# Patient Record
Sex: MALE | Race: BLACK OR AFRICAN AMERICAN
[De-identification: names, ages, dates, MRNs, and addresses within clinical notes are randomized per-mention and may not be internally consistent; named-entity substitution may affect disease eponyms.]

---

## 2018-07-23 ENCOUNTER — HOSPITAL ENCOUNTER (INPATIENT)
Dept: HOSPITAL 72 - EMR | Age: 53
LOS: 33 days | Discharge: NURSING, REHAB OR LONG-TERM CARE | DRG: 870 | End: 2018-08-25
Payer: COMMERCIAL

## 2018-07-23 VITALS — WEIGHT: 178.2 LBS | HEIGHT: 72 IN | BODY MASS INDEX: 24.14 KG/M2

## 2018-07-23 DIAGNOSIS — K59.00: ICD-10-CM

## 2018-07-23 DIAGNOSIS — E83.52: ICD-10-CM

## 2018-07-23 DIAGNOSIS — G93.1: ICD-10-CM

## 2018-07-23 DIAGNOSIS — R40.3: ICD-10-CM

## 2018-07-23 DIAGNOSIS — J96.20: ICD-10-CM

## 2018-07-23 DIAGNOSIS — N40.0: ICD-10-CM

## 2018-07-23 DIAGNOSIS — Z93.0: ICD-10-CM

## 2018-07-23 DIAGNOSIS — A41.9: Primary | ICD-10-CM

## 2018-07-23 DIAGNOSIS — I10: ICD-10-CM

## 2018-07-23 DIAGNOSIS — J18.9: ICD-10-CM

## 2018-07-23 DIAGNOSIS — E11.9: ICD-10-CM

## 2018-07-23 DIAGNOSIS — E83.39: ICD-10-CM

## 2018-07-23 DIAGNOSIS — E87.1: ICD-10-CM

## 2018-07-23 DIAGNOSIS — N13.30: ICD-10-CM

## 2018-07-23 DIAGNOSIS — T80.211A: ICD-10-CM

## 2018-07-23 DIAGNOSIS — Z99.11: ICD-10-CM

## 2018-07-23 DIAGNOSIS — N39.0: ICD-10-CM

## 2018-07-23 DIAGNOSIS — Z93.1: ICD-10-CM

## 2018-07-23 DIAGNOSIS — Z93.3: ICD-10-CM

## 2018-07-23 DIAGNOSIS — G40.909: ICD-10-CM

## 2018-07-23 DIAGNOSIS — K43.5: ICD-10-CM

## 2018-07-23 DIAGNOSIS — R65.21: ICD-10-CM

## 2018-07-23 PROCEDURE — 85044 MANUAL RETICULOCYTE COUNT: CPT

## 2018-07-23 PROCEDURE — 80048 BASIC METABOLIC PNL TOTAL CA: CPT

## 2018-07-23 PROCEDURE — 85651 RBC SED RATE NONAUTOMATED: CPT

## 2018-07-23 PROCEDURE — 82270 OCCULT BLOOD FECES: CPT

## 2018-07-23 PROCEDURE — 99291 CRITICAL CARE FIRST HOUR: CPT

## 2018-07-23 PROCEDURE — 87086 URINE CULTURE/COLONY COUNT: CPT

## 2018-07-23 PROCEDURE — 85007 BL SMEAR W/DIFF WBC COUNT: CPT

## 2018-07-23 PROCEDURE — 86140 C-REACTIVE PROTEIN: CPT

## 2018-07-23 PROCEDURE — 80069 RENAL FUNCTION PANEL: CPT

## 2018-07-23 PROCEDURE — 83550 IRON BINDING TEST: CPT

## 2018-07-23 PROCEDURE — 87181 SC STD AGAR DILUTION PER AGT: CPT

## 2018-07-23 PROCEDURE — 93005 ELECTROCARDIOGRAM TRACING: CPT

## 2018-07-23 PROCEDURE — 84484 ASSAY OF TROPONIN QUANT: CPT

## 2018-07-23 PROCEDURE — 82728 ASSAY OF FERRITIN: CPT

## 2018-07-23 PROCEDURE — 84443 ASSAY THYROID STIM HORMONE: CPT

## 2018-07-23 PROCEDURE — 85610 PROTHROMBIN TIME: CPT

## 2018-07-23 PROCEDURE — 71045 X-RAY EXAM CHEST 1 VIEW: CPT

## 2018-07-23 PROCEDURE — 85730 THROMBOPLASTIN TIME PARTIAL: CPT

## 2018-07-23 PROCEDURE — 87081 CULTURE SCREEN ONLY: CPT

## 2018-07-23 PROCEDURE — 93970 EXTREMITY STUDY: CPT

## 2018-07-23 PROCEDURE — 81003 URINALYSIS AUTO W/O SCOPE: CPT

## 2018-07-23 PROCEDURE — 87340 HEPATITIS B SURFACE AG IA: CPT

## 2018-07-23 PROCEDURE — 82553 CREATINE MB FRACTION: CPT

## 2018-07-23 PROCEDURE — 94150 VITAL CAPACITY TEST: CPT

## 2018-07-23 PROCEDURE — 83605 ASSAY OF LACTIC ACID: CPT

## 2018-07-23 PROCEDURE — 86705 HEP B CORE ANTIBODY IGM: CPT

## 2018-07-23 PROCEDURE — 94002 VENT MGMT INPAT INIT DAY: CPT

## 2018-07-23 PROCEDURE — 86803 HEPATITIS C AB TEST: CPT

## 2018-07-23 PROCEDURE — 82550 ASSAY OF CK (CPK): CPT

## 2018-07-23 PROCEDURE — 36415 COLL VENOUS BLD VENIPUNCTURE: CPT

## 2018-07-23 PROCEDURE — 85060 BLOOD SMEAR INTERPRETATION: CPT

## 2018-07-23 PROCEDURE — 86709 HEPATITIS A IGM ANTIBODY: CPT

## 2018-07-23 PROCEDURE — 83690 ASSAY OF LIPASE: CPT

## 2018-07-23 PROCEDURE — 82746 ASSAY OF FOLIC ACID SERUM: CPT

## 2018-07-23 PROCEDURE — 82962 GLUCOSE BLOOD TEST: CPT

## 2018-07-23 PROCEDURE — 83540 ASSAY OF IRON: CPT

## 2018-07-23 PROCEDURE — 84100 ASSAY OF PHOSPHORUS: CPT

## 2018-07-23 PROCEDURE — 76937 US GUIDE VASCULAR ACCESS: CPT

## 2018-07-23 PROCEDURE — 94003 VENT MGMT INPAT SUBQ DAY: CPT

## 2018-07-23 PROCEDURE — 85025 COMPLETE CBC W/AUTO DIFF WBC: CPT

## 2018-07-23 PROCEDURE — 80202 ASSAY OF VANCOMYCIN: CPT

## 2018-07-23 PROCEDURE — 87040 BLOOD CULTURE FOR BACTERIA: CPT

## 2018-07-23 PROCEDURE — 83880 ASSAY OF NATRIURETIC PEPTIDE: CPT

## 2018-07-23 PROCEDURE — 93306 TTE W/DOPPLER COMPLETE: CPT

## 2018-07-23 PROCEDURE — 36569 INSJ PICC 5 YR+ W/O IMAGING: CPT

## 2018-07-23 PROCEDURE — 83735 ASSAY OF MAGNESIUM: CPT

## 2018-07-23 PROCEDURE — 74176 CT ABD & PELVIS W/O CONTRAST: CPT

## 2018-07-23 PROCEDURE — 80053 COMPREHEN METABOLIC PANEL: CPT

## 2018-07-24 VITALS — DIASTOLIC BLOOD PRESSURE: 64 MMHG | SYSTOLIC BLOOD PRESSURE: 94 MMHG

## 2018-07-24 VITALS — SYSTOLIC BLOOD PRESSURE: 88 MMHG | DIASTOLIC BLOOD PRESSURE: 66 MMHG

## 2018-07-24 VITALS — SYSTOLIC BLOOD PRESSURE: 90 MMHG | DIASTOLIC BLOOD PRESSURE: 60 MMHG

## 2018-07-24 VITALS — SYSTOLIC BLOOD PRESSURE: 103 MMHG | DIASTOLIC BLOOD PRESSURE: 50 MMHG

## 2018-07-24 VITALS — DIASTOLIC BLOOD PRESSURE: 55 MMHG | SYSTOLIC BLOOD PRESSURE: 90 MMHG

## 2018-07-24 VITALS — SYSTOLIC BLOOD PRESSURE: 96 MMHG | DIASTOLIC BLOOD PRESSURE: 68 MMHG

## 2018-07-24 VITALS — SYSTOLIC BLOOD PRESSURE: 92 MMHG | DIASTOLIC BLOOD PRESSURE: 66 MMHG

## 2018-07-24 VITALS — SYSTOLIC BLOOD PRESSURE: 92 MMHG | DIASTOLIC BLOOD PRESSURE: 57 MMHG

## 2018-07-24 VITALS — DIASTOLIC BLOOD PRESSURE: 58 MMHG | SYSTOLIC BLOOD PRESSURE: 95 MMHG

## 2018-07-24 VITALS — SYSTOLIC BLOOD PRESSURE: 99 MMHG | DIASTOLIC BLOOD PRESSURE: 59 MMHG

## 2018-07-24 VITALS — SYSTOLIC BLOOD PRESSURE: 94 MMHG | DIASTOLIC BLOOD PRESSURE: 62 MMHG

## 2018-07-24 VITALS — DIASTOLIC BLOOD PRESSURE: 59 MMHG | SYSTOLIC BLOOD PRESSURE: 99 MMHG

## 2018-07-24 VITALS — DIASTOLIC BLOOD PRESSURE: 65 MMHG | SYSTOLIC BLOOD PRESSURE: 91 MMHG

## 2018-07-24 VITALS — DIASTOLIC BLOOD PRESSURE: 63 MMHG | SYSTOLIC BLOOD PRESSURE: 93 MMHG

## 2018-07-24 VITALS — SYSTOLIC BLOOD PRESSURE: 94 MMHG | DIASTOLIC BLOOD PRESSURE: 55 MMHG

## 2018-07-24 VITALS — DIASTOLIC BLOOD PRESSURE: 62 MMHG | SYSTOLIC BLOOD PRESSURE: 91 MMHG

## 2018-07-24 VITALS — SYSTOLIC BLOOD PRESSURE: 94 MMHG | DIASTOLIC BLOOD PRESSURE: 50 MMHG

## 2018-07-24 VITALS — DIASTOLIC BLOOD PRESSURE: 65 MMHG | SYSTOLIC BLOOD PRESSURE: 88 MMHG

## 2018-07-24 VITALS — SYSTOLIC BLOOD PRESSURE: 97 MMHG | DIASTOLIC BLOOD PRESSURE: 52 MMHG

## 2018-07-24 VITALS — DIASTOLIC BLOOD PRESSURE: 69 MMHG | SYSTOLIC BLOOD PRESSURE: 101 MMHG

## 2018-07-24 VITALS — SYSTOLIC BLOOD PRESSURE: 96 MMHG | DIASTOLIC BLOOD PRESSURE: 60 MMHG

## 2018-07-24 LAB
ADD MANUAL DIFF: NO
ALBUMIN SERPL-MCNC: 4 G/DL (ref 3.4–5)
ALBUMIN/GLOB SERPL: 0.8 {RATIO} (ref 1–2.7)
ALP SERPL-CCNC: 126 U/L (ref 46–116)
ALT SERPL-CCNC: 35 U/L (ref 12–78)
ANION GAP SERPL CALC-SCNC: 14 MMOL/L (ref 5–15)
APPEARANCE UR: (no result)
APTT BLD: 26 SEC (ref 23–33)
APTT PPP: (no result) S
AST SERPL-CCNC: 19 U/L (ref 15–37)
BILIRUB SERPL-MCNC: 0.5 MG/DL (ref 0.2–1)
BUN SERPL-MCNC: 34 MG/DL (ref 7–18)
CALCIUM SERPL-MCNC: 10.7 MG/DL (ref 8.5–10.1)
CHLORIDE SERPL-SCNC: 100 MMOL/L (ref 98–107)
CK MB SERPL-MCNC: 0.7 NG/ML (ref 0–3.6)
CK SERPL-CCNC: 155 U/L (ref 26–308)
CO2 SERPL-SCNC: 24 MMOL/L (ref 21–32)
CREAT SERPL-MCNC: 1.4 MG/DL (ref 0.55–1.3)
ERYTHROCYTE [DISTWIDTH] IN BLOOD BY AUTOMATED COUNT: 14.2 % (ref 11.6–14.8)
GLOBULIN SER-MCNC: 5.3 G/DL
GLUCOSE UR STRIP-MCNC: NEGATIVE MG/DL
HCT VFR BLD CALC: 43.7 % (ref 42–52)
HGB BLD-MCNC: 14.1 G/DL (ref 14.2–18)
INR PPP: 1.1 (ref 0.9–1.1)
KETONES UR QL STRIP: (no result)
LEUKOCYTE ESTERASE UR QL STRIP: (no result)
MCV RBC AUTO: 86 FL (ref 80–99)
NITRITE UR QL STRIP: NEGATIVE
PH UR STRIP: 5 [PH] (ref 4.5–8)
PHOSPHATE SERPL-MCNC: 2 MG/DL (ref 2.5–4.9)
PLATELET # BLD: 215 K/UL (ref 150–450)
POTASSIUM SERPL-SCNC: 4.4 MMOL/L (ref 3.5–5.1)
PROT UR QL STRIP: (no result)
RBC # BLD AUTO: 5.08 M/UL (ref 4.7–6.1)
SODIUM SERPL-SCNC: 138 MMOL/L (ref 136–145)
SP GR UR STRIP: 1.01 (ref 1–1.03)
UROBILINOGEN UR-MCNC: NORMAL MG/DL (ref 0–1)
WBC # BLD AUTO: 11.2 K/UL (ref 4.8–10.8)

## 2018-07-24 PROCEDURE — B548ZZA ULTRASONOGRAPHY OF SUPERIOR VENA CAVA, GUIDANCE: ICD-10-PCS

## 2018-07-24 PROCEDURE — 5A1955Z RESPIRATORY VENTILATION, GREATER THAN 96 CONSECUTIVE HOURS: ICD-10-PCS

## 2018-07-24 PROCEDURE — 02HV33Z INSERTION OF INFUSION DEVICE INTO SUPERIOR VENA CAVA, PERCUTANEOUS APPROACH: ICD-10-PCS

## 2018-07-24 RX ADMIN — LEVETIRACETAM SCH MG: 100 SOLUTION ORAL at 14:10

## 2018-07-24 RX ADMIN — Medication SCH MLS/HR: at 14:10

## 2018-07-24 RX ADMIN — LEVETIRACETAM SCH MG: 100 SOLUTION ORAL at 17:53

## 2018-07-24 RX ADMIN — CHLORHEXIDINE GLUCONATE SCH APPLIC: 213 SOLUTION TOPICAL at 20:27

## 2018-07-24 RX ADMIN — HEPARIN SODIUM SCH UNITS: 5000 INJECTION INTRAVENOUS; SUBCUTANEOUS at 20:53

## 2018-07-24 RX ADMIN — LEVETIRACETAM SCH MG: 100 SOLUTION ORAL at 22:17

## 2018-07-24 NOTE — PULMONOLGY CRITICAL CARE NOTE
Critical Care - Asmt/Plan


Problems:  


(1) Acute on chronic respiratory failure


(2) Acute on chronic renal insufficiency


(3) Severe sepsis


(4) Vegetative state


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


(6) Diabetes mellitus


Respiratory:  monitor respiratory rate, adjust FIO2, CXR


Cardiac:  continue to monitor HR/BP


Renal:  F/U I&O, keep IV fluid


Infectious Disease:  check cultures


Gastrointestinal:  hold feedings


Endocrine:  monitor blood sugar


Hematologic:  monitor H/H, transfuse if hgb<8.5


Neurologic:  PRN Ativan, keep patient comfortable


Affect:  PRN ativan


Prophylaxis:  Protonix


Disposition:  keep in ICU


Discussed with:  nurses, consultants, 





Critical Care - Objective





Last 24 Hour Vital Signs








  Date Time  Temp Pulse Resp B/P (MAP) Pulse Ox O2 Delivery O2 Flow Rate FiO2


 


7/24/18 09:00  107 18 88/65 (73) 99   


 


7/24/18 08:00 98.3 104 18 91/65 (74) 99   





 98.3       


 


7/24/18 07:29  103 22     54


 


7/24/18 07:08  107 18 88/66 (73) 100   


 


7/24/18 06:53      Mechanical Ventilator  


 


7/24/18 06:28        54


 


7/24/18 06:20 99.7 124 22 93/63 100 Mechanical Ventilator  54





 99.7       


 


7/24/18 06:09      Mechanical Ventilator  


 


7/24/18 06:00 99.7 116 18 92/57 (69) 99   





 99.7       


 


7/24/18 05:50  120      


 


7/24/18 05:28 99.7 124 22 93/63 100 Mechanical Ventilator  54





 99.7       


 


7/24/18 05:19  133 22     54


 


7/24/18 03:40  124 18 96/68 100 Mechanical Ventilator  54


 


7/24/18 03:19 103.6       


 


7/24/18 02:54  134 18     54


 


7/24/18 00:58  147 18     54


 


7/24/18 00:25 103.6  13 101/69 93 Mechanical Ventilator  50





 103.6       


 


7/23/18 23:50 103.7 144 13 101/69 93 Mechanical Ventilator  50





 103.6       








Status:  obtunded


Condition:  critical


HEENT:  atraumatic


Lungs:  clear


Heart:  HR/BP stable


Abdomen:  soft, active bowel sounds


Extremities:  no C/C/E, edema


Decubiti:  location


Accucheck:  250





Critical Care - Subjective


ROS Limited/Unobtainable:  Yes


ICU Day:  1


Interval Events:


52 year old male with hx of chronic trach, PEG, colostomy, DM, seizures, 

nursing home resident brought in to ER with CC of Fever. Pt was hypotensive on 

admission and is admitted to ICU for further work up.


Condition:  critical


IV Access:  PICC


FI02:  54


Vent Support Breath Rate:  18


Vent Support Mode:  AC


Vent Tidal Volume:  600


Sputum Amount:  Moderate


PEEP:  5.0


PIP:  39


I&O:











Intake and Output  


 


 7/23/18 7/24/18





 19:00 07:00


 


Intake Total  2050 ml


 


Output Total  603 ml


 


Balance  1447 ml


 


  


 


Intake Oral  0 ml


 


Free Water  50 ml


 


IV Total  2000 ml


 


Output Urine Total  603 ml








Labs:





Laboratory Tests








Test


  7/24/18


00:05 7/24/18


01:20 7/24/18


01:43


 


White Blood Count


  11.2 K/UL


(4.8-10.8)  H 


  


 


 


Red Blood Count


  5.08 M/UL


(4.70-6.10) 


  


 


 


Hemoglobin


  14.1 G/DL


(14.2-18.0)  L 


  


 


 


Hematocrit


  43.7 %


(42.0-52.0) 


  


 


 


Mean Corpuscular Volume 86 FL (80-99)    


 


Mean Corpuscular Hemoglobin


  27.7 PG


(27.0-31.0) 


  


 


 


Mean Corpuscular Hemoglobin


Concent 32.2 G/DL


(32.0-36.0) 


  


 


 


Red Cell Distribution Width


  14.2 %


(11.6-14.8) 


  


 


 


Platelet Count


  215 K/UL


(150-450) 


  


 


 


Mean Platelet Volume


  10.4 FL


(6.5-10.1)  H 


  


 


 


Neutrophils (%) (Auto)


  % (45.0-75.0)


  


  


 


 


Lymphocytes (%) (Auto)


  % (20.0-45.0)


  


  


 


 


Monocytes (%) (Auto)  % (1.0-10.0)    


 


Eosinophils (%) (Auto)  % (0.0-3.0)    


 


Basophils (%) (Auto)  % (0.0-2.0)    


 


Prothrombin Time


  11.1 SEC


(9.30-11.50) 


  


 


 


Prothromb Time International


Ratio 1.1 (0.9-1.1)  


  


  


 


 


Activated Partial


Thromboplast Time 26 SEC (23-33)


  


  


 


 


Sodium Level


  138 MMOL/L


(136-145) 


  


 


 


Potassium Level


  4.4 MMOL/L


(3.5-5.1) 


  


 


 


Chloride Level


  100 MMOL/L


() 


  


 


 


Carbon Dioxide Level


  24 MMOL/L


(21-32) 


  


 


 


Anion Gap


  14 mmol/L


(5-15) 


  


 


 


Blood Urea Nitrogen


  34 mg/dL


(7-18)  H 


  


 


 


Creatinine


  1.4 MG/DL


(0.55-1.30)  H 


  


 


 


Estimat Glomerular Filtration


Rate > 60 mL/min


(>60) 


  


 


 


Glucose Level


  232 MG/DL


()  H 


  


 


 


Lactic Acid Level


  4.00 mmol/L


(0.4-2.0)  H 4.20 mmol/L


(0.66-2.22)  H 


 


 


Calcium Level


  10.7 MG/DL


(8.5-10.1)  H 


  


 


 


Phosphorus Level


  2.0 MG/DL


(2.5-4.9)  L 


  


 


 


Magnesium Level


  1.9 MG/DL


(1.8-2.4) 


  


 


 


Total Bilirubin


  0.5 MG/DL


(0.2-1.0) 


  


 


 


Aspartate Amino Transf


(AST/SGOT) 19 U/L (15-37)


  


  


 


 


Alanine Aminotransferase


(ALT/SGPT) 35 U/L (12-78)


  


  


 


 


Alkaline Phosphatase


  126 U/L


()  H 


  


 


 


Total Creatine Kinase


  155 U/L


() 


  


 


 


Creatine Kinase MB


  0.7 NG/ML


(0.0-3.6) 


  


 


 


Creatine Kinase MB Relative


Index 0.4  


  


  


 


 


Troponin I


  0.000 ng/mL


(0.000-0.056) 


  


 


 


Pro-B-Type Natriuretic Peptide


  37 pg/mL


(0-125) 


  


 


 


Total Protein


  9.3 G/DL


(6.4-8.2)  H 


  


 


 


Albumin


  4.0 G/DL


(3.4-5.0) 


  


 


 


Globulin 5.3 g/dL    


 


Albumin/Globulin Ratio


  0.8 (1.0-2.7)


L 


  


 


 


Lipase


  172 U/L


() 


  


 


 


Urine Color   Red  


 


Urine Appearance   Cloudy  


 


Urine pH   5 (4.5-8.0)  


 


Urine Specific Gravity


  


  


  1.015


(1.005-1.035)


 


Urine Protein


  


  


  4+ (NEGATIVE)


H


 


Urine Glucose (UA)


  


  


  Negative


(NEGATIVE)


 


Urine Ketones


  


  


  1+ (NEGATIVE)


H


 


Urine Occult Blood


  


  


  5+ (NEGATIVE)


H


 


Urine Nitrite


  


  


  Negative


(NEGATIVE)


 


Urine Bilirubin


  


  


  Negative


(NEGATIVE)


 


Urine Urobilinogen


  


  


  Normal MG/DL


(0.0-1.0)


 


Urine Leukocyte Esterase


  


  


  2+ (NEGATIVE)


H


 


Urine RBC


  


  


  Tntc /HPF (0 -


0)  H


 


Urine WBC


  


  


  40-60 /HPF (0


- 0)  H


 


Urine Squamous Epithelial


Cells 


  


  None /LPF


(NONE/OCC)


 


Urine Bacteria


  


  


  Few /HPF


(NONE)

















Yury Pena MD Jul 24, 2018 09:44

## 2018-07-24 NOTE — CONSULTATION
DATE OF CONSULTATION:  07/24/2018



CONSULTING PHYSICIAN:  Jewel Templeton M.D.



REFERRING PHYSICIAN:  Tejas Gerber M.D.



REASON FOR CONSULTATION:

1. Acute kidney injury.

2. Hypotension.



HISTORY OF PRESENT ILLNESS:  The patient is a 52-year-old gentleman, sent

in from the nursing home facility overnight for further evaluation and

care of sepsis with fever and tachycardia.  The patient has a long time

tracheostomy and is ventilatory dependent.  The patient found to be

hypotensive, possible pneumonia, was aggressively hydrated, and noted

creatinine of 1.4.  He has a stoma colostomy noted with a G-tube.



PAST MEDICAL HISTORY:

1. Chronic respiratory failure, ventilatory dependent.

2. Constipation.

3. Hypertension.

4. Diabetes mellitus.

5. Seizure disorder.



FAMILY HISTORY:  Positive for hypertension.



PAST SURGICAL HISTORY:

1. Colostomy with noted stoma.

2. G-tube.

3. Tracheostomy.



LABORATORY DATA:  Laboratories dated July 24, 2018, sodium 138, potassium

4.4, creatinine 1.4, BUN 34, calcium 10.7, phosphorus of 2, and magnesium

1.9.  White cell count 11.2, hemoglobin 14.1, and platelet count 215.



PHYSICAL EXAMINATION:

VITAL SIGNS:  Blood pressure 88/66, respiratory rate 18, pulse 107, and

temperature 99.7.

GENERAL:  The patient is awake, in no overt distress.

HEENT:  Extraocular muscles intact.  No lymphadenopathy noted.

Tracheostomy noted.

CARDIOVASCULAR:  S1 and S2.  Tachycardic.  No rubs or gallops.

PULMONARY:  Mild diffuse expiratory rhonchi with basilar rales.

ABDOMEN:  Obese and nondistended.  The G-tube and stoma noted.

EXTREMITIES:  No edema.  Fair pedal pulses.



ASSESSMENT AND PLAN:

1. Acute kidney injury at this time secondary to ischemic ATN from

hypotension and underlying sepsis.  Continue to maintain hemodynamic

stability with a MAP greater than 65 mmHg.  IV fluid bolus p.r.n. along

with IV pressors as required.  Avoid any nephrotoxins.  CT of the abdomen

and pelvis do not show any acute renal abnormalities.

2. Septic shock.  At this time, IV antibiotics per Infectious Disease.

Continue IV fluids and p.r.n. IV pressors.

3. Chronic respiratory failure.  The patient has tracheostomy, on

mechanical ventilation.  Defer management to Dr. Pena.

4. Hypophosphatemia.  We will replace.

5. Hypercalcemia.  Recheck laboratories in the a.m.  Possible component

of intravascular volume depletion.



Let me take this opportunity to thank Dr. Gerber.









  ______________________________________________

  Jewel Templeton MD





DR:  HDP/PSM

D:  07/24/2018 08:30

T:  07/24/2018 22:20

JOB#:  [id number]

CC:



RADHA

## 2018-07-24 NOTE — DIAGNOSTIC IMAGING REPORT
Indication: Abdominal pain for 2 days

 

Technique: Spiral acquisitions obtained through the abdomen and pelvis. No oral

contrast utilized, per emergency room physician request No IV contrast utilized,  per

referring physician request.. Multiplanar reconstructions were generated. Total dose

length product 1148.76 mGycm. CTDIvol(s) 18.6 mGy. Dose reduction achieved using

automated exposure control

 

 

Comparison: None

 

Findings: There is a gastrostomy tube in good position. The appendix is normal. There

is a colostomy at the level of the hepatic flexure of the colon. There is also a

mucous fistula beginning at the same level. A loop of small bowel is herniated into

the colostomy defect. This does not appear to be obstructive or strangulated. There

is a ball of inspissated contrast within the distal sigmoid colon. No evidence of

diverticulosis or diverticulitis. The distal esophagus is unremarkable. The duodenum

is unremarkable.

 

Multiple intrarenal calculi are seen within the right kidney, measuring up to 4 mm

diameter. There is very mild right hydronephrosis, but no definite obstructing stone

and no evidence of hydroureter. There is a staghorn calculus within the left renal

pelvis which measures 2 x 1 x 0.8 cm. There are also smaller intrarenal calculi on

the left. No hydronephrosis is demonstrated. The left kidney is slightly atrophic.

Lack of IV contrast limits assessment of the renal parenchyma. There is a small cyst

within the left kidney. There is nonspecific bilateral perinephric fat stranding.

 

Lack of IV contrast limits assessment of the other solid organs. The liver,

gallbladder, bile ducts, pancreas, adrenals are all unremarkable. No retroperitoneal

or mesenteric mass or adenopathy. There is a Guzmán catheter in place. The bladder is

nondistended. It contains a small amount of air consistent with the Guzmán

catheterization. There is some stranding of the perivesical fat and slight wall

thickening of the bladder

 

The lung bases demonstrate considerable atelectasis and consolidation bilaterally.

The bones are unremarkable except for mild degenerative spondylosis changes.

 

Impression: Right lower quadrant double barrel colostomy, as described. Small

peristomal hernia contains bowel loops without evidence of obstruction or

strangulation

 

Left renal staghorn calculus. Bilateral intrarenal calyceal calculi

 

Borderline hydronephrosis on the right without evidence of downstream obstructive

lesion. May indicate mild ureteral pelvic junction obstruction

 

Somewhat atrophic left kidney

 

Inspissated contrast ball within the distal sigmoid colon

 

Gastrostomy in good position

 

Nonspecific bilateral perinephric fat stranding, could indicate pyelonephritis or

could be chronic

 

Guzmán catheter in place. Apparent bladder wall thickening, possibly an artifact of

under distention but cystitis is not excludable, particularly in view of mild

perivesical fat stranding

 

Bilateral pulmonary parenchymal atelectasis and consolidation

 

Other findings as noted, including mild degenerative spondylosis, left renal cyst.

 

This agrees with the preliminary interpretation provided overnight by [facility]

teleradiology service.

 

The CT scanner at [facility] is accredited by the American College of

Radiology and the scans are performed using protocols designed to limit radiation

exposure to as low as reasonably achievable to attain images of sufficient resolution

adequate for diagnostic evaluation.

## 2018-07-24 NOTE — DIAGNOSTIC IMAGING REPORT
Indications: Needs long-term IV access

 

Technique: Procedure performed at bedside. Procedural timeout performed. Ultrasound

confirms patent compressible left brachial vein. Total sterile technique, including

sterile probe cover and sterile gel, sterile gloves, hand hygiene, hat, mask,,

sterile gown, large sterile drape, and preparation with 2% chlorhexidine utilized.

Local anesthesia with 1% lidocaine. Under real-time ultrasound guidance, puncture

left brachial vein using 21-gauge needle, passage 0.018 guidewire, exchange for 5

[place] peel-away sheath. 5 [place] Bard dual-lumen power PICC cut to 41 cm. It was

inserted through the peel-away sheath. Peel-away sheath and guidewire removed.

Catheter fixed to the skin. Both catheter ports aspirated and flushed. Patient

tolerated procedure well, without immediate complication. Followup chest x-ray

obtained, documents catheter tip position at the high right atrium

 

Impression: Successful bedside placement of right arm PICC under sonographic

guidance, as described above.

## 2018-07-24 NOTE — DIAGNOSTIC IMAGING REPORT
Indication: Shortness of breath

 

Technique: One view of the chest

 

Comparison: none

 

Findings: There is thickening of the minor fissure on the right versus perihilar

atelectasis. Rounded opacity projecting over the right upper lobe is presumably

external to the patient. There is mild interstitial congestion. There is suggestion

of small bilateral pleural effusions. The heart is mildly enlarged. There is a

tracheostomy

 

Impression: Cardiomegaly

 

Mild interstitial congestion

 

Suspect small bilateral pleural effusions

 

Other findings as noted

## 2018-07-24 NOTE — EMERGENCY ROOM REPORT
History of Present Illness


General


Chief Complaint:  Fever


Source:  Medical Record





Present Illness


HPI


Patient is sent in from nursing facility with reports of fever and tachycardia


Patient presents with tracheostomy


Is vent dependent





Patient himself has eyes closed and is unresponsive


It does limit the history of present illness


Unknown regarding vomiting


Patient has obvious blood at the meatus


Also has a colostomy bag in the right lower abdomen


And is still somewhat distended





Unknown regarding vomiting


Unknown regarding diarrhea


Allergies:  


Coded Allergies:  


     AZTREONAM (Verified  Allergy, Unknown, 7/23/18)





Patient History


Limited by:  medical condition


Past Medical History:  see triage record


Pertinent Family History:  unable to obtain


Reviewed Nursing Documentation:  PMH: Agreed; PSxH: Agreed





Nursing Documentation-PMH


Hx Diabetes:  Yes


Hx Gastrointestinal Problems:  Yes - BPH, GERD, G-tube, Colostomy


Hx Seizures:  Yes - Convulsion disorder





Review of Systems


All Other Systems:  limited - Other than the ones mentioned in the history of 

present illness all others are reviewed however they do stay limited due to the 

patient's mental status





Physical Exam





Vital Signs








  Date Time  Temp Pulse Resp B/P (MAP) Pulse Ox O2 Delivery O2 Flow Rate FiO2


 


7/23/18 23:50 103.7 144 13 101/69 93 Mechanical Ventilator  50





 103.6       








Sp02 EP Interpretation:  reviewed, normal


General Appearance:  moderate distress - Patient appears in moderate distress 

tachypnic


Head:  atraumatic


Eyes:  bilateral eye PERRL, bilateral eye other - Patient has poor dentition 

has tongue protruding through the oral mucosa,


ENT:  dry mucus membranes


Neck:  supple, other - Tracheostomy in place


Respiratory:  crackles - Diffusely


Cardiovascular #1:  tachycardia


Gastrointestinal:  other - Distended abdomen, colostomy bag in the right lower 

abdomen


Genitourinary:  other - Gross blood at the meatus


Musculoskeletal:  other - Patient chronically debilitated, does not move 

extremities significantly edematous diffusely,


Neurologic:  responsive - Minimally to physical stimuli


Skin:  other - Multiple skin breakdowns, edematous


Lymphatic:  no adenopathy





Procedures


Critical Care Time


Critical Care Time


75 minutes for multiple re-evaluations


Critical presentation concerning for life-threatening pathology


Not including any procedural time





Medical Decision Making


Diagnostic Impression:  


 Primary Impression:  


 Severe sepsis


ER Course


Patient is a fairly complex patient with multiple differential to consideration 

including but not limited to infectious ,cardiac cardiopulmonary and vascular 

emergencies





Patient had elevated temperature which was addressed


Patient had IV bolus of fluids


Broad-spectrum antibiotics





Sepsis reexamination





Time:0400


VS refer to nursing note


cvs: RRR


respiratory: improved respiration


peripheral pulses: 2+radial


cap refill:<2 seconds


skin exam: warm, dry, not mottled





Patient requiring high level of care admission





Please refer to the phone log as multiple attempts have been made to make 

contact with the admitting physician





Labs








Test


  7/24/18


00:05 7/24/18


01:20 7/24/18


01:43


 


White Blood Count


  11.2 K/UL


(4.8-10.8) 


  


 


 


Red Blood Count


  5.08 M/UL


(4.70-6.10) 


  


 


 


Hemoglobin


  14.1 G/DL


(14.2-18.0) 


  


 


 


Hematocrit


  43.7 %


(42.0-52.0) 


  


 


 


Mean Corpuscular Volume 86 FL (80-99)   


 


Mean Corpuscular Hemoglobin


  27.7 PG


(27.0-31.0) 


  


 


 


Mean Corpuscular Hemoglobin


Concent 32.2 G/DL


(32.0-36.0) 


  


 


 


Red Cell Distribution Width


  14.2 %


(11.6-14.8) 


  


 


 


Platelet Count


  215 K/UL


(150-450) 


  


 


 


Mean Platelet Volume


  10.4 FL


(6.5-10.1) 


  


 


 


Neutrophils (%) (Auto)  % (45.0-75.0)   


 


Lymphocytes (%) (Auto)  % (20.0-45.0)   


 


Monocytes (%) (Auto)  % (1.0-10.0)   


 


Eosinophils (%) (Auto)  % (0.0-3.0)   


 


Basophils (%) (Auto)  % (0.0-2.0)   


 


Prothrombin Time


  11.1 SEC


(9.30-11.50) 


  


 


 


Prothromb Time International


Ratio 1.1 (0.9-1.1) 


  


  


 


 


Activated Partial


Thromboplast Time 26 SEC (23-33) 


  


  


 


 


Sodium Level


  138 MMOL/L


(136-145) 


  


 


 


Potassium Level


  4.4 MMOL/L


(3.5-5.1) 


  


 


 


Chloride Level


  100 MMOL/L


() 


  


 


 


Carbon Dioxide Level


  24 MMOL/L


(21-32) 


  


 


 


Anion Gap


  14 mmol/L


(5-15) 


  


 


 


Blood Urea Nitrogen


  34 mg/dL


(7-18) 


  


 


 


Creatinine


  1.4 MG/DL


(0.55-1.30) 


  


 


 


Estimat Glomerular Filtration


Rate > 60 mL/min


(>60) 


  


 


 


Glucose Level


  232 MG/DL


() 


  


 


 


Lactic Acid Level


  4.00 mmol/L


(0.4-2.0) 4.20 mmol/L


(0.66-2.22) 


 


 


Calcium Level


  10.7 MG/DL


(8.5-10.1) 


  


 


 


Phosphorus Level


  2.0 MG/DL


(2.5-4.9) 


  


 


 


Magnesium Level


  1.9 MG/DL


(1.8-2.4) 


  


 


 


Total Bilirubin


  0.5 MG/DL


(0.2-1.0) 


  


 


 


Aspartate Amino Transf


(AST/SGOT) 19 U/L (15-37) 


  


  


 


 


Alanine Aminotransferase


(ALT/SGPT) 35 U/L (12-78) 


  


  


 


 


Alkaline Phosphatase


  126 U/L


() 


  


 


 


Total Creatine Kinase


  155 U/L


() 


  


 


 


Creatine Kinase MB


  0.7 NG/ML


(0.0-3.6) 


  


 


 


Creatine Kinase MB Relative


Index 0.4 


  


  


 


 


Troponin I


  0.000 ng/mL


(0.000-0.056) 


  


 


 


Pro-B-Type Natriuretic Peptide


  37 pg/mL


(0-125) 


  


 


 


Total Protein


  9.3 G/DL


(6.4-8.2) 


  


 


 


Albumin


  4.0 G/DL


(3.4-5.0) 


  


 


 


Globulin 5.3 g/dL   


 


Albumin/Globulin Ratio 0.8 (1.0-2.7)   


 


Lipase


  172 U/L


() 


  


 


 


Urine Color   Red 


 


Urine Appearance   Cloudy 


 


Urine pH   5 (4.5-8.0) 


 


Urine Specific Gravity


  


  


  1.015


(1.005-1.035)


 


Urine Protein   4+ (NEGATIVE) 


 


Urine Glucose (UA)


  


  


  Negative


(NEGATIVE)


 


Urine Ketones   1+ (NEGATIVE) 


 


Urine Occult Blood   5+ (NEGATIVE) 


 


Urine Nitrite


  


  


  Negative


(NEGATIVE)


 


Urine Bilirubin


  


  


  Negative


(NEGATIVE)


 


Urine Urobilinogen


  


  


  Normal MG/DL


(0.0-1.0)


 


Urine Leukocyte Esterase   2+ (NEGATIVE) 


 


Urine RBC


  


  


  Tntc /HPF (0 -


0)


 


Urine WBC


  


  


  40-60 /HPF (0


- 0)


 


Urine Squamous Epithelial


Cells 


  


  None /LPF


(NONE/OCC)


 


Urine Bacteria


  


  


  Few /HPF


(NONE)








Rhythm Strip Diag. Results


EP Interpretation:  yes


Rate:  110


Rhythm:  no PVC's, no ectopy, other - Sinus tach





Chest X-Ray Diagnostic Results


Chest X-Ray Diagnostic Results :  


   Chest X-Ray Ordered:  Yes


   # of Views/Limited/Complete:  1 View


   Indication:  Chest Pain


   EP Interpretation:  Yes


   Interpretation:  no pneumothorax, other - Bilateral atelectasis, possible 

infrate, cardiac megaly


   Impression:  Other - Bilateral infiltates


   Electronically Signed by:  Mendoza Whyte DO





CT/MRI/US Diagnostic Results


CT/MRI/US Diagnostic Results :  


   Impression


CT abdomen pelvisRight lower quadrant peristomal hernia containing fat and 

bowel loopswithout evidence of associated


obstruction. No diverticulitis. Normal appendix.


Percutaneous gastrostomycatheter.


No radiopaque gallstones. No pancreatitis. Partial staghorn calculus in the 

left kidney. Additional


bilateral nonobstructing renal stones. No hydronephrosis. No ureteral or 

bladder stones. Bilateral


perinephric stranding maybe due to medical renal disease or pyelonephritis.


Normal caliber abdominal aorta.


Foleyballoon within decompressed bladder with associated wall thickening. 

Perivesicular stranding


noted. Correlate with lab values to exclude cystitis.


Bibasilar atelectasis/infiltrates. Debris/aspiration noted in bilateral lower 

lobe bronchi.


Cardiomegaly.


Elevated right hemidiaphragm.





Last Vital Signs








  Date Time  Temp Pulse Resp B/P (MAP) Pulse Ox O2 Delivery O2 Flow Rate FiO2


 


7/23/18 23:50 103.7 144 13 101/69 93 Mechanical Ventilator  50





 103.6       








Status:  improved


Disposition:  ADMITTED AS INPATIENT


Condition:  Critical











Mendoza Whyte DO Jul 24, 2018 00:35

## 2018-07-25 VITALS — DIASTOLIC BLOOD PRESSURE: 63 MMHG | SYSTOLIC BLOOD PRESSURE: 97 MMHG

## 2018-07-25 VITALS — SYSTOLIC BLOOD PRESSURE: 97 MMHG | DIASTOLIC BLOOD PRESSURE: 65 MMHG

## 2018-07-25 VITALS — DIASTOLIC BLOOD PRESSURE: 68 MMHG | SYSTOLIC BLOOD PRESSURE: 103 MMHG

## 2018-07-25 VITALS — SYSTOLIC BLOOD PRESSURE: 109 MMHG | DIASTOLIC BLOOD PRESSURE: 50 MMHG

## 2018-07-25 VITALS — DIASTOLIC BLOOD PRESSURE: 74 MMHG | SYSTOLIC BLOOD PRESSURE: 112 MMHG

## 2018-07-25 VITALS — DIASTOLIC BLOOD PRESSURE: 70 MMHG | SYSTOLIC BLOOD PRESSURE: 105 MMHG

## 2018-07-25 VITALS — DIASTOLIC BLOOD PRESSURE: 74 MMHG | SYSTOLIC BLOOD PRESSURE: 104 MMHG

## 2018-07-25 VITALS — SYSTOLIC BLOOD PRESSURE: 111 MMHG | DIASTOLIC BLOOD PRESSURE: 71 MMHG

## 2018-07-25 VITALS — DIASTOLIC BLOOD PRESSURE: 63 MMHG | SYSTOLIC BLOOD PRESSURE: 99 MMHG

## 2018-07-25 VITALS — SYSTOLIC BLOOD PRESSURE: 107 MMHG | DIASTOLIC BLOOD PRESSURE: 70 MMHG

## 2018-07-25 VITALS — SYSTOLIC BLOOD PRESSURE: 104 MMHG | DIASTOLIC BLOOD PRESSURE: 64 MMHG

## 2018-07-25 VITALS — DIASTOLIC BLOOD PRESSURE: 56 MMHG | SYSTOLIC BLOOD PRESSURE: 92 MMHG

## 2018-07-25 VITALS — SYSTOLIC BLOOD PRESSURE: 105 MMHG | DIASTOLIC BLOOD PRESSURE: 70 MMHG

## 2018-07-25 VITALS — SYSTOLIC BLOOD PRESSURE: 103 MMHG | DIASTOLIC BLOOD PRESSURE: 64 MMHG

## 2018-07-25 VITALS — DIASTOLIC BLOOD PRESSURE: 52 MMHG | SYSTOLIC BLOOD PRESSURE: 103 MMHG

## 2018-07-25 VITALS — DIASTOLIC BLOOD PRESSURE: 65 MMHG | SYSTOLIC BLOOD PRESSURE: 100 MMHG

## 2018-07-25 VITALS — SYSTOLIC BLOOD PRESSURE: 106 MMHG | DIASTOLIC BLOOD PRESSURE: 74 MMHG

## 2018-07-25 VITALS — DIASTOLIC BLOOD PRESSURE: 70 MMHG | SYSTOLIC BLOOD PRESSURE: 110 MMHG

## 2018-07-25 VITALS — SYSTOLIC BLOOD PRESSURE: 109 MMHG | DIASTOLIC BLOOD PRESSURE: 71 MMHG

## 2018-07-25 VITALS — SYSTOLIC BLOOD PRESSURE: 96 MMHG | DIASTOLIC BLOOD PRESSURE: 64 MMHG

## 2018-07-25 VITALS — DIASTOLIC BLOOD PRESSURE: 59 MMHG | SYSTOLIC BLOOD PRESSURE: 97 MMHG

## 2018-07-25 VITALS — SYSTOLIC BLOOD PRESSURE: 104 MMHG | DIASTOLIC BLOOD PRESSURE: 68 MMHG

## 2018-07-25 VITALS — DIASTOLIC BLOOD PRESSURE: 70 MMHG | SYSTOLIC BLOOD PRESSURE: 104 MMHG

## 2018-07-25 VITALS — SYSTOLIC BLOOD PRESSURE: 97 MMHG | DIASTOLIC BLOOD PRESSURE: 63 MMHG

## 2018-07-25 LAB
% IRON SATURATION: 7 % (ref 15–50)
ADD MANUAL DIFF: NO
ALBUMIN SERPL-MCNC: 3 G/DL (ref 3.4–5)
ANION GAP SERPL CALC-SCNC: 13 MMOL/L (ref 5–15)
ANION GAP SERPL CALC-SCNC: 13 MMOL/L (ref 5–15)
BASOPHILS NFR BLD AUTO: 0.5 % (ref 0–2)
BUN SERPL-MCNC: 20 MG/DL (ref 7–18)
BUN SERPL-MCNC: 20 MG/DL (ref 7–18)
CALCIUM SERPL-MCNC: 9.4 MG/DL (ref 8.5–10.1)
CALCIUM SERPL-MCNC: 9.4 MG/DL (ref 8.5–10.1)
CHLORIDE SERPL-SCNC: 107 MMOL/L (ref 98–107)
CHLORIDE SERPL-SCNC: 107 MMOL/L (ref 98–107)
CO2 SERPL-SCNC: 21 MMOL/L (ref 21–32)
CO2 SERPL-SCNC: 21 MMOL/L (ref 21–32)
CREAT SERPL-MCNC: 1.1 MG/DL (ref 0.55–1.3)
CREAT SERPL-MCNC: 1.1 MG/DL (ref 0.55–1.3)
EOSINOPHIL NFR BLD AUTO: 2.6 % (ref 0–3)
ERYTHROCYTE [DISTWIDTH] IN BLOOD BY AUTOMATED COUNT: 14.6 % (ref 11.6–14.8)
FERRITIN SERPL-MCNC: 1104 NG/ML (ref 8–388)
HCT VFR BLD CALC: 33.1 % (ref 42–52)
HGB BLD-MCNC: 10.4 G/DL (ref 14.2–18)
IRON SERPL-MCNC: 19 UG/DL (ref 50–175)
LYMPHOCYTES NFR BLD AUTO: 10.5 % (ref 20–45)
MCV RBC AUTO: 87 FL (ref 80–99)
MONOCYTES NFR BLD AUTO: 6.5 % (ref 1–10)
NEUTROPHILS NFR BLD AUTO: 80 % (ref 45–75)
PHOSPHATE SERPL-MCNC: 1.9 MG/DL (ref 2.5–4.9)
PLATELET # BLD: 173 K/UL (ref 150–450)
POTASSIUM SERPL-SCNC: 3.1 MMOL/L (ref 3.5–5.1)
POTASSIUM SERPL-SCNC: 3.2 MMOL/L (ref 3.5–5.1)
RBC # BLD AUTO: 3.83 M/UL (ref 4.7–6.1)
SODIUM SERPL-SCNC: 141 MMOL/L (ref 136–145)
SODIUM SERPL-SCNC: 141 MMOL/L (ref 136–145)
TIBC SERPL-MCNC: 256 UG/DL (ref 250–450)
UNSATURATED IRON BINDING: 237 UG/DL (ref 112–346)
WBC # BLD AUTO: 12 K/UL (ref 4.8–10.8)

## 2018-07-25 RX ADMIN — Medication SCH MLS/HR: at 12:45

## 2018-07-25 RX ADMIN — INSULIN ASPART SCH UNITS: 100 INJECTION, SOLUTION INTRAVENOUS; SUBCUTANEOUS at 21:06

## 2018-07-25 RX ADMIN — LEVETIRACETAM SCH MG: 100 SOLUTION ORAL at 22:08

## 2018-07-25 RX ADMIN — DEXTROSE MONOHYDRATE SCH MLS/HR: 50 INJECTION, SOLUTION INTRAVENOUS at 22:09

## 2018-07-25 RX ADMIN — Medication SCH MLS/HR: at 00:20

## 2018-07-25 RX ADMIN — INSULIN ASPART SCH UNITS: 100 INJECTION, SOLUTION INTRAVENOUS; SUBCUTANEOUS at 13:25

## 2018-07-25 RX ADMIN — HEPARIN SODIUM SCH UNITS: 5000 INJECTION INTRAVENOUS; SUBCUTANEOUS at 21:06

## 2018-07-25 RX ADMIN — HEPARIN SODIUM SCH UNITS: 5000 INJECTION INTRAVENOUS; SUBCUTANEOUS at 10:00

## 2018-07-25 RX ADMIN — CHLORHEXIDINE GLUCONATE SCH APPLIC: 213 SOLUTION TOPICAL at 19:52

## 2018-07-25 RX ADMIN — LEVETIRACETAM SCH MG: 100 SOLUTION ORAL at 12:51

## 2018-07-25 RX ADMIN — DEXTROSE MONOHYDRATE SCH MLS/HR: 50 INJECTION, SOLUTION INTRAVENOUS at 12:45

## 2018-07-25 RX ADMIN — LEVETIRACETAM SCH MG: 100 SOLUTION ORAL at 05:52

## 2018-07-25 RX ADMIN — INSULIN ASPART SCH UNITS: 100 INJECTION, SOLUTION INTRAVENOUS; SUBCUTANEOUS at 18:14

## 2018-07-25 NOTE — CARDIOLOGY REPORT
--------------- APPROVED REPORT --------------





EKG Measurement

Heart [id number]

WY 140P5

[id number]

[id number]

[id number]





Sinus tachycardia

Possible Left atrial enlargement

Cannot rule out Inferior infarct, age undetermined

Abnormal ECG

## 2018-07-25 NOTE — CONSULTATION
History of Present Illness


General


Date patient seen:  Jul 25, 2018


Chief Complaint:  Fever


Referring physician:  JUAN DIEGO SEN


Reason for Consultation:  PROLAPSE STOMA





Present Illness


HPI


53 y/o M with hx of chronic resp failure trach/vent dependant, BPH, GERD, HTN, 

DM2, seizure disorder, colostomy, s/p GT, constipation presents to ED on 7/23 

with fever, tachycardia and hypotension. Found to have BRAYDON, T up to 103 and 

consolidation on CXR.





No n/v/d


Allergies:  


Coded Allergies:  


     AZTREONAM (Verified  Allergy, Unknown, 7/23/18)





Medication History


Scheduled


Baclofen* (Baclofen*), 10 MG GT THREE TIMES A DAY, (Reported)


Bisacodyl* (Dulcolax*), 10 MG RECTAL DAILY, (Reported)


Calcium Carbonate (Calcium Carbonate), 1,000 MG GT BID, (Reported)


Chlorhexidine Gluconate (Peridex), 15 ML MM Q12HR, (Reported)


Cholecalciferol (Vitamin D3)* (Vitamin D*), 5,000 UNIT GT ONCE A WEEK, (Reported

)


Clonidine Hcl* (Catapres*), 0.1 MG GT EVERY 6 HOURS, (Reported)


Cranberry (Cranberry), 400 MG GT DAILY, (Reported)


Dextran 70/Hypromellose (Artificial Tears Eye Drops*), 1 DROP BOTH EYES Q4HR, (

Reported)


Docusate Sodium* (Docusate Sodium*), 100 MG GT TWICE A DAY, (Reported)


Famotidine (Famotidine), 20 MG GT DAILY, (Reported)


Glycopyrrolate (Robinul), 2 MG GT BID, (Reported)


Insulin Regular, Human* (Novolin R*), 0 SUBQ .SLIDING SCALE, (Reported)


Ipratropium/Albuterol Sulfate (DuoNeb 0.5-3(2.5)mg/3ml), 3 ML HHN Q3HR, (

Reported)


Levetiracetam* (Levetiracetam*), 1,000 MG GT TID, (Reported)


Losartan Potassium* (Losartan Potassium*), 25 MG GT DAILY, (Reported)


Multivitamin With Minerals (Multivitamins With Minerals*), 1 TAB GT DAILY, (

Reported)


Nystatin* (Nystatin*), 1 APPLIC TOPIC THREE TIMES A DAY, (Reported)


Polyethylene Glycol 3350* (Miralax*), 17 GM GT DAILY, (Reported)


Sitagliptin* (Januvia*), 50 MG GT DAILY, (Reported)


Spironolactone* (Aldactone*), 25 MG GT DAILY, (Reported)





Scheduled PRN


Acetaminophen 160MG/5ML* (Acetaminophen*), 20.3 ML GT Q4HR PRN for Fever/

Headache/Mild Pain, (Reported)





Miscellaneous Medications


Lactulose (Lactulose*), 30 ML GT, (Reported)





Patient History


Healthcare decision maker


unk


Resuscitation status


Full Code


Advanced Directive on File


No


Patient History Narrative


PMHx: as above





Shx: reviewed





Fhx: non contributory





Physical Exam


Physical Exam Narrative





GENERAL:  The patient is awake, in no overt distress.


HEENT:  Extraocular muscles intact.  No lymphadenopathy noted.


Tracheostomy noted.


CARDIOVASCULAR:  S1 and S2.  Tachycardic.  No rubs or gallops.


PULMONARY:  Mild diffuse expiratory rhonchi with basilar rales.


ABDOMEN:  Obese and nondistended.  The G-tube and stoma noted.


EXTREMITIES:  No edema.  Fair pedal pulses.





Last 24 Hour Vital Signs








  Date Time  Temp Pulse Resp B/P (MAP) Pulse Ox O2 Delivery O2 Flow Rate FiO2


 


7/25/18 16:00      Mechanical Ventilator  


 


7/25/18 16:00        35


 


7/25/18 15:06  86 16     30


 


7/25/18 13:17  90 16     30


 


7/25/18 12:00      Mechanical Ventilator  


 


7/25/18 12:00        35


 


7/25/18 12:00  90      


 


7/25/18 10:59  97 16     30


 


7/25/18 08:39  96 16     30


 


7/25/18 08:00        35


 


7/25/18 08:00  93      


 


7/25/18 08:00      Mechanical Ventilator  


 


7/25/18 07:29  91 16     30


 


7/25/18 07:00  95 16 97/63 (74) 100   


 


7/25/18 06:00  94 16 103/68 (80) 100   


 


7/25/18 05:29  96 16     30


 


7/25/18 05:00  102 16 92/56 (68) 98   


 


7/25/18 04:00        35


 


7/25/18 04:00      Mechanical Ventilator  


 


7/25/18 04:00  100      


 


7/25/18 04:00 98.4 99 16 97/59 (72) 98   





 98.4       


 


7/25/18 03:21  97 16     30


 


7/25/18 03:00  102 16 96/64 (75) 98   


 


7/25/18 02:00  96 17 99/63 (75) 98   


 


7/25/18 01:09  98 16     30


 


7/25/18 01:00  103 16 103/52 (69) 97   


 


7/25/18 00:00 98.6 102 16 109/50 (69) 98   





 98.6       


 


7/25/18 00:00        35


 


7/25/18 00:00  102      


 


7/25/18 00:00      Mechanical Ventilator  


 


7/24/18 23:00  100 16 103/50 (67) 100   


 


7/24/18 22:38  99 16     30


 


7/24/18 22:00  100 16 94/50 (65) 100   


 


7/24/18 21:17  96 16     30


 


7/24/18 21:00  97 16 94/55 (68) 99   


 


7/24/18 20:00  99      


 


7/24/18 20:00 98.1 98 16 90/60 (70) 100   





 98.1       


 


7/24/18 20:00        35


 


7/24/18 20:00      Mechanical Ventilator  


 


7/24/18 19:15  101 16     30


 


7/24/18 19:00  101 17 90/55 (67) 99   


 


7/24/18 18:00  99 17 95/58 (70) 98   


 


7/24/18 17:00  100 17 94/64 (74) 98   


 


7/24/18 16:56  98 16     35

















Intake and Output  


 


 7/24/18 7/25/18





 19:00 07:00


 


Intake Total 1125 ml 2020 ml


 


Output Total 840 ml 1085 ml


 


Balance 285 ml 935 ml


 


  


 


IV Total 1125 ml 1750 ml


 


Tube Feeding  270 ml


 


Output Urine Total 840 ml 735 ml


 


Chest Tube Drainage Total  350 ml











Laboratory Tests








Test


  7/24/18


17:30 7/25/18


05:00 7/25/18


10:30


 


Lactic Acid Level


  1.40 mmol/L


(0.4-2.0) 


  


 


 


White Blood Count


  


  12.0 K/UL


(4.8-10.8)  H 


 


 


Red Blood Count


  


  3.83 M/UL


(4.70-6.10)  L 


 


 


Hemoglobin


  


  10.4 G/DL


(14.2-18.0)  L 


 


 


Hematocrit


  


  33.1 %


(42.0-52.0)  L 


 


 


Mean Corpuscular Volume  87 FL (80-99)   


 


Mean Corpuscular Hemoglobin


  


  27.2 PG


(27.0-31.0) 


 


 


Mean Corpuscular Hemoglobin


Concent 


  31.4 G/DL


(32.0-36.0)  L 


 


 


Red Cell Distribution Width


  


  14.6 %


(11.6-14.8) 


 


 


Platelet Count


  


  173 K/UL


(150-450) 


 


 


Mean Platelet Volume


  


  8.8 FL


(6.5-10.1) 


 


 


Neutrophils (%) (Auto)


  


  80.0 %


(45.0-75.0)  H 


 


 


Lymphocytes (%) (Auto)


  


  10.5 %


(20.0-45.0)  L 


 


 


Monocytes (%) (Auto)


  


  6.5 %


(1.0-10.0) 


 


 


Eosinophils (%) (Auto)


  


  2.6 %


(0.0-3.0) 


 


 


Basophils (%) (Auto)


  


  0.5 %


(0.0-2.0) 


 


 


Differential Total Cells


Counted 


  100  


  


 


 


Neutrophils % (Manual)  80 % (45-75)  H 


 


Lymphocytes % (Manual)  10 % (20-45)  L 


 


Monocytes % (Manual)  7 % (1-10)   


 


Eosinophils % (Manual)  3 % (0-3)   


 


Basophils % (Manual)  0 % (0-2)   


 


Band Neutrophils  0 % (0-8)   


 


Platelet Estimate  Adequate   


 


Platelet Morphology  Normal   


 


Microcytosis  1+   


 


Tear Drop Cells  1+   


 


Reticulocyte Count


  


  2.3 %


(0.0-2.0)  H 


 


 


Sodium Level


  


  141 MMOL/L


(136-145) 


 


 


Potassium Level


  


  3.2 MMOL/L


(3.5-5.1)  L 


 


 


Chloride Level


  


  107 MMOL/L


() 


 


 


Carbon Dioxide Level


  


  21 MMOL/L


(21-32) 


 


 


Anion Gap


  


  13 mmol/L


(5-15) 


 


 


Blood Urea Nitrogen


  


  20 mg/dL


(7-18)  H 


 


 


Creatinine


  


  1.1 MG/DL


(0.55-1.30) 


 


 


Estimat Glomerular Filtration


Rate 


  > 60 mL/min


(>60) 


 


 


Glucose Level


  


  195 MG/DL


()  H 


 


 


Calcium Level


  


  9.4 MG/DL


(8.5-10.1) 


 


 


Phosphorus Level


  


  1.9 MG/DL


(2.5-4.9)  L 


 


 


Iron Level


  


  19 ug/dL


()  L 


 


 


Total Iron Binding Capacity


  


  256 ug/dL


(250-450) 


 


 


Percent Iron Saturation  7 % (15-50)  L 


 


Unsaturated Iron Binding


  


  237 ug/dL


(112-346) 


 


 


Ferritin


  


  1104 NG/ML


(8-388)  H 


 


 


Albumin


  


  3.0 G/DL


(3.4-5.0)  L 


 


 


Folate


  


  42.2 NG/ML


(8.6-58.9) 


 


 


Thyroid Stimulating Hormone


(TSH) 


  1.629 uiU/mL


(0.358-3.740) 


 


 


Hepatitis A IgM Antibody   Pending  


 


Hepatitis B Surface Antigen   Pending  


 


Hepatitis B Core IgM Antibody   Pending  


 


Hepatitis C Antibody   Pending  








Height (Feet):  6


Weight (Pounds):  218


Medications





Current Medications








 Medications


  (Trade)  Dose


 Ordered  Sig/Jan


 Route


 PRN Reason  Start Time


 Stop Time Status Last Admin


Dose Admin


 


 Acetaminophen


  (Tylenol)  650 mg  Q4H  PRN


 ORAL


 FEVER  7/24/18 10:00


 8/23/18 09:59   


 


 


 Albuterol/


 Ipratropium


  (Albuterol/


 Ipratropium)  3 ml  Q4H  PRN


 HHN


 Shortness of Breath  7/24/18 10:00


 7/29/18 09:59   


 


 


 Baclofen


  (Lioresal)  10 mg  THREE TIMES A  DAY


 GT


   7/24/18 13:00


 8/23/18 12:59  7/25/18 12:50


 


 


 Chlorhexidine


 Gluconate


  (Elaine-Hex 2%)  1 applic  DAILY@2000


 TOPIC


   7/24/18 20:00


 8/23/18 19:59  7/24/18 20:27


 


 


 Dextrose


  (Dextrose 50%)  25 ml  STAT  PRN


 IV


 Hypoglycemia  7/25/18 08:45


 8/24/18 08:44   


 


 


 Dextrose


  (Dextrose 50%)  50 ml  STAT  PRN


 IV


 Hypoglycemia  7/25/18 08:45


 8/24/18 08:44   


 


 


 Heparin Sodium


  (Porcine)


  (Heparin 5000


 units/ml)  5,000 units  EVERY 12  HOURS


 SUBQ


   7/24/18 21:00


 8/23/18 20:59  7/25/18 10:00


 


 


 Insulin Aspart


  (NovoLOG)    BEFORE MEALS AND  HS


 SUBQ


   7/25/18 11:30


 8/24/18 11:29  7/25/18 13:25


 


 


 Iron Sucrose 100


 mg/Sodium Chloride  60 ml @ 


 240 mls/hr  BEDTIME


 IV


   7/26/18 21:00


 7/30/18 21:14   


 


 


 Lansoprazole


  (Prevacid)  30 mg  DAILY


 GT


   7/26/18 09:00


 8/25/18 08:59   


 


 


 Levetiracetam


  (Keppra)  1,000 mg  Q8HR


 GT


   7/24/18 13:00


 8/23/18 12:59  7/25/18 12:51


 


 


 Lorazepam


  (Ativan 2mg/ml


 1ml)  2 mg  Q2H  PRN


 IV


 For Anxiety  7/24/18 10:00


 7/31/18 09:59   


 


 


 Morphine Sulfate


  (Morphine


 Sulfate)  4 mg  Q4H  PRN


 IVP


 Severe Pain (Pain Scale 7-10)  7/24/18 10:00


 7/31/18 09:59   


 


 


 Ondansetron HCl


  (Zofran)  4 mg  Q6H  PRN


 IVP


 Nausea & Vomiting  7/24/18 10:00


 8/23/18 09:59   


 


 


 Piperacillin Sod/


 Tazobactam Sod


 3.375 gm/Sodium


 Chloride  110 ml @ 


 27.5 mls/hr  EVERY 8  HOURS


 IVPB


   7/25/18 11:30


 7/30/18 11:29  7/25/18 12:45


 


 


 Polyethylene


 Glycol


  (Miralax)  17 gm  BEDTIME


 ORAL


   7/25/18 21:00


 8/24/18 20:59   


 


 


 Sodium Chloride  1,000 ml @ 


 75 mls/hr  [id number]


 IV


   7/25/18 09:00


 8/23/18 08:59  7/25/18 10:00


 


 


 Vancomycin HCl/


 Dextrose  250 ml @ 


 125 mls/hr  Q12H


 IVPB


   7/24/18 12:00


 7/29/18 11:59  7/25/18 12:45


 











Assessment/Plan


Assessment/Plan


Abx:


Zosyn 7/24-


IV Vanco 7/24-


 


Assessment:


Sepsis 2ry to UTI and Bacteremia- ?pyelo. Possible PNA


 -u/a wbc 40-60, nit neg, leuk +2; ucx >100k GNR


 -BCX 4/4 GNRs


 -CXR: Cardiomegaly. Mild interstitial congestion. Suspect small bilateral 

pleural effusions


 -CT abd/p: Right lower quadrant double barrel colostomy, as described. Small 

peristomal hernia contains bowel loops without evidence of obstruction or 

strangulation. Left renal staghorn calculus. Bilateral intrarenal calyceal 

calculi. Borderline hydronephrosis on the right without evidence of downstream 

obstructive lesion. May indicate mild ureteral pelvic junction obstruction. 

Somewhat atrophic left kidney. Inspissated contrast ball within the distal 

sigmoid colon. Gastrostomy in good position. Nonspecific bilateral perinephric 

fat stranding, could indicate pyelonephritis or could be chronic. Guzmán 

catheter in place. Apparent bladder wall thickening, possibly an artifact of 

under distention but cystitis is not excludable, particularly in view of mild 

perivesical fat stranding. Bilateral pulmonary parenchymal atelectasis and 

consolidation





Fever/Leukocytosis


BRAYDON, improving


Lactic acidosis, resolved





chronic resp failure trach/vent dependant


BPH


GERD


 HTN


DM2


seizure disorder


colostomy


 s/p GT 


constipation





 


Plan:


-Cotninue empiric IV Vancomcyin #2 pending sp cx


-Continue empiric Zosyn #2 pendign ID GNR Bcx and ucx


   -if decompensating, switch Zosyn to Meropenem.


-f/u cx


-Monitor CBC/CMP, temperatures





Thank you for this consultation. Will continue to follow along with you.





Discussed with JOSHUA.











Joy Love M.D. Jul 25, 2018 16:49

## 2018-07-25 NOTE — PULMONOLGY CRITICAL CARE NOTE
Critical Care - Asmt/Plan


Problems:  


(1) Acute on chronic respiratory failure


(2) Acute on chronic renal insufficiency


(3) Severe sepsis


(4) Vegetative state


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


(6) Diabetes mellitus


Respiratory:  monitor respiratory rate, adjust FIO2, CXR


Cardiac:  continue to monitor HR/BP


Renal:  F/U I&O, decrease IV fluid


Infectious Disease:  check cultures, continue antibiotics


Gastrointestinal:  continue feedings/current rate


Endocrine:  monitor blood sugar, check HgA1C


Neurologic:  PRN Ativan


Prophylaxis:  Protonix, Heparin


Disposition:  keep in ICU


Notes Reviewed:  internist, renal


Discussed with:  nurses, consultants, 





Critical Care - Objective





Last 24 Hour Vital Signs








  Date Time  Temp Pulse Resp B/P (MAP) Pulse Ox O2 Delivery O2 Flow Rate FiO2


 


7/25/18 08:39  96 16     30


 


7/25/18 07:29  91 16     30


 


7/25/18 07:00  95 16 97/63 (74) 100   


 


7/25/18 06:00  94 16 103/68 (80) 100   


 


7/25/18 05:29  96 16     30


 


7/25/18 05:00  102 16 92/56 (68) 98   


 


7/25/18 04:00        35


 


7/25/18 04:00      Mechanical Ventilator  


 


7/25/18 04:00  100      


 


7/25/18 04:00 98.4 99 16 97/59 (72) 98   





 98.4       


 


7/25/18 03:21  97 16     30


 


7/25/18 03:00  102 16 96/64 (75) 98   


 


7/25/18 02:00  96 17 99/63 (75) 98   


 


7/25/18 01:09  98 16     30


 


7/25/18 01:00  103 16 103/52 (69) 97   


 


7/25/18 00:00 98.6 102 16 109/50 (69) 98   





 98.6       


 


7/25/18 00:00        35


 


7/25/18 00:00  102      


 


7/25/18 00:00      Mechanical Ventilator  


 


7/24/18 23:00  100 16 103/50 (67) 100   


 


7/24/18 22:38  99 16     30


 


7/24/18 22:00  100 16 94/50 (65) 100   


 


7/24/18 21:17  96 16     30


 


7/24/18 21:00  97 16 94/55 (68) 99   


 


7/24/18 20:00  99      


 


7/24/18 20:00 98.1 98 16 90/60 (70) 100   





 98.1       


 


7/24/18 20:00        35


 


7/24/18 20:00      Mechanical Ventilator  


 


7/24/18 19:15  101 16     30


 


7/24/18 19:00  101 17 90/55 (67) 99   


 


7/24/18 18:00  99 17 95/58 (70) 98   


 


7/24/18 17:00  100 17 94/64 (74) 98   


 


7/24/18 16:56  98 16     35


 


7/24/18 16:00      Mechanical Ventilator  


 


7/24/18 16:00 98.9 98 16 94/62 (73) 99   





 98.9       


 


7/24/18 16:00  97      


 


7/24/18 16:00        54


 


7/24/18 15:25  96 16     35


 


7/24/18 15:00  99 17 97/52 (67) 99   


 


7/24/18 14:00  100 17 91/62 (72) 99   


 


7/24/18 13:29  100 16     35


 


7/24/18 13:00  100 17 99/59 (72) 100   


 


7/24/18 12:00  100      


 


7/24/18 12:00        54


 


7/24/18 12:00 98.9 99 17 99/59 (72) 99   





 98.9       


 


7/24/18 12:00      Mechanical Ventilator  


 


7/24/18 11:17  100 19     35


 


7/24/18 11:00  100 16 92/66 (75) 99   


 


7/24/18 10:05        35


 


7/24/18 10:00  102 17 96/60 (72) 99   








Condition:  critical


HEENT:  atraumatic, normocephalic


Lungs:  rales, rhonchi


Heart:  HR/BP stable


Abdomen:  soft, feeding tube


Extremities:  edema


Decubiti:  location


Micro:





Microbiology








 Date/Time


Source Procedure


Growth Status


 


 


 7/24/18 00:05


Blood Blood Culture - Preliminary


NO GROWTH AFTER 24 HOURS Resulted


 


 7/24/18 00:00


Blood Blood Culture - Preliminary


NO GROWTH AFTER 24 HOURS Resulted


 


 7/24/18 01:43


Urine,Clean Catch Urine Culture - Preliminary


Gram Negative Bacillus 1 Resulted








Accucheck:  213





Critical Care - Subjective


ROS Limited/Unobtainable:  Yes


Condition:  critical


EKG Rhythm:  Sinus Rhythm


FI02:  30


Vent Support Breath Rate:  16


Vent Support Mode:  AC


Vent Tidal Volume:  600


Sputum Amount:  Moderate


PEEP:  5.0


PIP:  32


Tube Feeding Amount:  30


I&O:











Intake and Output  


 


 7/24/18 7/25/18





 19:00 07:00


 


Intake Total 1125 ml 2020 ml


 


Output Total 840 ml 1085 ml


 


Balance 285 ml 935 ml


 


  


 


IV Total 1125 ml 1750 ml


 


Tube Feeding  270 ml


 


Output Urine Total 840 ml 735 ml


 


Chest Tube Drainage Total  350 ml








CXR:


no new changes


Labs:





Laboratory Tests








Test


  7/24/18


10:20 7/24/18


12:20 7/24/18


17:30 7/25/18


05:00


 


Lactic Acid Level


  3.50 mmol/L


(0.4-2.0)  H 3.00 mmol/L


(0.66-2.22)  H 1.40 mmol/L


(0.4-2.0) 


 


 


White Blood Count


  


  


  


  12.0 K/UL


(4.8-10.8)  H


 


Red Blood Count


  


  


  


  3.83 M/UL


(4.70-6.10)  L


 


Hemoglobin


  


  


  


  10.4 G/DL


(14.2-18.0)  L


 


Hematocrit


  


  


  


  33.1 %


(42.0-52.0)  L


 


Mean Corpuscular Volume    87 FL (80-99)  


 


Mean Corpuscular Hemoglobin


  


  


  


  27.2 PG


(27.0-31.0)


 


Mean Corpuscular Hemoglobin


Concent 


  


  


  31.4 G/DL


(32.0-36.0)  L


 


Red Cell Distribution Width


  


  


  


  14.6 %


(11.6-14.8)


 


Platelet Count


  


  


  


  173 K/UL


(150-450)


 


Mean Platelet Volume


  


  


  


  8.8 FL


(6.5-10.1)


 


Neutrophils (%) (Auto)


  


  


  


  80.0 %


(45.0-75.0)  H


 


Lymphocytes (%) (Auto)


  


  


  


  10.5 %


(20.0-45.0)  L


 


Monocytes (%) (Auto)


  


  


  


  6.5 %


(1.0-10.0)


 


Eosinophils (%) (Auto)


  


  


  


  2.6 %


(0.0-3.0)


 


Basophils (%) (Auto)


  


  


  


  0.5 %


(0.0-2.0)


 


Sodium Level


  


  


  


  141 MMOL/L


(136-145)


 


Potassium Level


  


  


  


  3.2 MMOL/L


(3.5-5.1)  L


 


Chloride Level


  


  


  


  107 MMOL/L


()


 


Carbon Dioxide Level


  


  


  


  21 MMOL/L


(21-32)


 


Anion Gap


  


  


  


  13 mmol/L


(5-15)


 


Blood Urea Nitrogen


  


  


  


  20 mg/dL


(7-18)  H


 


Creatinine


  


  


  


  1.1 MG/DL


(0.55-1.30)


 


Estimat Glomerular Filtration


Rate 


  


  


  > 60 mL/min


(>60)


 


Glucose Level


  


  


  


  195 MG/DL


()  H


 


Calcium Level


  


  


  


  9.4 MG/DL


(8.5-10.1)


 


Phosphorus Level


  


  


  


  1.9 MG/DL


(2.5-4.9)  L


 


Albumin


  


  


  


  3.0 G/DL


(3.4-5.0)  L

















Yury Pena MD Jul 25, 2018 09:37

## 2018-07-25 NOTE — GI INITIAL CONSULT NOTE
History of Present Illness


General


Date patient seen:  Jul 25, 2018


Time patient seen:  14:26


Reason for Hospitalization:  Fever


Referring physician:  JUAN DIEGO SEN


Reason for Consultation:  PROLAPSE STOMA





Present Illness


HPI


Patient is sent in from nursing facility with reports of fever and tachycardia


Patient presents with tracheostomy


Is vent dependent


Patient himself has eyes closed and is unresponsive


It does limit the history of present illness


Unknown regarding vomiting


Patient has obvious blood at the meatus


Also has a colostomy bag in the right lower abdomen


And is still somewhat distended


Unknown regarding vomiting


Unknown regarding diarrhea


GI consulted for prolapse stoma at colostomy.  Area assessed; pink, moist, no 

obstruction noted, no inflammation or erythema.  GT site c/d/i.  ROS limited, 

intubated.  No active s/sx of N/V/D.  Presents today with iron deficiency anemia

, elevated alkaline phosphatase, renal insufficiency, and hypoalbuminemia.   

Unknown history of endoscopy.


Home Meds


Reported Medications


Glycopyrrolate (ROBINUL) 1 Mg Tablet, 2 MG GT BID, TAB


   7/24/18


Chlorhexidine Gluconate (Peridex) 15 Ml Mouthwash, 15 ML MM Q12HR, ML


   7/24/18


Nystatin* (NYSTATIN*) 15 Gm Cream..g., 1 APPLIC TOPIC THREE TIMES A DAY, GM


   7/24/18


Multivitamin With Minerals (MULTIVITAMINS WITH MINERALS*) 1 Each Tablet, 1 TAB 

GT DAILY, TAB


   7/24/18


Polyethylene Glycol 3350* (MIRALAX*) 17 Gm Powd.pack, 17 GM GT DAILY, PACKET


   7/24/18


Losartan Potassium* (LOSARTAN POTASSIUM*) 25 Mg Tablet, 25 MG GT DAILY, TAB


   7/24/18


Levetiracetam* (LEVETIRACETAM*) 100 Mg/1 Ml Solution, 1000 MG GT TID


   7/24/18


Lactulose (LACTULOSE*) 20 Gm/30 Ml Solution, 30 ML GT, ML 0 Refills


   7/24/18


Sitagliptin* (JANUVIA*) 25 Mg Tablet, 50 MG GT DAILY, TAB


   7/24/18


Insulin Regular, Human* (NOVOLIN R*) 100 Unit/1 Ml Vial, 0 SUBQ .SLIDING SCALE, 

UNITS


   7/24/18


Famotidine (FAMOTIDINE) 20 Mg Tablet, 20 MG GT DAILY, #30 TAB 0 Refills


   7/24/18


Ipratropium/Albuterol Sulfate (DuoNeb 0.5-3(2.5)mg/3ml) 3 Ml Ampul.neb, 3 ML 

HHN Q3HR, EA


   7/24/18


Docusate Sodium* (DOCUSATE SODIUM*) 100 Mg Capsule, 100 MG GT TWICE A DAY, CAP


   7/24/18


Cholecalciferol (Vitamin D3)* (VITAMIN D*) 1,000 Unit Tablet, 5000 UNIT GT ONCE 

A WEEK, #30 TAB


   7/24/18


Cranberry (CRANBERRY) 400 Mg Capsule, 400 MG GT DAILY, CAP


   7/24/18


Clonidine Hcl* (CATAPRES*) 0.1 Mg Tablet, 0.1 MG GT EVERY 6 HOURS, TAB


   7/24/18


Calcium Carbonate (CALCIUM CARBONATE) 650 Mg Tablet, 1000 MG GT BID, TAB


   7/24/18


Bisacodyl* (DULCOLAX*) 5 Mg Tablet.dr, 10 MG RECTAL DAILY, #10 TAB 0 Refills


   7/24/18


Baclofen* (BACLOFEN*) 10 Mg Tablet, 10 MG GT THREE TIMES A DAY, TAB


   7/24/18


Dextran 70/Hypromellose (ARTIFICIAL TEARS EYE DROPS*) 15 Ml Drops, 1 DROP BOTH 

EYES Q4HR, #15 ML 0 Refills


   7/24/18


Spironolactone* (ALDACTONE*) 25 Mg Tablet, 25 MG GT DAILY, TAB


   7/24/18


Acetaminophen 160MG/5ML* (ACETAMINOPHEN*) 160 Mg/5 Ml Elixir, 20.3 ML GT Q4HR 

PRN for Fever/Headache/Mild Pain, ML 0 Refills


   7/24/18


Med list reviewed/reconciled:  Yes


Allergies:  


Coded Allergies:  


     AZTREONAM (Verified  Allergy, Unknown, 7/23/18)





Patient History


Limited by:  medical condition


History Provided By:  Medical Record


PMH Narrative


Constipation, hypertension, diabetes mellitus, seizure disorder, chronic 

respiratory failure, on a vent.  vegetative state.


Hx Diabetes:  Yes


Hx Gastrointestinal Problems:  Yes - BPH, GERD, G-tube, Colostomy


Hx Seizures:  Yes - Convulsion disorder





Review of Systems


All Other Systems:  limited





Physical Exam





Vital Signs








  Date Time  Temp Pulse Resp B/P (MAP) Pulse Ox O2 Delivery O2 Flow Rate FiO2


 


7/23/18 23:50 103.7 144 13 101/69 93 Mechanical Ventilator  50





 103.6       








Sp02 EP Interpretation:  reviewed, normal


Labs





Laboratory Tests








Test


  7/24/18


17:30 7/25/18


05:00 7/25/18


10:30


 


Lactic Acid Level


  1.40 mmol/L


(0.4-2.0) 


  


 


 


White Blood Count


  


  12.0 K/UL


(4.8-10.8)  H 


 


 


Red Blood Count


  


  3.83 M/UL


(4.70-6.10)  L 


 


 


Hemoglobin


  


  10.4 G/DL


(14.2-18.0)  L 


 


 


Hematocrit


  


  33.1 %


(42.0-52.0)  L 


 


 


Mean Corpuscular Volume  87 FL (80-99)   


 


Mean Corpuscular Hemoglobin


  


  27.2 PG


(27.0-31.0) 


 


 


Mean Corpuscular Hemoglobin


Concent 


  31.4 G/DL


(32.0-36.0)  L 


 


 


Red Cell Distribution Width


  


  14.6 %


(11.6-14.8) 


 


 


Platelet Count


  


  173 K/UL


(150-450) 


 


 


Mean Platelet Volume


  


  8.8 FL


(6.5-10.1) 


 


 


Neutrophils (%) (Auto)


  


  80.0 %


(45.0-75.0)  H 


 


 


Lymphocytes (%) (Auto)


  


  10.5 %


(20.0-45.0)  L 


 


 


Monocytes (%) (Auto)


  


  6.5 %


(1.0-10.0) 


 


 


Eosinophils (%) (Auto)


  


  2.6 %


(0.0-3.0) 


 


 


Basophils (%) (Auto)


  


  0.5 %


(0.0-2.0) 


 


 


Differential Total Cells


Counted 


  100  


  


 


 


Neutrophils % (Manual)  80 % (45-75)  H 


 


Lymphocytes % (Manual)  10 % (20-45)  L 


 


Monocytes % (Manual)  7 % (1-10)   


 


Eosinophils % (Manual)  3 % (0-3)   


 


Basophils % (Manual)  0 % (0-2)   


 


Band Neutrophils  0 % (0-8)   


 


Platelet Estimate  Adequate   


 


Platelet Morphology  Normal   


 


Microcytosis  1+   


 


Tear Drop Cells  1+   


 


Reticulocyte Count


  


  2.3 %


(0.0-2.0)  H 


 


 


Sodium Level


  


  141 MMOL/L


(136-145) 


 


 


Potassium Level


  


  3.2 MMOL/L


(3.5-5.1)  L 


 


 


Chloride Level


  


  107 MMOL/L


() 


 


 


Carbon Dioxide Level


  


  21 MMOL/L


(21-32) 


 


 


Anion Gap


  


  13 mmol/L


(5-15) 


 


 


Blood Urea Nitrogen


  


  20 mg/dL


(7-18)  H 


 


 


Creatinine


  


  1.1 MG/DL


(0.55-1.30) 


 


 


Estimat Glomerular Filtration


Rate 


  > 60 mL/min


(>60) 


 


 


Glucose Level


  


  195 MG/DL


()  H 


 


 


Calcium Level


  


  9.4 MG/DL


(8.5-10.1) 


 


 


Phosphorus Level


  


  1.9 MG/DL


(2.5-4.9)  L 


 


 


Iron Level


  


  19 ug/dL


()  L 


 


 


Total Iron Binding Capacity


  


  256 ug/dL


(250-450) 


 


 


Percent Iron Saturation  7 % (15-50)  L 


 


Unsaturated Iron Binding


  


  237 ug/dL


(112-346) 


 


 


Ferritin


  


  1104 NG/ML


(8-388)  H 


 


 


Albumin


  


  3.0 G/DL


(3.4-5.0)  L 


 


 


Folate


  


  42.2 NG/ML


(8.6-58.9) 


 


 


Thyroid Stimulating Hormone


(TSH) 


  1.629 uiU/mL


(0.358-3.740) 


 


 


Hepatitis A IgM Antibody   Pending  


 


Hepatitis B Surface Antigen   Pending  


 


Hepatitis B Core IgM Antibody   Pending  


 


Hepatitis C Antibody   Pending  








General Appearance:  no apparent distress


Head:  normocephalic


EENT:  PERRL/EOMI, normal ENT inspection, other - protuding tongue


Neck:  supple


Respiratory:  normal breath sounds, no respiratory distress, other - intubated


Cardiovascular:  normal rate


Gastrointestinal:  normal inspection, non tender, soft, normal bowel sounds, non

-distended, gt - c/d/i, other - prolapse stoma


Rectal:  deferred


Genitourinary:  deferred


Musculoskeletal:  normal inspection, back normal


Neurologic:  alert


Skin:  normal inspection, normal color, no rash, warm/dry, palpation normal, 

well hydrated


Lymphatic:  normal inspection, no adenopathy


Current Medications





Current Medications








 Medications


  (Trade)  Dose


 Ordered  Sig/Jan


 Route


 PRN Reason  Start Time


 Stop Time Status Last Admin


Dose Admin


 


 Acetaminophen


  (Tylenol)  650 mg  Q4H  PRN


 ORAL


 FEVER  7/24/18 10:00


 8/23/18 09:59   


 


 


 Albuterol/


 Ipratropium


  (Albuterol/


 Ipratropium)  3 ml  Q4H  PRN


 HHN


 Shortness of Breath  7/24/18 10:00


 7/29/18 09:59   


 


 


 Baclofen


  (Lioresal)  10 mg  THREE TIMES A  DAY


 GT


   7/24/18 13:00


 8/23/18 12:59  7/25/18 12:50


 


 


 Chlorhexidine


 Gluconate


  (Elaine-Hex 2%)  1 applic  DAILY@2000


 TOPIC


   7/24/18 20:00


 8/23/18 19:59  7/24/18 20:27


 


 


 Dextrose


  (Dextrose 50%)  25 ml  STAT  PRN


 IV


 Hypoglycemia  7/25/18 08:45


 8/24/18 08:44   


 


 


 Dextrose


  (Dextrose 50%)  50 ml  STAT  PRN


 IV


 Hypoglycemia  7/25/18 08:45


 8/24/18 08:44   


 


 


 Heparin Sodium


  (Porcine)


  (Heparin 5000


 units/ml)  5,000 units  EVERY 12  HOURS


 SUBQ


   7/24/18 21:00


 8/23/18 20:59  7/25/18 10:00


 


 


 Insulin Aspart


  (NovoLOG)    BEFORE MEALS AND  HS


 SUBQ


   7/25/18 11:30


 8/24/18 11:29  7/25/18 13:25


 


 


 Lansoprazole


  (Prevacid)  30 mg  DAILY


 GT


   7/26/18 09:00


 8/25/18 08:59   


 


 


 Levetiracetam


  (Keppra)  1,000 mg  Q8HR


 GT


   7/24/18 13:00


 8/23/18 12:59  7/25/18 12:51


 


 


 Lorazepam


  (Ativan 2mg/ml


 1ml)  2 mg  Q2H  PRN


 IV


 For Anxiety  7/24/18 10:00


 7/31/18 09:59   


 


 


 Morphine Sulfate


  (Morphine


 Sulfate)  4 mg  Q4H  PRN


 IVP


 Severe Pain (Pain Scale 7-10)  7/24/18 10:00


 7/31/18 09:59   


 


 


 Ondansetron HCl


  (Zofran)  4 mg  Q6H  PRN


 IVP


 Nausea & Vomiting  7/24/18 10:00


 8/23/18 09:59   


 


 


 Piperacillin Sod/


 Tazobactam Sod


 3.375 gm/Sodium


 Chloride  110 ml @ 


 27.5 mls/hr  EVERY 8  HOURS


 IVPB


   7/25/18 11:30


 7/30/18 11:29  7/25/18 12:45


 


 


 Polyethylene


 Glycol


  (Miralax)  17 gm  DAILYPRN  PRN


 ORAL


 Constipation  7/24/18 10:00


 8/23/18 09:59   


 


 


 Potassium


 Phosphate 20 mm/


 Sodium Chloride  281.6667


 ml @ 


 46.944 m...  ONCE  ONCE


 IV


   7/25/18 09:30


 7/25/18 15:29  7/25/18 12:45


 


 


 Sodium Chloride  1,000 ml @ 


 75 mls/hr  [id number]


 IV


   7/25/18 09:00


 8/23/18 08:59  7/25/18 10:00


 


 


 Vancomycin HCl/


 Dextrose  250 ml @ 


 125 mls/hr  Q12H


 IVPB


   7/24/18 12:00


 7/29/18 11:59  7/25/18 12:45


 











GI: Plan


Problems:  


(1) Gastrostomy tube dependent


(2) Diabetes mellitus


(3) Colostomy in place


(4) Severe sepsis


(5) Stoma malfunction


Plan


prolapse stoma assessed >> no s/sx of infection.  no obstruction. 


anemia work up reviewed >> iron deficiency


G tube dependent


hepatitis panel pending





consider surgical consult


monitor H&H, prn transfusions


venofer


ppi


GTFs per RD, adv to goal


GT site care daily/prn


abx


fu labs





Discussed with Dr. Pickard.


Thank you for this patient referral, we will follow.





The patient was seen and examined at bedside and all new and available data was 

reviewed in the patients chart. I agree with the above findings, impression 

and plan.  (Patient seen earlier today. Signature stamp does not reflect 

patient encounter time.). - MD Serena Miguel,[facility] NP Jul 25, 2018 13:58

## 2018-07-25 NOTE — HISTORY AND PHYSICAL REPORT
DATE OF ADMISSION:  07/24/2018



NOTE:  POOR AUDIO



INTERNAL MEDICINE HISTORY AND PHYSICAL



Covering for Dr. Bloom.



REASON FOR ADMISSION:  Sepsis.



HISTORY OF PRESENT ILLNESS:  The patient is a pleasant 52-year-old male,

sent from nursing home with history of constipation, hypertension,

diabetes mellitus, seizure disorder, chronic respiratory failure, on a

vent.  _____ has been admitted here, history of vegetative state, presents

with fevers and chills, noted to have an elevated temperature.  Blood

cultures and urine cultures were tested.  In addition, CAT scan of the

abdomen and pelvis completed showed atrophic left kidney _____

hydronephrosis, ______ calculi _____ right lower quadrant ____ colostomy

as  described, small peristomal hernia.  Gastrostomy tube in good

position.  Guzmán catheter in place.  Bilateral pulmonary parenchyma,

atelectasis, consolidation noted and ____ Dr. Bloom.



PAST MEDICAL HISTORY:  As noted _____ diabetes mellitus and seizure

disorder.



PAST SURGICAL HISTORY:  _____ colostomy, _____ NG-tube with tracheostomy.



FAMILY HISTORY:  Positive for hypertension.



PHYSICAL EXAMINATION:

VITAL SIGNS:  Reviewed.

GENERAL:  No acute distress.

PULMONARY:  Decreased breath sounds.  Tracheostomy site is intact.

CARDIOVASCULAR:  Regular rate.  No S3 or S4.

ABDOMEN:  Soft, nontender, and nondistended.  Positive colostomy.

EXTREMITIES:  No cyanosis, swelling, or edema.



LABORATORY DATA:  WBC 11.1, hemoglobin 13.1, and platelet count _____,000.

INR of 1.1.  Chemistry reviewed.  BUN of 33 and creatinine 1.4.



ASSESSMENT AND RECOMMENDATIONS:

1. Sepsis with elevated temperature of 103 degrees Fahrenheit.  Lactic

acid pending, was elevated upon admission, on antibiotics, has received a

dose of Zosyn and currently is on vancomycin q.12 h.  Obtain Infectious

Disease consult.

2. Acute kidney injury.  Currently _____ IV fluids, _____ IV bolus given

to see if the patient responds along with IV pressor as needed.  Closely

monitor with Nephrology team.

3. Septic shock, use antibiotic per ID services.

4. Respiratory failure, status post tracheostomy, on mechanical

ventilation per Dr. Pena.

5. Hypercalcemia.  Repeat PTH and calcium level in the morning.



 I appreciate the consultation.  Closely monitor with Dr. Bloom.









  ______________________________________________

  Tejas Gerber M.D.





DR:  FAREED

D:  07/24/2018 10:42

T:  07/25/2018 04:28

JOB#:  [id number]

CC:

## 2018-07-25 NOTE — GENERAL PROGRESS NOTE
Assessment/Plan


Status:  unchanged


Assessment/Plan


# Anemia of chronic disease. No hemolysis, peripheral smear reviewed, ferritin 

is elevated


--> Continue to closely monitor for improvement. 


--> Anemia w/u has been reviewed. Will trend cbc daily. 


--> Hgb goal >7


# Leukocytosis - Sepsis with elevated temperature of 103 degrees Fahrenheit.  


--> Lactic acid pending, was elevated upon admission. 


--> Pt on antibiotics, has received a dose of Zosyn and currently is on 

vancomycin q.12 h.  


--> Currently afebrile.


--> Appreciate Infectious Disease consult.


# Acute kidney injury.  


--> Currently on IV fluids, IV bolus given to see if the patient responds along 

with IV pressor as needed.  


--> Closely monitor with Nephrology team.


# Septic shock, use antibiotic per ID services.


--> potential pna, bacteremia


# Respiratory failure, status post tracheostomy, on mechanical ventilation per 

Dr. Pena.


# Hypercalcemia.  Monitor PTH and calcium level





The time the note was entered does not necessarily correspond to the time the 

patient was seen.





Subjective


Date patient seen:  Jul 25, 2018


ROS Limited/Unobtainable:  Yes


Hematologic/Lymphatic:  Reports: anemia


Allergies:  


Coded Allergies:  


     AZTREONAM (Verified  Allergy, Unknown, 7/23/18)


All Systems:  reviewed and negative except above


Subjective


Pt remains in ICU. Pt obtunded. No acute events. 





Covering for Dr. Bloom





Objective





Last 24 Hour Vital Signs








  Date Time  Temp Pulse Resp B/P (MAP) Pulse Ox O2 Delivery O2 Flow Rate FiO2


 


7/25/18 19:02  86 16     30


 


7/25/18 18:00  80 16 103/64 (77) 100   


 


7/25/18 17:05  83 16     30


 


7/25/18 17:00  80 16 107/70 (82) 100   


 


7/25/18 16:00 98.8 85 16 97/65 (76) 100   





 98.8       


 


7/25/18 16:00      Mechanical Ventilator  


 


7/25/18 16:00        35


 


7/25/18 16:00  83      


 


7/25/18 15:06  86 16     30


 


7/25/18 15:00  84 16 104/68 (80) 100   


 


7/25/18 14:00  88 16 106/74 (85) 100   


 


7/25/18 13:17  90 16     30


 


7/25/18 13:00  88 16 104/64 (77) 100   


 


7/25/18 12:00      Mechanical Ventilator  


 


7/25/18 12:00 98.9 86 16 105/70 (82) 100   





 98.9       


 


7/25/18 12:00        35


 


7/25/18 12:00  90      


 


7/25/18 11:00  90 16 109/71 (84) 100   


 


7/25/18 10:59  97 16     30


 


7/25/18 10:00  92 16 104/74 (84) 100   


 


7/25/18 09:00 98.3 92 16 111/71 (84) 99   





 98.3       


 


7/25/18 08:39  96 16     30


 


7/25/18 08:00  93 16 97/63 (74) 99   


 


7/25/18 08:00        35


 


7/25/18 08:00  93      


 


7/25/18 08:00      Mechanical Ventilator  


 


7/25/18 07:29  91 16     30


 


7/25/18 07:00  95 16 97/63 (74) 100   


 


7/25/18 06:00  94 16 103/68 (80) 100   


 


7/25/18 05:29  96 16     30


 


7/25/18 05:00  102 16 92/56 (68) 98   


 


7/25/18 04:00        35


 


7/25/18 04:00      Mechanical Ventilator  


 


7/25/18 04:00  100      


 


7/25/18 04:00 98.4 99 16 97/59 (72) 98   





 98.4       


 


7/25/18 03:21  97 16     30


 


7/25/18 03:00  102 16 96/64 (75) 98   


 


7/25/18 02:00  96 17 99/63 (75) 98   


 


7/25/18 01:09  98 16     30


 


7/25/18 01:00  103 16 103/52 (69) 97   


 


7/25/18 00:00 98.6 102 16 109/50 (69) 98   





 98.6       


 


7/25/18 00:00        35


 


7/25/18 00:00  102      


 


7/25/18 00:00      Mechanical Ventilator  


 


7/24/18 23:00  100 16 103/50 (67) 100   


 


7/24/18 22:38  99 16     30


 


7/24/18 22:00  100 16 94/50 (65) 100   


 


7/24/18 21:17  96 16     30


 


7/24/18 21:00  97 16 94/55 (68) 99   


 


7/24/18 20:00  99      


 


7/24/18 20:00 98.1 98 16 90/60 (70) 100   





 98.1       


 


7/24/18 20:00        35


 


7/24/18 20:00      Mechanical Ventilator  


 


7/24/18 19:15  101 16     30

















Intake and Output  


 


 7/24/18 7/25/18





 19:00 07:00


 


Intake Total 1125 ml 2020 ml


 


Output Total 840 ml 1085 ml


 


Balance 285 ml 935 ml


 


  


 


IV Total 1125 ml 1750 ml


 


Tube Feeding  270 ml


 


Output Urine Total 840 ml 735 ml


 


Chest Tube Drainage Total  350 ml








Laboratory Tests


7/25/18 05:00: 


White Blood Count 12.0H, Red Blood Count 3.83L, Hemoglobin 10.4L, Hematocrit 

33.1L, Mean Corpuscular Volume 87, Mean Corpuscular Hemoglobin 27.2, Mean 

Corpuscular Hemoglobin Concent 31.4L, Red Cell Distribution Width 14.6, 

Platelet Count 173, Mean Platelet Volume 8.8, Neutrophils (%) (Auto) 80.0H, 

Lymphocytes (%) (Auto) 10.5L, Monocytes (%) (Auto) 6.5, Eosinophils (%) (Auto) 

2.6, Basophils (%) (Auto) 0.5, Differential Total Cells Counted 100, 

Neutrophils % (Manual) 80H, Lymphocytes % (Manual) 10L, Monocytes % (Manual) 7, 

Eosinophils % (Manual) 3, Basophils % (Manual) 0, Band Neutrophils 0, Platelet 

Estimate Adequate, Platelet Morphology Normal, Microcytosis 1+, Tear Drop Cells 

1+, Reticulocyte Count 2.3H, Sodium Level 141, Potassium Level 3.2L, Chloride 

Level 107, Carbon Dioxide Level 21, Anion Gap 13, Blood Urea Nitrogen 20H, 

Creatinine 1.1, Estimat Glomerular Filtration Rate > 60, Glucose Level 195H, 

Calcium Level 9.4, Phosphorus Level 1.9L, Iron Level 19L, Total Iron Binding 

Capacity 256, Percent Iron Saturation 7L, Unsaturated Iron Binding 237, 

Ferritin 1104H, Albumin 3.0L, Folate 42.2, Thyroid Stimulating Hormone (TSH) 

1.629


7/25/18 10:30: 


Hepatitis A IgM Antibody [Pending], Hepatitis B Surface Antigen [Pending], 

Hepatitis B Core IgM Antibody [Pending], Hepatitis C Antibody [Pending]


Height (Feet):  6


Weight (Pounds):  218


General Appearance:  no apparent distress, lethargic


EENT:  PERRL/EOMI


Neck:  normal alignment


Cardiovascular:  normal peripheral pulses


Respiratory/Chest:  no respiratory distress


Abdomen:  no organomegaly, no mass











Tejas Gerber MD Jul 25, 2018 19:16

## 2018-07-25 NOTE — NEPHROLOGY PROGRESS NOTE
Assessment/Plan


Assessment/Plan


1. BRAYDON- due to multifactorial ATN (sepsis/hypotension)


    - resolved


   - Decrease IVFs


2. Septic Shock- IVF's and Abx


3. Chronic Resp FL- trached on vent


4. Hypok/Phos- being replaced today





Subjective


Date patient seen:  Jul 25, 2018


Time patient seen:  08:09


ROS Limited/Unobtainable:  Yes


Allergies:  


Coded Allergies:  


     AZTREONAM (Verified  Allergy, Unknown, 7/23/18)


Subjective


Patient remains trached on vent nonverbal





Objective





Last 24 Hour Vital Signs








  Date Time  Temp Pulse Resp B/P (MAP) Pulse Ox O2 Delivery O2 Flow Rate FiO2


 


7/25/18 07:29  91 16     30


 


7/25/18 07:00  95 16 97/63 (74) 100   


 


7/25/18 06:00  94 16 103/68 (80) 100   


 


7/25/18 05:29  96 16     30


 


7/25/18 05:00  102 16 92/56 (68) 98   


 


7/25/18 04:00        35


 


7/25/18 04:00      Mechanical Ventilator  


 


7/25/18 04:00  100      


 


7/25/18 04:00 98.4 99 16 97/59 (72) 98   





 98.4       


 


7/25/18 03:21  97 16     30


 


7/25/18 03:00  102 16 96/64 (75) 98   


 


7/25/18 02:00  96 17 99/63 (75) 98   


 


7/25/18 01:09  98 16     30


 


7/25/18 01:00  103 16 103/52 (69) 97   


 


7/25/18 00:00 98.6 102 16 109/50 (69) 98   





 98.6       


 


7/25/18 00:00        35


 


7/25/18 00:00  102      


 


7/25/18 00:00      Mechanical Ventilator  


 


7/24/18 23:00  100 16 103/50 (67) 100   


 


7/24/18 22:38  99 16     30


 


7/24/18 22:00  100 16 94/50 (65) 100   


 


7/24/18 21:17  96 16     30


 


7/24/18 21:00  97 16 94/55 (68) 99   


 


7/24/18 20:00  99      


 


7/24/18 20:00 98.1 98 16 90/60 (70) 100   





 98.1       


 


7/24/18 20:00        35


 


7/24/18 20:00      Mechanical Ventilator  


 


7/24/18 19:15  101 16     30


 


7/24/18 19:00  101 17 90/55 (67) 99   


 


7/24/18 18:00  99 17 95/58 (70) 98   


 


7/24/18 17:00  100 17 94/64 (74) 98   


 


7/24/18 16:56  98 16     35


 


7/24/18 16:00      Mechanical Ventilator  


 


7/24/18 16:00 98.9 98 16 94/62 (73) 99   





 98.9       


 


7/24/18 16:00  97      


 


7/24/18 16:00        54


 


7/24/18 15:25  96 16     35


 


7/24/18 15:00  99 17 97/52 (67) 99   


 


7/24/18 14:00  100 17 91/62 (72) 99   


 


7/24/18 13:29  100 16     35


 


7/24/18 13:00  100 17 99/59 (72) 100   


 


7/24/18 12:00  100      


 


7/24/18 12:00        54


 


7/24/18 12:00 98.9 99 17 99/59 (72) 99   





 98.9       


 


7/24/18 12:00      Mechanical Ventilator  


 


7/24/18 11:17  100 19     35


 


7/24/18 11:00  100 16 92/66 (75) 99   


 


7/24/18 10:05        35


 


7/24/18 10:00  102 17 96/60 (72) 99   


 


7/24/18 09:27  103 18     54


 


7/24/18 09:00  107 18 88/65 (73) 99   

















Intake and Output  


 


 7/24/18 7/25/18





 19:00 07:00


 


Intake Total 1125 ml 2020 ml


 


Output Total 840 ml 1085 ml


 


Balance 285 ml 935 ml


 


  


 


IV Total 1125 ml 1750 ml


 


Tube Feeding  270 ml


 


Output Urine Total 840 ml 735 ml


 


Chest Tube Drainage Total  350 ml








Laboratory Tests


7/24/18 10:20: Lactic Acid Level 3.50H


7/24/18 12:20: Lactic Acid Level 3.00H


7/24/18 17:30: Lactic Acid Level 1.40


7/25/18 05:00: 


White Blood Count 12.0H, Red Blood Count 3.83L, Hemoglobin 10.4L, Hematocrit 

33.1L, Mean Corpuscular Volume 87, Mean Corpuscular Hemoglobin 27.2, Mean 

Corpuscular Hemoglobin Concent 31.4L, Red Cell Distribution Width 14.6, 

Platelet Count 173, Mean Platelet Volume 8.8, Neutrophils (%) (Auto) 80.0H, 

Lymphocytes (%) (Auto) 10.5L, Monocytes (%) (Auto) 6.5, Eosinophils (%) (Auto) 

2.6, Basophils (%) (Auto) 0.5, Sodium Level 141, Potassium Level 3.2L, Chloride 

Level 107, Carbon Dioxide Level 21, Anion Gap 13, Blood Urea Nitrogen 20H, 

Creatinine 1.1, Estimat Glomerular Filtration Rate > 60, Glucose Level 195H, 

Calcium Level 9.4, Phosphorus Level 1.9L, Albumin 3.0L


Height (Feet):  6


Weight (Pounds):  218


General Appearance:  WD/WN, no apparent distress


EENT:  PERRL/EOMI


Neck:  non-tender, normal alignment


Cardiovascular:  normal peripheral pulses, normal rate


Respiratory/Chest:  rhonchi - bilaterally


Abdomen:  non tender, soft


Edema:  no edema noted Arm (L), no edema noted Arm (R), no edema noted Leg (L), 

no edema noted Leg (R), no edema noted Pedal (L), no edema noted Pedal (R), no 

edema noted Generalized











Jewel Templeton M.D. Jul 25, 2018 08:11

## 2018-07-26 VITALS — DIASTOLIC BLOOD PRESSURE: 76 MMHG | SYSTOLIC BLOOD PRESSURE: 129 MMHG

## 2018-07-26 VITALS — DIASTOLIC BLOOD PRESSURE: 71 MMHG | SYSTOLIC BLOOD PRESSURE: 108 MMHG

## 2018-07-26 VITALS — SYSTOLIC BLOOD PRESSURE: 117 MMHG | DIASTOLIC BLOOD PRESSURE: 75 MMHG

## 2018-07-26 VITALS — DIASTOLIC BLOOD PRESSURE: 74 MMHG | SYSTOLIC BLOOD PRESSURE: 116 MMHG

## 2018-07-26 VITALS — SYSTOLIC BLOOD PRESSURE: 114 MMHG | DIASTOLIC BLOOD PRESSURE: 73 MMHG

## 2018-07-26 VITALS — DIASTOLIC BLOOD PRESSURE: 71 MMHG | SYSTOLIC BLOOD PRESSURE: 109 MMHG

## 2018-07-26 VITALS — SYSTOLIC BLOOD PRESSURE: 108 MMHG | DIASTOLIC BLOOD PRESSURE: 72 MMHG

## 2018-07-26 VITALS — DIASTOLIC BLOOD PRESSURE: 66 MMHG | SYSTOLIC BLOOD PRESSURE: 111 MMHG

## 2018-07-26 VITALS — DIASTOLIC BLOOD PRESSURE: 71 MMHG | SYSTOLIC BLOOD PRESSURE: 110 MMHG

## 2018-07-26 VITALS — SYSTOLIC BLOOD PRESSURE: 114 MMHG | DIASTOLIC BLOOD PRESSURE: 79 MMHG

## 2018-07-26 VITALS — DIASTOLIC BLOOD PRESSURE: 77 MMHG | SYSTOLIC BLOOD PRESSURE: 115 MMHG

## 2018-07-26 VITALS — DIASTOLIC BLOOD PRESSURE: 77 MMHG | SYSTOLIC BLOOD PRESSURE: 119 MMHG

## 2018-07-26 VITALS — DIASTOLIC BLOOD PRESSURE: 75 MMHG | SYSTOLIC BLOOD PRESSURE: 114 MMHG

## 2018-07-26 VITALS — SYSTOLIC BLOOD PRESSURE: 110 MMHG | DIASTOLIC BLOOD PRESSURE: 75 MMHG

## 2018-07-26 VITALS — DIASTOLIC BLOOD PRESSURE: 61 MMHG | SYSTOLIC BLOOD PRESSURE: 105 MMHG

## 2018-07-26 LAB
ADD MANUAL DIFF: NO
ALBUMIN SERPL-MCNC: 2.9 G/DL (ref 3.4–5)
ALBUMIN/GLOB SERPL: 0.6 {RATIO} (ref 1–2.7)
ALP SERPL-CCNC: 88 U/L (ref 46–116)
ALT SERPL-CCNC: 33 U/L (ref 12–78)
ANION GAP SERPL CALC-SCNC: 10 MMOL/L (ref 5–15)
AST SERPL-CCNC: 16 U/L (ref 15–37)
BASOPHILS NFR BLD AUTO: 0.4 % (ref 0–2)
BILIRUB SERPL-MCNC: 0.4 MG/DL (ref 0.2–1)
BUN SERPL-MCNC: 11 MG/DL (ref 7–18)
CALCIUM SERPL-MCNC: 9.3 MG/DL (ref 8.5–10.1)
CHLORIDE SERPL-SCNC: 109 MMOL/L (ref 98–107)
CO2 SERPL-SCNC: 22 MMOL/L (ref 21–32)
CREAT SERPL-MCNC: 1.1 MG/DL (ref 0.55–1.3)
EOSINOPHIL NFR BLD AUTO: 7.4 % (ref 0–3)
ERYTHROCYTE [DISTWIDTH] IN BLOOD BY AUTOMATED COUNT: 14.3 % (ref 11.6–14.8)
GLOBULIN SER-MCNC: 4.8 G/DL
HCT VFR BLD CALC: 33.1 % (ref 42–52)
HGB BLD-MCNC: 10.4 G/DL (ref 14.2–18)
LYMPHOCYTES NFR BLD AUTO: 17.5 % (ref 20–45)
MCV RBC AUTO: 87 FL (ref 80–99)
MONOCYTES NFR BLD AUTO: 11.6 % (ref 1–10)
NEUTROPHILS NFR BLD AUTO: 63.2 % (ref 45–75)
PHOSPHATE SERPL-MCNC: 2.3 MG/DL (ref 2.5–4.9)
PLATELET # BLD: 181 K/UL (ref 150–450)
POTASSIUM SERPL-SCNC: 3.4 MMOL/L (ref 3.5–5.1)
RBC # BLD AUTO: 3.81 M/UL (ref 4.7–6.1)
SODIUM SERPL-SCNC: 141 MMOL/L (ref 136–145)
WBC # BLD AUTO: 7 K/UL (ref 4.8–10.8)

## 2018-07-26 RX ADMIN — DEXTROSE MONOHYDRATE SCH MLS/HR: 50 INJECTION, SOLUTION INTRAVENOUS at 06:05

## 2018-07-26 RX ADMIN — INSULIN ASPART SCH UNITS: 100 INJECTION, SOLUTION INTRAVENOUS; SUBCUTANEOUS at 12:18

## 2018-07-26 RX ADMIN — DEXTROSE MONOHYDRATE SCH MLS/HR: 50 INJECTION, SOLUTION INTRAVENOUS at 22:04

## 2018-07-26 RX ADMIN — LEVETIRACETAM SCH MG: 100 SOLUTION ORAL at 06:05

## 2018-07-26 RX ADMIN — HEPARIN SODIUM SCH UNITS: 5000 INJECTION INTRAVENOUS; SUBCUTANEOUS at 21:17

## 2018-07-26 RX ADMIN — CHLORHEXIDINE GLUCONATE SCH APPLIC: 213 SOLUTION TOPICAL at 21:10

## 2018-07-26 RX ADMIN — INSULIN ASPART SCH UNITS: 100 INJECTION, SOLUTION INTRAVENOUS; SUBCUTANEOUS at 06:07

## 2018-07-26 RX ADMIN — INSULIN ASPART SCH UNITS: 100 INJECTION, SOLUTION INTRAVENOUS; SUBCUTANEOUS at 11:30

## 2018-07-26 RX ADMIN — POLYETHYLENE GLYCOL 3350 SCH GM: 17 POWDER, FOR SOLUTION ORAL at 21:10

## 2018-07-26 RX ADMIN — INSULIN ASPART SCH UNITS: 100 INJECTION, SOLUTION INTRAVENOUS; SUBCUTANEOUS at 17:30

## 2018-07-26 RX ADMIN — SODIUM CHLORIDE SCH MLS/HR: 9 INJECTION, SOLUTION INTRAVENOUS at 21:15

## 2018-07-26 RX ADMIN — HEPARIN SODIUM SCH UNITS: 5000 INJECTION INTRAVENOUS; SUBCUTANEOUS at 09:48

## 2018-07-26 RX ADMIN — INSULIN ASPART SCH UNITS: 100 INJECTION, SOLUTION INTRAVENOUS; SUBCUTANEOUS at 23:50

## 2018-07-26 RX ADMIN — LEVETIRACETAM SCH MG: 100 SOLUTION ORAL at 21:20

## 2018-07-26 RX ADMIN — SODIUM CHLORIDE SCH MLS/HR: 0.9 INJECTION INTRAVENOUS at 22:01

## 2018-07-26 RX ADMIN — DEXTROSE MONOHYDRATE SCH MLS/HR: 50 INJECTION, SOLUTION INTRAVENOUS at 14:58

## 2018-07-26 RX ADMIN — LEVETIRACETAM SCH MG: 100 SOLUTION ORAL at 14:27

## 2018-07-26 NOTE — CONSULTATION
History of Present Illness


General


Date patient seen:  Jul 26, 2018


Chief Complaint:  Fever


Referring physician:  JUAN DIEGO SEN


Reason for Consultation:  PROLAPSE STOMA





Present Illness


HPI


52 year old male with history of chronic respiratory failure who has prior 

trach and is vent dependant, BPH, GERD, HTN, DM2, seizure disorder, colostomy, 

feeding tube, and chronic  constipation who presents to ED on 7/23 with fever, 

tachycardia and hypotension / sepsis.  During admission CT Scan performed and 

ostomy noted with herniation of bowel into colostomy.  Surgery called to 

evaluate.  patient seen, chart reviewed, patient examined.


Allergies:  


Coded Allergies:  


     AZTREONAM (Verified  Allergy, Unknown, 7/23/18)





Medication History


Scheduled


Baclofen* (Baclofen*), 10 MG GT THREE TIMES A DAY, (Reported)


Bisacodyl* (Dulcolax*), 10 MG RECTAL DAILY, (Reported)


Calcium Carbonate (Calcium Carbonate), 1,000 MG GT BID, (Reported)


Chlorhexidine Gluconate (Peridex), 15 ML MM Q12HR, (Reported)


Cholecalciferol (Vitamin D3)* (Vitamin D*), 5,000 UNIT GT ONCE A WEEK, (Reported

)


Clonidine Hcl* (Catapres*), 0.1 MG GT EVERY 6 HOURS, (Reported)


Cranberry (Cranberry), 400 MG GT DAILY, (Reported)


Dextran 70/Hypromellose (Artificial Tears Eye Drops*), 1 DROP BOTH EYES Q4HR, (

Reported)


Docusate Sodium* (Docusate Sodium*), 100 MG GT TWICE A DAY, (Reported)


Famotidine (Famotidine), 20 MG GT DAILY, (Reported)


Glycopyrrolate (Robinul), 2 MG GT BID, (Reported)


Insulin Regular, Human* (Novolin R*), 0 SUBQ .SLIDING SCALE, (Reported)


Ipratropium/Albuterol Sulfate (DuoNeb 0.5-3(2.5)mg/3ml), 3 ML HHN Q3HR, (

Reported)


Levetiracetam* (Levetiracetam*), 1,000 MG GT TID, (Reported)


Losartan Potassium* (Losartan Potassium*), 25 MG GT DAILY, (Reported)


Multivitamin With Minerals (Multivitamins With Minerals*), 1 TAB GT DAILY, (

Reported)


Nystatin* (Nystatin*), 1 APPLIC TOPIC THREE TIMES A DAY, (Reported)


Polyethylene Glycol 3350* (Miralax*), 17 GM GT DAILY, (Reported)


Sitagliptin* (Januvia*), 50 MG GT DAILY, (Reported)


Spironolactone* (Aldactone*), 25 MG GT DAILY, (Reported)





Scheduled PRN


Acetaminophen 160MG/5ML* (Acetaminophen*), 20.3 ML GT Q4HR PRN for Fever/

Headache/Mild Pain, (Reported)





Miscellaneous Medications


Lactulose (Lactulose*), 30 ML GT, (Reported)





Patient History


Limited by:  medical condition


History Provided By:  Medical Record, PMD


Healthcare decision maker


unk


Resuscitation status


Full Code


Advanced Directive on File


No





Past Medical/Surgical History


Past Medical/Surgical History:  


(1) Parastomal hernia


(2) Diabetes mellitus


(3) Vegetative state


(4) Acute on chronic renal insufficiency


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


(6) Acute on chronic respiratory failure


(7) Severe sepsis


(8) Gastrostomy tube dependent


(9) Stoma malfunction





Review of Systems


ROS Narrative


cannot obtain given medical condition





Physical Exam


General Appearance:  no apparent distress


Lines, tubes and drains:  central line, trach


HEENT:  mucous membranes moist


Neck:  trach


Respiratory/Chest:  on vent


Cardiovascular/Chest:  regular rhythm


Abdomen:  normal bowel sounds, soft, distended, feeding tube, other - loop 

colostomy with parastomal hernia


Extremities:  other


Skin Exam:  warm/dry


Neurologic:  unresponsiveness





Last 24 Hour Vital Signs








  Date Time  Temp Pulse Resp B/P (MAP) Pulse Ox O2 Delivery O2 Flow Rate FiO2


 


7/26/18 12:00 98.3 72 20 117/75 (89) 100   





 98.3       


 


7/26/18 12:00      Mechanical Ventilator  


 


7/26/18 12:00        30


 


7/26/18 11:00  68 16 110/75 (87) 100   


 


7/26/18 10:56  72 16     30


 


7/26/18 10:00  69 16 114/75 (88) 100   


 


7/26/18 09:29  73 16     30


 


7/26/18 09:00  67 16 115/77 (90) 100   


 


7/26/18 08:00        30


 


7/26/18 08:00 98.7 68 16 110/71 (84) 100   





 98.7       


 


7/26/18 08:00      Mechanical Ventilator  


 


7/26/18 08:00  68      


 


7/26/18 07:00  66 16 114/73 (87) 100   


 


7/26/18 06:33  65 16     30


 


7/26/18 06:00  70 16 109/71 (84) 100   


 


7/26/18 05:05  73 17     30


 


7/26/18 05:00  72 16 108/72 (84) 100   


 


7/26/18 04:00      Mechanical Ventilator  


 


7/26/18 04:00 98.6 69 16 114/79 (91) 100   





 98.6       


 


7/26/18 04:00        35


 


7/26/18 04:00  71      


 


7/26/18 03:00  75 16 111/66 (81) 100   


 


7/26/18 02:50  72 16     30


 


7/26/18 02:00  74 16 108/71 (83) 100   


 


7/26/18 01:00  72 16 105/61 (76) 100   


 


7/26/18 01:00  75 16     30


 


7/26/18 00:00  77      


 


7/26/18 00:00        35


 


7/26/18 00:00  80 16 116/74 (88) 100   


 


7/26/18 00:00      Mechanical Ventilator  


 


7/25/18 23:00  80 16 110/70 (83) 100   


 


7/25/18 22:56  76 16     30


 


7/25/18 22:00  80 16 112/74 (87) 100   


 


7/25/18 21:00  80 16 104/70 (81) 100   


 


7/25/18 20:55  76 18     30


 


7/25/18 20:00  80      


 


7/25/18 20:00        35


 


7/25/18 20:00 98.8 80 16 105/70 (82) 100   





 98.8       


 


7/25/18 20:00      Mechanical Ventilator  


 


7/25/18 19:02  86 16     30


 


7/25/18 19:00  80 16 100/65 (77) 100   


 


7/25/18 18:00  80 16 103/64 (77) 100   


 


7/25/18 17:05  83 16     30


 


7/25/18 17:00  80 16 107/70 (82) 100   


 


7/25/18 16:00 98.8 85 16 97/65 (76) 100   





 98.8       


 


7/25/18 16:00      Mechanical Ventilator  


 


7/25/18 16:00        35


 


7/25/18 16:00  83      


 


7/25/18 15:06  86 16     30


 


7/25/18 15:00  84 16 104/68 (80) 100   


 


7/25/18 14:00  88 16 106/74 (85) 100   


 


7/25/18 13:17  90 16     30

















Intake and Output  


 


 7/25/18 7/26/18





 19:00 07:00


 


Intake Total 2112.976 ml 1812.5 ml


 


Output Total 1040 ml 1470 ml


 


Balance 1072.976 ml 342.5 ml


 


  


 


IV Total 1472.976 ml 1037.5 ml


 


Tube Feeding 440 ml 675 ml


 


Other 200 ml 100 ml


 


Output Urine Total 740 ml 720 ml


 


Stool Total 300 ml 750 ml











Laboratory Tests








Test


  7/25/18


23:17 7/26/18


00:30 7/26/18


04:00


 


Vancomycin Level Trough


  23.4 ug/mL


(5.0-12.0)  H 


  


 


 


Stool Occult Blood


  


  Negative


(NEGATIVE) 


 


 


White Blood Count


  


  


  7.0 K/UL


(4.8-10.8)


 


Red Blood Count


  


  


  3.81 M/UL


(4.70-6.10)  L


 


Hemoglobin


  


  


  10.4 G/DL


(14.2-18.0)  L


 


Hematocrit


  


  


  33.1 %


(42.0-52.0)  L


 


Mean Corpuscular Volume   87 FL (80-99)  


 


Mean Corpuscular Hemoglobin


  


  


  27.3 PG


(27.0-31.0)


 


Mean Corpuscular Hemoglobin


Concent 


  


  31.5 G/DL


(32.0-36.0)  L


 


Red Cell Distribution Width


  


  


  14.3 %


(11.6-14.8)


 


Platelet Count


  


  


  181 K/UL


(150-450)


 


Mean Platelet Volume


  


  


  9.2 FL


(6.5-10.1)


 


Neutrophils (%) (Auto)


  


  


  63.2 %


(45.0-75.0)


 


Lymphocytes (%) (Auto)


  


  


  17.5 %


(20.0-45.0)  L


 


Monocytes (%) (Auto)


  


  


  11.6 %


(1.0-10.0)  H


 


Eosinophils (%) (Auto)


  


  


  7.4 %


(0.0-3.0)  H


 


Basophils (%) (Auto)


  


  


  0.4 %


(0.0-2.0)


 


Sodium Level


  


  


  141 MMOL/L


(136-145)


 


Potassium Level


  


  


  3.4 MMOL/L


(3.5-5.1)  L


 


Chloride Level


  


  


  109 MMOL/L


()  H


 


Carbon Dioxide Level


  


  


  22 MMOL/L


(21-32)


 


Anion Gap


  


  


  10 mmol/L


(5-15)


 


Blood Urea Nitrogen


  


  


  11 mg/dL


(7-18)


 


Creatinine


  


  


  1.1 MG/DL


(0.55-1.30)


 


Estimat Glomerular Filtration


Rate 


  


  > 60 mL/min


(>60)


 


Glucose Level


  


  


  190 MG/DL


()  H


 


Calcium Level


  


  


  9.3 MG/DL


(8.5-10.1)


 


Phosphorus Level


  


  


  2.3 MG/DL


(2.5-4.9)  L


 


Magnesium Level


  


  


  1.8 MG/DL


(1.8-2.4)


 


Total Bilirubin


  


  


  0.4 MG/DL


(0.2-1.0)


 


Aspartate Amino Transf


(AST/SGOT) 


  


  16 U/L (15-37)


 


 


Alanine Aminotransferase


(ALT/SGPT) 


  


  33 U/L (12-78)


 


 


Alkaline Phosphatase


  


  


  88 U/L


()


 


Total Protein


  


  


  7.7 G/DL


(6.4-8.2)


 


Albumin


  


  


  2.9 G/DL


(3.4-5.0)  L


 


Globulin   4.8 g/dL  


 


Albumin/Globulin Ratio


  


  


  0.6 (1.0-2.7)


L








Height (Feet):  6


Weight (Pounds):  207


Medications





Current Medications








 Medications


  (Trade)  Dose


 Ordered  Sig/Jan


 Route


 PRN Reason  Start Time


 Stop Time Status Last Admin


Dose Admin


 


 Acetaminophen


  (Tylenol)  650 mg  Q4H  PRN


 ORAL


 FEVER  7/26/18 14:00


 8/23/18 09:59   


 


 


 Albuterol/


 Ipratropium


  (Albuterol/


 Ipratropium)  3 ml  Q4H  PRN


 HHN


 Shortness of Breath  7/26/18 14:00


 7/29/18 09:59   


 


 


 Baclofen


  (Lioresal)  10 mg  THREE TIMES A  DAY


 GT


   7/26/18 13:00


 8/23/18 12:59   


 


 


 Chlorhexidine


 Gluconate


  (Elaine-Hex 2%)  1 applic  DAILY@2000


 TOPIC


   7/26/18 20:00


 8/23/18 19:59   


 


 


 Dextrose


  (Dextrose 50%)  25 ml  STAT  PRN


 IV


 Hypoglycemia  7/27/18 08:45


 8/24/18 08:44   


 


 


 Dextrose


  (Dextrose 50%)  50 ml  STAT  PRN


 IV


 Hypoglycemia  7/27/18 08:45


 8/24/18 08:44   


 


 


 Heparin Sodium


  (Porcine)


  (Heparin 5000


 units/ml)  5,000 units  EVERY 12  HOURS


 SUBQ


   7/26/18 21:00


 8/23/18 20:59   


 


 


 Insulin Aspart


  (NovoLOG)    Q6HR


 SUBQ


   7/26/18 18:00


 8/24/18 11:29   


 


 


 Iron Sucrose 100


 mg/Sodium Chloride  60 ml @ 


 240 mls/hr  BEDTIME


 IV


   7/26/18 21:00


 7/30/18 21:14   


 


 


 Lansoprazole


  (Prevacid)  30 mg  DAILY


 GT


   7/27/18 09:00


 8/25/18 08:59   


 


 


 Levetiracetam


  (Keppra)  1,000 mg  Q8HR


 GT


   7/26/18 14:00


 8/23/18 12:59   


 


 


 Lorazepam


  (Ativan 2mg/ml


 1ml)  2 mg  Q2H  PRN


 IV


 For Anxiety  7/26/18 12:00


 7/31/18 09:59   


 


 


 Morphine Sulfate


  (Morphine


 Sulfate)  4 mg  Q4H  PRN


 IVP


 Severe Pain (Pain Scale 7-10)  7/26/18 14:00


 7/31/18 09:59   


 


 


 Ondansetron HCl


  (Zofran)  4 mg  Q6H  PRN


 IVP


 Nausea & Vomiting  7/26/18 16:00


 8/23/18 09:59   


 


 


 Piperacillin Sod/


 Tazobactam Sod


 3.375 gm/Sodium


 Chloride  110 ml @ 


 27.5 mls/hr  EVERY 8  HOURS


 IVPB


   7/26/18 14:00


 7/30/18 11:29   


 


 


 Polyethylene


 Glycol


  (Miralax)  17 gm  BEDTIME


 ORAL


   7/26/18 21:00


 8/24/18 20:59   


 


 


 Vancomycin HCl 1


 gm/Dextrose  275 ml @ 


 183.708


 mls/hr  Q12HR


 IVPB


   7/26/18 21:00


 7/31/18 08:59   


 











Assessment/Plan


Problem List:  


(1) Stoma malfunction


SNOMED:  423894051


(2) Parastomal hernia


Assessment & Plan:  52M with history of prior loop colostomy.  proximal loop 

stable.  distal with impacted contrast stool but stable. 


parastomal hernia with small bowel contents in hernia.  no obstruction noted at 

this time. 


larger appearance of patients stoma is because location and use of loop 

colostomy in hepatic flexure.  it is otherwise viable and stable.  





Would Highly recommend fixing parastomal hernia but can be done electively.


if obstructs will need urgent repair.  currently non obstructive


will monitor and follow with recs. 


bowel care 


thank you for this consultation.


ICD Codes:  K43.5 - Parastomal hernia without obstruction or gangrene


SNOMED:  275505023


Qualifiers:  


   Qualified Codes:  K43.5 - Parastomal hernia without obstruction or gangrene


(3) Severe sepsis


ICD Codes:  A41.9 - Sepsis, unspecified organism; R65.20 - Severe sepsis 

without septic shock


SNOMED:  96300137


Status:  unchanged











Brian Christina Jul 26, 2018 13:16

## 2018-07-26 NOTE — GENERAL PROGRESS NOTE
Assessment/Plan


Status:  unchanged


Assessment/Plan


# Anemia of chronic disease. No hemolysis, peripheral smear reviewed, ferritin 

is elevated


--> Continue to closely monitor for improvement. 


--> Anemia w/u has been reviewed. Will trend cbc daily. 


--> Hgb goal >7


# Leukocytosis - Sepsis with elevated temperature of 103 degrees Fahrenheit.  


--> Lactic acid pending, was elevated upon admission. 


--> Pt on antibiotics, has received a dose of Zosyn and currently is on 

vancomycin q.12 h.  


--> Currently afebrile.


--> Appreciate Infectious Disease consult.


# Acute kidney injury.  


--> Currently on IV fluids, IV bolus given to see if the patient responds along 

with IV pressor as needed.  


--> Closely monitor with Nephrology team.


# Septic shock, use antibiotic per ID services.


--> potential pna, bacteremia


# Respiratory failure, status post tracheostomy, on mechanical ventilation per 

Dr. Pena.


# Hypercalcemia.  Monitor PTH and calcium level





The time the note was entered does not necessarily correspond to the time the 

patient was seen.





Subjective


Date patient seen:  Jul 26, 2018


ROS Limited/Unobtainable:  Yes


Hematologic/Lymphatic:  Reports: anemia


Allergies:  


Coded Allergies:  


     AZTREONAM (Verified  Allergy, Unknown, 7/23/18)


All Systems:  reviewed and negative except above


Subjective


Pt transferred from ICU to step down unit. Pt remains obtunded. No acute 

events. H/H stable. 





Covering for Dr. Bloom





Objective





Last 24 Hour Vital Signs








  Date Time  Temp Pulse Resp B/P (MAP) Pulse Ox O2 Delivery O2 Flow Rate FiO2


 


7/26/18 12:00      Mechanical Ventilator  


 


7/26/18 12:00        30


 


7/26/18 11:00  68 16 110/75 (87) 100   


 


7/26/18 10:56  72 16     30


 


7/26/18 10:00  69 16 114/75 (88) 100   


 


7/26/18 09:29  73 16     30


 


7/26/18 09:00  67 16 115/77 (90) 100   


 


7/26/18 08:00        30


 


7/26/18 08:00 98.7 68 16 110/71 (84) 100   





 98.7       


 


7/26/18 08:00      Mechanical Ventilator  


 


7/26/18 08:00  68      


 


7/26/18 07:00  66 16 114/73 (87) 100   


 


7/26/18 06:33  65 16     30


 


7/26/18 06:00  70 16 109/71 (84) 100   


 


7/26/18 05:05  73 17     30


 


7/26/18 05:00  72 16 108/72 (84) 100   


 


7/26/18 04:00      Mechanical Ventilator  


 


7/26/18 04:00 98.6 69 16 114/79 (91) 100   





 98.6       


 


7/26/18 04:00        35


 


7/26/18 04:00  71      


 


7/26/18 03:00  75 16 111/66 (81) 100   


 


7/26/18 02:50  72 16     30


 


7/26/18 02:00  74 16 108/71 (83) 100   


 


7/26/18 01:00  72 16 105/61 (76) 100   


 


7/26/18 01:00  75 16     30


 


7/26/18 00:00  77      


 


7/26/18 00:00        35


 


7/26/18 00:00  80 16 116/74 (88) 100   


 


7/26/18 00:00      Mechanical Ventilator  


 


7/25/18 23:00  80 16 110/70 (83) 100   


 


7/25/18 22:56  76 16     30


 


7/25/18 22:00  80 16 112/74 (87) 100   


 


7/25/18 21:00  80 16 104/70 (81) 100   


 


7/25/18 20:55  76 18     30


 


7/25/18 20:00  80      


 


7/25/18 20:00        35


 


7/25/18 20:00 98.8 80 16 105/70 (82) 100   





 98.8       


 


7/25/18 20:00      Mechanical Ventilator  


 


7/25/18 19:02  86 16     30


 


7/25/18 19:00  80 16 100/65 (77) 100   


 


7/25/18 18:00  80 16 103/64 (77) 100   


 


7/25/18 17:05  83 16     30


 


7/25/18 17:00  80 16 107/70 (82) 100   


 


7/25/18 16:00 98.8 85 16 97/65 (76) 100   





 98.8       


 


7/25/18 16:00      Mechanical Ventilator  


 


7/25/18 16:00        35


 


7/25/18 16:00  83      


 


7/25/18 15:06  86 16     30


 


7/25/18 15:00  84 16 104/68 (80) 100   


 


7/25/18 14:00  88 16 106/74 (85) 100   


 


7/25/18 13:17  90 16     30


 


7/25/18 13:00  88 16 104/64 (77) 100   

















Intake and Output  


 


 7/25/18 7/26/18





 19:00 07:00


 


Intake Total 2112.976 ml 1812.5 ml


 


Output Total 1040 ml 1470 ml


 


Balance 1072.976 ml 342.5 ml


 


  


 


IV Total 1472.976 ml 1037.5 ml


 


Tube Feeding 440 ml 675 ml


 


Other 200 ml 100 ml


 


Output Urine Total 740 ml 720 ml


 


Stool Total 300 ml 750 ml








Laboratory Tests


7/25/18 23:17: Vancomycin Level Trough 23.4H


7/26/18 00:30: Stool Occult Blood Negative


7/26/18 04:00: 


White Blood Count 7.0, Red Blood Count 3.81L, Hemoglobin 10.4L, Hematocrit 33.1L

, Mean Corpuscular Volume 87, Mean Corpuscular Hemoglobin 27.3, Mean 

Corpuscular Hemoglobin Concent 31.5L, Red Cell Distribution Width 14.3, 

Platelet Count 181, Mean Platelet Volume 9.2, Neutrophils (%) (Auto) 63.2, 

Lymphocytes (%) (Auto) 17.5L, Monocytes (%) (Auto) 11.6H, Eosinophils (%) (Auto

) 7.4H, Basophils (%) (Auto) 0.4, Sodium Level 141, Potassium Level 3.4L, 

Chloride Level 109H, Carbon Dioxide Level 22, Anion Gap 10, Blood Urea Nitrogen 

11, Creatinine 1.1, Estimat Glomerular Filtration Rate > 60, Glucose Level 190H

, Calcium Level 9.3, Phosphorus Level 2.3L, Magnesium Level 1.8, Total 

Bilirubin 0.4, Aspartate Amino Transf (AST/SGOT) 16, Alanine Aminotransferase (

ALT/SGPT) 33, Alkaline Phosphatase 88, Total Protein 7.7, Albumin 2.9L, 

Globulin 4.8, Albumin/Globulin Ratio 0.6L


Height (Feet):  6


Weight (Pounds):  207


General Appearance:  no apparent distress, lethargic


EENT:  PERRL/EOMI


Neck:  normal alignment


Cardiovascular:  normal peripheral pulses


Respiratory/Chest:  no respiratory distress


Abdomen:  soft











Tejas Gerber MD Jul 26, 2018 12:31

## 2018-07-26 NOTE — DIAGNOSTIC IMAGING REPORT
--------------- APPROVED REPORT --------------





CPT Code: 94021



Present Symptoms

Shortness of breath 

Comments: Technically difficult/limited visualization due to vessel depth (mid-thigh 

area).





BILATERAL: Imaging reveals a patent deep venous system bilaterally. There is no evidence 

of thrombus within the femoral, popliteal or tibial segments. The greater saphenous veins 

are also within normal limits. Doppler indicates normal spontaneous flow within these 

segments.

## 2018-07-26 NOTE — PULMONOLGY CRITICAL CARE NOTE
Critical Care - Asmt/Plan


Problems:  


(1) Acute on chronic respiratory failure


(2) Acute on chronic renal insufficiency


(3) Severe sepsis


(4) Vegetative state


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


(6) Diabetes mellitus


Respiratory:  monitor respiratory rate, adjust FIO2, CXR


Cardiac:  continue to monitor HR/BP


Renal:  F/U I&O


Infectious Disease:  check cultures


Gastrointestinal:  continue feedings/current rate


Endocrine:  monitor blood sugar, check HgA1C


Neurologic:  PRN Morphine


Prophylaxis:  Protonix, Heparin


Disposition:  keep in ICU


Time Spent (Minutes):  40





Critical Care - Objective





Last 24 Hour Vital Signs








  Date Time  Temp Pulse Resp B/P (MAP) Pulse Ox O2 Delivery O2 Flow Rate FiO2


 


7/26/18 09:29  73 16     30


 


7/26/18 08:00        30


 


7/26/18 08:00 98.7 68 16 110/71 (84) 100   





 98.7       


 


7/26/18 07:00  66 16 114/73 (87) 100   


 


7/26/18 06:33  65 16     30


 


7/26/18 06:00  70 16 109/71 (84) 100   


 


7/26/18 05:05  73 17     30


 


7/26/18 05:00  72 16 108/72 (84) 100   


 


7/26/18 04:00      Mechanical Ventilator  


 


7/26/18 04:00 98.6 69 16 114/79 (91) 100   





 98.6       


 


7/26/18 04:00        35


 


7/26/18 04:00  71      


 


7/26/18 03:00  75 16 111/66 (81) 100   


 


7/26/18 02:50  72 16     30


 


7/26/18 02:00  74 16 108/71 (83) 100   


 


7/26/18 01:00  72 16 105/61 (76) 100   


 


7/26/18 01:00  75 16     30


 


7/26/18 00:00  77      


 


7/26/18 00:00        35


 


7/26/18 00:00  80 16 116/74 (88) 100   


 


7/26/18 00:00      Mechanical Ventilator  


 


7/25/18 23:00  80 16 110/70 (83) 100   


 


7/25/18 22:56  76 16     30


 


7/25/18 22:00  80 16 112/74 (87) 100   


 


7/25/18 21:00  80 16 104/70 (81) 100   


 


7/25/18 20:55  76 18     30


 


7/25/18 20:00  80      


 


7/25/18 20:00        35


 


7/25/18 20:00 98.8 80 16 105/70 (82) 100   





 98.8       


 


7/25/18 20:00      Mechanical Ventilator  


 


7/25/18 19:02  86 16     30


 


7/25/18 19:00  80 16 100/65 (77) 100   


 


7/25/18 18:00  80 16 103/64 (77) 100   


 


7/25/18 17:05  83 16     30


 


7/25/18 17:00  80 16 107/70 (82) 100   


 


7/25/18 16:00 98.8 85 16 97/65 (76) 100   





 98.8       


 


7/25/18 16:00      Mechanical Ventilator  


 


7/25/18 16:00        35


 


7/25/18 16:00  83      


 


7/25/18 15:06  86 16     30


 


7/25/18 15:00  84 16 104/68 (80) 100   


 


7/25/18 14:00  88 16 106/74 (85) 100   


 


7/25/18 13:17  90 16     30


 


7/25/18 13:00  88 16 104/64 (77) 100   


 


7/25/18 12:00      Mechanical Ventilator  


 


7/25/18 12:00 98.9 86 16 105/70 (82) 100   





 98.9       


 


7/25/18 12:00        35


 


7/25/18 12:00  90      


 


7/25/18 11:00  90 16 109/71 (84) 100   


 


7/25/18 10:59  97 16     30








Status:  obtunded


Condition:  critical


Neck:  full ROM


Heart:  HR/BP stable


Abdomen:  non-tender, active bowel sounds


Extremities:  edema


Decubiti:  location


Micro:





Microbiology








 Date/Time


Source Procedure


Growth Status


 


 


 7/24/18 00:05


Blood Blood Culture - Preliminary


Gram Negative Bacillus 1


Gram Negative Bacillus 2 Resulted


 


 7/24/18 00:00


Blood Blood Culture - Preliminary


Gram Negative Bacillus 1


Gram Negative Bacillus 2 Resulted


 


 7/24/18 01:43


Urine,Clean Catch Urine Culture - Preliminary


Gram Negative Bacillus 1 Resulted


 


 7/24/18 00:05


Rectum VRE Culture - Final


Enterococcus Faecalis - Vre Complete








Accucheck:  204





Critical Care - Subjective


ROS Limited/Unobtainable:  Yes


Condition:  critical


EKG Rhythm:  Sinus Rhythm


FI02:  30


Vent Support Breath Rate:  16


Vent Support Mode:  AC


Vent Tidal Volume:  600


Sputum Amount:  Moderate


PEEP:  5.0


PIP:  35


Tube Feeding Amount:  65


I&O:











Intake and Output  


 


 7/25/18 7/26/18





 19:00 07:00


 


Intake Total 2112.976 ml 1812.5 ml


 


Output Total 1040 ml 1470 ml


 


Balance 1072.976 ml 342.5 ml


 


  


 


IV Total 1472.976 ml 1037.5 ml


 


Tube Feeding 440 ml 675 ml


 


Other 200 ml 100 ml


 


Output Urine Total 740 ml 720 ml


 


Stool Total 300 ml 750 ml








Labs:





Laboratory Tests








Test


  7/25/18


23:17 7/26/18


00:30 7/26/18


04:00


 


Vancomycin Level Trough


  23.4 ug/mL


(5.0-12.0)  H 


  


 


 


Stool Occult Blood  Pending   


 


White Blood Count


  


  


  7.0 K/UL


(4.8-10.8)


 


Red Blood Count


  


  


  3.81 M/UL


(4.70-6.10)  L


 


Hemoglobin


  


  


  10.4 G/DL


(14.2-18.0)  L


 


Hematocrit


  


  


  33.1 %


(42.0-52.0)  L


 


Mean Corpuscular Volume   87 FL (80-99)  


 


Mean Corpuscular Hemoglobin


  


  


  27.3 PG


(27.0-31.0)


 


Mean Corpuscular Hemoglobin


Concent 


  


  31.5 G/DL


(32.0-36.0)  L


 


Red Cell Distribution Width


  


  


  14.3 %


(11.6-14.8)


 


Platelet Count


  


  


  181 K/UL


(150-450)


 


Mean Platelet Volume


  


  


  9.2 FL


(6.5-10.1)


 


Neutrophils (%) (Auto)


  


  


  63.2 %


(45.0-75.0)


 


Lymphocytes (%) (Auto)


  


  


  17.5 %


(20.0-45.0)  L


 


Monocytes (%) (Auto)


  


  


  11.6 %


(1.0-10.0)  H


 


Eosinophils (%) (Auto)


  


  


  7.4 %


(0.0-3.0)  H


 


Basophils (%) (Auto)


  


  


  0.4 %


(0.0-2.0)


 


Sodium Level


  


  


  141 MMOL/L


(136-145)


 


Potassium Level


  


  


  3.4 MMOL/L


(3.5-5.1)  L


 


Chloride Level


  


  


  109 MMOL/L


()  H


 


Carbon Dioxide Level


  


  


  22 MMOL/L


(21-32)


 


Anion Gap


  


  


  10 mmol/L


(5-15)


 


Blood Urea Nitrogen


  


  


  11 mg/dL


(7-18)


 


Creatinine


  


  


  1.1 MG/DL


(0.55-1.30)


 


Estimat Glomerular Filtration


Rate 


  


  > 60 mL/min


(>60)


 


Glucose Level


  


  


  190 MG/DL


()  H


 


Calcium Level


  


  


  9.3 MG/DL


(8.5-10.1)


 


Phosphorus Level


  


  


  2.3 MG/DL


(2.5-4.9)  L


 


Magnesium Level


  


  


  1.8 MG/DL


(1.8-2.4)


 


Total Bilirubin


  


  


  0.4 MG/DL


(0.2-1.0)


 


Aspartate Amino Transf


(AST/SGOT) 


  


  16 U/L (15-37)


 


 


Alanine Aminotransferase


(ALT/SGPT) 


  


  33 U/L (12-78)


 


 


Alkaline Phosphatase


  


  


  88 U/L


()


 


Total Protein


  


  


  7.7 G/DL


(6.4-8.2)


 


Albumin


  


  


  2.9 G/DL


(3.4-5.0)  L


 


Globulin   4.8 g/dL  


 


Albumin/Globulin Ratio


  


  


  0.6 (1.0-2.7)


L

















Yury Pena MD Jul 26, 2018 10:37

## 2018-07-26 NOTE — NEPHROLOGY PROGRESS NOTE
Assessment/Plan


Assessment/Plan


1. BRAYDON- due to multifactorial ATN (sepsis/hypotension)


    - resolved. DC IVFs. Patient on TFs


2. Septic Shock-  Abx. BP now stable


3. Chronic Resp FL- trached on vent


4. Hypok/Phos- being replaced today again





Subjective


Date patient seen:  Jul 26, 2018


Time patient seen:  07:59


ROS Limited/Unobtainable:  Yes


Allergies:  


Coded Allergies:  


     AZTREONAM (Verified  Allergy, Unknown, 7/23/18)


Subjective


Patient remains trached/vent nonverbal. Stable





Objective





Last 24 Hour Vital Signs








  Date Time  Temp Pulse Resp B/P (MAP) Pulse Ox O2 Delivery O2 Flow Rate FiO2


 


7/26/18 07:00  66 16 114/73 (87) 100   


 


7/26/18 06:00  70 16 109/71 (84) 100   


 


7/26/18 05:05  73 17     30


 


7/26/18 05:00  72 16 108/72 (84) 100   


 


7/26/18 04:00      Mechanical Ventilator  


 


7/26/18 04:00 98.6 69 16 114/79 (91) 100   





 98.6       


 


7/26/18 04:00        35


 


7/26/18 04:00  71      


 


7/26/18 03:00  75 16 111/66 (81) 100   


 


7/26/18 02:50  72 16     30


 


7/26/18 02:00  74 16 108/71 (83) 100   


 


7/26/18 01:00  72 16 105/61 (76) 100   


 


7/26/18 01:00  75 16     30


 


7/26/18 00:00  77      


 


7/26/18 00:00        35


 


7/26/18 00:00  80 16 116/74 (88) 100   


 


7/26/18 00:00      Mechanical Ventilator  


 


7/25/18 23:00  80 16 110/70 (83) 100   


 


7/25/18 22:56  76 16     30


 


7/25/18 22:00  80 16 112/74 (87) 100   


 


7/25/18 21:00  80 16 104/70 (81) 100   


 


7/25/18 20:55  76 18     30


 


7/25/18 20:00  80      


 


7/25/18 20:00        35


 


7/25/18 20:00 98.8 80 16 105/70 (82) 100   





 98.8       


 


7/25/18 20:00      Mechanical Ventilator  


 


7/25/18 19:02  86 16     30


 


7/25/18 19:00  80 16 100/65 (77) 100   


 


7/25/18 18:00  80 16 103/64 (77) 100   


 


7/25/18 17:05  83 16     30


 


7/25/18 17:00  80 16 107/70 (82) 100   


 


7/25/18 16:00 98.8 85 16 97/65 (76) 100   





 98.8       


 


7/25/18 16:00      Mechanical Ventilator  


 


7/25/18 16:00        35


 


7/25/18 16:00  83      


 


7/25/18 15:06  86 16     30


 


7/25/18 15:00  84 16 104/68 (80) 100   


 


7/25/18 14:00  88 16 106/74 (85) 100   


 


7/25/18 13:17  90 16     30


 


7/25/18 13:00  88 16 104/64 (77) 100   


 


7/25/18 12:00      Mechanical Ventilator  


 


7/25/18 12:00 98.9 86 16 105/70 (82) 100   





 98.9       


 


7/25/18 12:00        35


 


7/25/18 12:00  90      


 


7/25/18 11:00  90 16 109/71 (84) 100   


 


7/25/18 10:59  97 16     30


 


7/25/18 10:00  92 16 104/74 (84) 100   


 


7/25/18 09:00 98.3 92 16 111/71 (84) 99   





 98.3       


 


7/25/18 08:39  96 16     30


 


7/25/18 08:00  93 16 97/63 (74) 99   


 


7/25/18 08:00        35


 


7/25/18 08:00  93      


 


7/25/18 08:00      Mechanical Ventilator  

















Intake and Output  


 


 7/25/18 7/26/18





 19:00 07:00


 


Intake Total 2112.976 ml 1812.5 ml


 


Output Total 1040 ml 1470 ml


 


Balance 1072.976 ml 342.5 ml


 


  


 


IV Total 1472.976 ml 1037.5 ml


 


Tube Feeding 440 ml 675 ml


 


Other 200 ml 100 ml


 


Output Urine Total 740 ml 720 ml


 


Stool Total 300 ml 750 ml








Laboratory Tests


7/25/18 10:30: 


Hepatitis A IgM Antibody [Pending], Hepatitis B Surface Antigen [Pending], 

Hepatitis B Core IgM Antibody [Pending], Hepatitis C Antibody [Pending]


7/25/18 23:17: Vancomycin Level Trough 23.4H


7/26/18 00:30: Stool Occult Blood [Pending]


7/26/18 04:00: 


White Blood Count 7.0, Red Blood Count 3.81L, Hemoglobin 10.4L, Hematocrit 33.1L

, Mean Corpuscular Volume 87, Mean Corpuscular Hemoglobin 27.3, Mean 

Corpuscular Hemoglobin Concent 31.5L, Red Cell Distribution Width 14.3, 

Platelet Count 181, Mean Platelet Volume 9.2, Neutrophils (%) (Auto) 63.2, 

Lymphocytes (%) (Auto) 17.5L, Monocytes (%) (Auto) 11.6H, Eosinophils (%) (Auto

) 7.4H, Basophils (%) (Auto) 0.4, Sodium Level 141, Potassium Level 3.4L, 

Chloride Level 109H, Carbon Dioxide Level 22, Anion Gap 10, Blood Urea Nitrogen 

11, Creatinine 1.1, Estimat Glomerular Filtration Rate > 60, Glucose Level 190H

, Calcium Level 9.3, Phosphorus Level 2.3L, Magnesium Level 1.8, Total 

Bilirubin 0.4, Aspartate Amino Transf (AST/SGOT) 16, Alanine Aminotransferase (

ALT/SGPT) 33, Alkaline Phosphatase 88, Total Protein 7.7, Albumin 2.9L, 

Globulin 4.8, Albumin/Globulin Ratio 0.6L


Height (Feet):  6


Weight (Pounds):  207


General Appearance:  WD/WN, no apparent distress


EENT:  PERRL/EOMI


Neck:  non-tender, normal alignment, supple


Cardiovascular:  normal peripheral pulses, normal rate, regular rhythm


Respiratory/Chest:  chest wall non-tender, normal breath sounds, rhonchi - 

bilaterally


Abdomen:  normal bowel sounds, non tender, soft


Edema:  no edema noted Arm (L), no edema noted Arm (R), no edema noted Leg (L), 

no edema noted Leg (R), no edema noted Pedal (L), no edema noted Pedal (R), no 

edema noted Generalized











Jewel Templeton M.D. Jul 26, 2018 08:01

## 2018-07-26 NOTE — INFECTIOUS DISEASES PROG NOTE
Assessment/Plan


Assessment/Plan


Abx:


Zosyn 7/24-


IV Vanco 7/24-


 


Assessment:


Sepsis, improving- 2ry to UTI and Bacteremia- ?pyelo. Possible PNA


 -u/a wbc 40-60, nit neg, leuk +2; ucx >100k GNR


 -BCX 4/4 GNR #1, #2


 -CXR: Cardiomegaly. Mild interstitial congestion. Suspect small bilateral 

pleural effusions


 -CT abd/p: Right lower quadrant double barrel colostomy, as described. Small 

peristomal hernia contains bowel loops without evidence of obstruction or 

strangulation. Left renal staghorn calculus. Bilateral intrarenal calyceal 

calculi. Borderline hydronephrosis on the right without evidence of downstream 

obstructive lesion. May indicate mild ureteral pelvic junction obstruction. 

Somewhat atrophic left kidney. Inspissated contrast ball within the distal 

sigmoid colon. Gastrostomy in good position. Nonspecific bilateral perinephric 

fat stranding, could indicate pyelonephritis or could be chronic. Guzmán 

catheter in place. Apparent bladder wall thickening, possibly an artifact of 

under distention but cystitis is not excludable, particularly in view of mild 

perivesical fat stranding. Bilateral pulmonary parenchymal atelectasis and 

consolidation





Fever/Leukocytosis; improving


BRAYDON, improving


Lactic acidosis, resolved





chronic resp failure trach/vent dependant


BPH


GERD


 HTN


DM2


seizure disorder


colostomy


 s/p GT 


constipation





VRE and MRSA colonized


 


Plan:


-Cotninue empiric IV Vancomycin #3 pending sp cx





-Continue empiric Zosyn #3 pending ID GNR Bcx and ucx


   -if decompensating, switch Zosyn to Meropenem.





-Repeat 2 sets of Bcx


-f/u cx


-Monitor CBC/CMP, temperatures


-CXR am





Thank you for this consultation. Will continue to follow along with you.





Discussed with RN.





Subjective


Allergies:  


Coded Allergies:  


     AZTREONAM (Verified  Allergy, Unknown, 7/23/18)


Subjective


afebrile in >24hrs


leukocytosis resolved


on JOSIAH





Objective


Vital Signs





Last 24 Hour Vital Signs








  Date Time  Temp Pulse Resp B/P (MAP) Pulse Ox O2 Delivery O2 Flow Rate FiO2


 


7/26/18 16:00      Mechanical Ventilator  


 


7/26/18 16:00        30


 


7/26/18 15:30  77 16     30


 


7/26/18 13:05  74 16     30


 


7/26/18 12:00 98.3 72 20 117/75 (89) 100   





 98.3       


 


7/26/18 12:00      Mechanical Ventilator  


 


7/26/18 12:00        30


 


7/26/18 12:00  74      


 


7/26/18 11:00  68 16 110/75 (87) 100   


 


7/26/18 10:56  72 16     30


 


7/26/18 10:00  69 16 114/75 (88) 100   


 


7/26/18 09:29  73 16     30


 


7/26/18 09:00  67 16 115/77 (90) 100   


 


7/26/18 08:00        30


 


7/26/18 08:00 98.7 68 16 110/71 (84) 100   





 98.7       


 


7/26/18 08:00      Mechanical Ventilator  


 


7/26/18 08:00  68      


 


7/26/18 07:00  66 16 114/73 (87) 100   


 


7/26/18 06:33  65 16     30


 


7/26/18 06:00  70 16 109/71 (84) 100   


 


7/26/18 05:05  73 17     30


 


7/26/18 05:00  72 16 108/72 (84) 100   


 


7/26/18 04:00      Mechanical Ventilator  


 


7/26/18 04:00 98.6 69 16 114/79 (91) 100   





 98.6       


 


7/26/18 04:00        35


 


7/26/18 04:00  71      


 


7/26/18 03:00  75 16 111/66 (81) 100   


 


7/26/18 02:50  72 16     30


 


7/26/18 02:00  74 16 108/71 (83) 100   


 


7/26/18 01:00  72 16 105/61 (76) 100   


 


7/26/18 01:00  75 16     30


 


7/26/18 00:00  77      


 


7/26/18 00:00        35


 


7/26/18 00:00  80 16 116/74 (88) 100   


 


7/26/18 00:00      Mechanical Ventilator  


 


7/25/18 23:00  80 16 110/70 (83) 100   


 


7/25/18 22:56  76 16     30


 


7/25/18 22:00  80 16 112/74 (87) 100   


 


7/25/18 21:00  80 16 104/70 (81) 100   


 


7/25/18 20:55  76 18     30


 


7/25/18 20:00  80      


 


7/25/18 20:00        35


 


7/25/18 20:00 98.8 80 16 105/70 (82) 100   





 98.8       


 


7/25/18 20:00      Mechanical Ventilator  


 


7/25/18 19:02  86 16     30


 


7/25/18 19:00  80 16 100/65 (77) 100   


 


7/25/18 18:00  80 16 103/64 (77) 100   


 


7/25/18 17:05  83 16     30


 


7/25/18 17:00  80 16 107/70 (82) 100   








Height (Feet):  6


Weight (Pounds):  207


Objective


GENERAL:  The patient is awake, in no overt distress.


HEENT:  Extraocular muscles intact.  No lymphadenopathy noted.


Tracheostomy noted.


CARDIOVASCULAR:  S1 and S2.  Tachycardic.  No rubs or gallops.


PULMONARY:  Mild diffuse expiratory rhonchi with basilar rales.


ABDOMEN:  Obese and nondistended.  The G-tube and stoma noted.


EXTREMITIES:  No edema.  Fair pedal pulses.





Microbiology








 Date/Time


Source Procedure


Growth Status


 


 


 7/24/18 00:05


Blood Blood Culture - Preliminary


Gram Negative Bacillus 1


Gram Negative Bacillus 2 Resulted


 


 7/24/18 00:00


Blood Blood Culture - Preliminary


Gram Negative Bacillus 1


Gram Negative Bacillus 2 Resulted


 


 7/24/18 00:05


Nasal Nares MRSA Culture - Final


Staphylococcus Aureus - Mrsa Complete


 


 7/24/18 01:43


Urine,Clean Catch Urine Culture - Preliminary


Gram Negative Bacillus 1 Resulted


 


 7/24/18 00:05


Rectum VRE Culture - Final


Enterococcus Faecalis - Vre Complete











Laboratory Tests








Test


  7/25/18


23:17 7/26/18


00:30 7/26/18


04:00


 


Vancomycin Level Trough


  23.4 ug/mL


(5.0-12.0)  H 


  


 


 


Stool Occult Blood


  


  Negative


(NEGATIVE) 


 


 


White Blood Count


  


  


  7.0 K/UL


(4.8-10.8)


 


Red Blood Count


  


  


  3.81 M/UL


(4.70-6.10)  L


 


Hemoglobin


  


  


  10.4 G/DL


(14.2-18.0)  L


 


Hematocrit


  


  


  33.1 %


(42.0-52.0)  L


 


Mean Corpuscular Volume   87 FL (80-99)  


 


Mean Corpuscular Hemoglobin


  


  


  27.3 PG


(27.0-31.0)


 


Mean Corpuscular Hemoglobin


Concent 


  


  31.5 G/DL


(32.0-36.0)  L


 


Red Cell Distribution Width


  


  


  14.3 %


(11.6-14.8)


 


Platelet Count


  


  


  181 K/UL


(150-450)


 


Mean Platelet Volume


  


  


  9.2 FL


(6.5-10.1)


 


Neutrophils (%) (Auto)


  


  


  63.2 %


(45.0-75.0)


 


Lymphocytes (%) (Auto)


  


  


  17.5 %


(20.0-45.0)  L


 


Monocytes (%) (Auto)


  


  


  11.6 %


(1.0-10.0)  H


 


Eosinophils (%) (Auto)


  


  


  7.4 %


(0.0-3.0)  H


 


Basophils (%) (Auto)


  


  


  0.4 %


(0.0-2.0)


 


Sodium Level


  


  


  141 MMOL/L


(136-145)


 


Potassium Level


  


  


  3.4 MMOL/L


(3.5-5.1)  L


 


Chloride Level


  


  


  109 MMOL/L


()  H


 


Carbon Dioxide Level


  


  


  22 MMOL/L


(21-32)


 


Anion Gap


  


  


  10 mmol/L


(5-15)


 


Blood Urea Nitrogen


  


  


  11 mg/dL


(7-18)


 


Creatinine


  


  


  1.1 MG/DL


(0.55-1.30)


 


Estimat Glomerular Filtration


Rate 


  


  > 60 mL/min


(>60)


 


Glucose Level


  


  


  190 MG/DL


()  H


 


Calcium Level


  


  


  9.3 MG/DL


(8.5-10.1)


 


Phosphorus Level


  


  


  2.3 MG/DL


(2.5-4.9)  L


 


Magnesium Level


  


  


  1.8 MG/DL


(1.8-2.4)


 


Total Bilirubin


  


  


  0.4 MG/DL


(0.2-1.0)


 


Aspartate Amino Transf


(AST/SGOT) 


  


  16 U/L (15-37)


 


 


Alanine Aminotransferase


(ALT/SGPT) 


  


  33 U/L (12-78)


 


 


Alkaline Phosphatase


  


  


  88 U/L


()


 


Total Protein


  


  


  7.7 G/DL


(6.4-8.2)


 


Albumin


  


  


  2.9 G/DL


(3.4-5.0)  L


 


Globulin   4.8 g/dL  


 


Albumin/Globulin Ratio


  


  


  0.6 (1.0-2.7)


L











Current Medications








 Medications


  (Trade)  Dose


 Ordered  Sig/Jan


 Route


 PRN Reason  Start Time


 Stop Time Status Last Admin


Dose Admin


 


 Acetaminophen


  (Tylenol)  650 mg  Q4H  PRN


 ORAL


 FEVER  7/26/18 14:00


 8/23/18 09:59   


 


 


 Albuterol/


 Ipratropium


  (Albuterol/


 Ipratropium)  3 ml  Q4H  PRN


 HHN


 Shortness of Breath  7/26/18 14:00


 7/29/18 09:59   


 


 


 Baclofen


  (Lioresal)  10 mg  THREE TIMES A  DAY


 GT


   7/26/18 13:00


 8/23/18 12:59  7/26/18 14:27


 


 


 Chlorhexidine


 Gluconate


  (Elaine-Hex 2%)  1 applic  DAILY@2000


 TOPIC


   7/26/18 20:00


 8/23/18 19:59   


 


 


 Dextrose


  (Dextrose 50%)  25 ml  STAT  PRN


 IV


 Hypoglycemia  7/27/18 08:45


 8/24/18 08:44   


 


 


 Dextrose


  (Dextrose 50%)  50 ml  STAT  PRN


 IV


 Hypoglycemia  7/27/18 08:45


 8/24/18 08:44   


 


 


 Docusate Sodium


  (Colace)  100 mg  THREE TIMES A  DAY


 GT


   7/26/18 18:00


 8/25/18 17:59   


 


 


 Heparin Sodium


  (Porcine)


  (Heparin 5000


 units/ml)  5,000 units  EVERY 12  HOURS


 SUBQ


   7/26/18 21:00


 8/23/18 20:59   


 


 


 Insulin Aspart


  (NovoLOG)    Q6HR


 SUBQ


   7/26/18 18:00


 8/24/18 11:29   


 


 


 Iron Sucrose 100


 mg/Sodium Chloride  60 ml @ 


 240 mls/hr  BEDTIME


 IV


   7/26/18 21:00


 7/30/18 21:14   


 


 


 Lansoprazole


  (Prevacid)  30 mg  DAILY


 GT


   7/27/18 09:00


 8/25/18 08:59   


 


 


 Levetiracetam


  (Keppra)  1,000 mg  Q8HR


 GT


   7/26/18 14:00


 8/23/18 12:59  7/26/18 14:27


 


 


 Lorazepam


  (Ativan 2mg/ml


 1ml)  2 mg  Q2H  PRN


 IV


 For Anxiety  7/26/18 12:00


 7/31/18 09:59   


 


 


 Morphine Sulfate


  (Morphine


 Sulfate)  4 mg  Q4H  PRN


 IVP


 Severe Pain (Pain Scale 7-10)  7/26/18 14:00


 7/31/18 09:59   


 


 


 Ondansetron HCl


  (Zofran)  4 mg  Q6H  PRN


 IVP


 Nausea & Vomiting  7/26/18 16:00


 8/23/18 09:59   


 


 


 Piperacillin Sod/


 Tazobactam Sod


 3.375 gm/Sodium


 Chloride  110 ml @ 


 27.5 mls/hr  EVERY 8  HOURS


 IVPB


   7/26/18 14:00


 7/30/18 11:29  7/26/18 14:58


 


 


 Polyethylene


 Glycol


  (Miralax)  17 gm  BEDTIME


 ORAL


   7/26/18 21:00


 8/24/18 20:59   


 


 


 Vancomycin HCl 1


 gm/Dextrose  275 ml @ 


 183.708


 mls/hr  Q12HR


 IVPB


   7/26/18 21:00


 7/31/18 08:59   


 

















Joy Love M.D. Jul 26, 2018 16:52

## 2018-07-26 NOTE — GI PROGRESS NOTE
Assessment/Plan


Problems:  


(1) Parastomal hernia


ICD Codes:  K43.5 - Parastomal hernia without obstruction or gangrene


SNOMED:  672293957


Qualifiers:  


   Qualified Codes:  K43.5 - Parastomal hernia without obstruction or gangrene


(2) Stoma malfunction


SNOMED:  628503669


(3) Colostomy in place


ICD Codes:  Z93.3 - Colostomy status


SNOMED:  040799160, 034149731


(4) Vegetative state


ICD Codes:  R40.3 - Persistent vegetative state


SNOMED:  06769113


(5) Diabetes mellitus


ICD Codes:  E11.9 - Type 2 diabetes mellitus without complications


SNOMED:  45070268


(6) Acute on chronic respiratory failure


ICD Codes:  J96.20 - Acute and chronic respiratory failure, unspecified whether 

with hypoxia or hypercapnia


SNOMED:  78652334


(7) Severe sepsis


ICD Codes:  A41.9 - Sepsis, unspecified organism; R65.20 - Severe sepsis 

without septic shock


SNOMED:  73971727


Status:  stable


Status Narrative


Discussed with Dr. Pickard.


Assessment/Plan


prolapse stoma assessed >> no s/sx of infection.  no obstruction. 


anemia work up reviewed >> iron deficiency


G tube dependent


hepatitis panel negative


OB negative





supportive care


monitor H&H, prn transfusions


venofer


ppi


GTFs per RD, adv to goal


GT site care daily/prn


abx


bowel regime


fu labs





The patient was seen and examined at bedside and all new and available data was 

reviewed in the patients chart. I agree with the above findings, impression 

and plan.  (Patient seen earlier today. Signature stamp does not reflect 

patient encounter time.). - Miles Pickard MD





Subjective


Subjective


limited





Objective





Last 24 Hour Vital Signs








  Date Time  Temp Pulse Resp B/P (MAP) Pulse Ox O2 Delivery O2 Flow Rate FiO2


 


7/26/18 13:05  74 16     30


 


7/26/18 12:00 98.3 72 20 117/75 (89) 100   





 98.3       


 


7/26/18 12:00      Mechanical Ventilator  


 


7/26/18 12:00        30


 


7/26/18 12:00  74      


 


7/26/18 11:00  68 16 110/75 (87) 100   


 


7/26/18 10:56  72 16     30


 


7/26/18 10:00  69 16 114/75 (88) 100   


 


7/26/18 09:29  73 16     30


 


7/26/18 09:00  67 16 115/77 (90) 100   


 


7/26/18 08:00        30


 


7/26/18 08:00 98.7 68 16 110/71 (84) 100   





 98.7       


 


7/26/18 08:00      Mechanical Ventilator  


 


7/26/18 08:00  68      


 


7/26/18 07:00  66 16 114/73 (87) 100   


 


7/26/18 06:33  65 16     30


 


7/26/18 06:00  70 16 109/71 (84) 100   


 


7/26/18 05:05  73 17     30


 


7/26/18 05:00  72 16 108/72 (84) 100   


 


7/26/18 04:00      Mechanical Ventilator  


 


7/26/18 04:00 98.6 69 16 114/79 (91) 100   





 98.6       


 


7/26/18 04:00        35


 


7/26/18 04:00  71      


 


7/26/18 03:00  75 16 111/66 (81) 100   


 


7/26/18 02:50  72 16     30


 


7/26/18 02:00  74 16 108/71 (83) 100   


 


7/26/18 01:00  72 16 105/61 (76) 100   


 


7/26/18 01:00  75 16     30


 


7/26/18 00:00  77      


 


7/26/18 00:00        35


 


7/26/18 00:00  80 16 116/74 (88) 100   


 


7/26/18 00:00      Mechanical Ventilator  


 


7/25/18 23:00  80 16 110/70 (83) 100   


 


7/25/18 22:56  76 16     30


 


7/25/18 22:00  80 16 112/74 (87) 100   


 


7/25/18 21:00  80 16 104/70 (81) 100   


 


7/25/18 20:55  76 18     30


 


7/25/18 20:00  80      


 


7/25/18 20:00        35


 


7/25/18 20:00 98.8 80 16 105/70 (82) 100   





 98.8       


 


7/25/18 20:00      Mechanical Ventilator  


 


7/25/18 19:02  86 16     30


 


7/25/18 19:00  80 16 100/65 (77) 100   


 


7/25/18 18:00  80 16 103/64 (77) 100   


 


7/25/18 17:05  83 16     30


 


7/25/18 17:00  80 16 107/70 (82) 100   


 


7/25/18 16:00 98.8 85 16 97/65 (76) 100   





 98.8       


 


7/25/18 16:00      Mechanical Ventilator  


 


7/25/18 16:00        35


 


7/25/18 16:00  83      


 


7/25/18 15:06  86 16     30


 


7/25/18 15:00  84 16 104/68 (80) 100   

















Intake and Output  


 


 7/25/18 7/26/18





 19:00 07:00


 


Intake Total 2112.976 ml 1812.5 ml


 


Output Total 1040 ml 1470 ml


 


Balance 1072.976 ml 342.5 ml


 


  


 


IV Total 1472.976 ml 1037.5 ml


 


Tube Feeding 440 ml 675 ml


 


Other 200 ml 100 ml


 


Output Urine Total 740 ml 720 ml


 


Stool Total 300 ml 750 ml











Laboratory Tests








Test


  7/25/18


23:17 7/26/18


00:30 7/26/18


04:00


 


Vancomycin Level Trough


  23.4 ug/mL


(5.0-12.0)  H 


  


 


 


Stool Occult Blood


  


  Negative


(NEGATIVE) 


 


 


White Blood Count


  


  


  7.0 K/UL


(4.8-10.8)


 


Red Blood Count


  


  


  3.81 M/UL


(4.70-6.10)  L


 


Hemoglobin


  


  


  10.4 G/DL


(14.2-18.0)  L


 


Hematocrit


  


  


  33.1 %


(42.0-52.0)  L


 


Mean Corpuscular Volume   87 FL (80-99)  


 


Mean Corpuscular Hemoglobin


  


  


  27.3 PG


(27.0-31.0)


 


Mean Corpuscular Hemoglobin


Concent 


  


  31.5 G/DL


(32.0-36.0)  L


 


Red Cell Distribution Width


  


  


  14.3 %


(11.6-14.8)


 


Platelet Count


  


  


  181 K/UL


(150-450)


 


Mean Platelet Volume


  


  


  9.2 FL


(6.5-10.1)


 


Neutrophils (%) (Auto)


  


  


  63.2 %


(45.0-75.0)


 


Lymphocytes (%) (Auto)


  


  


  17.5 %


(20.0-45.0)  L


 


Monocytes (%) (Auto)


  


  


  11.6 %


(1.0-10.0)  H


 


Eosinophils (%) (Auto)


  


  


  7.4 %


(0.0-3.0)  H


 


Basophils (%) (Auto)


  


  


  0.4 %


(0.0-2.0)


 


Sodium Level


  


  


  141 MMOL/L


(136-145)


 


Potassium Level


  


  


  3.4 MMOL/L


(3.5-5.1)  L


 


Chloride Level


  


  


  109 MMOL/L


()  H


 


Carbon Dioxide Level


  


  


  22 MMOL/L


(21-32)


 


Anion Gap


  


  


  10 mmol/L


(5-15)


 


Blood Urea Nitrogen


  


  


  11 mg/dL


(7-18)


 


Creatinine


  


  


  1.1 MG/DL


(0.55-1.30)


 


Estimat Glomerular Filtration


Rate 


  


  > 60 mL/min


(>60)


 


Glucose Level


  


  


  190 MG/DL


()  H


 


Calcium Level


  


  


  9.3 MG/DL


(8.5-10.1)


 


Phosphorus Level


  


  


  2.3 MG/DL


(2.5-4.9)  L


 


Magnesium Level


  


  


  1.8 MG/DL


(1.8-2.4)


 


Total Bilirubin


  


  


  0.4 MG/DL


(0.2-1.0)


 


Aspartate Amino Transf


(AST/SGOT) 


  


  16 U/L (15-37)


 


 


Alanine Aminotransferase


(ALT/SGPT) 


  


  33 U/L (12-78)


 


 


Alkaline Phosphatase


  


  


  88 U/L


()


 


Total Protein


  


  


  7.7 G/DL


(6.4-8.2)


 


Albumin


  


  


  2.9 G/DL


(3.4-5.0)  L


 


Globulin   4.8 g/dL  


 


Albumin/Globulin Ratio


  


  


  0.6 (1.0-2.7)


L








Height (Feet):  6


Weight (Pounds):  207


General Appearance:  no apparent distress


Cardiovascular:  normal rate


Respiratory/Chest:  normal breath sounds, no respiratory distress


Abdominal Exam:  normal bowel sounds, non tender, soft, GT site - c/d/i, other 

- prolapse stoma


Extremities:  non-tender











Remy Lyons NP Jul 26, 2018 14:16

## 2018-07-27 VITALS — DIASTOLIC BLOOD PRESSURE: 67 MMHG | SYSTOLIC BLOOD PRESSURE: 117 MMHG

## 2018-07-27 VITALS — SYSTOLIC BLOOD PRESSURE: 124 MMHG | DIASTOLIC BLOOD PRESSURE: 73 MMHG

## 2018-07-27 VITALS — DIASTOLIC BLOOD PRESSURE: 79 MMHG | SYSTOLIC BLOOD PRESSURE: 134 MMHG

## 2018-07-27 VITALS — SYSTOLIC BLOOD PRESSURE: 124 MMHG | DIASTOLIC BLOOD PRESSURE: 74 MMHG

## 2018-07-27 VITALS — SYSTOLIC BLOOD PRESSURE: 131 MMHG | DIASTOLIC BLOOD PRESSURE: 82 MMHG

## 2018-07-27 VITALS — SYSTOLIC BLOOD PRESSURE: 115 MMHG | DIASTOLIC BLOOD PRESSURE: 67 MMHG

## 2018-07-27 LAB
ADD MANUAL DIFF: NO
ALBUMIN SERPL-MCNC: 2.8 G/DL (ref 3.4–5)
ALBUMIN/GLOB SERPL: 0.6 {RATIO} (ref 1–2.7)
ALP SERPL-CCNC: 85 U/L (ref 46–116)
ALT SERPL-CCNC: 29 U/L (ref 12–78)
ANION GAP SERPL CALC-SCNC: 11 MMOL/L (ref 5–15)
AST SERPL-CCNC: 14 U/L (ref 15–37)
BASOPHILS NFR BLD AUTO: 1 % (ref 0–2)
BILIRUB SERPL-MCNC: 0.3 MG/DL (ref 0.2–1)
BUN SERPL-MCNC: 8 MG/DL (ref 7–18)
CALCIUM SERPL-MCNC: 9.9 MG/DL (ref 8.5–10.1)
CHLORIDE SERPL-SCNC: 107 MMOL/L (ref 98–107)
CO2 SERPL-SCNC: 21 MMOL/L (ref 21–32)
CREAT SERPL-MCNC: 1.1 MG/DL (ref 0.55–1.3)
EOSINOPHIL NFR BLD AUTO: 7.8 % (ref 0–3)
ERYTHROCYTE [DISTWIDTH] IN BLOOD BY AUTOMATED COUNT: 13.9 % (ref 11.6–14.8)
GLOBULIN SER-MCNC: 4.7 G/DL
HCT VFR BLD CALC: 32.8 % (ref 42–52)
HGB BLD-MCNC: 10.5 G/DL (ref 14.2–18)
LYMPHOCYTES NFR BLD AUTO: 17.5 % (ref 20–45)
MCV RBC AUTO: 86 FL (ref 80–99)
MONOCYTES NFR BLD AUTO: 8.3 % (ref 1–10)
NEUTROPHILS NFR BLD AUTO: 65.3 % (ref 45–75)
PHOSPHATE SERPL-MCNC: 2.7 MG/DL (ref 2.5–4.9)
PLATELET # BLD: 166 K/UL (ref 150–450)
POTASSIUM SERPL-SCNC: 3.5 MMOL/L (ref 3.5–5.1)
RBC # BLD AUTO: 3.83 M/UL (ref 4.7–6.1)
SODIUM SERPL-SCNC: 139 MMOL/L (ref 136–145)
WBC # BLD AUTO: 7.4 K/UL (ref 4.8–10.8)

## 2018-07-27 RX ADMIN — HEPARIN SODIUM SCH UNITS: 5000 INJECTION INTRAVENOUS; SUBCUTANEOUS at 08:55

## 2018-07-27 RX ADMIN — INSULIN ASPART SCH UNITS: 100 INJECTION, SOLUTION INTRAVENOUS; SUBCUTANEOUS at 23:58

## 2018-07-27 RX ADMIN — INSULIN ASPART SCH UNITS: 100 INJECTION, SOLUTION INTRAVENOUS; SUBCUTANEOUS at 05:39

## 2018-07-27 RX ADMIN — INSULIN ASPART SCH UNITS: 100 INJECTION, SOLUTION INTRAVENOUS; SUBCUTANEOUS at 17:56

## 2018-07-27 RX ADMIN — HEPARIN SODIUM SCH UNITS: 5000 INJECTION INTRAVENOUS; SUBCUTANEOUS at 21:17

## 2018-07-27 RX ADMIN — SODIUM CHLORIDE SCH MLS/HR: 9 INJECTION, SOLUTION INTRAVENOUS at 08:48

## 2018-07-27 RX ADMIN — LEVETIRACETAM SCH MG: 100 SOLUTION ORAL at 21:12

## 2018-07-27 RX ADMIN — POLYETHYLENE GLYCOL 3350 SCH GM: 17 POWDER, FOR SOLUTION ORAL at 21:13

## 2018-07-27 RX ADMIN — DEXTROSE MONOHYDRATE SCH MLS/HR: 50 INJECTION, SOLUTION INTRAVENOUS at 05:40

## 2018-07-27 RX ADMIN — SODIUM CHLORIDE SCH MLS/HR: 0.9 INJECTION INTRAVENOUS at 21:12

## 2018-07-27 RX ADMIN — LEVETIRACETAM SCH MG: 100 SOLUTION ORAL at 13:31

## 2018-07-27 RX ADMIN — LEVETIRACETAM SCH MG: 100 SOLUTION ORAL at 05:41

## 2018-07-27 RX ADMIN — CHLORHEXIDINE GLUCONATE SCH APPLIC: 213 SOLUTION TOPICAL at 21:11

## 2018-07-27 RX ADMIN — INSULIN ASPART SCH UNITS: 100 INJECTION, SOLUTION INTRAVENOUS; SUBCUTANEOUS at 12:27

## 2018-07-27 RX ADMIN — SODIUM CHLORIDE SCH MLS/HR: 9 INJECTION, SOLUTION INTRAVENOUS at 21:47

## 2018-07-27 RX ADMIN — ERTAPENEM SODIUM SCH MLS/HR: 1 INJECTION, POWDER, LYOPHILIZED, FOR SOLUTION INTRAMUSCULAR; INTRAVENOUS at 15:05

## 2018-07-27 NOTE — GENERAL SURGERY PROGRESS NOTE
General Surgery-Progress Note


Subjective


Additional Comments


no acute events.  doing well.





Objective





Last 24 Hour Vital Signs








  Date Time  Temp Pulse Resp B/P (MAP) Pulse Ox O2 Delivery O2 Flow Rate FiO2


 


7/27/18 08:00        30


 


7/27/18 08:00      Mechanical Ventilator  


 


7/27/18 08:00 97.0 62 16 115/67 (83) 98   





 97.0       


 


7/27/18 05:05  73 17     30


 


7/27/18 04:00      Mechanical Ventilator  


 


7/27/18 04:00        30


 


7/27/18 04:00 97.7 63 16 124/73 (90) 100   





 97.7       


 


7/27/18 04:00  67      


 


7/27/18 03:26  75 18     30


 


7/27/18 01:15  68 17     30


 


7/27/18 00:00      Mechanical Ventilator  


 


7/27/18 00:00  61      


 


7/27/18 00:00 98.2 68 16 134/79 (97) 100   





 98.2       


 


7/26/18 23:27  65 16     30


 


7/26/18 21:01  62 17     30


 


7/26/18 20:00        30


 


7/26/18 20:00 97.7 66 20 129/76 (93) 99   





 97.7       


 


7/26/18 20:00      Mechanical Ventilator  


 


7/26/18 20:00  66      


 


7/26/18 19:21  79 16     30


 


7/26/18 17:02  76 16     30


 


7/26/18 16:00      Mechanical Ventilator  


 


7/26/18 16:00 97.7 67 17 119/77 (91) 100   





 97.7       


 


7/26/18 16:00        30


 


7/26/18 16:00  66      


 


7/26/18 15:30  77 16     30


 


7/26/18 13:05  74 16     30


 


7/26/18 12:00 98.3 72 20 117/75 (89) 100   





 98.3       


 


7/26/18 12:00      Mechanical Ventilator  


 


7/26/18 12:00        30


 


7/26/18 12:00  74      








I&O











Intake and Output  


 


 7/26/18 7/27/18





 19:00 07:00


 


Intake Total 1275.932 ml 1332.500 ml


 


Output Total 825 ml 1075 ml


 


Balance 450.932 ml 257.500 ml


 


  


 


Free Water 60 ml 


 


IV Total 435.932 ml 472.500 ml


 


Tube Feeding 780 ml 780 ml


 


Other  80 ml


 


Output Urine Total 725 ml 650 ml


 


Stool Total 100 ml 425 ml


 


# Bowel Movements 100 








Wound:  clean


Drains:  none


Cardiovascular:  RSR


Respiratory:  clear


Abdomen:  soft, distended, present bowel sounds, other - stoma stable


Extremities:  no cyanosis





Laboratory Tests








Test


  7/27/18


04:00


 


White Blood Count


  7.4 K/UL


(4.8-10.8)


 


Red Blood Count


  3.83 M/UL


(4.70-6.10)  L


 


Hemoglobin


  10.5 G/DL


(14.2-18.0)  L


 


Hematocrit


  32.8 %


(42.0-52.0)  L


 


Mean Corpuscular Volume 86 FL (80-99)  


 


Mean Corpuscular Hemoglobin


  27.4 PG


(27.0-31.0)


 


Mean Corpuscular Hemoglobin


Concent 32.0 G/DL


(32.0-36.0)


 


Red Cell Distribution Width


  13.9 %


(11.6-14.8)


 


Platelet Count


  166 K/UL


(150-450)


 


Mean Platelet Volume


  9.2 FL


(6.5-10.1)


 


Neutrophils (%) (Auto)


  65.3 %


(45.0-75.0)


 


Lymphocytes (%) (Auto)


  17.5 %


(20.0-45.0)  L


 


Monocytes (%) (Auto)


  8.3 %


(1.0-10.0)


 


Eosinophils (%) (Auto)


  7.8 %


(0.0-3.0)  H


 


Basophils (%) (Auto)


  1.0 %


(0.0-2.0)


 


Sodium Level


  139 MMOL/L


(136-145)


 


Potassium Level


  3.5 MMOL/L


(3.5-5.1)


 


Chloride Level


  107 MMOL/L


()


 


Carbon Dioxide Level


  21 MMOL/L


(21-32)


 


Anion Gap


  11 mmol/L


(5-15)


 


Blood Urea Nitrogen


  8 mg/dL (7-18)


 


 


Creatinine


  1.1 MG/DL


(0.55-1.30)


 


Estimat Glomerular Filtration


Rate > 60 mL/min


(>60)


 


Glucose Level


  184 MG/DL


()  H


 


Calcium Level


  9.9 MG/DL


(8.5-10.1)


 


Phosphorus Level


  2.7 MG/DL


(2.5-4.9)


 


Magnesium Level


  1.9 MG/DL


(1.8-2.4)


 


Total Bilirubin


  0.3 MG/DL


(0.2-1.0)


 


Aspartate Amino Transf


(AST/SGOT) 14 U/L (15-37)


L


 


Alanine Aminotransferase


(ALT/SGPT) 29 U/L (12-78)


 


 


Alkaline Phosphatase


  85 U/L


()


 


Total Protein


  7.5 G/DL


(6.4-8.2)


 


Albumin


  2.8 G/DL


(3.4-5.0)  L


 


Globulin 4.7 g/dL  


 


Albumin/Globulin Ratio


  0.6 (1.0-2.7)


L











Plan


Problems:  


(1) Stoma malfunction


(2) Parastomal hernia


Assessment & Plan:  52M with history of prior loop colostomy.  proximal loop 

stable.  distal with impacted contrast stool but stable. 


parastomal hernia with small bowel contents in hernia.  no obstruction noted at 

this time. 


larger appearance of patients stoma is because location and use of loop 

colostomy in hepatic flexure.  it is otherwise viable and stable.  





Would Highly recommend fixing parastomal hernia but can be done electively.


if obstructs will need urgent repair.  currently non obstructive


will monitor and follow with recs. 


bowel care 


thank you for this consultation. 





(3) Severe sepsis











Brian Christina Jul 27, 2018 11:21

## 2018-07-27 NOTE — GI PROGRESS NOTE
Assessment/Plan


Problems:  


(1) Parastomal hernia


ICD Codes:  K43.5 - Parastomal hernia without obstruction or gangrene


SNOMED:  351861938


Qualifiers:  


   Qualified Codes:  K43.5 - Parastomal hernia without obstruction or gangrene


(2) Stoma malfunction


SNOMED:  742871577


(3) Colostomy in place


ICD Codes:  Z93.3 - Colostomy status


SNOMED:  408682268, 366152399


(4) Vegetative state


ICD Codes:  R40.3 - Persistent vegetative state


SNOMED:  76089565


(5) Diabetes mellitus


ICD Codes:  E11.9 - Type 2 diabetes mellitus without complications


SNOMED:  98269326


(6) Acute on chronic respiratory failure


ICD Codes:  J96.20 - Acute and chronic respiratory failure, unspecified whether 

with hypoxia or hypercapnia


SNOMED:  47242900


(7) Severe sepsis


ICD Codes:  A41.9 - Sepsis, unspecified organism; R65.20 - Severe sepsis 

without septic shock


SNOMED:  25697780


Status:  unchanged


Status Narrative


Discussed with Dr. Pickard.


Assessment/Plan


prolapse stoma assessed >> no s/sx of infection.  no obstruction. 


anemia work up reviewed >> iron deficiency


G tube dependent


hepatitis panel negative


OB negative





supportive care


monitor H&H, prn transfusions


venofer


ppi


GTFs per RD, adv to goal


GT site care daily/prn


abx


bowel regime


fu labs





The patient was seen and examined at bedside and all new and available data was 

reviewed in the patients chart. I agree with the above findings, impression 

and plan.  (Patient seen earlier today. Signature stamp does not reflect 

patient encounter time.). - Miles Pickard MD





Subjective


Subjective


limited





Objective





Last 24 Hour Vital Signs








  Date Time  Temp Pulse Resp B/P (MAP) Pulse Ox O2 Delivery O2 Flow Rate FiO2


 


7/27/18 14:40  61 16     30


 


7/27/18 13:18  63 16     30


 


7/27/18 12:00      Mechanical Ventilator  


 


7/27/18 12:00 97.9 60 16 117/67 (84) 100   





 97.9       


 


7/27/18 12:00        30


 


7/27/18 11:31  66      


 


7/27/18 10:45  62 16     30


 


7/27/18 09:17  60 16     30


 


7/27/18 08:00        30


 


7/27/18 08:00      Mechanical Ventilator  


 


7/27/18 08:00 97.0 62 16 115/67 (83) 98   





 97.0       


 


7/27/18 07:56  62      


 


7/27/18 07:28  60 16     30


 


7/27/18 05:05  73 17     30


 


7/27/18 04:00      Mechanical Ventilator  


 


7/27/18 04:00        30


 


7/27/18 04:00 97.7 63 16 124/73 (90) 100   





 97.7       


 


7/27/18 04:00  67      


 


7/27/18 03:26  75 18     30


 


7/27/18 01:15  68 17     30


 


7/27/18 00:00      Mechanical Ventilator  


 


7/27/18 00:00  61      


 


7/27/18 00:00 98.2 68 16 134/79 (97) 100   





 98.2       


 


7/26/18 23:27  65 16     30


 


7/26/18 21:01  62 17     30


 


7/26/18 20:00        30


 


7/26/18 20:00 97.7 66 20 129/76 (93) 99   





 97.7       


 


7/26/18 20:00      Mechanical Ventilator  


 


7/26/18 20:00  66      


 


7/26/18 19:21  79 16     30


 


7/26/18 17:02  76 16     30


 


7/26/18 16:00      Mechanical Ventilator  


 


7/26/18 16:00 97.7 67 17 119/77 (91) 100   





 97.7       


 


7/26/18 16:00        30


 


7/26/18 16:00  66      


 


7/26/18 15:30  77 16     30

















Intake and Output  


 


 7/26/18 7/27/18





 19:00 07:00


 


Intake Total 1275.932 ml 1332.500 ml


 


Output Total 825 ml 1075 ml


 


Balance 450.932 ml 257.500 ml


 


  


 


Free Water 60 ml 


 


IV Total 435.932 ml 472.500 ml


 


Tube Feeding 780 ml 780 ml


 


Other  80 ml


 


Output Urine Total 725 ml 650 ml


 


Stool Total 100 ml 425 ml


 


# Bowel Movements 100 











Laboratory Tests








Test


  7/27/18


04:00


 


White Blood Count


  7.4 K/UL


(4.8-10.8)


 


Red Blood Count


  3.83 M/UL


(4.70-6.10)  L


 


Hemoglobin


  10.5 G/DL


(14.2-18.0)  L


 


Hematocrit


  32.8 %


(42.0-52.0)  L


 


Mean Corpuscular Volume 86 FL (80-99)  


 


Mean Corpuscular Hemoglobin


  27.4 PG


(27.0-31.0)


 


Mean Corpuscular Hemoglobin


Concent 32.0 G/DL


(32.0-36.0)


 


Red Cell Distribution Width


  13.9 %


(11.6-14.8)


 


Platelet Count


  166 K/UL


(150-450)


 


Mean Platelet Volume


  9.2 FL


(6.5-10.1)


 


Neutrophils (%) (Auto)


  65.3 %


(45.0-75.0)


 


Lymphocytes (%) (Auto)


  17.5 %


(20.0-45.0)  L


 


Monocytes (%) (Auto)


  8.3 %


(1.0-10.0)


 


Eosinophils (%) (Auto)


  7.8 %


(0.0-3.0)  H


 


Basophils (%) (Auto)


  1.0 %


(0.0-2.0)


 


Sodium Level


  139 MMOL/L


(136-145)


 


Potassium Level


  3.5 MMOL/L


(3.5-5.1)


 


Chloride Level


  107 MMOL/L


()


 


Carbon Dioxide Level


  21 MMOL/L


(21-32)


 


Anion Gap


  11 mmol/L


(5-15)


 


Blood Urea Nitrogen


  8 mg/dL (7-18)


 


 


Creatinine


  1.1 MG/DL


(0.55-1.30)


 


Estimat Glomerular Filtration


Rate > 60 mL/min


(>60)


 


Glucose Level


  184 MG/DL


()  H


 


Calcium Level


  9.9 MG/DL


(8.5-10.1)


 


Phosphorus Level


  2.7 MG/DL


(2.5-4.9)


 


Magnesium Level


  1.9 MG/DL


(1.8-2.4)


 


Total Bilirubin


  0.3 MG/DL


(0.2-1.0)


 


Aspartate Amino Transf


(AST/SGOT) 14 U/L (15-37)


L


 


Alanine Aminotransferase


(ALT/SGPT) 29 U/L (12-78)


 


 


Alkaline Phosphatase


  85 U/L


()


 


Total Protein


  7.5 G/DL


(6.4-8.2)


 


Albumin


  2.8 G/DL


(3.4-5.0)  L


 


Globulin 4.7 g/dL  


 


Albumin/Globulin Ratio


  0.6 (1.0-2.7)


L








Height (Feet):  6


Weight (Pounds):  209


General Appearance:  no apparent distress


Cardiovascular:  normal rate


Respiratory/Chest:  normal breath sounds, no respiratory distress


Abdominal Exam:  normal bowel sounds, non tender, soft, other - protuding stoma


Extremities:  non-tender











Remy Lyons NP Jul 27, 2018 14:58

## 2018-07-27 NOTE — GENERAL PROGRESS NOTE
Assessment/Plan


Status:  stable


Assessment/Plan


# Anemia of chronic disease. No hemolysis, peripheral smear reviewed, ferritin 

is elevated


--> Continue to closely monitor for improvement. 


--> Anemia w/u has been reviewed. Will trend cbc daily. 


--> Hgb goal >7


# Leukocytosis - Sepsis with elevated temperature of 103 degrees Fahrenheit.  


--> Lactic acid pending, was elevated upon admission. 


--> Pt on antibiotics, has received a dose of Zosyn and currently is on 

vancomycin q.12 h.  


--> Currently afebrile.


--> Appreciate Infectious Disease consult.


# Acute kidney injury.  


--> Currently on IV fluids, IV bolus given to see if the patient responds along 

with IV pressor as needed.  


--> Closely monitor with Nephrology team.


# Septic shock, use antibiotic per ID services.


--> potential pna, bacteremia


# Respiratory failure, status post tracheostomy, on mechanical ventilation per 

Dr. Pena.


# Hypercalcemia.  Monitor PTH and calcium level





The time the note was entered does not necessarily correspond to the time the 

patient was seen.





Subjective


Date patient seen:  Jul 27, 2018


ROS Limited/Unobtainable:  Yes


Hematologic/Lymphatic:  Reports: anemia


Allergies:  


Coded Allergies:  


     AZTREONAM (Verified  Allergy, Unknown, 7/23/18)


All Systems:  reviewed and negative except above


Subjective


Pt remains obtunded. No acute events. H/H stable. CXR today shows increased 

left pleural effusion, over 3 days








Objective





Last 24 Hour Vital Signs








  Date Time  Temp Pulse Resp B/P (MAP) Pulse Ox O2 Delivery O2 Flow Rate FiO2


 


7/27/18 12:00 97.9 60 16 117/67 (84) 100   





 97.9       


 


7/27/18 11:31  66      


 


7/27/18 08:00        30


 


7/27/18 08:00      Mechanical Ventilator  


 


7/27/18 08:00 97.0 62 16 115/67 (83) 98   





 97.0       


 


7/27/18 07:56  62      


 


7/27/18 05:05  73 17     30


 


7/27/18 04:00      Mechanical Ventilator  


 


7/27/18 04:00        30


 


7/27/18 04:00 97.7 63 16 124/73 (90) 100   





 97.7       


 


7/27/18 04:00  67      


 


7/27/18 03:26  75 18     30


 


7/27/18 01:15  68 17     30


 


7/27/18 00:00      Mechanical Ventilator  


 


7/27/18 00:00  61      


 


7/27/18 00:00 98.2 68 16 134/79 (97) 100   





 98.2       


 


7/26/18 23:27  65 16     30


 


7/26/18 21:01  62 17     30


 


7/26/18 20:00        30


 


7/26/18 20:00 97.7 66 20 129/76 (93) 99   





 97.7       


 


7/26/18 20:00      Mechanical Ventilator  


 


7/26/18 20:00  66      


 


7/26/18 19:21  79 16     30


 


7/26/18 17:02  76 16     30


 


7/26/18 16:00      Mechanical Ventilator  


 


7/26/18 16:00 97.7 67 17 119/77 (91) 100   





 97.7       


 


7/26/18 16:00        30


 


7/26/18 16:00  66      


 


7/26/18 15:30  77 16     30


 


7/26/18 13:05  74 16     30

















Intake and Output  


 


 7/26/18 7/27/18





 19:00 07:00


 


Intake Total 1275.932 ml 1332.500 ml


 


Output Total 825 ml 1075 ml


 


Balance 450.932 ml 257.500 ml


 


  


 


Free Water 60 ml 


 


IV Total 435.932 ml 472.500 ml


 


Tube Feeding 780 ml 780 ml


 


Other  80 ml


 


Output Urine Total 725 ml 650 ml


 


Stool Total 100 ml 425 ml


 


# Bowel Movements 100 








Laboratory Tests


7/27/18 04:00: 


White Blood Count 7.4, Red Blood Count 3.83L, Hemoglobin 10.5L, Hematocrit 32.8L

, Mean Corpuscular Volume 86, Mean Corpuscular Hemoglobin 27.4, Mean 

Corpuscular Hemoglobin Concent 32.0, Red Cell Distribution Width 13.9, Platelet 

Count 166, Mean Platelet Volume 9.2, Neutrophils (%) (Auto) 65.3, Lymphocytes (%

) (Auto) 17.5L, Monocytes (%) (Auto) 8.3, Eosinophils (%) (Auto) 7.8H, 

Basophils (%) (Auto) 1.0, Sodium Level 139, Potassium Level 3.5, Chloride Level 

107, Carbon Dioxide Level 21, Anion Gap 11, Blood Urea Nitrogen 8, Creatinine 

1.1, Estimat Glomerular Filtration Rate > 60, Glucose Level 184H, Calcium Level 

9.9, Phosphorus Level 2.7, Magnesium Level 1.9, Total Bilirubin 0.3, Aspartate 

Amino Transf (AST/SGOT) 14L, Alanine Aminotransferase (ALT/SGPT) 29, Alkaline 

Phosphatase 85, Total Protein 7.5, Albumin 2.8L, Globulin 4.7, Albumin/Globulin 

Ratio 0.6L


Height (Feet):  6


Weight (Pounds):  209


General Appearance:  no apparent distress, lethargic


EENT:  PERRL/EOMI


Neck:  normal alignment


Cardiovascular:  normal peripheral pulses


Respiratory/Chest:  no respiratory distress


Abdomen:  soft











Tejas Gerber MD Jul 27, 2018 12:46

## 2018-07-27 NOTE — DIAGNOSTIC IMAGING REPORT
Indication: Shortness of breath

 

Technique: One view of the chest

 

Comparison: 7/24/2018

 

Findings: Interim placement left arm PICC. There is interim improvement of previously

demonstrated bilateral interstitial congestion. Some disease persists, however. There

is right infrahilar atelectasis. There is increasing opacity in the left lung base,

suggesting increasing left pleural fluid. Tracheostomy remains

 

Impression: Increased left pleural effusion, over 3 days

 

Overall decreased interstitial congestion

 

Right infrahilar atelectasis

## 2018-07-27 NOTE — INFECTIOUS DISEASES PROG NOTE
Assessment/Plan


Assessment/Plan


Abx:


Zosyn 7/24-


IV Vanco 7/24-


 


Assessment:


Sepsis, improving- 2ry to UTI and Bacteremia- ?pyelo. Possible PNA


 -u/a wbc 40-60, nit neg, leuk +2; ucx >100k E. aerogenes (S cefepime, cipro/

levo; R Zosyn)


 -BCX 4/4 E. aerogenes, prob Amp C (S cefepime, cipro/levo; R Zosyn), P. 

mirabilis ESBL (S Zosyn, Ertapenem)


  -CXR 7/27: Increased left pleural effusion, over 3 days. Overall decreased 

interstitial congestion. Right infrahilar atelectasis


 -CXR: Cardiomegaly. Mild interstitial congestion. Suspect small bilateral 

pleural effusions


 -CT abd/p: Right lower quadrant double barrel colostomy, as described. Small 

peristomal hernia contains bowel loops without evidence of obstruction or 

strangulation. Left renal staghorn calculus. Bilateral intrarenal calyceal 

calculi. Borderline hydronephrosis on the right without evidence of downstream 

obstructive lesion. May indicate mild ureteral pelvic junction obstruction. 

Somewhat atrophic left kidney. Inspissated contrast ball within the distal 

sigmoid colon. Gastrostomy in good position. Nonspecific bilateral perinephric 

fat stranding, could indicate pyelonephritis or could be chronic. Guzmán 

catheter in place. Apparent bladder wall thickening, possibly an artifact of 

under distention but cystitis is not excludable, particularly in view of mild 

perivesical fat stranding. Bilateral pulmonary parenchymal atelectasis and 

consolidation


  -sp cx p





Fever/Leukocytosis; resolved


BRAYDON, improving


Lactic acidosis, resolved





chronic resp failure trach/vent dependant


BPH


GERD


 HTN


DM2


seizure disorder


colostomy


 s/p GT 


constipation





VRE and MRSA colonized


 


Plan:


-Cotninue empiric IV Vancomycin #4 pending sp cx


    -if no MRSA growth, will d/c





-Switch empiric Zosyn #4 to Ertapenem for Amp-C Enterobacter and ESBL 

P.mirabilis bacteremia ; will treat for 10-14 days 








-f/u Repeat 2 sets of Bcx


-f/u cx


-Monitor CBC/CMP, temperatures








Thank you for this consultation. Will continue to follow along with you.





Discussed with RN.





Subjective


Allergies:  


Coded Allergies:  


     AZTREONAM (Verified  Allergy, Unknown, 7/23/18)


Subjective


afebrile in 72hrs


no leukocytosis


repeat Bcx p





Objective


Vital Signs





Last 24 Hour Vital Signs








  Date Time  Temp Pulse Resp B/P (MAP) Pulse Ox O2 Delivery O2 Flow Rate FiO2


 


7/27/18 12:00 97.9 60 16 117/67 (84) 100   





 97.9       


 


7/27/18 11:31  66      


 


7/27/18 08:00        30


 


7/27/18 08:00      Mechanical Ventilator  


 


7/27/18 08:00 97.0 62 16 115/67 (83) 98   





 97.0       


 


7/27/18 07:56  62      


 


7/27/18 05:05  73 17     30


 


7/27/18 04:00      Mechanical Ventilator  


 


7/27/18 04:00        30


 


7/27/18 04:00 97.7 63 16 124/73 (90) 100   





 97.7       


 


7/27/18 04:00  67      


 


7/27/18 03:26  75 18     30


 


7/27/18 01:15  68 17     30


 


7/27/18 00:00      Mechanical Ventilator  


 


7/27/18 00:00  61      


 


7/27/18 00:00 98.2 68 16 134/79 (97) 100   





 98.2       


 


7/26/18 23:27  65 16     30


 


7/26/18 21:01  62 17     30


 


7/26/18 20:00        30


 


7/26/18 20:00 97.7 66 20 129/76 (93) 99   





 97.7       


 


7/26/18 20:00      Mechanical Ventilator  


 


7/26/18 20:00  66      


 


7/26/18 19:21  79 16     30


 


7/26/18 17:02  76 16     30


 


7/26/18 16:00      Mechanical Ventilator  


 


7/26/18 16:00 97.7 67 17 119/77 (91) 100   





 97.7       


 


7/26/18 16:00        30


 


7/26/18 16:00  66      


 


7/26/18 15:30  77 16     30


 


7/26/18 13:05  74 16     30








Height (Feet):  6


Weight (Pounds):  209


Objective


GENERAL:  The patient is awake, in no overt distress.


HEENT:  Extraocular muscles intact.  No lymphadenopathy noted.


Tracheostomy noted.


CARDIOVASCULAR:  S1 and S2.  Tachycardic.  No rubs or gallops.


PULMONARY:  Mild diffuse expiratory rhonchi with basilar rales.


ABDOMEN:  Obese and nondistended.  The G-tube and stoma noted.


EXTREMITIES:  No edema.  Fair pedal pulses.





Laboratory Tests








Test


  7/27/18


04:00


 


White Blood Count


  7.4 K/UL


(4.8-10.8)


 


Red Blood Count


  3.83 M/UL


(4.70-6.10)  L


 


Hemoglobin


  10.5 G/DL


(14.2-18.0)  L


 


Hematocrit


  32.8 %


(42.0-52.0)  L


 


Mean Corpuscular Volume 86 FL (80-99)  


 


Mean Corpuscular Hemoglobin


  27.4 PG


(27.0-31.0)


 


Mean Corpuscular Hemoglobin


Concent 32.0 G/DL


(32.0-36.0)


 


Red Cell Distribution Width


  13.9 %


(11.6-14.8)


 


Platelet Count


  166 K/UL


(150-450)


 


Mean Platelet Volume


  9.2 FL


(6.5-10.1)


 


Neutrophils (%) (Auto)


  65.3 %


(45.0-75.0)


 


Lymphocytes (%) (Auto)


  17.5 %


(20.0-45.0)  L


 


Monocytes (%) (Auto)


  8.3 %


(1.0-10.0)


 


Eosinophils (%) (Auto)


  7.8 %


(0.0-3.0)  H


 


Basophils (%) (Auto)


  1.0 %


(0.0-2.0)


 


Sodium Level


  139 MMOL/L


(136-145)


 


Potassium Level


  3.5 MMOL/L


(3.5-5.1)


 


Chloride Level


  107 MMOL/L


()


 


Carbon Dioxide Level


  21 MMOL/L


(21-32)


 


Anion Gap


  11 mmol/L


(5-15)


 


Blood Urea Nitrogen


  8 mg/dL (7-18)


 


 


Creatinine


  1.1 MG/DL


(0.55-1.30)


 


Estimat Glomerular Filtration


Rate > 60 mL/min


(>60)


 


Glucose Level


  184 MG/DL


()  H


 


Calcium Level


  9.9 MG/DL


(8.5-10.1)


 


Phosphorus Level


  2.7 MG/DL


(2.5-4.9)


 


Magnesium Level


  1.9 MG/DL


(1.8-2.4)


 


Total Bilirubin


  0.3 MG/DL


(0.2-1.0)


 


Aspartate Amino Transf


(AST/SGOT) 14 U/L (15-37)


L


 


Alanine Aminotransferase


(ALT/SGPT) 29 U/L (12-78)


 


 


Alkaline Phosphatase


  85 U/L


()


 


Total Protein


  7.5 G/DL


(6.4-8.2)


 


Albumin


  2.8 G/DL


(3.4-5.0)  L


 


Globulin 4.7 g/dL  


 


Albumin/Globulin Ratio


  0.6 (1.0-2.7)


L











Current Medications








 Medications


  (Trade)  Dose


 Ordered  Sig/Jan


 Route


 PRN Reason  Start Time


 Stop Time Status Last Admin


Dose Admin


 


 Acetaminophen


  (Tylenol)  650 mg  Q4H  PRN


 ORAL


 FEVER  7/26/18 14:00


 8/23/18 09:59   


 


 


 Albuterol/


 Ipratropium


  (Albuterol/


 Ipratropium)  3 ml  Q4H  PRN


 HHN


 Shortness of Breath  7/26/18 14:00


 7/29/18 09:59   


 


 


 Baclofen


  (Lioresal)  10 mg  THREE TIMES A  DAY


 GT


   7/26/18 13:00


 8/23/18 12:59  7/27/18 08:47


 


 


 Chlorhexidine


 Gluconate


  (Elaine-Hex 2%)  1 applic  DAILY@2000


 TOPIC


   7/26/18 20:00


 8/23/18 19:59  7/26/18 21:10


 


 


 Dextrose


  (Dextrose 50%)  25 ml  STAT  PRN


 IV


 Hypoglycemia  7/27/18 08:45


 8/24/18 08:44   


 


 


 Dextrose


  (Dextrose 50%)  50 ml  STAT  PRN


 IV


 Hypoglycemia  7/27/18 08:45


 8/24/18 08:44   


 


 


 Heparin Sodium


  (Porcine)


  (Heparin 5000


 units/ml)  5,000 units  EVERY 12  HOURS


 SUBQ


   7/26/18 21:00


 8/23/18 20:59  7/27/18 08:55


 


 


 Insulin Aspart


  (NovoLOG)    Q6HR


 SUBQ


   7/26/18 18:00


 8/24/18 11:29  7/27/18 12:27


 


 


 Iron Sucrose 100


 mg/Sodium Chloride  60 ml @ 


 240 mls/hr  BEDTIME


 IV


   7/26/18 21:00


 7/30/18 21:14  7/26/18 22:01


 


 


 Lansoprazole


  (Prevacid)  30 mg  DAILY


 GT


   7/27/18 09:00


 8/25/18 08:59  7/27/18 08:47


 


 


 Levetiracetam


  (Keppra)  1,000 mg  Q8HR


 GT


   7/26/18 14:00


 8/23/18 12:59  7/27/18 05:41


 


 


 Lorazepam


  (Ativan 2mg/ml


 1ml)  2 mg  Q2H  PRN


 IV


 For Anxiety  7/26/18 12:00


 7/31/18 09:59   


 


 


 Morphine Sulfate


  (Morphine


 Sulfate)  4 mg  Q4H  PRN


 IVP


 Severe Pain (Pain Scale 7-10)  7/26/18 14:00


 7/31/18 09:59   


 


 


 Ondansetron HCl


  (Zofran)  4 mg  Q6H  PRN


 IVP


 Nausea & Vomiting  7/26/18 16:00


 8/23/18 09:59   


 


 


 Piperacillin Sod/


 Tazobactam Sod


 3.375 gm/Sodium


 Chloride  110 ml @ 


 27.5 mls/hr  EVERY 8  HOURS


 IVPB


   7/26/18 14:00


 7/30/18 11:29  7/27/18 05:40


 


 


 Polyethylene


 Glycol


  (Miralax)  17 gm  BEDTIME


 ORAL


   7/26/18 21:00


 8/24/18 20:59  7/26/18 21:10


 


 


 Vancomycin HCl 1


 gm/Dextrose  275 ml @ 


 183.708


 mls/hr  Q12HR


 IVPB


   7/26/18 21:00


 7/31/18 08:59  7/27/18 08:48


 

















Joy Love M.D. Jul 27, 2018 12:47

## 2018-07-27 NOTE — PULMONOLGY CRITICAL CARE NOTE
Critical Care - Asmt/Plan


Problems:  


(1) Acute on chronic respiratory failure


(2) Acute on chronic renal insufficiency


(3) Severe sepsis


(4) Vegetative state


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


(6) Diabetes mellitus


Respiratory:  monitor respiratory rate, adjust FIO2, CXR


Cardiac:  continue to monitor HR/BP


Renal:  F/U I&O


Infectious Disease:  check cultures


Gastrointestinal:  continue feedings/current rate


Endocrine:  monitor blood sugar, check TSH, continue sliding scale insulin


Hematologic:  monitor H/H, transfuse if hgb<8.5


Neurologic:  PRN Morphine, keep patient comfortable


Affect:  PRN ativan


Notes Reviewed:  internist, renal


Discussed with:  nurses, consultants, 





Critical Care - Objective





Last 24 Hour Vital Signs








  Date Time  Temp Pulse Resp B/P (MAP) Pulse Ox O2 Delivery O2 Flow Rate FiO2


 


7/27/18 08:00        30


 


7/27/18 08:00      Mechanical Ventilator  


 


7/27/18 08:00 97.0 62 16 115/67 (83) 98   





 97.0       


 


7/27/18 05:05  73 17     30


 


7/27/18 04:00      Mechanical Ventilator  


 


7/27/18 04:00        30


 


7/27/18 04:00 97.7 63 16 124/73 (90) 100   





 97.7       


 


7/27/18 04:00  67      


 


7/27/18 03:26  75 18     30


 


7/27/18 01:15  68 17     30


 


7/27/18 00:00      Mechanical Ventilator  


 


7/27/18 00:00  61      


 


7/27/18 00:00 98.2 68 16 134/79 (97) 100   





 98.2       


 


7/26/18 23:27  65 16     30


 


7/26/18 21:01  62 17     30


 


7/26/18 20:00        30


 


7/26/18 20:00 97.7 66 20 129/76 (93) 99   





 97.7       


 


7/26/18 20:00      Mechanical Ventilator  


 


7/26/18 20:00  66      


 


7/26/18 19:21  79 16     30


 


7/26/18 17:02  76 16     30


 


7/26/18 16:00      Mechanical Ventilator  


 


7/26/18 16:00 97.7 67 17 119/77 (91) 100   





 97.7       


 


7/26/18 16:00        30


 


7/26/18 16:00  66      


 


7/26/18 15:30  77 16     30


 


7/26/18 13:05  74 16     30


 


7/26/18 12:00 98.3 72 20 117/75 (89) 100   





 98.3       


 


7/26/18 12:00      Mechanical Ventilator  


 


7/26/18 12:00        30


 


7/26/18 12:00  74      


 


7/26/18 11:00  68 16 110/75 (87) 100   


 


7/26/18 10:56  72 16     30








Status:  sedated


Condition:  critical


HEENT:  atraumatic


Lungs:  clear


Heart:  HR/BP stable, regular


Abdomen:  non-tender, feeding tube


Extremities:  edema


Accucheck:  185





Critical Care - Subjective


ROS Limited/Unobtainable:  Yes


Condition:  critical


EKG Rhythm:  Sinus Rhythm


FI02:  30


Vent Support Breath Rate:  16


Vent Support Mode:  AC


Vent Tidal Volume:  600


Sputum Amount:  Moderate


PEEP:  5.0


PIP:  34


Tube Feeding Amount:  65


I&O:











Intake and Output  


 


 7/26/18 7/27/18





 19:00 07:00


 


Intake Total 1275.932 ml 1332.500 ml


 


Output Total 825 ml 1075 ml


 


Balance 450.932 ml 257.500 ml


 


  


 


Free Water 60 ml 


 


IV Total 435.932 ml 472.500 ml


 


Tube Feeding 780 ml 780 ml


 


Other  80 ml


 


Output Urine Total 725 ml 650 ml


 


Stool Total 100 ml 425 ml


 


# Bowel Movements 100 








Labs:





Laboratory Tests








Test


  7/27/18


04:00


 


White Blood Count


  7.4 K/UL


(4.8-10.8)


 


Red Blood Count


  3.83 M/UL


(4.70-6.10)  L


 


Hemoglobin


  10.5 G/DL


(14.2-18.0)  L


 


Hematocrit


  32.8 %


(42.0-52.0)  L


 


Mean Corpuscular Volume 86 FL (80-99)  


 


Mean Corpuscular Hemoglobin


  27.4 PG


(27.0-31.0)


 


Mean Corpuscular Hemoglobin


Concent 32.0 G/DL


(32.0-36.0)


 


Red Cell Distribution Width


  13.9 %


(11.6-14.8)


 


Platelet Count


  166 K/UL


(150-450)


 


Mean Platelet Volume


  9.2 FL


(6.5-10.1)


 


Neutrophils (%) (Auto)


  65.3 %


(45.0-75.0)


 


Lymphocytes (%) (Auto)


  17.5 %


(20.0-45.0)  L


 


Monocytes (%) (Auto)


  8.3 %


(1.0-10.0)


 


Eosinophils (%) (Auto)


  7.8 %


(0.0-3.0)  H


 


Basophils (%) (Auto)


  1.0 %


(0.0-2.0)


 


Sodium Level


  139 MMOL/L


(136-145)


 


Potassium Level


  3.5 MMOL/L


(3.5-5.1)


 


Chloride Level


  107 MMOL/L


()


 


Carbon Dioxide Level


  21 MMOL/L


(21-32)


 


Anion Gap


  11 mmol/L


(5-15)


 


Blood Urea Nitrogen


  8 mg/dL (7-18)


 


 


Creatinine


  1.1 MG/DL


(0.55-1.30)


 


Estimat Glomerular Filtration


Rate > 60 mL/min


(>60)


 


Glucose Level


  184 MG/DL


()  H


 


Calcium Level


  9.9 MG/DL


(8.5-10.1)


 


Phosphorus Level


  2.7 MG/DL


(2.5-4.9)


 


Magnesium Level


  1.9 MG/DL


(1.8-2.4)


 


Total Bilirubin


  0.3 MG/DL


(0.2-1.0)


 


Aspartate Amino Transf


(AST/SGOT) 14 U/L (15-37)


L


 


Alanine Aminotransferase


(ALT/SGPT) 29 U/L (12-78)


 


 


Alkaline Phosphatase


  85 U/L


()


 


Total Protein


  7.5 G/DL


(6.4-8.2)


 


Albumin


  2.8 G/DL


(3.4-5.0)  L


 


Globulin 4.7 g/dL  


 


Albumin/Globulin Ratio


  0.6 (1.0-2.7)


L

















Yury Pena MD Jul 27, 2018 10:17

## 2018-07-27 NOTE — NEPHROLOGY PROGRESS NOTE
Assessment/Plan


Assessment/Plan


1. BRAYDON- due to multifactorial ATN (sepsis/hypotension)


    - resolved. 


2. Septic Shock-  Abx. Resolving. Stable


3. Chronic Resp FL- trached on vent


4. Hypok/Phos- corrected


    - replace PRN





Subjective


Date patient seen:  Jul 27, 2018


Time patient seen:  09:16


ROS Limited/Unobtainable:  Yes


Allergies:  


Coded Allergies:  


     AZTREONAM (Verified  Allergy, Unknown, 7/23/18)


Subjective


Patient remains trached/vent. In no acute distress





Objective





Last 24 Hour Vital Signs








  Date Time  Temp Pulse Resp B/P (MAP) Pulse Ox O2 Delivery O2 Flow Rate FiO2


 


7/27/18 08:00        30


 


7/27/18 08:00 97.0 62 16 115/67 (83) 98   





 97.0       


 


7/27/18 05:05  73 17     30


 


7/27/18 04:00      Mechanical Ventilator  


 


7/27/18 04:00        30


 


7/27/18 04:00 97.7 63 16 124/73 (90) 100   





 97.7       


 


7/27/18 04:00  67      


 


7/27/18 03:26  75 18     30


 


7/27/18 01:15  68 17     30


 


7/27/18 00:00      Mechanical Ventilator  


 


7/27/18 00:00  61      


 


7/27/18 00:00 98.2 68 16 134/79 (97) 100   





 98.2       


 


7/26/18 23:27  65 16     30


 


7/26/18 21:01  62 17     30


 


7/26/18 20:00        30


 


7/26/18 20:00 97.7 66 20 129/76 (93) 99   





 97.7       


 


7/26/18 20:00      Mechanical Ventilator  


 


7/26/18 20:00  66      


 


7/26/18 19:21  79 16     30


 


7/26/18 17:02  76 16     30


 


7/26/18 16:00      Mechanical Ventilator  


 


7/26/18 16:00 97.7 67 17 119/77 (91) 100   





 97.7       


 


7/26/18 16:00        30


 


7/26/18 16:00  66      


 


7/26/18 15:30  77 16     30


 


7/26/18 13:05  74 16     30


 


7/26/18 12:00 98.3 72 20 117/75 (89) 100   





 98.3       


 


7/26/18 12:00      Mechanical Ventilator  


 


7/26/18 12:00        30


 


7/26/18 12:00  74      


 


7/26/18 11:00  68 16 110/75 (87) 100   


 


7/26/18 10:56  72 16     30


 


7/26/18 10:00  69 16 114/75 (88) 100   


 


7/26/18 09:29  73 16     30

















Intake and Output  


 


 7/26/18 7/27/18





 19:00 07:00


 


Intake Total 1275.932 ml 1332.500 ml


 


Output Total 825 ml 1075 ml


 


Balance 450.932 ml 257.500 ml


 


  


 


Free Water 60 ml 


 


IV Total 435.932 ml 472.500 ml


 


Tube Feeding 780 ml 780 ml


 


Other  80 ml


 


Output Urine Total 725 ml 650 ml


 


Stool Total 100 ml 425 ml


 


# Bowel Movements 100 








Laboratory Tests


7/27/18 04:00: 


White Blood Count 7.4, Red Blood Count 3.83L, Hemoglobin 10.5L, Hematocrit 32.8L

, Mean Corpuscular Volume 86, Mean Corpuscular Hemoglobin 27.4, Mean 

Corpuscular Hemoglobin Concent 32.0, Red Cell Distribution Width 13.9, Platelet 

Count 166, Mean Platelet Volume 9.2, Neutrophils (%) (Auto) 65.3, Lymphocytes (%

) (Auto) 17.5L, Monocytes (%) (Auto) 8.3, Eosinophils (%) (Auto) 7.8H, 

Basophils (%) (Auto) 1.0, Sodium Level 139, Potassium Level 3.5, Chloride Level 

107, Carbon Dioxide Level 21, Anion Gap 11, Blood Urea Nitrogen 8, Creatinine 

1.1, Estimat Glomerular Filtration Rate > 60, Glucose Level 184H, Calcium Level 

9.9, Phosphorus Level 2.7, Magnesium Level 1.9, Total Bilirubin 0.3, Aspartate 

Amino Transf (AST/SGOT) 14L, Alanine Aminotransferase (ALT/SGPT) 29, Alkaline 

Phosphatase 85, Total Protein 7.5, Albumin 2.8L, Globulin 4.7, Albumin/Globulin 

Ratio 0.6L


Height (Feet):  6


Weight (Pounds):  209


General Appearance:  WD/WN, no apparent distress


EENT:  PERRL/EOMI


Neck:  non-tender, normal alignment


Cardiovascular:  normal peripheral pulses, normal rate


Respiratory/Chest:  chest wall non-tender, rhonchi - bilaterally


Abdomen:  normal bowel sounds, non tender, soft


Edema:  no edema noted Arm (L), no edema noted Arm (R), no edema noted Leg (L), 

no edema noted Leg (R), no edema noted Pedal (L), no edema noted Pedal (R), no 

edema noted Generalized











Jewel Templeton M.D. Jul 27, 2018 09:18

## 2018-07-28 VITALS — DIASTOLIC BLOOD PRESSURE: 76 MMHG | SYSTOLIC BLOOD PRESSURE: 132 MMHG

## 2018-07-28 VITALS — SYSTOLIC BLOOD PRESSURE: 137 MMHG | DIASTOLIC BLOOD PRESSURE: 83 MMHG

## 2018-07-28 VITALS — DIASTOLIC BLOOD PRESSURE: 85 MMHG | SYSTOLIC BLOOD PRESSURE: 129 MMHG

## 2018-07-28 VITALS — DIASTOLIC BLOOD PRESSURE: 71 MMHG | SYSTOLIC BLOOD PRESSURE: 123 MMHG

## 2018-07-28 VITALS — SYSTOLIC BLOOD PRESSURE: 125 MMHG | DIASTOLIC BLOOD PRESSURE: 75 MMHG

## 2018-07-28 VITALS — SYSTOLIC BLOOD PRESSURE: 138 MMHG | DIASTOLIC BLOOD PRESSURE: 85 MMHG

## 2018-07-28 LAB
ANION GAP SERPL CALC-SCNC: 12 MMOL/L (ref 5–15)
BUN SERPL-MCNC: 7 MG/DL (ref 7–18)
CALCIUM SERPL-MCNC: 9.4 MG/DL (ref 8.5–10.1)
CHLORIDE SERPL-SCNC: 106 MMOL/L (ref 98–107)
CO2 SERPL-SCNC: 20 MMOL/L (ref 21–32)
CREAT SERPL-MCNC: 0.9 MG/DL (ref 0.55–1.3)
POTASSIUM SERPL-SCNC: 3.7 MMOL/L (ref 3.5–5.1)
SODIUM SERPL-SCNC: 138 MMOL/L (ref 136–145)

## 2018-07-28 RX ADMIN — INSULIN ASPART SCH UNITS: 100 INJECTION, SOLUTION INTRAVENOUS; SUBCUTANEOUS at 17:30

## 2018-07-28 RX ADMIN — INSULIN ASPART SCH UNITS: 100 INJECTION, SOLUTION INTRAVENOUS; SUBCUTANEOUS at 06:03

## 2018-07-28 RX ADMIN — SODIUM CHLORIDE SCH MLS/HR: 9 INJECTION, SOLUTION INTRAVENOUS at 21:32

## 2018-07-28 RX ADMIN — INSULIN ASPART SCH UNITS: 100 INJECTION, SOLUTION INTRAVENOUS; SUBCUTANEOUS at 12:28

## 2018-07-28 RX ADMIN — POLYETHYLENE GLYCOL 3350 SCH GM: 17 POWDER, FOR SOLUTION ORAL at 21:00

## 2018-07-28 RX ADMIN — ERTAPENEM SODIUM SCH MLS/HR: 1 INJECTION, POWDER, LYOPHILIZED, FOR SOLUTION INTRAMUSCULAR; INTRAVENOUS at 15:36

## 2018-07-28 RX ADMIN — LEVETIRACETAM SCH MG: 100 SOLUTION ORAL at 06:02

## 2018-07-28 RX ADMIN — SODIUM CHLORIDE SCH MLS/HR: 9 INJECTION, SOLUTION INTRAVENOUS at 06:02

## 2018-07-28 RX ADMIN — LEVETIRACETAM SCH MG: 100 SOLUTION ORAL at 13:08

## 2018-07-28 RX ADMIN — CHLORHEXIDINE GLUCONATE SCH APPLIC: 213 SOLUTION TOPICAL at 21:32

## 2018-07-28 RX ADMIN — SODIUM CHLORIDE SCH MLS/HR: 9 INJECTION, SOLUTION INTRAVENOUS at 14:03

## 2018-07-28 RX ADMIN — LEVETIRACETAM SCH MG: 100 SOLUTION ORAL at 21:32

## 2018-07-28 RX ADMIN — HEPARIN SODIUM SCH UNITS: 5000 INJECTION INTRAVENOUS; SUBCUTANEOUS at 21:35

## 2018-07-28 RX ADMIN — SODIUM CHLORIDE SCH MLS/HR: 0.9 INJECTION INTRAVENOUS at 21:32

## 2018-07-28 RX ADMIN — INSULIN ASPART SCH UNITS: 100 INJECTION, SOLUTION INTRAVENOUS; SUBCUTANEOUS at 23:39

## 2018-07-28 RX ADMIN — HEPARIN SODIUM SCH UNITS: 5000 INJECTION INTRAVENOUS; SUBCUTANEOUS at 09:21

## 2018-07-28 NOTE — GENERAL PROGRESS NOTE
Assessment/Plan


Status:  stable


Assessment/Plan


# Anemia of chronic disease. No hemolysis, peripheral smear reviewed, ferritin 

is elevated


--> Continue to closely monitor for improvement. 


--> Anemia w/u has been reviewed. Will trend cbc daily. 


--> Hgb goal >7


# Leukocytosis - Sepsis with elevated temperature of 103 degrees Fahrenheit.  


--> Lactic acid pending, was elevated upon admission. 


--> Pt on antibiotics, has received a dose of Zosyn and currently is on 

vancomycin q.12 h.  


--> Currently afebrile.


--> Appreciate Infectious Disease consult.


# Acute kidney injury.  


--> Currently on IV fluids, IV bolus given to see if the patient responds along 

with IV pressor as needed.  


--> Closely monitor with Nephrology team.


# Septic shock, use antibiotic per ID services.


--> potential pna, bacteremia


# Respiratory failure, status post tracheostomy, on mechanical ventilation per 

Dr. Pena.


# Hypercalcemia. Monitor PTH and calcium level





The time the note was entered does not necessarily correspond to the time the 

patient was seen.





Subjective


Date patient seen:  Jul 28, 2018


ROS Limited/Unobtainable:  Yes


Hematologic/Lymphatic:  Reports: anemia


Allergies:  


Coded Allergies:  


     AZTREONAM (Verified  Allergy, Unknown, 7/23/18)


All Systems:  reviewed and negative except above


Subjective


Pt remains obtunded. No acute events. H/H stable.





Objective





Last 24 Hour Vital Signs








  Date Time  Temp Pulse Resp B/P (MAP) Pulse Ox O2 Delivery O2 Flow Rate FiO2


 


7/28/18 08:30      Mechanical Ventilator  





      Mechanical Ventilator  





      Mechanical Ventilator  


 


7/28/18 08:08 97.9 60 16 138/85 (102) 99   





 97.9       


 


7/28/18 08:00        30


 


7/28/18 08:00  60      


 


7/28/18 05:17  68 16     30


 


7/28/18 04:00  61      


 


7/28/18 04:00      Mechanical Ventilator  


 


7/28/18 04:00 98.1 65 18 137/83 (101) 98   





 98.1       


 


7/28/18 02:40  69 16     30


 


7/28/18 01:01  65 16     30


 


7/28/18 00:00        30


 


7/28/18 00:00      Mechanical Ventilator  


 


7/28/18 00:00  61      


 


7/28/18 00:00 98.1 60 20 125/75 (92) 98   





 98.1       


 


7/27/18 22:51  72 16     30


 


7/27/18 21:19  73 16     30


 


7/27/18 20:00      Mechanical Ventilator  


 


7/27/18 20:00 96.1 57 16 124/74 (91) 96   





 96.1       


 


7/27/18 20:00  60      


 


7/27/18 20:00        30


 


7/27/18 18:35  71 16     30


 


7/27/18 17:25  63 16     30


 


7/27/18 16:00      Mechanical Ventilator  


 


7/27/18 16:00  60      


 


7/27/18 16:00 97.2 61 16 131/82 (98) 100   





 97.2       


 


7/27/18 16:00        30


 


7/27/18 14:40  61 16     30


 


7/27/18 13:18  63 16     30


 


7/27/18 12:00      Mechanical Ventilator  


 


7/27/18 12:00 97.9 60 16 117/67 (84) 100   





 97.9       


 


7/27/18 12:00        30


 


7/27/18 11:31  66      


 


7/27/18 10:45  62 16     30

















Intake and Output  


 


 7/27/18 7/28/18





 19:00 07:00


 


Intake Total 1260.000 ml 935 ml


 


Output Total 900 ml 450 ml


 


Balance 360.000 ml 485 ml


 


  


 


Free Water  100 ml


 


IV Total 330.000 ml 60 ml


 


Tube Feeding 780 ml 715 ml


 


Other 150 ml 60 ml


 


Output Urine Total 600 ml 450 ml


 


Stool Total 300 ml 








Laboratory Tests


7/27/18 20:30: Vancomycin Level Trough 11.8


7/28/18 04:30: 


Sodium Level 138, Potassium Level 3.7, Chloride Level 106, Carbon Dioxide Level 

20L, Anion Gap 12, Blood Urea Nitrogen 7, Creatinine 0.9, Estimat Glomerular 

Filtration Rate > 60, Glucose Level 178H, Calcium Level 9.4


Height (Feet):  6


Weight (Pounds):  210


General Appearance:  no apparent distress, lethargic


EENT:  PERRL/EOMI


Neck:  normal alignment


Cardiovascular:  normal peripheral pulses


Respiratory/Chest:  no respiratory distress


Abdomen:  soft











Tejas Gerber MD Jul 28, 2018 10:46

## 2018-07-28 NOTE — GENERAL SURGERY PROGRESS NOTE
General Surgery-Progress Note


Subjective


Additional Comments


no acute events.  stable..





Objective





Last 24 Hour Vital Signs








  Date Time  Temp Pulse Resp B/P (MAP) Pulse Ox O2 Delivery O2 Flow Rate FiO2


 


7/28/18 12:30  61 16     30


 


7/28/18 12:06 97.5 63 18 123/71 (88) 100   





 97.5       


 


7/28/18 12:05      Mechanical Ventilator  





      Mechanical Ventilator  





      Mechanical Ventilator  


 


7/28/18 12:00        30


 


7/28/18 12:00  65      


 


7/28/18 11:11  64 16     30


 


7/28/18 09:05  65 16     30


 


7/28/18 08:30      Mechanical Ventilator  





      Mechanical Ventilator  





      Mechanical Ventilator  


 


7/28/18 08:08 97.9 60 16 138/85 (102) 99   





 97.9       


 


7/28/18 08:00        30


 


7/28/18 08:00  60      


 


7/28/18 06:50  63 16     30


 


7/28/18 05:17  68 16     30


 


7/28/18 04:00  61      


 


7/28/18 04:00      Mechanical Ventilator  


 


7/28/18 04:00 98.1 65 18 137/83 (101) 98   





 98.1       


 


7/28/18 02:40  69 16     30


 


7/28/18 01:01  65 16     30


 


7/28/18 00:00        30


 


7/28/18 00:00      Mechanical Ventilator  


 


7/28/18 00:00  61      


 


7/28/18 00:00 98.1 60 20 125/75 (92) 98   





 98.1       


 


7/27/18 22:51  72 16     30


 


7/27/18 21:19  73 16     30


 


7/27/18 20:00      Mechanical Ventilator  


 


7/27/18 20:00 96.1 57 16 124/74 (91) 96   





 96.1       


 


7/27/18 20:00  60      


 


7/27/18 20:00        30


 


7/27/18 18:35  71 16     30


 


7/27/18 17:25  63 16     30


 


7/27/18 16:00      Mechanical Ventilator  


 


7/27/18 16:00  60      


 


7/27/18 16:00 97.2 61 16 131/82 (98) 100   





 97.2       


 


7/27/18 16:00        30


 


7/27/18 14:40  61 16     30








I&O











Intake and Output  


 


 7/27/18 7/28/18





 19:00 07:00


 


Intake Total 1260.000 ml 935 ml


 


Output Total 900 ml 450 ml


 


Balance 360.000 ml 485 ml


 


  


 


Free Water  100 ml


 


IV Total 330.000 ml 60 ml


 


Tube Feeding 780 ml 715 ml


 


Other 150 ml 60 ml


 


Output Urine Total 600 ml 450 ml


 


Stool Total 300 ml 








Wound:  clean


Drains:  none


Cardiovascular:  RSR


Respiratory:  clear


Abdomen:  soft, flat, non-tender, present bowel sounds


Extremities:  no cyanosis





Laboratory Tests








Test


  7/27/18


20:30 7/28/18


04:30


 


Vancomycin Level Trough


  11.8 ug/mL


(5.0-12.0) 


 


 


Sodium Level


  


  138 MMOL/L


(136-145)


 


Potassium Level


  


  3.7 MMOL/L


(3.5-5.1)


 


Chloride Level


  


  106 MMOL/L


()


 


Carbon Dioxide Level


  


  20 MMOL/L


(21-32)  L


 


Anion Gap


  


  12 mmol/L


(5-15)


 


Blood Urea Nitrogen


  


  7 mg/dL (7-18)


 


 


Creatinine


  


  0.9 MG/DL


(0.55-1.30)


 


Estimat Glomerular Filtration


Rate 


  > 60 mL/min


(>60)


 


Glucose Level


  


  178 MG/DL


()  H


 


Calcium Level


  


  9.4 MG/DL


(8.5-10.1)











Plan


Problems:  


(1) Stoma malfunction


(2) Parastomal hernia


Assessment & Plan:  52M with history of prior loop colostomy.  proximal loop 

stable.  distal with impacted contrast stool but stable. 


parastomal hernia with small bowel contents in hernia.  no obstruction noted at 

this time. 


larger appearance of patients stoma is because location and use of loop 

colostomy in hepatic flexure.  it is otherwise viable and stable.  





Would Highly recommend fixing parastomal hernia but can be done electively.


if obstructs will need urgent repair.  currently non obstructive


will monitor and follow with recs. 


bowel care 


thank you for this consultation. 





(3) Severe sepsis











Brian Christina Jul 28, 2018 14:36

## 2018-07-28 NOTE — GENERAL PROGRESS NOTE
Assessment/Plan


Assessment/Plan


Assessment


(1) Parastomal hernia


ICD Codes:  K43.5 - Parastomal hernia without obstruction or gangrene


SNOMED:  458449231


Qualifiers:  


   Qualified Codes:  K43.5 - Parastomal hernia without obstruction or gangrene


(2) Stoma malfunction


SNOMED:  272885314


(3) Colostomy in place


ICD Codes:  Z93.3 - Colostomy status


SNOMED:  138695760, 441183697


(4) Vegetative state / Dysphagia / G tube dependent


ICD Codes:  R40.3 - Persistent vegetative state


SNOMED:  40665890


(5) Diabetes mellitus


ICD Codes:  E11.9 - Type 2 diabetes mellitus without complications


SNOMED:  60742255


(6) Acute on chronic respiratory failure


ICD Codes:  J96.20 - Acute and chronic respiratory failure, unspecified whether 

with hypoxia or hypercapnia


SNOMED:  53749703


(7) Severe sepsis


ICD Codes:  A41.9 - Sepsis, unspecified organism; R65.20 - Severe sepsis 

without septic shock


SNOMED:  62087870


 


Assessment/Plan


prolapse stoma assessed >> no s/sx of infection.  no obstruction. 


anemia work up reviewed >> iron deficiency


G tube dependent


hepatitis panel negative


OB negative





supportive care


monitor H&H, prn transfusions


venofer


ppi


GTFs per RD, adv to goal


GT site care daily/prn


abx


bowel regime


fu labs





Subjective


Allergies:  


Coded Allergies:  


     AZTREONAM (Verified  Allergy, Unknown, 7/23/18)


Subjective


non communicative


on trach 


tolerating TF





Objective





Last 24 Hour Vital Signs








  Date Time  Temp Pulse Resp B/P (MAP) Pulse Ox O2 Delivery O2 Flow Rate FiO2


 


7/28/18 15:05  64 16     30


 


7/28/18 12:30  61 16     30


 


7/28/18 12:06 97.5 63 18 123/71 (88) 100   





 97.5       


 


7/28/18 12:05      Mechanical Ventilator  





      Mechanical Ventilator  





      Mechanical Ventilator  


 


7/28/18 12:00        30


 


7/28/18 12:00  65      


 


7/28/18 11:11  64 16     30


 


7/28/18 09:05  65 16     30


 


7/28/18 08:30      Mechanical Ventilator  





      Mechanical Ventilator  





      Mechanical Ventilator  


 


7/28/18 08:08 97.9 60 16 138/85 (102) 99   





 97.9       


 


7/28/18 08:00        30


 


7/28/18 08:00  60      


 


7/28/18 06:50  63 16     30


 


7/28/18 05:17  68 16     30


 


7/28/18 04:00  61      


 


7/28/18 04:00      Mechanical Ventilator  


 


7/28/18 04:00 98.1 65 18 137/83 (101) 98   





 98.1       


 


7/28/18 02:40  69 16     30


 


7/28/18 01:01  65 16     30


 


7/28/18 00:00        30


 


7/28/18 00:00      Mechanical Ventilator  


 


7/28/18 00:00  61      


 


7/28/18 00:00 98.1 60 20 125/75 (92) 98   





 98.1       


 


7/27/18 22:51  72 16     30


 


7/27/18 21:19  73 16     30


 


7/27/18 20:00      Mechanical Ventilator  


 


7/27/18 20:00 96.1 57 16 124/74 (91) 96   





 96.1       


 


7/27/18 20:00  60      


 


7/27/18 20:00        30


 


7/27/18 18:35  71 16     30


 


7/27/18 17:25  63 16     30


 


7/27/18 16:00      Mechanical Ventilator  


 


7/27/18 16:00  60      


 


7/27/18 16:00 97.2 61 16 131/82 (98) 100   





 97.2       


 


7/27/18 16:00        30

















Intake and Output  


 


 7/27/18 7/28/18





 19:00 07:00


 


Intake Total 1260.000 ml 935 ml


 


Output Total 900 ml 450 ml


 


Balance 360.000 ml 485 ml


 


  


 


Free Water  100 ml


 


IV Total 330.000 ml 60 ml


 


Tube Feeding 780 ml 715 ml


 


Other 150 ml 60 ml


 


Output Urine Total 600 ml 450 ml


 


Stool Total 300 ml 








Laboratory Tests


7/27/18 20:30: Vancomycin Level Trough 11.8


7/28/18 04:30: 


Sodium Level 138, Potassium Level 3.7, Chloride Level 106, Carbon Dioxide Level 

20L, Anion Gap 12, Blood Urea Nitrogen 7, Creatinine 0.9, Estimat Glomerular 

Filtration Rate > 60, Glucose Level 178H, Calcium Level 9.4


Height (Feet):  6


Weight (Pounds):  210


Objective


Obese AA man


NCAT


neck (+) trach


Chest Coarse BS


RRR


abd obese, (+) R sided ostomy and L sided GT


(+) contractures


OBS











Mariano Shepherd MD Jul 28, 2018 15:59

## 2018-07-28 NOTE — INFECTIOUS DISEASES PROG NOTE
Assessment/Plan


Assessment/Plan





 


Assessment:


Sepsis, resolving- 2ry to UTI and Bacteremia- ?pyelo. Possible PNA


 -u/a wbc 40-60, nit neg, leuk +2; ucx >100k E. aerogenes (S cefepime, cipro/

levo; R Zosyn)


 -BCX 4/4 E. aerogenes, prob Amp C (S cefepime, cipro/levo; R Zosyn), P. 

mirabilis ESBL (S Zosyn, Ertapenem); repeta 7/26 1/4 GPC clusters (suspect 

contaminant- r/o S,aureus)


  -CXR 7/27: Increased left pleural effusion, over 3 days. Overall decreased 

interstitial congestion. Right infrahilar atelectasis


 -CXR: Cardiomegaly. Mild interstitial congestion. Suspect small bilateral 

pleural effusions


 -CT abd/p: Right lower quadrant double barrel colostomy, as described. Small 

peristomal hernia contains bowel loops without evidence of obstruction or 

strangulation. Left renal staghorn calculus. Bilateral intrarenal calyceal 

calculi. Borderline hydronephrosis on the right without evidence of downstream 

obstructive lesion. May indicate mild ureteral pelvic junction obstruction. 

Somewhat atrophic left kidney. Inspissated contrast ball within the distal 

sigmoid colon. Gastrostomy in good position. Nonspecific bilateral perinephric 

fat stranding, could indicate pyelonephritis or could be chronic. Guzmán 

catheter in place. Apparent bladder wall thickening, possibly an artifact of 

under distention but cystitis is not excludable, particularly in view of mild 

perivesical fat stranding. Bilateral pulmonary parenchymal atelectasis and 

consolidation


  -sp cx p





Fever/Leukocytosis; resolved


BRAYDON, improving


Lactic acidosis, resolved





chronic resp failure trach/vent dependant


BPH


GERD


 HTN


DM2


seizure disorder


colostomy


 s/p GT 


constipation





VRE and MRSA colonized


 


Plan:


-Cotninue empiric IV Vancomycin #5 for nwo pending ID GPC BCx and repeat Bcx





-Continue Ertapenem #2/10 for Amp-C Enterobacter and ESBL P.mirabilis 

bacteremia ; will treat for 10-14 days 


    -7/27 SP Zosyn #4








-Repeat 2 sets of Bcx


-f/u cx


-Monitor CBC/CMP, temperatures








Thank you for this consultation. Will continue to follow along with you.





Discussed with RN.





Subjective


Allergies:  


Coded Allergies:  


     AZTREONAM (Verified  Allergy, Unknown, 7/23/18)


Subjective


afebrile


no leukocytosis


repeat Bcx GPC clusters





Objective


Vital Signs





Last 24 Hour Vital Signs








  Date Time  Temp Pulse Resp B/P (MAP) Pulse Ox O2 Delivery O2 Flow Rate FiO2


 


7/28/18 12:06 97.5 63 18 123/71 (88) 100   





 97.5       


 


7/28/18 12:05      Mechanical Ventilator  





      Mechanical Ventilator  





      Mechanical Ventilator  


 


7/28/18 12:00        30


 


7/28/18 11:11  64 16     30


 


7/28/18 09:05  65 16     30


 


7/28/18 08:30      Mechanical Ventilator  





      Mechanical Ventilator  





      Mechanical Ventilator  


 


7/28/18 08:08 97.9 60 16 138/85 (102) 99   





 97.9       


 


7/28/18 08:00        30


 


7/28/18 08:00  60      


 


7/28/18 06:50  63 16     30


 


7/28/18 05:17  68 16     30


 


7/28/18 04:00  61      


 


7/28/18 04:00      Mechanical Ventilator  


 


7/28/18 04:00 98.1 65 18 137/83 (101) 98   





 98.1       


 


7/28/18 02:40  69 16     30


 


7/28/18 01:01  65 16     30


 


7/28/18 00:00        30


 


7/28/18 00:00      Mechanical Ventilator  


 


7/28/18 00:00  61      


 


7/28/18 00:00 98.1 60 20 125/75 (92) 98   





 98.1       


 


7/27/18 22:51  72 16     30


 


7/27/18 21:19  73 16     30


 


7/27/18 20:00      Mechanical Ventilator  


 


7/27/18 20:00 96.1 57 16 124/74 (91) 96   





 96.1       


 


7/27/18 20:00  60      


 


7/27/18 20:00        30


 


7/27/18 18:35  71 16     30


 


7/27/18 17:25  63 16     30


 


7/27/18 16:00      Mechanical Ventilator  


 


7/27/18 16:00  60      


 


7/27/18 16:00 97.2 61 16 131/82 (98) 100   





 97.2       


 


7/27/18 16:00        30


 


7/27/18 14:40  61 16     30


 


7/27/18 13:18  63 16     30








Height (Feet):  6


Weight (Pounds):  210


Objective


GENERAL:  The patient is awake, in no overt distress.


HEENT:  Extraocular muscles intact.  No lymphadenopathy noted.


Tracheostomy noted.


CARDIOVASCULAR:  S1 and S2.  Tachycardic.  No rubs or gallops.


PULMONARY:  Mild diffuse expiratory rhonchi with basilar rales.


ABDOMEN:  Obese and nondistended.  The G-tube and stoma noted.


EXTREMITIES:  No edema.  Fair pedal pulses.





Microbiology








 Date/Time


Source Procedure


Growth Status


 


 


 7/26/18 17:50


Blood Blood Culture - Preliminary Resulted


 


 7/26/18 17:45


Blood Blood Culture - Preliminary


NO GROWTH AFTER 24 HOURS Resulted











Laboratory Tests








Test


  7/27/18


20:30 7/28/18


04:30


 


Vancomycin Level Trough


  11.8 ug/mL


(5.0-12.0) 


 


 


Sodium Level


  


  138 MMOL/L


(136-145)


 


Potassium Level


  


  3.7 MMOL/L


(3.5-5.1)


 


Chloride Level


  


  106 MMOL/L


()


 


Carbon Dioxide Level


  


  20 MMOL/L


(21-32)  L


 


Anion Gap


  


  12 mmol/L


(5-15)


 


Blood Urea Nitrogen


  


  7 mg/dL (7-18)


 


 


Creatinine


  


  0.9 MG/DL


(0.55-1.30)


 


Estimat Glomerular Filtration


Rate 


  > 60 mL/min


(>60)


 


Glucose Level


  


  178 MG/DL


()  H


 


Calcium Level


  


  9.4 MG/DL


(8.5-10.1)











Current Medications








 Medications


  (Trade)  Dose


 Ordered  Sig/Jan


 Route


 PRN Reason  Start Time


 Stop Time Status Last Admin


Dose Admin


 


 Acetaminophen


  (Tylenol)  650 mg  Q4H  PRN


 ORAL


 FEVER  7/26/18 14:00


 8/23/18 09:59   


 


 


 Albuterol/


 Ipratropium


  (Albuterol/


 Ipratropium)  3 ml  Q4H  PRN


 HHN


 Shortness of Breath  7/26/18 14:00


 7/29/18 09:59   


 


 


 Baclofen


  (Lioresal)  10 mg  THREE TIMES A  DAY


 GT


   7/26/18 13:00


 8/23/18 12:59  7/28/18 09:18


 


 


 Chlorhexidine


 Gluconate


  (Elaine-Hex 2%)  1 applic  DAILY@2000


 TOPIC


   7/26/18 20:00


 8/23/18 19:59  7/27/18 21:11


 


 


 Dextrose


  (Dextrose 50%)  25 ml  STAT  PRN


 IV


 Hypoglycemia  7/27/18 08:45


 8/24/18 08:44   


 


 


 Dextrose


  (Dextrose 50%)  50 ml  STAT  PRN


 IV


 Hypoglycemia  7/27/18 08:45


 8/24/18 08:44   


 


 


 Ertapenem 1 gm/


 Sodium Chloride  55 ml @ 


 110 mls/hr  Q24H


 IVPB


   7/27/18 15:00


 8/1/18 14:59  7/27/18 15:05


 


 


 Heparin Sodium


  (Porcine)


  (Heparin 5000


 units/ml)  5,000 units  EVERY 12  HOURS


 SUBQ


   7/26/18 21:00


 8/23/18 20:59  7/28/18 09:21


 


 


 Insulin Aspart


  (NovoLOG)    Q6HR


 SUBQ


   7/26/18 18:00


 8/24/18 11:29  7/28/18 12:28


 


 


 Iron Sucrose 100


 mg/Sodium Chloride  60 ml @ 


 240 mls/hr  BEDTIME


 IV


   7/26/18 21:00


 7/30/18 21:14  7/27/18 21:12


 


 


 Lansoprazole


  (Prevacid)  30 mg  DAILY


 GT


   7/27/18 09:00


 8/25/18 08:59  7/28/18 09:19


 


 


 Levetiracetam


  (Keppra)  1,000 mg  Q8HR


 GT


   7/26/18 14:00


 8/23/18 12:59  7/28/18 06:02


 


 


 Lorazepam


  (Ativan 2mg/ml


 1ml)  2 mg  Q2H  PRN


 IV


 For Anxiety  7/26/18 12:00


 7/31/18 09:59   


 


 


 Morphine Sulfate


  (Morphine


 Sulfate)  4 mg  Q4H  PRN


 IVP


 Severe Pain (Pain Scale 7-10)  7/26/18 14:00


 7/31/18 09:59   


 


 


 Ondansetron HCl


  (Zofran)  4 mg  Q6H  PRN


 IVP


 Nausea & Vomiting  7/26/18 16:00


 8/23/18 09:59   


 


 


 Polyethylene


 Glycol


  (Miralax)  17 gm  BEDTIME


 ORAL


   7/26/18 21:00


 8/24/18 20:59  7/27/18 21:13


 


 


 Vancomycin HCl


  (Vanco rx to


 dose)  1 ea  DAILY  PRN


 MISC


 PER RX PROTOCOL  7/27/18 13:45


 8/26/18 13:44   


 


 


 Vancomycin HCl 1


 gm/Dextrose  275 ml @ 


 183.708


 mls/hr  Q8HR


 IVPB


   7/27/18 22:00


 8/1/18 21:59  7/28/18 06:02


 

















Joy Love M.D. Jul 28, 2018 13:06

## 2018-07-28 NOTE — NEPHROLOGY PROGRESS NOTE
Assessment/Plan


Assessment/Plan


1. BRAYDON- resolved


2. Septic Shock-  stable. On Abx


3. Chronic Resp FL- trached on vent


4. Hypok/Phos- corrected


    - replace PRN


5. SZ- Keppra





Subjective


Date patient seen:  Jul 28, 2018


Time patient seen:  08:21


ROS Limited/Unobtainable:  Yes


Allergies:  


Coded Allergies:  


     AZTREONAM (Verified  Allergy, Unknown, 7/23/18)


Subjective


Patient  trached/vent.





Objective





Last 24 Hour Vital Signs








  Date Time  Temp Pulse Resp B/P (MAP) Pulse Ox O2 Delivery O2 Flow Rate FiO2


 


7/28/18 08:08 97.9 60 16 138/85 (102) 99   





 97.9       


 


7/28/18 05:17  68 16     30


 


7/28/18 04:00  61      


 


7/28/18 04:00      Mechanical Ventilator  


 


7/28/18 04:00 98.1 65 18 137/83 (101) 98   





 98.1       


 


7/28/18 02:40  69 16     30


 


7/28/18 01:01  65 16     30


 


7/28/18 00:00        30


 


7/28/18 00:00      Mechanical Ventilator  


 


7/28/18 00:00  61      


 


7/28/18 00:00 98.1 60 20 125/75 (92) 98   





 98.1       


 


7/27/18 22:51  72 16     30


 


7/27/18 21:19  73 16     30


 


7/27/18 20:00      Mechanical Ventilator  


 


7/27/18 20:00 96.1 57 16 124/74 (91) 96   





 96.1       


 


7/27/18 20:00  60      


 


7/27/18 20:00        30


 


7/27/18 18:35  71 16     30


 


7/27/18 17:25  63 16     30


 


7/27/18 16:00      Mechanical Ventilator  


 


7/27/18 16:00  60      


 


7/27/18 16:00 97.2 61 16 131/82 (98) 100   





 97.2       


 


7/27/18 16:00        30


 


7/27/18 14:40  61 16     30


 


7/27/18 13:18  63 16     30


 


7/27/18 12:00      Mechanical Ventilator  


 


7/27/18 12:00 97.9 60 16 117/67 (84) 100   





 97.9       


 


7/27/18 12:00        30


 


7/27/18 11:31  66      


 


7/27/18 10:45  62 16     30


 


7/27/18 09:17  60 16     30

















Intake and Output  


 


 7/27/18 7/28/18





 19:00 07:00


 


Intake Total 1260.000 ml 935 ml


 


Output Total 900 ml 450 ml


 


Balance 360.000 ml 485 ml


 


  


 


Free Water  100 ml


 


IV Total 330.000 ml 60 ml


 


Tube Feeding 780 ml 715 ml


 


Other 150 ml 60 ml


 


Output Urine Total 600 ml 450 ml


 


Stool Total 300 ml 








Laboratory Tests


7/27/18 20:30: Vancomycin Level Trough 11.8


7/28/18 04:30: 


Sodium Level 138, Potassium Level 3.7, Chloride Level 106, Carbon Dioxide Level 

20L, Anion Gap 12, Blood Urea Nitrogen 7, Creatinine 0.9, Estimat Glomerular 

Filtration Rate > 60, Glucose Level 178H, Calcium Level 9.4


Height (Feet):  6


Weight (Pounds):  210


General Appearance:  WD/WN


EENT:  PERRL/EOMI


Neck:  non-tender, normal alignment


Cardiovascular:  normal peripheral pulses, normal rate


Respiratory/Chest:  chest wall non-tender, lungs clear


Abdomen:  normal bowel sounds, non tender, soft


Edema:  no edema noted Arm (L), no edema noted Arm (R), no edema noted Leg (L), 

no edema noted Leg (R), no edema noted Pedal (L), no edema noted Pedal (R), no 

edema noted Generalized











Jewel Templeton M.D. Jul 28, 2018 08:23

## 2018-07-29 VITALS — SYSTOLIC BLOOD PRESSURE: 123 MMHG | DIASTOLIC BLOOD PRESSURE: 82 MMHG

## 2018-07-29 VITALS — SYSTOLIC BLOOD PRESSURE: 135 MMHG | DIASTOLIC BLOOD PRESSURE: 80 MMHG

## 2018-07-29 VITALS — DIASTOLIC BLOOD PRESSURE: 76 MMHG | SYSTOLIC BLOOD PRESSURE: 128 MMHG

## 2018-07-29 VITALS — DIASTOLIC BLOOD PRESSURE: 80 MMHG | SYSTOLIC BLOOD PRESSURE: 133 MMHG

## 2018-07-29 VITALS — SYSTOLIC BLOOD PRESSURE: 143 MMHG | DIASTOLIC BLOOD PRESSURE: 90 MMHG

## 2018-07-29 VITALS — SYSTOLIC BLOOD PRESSURE: 136 MMHG | DIASTOLIC BLOOD PRESSURE: 80 MMHG

## 2018-07-29 LAB
ANION GAP SERPL CALC-SCNC: 11 MMOL/L (ref 5–15)
BUN SERPL-MCNC: 6 MG/DL (ref 7–18)
CALCIUM SERPL-MCNC: 9.1 MG/DL (ref 8.5–10.1)
CHLORIDE SERPL-SCNC: 103 MMOL/L (ref 98–107)
CO2 SERPL-SCNC: 21 MMOL/L (ref 21–32)
CREAT SERPL-MCNC: 0.9 MG/DL (ref 0.55–1.3)
POTASSIUM SERPL-SCNC: 3.8 MMOL/L (ref 3.5–5.1)
SODIUM SERPL-SCNC: 135 MMOL/L (ref 136–145)

## 2018-07-29 RX ADMIN — LEVETIRACETAM SCH MG: 100 SOLUTION ORAL at 05:53

## 2018-07-29 RX ADMIN — LEVETIRACETAM SCH MG: 100 SOLUTION ORAL at 13:08

## 2018-07-29 RX ADMIN — HEPARIN SODIUM SCH UNITS: 5000 INJECTION INTRAVENOUS; SUBCUTANEOUS at 09:02

## 2018-07-29 RX ADMIN — POLYETHYLENE GLYCOL 3350 SCH GM: 17 POWDER, FOR SOLUTION ORAL at 21:01

## 2018-07-29 RX ADMIN — LEVETIRACETAM SCH MG: 100 SOLUTION ORAL at 21:47

## 2018-07-29 RX ADMIN — INSULIN ASPART SCH UNITS: 100 INJECTION, SOLUTION INTRAVENOUS; SUBCUTANEOUS at 06:01

## 2018-07-29 RX ADMIN — CHLORHEXIDINE GLUCONATE SCH APPLIC: 213 SOLUTION TOPICAL at 21:00

## 2018-07-29 RX ADMIN — INSULIN ASPART SCH UNITS: 100 INJECTION, SOLUTION INTRAVENOUS; SUBCUTANEOUS at 17:45

## 2018-07-29 RX ADMIN — SODIUM CHLORIDE SCH MLS/HR: 0.9 INJECTION INTRAVENOUS at 21:01

## 2018-07-29 RX ADMIN — SODIUM CHLORIDE SCH MLS/HR: 9 INJECTION, SOLUTION INTRAVENOUS at 17:42

## 2018-07-29 RX ADMIN — INSULIN ASPART SCH UNITS: 100 INJECTION, SOLUTION INTRAVENOUS; SUBCUTANEOUS at 12:00

## 2018-07-29 RX ADMIN — ERTAPENEM SODIUM SCH MLS/HR: 1 INJECTION, POWDER, LYOPHILIZED, FOR SOLUTION INTRAMUSCULAR; INTRAVENOUS at 14:01

## 2018-07-29 RX ADMIN — SODIUM CHLORIDE SCH MLS/HR: 9 INJECTION, SOLUTION INTRAVENOUS at 05:53

## 2018-07-29 RX ADMIN — HEPARIN SODIUM SCH UNITS: 5000 INJECTION INTRAVENOUS; SUBCUTANEOUS at 21:02

## 2018-07-29 NOTE — NEPHROLOGY PROGRESS NOTE
Assessment/Plan


Assessment/Plan


1. BRAYDON- resolved


2. Septis- resolving on Abx


3. Chronic Resp FL- trached on vent


4. Hyponatremia - mild at 135. Avoid excess free water with Abx while on Keppra 

(SIADH)


5. SZ- Keppra





Subjective


Date patient seen:  Jul 29, 2018


Time patient seen:  09:00


ROS Limited/Unobtainable:  Yes


Allergies:  


Coded Allergies:  


     AZTREONAM (Verified  Allergy, Unknown, 7/23/18)


Subjective


Patient  trached/vent. In no overt distress





Objective





Last 24 Hour Vital Signs








  Date Time  Temp Pulse Resp B/P (MAP) Pulse Ox O2 Delivery O2 Flow Rate FiO2


 


7/29/18 08:00  64      


 


7/29/18 07:29  73 18     30


 


7/29/18 05:20  71 22     30


 


7/29/18 04:00      Mechanical Ventilator  





      Mechanical Ventilator  





      Mechanical Ventilator  


 


7/29/18 04:00 97.5 70 16 143/90 (107) 100   





 97.5       


 


7/29/18 04:00  71      


 


7/29/18 04:00        30


 


7/29/18 03:18  70 19     30


 


7/29/18 01:30  72 16     30


 


7/29/18 00:00 97.7 75 16 136/80 (98) 99   





 97.7       


 


7/29/18 00:00  75      


 


7/29/18 00:00        30


 


7/29/18 00:00      Mechanical Ventilator  





      Mechanical Ventilator  





      Mechanical Ventilator  


 


7/28/18 23:05  74 17     30


 


7/28/18 21:30  73 16     30


 


7/28/18 20:00      Mechanical Ventilator  





      Mechanical Ventilator  





      Mechanical Ventilator  


 


7/28/18 20:00        30


 


7/28/18 20:00 97.3 75 16 132/76 (94) 98   





 97.3       


 


7/28/18 20:00  69      


 


7/28/18 19:30  74 16     30


 


7/28/18 16:53  66 16     30


 


7/28/18 16:00 98.7 72 20 129/85 (100) 100   





 98.7       


 


7/28/18 16:00        30


 


7/28/18 16:00  70      


 


7/28/18 16:00      Mechanical Ventilator  





      Mechanical Ventilator  





      Mechanical Ventilator  


 


7/28/18 15:05  64 16     30


 


7/28/18 12:30  61 16     30


 


7/28/18 12:06 97.5 63 18 123/71 (88) 100   





 97.5       


 


7/28/18 12:05      Mechanical Ventilator  





      Mechanical Ventilator  





      Mechanical Ventilator  


 


7/28/18 12:00        30


 


7/28/18 12:00  65      


 


7/28/18 11:11  64 16     30


 


7/28/18 09:05  65 16     30

















Intake and Output  


 


 7/28/18 7/29/18





 19:00 07:00


 


Intake Total 1565.000 ml 1025 ml


 


Output Total 1225 ml 2195 ml


 


Balance 340.000 ml -1170 ml


 


  


 


Free Water 400 ml 250 ml


 


IV Total 385.000 ml 60 ml


 


Tube Feeding 780 ml 715 ml


 


Output Urine Total 875 ml 2000 ml


 


Stool Total 300 ml 


 


Chest Tube Drainage Total 50 ml 195 ml








Laboratory Tests


7/28/18 21:25: Vancomycin Level Trough 25.7H


7/29/18 04:30: 


Sodium Level 135L, Potassium Level 3.8, Chloride Level 103, Carbon Dioxide 

Level 21, Anion Gap 11, Blood Urea Nitrogen 6L, Creatinine 0.9, Estimat 

Glomerular Filtration Rate > 60, Glucose Level 172H, Calcium Level 9.1


Height (Feet):  6


Weight (Pounds):  209


General Appearance:  WD/WN, no apparent distress


EENT:  PERRL/EOMI


Neck:  non-tender, normal alignment


Cardiovascular:  normal peripheral pulses, normal rate


Respiratory/Chest:  crackles/rales


Abdomen:  normal bowel sounds, non tender, soft


Edema:  no edema noted Arm (L), no edema noted Arm (R), no edema noted Leg (L), 

no edema noted Leg (R), no edema noted Pedal (L), no edema noted Pedal (R), no 

edema noted Generalized











Jewel Templeton M.D. Jul 29, 2018 09:02

## 2018-07-29 NOTE — GENERAL PROGRESS NOTE
Assessment/Plan


Status:  stable


Assessment/Plan


# Anemia of chronic disease. No hemolysis, peripheral smear reviewed, ferritin 

is elevated


--> Continue to closely monitor for improvement. 


--> Anemia w/u has been reviewed. Will trend cbc daily. 


--> Hgb goal >7


# Leukocytosis - Sepsis with elevated temperature of 103 degrees Fahrenheit.  


--> Lactic acid pending, was elevated upon admission. 


--> Pt on antibiotics, has received a dose of Zosyn and currently is on 

vancomycin q.12 h.  


--> Currently afebrile.


--> Appreciate Infectious Disease consult.


# Acute kidney injury.  


--> Currently on IV fluids, IV bolus given to see if the patient responds along 

with IV pressor as needed.  


--> Closely monitor with Nephrology team.


# Septic shock, use antibiotic per ID services.


--> potential pna, bacteremia


# Respiratory failure, status post tracheostomy, on mechanical ventilation per 

Dr. Pena.


# Hypercalcemia. Monitor PTH and calcium level





The time the note was entered does not necessarily correspond to the time the 

patient was seen.





Subjective


Date patient seen:  Jul 29, 2018


ROS Limited/Unobtainable:  Yes


Hematologic/Lymphatic:  Reports: anemia


Allergies:  


Coded Allergies:  


     AZTREONAM (Verified  Allergy, Unknown, 7/23/18)


All Systems:  reviewed and negative except above


Subjective


Pt remains obtunded, on trach/vent. No acute events. H/H stable.





Objective





Last 24 Hour Vital Signs








  Date Time  Temp Pulse Resp B/P (MAP) Pulse Ox O2 Delivery O2 Flow Rate FiO2


 


7/29/18 15:16  72 16     30


 


7/29/18 12:44  71 16     30


 


7/29/18 12:00  67      


 


7/29/18 12:00      Mechanical Ventilator  





      Mechanical Ventilator  





      Mechanical Ventilator  


 


7/29/18 12:00        30


 


7/29/18 11:13  70 16     30


 


7/29/18 09:28  73 16     30


 


7/29/18 08:00        30


 


7/29/18 08:00      Mechanical Ventilator  





      Mechanical Ventilator  





      Mechanical Ventilator  


 


7/29/18 08:00  64      


 


7/29/18 07:29  73 18     30


 


7/29/18 05:20  71 22     30


 


7/29/18 04:00      Mechanical Ventilator  





      Mechanical Ventilator  





      Mechanical Ventilator  


 


7/29/18 04:00 97.5 70 16 143/90 (107) 100   





 97.5       


 


7/29/18 04:00  71      


 


7/29/18 04:00        30


 


7/29/18 03:18  70 19     30


 


7/29/18 01:30  72 16     30


 


7/29/18 00:00 97.7 75 16 136/80 (98) 99   





 97.7       


 


7/29/18 00:00  75      


 


7/29/18 00:00        30


 


7/29/18 00:00      Mechanical Ventilator  





      Mechanical Ventilator  





      Mechanical Ventilator  


 


7/28/18 23:05  74 17     30


 


7/28/18 21:30  73 16     30


 


7/28/18 20:00      Mechanical Ventilator  





      Mechanical Ventilator  





      Mechanical Ventilator  


 


7/28/18 20:00        30


 


7/28/18 20:00 97.3 75 16 132/76 (94) 98   





 97.3       


 


7/28/18 20:00  69      


 


7/28/18 19:30  74 16     30


 


7/28/18 16:53  66 16     30

















Intake and Output  


 


 7/28/18 7/29/18





 19:00 07:00


 


Intake Total 1565.000 ml 1025 ml


 


Output Total 1225 ml 2195 ml


 


Balance 340.000 ml -1170 ml


 


  


 


Free Water 400 ml 250 ml


 


IV Total 385.000 ml 60 ml


 


Tube Feeding 780 ml 715 ml


 


Output Urine Total 875 ml 2000 ml


 


Stool Total 300 ml 


 


Chest Tube Drainage Total 50 ml 195 ml








Laboratory Tests


7/28/18 21:25: Vancomycin Level Trough 25.7H


7/29/18 04:30: 


Sodium Level 135L, Potassium Level 3.8, Chloride Level 103, Carbon Dioxide 

Level 21, Anion Gap 11, Blood Urea Nitrogen 6L, Creatinine 0.9, Estimat 

Glomerular Filtration Rate > 60, Glucose Level 172H, Calcium Level 9.1


Height (Feet):  6


Weight (Pounds):  209


General Appearance:  no apparent distress, lethargic


EENT:  PERRL/EOMI


Neck:  normal alignment


Cardiovascular:  normal peripheral pulses


Respiratory/Chest:  no respiratory distress


Abdomen:  soft











Tejas Gerber MD Jul 29, 2018 16:22

## 2018-07-29 NOTE — GENERAL PROGRESS NOTE
Assessment/Plan


Assessment/Plan


Assessment


(1) Parastomal hernia


ICD Codes:  K43.5 - Parastomal hernia without obstruction or gangrene


SNOMED:  572759089


Qualifiers:  


   Qualified Codes:  K43.5 - Parastomal hernia without obstruction or gangrene


(2) Stoma malfunction


SNOMED:  185482891


(3) Colostomy in place


ICD Codes:  Z93.3 - Colostomy status


SNOMED:  225654900, 137188381


(4) Vegetative state / Dysphagia / G tube dependent


ICD Codes:  R40.3 - Persistent vegetative state


SNOMED:  86276960


(5) Diabetes mellitus


ICD Codes:  E11.9 - Type 2 diabetes mellitus without complications


SNOMED:  80887943


(6) Acute on chronic respiratory failure


ICD Codes:  J96.20 - Acute and chronic respiratory failure, unspecified whether 

with hypoxia or hypercapnia


SNOMED:  01639444


(7) Severe sepsis


ICD Codes:  A41.9 - Sepsis, unspecified organism; R65.20 - Severe sepsis 

without septic shock


SNOMED:  46140992


 


Assessment/Plan


prolapse stoma assessed >> no s/sx of infection.  no obstruction. 


anemia work up reviewed >> iron deficiency


G tube dependent


hepatitis panel negative


OB negative





supportive care


monitor H&H, prn transfusions


venofer


ppi


GTFs per RD, adv to goal


GT site care daily/prn


abx


bowel regime


fu labs





Subjective


Allergies:  


Coded Allergies:  


     AZTREONAM (Verified  Allergy, Unknown, 7/23/18)


Subjective


No significant evnets


non communicative


on trach 


tolerating TF





Objective





Last 24 Hour Vital Signs








  Date Time  Temp Pulse Resp B/P (MAP) Pulse Ox O2 Delivery O2 Flow Rate FiO2


 


7/29/18 15:16  72 16     30


 


7/29/18 12:44  71 16     30


 


7/29/18 12:00  67      


 


7/29/18 12:00      Mechanical Ventilator  





      Mechanical Ventilator  





      Mechanical Ventilator  


 


7/29/18 12:00        30


 


7/29/18 11:13  70 16     30


 


7/29/18 09:28  73 16     30


 


7/29/18 08:00        30


 


7/29/18 08:00      Mechanical Ventilator  





      Mechanical Ventilator  





      Mechanical Ventilator  


 


7/29/18 08:00  64      


 


7/29/18 07:29  73 18     30


 


7/29/18 05:20  71 22     30


 


7/29/18 04:00      Mechanical Ventilator  





      Mechanical Ventilator  





      Mechanical Ventilator  


 


7/29/18 04:00 97.5 70 16 143/90 (107) 100   





 97.5       


 


7/29/18 04:00  71      


 


7/29/18 04:00        30


 


7/29/18 03:18  70 19     30


 


7/29/18 01:30  72 16     30


 


7/29/18 00:00 97.7 75 16 136/80 (98) 99   





 97.7       


 


7/29/18 00:00  75      


 


7/29/18 00:00        30


 


7/29/18 00:00      Mechanical Ventilator  





      Mechanical Ventilator  





      Mechanical Ventilator  


 


7/28/18 23:05  74 17     30


 


7/28/18 21:30  73 16     30


 


7/28/18 20:00      Mechanical Ventilator  





      Mechanical Ventilator  





      Mechanical Ventilator  


 


7/28/18 20:00        30


 


7/28/18 20:00 97.3 75 16 132/76 (94) 98   





 97.3       


 


7/28/18 20:00  69      


 


7/28/18 19:30  74 16     30


 


7/28/18 16:53  66 16     30


 


7/28/18 16:00 98.7 72 20 129/85 (100) 100   





 98.7       


 


7/28/18 16:00        30


 


7/28/18 16:00  70      


 


7/28/18 16:00      Mechanical Ventilator  





      Mechanical Ventilator  





      Mechanical Ventilator  

















Intake and Output  


 


 7/28/18 7/29/18





 19:00 07:00


 


Intake Total 1565.000 ml 1025 ml


 


Output Total 1225 ml 2195 ml


 


Balance 340.000 ml -1170 ml


 


  


 


Free Water 400 ml 250 ml


 


IV Total 385.000 ml 60 ml


 


Tube Feeding 780 ml 715 ml


 


Output Urine Total 875 ml 2000 ml


 


Stool Total 300 ml 


 


Chest Tube Drainage Total 50 ml 195 ml








Laboratory Tests


7/28/18 21:25: Vancomycin Level Trough 25.7H


7/29/18 04:30: 


Sodium Level 135L, Potassium Level 3.8, Chloride Level 103, Carbon Dioxide 

Level 21, Anion Gap 11, Blood Urea Nitrogen 6L, Creatinine 0.9, Estimat 

Glomerular Filtration Rate > 60, Glucose Level 172H, Calcium Level 9.1


Height (Feet):  6


Weight (Pounds):  209


Objective


Obese AA man


NCAT


neck (+) trach


Chest Coarse BS


RRR


abd obese, (+) R sided ostomy and L sided GT


(+) contractures


OBS











Mariano Shepherd MD Jul 29, 2018 15:25

## 2018-07-29 NOTE — GENERAL SURGERY PROGRESS NOTE
General Surgery-Progress Note


Subjective


Additional Comments


no acute events. comfortable.  stoma functional and healthy





Objective





Last 24 Hour Vital Signs








  Date Time  Temp Pulse Resp B/P (MAP) Pulse Ox O2 Delivery O2 Flow Rate FiO2


 


7/29/18 11:13  70 16     30


 


7/29/18 09:28  73 16     30


 


7/29/18 08:00        30


 


7/29/18 08:00      Mechanical Ventilator  





      Mechanical Ventilator  





      Mechanical Ventilator  


 


7/29/18 08:00  64      


 


7/29/18 07:29  73 18     30


 


7/29/18 05:20  71 22     30


 


7/29/18 04:00      Mechanical Ventilator  





      Mechanical Ventilator  





      Mechanical Ventilator  


 


7/29/18 04:00 97.5 70 16 143/90 (107) 100   





 97.5       


 


7/29/18 04:00  71      


 


7/29/18 04:00        30


 


7/29/18 03:18  70 19     30


 


7/29/18 01:30  72 16     30


 


7/29/18 00:00 97.7 75 16 136/80 (98) 99   





 97.7       


 


7/29/18 00:00  75      


 


7/29/18 00:00        30


 


7/29/18 00:00      Mechanical Ventilator  





      Mechanical Ventilator  





      Mechanical Ventilator  


 


7/28/18 23:05  74 17     30


 


7/28/18 21:30  73 16     30


 


7/28/18 20:00      Mechanical Ventilator  





      Mechanical Ventilator  





      Mechanical Ventilator  


 


7/28/18 20:00        30


 


7/28/18 20:00 97.3 75 16 132/76 (94) 98   





 97.3       


 


7/28/18 20:00  69      


 


7/28/18 19:30  74 16     30


 


7/28/18 16:53  66 16     30


 


7/28/18 16:00 98.7 72 20 129/85 (100) 100   





 98.7       


 


7/28/18 16:00        30


 


7/28/18 16:00  70      


 


7/28/18 16:00      Mechanical Ventilator  





      Mechanical Ventilator  





      Mechanical Ventilator  


 


7/28/18 15:05  64 16     30


 


7/28/18 12:30  61 16     30








I&O











Intake and Output  


 


 7/28/18 7/29/18





 19:00 07:00


 


Intake Total 1565.000 ml 1025 ml


 


Output Total 1225 ml 2195 ml


 


Balance 340.000 ml -1170 ml


 


  


 


Free Water 400 ml 250 ml


 


IV Total 385.000 ml 60 ml


 


Tube Feeding 780 ml 715 ml


 


Output Urine Total 875 ml 2000 ml


 


Stool Total 300 ml 


 


Chest Tube Drainage Total 50 ml 195 ml








Wound:  clean


Drains:  none


Cardiovascular:  RSR


Respiratory:  clear


Abdomen:  soft, flat, non-tender, present bowel sounds, other - stoma clean and 

viable


Extremities:  no cyanosis





Laboratory Tests








Test


  7/28/18


21:25 7/29/18


04:30


 


Vancomycin Level Trough


  25.7 ug/mL


(5.0-12.0)  H 


 


 


Sodium Level


  


  135 MMOL/L


(136-145)  L


 


Potassium Level


  


  3.8 MMOL/L


(3.5-5.1)


 


Chloride Level


  


  103 MMOL/L


()


 


Carbon Dioxide Level


  


  21 MMOL/L


(21-32)


 


Anion Gap


  


  11 mmol/L


(5-15)


 


Blood Urea Nitrogen


  


  6 mg/dL (7-18)


L


 


Creatinine


  


  0.9 MG/DL


(0.55-1.30)


 


Estimat Glomerular Filtration


Rate 


  > 60 mL/min


(>60)


 


Glucose Level


  


  172 MG/DL


()  H


 


Calcium Level


  


  9.1 MG/DL


(8.5-10.1)











Plan


Problems:  


(1) Stoma malfunction


(2) Parastomal hernia


Assessment & Plan:  52M with history of prior loop colostomy.  proximal loop 

stable.  distal with impacted contrast stool but stable. 


parastomal hernia with small bowel contents in hernia.  no obstruction noted at 

this time. 


larger appearance of patients stoma is because location and use of loop 

colostomy in hepatic flexure.  it is otherwise viable and stable.  





Would Highly recommend fixing parastomal hernia but can be done electively.


if obstructs will need urgent repair.  currently non obstructive


will monitor and follow with recs. 


bowel care 


thank you for this consultation. 





(3) Severe sepsis











Brian Christina Jul 29, 2018 12:29

## 2018-07-30 VITALS — SYSTOLIC BLOOD PRESSURE: 127 MMHG | DIASTOLIC BLOOD PRESSURE: 70 MMHG

## 2018-07-30 VITALS — DIASTOLIC BLOOD PRESSURE: 73 MMHG | SYSTOLIC BLOOD PRESSURE: 128 MMHG

## 2018-07-30 VITALS — DIASTOLIC BLOOD PRESSURE: 74 MMHG | SYSTOLIC BLOOD PRESSURE: 119 MMHG

## 2018-07-30 VITALS — SYSTOLIC BLOOD PRESSURE: 142 MMHG | DIASTOLIC BLOOD PRESSURE: 77 MMHG

## 2018-07-30 VITALS — SYSTOLIC BLOOD PRESSURE: 137 MMHG | DIASTOLIC BLOOD PRESSURE: 73 MMHG

## 2018-07-30 VITALS — SYSTOLIC BLOOD PRESSURE: 120 MMHG | DIASTOLIC BLOOD PRESSURE: 86 MMHG

## 2018-07-30 LAB
ADD MANUAL DIFF: NO
ANION GAP SERPL CALC-SCNC: 12 MMOL/L (ref 5–15)
BASOPHILS NFR BLD AUTO: 0.8 % (ref 0–2)
BUN SERPL-MCNC: 7 MG/DL (ref 7–18)
CALCIUM SERPL-MCNC: 9.9 MG/DL (ref 8.5–10.1)
CHLORIDE SERPL-SCNC: 101 MMOL/L (ref 98–107)
CO2 SERPL-SCNC: 22 MMOL/L (ref 21–32)
CREAT SERPL-MCNC: 0.9 MG/DL (ref 0.55–1.3)
EOSINOPHIL NFR BLD AUTO: 6.6 % (ref 0–3)
ERYTHROCYTE [DISTWIDTH] IN BLOOD BY AUTOMATED COUNT: 14.2 % (ref 11.6–14.8)
HCT VFR BLD CALC: 38.4 % (ref 42–52)
HGB BLD-MCNC: 12.2 G/DL (ref 14.2–18)
LYMPHOCYTES NFR BLD AUTO: 24.1 % (ref 20–45)
MCV RBC AUTO: 85 FL (ref 80–99)
MONOCYTES NFR BLD AUTO: 3 % (ref 1–10)
NEUTROPHILS NFR BLD AUTO: 65.5 % (ref 45–75)
PLATELET # BLD: 244 K/UL (ref 150–450)
POTASSIUM SERPL-SCNC: 3.9 MMOL/L (ref 3.5–5.1)
RBC # BLD AUTO: 4.52 M/UL (ref 4.7–6.1)
SODIUM SERPL-SCNC: 135 MMOL/L (ref 136–145)
WBC # BLD AUTO: 10.9 K/UL (ref 4.8–10.8)

## 2018-07-30 RX ADMIN — LEVETIRACETAM SCH MG: 100 SOLUTION ORAL at 21:12

## 2018-07-30 RX ADMIN — ERTAPENEM SODIUM SCH MLS/HR: 1 INJECTION, POWDER, LYOPHILIZED, FOR SOLUTION INTRAMUSCULAR; INTRAVENOUS at 14:18

## 2018-07-30 RX ADMIN — LEVETIRACETAM SCH MG: 100 SOLUTION ORAL at 14:18

## 2018-07-30 RX ADMIN — INSULIN ASPART SCH UNITS: 100 INJECTION, SOLUTION INTRAVENOUS; SUBCUTANEOUS at 11:51

## 2018-07-30 RX ADMIN — INSULIN ASPART SCH UNITS: 100 INJECTION, SOLUTION INTRAVENOUS; SUBCUTANEOUS at 00:21

## 2018-07-30 RX ADMIN — HEPARIN SODIUM SCH UNITS: 5000 INJECTION INTRAVENOUS; SUBCUTANEOUS at 08:24

## 2018-07-30 RX ADMIN — INSULIN ASPART SCH UNITS: 100 INJECTION, SOLUTION INTRAVENOUS; SUBCUTANEOUS at 17:36

## 2018-07-30 RX ADMIN — SODIUM CHLORIDE SCH MLS/HR: 0.9 INJECTION INTRAVENOUS at 21:16

## 2018-07-30 RX ADMIN — LEVETIRACETAM SCH MG: 100 SOLUTION ORAL at 05:49

## 2018-07-30 RX ADMIN — CHLORHEXIDINE GLUCONATE SCH APPLIC: 213 SOLUTION TOPICAL at 21:11

## 2018-07-30 RX ADMIN — POLYETHYLENE GLYCOL 3350 SCH GM: 17 POWDER, FOR SOLUTION ORAL at 21:12

## 2018-07-30 RX ADMIN — SODIUM CHLORIDE SCH MLS/HR: 9 INJECTION, SOLUTION INTRAVENOUS at 05:49

## 2018-07-30 RX ADMIN — INSULIN ASPART SCH UNITS: 100 INJECTION, SOLUTION INTRAVENOUS; SUBCUTANEOUS at 21:19

## 2018-07-30 RX ADMIN — HEPARIN SODIUM SCH UNITS: 5000 INJECTION INTRAVENOUS; SUBCUTANEOUS at 21:14

## 2018-07-30 RX ADMIN — INSULIN ASPART SCH UNITS: 100 INJECTION, SOLUTION INTRAVENOUS; SUBCUTANEOUS at 05:51

## 2018-07-30 NOTE — GI PROGRESS NOTE
Assessment/Plan


Problems:  


(1) Parastomal hernia


ICD Codes:  K43.5 - Parastomal hernia without obstruction or gangrene


SNOMED:  446556441


Qualifiers:  


   Qualified Codes:  K43.5 - Parastomal hernia without obstruction or gangrene


(2) Stoma malfunction


SNOMED:  354160443


(3) Colostomy in place


ICD Codes:  Z93.3 - Colostomy status


SNOMED:  138468296, 954319928


(4) Vegetative state


ICD Codes:  R40.3 - Persistent vegetative state


SNOMED:  07384457


(5) Diabetes mellitus


ICD Codes:  E11.9 - Type 2 diabetes mellitus without complications


SNOMED:  59213295


(6) Acute on chronic respiratory failure


ICD Codes:  J96.20 - Acute and chronic respiratory failure, unspecified whether 

with hypoxia or hypercapnia


SNOMED:  38661560


(7) Severe sepsis


ICD Codes:  A41.9 - Sepsis, unspecified organism; R65.20 - Severe sepsis 

without septic shock


SNOMED:  45790173


Status:  stable, unchanged


Status Narrative


Discussed with Dr. Pickard.


Assessment/Plan


prolapse stoma assessed >> no s/sx of infection.  no obstruction. 


anemia work up reviewed >> iron deficiency


G tube dependent


hepatitis panel negative


OB negative





supportive care


monitor H&H, prn transfusions


venofer


ppi


GTFs per RD, adv to goal


GT site care daily/prn


abx


bowel regime


fu labs





The patient was seen and examined at bedside and all new and available data was 

reviewed in the patients chart. I agree with the above findings, impression 

and plan.  (Patient seen earlier today. Signature stamp does not reflect 

patient encounter time.). - Miles Pickard MD





Subjective


Subjective


limited





Objective





Last 24 Hour Vital Signs








  Date Time  Temp Pulse Resp B/P (MAP) Pulse Ox O2 Delivery O2 Flow Rate FiO2


 


7/30/18 12:00      Mechanical Ventilator  





      Mechanical Ventilator  





      Mechanical Ventilator  


 


7/30/18 12:00        30


 


7/30/18 10:36  80 16     30


 


7/30/18 09:11  82 16     30


 


7/30/18 08:00 98.3 80 16 128/73 (91) 99   





 98.3       


 


7/30/18 08:00      Mechanical Ventilator  





      Mechanical Ventilator  





      Mechanical Ventilator  


 


7/30/18 08:00        30


 


7/30/18 07:42  79      


 


7/30/18 06:39  78 17     30


 


7/30/18 05:14  70 16     30


 


7/30/18 04:00      Mechanical Ventilator  





      Mechanical Ventilator  





      Mechanical Ventilator  


 


7/30/18 04:00        30


 


7/30/18 04:00  71      


 


7/30/18 04:00 98.3 71 17 142/77 (98) 99   





 98.3       


 


7/30/18 03:30  72 16     30


 


7/30/18 01:30  71 15     30


 


7/30/18 00:00      Mechanical Ventilator  





      Mechanical Ventilator  





      Mechanical Ventilator  


 


7/30/18 00:00  71      


 


7/30/18 00:00 98.1 71 16 137/73 (94) 100   





 98.1       


 


7/30/18 00:00        30


 


7/29/18 23:13  74 16     30


 


7/29/18 21:30  70 22     30


 


7/29/18 20:00 97.0 68 17 135/80 (98) 98   





 97.0       


 


7/29/18 20:00  65      


 


7/29/18 20:00        30


 


7/29/18 20:00      Mechanical Ventilator  





      Mechanical Ventilator  





      Mechanical Ventilator  


 


7/29/18 19:30  72 20     30


 


7/29/18 17:16  74 16     30


 


7/29/18 16:00  66      


 


7/29/18 16:00      Mechanical Ventilator  





      Mechanical Ventilator  





      Mechanical Ventilator  


 


7/29/18 16:00 97.2 68 16 123/82 (96) 100   





 97.2       


 


7/29/18 16:00        30


 


7/29/18 15:16  72 16     30


 


7/29/18 12:44  71 16     30

















Intake and Output  


 


 7/29/18 7/30/18





 19:00 07:00


 


Intake Total 1170 ml 875 ml


 


Output Total 1950 ml 1225 ml


 


Balance -780 ml -350 ml


 


  


 


Free Water 280 ml 


 


IV Total 110 ml 60 ml


 


Tube Feeding 780 ml 715 ml


 


Other  100 ml


 


Output Urine Total 1550 ml 1000 ml


 


Stool Total 400 ml 225 ml











Laboratory Tests








Test


  7/30/18


05:00


 


White Blood Count


  10.9 K/UL


(4.8-10.8)  H


 


Red Blood Count


  4.52 M/UL


(4.70-6.10)  L


 


Hemoglobin


  12.2 G/DL


(14.2-18.0)  L


 


Hematocrit


  38.4 %


(42.0-52.0)  L


 


Mean Corpuscular Volume 85 FL (80-99)  


 


Mean Corpuscular Hemoglobin


  26.9 PG


(27.0-31.0)  L


 


Mean Corpuscular Hemoglobin


Concent 31.7 G/DL


(32.0-36.0)  L


 


Red Cell Distribution Width


  14.2 %


(11.6-14.8)


 


Platelet Count


  244 K/UL


(150-450)


 


Mean Platelet Volume


  8.3 FL


(6.5-10.1)


 


Neutrophils (%) (Auto)


  65.5 %


(45.0-75.0)


 


Lymphocytes (%) (Auto)


  24.1 %


(20.0-45.0)


 


Monocytes (%) (Auto)


  3.0 %


(1.0-10.0)


 


Eosinophils (%) (Auto)


  6.6 %


(0.0-3.0)  H


 


Basophils (%) (Auto)


  0.8 %


(0.0-2.0)


 


Sodium Level


  135 MMOL/L


(136-145)  L


 


Potassium Level


  3.9 MMOL/L


(3.5-5.1)


 


Chloride Level


  101 MMOL/L


()


 


Carbon Dioxide Level


  22 MMOL/L


(21-32)


 


Anion Gap


  12 mmol/L


(5-15)


 


Blood Urea Nitrogen


  7 mg/dL (7-18)


 


 


Creatinine


  0.9 MG/DL


(0.55-1.30)


 


Estimat Glomerular Filtration


Rate > 60 mL/min


(>60)


 


Glucose Level


  137 MG/DL


()  H


 


Calcium Level


  9.9 MG/DL


(8.5-10.1)








Height (Feet):  6


Weight (Pounds):  210


General Appearance:  WD/WN, no apparent distress, alert


Cardiovascular:  normal rate


Respiratory/Chest:  normal breath sounds, no respiratory distress


Abdominal Exam:  normal bowel sounds, non tender, soft, GT site - GT site, 

other - colostomy


Extremities:  non-tender











Remy Lyons NP Jul 30, 2018 12:19

## 2018-07-30 NOTE — GENERAL SURGERY PROGRESS NOTE
General Surgery-Progress Note


Subjective


Additional Comments


no acute events.  stable.  ostomy viable.





Objective





Last 24 Hour Vital Signs








  Date Time  Temp Pulse Resp B/P (MAP) Pulse Ox O2 Delivery O2 Flow Rate FiO2


 


7/30/18 16:00 98.5 78 16 127/70 (89) 99   





 98.5       


 


7/30/18 16:00        30


 


7/30/18 16:00      Mechanical Ventilator  





      Mechanical Ventilator  





      Mechanical Ventilator  


 


7/30/18 15:23  82 16     30


 


7/30/18 15:22  83      


 


7/30/18 13:19  77 16     30


 


7/30/18 12:00      Mechanical Ventilator  





      Mechanical Ventilator  





      Mechanical Ventilator  


 


7/30/18 12:00        30


 


7/30/18 12:00 98.6 82 16 119/74 (89) 98   





 98.6       


 


7/30/18 11:44  82      


 


7/30/18 10:36  80 16     30


 


7/30/18 09:11  82 16     30


 


7/30/18 08:00 98.3 80 16 128/73 (91) 99   





 98.3       


 


7/30/18 08:00      Mechanical Ventilator  





      Mechanical Ventilator  





      Mechanical Ventilator  


 


7/30/18 08:00        30


 


7/30/18 07:42  79      


 


7/30/18 06:39  78 17     30


 


7/30/18 05:14  70 16     30


 


7/30/18 04:00      Mechanical Ventilator  





      Mechanical Ventilator  





      Mechanical Ventilator  


 


7/30/18 04:00        30


 


7/30/18 04:00  71      


 


7/30/18 04:00 98.3 71 17 142/77 (98) 99   





 98.3       


 


7/30/18 03:30  72 16     30


 


7/30/18 01:30  71 15     30


 


7/30/18 00:00      Mechanical Ventilator  





      Mechanical Ventilator  





      Mechanical Ventilator  


 


7/30/18 00:00  71      


 


7/30/18 00:00 98.1 71 16 137/73 (94) 100   





 98.1       


 


7/30/18 00:00        30


 


7/29/18 23:13  74 16     30


 


7/29/18 21:30  70 22     30


 


7/29/18 20:00 97.0 68 17 135/80 (98) 98   





 97.0       


 


7/29/18 20:00  65      


 


7/29/18 20:00        30


 


7/29/18 20:00      Mechanical Ventilator  





      Mechanical Ventilator  





      Mechanical Ventilator  


 


7/29/18 19:30  72 20     30


 


7/29/18 17:16  74 16     30








I&O











Intake and Output  


 


 7/29/18 7/30/18





 19:00 07:00


 


Intake Total 1170 ml 940 ml


 


Output Total 1950 ml 1225 ml


 


Balance -780 ml -285 ml


 


  


 


Free Water 280 ml 


 


IV Total 110 ml 60 ml


 


Tube Feeding 780 ml 780 ml


 


Other  100 ml


 


Output Urine Total 1550 ml 1000 ml


 


Stool Total 400 ml 225 ml








Wound:  clean


Drains:  other


Cardiovascular:  RSR


Respiratory:  decreased breath sounds


Abdomen:  soft, flat, non-tender, present bowel sounds, other - ostomy viable 

and non obstructed


Extremities:  no tenderness, no cyanosis





Laboratory Tests








Test


  7/30/18


05:00


 


White Blood Count


  10.9 K/UL


(4.8-10.8)  H


 


Red Blood Count


  4.52 M/UL


(4.70-6.10)  L


 


Hemoglobin


  12.2 G/DL


(14.2-18.0)  L


 


Hematocrit


  38.4 %


(42.0-52.0)  L


 


Mean Corpuscular Volume 85 FL (80-99)  


 


Mean Corpuscular Hemoglobin


  26.9 PG


(27.0-31.0)  L


 


Mean Corpuscular Hemoglobin


Concent 31.7 G/DL


(32.0-36.0)  L


 


Red Cell Distribution Width


  14.2 %


(11.6-14.8)


 


Platelet Count


  244 K/UL


(150-450)


 


Mean Platelet Volume


  8.3 FL


(6.5-10.1)


 


Neutrophils (%) (Auto)


  65.5 %


(45.0-75.0)


 


Lymphocytes (%) (Auto)


  24.1 %


(20.0-45.0)


 


Monocytes (%) (Auto)


  3.0 %


(1.0-10.0)


 


Eosinophils (%) (Auto)


  6.6 %


(0.0-3.0)  H


 


Basophils (%) (Auto)


  0.8 %


(0.0-2.0)


 


Sodium Level


  135 MMOL/L


(136-145)  L


 


Potassium Level


  3.9 MMOL/L


(3.5-5.1)


 


Chloride Level


  101 MMOL/L


()


 


Carbon Dioxide Level


  22 MMOL/L


(21-32)


 


Anion Gap


  12 mmol/L


(5-15)


 


Blood Urea Nitrogen


  7 mg/dL (7-18)


 


 


Creatinine


  0.9 MG/DL


(0.55-1.30)


 


Estimat Glomerular Filtration


Rate > 60 mL/min


(>60)


 


Glucose Level


  137 MG/DL


()  H


 


Calcium Level


  9.9 MG/DL


(8.5-10.1)











Plan


Problems:  


(1) Stoma malfunction


(2) Parastomal hernia


Assessment & Plan:  52M with history of prior loop colostomy.  proximal loop 

stable.  distal with impacted contrast stool but stable. 


parastomal hernia with small bowel contents in hernia.  no obstruction noted at 

this time. 


larger appearance of patients stoma is because location and use of loop 

colostomy in hepatic flexure.  it is otherwise viable and stable.  





Would Highly recommend fixing parastomal hernia but can be done electively.


if obstructs will need urgent repair.  currently non obstructive


will monitor and follow with recs. 


bowel care 


thank you for this consultation. 





(3) Severe sepsis











Brian Christina Jul 30, 2018 17:05

## 2018-07-30 NOTE — PULMONOLGY CRITICAL CARE NOTE
Critical Care - Asmt/Plan


Problems:  


(1) Acute on chronic respiratory failure


(2) Acute on chronic renal insufficiency


(3) Severe sepsis


(4) Vegetative state


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


(6) Diabetes mellitus


Respiratory:  monitor respiratory rate, adjust FIO2, CXR


Cardiac:  continue to monitor HR/BP


Renal:  F/U I&O, keep IV fluid


Infectious Disease:  check cultures


Gastrointestinal:  continue feedings/current rate


Endocrine:  monitor blood sugar, check TSH


Neurologic:  PRN Ativan, PRN Morphine


Affect:  PRN ativan


Notes Reviewed:  renal


Discussed with:  nurses, consultants, 





Critical Care - Objective





Last 24 Hour Vital Signs








  Date Time  Temp Pulse Resp B/P (MAP) Pulse Ox O2 Delivery O2 Flow Rate FiO2


 


7/30/18 10:36  80 16     30


 


7/30/18 09:11  82 16     30


 


7/30/18 08:00 98.3 80 16 128/73 (91) 99   





 98.3       


 


7/30/18 08:00      Mechanical Ventilator  





      Mechanical Ventilator  





      Mechanical Ventilator  


 


7/30/18 08:00        30


 


7/30/18 07:42  79      


 


7/30/18 06:39  78 17     30


 


7/30/18 05:14  70 16     30


 


7/30/18 04:00      Mechanical Ventilator  





      Mechanical Ventilator  





      Mechanical Ventilator  


 


7/30/18 04:00        30


 


7/30/18 04:00  71      


 


7/30/18 04:00 98.3 71 17 142/77 (98) 99   





 98.3       


 


7/30/18 03:30  72 16     30


 


7/30/18 01:30  71 15     30


 


7/30/18 00:00      Mechanical Ventilator  





      Mechanical Ventilator  





      Mechanical Ventilator  


 


7/30/18 00:00  71      


 


7/30/18 00:00 98.1 71 16 137/73 (94) 100   





 98.1       


 


7/30/18 00:00        30


 


7/29/18 23:13  74 16     30


 


7/29/18 21:30  70 22     30


 


7/29/18 20:00 97.0 68 17 135/80 (98) 98   





 97.0       


 


7/29/18 20:00  65      


 


7/29/18 20:00        30


 


7/29/18 20:00      Mechanical Ventilator  





      Mechanical Ventilator  





      Mechanical Ventilator  


 


7/29/18 19:30  72 20     30


 


7/29/18 17:16  74 16     30


 


7/29/18 16:00  66      


 


7/29/18 16:00      Mechanical Ventilator  





      Mechanical Ventilator  





      Mechanical Ventilator  


 


7/29/18 16:00 97.2 68 16 123/82 (96) 100   





 97.2       


 


7/29/18 16:00        30


 


7/29/18 15:16  72 16     30


 


7/29/18 12:44  71 16     30


 


7/29/18 12:00 97.3 68 18 128/76 (93) 95   





 97.3       


 


7/29/18 12:00  67      


 


7/29/18 12:00      Mechanical Ventilator  





      Mechanical Ventilator  





      Mechanical Ventilator  


 


7/29/18 12:00        30








Status:  awake


Condition:  critical


Neck:  full ROM


Heart:  HR/BP stable, HR/BP unstable


Abdomen:  non-tender, feeding tube


Extremities:  edema


Decubiti:  location


Micro:





Microbiology








 Date/Time


Source Procedure


Growth Status


 


 


 7/28/18 16:30


Blood Blood Culture - Preliminary


NO GROWTH AFTER 24 HOURS Resulted


 


 7/28/18 13:25


Blood Blood Culture - Preliminary


Gram Positive Cocci Resulted








Accucheck:  133





Critical Care - Subjective


ROS Limited/Unobtainable:  No


Condition:  critical


EKG Rhythm:  Sinus Bradycardia


FI02:  30


Vent Support Breath Rate:  16


Vent Support Mode:  AC


Vent Tidal Volume:  600


Sputum Amount:  Small


PEEP:  5.0


PIP:  31


Tube Feeding Amount:  65


I&O:











Intake and Output  


 


 7/29/18 7/30/18





 19:00 07:00


 


Intake Total 1170 ml 875 ml


 


Output Total 1950 ml 1225 ml


 


Balance -780 ml -350 ml


 


  


 


Free Water 280 ml 


 


IV Total 110 ml 60 ml


 


Tube Feeding 780 ml 715 ml


 


Other  100 ml


 


Output Urine Total 1550 ml 1000 ml


 


Stool Total 400 ml 225 ml








Labs:





Laboratory Tests








Test


  7/30/18


05:00


 


White Blood Count


  10.9 K/UL


(4.8-10.8)  H


 


Red Blood Count


  4.52 M/UL


(4.70-6.10)  L


 


Hemoglobin


  12.2 G/DL


(14.2-18.0)  L


 


Hematocrit


  38.4 %


(42.0-52.0)  L


 


Mean Corpuscular Volume 85 FL (80-99)  


 


Mean Corpuscular Hemoglobin


  26.9 PG


(27.0-31.0)  L


 


Mean Corpuscular Hemoglobin


Concent 31.7 G/DL


(32.0-36.0)  L


 


Red Cell Distribution Width


  14.2 %


(11.6-14.8)


 


Platelet Count


  244 K/UL


(150-450)


 


Mean Platelet Volume


  8.3 FL


(6.5-10.1)


 


Neutrophils (%) (Auto)


  65.5 %


(45.0-75.0)


 


Lymphocytes (%) (Auto)


  24.1 %


(20.0-45.0)


 


Monocytes (%) (Auto)


  3.0 %


(1.0-10.0)


 


Eosinophils (%) (Auto)


  6.6 %


(0.0-3.0)  H


 


Basophils (%) (Auto)


  0.8 %


(0.0-2.0)


 


Sodium Level


  135 MMOL/L


(136-145)  L


 


Potassium Level


  3.9 MMOL/L


(3.5-5.1)


 


Chloride Level


  101 MMOL/L


()


 


Carbon Dioxide Level


  22 MMOL/L


(21-32)


 


Anion Gap


  12 mmol/L


(5-15)


 


Blood Urea Nitrogen


  7 mg/dL (7-18)


 


 


Creatinine


  0.9 MG/DL


(0.55-1.30)


 


Estimat Glomerular Filtration


Rate > 60 mL/min


(>60)


 


Glucose Level


  137 MG/DL


()  H


 


Calcium Level


  9.9 MG/DL


(8.5-10.1)

















Yury Pena MD Jul 30, 2018 11:34

## 2018-07-30 NOTE — GENERAL PROGRESS NOTE
Assessment/Plan


Status:  stable


Assessment/Plan


# Anemia of chronic disease. No hemolysis, peripheral smear reviewed, ferritin 

is elevated.


--> Continue to closely monitor for improvement. 


--> Anemia w/u has been reviewed. Will trend cbc daily. 


--> Hgb goal >7


# Leukocytosis - Sepsis with elevated temperature of 103 degrees Fahrenheit.  


--> Lactic acid pending, was elevated upon admission. 


--> Pt on antibiotics, has received a dose of Zosyn and currently is on 

vancomycin q.12 h.  


--> Currently afebrile.


--> Appreciate Infectious Disease consult.


# Acute kidney injury.  


--> Currently on IV fluids, IV bolus given to see if the patient responds along 

with IV pressor as needed.  


--> Closely monitor with Nephrology team.


# Septic shock, use antibiotic per ID services.


--> potential pna, bacteremia


# Respiratory failure, status post tracheostomy, on mechanical ventilation per 

Dr. Pena.


# Hypercalcemia. Monitor PTH and calcium level





The time the note was entered does not necessarily correspond to the time the 

patient was seen.





Subjective


Date patient seen:  Jul 30, 2018


ROS Limited/Unobtainable:  Yes


Hematologic/Lymphatic:  Reports: anemia


Allergies:  


Coded Allergies:  


     AZTREONAM (Verified  Allergy, Unknown, 7/23/18)


All Systems:  reviewed and negative except above


Subjective


Pt remains obtunded, on trach/vent. No acute events. H/H stable. No recent 

seizure activity.





Objective





Last 24 Hour Vital Signs








  Date Time  Temp Pulse Resp B/P (MAP) Pulse Ox O2 Delivery O2 Flow Rate FiO2


 


7/30/18 13:19  77 16     30


 


7/30/18 12:00      Mechanical Ventilator  





      Mechanical Ventilator  





      Mechanical Ventilator  


 


7/30/18 12:00        30


 


7/30/18 12:00 98.6 82 16 119/74 (89) 98   





 98.6       


 


7/30/18 11:44  82      


 


7/30/18 10:36  80 16     30


 


7/30/18 09:11  82 16     30


 


7/30/18 08:00 98.3 80 16 128/73 (91) 99   





 98.3       


 


7/30/18 08:00      Mechanical Ventilator  





      Mechanical Ventilator  





      Mechanical Ventilator  


 


7/30/18 08:00        30


 


7/30/18 07:42  79      


 


7/30/18 06:39  78 17     30


 


7/30/18 05:14  70 16     30


 


7/30/18 04:00      Mechanical Ventilator  





      Mechanical Ventilator  





      Mechanical Ventilator  


 


7/30/18 04:00        30


 


7/30/18 04:00  71      


 


7/30/18 04:00 98.3 71 17 142/77 (98) 99   





 98.3       


 


7/30/18 03:30  72 16     30


 


7/30/18 01:30  71 15     30


 


7/30/18 00:00      Mechanical Ventilator  





      Mechanical Ventilator  





      Mechanical Ventilator  


 


7/30/18 00:00  71      


 


7/30/18 00:00 98.1 71 16 137/73 (94) 100   





 98.1       


 


7/30/18 00:00        30


 


7/29/18 23:13  74 16     30


 


7/29/18 21:30  70 22     30


 


7/29/18 20:00 97.0 68 17 135/80 (98) 98   





 97.0       


 


7/29/18 20:00  65      


 


7/29/18 20:00        30


 


7/29/18 20:00      Mechanical Ventilator  





      Mechanical Ventilator  





      Mechanical Ventilator  


 


7/29/18 19:30  72 20     30


 


7/29/18 17:16  74 16     30


 


7/29/18 16:00  66      


 


7/29/18 16:00      Mechanical Ventilator  





      Mechanical Ventilator  





      Mechanical Ventilator  


 


7/29/18 16:00 97.2 68 16 123/82 (96) 100   





 97.2       


 


7/29/18 16:00        30


 


7/29/18 15:16  72 16     30

















Intake and Output  


 


 7/29/18 7/30/18





 19:00 07:00


 


Intake Total 1170 ml 940 ml


 


Output Total 1950 ml 1225 ml


 


Balance -780 ml -285 ml


 


  


 


Free Water 280 ml 


 


IV Total 110 ml 60 ml


 


Tube Feeding 780 ml 780 ml


 


Other  100 ml


 


Output Urine Total 1550 ml 1000 ml


 


Stool Total 400 ml 225 ml








Laboratory Tests


7/30/18 05:00: 


White Blood Count 10.9H, Red Blood Count 4.52L, Hemoglobin 12.2L, Hematocrit 

38.4L, Mean Corpuscular Volume 85, Mean Corpuscular Hemoglobin 26.9L, Mean 

Corpuscular Hemoglobin Concent 31.7L, Red Cell Distribution Width 14.2, 

Platelet Count 244, Mean Platelet Volume 8.3, Neutrophils (%) (Auto) 65.5, 

Lymphocytes (%) (Auto) 24.1, Monocytes (%) (Auto) 3.0, Eosinophils (%) (Auto) 

6.6H, Basophils (%) (Auto) 0.8, Sodium Level 135L, Potassium Level 3.9, 

Chloride Level 101, Carbon Dioxide Level 22, Anion Gap 12, Blood Urea Nitrogen 7

, Creatinine 0.9, Estimat Glomerular Filtration Rate > 60, Glucose Level 137H, 

Calcium Level 9.9


Height (Feet):  6


Weight (Pounds):  210


General Appearance:  no apparent distress, lethargic


EENT:  PERRL/EOMI


Neck:  normal alignment


Cardiovascular:  normal peripheral pulses


Respiratory/Chest:  no respiratory distress


Abdomen:  soft











Tejas Gerber MD Jul 30, 2018 13:43

## 2018-07-30 NOTE — INFECTIOUS DISEASES PROG NOTE
Assessment/Plan


Assessment/Plan


Sepsis, resolving- 2ry to UTI and Bacteremia- ?pyelo. Possible PNA


 -u/a wbc 40-60, nit neg, leuk +2; ucx >100k E. aerogenes (S cefepime, cipro/

levo; R Zosyn)


 -BCX 4/4 E. aerogenes, prob Amp C (S cefepime, cipro/levo; R Zosyn), P. 

mirabilis ESBL (S Zosyn, Ertapenem); repeta 7/26 1/4 CoNS (suspect contaminant)

, 7/28 Staph f/u final results if CoNS may need TTE and IE work up.


  -CXR 7/27: Increased left pleural effusion, over 3 days. Overall decreased 

interstitial congestion. Right infrahilar atelectasis


 -CXR: Cardiomegaly. Mild interstitial congestion. Suspect small bilateral 

pleural effusions


 -CT abd/p: Right lower quadrant double barrel colostomy, as described. Small 

peristomal hernia contains bowel loops without evidence of obstruction or 

strangulation. Left renal staghorn calculus. Bilateral intrarenal calyceal 

calculi. Borderline hydronephrosis on the right without evidence of downstream 

obstructive lesion. May indicate mild ureteral pelvic junction obstruction. 

Somewhat atrophic left kidney. Inspissated contrast ball within the distal 

sigmoid colon. Gastrostomy in good position. Nonspecific bilateral perinephric 

fat stranding, could indicate pyelonephritis or could be chronic. Guzmán 

catheter in place. Apparent bladder wall thickening, possibly an artifact of 

under distention but cystitis is not excludable, particularly in view of mild 

perivesical fat stranding. Bilateral pulmonary parenchymal atelectasis and 

consolidation


  -sp cx p





Fever/Leukocytosis; resolved (mild Leukocytosis today)


BRAYDON, improving


Lactic acidosis, resolved





chronic resp failure trach/vent dependant


BPH


GERD


 HTN


DM2


seizure disorder


colostomy


 s/p GT 


constipation





VRE and MRSA colonized


 


Plan:


-Cotninue empiric IV Vancomycin #5 for now pending ID repeat Bcx





-Continue Ertapenem #2/10 for Amp-C Enterobacter and ESBL P.mirabilis 

bacteremia ; will treat for 10-14 days 


    -7/27 SP Zosyn #4








-Repeat 2 sets of Bcx for 7/30/18


-f/u 7/28 Bcx


-Monitor CBC/CMP, temperatures








Thank you for this consultation. Will continue to follow along with you.





Subjective


Allergies:  


Coded Allergies:  


     AZTREONAM (Verified  Allergy, Unknown, 7/23/18)


Subjective


Patient PEG and Trached


On Vent. 30% O2 and PEEP of 5





Objective


Vital Signs





Last 24 Hour Vital Signs








  Date Time  Temp Pulse Resp B/P (MAP) Pulse Ox O2 Delivery O2 Flow Rate FiO2


 


7/30/18 09:11  82 16     30


 


7/30/18 08:00 98.3 80 16 128/73 (91) 99   





 98.3       


 


7/30/18 08:00      Mechanical Ventilator  





      Mechanical Ventilator  





      Mechanical Ventilator  


 


7/30/18 08:00        30


 


7/30/18 07:42  79      


 


7/30/18 06:39  78 17     30


 


7/30/18 05:14  70 16     30


 


7/30/18 04:00      Mechanical Ventilator  





      Mechanical Ventilator  





      Mechanical Ventilator  


 


7/30/18 04:00        30


 


7/30/18 04:00  71      


 


7/30/18 04:00 98.3 71 17 142/77 (98) 99   





 98.3       


 


7/30/18 03:30  72 16     30


 


7/30/18 01:30  71 15     30


 


7/30/18 00:00      Mechanical Ventilator  





      Mechanical Ventilator  





      Mechanical Ventilator  


 


7/30/18 00:00  71      


 


7/30/18 00:00 98.1 71 16 137/73 (94) 100   





 98.1       


 


7/30/18 00:00        30


 


7/29/18 23:13  74 16     30


 


7/29/18 21:30  70 22     30


 


7/29/18 20:00 97.0 68 17 135/80 (98) 98   





 97.0       


 


7/29/18 20:00  65      


 


7/29/18 20:00        30


 


7/29/18 20:00      Mechanical Ventilator  





      Mechanical Ventilator  





      Mechanical Ventilator  


 


7/29/18 19:30  72 20     30


 


7/29/18 17:16  74 16     30


 


7/29/18 16:00  66      


 


7/29/18 16:00      Mechanical Ventilator  





      Mechanical Ventilator  





      Mechanical Ventilator  


 


7/29/18 16:00 97.2 68 16 123/82 (96) 100   





 97.2       


 


7/29/18 16:00        30


 


7/29/18 15:16  72 16     30


 


7/29/18 12:44  71 16     30


 


7/29/18 12:00 97.3 68 18 128/76 (93) 95   





 97.3       


 


7/29/18 12:00  67      


 


7/29/18 12:00      Mechanical Ventilator  





      Mechanical Ventilator  





      Mechanical Ventilator  


 


7/29/18 12:00        30


 


7/29/18 11:13  70 16     30


 


7/29/18 09:28  73 16     30








Height (Feet):  6


Weight (Pounds):  210


Objective


GENERAL:  The patient is asleep, in no overt distress. Opens eyes


HEENT:  Extraocular muscles intact.  No lymphadenopathy noted.


Tracheostomy noted.


PULM: CTAB


CARDIOVASCULAR:  RRR, S1 and S2.  Tachycardic.  No rubs or gallops.


PULMONARY:  Mild diffuse expiratory rhonchi with basilar rales.


ABDOMEN:  Obese and nondistended.  The G-tube and stoma noted.


EXTREMITIES:  No edema.  Fair pedal pulses.





Microbiology








 Date/Time


Source Procedure


Growth Status


 


 


 7/28/18 16:30


Blood Blood Culture - Preliminary


NO GROWTH AFTER 24 HOURS Resulted


 


 7/28/18 13:25


Blood Blood Culture - Preliminary


Gram Positive Cocci Resulted











Laboratory Tests








Test


  7/30/18


05:00


 


White Blood Count


  10.9 K/UL


(4.8-10.8)  H


 


Red Blood Count


  4.52 M/UL


(4.70-6.10)  L


 


Hemoglobin


  12.2 G/DL


(14.2-18.0)  L


 


Hematocrit


  38.4 %


(42.0-52.0)  L


 


Mean Corpuscular Volume 85 FL (80-99)  


 


Mean Corpuscular Hemoglobin


  26.9 PG


(27.0-31.0)  L


 


Mean Corpuscular Hemoglobin


Concent 31.7 G/DL


(32.0-36.0)  L


 


Red Cell Distribution Width


  14.2 %


(11.6-14.8)


 


Platelet Count


  244 K/UL


(150-450)


 


Mean Platelet Volume


  8.3 FL


(6.5-10.1)


 


Neutrophils (%) (Auto)


  65.5 %


(45.0-75.0)


 


Lymphocytes (%) (Auto)


  24.1 %


(20.0-45.0)


 


Monocytes (%) (Auto)


  3.0 %


(1.0-10.0)


 


Eosinophils (%) (Auto)


  6.6 %


(0.0-3.0)  H


 


Basophils (%) (Auto)


  0.8 %


(0.0-2.0)


 


Sodium Level


  135 MMOL/L


(136-145)  L


 


Potassium Level


  3.9 MMOL/L


(3.5-5.1)


 


Chloride Level


  101 MMOL/L


()


 


Carbon Dioxide Level


  22 MMOL/L


(21-32)


 


Anion Gap


  12 mmol/L


(5-15)


 


Blood Urea Nitrogen


  7 mg/dL (7-18)


 


 


Creatinine


  0.9 MG/DL


(0.55-1.30)


 


Estimat Glomerular Filtration


Rate > 60 mL/min


(>60)


 


Glucose Level


  137 MG/DL


()  H


 


Calcium Level


  9.9 MG/DL


(8.5-10.1)











Current Medications








 Medications


  (Trade)  Dose


 Ordered  Sig/Jan


 Route


 PRN Reason  Start Time


 Stop Time Status Last Admin


Dose Admin


 


 Acetaminophen


  (Tylenol)  650 mg  Q4H  PRN


 ORAL


 FEVER  7/26/18 14:00


 8/23/18 09:59   


 


 


 Baclofen


  (Lioresal)  10 mg  THREE TIMES A  DAY


 GT


   7/26/18 13:00


 8/23/18 12:59  7/30/18 08:22


 


 


 Chlorhexidine


 Gluconate


  (Elaine-Hex 2%)  1 applic  DAILY@2000


 TOPIC


   7/26/18 20:00


 8/23/18 19:59  7/29/18 21:00


 


 


 Dextrose


  (Dextrose 50%)  25 ml  STAT  PRN


 IV


 Hypoglycemia  7/27/18 08:45


 8/24/18 08:44   


 


 


 Dextrose


  (Dextrose 50%)  50 ml  STAT  PRN


 IV


 Hypoglycemia  7/27/18 08:45


 8/24/18 08:44   


 


 


 Ertapenem 1 gm/


 Sodium Chloride  55 ml @ 


 110 mls/hr  Q24H


 IVPB


   7/27/18 15:00


 8/1/18 14:59  7/29/18 14:01


 


 


 Heparin Sodium


  (Porcine)


  (Heparin 5000


 units/ml)  5,000 units  EVERY 12  HOURS


 SUBQ


   7/26/18 21:00


 8/23/18 20:59  7/30/18 08:24


 


 


 Insulin Aspart


  (NovoLOG)    Q6HR


 SUBQ


   7/26/18 18:00


 8/24/18 11:29  7/30/18 05:51


 


 


 Iron Sucrose 100


 mg/Sodium Chloride  60 ml @ 


 240 mls/hr  BEDTIME


 IV


   7/26/18 21:00


 7/30/18 21:14  7/29/18 21:01


 


 


 Lansoprazole


  (Prevacid)  30 mg  DAILY


 GT


   7/27/18 09:00


 8/25/18 08:59  7/30/18 08:21


 


 


 Levetiracetam


  (Keppra)  1,000 mg  Q8HR


 GT


   7/26/18 14:00


 8/23/18 12:59  7/30/18 05:49


 


 


 Lorazepam


  (Ativan 2mg/ml


 1ml)  2 mg  Q2H  PRN


 IV


 For Anxiety  7/26/18 12:00


 7/31/18 09:59   


 


 


 Morphine Sulfate


  (Morphine


 Sulfate)  4 mg  Q4H  PRN


 IVP


 Severe Pain (Pain Scale 7-10)  7/26/18 14:00


 7/31/18 09:59   


 


 


 Ondansetron HCl


  (Zofran)  4 mg  Q6H  PRN


 IVP


 Nausea & Vomiting  7/26/18 16:00


 8/23/18 09:59   


 


 


 Polyethylene


 Glycol


  (Miralax)  17 gm  BEDTIME


 ORAL


   7/26/18 21:00


 8/24/18 20:59  7/29/18 21:01


 


 


 Vancomycin HCl


  (Vanco rx to


 dose)  1 ea  DAILY  PRN


 MISC


 PER RX PROTOCOL  7/28/18 13:15


 8/27/18 13:14   


 


 


 Vancomycin HCl 1


 gm/Dextrose  275 ml @ 


 183.708


 mls/hr  Q12H


 IVPB


   7/29/18 06:00


 8/3/18 05:59  7/30/18 05:49


 

















Robert Williamson M.D. Jul 30, 2018 09:23

## 2018-07-30 NOTE — NEPHROLOGY PROGRESS NOTE
Assessment/Plan


Assessment/Plan


1. BRAYDON- resolved, Cr normal


2. Septis- resolved on Abx


3. Chronic Resp FL- trached on vent


4. Hyponatremia - stable at 135. Avoid excess free water with Abx while on 

Keppra (SIADH)


5. SZ- Keppra





Subjective


Date patient seen:  Jul 30, 2018


Time patient seen:  08:19


ROS Limited/Unobtainable:  Yes


Allergies:  


Coded Allergies:  


     AZTREONAM (Verified  Allergy, Unknown, 7/23/18)


All Systems:  reviewed and negative except above


Subjective


Patient  trached/vent.





Objective





Last 24 Hour Vital Signs








  Date Time  Temp Pulse Resp B/P (MAP) Pulse Ox O2 Delivery O2 Flow Rate FiO2


 


7/30/18 08:00 98.3 80 16 128/73 (91) 99   





 98.3       


 


7/30/18 08:00      Mechanical Ventilator  





      Mechanical Ventilator  





      Mechanical Ventilator  


 


7/30/18 08:00        30


 


7/30/18 06:39  78 17     30


 


7/30/18 05:14  70 16     30


 


7/30/18 04:00      Mechanical Ventilator  





      Mechanical Ventilator  





      Mechanical Ventilator  


 


7/30/18 04:00        30


 


7/30/18 04:00  71      


 


7/30/18 04:00 98.3 71 17 142/77 (98) 99   





 98.3       


 


7/30/18 03:30  72 16     30


 


7/30/18 01:30  71 15     30


 


7/30/18 00:00      Mechanical Ventilator  





      Mechanical Ventilator  





      Mechanical Ventilator  


 


7/30/18 00:00  71      


 


7/30/18 00:00 98.1 71 16 137/73 (94) 100   





 98.1       


 


7/30/18 00:00        30


 


7/29/18 23:13  74 16     30


 


7/29/18 21:30  70 22     30


 


7/29/18 20:00 97.0 68 17 135/80 (98) 98   





 97.0       


 


7/29/18 20:00  65      


 


7/29/18 20:00        30


 


7/29/18 20:00      Mechanical Ventilator  





      Mechanical Ventilator  





      Mechanical Ventilator  


 


7/29/18 19:30  72 20     30


 


7/29/18 17:16  74 16     30


 


7/29/18 16:00  66      


 


7/29/18 16:00      Mechanical Ventilator  





      Mechanical Ventilator  





      Mechanical Ventilator  


 


7/29/18 16:00 97.2 68 16 123/82 (96) 100   





 97.2       


 


7/29/18 16:00        30


 


7/29/18 15:16  72 16     30


 


7/29/18 12:44  71 16     30


 


7/29/18 12:00 97.3 68 18 128/76 (93) 95   





 97.3       


 


7/29/18 12:00  67      


 


7/29/18 12:00      Mechanical Ventilator  





      Mechanical Ventilator  





      Mechanical Ventilator  


 


7/29/18 12:00        30


 


7/29/18 11:13  70 16     30


 


7/29/18 09:28  73 16     30

















Intake and Output  


 


 7/29/18 7/30/18





 19:00 07:00


 


Intake Total 1170 ml 875 ml


 


Output Total 1950 ml 1225 ml


 


Balance -780 ml -350 ml


 


  


 


Free Water 280 ml 


 


IV Total 110 ml 60 ml


 


Tube Feeding 780 ml 715 ml


 


Other  100 ml


 


Output Urine Total 1550 ml 1000 ml


 


Stool Total 400 ml 225 ml








Laboratory Tests


7/30/18 05:00: 


White Blood Count 10.9H, Red Blood Count 4.52L, Hemoglobin 12.2L, Hematocrit 

38.4L, Mean Corpuscular Volume 85, Mean Corpuscular Hemoglobin 26.9L, Mean 

Corpuscular Hemoglobin Concent 31.7L, Red Cell Distribution Width 14.2, 

Platelet Count 244, Mean Platelet Volume 8.3, Neutrophils (%) (Auto) 65.5, 

Lymphocytes (%) (Auto) 24.1, Monocytes (%) (Auto) 3.0, Eosinophils (%) (Auto) 

6.6H, Basophils (%) (Auto) 0.8, Sodium Level 135L, Potassium Level 3.9, 

Chloride Level 101, Carbon Dioxide Level 22, Anion Gap 12, Blood Urea Nitrogen 7

, Creatinine 0.9, Estimat Glomerular Filtration Rate > 60, Glucose Level 137H, 

Calcium Level 9.9


Height (Feet):  6


Weight (Pounds):  210


General Appearance:  WD/WN, no apparent distress


EENT:  PERRL/EOMI


Neck:  non-tender, normal alignment


Cardiovascular:  normal rate, regular rhythm


Respiratory/Chest:  rhonchi - bilaterally


Abdomen:  normal bowel sounds, non tender, soft


Edema:  no edema noted Arm (L), no edema noted Arm (R), no edema noted Leg (L), 

no edema noted Leg (R), no edema noted Pedal (L), no edema noted Pedal (R), no 

edema noted Generalized











Jewel Templeton M.D. Jul 30, 2018 08:20

## 2018-07-31 VITALS — SYSTOLIC BLOOD PRESSURE: 127 MMHG | DIASTOLIC BLOOD PRESSURE: 86 MMHG

## 2018-07-31 VITALS — DIASTOLIC BLOOD PRESSURE: 75 MMHG | SYSTOLIC BLOOD PRESSURE: 114 MMHG

## 2018-07-31 VITALS — DIASTOLIC BLOOD PRESSURE: 78 MMHG | SYSTOLIC BLOOD PRESSURE: 137 MMHG

## 2018-07-31 VITALS — DIASTOLIC BLOOD PRESSURE: 64 MMHG | SYSTOLIC BLOOD PRESSURE: 114 MMHG

## 2018-07-31 VITALS — SYSTOLIC BLOOD PRESSURE: 114 MMHG | DIASTOLIC BLOOD PRESSURE: 81 MMHG

## 2018-07-31 VITALS — DIASTOLIC BLOOD PRESSURE: 82 MMHG | SYSTOLIC BLOOD PRESSURE: 127 MMHG

## 2018-07-31 LAB
ADD MANUAL DIFF: NO
ALBUMIN SERPL-MCNC: 3.3 G/DL (ref 3.4–5)
ALBUMIN/GLOB SERPL: 0.7 {RATIO} (ref 1–2.7)
ALP SERPL-CCNC: 111 U/L (ref 46–116)
ALT SERPL-CCNC: 51 U/L (ref 12–78)
ANION GAP SERPL CALC-SCNC: 10 MMOL/L (ref 5–15)
AST SERPL-CCNC: 30 U/L (ref 15–37)
BASOPHILS NFR BLD AUTO: 0.6 % (ref 0–2)
BILIRUB SERPL-MCNC: 0.4 MG/DL (ref 0.2–1)
BUN SERPL-MCNC: 9 MG/DL (ref 7–18)
CALCIUM SERPL-MCNC: 10.2 MG/DL (ref 8.5–10.1)
CHLORIDE SERPL-SCNC: 99 MMOL/L (ref 98–107)
CO2 SERPL-SCNC: 25 MMOL/L (ref 21–32)
CREAT SERPL-MCNC: 0.9 MG/DL (ref 0.55–1.3)
EOSINOPHIL NFR BLD AUTO: 4.7 % (ref 0–3)
ERYTHROCYTE [DISTWIDTH] IN BLOOD BY AUTOMATED COUNT: 14.9 % (ref 11.6–14.8)
GLOBULIN SER-MCNC: 5 G/DL
HCT VFR BLD CALC: 38.5 % (ref 42–52)
HGB BLD-MCNC: 12.3 G/DL (ref 14.2–18)
LYMPHOCYTES NFR BLD AUTO: 20.3 % (ref 20–45)
MCV RBC AUTO: 85 FL (ref 80–99)
MONOCYTES NFR BLD AUTO: 4.4 % (ref 1–10)
NEUTROPHILS NFR BLD AUTO: 70.1 % (ref 45–75)
PHOSPHATE SERPL-MCNC: 2.7 MG/DL (ref 2.5–4.9)
PLATELET # BLD: 257 K/UL (ref 150–450)
POTASSIUM SERPL-SCNC: 3.9 MMOL/L (ref 3.5–5.1)
RBC # BLD AUTO: 4.53 M/UL (ref 4.7–6.1)
SODIUM SERPL-SCNC: 134 MMOL/L (ref 136–145)
WBC # BLD AUTO: 12.6 K/UL (ref 4.8–10.8)

## 2018-07-31 RX ADMIN — ERTAPENEM SODIUM SCH MLS/HR: 1 INJECTION, POWDER, LYOPHILIZED, FOR SOLUTION INTRAMUSCULAR; INTRAVENOUS at 15:24

## 2018-07-31 RX ADMIN — LEVETIRACETAM SCH MG: 100 SOLUTION ORAL at 21:33

## 2018-07-31 RX ADMIN — LEVETIRACETAM SCH MG: 100 SOLUTION ORAL at 05:54

## 2018-07-31 RX ADMIN — INSULIN ASPART SCH UNITS: 100 INJECTION, SOLUTION INTRAVENOUS; SUBCUTANEOUS at 13:16

## 2018-07-31 RX ADMIN — CHLORHEXIDINE GLUCONATE SCH APPLIC: 213 SOLUTION TOPICAL at 21:33

## 2018-07-31 RX ADMIN — LEVETIRACETAM SCH MG: 100 SOLUTION ORAL at 13:14

## 2018-07-31 RX ADMIN — HEPARIN SODIUM SCH UNITS: 5000 INJECTION INTRAVENOUS; SUBCUTANEOUS at 21:35

## 2018-07-31 RX ADMIN — HEPARIN SODIUM SCH UNITS: 5000 INJECTION INTRAVENOUS; SUBCUTANEOUS at 08:31

## 2018-07-31 RX ADMIN — INSULIN ASPART SCH UNITS: 100 INJECTION, SOLUTION INTRAVENOUS; SUBCUTANEOUS at 17:36

## 2018-07-31 RX ADMIN — INSULIN ASPART SCH UNITS: 100 INJECTION, SOLUTION INTRAVENOUS; SUBCUTANEOUS at 05:57

## 2018-07-31 RX ADMIN — Medication SCH MLS/HR: at 05:55

## 2018-07-31 RX ADMIN — POLYETHYLENE GLYCOL 3350 SCH GM: 17 POWDER, FOR SOLUTION ORAL at 21:33

## 2018-07-31 NOTE — GENERAL SURGERY PROGRESS NOTE
General Surgery-Progress Note


Subjective


Additional Comments


no acute events.  resting comfortable.





Objective





Last 24 Hour Vital Signs








  Date Time  Temp Pulse Resp B/P (MAP) Pulse Ox O2 Delivery O2 Flow Rate FiO2


 


7/31/18 09:29  90 19     30


 


7/31/18 08:00        30


 


7/31/18 08:00 97.2 81 18 114/81 (92) 100   





 97.2       


 


7/31/18 08:00      Mechanical Ventilator  





      Mechanical Ventilator  





      Mechanical Ventilator  


 


7/31/18 07:50  86      


 


7/31/18 07:21  94 18     30


 


7/31/18 04:55  81 16     30


 


7/31/18 04:02  82      


 


7/31/18 04:00      Mechanical Ventilator  





      Mechanical Ventilator  





      Mechanical Ventilator  


 


7/31/18 04:00        30


 


7/31/18 04:00 97.8 84 16 127/82 (97) 100   





 97.8       


 


7/31/18 02:45  78 17     30


 


7/31/18 00:48  86 16     30


 


7/31/18 00:00 97.7 82 16 127/86 (100) 100   





 97.7       


 


7/31/18 00:00      Mechanical Ventilator  





      Mechanical Ventilator  





      Mechanical Ventilator  


 


7/31/18 00:00        30


 


7/30/18 23:39  79      


 


7/30/18 22:58  84 17     30


 


7/30/18 21:07  80 17     30


 


7/30/18 20:00        30


 


7/30/18 20:00      Mechanical Ventilator  





      Mechanical Ventilator  





      Mechanical Ventilator  


 


7/30/18 20:00 98.2 80 16 120/86 (97) 100   





 98.2       


 


7/30/18 19:58  84      


 


7/30/18 18:45  83 17     30


 


7/30/18 17:08  80 16     30


 


7/30/18 16:00 98.5 78 16 127/70 (89) 99   





 98.5       


 


7/30/18 16:00        30


 


7/30/18 16:00      Mechanical Ventilator  





      Mechanical Ventilator  





      Mechanical Ventilator  


 


7/30/18 15:23  82 16     30


 


7/30/18 15:22  83      


 


7/30/18 13:19  77 16     30


 


7/30/18 12:00      Mechanical Ventilator  





      Mechanical Ventilator  





      Mechanical Ventilator  


 


7/30/18 12:00        30


 


7/30/18 12:00 98.6 82 16 119/74 (89) 98   





 98.6       


 


7/30/18 11:44  82      








I&O











Intake and Output  


 


 7/30/18 7/31/18





 19:00 07:00


 


Intake Total 955 ml 955 ml


 


Output Total 1450 ml 1500 ml


 


Balance -495 ml -545 ml


 


  


 


Free Water  50 ml


 


IV Total 55 ml 125 ml


 


Tube Feeding 780 ml 780 ml


 


Other 120 ml 


 


Output Urine Total 1100 ml 1200 ml


 


Stool Total 350 ml 300 ml








Wound:  clean


Drains:  none


Cardiovascular:  RSR


Respiratory:  clear


Abdomen:  soft, flat, present bowel sounds, other - ostomy viable





Laboratory Tests








Test


  7/30/18


17:30 7/31/18


03:30


 


Random Vancomycin Level 21.8 ug/mL   


 


White Blood Count


  


  12.6 K/UL


(4.8-10.8)  H


 


Red Blood Count


  


  4.53 M/UL


(4.70-6.10)  L


 


Hemoglobin


  


  12.3 G/DL


(14.2-18.0)  L


 


Hematocrit


  


  38.5 %


(42.0-52.0)  L


 


Mean Corpuscular Volume  85 FL (80-99)  


 


Mean Corpuscular Hemoglobin


  


  27.2 PG


(27.0-31.0)


 


Mean Corpuscular Hemoglobin


Concent 


  32.0 G/DL


(32.0-36.0)


 


Red Cell Distribution Width


  


  14.9 %


(11.6-14.8)  H


 


Platelet Count


  


  257 K/UL


(150-450)


 


Mean Platelet Volume


  


  8.9 FL


(6.5-10.1)


 


Neutrophils (%) (Auto)


  


  70.1 %


(45.0-75.0)


 


Lymphocytes (%) (Auto)


  


  20.3 %


(20.0-45.0)


 


Monocytes (%) (Auto)


  


  4.4 %


(1.0-10.0)


 


Eosinophils (%) (Auto)


  


  4.7 %


(0.0-3.0)  H


 


Basophils (%) (Auto)


  


  0.6 %


(0.0-2.0)


 


Sodium Level


  


  134 MMOL/L


(136-145)  L


 


Potassium Level


  


  3.9 MMOL/L


(3.5-5.1)


 


Chloride Level


  


  99 MMOL/L


()


 


Carbon Dioxide Level


  


  25 MMOL/L


(21-32)


 


Anion Gap


  


  10 mmol/L


(5-15)


 


Blood Urea Nitrogen


  


  9 mg/dL (7-18)


 


 


Creatinine


  


  0.9 MG/DL


(0.55-1.30)


 


Estimat Glomerular Filtration


Rate 


  > 60 mL/min


(>60)


 


Glucose Level


  


  130 MG/DL


()  H


 


Calcium Level


  


  10.2 MG/DL


(8.5-10.1)  H


 


Phosphorus Level


  


  2.7 MG/DL


(2.5-4.9)


 


Magnesium Level


  


  1.8 MG/DL


(1.8-2.4)


 


Total Bilirubin


  


  0.4 MG/DL


(0.2-1.0)


 


Aspartate Amino Transf


(AST/SGOT) 


  30 U/L (15-37)


 


 


Alanine Aminotransferase


(ALT/SGPT) 


  51 U/L (12-78)


 


 


Alkaline Phosphatase


  


  111 U/L


()


 


Total Protein


  


  8.3 G/DL


(6.4-8.2)  H


 


Albumin


  


  3.3 G/DL


(3.4-5.0)  L


 


Globulin  5.0 g/dL  


 


Albumin/Globulin Ratio


  


  0.7 (1.0-2.7)


L











Plan


Problems:  


(1) Stoma malfunction


(2) Parastomal hernia


Assessment & Plan:  52M with history of prior loop colostomy.  proximal loop 

stable.  distal with impacted contrast stool but stable. 


parastomal hernia with small bowel contents in hernia.  no obstruction noted at 

this time. 


larger appearance of patients stoma is because location and use of loop 

colostomy in hepatic flexure.  it is otherwise viable and stable.  





Would Highly recommend fixing parastomal hernia but can be done electively.


if obstructs will need urgent repair.  currently non obstructive


will monitor and follow with recs. 


bowel care 





spoke with wife today and explained above.  will plan for d/c soon.  if desired 

will plan for elective repair at later time once not acutely sick in the 

hospital.  


thank you for this consultation. 





(3) Severe sepsis











Brian Christina Jul 31, 2018 10:56

## 2018-07-31 NOTE — INFECTIOUS DISEASES PROG NOTE
Assessment/Plan


Assessment/Plan


Sepsis, resolving- 2ry to UTI and Bacteremia- ?pyelo. Possible PNA


 -u/a wbc 40-60, nit neg, leuk +2; ucx >100k E. aerogenes (S cefepime, cipro/

levo; R Zosyn)


 -BCX 4/4 E. aerogenes, prob Amp C (S cefepime, cipro/levo; R Zosyn), P. 

mirabilis ESBL (S Zosyn, Ertapenem); repeta 7/26 1/4 CoNS (suspect contaminant)

, 7/28 Staph f/u final results if CoNS may need TTE and IE work up.


  -CXR 7/27: Increased left pleural effusion, over 3 days. Overall decreased 

interstitial congestion. Right infrahilar atelectasis


 -CXR: Cardiomegaly. Mild interstitial congestion. Suspect small bilateral 

pleural effusions


 -CT abd/p: Right lower quadrant double barrel colostomy, as described. Small 

peristomal hernia contains bowel loops without evidence of obstruction or 

strangulation. Left renal staghorn calculus. Bilateral intrarenal calyceal 

calculi. Borderline hydronephrosis on the right without evidence of downstream 

obstructive lesion. May indicate mild ureteral pelvic junction obstruction. 

Somewhat atrophic left kidney. Inspissated contrast ball within the distal 

sigmoid colon. Gastrostomy in good position. Nonspecific bilateral perinephric 

fat stranding, could indicate pyelonephritis or could be chronic. Guzmán 

catheter in place. Apparent bladder wall thickening, possibly an artifact of 

under distention but cystitis is not excludable, particularly in view of mild 

perivesical fat stranding. Bilateral pulmonary parenchymal atelectasis and 

consolidation


  -Blood Cx from PICC CoNS 2/2 bottles Peripheral Neg- (May be contaminant but 

will repeat blood Cx given new leukocytosis if positive will need ECHO.


 





Fever/Leukocytosis; increasing WBCs - Re-evaluate lines, Diarrhea? C. dif? 


BRAYDON, improving


Lactic acidosis, resolved





chronic resp failure trach/vent dependant


BPH


GERD


 HTN


DM2


seizure disorder


colostomy


 s/p GT 


constipation





VRE and MRSA colonized


 


Plan:


- Continue empiric IV Vancomycin #5 for bacteremia.





-Continue Ertapenem #4/10 for Amp-C Enterobacter and ESBL P.mirabilis 

bacteremia ; will treat for 10-14 days 


    -7/27 SP Zosyn #4





-Repeat 2 sets of Bcx for 7/30/18


-f/u 7/28 Bcx


-Monitor CBC/CMP, temperatures








Thank you for this consultation. Will continue to follow along with you.





Subjective


Allergies:  


Coded Allergies:  


     AZTREONAM (Verified  Allergy, Unknown, 7/23/18)


Subjective


JAYMIE, Patient PEG and Trached


On Vent. 30% O2 and PEEP of 5





Objective


Vital Signs





Last 24 Hour Vital Signs








  Date Time  Temp Pulse Resp B/P (MAP) Pulse Ox O2 Delivery O2 Flow Rate FiO2


 


7/31/18 04:55  81 16     30


 


7/31/18 04:02  82      


 


7/31/18 04:00      Mechanical Ventilator  





      Mechanical Ventilator  





      Mechanical Ventilator  


 


7/31/18 04:00        30


 


7/31/18 04:00 97.8 84 16 127/82 (97) 100   





 97.8       


 


7/31/18 02:45  78 17     30


 


7/31/18 00:48  86 16     30


 


7/31/18 00:00 97.7 82 16 127/86 (100) 100   





 97.7       


 


7/31/18 00:00      Mechanical Ventilator  





      Mechanical Ventilator  





      Mechanical Ventilator  


 


7/31/18 00:00        30


 


7/30/18 23:39  79      


 


7/30/18 22:58  84 17     30


 


7/30/18 21:07  80 17     30


 


7/30/18 20:00        30


 


7/30/18 20:00      Mechanical Ventilator  





      Mechanical Ventilator  





      Mechanical Ventilator  


 


7/30/18 20:00 98.2 80 16 120/86 (97) 100   





 98.2       


 


7/30/18 19:58  84      


 


7/30/18 18:45  83 17     30


 


7/30/18 17:08  80 16     30


 


7/30/18 16:00 98.5 78 16 127/70 (89) 99   





 98.5       


 


7/30/18 16:00        30


 


7/30/18 16:00      Mechanical Ventilator  





      Mechanical Ventilator  





      Mechanical Ventilator  


 


7/30/18 15:23  82 16     30


 


7/30/18 15:22  83      


 


7/30/18 13:19  77 16     30


 


7/30/18 12:00      Mechanical Ventilator  





      Mechanical Ventilator  





      Mechanical Ventilator  


 


7/30/18 12:00        30


 


7/30/18 12:00 98.6 82 16 119/74 (89) 98   





 98.6       


 


7/30/18 11:44  82      


 


7/30/18 10:36  80 16     30


 


7/30/18 09:11  82 16     30


 


7/30/18 08:00 98.3 80 16 128/73 (91) 99   





 98.3       


 


7/30/18 08:00      Mechanical Ventilator  





      Mechanical Ventilator  





      Mechanical Ventilator  


 


7/30/18 08:00        30


 


7/30/18 07:42  79      








Height (Feet):  6


Weight (Pounds):  198


Objective


GENERAL:  The patient is asleep, in no overt distress. Opens eyes


HEENT:  Extraocular muscles intact.  No lymphadenopathy noted.


Tracheostomy noted.


PULM: CTAB


CARDIOVASCULAR:  RRR, S1 and S2.  Tachycardic.  No rubs or gallops.


PULMONARY:  Mild diffuse expiratory rhonchi with basilar rales.


ABDOMEN:  Obese and nondistended.  The G-tube and stoma noted.


EXTREMITIES:  No edema.  Fair pedal pulses.





Microbiology








 Date/Time


Source Procedure


Growth Status


 


 


 7/28/18 16:30


Blood Blood Culture - Preliminary


NO GROWTH AFTER 48 HOURS Resulted


 


 7/28/18 13:25


Blood Blood Culture - Final


Staphylococcus Epidermidis Complete











Laboratory Tests








Test


  7/30/18


17:30 7/31/18


03:30


 


Random Vancomycin Level 21.8 ug/mL   


 


White Blood Count


  


  12.6 K/UL


(4.8-10.8)  H


 


Red Blood Count


  


  4.53 M/UL


(4.70-6.10)  L


 


Hemoglobin


  


  12.3 G/DL


(14.2-18.0)  L


 


Hematocrit


  


  38.5 %


(42.0-52.0)  L


 


Mean Corpuscular Volume  85 FL (80-99)  


 


Mean Corpuscular Hemoglobin


  


  27.2 PG


(27.0-31.0)


 


Mean Corpuscular Hemoglobin


Concent 


  32.0 G/DL


(32.0-36.0)


 


Red Cell Distribution Width


  


  14.9 %


(11.6-14.8)  H


 


Platelet Count


  


  257 K/UL


(150-450)


 


Mean Platelet Volume


  


  8.9 FL


(6.5-10.1)


 


Neutrophils (%) (Auto)


  


  70.1 %


(45.0-75.0)


 


Lymphocytes (%) (Auto)


  


  20.3 %


(20.0-45.0)


 


Monocytes (%) (Auto)


  


  4.4 %


(1.0-10.0)


 


Eosinophils (%) (Auto)


  


  4.7 %


(0.0-3.0)  H


 


Basophils (%) (Auto)


  


  0.6 %


(0.0-2.0)


 


Sodium Level


  


  134 MMOL/L


(136-145)  L


 


Potassium Level


  


  3.9 MMOL/L


(3.5-5.1)


 


Chloride Level


  


  99 MMOL/L


()


 


Carbon Dioxide Level


  


  25 MMOL/L


(21-32)


 


Anion Gap


  


  10 mmol/L


(5-15)


 


Blood Urea Nitrogen


  


  9 mg/dL (7-18)


 


 


Creatinine


  


  0.9 MG/DL


(0.55-1.30)


 


Estimat Glomerular Filtration


Rate 


  > 60 mL/min


(>60)


 


Glucose Level


  


  130 MG/DL


()  H


 


Calcium Level


  


  10.2 MG/DL


(8.5-10.1)  H


 


Phosphorus Level


  


  2.7 MG/DL


(2.5-4.9)


 


Magnesium Level


  


  1.8 MG/DL


(1.8-2.4)


 


Total Bilirubin


  


  0.4 MG/DL


(0.2-1.0)


 


Aspartate Amino Transf


(AST/SGOT) 


  30 U/L (15-37)


 


 


Alanine Aminotransferase


(ALT/SGPT) 


  51 U/L (12-78)


 


 


Alkaline Phosphatase


  


  111 U/L


()


 


Total Protein


  


  8.3 G/DL


(6.4-8.2)  H


 


Albumin


  


  3.3 G/DL


(3.4-5.0)  L


 


Globulin  5.0 g/dL  


 


Albumin/Globulin Ratio


  


  0.7 (1.0-2.7)


L











Current Medications








 Medications


  (Trade)  Dose


 Ordered  Sig/Jan


 Route


 PRN Reason  Start Time


 Stop Time Status Last Admin


Dose Admin


 


 Acetaminophen


  (Tylenol)  650 mg  Q4H  PRN


 ORAL


 FEVER  7/26/18 14:00


 8/23/18 09:59   


 


 


 Baclofen


  (Lioresal)  10 mg  EVERY 8  HOURS


 GT


   7/30/18 14:00


 8/23/18 12:59  7/31/18 05:54


 


 


 Chlorhexidine


 Gluconate


  (Elaine-Hex 2%)  1 applic  DAILY@2000


 TOPIC


   7/26/18 20:00


 8/23/18 19:59  7/30/18 21:11


 


 


 Dextrose


  (Dextrose 50%)  25 ml  STAT  PRN


 IV


 Hypoglycemia  7/27/18 08:45


 8/24/18 08:44   


 


 


 Dextrose


  (Dextrose 50%)  50 ml  STAT  PRN


 IV


 Hypoglycemia  7/27/18 08:45


 8/24/18 08:44   


 


 


 Ertapenem 1 gm/


 Sodium Chloride  55 ml @ 


 110 mls/hr  Q24H


 IVPB


   7/27/18 15:00


 8/6/18 14:59  7/30/18 14:18


 


 


 Heparin Sodium


  (Porcine)


  (Heparin 5000


 units/ml)  5,000 units  EVERY 12  HOURS


 SUBQ


   7/26/18 21:00


 8/23/18 20:59  7/30/18 21:14


 


 


 Insulin Aspart


  (NovoLOG)    Q6HR


 SUBQ


   7/26/18 18:00


 8/24/18 11:29  7/31/18 05:57


 


 


 Lansoprazole


  (Prevacid)  30 mg  DAILY


 GT


   7/27/18 09:00


 8/25/18 08:59  7/30/18 08:21


 


 


 Levetiracetam


  (Keppra)  1,000 mg  Q8HR


 GT


   7/26/18 14:00


 8/23/18 12:59  7/31/18 05:54


 


 


 Lorazepam


  (Ativan 2mg/ml


 1ml)  2 mg  Q2H  PRN


 IV


 For Anxiety  7/26/18 12:00


 7/31/18 09:59   


 


 


 Morphine Sulfate


  (Morphine


 Sulfate)  4 mg  Q4H  PRN


 IVP


 Severe Pain (Pain Scale 7-10)  7/26/18 14:00


 7/31/18 09:59   


 


 


 Ondansetron HCl


  (Zofran)  4 mg  Q6H  PRN


 IVP


 Nausea & Vomiting  7/26/18 16:00


 8/23/18 09:59   


 


 


 Polyethylene


 Glycol


  (Miralax)  17 gm  BEDTIME


 GT


   7/30/18 21:00


 8/24/18 20:59  7/30/18 21:12


 


 


 Vancomycin HCl


  (Vanco rx to


 dose)  1 ea  DAILY  PRN


 MISC


 PER RX PROTOCOL  7/28/18 13:15


 8/27/18 13:14   


 


 


 Vancomycin HCl/


 Dextrose  250 ml @ 


 125 mls/hr  Q24H


 IVPB


   7/31/18 06:00


 8/5/18 05:59  7/31/18 05:55


 

















Robert Williamson M.D. Jul 31, 2018 07:35

## 2018-07-31 NOTE — PULMONOLGY CRITICAL CARE NOTE
Critical Care - Asmt/Plan


Problems:  


(1) Acute on chronic respiratory failure


(2) Acute on chronic renal insufficiency


(3) Severe sepsis


(4) Vegetative state


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


(6) Diabetes mellitus


Respiratory:  monitor respiratory rate, adjust FIO2, CXR


Cardiac:  continue to monitor HR/BP


Renal:  F/U I&O


Infectious Disease:  check cultures, continue antibiotics


Gastrointestinal:  continue feedings/current rate


Endocrine:  monitor blood sugar, check HgA1C


Neurologic:  PRN Ativan, keep patient comfortable


Affect:  PRN ativan


Prophylaxis:  Heparin


Notes Reviewed:  internist, renal


Discussed with:  nurses, consultants, 





Critical Care - Objective





Last 24 Hour Vital Signs








  Date Time  Temp Pulse Resp B/P (MAP) Pulse Ox O2 Delivery O2 Flow Rate FiO2


 


7/31/18 09:29  90 19     30


 


7/31/18 08:00        30


 


7/31/18 08:00 97.2 81 18 114/81 (92) 100   





 97.2       


 


7/31/18 08:00      Mechanical Ventilator  





      Mechanical Ventilator  





      Mechanical Ventilator  


 


7/31/18 07:50  86      


 


7/31/18 07:21  94 18     30


 


7/31/18 04:55  81 16     30


 


7/31/18 04:02  82      


 


7/31/18 04:00      Mechanical Ventilator  





      Mechanical Ventilator  





      Mechanical Ventilator  


 


7/31/18 04:00        30


 


7/31/18 04:00 97.8 84 16 127/82 (97) 100   





 97.8       


 


7/31/18 02:45  78 17     30


 


7/31/18 00:48  86 16     30


 


7/31/18 00:00 97.7 82 16 127/86 (100) 100   





 97.7       


 


7/31/18 00:00      Mechanical Ventilator  





      Mechanical Ventilator  





      Mechanical Ventilator  


 


7/31/18 00:00        30


 


7/30/18 23:39  79      


 


7/30/18 22:58  84 17     30


 


7/30/18 21:07  80 17     30


 


7/30/18 20:00        30


 


7/30/18 20:00      Mechanical Ventilator  





      Mechanical Ventilator  





      Mechanical Ventilator  


 


7/30/18 20:00 98.2 80 16 120/86 (97) 100   





 98.2       


 


7/30/18 19:58  84      


 


7/30/18 18:45  83 17     30


 


7/30/18 17:08  80 16     30


 


7/30/18 16:00 98.5 78 16 127/70 (89) 99   





 98.5       


 


7/30/18 16:00        30


 


7/30/18 16:00      Mechanical Ventilator  





      Mechanical Ventilator  





      Mechanical Ventilator  


 


7/30/18 15:23  82 16     30


 


7/30/18 15:22  83      


 


7/30/18 13:19  77 16     30


 


7/30/18 12:00      Mechanical Ventilator  





      Mechanical Ventilator  





      Mechanical Ventilator  


 


7/30/18 12:00        30


 


7/30/18 12:00 98.6 82 16 119/74 (89) 98   





 98.6       


 


7/30/18 11:44  82      


 


7/30/18 10:36  80 16     30








Status:  obtunded


Condition:  critical


HEENT:  atraumatic


Heart:  HR/BP stable, HR/BP unstable


Abdomen:  non-tender, active bowel sounds


Extremities:  no C/C/E


Decubiti:  location


Micro:





Microbiology








 Date/Time


Source Procedure


Growth Status


 


 


 7/28/18 16:30


Blood Blood Culture - Preliminary


NO GROWTH AFTER 48 HOURS Resulted


 


 7/28/18 13:25


Blood Blood Culture - Final


Staphylococcus Epidermidis Complete








Accucheck:  144





Critical Care - Subjective


ROS Limited/Unobtainable:  Yes


Condition:  critical


EKG Rhythm:  Sinus Rhythm


FI02:  30


Vent Support Breath Rate:  16


Vent Support Mode:  AC


Vent Tidal Volume:  600


Sputum Amount:  Small


PEEP:  5.0


PIP:  31


Tube Feeding Amount:  65


I&O:











Intake and Output  


 


 7/30/18 7/31/18





 19:00 07:00


 


Intake Total 955 ml 955 ml


 


Output Total 1450 ml 1500 ml


 


Balance -495 ml -545 ml


 


  


 


Free Water  50 ml


 


IV Total 55 ml 125 ml


 


Tube Feeding 780 ml 780 ml


 


Other 120 ml 


 


Output Urine Total 1100 ml 1200 ml


 


Stool Total 350 ml 300 ml








Labs:





Laboratory Tests








Test


  7/30/18


17:30 7/31/18


03:30


 


Random Vancomycin Level 21.8 ug/mL   


 


White Blood Count


  


  12.6 K/UL


(4.8-10.8)  H


 


Red Blood Count


  


  4.53 M/UL


(4.70-6.10)  L


 


Hemoglobin


  


  12.3 G/DL


(14.2-18.0)  L


 


Hematocrit


  


  38.5 %


(42.0-52.0)  L


 


Mean Corpuscular Volume  85 FL (80-99)  


 


Mean Corpuscular Hemoglobin


  


  27.2 PG


(27.0-31.0)


 


Mean Corpuscular Hemoglobin


Concent 


  32.0 G/DL


(32.0-36.0)


 


Red Cell Distribution Width


  


  14.9 %


(11.6-14.8)  H


 


Platelet Count


  


  257 K/UL


(150-450)


 


Mean Platelet Volume


  


  8.9 FL


(6.5-10.1)


 


Neutrophils (%) (Auto)


  


  70.1 %


(45.0-75.0)


 


Lymphocytes (%) (Auto)


  


  20.3 %


(20.0-45.0)


 


Monocytes (%) (Auto)


  


  4.4 %


(1.0-10.0)


 


Eosinophils (%) (Auto)


  


  4.7 %


(0.0-3.0)  H


 


Basophils (%) (Auto)


  


  0.6 %


(0.0-2.0)


 


Sodium Level


  


  134 MMOL/L


(136-145)  L


 


Potassium Level


  


  3.9 MMOL/L


(3.5-5.1)


 


Chloride Level


  


  99 MMOL/L


()


 


Carbon Dioxide Level


  


  25 MMOL/L


(21-32)


 


Anion Gap


  


  10 mmol/L


(5-15)


 


Blood Urea Nitrogen


  


  9 mg/dL (7-18)


 


 


Creatinine


  


  0.9 MG/DL


(0.55-1.30)


 


Estimat Glomerular Filtration


Rate 


  > 60 mL/min


(>60)


 


Glucose Level


  


  130 MG/DL


()  H


 


Calcium Level


  


  10.2 MG/DL


(8.5-10.1)  H


 


Phosphorus Level


  


  2.7 MG/DL


(2.5-4.9)


 


Magnesium Level


  


  1.8 MG/DL


(1.8-2.4)


 


Total Bilirubin


  


  0.4 MG/DL


(0.2-1.0)


 


Aspartate Amino Transf


(AST/SGOT) 


  30 U/L (15-37)


 


 


Alanine Aminotransferase


(ALT/SGPT) 


  51 U/L (12-78)


 


 


Alkaline Phosphatase


  


  111 U/L


()


 


Total Protein


  


  8.3 G/DL


(6.4-8.2)  H


 


Albumin


  


  3.3 G/DL


(3.4-5.0)  L


 


Globulin  5.0 g/dL  


 


Albumin/Globulin Ratio


  


  0.7 (1.0-2.7)


L

















Yury Pena MD Jul 31, 2018 10:07

## 2018-07-31 NOTE — NEPHROLOGY PROGRESS NOTE
Assessment/Plan


Assessment/Plan


1. BRAYDON- resolved, Cr normal


2. Septis- resolved on Abx


3. Chronic Resp FL- trached on vent


4. Hyponatremia - Na at 134. DC free water. Monitor


5. RODDY- Keppra





Subjective


Date patient seen:  Jul 31, 2018


Time patient seen:  08:32


ROS Limited/Unobtainable:  Yes


Allergies:  


Coded Allergies:  


     AZTREONAM (Verified  Allergy, Unknown, 7/23/18)


All Systems:  reviewed and negative except above


Subjective


Patient  trached/vent. In no overt distress





Objective





Last 24 Hour Vital Signs








  Date Time  Temp Pulse Resp B/P (MAP) Pulse Ox O2 Delivery O2 Flow Rate FiO2


 


7/31/18 08:00        30


 


7/31/18 08:00 97.2 81 18 114/81 (92) 100   





 97.2       


 


7/31/18 08:00      Mechanical Ventilator  





      Mechanical Ventilator  





      Mechanical Ventilator  


 


7/31/18 07:21  94 18     30


 


7/31/18 04:55  81 16     30


 


7/31/18 04:02  82      


 


7/31/18 04:00      Mechanical Ventilator  





      Mechanical Ventilator  





      Mechanical Ventilator  


 


7/31/18 04:00        30


 


7/31/18 04:00 97.8 84 16 127/82 (97) 100   





 97.8       


 


7/31/18 02:45  78 17     30


 


7/31/18 00:48  86 16     30


 


7/31/18 00:00 97.7 82 16 127/86 (100) 100   





 97.7       


 


7/31/18 00:00      Mechanical Ventilator  





      Mechanical Ventilator  





      Mechanical Ventilator  


 


7/31/18 00:00        30


 


7/30/18 23:39  79      


 


7/30/18 22:58  84 17     30


 


7/30/18 21:07  80 17     30


 


7/30/18 20:00        30


 


7/30/18 20:00      Mechanical Ventilator  





      Mechanical Ventilator  





      Mechanical Ventilator  


 


7/30/18 20:00 98.2 80 16 120/86 (97) 100   





 98.2       


 


7/30/18 19:58  84      


 


7/30/18 18:45  83 17     30


 


7/30/18 17:08  80 16     30


 


7/30/18 16:00 98.5 78 16 127/70 (89) 99   





 98.5       


 


7/30/18 16:00        30


 


7/30/18 16:00      Mechanical Ventilator  





      Mechanical Ventilator  





      Mechanical Ventilator  


 


7/30/18 15:23  82 16     30


 


7/30/18 15:22  83      


 


7/30/18 13:19  77 16     30


 


7/30/18 12:00      Mechanical Ventilator  





      Mechanical Ventilator  





      Mechanical Ventilator  


 


7/30/18 12:00        30


 


7/30/18 12:00 98.6 82 16 119/74 (89) 98   





 98.6       


 


7/30/18 11:44  82      


 


7/30/18 10:36  80 16     30


 


7/30/18 09:11  82 16     30

















Intake and Output  


 


 7/30/18 7/31/18





 19:00 07:00


 


Intake Total 955 ml 955 ml


 


Output Total 1450 ml 1500 ml


 


Balance -495 ml -545 ml


 


  


 


Free Water  50 ml


 


IV Total 55 ml 125 ml


 


Tube Feeding 780 ml 780 ml


 


Other 120 ml 


 


Output Urine Total 1100 ml 1200 ml


 


Stool Total 350 ml 300 ml








Laboratory Tests


7/30/18 17:30: Random Vancomycin Level 21.8


7/31/18 03:30: 


White Blood Count 12.6H, Red Blood Count 4.53L, Hemoglobin 12.3L, Hematocrit 

38.5L, Mean Corpuscular Volume 85, Mean Corpuscular Hemoglobin 27.2, Mean 

Corpuscular Hemoglobin Concent 32.0, Red Cell Distribution Width 14.9H, 

Platelet Count 257, Mean Platelet Volume 8.9, Neutrophils (%) (Auto) 70.1, 

Lymphocytes (%) (Auto) 20.3, Monocytes (%) (Auto) 4.4, Eosinophils (%) (Auto) 

4.7H, Basophils (%) (Auto) 0.6, Sodium Level 134L, Potassium Level 3.9, 

Chloride Level 99, Carbon Dioxide Level 25, Anion Gap 10, Blood Urea Nitrogen 9

, Creatinine 0.9, Estimat Glomerular Filtration Rate > 60, Glucose Level 130H, 

Calcium Level 10.2H, Phosphorus Level 2.7, Magnesium Level 1.8, Total Bilirubin 

0.4, Aspartate Amino Transf (AST/SGOT) 30, Alanine Aminotransferase (ALT/SGPT) 

51, Alkaline Phosphatase 111, Total Protein 8.3H, Albumin 3.3L, Globulin 5.0, 

Albumin/Globulin Ratio 0.7L


Height (Feet):  6


Weight (Pounds):  198


General Appearance:  WD/WN, no apparent distress


EENT:  PERRL/EOMI, normal ENT inspection


Neck:  normal alignment, supple


Cardiovascular:  normal peripheral pulses, normal rate


Respiratory/Chest:  lungs clear, rhonchi - bilaterally


Abdomen:  non tender, soft


Edema:  no edema noted Arm (L), no edema noted Arm (R), no edema noted Leg (L), 

no edema noted Leg (R), no edema noted Pedal (L), no edema noted Pedal (R), no 

edema noted Generalized











Jewel Templeton M.D. Jul 31, 2018 08:37

## 2018-07-31 NOTE — GI PROGRESS NOTE
Assessment/Plan


Problems:  


(1) Parastomal hernia


ICD Codes:  K43.5 - Parastomal hernia without obstruction or gangrene


SNOMED:  526943211


Qualifiers:  


   Qualified Codes:  K43.5 - Parastomal hernia without obstruction or gangrene


(2) Stoma malfunction


SNOMED:  719359182


(3) Colostomy in place


ICD Codes:  Z93.3 - Colostomy status


SNOMED:  110099155, 704342098


(4) Vegetative state


ICD Codes:  R40.3 - Persistent vegetative state


SNOMED:  75064096


(5) Diabetes mellitus


ICD Codes:  E11.9 - Type 2 diabetes mellitus without complications


SNOMED:  21988313


(6) Acute on chronic respiratory failure


ICD Codes:  J96.20 - Acute and chronic respiratory failure, unspecified whether 

with hypoxia or hypercapnia


SNOMED:  01859517


(7) Severe sepsis


ICD Codes:  A41.9 - Sepsis, unspecified organism; R65.20 - Severe sepsis 

without septic shock


SNOMED:  61998873


Status:  stable, unchanged


Status Narrative


Discussed with Dr. Pickard.


Assessment/Plan


prolapse stoma assessed >> no s/sx of infection.  no obstruction. 


anemia work up reviewed >> iron deficiency


G tube dependent


hepatitis panel negative


OB negative





supportive care


monitor H&H, prn transfusions


venofer


ppi


GTFs per RD, adv to goal


GT site care daily/prn


abx


bowel regime


fu labs





The patient was seen and examined at bedside and all new and available data was 

reviewed in the patients chart. I agree with the above findings, impression 

and plan.  (Patient seen earlier today. Signature stamp does not reflect 

patient encounter time.). - Miles Pickard MD





Subjective


Subjective


limited





Objective





Last 24 Hour Vital Signs








  Date Time  Temp Pulse Resp B/P (MAP) Pulse Ox O2 Delivery O2 Flow Rate FiO2


 


7/31/18 15:11  90 16     30


 


7/31/18 13:25  82 16     30


 


7/31/18 12:00        30


 


7/31/18 12:00      Mechanical Ventilator  





      Mechanical Ventilator  





      Mechanical Ventilator  


 


7/31/18 12:00 98.1 100 16 114/75 (88) 100   





 98.1       


 


7/31/18 11:55  87      


 


7/31/18 11:28  85 16     30


 


7/31/18 09:29  90 19     30


 


7/31/18 08:00        30


 


7/31/18 08:00 97.2 81 18 114/81 (92) 100   





 97.2       


 


7/31/18 08:00      Mechanical Ventilator  





      Mechanical Ventilator  





      Mechanical Ventilator  


 


7/31/18 07:50  86      


 


7/31/18 07:21  94 18     30


 


7/31/18 04:55  81 16     30


 


7/31/18 04:02  82      


 


7/31/18 04:00      Mechanical Ventilator  





      Mechanical Ventilator  





      Mechanical Ventilator  


 


7/31/18 04:00        30


 


7/31/18 04:00 97.8 84 16 127/82 (97) 100   





 97.8       


 


7/31/18 02:45  78 17     30


 


7/31/18 00:48  86 16     30


 


7/31/18 00:00 97.7 82 16 127/86 (100) 100   





 97.7       


 


7/31/18 00:00      Mechanical Ventilator  





      Mechanical Ventilator  





      Mechanical Ventilator  


 


7/31/18 00:00        30


 


7/30/18 23:39  79      


 


7/30/18 22:58  84 17     30


 


7/30/18 21:07  80 17     30


 


7/30/18 20:00        30


 


7/30/18 20:00      Mechanical Ventilator  





      Mechanical Ventilator  





      Mechanical Ventilator  


 


7/30/18 20:00 98.2 80 16 120/86 (97) 100   





 98.2       


 


7/30/18 19:58  84      


 


7/30/18 18:45  83 17     30


 


7/30/18 17:08  80 16     30


 


7/30/18 16:00 98.5 78 16 127/70 (89) 99   





 98.5       


 


7/30/18 16:00        30


 


7/30/18 16:00      Mechanical Ventilator  





      Mechanical Ventilator  





      Mechanical Ventilator  


 


7/30/18 15:23  82 16     30


 


7/30/18 15:22  83      

















Intake and Output  


 


 7/30/18 7/31/18





 19:00 07:00


 


Intake Total 955 ml 955 ml


 


Output Total 1450 ml 1500 ml


 


Balance -495 ml -545 ml


 


  


 


Free Water  50 ml


 


IV Total 55 ml 125 ml


 


Tube Feeding 780 ml 780 ml


 


Other 120 ml 


 


Output Urine Total 1100 ml 1200 ml


 


Stool Total 350 ml 300 ml











Laboratory Tests








Test


  7/30/18


17:30 7/31/18


03:30


 


Random Vancomycin Level 21.8 ug/mL   


 


White Blood Count


  


  12.6 K/UL


(4.8-10.8)  H


 


Red Blood Count


  


  4.53 M/UL


(4.70-6.10)  L


 


Hemoglobin


  


  12.3 G/DL


(14.2-18.0)  L


 


Hematocrit


  


  38.5 %


(42.0-52.0)  L


 


Mean Corpuscular Volume  85 FL (80-99)  


 


Mean Corpuscular Hemoglobin


  


  27.2 PG


(27.0-31.0)


 


Mean Corpuscular Hemoglobin


Concent 


  32.0 G/DL


(32.0-36.0)


 


Red Cell Distribution Width


  


  14.9 %


(11.6-14.8)  H


 


Platelet Count


  


  257 K/UL


(150-450)


 


Mean Platelet Volume


  


  8.9 FL


(6.5-10.1)


 


Neutrophils (%) (Auto)


  


  70.1 %


(45.0-75.0)


 


Lymphocytes (%) (Auto)


  


  20.3 %


(20.0-45.0)


 


Monocytes (%) (Auto)


  


  4.4 %


(1.0-10.0)


 


Eosinophils (%) (Auto)


  


  4.7 %


(0.0-3.0)  H


 


Basophils (%) (Auto)


  


  0.6 %


(0.0-2.0)


 


Sodium Level


  


  134 MMOL/L


(136-145)  L


 


Potassium Level


  


  3.9 MMOL/L


(3.5-5.1)


 


Chloride Level


  


  99 MMOL/L


()


 


Carbon Dioxide Level


  


  25 MMOL/L


(21-32)


 


Anion Gap


  


  10 mmol/L


(5-15)


 


Blood Urea Nitrogen


  


  9 mg/dL (7-18)


 


 


Creatinine


  


  0.9 MG/DL


(0.55-1.30)


 


Estimat Glomerular Filtration


Rate 


  > 60 mL/min


(>60)


 


Glucose Level


  


  130 MG/DL


()  H


 


Calcium Level


  


  10.2 MG/DL


(8.5-10.1)  H


 


Phosphorus Level


  


  2.7 MG/DL


(2.5-4.9)


 


Magnesium Level


  


  1.8 MG/DL


(1.8-2.4)


 


Total Bilirubin


  


  0.4 MG/DL


(0.2-1.0)


 


Aspartate Amino Transf


(AST/SGOT) 


  30 U/L (15-37)


 


 


Alanine Aminotransferase


(ALT/SGPT) 


  51 U/L (12-78)


 


 


Alkaline Phosphatase


  


  111 U/L


()


 


Total Protein


  


  8.3 G/DL


(6.4-8.2)  H


 


Albumin


  


  3.3 G/DL


(3.4-5.0)  L


 


Globulin  5.0 g/dL  


 


Albumin/Globulin Ratio


  


  0.7 (1.0-2.7)


L








Height (Feet):  6


Weight (Pounds):  198


General Appearance:  alert


Cardiovascular:  normal rate


Abdominal Exam:  other - ostomy











Remy Lyons NP Jul 31, 2018 15:14

## 2018-07-31 NOTE — GENERAL PROGRESS NOTE
Assessment/Plan


Status:  unchanged


Assessment/Plan


# Anemia of chronic disease. No hemolysis, peripheral smear reviewed, ferritin 

is elevated.


--> Continue to closely monitor for improvement. 


--> Anemia w/u has been reviewed. Will trend cbc daily. 


--> Hgb goal >7


# Leukocytosis - Sepsis with elevated temperature of 103 degrees Fahrenheit.  


--> Lactic acid pending, was elevated upon admission. 


--> Pt on antibiotics, has received a dose of Zosyn and currently is on 

vancomycin q.12 h.  


--> Currently afebrile.


--> Appreciate Infectious Disease consult.


# Acute kidney injury.  


--> Currently on IV fluids, IV bolus given to see if the patient responds along 

with IV pressor as needed.  


--> Closely monitor with Nephrology team.


# Septic shock, use antibiotic per ID services.


--> potential pna, bacteremia


# Respiratory failure, status post tracheostomy, on mechanical ventilation per 

Dr. Pena.


# Hypercalcemia. Monitor PTH and calcium level





The time the note was entered does not necessarily correspond to the time the 

patient was seen.





Subjective


Date patient seen:  Jul 31, 2018


ROS Limited/Unobtainable:  Yes


Hematologic/Lymphatic:  Reports: anemia


Allergies:  


Coded Allergies:  


     AZTREONAM (Verified  Allergy, Unknown, 7/23/18)


All Systems:  reviewed and negative except above


Subjective


Pt remains obtunded, on trach/vent. No acute events. H/H stable. No recent 

seizure activity.





Objective





Last 24 Hour Vital Signs








  Date Time  Temp Pulse Resp B/P (MAP) Pulse Ox O2 Delivery O2 Flow Rate FiO2


 


7/31/18 13:25  82 16     30


 


7/31/18 12:00        30


 


7/31/18 12:00      Mechanical Ventilator  





      Mechanical Ventilator  





      Mechanical Ventilator  


 


7/31/18 12:00 98.1 100 16 114/75 (88) 100   





 98.1       


 


7/31/18 11:55  87      


 


7/31/18 11:28  85 16     30


 


7/31/18 09:29  90 19     30


 


7/31/18 08:00        30


 


7/31/18 08:00 97.2 81 18 114/81 (92) 100   





 97.2       


 


7/31/18 08:00      Mechanical Ventilator  





      Mechanical Ventilator  





      Mechanical Ventilator  


 


7/31/18 07:50  86      


 


7/31/18 07:21  94 18     30


 


7/31/18 04:55  81 16     30


 


7/31/18 04:02  82      


 


7/31/18 04:00      Mechanical Ventilator  





      Mechanical Ventilator  





      Mechanical Ventilator  


 


7/31/18 04:00        30


 


7/31/18 04:00 97.8 84 16 127/82 (97) 100   





 97.8       


 


7/31/18 02:45  78 17     30


 


7/31/18 00:48  86 16     30


 


7/31/18 00:00 97.7 82 16 127/86 (100) 100   





 97.7       


 


7/31/18 00:00      Mechanical Ventilator  





      Mechanical Ventilator  





      Mechanical Ventilator  


 


7/31/18 00:00        30


 


7/30/18 23:39  79      


 


7/30/18 22:58  84 17     30


 


7/30/18 21:07  80 17     30


 


7/30/18 20:00        30


 


7/30/18 20:00      Mechanical Ventilator  





      Mechanical Ventilator  





      Mechanical Ventilator  


 


7/30/18 20:00 98.2 80 16 120/86 (97) 100   





 98.2       


 


7/30/18 19:58  84      


 


7/30/18 18:45  83 17     30


 


7/30/18 17:08  80 16     30


 


7/30/18 16:00 98.5 78 16 127/70 (89) 99   





 98.5       


 


7/30/18 16:00        30


 


7/30/18 16:00      Mechanical Ventilator  





      Mechanical Ventilator  





      Mechanical Ventilator  


 


7/30/18 15:23  82 16     30


 


7/30/18 15:22  83      

















Intake and Output  


 


 7/30/18 7/31/18





 19:00 07:00


 


Intake Total 955 ml 955 ml


 


Output Total 1450 ml 1500 ml


 


Balance -495 ml -545 ml


 


  


 


Free Water  50 ml


 


IV Total 55 ml 125 ml


 


Tube Feeding 780 ml 780 ml


 


Other 120 ml 


 


Output Urine Total 1100 ml 1200 ml


 


Stool Total 350 ml 300 ml








Laboratory Tests


7/30/18 17:30: Random Vancomycin Level 21.8


7/31/18 03:30: 


White Blood Count 12.6H, Red Blood Count 4.53L, Hemoglobin 12.3L, Hematocrit 

38.5L, Mean Corpuscular Volume 85, Mean Corpuscular Hemoglobin 27.2, Mean 

Corpuscular Hemoglobin Concent 32.0, Red Cell Distribution Width 14.9H, 

Platelet Count 257, Mean Platelet Volume 8.9, Neutrophils (%) (Auto) 70.1, 

Lymphocytes (%) (Auto) 20.3, Monocytes (%) (Auto) 4.4, Eosinophils (%) (Auto) 

4.7H, Basophils (%) (Auto) 0.6, Sodium Level 134L, Potassium Level 3.9, 

Chloride Level 99, Carbon Dioxide Level 25, Anion Gap 10, Blood Urea Nitrogen 9

, Creatinine 0.9, Estimat Glomerular Filtration Rate > 60, Glucose Level 130H, 

Calcium Level 10.2H, Phosphorus Level 2.7, Magnesium Level 1.8, Total Bilirubin 

0.4, Aspartate Amino Transf (AST/SGOT) 30, Alanine Aminotransferase (ALT/SGPT) 

51, Alkaline Phosphatase 111, Total Protein 8.3H, Albumin 3.3L, Globulin 5.0, 

Albumin/Globulin Ratio 0.7L


Height (Feet):  6


Weight (Pounds):  198


General Appearance:  no apparent distress


EENT:  PERRL/EOMI


Neck:  normal alignment


Cardiovascular:  normal peripheral pulses


Respiratory/Chest:  no respiratory distress


Abdomen:  soft











Tejas Gerber MD Jul 31, 2018 14:14

## 2018-08-01 VITALS — DIASTOLIC BLOOD PRESSURE: 70 MMHG | SYSTOLIC BLOOD PRESSURE: 128 MMHG

## 2018-08-01 VITALS — DIASTOLIC BLOOD PRESSURE: 75 MMHG | SYSTOLIC BLOOD PRESSURE: 112 MMHG

## 2018-08-01 VITALS — DIASTOLIC BLOOD PRESSURE: 70 MMHG | SYSTOLIC BLOOD PRESSURE: 118 MMHG

## 2018-08-01 VITALS — DIASTOLIC BLOOD PRESSURE: 76 MMHG | SYSTOLIC BLOOD PRESSURE: 116 MMHG

## 2018-08-01 VITALS — DIASTOLIC BLOOD PRESSURE: 70 MMHG | SYSTOLIC BLOOD PRESSURE: 109 MMHG

## 2018-08-01 VITALS — DIASTOLIC BLOOD PRESSURE: 76 MMHG | SYSTOLIC BLOOD PRESSURE: 123 MMHG

## 2018-08-01 LAB
ADD MANUAL DIFF: NO
ANION GAP SERPL CALC-SCNC: 13 MMOL/L (ref 5–15)
BASOPHILS NFR BLD AUTO: 0.6 % (ref 0–2)
BUN SERPL-MCNC: 11 MG/DL (ref 7–18)
CALCIUM SERPL-MCNC: 10.3 MG/DL (ref 8.5–10.1)
CHLORIDE SERPL-SCNC: 99 MMOL/L (ref 98–107)
CO2 SERPL-SCNC: 22 MMOL/L (ref 21–32)
CREAT SERPL-MCNC: 0.9 MG/DL (ref 0.55–1.3)
EOSINOPHIL NFR BLD AUTO: 5.6 % (ref 0–3)
ERYTHROCYTE [DISTWIDTH] IN BLOOD BY AUTOMATED COUNT: 14.9 % (ref 11.6–14.8)
HCT VFR BLD CALC: 38.2 % (ref 42–52)
HGB BLD-MCNC: 12.3 G/DL (ref 14.2–18)
LYMPHOCYTES NFR BLD AUTO: 24.2 % (ref 20–45)
MCV RBC AUTO: 85 FL (ref 80–99)
MONOCYTES NFR BLD AUTO: 7 % (ref 1–10)
NEUTROPHILS NFR BLD AUTO: 62.6 % (ref 45–75)
PLATELET # BLD: 258 K/UL (ref 150–450)
POTASSIUM SERPL-SCNC: 4 MMOL/L (ref 3.5–5.1)
RBC # BLD AUTO: 4.47 M/UL (ref 4.7–6.1)
SODIUM SERPL-SCNC: 134 MMOL/L (ref 136–145)
WBC # BLD AUTO: 11.3 K/UL (ref 4.8–10.8)

## 2018-08-01 PROCEDURE — B54MZZA ULTRASONOGRAPHY OF RIGHT UPPER EXTREMITY VEINS, GUIDANCE: ICD-10-PCS

## 2018-08-01 PROCEDURE — 05H733Z INSERTION OF INFUSION DEVICE INTO RIGHT AXILLARY VEIN, PERCUTANEOUS APPROACH: ICD-10-PCS

## 2018-08-01 RX ADMIN — CHLORHEXIDINE GLUCONATE SCH APPLIC: 213 SOLUTION TOPICAL at 21:28

## 2018-08-01 RX ADMIN — Medication SCH MLS/HR: at 05:31

## 2018-08-01 RX ADMIN — INSULIN ASPART SCH UNITS: 100 INJECTION, SOLUTION INTRAVENOUS; SUBCUTANEOUS at 23:38

## 2018-08-01 RX ADMIN — INSULIN ASPART SCH UNITS: 100 INJECTION, SOLUTION INTRAVENOUS; SUBCUTANEOUS at 05:53

## 2018-08-01 RX ADMIN — POLYETHYLENE GLYCOL 3350 SCH GM: 17 POWDER, FOR SOLUTION ORAL at 21:31

## 2018-08-01 RX ADMIN — LEVETIRACETAM SCH MG: 100 SOLUTION ORAL at 21:28

## 2018-08-01 RX ADMIN — INSULIN ASPART SCH UNITS: 100 INJECTION, SOLUTION INTRAVENOUS; SUBCUTANEOUS at 13:19

## 2018-08-01 RX ADMIN — ERTAPENEM SODIUM SCH MLS/HR: 1 INJECTION, POWDER, LYOPHILIZED, FOR SOLUTION INTRAMUSCULAR; INTRAVENOUS at 17:04

## 2018-08-01 RX ADMIN — LEVETIRACETAM SCH MG: 100 SOLUTION ORAL at 05:32

## 2018-08-01 RX ADMIN — HEPARIN SODIUM SCH UNITS: 5000 INJECTION INTRAVENOUS; SUBCUTANEOUS at 21:31

## 2018-08-01 RX ADMIN — LEVETIRACETAM SCH MG: 100 SOLUTION ORAL at 13:17

## 2018-08-01 RX ADMIN — HEPARIN SODIUM SCH UNITS: 5000 INJECTION INTRAVENOUS; SUBCUTANEOUS at 09:25

## 2018-08-01 RX ADMIN — INSULIN ASPART SCH UNITS: 100 INJECTION, SOLUTION INTRAVENOUS; SUBCUTANEOUS at 17:05

## 2018-08-01 RX ADMIN — INSULIN ASPART SCH UNITS: 100 INJECTION, SOLUTION INTRAVENOUS; SUBCUTANEOUS at 01:14

## 2018-08-01 NOTE — CARDIOLOGY REPORT
--------------- APPROVED REPORT --------------





EXAM: Two-dimensional and M-mode echocardiogram with Doppler and color 

Doppler.



INDICATION

Vegetation



M-Mode DIMENSIONS 

IVSd1.9 (0.7-1.1cm)Left Atrium (MM)3.1 (1.6-4.0cm)

LVDd3.4 (3.5-5.6cm)Aortic Root3.8 (2.0-3.7cm)

PWd1.5 (0.7-1.1cm)Aortic Cusp Exc.2.1 (1.5-2.0cm)



LVDs2.1 (2.5-4.0cm)

PWs1.6 cm





Technically difficult study due to pts position.

Normal left ventricular chamber size, systolic function and wall motion to extent 

visualized.

Left ventricular ejection fraction estimated to be  55-60 %.

Moderate left ventricular hypertrophy.

No evidence of pericardial effusion. 

All other cardiac chamber sizes are within normal limits. 

Focal aortic valve sclerosis with adequate cusp excursion. Mild aortic root 

dilatation.

Thickened mitral valve leaflets with normal excursion.

Mitral annulus and aortic root calcification.

Pulmonic valve not well visualized.

Normal tricuspid valve structure. 

IVC at normal size with physiologic collapse.

No discrete vegetations seen, however SBE may not be excluded by transthoracic 2-D echo. 

Consider STACI if clinically indicated.



A  color flow and spectral Doppler study was performed and revealed:

Trace mitral regurgitation.

Mitral diastolic velocities suggest reduced left ventricular relaxation c/w mild LV 

diastolic dysfunction (Grade I ). 

Trace tricuspid regurgitation.

Tricuspid  systolic velocities suggests peak right ventricular systolic pressure of  16  

mmHg.

## 2018-08-01 NOTE — GI PROGRESS NOTE
Assessment/Plan


Problems:  


(1) Parastomal hernia


ICD Codes:  K43.5 - Parastomal hernia without obstruction or gangrene


SNOMED:  736559600


Qualifiers:  


   Qualified Codes:  K43.5 - Parastomal hernia without obstruction or gangrene


(2) Stoma malfunction


SNOMED:  424192612


(3) Colostomy in place


ICD Codes:  Z93.3 - Colostomy status


SNOMED:  864378551, 152398511


(4) Vegetative state


ICD Codes:  R40.3 - Persistent vegetative state


SNOMED:  66730395


(5) Diabetes mellitus


ICD Codes:  E11.9 - Type 2 diabetes mellitus without complications


SNOMED:  43040664


(6) Acute on chronic respiratory failure


ICD Codes:  J96.20 - Acute and chronic respiratory failure, unspecified whether 

with hypoxia or hypercapnia


SNOMED:  70398731


(7) Severe sepsis


ICD Codes:  A41.9 - Sepsis, unspecified organism; R65.20 - Severe sepsis 

without septic shock


SNOMED:  81157683


Status:  stable, unchanged


Status Narrative


Discussed with Dr. Pickard.


Assessment/Plan


prolapse stoma assessed >> no s/sx of infection.  no obstruction. 


anemia work up reviewed >> iron deficiency


G tube dependent


hepatitis panel negative


OB negative





supportive care


monitor H&H, prn transfusions


venofer


ppi


GTFs per RD, adv to goal


GT site care daily/prn


abx


bowel regime


fu labs





The patient was seen and examined at bedside and all new and available data was 

reviewed in the patients chart. I agree with the above findings, impression 

and plan.  (Patient seen earlier today. Signature stamp does not reflect 

patient encounter time.). - Miles Pickard MD





Subjective


Subjective


limited





Objective





Last 24 Hour Vital Signs








  Date Time  Temp Pulse Resp B/P (MAP) Pulse Ox O2 Delivery O2 Flow Rate FiO2


 


8/1/18 11:11  82 16     30


 


8/1/18 09:10  84 18     30


 


8/1/18 08:00        30


 


8/1/18 08:00      Mechanical Ventilator  





      Mechanical Ventilator  





      Mechanical Ventilator  


 


8/1/18 08:00 97.7 79 16 112/75 (87) 97   





 97.7       


 


8/1/18 07:58  84      


 


8/1/18 07:00  80 16     30


 


8/1/18 05:24  83 16     30


 


8/1/18 04:00      Mechanical Ventilator  





      Mechanical Ventilator  





      Mechanical Ventilator  


 


8/1/18 04:00        30


 


8/1/18 04:00 98.3 83 16 116/76 (89) 98   





 98.3       


 


8/1/18 04:00  84      


 


8/1/18 02:55  81 17     30


 


8/1/18 00:55  90 17     30


 


8/1/18 00:00        30


 


8/1/18 00:00  82      


 


8/1/18 00:00      Mechanical Ventilator  





      Mechanical Ventilator  





      Mechanical Ventilator  


 


8/1/18 00:00 98.7 81 16 123/76 (92) 99   





 98.7       


 


7/31/18 22:59  83 17     30


 


7/31/18 21:01  88 17     30


 


7/31/18 20:00      Mechanical Ventilator  





      Mechanical Ventilator  





      Mechanical Ventilator  


 


7/31/18 20:00  83      


 


7/31/18 20:00 98.4 84 16 137/78 (97) 100   





 98.4       


 


7/31/18 20:00        30


 


7/31/18 18:53  92 16     30


 


7/31/18 17:12  84 16     30


 


7/31/18 16:00      Mechanical Ventilator  





      Mechanical Ventilator  





      Mechanical Ventilator  


 


7/31/18 16:00 98.1 88 16 114/64 (81) 100   





 98.1       


 


7/31/18 16:00        30


 


7/31/18 15:23  86      


 


7/31/18 15:11  90 16     30


 


7/31/18 13:25  82 16     30


 


7/31/18 12:00        30


 


7/31/18 12:00      Mechanical Ventilator  





      Mechanical Ventilator  





      Mechanical Ventilator  


 


7/31/18 12:00 98.1 100 16 114/75 (88) 100   





 98.1       


 


7/31/18 11:55  87      


 


7/31/18 11:28  85 16     30

















Intake and Output  


 


 7/31/18 8/1/18





 19:00 07:00


 


Intake Total 1080 ml 635 ml


 


Output Total 1900 ml 


 


Balance -820 ml 635 ml


 


  


 


IV Total 180 ml 


 


Tube Feeding 780 ml 585 ml


 


Other 120 ml 50 ml


 


Output Urine Total 1500 ml 


 


Stool Total 400 ml 











Laboratory Tests








Test


  8/1/18


03:35


 


White Blood Count


  11.3 K/UL


(4.8-10.8)  H


 


Red Blood Count


  4.47 M/UL


(4.70-6.10)  L


 


Hemoglobin


  12.3 G/DL


(14.2-18.0)  L


 


Hematocrit


  38.2 %


(42.0-52.0)  L


 


Mean Corpuscular Volume 85 FL (80-99)  


 


Mean Corpuscular Hemoglobin


  27.4 PG


(27.0-31.0)


 


Mean Corpuscular Hemoglobin


Concent 32.1 G/DL


(32.0-36.0)


 


Red Cell Distribution Width


  14.9 %


(11.6-14.8)  H


 


Platelet Count


  258 K/UL


(150-450)


 


Mean Platelet Volume


  9.0 FL


(6.5-10.1)


 


Neutrophils (%) (Auto)


  62.6 %


(45.0-75.0)


 


Lymphocytes (%) (Auto)


  24.2 %


(20.0-45.0)


 


Monocytes (%) (Auto)


  7.0 %


(1.0-10.0)


 


Eosinophils (%) (Auto)


  5.6 %


(0.0-3.0)  H


 


Basophils (%) (Auto)


  0.6 %


(0.0-2.0)


 


Sodium Level


  134 MMOL/L


(136-145)  L


 


Potassium Level


  4.0 MMOL/L


(3.5-5.1)


 


Chloride Level


  99 MMOL/L


()


 


Carbon Dioxide Level


  22 MMOL/L


(21-32)


 


Anion Gap


  13 mmol/L


(5-15)


 


Blood Urea Nitrogen


  11 mg/dL


(7-18)


 


Creatinine


  0.9 MG/DL


(0.55-1.30)


 


Estimat Glomerular Filtration


Rate > 60 mL/min


(>60)


 


Glucose Level


  114 MG/DL


()  H


 


Calcium Level


  10.3 MG/DL


(8.5-10.1)  H








Height (Feet):  6


Weight (Pounds):  206


General Appearance:  WD/WN, no apparent distress, alert


Cardiovascular:  normal rate


Respiratory/Chest:  normal breath sounds, no respiratory distress


Abdominal Exam:  normal bowel sounds, non tender, soft, other - prolapse ostomy


Extremities:  non-tender











Remy Lyons NP Aug 1, 2018 11:21

## 2018-08-01 NOTE — PULMONOLGY CRITICAL CARE NOTE
Critical Care - Asmt/Plan


Problems:  


(1) Acute on chronic respiratory failure


(2) Acute on chronic renal insufficiency


(3) Severe sepsis


(4) Vegetative state


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


(6) Diabetes mellitus


Respiratory:  monitor respiratory rate, adjust FIO2, CXR


Cardiac:  continue to monitor HR/BP


Renal:  F/U I&O, keep IV fluid


Infectious Disease:  check cultures


Gastrointestinal:  continue feedings/current rate


Endocrine:  check HgA1C, continue sliding scale insulin


Hematologic:  monitor H/H, transfuse if hgb<8.5


Neurologic:  PRN Morphine, keep patient comfortable


Affect:  PRN ativan


Prophylaxis:  Heparin


Disposition:  keep in ICU


Notes Reviewed:  internist, renal


Discussed with:  nurses, consultants, 





Critical Care - Objective





Last 24 Hour Vital Signs








  Date Time  Temp Pulse Resp B/P (MAP) Pulse Ox O2 Delivery O2 Flow Rate FiO2


 


8/1/18 09:10  84 18     30


 


8/1/18 08:00        30


 


8/1/18 08:00      Mechanical Ventilator  





      Mechanical Ventilator  





      Mechanical Ventilator  


 


8/1/18 08:00 97.7 79 16 112/75 (87) 97   





 97.7       


 


8/1/18 07:58  84      


 


8/1/18 07:00  80 16     30


 


8/1/18 05:24  83 16     30


 


8/1/18 04:00      Mechanical Ventilator  





      Mechanical Ventilator  





      Mechanical Ventilator  


 


8/1/18 04:00        30


 


8/1/18 04:00 98.3 83 16 116/76 (89) 98   





 98.3       


 


8/1/18 04:00  84      


 


8/1/18 02:55  81 17     30


 


8/1/18 00:55  90 17     30


 


8/1/18 00:00        30


 


8/1/18 00:00  82      


 


8/1/18 00:00      Mechanical Ventilator  





      Mechanical Ventilator  





      Mechanical Ventilator  


 


8/1/18 00:00 98.7 81 16 123/76 (92) 99   





 98.7       


 


7/31/18 22:59  83 17     30


 


7/31/18 21:01  88 17     30


 


7/31/18 20:00      Mechanical Ventilator  





      Mechanical Ventilator  





      Mechanical Ventilator  


 


7/31/18 20:00  83      


 


7/31/18 20:00 98.4 84 16 137/78 (97) 100   





 98.4       


 


7/31/18 20:00        30


 


7/31/18 18:53  92 16     30


 


7/31/18 17:12  84 16     30


 


7/31/18 16:00      Mechanical Ventilator  





      Mechanical Ventilator  





      Mechanical Ventilator  


 


7/31/18 16:00 98.1 88 16 114/64 (81) 100   





 98.1       


 


7/31/18 16:00        30


 


7/31/18 15:23  86      


 


7/31/18 15:11  90 16     30


 


7/31/18 13:25  82 16     30


 


7/31/18 12:00        30


 


7/31/18 12:00      Mechanical Ventilator  





      Mechanical Ventilator  





      Mechanical Ventilator  


 


7/31/18 12:00 98.1 100 16 114/75 (88) 100   





 98.1       


 


7/31/18 11:55  87      


 


7/31/18 11:28  85 16     30








Status:  obtunded


HEENT:  atraumatic


Neck:  full ROM


Lungs:  chest wall tender


Heart:  HR/BP stable, regular


Abdomen:  active bowel sounds


Extremities:  no C/C/E


Micro:





Microbiology








 Date/Time


Source Procedure


Growth Status


 


 


 7/30/18 23:00


Blood Blood Culture - Preliminary Resulted


 


 7/30/18 20:38


Blood Blood Culture - Preliminary


NO GROWTH AFTER 24 HOURS Resulted








Accucheck:  121





Critical Care - Subjective


ROS Limited/Unobtainable:  Yes


Condition:  critical


EKG Rhythm:  Sinus Rhythm


FI02:  30


Vent Support Breath Rate:  16


Vent Support Mode:  AC


Vent Tidal Volume:  600


Sputum Amount:  Small


PEEP:  5.0


PIP:  30


Tube Feeding Amount:  65


I&O:











Intake and Output  


 


 7/31/18 8/1/18





 19:00 07:00


 


Intake Total 1080 ml 635 ml


 


Output Total 1900 ml 


 


Balance -820 ml 635 ml


 


  


 


IV Total 180 ml 


 


Tube Feeding 780 ml 585 ml


 


Other 120 ml 50 ml


 


Output Urine Total 1500 ml 


 


Stool Total 400 ml 








Labs:





Laboratory Tests








Test


  8/1/18


03:35


 


White Blood Count


  11.3 K/UL


(4.8-10.8)  H


 


Red Blood Count


  4.47 M/UL


(4.70-6.10)  L


 


Hemoglobin


  12.3 G/DL


(14.2-18.0)  L


 


Hematocrit


  38.2 %


(42.0-52.0)  L


 


Mean Corpuscular Volume 85 FL (80-99)  


 


Mean Corpuscular Hemoglobin


  27.4 PG


(27.0-31.0)


 


Mean Corpuscular Hemoglobin


Concent 32.1 G/DL


(32.0-36.0)


 


Red Cell Distribution Width


  14.9 %


(11.6-14.8)  H


 


Platelet Count


  258 K/UL


(150-450)


 


Mean Platelet Volume


  9.0 FL


(6.5-10.1)


 


Neutrophils (%) (Auto)


  62.6 %


(45.0-75.0)


 


Lymphocytes (%) (Auto)


  24.2 %


(20.0-45.0)


 


Monocytes (%) (Auto)


  7.0 %


(1.0-10.0)


 


Eosinophils (%) (Auto)


  5.6 %


(0.0-3.0)  H


 


Basophils (%) (Auto)


  0.6 %


(0.0-2.0)


 


Sodium Level


  134 MMOL/L


(136-145)  L


 


Potassium Level


  4.0 MMOL/L


(3.5-5.1)


 


Chloride Level


  99 MMOL/L


()


 


Carbon Dioxide Level


  22 MMOL/L


(21-32)


 


Anion Gap


  13 mmol/L


(5-15)


 


Blood Urea Nitrogen


  11 mg/dL


(7-18)


 


Creatinine


  0.9 MG/DL


(0.55-1.30)


 


Estimat Glomerular Filtration


Rate > 60 mL/min


(>60)


 


Glucose Level


  114 MG/DL


()  H


 


Calcium Level


  10.3 MG/DL


(8.5-10.1)  H

















Yury Pena MD Aug 1, 2018 10:30

## 2018-08-01 NOTE — DIAGNOSTIC IMAGING REPORT
Indications: Needs long-term IV access

 

Technique: Procedure performed at bedside. Ultrasound confirms patent compressible

right brachial vein. Total sterile technique, including  sterile probe cover and

sterile gel, hat, mask, sterile gown, large sterile drape, and preparation with 2%

chlorhexidine utilized. Local anesthesia with 1% lidocaine. Under real-time

ultrasound guidance, puncture brachial vein using 21-gauge needle, documented and

archived, passage 0.018 guidewire under direct fluoroscopy, which would not pass

centrally beyond about 35 cm. Exchange for 5 [place] peel-away sheath. 5 [place]

dual-lumen power PICC cut to originally to 41 cm. It was inserted through the

peel-away sheath. Multiple manipulations attempted in order to advance the catheter,

but it would not advance beyond about 20 cm, presumably due to central venoocclusive

disease. The catheter was then re-cut to 20 cm. Peel-away sheath and guidewire

removed. Catheter fixed to the skin. Both catheter ports aspirated and flushed.

Patient tolerated procedure well, without immediate complication. Follow-up chest

radiograph documents catheter tip position at the level of the right axillary vein. 

 

Impression: Attempted placement of right arm PICC, cut short due to limited filling

of an centrally, presumed central venoocclusive disease. Catheter cut short to 20 cm,

suitable for use as a midline only.

## 2018-08-01 NOTE — GENERAL SURGERY PROGRESS NOTE
General Surgery-Progress Note


Subjective


Additional Comments


no acute events.  stable.  ostomy stable





Objective





Last 24 Hour Vital Signs








  Date Time  Temp Pulse Resp B/P (MAP) Pulse Ox O2 Delivery O2 Flow Rate FiO2


 


8/1/18 11:11  82 16     30


 


8/1/18 09:10  84 18     30


 


8/1/18 08:00        30


 


8/1/18 08:00      Mechanical Ventilator  





      Mechanical Ventilator  





      Mechanical Ventilator  


 


8/1/18 08:00 97.7 79 16 112/75 (87) 97   





 97.7       


 


8/1/18 07:58  84      


 


8/1/18 07:00  80 16     30


 


8/1/18 05:24  83 16     30


 


8/1/18 04:00      Mechanical Ventilator  





      Mechanical Ventilator  





      Mechanical Ventilator  


 


8/1/18 04:00        30


 


8/1/18 04:00 98.3 83 16 116/76 (89) 98   





 98.3       


 


8/1/18 04:00  84      


 


8/1/18 02:55  81 17     30


 


8/1/18 00:55  90 17     30


 


8/1/18 00:00        30


 


8/1/18 00:00  82      


 


8/1/18 00:00      Mechanical Ventilator  





      Mechanical Ventilator  





      Mechanical Ventilator  


 


8/1/18 00:00 98.7 81 16 123/76 (92) 99   





 98.7       


 


7/31/18 22:59  83 17     30


 


7/31/18 21:01  88 17     30


 


7/31/18 20:00      Mechanical Ventilator  





      Mechanical Ventilator  





      Mechanical Ventilator  


 


7/31/18 20:00  83      


 


7/31/18 20:00 98.4 84 16 137/78 (97) 100   





 98.4       


 


7/31/18 20:00        30


 


7/31/18 18:53  92 16     30


 


7/31/18 17:12  84 16     30


 


7/31/18 16:00      Mechanical Ventilator  





      Mechanical Ventilator  





      Mechanical Ventilator  


 


7/31/18 16:00 98.1 88 16 114/64 (81) 100   





 98.1       


 


7/31/18 16:00        30


 


7/31/18 15:23  86      


 


7/31/18 15:11  90 16     30


 


7/31/18 13:25  82 16     30








I&O











Intake and Output  


 


 7/31/18 8/1/18





 19:00 07:00


 


Intake Total 1080 ml 635 ml


 


Output Total 1900 ml 


 


Balance -820 ml 635 ml


 


  


 


IV Total 180 ml 


 


Tube Feeding 780 ml 585 ml


 


Other 120 ml 50 ml


 


Output Urine Total 1500 ml 


 


Stool Total 400 ml 








Wound:  clean


Drains:  other


Cardiovascular:  RSR


Respiratory:  clear


Abdomen:  soft, distended, non-tender, present bowel sounds, other - ostomy 

viable and functional





Laboratory Tests








Test


  8/1/18


03:35


 


White Blood Count


  11.3 K/UL


(4.8-10.8)  H


 


Red Blood Count


  4.47 M/UL


(4.70-6.10)  L


 


Hemoglobin


  12.3 G/DL


(14.2-18.0)  L


 


Hematocrit


  38.2 %


(42.0-52.0)  L


 


Mean Corpuscular Volume 85 FL (80-99)  


 


Mean Corpuscular Hemoglobin


  27.4 PG


(27.0-31.0)


 


Mean Corpuscular Hemoglobin


Concent 32.1 G/DL


(32.0-36.0)


 


Red Cell Distribution Width


  14.9 %


(11.6-14.8)  H


 


Platelet Count


  258 K/UL


(150-450)


 


Mean Platelet Volume


  9.0 FL


(6.5-10.1)


 


Neutrophils (%) (Auto)


  62.6 %


(45.0-75.0)


 


Lymphocytes (%) (Auto)


  24.2 %


(20.0-45.0)


 


Monocytes (%) (Auto)


  7.0 %


(1.0-10.0)


 


Eosinophils (%) (Auto)


  5.6 %


(0.0-3.0)  H


 


Basophils (%) (Auto)


  0.6 %


(0.0-2.0)


 


Sodium Level


  134 MMOL/L


(136-145)  L


 


Potassium Level


  4.0 MMOL/L


(3.5-5.1)


 


Chloride Level


  99 MMOL/L


()


 


Carbon Dioxide Level


  22 MMOL/L


(21-32)


 


Anion Gap


  13 mmol/L


(5-15)


 


Blood Urea Nitrogen


  11 mg/dL


(7-18)


 


Creatinine


  0.9 MG/DL


(0.55-1.30)


 


Estimat Glomerular Filtration


Rate > 60 mL/min


(>60)


 


Glucose Level


  114 MG/DL


()  H


 


Calcium Level


  10.3 MG/DL


(8.5-10.1)  H











Plan


Problems:  


(1) Stoma malfunction


(2) Parastomal hernia


Assessment & Plan:  52M with history of prior loop colostomy.  proximal loop 

stable.  distal with impacted contrast stool but stable. 


parastomal hernia with small bowel contents in hernia.  no obstruction noted at 

this time. 


larger appearance of patients stoma is because location and use of loop 

colostomy in hepatic flexure.  it is otherwise viable and stable.  





Would Highly recommend fixing parastomal hernia but can be done electively.


if obstructs will need urgent repair.  currently non obstructive


will monitor and follow with recs. 


bowel care 





spoke with wife today and explained above.  will plan for d/c soon.  if desired 

will plan for elective repair at later time once not acutely sick in the 

hospital.  


thank you for this consultation. 





(3) Severe sepsis











Brian Christina Aug 1, 2018 12:01

## 2018-08-01 NOTE — NEPHROLOGY PROGRESS NOTE
Assessment/Plan


Assessment/Plan


1. BRAYDON- resolved, 


   - OK for DC from renal point


2. Septis- resolved on Abx


3. Chronic Resp FL- trached on vent


4. Hyponatremia - Na at 134. 


   - avoid excess free water


5. SZ- Keppra





Subjective


Date patient seen:  Aug 1, 2018


Time patient seen:  09:02


ROS Limited/Unobtainable:  Yes


Allergies:  


Coded Allergies:  


     AZTREONAM (Verified  Allergy, Unknown, 7/23/18)


All Systems:  reviewed and negative except above


Subjective


Patient stable. Trached on vent





Objective





Last 24 Hour Vital Signs








  Date Time  Temp Pulse Resp B/P (MAP) Pulse Ox O2 Delivery O2 Flow Rate FiO2


 


8/1/18 08:00        30


 


8/1/18 08:00      Mechanical Ventilator  





      Mechanical Ventilator  





      Mechanical Ventilator  


 


8/1/18 08:00 97.7 79 16 112/75 (87) 97   





 97.7       


 


8/1/18 07:00  80 16     30


 


8/1/18 05:24  83 16     30


 


8/1/18 04:00      Mechanical Ventilator  





      Mechanical Ventilator  





      Mechanical Ventilator  


 


8/1/18 04:00        30


 


8/1/18 04:00 98.3 83 16 116/76 (89) 98   





 98.3       


 


8/1/18 04:00  84      


 


8/1/18 02:55  81 17     30


 


8/1/18 00:55  90 17     30


 


8/1/18 00:00        30


 


8/1/18 00:00  82      


 


8/1/18 00:00      Mechanical Ventilator  





      Mechanical Ventilator  





      Mechanical Ventilator  


 


8/1/18 00:00 98.7 81 16 123/76 (92) 99   





 98.7       


 


7/31/18 22:59  83 17     30


 


7/31/18 21:01  88 17     30


 


7/31/18 20:00      Mechanical Ventilator  





      Mechanical Ventilator  





      Mechanical Ventilator  


 


7/31/18 20:00  83      


 


7/31/18 20:00 98.4 84 16 137/78 (97) 100   





 98.4       


 


7/31/18 20:00        30


 


7/31/18 18:53  92 16     30


 


7/31/18 17:12  84 16     30


 


7/31/18 16:00      Mechanical Ventilator  





      Mechanical Ventilator  





      Mechanical Ventilator  


 


7/31/18 16:00 98.1 88 16 114/64 (81) 100   





 98.1       


 


7/31/18 16:00        30


 


7/31/18 15:23  86      


 


7/31/18 15:11  90 16     30


 


7/31/18 13:25  82 16     30


 


7/31/18 12:00        30


 


7/31/18 12:00      Mechanical Ventilator  





      Mechanical Ventilator  





      Mechanical Ventilator  


 


7/31/18 12:00 98.1 100 16 114/75 (88) 100   





 98.1       


 


7/31/18 11:55  87      


 


7/31/18 11:28  85 16     30


 


7/31/18 09:29  90 19     30

















Intake and Output  


 


 7/31/18 8/1/18





 19:00 07:00


 


Intake Total 1080 ml 635 ml


 


Output Total 1900 ml 


 


Balance -820 ml 635 ml


 


  


 


IV Total 180 ml 


 


Tube Feeding 780 ml 585 ml


 


Other 120 ml 50 ml


 


Output Urine Total 1500 ml 


 


Stool Total 400 ml 








Laboratory Tests


8/1/18 03:35: 


White Blood Count 11.3H, Red Blood Count 4.47L, Hemoglobin 12.3L, Hematocrit 

38.2L, Mean Corpuscular Volume 85, Mean Corpuscular Hemoglobin 27.4, Mean 

Corpuscular Hemoglobin Concent 32.1, Red Cell Distribution Width 14.9H, 

Platelet Count 258, Mean Platelet Volume 9.0, Neutrophils (%) (Auto) 62.6, 

Lymphocytes (%) (Auto) 24.2, Monocytes (%) (Auto) 7.0, Eosinophils (%) (Auto) 

5.6H, Basophils (%) (Auto) 0.6, Sodium Level 134L, Potassium Level 4.0, 

Chloride Level 99, Carbon Dioxide Level 22, Anion Gap 13, Blood Urea Nitrogen 11

, Creatinine 0.9, Estimat Glomerular Filtration Rate > 60, Glucose Level 114H, 

Calcium Level 10.3H


Height (Feet):  6


Weight (Pounds):  206


General Appearance:  WD/WN, no apparent distress


EENT:  PERRL/EOMI


Neck:  non-tender, normal alignment


Cardiovascular:  normal peripheral pulses, normal rate


Respiratory/Chest:  rhonchi - bilaterally


Abdomen:  normal bowel sounds, non tender, soft


Edema:  no edema noted Arm (L), no edema noted Arm (R), no edema noted Leg (L), 

no edema noted Leg (R), no edema noted Pedal (L), no edema noted Pedal (R), no 

edema noted Generalized











Jewel Templeton M.D. Aug 1, 2018 09:06

## 2018-08-01 NOTE — GENERAL PROGRESS NOTE
Assessment/Plan


Status:  unchanged


Assessment/Plan


# Anemia of chronic disease. No hemolysis, peripheral smear reviewed, ferritin 

is elevated.


--> Continue to closely monitor for improvement. 


--> Anemia w/u has been reviewed. Will trend cbc daily. 


--> Hgb goal >7


# Leukocytosis - Sepsis with elevated temperature of 103 degrees Fahrenheit.  


--> Lactic acid pending, was elevated upon admission. 


--> Pt on antibiotics, has received a dose of Zosyn and currently is on 

vancomycin q.12 h.  


--> Currently afebrile.


--> Appreciate Infectious Disease consult.


# Acute kidney injury.  


--> Currently on IV fluids, IV bolus given to see if the patient responds along 

with IV pressor as needed.  


--> Closely monitor with Nephrology team.


# Septic shock, use antibiotic per ID services.


--> potential pna, bacteremia


# Respiratory failure, status post tracheostomy, on mechanical ventilation per 

Dr. Pena.


# Hypercalcemia. Monitor PTH and calcium level





The time the note was entered does not necessarily correspond to the time the 

patient was seen.





Subjective


Date patient seen:  Aug 1, 2018


ROS Limited/Unobtainable:  Yes


Hematologic/Lymphatic:  Reports: anemia


Allergies:  


Coded Allergies:  


     AZTREONAM (Verified  Allergy, Unknown, 7/23/18)


All Systems:  reviewed and negative except above


Subjective


Pt remains obtunded, on trach/vent. No acute events. H/H stable. No recent 

seizure activity.





Objective





Last 24 Hour Vital Signs








  Date Time  Temp Pulse Resp B/P (MAP) Pulse Ox O2 Delivery O2 Flow Rate FiO2


 


8/1/18 17:29  91 16     30


 


8/1/18 16:00        30


 


8/1/18 16:00 97.9 82 16 118/70 (86) 99   





 97.9       


 


8/1/18 16:00      Mechanical Ventilator  





      Mechanical Ventilator  





      Mechanical Ventilator  


 


8/1/18 15:26  83      


 


8/1/18 15:13  86 19     30


 


8/1/18 13:09  84 17     30


 


8/1/18 12:00      Mechanical Ventilator  





      Mechanical Ventilator  





      Mechanical Ventilator  


 


8/1/18 12:00 97.7 84 16 109/70 (83) 97   





 97.7       


 


8/1/18 12:00        30


 


8/1/18 11:47  87      


 


8/1/18 11:11  82 16     30


 


8/1/18 09:10  84 18     30


 


8/1/18 08:00        30


 


8/1/18 08:00      Mechanical Ventilator  





      Mechanical Ventilator  





      Mechanical Ventilator  


 


8/1/18 08:00 97.7 79 16 112/75 (87) 97   





 97.7       


 


8/1/18 07:58  84      


 


8/1/18 07:00  80 16     30


 


8/1/18 05:24  83 16     30


 


8/1/18 04:00      Mechanical Ventilator  





      Mechanical Ventilator  





      Mechanical Ventilator  


 


8/1/18 04:00        30


 


8/1/18 04:00 98.3 83 16 116/76 (89) 98   





 98.3       


 


8/1/18 04:00  84      


 


8/1/18 02:55  81 17     30


 


8/1/18 00:55  90 17     30


 


8/1/18 00:00        30


 


8/1/18 00:00  82      


 


8/1/18 00:00      Mechanical Ventilator  





      Mechanical Ventilator  





      Mechanical Ventilator  


 


8/1/18 00:00 98.7 81 16 123/76 (92) 99   





 98.7       


 


7/31/18 22:59  83 17     30


 


7/31/18 21:01  88 17     30


 


7/31/18 20:00      Mechanical Ventilator  





      Mechanical Ventilator  





      Mechanical Ventilator  


 


7/31/18 20:00  83      


 


7/31/18 20:00 98.4 84 16 137/78 (97) 100   





 98.4       


 


7/31/18 20:00        30


 


7/31/18 18:53  92 16     30

















Intake and Output  


 


 7/31/18 8/1/18





 19:00 07:00


 


Intake Total 1080 ml 635 ml


 


Output Total 1900 ml 


 


Balance -820 ml 635 ml


 


  


 


IV Total 180 ml 


 


Tube Feeding 780 ml 585 ml


 


Other 120 ml 50 ml


 


Output Urine Total 1500 ml 


 


Stool Total 400 ml 








Laboratory Tests


8/1/18 03:35: 


White Blood Count 11.3H, Red Blood Count 4.47L, Hemoglobin 12.3L, Hematocrit 

38.2L, Mean Corpuscular Volume 85, Mean Corpuscular Hemoglobin 27.4, Mean 

Corpuscular Hemoglobin Concent 32.1, Red Cell Distribution Width 14.9H, 

Platelet Count 258, Mean Platelet Volume 9.0, Neutrophils (%) (Auto) 62.6, 

Lymphocytes (%) (Auto) 24.2, Monocytes (%) (Auto) 7.0, Eosinophils (%) (Auto) 

5.6H, Basophils (%) (Auto) 0.6, Sodium Level 134L, Potassium Level 4.0, 

Chloride Level 99, Carbon Dioxide Level 22, Anion Gap 13, Blood Urea Nitrogen 11

, Creatinine 0.9, Estimat Glomerular Filtration Rate > 60, Glucose Level 114H, 

Calcium Level 10.3H


Height (Feet):  6


Weight (Pounds):  206


General Appearance:  no apparent distress, lethargic


EENT:  PERRL/EOMI


Neck:  normal alignment


Cardiovascular:  normal peripheral pulses


Respiratory/Chest:  no respiratory distress


Abdomen:  soft











Tejas Gerber MD Aug 1, 2018 18:11

## 2018-08-01 NOTE — INFECTIOUS DISEASES PROG NOTE
Assessment/Plan


Assessment/Plan


Sepsis, resolving- 2ry to UTI and Bacteremia- ?pyelo. Possible PNA


 -u/a wbc 40-60, nit neg, leuk +2; ucx >100k E. aerogenes (S cefepime, cipro/

levo; R Zosyn)


 -BCX 4/4 E. aerogenes, prob Amp C (S cefepime, cipro/levo; R Zosyn), P. 

mirabilis ESBL (S Zosyn, Ertapenem); repeta 7/26 1/4 CoNS (suspect contaminant)

, 7/28 Staph f/u final results if CoNS may need TTE and IE work up.


  -CXR 7/27: Increased left pleural effusion, over 3 days. Overall decreased 

interstitial congestion. Right infrahilar atelectasis


 -CXR: Cardiomegaly. Mild interstitial congestion. Suspect small bilateral 

pleural effusions


 -CT abd/p: Right lower quadrant double barrel colostomy, as described. Small 

peristomal hernia contains bowel loops without evidence of obstruction or 

strangulation. Left renal staghorn calculus. Bilateral intrarenal calyceal 

calculi. Borderline hydronephrosis on the right without evidence of downstream 

obstructive lesion. May indicate mild ureteral pelvic junction obstruction. 

Somewhat atrophic left kidney. Inspissated contrast ball within the distal 

sigmoid colon. Gastrostomy in good position. Nonspecific bilateral perinephric 

fat stranding, could indicate pyelonephritis or could be chronic. Guzmán 

catheter in place. Apparent bladder wall thickening, possibly an artifact of 

under distention but cystitis is not excludable, particularly in view of mild 

perivesical fat stranding. Bilateral pulmonary parenchymal atelectasis and 

consolidation


  -Blood Cx from PICC CoNS 2/2 bottles Peripheral Neg- (May be contaminant but 

will repeat blood Cx given new leukocytosis if positive will need ECHO.


  - Blood Cx 7/30/18 - GPC - Will need TTE for IE work up and the PICC removed.


 





Fever/Leukocytosis; increasing WBCs - Re-evaluate lines, Diarrhea? C. dif? 


BRAYDON, improving


Lactic acidosis, resolved





chronic resp failure trach/vent dependant


BPH


GERD


 HTN


DM2


seizure disorder


colostomy


 s/p GT 


constipation





VRE and MRSA colonized


 


Plan:


- Continue empiric IV Vancomycin #6 for bacteremia.





-Continue Ertapenem #6/10 for Amp-C Enterobacter and ESBL P.mirabilis 

bacteremia ; will treat for 10-14 days 


    -7/27 SP Zosyn #4





-f/u sets of Bcx for 7/30/18 - GPC per lab


-TTE to r/o IE as blood cultures persistently positive


- Repeat blood Cx today to document clearance


- Will likely need the PICC removed


-Monitor CBC/CMP, temperatures








Thank you for this consultation. Will continue to follow along with you.





Subjective


Allergies:  


Coded Allergies:  


     AZTREONAM (Verified  Allergy, Unknown, 7/23/18)


Subjective


No acute events overnight, Patient PEG and Trached


On Vent. 30% O2 and PEEP of 5


Called Micro for update on cultures from 7/30/18 There are some GPC on the 

plates





Objective


Vital Signs





Last 24 Hour Vital Signs








  Date Time  Temp Pulse Resp B/P (MAP) Pulse Ox O2 Delivery O2 Flow Rate FiO2


 


8/1/18 07:00  80 16     30


 


8/1/18 05:24  83 16     30


 


8/1/18 04:00      Mechanical Ventilator  





      Mechanical Ventilator  





      Mechanical Ventilator  


 


8/1/18 04:00        30


 


8/1/18 04:00 98.3 83 16 116/76 (89) 98   





 98.3       


 


8/1/18 04:00  84      


 


8/1/18 02:55  81 17     30


 


8/1/18 00:55  90 17     30


 


8/1/18 00:00        30


 


8/1/18 00:00  82      


 


8/1/18 00:00      Mechanical Ventilator  





      Mechanical Ventilator  





      Mechanical Ventilator  


 


8/1/18 00:00 98.7 81 16 123/76 (92) 99   





 98.7       


 


7/31/18 22:59  83 17     30


 


7/31/18 21:01  88 17     30


 


7/31/18 20:00      Mechanical Ventilator  





      Mechanical Ventilator  





      Mechanical Ventilator  


 


7/31/18 20:00  83      


 


7/31/18 20:00 98.4 84 16 137/78 (97) 100   





 98.4       


 


7/31/18 20:00        30


 


7/31/18 18:53  92 16     30


 


7/31/18 17:12  84 16     30


 


7/31/18 16:00      Mechanical Ventilator  





      Mechanical Ventilator  





      Mechanical Ventilator  


 


7/31/18 16:00 98.1 88 16 114/64 (81) 100   





 98.1       


 


7/31/18 16:00        30


 


7/31/18 15:23  86      


 


7/31/18 15:11  90 16     30


 


7/31/18 13:25  82 16     30


 


7/31/18 12:00        30


 


7/31/18 12:00      Mechanical Ventilator  





      Mechanical Ventilator  





      Mechanical Ventilator  


 


7/31/18 12:00 98.1 100 16 114/75 (88) 100   





 98.1       


 


7/31/18 11:55  87      


 


7/31/18 11:28  85 16     30


 


7/31/18 09:29  90 19     30


 


7/31/18 08:00        30


 


7/31/18 08:00 97.2 81 18 114/81 (92) 100   





 97.2       


 


7/31/18 08:00      Mechanical Ventilator  





      Mechanical Ventilator  





      Mechanical Ventilator  


 


7/31/18 07:50  86      








Height (Feet):  6


Weight (Pounds):  206


Objective


GENERAL:  No overt distress. Opens eyes


HEENT:  NCAT, DMM,  No lymphadenopathy noted.


Tracheostomy noted.


PULM: CTAB


CARDIOVASCULAR:  RRR, S1 and S2.  Tachycardic.  No rubs or gallops.


PULMONARY:  Mild diffuse expiratory rhonchi with basilar rales.


ABDOMEN:  Obese and nondistended.  The G-tube and stoma noted.


EXTREMITIES:  No edema.  Fair pedal pulses.





Microbiology








 Date/Time


Source Procedure


Growth Status


 


 


 7/30/18 23:00


Blood Blood Culture - Preliminary


NO GROWTH AFTER 24 HOURS Resulted


 


 7/30/18 20:38


Blood Blood Culture - Preliminary


NO GROWTH AFTER 24 HOURS Resulted











Laboratory Tests








Test


  8/1/18


03:35


 


White Blood Count


  11.3 K/UL


(4.8-10.8)  H


 


Red Blood Count


  4.47 M/UL


(4.70-6.10)  L


 


Hemoglobin


  12.3 G/DL


(14.2-18.0)  L


 


Hematocrit


  38.2 %


(42.0-52.0)  L


 


Mean Corpuscular Volume 85 FL (80-99)  


 


Mean Corpuscular Hemoglobin


  27.4 PG


(27.0-31.0)


 


Mean Corpuscular Hemoglobin


Concent 32.1 G/DL


(32.0-36.0)


 


Red Cell Distribution Width


  14.9 %


(11.6-14.8)  H


 


Platelet Count


  258 K/UL


(150-450)


 


Mean Platelet Volume


  9.0 FL


(6.5-10.1)


 


Neutrophils (%) (Auto)


  62.6 %


(45.0-75.0)


 


Lymphocytes (%) (Auto)


  24.2 %


(20.0-45.0)


 


Monocytes (%) (Auto)


  7.0 %


(1.0-10.0)


 


Eosinophils (%) (Auto)


  5.6 %


(0.0-3.0)  H


 


Basophils (%) (Auto)


  0.6 %


(0.0-2.0)


 


Sodium Level


  134 MMOL/L


(136-145)  L


 


Potassium Level


  4.0 MMOL/L


(3.5-5.1)


 


Chloride Level


  99 MMOL/L


()


 


Carbon Dioxide Level


  22 MMOL/L


(21-32)


 


Anion Gap


  13 mmol/L


(5-15)


 


Blood Urea Nitrogen


  11 mg/dL


(7-18)


 


Creatinine


  0.9 MG/DL


(0.55-1.30)


 


Estimat Glomerular Filtration


Rate > 60 mL/min


(>60)


 


Glucose Level


  114 MG/DL


()  H


 


Calcium Level


  10.3 MG/DL


(8.5-10.1)  H











Current Medications








 Medications


  (Trade)  Dose


 Ordered  Sig/Jan


 Route


 PRN Reason  Start Time


 Stop Time Status Last Admin


Dose Admin


 


 Acetaminophen


  (Tylenol)  650 mg  Q4H  PRN


 ORAL


 FEVER  7/26/18 14:00


 8/23/18 09:59   


 


 


 Baclofen


  (Lioresal)  10 mg  EVERY 8  HOURS


 GT


   7/30/18 14:00


 8/23/18 12:59  8/1/18 05:32


 


 


 Chlorhexidine


 Gluconate


  (Elaine-Hex 2%)  1 applic  DAILY@2000


 TOPIC


   7/26/18 20:00


 8/23/18 19:59  7/31/18 21:33


 


 


 Dextrose


  (Dextrose 50%)  25 ml  STAT  PRN


 IV


 Hypoglycemia  7/27/18 08:45


 8/24/18 08:44   


 


 


 Dextrose


  (Dextrose 50%)  50 ml  STAT  PRN


 IV


 Hypoglycemia  7/27/18 08:45


 8/24/18 08:44   


 


 


 Ertapenem 1 gm/


 Sodium Chloride  55 ml @ 


 110 mls/hr  Q24H


 IVPB


   7/27/18 15:00


 8/6/18 14:59  7/31/18 15:24


 


 


 Heparin Sodium


  (Porcine)


  (Heparin 5000


 units/ml)  5,000 units  EVERY 12  HOURS


 SUBQ


   7/26/18 21:00


 8/23/18 20:59  7/31/18 21:35


 


 


 Insulin Aspart


  (NovoLOG)    Q6HR


 SUBQ


   7/26/18 18:00


 8/24/18 11:29  8/1/18 05:53


 


 


 Lansoprazole


  (Prevacid)  30 mg  DAILY


 GT


   7/27/18 09:00


 8/25/18 08:59  7/31/18 08:29


 


 


 Levetiracetam


  (Keppra)  1,000 mg  Q8HR


 GT


   7/26/18 14:00


 8/23/18 12:59  8/1/18 05:32


 


 


 Ondansetron HCl


  (Zofran)  4 mg  Q6H  PRN


 IVP


 Nausea & Vomiting  7/26/18 16:00


 8/23/18 09:59   


 


 


 Polyethylene


 Glycol


  (Miralax)  17 gm  BEDTIME


 GT


   7/30/18 21:00


 8/24/18 20:59  7/31/18 21:33


 


 


 Vancomycin HCl


  (Vanco rx to


 dose)  1 ea  DAILY  PRN


 MISC


 PER RX PROTOCOL  7/28/18 13:15


 8/27/18 13:14   


 


 


 Vancomycin HCl/


 Dextrose  250 ml @ 


 125 mls/hr  Q24H


 IVPB


   7/31/18 06:00


 8/5/18 05:59  8/1/18 05:31


 

















Robert Williamson M.D. Aug 1, 2018 07:38

## 2018-08-02 VITALS — DIASTOLIC BLOOD PRESSURE: 77 MMHG | SYSTOLIC BLOOD PRESSURE: 106 MMHG

## 2018-08-02 VITALS — DIASTOLIC BLOOD PRESSURE: 66 MMHG | SYSTOLIC BLOOD PRESSURE: 123 MMHG

## 2018-08-02 VITALS — SYSTOLIC BLOOD PRESSURE: 112 MMHG | DIASTOLIC BLOOD PRESSURE: 62 MMHG

## 2018-08-02 VITALS — SYSTOLIC BLOOD PRESSURE: 124 MMHG | DIASTOLIC BLOOD PRESSURE: 83 MMHG

## 2018-08-02 VITALS — DIASTOLIC BLOOD PRESSURE: 73 MMHG | SYSTOLIC BLOOD PRESSURE: 141 MMHG

## 2018-08-02 VITALS — DIASTOLIC BLOOD PRESSURE: 61 MMHG | SYSTOLIC BLOOD PRESSURE: 111 MMHG

## 2018-08-02 LAB
ADD MANUAL DIFF: NO
ALBUMIN SERPL-MCNC: 3.6 G/DL (ref 3.4–5)
ALBUMIN/GLOB SERPL: 0.7 {RATIO} (ref 1–2.7)
ALP SERPL-CCNC: 133 U/L (ref 46–116)
ALT SERPL-CCNC: 44 U/L (ref 12–78)
ANION GAP SERPL CALC-SCNC: 13 MMOL/L (ref 5–15)
AST SERPL-CCNC: 22 U/L (ref 15–37)
BASOPHILS NFR BLD AUTO: 0.8 % (ref 0–2)
BILIRUB SERPL-MCNC: 0.5 MG/DL (ref 0.2–1)
BUN SERPL-MCNC: 12 MG/DL (ref 7–18)
CALCIUM SERPL-MCNC: 10.2 MG/DL (ref 8.5–10.1)
CHLORIDE SERPL-SCNC: 98 MMOL/L (ref 98–107)
CO2 SERPL-SCNC: 22 MMOL/L (ref 21–32)
CREAT SERPL-MCNC: 1 MG/DL (ref 0.55–1.3)
EOSINOPHIL NFR BLD AUTO: 4.4 % (ref 0–3)
ERYTHROCYTE [DISTWIDTH] IN BLOOD BY AUTOMATED COUNT: 15.1 % (ref 11.6–14.8)
GLOBULIN SER-MCNC: 5.4 G/DL
HCT VFR BLD CALC: 40 % (ref 42–52)
HGB BLD-MCNC: 12.7 G/DL (ref 14.2–18)
LYMPHOCYTES NFR BLD AUTO: 23.8 % (ref 20–45)
MCV RBC AUTO: 85 FL (ref 80–99)
MONOCYTES NFR BLD AUTO: 5.1 % (ref 1–10)
NEUTROPHILS NFR BLD AUTO: 66 % (ref 45–75)
PHOSPHATE SERPL-MCNC: 2.6 MG/DL (ref 2.5–4.9)
PLATELET # BLD: 270 K/UL (ref 150–450)
POTASSIUM SERPL-SCNC: 4.2 MMOL/L (ref 3.5–5.1)
RBC # BLD AUTO: 4.69 M/UL (ref 4.7–6.1)
SODIUM SERPL-SCNC: 133 MMOL/L (ref 136–145)
WBC # BLD AUTO: 10.3 K/UL (ref 4.8–10.8)

## 2018-08-02 RX ADMIN — INSULIN ASPART SCH UNITS: 100 INJECTION, SOLUTION INTRAVENOUS; SUBCUTANEOUS at 17:29

## 2018-08-02 RX ADMIN — HEPARIN SODIUM SCH UNITS: 5000 INJECTION INTRAVENOUS; SUBCUTANEOUS at 08:30

## 2018-08-02 RX ADMIN — INSULIN ASPART SCH UNITS: 100 INJECTION, SOLUTION INTRAVENOUS; SUBCUTANEOUS at 12:03

## 2018-08-02 RX ADMIN — INSULIN ASPART SCH UNITS: 100 INJECTION, SOLUTION INTRAVENOUS; SUBCUTANEOUS at 23:26

## 2018-08-02 RX ADMIN — LEVETIRACETAM SCH MG: 100 SOLUTION ORAL at 21:35

## 2018-08-02 RX ADMIN — CHLORHEXIDINE GLUCONATE SCH APPLIC: 213 SOLUTION TOPICAL at 20:54

## 2018-08-02 RX ADMIN — LEVETIRACETAM SCH MG: 100 SOLUTION ORAL at 05:56

## 2018-08-02 RX ADMIN — INSULIN ASPART SCH UNITS: 100 INJECTION, SOLUTION INTRAVENOUS; SUBCUTANEOUS at 05:59

## 2018-08-02 RX ADMIN — LEVETIRACETAM SCH MG: 100 SOLUTION ORAL at 14:00

## 2018-08-02 RX ADMIN — POLYETHYLENE GLYCOL 3350 SCH GM: 17 POWDER, FOR SOLUTION ORAL at 20:54

## 2018-08-02 RX ADMIN — VANCOMYCIN HCL-SODIUM CHLORIDE IV SOLN 1.25 GM/250ML-0.9% SCH MLS/HR: 1.25-0.9/25 SOLUTION at 23:23

## 2018-08-02 RX ADMIN — ERTAPENEM SODIUM SCH MLS/HR: 1 INJECTION, POWDER, LYOPHILIZED, FOR SOLUTION INTRAMUSCULAR; INTRAVENOUS at 15:00

## 2018-08-02 RX ADMIN — VANCOMYCIN HCL-SODIUM CHLORIDE IV SOLN 1.25 GM/250ML-0.9% SCH MLS/HR: 1.25-0.9/25 SOLUTION at 06:25

## 2018-08-02 RX ADMIN — HEPARIN SODIUM SCH UNITS: 5000 INJECTION INTRAVENOUS; SUBCUTANEOUS at 20:56

## 2018-08-02 NOTE — NEPHROLOGY PROGRESS NOTE
Assessment/Plan


Assessment/Plan


1. BRAYDON- resolved, 


2. Septis- resolved on Abx


3. Chronic Resp FL- trached on vent


4. Hyponatremia - Na 133. Change GTube flushes to NS


5. SZ- Keppra





Subjective


Date patient seen:  Aug 2, 2018


Time patient seen:  15:31


ROS Limited/Unobtainable:  Yes


Allergies:  


Coded Allergies:  


     AZTREONAM (Verified  Allergy, Unknown, 7/23/18)


All Systems:  reviewed and negative except above


Subjective


Patient trached on vent





Objective





Last 24 Hour Vital Signs








  Date Time  Temp Pulse Resp B/P (MAP) Pulse Ox O2 Delivery O2 Flow Rate FiO2


 


8/2/18 13:29  88 16     30


 


8/2/18 12:00        30


 


8/2/18 12:00      Mechanical Ventilator  





      Mechanical Ventilator  





      Mechanical Ventilator  


 


8/2/18 12:00 98.3 87 16 106/77 (87) 100   





 98.3       


 


8/2/18 12:00  87      


 


8/2/18 11:29  92 16     30


 


8/2/18 08:56  88 16     30


 


8/2/18 08:20  90      


 


8/2/18 08:11  87 16     30


 


8/2/18 08:00        30


 


8/2/18 08:00      Mechanical Ventilator  





      Mechanical Ventilator  





      Mechanical Ventilator  


 


8/2/18 08:00 98.5 86 16 112/62 (79) 100   





 98.5       


 


8/2/18 05:05  93 16     30


 


8/2/18 04:00  90      


 


8/2/18 04:00 98.4 91 16 111/61 (78) 100   





 98.4       


 


8/2/18 04:00      Mechanical Ventilator  





      Mechanical Ventilator  





      Mechanical Ventilator  


 


8/2/18 04:00        30


 


8/2/18 03:25  89 16     30


 


8/2/18 02:33 99.0       


 


8/2/18 01:34 97.9       


 


8/2/18 01:16  103 22     30


 


8/2/18 00:00      Mechanical Ventilator  





      Mechanical Ventilator  





      Mechanical Ventilator  


 


8/2/18 00:00  98      


 


8/2/18 00:00 99.0 81 17 123/66 (85) 100   





 99.0       


 


8/1/18 23:30  97 16     30


 


8/1/18 21:23  80 17     30


 


8/1/18 20:00        30


 


8/1/18 20:00      Mechanical Ventilator  





      Mechanical Ventilator  





      Mechanical Ventilator  


 


8/1/18 20:00  82      


 


8/1/18 20:00 97.9 84 17 128/70 (89) 97   





 97.9       


 


8/1/18 19:46  81 16     30


 


8/1/18 17:29  91 16     30


 


8/1/18 16:00        30


 


8/1/18 16:00 97.9 82 16 118/70 (86) 99   





 97.9       


 


8/1/18 16:00      Mechanical Ventilator  





      Mechanical Ventilator  





      Mechanical Ventilator  

















Intake and Output  


 


 8/1/18 8/2/18





 19:00 07:00


 


Intake Total 955 ml 931.6 ml


 


Output Total 1250 ml 250 ml


 


Balance -295 ml 681.6 ml


 


  


 


IV Total 55 ml 166.6 ml


 


Tube Feeding 780 ml 715 ml


 


Other 120 ml 50 ml


 


Output Urine Total 800 ml 


 


Stool Total 450 ml 250 ml








Laboratory Tests


8/2/18 05:00: 


White Blood Count 10.3, Red Blood Count 4.69L, Hemoglobin 12.7L, Hematocrit 

40.0L, Mean Corpuscular Volume 85, Mean Corpuscular Hemoglobin 27.1, Mean 

Corpuscular Hemoglobin Concent 31.8L, Red Cell Distribution Width 15.1H, 

Platelet Count 270, Mean Platelet Volume 8.4, Neutrophils (%) (Auto) 66.0, 

Lymphocytes (%) (Auto) 23.8, Monocytes (%) (Auto) 5.1, Eosinophils (%) (Auto) 

4.4H, Basophils (%) (Auto) 0.8, Sodium Level 133L, Potassium Level 4.2, 

Chloride Level 98, Carbon Dioxide Level 22, Anion Gap 13, Blood Urea Nitrogen 12

, Creatinine 1.0, Estimat Glomerular Filtration Rate > 60, Glucose Level 132H, 

Calcium Level 10.2H, Phosphorus Level 2.6, Magnesium Level 1.8, Total Bilirubin 

0.5, Aspartate Amino Transf (AST/SGOT) 22, Alanine Aminotransferase (ALT/SGPT) 

44, Alkaline Phosphatase 133H, Total Protein 9.0H, Albumin 3.6, Globulin 5.4, 

Albumin/Globulin Ratio 0.7L, Vancomycin Level Trough 13.6H


Height (Feet):  6


Weight (Pounds):  205


General Appearance:  WD/WN, no apparent distress


EENT:  PERRL/EOMI


Neck:  non-tender, normal alignment


Cardiovascular:  normal peripheral pulses, normal rate


Respiratory/Chest:  chest wall non-tender, lungs clear


Abdomen:  normal bowel sounds, non tender, soft


Edema:  no edema noted Arm (L), no edema noted Arm (R), no edema noted Leg (L), 

no edema noted Leg (R), no edema noted Pedal (L), no edema noted Pedal (R), no 

edema noted Generalized











Jewel Templeton M.D. Aug 2, 2018 15:33

## 2018-08-02 NOTE — GENERAL PROGRESS NOTE
Assessment/Plan


Status:  unchanged


Assessment/Plan


# Anemia of chronic disease. No hemolysis, peripheral smear reviewed, ferritin 

is elevated.


--> Continue to closely monitor for improvement. 


--> Anemia w/u has been reviewed. Will trend cbc daily. 


--> Hgb goal >7


# Leukocytosis - Sepsis with elevated temperature of 103 degrees Fahrenheit.  


--> Lactic acid pending, was elevated upon admission. 


--> Pt on antibiotics, has received a dose of Zosyn and currently is on 

vancomycin q.12 h.  


--> Currently afebrile.


--> Appreciate Infectious Disease consult.


# Acute kidney injury.  


--> Currently on IV fluids, IV bolus given to see if the patient responds along 

with IV pressor as needed.  


--> Closely monitor with Nephrology team.


# Septic shock, use antibiotic per ID services.


--> potential pna, bacteremia


# Respiratory failure, status post tracheostomy, on mechanical ventilation per 

Dr. Pena.


# Hypercalcemia. Monitor PTH and calcium level





The time the note was entered does not necessarily correspond to the time the 

patient was seen.





Subjective


Date patient seen:  Aug 2, 2018


ROS Limited/Unobtainable:  Yes


Hematologic/Lymphatic:  Reports: anemia


Allergies:  


Coded Allergies:  


     AZTREONAM (Verified  Allergy, Unknown, 7/23/18)


All Systems:  reviewed and negative except above


Subjective


Pt remains obtunded, on trach/vent. No acute events. H/H stable. No recent 

seizure activity.





Objective





Last 24 Hour Vital Signs








  Date Time  Temp Pulse Resp B/P (MAP) Pulse Ox O2 Delivery O2 Flow Rate FiO2


 


8/2/18 08:11  87 16     30


 


8/2/18 05:05  93 16     30


 


8/2/18 04:00  90      


 


8/2/18 04:00 98.4 91 16 111/61 (78) 100   





 98.4       


 


8/2/18 04:00      Mechanical Ventilator  





      Mechanical Ventilator  





      Mechanical Ventilator  


 


8/2/18 04:00        30


 


8/2/18 03:25  89 16     30


 


8/2/18 02:33 99.0       


 


8/2/18 01:34 97.9       


 


8/2/18 01:16  103 22     30


 


8/2/18 00:00      Mechanical Ventilator  





      Mechanical Ventilator  





      Mechanical Ventilator  


 


8/2/18 00:00  98      


 


8/2/18 00:00 99.0 81 17 123/66 (85) 100   





 99.0       


 


8/1/18 23:30  97 16     30


 


8/1/18 21:23  80 17     30


 


8/1/18 20:00        30


 


8/1/18 20:00      Mechanical Ventilator  





      Mechanical Ventilator  





      Mechanical Ventilator  


 


8/1/18 20:00  82      


 


8/1/18 20:00 97.9 84 17 128/70 (89) 97   





 97.9       


 


8/1/18 19:46  81 16     30


 


8/1/18 17:29  91 16     30


 


8/1/18 16:00        30


 


8/1/18 16:00 97.9 82 16 118/70 (86) 99   





 97.9       


 


8/1/18 16:00      Mechanical Ventilator  





      Mechanical Ventilator  





      Mechanical Ventilator  


 


8/1/18 15:26  83      


 


8/1/18 15:13  86 19     30


 


8/1/18 13:09  84 17     30


 


8/1/18 12:00      Mechanical Ventilator  





      Mechanical Ventilator  





      Mechanical Ventilator  


 


8/1/18 12:00 97.7 84 16 109/70 (83) 97   





 97.7       


 


8/1/18 12:00        30


 


8/1/18 11:47  87      


 


8/1/18 11:11  82 16     30


 


8/1/18 09:10  84 18     30

















Intake and Output  


 


 8/1/18 8/2/18





 19:00 07:00


 


Intake Total 955 ml 700 ml


 


Output Total 1250 ml 250 ml


 


Balance -295 ml 450 ml


 


  


 


IV Total 55 ml 


 


Tube Feeding 780 ml 650 ml


 


Other 120 ml 50 ml


 


Output Urine Total 800 ml 


 


Stool Total 450 ml 250 ml








Laboratory Tests


8/2/18 05:00: 


White Blood Count 10.3, Red Blood Count 4.69L, Hemoglobin 12.7L, Hematocrit 

40.0L, Mean Corpuscular Volume 85, Mean Corpuscular Hemoglobin 27.1, Mean 

Corpuscular Hemoglobin Concent 31.8L, Red Cell Distribution Width 15.1H, 

Platelet Count 270, Mean Platelet Volume 8.4, Neutrophils (%) (Auto) 66.0, 

Lymphocytes (%) (Auto) 23.8, Monocytes (%) (Auto) 5.1, Eosinophils (%) (Auto) 

4.4H, Basophils (%) (Auto) 0.8, Sodium Level 133L, Potassium Level 4.2, 

Chloride Level 98, Carbon Dioxide Level 22, Anion Gap 13, Blood Urea Nitrogen 12

, Creatinine 1.0, Estimat Glomerular Filtration Rate > 60, Glucose Level 132H, 

Calcium Level 10.2H, Phosphorus Level 2.6, Magnesium Level 1.8, Total Bilirubin 

0.5, Aspartate Amino Transf (AST/SGOT) 22, Alanine Aminotransferase (ALT/SGPT) 

44, Alkaline Phosphatase 133H, Total Protein 9.0H, Albumin 3.6, Globulin 5.4, 

Albumin/Globulin Ratio 0.7L, Vancomycin Level Trough 13.6H


Height (Feet):  6


Weight (Pounds):  205


General Appearance:  no apparent distress, lethargic


EENT:  PERRL/EOMI


Neck:  normal alignment


Cardiovascular:  normal peripheral pulses


Respiratory/Chest:  no respiratory distress


Abdomen:  soft











Tejas Gerber MD Aug 2, 2018 08:20

## 2018-08-02 NOTE — INFECTIOUS DISEASES PROG NOTE
Assessment/Plan


Assessment/Plan


Sepsis, resolving- 2ry to UTI and Bacteremia- ?pyelo. Possible PNA


 -u/a wbc 40-60, nit neg, leuk +2; ucx >100k E. aerogenes (S cefepime, cipro/

levo; R Zosyn)


 -BCX 4/4 E. aerogenes, prob Amp C (S cefepime, cipro/levo; R Zosyn), P. 

mirabilis ESBL (S Zosyn, Ertapenem); repeta 7/26 1/4 CoNS (suspect contaminant)

, 7/28 Staph f/u final results if CoNS may need TTE and IE work up.


  -CXR 7/27: Increased left pleural effusion, over 3 days. Overall decreased 

interstitial congestion. Right infrahilar atelectasis


 -CXR: Cardiomegaly. Mild interstitial congestion. Suspect small bilateral 

pleural effusions


 -CT abd/p: Right lower quadrant double barrel colostomy, as described. Small 

peristomal hernia contains bowel loops without evidence of obstruction or 

strangulation. Left renal staghorn calculus. Bilateral intrarenal calyceal 

calculi. Borderline hydronephrosis on the right without evidence of downstream 

obstructive lesion. May indicate mild ureteral pelvic junction obstruction. 

Somewhat atrophic left kidney. Inspissated contrast ball within the distal 

sigmoid colon. Gastrostomy in good position. Nonspecific bilateral perinephric 

fat stranding, could indicate pyelonephritis or could be chronic. Guzmán 

catheter in place. Apparent bladder wall thickening, possibly an artifact of 

under distention but cystitis is not excludable, particularly in view of mild 

perivesical fat stranding. Bilateral pulmonary parenchymal atelectasis and 

consolidation


  -Blood Cx from PICC CoNS 2/2 bottles Peripheral Neg- (May be contaminant but 

will repeat blood Cx given new leukocytosis if positive will need ECHO.


  - Blood Cx 7/30/18 - GPC - Will need TTE for IE work up and the PICC removed.


 


PICC line infection/colonization - TTE negative. Blood cultures only positive 

from PICC. Not positive from Peripheral 


- Will repeat blood cultures x 1 to confirm clearance after PICC replaced. [place] 

suspicion for IE.





Fever/Leukocytosis; increasing WBCs - Re-evaluate lines, Diarrhea? C. dif? 


BRAYDON, improving


Lactic acidosis, resolved





chronic resp failure trach/vent dependant


BPH


GERD


 HTN


DM2


seizure disorder


colostomy


 s/p GT 


constipation





VRE and MRSA colonized


 


Plan:


- Continue empiric IV Vancomycin #7/13 for PICC line infection/colonization. 

End date 8/8/18


- OK to transfer or D/C with abx once blood cultures negative x 48hr. 





-Continue Ertapenem #7/14 for Amp-C Enterobacter and ESBL P.mirabilis 

bacteremia ; will treat for 14 days - End date 8/9/18


    -7/27 SP Zosyn #4





- f/u blood Cx 8/1/18 to document clearance


- Will likely need the PICC removed


-Monitor CBC/CMP, temperatures








Thank you for this consultation. Will continue to follow along with you.





Subjective


Allergies:  


Coded Allergies:  


     AZTREONAM (Verified  Allergy, Unknown, 7/23/18)


Subjective


PICC replaced 8/1/18, Patient PEG and Trached


On Vent. 30% O2 and PEEP of 5


Afebrile with no leukocytosis





Objective


Vital Signs





Last 24 Hour Vital Signs








  Date Time  Temp Pulse Resp B/P (MAP) Pulse Ox O2 Delivery O2 Flow Rate FiO2


 


8/2/18 16:00      Mechanical Ventilator  





      Mechanical Ventilator  





      Mechanical Ventilator  


 


8/2/18 16:00        30


 


8/2/18 15:28  83 16     30


 


8/2/18 13:29  88 16     30


 


8/2/18 12:00        30


 


8/2/18 12:00      Mechanical Ventilator  





      Mechanical Ventilator  





      Mechanical Ventilator  


 


8/2/18 12:00 98.3 87 16 106/77 (87) 100   





 98.3       


 


8/2/18 12:00  87      


 


8/2/18 11:29  92 16     30


 


8/2/18 08:56  88 16     30


 


8/2/18 08:20  90      


 


8/2/18 08:11  87 16     30


 


8/2/18 08:00        30


 


8/2/18 08:00      Mechanical Ventilator  





      Mechanical Ventilator  





      Mechanical Ventilator  


 


8/2/18 08:00 98.5 86 16 112/62 (79) 100   





 98.5       


 


8/2/18 05:05  93 16     30


 


8/2/18 04:00  90      


 


8/2/18 04:00 98.4 91 16 111/61 (78) 100   





 98.4       


 


8/2/18 04:00      Mechanical Ventilator  





      Mechanical Ventilator  





      Mechanical Ventilator  


 


8/2/18 04:00        30


 


8/2/18 03:25  89 16     30


 


8/2/18 02:33 99.0       


 


8/2/18 01:34 97.9       


 


8/2/18 01:16  103 22     30


 


8/2/18 00:00      Mechanical Ventilator  





      Mechanical Ventilator  





      Mechanical Ventilator  


 


8/2/18 00:00  98      


 


8/2/18 00:00 99.0 81 17 123/66 (85) 100   





 99.0       


 


8/1/18 23:30  97 16     30


 


8/1/18 21:23  80 17     30


 


8/1/18 20:00        30


 


8/1/18 20:00      Mechanical Ventilator  





      Mechanical Ventilator  





      Mechanical Ventilator  


 


8/1/18 20:00  82      


 


8/1/18 20:00 97.9 84 17 128/70 (89) 97   





 97.9       


 


8/1/18 19:46  81 16     30


 


8/1/18 17:29  91 16     30








Height (Feet):  6


Weight (Pounds):  205


Cardiovascular:  normal peripheral pulses


Objective


GENERAL:  No overt distress. Eyes closed


HEENT:  NCAT, DMM,  No lymphadenopathy noted.


Tracheostomy noted.


PULM: Mild crackles B/L


CARDIOVASCULAR:  RRR, S1 and S2.  Tachycardic.  No rubs or gallops.


PULMONARY:  Mild diffuse expiratory rhonchi with basilar rales.


ABDOMEN:  Obese and nondistended.  The G-tube and stoma noted.


EXTREMITIES:  No edema.  Fair pedal pulses.





Microbiology








 Date/Time


Source Procedure


Growth Status


 


 


 7/30/18 23:00


Blood Blood Culture - Preliminary


Gram Positive Cocci Resulted


 


 7/30/18 20:38


Blood Blood Culture - Preliminary


NO GROWTH AFTER 48 HOURS Resulted











Laboratory Tests








Test


  8/2/18


05:00


 


White Blood Count


  10.3 K/UL


(4.8-10.8)


 


Red Blood Count


  4.69 M/UL


(4.70-6.10)  L


 


Hemoglobin


  12.7 G/DL


(14.2-18.0)  L


 


Hematocrit


  40.0 %


(42.0-52.0)  L


 


Mean Corpuscular Volume 85 FL (80-99)  


 


Mean Corpuscular Hemoglobin


  27.1 PG


(27.0-31.0)


 


Mean Corpuscular Hemoglobin


Concent 31.8 G/DL


(32.0-36.0)  L


 


Red Cell Distribution Width


  15.1 %


(11.6-14.8)  H


 


Platelet Count


  270 K/UL


(150-450)


 


Mean Platelet Volume


  8.4 FL


(6.5-10.1)


 


Neutrophils (%) (Auto)


  66.0 %


(45.0-75.0)


 


Lymphocytes (%) (Auto)


  23.8 %


(20.0-45.0)


 


Monocytes (%) (Auto)


  5.1 %


(1.0-10.0)


 


Eosinophils (%) (Auto)


  4.4 %


(0.0-3.0)  H


 


Basophils (%) (Auto)


  0.8 %


(0.0-2.0)


 


Sodium Level


  133 MMOL/L


(136-145)  L


 


Potassium Level


  4.2 MMOL/L


(3.5-5.1)


 


Chloride Level


  98 MMOL/L


()


 


Carbon Dioxide Level


  22 MMOL/L


(21-32)


 


Anion Gap


  13 mmol/L


(5-15)


 


Blood Urea Nitrogen


  12 mg/dL


(7-18)


 


Creatinine


  1.0 MG/DL


(0.55-1.30)


 


Estimat Glomerular Filtration


Rate > 60 mL/min


(>60)


 


Glucose Level


  132 MG/DL


()  H


 


Calcium Level


  10.2 MG/DL


(8.5-10.1)  H


 


Phosphorus Level


  2.6 MG/DL


(2.5-4.9)


 


Magnesium Level


  1.8 MG/DL


(1.8-2.4)


 


Total Bilirubin


  0.5 MG/DL


(0.2-1.0)


 


Aspartate Amino Transf


(AST/SGOT) 22 U/L (15-37)


 


 


Alanine Aminotransferase


(ALT/SGPT) 44 U/L (12-78)


 


 


Alkaline Phosphatase


  133 U/L


()  H


 


Total Protein


  9.0 G/DL


(6.4-8.2)  H


 


Albumin


  3.6 G/DL


(3.4-5.0)


 


Globulin 5.4 g/dL  


 


Albumin/Globulin Ratio


  0.7 (1.0-2.7)


L


 


Vancomycin Level Trough


  13.6 ug/mL


(5.0-12.0)  H











Current Medications








 Medications


  (Trade)  Dose


 Ordered  Sig/Jan


 Route


 PRN Reason  Start Time


 Stop Time Status Last Admin


Dose Admin


 


 Acetaminophen


  (Tylenol)  650 mg  Q4H  PRN


 ORAL


 FEVER  7/26/18 14:00


 8/23/18 09:59  8/2/18 01:34


 


 


 Baclofen


  (Lioresal)  10 mg  EVERY 8  HOURS


 GT


   7/30/18 14:00


 8/23/18 12:59  8/2/18 14:00


 


 


 Chlorhexidine


 Gluconate


  (Elaine-Hex 2%)  1 applic  DAILY@2000


 TOPIC


   7/26/18 20:00


 8/23/18 19:59  8/1/18 21:28


 


 


 Dextrose


  (Dextrose 50%)  25 ml  STAT  PRN


 IV


 Hypoglycemia  7/27/18 08:45


 8/24/18 08:44   


 


 


 Dextrose


  (Dextrose 50%)  50 ml  STAT  PRN


 IV


 Hypoglycemia  7/27/18 08:45


 8/24/18 08:44   


 


 


 Ertapenem 1 gm/


 Sodium Chloride  55 ml @ 


 110 mls/hr  Q24H


 IVPB


   7/27/18 15:00


 8/6/18 14:59  8/2/18 15:00


 


 


 Heparin Sodium


  (Porcine)


  (Heparin 5000


 units/ml)  5,000 units  EVERY 12  HOURS


 SUBQ


   7/26/18 21:00


 8/23/18 20:59  8/2/18 08:30


 


 


 Heparin Sodium/


 Sodium Chloride


  (Heparin 2000


 units/Ns 1000ml


 premix)  2,000 unit  ONCE  PRN


 INJ


 FOR PICC PLACEMENT  8/1/18 11:00


 8/3/18 10:59   


 


 


 Insulin Aspart


  (NovoLOG)    Q6HR


 SUBQ


   7/26/18 18:00


 8/24/18 11:29  8/2/18 12:03


 


 


 Lansoprazole


  (Prevacid)  30 mg  DAILY


 GT


   7/27/18 09:00


 8/25/18 08:59  8/2/18 08:28


 


 


 Levetiracetam


  (Keppra)  1,000 mg  Q8HR


 GT


   7/26/18 14:00


 8/23/18 12:59  8/2/18 14:00


 


 


 Lidocaine HCl


  (Xylocaine 1%


 30ml)  30 ml  ONCE  PRN


 INJ


 FOR PICC PLACEMENT  8/1/18 11:00


 8/3/18 10:59   


 


 


 Ondansetron HCl


  (Zofran)  4 mg  Q6H  PRN


 IVP


 Nausea & Vomiting  7/26/18 16:00


 8/23/18 09:59   


 


 


 Polyethylene


 Glycol


  (Miralax)  17 gm  BEDTIME


 GT


   7/30/18 21:00


 8/24/18 20:59  8/1/18 21:31


 


 


 Vancomycin HCl


  (Vanco rx to


 dose)  1 ea  DAILY  PRN


 MISC


 PER RX PROTOCOL  7/28/18 13:15


 8/27/18 13:14   


 


 


 Vancomycin HCl/


 Dextrose  250 ml @ 


 166.667


 mls/hr  Q18H


 IVPB


   8/2/18 06:00


 8/7/18 05:59  8/2/18 06:25


 

















Robert Williamson M.D. Aug 2, 2018 16:47 no

## 2018-08-02 NOTE — PULMONOLGY CRITICAL CARE NOTE
Critical Care - Asmt/Plan


Problems:  


(1) Acute on chronic respiratory failure


(2) Acute on chronic renal insufficiency


(3) Severe sepsis


(4) Vegetative state


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


(6) Diabetes mellitus


Assessment/Plan:


PIcc line was removed yesterday, Midline was inserted yesterday


Respiratory:  monitor respiratory rate, adjust FIO2, CXR


Cardiac:  continue to monitor HR/BP


Renal:  F/U I&O


Infectious Disease:  check cultures


Gastrointestinal:  continue feedings/current rate


Endocrine:  monitor blood sugar, check HgA1C


Hematologic:  transfuse if hgb<8.5


Neurologic:  PRN Ativan, PRN Morphine, keep patient comfortable


Affect:  PRN ativan


Prophylaxis:  Heparin


Notes Reviewed:  internist, renal


Discussed with:  nurses, consultants, 





Critical Care - Objective





Last 24 Hour Vital Signs








  Date Time  Temp Pulse Resp B/P (MAP) Pulse Ox O2 Delivery O2 Flow Rate FiO2


 


8/2/18 08:56  88 16     30


 


8/2/18 08:20  90      


 


8/2/18 08:11  87 16     30


 


8/2/18 08:00        30


 


8/2/18 08:00      Mechanical Ventilator  





      Mechanical Ventilator  





      Mechanical Ventilator  


 


8/2/18 05:05  93 16     30


 


8/2/18 04:00  90      


 


8/2/18 04:00 98.4 91 16 111/61 (78) 100   





 98.4       


 


8/2/18 04:00      Mechanical Ventilator  





      Mechanical Ventilator  





      Mechanical Ventilator  


 


8/2/18 04:00        30


 


8/2/18 03:25  89 16     30


 


8/2/18 02:33 99.0       


 


8/2/18 01:34 97.9       


 


8/2/18 01:16  103 22     30


 


8/2/18 00:00      Mechanical Ventilator  





      Mechanical Ventilator  





      Mechanical Ventilator  


 


8/2/18 00:00  98      


 


8/2/18 00:00 99.0 81 17 123/66 (85) 100   





 99.0       


 


8/1/18 23:30  97 16     30


 


8/1/18 21:23  80 17     30


 


8/1/18 20:00        30


 


8/1/18 20:00      Mechanical Ventilator  





      Mechanical Ventilator  





      Mechanical Ventilator  


 


8/1/18 20:00  82      


 


8/1/18 20:00 97.9 84 17 128/70 (89) 97   





 97.9       


 


8/1/18 19:46  81 16     30


 


8/1/18 17:29  91 16     30


 


8/1/18 16:00        30


 


8/1/18 16:00 97.9 82 16 118/70 (86) 99   





 97.9       


 


8/1/18 16:00      Mechanical Ventilator  





      Mechanical Ventilator  





      Mechanical Ventilator  


 


8/1/18 15:26  83      


 


8/1/18 15:13  86 19     30


 


8/1/18 13:09  84 17     30


 


8/1/18 12:00      Mechanical Ventilator  





      Mechanical Ventilator  





      Mechanical Ventilator  


 


8/1/18 12:00 97.7 84 16 109/70 (83) 97   





 97.7       


 


8/1/18 12:00        30


 


8/1/18 11:47  87      


 


8/1/18 11:11  82 16     30








Status:  obtunded


Condition:  critical


HEENT:  atraumatic


Neck:  full ROM


Heart:  HR/BP stable


Abdomen:  soft, active bowel sounds


Extremities:  no C/C/E, edema


Micro:





Microbiology








 Date/Time


Source Procedure


Growth Status


 


 


 7/30/18 23:00


Blood Blood Culture - Preliminary


Gram Positive Cocci Resulted


 


 7/30/18 20:38


Blood Blood Culture - Preliminary


NO GROWTH AFTER 48 HOURS Resulted








Accucheck:  141





Critical Care - Subjective


ROS Limited/Unobtainable:  Yes


Condition:  critical


FI02:  30


Vent Support Breath Rate:  16


Vent Support Mode:  AC


Vent Tidal Volume:  600


Sputum Amount:  Moderate


PEEP:  5.0


PIP:  35


Tube Feeding Amount:  65


I&O:











Intake and Output  


 


 8/1/18 8/2/18





 19:00 07:00


 


Intake Total 955 ml 866.6 ml


 


Output Total 1250 ml 250 ml


 


Balance -295 ml 616.6 ml


 


  


 


IV Total 55 ml 166.6 ml


 


Tube Feeding 780 ml 650 ml


 


Other 120 ml 50 ml


 


Output Urine Total 800 ml 


 


Stool Total 450 ml 250 ml








Labs:





Laboratory Tests








Test


  8/2/18


05:00


 


White Blood Count


  10.3 K/UL


(4.8-10.8)


 


Red Blood Count


  4.69 M/UL


(4.70-6.10)  L


 


Hemoglobin


  12.7 G/DL


(14.2-18.0)  L


 


Hematocrit


  40.0 %


(42.0-52.0)  L


 


Mean Corpuscular Volume 85 FL (80-99)  


 


Mean Corpuscular Hemoglobin


  27.1 PG


(27.0-31.0)


 


Mean Corpuscular Hemoglobin


Concent 31.8 G/DL


(32.0-36.0)  L


 


Red Cell Distribution Width


  15.1 %


(11.6-14.8)  H


 


Platelet Count


  270 K/UL


(150-450)


 


Mean Platelet Volume


  8.4 FL


(6.5-10.1)


 


Neutrophils (%) (Auto)


  66.0 %


(45.0-75.0)


 


Lymphocytes (%) (Auto)


  23.8 %


(20.0-45.0)


 


Monocytes (%) (Auto)


  5.1 %


(1.0-10.0)


 


Eosinophils (%) (Auto)


  4.4 %


(0.0-3.0)  H


 


Basophils (%) (Auto)


  0.8 %


(0.0-2.0)


 


Sodium Level


  133 MMOL/L


(136-145)  L


 


Potassium Level


  4.2 MMOL/L


(3.5-5.1)


 


Chloride Level


  98 MMOL/L


()


 


Carbon Dioxide Level


  22 MMOL/L


(21-32)


 


Anion Gap


  13 mmol/L


(5-15)


 


Blood Urea Nitrogen


  12 mg/dL


(7-18)


 


Creatinine


  1.0 MG/DL


(0.55-1.30)


 


Estimat Glomerular Filtration


Rate > 60 mL/min


(>60)


 


Glucose Level


  132 MG/DL


()  H


 


Calcium Level


  10.2 MG/DL


(8.5-10.1)  H


 


Phosphorus Level


  2.6 MG/DL


(2.5-4.9)


 


Magnesium Level


  1.8 MG/DL


(1.8-2.4)


 


Total Bilirubin


  0.5 MG/DL


(0.2-1.0)


 


Aspartate Amino Transf


(AST/SGOT) 22 U/L (15-37)


 


 


Alanine Aminotransferase


(ALT/SGPT) 44 U/L (12-78)


 


 


Alkaline Phosphatase


  133 U/L


()  H


 


Total Protein


  9.0 G/DL


(6.4-8.2)  H


 


Albumin


  3.6 G/DL


(3.4-5.0)


 


Globulin 5.4 g/dL  


 


Albumin/Globulin Ratio


  0.7 (1.0-2.7)


L


 


Vancomycin Level Trough


  13.6 ug/mL


(5.0-12.0)  H

















Yury Pena MD Aug 2, 2018 11:03

## 2018-08-02 NOTE — GI PROGRESS NOTE
Assessment/Plan


Problems:  


(1) Parastomal hernia


ICD Codes:  K43.5 - Parastomal hernia without obstruction or gangrene


SNOMED:  854326317


Qualifiers:  


   Qualified Codes:  K43.5 - Parastomal hernia without obstruction or gangrene


(2) Stoma malfunction


SNOMED:  636067624


(3) Colostomy in place


ICD Codes:  Z93.3 - Colostomy status


SNOMED:  817850105, 074044539


(4) Vegetative state


ICD Codes:  R40.3 - Persistent vegetative state


SNOMED:  69938369


(5) Diabetes mellitus


ICD Codes:  E11.9 - Type 2 diabetes mellitus without complications


SNOMED:  95386680


(6) Acute on chronic respiratory failure


ICD Codes:  J96.20 - Acute and chronic respiratory failure, unspecified whether 

with hypoxia or hypercapnia


SNOMED:  49981625


(7) Severe sepsis


ICD Codes:  A41.9 - Sepsis, unspecified organism; R65.20 - Severe sepsis 

without septic shock


SNOMED:  08145202


Status:  unchanged


Status Narrative


Discussed with Dr. Pickard.


Assessment/Plan


prolapse stoma assessed >> no s/sx of infection.  no obstruction. 


anemia work up reviewed >> iron deficiency


G tube dependent


hepatitis panel negative


OB negative





supportive care


monitor H&H, prn transfusions


venofer


ppi


GTFs per RD, adv to goal


GT site care daily/prn


abx


bowel regime


fu labs





The patient was seen and examined at bedside and all new and available data was 

reviewed in the patients chart. I agree with the above findings, impression 

and plan.  (Patient seen earlier today. Signature stamp does not reflect 

patient encounter time.). - Miles Pickard MD





Subjective


Subjective


limited





Objective





Last 24 Hour Vital Signs








  Date Time  Temp Pulse Resp B/P (MAP) Pulse Ox O2 Delivery O2 Flow Rate FiO2


 


8/2/18 08:56  88 16     30


 


8/2/18 08:20  90      


 


8/2/18 08:11  87 16     30


 


8/2/18 08:00        30


 


8/2/18 08:00      Mechanical Ventilator  





      Mechanical Ventilator  





      Mechanical Ventilator  


 


8/2/18 05:05  93 16     30


 


8/2/18 04:00  90      


 


8/2/18 04:00 98.4 91 16 111/61 (78) 100   





 98.4       


 


8/2/18 04:00      Mechanical Ventilator  





      Mechanical Ventilator  





      Mechanical Ventilator  


 


8/2/18 04:00        30


 


8/2/18 03:25  89 16     30


 


8/2/18 02:33 99.0       


 


8/2/18 01:34 97.9       


 


8/2/18 01:16  103 22     30


 


8/2/18 00:00      Mechanical Ventilator  





      Mechanical Ventilator  





      Mechanical Ventilator  


 


8/2/18 00:00  98      


 


8/2/18 00:00 99.0 81 17 123/66 (85) 100   





 99.0       


 


8/1/18 23:30  97 16     30


 


8/1/18 21:23  80 17     30


 


8/1/18 20:00        30


 


8/1/18 20:00      Mechanical Ventilator  





      Mechanical Ventilator  





      Mechanical Ventilator  


 


8/1/18 20:00  82      


 


8/1/18 20:00 97.9 84 17 128/70 (89) 97   





 97.9       


 


8/1/18 19:46  81 16     30


 


8/1/18 17:29  91 16     30


 


8/1/18 16:00        30


 


8/1/18 16:00 97.9 82 16 118/70 (86) 99   





 97.9       


 


8/1/18 16:00      Mechanical Ventilator  





      Mechanical Ventilator  





      Mechanical Ventilator  


 


8/1/18 15:26  83      


 


8/1/18 15:13  86 19     30


 


8/1/18 13:09  84 17     30


 


8/1/18 12:00      Mechanical Ventilator  





      Mechanical Ventilator  





      Mechanical Ventilator  


 


8/1/18 12:00 97.7 84 16 109/70 (83) 97   





 97.7       


 


8/1/18 12:00        30


 


8/1/18 11:47  87      


 


8/1/18 11:11  82 16     30

















Intake and Output  


 


 8/1/18 8/2/18





 19:00 07:00


 


Intake Total 955 ml 866.6 ml


 


Output Total 1250 ml 250 ml


 


Balance -295 ml 616.6 ml


 


  


 


IV Total 55 ml 166.6 ml


 


Tube Feeding 780 ml 650 ml


 


Other 120 ml 50 ml


 


Output Urine Total 800 ml 


 


Stool Total 450 ml 250 ml











Laboratory Tests








Test


  8/2/18


05:00


 


White Blood Count


  10.3 K/UL


(4.8-10.8)


 


Red Blood Count


  4.69 M/UL


(4.70-6.10)  L


 


Hemoglobin


  12.7 G/DL


(14.2-18.0)  L


 


Hematocrit


  40.0 %


(42.0-52.0)  L


 


Mean Corpuscular Volume 85 FL (80-99)  


 


Mean Corpuscular Hemoglobin


  27.1 PG


(27.0-31.0)


 


Mean Corpuscular Hemoglobin


Concent 31.8 G/DL


(32.0-36.0)  L


 


Red Cell Distribution Width


  15.1 %


(11.6-14.8)  H


 


Platelet Count


  270 K/UL


(150-450)


 


Mean Platelet Volume


  8.4 FL


(6.5-10.1)


 


Neutrophils (%) (Auto)


  66.0 %


(45.0-75.0)


 


Lymphocytes (%) (Auto)


  23.8 %


(20.0-45.0)


 


Monocytes (%) (Auto)


  5.1 %


(1.0-10.0)


 


Eosinophils (%) (Auto)


  4.4 %


(0.0-3.0)  H


 


Basophils (%) (Auto)


  0.8 %


(0.0-2.0)


 


Sodium Level


  133 MMOL/L


(136-145)  L


 


Potassium Level


  4.2 MMOL/L


(3.5-5.1)


 


Chloride Level


  98 MMOL/L


()


 


Carbon Dioxide Level


  22 MMOL/L


(21-32)


 


Anion Gap


  13 mmol/L


(5-15)


 


Blood Urea Nitrogen


  12 mg/dL


(7-18)


 


Creatinine


  1.0 MG/DL


(0.55-1.30)


 


Estimat Glomerular Filtration


Rate > 60 mL/min


(>60)


 


Glucose Level


  132 MG/DL


()  H


 


Calcium Level


  10.2 MG/DL


(8.5-10.1)  H


 


Phosphorus Level


  2.6 MG/DL


(2.5-4.9)


 


Magnesium Level


  1.8 MG/DL


(1.8-2.4)


 


Total Bilirubin


  0.5 MG/DL


(0.2-1.0)


 


Aspartate Amino Transf


(AST/SGOT) 22 U/L (15-37)


 


 


Alanine Aminotransferase


(ALT/SGPT) 44 U/L (12-78)


 


 


Alkaline Phosphatase


  133 U/L


()  H


 


Total Protein


  9.0 G/DL


(6.4-8.2)  H


 


Albumin


  3.6 G/DL


(3.4-5.0)


 


Globulin 5.4 g/dL  


 


Albumin/Globulin Ratio


  0.7 (1.0-2.7)


L


 


Vancomycin Level Trough


  13.6 ug/mL


(5.0-12.0)  H








Height (Feet):  6


Weight (Pounds):  205


General Appearance:  WD/WN, no apparent distress, alert


Cardiovascular:  normal rate


Respiratory/Chest:  normal breath sounds, no respiratory distress


Abdominal Exam:  normal bowel sounds, non tender, soft, other - colostomy


Extremities:  non-tender











Remy Lyons NP Aug 2, 2018 10:25

## 2018-08-03 VITALS — SYSTOLIC BLOOD PRESSURE: 121 MMHG | DIASTOLIC BLOOD PRESSURE: 73 MMHG

## 2018-08-03 VITALS — DIASTOLIC BLOOD PRESSURE: 77 MMHG | SYSTOLIC BLOOD PRESSURE: 142 MMHG

## 2018-08-03 VITALS — SYSTOLIC BLOOD PRESSURE: 150 MMHG | DIASTOLIC BLOOD PRESSURE: 62 MMHG

## 2018-08-03 VITALS — SYSTOLIC BLOOD PRESSURE: 105 MMHG | DIASTOLIC BLOOD PRESSURE: 66 MMHG

## 2018-08-03 VITALS — SYSTOLIC BLOOD PRESSURE: 122 MMHG | DIASTOLIC BLOOD PRESSURE: 84 MMHG

## 2018-08-03 VITALS — DIASTOLIC BLOOD PRESSURE: 86 MMHG | SYSTOLIC BLOOD PRESSURE: 123 MMHG

## 2018-08-03 LAB
ADD MANUAL DIFF: NO
ALBUMIN SERPL-MCNC: 3.7 G/DL (ref 3.4–5)
ALBUMIN/GLOB SERPL: 0.7 {RATIO} (ref 1–2.7)
ALP SERPL-CCNC: 140 U/L (ref 46–116)
ALT SERPL-CCNC: 38 U/L (ref 12–78)
ANION GAP SERPL CALC-SCNC: 13 MMOL/L (ref 5–15)
AST SERPL-CCNC: 18 U/L (ref 15–37)
BASOPHILS NFR BLD AUTO: 1 % (ref 0–2)
BILIRUB SERPL-MCNC: 0.4 MG/DL (ref 0.2–1)
BUN SERPL-MCNC: 12 MG/DL (ref 7–18)
CALCIUM SERPL-MCNC: 10.2 MG/DL (ref 8.5–10.1)
CHLORIDE SERPL-SCNC: 98 MMOL/L (ref 98–107)
CO2 SERPL-SCNC: 22 MMOL/L (ref 21–32)
CREAT SERPL-MCNC: 1 MG/DL (ref 0.55–1.3)
EOSINOPHIL NFR BLD AUTO: 5.4 % (ref 0–3)
ERYTHROCYTE [DISTWIDTH] IN BLOOD BY AUTOMATED COUNT: 15.1 % (ref 11.6–14.8)
GLOBULIN SER-MCNC: 5.4 G/DL
HCT VFR BLD CALC: 39.3 % (ref 42–52)
HGB BLD-MCNC: 12.6 G/DL (ref 14.2–18)
LYMPHOCYTES NFR BLD AUTO: 20.3 % (ref 20–45)
MCV RBC AUTO: 86 FL (ref 80–99)
MONOCYTES NFR BLD AUTO: 6.4 % (ref 1–10)
NEUTROPHILS NFR BLD AUTO: 67.1 % (ref 45–75)
PHOSPHATE SERPL-MCNC: 2.6 MG/DL (ref 2.5–4.9)
PLATELET # BLD: 259 K/UL (ref 150–450)
POTASSIUM SERPL-SCNC: 4.3 MMOL/L (ref 3.5–5.1)
RBC # BLD AUTO: 4.58 M/UL (ref 4.7–6.1)
SODIUM SERPL-SCNC: 133 MMOL/L (ref 136–145)
WBC # BLD AUTO: 9.7 K/UL (ref 4.8–10.8)

## 2018-08-03 RX ADMIN — CHLORHEXIDINE GLUCONATE SCH APPLIC: 213 SOLUTION TOPICAL at 21:00

## 2018-08-03 RX ADMIN — ERTAPENEM SODIUM SCH MLS/HR: 1 INJECTION, POWDER, LYOPHILIZED, FOR SOLUTION INTRAMUSCULAR; INTRAVENOUS at 14:28

## 2018-08-03 RX ADMIN — POLYETHYLENE GLYCOL 3350 SCH GM: 17 POWDER, FOR SOLUTION ORAL at 21:00

## 2018-08-03 RX ADMIN — HEPARIN SODIUM SCH UNITS: 5000 INJECTION INTRAVENOUS; SUBCUTANEOUS at 21:03

## 2018-08-03 RX ADMIN — INSULIN ASPART SCH UNITS: 100 INJECTION, SOLUTION INTRAVENOUS; SUBCUTANEOUS at 12:09

## 2018-08-03 RX ADMIN — INSULIN ASPART SCH UNITS: 100 INJECTION, SOLUTION INTRAVENOUS; SUBCUTANEOUS at 05:49

## 2018-08-03 RX ADMIN — HEPARIN SODIUM SCH UNITS: 5000 INJECTION INTRAVENOUS; SUBCUTANEOUS at 09:56

## 2018-08-03 RX ADMIN — LEVETIRACETAM SCH MG: 100 SOLUTION ORAL at 21:00

## 2018-08-03 RX ADMIN — VANCOMYCIN HCL-SODIUM CHLORIDE IV SOLN 1.25 GM/250ML-0.9% SCH MLS/HR: 1.25-0.9/25 SOLUTION at 18:07

## 2018-08-03 RX ADMIN — INSULIN ASPART SCH UNITS: 100 INJECTION, SOLUTION INTRAVENOUS; SUBCUTANEOUS at 18:11

## 2018-08-03 RX ADMIN — LEVETIRACETAM SCH MG: 100 SOLUTION ORAL at 14:28

## 2018-08-03 RX ADMIN — LEVETIRACETAM SCH MG: 100 SOLUTION ORAL at 05:46

## 2018-08-03 NOTE — PULMONOLGY CRITICAL CARE NOTE
Critical Care - Asmt/Plan


Problems:  


(1) Acute on chronic respiratory failure


(2) Acute on chronic renal insufficiency


(3) Severe sepsis


(4) Vegetative state


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


(6) Diabetes mellitus


Assessment/Plan:


PIcc line was removed yesterday, Midline was inserted.  BC continue to be 

positive on abx


Respiratory:  monitor respiratory rate, adjust FIO2


Cardiac:  continue to monitor HR/BP





Critical Care - Objective





Last 24 Hour Vital Signs








  Date Time  Temp Pulse Resp B/P (MAP) Pulse Ox O2 Delivery O2 Flow Rate FiO2


 


8/3/18 09:03  87 17     30


 


8/3/18 08:00 97.7 103 20 105/66 (79) 100   





 97.7       


 


8/3/18 08:00        30


 


8/3/18 07:49  89 16     30


 


8/3/18 05:30  87 16     30


 


8/3/18 04:00 98.7 91 20 122/84 (97) 100   





 98.7       


 


8/3/18 04:00      Mechanical Ventilator  





      Mechanical Ventilator  





      Mechanical Ventilator  


 


8/3/18 04:00        30


 


8/3/18 03:38  91      


 


8/3/18 03:30  89 17     30


 


8/3/18 01:20  91 18     30


 


8/3/18 00:00      Mechanical Ventilator  





      Mechanical Ventilator  





      Mechanical Ventilator  


 


8/3/18 00:00        30


 


8/3/18 00:00 98.5 89 16 142/77 (98) 100   





 98.5       


 


8/2/18 23:35  91      


 


8/2/18 23:30  90 17     30


 


8/2/18 21:30  82 16     30


 


8/2/18 20:07 98.3 86 16 141/73 (95) 98   





 98.3       


 


8/2/18 20:00      Mechanical Ventilator  





      Mechanical Ventilator  





      Mechanical Ventilator  


 


8/2/18 20:00        30


 


8/2/18 19:30  86 18     30


 


8/2/18 19:06  87      


 


8/2/18 16:52  84 17     30


 


8/2/18 16:00 98.4 82 17 124/83 (97) 100   





 98.4       


 


8/2/18 16:00      Mechanical Ventilator  





      Mechanical Ventilator  





      Mechanical Ventilator  


 


8/2/18 16:00  80      


 


8/2/18 16:00        30


 


8/2/18 15:28  83 16     30


 


8/2/18 13:29  88 16     30


 


8/2/18 12:00        30


 


8/2/18 12:00      Mechanical Ventilator  





      Mechanical Ventilator  





      Mechanical Ventilator  


 


8/2/18 12:00 98.3 87 16 106/77 (87) 100   





 98.3       


 


8/2/18 12:00  87      


 


8/2/18 11:29  92 16     30








Status:  awake


Condition:  critical


Lungs:  chest wall tender


Heart:  HR/BP unstable


Abdomen:  soft, non-tender, active bowel sounds, feeding tube


Decubiti:  location, stage


Micro:





Microbiology








 Date/Time


Source Procedure


Growth Status


 


 


 8/1/18 21:20


Blood Blood Culture - Preliminary Resulted


 


 8/1/18 21:10


Blood Blood Culture - Preliminary Resulted








Accucheck:  153





Critical Care - Subjective


ROS Limited/Unobtainable:  No


Condition:  critical


EKG Rhythm:  Sinus Rhythm


FI02:  30


Vent Support Breath Rate:  16


Vent Support Mode:  AC


Vent Tidal Volume:  600


Sputum Amount:  Small


PEEP:  5.0


PIP:  33


Tube Feeding Amount:  65


I&O:











Intake and Output  


 


 8/2/18 8/3/18





 19:00 07:00


 


Intake Total 920 ml 1270.0 ml


 


Output Total 680 ml 925 ml


 


Balance 240 ml 345.0 ml


 


  


 


Free Water  90 ml


 


IV Total 110 ml 250.0 ml


 


Tube Feeding 780 ml 780 ml


 


Other 30 ml 150 ml


 


Output Urine Total 380 ml 575 ml


 


Stool Total 300 ml 350 ml








Labs:





Laboratory Tests








Test


  8/3/18


04:00


 


White Blood Count


  9.7 K/UL


(4.8-10.8)


 


Red Blood Count


  4.58 M/UL


(4.70-6.10)  L


 


Hemoglobin


  12.6 G/DL


(14.2-18.0)  L


 


Hematocrit


  39.3 %


(42.0-52.0)  L


 


Mean Corpuscular Volume 86 FL (80-99)  


 


Mean Corpuscular Hemoglobin


  27.6 PG


(27.0-31.0)


 


Mean Corpuscular Hemoglobin


Concent 32.1 G/DL


(32.0-36.0)


 


Red Cell Distribution Width


  15.1 %


(11.6-14.8)  H


 


Platelet Count


  259 K/UL


(150-450)


 


Mean Platelet Volume


  8.7 FL


(6.5-10.1)


 


Neutrophils (%) (Auto)


  67.1 %


(45.0-75.0)


 


Lymphocytes (%) (Auto)


  20.3 %


(20.0-45.0)


 


Monocytes (%) (Auto)


  6.4 %


(1.0-10.0)


 


Eosinophils (%) (Auto)


  5.4 %


(0.0-3.0)  H


 


Basophils (%) (Auto)


  1.0 %


(0.0-2.0)


 


Sodium Level


  133 MMOL/L


(136-145)  L


 


Potassium Level


  4.3 MMOL/L


(3.5-5.1)


 


Chloride Level


  98 MMOL/L


()


 


Carbon Dioxide Level


  22 MMOL/L


(21-32)


 


Anion Gap


  13 mmol/L


(5-15)


 


Blood Urea Nitrogen


  12 mg/dL


(7-18)


 


Creatinine


  1.0 MG/DL


(0.55-1.30)


 


Estimat Glomerular Filtration


Rate > 60 mL/min


(>60)


 


Glucose Level


  137 MG/DL


()  H


 


Calcium Level


  10.2 MG/DL


(8.5-10.1)  H


 


Phosphorus Level


  2.6 MG/DL


(2.5-4.9)


 


Magnesium Level


  1.9 MG/DL


(1.8-2.4)


 


Total Bilirubin


  0.4 MG/DL


(0.2-1.0)


 


Aspartate Amino Transf


(AST/SGOT) 18 U/L (15-37)


 


 


Alanine Aminotransferase


(ALT/SGPT) 38 U/L (12-78)


 


 


Alkaline Phosphatase


  140 U/L


()  H


 


Total Protein


  9.1 G/DL


(6.4-8.2)  H


 


Albumin


  3.7 G/DL


(3.4-5.0)


 


Globulin 5.4 g/dL  


 


Albumin/Globulin Ratio


  0.7 (1.0-2.7)


L

















Yury Pena MD Aug 3, 2018 10:45

## 2018-08-03 NOTE — GENERAL PROGRESS NOTE
Assessment/Plan


Status:  unchanged


Assessment/Plan


# Anemia of chronic disease. No hemolysis, peripheral smear reviewed, ferritin 

is elevated.


--> Continue to closely monitor for improvement. 


--> Anemia w/u has been reviewed. Will trend cbc daily. 


--> Hgb goal >7


--> 8/3: Hgb 12.6


# Leukocytosis - Sepsis with elevated temperature of 103 degrees Fahrenheit.  


--> Lactic acid pending, was elevated upon admission. 


--> Pt on antibiotics, has received a dose of Zosyn and currently is on 

vancomycin q.12 h.  


--> Currently afebrile.


--> Appreciate Infectious Disease consult.


# Acute kidney injury.  


--> Currently on IV fluids, IV bolus given to see if the patient responds along 

with IV pressor as needed.  


--> Closely monitor with Nephrology team.


# Septic shock, use antibiotic per ID services.


--> potential pna, bacteremia


# Respiratory failure, status post tracheostomy, on mechanical ventilation per 

Dr. Pena.


# Hypercalcemia. Monitor PTH and calcium level





The time the note was entered does not necessarily correspond to the time the 

patient was seen.





Subjective


Date patient seen:  Aug 3, 2018


ROS Limited/Unobtainable:  Yes


Hematologic/Lymphatic:  Reports: anemia


Allergies:  


Coded Allergies:  


     AZTREONAM (Verified  Allergy, Unknown, 7/23/18)


All Systems:  reviewed and negative except above


Subjective


Pt remains obtunded, on trach/vent. No acute events. H/H stable.





Objective





Last 24 Hour Vital Signs








  Date Time  Temp Pulse Resp B/P (MAP) Pulse Ox O2 Delivery O2 Flow Rate FiO2


 


8/3/18 07:49  89 16     30


 


8/3/18 05:30  87 16     30


 


8/3/18 04:00 98.7 91 20 122/84 (97) 100   





 98.7       


 


8/3/18 04:00      Mechanical Ventilator  





      Mechanical Ventilator  





      Mechanical Ventilator  


 


8/3/18 04:00        30


 


8/3/18 03:38  91      


 


8/3/18 03:30  89 17     30


 


8/3/18 01:20  91 18     30


 


8/3/18 00:00      Mechanical Ventilator  





      Mechanical Ventilator  





      Mechanical Ventilator  


 


8/3/18 00:00        30


 


8/3/18 00:00 98.5 89 16 142/77 (98) 100   





 98.5       


 


8/2/18 23:35  91      


 


8/2/18 23:30  90 17     30


 


8/2/18 21:30  82 16     30


 


8/2/18 20:07 98.3 86 16 141/73 (95) 98   





 98.3       


 


8/2/18 20:00      Mechanical Ventilator  





      Mechanical Ventilator  





      Mechanical Ventilator  


 


8/2/18 20:00        30


 


8/2/18 19:30  86 18     30


 


8/2/18 19:06  87      


 


8/2/18 16:52  84 17     30


 


8/2/18 16:00 98.4 82 17 124/83 (97) 100   





 98.4       


 


8/2/18 16:00      Mechanical Ventilator  





      Mechanical Ventilator  





      Mechanical Ventilator  


 


8/2/18 16:00  80      


 


8/2/18 16:00        30


 


8/2/18 15:28  83 16     30


 


8/2/18 13:29  88 16     30


 


8/2/18 12:00        30


 


8/2/18 12:00      Mechanical Ventilator  





      Mechanical Ventilator  





      Mechanical Ventilator  


 


8/2/18 12:00 98.3 87 16 106/77 (87) 100   





 98.3       


 


8/2/18 12:00  87      


 


8/2/18 11:29  92 16     30


 


8/2/18 08:56  88 16     30

















Intake and Output  


 


 8/2/18 8/3/18





 19:00 07:00


 


Intake Total 920 ml 1270.0 ml


 


Output Total 680 ml 925 ml


 


Balance 240 ml 345.0 ml


 


  


 


Free Water  90 ml


 


IV Total 110 ml 250.0 ml


 


Tube Feeding 780 ml 780 ml


 


Other 30 ml 150 ml


 


Output Urine Total 380 ml 575 ml


 


Stool Total 300 ml 350 ml








Laboratory Tests


8/3/18 04:00: 


White Blood Count 9.7, Red Blood Count 4.58L, Hemoglobin 12.6L, Hematocrit 39.3L

, Mean Corpuscular Volume 86, Mean Corpuscular Hemoglobin 27.6, Mean 

Corpuscular Hemoglobin Concent 32.1, Red Cell Distribution Width 15.1H, 

Platelet Count 259, Mean Platelet Volume 8.7, Neutrophils (%) (Auto) 67.1, 

Lymphocytes (%) (Auto) 20.3, Monocytes (%) (Auto) 6.4, Eosinophils (%) (Auto) 

5.4H, Basophils (%) (Auto) 1.0, Sodium Level 133L, Potassium Level 4.3, 

Chloride Level 98, Carbon Dioxide Level 22, Anion Gap 13, Blood Urea Nitrogen 12

, Creatinine 1.0, Estimat Glomerular Filtration Rate > 60, Glucose Level 137H, 

Calcium Level 10.2H, Phosphorus Level 2.6, Magnesium Level 1.9, Total Bilirubin 

0.4, Aspartate Amino Transf (AST/SGOT) 18, Alanine Aminotransferase (ALT/SGPT) 

38, Alkaline Phosphatase 140H, Total Protein 9.1H, Albumin 3.7, Globulin 5.4, 

Albumin/Globulin Ratio 0.7L


Height (Feet):  6


Weight (Pounds):  203


General Appearance:  no apparent distress


EENT:  PERRL/EOMI


Neck:  normal alignment


Cardiovascular:  normal peripheral pulses


Respiratory/Chest:  no respiratory distress


Abdomen:  soft











Tejas Gerber MD Aug 3, 2018 08:50

## 2018-08-03 NOTE — NEPHROLOGY PROGRESS NOTE
Assessment/Plan


Assessment/Plan


1. BRAYDON- resolved, 


2. Sepsis- on Abx


3. Chronic Resp FL- trached on vent. 


    - Per PULM mgmt


4. Hyponatremia - Na stable 133. Changed GTube flushes to NS


5. SZ- Keppra


    - monitor as serum sodium 133-135





Subjective


Date patient seen:  Aug 3, 2018


Time patient seen:  10:03


ROS Limited/Unobtainable:  Yes


Allergies:  


Coded Allergies:  


     AZTREONAM (Verified  Allergy, Unknown, 7/23/18)


Subjective


Patient trached on vent. In no overt distress





Objective





Last 24 Hour Vital Signs








  Date Time  Temp Pulse Resp B/P (MAP) Pulse Ox O2 Delivery O2 Flow Rate FiO2


 


8/3/18 09:03  87 17     30


 


8/3/18 07:49  89 16     30


 


8/3/18 05:30  87 16     30


 


8/3/18 04:00 98.7 91 20 122/84 (97) 100   





 98.7       


 


8/3/18 04:00      Mechanical Ventilator  





      Mechanical Ventilator  





      Mechanical Ventilator  


 


8/3/18 04:00        30


 


8/3/18 03:38  91      


 


8/3/18 03:30  89 17     30


 


8/3/18 01:20  91 18     30


 


8/3/18 00:00      Mechanical Ventilator  





      Mechanical Ventilator  





      Mechanical Ventilator  


 


8/3/18 00:00        30


 


8/3/18 00:00 98.5 89 16 142/77 (98) 100   





 98.5       


 


8/2/18 23:35  91      


 


8/2/18 23:30  90 17     30


 


8/2/18 21:30  82 16     30


 


8/2/18 20:07 98.3 86 16 141/73 (95) 98   





 98.3       


 


8/2/18 20:00      Mechanical Ventilator  





      Mechanical Ventilator  





      Mechanical Ventilator  


 


8/2/18 20:00        30


 


8/2/18 19:30  86 18     30


 


8/2/18 19:06  87      


 


8/2/18 16:52  84 17     30


 


8/2/18 16:00 98.4 82 17 124/83 (97) 100   





 98.4       


 


8/2/18 16:00      Mechanical Ventilator  





      Mechanical Ventilator  





      Mechanical Ventilator  


 


8/2/18 16:00  80      


 


8/2/18 16:00        30


 


8/2/18 15:28  83 16     30


 


8/2/18 13:29  88 16     30


 


8/2/18 12:00        30


 


8/2/18 12:00      Mechanical Ventilator  





      Mechanical Ventilator  





      Mechanical Ventilator  


 


8/2/18 12:00 98.3 87 16 106/77 (87) 100   





 98.3       


 


8/2/18 12:00  87      


 


8/2/18 11:29  92 16     30

















Intake and Output  


 


 8/2/18 8/3/18





 19:00 07:00


 


Intake Total 920 ml 1270.0 ml


 


Output Total 680 ml 925 ml


 


Balance 240 ml 345.0 ml


 


  


 


Free Water  90 ml


 


IV Total 110 ml 250.0 ml


 


Tube Feeding 780 ml 780 ml


 


Other 30 ml 150 ml


 


Output Urine Total 380 ml 575 ml


 


Stool Total 300 ml 350 ml








Laboratory Tests


8/3/18 04:00: 


White Blood Count 9.7, Red Blood Count 4.58L, Hemoglobin 12.6L, Hematocrit 39.3L

, Mean Corpuscular Volume 86, Mean Corpuscular Hemoglobin 27.6, Mean 

Corpuscular Hemoglobin Concent 32.1, Red Cell Distribution Width 15.1H, 

Platelet Count 259, Mean Platelet Volume 8.7, Neutrophils (%) (Auto) 67.1, 

Lymphocytes (%) (Auto) 20.3, Monocytes (%) (Auto) 6.4, Eosinophils (%) (Auto) 

5.4H, Basophils (%) (Auto) 1.0, Sodium Level 133L, Potassium Level 4.3, 

Chloride Level 98, Carbon Dioxide Level 22, Anion Gap 13, Blood Urea Nitrogen 12

, Creatinine 1.0, Estimat Glomerular Filtration Rate > 60, Glucose Level 137H, 

Calcium Level 10.2H, Phosphorus Level 2.6, Magnesium Level 1.9, Total Bilirubin 

0.4, Aspartate Amino Transf (AST/SGOT) 18, Alanine Aminotransferase (ALT/SGPT) 

38, Alkaline Phosphatase 140H, Total Protein 9.1H, Albumin 3.7, Globulin 5.4, 

Albumin/Globulin Ratio 0.7L


Height (Feet):  6


Weight (Pounds):  203


General Appearance:  WD/WN, no apparent distress


EENT:  PERRL/EOMI


Neck:  non-tender, normal alignment, supple


Cardiovascular:  normal rate, regular rhythm


Respiratory/Chest:  chest wall non-tender, normal breath sounds


Abdomen:  non tender, soft


Edema:  no edema noted Arm (L), no edema noted Arm (R), no edema noted Leg (L), 

no edema noted Leg (R), no edema noted Pedal (L), no edema noted Pedal (R), no 

edema noted Generalized











Jewel Templeton M.D. Aug 3, 2018 10:05

## 2018-08-03 NOTE — GI PROGRESS NOTE
Assessment/Plan


Problems:  


(1) Parastomal hernia


ICD Codes:  K43.5 - Parastomal hernia without obstruction or gangrene


SNOMED:  446871524


Qualifiers:  


   Qualified Codes:  K43.5 - Parastomal hernia without obstruction or gangrene


(2) Stoma malfunction


SNOMED:  638008018


(3) Colostomy in place


ICD Codes:  Z93.3 - Colostomy status


SNOMED:  317506767, 793863544


(4) Vegetative state


ICD Codes:  R40.3 - Persistent vegetative state


SNOMED:  34046333


(5) Diabetes mellitus


ICD Codes:  E11.9 - Type 2 diabetes mellitus without complications


SNOMED:  23074930


(6) Acute on chronic respiratory failure


ICD Codes:  J96.20 - Acute and chronic respiratory failure, unspecified whether 

with hypoxia or hypercapnia


SNOMED:  82486599


(7) Severe sepsis


ICD Codes:  A41.9 - Sepsis, unspecified organism; R65.20 - Severe sepsis 

without septic shock


SNOMED:  17253231


Status:  stable


Status Narrative


Discussed with Dr. Pickard.


Assessment/Plan


prolapse stoma assessed >> no s/sx of infection.  no obstruction. 


anemia work up reviewed >> iron deficiency


G tube dependent


hepatitis panel negative


OB negative





supportive care


monitor H&H, prn transfusions


venofer


ppi


GTFs per RD, adv to goal


GT site care daily/prn


abx


bowel regime


fu labs





The patient was seen and examined at bedside and all new and available data was 

reviewed in the patients chart. I agree with the above findings, impression 

and plan.  (Patient seen earlier today. Signature stamp does not reflect 

patient encounter time.). - Miles Pickard MD





Subjective


Subjective


limited





Objective





Last 24 Hour Vital Signs








  Date Time  Temp Pulse Resp B/P (MAP) Pulse Ox O2 Delivery O2 Flow Rate FiO2


 


8/3/18 11:02  98 16     30


 


8/3/18 09:03  87 17     30


 


8/3/18 08:00 97.7 103 20 105/66 (79) 100   





 97.7       


 


8/3/18 08:00  89      


 


8/3/18 08:00        30


 


8/3/18 07:49  89 16     30


 


8/3/18 05:30  87 16     30


 


8/3/18 04:00 98.7 91 20 122/84 (97) 100   





 98.7       


 


8/3/18 04:00      Mechanical Ventilator  





      Mechanical Ventilator  





      Mechanical Ventilator  


 


8/3/18 04:00        30


 


8/3/18 03:38  91      


 


8/3/18 03:30  89 17     30


 


8/3/18 01:20  91 18     30


 


8/3/18 00:00      Mechanical Ventilator  





      Mechanical Ventilator  





      Mechanical Ventilator  


 


8/3/18 00:00        30


 


8/3/18 00:00 98.5 89 16 142/77 (98) 100   





 98.5       


 


8/2/18 23:35  91      


 


8/2/18 23:30  90 17     30


 


8/2/18 21:30  82 16     30


 


8/2/18 20:07 98.3 86 16 141/73 (95) 98   





 98.3       


 


8/2/18 20:00      Mechanical Ventilator  





      Mechanical Ventilator  





      Mechanical Ventilator  


 


8/2/18 20:00        30


 


8/2/18 19:30  86 18     30


 


8/2/18 19:06  87      


 


8/2/18 16:52  84 17     30


 


8/2/18 16:00 98.4 82 17 124/83 (97) 100   





 98.4       


 


8/2/18 16:00      Mechanical Ventilator  





      Mechanical Ventilator  





      Mechanical Ventilator  


 


8/2/18 16:00  80      


 


8/2/18 16:00        30


 


8/2/18 15:28  83 16     30


 


8/2/18 13:29  88 16     30


 


8/2/18 12:00        30


 


8/2/18 12:00      Mechanical Ventilator  





      Mechanical Ventilator  





      Mechanical Ventilator  


 


8/2/18 12:00 98.3 87 16 106/77 (87) 100   





 98.3       


 


8/2/18 12:00  87      


 


8/2/18 11:29  92 16     30

















Intake and Output  


 


 8/2/18 8/3/18





 19:00 07:00


 


Intake Total 920 ml 1270.0 ml


 


Output Total 680 ml 925 ml


 


Balance 240 ml 345.0 ml


 


  


 


Free Water  90 ml


 


IV Total 110 ml 250.0 ml


 


Tube Feeding 780 ml 780 ml


 


Other 30 ml 150 ml


 


Output Urine Total 380 ml 575 ml


 


Stool Total 300 ml 350 ml











Laboratory Tests








Test


  8/3/18


04:00


 


White Blood Count


  9.7 K/UL


(4.8-10.8)


 


Red Blood Count


  4.58 M/UL


(4.70-6.10)  L


 


Hemoglobin


  12.6 G/DL


(14.2-18.0)  L


 


Hematocrit


  39.3 %


(42.0-52.0)  L


 


Mean Corpuscular Volume 86 FL (80-99)  


 


Mean Corpuscular Hemoglobin


  27.6 PG


(27.0-31.0)


 


Mean Corpuscular Hemoglobin


Concent 32.1 G/DL


(32.0-36.0)


 


Red Cell Distribution Width


  15.1 %


(11.6-14.8)  H


 


Platelet Count


  259 K/UL


(150-450)


 


Mean Platelet Volume


  8.7 FL


(6.5-10.1)


 


Neutrophils (%) (Auto)


  67.1 %


(45.0-75.0)


 


Lymphocytes (%) (Auto)


  20.3 %


(20.0-45.0)


 


Monocytes (%) (Auto)


  6.4 %


(1.0-10.0)


 


Eosinophils (%) (Auto)


  5.4 %


(0.0-3.0)  H


 


Basophils (%) (Auto)


  1.0 %


(0.0-2.0)


 


Sodium Level


  133 MMOL/L


(136-145)  L


 


Potassium Level


  4.3 MMOL/L


(3.5-5.1)


 


Chloride Level


  98 MMOL/L


()


 


Carbon Dioxide Level


  22 MMOL/L


(21-32)


 


Anion Gap


  13 mmol/L


(5-15)


 


Blood Urea Nitrogen


  12 mg/dL


(7-18)


 


Creatinine


  1.0 MG/DL


(0.55-1.30)


 


Estimat Glomerular Filtration


Rate > 60 mL/min


(>60)


 


Glucose Level


  137 MG/DL


()  H


 


Calcium Level


  10.2 MG/DL


(8.5-10.1)  H


 


Phosphorus Level


  2.6 MG/DL


(2.5-4.9)


 


Magnesium Level


  1.9 MG/DL


(1.8-2.4)


 


Total Bilirubin


  0.4 MG/DL


(0.2-1.0)


 


Aspartate Amino Transf


(AST/SGOT) 18 U/L (15-37)


 


 


Alanine Aminotransferase


(ALT/SGPT) 38 U/L (12-78)


 


 


Alkaline Phosphatase


  140 U/L


()  H


 


Total Protein


  9.1 G/DL


(6.4-8.2)  H


 


Albumin


  3.7 G/DL


(3.4-5.0)


 


Globulin 5.4 g/dL  


 


Albumin/Globulin Ratio


  0.7 (1.0-2.7)


L








Height (Feet):  6


Weight (Pounds):  203


General Appearance:  alert


Cardiovascular:  normal rate


Respiratory/Chest:  normal breath sounds, no respiratory distress


Abdominal Exam:  other - prolapse stoma, colostomy











Remy Lyons NP Aug 3, 2018 11:16

## 2018-08-03 NOTE — INFECTIOUS DISEASES PROG NOTE
Assessment/Plan


Assessment/Plan


Sepsis, resolving- 2ry to UTI and Bacteremia Blood cultures postive 4/4 bottles 

with GPC Unclear source will need to R/O Endocarditis  Possible PNA initially 


 -u/a wbc 40-60, nit neg, leuk +2; ucx >100k E. aerogenes (S cefepime, cipro/

levo; R Zosyn)


 -BCX 4/4 E. aerogenes, prob Amp C (S cefepime, cipro/levo; R Zosyn), P. 

mirabilis ESBL (S Zosyn, Ertapenem); repeta 7/26 1/4 CoNS (suspect contaminant)

, 7/28 Staph f/u final results if CoNS may need TTE and IE work up.


  -CXR 7/27: Increased left pleural effusion, over 3 days. Overall decreased 

interstitial congestion. Right infrahilar atelectasis


 -CXR: Cardiomegaly. Mild interstitial congestion. Suspect small bilateral 

pleural effusions


 -CT abd/p: Right lower quadrant double barrel colostomy, as described. Small 

peristomal hernia contains bowel loops without evidence of obstruction or 

strangulation. Left renal staghorn calculus. Bilateral intrarenal calyceal 

calculi. Borderline hydronephrosis on the right without evidence of downstream 

obstructive lesion. May indicate mild ureteral pelvic junction obstruction. 

Somewhat atrophic left kidney. Inspissated contrast ball within the distal 

sigmoid colon. Gastrostomy in good position. Nonspecific bilateral perinephric 

fat stranding, could indicate pyelonephritis or could be chronic. Guzmán 

catheter in place. Apparent bladder wall thickening, possibly an artifact of 

under distention but cystitis is not excludable, particularly in view of mild 

perivesical fat stranding. Bilateral pulmonary parenchymal atelectasis and 

consolidation


  -Blood Cx from PICC CoNS 2/2 bottles Peripheral Neg- (May be contaminant but 

will repeat blood Cx given new leukocytosis if positive will need ECHO.


  - Blood Cx 7/30/18 - GPC - Will need TTE for IE work up and the PICC removed.


 


PICC line infection/colonization - TTE negative. Blood cultures only positive 

from PICC. Not positive from Peripheral 


- Will repeat blood cultures x 1 to confirm clearance after PICC replaced. [place] 

suspicion for IE.


- PICC replace 8/1/18





Fever/Leukocytosis; Resolved - Re-evaluate lines, Diarrhea? C. dif? 


BRAYDON, improving


Lactic acidosis, resolved





chronic resp failure trach/vent dependant


BPH


GERD


 HTN


DM2


seizure disorder


colostomy


 s/p GT 


constipation





VRE and MRSA colonized


 


Plan:


- Continue empiric IV Vancomycin #7 for bacteremia pending culture results


- Must r/u Endocarditics as Blood Cx 8/1/18 with 4/4 bottles positive


- Recommend Transesophageal ECHO to r/o IE





-Continue Ertapenem #8/14 for Amp-C Enterobacter and ESBL P.mirabilis 

bacteremia ; will treat for 14 days - End date 8/9/18


    -7/27 SP Zosyn #4





- Monitor CBC/CMP, temperatures








Thank you for this consultation. Will continue to follow along with you.





Subjective


Allergies:  


Coded Allergies:  


     AZTREONAM (Verified  Allergy, Unknown, 7/23/18)


Subjective


No acute events 


On Vent. 30% O2 and PEEP of 5


Afebrile with no leukocytosis





Objective


Vital Signs





Last 24 Hour Vital Signs








  Date Time  Temp Pulse Resp B/P (MAP) Pulse Ox O2 Delivery O2 Flow Rate FiO2


 


8/3/18 07:49  89 16     30


 


8/3/18 05:30  87 16     30


 


8/3/18 04:00 98.7 91 20 122/84 (97) 100   





 98.7       


 


8/3/18 04:00      Mechanical Ventilator  





      Mechanical Ventilator  





      Mechanical Ventilator  


 


8/3/18 04:00        30


 


8/3/18 03:38  91      


 


8/3/18 03:30  89 17     30


 


8/3/18 01:20  91 18     30


 


8/3/18 00:00      Mechanical Ventilator  





      Mechanical Ventilator  





      Mechanical Ventilator  


 


8/3/18 00:00        30


 


8/3/18 00:00 98.5 89 16 142/77 (98) 100   





 98.5       


 


8/2/18 23:35  91      


 


8/2/18 23:30  90 17     30


 


8/2/18 21:30  82 16     30


 


8/2/18 20:07 98.3 86 16 141/73 (95) 98   





 98.3       


 


8/2/18 20:00      Mechanical Ventilator  





      Mechanical Ventilator  





      Mechanical Ventilator  


 


8/2/18 20:00        30


 


8/2/18 19:30  86 18     30


 


8/2/18 19:06  87      


 


8/2/18 16:52  84 17     30


 


8/2/18 16:00 98.4 82 17 124/83 (97) 100   





 98.4       


 


8/2/18 16:00      Mechanical Ventilator  





      Mechanical Ventilator  





      Mechanical Ventilator  


 


8/2/18 16:00  80      


 


8/2/18 16:00        30


 


8/2/18 15:28  83 16     30


 


8/2/18 13:29  88 16     30


 


8/2/18 12:00        30


 


8/2/18 12:00      Mechanical Ventilator  





      Mechanical Ventilator  





      Mechanical Ventilator  


 


8/2/18 12:00 98.3 87 16 106/77 (87) 100   





 98.3       


 


8/2/18 12:00  87      


 


8/2/18 11:29  92 16     30


 


8/2/18 08:56  88 16     30


 


8/2/18 08:20  90      


 


8/2/18 08:11  87 16     30








Height (Feet):  6


Weight (Pounds):  203


Objective


GENERAL:  No overt distress. Opens eyes and get agitated to voice and exam. On 

vent


HEENT:  NCAT, DMM, Tracheostomy noted.


PULM: Mild crackles B/L


CARDIOVASCULAR:  RRR, S1 and S2.  Tachycardic.  No rubs or gallops.


PULMONARY:  Mild diffuse expiratory rhonchi with basilar rales.


ABDOMEN:  Obese and nondistended. +BS, The G-tube and stoma noted.


EXTREMITIES:  No edema.  Fair pedal pulses.





Microbiology








 Date/Time


Source Procedure


Growth Status


 


 


 8/1/18 21:20


Blood Blood Culture - Preliminary


NO GROWTH AFTER 24 HOURS Resulted


 


 8/1/18 21:10


Blood Blood Culture - Preliminary


NO GROWTH AFTER 24 HOURS Resulted











Laboratory Tests








Test


  8/3/18


04:00


 


White Blood Count


  9.7 K/UL


(4.8-10.8)


 


Red Blood Count


  4.58 M/UL


(4.70-6.10)  L


 


Hemoglobin


  12.6 G/DL


(14.2-18.0)  L


 


Hematocrit


  39.3 %


(42.0-52.0)  L


 


Mean Corpuscular Volume 86 FL (80-99)  


 


Mean Corpuscular Hemoglobin


  27.6 PG


(27.0-31.0)


 


Mean Corpuscular Hemoglobin


Concent 32.1 G/DL


(32.0-36.0)


 


Red Cell Distribution Width


  15.1 %


(11.6-14.8)  H


 


Platelet Count


  259 K/UL


(150-450)


 


Mean Platelet Volume


  8.7 FL


(6.5-10.1)


 


Neutrophils (%) (Auto)


  67.1 %


(45.0-75.0)


 


Lymphocytes (%) (Auto)


  20.3 %


(20.0-45.0)


 


Monocytes (%) (Auto)


  6.4 %


(1.0-10.0)


 


Eosinophils (%) (Auto)


  5.4 %


(0.0-3.0)  H


 


Basophils (%) (Auto)


  1.0 %


(0.0-2.0)


 


Sodium Level


  133 MMOL/L


(136-145)  L


 


Potassium Level


  4.3 MMOL/L


(3.5-5.1)


 


Chloride Level


  98 MMOL/L


()


 


Carbon Dioxide Level


  22 MMOL/L


(21-32)


 


Anion Gap


  13 mmol/L


(5-15)


 


Blood Urea Nitrogen


  12 mg/dL


(7-18)


 


Creatinine


  1.0 MG/DL


(0.55-1.30)


 


Estimat Glomerular Filtration


Rate > 60 mL/min


(>60)


 


Glucose Level


  137 MG/DL


()  H


 


Calcium Level


  10.2 MG/DL


(8.5-10.1)  H


 


Phosphorus Level


  2.6 MG/DL


(2.5-4.9)


 


Magnesium Level


  1.9 MG/DL


(1.8-2.4)


 


Total Bilirubin


  0.4 MG/DL


(0.2-1.0)


 


Aspartate Amino Transf


(AST/SGOT) 18 U/L (15-37)


 


 


Alanine Aminotransferase


(ALT/SGPT) 38 U/L (12-78)


 


 


Alkaline Phosphatase


  140 U/L


()  H


 


Total Protein


  9.1 G/DL


(6.4-8.2)  H


 


Albumin


  3.7 G/DL


(3.4-5.0)


 


Globulin 5.4 g/dL  


 


Albumin/Globulin Ratio


  0.7 (1.0-2.7)


L











Current Medications








 Medications


  (Trade)  Dose


 Ordered  Sig/Jan


 Route


 PRN Reason  Start Time


 Stop Time Status Last Admin


Dose Admin


 


 Acetaminophen


  (Tylenol)  650 mg  Q4H  PRN


 ORAL


 FEVER  7/26/18 14:00


 8/23/18 09:59  8/2/18 01:34


 


 


 Baclofen


  (Lioresal)  10 mg  EVERY 8  HOURS


 GT


   7/30/18 14:00


 8/23/18 12:59  8/3/18 05:45


 


 


 Chlorhexidine


 Gluconate


  (Elaine-Hex 2%)  1 applic  DAILY@2000


 TOPIC


   7/26/18 20:00


 8/23/18 19:59  8/2/18 20:54


 


 


 Dextrose


  (Dextrose 50%)  25 ml  STAT  PRN


 IV


 Hypoglycemia  7/27/18 08:45


 8/24/18 08:44   


 


 


 Dextrose


  (Dextrose 50%)  50 ml  STAT  PRN


 IV


 Hypoglycemia  7/27/18 08:45


 8/24/18 08:44   


 


 


 Ertapenem 1 gm/


 Sodium Chloride  55 ml @ 


 110 mls/hr  Q24H


 IVPB


   7/27/18 15:00


 8/6/18 14:59  8/2/18 15:00


 


 


 Heparin Sodium


  (Porcine)


  (Heparin 5000


 units/ml)  5,000 units  EVERY 12  HOURS


 SUBQ


   7/26/18 21:00


 8/23/18 20:59  8/2/18 20:56


 


 


 Heparin Sodium/


 Sodium Chloride


  (Heparin 2000


 units/Ns 1000ml


 premix)  2,000 unit  ONCE  PRN


 INJ


 FOR PICC PLACEMENT  8/1/18 11:00


 8/3/18 10:59   


 


 


 Insulin Aspart


  (NovoLOG)    Q6HR


 SUBQ


   7/26/18 18:00


 8/24/18 11:29  8/3/18 05:49


 


 


 Lansoprazole


  (Prevacid)  30 mg  DAILY


 GT


   7/27/18 09:00


 8/25/18 08:59  8/2/18 08:28


 


 


 Levetiracetam


  (Keppra)  1,000 mg  Q8HR


 GT


   7/26/18 14:00


 8/23/18 12:59  8/3/18 05:46


 


 


 Lidocaine HCl


  (Xylocaine 1%


 30ml)  30 ml  ONCE  PRN


 INJ


 FOR PICC PLACEMENT  8/1/18 11:00


 8/3/18 10:59   


 


 


 Ondansetron HCl


  (Zofran)  4 mg  Q6H  PRN


 IVP


 Nausea & Vomiting  7/26/18 16:00


 8/23/18 09:59   


 


 


 Polyethylene


 Glycol


  (Miralax)  17 gm  BEDTIME


 GT


   7/30/18 21:00


 8/24/18 20:59  8/2/18 20:54


 


 


 Vancomycin HCl


  (Vanco rx to


 dose)  1 ea  DAILY  PRN


 MISC


 PER RX PROTOCOL  7/28/18 13:15


 8/27/18 13:14   


 


 


 Vancomycin HCl/


 Dextrose  250 ml @ 


 166.667


 mls/hr  Q18H


 IVPB


   8/2/18 06:00


 8/7/18 05:59  8/2/18 23:23


 

















Robert Williamson M.D. Aug 3, 2018 08:07

## 2018-08-03 NOTE — GENERAL SURGERY PROGRESS NOTE
General Surgery-Progress Note


Subjective


Additional Comments


no acute events.  stoma stable





Objective





Last 24 Hour Vital Signs








  Date Time  Temp Pulse Resp B/P (MAP) Pulse Ox O2 Delivery O2 Flow Rate FiO2


 


8/3/18 09:03  87 17     30


 


8/3/18 08:00 97.7 103 20 105/66 (79) 100   





 97.7       


 


8/3/18 08:00        30


 


8/3/18 07:49  89 16     30


 


8/3/18 05:30  87 16     30


 


8/3/18 04:00 98.7 91 20 122/84 (97) 100   





 98.7       


 


8/3/18 04:00      Mechanical Ventilator  





      Mechanical Ventilator  





      Mechanical Ventilator  


 


8/3/18 04:00        30


 


8/3/18 03:38  91      


 


8/3/18 03:30  89 17     30


 


8/3/18 01:20  91 18     30


 


8/3/18 00:00      Mechanical Ventilator  





      Mechanical Ventilator  





      Mechanical Ventilator  


 


8/3/18 00:00        30


 


8/3/18 00:00 98.5 89 16 142/77 (98) 100   





 98.5       


 


8/2/18 23:35  91      


 


8/2/18 23:30  90 17     30


 


8/2/18 21:30  82 16     30


 


8/2/18 20:07 98.3 86 16 141/73 (95) 98   





 98.3       


 


8/2/18 20:00      Mechanical Ventilator  





      Mechanical Ventilator  





      Mechanical Ventilator  


 


8/2/18 20:00        30


 


8/2/18 19:30  86 18     30


 


8/2/18 19:06  87      


 


8/2/18 16:52  84 17     30


 


8/2/18 16:00 98.4 82 17 124/83 (97) 100   





 98.4       


 


8/2/18 16:00      Mechanical Ventilator  





      Mechanical Ventilator  





      Mechanical Ventilator  


 


8/2/18 16:00  80      


 


8/2/18 16:00        30


 


8/2/18 15:28  83 16     30


 


8/2/18 13:29  88 16     30


 


8/2/18 12:00        30


 


8/2/18 12:00      Mechanical Ventilator  





      Mechanical Ventilator  





      Mechanical Ventilator  


 


8/2/18 12:00 98.3 87 16 106/77 (87) 100   





 98.3       


 


8/2/18 12:00  87      


 


8/2/18 11:29  92 16     30








I&O











Intake and Output  


 


 8/2/18 8/3/18





 19:00 07:00


 


Intake Total 920 ml 1270.0 ml


 


Output Total 680 ml 925 ml


 


Balance 240 ml 345.0 ml


 


  


 


Free Water  90 ml


 


IV Total 110 ml 250.0 ml


 


Tube Feeding 780 ml 780 ml


 


Other 30 ml 150 ml


 


Output Urine Total 380 ml 575 ml


 


Stool Total 300 ml 350 ml








Wound:  clean


Drains:  other


Cardiovascular:  RSR


Respiratory:  clear


Abdomen:  soft, flat


Extremities:  no cyanosis





Laboratory Tests








Test


  8/3/18


04:00


 


White Blood Count


  9.7 K/UL


(4.8-10.8)


 


Red Blood Count


  4.58 M/UL


(4.70-6.10)  L


 


Hemoglobin


  12.6 G/DL


(14.2-18.0)  L


 


Hematocrit


  39.3 %


(42.0-52.0)  L


 


Mean Corpuscular Volume 86 FL (80-99)  


 


Mean Corpuscular Hemoglobin


  27.6 PG


(27.0-31.0)


 


Mean Corpuscular Hemoglobin


Concent 32.1 G/DL


(32.0-36.0)


 


Red Cell Distribution Width


  15.1 %


(11.6-14.8)  H


 


Platelet Count


  259 K/UL


(150-450)


 


Mean Platelet Volume


  8.7 FL


(6.5-10.1)


 


Neutrophils (%) (Auto)


  67.1 %


(45.0-75.0)


 


Lymphocytes (%) (Auto)


  20.3 %


(20.0-45.0)


 


Monocytes (%) (Auto)


  6.4 %


(1.0-10.0)


 


Eosinophils (%) (Auto)


  5.4 %


(0.0-3.0)  H


 


Basophils (%) (Auto)


  1.0 %


(0.0-2.0)


 


Sodium Level


  133 MMOL/L


(136-145)  L


 


Potassium Level


  4.3 MMOL/L


(3.5-5.1)


 


Chloride Level


  98 MMOL/L


()


 


Carbon Dioxide Level


  22 MMOL/L


(21-32)


 


Anion Gap


  13 mmol/L


(5-15)


 


Blood Urea Nitrogen


  12 mg/dL


(7-18)


 


Creatinine


  1.0 MG/DL


(0.55-1.30)


 


Estimat Glomerular Filtration


Rate > 60 mL/min


(>60)


 


Glucose Level


  137 MG/DL


()  H


 


Calcium Level


  10.2 MG/DL


(8.5-10.1)  H


 


Phosphorus Level


  2.6 MG/DL


(2.5-4.9)


 


Magnesium Level


  1.9 MG/DL


(1.8-2.4)


 


Total Bilirubin


  0.4 MG/DL


(0.2-1.0)


 


Aspartate Amino Transf


(AST/SGOT) 18 U/L (15-37)


 


 


Alanine Aminotransferase


(ALT/SGPT) 38 U/L (12-78)


 


 


Alkaline Phosphatase


  140 U/L


()  H


 


Total Protein


  9.1 G/DL


(6.4-8.2)  H


 


Albumin


  3.7 G/DL


(3.4-5.0)


 


Globulin 5.4 g/dL  


 


Albumin/Globulin Ratio


  0.7 (1.0-2.7)


L











Plan


Problems:  


(1) Stoma malfunction


(2) Parastomal hernia


Assessment & Plan:  52M with history of prior loop colostomy.  proximal loop 

stable.  distal with impacted contrast stool but stable. 


parastomal hernia with small bowel contents in hernia.  no obstruction noted at 

this time. 


larger appearance of patients stoma is because location and use of loop 

colostomy in hepatic flexure.  it is otherwise viable and stable.  





Would Highly recommend fixing parastomal hernia but can be done electively.


if obstructs will need urgent repair.  currently non obstructive


will monitor and follow with recs. 


bowel care 





spoke with wife today and explained above.  will plan for d/c soon.  if desired 

will plan for elective repair at later time once not acutely sick in the 

hospital.  


thank you for this consultation. 





(3) Severe sepsis











Brian Christina Aug 3, 2018 10:43

## 2018-08-04 VITALS — DIASTOLIC BLOOD PRESSURE: 73 MMHG | SYSTOLIC BLOOD PRESSURE: 130 MMHG

## 2018-08-04 VITALS — DIASTOLIC BLOOD PRESSURE: 81 MMHG | SYSTOLIC BLOOD PRESSURE: 133 MMHG

## 2018-08-04 VITALS — DIASTOLIC BLOOD PRESSURE: 75 MMHG | SYSTOLIC BLOOD PRESSURE: 129 MMHG

## 2018-08-04 VITALS — SYSTOLIC BLOOD PRESSURE: 121 MMHG | DIASTOLIC BLOOD PRESSURE: 79 MMHG

## 2018-08-04 VITALS — DIASTOLIC BLOOD PRESSURE: 85 MMHG | SYSTOLIC BLOOD PRESSURE: 113 MMHG

## 2018-08-04 VITALS — DIASTOLIC BLOOD PRESSURE: 79 MMHG | SYSTOLIC BLOOD PRESSURE: 120 MMHG

## 2018-08-04 LAB
ADD MANUAL DIFF: NO
ALBUMIN SERPL-MCNC: 3.6 G/DL (ref 3.4–5)
ALBUMIN/GLOB SERPL: 0.7 {RATIO} (ref 1–2.7)
ALP SERPL-CCNC: 124 U/L (ref 46–116)
ALT SERPL-CCNC: 31 U/L (ref 12–78)
ANION GAP SERPL CALC-SCNC: 13 MMOL/L (ref 5–15)
AST SERPL-CCNC: 17 U/L (ref 15–37)
BASOPHILS NFR BLD AUTO: 0.7 % (ref 0–2)
BILIRUB SERPL-MCNC: 0.5 MG/DL (ref 0.2–1)
BUN SERPL-MCNC: 13 MG/DL (ref 7–18)
CALCIUM SERPL-MCNC: 10.1 MG/DL (ref 8.5–10.1)
CHLORIDE SERPL-SCNC: 99 MMOL/L (ref 98–107)
CO2 SERPL-SCNC: 21 MMOL/L (ref 21–32)
CREAT SERPL-MCNC: 1 MG/DL (ref 0.55–1.3)
EOSINOPHIL NFR BLD AUTO: 4.1 % (ref 0–3)
ERYTHROCYTE [DISTWIDTH] IN BLOOD BY AUTOMATED COUNT: 14.8 % (ref 11.6–14.8)
GLOBULIN SER-MCNC: 5.2 G/DL
HCT VFR BLD CALC: 35.8 % (ref 42–52)
HGB BLD-MCNC: 11.9 G/DL (ref 14.2–18)
LYMPHOCYTES NFR BLD AUTO: 17.9 % (ref 20–45)
MCV RBC AUTO: 85 FL (ref 80–99)
MONOCYTES NFR BLD AUTO: 5.3 % (ref 1–10)
NEUTROPHILS NFR BLD AUTO: 72.1 % (ref 45–75)
PHOSPHATE SERPL-MCNC: 2.4 MG/DL (ref 2.5–4.9)
PLATELET # BLD: 253 K/UL (ref 150–450)
POTASSIUM SERPL-SCNC: 4.1 MMOL/L (ref 3.5–5.1)
RBC # BLD AUTO: 4.2 M/UL (ref 4.7–6.1)
SODIUM SERPL-SCNC: 133 MMOL/L (ref 136–145)
WBC # BLD AUTO: 10.7 K/UL (ref 4.8–10.8)

## 2018-08-04 RX ADMIN — HEPARIN SODIUM SCH UNITS: 5000 INJECTION INTRAVENOUS; SUBCUTANEOUS at 20:32

## 2018-08-04 RX ADMIN — VANCOMYCIN HCL-SODIUM CHLORIDE IV SOLN 1.25 GM/250ML-0.9% SCH MLS/HR: 1.25-0.9/25 SOLUTION at 12:17

## 2018-08-04 RX ADMIN — HEPARIN SODIUM SCH UNITS: 5000 INJECTION INTRAVENOUS; SUBCUTANEOUS at 08:55

## 2018-08-04 RX ADMIN — LEVETIRACETAM SCH MG: 100 SOLUTION ORAL at 22:04

## 2018-08-04 RX ADMIN — CHLORHEXIDINE GLUCONATE SCH APPLIC: 213 SOLUTION TOPICAL at 20:30

## 2018-08-04 RX ADMIN — ERTAPENEM SODIUM SCH MLS/HR: 1 INJECTION, POWDER, LYOPHILIZED, FOR SOLUTION INTRAMUSCULAR; INTRAVENOUS at 15:11

## 2018-08-04 RX ADMIN — POLYETHYLENE GLYCOL 3350 SCH GM: 17 POWDER, FOR SOLUTION ORAL at 20:30

## 2018-08-04 RX ADMIN — INSULIN ASPART SCH UNITS: 100 INJECTION, SOLUTION INTRAVENOUS; SUBCUTANEOUS at 18:26

## 2018-08-04 RX ADMIN — INSULIN ASPART SCH UNITS: 100 INJECTION, SOLUTION INTRAVENOUS; SUBCUTANEOUS at 06:38

## 2018-08-04 RX ADMIN — INSULIN ASPART SCH UNITS: 100 INJECTION, SOLUTION INTRAVENOUS; SUBCUTANEOUS at 23:24

## 2018-08-04 RX ADMIN — INSULIN ASPART SCH UNITS: 100 INJECTION, SOLUTION INTRAVENOUS; SUBCUTANEOUS at 12:08

## 2018-08-04 RX ADMIN — LEVETIRACETAM SCH MG: 100 SOLUTION ORAL at 06:39

## 2018-08-04 RX ADMIN — LEVETIRACETAM SCH MG: 100 SOLUTION ORAL at 13:54

## 2018-08-04 RX ADMIN — INSULIN ASPART SCH UNITS: 100 INJECTION, SOLUTION INTRAVENOUS; SUBCUTANEOUS at 00:54

## 2018-08-04 NOTE — PULMONOLGY CRITICAL CARE NOTE
Critical Care - Asmt/Plan


Problems:  


(1) Acute on chronic respiratory failure


(2) Acute on chronic renal insufficiency


(3) Severe sepsis


(4) Vegetative state


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


(6) Diabetes mellitus


Respiratory:  monitor respiratory rate


Cardiac:  continue pressors


Renal:  F/U I&O


Infectious Disease:  check cultures


Gastrointestinal:  continue feedings/current rate


Endocrine:  monitor blood sugar


Hematologic:  monitor H/H


Neurologic:  PRN Ativan


Affect:  PRN ativan


Prophylaxis:  Protonix, Heparin


Notes Reviewed:  internist, cardio, renal


Discussed with:  nurses





Critical Care - Objective





Last 24 Hour Vital Signs








  Date Time  Temp Pulse Resp B/P (MAP) Pulse Ox O2 Delivery O2 Flow Rate FiO2


 


8/4/18 08:00 98.2 83 16 120/79 (93) 100   





 98.2       


 


8/4/18 06:40  78 17     30


 


8/4/18 05:09  74 16     30


 


8/4/18 04:00 98.5 81 21 133/81 (98) 100   





 98.5       


 


8/4/18 04:00        30


 


8/4/18 04:00  81      


 


8/4/18 04:00      Mechanical Ventilator  


 


8/4/18 03:25  76 16     30


 


8/4/18 01:31  71 16     30


 


8/4/18 00:00 98.6 78 20 130/73 (92) 100   





 98.6       


 


8/4/18 00:00        30


 


8/4/18 00:00      Mechanical Ventilator  





      Mechanical Ventilator  





      Mechanical Ventilator  


 


8/4/18 00:00  77      


 


8/3/18 23:36  96 19     30


 


8/3/18 22:00 99.9       


 


8/3/18 21:01 100.8       


 


8/3/18 20:55  83 16     30


 


8/3/18 20:39 100.8 81 21 121/73 (89) 98   





 100.8       


 


8/3/18 20:00        30


 


8/3/18 20:00      Mechanical Ventilator  





      Mechanical Ventilator  





      Mechanical Ventilator  


 


8/3/18 19:47  79 16     30


 


8/3/18 19:44  83      


 


8/3/18 17:43  85 16     30


 


8/3/18 16:00 98.4 90 20 123/86 (98) 100   





 98.4       


 


8/3/18 16:00        30


 


8/3/18 16:00      Mechanical Ventilator  





      Mechanical Ventilator  





      Mechanical Ventilator  


 


8/3/18 16:00  89      


 


8/3/18 15:40  91 16     30


 


8/3/18 14:06  95      


 


8/3/18 13:44  91 16     30


 


8/3/18 12:12        30


 


8/3/18 12:10 97.3 94 20 150/62 (91) 97   





 97.3       


 


8/3/18 12:00      Mechanical Ventilator  





      Mechanical Ventilator  





      Mechanical Ventilator  


 


8/3/18 11:02  98 16     30


 


8/3/18 09:03  87 17     30








Status:  obtunded


Condition:  critical


HEENT:  atraumatic


Neck:  full ROM


Lungs:  clear


Heart:  HR/BP stable


Abdomen:  soft


Extremities:  no C/C/E


Decubiti:  location


Micro:





Microbiology








 Date/Time


Source Procedure


Growth Status


 


 


 8/1/18 21:20


Blood Blood Culture - Preliminary


Staphylococcus Sp Coag Neg Resulted


 


 8/1/18 21:10


Blood Blood Culture - Preliminary


Staphylococcus Sp Coag Neg Resulted








Accucheck:  125





Critical Care - Subjective


ROS Limited/Unobtainable:  Yes


Condition:  critical


FI02:  30


Vent Support Breath Rate:  16


Vent Support Mode:  AC


Vent Tidal Volume:  600


Sputum Amount:  Small


PEEP:  5.0


PIP:  38


Tube Feeding Amount:  65


I&O:











Intake and Output  


 


 8/3/18 8/4/18





 19:00 07:00


 


Intake Total 1100 ml 1035 ml


 


Output Total 1175 ml 900 ml


 


Balance -75 ml 135 ml


 


  


 


Free Water 200 ml 200 ml


 


Tube Feeding 780 ml 715 ml


 


Other 120 ml 120 ml


 


Output Urine Total 600 ml 800 ml


 


Stool Total 575 ml 100 ml








Labs:





Laboratory Tests








Test


  8/3/18


16:15 8/4/18


04:00


 


Vancomycin Level Trough


  15.4 ug/mL


(5.0-12.0)  H 


 


 


White Blood Count


  


  10.7 K/UL


(4.8-10.8)


 


Red Blood Count


  


  4.20 M/UL


(4.70-6.10)  L


 


Hemoglobin


  


  11.9 G/DL


(14.2-18.0)  L


 


Hematocrit


  


  35.8 %


(42.0-52.0)  L


 


Mean Corpuscular Volume  85 FL (80-99)  


 


Mean Corpuscular Hemoglobin


  


  28.4 PG


(27.0-31.0)


 


Mean Corpuscular Hemoglobin


Concent 


  33.4 G/DL


(32.0-36.0)


 


Red Cell Distribution Width


  


  14.8 %


(11.6-14.8)


 


Platelet Count


  


  253 K/UL


(150-450)


 


Mean Platelet Volume


  


  8.0 FL


(6.5-10.1)


 


Neutrophils (%) (Auto)


  


  72.1 %


(45.0-75.0)


 


Lymphocytes (%) (Auto)


  


  17.9 %


(20.0-45.0)  L


 


Monocytes (%) (Auto)


  


  5.3 %


(1.0-10.0)


 


Eosinophils (%) (Auto)


  


  4.1 %


(0.0-3.0)  H


 


Basophils (%) (Auto)


  


  0.7 %


(0.0-2.0)


 


Erythrocyte Sedimentation Rate


  


  80 MM/HR


(0-15)  H


 


Sodium Level


  


  133 MMOL/L


(136-145)  L


 


Potassium Level


  


  4.1 MMOL/L


(3.5-5.1)


 


Chloride Level


  


  99 MMOL/L


()


 


Carbon Dioxide Level


  


  21 MMOL/L


(21-32)


 


Anion Gap


  


  13 mmol/L


(5-15)


 


Blood Urea Nitrogen


  


  13 mg/dL


(7-18)


 


Creatinine


  


  1.0 MG/DL


(0.55-1.30)


 


Estimat Glomerular Filtration


Rate 


  > 60 mL/min


(>60)


 


Glucose Level


  


  125 MG/DL


()  H


 


Calcium Level


  


  10.1 MG/DL


(8.5-10.1)


 


Phosphorus Level


  


  2.4 MG/DL


(2.5-4.9)  L


 


Magnesium Level


  


  1.9 MG/DL


(1.8-2.4)


 


Total Bilirubin


  


  0.5 MG/DL


(0.2-1.0)


 


Aspartate Amino Transf


(AST/SGOT) 


  17 U/L (15-37)


 


 


Alanine Aminotransferase


(ALT/SGPT) 


  31 U/L (12-78)


 


 


Alkaline Phosphatase


  


  124 U/L


()  H


 


C-Reactive Protein,


Quantitative 


  5.0 mg/dL


(0.00-0.90)  H


 


Total Protein


  


  8.8 G/DL


(6.4-8.2)  H


 


Albumin


  


  3.6 G/DL


(3.4-5.0)


 


Globulin  5.2 g/dL  


 


Albumin/Globulin Ratio


  


  0.7 (1.0-2.7)


L

















Yury Pena MD Aug 4, 2018 08:22

## 2018-08-04 NOTE — INFECTIOUS DISEASES PROG NOTE
Assessment/Plan


Assessment/Plan





Assessment/:











Sepsis,, SP 


Bacteremia CoNS ( 2 different Spp )  


  8/1 2DEcho : no Veg Endocarditis    


  -Blood Cx from PICC CoNS 2/2 bottles Peripheral Neg-


  - Blood Cx 7/, 26,28, 30 and 8/1 CoNS


PICC line infection/colonization - TTE negative.


- PICC removed and replace by midline on  8/1/18


UTI 


 -u/a wbc 40-60, nit neg, leuk +2; ucx >100k E. aerogenes (S cefepime, cipro/

levo; R Zosyn)


 -BCX 4/4 E. aerogenes, prob Amp C (S cefepime, cipro/levo; R Zosyn), P. 

mirabilis ESBL (S Zosyn, Ertapenem); repeta 7/26 1/4 CoNS (suspect contaminant)

, 7/28 Staph f/u final results if CoNS may need TTE and IE work up.


  -CXR 7/27: Increased left pleural effusion, over 3 days. Overall decreased 

interstitial congestion. Right infrahilar atelectasis





 -CT abd/p: Right lower quadrant double barrel colostomy, as described. Small 

peristomal hernia contains bowel loops without evidence of obstruction or 

strangulation. Left renal staghorn calculus. Bilateral intrarenal calyceal 

calculi. Borderline hydronephrosis on the right without evidence of downstream 

obstructive lesion. May indicate mild ureteral pelvic junction obstruction. 

Somewhat atrophic left kidney. Inspissated contrast ball within the distal 

sigmoid colon. Gastrostomy in good position. Nonspecific bilateral perinephric 

fat stranding, could indicate pyelonephritis or could be chronic. Guzmán 

catheter in place. Apparent bladder wall thickening, possibly an artifact of 

under distention but cystitis is not excludable, particularly in view of mild 

perivesical fat stranding. Bilateral pulmonary parenchymal atelectasis and 

consolidation








Fever/Leukocytosis; Resolved - Re-evaluate lines, Diarrhea? C. dif? 


Lactic acidosis, resolved











BRAYDON, improving


chronic resp failure trach/vent dependant


BPH


GERD


 HTN


DM2


seizure disorder


colostomy


 s/p GT 


constipation





VRE and MRSA colonized


 


Plan:





- Continue empiric IV Vancomycin #7/ 14  for bacteremia pending culture results


-Continue Ertapenem # 8/14 for Amp-C Enterobacter and ESBL P.mirabilis 

bacteremia ;- End date 8/9/18


    -7/27 SP Zosyn #4





- Monitor CBC/CMP, temperatures


- repeat blood Cx in AM 


- Recommend Transesophageal ECHO to r/o IE





Subjective


Allergies:  


Coded Allergies:  


     AZTREONAM (Verified  Allergy, Unknown, 7/23/18)


Subjective


Afebrile





Objective


Vital Signs





Last 24 Hour Vital Signs








  Date Time  Temp Pulse Resp B/P (MAP) Pulse Ox O2 Delivery O2 Flow Rate FiO2


 


8/4/18 12:00 98.4 77 16 113/85 (94) 98   





 98.4       


 


8/4/18 10:59  78 16     30


 


8/4/18 09:10  75 16     30


 


8/4/18 08:00      Mechanical Ventilator  


 


8/4/18 08:00  91      


 


8/4/18 08:00 98.2 83 16 120/79 (93) 100   





 98.2       


 


8/4/18 08:00        30


 


8/4/18 06:40  78 17     30


 


8/4/18 05:09  74 16     30


 


8/4/18 04:00 98.5 81 21 133/81 (98) 100   





 98.5       


 


8/4/18 04:00        30


 


8/4/18 04:00  81      


 


8/4/18 04:00      Mechanical Ventilator  


 


8/4/18 03:25  76 16     30


 


8/4/18 01:31  71 16     30


 


8/4/18 00:00 98.6 78 20 130/73 (92) 100   





 98.6       


 


8/4/18 00:00        30


 


8/4/18 00:00      Mechanical Ventilator  





      Mechanical Ventilator  





      Mechanical Ventilator  


 


8/4/18 00:00  77      


 


8/3/18 23:36  96 19     30


 


8/3/18 22:00 99.9       


 


8/3/18 21:01 100.8       


 


8/3/18 20:55  83 16     30


 


8/3/18 20:39 100.8 81 21 121/73 (89) 98   





 100.8       


 


8/3/18 20:00        30


 


8/3/18 20:00      Mechanical Ventilator  





      Mechanical Ventilator  





      Mechanical Ventilator  


 


8/3/18 19:47  79 16     30


 


8/3/18 19:44  83      


 


8/3/18 17:43  85 16     30


 


8/3/18 16:00 98.4 90 20 123/86 (98) 100   





 98.4       


 


8/3/18 16:00        30


 


8/3/18 16:00      Mechanical Ventilator  





      Mechanical Ventilator  





      Mechanical Ventilator  


 


8/3/18 16:00  89      


 


8/3/18 15:40  91 16     30


 


8/3/18 14:06  95      


 


8/3/18 13:44  91 16     30








Height (Feet):  6


Weight (Pounds):  204


HEENT:  anicteric


Respiratory/Chest:  no respiratory distress


Cardiovascular:  regularly irregular


Abdomen:  no organomegaly





Microbiology








 Date/Time


Source Procedure


Growth Status


 


 


 8/1/18 21:20


Blood Blood Culture - Preliminary


Staphylococcus Sp Coag Neg Resulted


 


 8/1/18 21:10


Blood Blood Culture - Preliminary


Staphylococcus Sp Coag Neg Resulted











Laboratory Tests








Test


  8/3/18


16:15 8/4/18


04:00


 


Vancomycin Level Trough


  15.4 ug/mL


(5.0-12.0)  H 


 


 


White Blood Count


  


  10.7 K/UL


(4.8-10.8)


 


Red Blood Count


  


  4.20 M/UL


(4.70-6.10)  L


 


Hemoglobin


  


  11.9 G/DL


(14.2-18.0)  L


 


Hematocrit


  


  35.8 %


(42.0-52.0)  L


 


Mean Corpuscular Volume  85 FL (80-99)  


 


Mean Corpuscular Hemoglobin


  


  28.4 PG


(27.0-31.0)


 


Mean Corpuscular Hemoglobin


Concent 


  33.4 G/DL


(32.0-36.0)


 


Red Cell Distribution Width


  


  14.8 %


(11.6-14.8)


 


Platelet Count


  


  253 K/UL


(150-450)


 


Mean Platelet Volume


  


  8.0 FL


(6.5-10.1)


 


Neutrophils (%) (Auto)


  


  72.1 %


(45.0-75.0)


 


Lymphocytes (%) (Auto)


  


  17.9 %


(20.0-45.0)  L


 


Monocytes (%) (Auto)


  


  5.3 %


(1.0-10.0)


 


Eosinophils (%) (Auto)


  


  4.1 %


(0.0-3.0)  H


 


Basophils (%) (Auto)


  


  0.7 %


(0.0-2.0)


 


Erythrocyte Sedimentation Rate


  


  80 MM/HR


(0-15)  H


 


Sodium Level


  


  133 MMOL/L


(136-145)  L


 


Potassium Level


  


  4.1 MMOL/L


(3.5-5.1)


 


Chloride Level


  


  99 MMOL/L


()


 


Carbon Dioxide Level


  


  21 MMOL/L


(21-32)


 


Anion Gap


  


  13 mmol/L


(5-15)


 


Blood Urea Nitrogen


  


  13 mg/dL


(7-18)


 


Creatinine


  


  1.0 MG/DL


(0.55-1.30)


 


Estimat Glomerular Filtration


Rate 


  > 60 mL/min


(>60)


 


Glucose Level


  


  125 MG/DL


()  H


 


Calcium Level


  


  10.1 MG/DL


(8.5-10.1)


 


Phosphorus Level


  


  2.4 MG/DL


(2.5-4.9)  L


 


Magnesium Level


  


  1.9 MG/DL


(1.8-2.4)


 


Total Bilirubin


  


  0.5 MG/DL


(0.2-1.0)


 


Aspartate Amino Transf


(AST/SGOT) 


  17 U/L (15-37)


 


 


Alanine Aminotransferase


(ALT/SGPT) 


  31 U/L (12-78)


 


 


Alkaline Phosphatase


  


  124 U/L


()  H


 


C-Reactive Protein,


Quantitative 


  5.0 mg/dL


(0.00-0.90)  H


 


Total Protein


  


  8.8 G/DL


(6.4-8.2)  H


 


Albumin


  


  3.6 G/DL


(3.4-5.0)


 


Globulin  5.2 g/dL  


 


Albumin/Globulin Ratio


  


  0.7 (1.0-2.7)


L











Current Medications








 Medications


  (Trade)  Dose


 Ordered  Sig/Jan


 Route


 PRN Reason  Start Time


 Stop Time Status Last Admin


Dose Admin


 


 Acetaminophen


  (Tylenol)  650 mg  Q4H  PRN


 ORAL


 FEVER  7/26/18 14:00


 8/23/18 09:59  8/3/18 21:01


 


 


 Baclofen


  (Lioresal)  10 mg  EVERY 8  HOURS


 GT


   7/30/18 14:00


 8/23/18 12:59  8/4/18 06:39


 


 


 Chlorhexidine


 Gluconate


  (Elaine-Hex 2%)  1 applic  DAILY@2000


 TOPIC


   7/26/18 20:00


 8/23/18 19:59  8/3/18 21:00


 


 


 Dextrose


  (Dextrose 50%)  25 ml  STAT  PRN


 IV


 Hypoglycemia  7/27/18 08:45


 8/24/18 08:44   


 


 


 Dextrose


  (Dextrose 50%)  50 ml  STAT  PRN


 IV


 Hypoglycemia  7/27/18 08:45


 8/24/18 08:44   


 


 


 Ertapenem 1 gm/


 Sodium Chloride  55 ml @ 


 110 mls/hr  Q24H


 IVPB


   7/27/18 15:00


 8/6/18 14:59  8/3/18 14:28


 


 


 Heparin Sodium


  (Porcine)


  (Heparin 5000


 units/ml)  5,000 units  EVERY 12  HOURS


 SUBQ


   7/26/18 21:00


 8/23/18 20:59  8/4/18 08:55


 


 


 Insulin Aspart


  (NovoLOG)    Q6HR


 SUBQ


   7/26/18 18:00


 8/24/18 11:29  8/4/18 12:08


 


 


 Lansoprazole


  (Prevacid)  30 mg  DAILY


 GT


   7/27/18 09:00


 8/25/18 08:59  8/4/18 08:54


 


 


 Levetiracetam


  (Keppra)  1,000 mg  Q8HR


 GT


   7/26/18 14:00


 8/23/18 12:59  8/4/18 06:39


 


 


 Ondansetron HCl


  (Zofran)  4 mg  Q6H  PRN


 IVP


 Nausea & Vomiting  7/26/18 16:00


 8/23/18 09:59   


 


 


 Polyethylene


 Glycol


  (Miralax)  17 gm  BEDTIME


 GT


   7/30/18 21:00


 8/24/18 20:59  8/3/18 21:00


 


 


 Sodium Phosphate


 30 mm/Sodium


 Chloride  285 ml @ 


 47.5 mls/hr  ONCE  ONCE


 IV


   8/4/18 10:00


 8/4/18 15:59  8/4/18 09:52


 


 


 Vancomycin HCl


  (Vanco rx to


 dose)  1 ea  DAILY  PRN


 MISC


 PER RX PROTOCOL  7/28/18 13:15


 8/27/18 13:14   


 


 


 Vancomycin HCl/


 Dextrose  250 ml @ 


 166.667


 mls/hr  Q18H


 IVPB


   8/2/18 06:00


 8/7/18 05:59  8/3/18 18:07


 

















Roderick Fitzgerald MD Aug 4, 2018 12:22

## 2018-08-04 NOTE — NEPHROLOGY PROGRESS NOTE
Assessment/Plan


Assessment/Plan


1. BRAYDON- resolved, 


2. Sepsis- on Abx and improving


3. Chronic Resp FL- trached on vent. 


    - Per PULM mgmt


4. Hyponatremia - Na stable 133.  GTube flushes with NS


5. SZ- Keppra


    - monitor as serum sodium 133-135 and stable


6. Hypophos- NaPhos 10 mmol  IV ordered





Subjective


Date patient seen:  Aug 4, 2018


Time patient seen:  09:02


ROS Limited/Unobtainable:  Yes


Allergies:  


Coded Allergies:  


     AZTREONAM (Verified  Allergy, Unknown, 7/23/18)


Subjective


Patient trached on vent. Improving





Objective





Last 24 Hour Vital Signs








  Date Time  Temp Pulse Resp B/P (MAP) Pulse Ox O2 Delivery O2 Flow Rate FiO2


 


8/4/18 08:00 98.2 83 16 120/79 (93) 100   





 98.2       


 


8/4/18 06:40  78 17     30


 


8/4/18 05:09  74 16     30


 


8/4/18 04:00 98.5 81 21 133/81 (98) 100   





 98.5       


 


8/4/18 04:00        30


 


8/4/18 04:00  81      


 


8/4/18 04:00      Mechanical Ventilator  


 


8/4/18 03:25  76 16     30


 


8/4/18 01:31  71 16     30


 


8/4/18 00:00 98.6 78 20 130/73 (92) 100   





 98.6       


 


8/4/18 00:00        30


 


8/4/18 00:00      Mechanical Ventilator  





      Mechanical Ventilator  





      Mechanical Ventilator  


 


8/4/18 00:00  77      


 


8/3/18 23:36  96 19     30


 


8/3/18 22:00 99.9       


 


8/3/18 21:01 100.8       


 


8/3/18 20:55  83 16     30


 


8/3/18 20:39 100.8 81 21 121/73 (89) 98   





 100.8       


 


8/3/18 20:00        30


 


8/3/18 20:00      Mechanical Ventilator  





      Mechanical Ventilator  





      Mechanical Ventilator  


 


8/3/18 19:47  79 16     30


 


8/3/18 19:44  83      


 


8/3/18 17:43  85 16     30


 


8/3/18 16:00 98.4 90 20 123/86 (98) 100   





 98.4       


 


8/3/18 16:00        30


 


8/3/18 16:00      Mechanical Ventilator  





      Mechanical Ventilator  





      Mechanical Ventilator  


 


8/3/18 16:00  89      


 


8/3/18 15:40  91 16     30


 


8/3/18 14:06  95      


 


8/3/18 13:44  91 16     30


 


8/3/18 12:12        30


 


8/3/18 12:10 97.3 94 20 150/62 (91) 97   





 97.3       


 


8/3/18 12:00      Mechanical Ventilator  





      Mechanical Ventilator  





      Mechanical Ventilator  


 


8/3/18 11:02  98 16     30


 


8/3/18 09:03  87 17     30

















Intake and Output  


 


 8/3/18 8/4/18





 19:00 07:00


 


Intake Total 1100 ml 1035 ml


 


Output Total 1175 ml 900 ml


 


Balance -75 ml 135 ml


 


  


 


Free Water 200 ml 200 ml


 


Tube Feeding 780 ml 715 ml


 


Other 120 ml 120 ml


 


Output Urine Total 600 ml 800 ml


 


Stool Total 575 ml 100 ml








Laboratory Tests


8/3/18 16:15: Vancomycin Level Trough 15.4H


8/4/18 04:00: 


White Blood Count 10.7, Red Blood Count 4.20L, Hemoglobin 11.9L, Hematocrit 

35.8L, Mean Corpuscular Volume 85, Mean Corpuscular Hemoglobin 28.4, Mean 

Corpuscular Hemoglobin Concent 33.4, Red Cell Distribution Width 14.8, Platelet 

Count 253, Mean Platelet Volume 8.0, Neutrophils (%) (Auto) 72.1, Lymphocytes (%

) (Auto) 17.9L, Monocytes (%) (Auto) 5.3, Eosinophils (%) (Auto) 4.1H, 

Basophils (%) (Auto) 0.7, Erythrocyte Sedimentation Rate 80H, Sodium Level 133L

, Potassium Level 4.1, Chloride Level 99, Carbon Dioxide Level 21, Anion Gap 13

, Blood Urea Nitrogen 13, Creatinine 1.0, Estimat Glomerular Filtration Rate > 

60, Glucose Level 125H, Calcium Level 10.1, Phosphorus Level 2.4L, Magnesium 

Level 1.9, Total Bilirubin 0.5, Aspartate Amino Transf (AST/SGOT) 17, Alanine 

Aminotransferase (ALT/SGPT) 31, Alkaline Phosphatase 124H, C-Reactive Protein, 

Quantitative 5.0H, Total Protein 8.8H, Albumin 3.6, Globulin 5.2, Albumin/

Globulin Ratio 0.7L


Height (Feet):  6


Weight (Pounds):  204


General Appearance:  WD/WN, no apparent distress


EENT:  PERRL/EOMI


Neck:  non-tender, normal alignment, supple


Cardiovascular:  normal rate, regular rhythm


Respiratory/Chest:  lungs clear, normal breath sounds


Abdomen:  non tender, soft


Edema:  no edema noted Arm (L), no edema noted Arm (R), no edema noted Leg (L), 

no edema noted Leg (R), no edema noted Pedal (L), no edema noted Pedal (R), no 

edema noted Generalized











Jewel Templeton M.D. Aug 4, 2018 09:03

## 2018-08-04 NOTE — GENERAL PROGRESS NOTE
Assessment/Plan


Assessment/Plan


prolapse stoma assessed >> no s/sx of infection.  no obstruction. 


anemia work up reviewed >> iron deficiency


G tube dependent


hepatitis panel negative


OB negative





supportive care


monitor H&H, prn transfusions


venofer


ppi


GTFs per RD, adv to goal


GT site care daily/prn


abx


bowel regime


fu labs





Subjective


ROS Limited/Unobtainable:  No


Allergies:  


Coded Allergies:  


     AZTREONAM (Verified  Allergy, Unknown, 7/23/18)





Objective





Last 24 Hour Vital Signs








  Date Time  Temp Pulse Resp B/P (MAP) Pulse Ox O2 Delivery O2 Flow Rate FiO2


 


8/4/18 09:10  75 16     30


 


8/4/18 08:00      Mechanical Ventilator  


 


8/4/18 08:00 98.2 83 16 120/79 (93) 100   





 98.2       


 


8/4/18 08:00        30


 


8/4/18 06:40  78 17     30


 


8/4/18 05:09  74 16     30


 


8/4/18 04:00 98.5 81 21 133/81 (98) 100   





 98.5       


 


8/4/18 04:00        30


 


8/4/18 04:00  81      


 


8/4/18 04:00      Mechanical Ventilator  


 


8/4/18 03:25  76 16     30


 


8/4/18 01:31  71 16     30


 


8/4/18 00:00 98.6 78 20 130/73 (92) 100   





 98.6       


 


8/4/18 00:00        30


 


8/4/18 00:00      Mechanical Ventilator  





      Mechanical Ventilator  





      Mechanical Ventilator  


 


8/4/18 00:00  77      


 


8/3/18 23:36  96 19     30


 


8/3/18 22:00 99.9       


 


8/3/18 21:01 100.8       


 


8/3/18 20:55  83 16     30


 


8/3/18 20:39 100.8 81 21 121/73 (89) 98   





 100.8       


 


8/3/18 20:00        30


 


8/3/18 20:00      Mechanical Ventilator  





      Mechanical Ventilator  





      Mechanical Ventilator  


 


8/3/18 19:47  79 16     30


 


8/3/18 19:44  83      


 


8/3/18 17:43  85 16     30


 


8/3/18 16:00 98.4 90 20 123/86 (98) 100   





 98.4       


 


8/3/18 16:00        30


 


8/3/18 16:00      Mechanical Ventilator  





      Mechanical Ventilator  





      Mechanical Ventilator  


 


8/3/18 16:00  89      


 


8/3/18 15:40  91 16     30


 


8/3/18 14:06  95      


 


8/3/18 13:44  91 16     30


 


8/3/18 12:12        30


 


8/3/18 12:10 97.3 94 20 150/62 (91) 97   





 97.3       


 


8/3/18 12:00      Mechanical Ventilator  





      Mechanical Ventilator  





      Mechanical Ventilator  


 


8/3/18 11:02  98 16     30

















Intake and Output  


 


 8/3/18 8/4/18





 19:00 07:00


 


Intake Total 1100 ml 1035 ml


 


Output Total 1175 ml 900 ml


 


Balance -75 ml 135 ml


 


  


 


Free Water 200 ml 200 ml


 


Tube Feeding 780 ml 715 ml


 


Other 120 ml 120 ml


 


Output Urine Total 600 ml 800 ml


 


Stool Total 575 ml 100 ml








Laboratory Tests


8/3/18 16:15: Vancomycin Level Trough 15.4H


8/4/18 04:00: 


White Blood Count 10.7, Red Blood Count 4.20L, Hemoglobin 11.9L, Hematocrit 

35.8L, Mean Corpuscular Volume 85, Mean Corpuscular Hemoglobin 28.4, Mean 

Corpuscular Hemoglobin Concent 33.4, Red Cell Distribution Width 14.8, Platelet 

Count 253, Mean Platelet Volume 8.0, Neutrophils (%) (Auto) 72.1, Lymphocytes (%

) (Auto) 17.9L, Monocytes (%) (Auto) 5.3, Eosinophils (%) (Auto) 4.1H, 

Basophils (%) (Auto) 0.7, Erythrocyte Sedimentation Rate 80H, Sodium Level 133L

, Potassium Level 4.1, Chloride Level 99, Carbon Dioxide Level 21, Anion Gap 13

, Blood Urea Nitrogen 13, Creatinine 1.0, Estimat Glomerular Filtration Rate > 

60, Glucose Level 125H, Calcium Level 10.1, Phosphorus Level 2.4L, Magnesium 

Level 1.9, Total Bilirubin 0.5, Aspartate Amino Transf (AST/SGOT) 17, Alanine 

Aminotransferase (ALT/SGPT) 31, Alkaline Phosphatase 124H, C-Reactive Protein, 

Quantitative 5.0H, Total Protein 8.8H, Albumin 3.6, Globulin 5.2, Albumin/

Globulin Ratio 0.7L


Height (Feet):  6


Weight (Pounds):  204


General Appearance:  no apparent distress


EENT:  normal ENT inspection


Neck:  supple


Cardiovascular:  normal rate


Respiratory/Chest:  decreased breath sounds


Abdomen:  soft, hypoactive bowel sounds, distended


Extremities:  non-tender











Miles Pickard MD Aug 4, 2018 09:54

## 2018-08-05 VITALS — DIASTOLIC BLOOD PRESSURE: 78 MMHG | SYSTOLIC BLOOD PRESSURE: 124 MMHG

## 2018-08-05 VITALS — SYSTOLIC BLOOD PRESSURE: 119 MMHG | DIASTOLIC BLOOD PRESSURE: 82 MMHG

## 2018-08-05 VITALS — DIASTOLIC BLOOD PRESSURE: 76 MMHG | SYSTOLIC BLOOD PRESSURE: 133 MMHG

## 2018-08-05 VITALS — DIASTOLIC BLOOD PRESSURE: 84 MMHG | SYSTOLIC BLOOD PRESSURE: 128 MMHG

## 2018-08-05 VITALS — DIASTOLIC BLOOD PRESSURE: 75 MMHG | SYSTOLIC BLOOD PRESSURE: 124 MMHG

## 2018-08-05 VITALS — SYSTOLIC BLOOD PRESSURE: 122 MMHG | DIASTOLIC BLOOD PRESSURE: 76 MMHG

## 2018-08-05 LAB
ANION GAP SERPL CALC-SCNC: 13 MMOL/L (ref 5–15)
BUN SERPL-MCNC: 13 MG/DL (ref 7–18)
CALCIUM SERPL-MCNC: 9.9 MG/DL (ref 8.5–10.1)
CHLORIDE SERPL-SCNC: 101 MMOL/L (ref 98–107)
CO2 SERPL-SCNC: 20 MMOL/L (ref 21–32)
CREAT SERPL-MCNC: 1 MG/DL (ref 0.55–1.3)
POTASSIUM SERPL-SCNC: 4 MMOL/L (ref 3.5–5.1)
SODIUM SERPL-SCNC: 134 MMOL/L (ref 136–145)

## 2018-08-05 RX ADMIN — INSULIN ASPART SCH UNITS: 100 INJECTION, SOLUTION INTRAVENOUS; SUBCUTANEOUS at 23:28

## 2018-08-05 RX ADMIN — HEPARIN SODIUM SCH UNITS: 5000 INJECTION INTRAVENOUS; SUBCUTANEOUS at 08:35

## 2018-08-05 RX ADMIN — LEVETIRACETAM SCH MG: 100 SOLUTION ORAL at 05:35

## 2018-08-05 RX ADMIN — VANCOMYCIN HCL-SODIUM CHLORIDE IV SOLN 1.25 GM/250ML-0.9% SCH MLS/HR: 1.25-0.9/25 SOLUTION at 05:36

## 2018-08-05 RX ADMIN — INSULIN ASPART SCH UNITS: 100 INJECTION, SOLUTION INTRAVENOUS; SUBCUTANEOUS at 12:14

## 2018-08-05 RX ADMIN — HEPARIN SODIUM SCH UNITS: 5000 INJECTION INTRAVENOUS; SUBCUTANEOUS at 21:12

## 2018-08-05 RX ADMIN — LEVETIRACETAM SCH MG: 100 SOLUTION ORAL at 21:10

## 2018-08-05 RX ADMIN — VANCOMYCIN HCL-SODIUM CHLORIDE IV SOLN 1.25 GM/250ML-0.9% SCH MLS/HR: 1.25-0.9/25 SOLUTION at 23:26

## 2018-08-05 RX ADMIN — POLYETHYLENE GLYCOL 3350 SCH GM: 17 POWDER, FOR SOLUTION ORAL at 21:10

## 2018-08-05 RX ADMIN — INSULIN ASPART SCH UNITS: 100 INJECTION, SOLUTION INTRAVENOUS; SUBCUTANEOUS at 05:38

## 2018-08-05 RX ADMIN — INSULIN ASPART SCH UNITS: 100 INJECTION, SOLUTION INTRAVENOUS; SUBCUTANEOUS at 17:40

## 2018-08-05 RX ADMIN — ERTAPENEM SODIUM SCH MLS/HR: 1 INJECTION, POWDER, LYOPHILIZED, FOR SOLUTION INTRAMUSCULAR; INTRAVENOUS at 15:24

## 2018-08-05 RX ADMIN — LEVETIRACETAM SCH MG: 100 SOLUTION ORAL at 14:08

## 2018-08-05 RX ADMIN — CHLORHEXIDINE GLUCONATE SCH APPLIC: 213 SOLUTION TOPICAL at 21:10

## 2018-08-05 NOTE — GENERAL PROGRESS NOTE
Assessment/Plan


Assessment/Plan


prolapse stoma assessed >> no s/sx of infection.  no obstruction. 


anemia work up reviewed >> iron deficiency


G tube dependent


hepatitis panel negative


OB negative





supportive care


monitor H&H, prn transfusions


venofer


ppi


GTFs per RD, adv to goal


GT site care daily/prn


abx


bowel regime


fu labs





Subjective


ROS Limited/Unobtainable:  No


Allergies:  


Coded Allergies:  


     AZTREONAM (Verified  Allergy, Unknown, 7/23/18)





Objective





Last 24 Hour Vital Signs








  Date Time  Temp Pulse Resp B/P (MAP) Pulse Ox O2 Delivery O2 Flow Rate FiO2


 


8/5/18 09:29  76 16     30


 


8/5/18 08:00        30


 


8/5/18 08:00  84      


 


8/5/18 08:00 98.5 71 16 122/76 (91) 99   





 98.5       


 


8/5/18 08:00      Mechanical Ventilator  


 


8/5/18 07:25  90 17     30


 


8/5/18 05:14  79 17     30


 


8/5/18 04:00 98.4 70 16 128/84 (99) 99   





 98.4       


 


8/5/18 04:00        30


 


8/5/18 04:00      Mechanical Ventilator  


 


8/5/18 03:38  74      


 


8/5/18 02:51  76 17     30


 


8/5/18 01:24  74 22     30


 


8/5/18 00:00      Mechanical Ventilator  


 


8/5/18 00:00 98.8 77 17 124/75 (91) 100   





 98.8       


 


8/4/18 23:36  79      


 


8/4/18 22:47  77 18     30


 


8/4/18 21:11  83 17     30


 


8/4/18 20:00        30


 


8/4/18 20:00 98.7 80 17 129/75 (93) 99   





 98.7       


 


8/4/18 20:00      Mechanical Ventilator  


 


8/4/18 19:46  81      


 


8/4/18 19:30  82 16     30


 


8/4/18 17:09  79 16     30


 


8/4/18 16:00        30


 


8/4/18 16:00 98.3 80 17 121/79 (93) 100   





 98.3       


 


8/4/18 16:00  81      


 


8/4/18 16:00      Mechanical Ventilator  


 


8/4/18 15:00  81 17     30


 


8/4/18 13:10  79 17     30


 


8/4/18 12:00        30


 


8/4/18 12:00  78      


 


8/4/18 12:00 98.4 77 16 113/85 (94) 98   





 98.4       


 


8/4/18 12:00      Mechanical Ventilator  

















Intake and Output  


 


 8/4/18 8/5/18





 19:00 07:00


 


Intake Total 1470.000 ml 1098.867 ml


 


Output Total 900 ml 850 ml


 


Balance 570.000 ml 248.867 ml


 


  


 


Free Water  90 ml


 


IV Total 590.000 ml 233.867 ml


 


Tube Feeding 780 ml 715 ml


 


Other 100 ml 60 ml


 


Output Urine Total 450 ml 400 ml


 


Stool Total 450 ml 450 ml








Laboratory Tests


8/5/18 05:15: 


Sodium Level 134L, Potassium Level 4.0, Chloride Level 101, Carbon Dioxide 

Level 20L, Anion Gap 13, Blood Urea Nitrogen 13, Creatinine 1.0, Estimat 

Glomerular Filtration Rate > 60, Glucose Level 141H, Calcium Level 9.9


Height (Feet):  6


Weight (Pounds):  206


General Appearance:  lethargic


EENT:  normal ENT inspection


Neck:  supple


Cardiovascular:  normal rate


Respiratory/Chest:  decreased breath sounds


Abdomen:  normal bowel sounds, non tender, soft


Extremities:  non-tender











Miles Pickard MD Aug 5, 2018 11:00

## 2018-08-05 NOTE — NEPHROLOGY PROGRESS NOTE
Assessment/Plan


Assessment/Plan


1. BRAYDON- resolved, Renal function stable


2. Sepsis- on Abx


3. Chronic Resp FL- trached on vent  - Per PULM mgmt


4. Hyponatremia - Na stable 134.  GTube flushes with NS


5. SZ- Keppra


    - monitor as serum sodium 133-135


6. Hypophos- corrected. Recheck in am





Subjective


Date patient seen:  Aug 5, 2018


Time patient seen:  09:14


ROS Limited/Unobtainable:  Yes


Allergies:  


Coded Allergies:  


     AZTREONAM (Verified  Allergy, Unknown, 7/23/18)


All Systems:  reviewed and negative except above


Subjective


Patient trached/vent- stable





Objective





Last 24 Hour Vital Signs








  Date Time  Temp Pulse Resp B/P (MAP) Pulse Ox O2 Delivery O2 Flow Rate FiO2


 


8/5/18 08:00        30


 


8/5/18 08:00 98.5 71 16 122/76 (91) 99   





 98.5       


 


8/5/18 08:00      Mechanical Ventilator  


 


8/5/18 07:25  90 17     30


 


8/5/18 05:14  79 17     30


 


8/5/18 04:00 98.4 70 16 128/84 (99) 99   





 98.4       


 


8/5/18 04:00        30


 


8/5/18 04:00      Mechanical Ventilator  


 


8/5/18 03:38  74      


 


8/5/18 02:51  76 17     30


 


8/5/18 01:24  74 22     30


 


8/5/18 00:00      Mechanical Ventilator  


 


8/5/18 00:00 98.8 77 17 124/75 (91) 100   





 98.8       


 


8/4/18 23:36  79      


 


8/4/18 22:47  77 18     30


 


8/4/18 21:11  83 17     30


 


8/4/18 20:00        30


 


8/4/18 20:00 98.7 80 17 129/75 (93) 99   





 98.7       


 


8/4/18 20:00      Mechanical Ventilator  


 


8/4/18 19:46  81      


 


8/4/18 19:30  82 16     30


 


8/4/18 17:09  79 16     30


 


8/4/18 16:00        30


 


8/4/18 16:00 98.3 80 17 121/79 (93) 100   





 98.3       


 


8/4/18 16:00  81      


 


8/4/18 16:00      Mechanical Ventilator  


 


8/4/18 15:00  81 17     30


 


8/4/18 13:10  79 17     30


 


8/4/18 12:00        30


 


8/4/18 12:00  78      


 


8/4/18 12:00 98.4 77 16 113/85 (94) 98   





 98.4       


 


8/4/18 12:00      Mechanical Ventilator  


 


8/4/18 10:59  78 16     30

















Intake and Output  


 


 8/4/18 8/5/18





 19:00 07:00


 


Intake Total 1470.000 ml 1098.867 ml


 


Output Total 900 ml 850 ml


 


Balance 570.000 ml 248.867 ml


 


  


 


Free Water  90 ml


 


IV Total 590.000 ml 233.867 ml


 


Tube Feeding 780 ml 715 ml


 


Other 100 ml 60 ml


 


Output Urine Total 450 ml 400 ml


 


Stool Total 450 ml 450 ml








Laboratory Tests


8/5/18 05:15: 


Sodium Level 134L, Potassium Level 4.0, Chloride Level 101, Carbon Dioxide 

Level 20L, Anion Gap 13, Blood Urea Nitrogen 13, Creatinine 1.0, Estimat 

Glomerular Filtration Rate > 60, Glucose Level 141H, Calcium Level 9.9


Height (Feet):  6


Weight (Pounds):  206


General Appearance:  WD/WN


EENT:  PERRL/EOMI


Neck:  non-tender, normal alignment


Cardiovascular:  normal peripheral pulses, normal rate, regular rhythm


Respiratory/Chest:  lungs clear, normal breath sounds


Abdomen:  normal bowel sounds, non tender


Edema:  no edema noted Arm (L), no edema noted Arm (R), no edema noted Leg (L), 

no edema noted Leg (R), no edema noted Pedal (L), no edema noted Pedal (R), no 

edema noted Generalized











Jewel Templeton M.D. Aug 5, 2018 09:16

## 2018-08-05 NOTE — GENERAL PROGRESS NOTE
Assessment/Plan


Assessment/Plan


# Leukocytosis - Sepsis with elevated temperature of 103 degrees Fahrenheit.  

Had uti v bacteremia (CoNS) on abx, now resolved


--> Pt on antibiotics, has received a dose of Zosyn and currently is on 

vancomycin q.12 h. now on cefepime


--> Appreciate Infectious Disease consult.


--> 2D echo per ID


# Anemia of chronic disease. No hemolysis, peripheral smear reviewed, ferritin 

was elevated.


--> Continue to closely monitor


--> Hgb goal >7


--> 8/3: Hgb 12.6 and recently uptrended


# Acute kidney injury.  


--> Currently on IV fluids, IV bolus given to see if the patient responds along 

with IV pressor as needed.  


--> Closely monitor with Nephrology team.


# Septic shock, use antibiotic per ID services.


--> potential uti, on abx and bacteremia


# Respiratory failure, status post tracheostomy, on mechanical ventilation per 

Dr. Pena.


# Hypercalcemia. Monitor PTH and calcium level





The time the note was entered does not necessarily correspond to the time the 

patient was seen.





Subjective


Date patient seen:  Aug 4, 2018


Allergies:  


Coded Allergies:  


     AZTREONAM (Verified  Allergy, Unknown, 7/23/18)


All Systems:  reviewed and negative except above


Subjective


Pt remains obtunded, on trach/vent. No acute events. getting gtube feedings





Objective





Last 24 Hour Vital Signs








  Date Time  Temp Pulse Resp B/P (MAP) Pulse Ox O2 Delivery O2 Flow Rate FiO2


 


8/5/18 19:01  67 16     30


 


8/5/18 16:55  74 17     30


 


8/5/18 16:00        30


 


8/5/18 16:00  69      


 


8/5/18 16:00      Mechanical Ventilator  


 


8/5/18 16:00 98.4 69 16 119/82 (94) 99   





 98.4       


 


8/5/18 15:03  68 16     30


 


8/5/18 13:17  72 17     30


 


8/5/18 12:00  77      


 


8/5/18 12:00      Mechanical Ventilator  


 


8/5/18 12:00 98.4 72 16 133/76 (95) 100   





 98.4       


 


8/5/18 12:00        30


 


8/5/18 11:21  73 17     30


 


8/5/18 09:29  76 16     30


 


8/5/18 08:00        30


 


8/5/18 08:00  84      


 


8/5/18 08:00 98.5 71 16 122/76 (91) 99   





 98.5       


 


8/5/18 08:00      Mechanical Ventilator  


 


8/5/18 07:25  90 17     30


 


8/5/18 05:14  79 17     30


 


8/5/18 04:00 98.4 70 16 128/84 (99) 99   





 98.4       


 


8/5/18 04:00        30


 


8/5/18 04:00      Mechanical Ventilator  


 


8/5/18 03:38  74      


 


8/5/18 02:51  76 17     30


 


8/5/18 01:24  74 22     30


 


8/5/18 00:00      Mechanical Ventilator  


 


8/5/18 00:00 98.8 77 17 124/75 (91) 100   





 98.8       


 


8/4/18 23:36  79      


 


8/4/18 22:47  77 18     30


 


8/4/18 21:11  83 17     30

















Intake and Output  


 


 8/4/18 8/5/18





 19:00 07:00


 


Intake Total 1470.000 ml 1098.867 ml


 


Output Total 900 ml 850 ml


 


Balance 570.000 ml 248.867 ml


 


  


 


Free Water  90 ml


 


IV Total 590.000 ml 233.867 ml


 


Tube Feeding 780 ml 715 ml


 


Other 100 ml 60 ml


 


Output Urine Total 450 ml 400 ml


 


Stool Total 450 ml 450 ml








Laboratory Tests


8/5/18 05:15: 


Sodium Level 134L, Potassium Level 4.0, Chloride Level 101, Carbon Dioxide 

Level 20L, Anion Gap 13, Blood Urea Nitrogen 13, Creatinine 1.0, Estimat 

Glomerular Filtration Rate > 60, Glucose Level 141H, Calcium Level 9.9


Height (Feet):  6


Weight (Pounds):  206


General Appearance:  no apparent distress


EENT:  normal ENT inspection


Neck:  supple


Cardiovascular:  regular rhythm


Respiratory/Chest:  normal breath sounds


Abdomen:  non tender


Extremities:  non-tender


Edema:  mild edema


Neurologic:  responsive


Skin:  normal pigmentation











Tejas Gerber MD Aug 5, 2018 20:31

## 2018-08-05 NOTE — PULMONOLGY CRITICAL CARE NOTE
Critical Care - Asmt/Plan


Problems:  


(1) Acute on chronic respiratory failure


(2) Acute on chronic renal insufficiency


(3) Severe sepsis


(4) Vegetative state


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


(6) Diabetes mellitus


Respiratory:  monitor respiratory rate, adjust FIO2


Cardiac:  continue to monitor HR/BP


Renal:  F/U I&O, keep IV fluid


Infectious Disease:  check cultures


Gastrointestinal:  continue feedings/current rate


Endocrine:  monitor blood sugar, check HgA1C


Neurologic:  PRN Ativan


Affect:  PRN ativan


Prophylaxis:  Heparin





Critical Care - Objective





Last 24 Hour Vital Signs








  Date Time  Temp Pulse Resp B/P (MAP) Pulse Ox O2 Delivery O2 Flow Rate FiO2


 


8/5/18 13:17  72 17     30


 


8/5/18 12:00  77      


 


8/5/18 12:00      Mechanical Ventilator  


 


8/5/18 12:00 98.4 72 16 133/76 (95) 100   





 98.4       


 


8/5/18 12:00        30


 


8/5/18 11:21  73 17     30


 


8/5/18 09:29  76 16     30


 


8/5/18 08:00        30


 


8/5/18 08:00  84      


 


8/5/18 08:00 98.5 71 16 122/76 (91) 99   





 98.5       


 


8/5/18 08:00      Mechanical Ventilator  


 


8/5/18 07:25  90 17     30


 


8/5/18 05:14  79 17     30


 


8/5/18 04:00 98.4 70 16 128/84 (99) 99   





 98.4       


 


8/5/18 04:00        30


 


8/5/18 04:00      Mechanical Ventilator  


 


8/5/18 03:38  74      


 


8/5/18 02:51  76 17     30


 


8/5/18 01:24  74 22     30


 


8/5/18 00:00      Mechanical Ventilator  


 


8/5/18 00:00 98.8 77 17 124/75 (91) 100   





 98.8       


 


8/4/18 23:36  79      


 


8/4/18 22:47  77 18     30


 


8/4/18 21:11  83 17     30


 


8/4/18 20:00        30


 


8/4/18 20:00 98.7 80 17 129/75 (93) 99   





 98.7       


 


8/4/18 20:00      Mechanical Ventilator  


 


8/4/18 19:46  81      


 


8/4/18 19:30  82 16     30


 


8/4/18 17:09  79 16     30


 


8/4/18 16:00        30


 


8/4/18 16:00 98.3 80 17 121/79 (93) 100   





 98.3       


 


8/4/18 16:00  81      


 


8/4/18 16:00      Mechanical Ventilator  


 


8/4/18 15:00  81 17     30








Status:  sedated


Condition:  critical


HEENT:  atraumatic


Neck:  full ROM


Heart:  HR/BP stable, HR/BP unstable


Abdomen:  active bowel sounds


Extremities:  no C/C/E, edema


Decubiti:  stage


Micro:





Microbiology








 Date/Time


Source Procedure


Growth Status


 


 


 8/3/18 10:35


Blood Blood Culture - Preliminary


NO GROWTH AFTER 24 HOURS Resulted


 


 8/3/18 10:35


Blood Blood Culture - Preliminary


NO GROWTH AFTER 24 HOURS Resulted








Accucheck:  138





Critical Care - Subjective


ROS Limited/Unobtainable:  Yes


Condition:  critical


FI02:  30


Vent Support Breath Rate:  16


Vent Support Mode:  AC


Vent Tidal Volume:  600


Sputum Amount:  Moderate


PEEP:  5.0


PIP:  29


Tube Feeding Amount:  65


I&O:











Intake and Output  


 


 8/4/18 8/5/18





 19:00 07:00


 


Intake Total 1470.000 ml 1098.867 ml


 


Output Total 900 ml 850 ml


 


Balance 570.000 ml 248.867 ml


 


  


 


Free Water  90 ml


 


IV Total 590.000 ml 233.867 ml


 


Tube Feeding 780 ml 715 ml


 


Other 100 ml 60 ml


 


Output Urine Total 450 ml 400 ml


 


Stool Total 450 ml 450 ml








Labs:





Laboratory Tests








Test


  8/5/18


05:15


 


Sodium Level


  134 MMOL/L


(136-145)  L


 


Potassium Level


  4.0 MMOL/L


(3.5-5.1)


 


Chloride Level


  101 MMOL/L


()


 


Carbon Dioxide Level


  20 MMOL/L


(21-32)  L


 


Anion Gap


  13 mmol/L


(5-15)


 


Blood Urea Nitrogen


  13 mg/dL


(7-18)


 


Creatinine


  1.0 MG/DL


(0.55-1.30)


 


Estimat Glomerular Filtration


Rate > 60 mL/min


(>60)


 


Glucose Level


  141 MG/DL


()  H


 


Calcium Level


  9.9 MG/DL


(8.5-10.1)

















Yury Pena MD Aug 5, 2018 13:40

## 2018-08-06 VITALS — SYSTOLIC BLOOD PRESSURE: 134 MMHG | DIASTOLIC BLOOD PRESSURE: 79 MMHG

## 2018-08-06 VITALS — SYSTOLIC BLOOD PRESSURE: 118 MMHG | DIASTOLIC BLOOD PRESSURE: 78 MMHG

## 2018-08-06 VITALS — SYSTOLIC BLOOD PRESSURE: 121 MMHG | DIASTOLIC BLOOD PRESSURE: 78 MMHG

## 2018-08-06 VITALS — SYSTOLIC BLOOD PRESSURE: 120 MMHG | DIASTOLIC BLOOD PRESSURE: 79 MMHG

## 2018-08-06 VITALS — SYSTOLIC BLOOD PRESSURE: 133 MMHG | DIASTOLIC BLOOD PRESSURE: 84 MMHG

## 2018-08-06 VITALS — DIASTOLIC BLOOD PRESSURE: 74 MMHG | SYSTOLIC BLOOD PRESSURE: 127 MMHG

## 2018-08-06 LAB
ADD MANUAL DIFF: NO
ALBUMIN SERPL-MCNC: 3.9 G/DL (ref 3.4–5)
ALBUMIN/GLOB SERPL: 0.7 {RATIO} (ref 1–2.7)
ALP SERPL-CCNC: 141 U/L (ref 46–116)
ALT SERPL-CCNC: 35 U/L (ref 12–78)
ANION GAP SERPL CALC-SCNC: 10 MMOL/L (ref 5–15)
AST SERPL-CCNC: 23 U/L (ref 15–37)
BASOPHILS NFR BLD AUTO: 1.4 % (ref 0–2)
BILIRUB SERPL-MCNC: 0.5 MG/DL (ref 0.2–1)
BUN SERPL-MCNC: 13 MG/DL (ref 7–18)
CALCIUM SERPL-MCNC: 10.8 MG/DL (ref 8.5–10.1)
CHLORIDE SERPL-SCNC: 100 MMOL/L (ref 98–107)
CO2 SERPL-SCNC: 21 MMOL/L (ref 21–32)
CREAT SERPL-MCNC: 0.9 MG/DL (ref 0.55–1.3)
EOSINOPHIL NFR BLD AUTO: 2.3 % (ref 0–3)
ERYTHROCYTE [DISTWIDTH] IN BLOOD BY AUTOMATED COUNT: 14.9 % (ref 11.6–14.8)
GLOBULIN SER-MCNC: 5.5 G/DL
HCT VFR BLD CALC: 41.1 % (ref 42–52)
HGB BLD-MCNC: 13 G/DL (ref 14.2–18)
LYMPHOCYTES NFR BLD AUTO: 21.1 % (ref 20–45)
MCV RBC AUTO: 86 FL (ref 80–99)
MONOCYTES NFR BLD AUTO: 7 % (ref 1–10)
NEUTROPHILS NFR BLD AUTO: 68.2 % (ref 45–75)
PHOSPHATE SERPL-MCNC: 2.2 MG/DL (ref 2.5–4.9)
PLATELET # BLD: 253 K/UL (ref 150–450)
POTASSIUM SERPL-SCNC: 4.3 MMOL/L (ref 3.5–5.1)
RBC # BLD AUTO: 4.76 M/UL (ref 4.7–6.1)
SODIUM SERPL-SCNC: 131 MMOL/L (ref 136–145)
WBC # BLD AUTO: 10 K/UL (ref 4.8–10.8)

## 2018-08-06 RX ADMIN — INSULIN ASPART SCH UNITS: 100 INJECTION, SOLUTION INTRAVENOUS; SUBCUTANEOUS at 23:56

## 2018-08-06 RX ADMIN — HEPARIN SODIUM SCH UNITS: 5000 INJECTION INTRAVENOUS; SUBCUTANEOUS at 20:32

## 2018-08-06 RX ADMIN — CHLORHEXIDINE GLUCONATE SCH APPLIC: 213 SOLUTION TOPICAL at 20:31

## 2018-08-06 RX ADMIN — POLYETHYLENE GLYCOL 3350 SCH GM: 17 POWDER, FOR SOLUTION ORAL at 20:31

## 2018-08-06 RX ADMIN — LEVETIRACETAM SCH MG: 100 SOLUTION ORAL at 21:04

## 2018-08-06 RX ADMIN — INSULIN ASPART SCH UNITS: 100 INJECTION, SOLUTION INTRAVENOUS; SUBCUTANEOUS at 05:37

## 2018-08-06 RX ADMIN — LEVETIRACETAM SCH MG: 100 SOLUTION ORAL at 14:33

## 2018-08-06 RX ADMIN — INSULIN ASPART SCH UNITS: 100 INJECTION, SOLUTION INTRAVENOUS; SUBCUTANEOUS at 17:27

## 2018-08-06 RX ADMIN — ERTAPENEM SODIUM SCH MLS/HR: 1 INJECTION, POWDER, LYOPHILIZED, FOR SOLUTION INTRAMUSCULAR; INTRAVENOUS at 14:33

## 2018-08-06 RX ADMIN — VANCOMYCIN HCL-SODIUM CHLORIDE IV SOLN 1.25 GM/250ML-0.9% SCH MLS/HR: 1.25-0.9/25 SOLUTION at 17:27

## 2018-08-06 RX ADMIN — HEPARIN SODIUM SCH UNITS: 5000 INJECTION INTRAVENOUS; SUBCUTANEOUS at 08:48

## 2018-08-06 RX ADMIN — INSULIN ASPART SCH UNITS: 100 INJECTION, SOLUTION INTRAVENOUS; SUBCUTANEOUS at 12:46

## 2018-08-06 RX ADMIN — LEVETIRACETAM SCH MG: 100 SOLUTION ORAL at 05:35

## 2018-08-06 NOTE — GENERAL SURGERY PROGRESS NOTE
General Surgery-Progress Note


Subjective


Additional Comments


stoma pink and viable





Objective





Last 24 Hour Vital Signs








  Date Time  Temp Pulse Resp B/P (MAP) Pulse Ox O2 Delivery O2 Flow Rate FiO2


 


8/6/18 19:18  68 18     30


 


8/6/18 17:10  74 18     30


 


8/6/18 16:00 98.4 68 19 118/78 (91) 100   





 98.4       


 


8/6/18 16:00        30


 


8/6/18 16:00      Mechanical Ventilator  


 


8/6/18 15:35  67      


 


8/6/18 15:22  72 16     30


 


8/6/18 12:37  69 16     30


 


8/6/18 12:00        30


 


8/6/18 12:00      Mechanical Ventilator  


 


8/6/18 12:00 98.1 69 19 121/78 (92) 100   





 98.1       


 


8/6/18 11:32  70      


 


8/6/18 10:50  82 18     30


 


8/6/18 08:48  68 17     30


 


8/6/18 08:00      Mechanical Ventilator  


 


8/6/18 08:00        30


 


8/6/18 08:00  67      


 


8/6/18 08:00 97.7 67 19 120/79 (93) 100   





 97.7       


 


8/6/18 07:28  61 17     30


 


8/6/18 05:10  64 16     30


 


8/6/18 04:00  65      


 


8/6/18 04:00      Mechanical Ventilator  


 


8/6/18 04:00 98.4 74 19 127/74 (91) 100   





 98.4       


 


8/6/18 04:00        30


 


8/6/18 03:00  78 18     30


 


8/6/18 01:00  71 16     30


 


8/6/18 00:00 98.6 66 18 134/79 (97) 100   





 98.6       


 


8/6/18 00:00  68      


 


8/6/18 00:00      Mechanical Ventilator  


 


8/6/18 00:00        30


 


8/5/18 23:00  73 17     30


 


8/5/18 21:00  68 16     30








I&O











Intake and Output  


 


 8/5/18 8/6/18





 19:00 07:00


 


Intake Total 951.133 ml 1195 ml


 


Output Total 1200 ml 450 ml


 


Balance -248.867 ml 745 ml


 


  


 


Free Water  50 ml


 


IV Total 71.133 ml 250 ml


 


Tube Feeding 780 ml 845 ml


 


Other 100 ml 50 ml


 


Output Urine Total 700 ml 250 ml


 


Stool Total 500 ml 200 ml








Wound:  clean


Drains:  none


Cardiovascular:  RSR


Respiratory:  clear


Abdomen:  soft, distended, non-tender, present bowel sounds





Laboratory Tests








Test


  8/6/18


06:10


 


White Blood Count


  10.0 K/UL


(4.8-10.8)


 


Red Blood Count


  4.76 M/UL


(4.70-6.10)


 


Hemoglobin


  13.0 G/DL


(14.2-18.0)  L


 


Hematocrit


  41.1 %


(42.0-52.0)  L


 


Mean Corpuscular Volume 86 FL (80-99)  


 


Mean Corpuscular Hemoglobin


  27.3 PG


(27.0-31.0)


 


Mean Corpuscular Hemoglobin


Concent 31.6 G/DL


(32.0-36.0)  L


 


Red Cell Distribution Width


  14.9 %


(11.6-14.8)  H


 


Platelet Count


  253 K/UL


(150-450)


 


Mean Platelet Volume


  7.8 FL


(6.5-10.1)


 


Neutrophils (%) (Auto)


  68.2 %


(45.0-75.0)


 


Lymphocytes (%) (Auto)


  21.1 %


(20.0-45.0)


 


Monocytes (%) (Auto)


  7.0 %


(1.0-10.0)


 


Eosinophils (%) (Auto)


  2.3 %


(0.0-3.0)


 


Basophils (%) (Auto)


  1.4 %


(0.0-2.0)


 


Sodium Level


  131 MMOL/L


(136-145)  L


 


Potassium Level


  4.3 MMOL/L


(3.5-5.1)


 


Chloride Level


  100 MMOL/L


()


 


Carbon Dioxide Level


  21 MMOL/L


(21-32)


 


Anion Gap


  10 mmol/L


(5-15)


 


Blood Urea Nitrogen


  13 mg/dL


(7-18)


 


Creatinine


  0.9 MG/DL


(0.55-1.30)


 


Estimat Glomerular Filtration


Rate > 60 mL/min


(>60)


 


Glucose Level


  126 MG/DL


()  H


 


Calcium Level


  10.8 MG/DL


(8.5-10.1)  H


 


Phosphorus Level


  2.2 MG/DL


(2.5-4.9)  L


 


Magnesium Level


  2.1 MG/DL


(1.8-2.4)


 


Total Bilirubin


  0.5 MG/DL


(0.2-1.0)


 


Aspartate Amino Transf


(AST/SGOT) 23 U/L (15-37)


 


 


Alanine Aminotransferase


(ALT/SGPT) 35 U/L (12-78)


 


 


Alkaline Phosphatase


  141 U/L


()  H


 


Total Protein


  9.4 G/DL


(6.4-8.2)  H


 


Albumin


  3.9 G/DL


(3.4-5.0)


 


Globulin 5.5 g/dL  


 


Albumin/Globulin Ratio


  0.7 (1.0-2.7)


L











Plan


Problems:  


(1) Stoma malfunction


(2) Parastomal hernia


Assessment & Plan:  52M with history of prior loop colostomy.  proximal loop 

stable.  distal with impacted contrast stool but stable. 


parastomal hernia with small bowel contents in hernia.  no obstruction noted at 

this time. 


larger appearance of patients stoma is because location and use of loop 

colostomy in hepatic flexure.  it is otherwise viable and stable.  





Would Highly recommend fixing parastomal hernia but can be done electively.


if obstructs will need urgent repair.  currently non obstructive


will monitor and follow with recs. 


bowel care 





d/c planning.  if desired will plan for elective repair at later time once not 

acutely sick in the hospital.  


thank you for this consultation. 





(3) Severe sepsis











Brian Christina Aug 6, 2018 20:24

## 2018-08-06 NOTE — GI PROGRESS NOTE
Assessment/Plan


Problems:  


(1) Parastomal hernia


ICD Codes:  K43.5 - Parastomal hernia without obstruction or gangrene


SNOMED:  691650985


Qualifiers:  


   Qualified Codes:  K43.5 - Parastomal hernia without obstruction or gangrene


(2) Stoma malfunction


SNOMED:  807189898


(3) Colostomy in place


ICD Codes:  Z93.3 - Colostomy status


SNOMED:  460496005, 977484608


(4) Vegetative state


ICD Codes:  R40.3 - Persistent vegetative state


SNOMED:  77275324


(5) Diabetes mellitus


ICD Codes:  E11.9 - Type 2 diabetes mellitus without complications


SNOMED:  68833271


(6) Acute on chronic respiratory failure


ICD Codes:  J96.20 - Acute and chronic respiratory failure, unspecified whether 

with hypoxia or hypercapnia


SNOMED:  85442971


(7) Severe sepsis


ICD Codes:  A41.9 - Sepsis, unspecified organism; R65.20 - Severe sepsis 

without septic shock


SNOMED:  02328494


Status:  stable


Status Narrative


Discussed with Dr. Pickard.


Assessment/Plan


prolapse stoma assessed >> no s/sx of infection.  no obstruction. 


anemia work up reviewed >> iron deficiency


G tube dependent


hepatitis panel negative


OB negative





supportive care


monitor H&H, prn transfusions


venofer


ppi


GTFs per RD, adv to goal


GT site care daily/prn


abx


bowel regime


fu labs





The patient was seen and examined at bedside and all new and available data was 

reviewed in the patients chart. I agree with the above findings, impression 

and plan.  (Patient seen earlier today. Signature stamp does not reflect 

patient encounter time.). - Miles Pickard MD





Subjective


Subjective


limited





Objective





Last 24 Hour Vital Signs








  Date Time  Temp Pulse Resp B/P (MAP) Pulse Ox O2 Delivery O2 Flow Rate FiO2


 


8/6/18 08:48  68 17     30


 


8/6/18 08:00      Mechanical Ventilator  


 


8/6/18 08:00        30


 


8/6/18 08:00  67      


 


8/6/18 08:00 97.7 67 19 120/79 (93) 100   





 97.7       


 


8/6/18 07:28  61 17     30


 


8/6/18 05:10  64 16     30


 


8/6/18 04:00  65      


 


8/6/18 04:00      Mechanical Ventilator  


 


8/6/18 04:00 98.4 74 19 127/74 (91) 100   





 98.4       


 


8/6/18 04:00        30


 


8/6/18 03:00  78 18     30


 


8/6/18 01:00  71 16     30


 


8/6/18 00:00 98.6 66 18 134/79 (97) 100   





 98.6       


 


8/6/18 00:00  68      


 


8/6/18 00:00      Mechanical Ventilator  


 


8/6/18 00:00        30


 


8/5/18 23:00  73 17     30


 


8/5/18 21:00  68 16     30


 


8/5/18 20:00 98.4 67 20 124/78 (93) 100   





 98.4       


 


8/5/18 20:00        30


 


8/5/18 20:00      Mechanical Ventilator  


 


8/5/18 20:00  70      


 


8/5/18 19:01  67 16     30


 


8/5/18 16:55  74 17     30


 


8/5/18 16:00        30


 


8/5/18 16:00  69      


 


8/5/18 16:00      Mechanical Ventilator  


 


8/5/18 16:00 98.4 69 16 119/82 (94) 99   





 98.4       


 


8/5/18 15:03  68 16     30


 


8/5/18 13:17  72 17     30


 


8/5/18 12:00  77      


 


8/5/18 12:00      Mechanical Ventilator  


 


8/5/18 12:00 98.4 72 16 133/76 (95) 100   





 98.4       


 


8/5/18 12:00        30


 


8/5/18 11:21  73 17     30

















Intake and Output  


 


 8/5/18 8/6/18





 19:00 07:00


 


Intake Total 951.133 ml 1195 ml


 


Output Total 1200 ml 450 ml


 


Balance -248.867 ml 745 ml


 


  


 


Free Water  50 ml


 


IV Total 71.133 ml 250 ml


 


Tube Feeding 780 ml 845 ml


 


Other 100 ml 50 ml


 


Output Urine Total 700 ml 250 ml


 


Stool Total 500 ml 200 ml











Laboratory Tests








Test


  8/6/18


06:10


 


White Blood Count


  10.0 K/UL


(4.8-10.8)


 


Red Blood Count


  4.76 M/UL


(4.70-6.10)


 


Hemoglobin


  13.0 G/DL


(14.2-18.0)  L


 


Hematocrit


  41.1 %


(42.0-52.0)  L


 


Mean Corpuscular Volume 86 FL (80-99)  


 


Mean Corpuscular Hemoglobin


  27.3 PG


(27.0-31.0)


 


Mean Corpuscular Hemoglobin


Concent 31.6 G/DL


(32.0-36.0)  L


 


Red Cell Distribution Width


  14.9 %


(11.6-14.8)  H


 


Platelet Count


  253 K/UL


(150-450)


 


Mean Platelet Volume


  7.8 FL


(6.5-10.1)


 


Neutrophils (%) (Auto)


  68.2 %


(45.0-75.0)


 


Lymphocytes (%) (Auto)


  21.1 %


(20.0-45.0)


 


Monocytes (%) (Auto)


  7.0 %


(1.0-10.0)


 


Eosinophils (%) (Auto)


  2.3 %


(0.0-3.0)


 


Basophils (%) (Auto)


  1.4 %


(0.0-2.0)


 


Sodium Level


  131 MMOL/L


(136-145)  L


 


Potassium Level


  4.3 MMOL/L


(3.5-5.1)


 


Chloride Level


  100 MMOL/L


()


 


Carbon Dioxide Level


  21 MMOL/L


(21-32)


 


Anion Gap


  10 mmol/L


(5-15)


 


Blood Urea Nitrogen


  13 mg/dL


(7-18)


 


Creatinine


  0.9 MG/DL


(0.55-1.30)


 


Estimat Glomerular Filtration


Rate > 60 mL/min


(>60)


 


Glucose Level


  126 MG/DL


()  H


 


Calcium Level


  10.8 MG/DL


(8.5-10.1)  H


 


Phosphorus Level


  2.2 MG/DL


(2.5-4.9)  L


 


Magnesium Level


  2.1 MG/DL


(1.8-2.4)


 


Total Bilirubin


  0.5 MG/DL


(0.2-1.0)


 


Aspartate Amino Transf


(AST/SGOT) 23 U/L (15-37)


 


 


Alanine Aminotransferase


(ALT/SGPT) 35 U/L (12-78)


 


 


Alkaline Phosphatase


  141 U/L


()  H


 


Total Protein


  9.4 G/DL


(6.4-8.2)  H


 


Albumin


  3.9 G/DL


(3.4-5.0)


 


Globulin 5.5 g/dL  


 


Albumin/Globulin Ratio


  0.7 (1.0-2.7)


L








Height (Feet):  6


Weight (Pounds):  204


General Appearance:  no apparent distress


Cardiovascular:  normal rate


Respiratory/Chest:  other - trach to vent


Abdominal Exam:  GT site - c/d/i, other - prolapsed stoma











Remy Lyons NP Aug 6, 2018 10:27

## 2018-08-06 NOTE — INFECTIOUS DISEASES PROG NOTE
Assessment/Plan


Assessment/Plan


Sepsis, resolving- 2ry to UTI and Bacteremia Blood cultures postive 4/4 bottles 

with [facility] Unclear source will need to R/O Endocarditis  Possible PNA initially 


 -u/a wbc 40-60, nit neg, leuk +2; ucx >100k E. aerogenes (S cefepime, cipro/

levo; R Zosyn)


 -BCX 4/4 E. aerogenes, prob Amp C (S cefepime, cipro/levo; R Zosyn), P. 

mirabilis ESBL (S Zosyn, Ertapenem); repeta 7/26 1/4 CoNS (suspect contaminant)

, 7/28 Staph f/u final results if CoNS may need TTE and IE work up.


  -CXR 7/27: Increased left pleural effusion, over 3 days. Overall decreased 

interstitial congestion. Right infrahilar atelectasis


 -CXR: Cardiomegaly. Mild interstitial congestion. Suspect small bilateral 

pleural effusions


 -CT abd/p: Right lower quadrant double barrel colostomy, as described. Small 

peristomal hernia contains bowel loops without evidence of obstruction or 

strangulation. Left renal staghorn calculus. Bilateral intrarenal calyceal 

calculi. Borderline hydronephrosis on the right without evidence of downstream 

obstructive lesion. May indicate mild ureteral pelvic junction obstruction. 

Somewhat atrophic left kidney. Inspissated contrast ball within the distal 

sigmoid colon. Gastrostomy in good position. Nonspecific bilateral perinephric 

fat stranding, could indicate pyelonephritis or could be chronic. Guzmán 

catheter in place. Apparent bladder wall thickening, possibly an artifact of 

under distention but cystitis is not excludable, particularly in view of mild 

perivesical fat stranding. Bilateral pulmonary parenchymal atelectasis and 

consolidation


  -Blood Cx from PICC CoNS 2/2 bottles Peripheral Neg- (May be contaminant but 

will repeat blood Cx given new leukocytosis if positive will need ECHO.


  - Blood Cx 7/30/18 - GPC - Will need TTE for IE work up and the PICC removed.


 


PICC line infection/colonization - TTE negative. Blood cultures only positive 

from PICC. Not positive from Peripheral 


- Will repeat blood cultures x 1 to confirm clearance after PICC replaced. [place] 

suspicion for IE.


- PICC replace 8/1/18





Fever/Leukocytosis; Resolved - i recurs Re-evaluate lines, Diarrhea? C. dif? 


BRAYDON, improving


Lactic acidosis, resolved





chronic resp failure trach/vent dependant


BPH


GERD


 HTN


DM2


seizure disorder


colostomy


 s/p GT 


constipation





VRE and MRSA colonized


 


Plan:


- Continue empiric IV Vancomycin #9/14 for bacteremia/line infection - Abx end 

date 8/10/18 if STACI negative


- Recommend Transesophageal ECHO to r/o IE





-Continue Ertapenem #10/14 for Amp-C Enterobacter and ESBL P.mirabilis 

bacteremia ; will treat for 14 days - End date 8/9/18


    -7/27 SP Zosyn #4





- Monitor CBC/CMP, temperatures


- F/U STACI








Thank you for this consultation. Will continue to follow along with you.





Subjective


Allergies:  


Coded Allergies:  


     AZTREONAM (Verified  Allergy, Unknown, 7/23/18)


Subjective


No acute events over night


On Vent. 30% O2


Afebrile still with no leukocytosis


STACI pending





Objective


Vital Signs





Last 24 Hour Vital Signs








  Date Time  Temp Pulse Resp B/P (MAP) Pulse Ox O2 Delivery O2 Flow Rate FiO2


 


8/6/18 08:00 97.7 67 19 120/79 (93) 100   





 97.7       


 


8/6/18 05:10  64 16     30


 


8/6/18 04:00  65      


 


8/6/18 04:00      Mechanical Ventilator  


 


8/6/18 04:00 98.4 74 19 127/74 (91) 100   





 98.4       


 


8/6/18 04:00        30


 


8/6/18 03:00  78 18     30


 


8/6/18 01:00  71 16     30


 


8/6/18 00:00 98.6 66 18 134/79 (97) 100   





 98.6       


 


8/6/18 00:00  68      


 


8/6/18 00:00      Mechanical Ventilator  


 


8/6/18 00:00        30


 


8/5/18 23:00  73 17     30


 


8/5/18 21:00  68 16     30


 


8/5/18 20:00 98.4 67 20 124/78 (93) 100   





 98.4       


 


8/5/18 20:00        30


 


8/5/18 20:00      Mechanical Ventilator  


 


8/5/18 20:00  70      


 


8/5/18 19:01  67 16     30


 


8/5/18 16:55  74 17     30


 


8/5/18 16:00        30


 


8/5/18 16:00  69      


 


8/5/18 16:00      Mechanical Ventilator  


 


8/5/18 16:00 98.4 69 16 119/82 (94) 99   





 98.4       


 


8/5/18 15:03  68 16     30


 


8/5/18 13:17  72 17     30


 


8/5/18 12:00  77      


 


8/5/18 12:00      Mechanical Ventilator  


 


8/5/18 12:00 98.4 72 16 133/76 (95) 100   





 98.4       


 


8/5/18 12:00        30


 


8/5/18 11:21  73 17     30


 


8/5/18 09:29  76 16     30








Height (Feet):  6


Weight (Pounds):  204


Objective


GENERAL:  No overt distress. On vent 30% O2, Eyes open


HEENT:  NCAT, DMM, Tracheostomy noted, spittle coming out of mouth 


PULM: Mild crackles B/L, No wheezing


CARDIOVASCULAR:  RRR, S1 and S2.  Tachycardic.  No rubs or gallops.


PULMONARY:  Mild diffuse expiratory rhonchi with basilar rales.


ABDOMEN:  Obese and nondistended. +BS, The G-tube and stoma noted.


EXTREMITIES:  No edema.  Fair pedal pulses.





Microbiology








 Date/Time


Source Procedure


Growth Status


 


 


 8/5/18 05:30


Blood Blood Culture - Preliminary


NO GROWTH AFTER 24 HOURS Resulted


 


 8/5/18 05:15


Blood Blood Culture - Preliminary


NO GROWTH AFTER 24 HOURS Resulted


 


 8/3/18 10:35


Blood Blood Culture - Preliminary


NO GROWTH AFTER 48 HOURS Resulted


 


 8/3/18 10:35


Blood Blood Culture - Preliminary


NO GROWTH AFTER 48 HOURS Resulted











Laboratory Tests








Test


  8/6/18


06:10


 


White Blood Count


  10.0 K/UL


(4.8-10.8)


 


Red Blood Count


  4.76 M/UL


(4.70-6.10)


 


Hemoglobin


  13.0 G/DL


(14.2-18.0)  L


 


Hematocrit


  41.1 %


(42.0-52.0)  L


 


Mean Corpuscular Volume 86 FL (80-99)  


 


Mean Corpuscular Hemoglobin


  27.3 PG


(27.0-31.0)


 


Mean Corpuscular Hemoglobin


Concent 31.6 G/DL


(32.0-36.0)  L


 


Red Cell Distribution Width


  14.9 %


(11.6-14.8)  H


 


Platelet Count


  253 K/UL


(150-450)


 


Mean Platelet Volume


  7.8 FL


(6.5-10.1)


 


Neutrophils (%) (Auto)


  68.2 %


(45.0-75.0)


 


Lymphocytes (%) (Auto)


  21.1 %


(20.0-45.0)


 


Monocytes (%) (Auto)


  7.0 %


(1.0-10.0)


 


Eosinophils (%) (Auto)


  2.3 %


(0.0-3.0)


 


Basophils (%) (Auto)


  1.4 %


(0.0-2.0)


 


Sodium Level


  131 MMOL/L


(136-145)  L


 


Potassium Level


  4.3 MMOL/L


(3.5-5.1)


 


Chloride Level


  100 MMOL/L


()


 


Carbon Dioxide Level


  21 MMOL/L


(21-32)


 


Anion Gap


  10 mmol/L


(5-15)


 


Blood Urea Nitrogen


  13 mg/dL


(7-18)


 


Creatinine


  0.9 MG/DL


(0.55-1.30)


 


Estimat Glomerular Filtration


Rate > 60 mL/min


(>60)


 


Glucose Level


  126 MG/DL


()  H


 


Calcium Level


  10.8 MG/DL


(8.5-10.1)  H


 


Phosphorus Level


  2.2 MG/DL


(2.5-4.9)  L


 


Magnesium Level


  2.1 MG/DL


(1.8-2.4)


 


Total Bilirubin


  0.5 MG/DL


(0.2-1.0)


 


Aspartate Amino Transf


(AST/SGOT) 23 U/L (15-37)


 


 


Alanine Aminotransferase


(ALT/SGPT) 35 U/L (12-78)


 


 


Alkaline Phosphatase


  141 U/L


()  H


 


Total Protein


  9.4 G/DL


(6.4-8.2)  H


 


Albumin


  3.9 G/DL


(3.4-5.0)


 


Globulin 5.5 g/dL  


 


Albumin/Globulin Ratio


  0.7 (1.0-2.7)


L











Current Medications








 Medications


  (Trade)  Dose


 Ordered  Sig/Jan


 Route


 PRN Reason  Start Time


 Stop Time Status Last Admin


Dose Admin


 


 Acetaminophen


  (Tylenol)  650 mg  Q4H  PRN


 ORAL


 FEVER  7/26/18 14:00


 8/23/18 09:59  8/3/18 21:01


 


 


 Baclofen


  (Lioresal)  10 mg  EVERY 8  HOURS


 GT


   7/30/18 14:00


 8/23/18 12:59  8/6/18 05:35


 


 


 Chlorhexidine


 Gluconate


  (Elaine-Hex 2%)  1 applic  DAILY@2000


 TOPIC


   7/26/18 20:00


 8/23/18 19:59  8/5/18 21:10


 


 


 Dextrose


  (Dextrose 50%)  25 ml  STAT  PRN


 IV


 Hypoglycemia  7/27/18 08:45


 8/24/18 08:44   


 


 


 Dextrose


  (Dextrose 50%)  50 ml  STAT  PRN


 IV


 Hypoglycemia  7/27/18 08:45


 8/24/18 08:44   


 


 


 Ertapenem 1 gm/


 Sodium Chloride  55 ml @ 


 110 mls/hr  Q24H


 IVPB


   7/27/18 15:00


 8/9/18 14:59  8/5/18 15:24


 


 


 Heparin Sodium


  (Porcine)


  (Heparin 5000


 units/ml)  5,000 units  EVERY 12  HOURS


 SUBQ


   7/26/18 21:00


 8/23/18 20:59  8/6/18 08:48


 


 


 Insulin Aspart


  (NovoLOG)    Q6HR


 SUBQ


   7/26/18 18:00


 8/24/18 11:29  8/6/18 05:37


 


 


 Lansoprazole


  (Prevacid)  30 mg  DAILY


 GT


   7/27/18 09:00


 8/25/18 08:59  8/6/18 08:42


 


 


 Levetiracetam


  (Keppra)  1,000 mg  Q8HR


 GT


   7/26/18 14:00


 8/23/18 12:59  8/6/18 05:35


 


 


 Ondansetron HCl


  (Zofran)  4 mg  Q6H  PRN


 IVP


 Nausea & Vomiting  7/26/18 16:00


 8/23/18 09:59   


 


 


 Polyethylene


 Glycol


  (Miralax)  17 gm  BEDTIME


 GT


   7/30/18 21:00


 8/24/18 20:59  8/5/18 21:10


 


 


 Sodium Phosphate


 15 mm/Sodium


 Chloride  280 ml @ 


 70.273 mls/


 hr  ONCE  ONCE


 IVPB


   8/6/18 10:00


 8/6/18 13:59   


 


 


 Vancomycin HCl


  (Vanco rx to


 dose)  1 ea  DAILY  PRN


 MISC


 PER RX PROTOCOL  7/28/18 13:15


 8/27/18 13:14   


 


 


 Vancomycin HCl/


 Dextrose  250 ml @ 


 166.667


 mls/hr  Q18H


 IVPB


   8/2/18 06:00


 8/7/18 05:59  8/5/18 23:26


 

















Robert Williamson M.D. Aug 6, 2018 08:54

## 2018-08-06 NOTE — NEPHROLOGY PROGRESS NOTE
Assessment/Plan


Assessment/Plan


1. BRAYDON- resolved


2. Sepsis- on Abx


3. Chronic Resp FL- trached on vent  - Per PULM mgmt


4. Hyponatremia - Na 131. Free water stopped. Monitor on Keppra


5. SZ- Keppra


    - monitor as serum sodium 133-135


6. Hypophos- Na Phos replacement this am





Subjective


Date patient seen:  Aug 6, 2018


Time patient seen:  08:37


ROS Limited/Unobtainable:  Yes


Allergies:  


Coded Allergies:  


     AZTREONAM (Verified  Allergy, Unknown, 7/23/18)


Subjective


Patient trached/vent





Objective





Last 24 Hour Vital Signs








  Date Time  Temp Pulse Resp B/P (MAP) Pulse Ox O2 Delivery O2 Flow Rate FiO2


 


8/6/18 08:00 97.7 67 19 120/79 (93) 100   





 97.7       


 


8/6/18 05:10  64 16     30


 


8/6/18 04:00  65      


 


8/6/18 04:00      Mechanical Ventilator  


 


8/6/18 04:00 98.4 74 19 127/74 (91) 100   





 98.4       


 


8/6/18 04:00        30


 


8/6/18 03:00  78 18     30


 


8/6/18 01:00  71 16     30


 


8/6/18 00:00 98.6 66 18 134/79 (97) 100   





 98.6       


 


8/6/18 00:00  68      


 


8/6/18 00:00      Mechanical Ventilator  


 


8/6/18 00:00        30


 


8/5/18 23:00  73 17     30


 


8/5/18 21:00  68 16     30


 


8/5/18 20:00 98.4 67 20 124/78 (93) 100   





 98.4       


 


8/5/18 20:00        30


 


8/5/18 20:00      Mechanical Ventilator  


 


8/5/18 20:00  70      


 


8/5/18 19:01  67 16     30


 


8/5/18 16:55  74 17     30


 


8/5/18 16:00        30


 


8/5/18 16:00  69      


 


8/5/18 16:00      Mechanical Ventilator  


 


8/5/18 16:00 98.4 69 16 119/82 (94) 99   





 98.4       


 


8/5/18 15:03  68 16     30


 


8/5/18 13:17  72 17     30


 


8/5/18 12:00  77      


 


8/5/18 12:00      Mechanical Ventilator  


 


8/5/18 12:00 98.4 72 16 133/76 (95) 100   





 98.4       


 


8/5/18 12:00        30


 


8/5/18 11:21  73 17     30


 


8/5/18 09:29  76 16     30

















Intake and Output  


 


 8/5/18 8/6/18





 19:00 07:00


 


Intake Total 951.133 ml 1195 ml


 


Output Total 1200 ml 450 ml


 


Balance -248.867 ml 745 ml


 


  


 


Free Water  50 ml


 


IV Total 71.133 ml 250 ml


 


Tube Feeding 780 ml 845 ml


 


Other 100 ml 50 ml


 


Output Urine Total 700 ml 250 ml


 


Stool Total 500 ml 200 ml








Laboratory Tests


8/6/18 06:10: 


White Blood Count 10.0, Red Blood Count 4.76, Hemoglobin 13.0L, Hematocrit 41.1L

, Mean Corpuscular Volume 86, Mean Corpuscular Hemoglobin 27.3, Mean 

Corpuscular Hemoglobin Concent 31.6L, Red Cell Distribution Width 14.9H, 

Platelet Count 253, Mean Platelet Volume 7.8, Neutrophils (%) (Auto) 68.2, 

Lymphocytes (%) (Auto) 21.1, Monocytes (%) (Auto) 7.0, Eosinophils (%) (Auto) 

2.3, Basophils (%) (Auto) 1.4, Sodium Level 131L, Potassium Level 4.3, Chloride 

Level 100, Carbon Dioxide Level 21, Anion Gap 10, Blood Urea Nitrogen 13, 

Creatinine 0.9, Estimat Glomerular Filtration Rate > 60, Glucose Level 126H, 

Calcium Level 10.8H, Phosphorus Level 2.2L, Magnesium Level 2.1, Total 

Bilirubin 0.5, Aspartate Amino Transf (AST/SGOT) 23, Alanine Aminotransferase (

ALT/SGPT) 35, Alkaline Phosphatase 141H, Total Protein 9.4H, Albumin 3.9, 

Globulin 5.5, Albumin/Globulin Ratio 0.7L


Height (Feet):  6


Weight (Pounds):  204


General Appearance:  WD/WN


EENT:  PERRL/EOMI


Neck:  non-tender, normal alignment


Cardiovascular:  normal peripheral pulses, normal rate


Respiratory/Chest:  chest wall non-tender, lungs clear


Abdomen:  non tender, soft


Edema:  no edema noted Arm (L), no edema noted Arm (R), no edema noted Leg (L), 

no edema noted Leg (R), no edema noted Pedal (L), no edema noted Pedal (R), no 

edema noted Generalized











Jewel Templeton M.D. Aug 6, 2018 08:39

## 2018-08-06 NOTE — PULMONOLGY CRITICAL CARE NOTE
Critical Care - Asmt/Plan


Problems:  


(1) Acute on chronic respiratory failure


(2) Acute on chronic renal insufficiency


(3) Severe sepsis


(4) Vegetative state


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


(6) Diabetes mellitus


Respiratory:  monitor respiratory rate, adjust FIO2, CXR


Cardiac:  continue to monitor HR/BP, other


Renal:  F/U I&O, keep IV fluid - awaiting STACI


Infectious Disease:  check cultures, continue antibiotics


Gastrointestinal:  continue feedings/current rate


Endocrine:  monitor blood sugar


Neurologic:  PRN Ativan, PRN Morphine


Prophylaxis:  Protonix


Disposition:  keep in ICU


Notes Reviewed:  internist, cardio, renal


Discussed with:  nurses





Critical Care - Objective





Last 24 Hour Vital Signs








  Date Time  Temp Pulse Resp B/P (MAP) Pulse Ox O2 Delivery O2 Flow Rate FiO2


 


8/6/18 08:48  68 17     30


 


8/6/18 08:00      Mechanical Ventilator  


 


8/6/18 08:00        30


 


8/6/18 08:00  67      


 


8/6/18 08:00 97.7 67 19 120/79 (93) 100   





 97.7       


 


8/6/18 07:28  61 17     30


 


8/6/18 05:10  64 16     30


 


8/6/18 04:00  65      


 


8/6/18 04:00      Mechanical Ventilator  


 


8/6/18 04:00 98.4 74 19 127/74 (91) 100   





 98.4       


 


8/6/18 04:00        30


 


8/6/18 03:00  78 18     30


 


8/6/18 01:00  71 16     30


 


8/6/18 00:00 98.6 66 18 134/79 (97) 100   





 98.6       


 


8/6/18 00:00  68      


 


8/6/18 00:00      Mechanical Ventilator  


 


8/6/18 00:00        30


 


8/5/18 23:00  73 17     30


 


8/5/18 21:00  68 16     30


 


8/5/18 20:00 98.4 67 20 124/78 (93) 100   





 98.4       


 


8/5/18 20:00        30


 


8/5/18 20:00      Mechanical Ventilator  


 


8/5/18 20:00  70      


 


8/5/18 19:01  67 16     30


 


8/5/18 16:55  74 17     30


 


8/5/18 16:00        30


 


8/5/18 16:00  69      


 


8/5/18 16:00      Mechanical Ventilator  


 


8/5/18 16:00 98.4 69 16 119/82 (94) 99   





 98.4       


 


8/5/18 15:03  68 16     30


 


8/5/18 13:17  72 17     30


 


8/5/18 12:00  77      


 


8/5/18 12:00      Mechanical Ventilator  


 


8/5/18 12:00 98.4 72 16 133/76 (95) 100   





 98.4       


 


8/5/18 12:00        30


 


8/5/18 11:21  73 17     30








Status:  awake


Condition:  critical


Neck:  full ROM


Lungs:  clear


Heart:  HR/BP stable, regular


Abdomen:  non-tender, active bowel sounds


Extremities:  no C/C/E


Decubiti:  stage


Micro:





Microbiology








 Date/Time


Source Procedure


Growth Status


 


 


 8/5/18 05:30


Blood Blood Culture - Preliminary


NO GROWTH AFTER 24 HOURS Resulted


 


 8/5/18 05:15


Blood Blood Culture - Preliminary


NO GROWTH AFTER 24 HOURS Resulted








Accucheck:  133





Critical Care - Subjective


ROS Limited/Unobtainable:  No


Condition:  critical


FI02:  30


Vent Support Breath Rate:  16


Vent Support Mode:  AC


Vent Tidal Volume:  600


Sputum Amount:  Small


PEEP:  5.0


PIP:  30


Tube Feeding Amount:  65


I&O:











Intake and Output  


 


 8/5/18 8/6/18





 19:00 07:00


 


Intake Total 951.133 ml 1195 ml


 


Output Total 1200 ml 450 ml


 


Balance -248.867 ml 745 ml


 


  


 


Free Water  50 ml


 


IV Total 71.133 ml 250 ml


 


Tube Feeding 780 ml 845 ml


 


Other 100 ml 50 ml


 


Output Urine Total 700 ml 250 ml


 


Stool Total 500 ml 200 ml








Labs:





Laboratory Tests








Test


  8/6/18


06:10


 


White Blood Count


  10.0 K/UL


(4.8-10.8)


 


Red Blood Count


  4.76 M/UL


(4.70-6.10)


 


Hemoglobin


  13.0 G/DL


(14.2-18.0)  L


 


Hematocrit


  41.1 %


(42.0-52.0)  L


 


Mean Corpuscular Volume 86 FL (80-99)  


 


Mean Corpuscular Hemoglobin


  27.3 PG


(27.0-31.0)


 


Mean Corpuscular Hemoglobin


Concent 31.6 G/DL


(32.0-36.0)  L


 


Red Cell Distribution Width


  14.9 %


(11.6-14.8)  H


 


Platelet Count


  253 K/UL


(150-450)


 


Mean Platelet Volume


  7.8 FL


(6.5-10.1)


 


Neutrophils (%) (Auto)


  68.2 %


(45.0-75.0)


 


Lymphocytes (%) (Auto)


  21.1 %


(20.0-45.0)


 


Monocytes (%) (Auto)


  7.0 %


(1.0-10.0)


 


Eosinophils (%) (Auto)


  2.3 %


(0.0-3.0)


 


Basophils (%) (Auto)


  1.4 %


(0.0-2.0)


 


Sodium Level


  131 MMOL/L


(136-145)  L


 


Potassium Level


  4.3 MMOL/L


(3.5-5.1)


 


Chloride Level


  100 MMOL/L


()


 


Carbon Dioxide Level


  21 MMOL/L


(21-32)


 


Anion Gap


  10 mmol/L


(5-15)


 


Blood Urea Nitrogen


  13 mg/dL


(7-18)


 


Creatinine


  0.9 MG/DL


(0.55-1.30)


 


Estimat Glomerular Filtration


Rate > 60 mL/min


(>60)


 


Glucose Level


  126 MG/DL


()  H


 


Calcium Level


  10.8 MG/DL


(8.5-10.1)  H


 


Phosphorus Level


  2.2 MG/DL


(2.5-4.9)  L


 


Magnesium Level


  2.1 MG/DL


(1.8-2.4)


 


Total Bilirubin


  0.5 MG/DL


(0.2-1.0)


 


Aspartate Amino Transf


(AST/SGOT) 23 U/L (15-37)


 


 


Alanine Aminotransferase


(ALT/SGPT) 35 U/L (12-78)


 


 


Alkaline Phosphatase


  141 U/L


()  H


 


Total Protein


  9.4 G/DL


(6.4-8.2)  H


 


Albumin


  3.9 G/DL


(3.4-5.0)


 


Globulin 5.5 g/dL  


 


Albumin/Globulin Ratio


  0.7 (1.0-2.7)


L

















Yury Pena MD Aug 6, 2018 11:06

## 2018-08-06 NOTE — GENERAL PROGRESS NOTE
Assessment/Plan


Status:  unchanged


Assessment/Plan


# Leukocytosis - Sepsis with elevated temperature of 103 degrees Fahrenheit.  

Had uti v bacteremia (CoNS) on abx, now resolved


--> Pt on antibiotics, has received a dose of Zosyn and currently is on 

vancomycin q.12 h. now on cefepime


--> Appreciate Infectious Disease consult.


--> 2D echo per ID performed on 8/1: No discrete vegetations seen, however SBE 

may not be excluded by transthoracic 2-D echo.


--> 8/4: WBC of 10.7 wnl 


# Anemia of chronic disease. No hemolysis, peripheral smear reviewed, ferritin 

was elevated.


--> Continue to closely monitor


--> Hgb goal >7


--> 8/4: Hgb 11.9


# Acute kidney injury.  


--> Currently on IV fluids, IV bolus given to see if the patient responds along 

with IV pressor as needed.  


--> Closely monitor with Nephrology team.


# Septic shock, use antibiotic per ID services.


--> potential uti, on abx and bacteremia


# Respiratory failure, status post tracheostomy, on mechanical ventilation per 

Dr. Pena.


# Hypercalcemia. Monitor PTH and calcium level





The time the note was entered does not necessarily correspond to the time the 

patient was seen.





Subjective


Date patient seen:  Aug 5, 2018


ROS Limited/Unobtainable:  Yes


Hematologic/Lymphatic:  Reports: anemia


Allergies:  


Coded Allergies:  


     AZTREONAM (Verified  Allergy, Unknown, 7/23/18)


All Systems:  reviewed and negative except above


Subjective


Pt remains obtunded, on trach/vent. No acute events. No resp distress.





Objective





Last 24 Hour Vital Signs








  Date Time  Temp Pulse Resp B/P (MAP) Pulse Ox O2 Delivery O2 Flow Rate FiO2


 


8/6/18 08:00 97.7 67 19 120/79 (93) 100   





 97.7       


 


8/6/18 05:10  64 16     30


 


8/6/18 04:00  65      


 


8/6/18 04:00      Mechanical Ventilator  


 


8/6/18 04:00 98.4 74 19 127/74 (91) 100   





 98.4       


 


8/6/18 04:00        30


 


8/6/18 03:00  78 18     30


 


8/6/18 01:00  71 16     30


 


8/6/18 00:00 98.6 66 18 134/79 (97) 100   





 98.6       


 


8/6/18 00:00  68      


 


8/6/18 00:00      Mechanical Ventilator  


 


8/6/18 00:00        30


 


8/5/18 23:00  73 17     30


 


8/5/18 21:00  68 16     30


 


8/5/18 20:00 98.4 67 20 124/78 (93) 100   





 98.4       


 


8/5/18 20:00        30


 


8/5/18 20:00      Mechanical Ventilator  


 


8/5/18 20:00  70      


 


8/5/18 19:01  67 16     30


 


8/5/18 16:55  74 17     30


 


8/5/18 16:00        30


 


8/5/18 16:00  69      


 


8/5/18 16:00      Mechanical Ventilator  


 


8/5/18 16:00 98.4 69 16 119/82 (94) 99   





 98.4       


 


8/5/18 15:03  68 16     30


 


8/5/18 13:17  72 17     30


 


8/5/18 12:00  77      


 


8/5/18 12:00      Mechanical Ventilator  


 


8/5/18 12:00 98.4 72 16 133/76 (95) 100   





 98.4       


 


8/5/18 12:00        30


 


8/5/18 11:21  73 17     30


 


8/5/18 09:29  76 16     30

















Intake and Output  


 


 8/5/18 8/6/18





 19:00 07:00


 


Intake Total 951.133 ml 1195 ml


 


Output Total 1200 ml 450 ml


 


Balance -248.867 ml 745 ml


 


  


 


Free Water  50 ml


 


IV Total 71.133 ml 250 ml


 


Tube Feeding 780 ml 845 ml


 


Other 100 ml 50 ml


 


Output Urine Total 700 ml 250 ml


 


Stool Total 500 ml 200 ml








Laboratory Tests


8/6/18 06:10: 


White Blood Count 10.0, Red Blood Count 4.76, Hemoglobin 13.0L, Hematocrit 41.1L

, Mean Corpuscular Volume 86, Mean Corpuscular Hemoglobin 27.3, Mean 

Corpuscular Hemoglobin Concent 31.6L, Red Cell Distribution Width 14.9H, 

Platelet Count 253, Mean Platelet Volume 7.8, Neutrophils (%) (Auto) 68.2, 

Lymphocytes (%) (Auto) 21.1, Monocytes (%) (Auto) 7.0, Eosinophils (%) (Auto) 

2.3, Basophils (%) (Auto) 1.4, Sodium Level 131L, Potassium Level 4.3, Chloride 

Level 100, Carbon Dioxide Level 21, Anion Gap 10, Blood Urea Nitrogen 13, 

Creatinine 0.9, Estimat Glomerular Filtration Rate > 60, Glucose Level 126H, 

Calcium Level 10.8H, Phosphorus Level 2.2L, Magnesium Level 2.1, Total 

Bilirubin 0.5, Aspartate Amino Transf (AST/SGOT) 23, Alanine Aminotransferase (

ALT/SGPT) 35, Alkaline Phosphatase 141H, Total Protein 9.4H, Albumin 3.9, 

Globulin 5.5, Albumin/Globulin Ratio 0.7L


Height (Feet):  6


Weight (Pounds):  204


General Appearance:  no apparent distress, lethargic


EENT:  PERRL/EOMI


Neck:  normal alignment


Cardiovascular:  normal peripheral pulses


Respiratory/Chest:  no respiratory distress


Abdomen:  soft











Tejas Gerber MD Aug 6, 2018 09:16

## 2018-08-06 NOTE — CONSULTATION
History of Present Illness


General


Date patient seen:  Aug 6, 2018


Chief Complaint:  Fever


Referring physician:  JUAN DIEGO SEN


Reason for Consultation:  PROLAPSE STOMA





Present Illness


HPI


Pt brought in by ambulance from [facility], c/o fever x 3 hours. Per EMS

, pt was given 650 acetaminophen one hour ago, no change in temp. Pt's rectal 

temp in .7, , pt recieved with trach on vent, G-tube and colostomy. 

The patient found to be hypotensive, possible pneumonia, was aggressively 

hydrated, and noted creatinine of 1.4.  He has a stoma colostomy noted with a G-

tube. Cardiology consulted to assess for endocarditis.  Patient is obtunded in 

bed. SR on monitor. On Ventilator and tolerated well with setting. No s/sx of 

respiratory and cardiac distress noted. G-tube feeding running as prescribed 

rated. HOB elevated. No residual noted. Colostomy bag on right lower quadrant. 

No s/sx of infection noted. Central line on right upper midline intact and 

patent


Allergies:  


Coded Allergies:  


     AZTREONAM (Verified  Allergy, Unknown, 7/23/18)





Medication History


Scheduled


Baclofen* (Baclofen*), 10 MG GT THREE TIMES A DAY, (Reported)


Bisacodyl* (Dulcolax*), 10 MG RECTAL DAILY, (Reported)


Calcium Carbonate (Calcium Carbonate), 1,000 MG GT BID, (Reported)


Chlorhexidine Gluconate (Peridex), 15 ML MM Q12HR, (Reported)


Cholecalciferol (Vitamin D3)* (Vitamin D*), 5,000 UNIT GT ONCE A WEEK, (Reported

)


Clonidine Hcl* (Catapres*), 0.1 MG GT EVERY 6 HOURS, (Reported)


Cranberry (Cranberry), 400 MG GT DAILY, (Reported)


Dextran 70/Hypromellose (Artificial Tears Eye Drops*), 1 DROP BOTH EYES Q4HR, (

Reported)


Docusate Sodium* (Docusate Sodium*), 100 MG GT TWICE A DAY, (Reported)


Famotidine (Famotidine), 20 MG GT DAILY, (Reported)


Glycopyrrolate (Robinul), 2 MG GT BID, (Reported)


Insulin Regular, Human* (Novolin R*), 0 SUBQ .SLIDING SCALE, (Reported)


Ipratropium/Albuterol Sulfate (DuoNeb 0.5-3(2.5)mg/3ml), 3 ML HHN Q3HR, (

Reported)


Levetiracetam* (Levetiracetam*), 1,000 MG GT TID, (Reported)


Losartan Potassium* (Losartan Potassium*), 25 MG GT DAILY, (Reported)


Multivitamin With Minerals (Multivitamins With Minerals*), 1 TAB GT DAILY, (

Reported)


Nystatin* (Nystatin*), 1 APPLIC TOPIC THREE TIMES A DAY, (Reported)


Polyethylene Glycol 3350* (Miralax*), 17 GM GT DAILY, (Reported)


Sitagliptin* (Januvia*), 50 MG GT DAILY, (Reported)


Spironolactone* (Aldactone*), 25 MG GT DAILY, (Reported)





Scheduled PRN


Acetaminophen 160MG/5ML* (Acetaminophen*), 20.3 ML GT Q4HR PRN for Fever/

Headache/Mild Pain, (Reported)





Miscellaneous Medications


Lactulose (Lactulose*), 30 ML GT, (Reported)





Patient History


Healthcare decision maker


unk


Resuscitation status


Full Code


Advanced Directive on File


No





Review of Systems


Constitutional:  Reports: no symptoms


Eye:  Reports: no symptoms


ENT:  Reports: no symptoms


Respiratory:  Reports: no symptoms


Cardiovascular:  Reports: no symptoms


Gastrointestinal:  Reports: no symptoms


Genitourinary:  Reports: no symptoms


Musculoskeletal:  Reports: no symptoms


Skin:  Reports: no symptoms


Psychiatric:  Reports: no symptoms


Neurological:  Reports: no symptoms


Endocrine:  Reports: no symptoms


Hematologic/Lymphatic:  Reports: no symptoms





Physical Exam


General Appearance:  no apparent distress


Lines, tubes and drains:  peripheral


HEENT:  normocephalic, atraumatic


Neck:  non-tender, normal alignment


Respiratory/Chest:  chest wall non-tender, normal breath sounds


Cardiovascular/Chest:  normal peripheral pulses, normal rate, regular rhythm


Abdomen:  normal bowel sounds, non tender


Extremities:  normal range of motion, non-tender


Skin Exam:  normal pigmentation


Neurologic:  CNs II-XII grossly normal





Last 24 Hour Vital Signs








  Date Time  Temp Pulse Resp B/P (MAP) Pulse Ox O2 Delivery O2 Flow Rate FiO2


 


8/6/18 12:00 98.1 69 19 121/78 (92) 100   





 98.1       


 


8/6/18 10:50  82 18     30


 


8/6/18 08:48  68 17     30


 


8/6/18 08:00      Mechanical Ventilator  


 


8/6/18 08:00        30


 


8/6/18 08:00  67      


 


8/6/18 08:00 97.7 67 19 120/79 (93) 100   





 97.7       


 


8/6/18 07:28  61 17     30


 


8/6/18 05:10  64 16     30


 


8/6/18 04:00  65      


 


8/6/18 04:00      Mechanical Ventilator  


 


8/6/18 04:00 98.4 74 19 127/74 (91) 100   





 98.4       


 


8/6/18 04:00        30


 


8/6/18 03:00  78 18     30


 


8/6/18 01:00  71 16     30


 


8/6/18 00:00 98.6 66 18 134/79 (97) 100   





 98.6       


 


8/6/18 00:00  68      


 


8/6/18 00:00      Mechanical Ventilator  


 


8/6/18 00:00        30


 


8/5/18 23:00  73 17     30


 


8/5/18 21:00  68 16     30


 


8/5/18 20:00 98.4 67 20 124/78 (93) 100   





 98.4       


 


8/5/18 20:00        30


 


8/5/18 20:00      Mechanical Ventilator  


 


8/5/18 20:00  70      


 


8/5/18 19:01  67 16     30


 


8/5/18 16:55  74 17     30


 


8/5/18 16:00        30


 


8/5/18 16:00  69      


 


8/5/18 16:00      Mechanical Ventilator  


 


8/5/18 16:00 98.4 69 16 119/82 (94) 99   





 98.4       


 


8/5/18 15:03  68 16     30


 


8/5/18 13:17  72 17     30

















Intake and Output  


 


 8/5/18 8/6/18





 19:00 07:00


 


Intake Total 951.133 ml 1195 ml


 


Output Total 1200 ml 450 ml


 


Balance -248.867 ml 745 ml


 


  


 


Free Water  50 ml


 


IV Total 71.133 ml 250 ml


 


Tube Feeding 780 ml 845 ml


 


Other 100 ml 50 ml


 


Output Urine Total 700 ml 250 ml


 


Stool Total 500 ml 200 ml











Laboratory Tests








Test


  8/6/18


06:10


 


White Blood Count


  10.0 K/UL


(4.8-10.8)


 


Red Blood Count


  4.76 M/UL


(4.70-6.10)


 


Hemoglobin


  13.0 G/DL


(14.2-18.0)  L


 


Hematocrit


  41.1 %


(42.0-52.0)  L


 


Mean Corpuscular Volume 86 FL (80-99)  


 


Mean Corpuscular Hemoglobin


  27.3 PG


(27.0-31.0)


 


Mean Corpuscular Hemoglobin


Concent 31.6 G/DL


(32.0-36.0)  L


 


Red Cell Distribution Width


  14.9 %


(11.6-14.8)  H


 


Platelet Count


  253 K/UL


(150-450)


 


Mean Platelet Volume


  7.8 FL


(6.5-10.1)


 


Neutrophils (%) (Auto)


  68.2 %


(45.0-75.0)


 


Lymphocytes (%) (Auto)


  21.1 %


(20.0-45.0)


 


Monocytes (%) (Auto)


  7.0 %


(1.0-10.0)


 


Eosinophils (%) (Auto)


  2.3 %


(0.0-3.0)


 


Basophils (%) (Auto)


  1.4 %


(0.0-2.0)


 


Sodium Level


  131 MMOL/L


(136-145)  L


 


Potassium Level


  4.3 MMOL/L


(3.5-5.1)


 


Chloride Level


  100 MMOL/L


()


 


Carbon Dioxide Level


  21 MMOL/L


(21-32)


 


Anion Gap


  10 mmol/L


(5-15)


 


Blood Urea Nitrogen


  13 mg/dL


(7-18)


 


Creatinine


  0.9 MG/DL


(0.55-1.30)


 


Estimat Glomerular Filtration


Rate > 60 mL/min


(>60)


 


Glucose Level


  126 MG/DL


()  H


 


Calcium Level


  10.8 MG/DL


(8.5-10.1)  H


 


Phosphorus Level


  2.2 MG/DL


(2.5-4.9)  L


 


Magnesium Level


  2.1 MG/DL


(1.8-2.4)


 


Total Bilirubin


  0.5 MG/DL


(0.2-1.0)


 


Aspartate Amino Transf


(AST/SGOT) 23 U/L (15-37)


 


 


Alanine Aminotransferase


(ALT/SGPT) 35 U/L (12-78)


 


 


Alkaline Phosphatase


  141 U/L


()  H


 


Total Protein


  9.4 G/DL


(6.4-8.2)  H


 


Albumin


  3.9 G/DL


(3.4-5.0)


 


Globulin 5.5 g/dL  


 


Albumin/Globulin Ratio


  0.7 (1.0-2.7)


L








Height (Feet):  6


Weight (Pounds):  204


Medications





Current Medications








 Medications


  (Trade)  Dose


 Ordered  Sig/Jan


 Route


 PRN Reason  Start Time


 Stop Time Status Last Admin


Dose Admin


 


 Acetaminophen


  (Tylenol)  650 mg  Q4H  PRN


 ORAL


 FEVER  7/26/18 14:00


 8/23/18 09:59  8/3/18 21:01


 


 


 Baclofen


  (Lioresal)  10 mg  EVERY 8  HOURS


 GT


   7/30/18 14:00


 8/23/18 12:59  8/6/18 05:35


 


 


 Chlorhexidine


 Gluconate


  (Elaine-Hex 2%)  1 applic  DAILY@2000


 TOPIC


   7/26/18 20:00


 8/23/18 19:59  8/5/18 21:10


 


 


 Dextrose


  (Dextrose 50%)  25 ml  STAT  PRN


 IV


 Hypoglycemia  7/27/18 08:45


 8/24/18 08:44   


 


 


 Dextrose


  (Dextrose 50%)  50 ml  STAT  PRN


 IV


 Hypoglycemia  7/27/18 08:45


 8/24/18 08:44   


 


 


 Ertapenem 1 gm/


 Sodium Chloride  55 ml @ 


 110 mls/hr  Q24H


 IVPB


   7/27/18 15:00


 8/9/18 14:59  8/5/18 15:24


 


 


 Heparin Sodium


  (Porcine)


  (Heparin 5000


 units/ml)  5,000 units  EVERY 12  HOURS


 SUBQ


   7/26/18 21:00


 8/23/18 20:59  8/6/18 08:48


 


 


 Insulin Aspart


  (NovoLOG)    Q6HR


 SUBQ


   7/26/18 18:00


 8/24/18 11:29  8/6/18 05:37


 


 


 Lansoprazole


  (Prevacid)  30 mg  DAILY


 GT


   7/27/18 09:00


 8/25/18 08:59  8/6/18 08:42


 


 


 Levetiracetam


  (Keppra)  1,000 mg  Q8HR


 GT


   7/26/18 14:00


 8/23/18 12:59  8/6/18 05:35


 


 


 Ondansetron HCl


  (Zofran)  4 mg  Q6H  PRN


 IVP


 Nausea & Vomiting  7/26/18 16:00


 8/23/18 09:59   


 


 


 Polyethylene


 Glycol


  (Miralax)  17 gm  BEDTIME


 GT


   7/30/18 21:00


 8/24/18 20:59  8/5/18 21:10


 


 


 Sodium Phosphate


 15 mm/Sodium


 Chloride  280 ml @ 


 70.273 mls/


 hr  ONCE  ONCE


 IVPB


   8/6/18 10:00


 8/6/18 13:59  8/6/18 09:49


 


 


 Vancomycin HCl


  (Vanco rx to


 dose)  1 ea  DAILY  PRN


 MISC


 PER RX PROTOCOL  7/28/18 13:15


 8/27/18 13:14   


 


 


 Vancomycin HCl/


 Dextrose  250 ml @ 


 166.667


 mls/hr  Q18H


 IVPB


   8/2/18 06:00


 8/11/18 23:59  8/5/18 23:26


 











Assessment/Plan


Status:  stable


Assessment/Plan


Assessment:


(1) Acute on chronic respiratory failure


(2) Acute on chronic renal insufficiency


(3) Severe sepsis


(4) Vegetative state


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


(6) Diabetes mellitus





Plan:


Continue antibiotics


Echocardiogram reviewed- no endocarditis


Will arrange STACI at later date to ensure no endocarditis


Patient afebrile, normal WBC otherwise


Continue trach care


Continue keppra for seizures











Robert Hernandez M.D. Aug 6, 2018 12:46

## 2018-08-06 NOTE — GENERAL PROGRESS NOTE
Assessment/Plan


Status:  unchanged


Assessment/Plan


# Leukocytosis - Sepsis with elevated temperature of 103 degrees Fahrenheit.  

Had uti v bacteremia (CoNS) on abx, now resolved.


--> Pt on antibiotics, has received a dose of Zosyn and currently is on 

vancomycin q.12 h. now on cefepime


--> Appreciate Infectious Disease consult.


--> 2D echo per ID performed on 8/1: No discrete vegetations seen, however SBE 

may not be excluded by transthoracic 2-D echo.


--> 8/6: WBC of 10.0, wnl 


# Anemia of chronic disease. No hemolysis, peripheral smear reviewed, ferritin 

was elevated.


--> Continue to closely monitor


--> Hgb goal >7


--> 8/6: Hgb 13.0


# Acute kidney injury.  


--> Currently on IV fluids, IV bolus given to see if the patient responds along 

with IV pressor as needed.  


--> Closely monitor with Nephrology team.


# Septic shock, use antibiotic per ID services.


--> potential uti, on abx and bacteremia


# Respiratory failure, status post tracheostomy, on mechanical ventilation per 

Dr. Pena.


# Hypercalcemia. Monitor PTH and calcium level.


--> 8/6: Elevated at 10.8 





The time the note was entered does not necessarily correspond to the time the 

patient was seen.





Subjective


ROS Limited/Unobtainable:  Yes


Hematologic/Lymphatic:  Reports: anemia


Allergies:  


Coded Allergies:  


     AZTREONAM (Verified  Allergy, Unknown, 7/23/18)


All Systems:  reviewed and negative except above


Subjective


Pt remains obtunded, on trach/vent. No acute events. No resp distress. Pt 

afebrile. STACI pending.





Objective





Last 24 Hour Vital Signs








  Date Time  Temp Pulse Resp B/P (MAP) Pulse Ox O2 Delivery O2 Flow Rate FiO2


 


8/6/18 16:00 98.4 68 19 118/78 (91) 100   





 98.4       


 


8/6/18 16:00        30


 


8/6/18 16:00      Mechanical Ventilator  


 


8/6/18 15:22  72 16     30


 


8/6/18 12:37  69 16     30


 


8/6/18 12:00        30


 


8/6/18 12:00      Mechanical Ventilator  


 


8/6/18 12:00 98.1 69 19 121/78 (92) 100   





 98.1       


 


8/6/18 11:32  70      


 


8/6/18 10:50  82 18     30


 


8/6/18 08:48  68 17     30


 


8/6/18 08:00      Mechanical Ventilator  


 


8/6/18 08:00        30


 


8/6/18 08:00  67      


 


8/6/18 08:00 97.7 67 19 120/79 (93) 100   





 97.7       


 


8/6/18 07:28  61 17     30


 


8/6/18 05:10  64 16     30


 


8/6/18 04:00  65      


 


8/6/18 04:00      Mechanical Ventilator  


 


8/6/18 04:00 98.4 74 19 127/74 (91) 100   





 98.4       


 


8/6/18 04:00        30


 


8/6/18 03:00  78 18     30


 


8/6/18 01:00  71 16     30


 


8/6/18 00:00 98.6 66 18 134/79 (97) 100   





 98.6       


 


8/6/18 00:00  68      


 


8/6/18 00:00      Mechanical Ventilator  


 


8/6/18 00:00        30


 


8/5/18 23:00  73 17     30


 


8/5/18 21:00  68 16     30


 


8/5/18 20:00 98.4 67 20 124/78 (93) 100   





 98.4       


 


8/5/18 20:00        30


 


8/5/18 20:00      Mechanical Ventilator  


 


8/5/18 20:00  70      


 


8/5/18 19:01  67 16     30

















Intake and Output  


 


 8/5/18 8/6/18





 19:00 07:00


 


Intake Total 951.133 ml 1195 ml


 


Output Total 1200 ml 450 ml


 


Balance -248.867 ml 745 ml


 


  


 


Free Water  50 ml


 


IV Total 71.133 ml 250 ml


 


Tube Feeding 780 ml 845 ml


 


Other 100 ml 50 ml


 


Output Urine Total 700 ml 250 ml


 


Stool Total 500 ml 200 ml








Laboratory Tests


8/6/18 06:10: 


White Blood Count 10.0, Red Blood Count 4.76, Hemoglobin 13.0L, Hematocrit 41.1L

, Mean Corpuscular Volume 86, Mean Corpuscular Hemoglobin 27.3, Mean 

Corpuscular Hemoglobin Concent 31.6L, Red Cell Distribution Width 14.9H, 

Platelet Count 253, Mean Platelet Volume 7.8, Neutrophils (%) (Auto) 68.2, 

Lymphocytes (%) (Auto) 21.1, Monocytes (%) (Auto) 7.0, Eosinophils (%) (Auto) 

2.3, Basophils (%) (Auto) 1.4, Sodium Level 131L, Potassium Level 4.3, Chloride 

Level 100, Carbon Dioxide Level 21, Anion Gap 10, Blood Urea Nitrogen 13, 

Creatinine 0.9, Estimat Glomerular Filtration Rate > 60, Glucose Level 126H, 

Calcium Level 10.8H, Phosphorus Level 2.2L, Magnesium Level 2.1, Total 

Bilirubin 0.5, Aspartate Amino Transf (AST/SGOT) 23, Alanine Aminotransferase (

ALT/SGPT) 35, Alkaline Phosphatase 141H, Total Protein 9.4H, Albumin 3.9, 

Globulin 5.5, Albumin/Globulin Ratio 0.7L


Height (Feet):  6


Weight (Pounds):  204


General Appearance:  no apparent distress


EENT:  PERRL/EOMI


Neck:  normal alignment


Cardiovascular:  normal peripheral pulses


Respiratory/Chest:  no respiratory distress


Abdomen:  soft











Tejas Gerber MD Aug 6, 2018 17:18

## 2018-08-07 VITALS — SYSTOLIC BLOOD PRESSURE: 153 MMHG | DIASTOLIC BLOOD PRESSURE: 90 MMHG

## 2018-08-07 VITALS — SYSTOLIC BLOOD PRESSURE: 148 MMHG | DIASTOLIC BLOOD PRESSURE: 77 MMHG

## 2018-08-07 VITALS — DIASTOLIC BLOOD PRESSURE: 76 MMHG | SYSTOLIC BLOOD PRESSURE: 137 MMHG

## 2018-08-07 VITALS — DIASTOLIC BLOOD PRESSURE: 81 MMHG | SYSTOLIC BLOOD PRESSURE: 134 MMHG

## 2018-08-07 VITALS — DIASTOLIC BLOOD PRESSURE: 73 MMHG | SYSTOLIC BLOOD PRESSURE: 143 MMHG

## 2018-08-07 VITALS — DIASTOLIC BLOOD PRESSURE: 69 MMHG | SYSTOLIC BLOOD PRESSURE: 130 MMHG

## 2018-08-07 LAB
ADD MANUAL DIFF: NO
ALBUMIN SERPL-MCNC: 3.8 G/DL (ref 3.4–5)
ALBUMIN/GLOB SERPL: 0.7 {RATIO} (ref 1–2.7)
ALP SERPL-CCNC: 135 U/L (ref 46–116)
ALT SERPL-CCNC: 40 U/L (ref 12–78)
ANION GAP SERPL CALC-SCNC: 10 MMOL/L (ref 5–15)
AST SERPL-CCNC: 26 U/L (ref 15–37)
BASOPHILS NFR BLD AUTO: 1 % (ref 0–2)
BILIRUB SERPL-MCNC: 0.6 MG/DL (ref 0.2–1)
BUN SERPL-MCNC: 13 MG/DL (ref 7–18)
CALCIUM SERPL-MCNC: 10 MG/DL (ref 8.5–10.1)
CHLORIDE SERPL-SCNC: 101 MMOL/L (ref 98–107)
CO2 SERPL-SCNC: 23 MMOL/L (ref 21–32)
CREAT SERPL-MCNC: 0.9 MG/DL (ref 0.55–1.3)
EOSINOPHIL NFR BLD AUTO: 2.6 % (ref 0–3)
ERYTHROCYTE [DISTWIDTH] IN BLOOD BY AUTOMATED COUNT: 15.2 % (ref 11.6–14.8)
GLOBULIN SER-MCNC: 5.6 G/DL
HCT VFR BLD CALC: 40.5 % (ref 42–52)
HGB BLD-MCNC: 12.9 G/DL (ref 14.2–18)
LYMPHOCYTES NFR BLD AUTO: 24.1 % (ref 20–45)
MCV RBC AUTO: 86 FL (ref 80–99)
MONOCYTES NFR BLD AUTO: 6.9 % (ref 1–10)
NEUTROPHILS NFR BLD AUTO: 65.4 % (ref 45–75)
PHOSPHATE SERPL-MCNC: 2.4 MG/DL (ref 2.5–4.9)
PLATELET # BLD: 284 K/UL (ref 150–450)
POTASSIUM SERPL-SCNC: 4.2 MMOL/L (ref 3.5–5.1)
RBC # BLD AUTO: 4.71 M/UL (ref 4.7–6.1)
SODIUM SERPL-SCNC: 134 MMOL/L (ref 136–145)
WBC # BLD AUTO: 9.7 K/UL (ref 4.8–10.8)

## 2018-08-07 RX ADMIN — INSULIN ASPART SCH UNITS: 100 INJECTION, SOLUTION INTRAVENOUS; SUBCUTANEOUS at 06:00

## 2018-08-07 RX ADMIN — INSULIN ASPART SCH UNITS: 100 INJECTION, SOLUTION INTRAVENOUS; SUBCUTANEOUS at 18:23

## 2018-08-07 RX ADMIN — ERTAPENEM SODIUM SCH MLS/HR: 1 INJECTION, POWDER, LYOPHILIZED, FOR SOLUTION INTRAMUSCULAR; INTRAVENOUS at 14:34

## 2018-08-07 RX ADMIN — LEVETIRACETAM SCH MG: 100 SOLUTION ORAL at 13:26

## 2018-08-07 RX ADMIN — CHLORHEXIDINE GLUCONATE SCH APPLIC: 213 SOLUTION TOPICAL at 20:04

## 2018-08-07 RX ADMIN — LEVETIRACETAM SCH MG: 100 SOLUTION ORAL at 21:10

## 2018-08-07 RX ADMIN — HEPARIN SODIUM SCH UNITS: 5000 INJECTION INTRAVENOUS; SUBCUTANEOUS at 21:12

## 2018-08-07 RX ADMIN — VANCOMYCIN HCL-SODIUM CHLORIDE IV SOLN 1.25 GM/250ML-0.9% SCH MLS/HR: 1.25-0.9/25 SOLUTION at 12:48

## 2018-08-07 RX ADMIN — POLYETHYLENE GLYCOL 3350 SCH GM: 17 POWDER, FOR SOLUTION ORAL at 21:10

## 2018-08-07 RX ADMIN — INSULIN ASPART SCH UNITS: 100 INJECTION, SOLUTION INTRAVENOUS; SUBCUTANEOUS at 13:19

## 2018-08-07 RX ADMIN — LEVETIRACETAM SCH MG: 100 SOLUTION ORAL at 06:22

## 2018-08-07 RX ADMIN — HEPARIN SODIUM SCH UNITS: 5000 INJECTION INTRAVENOUS; SUBCUTANEOUS at 09:58

## 2018-08-07 NOTE — GENERAL PROGRESS NOTE
Assessment/Plan


Status:  unchanged


Assessment/Plan


# Leukocytosis - Sepsis with elevated temperature of 103 degrees Fahrenheit.  

Had uti v bacteremia (CoNS) on abx, now resolved.


--> Pt on antibiotics, has received a dose of Zosyn and currently is on 

vancomycin q.12 h. now on cefepime


--> Appreciate Infectious Disease consult.


--> 2D echo per ID performed on 8/1: No discrete vegetations seen, however SBE 

may not be excluded by transthoracic 2-D echo.


--> 8/7: WBC of 9.7, wnl 


# Anemia of chronic disease. No hemolysis, peripheral smear reviewed, ferritin 

was elevated.


--> Continue to closely monitor


--> Hgb goal >7


--> 8/7: Hgb 12.9


# Acute kidney injury.  


--> Currently on IV fluids, IV bolus given to see if the patient responds along 

with IV pressor as needed.  


--> Closely monitor with Nephrology team.


# Septic shock, use antibiotic per ID services.


--> potential uti, on abx and bacteremia


# Respiratory failure, status post tracheostomy, on mechanical ventilation per 

Dr. Pena.


# Hypercalcemia. Monitor PTH and calcium level.


--> 8/7: Elevated at 10.0, wnl





The time the note was entered does not necessarily correspond to the time the 

patient was seen.





Subjective


Date patient seen:  Aug 7, 2018


ROS Limited/Unobtainable:  Yes


Hematologic/Lymphatic:  Reports: anemia


Allergies:  


Coded Allergies:  


     AZTREONAM (Verified  Allergy, Unknown, 7/23/18)


All Systems:  reviewed and negative except above


Subjective


Pt remains on vent. No acute events. STACI scheduled for today. H/H stable. 

Vitals are stable.





Objective





Last 24 Hour Vital Signs








  Date Time  Temp Pulse Resp B/P (MAP) Pulse Ox O2 Delivery O2 Flow Rate FiO2


 


8/7/18 13:15  62 16     30


 


8/7/18 12:00  66      


 


8/7/18 12:00      Mechanical Ventilator  


 


8/7/18 12:00        30


 


8/7/18 12:00 98.1 60 16 134/81 (98) 100   





 98.1       


 


8/7/18 10:39  62 17     30


 


8/7/18 09:08  59 16     30


 


8/7/18 08:00        30


 


8/7/18 08:00      Mechanical Ventilator  


 


8/7/18 08:00 98.5 65 18 153/90 (111) 100   





 98.5       


 


8/7/18 07:32  63      


 


8/7/18 06:39  60 17     30


 


8/7/18 04:39  64 21     30


 


8/7/18 04:00 97.7 66 18 137/76 (96) 100   





 97.7       


 


8/7/18 04:00      Mechanical Ventilator  


 


8/7/18 04:00        30


 


8/7/18 04:00  59      


 


8/7/18 02:58  62 16     30


 


8/7/18 01:15  59 17     30


 


8/7/18 00:00 98.8 62 16 143/73 (96) 100   





 98.8       


 


8/7/18 00:00        30


 


8/7/18 00:00      Mechanical Ventilator  


 


8/6/18 23:57  65      


 


8/6/18 23:24  63 16     30


 


8/6/18 22:03 99.9       


 


8/6/18 21:04 100.2       


 


8/6/18 20:55 100.2       





 100.2       


 


8/6/18 20:39  70 17     30


 


8/6/18 20:00 99.2 72 17 133/84 (100) 100   





 99.2       


 


8/6/18 20:00      Mechanical Ventilator  


 


8/6/18 20:00        30


 


8/6/18 19:21  69      


 


8/6/18 19:18  68 18     30


 


8/6/18 17:10  74 18     30


 


8/6/18 16:00 98.4 68 19 118/78 (91) 100   





 98.4       


 


8/6/18 16:00        30


 


8/6/18 16:00      Mechanical Ventilator  


 


8/6/18 15:35  67      


 


8/6/18 15:22  72 16     30

















Intake and Output  


 


 8/6/18 8/7/18





 19:00 07:00


 


Intake Total 1506.092 ml 420 ml


 


Output Total 1100 ml 750 ml


 


Balance 406.092 ml -330 ml


 


  


 


Free Water 140 ml 


 


IV Total 586.092 ml 


 


Tube Feeding 780 ml 260 ml


 


Other  160 ml


 


Output Urine Total 700 ml 550 ml


 


Stool Total 400 ml 200 ml








Laboratory Tests


8/7/18 04:35: 


White Blood Count 9.7, Red Blood Count 4.71, Hemoglobin 12.9L, Hematocrit 40.5L

, Mean Corpuscular Volume 86, Mean Corpuscular Hemoglobin 27.5, Mean 

Corpuscular Hemoglobin Concent 31.9L, Red Cell Distribution Width 15.2H, 

Platelet Count 284, Mean Platelet Volume 7.4, Neutrophils (%) (Auto) 65.4, 

Lymphocytes (%) (Auto) 24.1, Monocytes (%) (Auto) 6.9, Eosinophils (%) (Auto) 

2.6, Basophils (%) (Auto) 1.0, Sodium Level 134L, Potassium Level 4.2, Chloride 

Level 101, Carbon Dioxide Level 23, Anion Gap 10, Blood Urea Nitrogen 13, 

Creatinine 0.9, Estimat Glomerular Filtration Rate > 60, Glucose Level 102, 

Calcium Level 10.0, Phosphorus Level 2.4L, Magnesium Level 2.1, Total Bilirubin 

0.6, Aspartate Amino Transf (AST/SGOT) 26, Alanine Aminotransferase (ALT/SGPT) 

40, Alkaline Phosphatase 135H, Total Protein 9.4H, Albumin 3.8, Globulin 5.6, 

Albumin/Globulin Ratio 0.7L


Height (Feet):  6


Weight (Pounds):  204


General Appearance:  no apparent distress


EENT:  PERRL/EOMI


Neck:  normal alignment


Cardiovascular:  normal peripheral pulses


Respiratory/Chest:  no respiratory distress


Abdomen:  soft











Tejas Gerber MD Aug 7, 2018 15:04

## 2018-08-07 NOTE — NEPHROLOGY PROGRESS NOTE
Assessment/Plan


Assessment/Plan


1. BRAYDON- resolved


2. Sepsis- on Abx. STACI today


3. Chronic Resp FL- trached on vent  


       - Per PULM mgmt


4. Hyponatremia - Na 134.  Monitor on Keppra


5. SZ- Keppra


    -sodium 133-135


6. Hypophos- Na Phos replacement





Subjective


Date patient seen:  Aug 7, 2018


Time patient seen:  08:15


ROS Limited/Unobtainable:  Yes


Allergies:  


Coded Allergies:  


     AZTREONAM (Verified  Allergy, Unknown, 7/23/18)


Subjective


Patient trached/vent awaiting STACI today





Objective





Last 24 Hour Vital Signs








  Date Time  Temp Pulse Resp B/P (MAP) Pulse Ox O2 Delivery O2 Flow Rate FiO2


 


8/7/18 06:39  60 17     30


 


8/7/18 04:39  64 21     30


 


8/7/18 04:00 97.7 66 18 137/76 (96) 100   





 97.7       


 


8/7/18 04:00      Mechanical Ventilator  


 


8/7/18 04:00        30


 


8/7/18 04:00  59      


 


8/7/18 02:58  62 16     30


 


8/7/18 01:15  59 17     30


 


8/7/18 00:00 98.8 62 16 143/73 (96) 100   





 98.8       


 


8/7/18 00:00        30


 


8/7/18 00:00      Mechanical Ventilator  


 


8/6/18 23:57  65      


 


8/6/18 23:24  63 16     30


 


8/6/18 22:03 99.9       


 


8/6/18 21:04 100.2       


 


8/6/18 20:55 100.2       





 100.2       


 


8/6/18 20:39  70 17     30


 


8/6/18 20:00 99.2 72 17 133/84 (100) 100   





 99.2       


 


8/6/18 20:00      Mechanical Ventilator  


 


8/6/18 20:00        30


 


8/6/18 19:21  69      


 


8/6/18 19:18  68 18     30


 


8/6/18 17:10  74 18     30


 


8/6/18 16:00 98.4 68 19 118/78 (91) 100   





 98.4       


 


8/6/18 16:00        30


 


8/6/18 16:00      Mechanical Ventilator  


 


8/6/18 15:35  67      


 


8/6/18 15:22  72 16     30


 


8/6/18 12:37  69 16     30


 


8/6/18 12:00        30


 


8/6/18 12:00      Mechanical Ventilator  


 


8/6/18 12:00 98.1 69 19 121/78 (92) 100   





 98.1       


 


8/6/18 11:32  70      


 


8/6/18 10:50  82 18     30


 


8/6/18 08:48  68 17     30

















Intake and Output  


 


 8/6/18 8/7/18





 19:00 07:00


 


Intake Total 1506.092 ml 420 ml


 


Output Total 1100 ml 750 ml


 


Balance 406.092 ml -330 ml


 


  


 


Free Water 140 ml 


 


IV Total 586.092 ml 


 


Tube Feeding 780 ml 260 ml


 


Other  160 ml


 


Output Urine Total 700 ml 550 ml


 


Stool Total 400 ml 200 ml








Laboratory Tests


8/7/18 04:35: 


White Blood Count 9.7, Red Blood Count 4.71, Hemoglobin 12.9L, Hematocrit 40.5L

, Mean Corpuscular Volume 86, Mean Corpuscular Hemoglobin 27.5, Mean 

Corpuscular Hemoglobin Concent 31.9L, Red Cell Distribution Width 15.2H, 

Platelet Count 284, Mean Platelet Volume 7.4, Neutrophils (%) (Auto) 65.4, 

Lymphocytes (%) (Auto) 24.1, Monocytes (%) (Auto) 6.9, Eosinophils (%) (Auto) 

2.6, Basophils (%) (Auto) 1.0, Sodium Level 134L, Potassium Level 4.2, Chloride 

Level 101, Carbon Dioxide Level 23, Anion Gap 10, Blood Urea Nitrogen 13, 

Creatinine 0.9, Estimat Glomerular Filtration Rate > 60, Glucose Level 102, 

Calcium Level 10.0, Phosphorus Level 2.4L, Magnesium Level 2.1, Total Bilirubin 

0.6, Aspartate Amino Transf (AST/SGOT) 26, Alanine Aminotransferase (ALT/SGPT) 

40, Alkaline Phosphatase 135H, Total Protein 9.4H, Albumin 3.8, Globulin 5.6, 

Albumin/Globulin Ratio 0.7L


Height (Feet):  6


Weight (Pounds):  204


General Appearance:  WD/WN


EENT:  PERRL/EOMI


Neck:  normal alignment, supple


Cardiovascular:  normal rate, regular rhythm


Respiratory/Chest:  lungs clear, normal breath sounds


Abdomen:  non tender, soft


Edema:  no edema noted Arm (L), no edema noted Arm (R), no edema noted Leg (L), 

no edema noted Leg (R), no edema noted Pedal (L), no edema noted Pedal (R), no 

edema noted Generalized











Jewel Templeton M.D. Aug 7, 2018 08:17

## 2018-08-07 NOTE — CARDIOLOGY PROGRESS NOTE
Assessment/Plan


Status:  stable


Assessment/Plan


Assessment:


(1) Acute on chronic respiratory failure


(2) Acute on chronic renal insufficiency


(3) Severe sepsis


(4) Vegetative state


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


(6) Diabetes mellitus





Plan:


Continue antibiotics


Echocardiogram reviewed- no endocarditis


Will arrange STACI at later date to ensure no endocarditis


Patient afebrile, normal WBC otherwise


Continue trach care and tube feeds


Continue keppra for seizures





Subjective


Cardiovascular:  Reports: no symptoms


Respiratory:  Reports: no symptoms


Gastrointestinal/Abdominal:  Reports: no symptoms


Genitourinary:  Reports: no symptoms


Subjective


Afebrile, vitals stable, Pt is obtunded, opens eyes spontaneously. Telemetry 

shows sinus rhythm. Ventilator settings: portex 7, AC 16, TV: 600, FiO2: 30%, 

PEEP: 5. GT is in place running glucerna 1.2 @ 65 ml/hr. No residual present. 

Colostomy bag in place.


STACI was supposed to be done today but STACI scheduled for 8/13/18 at 1100, no 

other opening downstairs.





Objective





Last 24 Hour Vital Signs








  Date Time  Temp Pulse Resp B/P (MAP) Pulse Ox O2 Delivery O2 Flow Rate FiO2


 


8/7/18 13:15  62 16     30


 


8/7/18 12:00  66      


 


8/7/18 12:00      Mechanical Ventilator  


 


8/7/18 12:00        30


 


8/7/18 12:00 98.1 60 16 134/81 (98) 100   





 98.1       


 


8/7/18 10:39  62 17     30


 


8/7/18 09:08  59 16     30


 


8/7/18 08:00        30


 


8/7/18 08:00      Mechanical Ventilator  


 


8/7/18 08:00 98.5 65 18 153/90 (111) 100   





 98.5       


 


8/7/18 07:32  63      


 


8/7/18 06:39  60 17     30


 


8/7/18 04:39  64 21     30


 


8/7/18 04:00 97.7 66 18 137/76 (96) 100   





 97.7       


 


8/7/18 04:00      Mechanical Ventilator  


 


8/7/18 04:00        30


 


8/7/18 04:00  59      


 


8/7/18 02:58  62 16     30


 


8/7/18 01:15  59 17     30


 


8/7/18 00:00 98.8 62 16 143/73 (96) 100   





 98.8       


 


8/7/18 00:00        30


 


8/7/18 00:00      Mechanical Ventilator  


 


8/6/18 23:57  65      


 


8/6/18 23:24  63 16     30


 


8/6/18 22:03 99.9       


 


8/6/18 21:04 100.2       


 


8/6/18 20:55 100.2       





 100.2       


 


8/6/18 20:39  70 17     30


 


8/6/18 20:00 99.2 72 17 133/84 (100) 100   





 99.2       


 


8/6/18 20:00      Mechanical Ventilator  


 


8/6/18 20:00        30


 


8/6/18 19:21  69      


 


8/6/18 19:18  68 18     30


 


8/6/18 17:10  74 18     30


 


8/6/18 16:00 98.4 68 19 118/78 (91) 100   





 98.4       


 


8/6/18 16:00        30


 


8/6/18 16:00      Mechanical Ventilator  








General Appearance:  no apparent distress, alert


EENT:  PERRL/EOMI, normal ENT inspection


Neck:  non-tender, normal alignment, supple, normal inspection, no JVD


Rhythm:  NSR


Cardiovascular:  normal peripheral pulses, normal rate, regular rhythm


Respiratory/Chest:  chest wall non-tender, lungs clear


Abdomen:  normal bowel sounds, non tender, soft, no organomegaly, no mass


Extremities:  normal range of motion, non-tender


Neurologic:  CNs II-XII grossly normal











Intake and Output  


 


 8/6/18 8/7/18





 19:00 07:00


 


Intake Total 1506.092 ml 420 ml


 


Output Total 1100 ml 750 ml


 


Balance 406.092 ml -330 ml


 


  


 


Free Water 140 ml 


 


IV Total 586.092 ml 


 


Tube Feeding 780 ml 260 ml


 


Other  160 ml


 


Output Urine Total 700 ml 550 ml


 


Stool Total 400 ml 200 ml











Laboratory Tests








Test


  8/7/18


04:35


 


White Blood Count


  9.7 K/UL


(4.8-10.8)


 


Red Blood Count


  4.71 M/UL


(4.70-6.10)


 


Hemoglobin


  12.9 G/DL


(14.2-18.0)  L


 


Hematocrit


  40.5 %


(42.0-52.0)  L


 


Mean Corpuscular Volume 86 FL (80-99)  


 


Mean Corpuscular Hemoglobin


  27.5 PG


(27.0-31.0)


 


Mean Corpuscular Hemoglobin


Concent 31.9 G/DL


(32.0-36.0)  L


 


Red Cell Distribution Width


  15.2 %


(11.6-14.8)  H


 


Platelet Count


  284 K/UL


(150-450)


 


Mean Platelet Volume


  7.4 FL


(6.5-10.1)


 


Neutrophils (%) (Auto)


  65.4 %


(45.0-75.0)


 


Lymphocytes (%) (Auto)


  24.1 %


(20.0-45.0)


 


Monocytes (%) (Auto)


  6.9 %


(1.0-10.0)


 


Eosinophils (%) (Auto)


  2.6 %


(0.0-3.0)


 


Basophils (%) (Auto)


  1.0 %


(0.0-2.0)


 


Sodium Level


  134 MMOL/L


(136-145)  L


 


Potassium Level


  4.2 MMOL/L


(3.5-5.1)


 


Chloride Level


  101 MMOL/L


()


 


Carbon Dioxide Level


  23 MMOL/L


(21-32)


 


Anion Gap


  10 mmol/L


(5-15)


 


Blood Urea Nitrogen


  13 mg/dL


(7-18)


 


Creatinine


  0.9 MG/DL


(0.55-1.30)


 


Estimat Glomerular Filtration


Rate > 60 mL/min


(>60)


 


Glucose Level


  102 MG/DL


()


 


Calcium Level


  10.0 MG/DL


(8.5-10.1)


 


Phosphorus Level


  2.4 MG/DL


(2.5-4.9)  L


 


Magnesium Level


  2.1 MG/DL


(1.8-2.4)


 


Total Bilirubin


  0.6 MG/DL


(0.2-1.0)


 


Aspartate Amino Transf


(AST/SGOT) 26 U/L (15-37)


 


 


Alanine Aminotransferase


(ALT/SGPT) 40 U/L (12-78)


 


 


Alkaline Phosphatase


  135 U/L


()  H


 


Total Protein


  9.4 G/DL


(6.4-8.2)  H


 


Albumin


  3.8 G/DL


(3.4-5.0)


 


Globulin 5.6 g/dL  


 


Albumin/Globulin Ratio


  0.7 (1.0-2.7)


L











Microbiology








 Date/Time


Source Procedure


Growth Status


 


 


 8/5/18 05:30


Blood Blood Culture - Preliminary


NO GROWTH AFTER 24 HOURS Resulted


 


 8/5/18 05:15


Blood Blood Culture - Preliminary


NO GROWTH AFTER 24 HOURS Resulted

















Robert Hernandez M.D. Aug 7, 2018 15:48

## 2018-08-07 NOTE — PULMONOLGY CRITICAL CARE NOTE
Critical Care - Asmt/Plan


Problems:  


(1) Acute on chronic respiratory failure


(2) Acute on chronic renal insufficiency


(3) Severe sepsis


(4) Vegetative state


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


(6) Diabetes mellitus


Respiratory:  monitor respiratory rate, adjust FIO2, CXR


Cardiac:  continue to monitor HR/BP


Renal:  F/U I&O


Infectious Disease:  check cultures


Gastrointestinal:  continue feedings/current rate


Endocrine:  monitor blood sugar, check TSH


Hematologic:  monitor H/H, transfuse if hgb<8.5


Neurologic:  PRN Ativan, keep patient comfortable


Affect:  PRN ativan


Notes Reviewed:  cardio, renal


Discussed with:  nurses, consultants, 





Critical Care - Objective





Last 24 Hour Vital Signs








  Date Time  Temp Pulse Resp B/P (MAP) Pulse Ox O2 Delivery O2 Flow Rate FiO2


 


8/7/18 09:08  59 16     30


 


8/7/18 08:00        30


 


8/7/18 08:00      Mechanical Ventilator  


 


8/7/18 08:00 98.5 65 18 153/90 (111) 100   





 98.5       


 


8/7/18 07:32  63      


 


8/7/18 06:39  60 17     30


 


8/7/18 04:39  64 21     30


 


8/7/18 04:00 97.7 66 18 137/76 (96) 100   





 97.7       


 


8/7/18 04:00      Mechanical Ventilator  


 


8/7/18 04:00        30


 


8/7/18 04:00  59      


 


8/7/18 02:58  62 16     30


 


8/7/18 01:15  59 17     30


 


8/7/18 00:00 98.8 62 16 143/73 (96) 100   





 98.8       


 


8/7/18 00:00        30


 


8/7/18 00:00      Mechanical Ventilator  


 


8/6/18 23:57  65      


 


8/6/18 23:24  63 16     30


 


8/6/18 22:03 99.9       


 


8/6/18 21:04 100.2       


 


8/6/18 20:55 100.2       





 100.2       


 


8/6/18 20:39  70 17     30


 


8/6/18 20:00 99.2 72 17 133/84 (100) 100   





 99.2       


 


8/6/18 20:00      Mechanical Ventilator  


 


8/6/18 20:00        30


 


8/6/18 19:21  69      


 


8/6/18 19:18  68 18     30


 


8/6/18 17:10  74 18     30


 


8/6/18 16:00 98.4 68 19 118/78 (91) 100   





 98.4       


 


8/6/18 16:00        30


 


8/6/18 16:00      Mechanical Ventilator  


 


8/6/18 15:35  67      


 


8/6/18 15:22  72 16     30


 


8/6/18 12:37  69 16     30


 


8/6/18 12:00        30


 


8/6/18 12:00      Mechanical Ventilator  


 


8/6/18 12:00 98.1 69 19 121/78 (92) 100   





 98.1       


 


8/6/18 11:32  70      


 


8/6/18 10:50  82 18     30








Status:  obtunded


Condition:  critical


HEENT:  atraumatic


Heart:  HR/BP stable


Abdomen:  soft, active bowel sounds


Extremities:  no C/C/E, edema


Decubiti:  location


Micro:





Microbiology








 Date/Time


Source Procedure


Growth Status


 


 


 8/5/18 05:30


Blood Blood Culture - Preliminary


NO GROWTH AFTER 24 HOURS Resulted


 


 8/5/18 05:15


Blood Blood Culture - Preliminary


NO GROWTH AFTER 24 HOURS Resulted








Accucheck:  107





Critical Care - Subjective


ROS Limited/Unobtainable:  No


Condition:  critical


EKG Rhythm:  Sinus Rhythm


FI02:  30


Vent Support Breath Rate:  16


Vent Support Mode:  AC


Vent Tidal Volume:  600


Sputum Amount:  Moderate


PEEP:  5.0


PIP:  33


Tube Feeding Amount:  65


I&O:











Intake and Output  


 


 8/6/18 8/7/18





 19:00 07:00


 


Intake Total 1506.092 ml 420 ml


 


Output Total 1100 ml 750 ml


 


Balance 406.092 ml -330 ml


 


  


 


Free Water 140 ml 


 


IV Total 586.092 ml 


 


Tube Feeding 780 ml 260 ml


 


Other  160 ml


 


Output Urine Total 700 ml 550 ml


 


Stool Total 400 ml 200 ml








Labs:





Laboratory Tests








Test


  8/7/18


04:35


 


White Blood Count


  9.7 K/UL


(4.8-10.8)


 


Red Blood Count


  4.71 M/UL


(4.70-6.10)


 


Hemoglobin


  12.9 G/DL


(14.2-18.0)  L


 


Hematocrit


  40.5 %


(42.0-52.0)  L


 


Mean Corpuscular Volume 86 FL (80-99)  


 


Mean Corpuscular Hemoglobin


  27.5 PG


(27.0-31.0)


 


Mean Corpuscular Hemoglobin


Concent 31.9 G/DL


(32.0-36.0)  L


 


Red Cell Distribution Width


  15.2 %


(11.6-14.8)  H


 


Platelet Count


  284 K/UL


(150-450)


 


Mean Platelet Volume


  7.4 FL


(6.5-10.1)


 


Neutrophils (%) (Auto)


  65.4 %


(45.0-75.0)


 


Lymphocytes (%) (Auto)


  24.1 %


(20.0-45.0)


 


Monocytes (%) (Auto)


  6.9 %


(1.0-10.0)


 


Eosinophils (%) (Auto)


  2.6 %


(0.0-3.0)


 


Basophils (%) (Auto)


  1.0 %


(0.0-2.0)


 


Sodium Level


  134 MMOL/L


(136-145)  L


 


Potassium Level


  4.2 MMOL/L


(3.5-5.1)


 


Chloride Level


  101 MMOL/L


()


 


Carbon Dioxide Level


  23 MMOL/L


(21-32)


 


Anion Gap


  10 mmol/L


(5-15)


 


Blood Urea Nitrogen


  13 mg/dL


(7-18)


 


Creatinine


  0.9 MG/DL


(0.55-1.30)


 


Estimat Glomerular Filtration


Rate > 60 mL/min


(>60)


 


Glucose Level


  102 MG/DL


()


 


Calcium Level


  10.0 MG/DL


(8.5-10.1)


 


Phosphorus Level


  2.4 MG/DL


(2.5-4.9)  L


 


Magnesium Level


  2.1 MG/DL


(1.8-2.4)


 


Total Bilirubin


  0.6 MG/DL


(0.2-1.0)


 


Aspartate Amino Transf


(AST/SGOT) 26 U/L (15-37)


 


 


Alanine Aminotransferase


(ALT/SGPT) 40 U/L (12-78)


 


 


Alkaline Phosphatase


  135 U/L


()  H


 


Total Protein


  9.4 G/DL


(6.4-8.2)  H


 


Albumin


  3.8 G/DL


(3.4-5.0)


 


Globulin 5.6 g/dL  


 


Albumin/Globulin Ratio


  0.7 (1.0-2.7)


L

















Yury Pena MD Aug 7, 2018 10:39

## 2018-08-07 NOTE — GI PROGRESS NOTE
Assessment/Plan


Problems:  


(1) Parastomal hernia


ICD Codes:  K43.5 - Parastomal hernia without obstruction or gangrene


SNOMED:  240576311


Qualifiers:  


   Qualified Codes:  K43.5 - Parastomal hernia without obstruction or gangrene


(2) Stoma malfunction


SNOMED:  332567471


(3) Colostomy in place


ICD Codes:  Z93.3 - Colostomy status


SNOMED:  372073241, 830341504


(4) Vegetative state


ICD Codes:  R40.3 - Persistent vegetative state


SNOMED:  55769539


(5) Diabetes mellitus


ICD Codes:  E11.9 - Type 2 diabetes mellitus without complications


SNOMED:  94982471


(6) Acute on chronic respiratory failure


ICD Codes:  J96.20 - Acute and chronic respiratory failure, unspecified whether 

with hypoxia or hypercapnia


SNOMED:  82400905


(7) Severe sepsis


ICD Codes:  A41.9 - Sepsis, unspecified organism; R65.20 - Severe sepsis 

without septic shock


SNOMED:  05575720


Status:  stable, unchanged


Status Narrative


Discussed with Dr. Pickard.


Assessment/Plan


prolapse stoma assessed >> no s/sx of infection.  no obstruction. 


anemia work up reviewed >> iron deficiency


G tube dependent


hepatitis panel negative


OB negative





supportive care


monitor H&H, prn transfusions


venofer


ppi


GTFs per RD, adv to goal


GT site care daily/prn


abx


bowel regime


fu labs





The patient was seen and examined at bedside and all new and available data was 

reviewed in the patients chart. I agree with the above findings, impression 

and plan.  (Patient seen earlier today. Signature stamp does not reflect 

patient encounter time.). - Miles Pickard MD





Subjective


Subjective


limited





Objective





Last 24 Hour Vital Signs








  Date Time  Temp Pulse Resp B/P (MAP) Pulse Ox O2 Delivery O2 Flow Rate FiO2


 


8/7/18 12:00      Mechanical Ventilator  


 


8/7/18 12:00        30


 


8/7/18 12:00 98.1 60 16 134/81 (98) 100   





 98.1       


 


8/7/18 10:39  62 17     30


 


8/7/18 09:08  59 16     30


 


8/7/18 08:00        30


 


8/7/18 08:00      Mechanical Ventilator  


 


8/7/18 08:00 98.5 65 18 153/90 (111) 100   





 98.5       


 


8/7/18 07:32  63      


 


8/7/18 06:39  60 17     30


 


8/7/18 04:39  64 21     30


 


8/7/18 04:00 97.7 66 18 137/76 (96) 100   





 97.7       


 


8/7/18 04:00      Mechanical Ventilator  


 


8/7/18 04:00        30


 


8/7/18 04:00  59      


 


8/7/18 02:58  62 16     30


 


8/7/18 01:15  59 17     30


 


8/7/18 00:00 98.8 62 16 143/73 (96) 100   





 98.8       


 


8/7/18 00:00        30


 


8/7/18 00:00      Mechanical Ventilator  


 


8/6/18 23:57  65      


 


8/6/18 23:24  63 16     30


 


8/6/18 22:03 99.9       


 


8/6/18 21:04 100.2       


 


8/6/18 20:55 100.2       





 100.2       


 


8/6/18 20:39  70 17     30


 


8/6/18 20:00 99.2 72 17 133/84 (100) 100   





 99.2       


 


8/6/18 20:00      Mechanical Ventilator  


 


8/6/18 20:00        30


 


8/6/18 19:21  69      


 


8/6/18 19:18  68 18     30


 


8/6/18 17:10  74 18     30


 


8/6/18 16:00 98.4 68 19 118/78 (91) 100   





 98.4       


 


8/6/18 16:00        30


 


8/6/18 16:00      Mechanical Ventilator  


 


8/6/18 15:35  67      


 


8/6/18 15:22  72 16     30

















Intake and Output  


 


 8/6/18 8/7/18





 19:00 07:00


 


Intake Total 1506.092 ml 420 ml


 


Output Total 1100 ml 750 ml


 


Balance 406.092 ml -330 ml


 


  


 


Free Water 140 ml 


 


IV Total 586.092 ml 


 


Tube Feeding 780 ml 260 ml


 


Other  160 ml


 


Output Urine Total 700 ml 550 ml


 


Stool Total 400 ml 200 ml











Laboratory Tests








Test


  8/7/18


04:35


 


White Blood Count


  9.7 K/UL


(4.8-10.8)


 


Red Blood Count


  4.71 M/UL


(4.70-6.10)


 


Hemoglobin


  12.9 G/DL


(14.2-18.0)  L


 


Hematocrit


  40.5 %


(42.0-52.0)  L


 


Mean Corpuscular Volume 86 FL (80-99)  


 


Mean Corpuscular Hemoglobin


  27.5 PG


(27.0-31.0)


 


Mean Corpuscular Hemoglobin


Concent 31.9 G/DL


(32.0-36.0)  L


 


Red Cell Distribution Width


  15.2 %


(11.6-14.8)  H


 


Platelet Count


  284 K/UL


(150-450)


 


Mean Platelet Volume


  7.4 FL


(6.5-10.1)


 


Neutrophils (%) (Auto)


  65.4 %


(45.0-75.0)


 


Lymphocytes (%) (Auto)


  24.1 %


(20.0-45.0)


 


Monocytes (%) (Auto)


  6.9 %


(1.0-10.0)


 


Eosinophils (%) (Auto)


  2.6 %


(0.0-3.0)


 


Basophils (%) (Auto)


  1.0 %


(0.0-2.0)


 


Sodium Level


  134 MMOL/L


(136-145)  L


 


Potassium Level


  4.2 MMOL/L


(3.5-5.1)


 


Chloride Level


  101 MMOL/L


()


 


Carbon Dioxide Level


  23 MMOL/L


(21-32)


 


Anion Gap


  10 mmol/L


(5-15)


 


Blood Urea Nitrogen


  13 mg/dL


(7-18)


 


Creatinine


  0.9 MG/DL


(0.55-1.30)


 


Estimat Glomerular Filtration


Rate > 60 mL/min


(>60)


 


Glucose Level


  102 MG/DL


()


 


Calcium Level


  10.0 MG/DL


(8.5-10.1)


 


Phosphorus Level


  2.4 MG/DL


(2.5-4.9)  L


 


Magnesium Level


  2.1 MG/DL


(1.8-2.4)


 


Total Bilirubin


  0.6 MG/DL


(0.2-1.0)


 


Aspartate Amino Transf


(AST/SGOT) 26 U/L (15-37)


 


 


Alanine Aminotransferase


(ALT/SGPT) 40 U/L (12-78)


 


 


Alkaline Phosphatase


  135 U/L


()  H


 


Total Protein


  9.4 G/DL


(6.4-8.2)  H


 


Albumin


  3.8 G/DL


(3.4-5.0)


 


Globulin 5.6 g/dL  


 


Albumin/Globulin Ratio


  0.7 (1.0-2.7)


L








Height (Feet):  6


Weight (Pounds):  204


General Appearance:  WD/WN, no apparent distress, alert


Cardiovascular:  normal rate


Respiratory/Chest:  normal breath sounds, no respiratory distress


Abdominal Exam:  normal bowel sounds, non tender, soft, other - prolapse stoma


Extremities:  non-tender











Remy Lyons NP Aug 7, 2018 13:48

## 2018-08-07 NOTE — INFECTIOUS DISEASES PROG NOTE
Assessment/Plan


Assessment/Plan


Sepsis, resolving- 2ry to UTI and Bacteremia Blood cultures postive 4/4 bottles 

with [facility] Unclear source will need to R/O Endocarditis  Possible PNA initially 


 -u/a wbc 40-60, nit neg, leuk +2; ucx >100k E. aerogenes (S cefepime, cipro/

levo; R Zosyn)


 -BCX 4/4 E. aerogenes, prob Amp C (S cefepime, cipro/levo; R Zosyn), P. 

mirabilis ESBL (S Zosyn, Ertapenem); repeta 7/26 1/4 CoNS (suspect contaminant)

, 7/28 Staph f/u final results if CoNS may need TTE and IE work up.


  -CXR 7/27: Increased left pleural effusion, over 3 days. Overall decreased 

interstitial congestion. Right infrahilar atelectasis


 -CXR: Cardiomegaly. Mild interstitial congestion. Suspect small bilateral 

pleural effusions


 -CT abd/p: Right lower quadrant double barrel colostomy, as described. Small 

peristomal hernia contains bowel loops without evidence of obstruction or 

strangulation. Left renal staghorn calculus. Bilateral intrarenal calyceal 

calculi. Borderline hydronephrosis on the right without evidence of downstream 

obstructive lesion. May indicate mild ureteral pelvic junction obstruction. 

Somewhat atrophic left kidney. Inspissated contrast ball within the distal 

sigmoid colon. Gastrostomy in good position. Nonspecific bilateral perinephric 

fat stranding, could indicate pyelonephritis or could be chronic. Guzmán 

catheter in place. Apparent bladder wall thickening, possibly an artifact of 

under distention but cystitis is not excludable, particularly in view of mild 

perivesical fat stranding. Bilateral pulmonary parenchymal atelectasis and 

consolidation


  -Blood Cx from PICC CoNS 2/2 bottles Peripheral Neg- (May be contaminant but 

will repeat blood Cx given new leukocytosis if positive will need ECHO.


  - Blood Cx 7/30/18 - [facility] - Will need TTE for IE work up and the PICC removed.


 


PICC line infection/colonization - TTE negative. Blood cultures only positive 

from PICC. Not positive from Peripheral 


- Will repeat blood cultures x 1 to confirm clearance after PICC replaced. Los 

suspicion for IE.


- PICC replace 8/1/18





Fever/Leukocytosis; Resolved - i recurs Re-evaluate lines, Diarrhea? C. dif? 


BRAYDON, improving


Lactic acidosis, resolved





chronic resp failure trach/vent dependant


BPH


GERD


 HTN


DM2


seizure disorder


colostomy


 s/p GT 


constipation





VRE and MRSA colonized


 


Plan:


- Continue empiric IV Vancomycin #10/14 for bacteremia/line infection - Abx end 

date 8/10/18 if STACI negative


- Recommend Transesophageal ECHO to r/o IE





-Continue Ertapenem #11/14 for Amp-C Enterobacter and ESBL P.mirabilis 

bacteremia ; will treat for 14 days - End date 8/9/18


    -7/27 SP Zosyn #4





- Monitor CBC/CMP, temperatures


- F/U STACI


- Pulmonary care








Thank you for this consultation. Will continue to follow along with you.





Subjective


Allergies:  


Coded Allergies:  


     AZTREONAM (Verified  Allergy, Unknown, 7/23/18)


Subjective


No acute changes


On Vent. 30% O2


Afebrile still with no leukocytosis


STACI pending for later this week





Objective


Vital Signs





Last 24 Hour Vital Signs








  Date Time  Temp Pulse Resp B/P (MAP) Pulse Ox O2 Delivery O2 Flow Rate FiO2


 


8/7/18 06:39  60 17     30


 


8/7/18 04:39  64 21     30


 


8/7/18 04:00 97.7 66 18 137/76 (96) 100   





 97.7       


 


8/7/18 04:00      Mechanical Ventilator  


 


8/7/18 04:00        30


 


8/7/18 04:00  59      


 


8/7/18 02:58  62 16     30


 


8/7/18 01:15  59 17     30


 


8/7/18 00:00 98.8 62 16 143/73 (96) 100   





 98.8       


 


8/7/18 00:00        30


 


8/7/18 00:00      Mechanical Ventilator  


 


8/6/18 23:57  65      


 


8/6/18 23:24  63 16     30


 


8/6/18 22:03 99.9       


 


8/6/18 21:04 100.2       


 


8/6/18 20:55 100.2       





 100.2       


 


8/6/18 20:39  70 17     30


 


8/6/18 20:00 99.2 72 17 133/84 (100) 100   





 99.2       


 


8/6/18 20:00      Mechanical Ventilator  


 


8/6/18 20:00        30


 


8/6/18 19:21  69      


 


8/6/18 19:18  68 18     30


 


8/6/18 17:10  74 18     30


 


8/6/18 16:00 98.4 68 19 118/78 (91) 100   





 98.4       


 


8/6/18 16:00        30


 


8/6/18 16:00      Mechanical Ventilator  


 


8/6/18 15:35  67      


 


8/6/18 15:22  72 16     30


 


8/6/18 12:37  69 16     30


 


8/6/18 12:00        30


 


8/6/18 12:00      Mechanical Ventilator  


 


8/6/18 12:00 98.1 69 19 121/78 (92) 100   





 98.1       


 


8/6/18 11:32  70      


 


8/6/18 10:50  82 18     30


 


8/6/18 08:48  68 17     30


 


8/6/18 08:00      Mechanical Ventilator  


 


8/6/18 08:00        30


 


8/6/18 08:00  67      


 


8/6/18 08:00 97.7 67 19 120/79 (93) 100   





 97.7       


 


8/6/18 07:28  61 17     30








Height (Feet):  6


Weight (Pounds):  204


Objective


GENERAL:  No overt distress. On vent 30% O2, Eyes open


HEENT:  NCAT, DMM, Tracheostomy noted 


PULM: Mild crackles in bases B/L, No wheezing


CARDIOVASCULAR:  RRR, S1 and S2. No rubs or gallops.


ABDOMEN:  Obese and nondistended. +BS, The G-tube and stoma noted.


EXTREMITIES:  No edema.  1+ pedal pulses.





Microbiology








 Date/Time


Source Procedure


Growth Status


 


 


 8/5/18 05:30


Blood Blood Culture - Preliminary


NO GROWTH AFTER 24 HOURS Resulted


 


 8/5/18 05:15


Blood Blood Culture - Preliminary


NO GROWTH AFTER 24 HOURS Resulted











Laboratory Tests








Test


  8/7/18


04:35


 


White Blood Count


  9.7 K/UL


(4.8-10.8)


 


Red Blood Count


  4.71 M/UL


(4.70-6.10)


 


Hemoglobin


  12.9 G/DL


(14.2-18.0)  L


 


Hematocrit


  40.5 %


(42.0-52.0)  L


 


Mean Corpuscular Volume 86 FL (80-99)  


 


Mean Corpuscular Hemoglobin


  27.5 PG


(27.0-31.0)


 


Mean Corpuscular Hemoglobin


Concent 31.9 G/DL


(32.0-36.0)  L


 


Red Cell Distribution Width


  15.2 %


(11.6-14.8)  H


 


Platelet Count


  284 K/UL


(150-450)


 


Mean Platelet Volume


  7.4 FL


(6.5-10.1)


 


Neutrophils (%) (Auto)


  65.4 %


(45.0-75.0)


 


Lymphocytes (%) (Auto)


  24.1 %


(20.0-45.0)


 


Monocytes (%) (Auto)


  6.9 %


(1.0-10.0)


 


Eosinophils (%) (Auto)


  2.6 %


(0.0-3.0)


 


Basophils (%) (Auto)


  1.0 %


(0.0-2.0)


 


Sodium Level


  134 MMOL/L


(136-145)  L


 


Potassium Level


  4.2 MMOL/L


(3.5-5.1)


 


Chloride Level


  101 MMOL/L


()


 


Carbon Dioxide Level


  23 MMOL/L


(21-32)


 


Anion Gap


  10 mmol/L


(5-15)


 


Blood Urea Nitrogen


  13 mg/dL


(7-18)


 


Creatinine


  0.9 MG/DL


(0.55-1.30)


 


Estimat Glomerular Filtration


Rate > 60 mL/min


(>60)


 


Glucose Level


  102 MG/DL


()


 


Calcium Level


  10.0 MG/DL


(8.5-10.1)


 


Phosphorus Level


  2.4 MG/DL


(2.5-4.9)  L


 


Magnesium Level


  2.1 MG/DL


(1.8-2.4)


 


Total Bilirubin


  0.6 MG/DL


(0.2-1.0)


 


Aspartate Amino Transf


(AST/SGOT) 26 U/L (15-37)


 


 


Alanine Aminotransferase


(ALT/SGPT) 40 U/L (12-78)


 


 


Alkaline Phosphatase


  135 U/L


()  H


 


Total Protein


  9.4 G/DL


(6.4-8.2)  H


 


Albumin


  3.8 G/DL


(3.4-5.0)


 


Globulin 5.6 g/dL  


 


Albumin/Globulin Ratio


  0.7 (1.0-2.7)


L











Current Medications








 Medications


  (Trade)  Dose


 Ordered  Sig/Jan


 Route


 PRN Reason  Start Time


 Stop Time Status Last Admin


Dose Admin


 


 Acetaminophen


  (Tylenol)  650 mg  Q4H  PRN


 ORAL


 FEVER  7/26/18 14:00


 8/23/18 09:59  8/6/18 21:04


 


 


 Baclofen


  (Lioresal)  10 mg  EVERY 8  HOURS


 GT


   7/30/18 14:00


 8/23/18 12:59  8/7/18 06:21


 


 


 Chlorhexidine


 Gluconate


  (Elaine-Hex 2%)  1 applic  DAILY@2000


 TOPIC


   7/26/18 20:00


 8/23/18 19:59  8/6/18 20:31


 


 


 Dextrose


  (Dextrose 50%)  25 ml  STAT  PRN


 IV


 Hypoglycemia  7/27/18 08:45


 8/24/18 08:44   


 


 


 Dextrose


  (Dextrose 50%)  50 ml  STAT  PRN


 IV


 Hypoglycemia  7/27/18 08:45


 8/24/18 08:44   


 


 


 Ertapenem 1 gm/


 Sodium Chloride  55 ml @ 


 110 mls/hr  Q24H


 IVPB


   7/27/18 15:00


 8/9/18 14:59  8/6/18 14:33


 


 


 Heparin Sodium


  (Porcine)


  (Heparin 5000


 units/ml)  5,000 units  EVERY 12  HOURS


 SUBQ


   7/26/18 21:00


 8/23/18 20:59  8/6/18 20:32


 


 


 Insulin Aspart


  (NovoLOG)    Q6HR


 SUBQ


   7/26/18 18:00


 8/24/18 11:29  8/6/18 17:27


 


 


 Lansoprazole


  (Prevacid)  30 mg  DAILY


 GT


   7/27/18 09:00


 8/25/18 08:59  8/6/18 08:42


 


 


 Levetiracetam


  (Keppra)  1,000 mg  Q8HR


 GT


   7/26/18 14:00


 8/23/18 12:59  8/7/18 06:22


 


 


 Ondansetron HCl


  (Zofran)  4 mg  Q6H  PRN


 IVP


 Nausea & Vomiting  7/26/18 16:00


 8/23/18 09:59   


 


 


 Polyethylene


 Glycol


  (Miralax)  17 gm  BEDTIME


 GT


   7/30/18 21:00


 8/24/18 20:59  8/6/18 20:31


 


 


 Vancomycin HCl


  (Vanco rx to


 dose)  1 ea  DAILY  PRN


 MISC


 PER RX PROTOCOL  7/28/18 13:15


 8/27/18 13:14   


 


 


 Vancomycin HCl/


 Dextrose  250 ml @ 


 166.667


 mls/hr  Q18H


 IVPB


   8/2/18 06:00


 8/11/18 23:59  8/6/18 17:27


 

















Robert Williamson M.D. Aug 7, 2018 07:26

## 2018-08-08 VITALS — SYSTOLIC BLOOD PRESSURE: 135 MMHG | DIASTOLIC BLOOD PRESSURE: 79 MMHG

## 2018-08-08 VITALS — SYSTOLIC BLOOD PRESSURE: 125 MMHG | DIASTOLIC BLOOD PRESSURE: 72 MMHG

## 2018-08-08 VITALS — DIASTOLIC BLOOD PRESSURE: 70 MMHG | SYSTOLIC BLOOD PRESSURE: 143 MMHG

## 2018-08-08 VITALS — DIASTOLIC BLOOD PRESSURE: 79 MMHG | SYSTOLIC BLOOD PRESSURE: 124 MMHG

## 2018-08-08 VITALS — DIASTOLIC BLOOD PRESSURE: 78 MMHG | SYSTOLIC BLOOD PRESSURE: 127 MMHG

## 2018-08-08 VITALS — SYSTOLIC BLOOD PRESSURE: 152 MMHG | DIASTOLIC BLOOD PRESSURE: 77 MMHG

## 2018-08-08 LAB
ADD MANUAL DIFF: NO
ALBUMIN SERPL-MCNC: 3.8 G/DL (ref 3.4–5)
ALBUMIN/GLOB SERPL: 0.7 {RATIO} (ref 1–2.7)
ALP SERPL-CCNC: 141 U/L (ref 46–116)
ALT SERPL-CCNC: 54 U/L (ref 12–78)
ANION GAP SERPL CALC-SCNC: 12 MMOL/L (ref 5–15)
AST SERPL-CCNC: 30 U/L (ref 15–37)
BASOPHILS NFR BLD AUTO: 0.9 % (ref 0–2)
BILIRUB SERPL-MCNC: 0.7 MG/DL (ref 0.2–1)
BUN SERPL-MCNC: 12 MG/DL (ref 7–18)
CALCIUM SERPL-MCNC: 10.2 MG/DL (ref 8.5–10.1)
CHLORIDE SERPL-SCNC: 100 MMOL/L (ref 98–107)
CO2 SERPL-SCNC: 22 MMOL/L (ref 21–32)
CREAT SERPL-MCNC: 1 MG/DL (ref 0.55–1.3)
EOSINOPHIL NFR BLD AUTO: 2.1 % (ref 0–3)
ERYTHROCYTE [DISTWIDTH] IN BLOOD BY AUTOMATED COUNT: 15.8 % (ref 11.6–14.8)
GLOBULIN SER-MCNC: 5.5 G/DL
HCT VFR BLD CALC: 40.2 % (ref 42–52)
HGB BLD-MCNC: 12.7 G/DL (ref 14.2–18)
LYMPHOCYTES NFR BLD AUTO: 16.4 % (ref 20–45)
MCV RBC AUTO: 86 FL (ref 80–99)
MONOCYTES NFR BLD AUTO: 6.3 % (ref 1–10)
NEUTROPHILS NFR BLD AUTO: 74.3 % (ref 45–75)
PLATELET # BLD: 327 K/UL (ref 150–450)
POTASSIUM SERPL-SCNC: 3.9 MMOL/L (ref 3.5–5.1)
RBC # BLD AUTO: 4.68 M/UL (ref 4.7–6.1)
SODIUM SERPL-SCNC: 134 MMOL/L (ref 136–145)
WBC # BLD AUTO: 11.8 K/UL (ref 4.8–10.8)

## 2018-08-08 RX ADMIN — INSULIN ASPART SCH UNITS: 100 INJECTION, SOLUTION INTRAVENOUS; SUBCUTANEOUS at 00:00

## 2018-08-08 RX ADMIN — VANCOMYCIN HCL-SODIUM CHLORIDE IV SOLN 1.25 GM/250ML-0.9% SCH MLS/HR: 1.25-0.9/25 SOLUTION at 05:37

## 2018-08-08 RX ADMIN — LEVETIRACETAM SCH MG: 100 SOLUTION ORAL at 22:02

## 2018-08-08 RX ADMIN — Medication SCH MLS/HR: at 22:01

## 2018-08-08 RX ADMIN — INSULIN ASPART SCH UNITS: 100 INJECTION, SOLUTION INTRAVENOUS; SUBCUTANEOUS at 18:32

## 2018-08-08 RX ADMIN — LEVETIRACETAM SCH MG: 100 SOLUTION ORAL at 05:37

## 2018-08-08 RX ADMIN — HEPARIN SODIUM SCH UNITS: 5000 INJECTION INTRAVENOUS; SUBCUTANEOUS at 08:37

## 2018-08-08 RX ADMIN — CHLORHEXIDINE GLUCONATE SCH APPLIC: 213 SOLUTION TOPICAL at 22:02

## 2018-08-08 RX ADMIN — LEVETIRACETAM SCH MG: 100 SOLUTION ORAL at 13:50

## 2018-08-08 RX ADMIN — INSULIN ASPART SCH UNITS: 100 INJECTION, SOLUTION INTRAVENOUS; SUBCUTANEOUS at 23:45

## 2018-08-08 RX ADMIN — HEPARIN SODIUM SCH UNITS: 5000 INJECTION INTRAVENOUS; SUBCUTANEOUS at 22:01

## 2018-08-08 RX ADMIN — INSULIN ASPART SCH UNITS: 100 INJECTION, SOLUTION INTRAVENOUS; SUBCUTANEOUS at 06:21

## 2018-08-08 RX ADMIN — ERTAPENEM SODIUM SCH MLS/HR: 1 INJECTION, POWDER, LYOPHILIZED, FOR SOLUTION INTRAMUSCULAR; INTRAVENOUS at 14:56

## 2018-08-08 RX ADMIN — HEPARIN SODIUM SCH UNITS: 5000 INJECTION INTRAVENOUS; SUBCUTANEOUS at 09:17

## 2018-08-08 RX ADMIN — POLYETHYLENE GLYCOL 3350 SCH GM: 17 POWDER, FOR SOLUTION ORAL at 22:02

## 2018-08-08 RX ADMIN — INSULIN ASPART SCH UNITS: 100 INJECTION, SOLUTION INTRAVENOUS; SUBCUTANEOUS at 12:17

## 2018-08-08 NOTE — GENERAL PROGRESS NOTE
Assessment/Plan


Assessment/Plan


# Leukocytosis - Sepsis with elevated temperature of 103 degrees Fahrenheit.  

Had uti v bacteremia (CoNS) on abx, had improved


--> Pt on antibiotics, has received a dose of Zosyn and currently is on 

vancomycin q.12 h. now on cefepime


--> Appreciate Infectious Disease recs


--> 2D echo per ID performed on 8/1: No discrete vegetations seen, however SBE 

may not be excluded by transthoracic 2-D echo.


--> 8/7: WBC of 9.7, wnl 


# Anemia of chronic disease. No hemolysis, peripheral smear reviewed, ferritin 

was elevated.


--> Continue to closely monitor


--> Hgb goal >7


--> 8/7: Hgb 12.9


# Acute kidney injury.  


--> Currently on IV fluids, IV bolus given to see if the patient responds along 

with IV pressor as needed.  


--> Closely monitor with Nephrology team.


# Septic shock, use antibiotic per ID services.


--> potential uti, on abx and bacteremia


# Respiratory failure, status post tracheostomy, on mechanical ventilation per 

Dr. Pena


# Hypercalcemia. Monitor PTH and calcium level.


--> 8/7: Elevated at 10.0, currently borderline





The time the note was entered does not necessarily correspond to the time the 

patient was seen.





Subjective


Allergies:  


Coded Allergies:  


     AZTREONAM (Verified  Allergy, Unknown, 7/23/18)


All Systems:  reviewed and negative except above


Subjective


Pt remains on vent. No acute events. tongue protruding. H/H stable. Vitals are 

stable.





Objective





Last 24 Hour Vital Signs








  Date Time  Temp Pulse Resp B/P (MAP) Pulse Ox O2 Delivery O2 Flow Rate FiO2


 


8/8/18 05:19  81 17     30


 


8/8/18 04:00      Mechanical Ventilator  


 


8/8/18 04:00        30


 


8/8/18 04:00 97.5 77 17 143/70 (94) 99   





 97.5       


 


8/8/18 04:00  80      


 


8/8/18 02:51  79 17     30


 


8/8/18 01:15  86 18     30


 


8/8/18 00:00 97.9 75 16 125/72 (89) 99   





 97.9       


 


8/8/18 00:00      Mechanical Ventilator  


 


8/7/18 22:42  77 16     30


 


8/7/18 21:01  66 17     30


 


8/7/18 20:00        30


 


8/7/18 20:00 97.7 70 18 130/69 (89) 100   





 97.7       


 


8/7/18 20:00  78      


 


8/7/18 20:00      Mechanical Ventilator  


 


8/7/18 19:10  68 16     30


 


8/7/18 17:07  80 17     30


 


8/7/18 16:00      Mechanical Ventilator  


 


8/7/18 16:00        30


 


8/7/18 16:00 98.1 73 18 148/77 (100) 100   





 98.1       


 


8/7/18 16:00  73      


 


8/7/18 15:20  72 16     30


 


8/7/18 13:15  62 16     30


 


8/7/18 12:00  66      


 


8/7/18 12:00      Mechanical Ventilator  


 


8/7/18 12:00        30


 


8/7/18 12:00 98.1 60 16 134/81 (98) 100   





 98.1       


 


8/7/18 10:39  62 17     30


 


8/7/18 09:08  59 16     30


 


8/7/18 08:00        30


 


8/7/18 08:00      Mechanical Ventilator  


 


8/7/18 08:00 98.5 65 18 153/90 (111) 100   





 98.5       


 


8/7/18 07:32  63      


 


8/7/18 06:39  60 17     30

















Intake and Output  


 


 8/7/18 8/8/18





 19:00 07:00


 


Intake Total 345 ml 445 ml


 


Output Total 525 ml 925 ml


 


Balance -180 ml -480 ml


 


  


 


Tube Feeding 245 ml 385 ml


 


Other 100 ml 60 ml


 


Output Urine Total 525 ml 500 ml


 


Stool Total  425 ml








Laboratory Tests


8/8/18 03:45: 


White Blood Count 11.8H, Red Blood Count 4.68L, Hemoglobin 12.7L, Hematocrit 

40.2L, Mean Corpuscular Volume 86, Mean Corpuscular Hemoglobin 27.1, Mean 

Corpuscular Hemoglobin Concent 31.5L, Red Cell Distribution Width 15.8H, 

Platelet Count 327, Mean Platelet Volume 7.8, Neutrophils (%) (Auto) 74.3, 

Lymphocytes (%) (Auto) 16.4L, Monocytes (%) (Auto) 6.3, Eosinophils (%) (Auto) 

2.1, Basophils (%) (Auto) 0.9, Sodium Level 134L, Potassium Level 3.9, Chloride 

Level 100, Carbon Dioxide Level 22, Anion Gap 12, Blood Urea Nitrogen 12, 

Creatinine 1.0, Estimat Glomerular Filtration Rate > 60, Glucose Level 115H, 

Calcium Level 10.2H, Phosphorus Level 2.5, Magnesium Level 2.2, Total Bilirubin 

0.7, Aspartate Amino Transf (AST/SGOT) 30, Alanine Aminotransferase (ALT/SGPT) 

54, Alkaline Phosphatase 141H, Total Protein 9.3H, Albumin 3.8, Globulin 5.5, 

Albumin/Globulin Ratio 0.7L, Vancomycin Level Trough 15.9H


Height (Feet):  6


Weight (Pounds):  204


General Appearance:  no apparent distress


EENT:  TMs normal


Neck:  supple


Cardiovascular:  regular rhythm


Respiratory/Chest:  lungs clear


Abdomen:  abnormal bowel sounds


Extremities:  non-tender


Edema:  1+ Leg (L), 1+ Leg (R)


Edema:  trace edema


Neurologic:  alert


Skin:  warm/dry











Tejas Gerber MD Aug 8, 2018 06:24

## 2018-08-08 NOTE — INFECTIOUS DISEASES PROG NOTE
Assessment/Plan


Assessment/Plan


Sepsis, resolving- 2ry to UTI and Bacteremia Blood cultures postive 4/4 bottles 

with [facility]PC Unclear source will need to R/O Endocarditis  Possible PNA initially 


 -u/a wbc 40-60, nit neg, leuk +2; ucx >100k E. aerogenes (S cefepime, cipro/

levo; R Zosyn)


 -BCX 4/4 E. aerogenes, prob Amp C (S cefepime, cipro/levo; R Zosyn), P. 

mirabilis ESBL (S Zosyn, Ertapenem); repeta 7/26 1/4 CoNS (suspect contaminant)

, 7/28 Staph f/u final results if CoNS may need TTE and IE work up.


  -CXR 7/27: Increased left pleural effusion, over 3 days. Overall decreased 

interstitial congestion. Right infrahilar atelectasis


 -CXR: Cardiomegaly. Mild interstitial congestion. Suspect small bilateral 

pleural effusions


 -CT abd/p: Right lower quadrant double barrel colostomy, as described. Small 

peristomal hernia contains bowel loops without evidence of obstruction or 

strangulation. Left renal staghorn calculus. Bilateral intrarenal calyceal 

calculi. Borderline hydronephrosis on the right without evidence of downstream 

obstructive lesion. May indicate mild ureteral pelvic junction obstruction. 

Somewhat atrophic left kidney. Inspissated contrast ball within the distal 

sigmoid colon. Gastrostomy in good position. Nonspecific bilateral perinephric 

fat stranding, could indicate pyelonephritis or could be chronic. Guzmán 

catheter in place. Apparent bladder wall thickening, possibly an artifact of 

under distention but cystitis is not excludable, particularly in view of mild 

perivesical fat stranding. Bilateral pulmonary parenchymal atelectasis and 

consolidation


  -Blood Cx from PICC CoNS 2/2 bottles Peripheral Neg- (May be contaminant but 

will repeat blood Cx given new leukocytosis if positive will need ECHO.


  - Blood Cx 7/30/18 - [facility] - Will need TTE for IE work up and the PICC removed.


 


PICC line infection/colonization - TTE negative. Blood cultures only positive 

from PICC. Not positive from Peripheral 


- Will repeat blood cultures x 1 to confirm clearance after PICC replaced. Los 

suspicion for IE.


- PICC replace 8/1/18





Fever/Leukocytosis; Resolved - i recurs Re-evaluate lines, Diarrhea? C. dif? 


BRAYDON, improving


Lactic acidosis, resolved





chronic resp failure trach/vent dependant


BPH


GERD


 HTN


DM2


seizure disorder


colostomy


 s/p GT 


constipation





VRE and MRSA colonized


 


Plan:


- Continue empiric IV Vancomycin #11/18 for bacteremia/line infection - Abx end 

date 8/15/18 if STACI negative


- Recommend Transesophageal ECHO to r/o IE





-Continue Ertapenem #12/14 for Amp-C Enterobacter and ESBL P.mirabilis 

bacteremia ; will treat for 14 days - End date 8/9/18


    -7/27 SP Zosyn #4





- Monitor CBC/CMP, temperatures


- F/U STACI


- Pulmonary care








Thank you for this consultation. Will continue to follow along with you.





Subjective


Allergies:  


Coded Allergies:  


     AZTREONAM (Verified  Allergy, Unknown, 7/23/18)


Subjective


No acute changes


On Vent. 30% O2 sating well


Afebrile


STACI pending





Objective


Vital Signs





Last 24 Hour Vital Signs








  Date Time  Temp Pulse Resp B/P (MAP) Pulse Ox O2 Delivery O2 Flow Rate FiO2


 


8/8/18 08:56  78 16     30


 


8/8/18 08:00        30


 


8/8/18 08:00      Mechanical Ventilator  


 


8/8/18 08:00  70      


 


8/8/18 08:00 98.2 74 17 152/77 (102) 100   





 98.2       


 


8/8/18 07:16  75 18     30


 


8/8/18 05:19  81 17     30


 


8/8/18 04:00      Mechanical Ventilator  


 


8/8/18 04:00        30


 


8/8/18 04:00 97.5 77 17 143/70 (94) 99   





 97.5       


 


8/8/18 04:00  80      


 


8/8/18 02:51  79 17     30


 


8/8/18 01:15  86 18     30


 


8/8/18 00:00 97.9 75 16 125/72 (89) 99   





 97.9       


 


8/8/18 00:00      Mechanical Ventilator  


 


8/7/18 22:42  77 16     30


 


8/7/18 21:01  66 17     30


 


8/7/18 20:00        30


 


8/7/18 20:00 97.7 70 18 130/69 (89) 100   





 97.7       


 


8/7/18 20:00  78      


 


8/7/18 20:00      Mechanical Ventilator  


 


8/7/18 19:10  68 16     30


 


8/7/18 17:07  80 17     30


 


8/7/18 16:00      Mechanical Ventilator  


 


8/7/18 16:00        30


 


8/7/18 16:00 98.1 73 18 148/77 (100) 100   





 98.1       


 


8/7/18 16:00  73      


 


8/7/18 15:20  72 16     30


 


8/7/18 13:15  62 16     30


 


8/7/18 12:00  66      


 


8/7/18 12:00      Mechanical Ventilator  


 


8/7/18 12:00        30


 


8/7/18 12:00 98.1 60 16 134/81 (98) 100   





 98.1       








Height (Feet):  6


Weight (Pounds):  205


Objective


GENERAL:  No overt distress. On vent 30% O2, Eyes and mouth


HEENT:  NCAT, DMM, Tracheostomy


PULM: Mild crackles in bases B/L, No wheezing


CARDIOVASCULAR:  RRR, S1 and S2. No rubs or gallops.


ABDOMEN:  Obese and nondistended. +BS, The G-tube and stoma noted.


EXTREMITIES:  No edema.  1+ pedal pulses.





Laboratory Tests








Test


  8/8/18


03:45


 


White Blood Count


  11.8 K/UL


(4.8-10.8)  H


 


Red Blood Count


  4.68 M/UL


(4.70-6.10)  L


 


Hemoglobin


  12.7 G/DL


(14.2-18.0)  L


 


Hematocrit


  40.2 %


(42.0-52.0)  L


 


Mean Corpuscular Volume 86 FL (80-99)  


 


Mean Corpuscular Hemoglobin


  27.1 PG


(27.0-31.0)


 


Mean Corpuscular Hemoglobin


Concent 31.5 G/DL


(32.0-36.0)  L


 


Red Cell Distribution Width


  15.8 %


(11.6-14.8)  H


 


Platelet Count


  327 K/UL


(150-450)


 


Mean Platelet Volume


  7.8 FL


(6.5-10.1)


 


Neutrophils (%) (Auto)


  74.3 %


(45.0-75.0)


 


Lymphocytes (%) (Auto)


  16.4 %


(20.0-45.0)  L


 


Monocytes (%) (Auto)


  6.3 %


(1.0-10.0)


 


Eosinophils (%) (Auto)


  2.1 %


(0.0-3.0)


 


Basophils (%) (Auto)


  0.9 %


(0.0-2.0)


 


Sodium Level


  134 MMOL/L


(136-145)  L


 


Potassium Level


  3.9 MMOL/L


(3.5-5.1)


 


Chloride Level


  100 MMOL/L


()


 


Carbon Dioxide Level


  22 MMOL/L


(21-32)


 


Anion Gap


  12 mmol/L


(5-15)


 


Blood Urea Nitrogen


  12 mg/dL


(7-18)


 


Creatinine


  1.0 MG/DL


(0.55-1.30)


 


Estimat Glomerular Filtration


Rate > 60 mL/min


(>60)


 


Glucose Level


  115 MG/DL


()  H


 


Calcium Level


  10.2 MG/DL


(8.5-10.1)  H


 


Phosphorus Level


  2.5 MG/DL


(2.5-4.9)


 


Magnesium Level


  2.2 MG/DL


(1.8-2.4)


 


Total Bilirubin


  0.7 MG/DL


(0.2-1.0)


 


Aspartate Amino Transf


(AST/SGOT) 30 U/L (15-37)


 


 


Alanine Aminotransferase


(ALT/SGPT) 54 U/L (12-78)


 


 


Alkaline Phosphatase


  141 U/L


()  H


 


Total Protein


  9.3 G/DL


(6.4-8.2)  H


 


Albumin


  3.8 G/DL


(3.4-5.0)


 


Globulin 5.5 g/dL  


 


Albumin/Globulin Ratio


  0.7 (1.0-2.7)


L


 


Vancomycin Level Trough


  15.9 ug/mL


(5.0-12.0)  H











Current Medications








 Medications


  (Trade)  Dose


 Ordered  Sig/Jan


 Route


 PRN Reason  Start Time


 Stop Time Status Last Admin


Dose Admin


 


 Acetaminophen


  (Tylenol)  650 mg  Q4H  PRN


 ORAL


 FEVER  7/26/18 14:00


 8/23/18 09:59  8/6/18 21:04


 


 


 Baclofen


  (Lioresal)  10 mg  EVERY 8  HOURS


 GT


   7/30/18 14:00


 8/23/18 12:59  8/8/18 05:37


 


 


 Chlorhexidine


 Gluconate


  (Elaine-Hex 2%)  1 applic  DAILY@2000


 TOPIC


   7/26/18 20:00


 8/23/18 19:59  8/7/18 20:04


 


 


 Dextrose


  (Dextrose 50%)  25 ml  STAT  PRN


 IV


 Hypoglycemia  7/27/18 08:45


 8/24/18 08:44   


 


 


 Dextrose


  (Dextrose 50%)  50 ml  STAT  PRN


 IV


 Hypoglycemia  7/27/18 08:45


 8/24/18 08:44   


 


 


 Ertapenem 1 gm/


 Sodium Chloride  55 ml @ 


 110 mls/hr  Q24H


 IVPB


   7/27/18 15:00


 8/10/18 14:59  8/7/18 14:34


 


 


 Heparin Sodium


  (Porcine)


  (Heparin 5000


 units/ml)  5,000 units  EVERY 12  HOURS


 SUBQ


   7/26/18 21:00


 8/23/18 20:59  8/8/18 09:17


 


 


 Insulin Aspart


  (NovoLOG)    Q6HR


 SUBQ


   7/26/18 18:00


 8/24/18 11:29  8/8/18 06:21


 


 


 Lansoprazole


  (Prevacid)  30 mg  DAILY


 GT


   7/27/18 09:00


 8/25/18 08:59  8/8/18 09:16


 


 


 Levetiracetam


  (Keppra)  1,000 mg  Q8HR


 GT


   7/26/18 14:00


 8/23/18 12:59  8/8/18 05:37


 


 


 Ondansetron HCl


  (Zofran)  4 mg  Q6H  PRN


 IVP


 Nausea & Vomiting  7/26/18 16:00


 8/23/18 09:59   


 


 


 Polyethylene


 Glycol


  (Miralax)  17 gm  BEDTIME


 GT


   7/30/18 21:00


 8/24/18 20:59  8/7/18 21:10


 


 


 Vancomycin HCl


  (Vanco rx to


 dose)  1 ea  DAILY  PRN


 MISC


 PER RX PROTOCOL  7/28/18 13:15


 8/27/18 13:14   


 


 


 Vancomycin HCl/


 Dextrose  250 ml @ 


 125 mls/hr  Q18H


 IVPB


   8/8/18 21:00


 8/11/18 23:59   


 

















Robert Williamson M.D. Aug 8, 2018 11:29

## 2018-08-08 NOTE — CARDIOLOGY PROGRESS NOTE
Assessment/Plan


Status:  stable


Assessment/Plan


Assessment:


(1) Acute on chronic respiratory failure


(2) Acute on chronic renal insufficiency


(3) Severe sepsis


(4) Vegetative state


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


(6) Diabetes mellitus





Plan:


Continue antibiotics


Echocardiogram reviewed- no endocarditis


STACI for next week


Patient afebrile, normal WBC otherwise


Continue trach care and tube feeds


Continue keppra for seizures





Subjective


Cardiovascular:  Reports: no symptoms


Respiratory:  Reports: no symptoms


Gastrointestinal/Abdominal:  Reports: no symptoms


Genitourinary:  Reports: no symptoms


Subjective


Afebrile, vitals stable, Telemetry shows sinus rhythm. No acute events


Ventilator settings: portex 7, AC 16, TV: 600, FiO2: 30%, PEEP: 5. GT is in 

place running glucerna 1.2 @ 65 ml/hr. No residual present. Colostomy bag in 

place.


STACI was supposed to be done today but STACI scheduled for 8/13/18 at 1100, no 

other opening downstairs.





Objective





Last 24 Hour Vital Signs








  Date Time  Temp Pulse Resp B/P (MAP) Pulse Ox O2 Delivery O2 Flow Rate FiO2


 


8/8/18 08:56  78 16     30


 


8/8/18 08:00        30


 


8/8/18 08:00      Mechanical Ventilator  


 


8/8/18 08:00  70      


 


8/8/18 08:00 98.2 74 17 152/77 (102) 100   





 98.2       


 


8/8/18 07:16  75 18     30


 


8/8/18 05:19  81 17     30


 


8/8/18 04:00      Mechanical Ventilator  


 


8/8/18 04:00        30


 


8/8/18 04:00 97.5 77 17 143/70 (94) 99   





 97.5       


 


8/8/18 04:00  80      


 


8/8/18 02:51  79 17     30


 


8/8/18 01:15  86 18     30


 


8/8/18 00:00 97.9 75 16 125/72 (89) 99   





 97.9       


 


8/8/18 00:00      Mechanical Ventilator  


 


8/7/18 22:42  77 16     30


 


8/7/18 21:01  66 17     30


 


8/7/18 20:00        30


 


8/7/18 20:00 97.7 70 18 130/69 (89) 100   





 97.7       


 


8/7/18 20:00  78      


 


8/7/18 20:00      Mechanical Ventilator  


 


8/7/18 19:10  68 16     30


 


8/7/18 17:07  80 17     30


 


8/7/18 16:00      Mechanical Ventilator  


 


8/7/18 16:00        30


 


8/7/18 16:00 98.1 73 18 148/77 (100) 100   





 98.1       


 


8/7/18 16:00  73      


 


8/7/18 15:20  72 16     30


 


8/7/18 13:15  62 16     30


 


8/7/18 12:00  66      


 


8/7/18 12:00      Mechanical Ventilator  


 


8/7/18 12:00        30


 


8/7/18 12:00 98.1 60 16 134/81 (98) 100   





 98.1       








General Appearance:  no apparent distress, on vent


EENT:  PERRL/EOMI, normal ENT inspection


Neck:  non-tender, normal alignment, supple, normal inspection, no JVD


Rhythm:  NSR


Cardiovascular:  normal peripheral pulses, normal rate, regular rhythm


Respiratory/Chest:  chest wall non-tender, lungs clear


Abdomen:  normal bowel sounds, non tender


Extremities:  normal range of motion, non-tender


Neurologic:  CNs II-XII grossly normal, no motor/sensory deficits











Intake and Output  


 


 8/7/18 8/8/18





 19:00 07:00


 


Intake Total 345 ml 490 ml


 


Output Total 525 ml 925 ml


 


Balance -180 ml -435 ml


 


  


 


Tube Feeding 245 ml 430 ml


 


Other 100 ml 60 ml


 


Output Urine Total 525 ml 500 ml


 


Stool Total  425 ml











Laboratory Tests








Test


  8/8/18


03:45


 


White Blood Count


  11.8 K/UL


(4.8-10.8)  H


 


Red Blood Count


  4.68 M/UL


(4.70-6.10)  L


 


Hemoglobin


  12.7 G/DL


(14.2-18.0)  L


 


Hematocrit


  40.2 %


(42.0-52.0)  L


 


Mean Corpuscular Volume 86 FL (80-99)  


 


Mean Corpuscular Hemoglobin


  27.1 PG


(27.0-31.0)


 


Mean Corpuscular Hemoglobin


Concent 31.5 G/DL


(32.0-36.0)  L


 


Red Cell Distribution Width


  15.8 %


(11.6-14.8)  H


 


Platelet Count


  327 K/UL


(150-450)


 


Mean Platelet Volume


  7.8 FL


(6.5-10.1)


 


Neutrophils (%) (Auto)


  74.3 %


(45.0-75.0)


 


Lymphocytes (%) (Auto)


  16.4 %


(20.0-45.0)  L


 


Monocytes (%) (Auto)


  6.3 %


(1.0-10.0)


 


Eosinophils (%) (Auto)


  2.1 %


(0.0-3.0)


 


Basophils (%) (Auto)


  0.9 %


(0.0-2.0)


 


Sodium Level


  134 MMOL/L


(136-145)  L


 


Potassium Level


  3.9 MMOL/L


(3.5-5.1)


 


Chloride Level


  100 MMOL/L


()


 


Carbon Dioxide Level


  22 MMOL/L


(21-32)


 


Anion Gap


  12 mmol/L


(5-15)


 


Blood Urea Nitrogen


  12 mg/dL


(7-18)


 


Creatinine


  1.0 MG/DL


(0.55-1.30)


 


Estimat Glomerular Filtration


Rate > 60 mL/min


(>60)


 


Glucose Level


  115 MG/DL


()  H


 


Calcium Level


  10.2 MG/DL


(8.5-10.1)  H


 


Phosphorus Level


  2.5 MG/DL


(2.5-4.9)


 


Magnesium Level


  2.2 MG/DL


(1.8-2.4)


 


Total Bilirubin


  0.7 MG/DL


(0.2-1.0)


 


Aspartate Amino Transf


(AST/SGOT) 30 U/L (15-37)


 


 


Alanine Aminotransferase


(ALT/SGPT) 54 U/L (12-78)


 


 


Alkaline Phosphatase


  141 U/L


()  H


 


Total Protein


  9.3 G/DL


(6.4-8.2)  H


 


Albumin


  3.8 G/DL


(3.4-5.0)


 


Globulin 5.5 g/dL  


 


Albumin/Globulin Ratio


  0.7 (1.0-2.7)


L


 


Vancomycin Level Trough


  15.9 ug/mL


(5.0-12.0)  H

















Robert Hernandez M.D. Aug 8, 2018 10:49

## 2018-08-08 NOTE — NEPHROLOGY PROGRESS NOTE
Assessment/Plan


Assessment/Plan


1. BRAYDON- resolved


2. Sepsis- on Abx. Awaiting DC and STACI next week


3. Chronic Resp FL- trached on vent  


       - Per PULM mgmt


4. Hyponatremia - Na stable at 134


5. SZ- Keppra


    -sodium 133-135


6. Hypophos- corrected





Subjective


Date patient seen:  Aug 8, 2018


Time patient seen:  08:42


ROS Limited/Unobtainable:  Yes


Allergies:  


Coded Allergies:  


     AZTREONAM (Verified  Allergy, Unknown, 7/23/18)


Subjective


Patient trached/vent. Awaiting DC





Objective





Last 24 Hour Vital Signs








  Date Time  Temp Pulse Resp B/P (MAP) Pulse Ox O2 Delivery O2 Flow Rate FiO2


 


8/8/18 08:00        30


 


8/8/18 08:00      Mechanical Ventilator  


 


8/8/18 08:00 98.2 74 17 152/77 (102) 100   





 98.2       


 


8/8/18 07:16  75 18     30


 


8/8/18 05:19  81 17     30


 


8/8/18 04:00      Mechanical Ventilator  


 


8/8/18 04:00        30


 


8/8/18 04:00 97.5 77 17 143/70 (94) 99   





 97.5       


 


8/8/18 04:00  80      


 


8/8/18 02:51  79 17     30


 


8/8/18 01:15  86 18     30


 


8/8/18 00:00 97.9 75 16 125/72 (89) 99   





 97.9       


 


8/8/18 00:00      Mechanical Ventilator  


 


8/7/18 22:42  77 16     30


 


8/7/18 21:01  66 17     30


 


8/7/18 20:00        30


 


8/7/18 20:00 97.7 70 18 130/69 (89) 100   





 97.7       


 


8/7/18 20:00  78      


 


8/7/18 20:00      Mechanical Ventilator  


 


8/7/18 19:10  68 16     30


 


8/7/18 17:07  80 17     30


 


8/7/18 16:00      Mechanical Ventilator  


 


8/7/18 16:00        30


 


8/7/18 16:00 98.1 73 18 148/77 (100) 100   





 98.1       


 


8/7/18 16:00  73      


 


8/7/18 15:20  72 16     30


 


8/7/18 13:15  62 16     30


 


8/7/18 12:00  66      


 


8/7/18 12:00      Mechanical Ventilator  


 


8/7/18 12:00        30


 


8/7/18 12:00 98.1 60 16 134/81 (98) 100   





 98.1       


 


8/7/18 10:39  62 17     30


 


8/7/18 09:08  59 16     30

















Intake and Output  


 


 8/7/18 8/8/18





 19:00 07:00


 


Intake Total 345 ml 490 ml


 


Output Total 525 ml 925 ml


 


Balance -180 ml -435 ml


 


  


 


Tube Feeding 245 ml 430 ml


 


Other 100 ml 60 ml


 


Output Urine Total 525 ml 500 ml


 


Stool Total  425 ml








Laboratory Tests


8/8/18 03:45: 


White Blood Count 11.8H, Red Blood Count 4.68L, Hemoglobin 12.7L, Hematocrit 

40.2L, Mean Corpuscular Volume 86, Mean Corpuscular Hemoglobin 27.1, Mean 

Corpuscular Hemoglobin Concent 31.5L, Red Cell Distribution Width 15.8H, 

Platelet Count 327, Mean Platelet Volume 7.8, Neutrophils (%) (Auto) 74.3, 

Lymphocytes (%) (Auto) 16.4L, Monocytes (%) (Auto) 6.3, Eosinophils (%) (Auto) 

2.1, Basophils (%) (Auto) 0.9, Sodium Level 134L, Potassium Level 3.9, Chloride 

Level 100, Carbon Dioxide Level 22, Anion Gap 12, Blood Urea Nitrogen 12, 

Creatinine 1.0, Estimat Glomerular Filtration Rate > 60, Glucose Level 115H, 

Calcium Level 10.2H, Phosphorus Level 2.5, Magnesium Level 2.2, Total Bilirubin 

0.7, Aspartate Amino Transf (AST/SGOT) 30, Alanine Aminotransferase (ALT/SGPT) 

54, Alkaline Phosphatase 141H, Total Protein 9.3H, Albumin 3.8, Globulin 5.5, 

Albumin/Globulin Ratio 0.7L, Vancomycin Level Trough 15.9H


Height (Feet):  6


Weight (Pounds):  205


General Appearance:  WD/WN, no apparent distress


EENT:  PERRL/EOMI


Neck:  non-tender, normal alignment


Cardiovascular:  normal peripheral pulses, normal rate


Respiratory/Chest:  lungs clear, normal breath sounds


Edema:  no edema noted Arm (L), no edema noted Arm (R), no edema noted Leg (L), 

no edema noted Leg (R), no edema noted Pedal (L), no edema noted Pedal (R), no 

edema noted Generalized











De Kriss,Jewel M.D. Aug 8, 2018 08:44

## 2018-08-08 NOTE — GI PROGRESS NOTE
Assessment/Plan


Problems:  


(1) Parastomal hernia


ICD Codes:  K43.5 - Parastomal hernia without obstruction or gangrene


SNOMED:  231551025


Qualifiers:  


   Qualified Codes:  K43.5 - Parastomal hernia without obstruction or gangrene


(2) Stoma malfunction


SNOMED:  656629845


(3) Colostomy in place


ICD Codes:  Z93.3 - Colostomy status


SNOMED:  331273343, 998024270


(4) Vegetative state


ICD Codes:  R40.3 - Persistent vegetative state


SNOMED:  61286167


(5) Diabetes mellitus


ICD Codes:  E11.9 - Type 2 diabetes mellitus without complications


SNOMED:  75667798


(6) Acute on chronic respiratory failure


ICD Codes:  J96.20 - Acute and chronic respiratory failure, unspecified whether 

with hypoxia or hypercapnia


SNOMED:  08981629


(7) Severe sepsis


ICD Codes:  A41.9 - Sepsis, unspecified organism; R65.20 - Severe sepsis 

without septic shock


SNOMED:  24596372


Status:  stable


Status Narrative


Discussed with Dr. Pickard.


Assessment/Plan


prolapse stoma assessed >> no s/sx of infection.  no obstruction. 


anemia work up reviewed >> iron deficiency


G tube dependent


hepatitis panel negative


OB negative





supportive care


monitor H&H, prn transfusions


venofer


ppi


GTFs per RD, adv to goal


GT site care daily/prn


abx


bowel regime


fu labs





The patient was seen and examined at bedside and all new and available data was 

reviewed in the patients chart. I agree with the above findings, impression 

and plan.  (Patient seen earlier today. Signature stamp does not reflect 

patient encounter time.). - Miles Pickard MD





Subjective


Subjective


limited





Objective





Last 24 Hour Vital Signs








  Date Time  Temp Pulse Resp B/P (MAP) Pulse Ox O2 Delivery O2 Flow Rate FiO2


 


8/8/18 08:56  78 16     30


 


8/8/18 08:00        30


 


8/8/18 08:00      Mechanical Ventilator  


 


8/8/18 08:00  70      


 


8/8/18 08:00 98.2 74 17 152/77 (102) 100   





 98.2       


 


8/8/18 07:16  75 18     30


 


8/8/18 05:19  81 17     30


 


8/8/18 04:00      Mechanical Ventilator  


 


8/8/18 04:00        30


 


8/8/18 04:00 97.5 77 17 143/70 (94) 99   





 97.5       


 


8/8/18 04:00  80      


 


8/8/18 02:51  79 17     30


 


8/8/18 01:15  86 18     30


 


8/8/18 00:00 97.9 75 16 125/72 (89) 99   





 97.9       


 


8/8/18 00:00      Mechanical Ventilator  


 


8/7/18 22:42  77 16     30


 


8/7/18 21:01  66 17     30


 


8/7/18 20:00        30


 


8/7/18 20:00 97.7 70 18 130/69 (89) 100   





 97.7       


 


8/7/18 20:00  78      


 


8/7/18 20:00      Mechanical Ventilator  


 


8/7/18 19:10  68 16     30


 


8/7/18 17:07  80 17     30


 


8/7/18 16:00      Mechanical Ventilator  


 


8/7/18 16:00        30


 


8/7/18 16:00 98.1 73 18 148/77 (100) 100   





 98.1       


 


8/7/18 16:00  73      


 


8/7/18 15:20  72 16     30


 


8/7/18 13:15  62 16     30


 


8/7/18 12:00  66      


 


8/7/18 12:00      Mechanical Ventilator  


 


8/7/18 12:00        30


 


8/7/18 12:00 98.1 60 16 134/81 (98) 100   





 98.1       

















Intake and Output  


 


 8/7/18 8/8/18





 19:00 07:00


 


Intake Total 345 ml 490 ml


 


Output Total 525 ml 925 ml


 


Balance -180 ml -435 ml


 


  


 


Tube Feeding 245 ml 430 ml


 


Other 100 ml 60 ml


 


Output Urine Total 525 ml 500 ml


 


Stool Total  425 ml











Laboratory Tests








Test


  8/8/18


03:45


 


White Blood Count


  11.8 K/UL


(4.8-10.8)  H


 


Red Blood Count


  4.68 M/UL


(4.70-6.10)  L


 


Hemoglobin


  12.7 G/DL


(14.2-18.0)  L


 


Hematocrit


  40.2 %


(42.0-52.0)  L


 


Mean Corpuscular Volume 86 FL (80-99)  


 


Mean Corpuscular Hemoglobin


  27.1 PG


(27.0-31.0)


 


Mean Corpuscular Hemoglobin


Concent 31.5 G/DL


(32.0-36.0)  L


 


Red Cell Distribution Width


  15.8 %


(11.6-14.8)  H


 


Platelet Count


  327 K/UL


(150-450)


 


Mean Platelet Volume


  7.8 FL


(6.5-10.1)


 


Neutrophils (%) (Auto)


  74.3 %


(45.0-75.0)


 


Lymphocytes (%) (Auto)


  16.4 %


(20.0-45.0)  L


 


Monocytes (%) (Auto)


  6.3 %


(1.0-10.0)


 


Eosinophils (%) (Auto)


  2.1 %


(0.0-3.0)


 


Basophils (%) (Auto)


  0.9 %


(0.0-2.0)


 


Sodium Level


  134 MMOL/L


(136-145)  L


 


Potassium Level


  3.9 MMOL/L


(3.5-5.1)


 


Chloride Level


  100 MMOL/L


()


 


Carbon Dioxide Level


  22 MMOL/L


(21-32)


 


Anion Gap


  12 mmol/L


(5-15)


 


Blood Urea Nitrogen


  12 mg/dL


(7-18)


 


Creatinine


  1.0 MG/DL


(0.55-1.30)


 


Estimat Glomerular Filtration


Rate > 60 mL/min


(>60)


 


Glucose Level


  115 MG/DL


()  H


 


Calcium Level


  10.2 MG/DL


(8.5-10.1)  H


 


Phosphorus Level


  2.5 MG/DL


(2.5-4.9)


 


Magnesium Level


  2.2 MG/DL


(1.8-2.4)


 


Total Bilirubin


  0.7 MG/DL


(0.2-1.0)


 


Aspartate Amino Transf


(AST/SGOT) 30 U/L (15-37)


 


 


Alanine Aminotransferase


(ALT/SGPT) 54 U/L (12-78)


 


 


Alkaline Phosphatase


  141 U/L


()  H


 


Total Protein


  9.3 G/DL


(6.4-8.2)  H


 


Albumin


  3.8 G/DL


(3.4-5.0)


 


Globulin 5.5 g/dL  


 


Albumin/Globulin Ratio


  0.7 (1.0-2.7)


L


 


Vancomycin Level Trough


  15.9 ug/mL


(5.0-12.0)  H








Height (Feet):  6


Weight (Pounds):  205


General Appearance:  no apparent distress


Cardiovascular:  normal rate


Respiratory/Chest:  normal breath sounds, no respiratory distress, other - 

trach to vent


Abdominal Exam:  normal bowel sounds, non tender, soft, GT site


Extremities:  non-tender











Remy Lyons NP Aug 8, 2018 10:42

## 2018-08-08 NOTE — PROGRESS NOTE
DATE:  08/07/2018



NOTE:  POOR AUDIO



SUBJECTIVE:  The patient is awake, alert, afebrile, and hemodynamically

stable.  The patient has not been seen during all his admissions.  Medical

record has been reviewed in regards _____ laboratory test and imaging

studies.  The patient was scheduled to undergo transesophageal echo, but

_____ did not take place.



PHYSICAL EXAMINATION:

VITAL SIGNS:  Blood pressure 148/77, pulse 73, respirations 18, and

temperature of 98.1.

HEENT:  Eyes were normal.  Mucous membrane was moist and intact.

NECK:  Supple.  No JVD without lymph nodes.  Tracheostomy site is clean.

There is hyperventilation.

LUNGS:  Clear without rhonchi, rales, or wheezing.  Secretions are small,

thin, and whitish.

HEART:  Normal sounds with regular beats.  There is no tachycardia at

rest.

ABDOMEN:  Soft and nontender with normal bowel sounds.  Gastrostomy site is

clean.

EXTREMITIES:  Warm without cyanosis, clubbing, or edema.



LABORATORY AND DIAGNOSTIC DATA:  Hemoglobin is 12.9, hematocrit was 40.5

with MCV of 86, WBC of 9.7, and platelets are 284,000.  His BUN and

creatinine are 13 and 0.9, respectively.  His sodium is 134, potassium

4.2, chloride 101, and CO2 was 23.  His phosphorus is 2.4.  Magnesium is

2.1.  SGOT and SGPT are normal.  His albumin is 3.8 and total protein is

9.4.  Chest x-ray was done on 07/27/2018 _____ atelectasis.  A 2D echo,

which was technically difficult _____ ejection fraction of 55 to 60.



IMPRESSION AND PLAN:  The patient is ready to be discharged _____ facility

from which he came from.  _____ the available bed.  Discharge is pending

tomorrow to a different facility.









  ______________________________________________

  Etienne Bloom M.D.





DR:  NED

D:  08/07/2018 18:09

T:  08/08/2018 17:36

JOB#:  [id number]

CC:

## 2018-08-08 NOTE — PULMONOLGY CRITICAL CARE NOTE
Critical Care - Asmt/Plan


Problems:  


(1) Acute on chronic respiratory failure


(2) Acute on chronic renal insufficiency


(3) Severe sepsis


(4) Vegetative state


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


(6) Diabetes mellitus


Respiratory:  monitor respiratory rate, adjust FIO2, CXR


Cardiac:  continue to monitor HR/BP


Renal:  F/U I&O


Infectious Disease:  check cultures, continue antibiotics


Gastrointestinal:  continue feedings/current rate


Endocrine:  monitor blood sugar, check HgA1C


Neurologic:  PRN Ativan


Affect:  PRN ativan


Prophylaxis:  Protonix


Notes Reviewed:  cardio


Discussed with:  nurses, consultants, 





Critical Care - Objective





Last 24 Hour Vital Signs








  Date Time  Temp Pulse Resp B/P (MAP) Pulse Ox O2 Delivery O2 Flow Rate FiO2


 


8/8/18 08:56  78 16     30


 


8/8/18 08:00        30


 


8/8/18 08:00      Mechanical Ventilator  


 


8/8/18 08:00 98.2 74 17 152/77 (102) 100   





 98.2       


 


8/8/18 07:16  75 18     30


 


8/8/18 05:19  81 17     30


 


8/8/18 04:00      Mechanical Ventilator  


 


8/8/18 04:00        30


 


8/8/18 04:00 97.5 77 17 143/70 (94) 99   





 97.5       


 


8/8/18 04:00  80      


 


8/8/18 02:51  79 17     30


 


8/8/18 01:15  86 18     30


 


8/8/18 00:00 97.9 75 16 125/72 (89) 99   





 97.9       


 


8/8/18 00:00      Mechanical Ventilator  


 


8/7/18 22:42  77 16     30


 


8/7/18 21:01  66 17     30


 


8/7/18 20:00        30


 


8/7/18 20:00 97.7 70 18 130/69 (89) 100   





 97.7       


 


8/7/18 20:00  78      


 


8/7/18 20:00      Mechanical Ventilator  


 


8/7/18 19:10  68 16     30


 


8/7/18 17:07  80 17     30


 


8/7/18 16:00      Mechanical Ventilator  


 


8/7/18 16:00        30


 


8/7/18 16:00 98.1 73 18 148/77 (100) 100   





 98.1       


 


8/7/18 16:00  73      


 


8/7/18 15:20  72 16     30


 


8/7/18 13:15  62 16     30


 


8/7/18 12:00  66      


 


8/7/18 12:00      Mechanical Ventilator  


 


8/7/18 12:00        30


 


8/7/18 12:00 98.1 60 16 134/81 (98) 100   





 98.1       


 


8/7/18 10:39  62 17     30








Status:  somnolent


Condition:  critical


HEENT:  atraumatic


Neck:  full ROM


Lungs:  clear


Heart:  HR/BP stable


Abdomen:  soft, active bowel sounds


Extremities:  no C/C/E, edema


Accucheck:  141





Critical Care - Subjective


ROS Limited/Unobtainable:  No


Condition:  critical


EKG Rhythm:  Sinus Rhythm


FI02:  30


Vent Support Breath Rate:  16


Vent Support Mode:  AC


Vent Tidal Volume:  600


Sputum Amount:  Moderate


PEEP:  5.0


PIP:  29


Tube Feeding Amount:  45


I&O:











Intake and Output  


 


 8/7/18 8/8/18





 19:00 07:00


 


Intake Total 345 ml 490 ml


 


Output Total 525 ml 925 ml


 


Balance -180 ml -435 ml


 


  


 


Tube Feeding 245 ml 430 ml


 


Other 100 ml 60 ml


 


Output Urine Total 525 ml 500 ml


 


Stool Total  425 ml








Labs:





Laboratory Tests








Test


  8/8/18


03:45


 


White Blood Count


  11.8 K/UL


(4.8-10.8)  H


 


Red Blood Count


  4.68 M/UL


(4.70-6.10)  L


 


Hemoglobin


  12.7 G/DL


(14.2-18.0)  L


 


Hematocrit


  40.2 %


(42.0-52.0)  L


 


Mean Corpuscular Volume 86 FL (80-99)  


 


Mean Corpuscular Hemoglobin


  27.1 PG


(27.0-31.0)


 


Mean Corpuscular Hemoglobin


Concent 31.5 G/DL


(32.0-36.0)  L


 


Red Cell Distribution Width


  15.8 %


(11.6-14.8)  H


 


Platelet Count


  327 K/UL


(150-450)


 


Mean Platelet Volume


  7.8 FL


(6.5-10.1)


 


Neutrophils (%) (Auto)


  74.3 %


(45.0-75.0)


 


Lymphocytes (%) (Auto)


  16.4 %


(20.0-45.0)  L


 


Monocytes (%) (Auto)


  6.3 %


(1.0-10.0)


 


Eosinophils (%) (Auto)


  2.1 %


(0.0-3.0)


 


Basophils (%) (Auto)


  0.9 %


(0.0-2.0)


 


Sodium Level


  134 MMOL/L


(136-145)  L


 


Potassium Level


  3.9 MMOL/L


(3.5-5.1)


 


Chloride Level


  100 MMOL/L


()


 


Carbon Dioxide Level


  22 MMOL/L


(21-32)


 


Anion Gap


  12 mmol/L


(5-15)


 


Blood Urea Nitrogen


  12 mg/dL


(7-18)


 


Creatinine


  1.0 MG/DL


(0.55-1.30)


 


Estimat Glomerular Filtration


Rate > 60 mL/min


(>60)


 


Glucose Level


  115 MG/DL


()  H


 


Calcium Level


  10.2 MG/DL


(8.5-10.1)  H


 


Phosphorus Level


  2.5 MG/DL


(2.5-4.9)


 


Magnesium Level


  2.2 MG/DL


(1.8-2.4)


 


Total Bilirubin


  0.7 MG/DL


(0.2-1.0)


 


Aspartate Amino Transf


(AST/SGOT) 30 U/L (15-37)


 


 


Alanine Aminotransferase


(ALT/SGPT) 54 U/L (12-78)


 


 


Alkaline Phosphatase


  141 U/L


()  H


 


Total Protein


  9.3 G/DL


(6.4-8.2)  H


 


Albumin


  3.8 G/DL


(3.4-5.0)


 


Globulin 5.5 g/dL  


 


Albumin/Globulin Ratio


  0.7 (1.0-2.7)


L


 


Vancomycin Level Trough


  15.9 ug/mL


(5.0-12.0)  H

















Yury Pena MD Aug 8, 2018 10:12

## 2018-08-09 VITALS — DIASTOLIC BLOOD PRESSURE: 72 MMHG | SYSTOLIC BLOOD PRESSURE: 132 MMHG

## 2018-08-09 VITALS — DIASTOLIC BLOOD PRESSURE: 76 MMHG | SYSTOLIC BLOOD PRESSURE: 129 MMHG

## 2018-08-09 VITALS — DIASTOLIC BLOOD PRESSURE: 77 MMHG | SYSTOLIC BLOOD PRESSURE: 126 MMHG

## 2018-08-09 VITALS — SYSTOLIC BLOOD PRESSURE: 139 MMHG | DIASTOLIC BLOOD PRESSURE: 79 MMHG

## 2018-08-09 VITALS — DIASTOLIC BLOOD PRESSURE: 71 MMHG | SYSTOLIC BLOOD PRESSURE: 133 MMHG

## 2018-08-09 VITALS — SYSTOLIC BLOOD PRESSURE: 122 MMHG | DIASTOLIC BLOOD PRESSURE: 82 MMHG

## 2018-08-09 LAB
ADD MANUAL DIFF: NO
ALBUMIN SERPL-MCNC: 3.7 G/DL (ref 3.4–5)
ALBUMIN/GLOB SERPL: 0.7 {RATIO} (ref 1–2.7)
ALP SERPL-CCNC: 139 U/L (ref 46–116)
ALT SERPL-CCNC: 51 U/L (ref 12–78)
ANION GAP SERPL CALC-SCNC: 13 MMOL/L (ref 5–15)
AST SERPL-CCNC: 24 U/L (ref 15–37)
BASOPHILS NFR BLD AUTO: 2.7 % (ref 0–2)
BILIRUB SERPL-MCNC: 0.5 MG/DL (ref 0.2–1)
BUN SERPL-MCNC: 12 MG/DL (ref 7–18)
CALCIUM SERPL-MCNC: 10.3 MG/DL (ref 8.5–10.1)
CHLORIDE SERPL-SCNC: 100 MMOL/L (ref 98–107)
CO2 SERPL-SCNC: 21 MMOL/L (ref 21–32)
CREAT SERPL-MCNC: 0.9 MG/DL (ref 0.55–1.3)
EOSINOPHIL NFR BLD AUTO: 4.8 % (ref 0–3)
ERYTHROCYTE [DISTWIDTH] IN BLOOD BY AUTOMATED COUNT: 15.6 % (ref 11.6–14.8)
GLOBULIN SER-MCNC: 5.6 G/DL
HCT VFR BLD CALC: 41.3 % (ref 42–52)
HGB BLD-MCNC: 13 G/DL (ref 14.2–18)
LYMPHOCYTES NFR BLD AUTO: 20.4 % (ref 20–45)
MCV RBC AUTO: 86 FL (ref 80–99)
MONOCYTES NFR BLD AUTO: 10.4 % (ref 1–10)
NEUTROPHILS NFR BLD AUTO: 61.9 % (ref 45–75)
PLATELET # BLD: 316 K/UL (ref 150–450)
POTASSIUM SERPL-SCNC: 4 MMOL/L (ref 3.5–5.1)
RBC # BLD AUTO: 4.81 M/UL (ref 4.7–6.1)
SODIUM SERPL-SCNC: 134 MMOL/L (ref 136–145)
WBC # BLD AUTO: 8.1 K/UL (ref 4.8–10.8)

## 2018-08-09 RX ADMIN — HEPARIN SODIUM SCH UNITS: 5000 INJECTION INTRAVENOUS; SUBCUTANEOUS at 20:41

## 2018-08-09 RX ADMIN — INSULIN ASPART SCH UNITS: 100 INJECTION, SOLUTION INTRAVENOUS; SUBCUTANEOUS at 23:40

## 2018-08-09 RX ADMIN — INSULIN ASPART SCH UNITS: 100 INJECTION, SOLUTION INTRAVENOUS; SUBCUTANEOUS at 18:30

## 2018-08-09 RX ADMIN — LEVETIRACETAM SCH MG: 100 SOLUTION ORAL at 21:51

## 2018-08-09 RX ADMIN — CHLORHEXIDINE GLUCONATE SCH APPLIC: 213 SOLUTION TOPICAL at 19:47

## 2018-08-09 RX ADMIN — LEVETIRACETAM SCH MG: 100 SOLUTION ORAL at 14:48

## 2018-08-09 RX ADMIN — INSULIN ASPART SCH UNITS: 100 INJECTION, SOLUTION INTRAVENOUS; SUBCUTANEOUS at 06:43

## 2018-08-09 RX ADMIN — INSULIN ASPART SCH UNITS: 100 INJECTION, SOLUTION INTRAVENOUS; SUBCUTANEOUS at 12:00

## 2018-08-09 RX ADMIN — Medication SCH MLS/HR: at 15:39

## 2018-08-09 RX ADMIN — ERTAPENEM SODIUM SCH MLS/HR: 1 INJECTION, POWDER, LYOPHILIZED, FOR SOLUTION INTRAMUSCULAR; INTRAVENOUS at 15:38

## 2018-08-09 RX ADMIN — HEPARIN SODIUM SCH UNITS: 5000 INJECTION INTRAVENOUS; SUBCUTANEOUS at 09:32

## 2018-08-09 RX ADMIN — LEVETIRACETAM SCH MG: 100 SOLUTION ORAL at 06:40

## 2018-08-09 RX ADMIN — POLYETHYLENE GLYCOL 3350 SCH GM: 17 POWDER, FOR SOLUTION ORAL at 20:41

## 2018-08-09 NOTE — GENERAL PROGRESS NOTE
Assessment/Plan


Status:  unchanged


Assessment/Plan


# Leukocytosis - Sepsis with elevated temperature of 103 degrees Fahrenheit.  

Had uti v bacteremia (CoNS) on abx, had improved


--> Pt on antibiotics, has received a dose of Zosyn and currently is on 

vancomycin q.12 h. now on cefepime


--> Appreciate Infectious Disease recs


--> 2D echo per ID performed on 8/1: No discrete vegetations seen, however SBE 

may not be excluded by transthoracic 2-D echo.


--> 8/9: WBC of 8.1, wnl 


# Anemia of chronic disease. No hemolysis, peripheral smear reviewed, ferritin 

was elevated.


--> Continue to closely monitor


--> Hgb goal >7


--> 8/9: Hgb 13.0


# Acute kidney injury.  


--> Currently on IV fluids, IV bolus given to see if the patient responds along 

with IV pressor as needed.  


--> Closely monitor with Nephrology team.


# Septic shock, use antibiotic per ID services.


--> potential uti, on abx and bacteremia


# Respiratory failure, status post tracheostomy, on mechanical ventilation per 

Dr. Pena


# Hypercalcemia. Monitor PTH and calcium level.


--> 8/7: Elevated at 10.0, currently borderline





The time the note was entered does not necessarily correspond to the time the 

patient was seen.





Subjective


Date patient seen:  Aug 9, 2018


ROS Limited/Unobtainable:  Yes


Hematologic/Lymphatic:  Reports: anemia


Allergies:  


Coded Allergies:  


     AZTREONAM (Verified  Allergy, Unknown, 7/23/18)


All Systems:  reviewed and negative except above


Subjective


Pt remains on vent. No acute events. H/H stable. Vitals are stable.





Objective





Last 24 Hour Vital Signs








  Date Time  Temp Pulse Resp B/P (MAP) Pulse Ox O2 Delivery O2 Flow Rate FiO2


 


8/9/18 08:57  69 16     30


 


8/9/18 08:16  68      


 


8/9/18 08:00 98.2 70 16 122/82 (95) 99   





 98.2       


 


8/9/18 08:00        30


 


8/9/18 08:00      Mechanical Ventilator  


 


8/9/18 06:56  72 16     30


 


8/9/18 04:38  66 16     30


 


8/9/18 04:00 98.4 76 19 132/72 (92) 99   





 98.4       


 


8/9/18 04:00      Mechanical Ventilator  


 


8/9/18 04:00  67      


 


8/9/18 04:00        30


 


8/9/18 03:21  70 18     30


 


8/9/18 00:49  68 16     30


 


8/9/18 00:00        30


 


8/9/18 00:00  73      


 


8/9/18 00:00 98.6 79 20 139/79 (99) 100   





 98.6       


 


8/9/18 00:00      Mechanical Ventilator  


 


8/8/18 22:53  72 17     30


 


8/8/18 20:53  80 19     30


 


8/8/18 20:00        30


 


8/8/18 20:00      Mechanical Ventilator  


 


8/8/18 20:00 98.1 65 18 135/79 (97) 100   





 98.1       


 


8/8/18 19:40  71      


 


8/8/18 18:45  68 18     30


 


8/8/18 17:12  84 18     30


 


8/8/18 16:00 98.2 74 16 124/79 (94) 100   





 98.2       


 


8/8/18 16:00      Mechanical Ventilator  


 


8/8/18 16:00        30


 


8/8/18 16:00  74      


 


8/8/18 14:38  81 18     30


 


8/8/18 12:53  79 18     30


 


8/8/18 12:00        30


 


8/8/18 12:00 98.1 76 18 127/78 (94) 96   





 98.1       


 


8/8/18 12:00      Mechanical Ventilator  


 


8/8/18 11:59  83      


 


8/8/18 10:51  77 19     30

















Intake and Output  


 


 8/8/18 8/9/18





 19:00 07:00


 


Intake Total 645 ml 710 ml


 


Output Total 900 ml 650 ml


 


Balance -255 ml 60 ml


 


  


 


Free Water 150 ml 130 ml


 


IV Total  250 ml


 


Tube Feeding 495 ml 330 ml


 


Output Urine Total 550 ml 450 ml


 


Stool Total 350 ml 200 ml








Laboratory Tests


8/9/18 04:39: 


White Blood Count 8.1, Red Blood Count 4.81, Hemoglobin 13.0L, Hematocrit 41.3L

, Mean Corpuscular Volume 86, Mean Corpuscular Hemoglobin 27.1, Mean 

Corpuscular Hemoglobin Concent 31.5L, Red Cell Distribution Width 15.6H, 

Platelet Count 316, Mean Platelet Volume 7.8, Neutrophils (%) (Auto) 61.9, 

Lymphocytes (%) (Auto) 20.4, Monocytes (%) (Auto) 10.4H, Eosinophils (%) (Auto) 

4.8H, Basophils (%) (Auto) 2.7H, Sodium Level 134L, Potassium Level 4.0, 

Chloride Level 100, Carbon Dioxide Level 21, Anion Gap 13, Blood Urea Nitrogen 

12, Creatinine 0.9, Estimat Glomerular Filtration Rate > 60, Glucose Level 108H

, Calcium Level 10.3H, Total Bilirubin 0.5, Aspartate Amino Transf (AST/SGOT) 24

, Alanine Aminotransferase (ALT/SGPT) 51, Alkaline Phosphatase 139H, Pro-B-Type 

Natriuretic Peptide 120, Total Protein 9.3H, Albumin 3.7, Globulin 5.6, Albumin/

Globulin Ratio 0.7L


Height (Feet):  6


Weight (Pounds):  203


General Appearance:  no apparent distress


EENT:  PERRL/EOMI


Neck:  normal alignment


Cardiovascular:  normal peripheral pulses


Respiratory/Chest:  no respiratory distress


Abdomen:  soft











Tejas Gerber MD Aug 9, 2018 10:06

## 2018-08-09 NOTE — PULMONOLGY CRITICAL CARE NOTE
Critical Care - Asmt/Plan


Problems:  


(1) Acute on chronic respiratory failure


(2) Acute on chronic renal insufficiency


(3) Severe sepsis


(4) Vegetative state


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


(6) Diabetes mellitus


Respiratory:  monitor respiratory rate, adjust FIO2


Cardiac:  continue to monitor HR/BP


Renal:  F/U I&O


Infectious Disease:  check cultures


Gastrointestinal:  continue feedings/current rate


Endocrine:  check TSH


Hematologic:  monitor H/H


Neurologic:  PRN Morphine


Disposition:  keep in ICU


Notes Reviewed:  cardio


Discussed with:  nurses, consultants, 





Critical Care - Objective





Last 24 Hour Vital Signs








  Date Time  Temp Pulse Resp B/P (MAP) Pulse Ox O2 Delivery O2 Flow Rate FiO2


 


8/9/18 14:55  66 16     30


 


8/9/18 13:10  68 16     30


 


8/9/18 12:00      Mechanical Ventilator  


 


8/9/18 12:00  64      


 


8/9/18 12:00 98.1 65 16 129/76 (93) 100   





 98.1       


 


8/9/18 12:00        30


 


8/9/18 10:56  66 17     30


 


8/9/18 08:57  69 16     30


 


8/9/18 08:16  68      


 


8/9/18 08:00 98.2 70 16 122/82 (95) 99   





 98.2       


 


8/9/18 08:00        30


 


8/9/18 08:00      Mechanical Ventilator  


 


8/9/18 06:56  72 16     30


 


8/9/18 04:38  66 16     30


 


8/9/18 04:00 98.4 76 19 132/72 (92) 99   





 98.4       


 


8/9/18 04:00      Mechanical Ventilator  


 


8/9/18 04:00  67      


 


8/9/18 04:00        30


 


8/9/18 03:21  70 18     30


 


8/9/18 00:49  68 16     30


 


8/9/18 00:00        30


 


8/9/18 00:00  73      


 


8/9/18 00:00 98.6 79 20 139/79 (99) 100   





 98.6       


 


8/9/18 00:00      Mechanical Ventilator  


 


8/8/18 22:53  72 17     30


 


8/8/18 20:53  80 19     30


 


8/8/18 20:00        30


 


8/8/18 20:00      Mechanical Ventilator  


 


8/8/18 20:00 98.1 65 18 135/79 (97) 100   





 98.1       


 


8/8/18 19:40  71      


 


8/8/18 18:45  68 18     30


 


8/8/18 17:12  84 18     30


 


8/8/18 16:00 98.2 74 16 124/79 (94) 100   





 98.2       


 


8/8/18 16:00      Mechanical Ventilator  


 


8/8/18 16:00        30


 


8/8/18 16:00  74      








Status:  awake


Condition:  critical


HEENT:  atraumatic


Lungs:  chest wall tender


Heart:  regular


Abdomen:  non-tender


Extremities:  no C/C/E


Decubiti:  location


Accucheck:  109





Critical Care - Subjective


ROS Limited/Unobtainable:  No


FI02:  30


Vent Support Breath Rate:  16


Vent Support Mode:  AC


Vent Tidal Volume:  600


Sputum Amount:  Moderate


PEEP:  5.0


PIP:  29


Tube Feeding Amount:  50


I&O:











Intake and Output  


 


 8/8/18 8/9/18





 19:00 07:00


 


Intake Total 645 ml 750 ml


 


Output Total 900 ml 650 ml


 


Balance -255 ml 100 ml


 


  


 


Free Water 150 ml 130 ml


 


IV Total  250 ml


 


Tube Feeding 495 ml 370 ml


 


Output Urine Total 550 ml 450 ml


 


Stool Total 350 ml 200 ml








Labs:





Laboratory Tests








Test


  8/9/18


04:39


 


White Blood Count


  8.1 K/UL


(4.8-10.8)


 


Red Blood Count


  4.81 M/UL


(4.70-6.10)


 


Hemoglobin


  13.0 G/DL


(14.2-18.0)  L


 


Hematocrit


  41.3 %


(42.0-52.0)  L


 


Mean Corpuscular Volume 86 FL (80-99)  


 


Mean Corpuscular Hemoglobin


  27.1 PG


(27.0-31.0)


 


Mean Corpuscular Hemoglobin


Concent 31.5 G/DL


(32.0-36.0)  L


 


Red Cell Distribution Width


  15.6 %


(11.6-14.8)  H


 


Platelet Count


  316 K/UL


(150-450)


 


Mean Platelet Volume


  7.8 FL


(6.5-10.1)


 


Neutrophils (%) (Auto)


  61.9 %


(45.0-75.0)


 


Lymphocytes (%) (Auto)


  20.4 %


(20.0-45.0)


 


Monocytes (%) (Auto)


  10.4 %


(1.0-10.0)  H


 


Eosinophils (%) (Auto)


  4.8 %


(0.0-3.0)  H


 


Basophils (%) (Auto)


  2.7 %


(0.0-2.0)  H


 


Sodium Level


  134 MMOL/L


(136-145)  L


 


Potassium Level


  4.0 MMOL/L


(3.5-5.1)


 


Chloride Level


  100 MMOL/L


()


 


Carbon Dioxide Level


  21 MMOL/L


(21-32)


 


Anion Gap


  13 mmol/L


(5-15)


 


Blood Urea Nitrogen


  12 mg/dL


(7-18)


 


Creatinine


  0.9 MG/DL


(0.55-1.30)


 


Estimat Glomerular Filtration


Rate > 60 mL/min


(>60)


 


Glucose Level


  108 MG/DL


()  H


 


Calcium Level


  10.3 MG/DL


(8.5-10.1)  H


 


Total Bilirubin


  0.5 MG/DL


(0.2-1.0)


 


Aspartate Amino Transf


(AST/SGOT) 24 U/L (15-37)


 


 


Alanine Aminotransferase


(ALT/SGPT) 51 U/L (12-78)


 


 


Alkaline Phosphatase


  139 U/L


()  H


 


Pro-B-Type Natriuretic Peptide


  120 pg/mL


(0-125)


 


Total Protein


  9.3 G/DL


(6.4-8.2)  H


 


Albumin


  3.7 G/DL


(3.4-5.0)


 


Globulin 5.6 g/dL  


 


Albumin/Globulin Ratio


  0.7 (1.0-2.7)


L

















Yury Pena MD Aug 9, 2018 15:53

## 2018-08-09 NOTE — INFECTIOUS DISEASES PROG NOTE
Assessment/Plan


Assessment/Plan


Sepsis, resolving- 2ry to UTI and Bacteremia Blood cultures postive 4/4 bottles 

with [facility] Unclear source will need to R/O Endocarditis  Possible PNA initially 


 -u/a wbc 40-60, nit neg, leuk +2; ucx >100k E. aerogenes (S cefepime, cipro/

levo; R Zosyn)


 -BCX 4/4 E. aerogenes, prob Amp C (S cefepime, cipro/levo; R Zosyn), P. 

mirabilis ESBL (S Zosyn, Ertapenem); repeta 7/26 1/4 CoNS (suspect contaminant)

, 7/28 Staph f/u final results if CoNS may need TTE and IE work up.


  -CXR 7/27: Increased left pleural effusion, over 3 days. Overall decreased 

interstitial congestion. Right infrahilar atelectasis


 -CXR: Cardiomegaly. Mild interstitial congestion. Suspect small bilateral 

pleural effusions


 -CT abd/p: Right lower quadrant double barrel colostomy, as described. Small 

peristomal hernia contains bowel loops without evidence of obstruction or 

strangulation. Left renal staghorn calculus. Bilateral intrarenal calyceal 

calculi. Borderline hydronephrosis on the right without evidence of downstream 

obstructive lesion. May indicate mild ureteral pelvic junction obstruction. 

Somewhat atrophic left kidney. Inspissated contrast ball within the distal 

sigmoid colon. Gastrostomy in good position. Nonspecific bilateral perinephric 

fat stranding, could indicate pyelonephritis or could be chronic. Guzmán 

catheter in place. Apparent bladder wall thickening, possibly an artifact of 

under distention but cystitis is not excludable, particularly in view of mild 

perivesical fat stranding. Bilateral pulmonary parenchymal atelectasis and 

consolidation


  -Blood Cx from PICC CoNS 2/2 bottles Peripheral Neg- (May be contaminant but 

will repeat blood Cx given new leukocytosis if positive will need ECHO.


  - Blood Cx 7/30/18 - [facility] - Will need TTE for IE work up and the PICC removed.


 


PICC line infection/colonization - TTE negative. Blood cultures only positive 

from PICC. Not positive from Peripheral 


- Will repeat blood cultures x 1 to confirm clearance after PICC replaced. [place] 

suspicion for IE.


- PICC replace 8/1/18





Fever/Leukocytosis; Resolved - i recurs Re-evaluate lines, Diarrhea? C. dif? 


BRAYDON, improving


Lactic acidosis, resolved





chronic resp failure trach/vent dependant


BPH


GERD


 HTN


DM2


seizure disorder


colostomy


 s/p GT 


constipation





VRE and MRSA colonized


 


Plan:


- Continue empiric IV Vancomycin #12/18 for bacteremia/line infection - Abx end 

date 8/15/18 if STACI negative


- Recommend Transesophageal ECHO to r/o IE





-Last day of Ertapenem #14/14 for Amp-C Enterobacter and ESBL P.mirabilis 

bacteremia ; will treat for 14 days - End date 8/9/18


    -7/27 SP Zosyn #4





- Monitor CBC/CMP, temperatures


- F/U STACI


- Pulmonary care








Thank you for this consultation. Will continue to follow along with you.





Subjective


Allergies:  


Coded Allergies:  


     AZTREONAM (Verified  Allergy, Unknown, 7/23/18)


Subjective


No acute changes


On Vent still and stable at 30% O2 sating well


Afebrile





Objective


Vital Signs





Last 24 Hour Vital Signs








  Date Time  Temp Pulse Resp B/P (MAP) Pulse Ox O2 Delivery O2 Flow Rate FiO2


 


8/9/18 08:16  68      


 


8/9/18 08:00 98.2 70 16 122/82 (95) 99   





 98.2       


 


8/9/18 08:00        30


 


8/9/18 08:00      Mechanical Ventilator  


 


8/9/18 06:56  72 16     30


 


8/9/18 04:38  66 16     30


 


8/9/18 04:00 98.4 76 19 132/72 (92) 99   





 98.4       


 


8/9/18 04:00      Mechanical Ventilator  


 


8/9/18 04:00  67      


 


8/9/18 04:00        30


 


8/9/18 03:21  70 18     30


 


8/9/18 00:49  68 16     30


 


8/9/18 00:00        30


 


8/9/18 00:00  73      


 


8/9/18 00:00 98.6 79 20 139/79 (99) 100   





 98.6       


 


8/9/18 00:00      Mechanical Ventilator  


 


8/8/18 22:53  72 17     30


 


8/8/18 20:53  80 19     30


 


8/8/18 20:00        30


 


8/8/18 20:00      Mechanical Ventilator  


 


8/8/18 20:00 98.1 65 18 135/79 (97) 100   





 98.1       


 


8/8/18 19:40  71      


 


8/8/18 18:45  68 18     30


 


8/8/18 17:12  84 18     30


 


8/8/18 16:00 98.2 74 16 124/79 (94) 100   





 98.2       


 


8/8/18 16:00      Mechanical Ventilator  


 


8/8/18 16:00        30


 


8/8/18 16:00  74      


 


8/8/18 14:38  81 18     30


 


8/8/18 12:53  79 18     30


 


8/8/18 12:00        30


 


8/8/18 12:00 98.1 76 18 127/78 (94) 96   





 98.1       


 


8/8/18 12:00      Mechanical Ventilator  


 


8/8/18 11:59  83      


 


8/8/18 10:51  77 19     30








Height (Feet):  6


Weight (Pounds):  203


Objective


GENERAL:  No overt distress. On vent 30% O2


HEENT:  NCAT, DMM, Tracheostomy (C/D/I)


PULM: Mild crackles in bases B/L, No wheezing


CARDIOVASCULAR:  RRR, S1 and S2. No rubs or gallops.


ABDOMEN:  Obese and nondistended. +BS, The G-tube and stoma noted.


EXTREMITIES:  No edema.  1+ pedal pulses.





Laboratory Tests








Test


  8/9/18


04:39


 


White Blood Count


  8.1 K/UL


(4.8-10.8)


 


Red Blood Count


  4.81 M/UL


(4.70-6.10)


 


Hemoglobin


  13.0 G/DL


(14.2-18.0)  L


 


Hematocrit


  41.3 %


(42.0-52.0)  L


 


Mean Corpuscular Volume 86 FL (80-99)  


 


Mean Corpuscular Hemoglobin


  27.1 PG


(27.0-31.0)


 


Mean Corpuscular Hemoglobin


Concent 31.5 G/DL


(32.0-36.0)  L


 


Red Cell Distribution Width


  15.6 %


(11.6-14.8)  H


 


Platelet Count


  316 K/UL


(150-450)


 


Mean Platelet Volume


  7.8 FL


(6.5-10.1)


 


Neutrophils (%) (Auto)


  61.9 %


(45.0-75.0)


 


Lymphocytes (%) (Auto)


  20.4 %


(20.0-45.0)


 


Monocytes (%) (Auto)


  10.4 %


(1.0-10.0)  H


 


Eosinophils (%) (Auto)


  4.8 %


(0.0-3.0)  H


 


Basophils (%) (Auto)


  2.7 %


(0.0-2.0)  H


 


Sodium Level


  134 MMOL/L


(136-145)  L


 


Potassium Level


  4.0 MMOL/L


(3.5-5.1)


 


Chloride Level


  100 MMOL/L


()


 


Carbon Dioxide Level


  21 MMOL/L


(21-32)


 


Anion Gap


  13 mmol/L


(5-15)


 


Blood Urea Nitrogen


  12 mg/dL


(7-18)


 


Creatinine


  0.9 MG/DL


(0.55-1.30)


 


Estimat Glomerular Filtration


Rate > 60 mL/min


(>60)


 


Glucose Level


  108 MG/DL


()  H


 


Calcium Level


  10.3 MG/DL


(8.5-10.1)  H


 


Total Bilirubin


  0.5 MG/DL


(0.2-1.0)


 


Aspartate Amino Transf


(AST/SGOT) 24 U/L (15-37)


 


 


Alanine Aminotransferase


(ALT/SGPT) 51 U/L (12-78)


 


 


Alkaline Phosphatase


  139 U/L


()  H


 


Pro-B-Type Natriuretic Peptide


  120 pg/mL


(0-125)


 


Total Protein


  9.3 G/DL


(6.4-8.2)  H


 


Albumin


  3.7 G/DL


(3.4-5.0)


 


Globulin 5.6 g/dL  


 


Albumin/Globulin Ratio


  0.7 (1.0-2.7)


L











Current Medications








 Medications


  (Trade)  Dose


 Ordered  Sig/Jan


 Route


 PRN Reason  Start Time


 Stop Time Status Last Admin


Dose Admin


 


 Acetaminophen


  (Tylenol)  650 mg  Q4H  PRN


 ORAL


 FEVER  7/26/18 14:00


 8/23/18 09:59  8/6/18 21:04


 


 


 Baclofen


  (Lioresal)  10 mg  EVERY 8  HOURS


 GT


   7/30/18 14:00


 8/23/18 12:59  8/9/18 06:40


 


 


 Chlorhexidine


 Gluconate


  (Elaine-Hex 2%)  1 applic  DAILY@2000


 TOPIC


   7/26/18 20:00


 8/23/18 19:59  8/8/18 22:02


 


 


 Dextrose


  (Dextrose 50%)  25 ml  STAT  PRN


 IV


 Hypoglycemia  7/27/18 08:45


 8/24/18 08:44   


 


 


 Dextrose


  (Dextrose 50%)  50 ml  STAT  PRN


 IV


 Hypoglycemia  7/27/18 08:45


 8/24/18 08:44   


 


 


 Ertapenem 1 gm/


 Sodium Chloride  55 ml @ 


 110 mls/hr  Q24H


 IVPB


   7/27/18 15:00


 8/10/18 14:59  8/8/18 14:56


 


 


 Heparin Sodium


  (Porcine)


  (Heparin 5000


 units/ml)  5,000 units  EVERY 12  HOURS


 SUBQ


   7/26/18 21:00


 8/23/18 20:59  8/8/18 22:01


 


 


 Insulin Aspart


  (NovoLOG)    Q6HR


 SUBQ


   7/26/18 18:00


 8/24/18 11:29  8/9/18 06:43


 


 


 Lansoprazole


  (Prevacid)  30 mg  DAILY


 GT


   7/27/18 09:00


 8/25/18 08:59  8/8/18 09:16


 


 


 Levetiracetam


  (Keppra)  1,000 mg  Q8HR


 GT


   7/26/18 14:00


 8/23/18 12:59  8/9/18 06:40


 


 


 Ondansetron HCl


  (Zofran)  4 mg  Q6H  PRN


 IVP


 Nausea & Vomiting  7/26/18 16:00


 8/23/18 09:59   


 


 


 Polyethylene


 Glycol


  (Miralax)  17 gm  BEDTIME


 GT


   7/30/18 21:00


 8/24/18 20:59  8/8/18 22:02


 


 


 Vancomycin HCl


  (Vanco rx to


 dose)  1 ea  DAILY  PRN


 MISC


 PER RX PROTOCOL  7/28/18 13:15


 8/27/18 13:14   


 


 


 Vancomycin HCl/


 Dextrose  250 ml @ 


 125 mls/hr  Q18H


 IVPB


   8/8/18 21:00


 8/11/18 23:59  8/8/18 22:01


 

















Robert Williamson M.D. Aug 9, 2018 09:07

## 2018-08-09 NOTE — DIAGNOSTIC IMAGING REPORT
Indication: Dyspnea

 

Technique: One view of the chest

 

Comparison: 7/27/2018

 

Findings: Tracheostomy is again demonstrated. Left-sided pleural effusion is again

demonstrated. Previously demonstrated interstitial congestion and atelectatic changes

are no longer evident. Previously demonstrated PICC on the left is no longer evident.

There is a PICC with its tip in the right axilla now present.

 

Impression: Left-sided pleural effusion, also previously reported.

 

No acute process otherwise. Note interim resolution of previously demonstrated

interstitial congestion

## 2018-08-09 NOTE — NEPHROLOGY PROGRESS NOTE
Assessment/Plan


Assessment/Plan


1. BRAYDON- resolved


2. Sepsis- on Abx. 


3. Chronic Resp FL- trached on vent  


       - Per PULM mgmt


4. Hyponatremia - Na stable at 134. No changes today


5. SZ- Keppra


    -sodium 133-135


6. Hypophos- corrected





Subjective


Date patient seen:  Aug 9, 2018


Time patient seen:  08:32


ROS Limited/Unobtainable:  Yes


Allergies:  


Coded Allergies:  


     AZTREONAM (Verified  Allergy, Unknown, 7/23/18)


Subjective


Patient trached/vent.





Objective





Last 24 Hour Vital Signs








  Date Time  Temp Pulse Resp B/P (MAP) Pulse Ox O2 Delivery O2 Flow Rate FiO2


 


8/9/18 08:16  68      


 


8/9/18 08:00 98.2 70 16 122/82 (95) 99   





 98.2       


 


8/9/18 08:00        30


 


8/9/18 08:00      Mechanical Ventilator  


 


8/9/18 04:38  66 16     30


 


8/9/18 04:00 98.4 76 19 132/72 (92) 99   





 98.4       


 


8/9/18 04:00      Mechanical Ventilator  


 


8/9/18 04:00  67      


 


8/9/18 04:00        30


 


8/9/18 03:21  70 18     30


 


8/9/18 00:49  68 16     30


 


8/9/18 00:00        30


 


8/9/18 00:00  73      


 


8/9/18 00:00 98.6 79 20 139/79 (99) 100   





 98.6       


 


8/9/18 00:00      Mechanical Ventilator  


 


8/8/18 22:53  72 17     30


 


8/8/18 20:53  80 19     30


 


8/8/18 20:00        30


 


8/8/18 20:00      Mechanical Ventilator  


 


8/8/18 20:00 98.1 65 18 135/79 (97) 100   





 98.1       


 


8/8/18 19:40  71      


 


8/8/18 18:45  68 18     30


 


8/8/18 17:12  84 18     30


 


8/8/18 16:00 98.2 74 16 124/79 (94) 100   





 98.2       


 


8/8/18 16:00      Mechanical Ventilator  


 


8/8/18 16:00        30


 


8/8/18 16:00  74      


 


8/8/18 14:38  81 18     30


 


8/8/18 12:53  79 18     30


 


8/8/18 12:00        30


 


8/8/18 12:00 98.1 76 18 127/78 (94) 96   





 98.1       


 


8/8/18 12:00      Mechanical Ventilator  


 


8/8/18 11:59  83      


 


8/8/18 10:51  77 19     30


 


8/8/18 08:56  78 16     30

















Intake and Output  


 


 8/8/18 8/9/18





 19:00 07:00


 


Intake Total 645 ml 710 ml


 


Output Total 900 ml 650 ml


 


Balance -255 ml 60 ml


 


  


 


Free Water 150 ml 130 ml


 


IV Total  250 ml


 


Tube Feeding 495 ml 330 ml


 


Output Urine Total 550 ml 450 ml


 


Stool Total 350 ml 200 ml








Laboratory Tests


8/9/18 04:39: 


White Blood Count 8.1, Red Blood Count 4.81, Hemoglobin 13.0L, Hematocrit 41.3L

, Mean Corpuscular Volume 86, Mean Corpuscular Hemoglobin 27.1, Mean 

Corpuscular Hemoglobin Concent 31.5L, Red Cell Distribution Width 15.6H, 

Platelet Count 316, Mean Platelet Volume 7.8, Neutrophils (%) (Auto) 61.9, 

Lymphocytes (%) (Auto) 20.4, Monocytes (%) (Auto) 10.4H, Eosinophils (%) (Auto) 

4.8H, Basophils (%) (Auto) 2.7H, Sodium Level 134L, Potassium Level 4.0, 

Chloride Level 100, Carbon Dioxide Level 21, Anion Gap 13, Blood Urea Nitrogen 

12, Creatinine 0.9, Estimat Glomerular Filtration Rate > 60, Glucose Level 108H

, Calcium Level 10.3H, Total Bilirubin 0.5, Aspartate Amino Transf (AST/SGOT) 24

, Alanine Aminotransferase (ALT/SGPT) 51, Alkaline Phosphatase 139H, Pro-B-Type 

Natriuretic Peptide 120, Total Protein 9.3H, Albumin 3.7, Globulin 5.6, Albumin/

Globulin Ratio 0.7L


Height (Feet):  6


Weight (Pounds):  203


General Appearance:  WD/WN, no apparent distress


EENT:  PERRL/EOMI, normal ENT inspection


Neck:  normal alignment, supple


Cardiovascular:  normal rate, regular rhythm


Respiratory/Chest:  lungs clear, rhonchi - bilaterally


Abdomen:  non tender, soft


Edema:  no edema noted Arm (L), no edema noted Arm (R), no edema noted Leg (L), 

no edema noted Leg (R), no edema noted Pedal (L), no edema noted Pedal (R), no 

edema noted Generalized











Jewel Templeton M.D. Aug 9, 2018 08:34

## 2018-08-09 NOTE — CARDIOLOGY PROGRESS NOTE
Assessment/Plan


Status:  stable


Assessment/Plan


Assessment:


(1) Acute on chronic respiratory failure


(2) Acute on chronic renal insufficiency


(3) Severe sepsis


(4) Vegetative state


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


(6) Diabetes mellitus





Plan:


Continue antibiotics


Echocardiogram reviewed- no endocarditis


STACI for next Monday


Patient afebrile, normal WBC otherwise


Continue trach care and tube feeds


Continue keppra for seizures





Subjective


Cardiovascular:  Reports: no symptoms


Respiratory:  Reports: no symptoms


Gastrointestinal/Abdominal:  Reports: no symptoms


Genitourinary:  Reports: no symptoms


Subjective


Afebrile, vitals stable, Telemetry shows sinus rhythm. No acute events


Ventilator settings: portex 7, AC 16, TV: 600, FiO2: 30%, PEEP: 5. GT is in 

place running glucerna 1.2 @ 65 ml/hr. No residual present. Colostomy bag in 

place.


STACI was supposed to be done today but STACI scheduled for 8/13/18 at 1100, no 

other opening downstairs.





Objective





Last 24 Hour Vital Signs








  Date Time  Temp Pulse Resp B/P (MAP) Pulse Ox O2 Delivery O2 Flow Rate FiO2


 


8/9/18 10:56  66 17     30


 


8/9/18 08:57  69 16     30


 


8/9/18 08:16  68      


 


8/9/18 08:00 98.2 70 16 122/82 (95) 99   





 98.2       


 


8/9/18 08:00        30


 


8/9/18 08:00      Mechanical Ventilator  


 


8/9/18 06:56  72 16     30


 


8/9/18 04:38  66 16     30


 


8/9/18 04:00 98.4 76 19 132/72 (92) 99   





 98.4       


 


8/9/18 04:00      Mechanical Ventilator  


 


8/9/18 04:00  67      


 


8/9/18 04:00        30


 


8/9/18 03:21  70 18     30


 


8/9/18 00:49  68 16     30


 


8/9/18 00:00        30


 


8/9/18 00:00  73      


 


8/9/18 00:00 98.6 79 20 139/79 (99) 100   





 98.6       


 


8/9/18 00:00      Mechanical Ventilator  


 


8/8/18 22:53  72 17     30


 


8/8/18 20:53  80 19     30


 


8/8/18 20:00        30


 


8/8/18 20:00      Mechanical Ventilator  


 


8/8/18 20:00 98.1 65 18 135/79 (97) 100   





 98.1       


 


8/8/18 19:40  71      


 


8/8/18 18:45  68 18     30


 


8/8/18 17:12  84 18     30


 


8/8/18 16:00 98.2 74 16 124/79 (94) 100   





 98.2       


 


8/8/18 16:00      Mechanical Ventilator  


 


8/8/18 16:00        30


 


8/8/18 16:00  74      


 


8/8/18 14:38  81 18     30


 


8/8/18 12:53  79 18     30


 


8/8/18 12:00        30


 


8/8/18 12:00 98.1 76 18 127/78 (94) 96   





 98.1       


 


8/8/18 12:00      Mechanical Ventilator  


 


8/8/18 11:59  83      








General Appearance:  no apparent distress, on vent


EENT:  PERRL/EOMI, normal ENT inspection


Neck:  non-tender, normal alignment, supple, normal inspection, no JVD


Rhythm:  NSR


Cardiovascular:  normal peripheral pulses, normal rate, regular rhythm


Respiratory/Chest:  chest wall non-tender, lungs clear, normal breath sounds, 

no respiratory distress


Abdomen:  normal bowel sounds, non tender, soft, no organomegaly


Extremities:  normal range of motion, non-tender


Neurologic:  CNs II-XII grossly normal











Intake and Output  


 


 8/8/18 8/9/18





 19:00 07:00


 


Intake Total 645 ml 750 ml


 


Output Total 900 ml 650 ml


 


Balance -255 ml 100 ml


 


  


 


Free Water 150 ml 130 ml


 


IV Total  250 ml


 


Tube Feeding 495 ml 370 ml


 


Output Urine Total 550 ml 450 ml


 


Stool Total 350 ml 200 ml











Laboratory Tests








Test


  8/9/18


04:39


 


White Blood Count


  8.1 K/UL


(4.8-10.8)


 


Red Blood Count


  4.81 M/UL


(4.70-6.10)


 


Hemoglobin


  13.0 G/DL


(14.2-18.0)  L


 


Hematocrit


  41.3 %


(42.0-52.0)  L


 


Mean Corpuscular Volume 86 FL (80-99)  


 


Mean Corpuscular Hemoglobin


  27.1 PG


(27.0-31.0)


 


Mean Corpuscular Hemoglobin


Concent 31.5 G/DL


(32.0-36.0)  L


 


Red Cell Distribution Width


  15.6 %


(11.6-14.8)  H


 


Platelet Count


  316 K/UL


(150-450)


 


Mean Platelet Volume


  7.8 FL


(6.5-10.1)


 


Neutrophils (%) (Auto)


  61.9 %


(45.0-75.0)


 


Lymphocytes (%) (Auto)


  20.4 %


(20.0-45.0)


 


Monocytes (%) (Auto)


  10.4 %


(1.0-10.0)  H


 


Eosinophils (%) (Auto)


  4.8 %


(0.0-3.0)  H


 


Basophils (%) (Auto)


  2.7 %


(0.0-2.0)  H


 


Sodium Level


  134 MMOL/L


(136-145)  L


 


Potassium Level


  4.0 MMOL/L


(3.5-5.1)


 


Chloride Level


  100 MMOL/L


()


 


Carbon Dioxide Level


  21 MMOL/L


(21-32)


 


Anion Gap


  13 mmol/L


(5-15)


 


Blood Urea Nitrogen


  12 mg/dL


(7-18)


 


Creatinine


  0.9 MG/DL


(0.55-1.30)


 


Estimat Glomerular Filtration


Rate > 60 mL/min


(>60)


 


Glucose Level


  108 MG/DL


()  H


 


Calcium Level


  10.3 MG/DL


(8.5-10.1)  H


 


Total Bilirubin


  0.5 MG/DL


(0.2-1.0)


 


Aspartate Amino Transf


(AST/SGOT) 24 U/L (15-37)


 


 


Alanine Aminotransferase


(ALT/SGPT) 51 U/L (12-78)


 


 


Alkaline Phosphatase


  139 U/L


()  H


 


Pro-B-Type Natriuretic Peptide


  120 pg/mL


(0-125)


 


Total Protein


  9.3 G/DL


(6.4-8.2)  H


 


Albumin


  3.7 G/DL


(3.4-5.0)


 


Globulin 5.6 g/dL  


 


Albumin/Globulin Ratio


  0.7 (1.0-2.7)


L

















Robert Hernandez M.D. Aug 9, 2018 11:44

## 2018-08-09 NOTE — GENERAL PROGRESS NOTE
Assessment/Plan


Problem List:  


(1) Parastomal hernia


ICD Codes:  K43.5 - Parastomal hernia without obstruction or gangrene


SNOMED:  111988400


Qualifiers:  


   Qualified Codes:  K43.5 - Parastomal hernia without obstruction or gangrene


(2) Stoma malfunction


SNOMED:  663896224


(3) Gastrostomy tube dependent


ICD Codes:  Z93.1 - Gastrostomy status


SNOMED:  644270255, 478968859


(4) Colostomy in place


ICD Codes:  Z93.3 - Colostomy status


SNOMED:  480242559, 830723807


(5) Diabetes mellitus


ICD Codes:  E11.9 - Type 2 diabetes mellitus without complications


SNOMED:  74166875


Assessment/Plan


prolapse stoma assessed >> no s/sx of infection.  no obstruction. 


anemia work up reviewed >> iron deficiency


G tube dependent


hepatitis panel negative


OB negative





supportive care


monitor H&H, prn transfusions


ppi


GTF


GT site care daily/prn


abx


bowel regime


fu labs





Subjective


ROS Limited/Unobtainable:  No


Allergies:  


Coded Allergies:  


     AZTREONAM (Verified  Allergy, Unknown, 7/23/18)





Objective





Last 24 Hour Vital Signs








  Date Time  Temp Pulse Resp B/P (MAP) Pulse Ox O2 Delivery O2 Flow Rate FiO2


 


8/9/18 08:57  69 16     30


 


8/9/18 08:16  68      


 


8/9/18 08:00 98.2 70 16 122/82 (95) 99   





 98.2       


 


8/9/18 08:00        30


 


8/9/18 08:00      Mechanical Ventilator  


 


8/9/18 06:56  72 16     30


 


8/9/18 04:38  66 16     30


 


8/9/18 04:00 98.4 76 19 132/72 (92) 99   





 98.4       


 


8/9/18 04:00      Mechanical Ventilator  


 


8/9/18 04:00  67      


 


8/9/18 04:00        30


 


8/9/18 03:21  70 18     30


 


8/9/18 00:49  68 16     30


 


8/9/18 00:00        30


 


8/9/18 00:00  73      


 


8/9/18 00:00 98.6 79 20 139/79 (99) 100   





 98.6       


 


8/9/18 00:00      Mechanical Ventilator  


 


8/8/18 22:53  72 17     30


 


8/8/18 20:53  80 19     30


 


8/8/18 20:00        30


 


8/8/18 20:00      Mechanical Ventilator  


 


8/8/18 20:00 98.1 65 18 135/79 (97) 100   





 98.1       


 


8/8/18 19:40  71      


 


8/8/18 18:45  68 18     30


 


8/8/18 17:12  84 18     30


 


8/8/18 16:00 98.2 74 16 124/79 (94) 100   





 98.2       


 


8/8/18 16:00      Mechanical Ventilator  


 


8/8/18 16:00        30


 


8/8/18 16:00  74      


 


8/8/18 14:38  81 18     30


 


8/8/18 12:53  79 18     30


 


8/8/18 12:00        30


 


8/8/18 12:00 98.1 76 18 127/78 (94) 96   





 98.1       


 


8/8/18 12:00      Mechanical Ventilator  


 


8/8/18 11:59  83      


 


8/8/18 10:51  77 19     30

















Intake and Output  


 


 8/8/18 8/9/18





 19:00 07:00


 


Intake Total 645 ml 750 ml


 


Output Total 900 ml 650 ml


 


Balance -255 ml 100 ml


 


  


 


Free Water 150 ml 130 ml


 


IV Total  250 ml


 


Tube Feeding 495 ml 370 ml


 


Output Urine Total 550 ml 450 ml


 


Stool Total 350 ml 200 ml








Laboratory Tests


8/9/18 04:39: 


White Blood Count 8.1, Red Blood Count 4.81, Hemoglobin 13.0L, Hematocrit 41.3L

, Mean Corpuscular Volume 86, Mean Corpuscular Hemoglobin 27.1, Mean 

Corpuscular Hemoglobin Concent 31.5L, Red Cell Distribution Width 15.6H, 

Platelet Count 316, Mean Platelet Volume 7.8, Neutrophils (%) (Auto) 61.9, 

Lymphocytes (%) (Auto) 20.4, Monocytes (%) (Auto) 10.4H, Eosinophils (%) (Auto) 

4.8H, Basophils (%) (Auto) 2.7H, Sodium Level 134L, Potassium Level 4.0, 

Chloride Level 100, Carbon Dioxide Level 21, Anion Gap 13, Blood Urea Nitrogen 

12, Creatinine 0.9, Estimat Glomerular Filtration Rate > 60, Glucose Level 108H

, Calcium Level 10.3H, Total Bilirubin 0.5, Aspartate Amino Transf (AST/SGOT) 24

, Alanine Aminotransferase (ALT/SGPT) 51, Alkaline Phosphatase 139H, Pro-B-Type 

Natriuretic Peptide 120, Total Protein 9.3H, Albumin 3.7, Globulin 5.6, Albumin/

Globulin Ratio 0.7L


Height (Feet):  6


Weight (Pounds):  203


General Appearance:  no apparent distress


EENT:  normal ENT inspection


Neck:  supple


Cardiovascular:  normal rate


Respiratory/Chest:  decreased breath sounds


Abdomen:  normal bowel sounds, non tender, soft


Extremities:  non-tender











Miles Pickard MD Aug 9, 2018 10:22

## 2018-08-09 NOTE — PROGRESS NOTE
DATE:  08/08/2018



SUBJECTIVE:  The patient is afebrile and hemodynamically stable.



PHYSICAL EXAMINATION:

VITAL SIGNS:  Blood pressure 135/79, his pulse is 65, respirations were 18,

and temperature was 98.1.

HEENT:  Eyes were normal.  ENT, mucous membranes were moist and intact.

NECK:  Supple with no JVD without lymph nodes.  Tracheostomy site is

clean.

LUNGS:  Clear without rhonchi, rales, or wheezing.  Secretions are small,

thin, and whitish.

HEART:  Normal sounds with regular beats.

ABDOMEN:  Soft and nontender with normal bowel sounds.

EXTREMITIES:  Warm without cyanosis, clubbing, or edema.



LABORATORY DATA:  Hemoglobin is 12.7, hematocrit of 40.2 with MCV of 86,

WBC 11.8, and platelets of 327,000.  His BUN and creatinine is 12 and 1.0

respectively.  Sodium is 134, potassium 3.9, chloride 100, and CO2 is 22.

His calcium was 10.2.  SGOT, SGPT, and alkaline phosphatase are normal.

Albumin was 3.8 and total protein is 8.3.



IMPRESSION AND PLAN:  The patient appears in stable condition.  He is

afebrile and hemodynamically stable with minimal leukocytosis.  Repeat

laboratory tests will be done in the morning.









  ______________________________________________

  Etienne Bloom M.D.





DR:  NED

D:  08/09/2018 00:00

T:  08/09/2018 00:39

JOB#:  [id number]

CC:

## 2018-08-10 VITALS — SYSTOLIC BLOOD PRESSURE: 120 MMHG | DIASTOLIC BLOOD PRESSURE: 81 MMHG

## 2018-08-10 VITALS — SYSTOLIC BLOOD PRESSURE: 135 MMHG | DIASTOLIC BLOOD PRESSURE: 72 MMHG

## 2018-08-10 VITALS — SYSTOLIC BLOOD PRESSURE: 118 MMHG | DIASTOLIC BLOOD PRESSURE: 64 MMHG

## 2018-08-10 VITALS — DIASTOLIC BLOOD PRESSURE: 76 MMHG | SYSTOLIC BLOOD PRESSURE: 120 MMHG

## 2018-08-10 VITALS — SYSTOLIC BLOOD PRESSURE: 127 MMHG | DIASTOLIC BLOOD PRESSURE: 81 MMHG

## 2018-08-10 VITALS — SYSTOLIC BLOOD PRESSURE: 131 MMHG | DIASTOLIC BLOOD PRESSURE: 79 MMHG

## 2018-08-10 LAB
ADD MANUAL DIFF: NO
ANION GAP SERPL CALC-SCNC: 11 MMOL/L (ref 5–15)
BASOPHILS NFR BLD AUTO: 1.3 % (ref 0–2)
BUN SERPL-MCNC: 14 MG/DL (ref 7–18)
CALCIUM SERPL-MCNC: 10 MG/DL (ref 8.5–10.1)
CHLORIDE SERPL-SCNC: 100 MMOL/L (ref 98–107)
CO2 SERPL-SCNC: 22 MMOL/L (ref 21–32)
CREAT SERPL-MCNC: 0.9 MG/DL (ref 0.55–1.3)
EOSINOPHIL NFR BLD AUTO: 5.3 % (ref 0–3)
ERYTHROCYTE [DISTWIDTH] IN BLOOD BY AUTOMATED COUNT: 16.4 % (ref 11.6–14.8)
HCT VFR BLD CALC: 40.3 % (ref 42–52)
HGB BLD-MCNC: 12.9 G/DL (ref 14.2–18)
LYMPHOCYTES NFR BLD AUTO: 18.4 % (ref 20–45)
MCV RBC AUTO: 87 FL (ref 80–99)
MONOCYTES NFR BLD AUTO: 10.9 % (ref 1–10)
NEUTROPHILS NFR BLD AUTO: 64.1 % (ref 45–75)
PHOSPHATE SERPL-MCNC: 1.9 MG/DL (ref 2.5–4.9)
PLATELET # BLD: 315 K/UL (ref 150–450)
POTASSIUM SERPL-SCNC: 4.1 MMOL/L (ref 3.5–5.1)
RBC # BLD AUTO: 4.61 M/UL (ref 4.7–6.1)
SODIUM SERPL-SCNC: 133 MMOL/L (ref 136–145)
WBC # BLD AUTO: 8.7 K/UL (ref 4.8–10.8)

## 2018-08-10 RX ADMIN — INSULIN ASPART SCH UNITS: 100 INJECTION, SOLUTION INTRAVENOUS; SUBCUTANEOUS at 06:20

## 2018-08-10 RX ADMIN — HEPARIN SODIUM SCH UNITS: 5000 INJECTION INTRAVENOUS; SUBCUTANEOUS at 08:13

## 2018-08-10 RX ADMIN — POLYETHYLENE GLYCOL 3350 SCH GM: 17 POWDER, FOR SOLUTION ORAL at 21:27

## 2018-08-10 RX ADMIN — CHLORHEXIDINE GLUCONATE SCH APPLIC: 213 SOLUTION TOPICAL at 21:27

## 2018-08-10 RX ADMIN — HEPARIN SODIUM SCH UNITS: 5000 INJECTION INTRAVENOUS; SUBCUTANEOUS at 21:29

## 2018-08-10 RX ADMIN — LEVETIRACETAM SCH MG: 100 SOLUTION ORAL at 21:28

## 2018-08-10 RX ADMIN — Medication SCH MLS/HR: at 08:14

## 2018-08-10 RX ADMIN — LEVETIRACETAM SCH MG: 100 SOLUTION ORAL at 06:22

## 2018-08-10 RX ADMIN — INSULIN ASPART SCH UNITS: 100 INJECTION, SOLUTION INTRAVENOUS; SUBCUTANEOUS at 17:27

## 2018-08-10 RX ADMIN — INSULIN ASPART SCH UNITS: 100 INJECTION, SOLUTION INTRAVENOUS; SUBCUTANEOUS at 11:27

## 2018-08-10 RX ADMIN — LEVETIRACETAM SCH MG: 100 SOLUTION ORAL at 14:04

## 2018-08-10 RX ADMIN — INSULIN ASPART SCH UNITS: 100 INJECTION, SOLUTION INTRAVENOUS; SUBCUTANEOUS at 23:56

## 2018-08-10 NOTE — GENERAL PROGRESS NOTE
Assessment/Plan


Problem List:  


(1) Parastomal hernia


ICD Codes:  K43.5 - Parastomal hernia without obstruction or gangrene


SNOMED:  792395583


Qualifiers:  


   Qualified Codes:  K43.5 - Parastomal hernia without obstruction or gangrene


(2) Stoma malfunction


SNOMED:  596550793


(3) Gastrostomy tube dependent


ICD Codes:  Z93.1 - Gastrostomy status


SNOMED:  338129646, 638646234


(4) Colostomy in place


ICD Codes:  Z93.3 - Colostomy status


SNOMED:  861476027, 177911831


(5) Diabetes mellitus


ICD Codes:  E11.9 - Type 2 diabetes mellitus without complications


SNOMED:  52991463


Assessment/Plan


prolapse stoma assessed >> no s/sx of infection.  no obstruction. 


anemia work up reviewed >> iron deficiency


G tube dependent


hepatitis panel negative


OB negative





supportive care


monitor H&H, prn transfusions


ppi


GTF


GT site care daily/prn


abx


bowel regime


fu labs





Subjective


ROS Limited/Unobtainable:  No


Allergies:  


Coded Allergies:  


     AZTREONAM (Verified  Allergy, Unknown, 7/23/18)





Objective





Last 24 Hour Vital Signs








  Date Time  Temp Pulse Resp B/P (MAP) Pulse Ox O2 Delivery O2 Flow Rate FiO2


 


8/10/18 09:13  79 16     30


 


8/10/18 08:00        30


 


8/10/18 08:00  75      


 


8/10/18 08:00      Mechanical Ventilator  


 


8/10/18 08:00 98.1 73 16 120/76 (91) 99   





 98.1       


 


8/10/18 07:24  69 16     30


 


8/10/18 05:16  74 18     30


 


8/10/18 04:00      Mechanical Ventilator  


 


8/10/18 04:00        30


 


8/10/18 04:00  76      


 


8/10/18 04:00 98.3 70 19 127/81 (96) 100   





 98.3       


 


8/10/18 03:22  80 18     30


 


8/10/18 01:22  72 18     30


 


8/10/18 00:00        30


 


8/10/18 00:00      Mechanical Ventilator  


 


8/10/18 00:00  79      


 


8/10/18 00:00 98.1 80 18 135/72 (93) 100   





 98.1       


 


8/9/18 22:52  78 18     30


 


8/9/18 21:29  90 19     30


 


8/9/18 20:00      Mechanical Ventilator  


 


8/9/18 20:00 98.2 70 19 133/71 (91) 100   





 98.2       


 


8/9/18 20:00        30


 


8/9/18 20:00  70      


 


8/9/18 18:31  71 16     30


 


8/9/18 16:38  75 17     30


 


8/9/18 16:00      Mechanical Ventilator  


 


8/9/18 16:00 97.5 70 17 126/77 (93) 100   





 97.5       


 


8/9/18 16:00  69      


 


8/9/18 16:00        30


 


8/9/18 14:55  66 16     30


 


8/9/18 13:10  68 16     30


 


8/9/18 12:00      Mechanical Ventilator  


 


8/9/18 12:00  64      


 


8/9/18 12:00 98.1 65 16 129/76 (93) 100   





 98.1       


 


8/9/18 12:00        30


 


8/9/18 10:56  66 17     30

















Intake and Output  


 


 8/9/18 8/10/18





 19:00 07:00


 


Intake Total 985 ml 600 ml


 


Output Total 800 ml 


 


Balance 185 ml 600 ml


 


  


 


Free Water 150 ml 100 ml


 


IV Total 305 ml 


 


Tube Feeding 530 ml 500 ml


 


Output Urine Total 550 ml 


 


Stool Total 250 ml 








Laboratory Tests


8/10/18 04:00: 


White Blood Count 8.7, Red Blood Count 4.61L, Hemoglobin 12.9L, Hematocrit 40.3L

, Mean Corpuscular Volume 87, Mean Corpuscular Hemoglobin 28.0, Mean 

Corpuscular Hemoglobin Concent 32.0, Red Cell Distribution Width 16.4H, 

Platelet Count 315, Mean Platelet Volume 7.3, Neutrophils (%) (Auto) 64.1, 

Lymphocytes (%) (Auto) 18.4L, Monocytes (%) (Auto) 10.9H, Eosinophils (%) (Auto

) 5.3H, Basophils (%) (Auto) 1.3, Sodium Level 133L, Potassium Level 4.1, 

Chloride Level 100, Carbon Dioxide Level 22, Anion Gap 11, Blood Urea Nitrogen 

14, Creatinine 0.9, Estimat Glomerular Filtration Rate > 60, Glucose Level 96, 

Calcium Level 10.0, Phosphorus Level 1.9L, Magnesium Level 2.1


Height (Feet):  6


Weight (Pounds):  198


General Appearance:  no apparent distress


EENT:  normal ENT inspection


Neck:  supple


Cardiovascular:  normal rate


Respiratory/Chest:  decreased breath sounds


Abdomen:  normal bowel sounds, non tender, soft


Extremities:  non-tender











Miles Pickard MD Aug 10, 2018 10:01

## 2018-08-10 NOTE — PULMONOLGY CRITICAL CARE NOTE
Critical Care - Asmt/Plan


Problems:  


(1) Acute on chronic respiratory failure


(2) Acute on chronic renal insufficiency


(3) Severe sepsis


(4) Vegetative state


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


(6) Diabetes mellitus


Respiratory:  monitor respiratory rate, adjust FIO2, CXR


Cardiac:  continue to monitor HR/BP


Renal:  F/U I&O


Infectious Disease:  check cultures, continue antibiotics


Gastrointestinal:  continue feedings/current rate


Endocrine:  monitor blood sugar, check TSH, check HgA1C


Hematologic:  monitor H/H, transfuse if hgb<8.5


Neurologic:  PRN Morphine, keep patient comfortable


Disposition:  keep in ICU


Notes Reviewed:  cardio, renal


Discussed with:  nurses, consultants, 





Critical Care - Objective





Last 24 Hour Vital Signs








  Date Time  Temp Pulse Resp B/P (MAP) Pulse Ox O2 Delivery O2 Flow Rate FiO2


 


8/10/18 09:13  79 16     30


 


8/10/18 08:00        30


 


8/10/18 08:00  75      


 


8/10/18 08:00      Mechanical Ventilator  


 


8/10/18 08:00 98.1 73 16 120/76 (91) 99   





 98.1       


 


8/10/18 07:24  69 16     30


 


8/10/18 05:16  74 18     30


 


8/10/18 04:00      Mechanical Ventilator  


 


8/10/18 04:00        30


 


8/10/18 04:00  76      


 


8/10/18 04:00 98.3 70 19 127/81 (96) 100   





 98.3       


 


8/10/18 03:22  80 18     30


 


8/10/18 01:22  72 18     30


 


8/10/18 00:00        30


 


8/10/18 00:00      Mechanical Ventilator  


 


8/10/18 00:00  79      


 


8/10/18 00:00 98.1 80 18 135/72 (93) 100   





 98.1       


 


8/9/18 22:52  78 18     30


 


8/9/18 21:29  90 19     30


 


8/9/18 20:00      Mechanical Ventilator  


 


8/9/18 20:00 98.2 70 19 133/71 (91) 100   





 98.2       


 


8/9/18 20:00        30


 


8/9/18 20:00  70      


 


8/9/18 18:31  71 16     30


 


8/9/18 16:38  75 17     30


 


8/9/18 16:00      Mechanical Ventilator  


 


8/9/18 16:00 97.5 70 17 126/77 (93) 100   





 97.5       


 


8/9/18 16:00  69      


 


8/9/18 16:00        30


 


8/9/18 14:55  66 16     30


 


8/9/18 13:10  68 16     30


 


8/9/18 12:00      Mechanical Ventilator  


 


8/9/18 12:00  64      


 


8/9/18 12:00 98.1 65 16 129/76 (93) 100   





 98.1       


 


8/9/18 12:00        30


 


8/9/18 10:56  66 17     30








Status:  awake


Condition:  critical


Neck:  full ROM


Heart:  HR/BP stable, regular


Abdomen:  non-tender, feeding tube


Extremities:  no C/C/E


Decubiti:  location


Accucheck:  113





Critical Care - Subjective


ROS Limited/Unobtainable:  Yes


Condition:  critical


FI02:  30


Vent Support Breath Rate:  16


Vent Support Mode:  AC


Vent Tidal Volume:  600


Sputum Amount:  Moderate


PEEP:  5.0


PIP:  32


Tube Feeding Amount:  50


I&O:











Intake and Output  


 


 8/9/18 8/10/18





 19:00 07:00


 


Intake Total 985 ml 600 ml


 


Output Total 800 ml 


 


Balance 185 ml 600 ml


 


  


 


Free Water 150 ml 100 ml


 


IV Total 305 ml 


 


Tube Feeding 530 ml 500 ml


 


Output Urine Total 550 ml 


 


Stool Total 250 ml 








Labs:





Laboratory Tests








Test


  8/10/18


04:00


 


White Blood Count


  8.7 K/UL


(4.8-10.8)


 


Red Blood Count


  4.61 M/UL


(4.70-6.10)  L


 


Hemoglobin


  12.9 G/DL


(14.2-18.0)  L


 


Hematocrit


  40.3 %


(42.0-52.0)  L


 


Mean Corpuscular Volume 87 FL (80-99)  


 


Mean Corpuscular Hemoglobin


  28.0 PG


(27.0-31.0)


 


Mean Corpuscular Hemoglobin


Concent 32.0 G/DL


(32.0-36.0)


 


Red Cell Distribution Width


  16.4 %


(11.6-14.8)  H


 


Platelet Count


  315 K/UL


(150-450)


 


Mean Platelet Volume


  7.3 FL


(6.5-10.1)


 


Neutrophils (%) (Auto)


  64.1 %


(45.0-75.0)


 


Lymphocytes (%) (Auto)


  18.4 %


(20.0-45.0)  L


 


Monocytes (%) (Auto)


  10.9 %


(1.0-10.0)  H


 


Eosinophils (%) (Auto)


  5.3 %


(0.0-3.0)  H


 


Basophils (%) (Auto)


  1.3 %


(0.0-2.0)


 


Sodium Level


  133 MMOL/L


(136-145)  L


 


Potassium Level


  4.1 MMOL/L


(3.5-5.1)


 


Chloride Level


  100 MMOL/L


()


 


Carbon Dioxide Level


  22 MMOL/L


(21-32)


 


Anion Gap


  11 mmol/L


(5-15)


 


Blood Urea Nitrogen


  14 mg/dL


(7-18)


 


Creatinine


  0.9 MG/DL


(0.55-1.30)


 


Estimat Glomerular Filtration


Rate > 60 mL/min


(>60)


 


Glucose Level


  96 MG/DL


()


 


Calcium Level


  10.0 MG/DL


(8.5-10.1)


 


Phosphorus Level


  1.9 MG/DL


(2.5-4.9)  L


 


Magnesium Level


  2.1 MG/DL


(1.8-2.4)

















Yury Pena MD Aug 10, 2018 10:39

## 2018-08-10 NOTE — PROGRESS NOTE
DATE:  08/09/2018



SUBJECTIVE:  The patient is awake, alert, and afebrile.  Hemodynamically

stable.  His tongue is protruded from his oral cavity.



PHYSICAL EXAMINATION:

VITAL SIGNS:  Blood pressure 133/71, his pulse is 70, respirations 19, and

temperature 98.2.

HEENT:  Eyes were normal.  ENT, mucous membranes were moist and intact.

NECK:  Supple with no JVD without lymph nodes.  Tracheostomy site is

clean.

LUNGS:  Clear without rhonchi, rales, or wheezing.  Secretions are small,

thin, and whitish.

HEART:  Normal sounds with regular beats.  There is no S3, S4, or

pericardial rub.

ABDOMEN:  Soft and nontender with normal bowel sounds.  Gastrostomy site is

clean.

EXTREMITIES:  Warm without cyanosis, clubbing, or edema.



LABORATORY AND DIAGNOSTIC DATA:  Hemoglobin is 13.0, hematocrit 41.3 with

MCV of 86, WBC of 8.1, and platelet is 316.  His BUN and creatinine is 12

and 0.9 respectively.  His sodium is 134, potassium 4.0, chloride 100, CO2

is 21, his glucose is 108, and his calcium is 10.3.  SGOT, SGPT, and

bilirubin _____.  His albumin is 3.7.  His total protein is 9.3.  Chest

x-ray done today revealed no acute process and small left pleural

effusion.



IMPRESSION:  The patient is in stable condition.









  ______________________________________________

  Etienne Bolom M.D.





DR:  JAMIE

D:  08/09/2018 23:49

T:  08/10/2018 03:29

JOB#:  [id number]

CC:

## 2018-08-10 NOTE — INFECTIOUS DISEASES PROG NOTE
Assessment/Plan


Assessment/Plan


Sepsis, resolving- 2ry to UTI and Bacteremia Blood cultures postive 4/4 bottles 

with [facility] Unclear source will need to R/O Endocarditis  Possible PNA initially 


 -u/a wbc 40-60, nit neg, leuk +2; ucx >100k E. aerogenes (S cefepime, cipro/

levo; R Zosyn)


 -BCX 4/4 E. aerogenes, prob Amp C (S cefepime, cipro/levo; R Zosyn), P. 

mirabilis ESBL (S Zosyn, Ertapenem); repeta 7/26 1/4 CoNS (suspect contaminant)

, 7/28 Staph f/u final results if CoNS may need TTE and IE work up.


  -CXR 7/27: Increased left pleural effusion, over 3 days. Overall decreased 

interstitial congestion. Right infrahilar atelectasis


 -CXR: Cardiomegaly. Mild interstitial congestion. Suspect small bilateral 

pleural effusions


 -CT abd/p: Right lower quadrant double barrel colostomy, as described. Small 

peristomal hernia contains bowel loops without evidence of obstruction or 

strangulation. Left renal staghorn calculus. Bilateral intrarenal calyceal 

calculi. Borderline hydronephrosis on the right without evidence of downstream 

obstructive lesion. May indicate mild ureteral pelvic junction obstruction. 

Somewhat atrophic left kidney. Inspissated contrast ball within the distal 

sigmoid colon. Gastrostomy in good position. Nonspecific bilateral perinephric 

fat stranding, could indicate pyelonephritis or could be chronic. Guzmán 

catheter in place. Apparent bladder wall thickening, possibly an artifact of 

under distention but cystitis is not excludable, particularly in view of mild 

perivesical fat stranding. Bilateral pulmonary parenchymal atelectasis and 

consolidation


  -Blood Cx from PICC CoNS 2/2 bottles Peripheral Neg- (May be contaminant but 

will repeat blood Cx given new leukocytosis if positive will need ECHO.


  - Blood Cx 7/30/18 - [facility]PC - Will need TTE for IE work up and the PICC removed.


 


PICC line infection/colonization - TTE negative. Blood cultures only positive 

from PICC. Not positive from Peripheral 


- Will repeat blood cultures x 1 to confirm clearance after PICC replaced. [place] 

suspicion for IE.


- PICC replace 8/1/18





Fever/Leukocytosis; Resolved - i recurs Re-evaluate lines, Diarrhea? C. dif? 


BRAYDON, improving


Lactic acidosis, resolved





chronic resp failure trach/vent dependant


BPH


GERD


 HTN


DM2


seizure disorder


colostomy


 s/p GT 


constipation





VRE and MRSA colonized


 


Plan:


- Continue empiric IV Vancomycin #13/18 for bacteremia/line infection - Abx end 

date 8/15/18 if STACI negative


- Recommend Transesophageal ECHO to r/o IE





-Last day of Ertapenem #14/14 for Amp-C Enterobacter and ESBL P.mirabilis 

bacteremia ; will treat for 14 days - End date 8/9/18


    -7/27 SP Zosyn #4





- Monitor CBC/CMP, temperatures


- F/U STACI


- Pulmonary care








Thank you for this consultation. Will continue to follow along with you.





Subjective


Allergies:  


Coded Allergies:  


     AZTREONAM (Verified  Allergy, Unknown, 7/23/18)


Subjective


No acute changes


On Vent still and stable at 30% O2 sating well


Remains Afebrile





Objective


Vital Signs





Last 24 Hour Vital Signs








  Date Time  Temp Pulse Resp B/P (MAP) Pulse Ox O2 Delivery O2 Flow Rate FiO2


 


8/10/18 07:24  69 16     30


 


8/10/18 05:16  74 18     30


 


8/10/18 04:00      Mechanical Ventilator  


 


8/10/18 04:00        30


 


8/10/18 04:00  76      


 


8/10/18 04:00 98.3 70 19 127/81 (96) 100   





 98.3       


 


8/10/18 03:22  80 18     30


 


8/10/18 01:22  72 18     30


 


8/10/18 00:00        30


 


8/10/18 00:00      Mechanical Ventilator  


 


8/10/18 00:00  79      


 


8/10/18 00:00 98.1 80 18 135/72 (93) 100   





 98.1       


 


8/9/18 22:52  78 18     30


 


8/9/18 21:29  90 19     30


 


8/9/18 20:00      Mechanical Ventilator  


 


8/9/18 20:00 98.2 70 19 133/71 (91) 100   





 98.2       


 


8/9/18 20:00        30


 


8/9/18 20:00  70      


 


8/9/18 18:31  71 16     30


 


8/9/18 16:38  75 17     30


 


8/9/18 16:00      Mechanical Ventilator  


 


8/9/18 16:00 97.5 70 17 126/77 (93) 100   





 97.5       


 


8/9/18 16:00  69      


 


8/9/18 16:00        30


 


8/9/18 14:55  66 16     30


 


8/9/18 13:10  68 16     30


 


8/9/18 12:00      Mechanical Ventilator  


 


8/9/18 12:00  64      


 


8/9/18 12:00 98.1 65 16 129/76 (93) 100   





 98.1       


 


8/9/18 12:00        30


 


8/9/18 10:56  66 17     30


 


8/9/18 08:57  69 16     30


 


8/9/18 08:16  68      


 


8/9/18 08:00 98.2 70 16 122/82 (95) 99   





 98.2       


 


8/9/18 08:00        30


 


8/9/18 08:00      Mechanical Ventilator  








Height (Feet):  6


Weight (Pounds):  198


Objective


GENERAL:  NAD. On vent 30% O2


HEENT:  NCAT, DMM, Tracheostomy (C/D/I)


PULM: Mild crackles in bases B/L, No wheezing


CARDIOVASCULAR:  RRR, S1 and S2. No M/R/G.


ABDOMEN:  Obese and nondistended. +BS, The G-tube and stoma noted.


EXTREMITIES:  No edema.  1+ pedal pulses.





Laboratory Tests








Test


  8/10/18


04:00


 


White Blood Count


  8.7 K/UL


(4.8-10.8)


 


Red Blood Count


  4.61 M/UL


(4.70-6.10)  L


 


Hemoglobin


  12.9 G/DL


(14.2-18.0)  L


 


Hematocrit


  40.3 %


(42.0-52.0)  L


 


Mean Corpuscular Volume 87 FL (80-99)  


 


Mean Corpuscular Hemoglobin


  28.0 PG


(27.0-31.0)


 


Mean Corpuscular Hemoglobin


Concent 32.0 G/DL


(32.0-36.0)


 


Red Cell Distribution Width


  16.4 %


(11.6-14.8)  H


 


Platelet Count


  315 K/UL


(150-450)


 


Mean Platelet Volume


  7.3 FL


(6.5-10.1)


 


Neutrophils (%) (Auto)


  64.1 %


(45.0-75.0)


 


Lymphocytes (%) (Auto)


  18.4 %


(20.0-45.0)  L


 


Monocytes (%) (Auto)


  10.9 %


(1.0-10.0)  H


 


Eosinophils (%) (Auto)


  5.3 %


(0.0-3.0)  H


 


Basophils (%) (Auto)


  1.3 %


(0.0-2.0)


 


Sodium Level


  133 MMOL/L


(136-145)  L


 


Potassium Level


  4.1 MMOL/L


(3.5-5.1)


 


Chloride Level


  100 MMOL/L


()


 


Carbon Dioxide Level


  22 MMOL/L


(21-32)


 


Anion Gap


  11 mmol/L


(5-15)


 


Blood Urea Nitrogen


  14 mg/dL


(7-18)


 


Creatinine


  0.9 MG/DL


(0.55-1.30)


 


Estimat Glomerular Filtration


Rate > 60 mL/min


(>60)


 


Glucose Level


  96 MG/DL


()


 


Calcium Level


  10.0 MG/DL


(8.5-10.1)


 


Phosphorus Level


  1.9 MG/DL


(2.5-4.9)  L


 


Magnesium Level


  2.1 MG/DL


(1.8-2.4)











Current Medications








 Medications


  (Trade)  Dose


 Ordered  Sig/Jan


 Route


 PRN Reason  Start Time


 Stop Time Status Last Admin


Dose Admin


 


 Acetaminophen


  (Tylenol)  650 mg  Q4H  PRN


 ORAL


 FEVER  7/26/18 14:00


 8/23/18 09:59  8/6/18 21:04


 


 


 Baclofen


  (Lioresal)  10 mg  EVERY 8  HOURS


 GT


   7/30/18 14:00


 8/23/18 12:59  8/10/18 06:23


 


 


 Chlorhexidine


 Gluconate


  (Elaine-Hex 2%)  1 applic  DAILY@2000


 TOPIC


   7/26/18 20:00


 8/23/18 19:59  8/9/18 19:47


 


 


 Dextrose


  (Dextrose 50%)  25 ml  STAT  PRN


 IV


 Hypoglycemia  7/27/18 08:45


 8/24/18 08:44   


 


 


 Dextrose


  (Dextrose 50%)  50 ml  STAT  PRN


 IV


 Hypoglycemia  7/27/18 08:45


 8/24/18 08:44   


 


 


 Ertapenem 1 gm/


 Sodium Chloride  55 ml @ 


 110 mls/hr  Q24H


 IVPB


   7/27/18 15:00


 8/10/18 14:59  8/9/18 15:38


 


 


 Heparin Sodium


  (Porcine)


  (Heparin 5000


 units/ml)  5,000 units  EVERY 12  HOURS


 SUBQ


   7/26/18 21:00


 8/23/18 20:59  8/9/18 20:41


 


 


 Insulin Aspart


  (NovoLOG)    Q6HR


 SUBQ


   7/26/18 18:00


 8/24/18 11:29  8/10/18 06:20


 


 


 Lansoprazole


  (Prevacid)  30 mg  DAILY


 GT


   7/27/18 09:00


 8/25/18 08:59  8/9/18 09:31


 


 


 Levetiracetam


  (Keppra)  1,000 mg  Q8HR


 GT


   7/26/18 14:00


 8/23/18 12:59  8/10/18 06:22


 


 


 Ondansetron HCl


  (Zofran)  4 mg  Q6H  PRN


 IVP


 Nausea & Vomiting  7/26/18 16:00


 8/23/18 09:59   


 


 


 Polyethylene


 Glycol


  (Miralax)  17 gm  BEDTIME


 GT


   7/30/18 21:00


 8/24/18 20:59  8/9/18 20:41


 


 


 Vancomycin HCl


  (Vanco rx to


 dose)  1 ea  DAILY  PRN


 MISC


 PER RX PROTOCOL  7/28/18 13:15


 8/27/18 13:14   


 


 


 Vancomycin HCl/


 Dextrose  250 ml @ 


 125 mls/hr  Q18H


 IVPB


   8/8/18 21:00


 8/15/18 20:59  8/9/18 15:39


 

















Robert Williamson M.D. Aug 10, 2018 07:34

## 2018-08-10 NOTE — NEPHROLOGY PROGRESS NOTE
Assessment/Plan


Assessment/Plan


1. BRAYDON- resolved


2. Sepsis- on Abx. 


3. Chronic Resp FL- trached on vent 


4. Hyponatremia - Na stable


5. SZ- Keppra


    -sodium 133-135


6. Hypophos- replace today





Subjective


Date patient seen:  Aug 10, 2018


Time patient seen:  09:00


ROS Limited/Unobtainable:  Yes


Allergies:  


Coded Allergies:  


     AZTREONAM (Verified  Allergy, Unknown, 7/23/18)


Subjective


Patient trached/vent awaiting DC





Objective





Last 24 Hour Vital Signs








  Date Time  Temp Pulse Resp B/P (MAP) Pulse Ox O2 Delivery O2 Flow Rate FiO2


 


8/10/18 08:00        30


 


8/10/18 08:00      Mechanical Ventilator  


 


8/10/18 08:00 98.1 73 16 120/76 (91) 99   





 98.1       


 


8/10/18 07:24  69 16     30


 


8/10/18 05:16  74 18     30


 


8/10/18 04:00      Mechanical Ventilator  


 


8/10/18 04:00        30


 


8/10/18 04:00  76      


 


8/10/18 04:00 98.3 70 19 127/81 (96) 100   





 98.3       


 


8/10/18 03:22  80 18     30


 


8/10/18 01:22  72 18     30


 


8/10/18 00:00        30


 


8/10/18 00:00      Mechanical Ventilator  


 


8/10/18 00:00  79      


 


8/10/18 00:00 98.1 80 18 135/72 (93) 100   





 98.1       


 


8/9/18 22:52  78 18     30


 


8/9/18 21:29  90 19     30


 


8/9/18 20:00      Mechanical Ventilator  


 


8/9/18 20:00 98.2 70 19 133/71 (91) 100   





 98.2       


 


8/9/18 20:00        30


 


8/9/18 20:00  70      


 


8/9/18 18:31  71 16     30


 


8/9/18 16:38  75 17     30


 


8/9/18 16:00      Mechanical Ventilator  


 


8/9/18 16:00 97.5 70 17 126/77 (93) 100   





 97.5       


 


8/9/18 16:00  69      


 


8/9/18 16:00        30


 


8/9/18 14:55  66 16     30


 


8/9/18 13:10  68 16     30


 


8/9/18 12:00      Mechanical Ventilator  


 


8/9/18 12:00  64      


 


8/9/18 12:00 98.1 65 16 129/76 (93) 100   





 98.1       


 


8/9/18 12:00        30


 


8/9/18 10:56  66 17     30

















Intake and Output  


 


 8/9/18 8/10/18





 19:00 07:00


 


Intake Total 985 ml 600 ml


 


Output Total 800 ml 


 


Balance 185 ml 600 ml


 


  


 


Free Water 150 ml 100 ml


 


IV Total 305 ml 


 


Tube Feeding 530 ml 500 ml


 


Output Urine Total 550 ml 


 


Stool Total 250 ml 








Laboratory Tests


8/10/18 04:00: 


White Blood Count 8.7, Red Blood Count 4.61L, Hemoglobin 12.9L, Hematocrit 40.3L

, Mean Corpuscular Volume 87, Mean Corpuscular Hemoglobin 28.0, Mean 

Corpuscular Hemoglobin Concent 32.0, Red Cell Distribution Width 16.4H, 

Platelet Count 315, Mean Platelet Volume 7.3, Neutrophils (%) (Auto) 64.1, 

Lymphocytes (%) (Auto) 18.4L, Monocytes (%) (Auto) 10.9H, Eosinophils (%) (Auto

) 5.3H, Basophils (%) (Auto) 1.3, Sodium Level 133L, Potassium Level 4.1, 

Chloride Level 100, Carbon Dioxide Level 22, Anion Gap 11, Blood Urea Nitrogen 

14, Creatinine 0.9, Estimat Glomerular Filtration Rate > 60, Glucose Level 96, 

Calcium Level 10.0, Phosphorus Level 1.9L, Magnesium Level 2.1


Height (Feet):  6


Weight (Pounds):  198


General Appearance:  WD/WN, no apparent distress


EENT:  PERRL/EOMI


Neck:  non-tender, normal alignment


Cardiovascular:  normal rate, regular rhythm


Respiratory/Chest:  lungs clear, normal breath sounds


Abdomen:  non tender, soft


Edema:  no edema noted Arm (L), no edema noted Arm (R), no edema noted Leg (L), 

no edema noted Leg (R), no edema noted Pedal (L), no edema noted Pedal (R), no 

edema noted Generalized











Jewel Templeton M.D. Aug 10, 2018 09:02

## 2018-08-10 NOTE — CARDIOLOGY PROGRESS NOTE
Assessment/Plan


Assessment/Plan


Assessment:


(1) Acute on chronic respiratory failure


(2) Acute on chronic renal insufficiency


(3) Severe sepsis


(4) Vegetative state


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


(6) Diabetes mellitus





Plan:


Continue antibiotics


Echocardiogram reviewed- no endocarditis


STACI for next Monday


Patient afebrile, normal WBC otherwise


Continue trach care and tube feeds


Continue keppra for seizures





Subjective


Cardiovascular:  Reports: no symptoms


Respiratory:  Reports: no symptoms


Gastrointestinal/Abdominal:  Reports: no symptoms


Genitourinary:  Reports: no symptoms


Subjective


Afebrile, vitals stable, Telemetry shows sinus rhythm. No acute events


Ventilator settings: portex 7, AC 16, TV: 600, FiO2: 30%, PEEP: 5. GT is in 

place running glucerna 1.2 @ 65 ml/hr. No residual present. Colostomy bag in 

place.


STACI was supposed to be done today but STACI scheduled for 8/13/18 at 1100, no 

other opening downstairs.





Objective





Last 24 Hour Vital Signs








  Date Time  Temp Pulse Resp B/P (MAP) Pulse Ox O2 Delivery O2 Flow Rate FiO2


 


8/10/18 11:10  75 19     30


 


8/10/18 09:13  79 16     30


 


8/10/18 08:00        30


 


8/10/18 08:00  75      


 


8/10/18 08:00      Mechanical Ventilator  


 


8/10/18 08:00 98.1 73 16 120/76 (91) 99   





 98.1       


 


8/10/18 07:24  69 16     30


 


8/10/18 05:16  74 18     30


 


8/10/18 04:00      Mechanical Ventilator  


 


8/10/18 04:00        30


 


8/10/18 04:00  76      


 


8/10/18 04:00 98.3 70 19 127/81 (96) 100   





 98.3       


 


8/10/18 03:22  80 18     30


 


8/10/18 01:22  72 18     30


 


8/10/18 00:00        30


 


8/10/18 00:00      Mechanical Ventilator  


 


8/10/18 00:00  79      


 


8/10/18 00:00 98.1 80 18 135/72 (93) 100   





 98.1       


 


8/9/18 22:52  78 18     30


 


8/9/18 21:29  90 19     30


 


8/9/18 20:00      Mechanical Ventilator  


 


8/9/18 20:00 98.2 70 19 133/71 (91) 100   





 98.2       


 


8/9/18 20:00        30


 


8/9/18 20:00  70      


 


8/9/18 18:31  71 16     30


 


8/9/18 16:38  75 17     30


 


8/9/18 16:00      Mechanical Ventilator  


 


8/9/18 16:00 97.5 70 17 126/77 (93) 100   





 97.5       


 


8/9/18 16:00  69      


 


8/9/18 16:00        30


 


8/9/18 14:55  66 16     30


 


8/9/18 13:10  68 16     30


 


8/9/18 12:00      Mechanical Ventilator  


 


8/9/18 12:00  64      


 


8/9/18 12:00 98.1 65 16 129/76 (93) 100   





 98.1       


 


8/9/18 12:00        30








General Appearance:  no apparent distress, on vent


EENT:  PERRL/EOMI, normal ENT inspection


Neck:  non-tender, normal alignment


Rhythm:  NSR


Cardiovascular:  normal peripheral pulses, normal rate


Respiratory/Chest:  chest wall non-tender, normal breath sounds


Abdomen:  normal bowel sounds, non tender


Extremities:  normal range of motion, non-tender


Neurologic:  CNs II-XII grossly normal











Intake and Output  


 


 8/9/18 8/10/18





 19:00 07:00


 


Intake Total 985 ml 600 ml


 


Output Total 800 ml 


 


Balance 185 ml 600 ml


 


  


 


Free Water 150 ml 100 ml


 


IV Total 305 ml 


 


Tube Feeding 530 ml 500 ml


 


Output Urine Total 550 ml 


 


Stool Total 250 ml 











Laboratory Tests








Test


  8/10/18


04:00


 


White Blood Count


  8.7 K/UL


(4.8-10.8)


 


Red Blood Count


  4.61 M/UL


(4.70-6.10)  L


 


Hemoglobin


  12.9 G/DL


(14.2-18.0)  L


 


Hematocrit


  40.3 %


(42.0-52.0)  L


 


Mean Corpuscular Volume 87 FL (80-99)  


 


Mean Corpuscular Hemoglobin


  28.0 PG


(27.0-31.0)


 


Mean Corpuscular Hemoglobin


Concent 32.0 G/DL


(32.0-36.0)


 


Red Cell Distribution Width


  16.4 %


(11.6-14.8)  H


 


Platelet Count


  315 K/UL


(150-450)


 


Mean Platelet Volume


  7.3 FL


(6.5-10.1)


 


Neutrophils (%) (Auto)


  64.1 %


(45.0-75.0)


 


Lymphocytes (%) (Auto)


  18.4 %


(20.0-45.0)  L


 


Monocytes (%) (Auto)


  10.9 %


(1.0-10.0)  H


 


Eosinophils (%) (Auto)


  5.3 %


(0.0-3.0)  H


 


Basophils (%) (Auto)


  1.3 %


(0.0-2.0)


 


Sodium Level


  133 MMOL/L


(136-145)  L


 


Potassium Level


  4.1 MMOL/L


(3.5-5.1)


 


Chloride Level


  100 MMOL/L


()


 


Carbon Dioxide Level


  22 MMOL/L


(21-32)


 


Anion Gap


  11 mmol/L


(5-15)


 


Blood Urea Nitrogen


  14 mg/dL


(7-18)


 


Creatinine


  0.9 MG/DL


(0.55-1.30)


 


Estimat Glomerular Filtration


Rate > 60 mL/min


(>60)


 


Glucose Level


  96 MG/DL


()


 


Calcium Level


  10.0 MG/DL


(8.5-10.1)


 


Phosphorus Level


  1.9 MG/DL


(2.5-4.9)  L


 


Magnesium Level


  2.1 MG/DL


(1.8-2.4)

















Robert Hernandez M.D. Aug 10, 2018 11:26

## 2018-08-10 NOTE — GENERAL PROGRESS NOTE
Assessment/Plan


Status:  stable


Assessment/Plan


# Leukocytosis - Sepsis with elevated temperature of 103 degrees Fahrenheit.  

Had uti v bacteremia (CoNS) on abx, has improved.


--> Pt on antibiotics, has received a dose of Zosyn and currently is on 

vancomycin q.12 h. now on cefepime


--> Appreciate Infectious Disease recs


--> 2D echo per ID performed on 8/1: No discrete vegetations seen, however SBE 

may not be excluded by transthoracic 2-D echo.


--> 8/10: WBC of 8.7, wnl 


--> Currently afebrile 


# Anemia of chronic disease. No hemolysis, peripheral smear reviewed, ferritin 

was elevated.


--> Continue to closely monitor


--> Hgb goal >7


--> 8/10: Hgb 12.9


# Acute kidney injury.  


--> Currently on IV fluids, IV bolus given to see if the patient responds along 

with IV pressor as needed.  


--> Closely monitor with Nephrology team.


# Septic shock, use antibiotic per ID services.


--> potential uti, on abx and bacteremia


# Respiratory failure, status post tracheostomy, on mechanical ventilation per 

Dr. Pena


# Hypercalcemia. Monitor PTH and calcium level.


--> 8/10: 10.0, wnl





The time the note was entered does not necessarily correspond to the time the 

patient was seen.





Subjective


Date patient seen:  Aug 10, 2018


ROS Limited/Unobtainable:  Yes


Hematologic/Lymphatic:  Reports: anemia


Allergies:  


Coded Allergies:  


     AZTREONAM (Verified  Allergy, Unknown, 7/23/18)


All Systems:  reviewed and negative except above


Subjective


Pt remains on vent. No acute events. H/H stable. Vitals are stable.





Objective





Last 24 Hour Vital Signs








  Date Time  Temp Pulse Resp B/P (MAP) Pulse Ox O2 Delivery O2 Flow Rate FiO2


 


8/10/18 08:00        30


 


8/10/18 08:00      Mechanical Ventilator  


 


8/10/18 07:24  69 16     30


 


8/10/18 05:16  74 18     30


 


8/10/18 04:00      Mechanical Ventilator  


 


8/10/18 04:00        30


 


8/10/18 04:00  76      


 


8/10/18 04:00 98.3 70 19 127/81 (96) 100   





 98.3       


 


8/10/18 03:22  80 18     30


 


8/10/18 01:22  72 18     30


 


8/10/18 00:00        30


 


8/10/18 00:00      Mechanical Ventilator  


 


8/10/18 00:00  79      


 


8/10/18 00:00 98.1 80 18 135/72 (93) 100   





 98.1       


 


8/9/18 22:52  78 18     30


 


8/9/18 21:29  90 19     30


 


8/9/18 20:00      Mechanical Ventilator  


 


8/9/18 20:00 98.2 70 19 133/71 (91) 100   





 98.2       


 


8/9/18 20:00        30


 


8/9/18 20:00  70      


 


8/9/18 18:31  71 16     30


 


8/9/18 16:38  75 17     30


 


8/9/18 16:00      Mechanical Ventilator  


 


8/9/18 16:00 97.5 70 17 126/77 (93) 100   





 97.5       


 


8/9/18 16:00  69      


 


8/9/18 16:00        30


 


8/9/18 14:55  66 16     30


 


8/9/18 13:10  68 16     30


 


8/9/18 12:00      Mechanical Ventilator  


 


8/9/18 12:00  64      


 


8/9/18 12:00 98.1 65 16 129/76 (93) 100   





 98.1       


 


8/9/18 12:00        30


 


8/9/18 10:56  66 17     30


 


8/9/18 08:57  69 16     30


 


8/9/18 08:16  68      

















Intake and Output  


 


 8/9/18 8/10/18





 19:00 07:00


 


Intake Total 985 ml 600 ml


 


Output Total 800 ml 


 


Balance 185 ml 600 ml


 


  


 


Free Water 150 ml 100 ml


 


IV Total 305 ml 


 


Tube Feeding 530 ml 500 ml


 


Output Urine Total 550 ml 


 


Stool Total 250 ml 








Laboratory Tests


8/10/18 04:00: 


White Blood Count 8.7, Red Blood Count 4.61L, Hemoglobin 12.9L, Hematocrit 40.3L

, Mean Corpuscular Volume 87, Mean Corpuscular Hemoglobin 28.0, Mean 

Corpuscular Hemoglobin Concent 32.0, Red Cell Distribution Width 16.4H, 

Platelet Count 315, Mean Platelet Volume 7.3, Neutrophils (%) (Auto) 64.1, 

Lymphocytes (%) (Auto) 18.4L, Monocytes (%) (Auto) 10.9H, Eosinophils (%) (Auto

) 5.3H, Basophils (%) (Auto) 1.3, Sodium Level 133L, Potassium Level 4.1, 

Chloride Level 100, Carbon Dioxide Level 22, Anion Gap 11, Blood Urea Nitrogen 

14, Creatinine 0.9, Estimat Glomerular Filtration Rate > 60, Glucose Level 96, 

Calcium Level 10.0, Phosphorus Level 1.9L, Magnesium Level 2.1


Height (Feet):  6


Weight (Pounds):  198


General Appearance:  no apparent distress


EENT:  PERRL/EOMI


Neck:  normal alignment


Cardiovascular:  normal peripheral pulses


Respiratory/Chest:  no respiratory distress


Abdomen:  soft











Tejas Gerber MD Aug 10, 2018 08:09

## 2018-08-11 VITALS — DIASTOLIC BLOOD PRESSURE: 89 MMHG | SYSTOLIC BLOOD PRESSURE: 134 MMHG

## 2018-08-11 VITALS — SYSTOLIC BLOOD PRESSURE: 122 MMHG | DIASTOLIC BLOOD PRESSURE: 79 MMHG

## 2018-08-11 VITALS — SYSTOLIC BLOOD PRESSURE: 135 MMHG | DIASTOLIC BLOOD PRESSURE: 79 MMHG

## 2018-08-11 VITALS — DIASTOLIC BLOOD PRESSURE: 85 MMHG | SYSTOLIC BLOOD PRESSURE: 120 MMHG

## 2018-08-11 VITALS — SYSTOLIC BLOOD PRESSURE: 128 MMHG | DIASTOLIC BLOOD PRESSURE: 78 MMHG

## 2018-08-11 VITALS — DIASTOLIC BLOOD PRESSURE: 67 MMHG | SYSTOLIC BLOOD PRESSURE: 127 MMHG

## 2018-08-11 VITALS — DIASTOLIC BLOOD PRESSURE: 82 MMHG | SYSTOLIC BLOOD PRESSURE: 127 MMHG

## 2018-08-11 LAB
ADD MANUAL DIFF: NO
ANION GAP SERPL CALC-SCNC: 11 MMOL/L (ref 5–15)
BASOPHILS NFR BLD AUTO: 1.9 % (ref 0–2)
BUN SERPL-MCNC: 16 MG/DL (ref 7–18)
CALCIUM SERPL-MCNC: 9.8 MG/DL (ref 8.5–10.1)
CHLORIDE SERPL-SCNC: 100 MMOL/L (ref 98–107)
CO2 SERPL-SCNC: 21 MMOL/L (ref 21–32)
CREAT SERPL-MCNC: 1.1 MG/DL (ref 0.55–1.3)
EOSINOPHIL NFR BLD AUTO: 3.6 % (ref 0–3)
ERYTHROCYTE [DISTWIDTH] IN BLOOD BY AUTOMATED COUNT: 16 % (ref 11.6–14.8)
HCT VFR BLD CALC: 38 % (ref 42–52)
HGB BLD-MCNC: 12.7 G/DL (ref 14.2–18)
LYMPHOCYTES NFR BLD AUTO: 25.4 % (ref 20–45)
MCV RBC AUTO: 86 FL (ref 80–99)
MONOCYTES NFR BLD AUTO: 10.6 % (ref 1–10)
NEUTROPHILS NFR BLD AUTO: 58.5 % (ref 45–75)
PHOSPHATE SERPL-MCNC: 2.4 MG/DL (ref 2.5–4.9)
PLATELET # BLD: 327 K/UL (ref 150–450)
POTASSIUM SERPL-SCNC: 3.8 MMOL/L (ref 3.5–5.1)
RBC # BLD AUTO: 4.4 M/UL (ref 4.7–6.1)
SODIUM SERPL-SCNC: 132 MMOL/L (ref 136–145)
WBC # BLD AUTO: 8.1 K/UL (ref 4.8–10.8)

## 2018-08-11 RX ADMIN — INSULIN ASPART SCH UNITS: 100 INJECTION, SOLUTION INTRAVENOUS; SUBCUTANEOUS at 12:00

## 2018-08-11 RX ADMIN — POLYETHYLENE GLYCOL 3350 SCH GM: 17 POWDER, FOR SOLUTION ORAL at 20:38

## 2018-08-11 RX ADMIN — INSULIN ASPART SCH UNITS: 100 INJECTION, SOLUTION INTRAVENOUS; SUBCUTANEOUS at 05:38

## 2018-08-11 RX ADMIN — HEPARIN SODIUM SCH UNITS: 5000 INJECTION INTRAVENOUS; SUBCUTANEOUS at 09:09

## 2018-08-11 RX ADMIN — LEVETIRACETAM SCH MG: 100 SOLUTION ORAL at 22:05

## 2018-08-11 RX ADMIN — LEVETIRACETAM SCH MG: 100 SOLUTION ORAL at 14:08

## 2018-08-11 RX ADMIN — CHLORHEXIDINE GLUCONATE SCH APPLIC: 213 SOLUTION TOPICAL at 20:38

## 2018-08-11 RX ADMIN — LEVETIRACETAM SCH MG: 100 SOLUTION ORAL at 05:37

## 2018-08-11 RX ADMIN — Medication SCH MLS/HR: at 20:38

## 2018-08-11 RX ADMIN — HEPARIN SODIUM SCH UNITS: 5000 INJECTION INTRAVENOUS; SUBCUTANEOUS at 20:39

## 2018-08-11 RX ADMIN — Medication SCH MLS/HR: at 02:26

## 2018-08-11 RX ADMIN — INSULIN ASPART SCH UNITS: 100 INJECTION, SOLUTION INTRAVENOUS; SUBCUTANEOUS at 18:00

## 2018-08-11 NOTE — CARDIOLOGY PROGRESS NOTE
Assessment/Plan


Status:  stable


Assessment/Plan


Assessment:


(1) Acute on chronic respiratory failure


(2) Acute on chronic renal insufficiency


(3) Severe sepsis


(4) Vegetative state


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


(6) Diabetes mellitus





Plan:


Continue antibiotics


Echocardiogram reviewed- no endocarditis


STACI for next Monday


Patient afebrile, normal WBC otherwise


Continue trach care and tube feeds


Continue keppra for seizures





Subjective


Cardiovascular:  Reports: no symptoms


Respiratory:  Reports: no symptoms


Gastrointestinal/Abdominal:  Reports: no symptoms


Genitourinary:  Reports: no symptoms


Subjective


Afebrile, vitals stable, Telemetry shows sinus rhythm. No acute events


Ventilator settings: portex 7, AC 16, TV: 600, FiO2: 30%, PEEP: 5. GT is in 

place running glucerna 1.2 @ 65 ml/hr. No residual present. Colostomy bag in 

place.


STACI was supposed to be done today but STACI scheduled for 8/13/18 at 1100, no 

other opening downstairs.





Objective





Last 24 Hour Vital Signs








  Date Time  Temp Pulse Resp B/P (MAP) Pulse Ox O2 Delivery O2 Flow Rate FiO2


 


8/11/18 09:23  77 16     30


 


8/11/18 08:00 98.2 79 16 134/89 (104) 100   





 98.2       


 


8/11/18 08:00        30


 


8/11/18 08:00      Mechanical Ventilator  


 


8/11/18 07:02  78 17     30


 


8/11/18 05:15  83 19     30


 


8/11/18 04:00        30


 


8/11/18 04:00      Mechanical Ventilator  


 


8/11/18 04:00 99.7 77 30 128/78 (95) 98   





 99.7       


 


8/11/18 03:52  98      


 


8/11/18 02:45  82 18     30


 


8/11/18 00:59 99.0       


 


8/11/18 00:58  81 18     30


 


8/11/18 00:00 100.5 78 34 135/79 (97) 99   





 100.5       


 


8/11/18 00:00      Mechanical Ventilator  


 


8/11/18 00:00 100.5       


 


8/10/18 23:43  81      


 


8/10/18 23:23  86 18     30


 


8/10/18 21:06  85 18     30


 


8/10/18 20:00 99.8 81 29 131/79 (96) 99   





 99.8       


 


8/10/18 20:00        30


 


8/10/18 20:00      Mechanical Ventilator  


 


8/10/18 19:48  83      


 


8/10/18 18:45  81 18     30


 


8/10/18 16:50  85 18     30


 


8/10/18 16:00 97.9 84 16 120/81 (94) 99   





 97.9       


 


8/10/18 16:00        30


 


8/10/18 16:00      Mechanical Ventilator  


 


8/10/18 16:00  82      


 


8/10/18 14:45  92 18     30


 


8/10/18 13:12  81 16     30


 


8/10/18 12:12  80      


 


8/10/18 12:00 97.5 81 17 118/64 (82) 99   





 97.5       


 


8/10/18 12:00        30


 


8/10/18 12:00      Mechanical Ventilator  


 


8/10/18 11:10  75 19     30








General Appearance:  no apparent distress, alert, on vent


EENT:  PERRL/EOMI, normal ENT inspection


Neck:  non-tender, normal alignment


Rhythm:  NSR


Cardiovascular:  normal peripheral pulses, normal rate, regular rhythm


Respiratory/Chest:  chest wall non-tender, lungs clear


Abdomen:  normal bowel sounds, non tender


Extremities:  normal range of motion


Neurologic:  CNs II-XII grossly normal











Intake and Output  


 


 8/10/18 8/11/18





 19:00 07:00


 


Intake Total 1205.0 ml 1030.0 ml


 


Output Total 900 ml 475 ml


 


Balance 305.0 ml 555.0 ml


 


  


 


Free Water  120 ml


 


IV Total 535.0 ml 250.0 ml


 


Tube Feeding 550 ml 600 ml


 


Other 120 ml 60 ml


 


Output Urine Total 500 ml 200 ml


 


Stool Total 400 ml 275 ml











Laboratory Tests








Test


  8/11/18


04:10


 


White Blood Count


  8.1 K/UL


(4.8-10.8)


 


Red Blood Count


  4.40 M/UL


(4.70-6.10)  L


 


Hemoglobin


  12.7 G/DL


(14.2-18.0)  L


 


Hematocrit


  38.0 %


(42.0-52.0)  L


 


Mean Corpuscular Volume 86 FL (80-99)  


 


Mean Corpuscular Hemoglobin


  28.9 PG


(27.0-31.0)


 


Mean Corpuscular Hemoglobin


Concent 33.4 G/DL


(32.0-36.0)


 


Red Cell Distribution Width


  16.0 %


(11.6-14.8)  H


 


Platelet Count


  327 K/UL


(150-450)


 


Mean Platelet Volume


  7.5 FL


(6.5-10.1)


 


Neutrophils (%) (Auto)


  58.5 %


(45.0-75.0)


 


Lymphocytes (%) (Auto)


  25.4 %


(20.0-45.0)


 


Monocytes (%) (Auto)


  10.6 %


(1.0-10.0)  H


 


Eosinophils (%) (Auto)


  3.6 %


(0.0-3.0)  H


 


Basophils (%) (Auto)


  1.9 %


(0.0-2.0)


 


Sodium Level


  132 MMOL/L


(136-145)  L


 


Potassium Level


  3.8 MMOL/L


(3.5-5.1)


 


Chloride Level


  100 MMOL/L


()


 


Carbon Dioxide Level


  21 MMOL/L


(21-32)


 


Anion Gap


  11 mmol/L


(5-15)


 


Blood Urea Nitrogen


  16 mg/dL


(7-18)


 


Creatinine


  1.1 MG/DL


(0.55-1.30)


 


Estimat Glomerular Filtration


Rate > 60 mL/min


(>60)


 


Glucose Level


  120 MG/DL


()  H


 


Calcium Level


  9.8 MG/DL


(8.5-10.1)


 


Phosphorus Level


  2.4 MG/DL


(2.5-4.9)  Robert Barrientos M.D. Aug 11, 2018 09:48

## 2018-08-11 NOTE — INFECTIOUS DISEASES PROG NOTE
Assessment/Plan


Assessment/Plan


Sepsis, resolving- 2ry to UTI and Bacteremia Blood cultures postive 4/4 bottles 

with [facility] Unclear source will need to R/O Endocarditis  Possible PNA initially 


 -u/a wbc 40-60, nit neg, leuk +2; ucx >100k E. aerogenes (S cefepime, cipro/

levo; R Zosyn)


 -BCX 4/4 E. aerogenes, prob Amp C (S cefepime, cipro/levo; R Zosyn), P. 

mirabilis ESBL (S Zosyn, Ertapenem); repeta 7/26 1/4 CoNS (suspect contaminant)

, 7/28 Staph f/u final results if CoNS may need TTE and IE work up.


  -CXR 7/27: Increased left pleural effusion, over 3 days. Overall decreased 

interstitial congestion. Right infrahilar atelectasis


 -CXR: Cardiomegaly. Mild interstitial congestion. Suspect small bilateral 

pleural effusions


 -CT abd/p: Right lower quadrant double barrel colostomy, as described. Small 

peristomal hernia contains bowel loops without evidence of obstruction or 

strangulation. Left renal staghorn calculus. Bilateral intrarenal calyceal 

calculi. Borderline hydronephrosis on the right without evidence of downstream 

obstructive lesion. May indicate mild ureteral pelvic junction obstruction. 

Somewhat atrophic left kidney. Inspissated contrast ball within the distal 

sigmoid colon. Gastrostomy in good position. Nonspecific bilateral perinephric 

fat stranding, could indicate pyelonephritis or could be chronic. Guzmán 

catheter in place. Apparent bladder wall thickening, possibly an artifact of 

under distention but cystitis is not excludable, particularly in view of mild 

perivesical fat stranding. Bilateral pulmonary parenchymal atelectasis and 

consolidation


  -Blood Cx from PICC CoNS 2/2 bottles Peripheral Neg- (May be contaminant but 

will repeat blood Cx given new leukocytosis if positive will need ECHO.


  - Blood Cx 7/30/18 - [facility] - Will need TTE for IE work up and the PICC removed.


 


PICC line infection/colonization - TTE negative. Blood cultures only positive 

from PICC. Not positive from Peripheral 


- Will repeat blood cultures x 1 to confirm clearance after PICC replaced. [place] 

suspicion for IE.


- PICC replace 8/1/18





Fever/Leukocytosis; Resolved - i recurs Re-evaluate lines, Diarrhea? C. dif? 


BRAYDON, improving


Lactic acidosis, resolved





chronic resp failure trach/vent dependant


BPH


GERD


 HTN


DM2


seizure disorder


colostomy


 s/p GT 


constipation





VRE and MRSA colonized


 


Plan:


- Continue empiric IV Vancomycin #14/18 for bacteremia/line infection - Abx end 

date 8/15/18 if STACI negative


- Recommend Transesophageal ECHO to r/o IE





-Last day of Ertapenem #14/14 for Amp-C Enterobacter and ESBL P.mirabilis 

bacteremia ; will treat for 14 days - End date 8/9/18


    -7/27 SP Zosyn #4





- Monitor CBC/CMP, temperatures


- F/U STACI


- Pulmonary care








Thank you for this consultation. Will continue to follow along with you.





Subjective


Allergies:  


Coded Allergies:  


     AZTREONAM (Verified  Allergy, Unknown, 7/23/18)


Subjective


No acute changes


On Vent still and stable at 30%


Low grade fever last night





Objective


Vital Signs





Last 24 Hour Vital Signs








  Date Time  Temp Pulse Resp B/P (MAP) Pulse Ox O2 Delivery O2 Flow Rate FiO2


 


8/11/18 07:02  78 17     30


 


8/11/18 05:15  83 19     30


 


8/11/18 04:00        30


 


8/11/18 04:00      Mechanical Ventilator  


 


8/11/18 04:00 99.7 77 30 128/78 (95) 98   





 99.7       


 


8/11/18 03:52  98      


 


8/11/18 02:45  82 18     30


 


8/11/18 00:59 99.0       


 


8/11/18 00:58  81 18     30


 


8/11/18 00:00 100.5 78 34 135/79 (97) 99   





 100.5       


 


8/11/18 00:00      Mechanical Ventilator  


 


8/11/18 00:00 100.5       


 


8/10/18 23:43  81      


 


8/10/18 23:23  86 18     30


 


8/10/18 21:06  85 18     30


 


8/10/18 20:00 99.8 81 29 131/79 (96) 99   





 99.8       


 


8/10/18 20:00        30


 


8/10/18 20:00      Mechanical Ventilator  


 


8/10/18 19:48  83      


 


8/10/18 18:45  81 18     30


 


8/10/18 16:50  85 18     30


 


8/10/18 16:00 97.9 84 16 120/81 (94) 99   





 97.9       


 


8/10/18 16:00        30


 


8/10/18 16:00      Mechanical Ventilator  


 


8/10/18 16:00  82      


 


8/10/18 14:45  92 18     30


 


8/10/18 13:12  81 16     30


 


8/10/18 12:12  80      


 


8/10/18 12:00 97.5 81 17 118/64 (82) 99   





 97.5       


 


8/10/18 12:00        30


 


8/10/18 12:00      Mechanical Ventilator  


 


8/10/18 11:10  75 19     30


 


8/10/18 09:13  79 16     30


 


8/10/18 08:00        30


 


8/10/18 08:00  75      


 


8/10/18 08:00      Mechanical Ventilator  


 


8/10/18 08:00 98.1 73 16 120/76 (91) 99   





 98.1       








Height (Feet):  6


Weight (Pounds):  200


Objective


GENERAL:  NAD. On vent 30% O2, Not responsive to voice


HEENT:  NCAT, DMM, Tracheostomy (C/D/I)


PULM: Mild crackles in bases B/L, No wheezing


CARDIOVASCULAR:  RRR, S1 and S2. No M/R/G.


ABDOMEN:  Obese and nondistended. +BS, The G-tube and stoma noted.


EXTREMITIES:  No edema.  1+ pedal pulses.





Laboratory Tests








Test


  8/11/18


04:10


 


White Blood Count


  8.1 K/UL


(4.8-10.8)


 


Red Blood Count


  4.40 M/UL


(4.70-6.10)  L


 


Hemoglobin


  12.7 G/DL


(14.2-18.0)  L


 


Hematocrit


  38.0 %


(42.0-52.0)  L


 


Mean Corpuscular Volume 86 FL (80-99)  


 


Mean Corpuscular Hemoglobin


  28.9 PG


(27.0-31.0)


 


Mean Corpuscular Hemoglobin


Concent 33.4 G/DL


(32.0-36.0)


 


Red Cell Distribution Width


  16.0 %


(11.6-14.8)  H


 


Platelet Count


  327 K/UL


(150-450)


 


Mean Platelet Volume


  7.5 FL


(6.5-10.1)


 


Neutrophils (%) (Auto)


  58.5 %


(45.0-75.0)


 


Lymphocytes (%) (Auto)


  25.4 %


(20.0-45.0)


 


Monocytes (%) (Auto)


  10.6 %


(1.0-10.0)  H


 


Eosinophils (%) (Auto)


  3.6 %


(0.0-3.0)  H


 


Basophils (%) (Auto)


  1.9 %


(0.0-2.0)


 


Sodium Level


  132 MMOL/L


(136-145)  L


 


Potassium Level


  3.8 MMOL/L


(3.5-5.1)


 


Chloride Level


  100 MMOL/L


()


 


Carbon Dioxide Level


  21 MMOL/L


(21-32)


 


Anion Gap


  11 mmol/L


(5-15)


 


Blood Urea Nitrogen


  16 mg/dL


(7-18)


 


Creatinine


  1.1 MG/DL


(0.55-1.30)


 


Estimat Glomerular Filtration


Rate > 60 mL/min


(>60)


 


Glucose Level


  120 MG/DL


()  H


 


Calcium Level


  9.8 MG/DL


(8.5-10.1)


 


Phosphorus Level


  2.4 MG/DL


(2.5-4.9)  L











Current Medications








 Medications


  (Trade)  Dose


 Ordered  Sig/Jan


 Route


 PRN Reason  Start Time


 Stop Time Status Last Admin


Dose Admin


 


 Acetaminophen


  (Tylenol)  650 mg  Q4H  PRN


 ORAL


 FEVER  7/26/18 14:00


 8/23/18 09:59  8/11/18 00:00


 


 


 Baclofen


  (Lioresal)  10 mg  EVERY 8  HOURS


 GT


   7/30/18 14:00


 8/23/18 12:59  8/11/18 05:36


 


 


 Chlorhexidine


 Gluconate


  (Elaine-Hex 2%)  1 applic  DAILY@2000


 TOPIC


   7/26/18 20:00


 8/23/18 19:59  8/10/18 21:27


 


 


 Dextrose


  (Dextrose 50%)  25 ml  STAT  PRN


 IV


 Hypoglycemia  7/27/18 08:45


 8/24/18 08:44   


 


 


 Dextrose


  (Dextrose 50%)  50 ml  STAT  PRN


 IV


 Hypoglycemia  7/27/18 08:45


 8/24/18 08:44   


 


 


 Heparin Sodium


  (Porcine)


  (Heparin 5000


 units/ml)  5,000 units  EVERY 12  HOURS


 SUBQ


   7/26/18 21:00


 8/23/18 20:59  8/10/18 21:29


 


 


 Insulin Aspart


  (NovoLOG)    Q6HR


 SUBQ


   7/26/18 18:00


 8/24/18 11:29  8/11/18 05:38


 


 


 Lansoprazole


  (Prevacid)  30 mg  DAILY


 GT


   7/27/18 09:00


 8/25/18 08:59  8/10/18 08:12


 


 


 Levetiracetam


  (Keppra)  1,000 mg  Q8HR


 GT


   7/26/18 14:00


 8/23/18 12:59  8/11/18 05:37


 


 


 Ondansetron HCl


  (Zofran)  4 mg  Q6H  PRN


 IVP


 Nausea & Vomiting  7/26/18 16:00


 8/23/18 09:59   


 


 


 Polyethylene


 Glycol


  (Miralax)  17 gm  BEDTIME


 GT


   7/30/18 21:00


 8/24/18 20:59  8/10/18 21:27


 


 


 Vancomycin HCl


  (Vanco rx to


 dose)  1 ea  DAILY  PRN


 MISC


 PER RX PROTOCOL  7/28/18 13:15


 8/27/18 13:14   


 


 


 Vancomycin HCl/


 Dextrose  250 ml @ 


 125 mls/hr  Q18H


 IVPB


   8/8/18 21:00


 8/15/18 20:59  8/11/18 02:26


 

















Robert Williamson M.D. Aug 11, 2018 07:55

## 2018-08-11 NOTE — NEPHROLOGY PROGRESS NOTE
Assessment/Plan


Assessment/Plan


1. BRAYDON- resolved


2. Sepsis- on Abx. 


3. Chronic Resp FL- trached on vent 


    - awaiting DC


4. Hyponatremia - Na stable


5. SZ- Keppra


    -monitor NA


6. Hypophos- replace today with NaPhos





Subjective


Date patient seen:  Aug 11, 2018


Time patient seen:  08:49


ROS Limited/Unobtainable:  Yes


Allergies:  


Coded Allergies:  


     AZTREONAM (Verified  Allergy, Unknown, 7/23/18)


Subjective


Patient trached/vent. Stable





Objective





Last 24 Hour Vital Signs








  Date Time  Temp Pulse Resp B/P (MAP) Pulse Ox O2 Delivery O2 Flow Rate FiO2


 


8/11/18 07:02  78 17     30


 


8/11/18 05:15  83 19     30


 


8/11/18 04:00        30


 


8/11/18 04:00      Mechanical Ventilator  


 


8/11/18 04:00 99.7 77 30 128/78 (95) 98   





 99.7       


 


8/11/18 03:52  98      


 


8/11/18 02:45  82 18     30


 


8/11/18 00:59 99.0       


 


8/11/18 00:58  81 18     30


 


8/11/18 00:00 100.5 78 34 135/79 (97) 99   





 100.5       


 


8/11/18 00:00      Mechanical Ventilator  


 


8/11/18 00:00 100.5       


 


8/10/18 23:43  81      


 


8/10/18 23:23  86 18     30


 


8/10/18 21:06  85 18     30


 


8/10/18 20:00 99.8 81 29 131/79 (96) 99   





 99.8       


 


8/10/18 20:00        30


 


8/10/18 20:00      Mechanical Ventilator  


 


8/10/18 19:48  83      


 


8/10/18 18:45  81 18     30


 


8/10/18 16:50  85 18     30


 


8/10/18 16:00 97.9 84 16 120/81 (94) 99   





 97.9       


 


8/10/18 16:00        30


 


8/10/18 16:00      Mechanical Ventilator  


 


8/10/18 16:00  82      


 


8/10/18 14:45  92 18     30


 


8/10/18 13:12  81 16     30


 


8/10/18 12:12  80      


 


8/10/18 12:00 97.5 81 17 118/64 (82) 99   





 97.5       


 


8/10/18 12:00        30


 


8/10/18 12:00      Mechanical Ventilator  


 


8/10/18 11:10  75 19     30


 


8/10/18 09:13  79 16     30

















Intake and Output  


 


 8/10/18 8/11/18





 19:00 07:00


 


Intake Total 1205.0 ml 980.0 ml


 


Output Total 900 ml 475 ml


 


Balance 305.0 ml 505.0 ml


 


  


 


Free Water  120 ml


 


IV Total 535.0 ml 250.0 ml


 


Tube Feeding 550 ml 550 ml


 


Other 120 ml 60 ml


 


Output Urine Total 500 ml 200 ml


 


Stool Total 400 ml 275 ml








Laboratory Tests


8/11/18 04:10: 


White Blood Count 8.1, Red Blood Count 4.40L, Hemoglobin 12.7L, Hematocrit 38.0L

, Mean Corpuscular Volume 86, Mean Corpuscular Hemoglobin 28.9, Mean 

Corpuscular Hemoglobin Concent 33.4, Red Cell Distribution Width 16.0H, 

Platelet Count 327, Mean Platelet Volume 7.5, Neutrophils (%) (Auto) 58.5, 

Lymphocytes (%) (Auto) 25.4, Monocytes (%) (Auto) 10.6H, Eosinophils (%) (Auto) 

3.6H, Basophils (%) (Auto) 1.9, Sodium Level 132L, Potassium Level 3.8, 

Chloride Level 100, Carbon Dioxide Level 21, Anion Gap 11, Blood Urea Nitrogen 

16, Creatinine 1.1, Estimat Glomerular Filtration Rate > 60, Glucose Level 120H

, Calcium Level 9.8, Phosphorus Level 2.4L


Height (Feet):  6


Weight (Pounds):  200


General Appearance:  WD/WN, no apparent distress


EENT:  PERRL/EOMI


Neck:  non-tender, normal alignment


Cardiovascular:  normal peripheral pulses, normal rate


Respiratory/Chest:  lungs clear, normal breath sounds


Abdomen:  non tender, soft


Edema:  no edema noted Arm (L), no edema noted Arm (R), no edema noted Leg (L), 

no edema noted Leg (R), no edema noted Pedal (L), no edema noted Pedal (R), no 

edema noted Generalized











Jewel Templeton M.D. Aug 11, 2018 09:01

## 2018-08-11 NOTE — PULMONOLGY CRITICAL CARE NOTE
Critical Care - Asmt/Plan


Problems:  


(1) Acute on chronic respiratory failure


(2) Acute on chronic renal insufficiency


(3) Severe sepsis


(4) Vegetative state


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


(6) Diabetes mellitus


Respiratory:  monitor respiratory rate, adjust FIO2


Cardiac:  continue to monitor HR/BP


Renal:  F/U I&O


Infectious Disease:  check cultures


Gastrointestinal:  continue feedings/current rate


Endocrine:  monitor blood sugar, check TSH


Hematologic:  transfuse if hgb<8.5


Neurologic:  PRN Morphine, keep patient comfortable


Prophylaxis:  Protonix


Notes Reviewed:  cardio, renal


Discussed with:  nurses, consultants, 





Critical Care - Objective





Last 24 Hour Vital Signs








  Date Time  Temp Pulse Resp B/P (MAP) Pulse Ox O2 Delivery O2 Flow Rate FiO2


 


8/11/18 07:02  78 17     30


 


8/11/18 05:15  83 19     30


 


8/11/18 04:00        30


 


8/11/18 04:00      Mechanical Ventilator  


 


8/11/18 04:00 99.7 77 30 128/78 (95) 98   





 99.7       


 


8/11/18 03:52  98      


 


8/11/18 02:45  82 18     30


 


8/11/18 00:59 99.0       


 


8/11/18 00:58  81 18     30


 


8/11/18 00:00 100.5 78 34 135/79 (97) 99   





 100.5       


 


8/11/18 00:00      Mechanical Ventilator  


 


8/11/18 00:00 100.5       


 


8/10/18 23:43  81      


 


8/10/18 23:23  86 18     30


 


8/10/18 21:06  85 18     30


 


8/10/18 20:00 99.8 81 29 131/79 (96) 99   





 99.8       


 


8/10/18 20:00        30


 


8/10/18 20:00      Mechanical Ventilator  


 


8/10/18 19:48  83      


 


8/10/18 18:45  81 18     30


 


8/10/18 16:50  85 18     30


 


8/10/18 16:00 97.9 84 16 120/81 (94) 99   





 97.9       


 


8/10/18 16:00        30


 


8/10/18 16:00      Mechanical Ventilator  


 


8/10/18 16:00  82      


 


8/10/18 14:45  92 18     30


 


8/10/18 13:12  81 16     30


 


8/10/18 12:12  80      


 


8/10/18 12:00 97.5 81 17 118/64 (82) 99   





 97.5       


 


8/10/18 12:00        30


 


8/10/18 12:00      Mechanical Ventilator  


 


8/10/18 11:10  75 19     30


 


8/10/18 09:13  79 16     30








Status:  awake


Condition:  critical


Neck:  full ROM


Lungs:  clear


Heart:  HR/BP unstable


Abdomen:  soft, non-tender, feeding tube


Extremities:  no C/C/E


Accucheck:  113





Critical Care - Subjective


ROS Limited/Unobtainable:  Yes


Condition:  critical


EKG Rhythm:  Sinus Rhythm


FI02:  30


Vent Support Breath Rate:  16


Vent Support Mode:  AC


Vent Tidal Volume:  600


Sputum Amount:  Small


PEEP:  5.0


PIP:  30


Tube Feeding Amount:  50


I&O:











Intake and Output  


 


 8/10/18 8/11/18





 19:00 07:00


 


Intake Total 1205.0 ml 980.0 ml


 


Output Total 900 ml 475 ml


 


Balance 305.0 ml 505.0 ml


 


  


 


Free Water  120 ml


 


IV Total 535.0 ml 250.0 ml


 


Tube Feeding 550 ml 550 ml


 


Other 120 ml 60 ml


 


Output Urine Total 500 ml 200 ml


 


Stool Total 400 ml 275 ml








Labs:





Laboratory Tests








Test


  8/11/18


04:10


 


White Blood Count


  8.1 K/UL


(4.8-10.8)


 


Red Blood Count


  4.40 M/UL


(4.70-6.10)  L


 


Hemoglobin


  12.7 G/DL


(14.2-18.0)  L


 


Hematocrit


  38.0 %


(42.0-52.0)  L


 


Mean Corpuscular Volume 86 FL (80-99)  


 


Mean Corpuscular Hemoglobin


  28.9 PG


(27.0-31.0)


 


Mean Corpuscular Hemoglobin


Concent 33.4 G/DL


(32.0-36.0)


 


Red Cell Distribution Width


  16.0 %


(11.6-14.8)  H


 


Platelet Count


  327 K/UL


(150-450)


 


Mean Platelet Volume


  7.5 FL


(6.5-10.1)


 


Neutrophils (%) (Auto)


  58.5 %


(45.0-75.0)


 


Lymphocytes (%) (Auto)


  25.4 %


(20.0-45.0)


 


Monocytes (%) (Auto)


  10.6 %


(1.0-10.0)  H


 


Eosinophils (%) (Auto)


  3.6 %


(0.0-3.0)  H


 


Basophils (%) (Auto)


  1.9 %


(0.0-2.0)


 


Sodium Level


  132 MMOL/L


(136-145)  L


 


Potassium Level


  3.8 MMOL/L


(3.5-5.1)


 


Chloride Level


  100 MMOL/L


()


 


Carbon Dioxide Level


  21 MMOL/L


(21-32)


 


Anion Gap


  11 mmol/L


(5-15)


 


Blood Urea Nitrogen


  16 mg/dL


(7-18)


 


Creatinine


  1.1 MG/DL


(0.55-1.30)


 


Estimat Glomerular Filtration


Rate > 60 mL/min


(>60)


 


Glucose Level


  120 MG/DL


()  H


 


Calcium Level


  9.8 MG/DL


(8.5-10.1)


 


Phosphorus Level


  2.4 MG/DL


(2.5-4.9)  L

















Yury Pena MD Aug 11, 2018 08:51

## 2018-08-11 NOTE — GENERAL PROGRESS NOTE
Assessment/Plan


Status:  stable


Assessment/Plan


# Leukocytosis - Sepsis with elevated temperature of 103 degrees Fahrenheit.  

Had uti v bacteremia (CoNS) on abx, has improved.


--> Pt on antibiotics, has received a dose of Zosyn and currently is on 

vancomycin q.12 h. now on cefepime


--> Appreciate Infectious Disease recs


--> 2D echo per ID performed on 8/1: No discrete vegetations seen, however SBE 

may not be excluded by transthoracic 2-D echo.


--> 8/11: WBC of 8.1, wnl 


--> Currently afebrile 


# Anemia of chronic disease. No hemolysis, peripheral smear reviewed, ferritin 

was elevated.


--> Continue to closely monitor


--> Hgb goal >7


--> 8/11: Hgb 12.7


# Acute kidney injury.  


--> Currently on IV fluids, IV bolus given to see if the patient responds along 

with IV pressor as needed.  


--> Closely monitor with Nephrology team.


# Septic shock, use antibiotic per ID services.


--> potential uti, on abx and bacteremia


# Respiratory failure, status post tracheostomy, on mechanical ventilation per 

Dr. Pena


# Hypercalcemia. Monitor PTH and calcium level.


--> 8/10: 10.0, wnl





The time the note was entered does not necessarily correspond to the time the 

patient was seen.





Subjective


Date patient seen:  Aug 11, 2018


ROS Limited/Unobtainable:  Yes


Hematologic/Lymphatic:  Reports: anemia


Allergies:  


Coded Allergies:  


     AZTREONAM (Verified  Allergy, Unknown, 7/23/18)


All Systems:  reviewed and negative except above


Subjective


Pt remains on vent. No acute events. H/H stable. Vitals are stable. Low grade 

fever overnight, currently afebrile. DC planning.





Objective





Last 24 Hour Vital Signs








  Date Time  Temp Pulse Resp B/P (MAP) Pulse Ox O2 Delivery O2 Flow Rate FiO2


 


8/11/18 09:23  77 16     30


 


8/11/18 08:00 98.2 79 16 134/89 (104) 100   





 98.2       


 


8/11/18 08:00        30


 


8/11/18 08:00      Mechanical Ventilator  


 


8/11/18 07:02  78 17     30


 


8/11/18 05:15  83 19     30


 


8/11/18 04:00        30


 


8/11/18 04:00      Mechanical Ventilator  


 


8/11/18 04:00 99.7 77 30 128/78 (95) 98   





 99.7       


 


8/11/18 03:52  98      


 


8/11/18 02:45  82 18     30


 


8/11/18 00:59 99.0       


 


8/11/18 00:58  81 18     30


 


8/11/18 00:00 100.5 78 34 135/79 (97) 99   





 100.5       


 


8/11/18 00:00      Mechanical Ventilator  


 


8/11/18 00:00 100.5       


 


8/10/18 23:43  81      


 


8/10/18 23:23  86 18     30


 


8/10/18 21:06  85 18     30


 


8/10/18 20:00 99.8 81 29 131/79 (96) 99   





 99.8       


 


8/10/18 20:00        30


 


8/10/18 20:00      Mechanical Ventilator  


 


8/10/18 19:48  83      


 


8/10/18 18:45  81 18     30


 


8/10/18 16:50  85 18     30


 


8/10/18 16:00 97.9 84 16 120/81 (94) 99   





 97.9       


 


8/10/18 16:00        30


 


8/10/18 16:00      Mechanical Ventilator  


 


8/10/18 16:00  82      


 


8/10/18 14:45  92 18     30


 


8/10/18 13:12  81 16     30


 


8/10/18 12:12  80      


 


8/10/18 12:00 97.5 81 17 118/64 (82) 99   





 97.5       


 


8/10/18 12:00        30


 


8/10/18 12:00      Mechanical Ventilator  


 


8/10/18 11:10  75 19     30

















Intake and Output  


 


 8/10/18 8/11/18





 19:00 07:00


 


Intake Total 1205.0 ml 1030.0 ml


 


Output Total 900 ml 475 ml


 


Balance 305.0 ml 555.0 ml


 


  


 


Free Water  120 ml


 


IV Total 535.0 ml 250.0 ml


 


Tube Feeding 550 ml 600 ml


 


Other 120 ml 60 ml


 


Output Urine Total 500 ml 200 ml


 


Stool Total 400 ml 275 ml








Laboratory Tests


8/11/18 04:10: 


White Blood Count 8.1, Red Blood Count 4.40L, Hemoglobin 12.7L, Hematocrit 38.0L

, Mean Corpuscular Volume 86, Mean Corpuscular Hemoglobin 28.9, Mean 

Corpuscular Hemoglobin Concent 33.4, Red Cell Distribution Width 16.0H, 

Platelet Count 327, Mean Platelet Volume 7.5, Neutrophils (%) (Auto) 58.5, 

Lymphocytes (%) (Auto) 25.4, Monocytes (%) (Auto) 10.6H, Eosinophils (%) (Auto) 

3.6H, Basophils (%) (Auto) 1.9, Sodium Level 132L, Potassium Level 3.8, 

Chloride Level 100, Carbon Dioxide Level 21, Anion Gap 11, Blood Urea Nitrogen 

16, Creatinine 1.1, Estimat Glomerular Filtration Rate > 60, Glucose Level 120H

, Calcium Level 9.8, Phosphorus Level 2.4L


Height (Feet):  6


Weight (Pounds):  200


General Appearance:  no apparent distress


EENT:  PERRL/EOMI


Neck:  normal alignment


Cardiovascular:  normal peripheral pulses


Respiratory/Chest:  no respiratory distress


Abdomen:  soft











Tejas Gerber MD Aug 11, 2018 09:51

## 2018-08-12 VITALS — DIASTOLIC BLOOD PRESSURE: 77 MMHG | SYSTOLIC BLOOD PRESSURE: 119 MMHG

## 2018-08-12 VITALS — SYSTOLIC BLOOD PRESSURE: 111 MMHG | DIASTOLIC BLOOD PRESSURE: 68 MMHG

## 2018-08-12 VITALS — DIASTOLIC BLOOD PRESSURE: 82 MMHG | SYSTOLIC BLOOD PRESSURE: 123 MMHG

## 2018-08-12 VITALS — SYSTOLIC BLOOD PRESSURE: 125 MMHG | DIASTOLIC BLOOD PRESSURE: 69 MMHG

## 2018-08-12 VITALS — SYSTOLIC BLOOD PRESSURE: 109 MMHG | DIASTOLIC BLOOD PRESSURE: 76 MMHG

## 2018-08-12 LAB
ADD MANUAL DIFF: NO
ANION GAP SERPL CALC-SCNC: 14 MMOL/L (ref 5–15)
BASOPHILS NFR BLD AUTO: 2 % (ref 0–2)
BUN SERPL-MCNC: 17 MG/DL (ref 7–18)
CALCIUM SERPL-MCNC: 9.7 MG/DL (ref 8.5–10.1)
CHLORIDE SERPL-SCNC: 101 MMOL/L (ref 98–107)
CO2 SERPL-SCNC: 20 MMOL/L (ref 21–32)
CREAT SERPL-MCNC: 1 MG/DL (ref 0.55–1.3)
EOSINOPHIL NFR BLD AUTO: 5.5 % (ref 0–3)
ERYTHROCYTE [DISTWIDTH] IN BLOOD BY AUTOMATED COUNT: 16.1 % (ref 11.6–14.8)
HCT VFR BLD CALC: 37.9 % (ref 42–52)
HGB BLD-MCNC: 12.2 G/DL (ref 14.2–18)
LYMPHOCYTES NFR BLD AUTO: 28.2 % (ref 20–45)
MCV RBC AUTO: 87 FL (ref 80–99)
MONOCYTES NFR BLD AUTO: 9.3 % (ref 1–10)
NEUTROPHILS NFR BLD AUTO: 55 % (ref 45–75)
PLATELET # BLD: 311 K/UL (ref 150–450)
POTASSIUM SERPL-SCNC: 3.9 MMOL/L (ref 3.5–5.1)
RBC # BLD AUTO: 4.35 M/UL (ref 4.7–6.1)
SODIUM SERPL-SCNC: 135 MMOL/L (ref 136–145)
WBC # BLD AUTO: 8.7 K/UL (ref 4.8–10.8)

## 2018-08-12 RX ADMIN — INSULIN ASPART SCH UNITS: 100 INJECTION, SOLUTION INTRAVENOUS; SUBCUTANEOUS at 05:52

## 2018-08-12 RX ADMIN — HEPARIN SODIUM SCH UNITS: 5000 INJECTION INTRAVENOUS; SUBCUTANEOUS at 09:32

## 2018-08-12 RX ADMIN — HEPARIN SODIUM SCH UNITS: 5000 INJECTION INTRAVENOUS; SUBCUTANEOUS at 20:48

## 2018-08-12 RX ADMIN — POLYETHYLENE GLYCOL 3350 SCH GM: 17 POWDER, FOR SOLUTION ORAL at 20:47

## 2018-08-12 RX ADMIN — INSULIN ASPART SCH UNITS: 100 INJECTION, SOLUTION INTRAVENOUS; SUBCUTANEOUS at 23:43

## 2018-08-12 RX ADMIN — LEVETIRACETAM SCH MG: 100 SOLUTION ORAL at 17:28

## 2018-08-12 RX ADMIN — Medication SCH MLS/HR: at 19:39

## 2018-08-12 RX ADMIN — INSULIN ASPART SCH UNITS: 100 INJECTION, SOLUTION INTRAVENOUS; SUBCUTANEOUS at 00:00

## 2018-08-12 RX ADMIN — INSULIN ASPART SCH UNITS: 100 INJECTION, SOLUTION INTRAVENOUS; SUBCUTANEOUS at 12:00

## 2018-08-12 RX ADMIN — LEVETIRACETAM SCH MG: 100 SOLUTION ORAL at 21:11

## 2018-08-12 RX ADMIN — CHLORHEXIDINE GLUCONATE SCH APPLIC: 213 SOLUTION TOPICAL at 20:47

## 2018-08-12 RX ADMIN — INSULIN ASPART SCH UNITS: 100 INJECTION, SOLUTION INTRAVENOUS; SUBCUTANEOUS at 18:00

## 2018-08-12 RX ADMIN — LEVETIRACETAM SCH MG: 100 SOLUTION ORAL at 05:44

## 2018-08-12 NOTE — PULMONOLGY CRITICAL CARE NOTE
Critical Care - Asmt/Plan


Problems:  


(1) Acute on chronic respiratory failure


(2) Acute on chronic renal insufficiency


(3) Severe sepsis


(4) Vegetative state


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


(6) Diabetes mellitus


Respiratory:  monitor respiratory rate, adjust FIO2, CXR


Cardiac:  continue to monitor HR/BP


Renal:  F/U I&O


Infectious Disease:  check cultures


Gastrointestinal:  continue feedings/current rate


Hematologic:  transfuse if hgb<8.5


Neurologic:  PRN Ativan, keep patient comfortable


Prophylaxis:  Protonix


Time Spent (Minutes):  40


Notes Reviewed:  internist, cardio, renal


Discussed with:  nurses, consultants, 





Critical Care - Objective





Last 24 Hour Vital Signs








  Date Time  Temp Pulse Resp B/P (MAP) Pulse Ox O2 Delivery O2 Flow Rate FiO2


 


8/12/18 12:00        30


 


8/12/18 12:00 97.8 82 18 109/76 (87) 98   





 97.8       


 


8/12/18 12:00      Mechanical Ventilator  


 


8/12/18 12:00  80      


 


8/12/18 11:11  82 17     30


 


8/12/18 09:23  81 16     30


 


8/12/18 08:00 97.9 81 17 119/77 (91) 98   





 97.9       


 


8/12/18 08:00  79      


 


8/12/18 08:00        30


 


8/12/18 08:00      Mechanical Ventilator  


 


8/12/18 07:25  84 15     30


 


8/12/18 05:30  83 16     30


 


8/12/18 04:00  78      


 


8/12/18 04:00        30


 


8/12/18 04:00 97.7 76 18 125/69 (87) 98   





 97.7       


 


8/12/18 04:00      Mechanical Ventilator  


 


8/12/18 03:27  81 16     30


 


8/12/18 01:20  82 17     30


 


8/12/18 00:01        30


 


8/12/18 00:00  83      


 


8/12/18 00:00      Mechanical Ventilator  


 


8/11/18 23:56 100.2 85 16 127/67 (87) 99   





 100.2       


 


8/11/18 23:30  80 16     30


 


8/11/18 21:19  95 19     30


 


8/11/18 20:00  81      


 


8/11/18 20:00 99.8 84 16 122/79 (93) 98   





 99.8       


 


8/11/18 20:00        30


 


8/11/18 20:00      Mechanical Ventilator  


 


8/11/18 19:15  82 18     30


 


8/11/18 16:53  85 16     30


 


8/11/18 16:00      Mechanical Ventilator  


 


8/11/18 16:00  92      


 


8/11/18 16:00        30


 


8/11/18 16:00 99.0 86 20 127/82 (97) 98   





 99.0       


 


8/11/18 15:04  81 22     30








Status:  awake, sedated


Lungs:  clear


Heart:  HR/BP stable, regular


Abdomen:  non-tender


Extremities:  no C/C/E, edema


Accucheck:  111





Critical Care - Subjective


ROS Limited/Unobtainable:  No


Condition:  critical


FI02:  30


Vent Support Breath Rate:  16


Vent Support Mode:  AC


Vent Tidal Volume:  600


Sputum Amount:  Small


PEEP:  5.0


PIP:  29


Tube Feeding Amount:  50


I&O:











Intake and Output  


 


 8/11/18 8/12/18





 19:00 07:00


 


Intake Total 997.5 ml 930 ml


 


Output Total 1050 ml 1025 ml


 


Balance -52.5 ml -95 ml


 


  


 


Free Water 160 ml 30 ml


 


IV Total 237.5 ml 250 ml


 


Tube Feeding 600 ml 550 ml


 


Other  100 ml


 


Output Urine Total 600 ml 600 ml


 


Stool Total 450 ml 425 ml








Labs:





Laboratory Tests








Test


  8/12/18


03:55


 


White Blood Count


  8.7 K/UL


(4.8-10.8)


 


Red Blood Count


  4.35 M/UL


(4.70-6.10)  L


 


Hemoglobin


  12.2 G/DL


(14.2-18.0)  L


 


Hematocrit


  37.9 %


(42.0-52.0)  L


 


Mean Corpuscular Volume 87 FL (80-99)  


 


Mean Corpuscular Hemoglobin


  28.1 PG


(27.0-31.0)


 


Mean Corpuscular Hemoglobin


Concent 32.2 G/DL


(32.0-36.0)


 


Red Cell Distribution Width


  16.1 %


(11.6-14.8)  H


 


Platelet Count


  311 K/UL


(150-450)


 


Mean Platelet Volume


  7.6 FL


(6.5-10.1)


 


Neutrophils (%) (Auto)


  55.0 %


(45.0-75.0)


 


Lymphocytes (%) (Auto)


  28.2 %


(20.0-45.0)


 


Monocytes (%) (Auto)


  9.3 %


(1.0-10.0)


 


Eosinophils (%) (Auto)


  5.5 %


(0.0-3.0)  H


 


Basophils (%) (Auto)


  2.0 %


(0.0-2.0)


 


Sodium Level


  135 MMOL/L


(136-145)  L


 


Potassium Level


  3.9 MMOL/L


(3.5-5.1)


 


Chloride Level


  101 MMOL/L


()


 


Carbon Dioxide Level


  20 MMOL/L


(21-32)  L


 


Anion Gap


  14 mmol/L


(5-15)


 


Blood Urea Nitrogen


  17 mg/dL


(7-18)


 


Creatinine


  1.0 MG/DL


(0.55-1.30)


 


Estimat Glomerular Filtration


Rate > 60 mL/min


(>60)


 


Glucose Level


  97 MG/DL


()


 


Calcium Level


  9.7 MG/DL


(8.5-10.1)

















Yury Pena MD Aug 12, 2018 12:46

## 2018-08-12 NOTE — CARDIOLOGY PROGRESS NOTE
Assessment/Plan


Status:  stable


Assessment/Plan


Assessment:


(1) Acute on chronic respiratory failure


(2) Acute on chronic renal insufficiency


(3) Severe sepsis


(4) Vegetative state


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


(6) Diabetes mellitus





Plan:


Continue antibiotics


Echocardiogram reviewed- no endocarditis


STACI for Monday 8/13


Patient afebrile, normal WBC otherwise


Continue trach care and tube feeds


Continue keppra for seizures





Subjective


Cardiovascular:  Reports: no symptoms


Respiratory:  Reports: no symptoms


Gastrointestinal/Abdominal:  Reports: no symptoms


Genitourinary:  Reports: no symptoms


Subjective


Low grade temp. 


vitals stable, Telemetry shows sinus rhythm. No acute events


Ventilator settings: portex 7, AC 16, TV: 600, FiO2: 30%, PEEP: 5. GT is in 

place running glucerna 1.2 @ 65 ml/hr. No residual present. Colostomy bag in 

place.


STACI scheduled for 8/13/18 at 1100





Objective





Last 24 Hour Vital Signs








  Date Time  Temp Pulse Resp B/P (MAP) Pulse Ox O2 Delivery O2 Flow Rate FiO2


 


8/12/18 09:23  81 16     30


 


8/12/18 07:25  84 15     30


 


8/12/18 05:30  83 16     30


 


8/12/18 04:00  78      


 


8/12/18 04:00        30


 


8/12/18 04:00 97.7 76 18 125/69 (87) 98   





 97.7       


 


8/12/18 04:00      Mechanical Ventilator  


 


8/12/18 03:27  81 16     30


 


8/12/18 01:20  82 17     30


 


8/12/18 00:01        30


 


8/12/18 00:00  83      


 


8/12/18 00:00      Mechanical Ventilator  


 


8/11/18 23:56 100.2 85 16 127/67 (87) 99   





 100.2       


 


8/11/18 23:30  80 16     30


 


8/11/18 21:19  95 19     30


 


8/11/18 20:00  81      


 


8/11/18 20:00 99.8 84 16 122/79 (93) 98   





 99.8       


 


8/11/18 20:00        30


 


8/11/18 20:00      Mechanical Ventilator  


 


8/11/18 19:15  82 18     30


 


8/11/18 16:53  85 16     30


 


8/11/18 16:00      Mechanical Ventilator  


 


8/11/18 16:00  92      


 


8/11/18 16:00        30


 


8/11/18 16:00 99.0 86 20 127/82 (97) 98   





 99.0       


 


8/11/18 15:04  81 22     30


 


8/11/18 12:42  88 16     30


 


8/11/18 12:00  83      


 


8/11/18 12:00 98.6 77 16 120/85 (97) 97   





 98.6       


 


8/11/18 12:00      Mechanical Ventilator  


 


8/11/18 12:00        30








General Appearance:  no apparent distress, alert, on vent


EENT:  PERRL/EOMI, normal ENT inspection


Neck:  non-tender, normal alignment


Rhythm:  NSR


Cardiovascular:  normal peripheral pulses, normal rate, regular rhythm


Respiratory/Chest:  chest wall non-tender, lungs clear


Abdomen:  normal bowel sounds, non tender


Extremities:  normal range of motion, non-tender











Intake and Output  


 


 8/11/18 8/12/18





 19:00 07:00


 


Intake Total 997.5 ml 930 ml


 


Output Total 1050 ml 1025 ml


 


Balance -52.5 ml -95 ml


 


  


 


Free Water 160 ml 30 ml


 


IV Total 237.5 ml 250 ml


 


Tube Feeding 600 ml 550 ml


 


Other  100 ml


 


Output Urine Total 600 ml 600 ml


 


Stool Total 450 ml 425 ml











Laboratory Tests








Test


  8/12/18


03:55


 


White Blood Count


  8.7 K/UL


(4.8-10.8)


 


Red Blood Count


  4.35 M/UL


(4.70-6.10)  L


 


Hemoglobin


  12.2 G/DL


(14.2-18.0)  L


 


Hematocrit


  37.9 %


(42.0-52.0)  L


 


Mean Corpuscular Volume 87 FL (80-99)  


 


Mean Corpuscular Hemoglobin


  28.1 PG


(27.0-31.0)


 


Mean Corpuscular Hemoglobin


Concent 32.2 G/DL


(32.0-36.0)


 


Red Cell Distribution Width


  16.1 %


(11.6-14.8)  H


 


Platelet Count


  311 K/UL


(150-450)


 


Mean Platelet Volume


  7.6 FL


(6.5-10.1)


 


Neutrophils (%) (Auto)


  55.0 %


(45.0-75.0)


 


Lymphocytes (%) (Auto)


  28.2 %


(20.0-45.0)


 


Monocytes (%) (Auto)


  9.3 %


(1.0-10.0)


 


Eosinophils (%) (Auto)


  5.5 %


(0.0-3.0)  H


 


Basophils (%) (Auto)


  2.0 %


(0.0-2.0)


 


Sodium Level


  135 MMOL/L


(136-145)  L


 


Potassium Level


  3.9 MMOL/L


(3.5-5.1)


 


Chloride Level


  101 MMOL/L


()


 


Carbon Dioxide Level


  20 MMOL/L


(21-32)  L


 


Anion Gap


  14 mmol/L


(5-15)


 


Blood Urea Nitrogen


  17 mg/dL


(7-18)


 


Creatinine


  1.0 MG/DL


(0.55-1.30)


 


Estimat Glomerular Filtration


Rate > 60 mL/min


(>60)


 


Glucose Level


  97 MG/DL


()


 


Calcium Level


  9.7 MG/DL


(8.5-10.1)

















Robert Hernandez M.D. Aug 12, 2018 10:49

## 2018-08-12 NOTE — NEPHROLOGY PROGRESS NOTE
Assessment/Plan


Assessment/Plan


1. BRAYDON- resolved


2. Sepsis- on Abx. STACI tomorrow


3. Chronic Resp FL- trached on vent 


4. Hyponatremia - Na stable 135


5. SZ- Keppra


    -monitor NA


6. Hypophos- corrected. recheck in am





Subjective


Date patient seen:  Aug 12, 2018


Time patient seen:  10:05


ROS Limited/Unobtainable:  Yes


Allergies:  


Coded Allergies:  


     AZTREONAM (Verified  Allergy, Unknown, 7/23/18)


All Systems:  reviewed and negative except above


Subjective


Patient trached/vent. Stable in no distress





Objective





Last 24 Hour Vital Signs








  Date Time  Temp Pulse Resp B/P (MAP) Pulse Ox O2 Delivery O2 Flow Rate FiO2


 


8/12/18 09:23  81 16     30


 


8/12/18 07:25  84 15     30


 


8/12/18 05:30  83 16     30


 


8/12/18 04:00  78      


 


8/12/18 04:00        30


 


8/12/18 04:00 97.7 76 18 125/69 (87) 98   





 97.7       


 


8/12/18 04:00      Mechanical Ventilator  


 


8/12/18 03:27  81 16     30


 


8/12/18 01:20  82 17     30


 


8/12/18 00:01        30


 


8/12/18 00:00  83      


 


8/12/18 00:00      Mechanical Ventilator  


 


8/11/18 23:56 100.2 85 16 127/67 (87) 99   





 100.2       


 


8/11/18 23:30  80 16     30


 


8/11/18 21:19  95 19     30


 


8/11/18 20:00  81      


 


8/11/18 20:00 99.8 84 16 122/79 (93) 98   





 99.8       


 


8/11/18 20:00        30


 


8/11/18 20:00      Mechanical Ventilator  


 


8/11/18 19:15  82 18     30


 


8/11/18 16:53  85 16     30


 


8/11/18 16:00      Mechanical Ventilator  


 


8/11/18 16:00  92      


 


8/11/18 16:00        30


 


8/11/18 16:00 99.0 86 20 127/82 (97) 98   





 99.0       


 


8/11/18 15:04  81 22     30


 


8/11/18 12:42  88 16     30


 


8/11/18 12:00  83      


 


8/11/18 12:00 98.6 77 16 120/85 (97) 97   





 98.6       


 


8/11/18 12:00      Mechanical Ventilator  


 


8/11/18 12:00        30


 


8/11/18 10:32  85 20     30

















Intake and Output  


 


 8/11/18 8/12/18





 19:00 07:00


 


Intake Total 997.5 ml 930 ml


 


Output Total 1050 ml 1025 ml


 


Balance -52.5 ml -95 ml


 


  


 


Free Water 160 ml 30 ml


 


IV Total 237.5 ml 250 ml


 


Tube Feeding 600 ml 550 ml


 


Other  100 ml


 


Output Urine Total 600 ml 600 ml


 


Stool Total 450 ml 425 ml








Laboratory Tests


8/12/18 03:55: 


White Blood Count 8.7, Red Blood Count 4.35L, Hemoglobin 12.2L, Hematocrit 37.9L

, Mean Corpuscular Volume 87, Mean Corpuscular Hemoglobin 28.1, Mean 

Corpuscular Hemoglobin Concent 32.2, Red Cell Distribution Width 16.1H, 

Platelet Count 311, Mean Platelet Volume 7.6, Neutrophils (%) (Auto) 55.0, 

Lymphocytes (%) (Auto) 28.2, Monocytes (%) (Auto) 9.3, Eosinophils (%) (Auto) 

5.5H, Basophils (%) (Auto) 2.0, Sodium Level 135L, Potassium Level 3.9, 

Chloride Level 101, Carbon Dioxide Level 20L, Anion Gap 14, Blood Urea Nitrogen 

17, Creatinine 1.0, Estimat Glomerular Filtration Rate > 60, Glucose Level 97, 

Calcium Level 9.7


Height (Feet):  6


Weight (Pounds):  198


General Appearance:  WD/WN, no apparent distress


EENT:  PERRL/EOMI


Neck:  non-tender


Cardiovascular:  normal peripheral pulses, normal rate


Respiratory/Chest:  chest wall non-tender, rhonchi - bilaterally


Abdomen:  non tender, soft


Edema:  no edema noted Arm (L), no edema noted Arm (R), no edema noted Leg (L), 

no edema noted Leg (R), no edema noted Pedal (L), no edema noted Pedal (R), no 

edema noted Generalized











Jewel Templeton M.D. Aug 12, 2018 10:07

## 2018-08-12 NOTE — GENERAL PROGRESS NOTE
Assessment/Plan


Status:  stable


Assessment/Plan


# Leukocytosis - Sepsis with elevated temperature of 103 degrees Fahrenheit.  

Had uti v bacteremia (CoNS) on abx, has improved.


--> Pt on antibiotics, has received a dose of Zosyn and currently is on 

vancomycin q.12 h. now on cefepime


--> Appreciate Infectious Disease recs


--> 2D echo per ID performed on 8/1: No discrete vegetations seen, however SBE 

may not be excluded by transthoracic 2-D echo.


--> 8/12: WBC of 8.7, wnl 


--> Currently afebrile 


# Anemia of chronic disease. No hemolysis, peripheral smear reviewed, ferritin 

was elevated.


--> Continue to closely monitor


--> Hgb goal >7


--> 8/12: Hgb 12.2


# Acute kidney injury.  


--> Currently on IV fluids, IV bolus given to see if the patient responds along 

with IV pressor as needed.  


--> Closely monitor with Nephrology team.


# Septic shock, use antibiotic per ID services.


--> potential uti, on abx and bacteremia


# Respiratory failure, status post tracheostomy, on mechanical ventilation per 

Dr. Pena


# Hypercalcemia. Monitor PTH and calcium level.


--> 8/12: 9.7, wnl





The time the note was entered does not necessarily correspond to the time the 

patient was seen.





Subjective


Date patient seen:  Aug 12, 2018


ROS Limited/Unobtainable:  Yes


Hematologic/Lymphatic:  Reports: anemia


Allergies:  


Coded Allergies:  


     AZTREONAM (Verified  Allergy, Unknown, 7/23/18)


All Systems:  reviewed and negative except above


Subjective


Pt remains on vent. No acute events. H/H stable. Afebrile. DC planning.





Objective





Last 24 Hour Vital Signs








  Date Time  Temp Pulse Resp B/P (MAP) Pulse Ox O2 Delivery O2 Flow Rate FiO2


 


8/12/18 09:23  81 16     30


 


8/12/18 07:25  84 15     30


 


8/12/18 05:30  83 16     30


 


8/12/18 04:00  78      


 


8/12/18 04:00        30


 


8/12/18 04:00 97.7 76 18 125/69 (87) 98   





 97.7       


 


8/12/18 04:00      Mechanical Ventilator  


 


8/12/18 03:27  81 16     30


 


8/12/18 01:20  82 17     30


 


8/12/18 00:01        30


 


8/12/18 00:00  83      


 


8/12/18 00:00      Mechanical Ventilator  


 


8/11/18 23:56 100.2 85 16 127/67 (87) 99   





 100.2       


 


8/11/18 23:30  80 16     30


 


8/11/18 21:19  95 19     30


 


8/11/18 20:00  81      


 


8/11/18 20:00 99.8 84 16 122/79 (93) 98   





 99.8       


 


8/11/18 20:00        30


 


8/11/18 20:00      Mechanical Ventilator  


 


8/11/18 19:15  82 18     30


 


8/11/18 16:53  85 16     30


 


8/11/18 16:00      Mechanical Ventilator  


 


8/11/18 16:00  92      


 


8/11/18 16:00        30


 


8/11/18 16:00 99.0 86 20 127/82 (97) 98   





 99.0       


 


8/11/18 15:04  81 22     30


 


8/11/18 12:42  88 16     30


 


8/11/18 12:00  83      


 


8/11/18 12:00 98.6 77 16 120/85 (97) 97   





 98.6       


 


8/11/18 12:00      Mechanical Ventilator  


 


8/11/18 12:00        30


 


8/11/18 10:32  85 20     30

















Intake and Output  


 


 8/11/18 8/12/18





 19:00 07:00


 


Intake Total 997.5 ml 930 ml


 


Output Total 1050 ml 1025 ml


 


Balance -52.5 ml -95 ml


 


  


 


Free Water 160 ml 30 ml


 


IV Total 237.5 ml 250 ml


 


Tube Feeding 600 ml 550 ml


 


Other  100 ml


 


Output Urine Total 600 ml 600 ml


 


Stool Total 450 ml 425 ml








Laboratory Tests


8/12/18 03:55: 


White Blood Count 8.7, Red Blood Count 4.35L, Hemoglobin 12.2L, Hematocrit 37.9L

, Mean Corpuscular Volume 87, Mean Corpuscular Hemoglobin 28.1, Mean 

Corpuscular Hemoglobin Concent 32.2, Red Cell Distribution Width 16.1H, 

Platelet Count 311, Mean Platelet Volume 7.6, Neutrophils (%) (Auto) 55.0, 

Lymphocytes (%) (Auto) 28.2, Monocytes (%) (Auto) 9.3, Eosinophils (%) (Auto) 

5.5H, Basophils (%) (Auto) 2.0, Sodium Level 135L, Potassium Level 3.9, 

Chloride Level 101, Carbon Dioxide Level 20L, Anion Gap 14, Blood Urea Nitrogen 

17, Creatinine 1.0, Estimat Glomerular Filtration Rate > 60, Glucose Level 97, 

Calcium Level 9.7


Height (Feet):  6


Weight (Pounds):  198


General Appearance:  no apparent distress


EENT:  PERRL/EOMI


Neck:  normal alignment


Cardiovascular:  normal peripheral pulses


Respiratory/Chest:  normal breath sounds, no respiratory distress


Abdomen:  soft











Tejas Gerber MD Aug 12, 2018 09:30

## 2018-08-13 VITALS — DIASTOLIC BLOOD PRESSURE: 77 MMHG | SYSTOLIC BLOOD PRESSURE: 113 MMHG

## 2018-08-13 VITALS — SYSTOLIC BLOOD PRESSURE: 109 MMHG | DIASTOLIC BLOOD PRESSURE: 85 MMHG

## 2018-08-13 VITALS — DIASTOLIC BLOOD PRESSURE: 79 MMHG | SYSTOLIC BLOOD PRESSURE: 99 MMHG

## 2018-08-13 VITALS — SYSTOLIC BLOOD PRESSURE: 124 MMHG | DIASTOLIC BLOOD PRESSURE: 81 MMHG

## 2018-08-13 VITALS — SYSTOLIC BLOOD PRESSURE: 105 MMHG | DIASTOLIC BLOOD PRESSURE: 77 MMHG

## 2018-08-13 VITALS — SYSTOLIC BLOOD PRESSURE: 124 MMHG | DIASTOLIC BLOOD PRESSURE: 78 MMHG

## 2018-08-13 LAB
ADD MANUAL DIFF: NO
ALBUMIN SERPL-MCNC: 3.9 G/DL (ref 3.4–5)
ALBUMIN/GLOB SERPL: 0.7 {RATIO} (ref 1–2.7)
ALP SERPL-CCNC: 139 U/L (ref 46–116)
ALT SERPL-CCNC: 42 U/L (ref 12–78)
ANION GAP SERPL CALC-SCNC: 14 MMOL/L (ref 5–15)
APTT BLD: 31 SEC (ref 23–33)
AST SERPL-CCNC: 21 U/L (ref 15–37)
BASOPHILS NFR BLD AUTO: 1.9 % (ref 0–2)
BILIRUB SERPL-MCNC: 0.8 MG/DL (ref 0.2–1)
BUN SERPL-MCNC: 20 MG/DL (ref 7–18)
CALCIUM SERPL-MCNC: 10.3 MG/DL (ref 8.5–10.1)
CHLORIDE SERPL-SCNC: 100 MMOL/L (ref 98–107)
CO2 SERPL-SCNC: 21 MMOL/L (ref 21–32)
CREAT SERPL-MCNC: 1 MG/DL (ref 0.55–1.3)
EOSINOPHIL NFR BLD AUTO: 5.4 % (ref 0–3)
ERYTHROCYTE [DISTWIDTH] IN BLOOD BY AUTOMATED COUNT: 16.1 % (ref 11.6–14.8)
GLOBULIN SER-MCNC: 5.4 G/DL
HCT VFR BLD CALC: 43.9 % (ref 42–52)
HGB BLD-MCNC: 13.7 G/DL (ref 14.2–18)
INR PPP: 1.1 (ref 0.9–1.1)
LYMPHOCYTES NFR BLD AUTO: 22.8 % (ref 20–45)
MCV RBC AUTO: 87 FL (ref 80–99)
MONOCYTES NFR BLD AUTO: 11.1 % (ref 1–10)
NEUTROPHILS NFR BLD AUTO: 58.8 % (ref 45–75)
PHOSPHATE SERPL-MCNC: 2.1 MG/DL (ref 2.5–4.9)
PLATELET # BLD: 350 K/UL (ref 150–450)
POTASSIUM SERPL-SCNC: 3.9 MMOL/L (ref 3.5–5.1)
RBC # BLD AUTO: 5.07 M/UL (ref 4.7–6.1)
SODIUM SERPL-SCNC: 135 MMOL/L (ref 136–145)
WBC # BLD AUTO: 8.6 K/UL (ref 4.8–10.8)

## 2018-08-13 RX ADMIN — HEPARIN SODIUM SCH UNITS: 5000 INJECTION INTRAVENOUS; SUBCUTANEOUS at 08:42

## 2018-08-13 RX ADMIN — HEPARIN SODIUM SCH UNITS: 5000 INJECTION INTRAVENOUS; SUBCUTANEOUS at 20:13

## 2018-08-13 RX ADMIN — INSULIN ASPART SCH UNITS: 100 INJECTION, SOLUTION INTRAVENOUS; SUBCUTANEOUS at 23:53

## 2018-08-13 RX ADMIN — INSULIN ASPART SCH UNITS: 100 INJECTION, SOLUTION INTRAVENOUS; SUBCUTANEOUS at 17:23

## 2018-08-13 RX ADMIN — LEVETIRACETAM SCH MG: 100 SOLUTION ORAL at 21:25

## 2018-08-13 RX ADMIN — LEVETIRACETAM SCH MG: 100 SOLUTION ORAL at 14:33

## 2018-08-13 RX ADMIN — LEVETIRACETAM SCH MG: 100 SOLUTION ORAL at 05:06

## 2018-08-13 RX ADMIN — CHLORHEXIDINE GLUCONATE SCH APPLIC: 213 SOLUTION TOPICAL at 20:12

## 2018-08-13 RX ADMIN — INSULIN ASPART SCH UNITS: 100 INJECTION, SOLUTION INTRAVENOUS; SUBCUTANEOUS at 11:44

## 2018-08-13 RX ADMIN — POLYETHYLENE GLYCOL 3350 SCH GM: 17 POWDER, FOR SOLUTION ORAL at 20:12

## 2018-08-13 RX ADMIN — INSULIN ASPART SCH UNITS: 100 INJECTION, SOLUTION INTRAVENOUS; SUBCUTANEOUS at 05:03

## 2018-08-13 RX ADMIN — Medication SCH MLS/HR: at 08:40

## 2018-08-13 NOTE — PULMONOLGY CRITICAL CARE NOTE
Critical Care - Asmt/Plan


Problems:  


(1) Acute on chronic respiratory failure


(2) Acute on chronic renal insufficiency


(3) Severe sepsis


(4) Vegetative state


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


(6) Diabetes mellitus


Respiratory:  monitor respiratory rate, adjust FIO2, CXR


Cardiac:  continue to monitor HR/BP


Renal:  F/U I&O


Infectious Disease:  check cultures, continue antibiotics


Gastrointestinal:  hold feedings


Endocrine:  check TSH


Hematologic:  monitor H/H, transfuse if hgb<8.5


Neurologic:  PRN Ativan, PRN Morphine, keep patient comfortable


Affect:  PRN ativan


Prophylaxis:  Heparin


Notes Reviewed:  internist, renal


Discussed with:  nurses, consultants, 





Critical Care - Objective





Last 24 Hour Vital Signs








  Date Time  Temp Pulse Resp B/P (MAP) Pulse Ox O2 Delivery O2 Flow Rate FiO2


 


8/13/18 08:00        30


 


8/13/18 08:00  75      


 


8/13/18 08:00 97.7 75 16 113/77 (89) 99   





 97.7       


 


8/13/18 08:00      Mechanical Ventilator  


 


8/13/18 07:10  73 16     30


 


8/13/18 05:08  70 16     30


 


8/13/18 04:00 98.4 75 16 124/81 (95) 98   





 98.4       


 


8/13/18 04:00        30


 


8/13/18 04:00      Mechanical Ventilator  


 


8/13/18 04:00  75      


 


8/13/18 03:29  74 16     30


 


8/13/18 01:12  76 16     30


 


8/13/18 00:42 97.9       





 97.9       


 


8/13/18 00:42 97.9       


 


8/13/18 00:00        30


 


8/13/18 00:00 99.8 74 16 124/78 (93) 95   





 99.8       


 


8/13/18 00:00  77      


 


8/13/18 00:00      Mechanical Ventilator  


 


8/12/18 23:43 98.6       


 


8/12/18 23:18  83 16     30


 


8/12/18 21:18  83 17     30


 


8/12/18 20:00 99.1 86 22 123/82 (96) 98   





 99.1       


 


8/12/18 20:00        30


 


8/12/18 20:00  81      


 


8/12/18 20:00      Mechanical Ventilator  


 


8/12/18 18:30  82 16     30


 


8/12/18 17:06  88 17     30


 


8/12/18 16:00        30


 


8/12/18 16:00 98.4 86 17 111/68 (82) 98   





 98.4       


 


8/12/18 16:00  86      


 


8/12/18 16:00      Mechanical Ventilator  


 


8/12/18 14:54  87 16     30


 


8/12/18 13:12  82 16     30


 


8/12/18 12:00        30


 


8/12/18 12:00 97.8 82 18 109/76 (87) 98   





 97.8       


 


8/12/18 12:00      Mechanical Ventilator  


 


8/12/18 12:00  80      


 


8/12/18 11:11  82 17     30








Status:  awake


Condition:  critical


HEENT:  atraumatic


Neck:  full ROM


Heart:  HR/BP stable


Abdomen:  non-tender, active bowel sounds


Extremities:  no C/C/E, edema


Accucheck:  110





Critical Care - Subjective


ROS Limited/Unobtainable:  No


Condition:  critical


EKG Rhythm:  Sinus Tachycardia


FI02:  30


Vent Support Breath Rate:  16


Vent Support Mode:  AC


Vent Tidal Volume:  600


Sputum Amount:  Small


PEEP:  5.0


PIP:  35


Tube Feeding Amount:  50


I&O:











Intake and Output  


 


 8/12/18 8/13/18





 19:00 07:00


 


Intake Total 50 ml 900 ml


 


Output Total 700 ml 890 ml


 


Balance -650 ml 10 ml


 


  


 


Free Water  50 ml


 


IV Total  250 ml


 


Tube Feeding 50 ml 600 ml


 


Output Urine Total 700 ml 400 ml


 


Stool Total  490 ml








Labs:





Laboratory Tests








Test


  8/12/18


18:03 8/13/18


03:25


 


Vancomycin Level Trough


  14.2 ug/mL


(5.0-12.0)  H 


 


 


White Blood Count


  


  8.6 K/UL


(4.8-10.8)


 


Red Blood Count


  


  5.07 M/UL


(4.70-6.10)


 


Hemoglobin


  


  13.7 G/DL


(14.2-18.0)  L


 


Hematocrit


  


  43.9 %


(42.0-52.0)


 


Mean Corpuscular Volume  87 FL (80-99)  


 


Mean Corpuscular Hemoglobin


  


  27.1 PG


(27.0-31.0)


 


Mean Corpuscular Hemoglobin


Concent 


  31.3 G/DL


(32.0-36.0)  L


 


Red Cell Distribution Width


  


  16.1 %


(11.6-14.8)  H


 


Platelet Count


  


  350 K/UL


(150-450)


 


Mean Platelet Volume


  


  7.1 FL


(6.5-10.1)


 


Neutrophils (%) (Auto)


  


  58.8 %


(45.0-75.0)


 


Lymphocytes (%) (Auto)


  


  22.8 %


(20.0-45.0)


 


Monocytes (%) (Auto)


  


  11.1 %


(1.0-10.0)  H


 


Eosinophils (%) (Auto)


  


  5.4 %


(0.0-3.0)  H


 


Basophils (%) (Auto)


  


  1.9 %


(0.0-2.0)


 


Prothrombin Time


  


  11.9 SEC


(9.30-11.50)  H


 


Prothromb Time International


Ratio 


  1.1 (0.9-1.1)  


 


 


Activated Partial


Thromboplast Time 


  31 SEC (23-33)


 


 


Sodium Level


  


  135 MMOL/L


(136-145)  L


 


Potassium Level


  


  3.9 MMOL/L


(3.5-5.1)


 


Chloride Level


  


  100 MMOL/L


()


 


Carbon Dioxide Level


  


  21 MMOL/L


(21-32)


 


Anion Gap


  


  14 mmol/L


(5-15)


 


Blood Urea Nitrogen


  


  20 mg/dL


(7-18)  H


 


Creatinine


  


  1.0 MG/DL


(0.55-1.30)


 


Estimat Glomerular Filtration


Rate 


  > 60 mL/min


(>60)


 


Glucose Level


  


  102 MG/DL


()


 


Calcium Level


  


  10.3 MG/DL


(8.5-10.1)  H


 


Phosphorus Level


  


  2.1 MG/DL


(2.5-4.9)  L


 


Magnesium Level


  


  2.2 MG/DL


(1.8-2.4)


 


Total Bilirubin


  


  0.8 MG/DL


(0.2-1.0)


 


Aspartate Amino Transf


(AST/SGOT) 


  21 U/L (15-37)


 


 


Alanine Aminotransferase


(ALT/SGPT) 


  42 U/L (12-78)


 


 


Alkaline Phosphatase


  


  139 U/L


()  H


 


Total Protein


  


  9.3 G/DL


(6.4-8.2)  H


 


Albumin


  


  3.9 G/DL


(3.4-5.0)


 


Globulin  5.4 g/dL  


 


Albumin/Globulin Ratio


  


  0.7 (1.0-2.7)


L

















Yury Pena MD Aug 13, 2018 10:34

## 2018-08-13 NOTE — IMMEDIATE POST-OP EVALUATION
Immediate Post-Op Evalulation


Immediate Post-Op Evalulation


Procedure:  STACI


Date of Evaluation:  Aug 13, 2018


Time of Evaluation:  14:38


IV Fluids:  0


Blood Products:  0


Estimated Blood Loss:  2


Urinary Output:  0


Blood Pressure Systolic:  116


Blood Pressure Diastolic:  72


Pulse Rate:  81


Respiratory Rate:  20 - Mech Vent


O2 Sat by Pulse Oximetry:  100


Temperature (Fahrenheit):  97.8


Pain Score (1-10):  0


Nausea:  No


Vomiting:  No


Complications


0


Patient Status:  awake, no response, patent, ventilated, nausea, vomiting, none


Hydration Status:  adequate











Celestino Gautam MD Aug 13, 2018 14:00

## 2018-08-13 NOTE — ANETHESIA PREOPERATIVE EVAL
Anesthesia Pre-op PMH/ROS


General


Date of Evaluation:  Aug 13, 2018


Time of Evaluation:  13:21


Anesthesiologist:  Lotus


ASA Score:  ASA 4


Mallampati Score


Class I : Soft palate, uvula, fauces, pillars visible


Class II: Soft palate, uvula, fauces visible


Class III: Soft palate, base of uvula visible


Class IV: Only hard plate visible


Mallampati Classification:  Class IV


Surgeon:  Ryan


Diagnosis:  Ventilatory Failure


Surgical Procedure:  STACI


Anesthesia History:  none


Family History:  no anesthesia problems


Allergies:  


Coded Allergies:  


     AZTREONAM (Verified  Allergy, Unknown, 7/23/18)


Medications:  see eMAR





Past Medical History


Cardiovascular:  Reports: HTN


Pulmonary:  Reports: COPD


Gastrointestinal/Genitourinary:  Reports: ESRD


Neurologic/Psychiatric:  Reports: other - SZ


Endocrine:  Reports: DM


PSxH Narrative:


Colostomy





Anesthesia Pre-op Phys. Exam


Physician Exam





Last Vital Signs








  Date Time  Temp Pulse Resp B/P (MAP) Pulse Ox O2 Delivery O2 Flow Rate FiO2


 


8/13/18 13:15  74 16     30


 


8/13/18 12:03 97.1   99/79 (86) 100   





 97.1       


 


8/13/18 12:00      Mechanical Ventilator  








Constitutional:  NAD


Neurologic:  CN 2-12 intact


Cardiovascular:  RRR


Respiratory:  CTA


Gastrointestinal:  S/NT/ND





Airway Exam


Mallampati Score:  Class IV


MO:  limited


ROM:  limited


Teeth:  missing





Anesthesia Pre-op A/P


Labs





Hematology








Test


  8/13/18


03:25


 


White Blood Count


  8.6 K/UL


(4.8-10.8)


 


Red Blood Count


  5.07 M/UL


(4.70-6.10)


 


Hemoglobin


  13.7 G/DL


(14.2-18.0)  L


 


Hematocrit


  43.9 %


(42.0-52.0)


 


Mean Corpuscular Volume 87 FL (80-99)  


 


Mean Corpuscular Hemoglobin


  27.1 PG


(27.0-31.0)


 


Mean Corpuscular Hemoglobin


Concent 31.3 G/DL


(32.0-36.0)  L


 


Red Cell Distribution Width


  16.1 %


(11.6-14.8)  H


 


Platelet Count


  350 K/UL


(150-450)


 


Mean Platelet Volume


  7.1 FL


(6.5-10.1)


 


Neutrophils (%) (Auto)


  58.8 %


(45.0-75.0)


 


Lymphocytes (%) (Auto)


  22.8 %


(20.0-45.0)


 


Monocytes (%) (Auto)


  11.1 %


(1.0-10.0)  H


 


Eosinophils (%) (Auto)


  5.4 %


(0.0-3.0)  H


 


Basophils (%) (Auto)


  1.9 %


(0.0-2.0)








Coagulation








Test


  8/13/18


03:25


 


Prothrombin Time


  11.9 SEC


(9.30-11.50)  H


 


Prothromb Time International


Ratio 1.1 (0.9-1.1)  


 


 


Activated Partial


Thromboplast Time 31 SEC (23-33)


 








Chemistry








Test


  8/13/18


03:25


 


Sodium Level


  135 MMOL/L


(136-145)  L


 


Potassium Level


  3.9 MMOL/L


(3.5-5.1)


 


Chloride Level


  100 MMOL/L


()


 


Carbon Dioxide Level


  21 MMOL/L


(21-32)


 


Anion Gap


  14 mmol/L


(5-15)


 


Blood Urea Nitrogen


  20 mg/dL


(7-18)  H


 


Creatinine


  1.0 MG/DL


(0.55-1.30)


 


Estimat Glomerular Filtration


Rate > 60 mL/min


(>60)


 


Glucose Level


  102 MG/DL


()


 


Calcium Level


  10.3 MG/DL


(8.5-10.1)  H


 


Phosphorus Level


  2.1 MG/DL


(2.5-4.9)  L


 


Magnesium Level


  2.2 MG/DL


(1.8-2.4)


 


Total Bilirubin


  0.8 MG/DL


(0.2-1.0)


 


Aspartate Amino Transf


(AST/SGOT) 21 U/L (15-37)


 


 


Alanine Aminotransferase


(ALT/SGPT) 42 U/L (12-78)


 


 


Alkaline Phosphatase


  139 U/L


()  H


 


Total Protein


  9.3 G/DL


(6.4-8.2)  H


 


Albumin


  3.9 G/DL


(3.4-5.0)


 


Globulin 5.4 g/dL  


 


Albumin/Globulin Ratio


  0.7 (1.0-2.7)


L











Risk Assessment & Plan


Assessment:


ASA 4


Plan:


GA


Status Change Before Surgery:  No





Pre-Antibiotics


Given Within 1 Hr of Incision:  No











Celestino Gautam MD Aug 13, 2018 13:48

## 2018-08-13 NOTE — GENERAL PROGRESS NOTE
Assessment/Plan


Status:  stable


Assessment/Plan


# Leukocytosis - Sepsis with elevated temperature of 103 degrees Fahrenheit.  

Had uti v bacteremia (CoNS) on abx, has improved.


--> Pt on antibiotics, has received a dose of Zosyn and currently is on 

vancomycin q.12 h. now on cefepime


--> Appreciate Infectious Disease recs


--> 2D echo per ID performed on 8/1: No discrete vegetations seen, however SBE 

may not be excluded by transthoracic 2-D echo.


--> 8/13: WBC of 8.6, wnl 


--> Currently afebrile 


# Anemia of chronic disease. No hemolysis, peripheral smear reviewed, ferritin 

was elevated.


--> Continue to closely monitor


--> Hgb goal >7


--> 8/13: Hgb 13.7


# Acute kidney injury.  


--> Currently on IV fluids, IV bolus given to see if the patient responds along 

with IV pressor as needed.  


--> Closely monitor with Nephrology team.


# Septic shock, use antibiotic per ID services.


--> potential uti, on abx and bacteremia


# Respiratory failure, status post tracheostomy, on mechanical ventilation per 

Dr. Pena


--> Remains on vent 


# Hypercalcemia. Monitor PTH and calcium level.


--> 8/12: 9.7, wnl





The time the note was entered does not necessarily correspond to the time the 

patient was seen.





Subjective


Date patient seen:  Aug 13, 2018


ROS Limited/Unobtainable:  Yes


Hematologic/Lymphatic:  Reports: anemia


Allergies:  


Coded Allergies:  


     AZTREONAM (Verified  Allergy, Unknown, 7/23/18)


All Systems:  reviewed and negative except above


Subjective


Pt remains on vent. No acute events. H/H stable. STACI planned for today.





Objective





Last 24 Hour Vital Signs








  Date Time  Temp Pulse Resp B/P (MAP) Pulse Ox O2 Delivery O2 Flow Rate FiO2


 


8/13/18 08:00        30


 


8/13/18 08:00 97.7 75 16 113/77 (89) 99   





 97.7       


 


8/13/18 08:00      Mechanical Ventilator  


 


8/13/18 07:10  73 16     30


 


8/13/18 05:08  70 16     30


 


8/13/18 04:00 98.4 75 16 124/81 (95) 98   





 98.4       


 


8/13/18 04:00        30


 


8/13/18 04:00      Mechanical Ventilator  


 


8/13/18 04:00  75      


 


8/13/18 03:29  74 16     30


 


8/13/18 01:12  76 16     30


 


8/13/18 00:42 97.9       





 97.9       


 


8/13/18 00:42 97.9       


 


8/13/18 00:00        30


 


8/13/18 00:00 99.8 74 16 124/78 (93) 95   





 99.8       


 


8/13/18 00:00  77      


 


8/13/18 00:00      Mechanical Ventilator  


 


8/12/18 23:43 98.6       


 


8/12/18 23:18  83 16     30


 


8/12/18 21:18  83 17     30


 


8/12/18 20:00 99.1 86 22 123/82 (96) 98   





 99.1       


 


8/12/18 20:00        30


 


8/12/18 20:00  81      


 


8/12/18 20:00      Mechanical Ventilator  


 


8/12/18 18:30  82 16     30


 


8/12/18 17:06  88 17     30


 


8/12/18 16:00        30


 


8/12/18 16:00 98.4 86 17 111/68 (82) 98   





 98.4       


 


8/12/18 16:00  86      


 


8/12/18 16:00      Mechanical Ventilator  


 


8/12/18 14:54  87 16     30


 


8/12/18 13:12  82 16     30


 


8/12/18 12:00        30


 


8/12/18 12:00 97.8 82 18 109/76 (87) 98   





 97.8       


 


8/12/18 12:00      Mechanical Ventilator  


 


8/12/18 12:00  80      


 


8/12/18 11:11  82 17     30

















Intake and Output  


 


 8/12/18 8/13/18





 19:00 07:00


 


Intake Total 50 ml 900 ml


 


Output Total 700 ml 890 ml


 


Balance -650 ml 10 ml


 


  


 


Free Water  50 ml


 


IV Total  250 ml


 


Tube Feeding 50 ml 600 ml


 


Output Urine Total 700 ml 400 ml


 


Stool Total  490 ml








Laboratory Tests


8/12/18 18:03: Vancomycin Level Trough 14.2H


8/13/18 03:25: 


White Blood Count 8.6, Red Blood Count 5.07, Hemoglobin 13.7L, Hematocrit 43.9, 

Mean Corpuscular Volume 87, Mean Corpuscular Hemoglobin 27.1, Mean Corpuscular 

Hemoglobin Concent 31.3L, Red Cell Distribution Width 16.1H, Platelet Count 350

, Mean Platelet Volume 7.1, Neutrophils (%) (Auto) 58.8, Lymphocytes (%) (Auto) 

22.8, Monocytes (%) (Auto) 11.1H, Eosinophils (%) (Auto) 5.4H, Basophils (%) (

Auto) 1.9, Prothrombin Time 11.9H, Prothromb Time International Ratio 1.1, 

Activated Partial Thromboplast Time 31, Sodium Level 135L, Potassium Level 3.9, 

Chloride Level 100, Carbon Dioxide Level 21, Anion Gap 14, Blood Urea Nitrogen 

20H, Creatinine 1.0, Estimat Glomerular Filtration Rate > 60, Glucose Level 102

, Calcium Level 10.3H, Phosphorus Level 2.1L, Magnesium Level 2.2, Total 

Bilirubin 0.8, Aspartate Amino Transf (AST/SGOT) 21, Alanine Aminotransferase (

ALT/SGPT) 42, Alkaline Phosphatase 139H, Total Protein 9.3H, Albumin 3.9, 

Globulin 5.4, Albumin/Globulin Ratio 0.7L


Height (Feet):  6


Weight (Pounds):  202


General Appearance:  no apparent distress


EENT:  PERRL/EOMI


Neck:  normal alignment


Cardiovascular:  normal peripheral pulses


Respiratory/Chest:  no respiratory distress


Abdomen:  soft











Tejas Gerber MD Aug 13, 2018 09:52

## 2018-08-13 NOTE — NEPHROLOGY PROGRESS NOTE
Assessment/Plan


Assessment/Plan


1. BRAYDON- resolved


2. Sepsis- on Abx. STACI 


3. Chronic Resp FL- trached on vent 


4. Hyponatremia - Na stable 135. No changes


5. SZ- Keppra  -monitor NA


6. Hypophos- replace today





Subjective


Date patient seen:  Aug 13, 2018


Time patient seen:  08:14


ROS Limited/Unobtainable:  Yes


Allergies:  


Coded Allergies:  


     AZTREONAM (Verified  Allergy, Unknown, 7/23/18)


Subjective


Patient trached/vent. Poss STACI today





Objective





Last 24 Hour Vital Signs








  Date Time  Temp Pulse Resp B/P (MAP) Pulse Ox O2 Delivery O2 Flow Rate FiO2


 


8/13/18 07:10  73 16     30


 


8/13/18 05:08  70 16     30


 


8/13/18 04:00 98.4 75 16 124/81 (95) 98   





 98.4       


 


8/13/18 04:00        30


 


8/13/18 04:00      Mechanical Ventilator  


 


8/13/18 04:00  75      


 


8/13/18 03:29  74 16     30


 


8/13/18 01:12  76 16     30


 


8/13/18 00:42 97.9       





 97.9       


 


8/13/18 00:42 97.9       


 


8/13/18 00:00        30


 


8/13/18 00:00 99.8 74 16 124/78 (93) 95   





 99.8       


 


8/13/18 00:00  77      


 


8/13/18 00:00      Mechanical Ventilator  


 


8/12/18 23:43 98.6       


 


8/12/18 23:18  83 16     30


 


8/12/18 21:18  83 17     30


 


8/12/18 20:00 99.1 86 22 123/82 (96) 98   





 99.1       


 


8/12/18 20:00        30


 


8/12/18 20:00  81      


 


8/12/18 20:00      Mechanical Ventilator  


 


8/12/18 18:30  82 16     30


 


8/12/18 17:06  88 17     30


 


8/12/18 16:00        30


 


8/12/18 16:00 98.4 86 17 111/68 (82) 98   





 98.4       


 


8/12/18 16:00  86      


 


8/12/18 16:00      Mechanical Ventilator  


 


8/12/18 14:54  87 16     30


 


8/12/18 13:12  82 16     30


 


8/12/18 12:00        30


 


8/12/18 12:00 97.8 82 18 109/76 (87) 98   





 97.8       


 


8/12/18 12:00      Mechanical Ventilator  


 


8/12/18 12:00  80      


 


8/12/18 11:11  82 17     30


 


8/12/18 09:23  81 16     30

















Intake and Output  


 


 8/12/18 8/13/18





 19:00 07:00


 


Intake Total 50 ml 900 ml


 


Output Total 700 ml 890 ml


 


Balance -650 ml 10 ml


 


  


 


Free Water  50 ml


 


IV Total  250 ml


 


Tube Feeding 50 ml 600 ml


 


Output Urine Total 700 ml 400 ml


 


Stool Total  490 ml








Laboratory Tests


8/12/18 18:03: Vancomycin Level Trough 14.2H


8/13/18 03:25: 


White Blood Count 8.6, Red Blood Count 5.07, Hemoglobin 13.7L, Hematocrit 43.9, 

Mean Corpuscular Volume 87, Mean Corpuscular Hemoglobin 27.1, Mean Corpuscular 

Hemoglobin Concent 31.3L, Red Cell Distribution Width 16.1H, Platelet Count 350

, Mean Platelet Volume 7.1, Neutrophils (%) (Auto) 58.8, Lymphocytes (%) (Auto) 

22.8, Monocytes (%) (Auto) 11.1H, Eosinophils (%) (Auto) 5.4H, Basophils (%) (

Auto) 1.9, Prothrombin Time 11.9H, Prothromb Time International Ratio 1.1, 

Activated Partial Thromboplast Time 31, Sodium Level 135L, Potassium Level 3.9, 

Chloride Level 100, Carbon Dioxide Level 21, Anion Gap 14, Blood Urea Nitrogen 

20H, Creatinine 1.0, Estimat Glomerular Filtration Rate > 60, Glucose Level 102

, Calcium Level 10.3H, Phosphorus Level 2.1L, Magnesium Level 2.2, Total 

Bilirubin 0.8, Aspartate Amino Transf (AST/SGOT) 21, Alanine Aminotransferase (

ALT/SGPT) 42, Alkaline Phosphatase 139H, Total Protein 9.3H, Albumin 3.9, 

Globulin 5.4, Albumin/Globulin Ratio 0.7L


Height (Feet):  6


Weight (Pounds):  202


General Appearance:  WD/WN, no apparent distress


EENT:  PERRL/EOMI


Neck:  non-tender, normal alignment


Cardiovascular:  normal rate, regular rhythm


Respiratory/Chest:  chest wall non-tender, rhonchi - bilaterally


Abdomen:  normal bowel sounds, non tender


Edema:  no edema noted Arm (L), no edema noted Arm (R), no edema noted Leg (L), 

no edema noted Leg (R), no edema noted Pedal (L), no edema noted Pedal (R), no 

edema noted Generalized











Jewel Templeton M.D. Aug 13, 2018 08:16

## 2018-08-13 NOTE — PRE-PROCEDURE NOTE/ATTESTATION
Pre-Procedure Note/Attestation


Complete Prior to Procedure


Procedure Narrative:


STACI





Indications for Procedure


Pre-Operative Diagnosis:


BACTEREMIA





Attestation


I attest that I discussed the nature of the procedure; its benefits; risks and 

complications; and alternatives (and the risks and benefits of such alternatives

), prior to the procedure, with the patient (or the patient's legal 

representative).





I attest that, if there was a reasonable possibility of needing a blood 

transfusion, the patient (or the patient's legal representative) was given the 

[facility] standardized written summary, pursuant 

to the Sukhjinder Jasmin Blood Safety Act ([place] Health and Safety Code # 1645, as 

amended).





I attest that I re-evaluated the patient just prior to the surgery and that 

there has been no change in the patient's H&P, except as documented below:











Anibal Davis MD Aug 13, 2018 13:44

## 2018-08-13 NOTE — INFECTIOUS DISEASES PROG NOTE
Assessment/Plan


Assessment/Plan


Sepsis, resolving- 2ry to UTI and Bacteremia Blood cultures postive 4/4 bottles 

with GPC Unclear source will need to R/O Endocarditis  Possible PNA initially 


 -u/a wbc 40-60, nit neg, leuk +2; ucx >100k E. aerogenes (S cefepime, cipro/

levo; R Zosyn)


 -BCX 4/4 E. aerogenes, prob Amp C (S cefepime, cipro/levo; R Zosyn), P. 

mirabilis ESBL (S Zosyn, Ertapenem); repeta 7/26 1/4 CoNS (suspect contaminant)

, 7/28 Staph f/u final results if CoNS may need TTE and IE work up.


  -CXR 7/27: Increased left pleural effusion, over 3 days. Overall decreased 

interstitial congestion. Right infrahilar atelectasis


 -CXR: Cardiomegaly. Mild interstitial congestion. Suspect small bilateral 

pleural effusions


 -CT abd/p: Right lower quadrant double barrel colostomy, as described. Small 

peristomal hernia contains bowel loops without evidence of obstruction or 

strangulation. Left renal staghorn calculus. Bilateral intrarenal calyceal 

calculi. Borderline hydronephrosis on the right without evidence of downstream 

obstructive lesion. May indicate mild ureteral pelvic junction obstruction. 

Somewhat atrophic left kidney. Inspissated contrast ball within the distal 

sigmoid colon. Gastrostomy in good position. Nonspecific bilateral perinephric 

fat stranding, could indicate pyelonephritis or could be chronic. Guzmán 

catheter in place. Apparent bladder wall thickening, possibly an artifact of 

under distention but cystitis is not excludable, particularly in view of mild 

perivesical fat stranding. Bilateral pulmonary parenchymal atelectasis and 

consolidation


  -Blood Cx from PICC CoNS 2/2 bottles Peripheral Neg- (May be contaminant but 

will repeat blood Cx given new leukocytosis if positive will need ECHO.


  - Blood Cx 7/30/18 - GPC - Will need TTE for IE work up and the PICC removed.


 


PICC line infection/colonization - TTE negative. Blood cultures only positive 

from PICC. Not positive from Peripheral 


- Will repeat blood cultures x 1 to confirm clearance after PICC replaced. [place] 

suspicion for IE.


- PICC replace 8/1/18





Fever/Leukocytosis; Resolved - i recurs Re-evaluate lines, Diarrhea? C. dif? 


BRAYDON, improving


Lactic acidosis, resolved





chronic resp failure trach/vent dependant


BPH


GERD


 HTN


DM2


seizure disorder


colostomy


 s/p GT 


constipation





VRE and MRSA colonized


 


Plan:


- Continue empiric IV Vancomycin #14/18 for bacteremia/line infection - Abx end 

date 8/15/18 if STACI negative


- Recommend Transesophageal ECHO to r/o IE





-Last day of Ertapenem #14/14 for Amp-C Enterobacter and ESBL P.mirabilis 

bacteremia ; will treat for 14 days - End date 8/9/18


    -7/27 SP Zosyn #4





- Monitor CBC/CMP, temperatures


- F/U STACI


- Pulmonary care








Thank you for this consultation. Will continue to follow along with you.





Subjective


Allergies:  


Coded Allergies:  


     AZTREONAM (Verified  Allergy, Unknown, 7/23/18)


Subjective


No acute changes


On Vent still and stable at 30%


Afebrile 


STACI planned for today





Objective


Vital Signs





Last 24 Hour Vital Signs








  Date Time  Temp Pulse Resp B/P (MAP) Pulse Ox O2 Delivery O2 Flow Rate FiO2


 


8/13/18 08:00        30


 


8/13/18 08:00 97.7 75 16 113/77 (89) 99   





 97.7       


 


8/13/18 08:00      Mechanical Ventilator  


 


8/13/18 07:10  73 16     30


 


8/13/18 05:08  70 16     30


 


8/13/18 04:00 98.4 75 16 124/81 (95) 98   





 98.4       


 


8/13/18 04:00        30


 


8/13/18 04:00      Mechanical Ventilator  


 


8/13/18 04:00  75      


 


8/13/18 03:29  74 16     30


 


8/13/18 01:12  76 16     30


 


8/13/18 00:42 97.9       





 97.9       


 


8/13/18 00:42 97.9       


 


8/13/18 00:00        30


 


8/13/18 00:00 99.8 74 16 124/78 (93) 95   





 99.8       


 


8/13/18 00:00  77      


 


8/13/18 00:00      Mechanical Ventilator  


 


8/12/18 23:43 98.6       


 


8/12/18 23:18  83 16     30


 


8/12/18 21:18  83 17     30


 


8/12/18 20:00 99.1 86 22 123/82 (96) 98   





 99.1       


 


8/12/18 20:00        30


 


8/12/18 20:00  81      


 


8/12/18 20:00      Mechanical Ventilator  


 


8/12/18 18:30  82 16     30


 


8/12/18 17:06  88 17     30


 


8/12/18 16:00        30


 


8/12/18 16:00 98.4 86 17 111/68 (82) 98   





 98.4       


 


8/12/18 16:00  86      


 


8/12/18 16:00      Mechanical Ventilator  


 


8/12/18 14:54  87 16     30


 


8/12/18 13:12  82 16     30


 


8/12/18 12:00        30


 


8/12/18 12:00 97.8 82 18 109/76 (87) 98   





 97.8       


 


8/12/18 12:00      Mechanical Ventilator  


 


8/12/18 12:00  80      


 


8/12/18 11:11  82 17     30


 


8/12/18 09:23  81 16     30








Height (Feet):  6


Weight (Pounds):  202


Objective


GENERAL:  NAD. On vent 30% O2


HEENT:  NCAT, DMM, Tracheostomy (C/D/I)


PULM: Mild crackles in bases B/L, No wheezing


CARDIOVASCULAR:  RRR, S1 and S2. No M/R/G.


ABDOMEN:  Obese and nondistended. +BS, The G-tube and stoma noted.


EXTREMITIES:  No edema.  1+ pedal pulses.





Laboratory Tests








Test


  8/12/18


18:03 8/13/18


03:25


 


Vancomycin Level Trough


  14.2 ug/mL


(5.0-12.0)  H 


 


 


White Blood Count


  


  8.6 K/UL


(4.8-10.8)


 


Red Blood Count


  


  5.07 M/UL


(4.70-6.10)


 


Hemoglobin


  


  13.7 G/DL


(14.2-18.0)  L


 


Hematocrit


  


  43.9 %


(42.0-52.0)


 


Mean Corpuscular Volume  87 FL (80-99)  


 


Mean Corpuscular Hemoglobin


  


  27.1 PG


(27.0-31.0)


 


Mean Corpuscular Hemoglobin


Concent 


  31.3 G/DL


(32.0-36.0)  L


 


Red Cell Distribution Width


  


  16.1 %


(11.6-14.8)  H


 


Platelet Count


  


  350 K/UL


(150-450)


 


Mean Platelet Volume


  


  7.1 FL


(6.5-10.1)


 


Neutrophils (%) (Auto)


  


  58.8 %


(45.0-75.0)


 


Lymphocytes (%) (Auto)


  


  22.8 %


(20.0-45.0)


 


Monocytes (%) (Auto)


  


  11.1 %


(1.0-10.0)  H


 


Eosinophils (%) (Auto)


  


  5.4 %


(0.0-3.0)  H


 


Basophils (%) (Auto)


  


  1.9 %


(0.0-2.0)


 


Prothrombin Time


  


  11.9 SEC


(9.30-11.50)  H


 


Prothromb Time International


Ratio 


  1.1 (0.9-1.1)  


 


 


Activated Partial


Thromboplast Time 


  31 SEC (23-33)


 


 


Sodium Level


  


  135 MMOL/L


(136-145)  L


 


Potassium Level


  


  3.9 MMOL/L


(3.5-5.1)


 


Chloride Level


  


  100 MMOL/L


()


 


Carbon Dioxide Level


  


  21 MMOL/L


(21-32)


 


Anion Gap


  


  14 mmol/L


(5-15)


 


Blood Urea Nitrogen


  


  20 mg/dL


(7-18)  H


 


Creatinine


  


  1.0 MG/DL


(0.55-1.30)


 


Estimat Glomerular Filtration


Rate 


  > 60 mL/min


(>60)


 


Glucose Level


  


  102 MG/DL


()


 


Calcium Level


  


  10.3 MG/DL


(8.5-10.1)  H


 


Phosphorus Level


  


  2.1 MG/DL


(2.5-4.9)  L


 


Magnesium Level


  


  2.2 MG/DL


(1.8-2.4)


 


Total Bilirubin


  


  0.8 MG/DL


(0.2-1.0)


 


Aspartate Amino Transf


(AST/SGOT) 


  21 U/L (15-37)


 


 


Alanine Aminotransferase


(ALT/SGPT) 


  42 U/L (12-78)


 


 


Alkaline Phosphatase


  


  139 U/L


()  H


 


Total Protein


  


  9.3 G/DL


(6.4-8.2)  H


 


Albumin


  


  3.9 G/DL


(3.4-5.0)


 


Globulin  5.4 g/dL  


 


Albumin/Globulin Ratio


  


  0.7 (1.0-2.7)


L











Current Medications








 Medications


  (Trade)  Dose


 Ordered  Sig/Jan


 Route


 PRN Reason  Start Time


 Stop Time Status Last Admin


Dose Admin


 


 Acetaminophen


  (Tylenol)  650 mg  Q4H  PRN


 ORAL


 FEVER  7/26/18 14:00


 8/23/18 09:59  8/12/18 23:43


 


 


 Baclofen


  (Lioresal)  10 mg  EVERY 8  HOURS


 GT


   7/30/18 14:00


 8/23/18 12:59  8/13/18 05:06


 


 


 Chlorhexidine


 Gluconate


  (Elaine-Hex 2%)  1 applic  DAILY@2000


 TOPIC


   7/26/18 20:00


 8/23/18 19:59  8/12/18 20:47


 


 


 Dextrose


  (Dextrose 50%)  25 ml  STAT  PRN


 IV


 Hypoglycemia  7/27/18 08:45


 8/24/18 08:44   


 


 


 Dextrose


  (Dextrose 50%)  50 ml  STAT  PRN


 IV


 Hypoglycemia  7/27/18 08:45


 8/24/18 08:44   


 


 


 Heparin Sodium


  (Porcine)


  (Heparin 5000


 units/ml)  5,000 units  EVERY 12  HOURS


 SUBQ


   7/26/18 21:00


 8/23/18 20:59  8/12/18 20:48


 


 


 Insulin Aspart


  (NovoLOG)    Q6HR


 SUBQ


   7/26/18 18:00


 8/24/18 11:29  8/12/18 05:52


 


 


 Lansoprazole


  (Prevacid)  30 mg  DAILY


 GT


   7/27/18 09:00


 8/25/18 08:59  8/13/18 08:41


 


 


 Levetiracetam


  (Keppra)  1,000 mg  Q8HR


 GT


   7/26/18 14:00


 8/23/18 12:59  8/13/18 05:06


 


 


 Ondansetron HCl


  (Zofran)  4 mg  Q6H  PRN


 IVP


 Nausea & Vomiting  7/26/18 16:00


 8/23/18 09:59   


 


 


 Polyethylene


 Glycol


  (Miralax)  17 gm  BEDTIME


 GT


   7/30/18 21:00


 8/24/18 20:59  8/12/18 20:47


 


 


 Sodium Phosphate


 15 mm/Sodium


 Chloride  280 ml @ 


 70.273 mls/


 hr  ONCE  ONCE


 IVPB


   8/13/18 10:00


 8/13/18 13:59   


 


 


 Vancomycin HCl


  (Vanco rx to


 dose)  1 ea  DAILY  PRN


 MISC


 PER RX PROTOCOL  7/28/18 13:15


 8/27/18 13:14   


 


 


 Vancomycin HCl/


 Dextrose  250 ml @ 


 125 mls/hr  Q18H


 IVPB


   8/8/18 21:00


 8/15/18 20:59  8/13/18 08:40


 

















Robert Williamson M.D. Aug 13, 2018 09:03

## 2018-08-13 NOTE — CARDIOLOGY PROGRESS NOTE
Assessment/Plan


Status:  stable


Assessment/Plan


Assessment:


(1) Acute on chronic respiratory failure


(2) Acute on chronic renal insufficiency


(3) Severe sepsis


(4) Vegetative state


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


(6) Diabetes mellitus





Plan:


Continue antibiotics


Echocardiogram reviewed- no endocarditis


STACI for Monday 8/13


Patient afebrile, normal WBC otherwise


Continue trach care and tube feeds


Continue keppra for seizures





Subjective


Cardiovascular:  Reports: no symptoms


Respiratory:  Reports: no symptoms


Gastrointestinal/Abdominal:  Reports: no symptoms


Genitourinary:  Reports: no symptoms


Subjective


Low grade temp. 


vitals stable, Telemetry shows sinus rhythm. No acute events


Ventilator settings: portex 7, AC 16, TV: 600, FiO2: 30%, PEEP: 5. GT is in 

place running glucerna 1.2 @ 65 ml/hr. No residual present. Colostomy bag in 

place.


STACI scheduled for 8/13/18 at 1100





Objective





Last 24 Hour Vital Signs








  Date Time  Temp Pulse Resp B/P (MAP) Pulse Ox O2 Delivery O2 Flow Rate FiO2


 


8/13/18 10:30  77 16     30


 


8/13/18 08:59  70 16     30


 


8/13/18 08:00        30


 


8/13/18 08:00  75      


 


8/13/18 08:00 97.7 75 16 113/77 (89) 99   





 97.7       


 


8/13/18 08:00      Mechanical Ventilator  


 


8/13/18 07:10  73 16     30


 


8/13/18 05:08  70 16     30


 


8/13/18 04:00 98.4 75 16 124/81 (95) 98   





 98.4       


 


8/13/18 04:00        30


 


8/13/18 04:00      Mechanical Ventilator  


 


8/13/18 04:00  75      


 


8/13/18 03:29  74 16     30


 


8/13/18 01:12  76 16     30


 


8/13/18 00:42 97.9       





 97.9       


 


8/13/18 00:42 97.9       


 


8/13/18 00:00        30


 


8/13/18 00:00 99.8 74 16 124/78 (93) 95   





 99.8       


 


8/13/18 00:00  77      


 


8/13/18 00:00      Mechanical Ventilator  


 


8/12/18 23:43 98.6       


 


8/12/18 23:18  83 16     30


 


8/12/18 21:18  83 17     30


 


8/12/18 20:00 99.1 86 22 123/82 (96) 98   





 99.1       


 


8/12/18 20:00        30


 


8/12/18 20:00  81      


 


8/12/18 20:00      Mechanical Ventilator  


 


8/12/18 18:30  82 16     30


 


8/12/18 17:06  88 17     30


 


8/12/18 16:00        30


 


8/12/18 16:00 98.4 86 17 111/68 (82) 98   





 98.4       


 


8/12/18 16:00  86      


 


8/12/18 16:00      Mechanical Ventilator  


 


8/12/18 14:54  87 16     30


 


8/12/18 13:12  82 16     30


 


8/12/18 12:00        30


 


8/12/18 12:00 97.8 82 18 109/76 (87) 98   





 97.8       


 


8/12/18 12:00      Mechanical Ventilator  


 


8/12/18 12:00  80      








General Appearance:  no apparent distress, on vent


EENT:  PERRL/EOMI, normal ENT inspection


Neck:  non-tender, normal alignment


Rhythm:  NSR


Cardiovascular:  normal peripheral pulses, normal rate, regular rhythm


Respiratory/Chest:  chest wall non-tender, lungs clear


Abdomen:  normal bowel sounds, non tender


Extremities:  normal range of motion


Neurologic:  CNs II-XII grossly normal, no motor/sensory deficits











Intake and Output  


 


 8/12/18 8/13/18





 19:00 07:00


 


Intake Total 50 ml 900 ml


 


Output Total 700 ml 890 ml


 


Balance -650 ml 10 ml


 


  


 


Free Water  50 ml


 


IV Total  250 ml


 


Tube Feeding 50 ml 600 ml


 


Output Urine Total 700 ml 400 ml


 


Stool Total  490 ml











Laboratory Tests








Test


  8/12/18


18:03 8/13/18


03:25


 


Vancomycin Level Trough


  14.2 ug/mL


(5.0-12.0)  H 


 


 


White Blood Count


  


  8.6 K/UL


(4.8-10.8)


 


Red Blood Count


  


  5.07 M/UL


(4.70-6.10)


 


Hemoglobin


  


  13.7 G/DL


(14.2-18.0)  L


 


Hematocrit


  


  43.9 %


(42.0-52.0)


 


Mean Corpuscular Volume  87 FL (80-99)  


 


Mean Corpuscular Hemoglobin


  


  27.1 PG


(27.0-31.0)


 


Mean Corpuscular Hemoglobin


Concent 


  31.3 G/DL


(32.0-36.0)  L


 


Red Cell Distribution Width


  


  16.1 %


(11.6-14.8)  H


 


Platelet Count


  


  350 K/UL


(150-450)


 


Mean Platelet Volume


  


  7.1 FL


(6.5-10.1)


 


Neutrophils (%) (Auto)


  


  58.8 %


(45.0-75.0)


 


Lymphocytes (%) (Auto)


  


  22.8 %


(20.0-45.0)


 


Monocytes (%) (Auto)


  


  11.1 %


(1.0-10.0)  H


 


Eosinophils (%) (Auto)


  


  5.4 %


(0.0-3.0)  H


 


Basophils (%) (Auto)


  


  1.9 %


(0.0-2.0)


 


Prothrombin Time


  


  11.9 SEC


(9.30-11.50)  H


 


Prothromb Time International


Ratio 


  1.1 (0.9-1.1)  


 


 


Activated Partial


Thromboplast Time 


  31 SEC (23-33)


 


 


Sodium Level


  


  135 MMOL/L


(136-145)  L


 


Potassium Level


  


  3.9 MMOL/L


(3.5-5.1)


 


Chloride Level


  


  100 MMOL/L


()


 


Carbon Dioxide Level


  


  21 MMOL/L


(21-32)


 


Anion Gap


  


  14 mmol/L


(5-15)


 


Blood Urea Nitrogen


  


  20 mg/dL


(7-18)  H


 


Creatinine


  


  1.0 MG/DL


(0.55-1.30)


 


Estimat Glomerular Filtration


Rate 


  > 60 mL/min


(>60)


 


Glucose Level


  


  102 MG/DL


()


 


Calcium Level


  


  10.3 MG/DL


(8.5-10.1)  H


 


Phosphorus Level


  


  2.1 MG/DL


(2.5-4.9)  L


 


Magnesium Level


  


  2.2 MG/DL


(1.8-2.4)


 


Total Bilirubin


  


  0.8 MG/DL


(0.2-1.0)


 


Aspartate Amino Transf


(AST/SGOT) 


  21 U/L (15-37)


 


 


Alanine Aminotransferase


(ALT/SGPT) 


  42 U/L (12-78)


 


 


Alkaline Phosphatase


  


  139 U/L


()  H


 


Total Protein


  


  9.3 G/DL


(6.4-8.2)  H


 


Albumin


  


  3.9 G/DL


(3.4-5.0)


 


Globulin  5.4 g/dL  


 


Albumin/Globulin Ratio


  


  0.7 (1.0-2.7)


Robert Barrientos M.D. Aug 13, 2018 11:14

## 2018-08-13 NOTE — DIAGNOSTIC IMAGING REPORT
Indication: Dyspnea

 

Technique: One view of the chest

 

Comparison: 8/9/2018

 

Findings: Blunting of left costophrenic sulcus, left perihilar scarring persists.

Lungs and pleural spaces otherwise remain clear. The heart size is upper limits

normal. There is a tracheostomy

 

Impression: Unchanged left pleural effusion versus scarring, over 4 days. Other

stable findings as described

## 2018-08-13 NOTE — GI PROGRESS NOTE
Assessment/Plan


Problems:  


(1) Parastomal hernia


ICD Codes:  K43.5 - Parastomal hernia without obstruction or gangrene


SNOMED:  010541786


Qualifiers:  


   Qualified Codes:  K43.5 - Parastomal hernia without obstruction or gangrene


(2) Stoma malfunction


SNOMED:  466583613


(3) Colostomy in place


ICD Codes:  Z93.3 - Colostomy status


SNOMED:  811927990, 647285115


(4) Vegetative state


ICD Codes:  R40.3 - Persistent vegetative state


SNOMED:  14604491


(5) Diabetes mellitus


ICD Codes:  E11.9 - Type 2 diabetes mellitus without complications


SNOMED:  01531774


(6) Acute on chronic respiratory failure


ICD Codes:  J96.20 - Acute and chronic respiratory failure, unspecified whether 

with hypoxia or hypercapnia


SNOMED:  82569173


(7) Severe sepsis


ICD Codes:  A41.9 - Sepsis, unspecified organism; R65.20 - Severe sepsis 

without septic shock


SNOMED:  25161183


Status:  stable


Status Narrative


Discussed with Dr. Pickard.


Assessment/Plan


prolapse stoma assessed >> no s/sx of infection.  no obstruction. 


anemia work up reviewed >> iron deficiency


G tube dependent


hepatitis panel negative


OB negative





supportive care


monitor H&H, prn transfusions


venofer


ppi


GTFs per RD, adv to goal


GT site care daily/prn


abx


bowel regime


fu labs





The patient was seen and examined at bedside and all new and available data was 

reviewed in the patients chart. I agree with the above findings, impression 

and plan.  (Patient seen earlier today. Signature stamp does not reflect 

patient encounter time.). - Miles Pickard MD





Subjective


Subjective


limited





Objective





Last 24 Hour Vital Signs








  Date Time  Temp Pulse Resp B/P (MAP) Pulse Ox O2 Delivery O2 Flow Rate FiO2


 


8/13/18 10:30  77 16     30


 


8/13/18 08:59  70 16     30


 


8/13/18 08:00        30


 


8/13/18 08:00  75      


 


8/13/18 08:00 97.7 75 16 113/77 (89) 99   





 97.7       


 


8/13/18 08:00      Mechanical Ventilator  


 


8/13/18 07:10  73 16     30


 


8/13/18 05:08  70 16     30


 


8/13/18 04:00 98.4 75 16 124/81 (95) 98   





 98.4       


 


8/13/18 04:00        30


 


8/13/18 04:00      Mechanical Ventilator  


 


8/13/18 04:00  75      


 


8/13/18 03:29  74 16     30


 


8/13/18 01:12  76 16     30


 


8/13/18 00:42 97.9       





 97.9       


 


8/13/18 00:42 97.9       


 


8/13/18 00:00        30


 


8/13/18 00:00 99.8 74 16 124/78 (93) 95   





 99.8       


 


8/13/18 00:00  77      


 


8/13/18 00:00      Mechanical Ventilator  


 


8/12/18 23:43 98.6       


 


8/12/18 23:18  83 16     30


 


8/12/18 21:18  83 17     30


 


8/12/18 20:00 99.1 86 22 123/82 (96) 98   





 99.1       


 


8/12/18 20:00        30


 


8/12/18 20:00  81      


 


8/12/18 20:00      Mechanical Ventilator  


 


8/12/18 18:30  82 16     30


 


8/12/18 17:06  88 17     30


 


8/12/18 16:00        30


 


8/12/18 16:00 98.4 86 17 111/68 (82) 98   





 98.4       


 


8/12/18 16:00  86      


 


8/12/18 16:00      Mechanical Ventilator  


 


8/12/18 14:54  87 16     30


 


8/12/18 13:12  82 16     30


 


8/12/18 12:00        30


 


8/12/18 12:00 97.8 82 18 109/76 (87) 98   





 97.8       


 


8/12/18 12:00      Mechanical Ventilator  


 


8/12/18 12:00  80      


 


8/12/18 11:11  82 17     30

















Intake and Output  


 


 8/12/18 8/13/18





 19:00 07:00


 


Intake Total 50 ml 900 ml


 


Output Total 700 ml 890 ml


 


Balance -650 ml 10 ml


 


  


 


Free Water  50 ml


 


IV Total  250 ml


 


Tube Feeding 50 ml 600 ml


 


Output Urine Total 700 ml 400 ml


 


Stool Total  490 ml











Laboratory Tests








Test


  8/12/18


18:03 8/13/18


03:25


 


Vancomycin Level Trough


  14.2 ug/mL


(5.0-12.0)  H 


 


 


White Blood Count


  


  8.6 K/UL


(4.8-10.8)


 


Red Blood Count


  


  5.07 M/UL


(4.70-6.10)


 


Hemoglobin


  


  13.7 G/DL


(14.2-18.0)  L


 


Hematocrit


  


  43.9 %


(42.0-52.0)


 


Mean Corpuscular Volume  87 FL (80-99)  


 


Mean Corpuscular Hemoglobin


  


  27.1 PG


(27.0-31.0)


 


Mean Corpuscular Hemoglobin


Concent 


  31.3 G/DL


(32.0-36.0)  L


 


Red Cell Distribution Width


  


  16.1 %


(11.6-14.8)  H


 


Platelet Count


  


  350 K/UL


(150-450)


 


Mean Platelet Volume


  


  7.1 FL


(6.5-10.1)


 


Neutrophils (%) (Auto)


  


  58.8 %


(45.0-75.0)


 


Lymphocytes (%) (Auto)


  


  22.8 %


(20.0-45.0)


 


Monocytes (%) (Auto)


  


  11.1 %


(1.0-10.0)  H


 


Eosinophils (%) (Auto)


  


  5.4 %


(0.0-3.0)  H


 


Basophils (%) (Auto)


  


  1.9 %


(0.0-2.0)


 


Prothrombin Time


  


  11.9 SEC


(9.30-11.50)  H


 


Prothromb Time International


Ratio 


  1.1 (0.9-1.1)  


 


 


Activated Partial


Thromboplast Time 


  31 SEC (23-33)


 


 


Sodium Level


  


  135 MMOL/L


(136-145)  L


 


Potassium Level


  


  3.9 MMOL/L


(3.5-5.1)


 


Chloride Level


  


  100 MMOL/L


()


 


Carbon Dioxide Level


  


  21 MMOL/L


(21-32)


 


Anion Gap


  


  14 mmol/L


(5-15)


 


Blood Urea Nitrogen


  


  20 mg/dL


(7-18)  H


 


Creatinine


  


  1.0 MG/DL


(0.55-1.30)


 


Estimat Glomerular Filtration


Rate 


  > 60 mL/min


(>60)


 


Glucose Level


  


  102 MG/DL


()


 


Calcium Level


  


  10.3 MG/DL


(8.5-10.1)  H


 


Phosphorus Level


  


  2.1 MG/DL


(2.5-4.9)  L


 


Magnesium Level


  


  2.2 MG/DL


(1.8-2.4)


 


Total Bilirubin


  


  0.8 MG/DL


(0.2-1.0)


 


Aspartate Amino Transf


(AST/SGOT) 


  21 U/L (15-37)


 


 


Alanine Aminotransferase


(ALT/SGPT) 


  42 U/L (12-78)


 


 


Alkaline Phosphatase


  


  139 U/L


()  H


 


Total Protein


  


  9.3 G/DL


(6.4-8.2)  H


 


Albumin


  


  3.9 G/DL


(3.4-5.0)


 


Globulin  5.4 g/dL  


 


Albumin/Globulin Ratio


  


  0.7 (1.0-2.7)


L








Height (Feet):  6


Weight (Pounds):  202


General Appearance:  WD/WN, no apparent distress, alert


Cardiovascular:  normal rate


Respiratory/Chest:  normal breath sounds, no respiratory distress


Abdominal Exam:  normal bowel sounds, non tender, soft, other - colostomy


Extremities:  non-tender











Reym Lyons NP Aug 13, 2018 10:59

## 2018-08-13 NOTE — BRIEF OPERATIVE NOTE
Immediate Post Operative Note


Operative Note


Pre-op Diagnosis:


BACTEREMIA


Procedure:


STACI COULD NOT BE PERFORMED DUE TO TECHNICAL DIFFICULTIES


Post-op Diagnosis:


BACTEREMIA


PROCEDURE WAS UNSUCCESSFUL


Surgeon:  ALEXEY DAVIS MD


Specimen:  none


Complications:  none


Condition:  stable


Fluids:  NONE


Estimated Blood Loss:  none


Drains:  none


Implant(s) used?:  No











Alexey Davis MD Aug 13, 2018 13:53

## 2018-08-14 VITALS — SYSTOLIC BLOOD PRESSURE: 118 MMHG | DIASTOLIC BLOOD PRESSURE: 79 MMHG

## 2018-08-14 VITALS — DIASTOLIC BLOOD PRESSURE: 77 MMHG | SYSTOLIC BLOOD PRESSURE: 128 MMHG

## 2018-08-14 VITALS — SYSTOLIC BLOOD PRESSURE: 121 MMHG | DIASTOLIC BLOOD PRESSURE: 69 MMHG

## 2018-08-14 VITALS — DIASTOLIC BLOOD PRESSURE: 79 MMHG | SYSTOLIC BLOOD PRESSURE: 119 MMHG

## 2018-08-14 VITALS — DIASTOLIC BLOOD PRESSURE: 70 MMHG | SYSTOLIC BLOOD PRESSURE: 117 MMHG

## 2018-08-14 VITALS — DIASTOLIC BLOOD PRESSURE: 75 MMHG | SYSTOLIC BLOOD PRESSURE: 121 MMHG

## 2018-08-14 LAB
ADD MANUAL DIFF: NO
ANION GAP SERPL CALC-SCNC: 14 MMOL/L (ref 5–15)
BASOPHILS NFR BLD AUTO: 0.4 % (ref 0–2)
BUN SERPL-MCNC: 18 MG/DL (ref 7–18)
CALCIUM SERPL-MCNC: 10.2 MG/DL (ref 8.5–10.1)
CHLORIDE SERPL-SCNC: 100 MMOL/L (ref 98–107)
CO2 SERPL-SCNC: 21 MMOL/L (ref 21–32)
CREAT SERPL-MCNC: 1 MG/DL (ref 0.55–1.3)
EOSINOPHIL NFR BLD AUTO: 6.4 % (ref 0–3)
ERYTHROCYTE [DISTWIDTH] IN BLOOD BY AUTOMATED COUNT: 15.4 % (ref 11.6–14.8)
HCT VFR BLD CALC: 40.9 % (ref 42–52)
HGB BLD-MCNC: 13.3 G/DL (ref 14.2–18)
LYMPHOCYTES NFR BLD AUTO: 22.7 % (ref 20–45)
MCV RBC AUTO: 88 FL (ref 80–99)
MONOCYTES NFR BLD AUTO: 6 % (ref 1–10)
NEUTROPHILS NFR BLD AUTO: 64.5 % (ref 45–75)
PLATELET # BLD: 330 K/UL (ref 150–450)
POTASSIUM SERPL-SCNC: 4 MMOL/L (ref 3.5–5.1)
RBC # BLD AUTO: 4.64 M/UL (ref 4.7–6.1)
SODIUM SERPL-SCNC: 135 MMOL/L (ref 136–145)
WBC # BLD AUTO: 9.4 K/UL (ref 4.8–10.8)

## 2018-08-14 RX ADMIN — INSULIN ASPART SCH UNITS: 100 INJECTION, SOLUTION INTRAVENOUS; SUBCUTANEOUS at 11:58

## 2018-08-14 RX ADMIN — POLYETHYLENE GLYCOL 3350 SCH GM: 17 POWDER, FOR SOLUTION ORAL at 20:22

## 2018-08-14 RX ADMIN — LEVETIRACETAM SCH MG: 100 SOLUTION ORAL at 14:05

## 2018-08-14 RX ADMIN — Medication SCH MLS/HR: at 02:06

## 2018-08-14 RX ADMIN — CHLORHEXIDINE GLUCONATE SCH APPLIC: 213 SOLUTION TOPICAL at 20:21

## 2018-08-14 RX ADMIN — LEVETIRACETAM SCH MG: 100 SOLUTION ORAL at 21:58

## 2018-08-14 RX ADMIN — LEVETIRACETAM SCH MG: 100 SOLUTION ORAL at 05:14

## 2018-08-14 RX ADMIN — HEPARIN SODIUM SCH UNITS: 5000 INJECTION INTRAVENOUS; SUBCUTANEOUS at 20:24

## 2018-08-14 RX ADMIN — HEPARIN SODIUM SCH UNITS: 5000 INJECTION INTRAVENOUS; SUBCUTANEOUS at 08:26

## 2018-08-14 RX ADMIN — INSULIN ASPART SCH UNITS: 100 INJECTION, SOLUTION INTRAVENOUS; SUBCUTANEOUS at 17:22

## 2018-08-14 RX ADMIN — INSULIN ASPART SCH UNITS: 100 INJECTION, SOLUTION INTRAVENOUS; SUBCUTANEOUS at 05:11

## 2018-08-14 RX ADMIN — Medication SCH MLS/HR: at 20:21

## 2018-08-14 NOTE — GI PROGRESS NOTE
Assessment/Plan


Problems:  


(1) Parastomal hernia


ICD Codes:  K43.5 - Parastomal hernia without obstruction or gangrene


SNOMED:  827515066


Qualifiers:  


   Qualified Codes:  K43.5 - Parastomal hernia without obstruction or gangrene


(2) Stoma malfunction


SNOMED:  206337220


(3) Colostomy in place


ICD Codes:  Z93.3 - Colostomy status


SNOMED:  883228578, 534643515


(4) Vegetative state


ICD Codes:  R40.3 - Persistent vegetative state


SNOMED:  26329415


(5) Diabetes mellitus


ICD Codes:  E11.9 - Type 2 diabetes mellitus without complications


SNOMED:  33140544


(6) Acute on chronic respiratory failure


ICD Codes:  J96.20 - Acute and chronic respiratory failure, unspecified whether 

with hypoxia or hypercapnia


SNOMED:  32769710


(7) Severe sepsis


ICD Codes:  A41.9 - Sepsis, unspecified organism; R65.20 - Severe sepsis 

without septic shock


SNOMED:  41527911


Status:  unchanged


Status Narrative


Discussed with Dr. Pickard.


Assessment/Plan


prolapse stoma assessed >> no s/sx of infection.  no obstruction. 


anemia work up reviewed >> iron deficiency


G tube dependent


hepatitis panel negative


OB negative





supportive care


monitor H&H, prn transfusions


venofer


ppi


GTFs per RD, adv to goal


GT site care daily/prn


abx


bowel regime


fu labs





The patient was seen and examined at bedside and all new and available data was 

reviewed in the patients chart. I agree with the above findings, impression 

and plan.  (Patient seen earlier today. Signature stamp does not reflect 

patient encounter time.). - Miles Pickard MD





Subjective


Subjective


limited





Objective





Last 24 Hour Vital Signs








  Date Time  Temp Pulse Resp B/P (MAP) Pulse Ox O2 Delivery O2 Flow Rate FiO2


 


8/14/18 13:20  79 16     30


 


8/14/18 12:00  74      


 


8/14/18 12:00 98.2 76 16 128/77 (94) 99   





 98.2       


 


8/14/18 10:50  90 17     30


 


8/14/18 09:40  75 16     30


 


8/14/18 08:00  82      


 


8/14/18 08:00      Mechanical Ventilator  


 


8/14/18 08:00 98.2 78 16 119/79 (92) 99   





 98.2       


 


8/14/18 08:00        30


 


8/14/18 06:54  88 18     30


 


8/14/18 05:20  82 16     30


 


8/14/18 04:00        30


 


8/14/18 04:00 98.1 79 18 121/69 (86) 99   





 98.1       


 


8/14/18 04:00      Mechanical Ventilator  


 


8/14/18 04:00  77      


 


8/14/18 03:10  84 16     30


 


8/14/18 01:16  82 16     30


 


8/14/18 00:00 99.1 83 16 118/79 (92) 99   





 99.1       


 


8/14/18 00:00  79      


 


8/14/18 00:00      Mechanical Ventilator  


 


8/14/18 00:00        30


 


8/13/18 23:18  77 16     30


 


8/13/18 20:54  80 16     30


 


8/13/18 20:00 97.9 76 18 109/85 (93) 100   





 97.9       


 


8/13/18 20:00      Mechanical Ventilator  


 


8/13/18 20:00  87      


 


8/13/18 20:00        30


 


8/13/18 19:08  86 16     30


 


8/13/18 17:00  79 16     30


 


8/13/18 16:00 98.3 81 16 105/77 (86) 100   





 98.3       


 


8/13/18 16:00        30


 


8/13/18 16:00      Mechanical Ventilator  


 


8/13/18 16:00  79      


 


8/13/18 15:13  80 16     30


 


8/13/18 14:18 208.0 81 20  100   

















Intake and Output  


 


 8/13/18 8/14/18





 19:00 07:00


 


Intake Total 620.273 ml 900 ml


 


Output Total 700 ml 1000 ml


 


Balance -79.727 ml -100 ml


 


  


 


Free Water 50 ml 50 ml


 


IV Total 320.273 ml 250 ml


 


Tube Feeding 250 ml 600 ml


 


Output Urine Total 450 ml 525 ml


 


Stool Total 250 ml 475 ml











Laboratory Tests








Test


  8/14/18


04:00


 


White Blood Count


  9.4 K/UL


(4.8-10.8)


 


Red Blood Count


  4.64 M/UL


(4.70-6.10)  L


 


Hemoglobin


  13.3 G/DL


(14.2-18.0)  L


 


Hematocrit


  40.9 %


(42.0-52.0)  L


 


Mean Corpuscular Volume 88 FL (80-99)  


 


Mean Corpuscular Hemoglobin


  28.6 PG


(27.0-31.0)


 


Mean Corpuscular Hemoglobin


Concent 32.5 G/DL


(32.0-36.0)


 


Red Cell Distribution Width


  15.4 %


(11.6-14.8)  H


 


Platelet Count


  330 K/UL


(150-450)


 


Mean Platelet Volume


  7.1 FL


(6.5-10.1)


 


Neutrophils (%) (Auto)


  64.5 %


(45.0-75.0)


 


Lymphocytes (%) (Auto)


  22.7 %


(20.0-45.0)


 


Monocytes (%) (Auto)


  6.0 %


(1.0-10.0)


 


Eosinophils (%) (Auto)


  6.4 %


(0.0-3.0)  H


 


Basophils (%) (Auto)


  0.4 %


(0.0-2.0)


 


Sodium Level


  135 MMOL/L


(136-145)  L


 


Potassium Level


  4.0 MMOL/L


(3.5-5.1)


 


Chloride Level


  100 MMOL/L


()


 


Carbon Dioxide Level


  21 MMOL/L


(21-32)


 


Anion Gap


  14 mmol/L


(5-15)


 


Blood Urea Nitrogen


  18 mg/dL


(7-18)


 


Creatinine


  1.0 MG/DL


(0.55-1.30)


 


Estimat Glomerular Filtration


Rate > 60 mL/min


(>60)


 


Glucose Level


  112 MG/DL


()  H


 


Calcium Level


  10.2 MG/DL


(8.5-10.1)  H








Height (Feet):  6


Height (Inches):  0.00


Weight (Pounds):  201


General Appearance:  alert


Cardiovascular:  normal rate


Respiratory/Chest:  no respiratory distress, other - trach to vent


Abdominal Exam:  soft, GT site, other - colostomy











Remy Lyons NP Aug 14, 2018 13:37

## 2018-08-14 NOTE — GENERAL PROGRESS NOTE
Assessment/Plan


Status:  stable


Assessment/Plan


# Leukocytosis - Sepsis with elevated temperature of 103 degrees Fahrenheit.  

Had uti v bacteremia (CoNS) on abx, has improved.


--> Pt on antibiotics, has received a dose of Zosyn and currently is on 

vancomycin q.12 h. now on cefepime


--> Appreciate Infectious Disease recs


--> 2D echo per ID performed on 8/1: No discrete vegetations seen, however SBE 

may not be excluded by transthoracic 2-D echo.


--> 8/14: WBC of 9.4, wnl 


--> Currently afebrile 


# Anemia of chronic disease. No hemolysis, peripheral smear reviewed, ferritin 

was elevated.


--> Continue to closely monitor


--> Hgb goal >7


--> 8/14: Hgb 13.3


# Acute kidney injury.  


--> Currently on IV fluids, IV bolus given to see if the patient responds along 

with IV pressor as needed.  


--> Closely monitor with Nephrology team.


# Septic shock, use antibiotic per ID services.


--> potential uti, on abx and bacteremia


# Respiratory failure, status post tracheostomy, on mechanical ventilation per 

Dr. Pena


--> Remains on vent 


--> 8/13 CXR: Unchanged left pleural effusion versus scarring, over 4 days


# Hypercalcemia. Monitor PTH and calcium level.


--> 8/12: 9.7, wnl





The time the note was entered does not necessarily correspond to the time the 

patient was seen.





Subjective


Date patient seen:  Aug 14, 2018


ROS Limited/Unobtainable:  Yes


Hematologic/Lymphatic:  Reports: anemia


Allergies:  


Coded Allergies:  


     AZTREONAM (Verified  Allergy, Unknown, 7/23/18)


All Systems:  reviewed and negative except above


Subjective


Pt remains obtunded and astable.. No acute events. H/H stable.





Objective





Last 24 Hour Vital Signs








  Date Time  Temp Pulse Resp B/P (MAP) Pulse Ox O2 Delivery O2 Flow Rate FiO2


 


8/14/18 10:50  90 17     30


 


8/14/18 09:40  75 16     30


 


8/14/18 08:00  82      


 


8/14/18 08:00      Mechanical Ventilator  


 


8/14/18 08:00 98.2 78 16 119/79 (92) 99   





 98.2       


 


8/14/18 08:00        30


 


8/14/18 06:54  88 18     30


 


8/14/18 05:20  82 16     30


 


8/14/18 04:00        30


 


8/14/18 04:00 98.1 79 18 121/69 (86) 99   





 98.1       


 


8/14/18 04:00      Mechanical Ventilator  


 


8/14/18 04:00  77      


 


8/14/18 03:10  84 16     30


 


8/14/18 01:16  82 16     30


 


8/14/18 00:00 99.1 83 16 118/79 (92) 99   





 99.1       


 


8/14/18 00:00  79      


 


8/14/18 00:00      Mechanical Ventilator  


 


8/14/18 00:00        30


 


8/13/18 23:18  77 16     30


 


8/13/18 20:54  80 16     30


 


8/13/18 20:00 97.9 76 18 109/85 (93) 100   





 97.9       


 


8/13/18 20:00      Mechanical Ventilator  


 


8/13/18 20:00  87      


 


8/13/18 20:00        30


 


8/13/18 19:08  86 16     30


 


8/13/18 17:00  79 16     30


 


8/13/18 16:00 98.3 81 16 105/77 (86) 100   





 98.3       


 


8/13/18 16:00        30


 


8/13/18 16:00      Mechanical Ventilator  


 


8/13/18 16:00  79      


 


8/13/18 15:13  80 16     30


 


8/13/18 14:18 208.0 81 20  100   


 


8/13/18 13:15  74 16     30


 


8/13/18 12:03 97.1 82 16 99/79 (86) 100   





 97.1       


 


8/13/18 12:00        30


 


8/13/18 12:00  78      


 


8/13/18 12:00      Mechanical Ventilator  

















Intake and Output  


 


 8/13/18 8/14/18





 19:00 07:00


 


Intake Total 620.273 ml 900 ml


 


Output Total 700 ml 1000 ml


 


Balance -79.727 ml -100 ml


 


  


 


Free Water 50 ml 50 ml


 


IV Total 320.273 ml 250 ml


 


Tube Feeding 250 ml 600 ml


 


Output Urine Total 450 ml 525 ml


 


Stool Total 250 ml 475 ml








Laboratory Tests


8/14/18 04:00: 


White Blood Count 9.4, Red Blood Count 4.64L, Hemoglobin 13.3L, Hematocrit 40.9L

, Mean Corpuscular Volume 88, Mean Corpuscular Hemoglobin 28.6, Mean 

Corpuscular Hemoglobin Concent 32.5, Red Cell Distribution Width 15.4H, 

Platelet Count 330, Mean Platelet Volume 7.1, Neutrophils (%) (Auto) 64.5, 

Lymphocytes (%) (Auto) 22.7, Monocytes (%) (Auto) 6.0, Eosinophils (%) (Auto) 

6.4H, Basophils (%) (Auto) 0.4, Sodium Level 135L, Potassium Level 4.0, 

Chloride Level 100, Carbon Dioxide Level 21, Anion Gap 14, Blood Urea Nitrogen 

18, Creatinine 1.0, Estimat Glomerular Filtration Rate > 60, Glucose Level 112H

, Calcium Level 10.2H


Height (Feet):  6


Height (Inches):  0.00


Weight (Pounds):  201


General Appearance:  no apparent distress


EENT:  PERRL/EOMI


Neck:  normal alignment


Cardiovascular:  normal peripheral pulses


Respiratory/Chest:  no respiratory distress


Abdomen:  soft











Tejas Gerber MD Aug 14, 2018 11:55

## 2018-08-14 NOTE — CONSULTATION
DATE OF CONSULTATION:  08/13/2018



INVASIVE CARDIOLOGY CONSULTATION



CONSULTING PHYSICIAN:  Anibal Davis M.D.



REFERRING PHYSICIANS:

1. Etienne Bloom M.D.

2. Yury Pena M.D.



REASON FOR CONSULTATION:  Evaluation assessment for transesophageal

echocardiography.



HISTORY OF PRESENT ILLNESS:  The patient is a very unfortunate 52-year-old

gentleman, who presents by ambulance from [facility] for fever for

about three hours initially.  The patient was hemodynamically unstable

initially with possibility of pneumonia, aggressively hydrated, which made

him hemodynamically stable.  He had grown Proteus mirabilis and

Enterobacter as well as Staph epidermidis in his blood.  Transthoracic

echocardiography was inconclusive to rule out endocarditis.  I was asked

by primary care physician and Dr. Pena, to evaluate this patient for

transesophageal echocardiography.



PAST SURGICAL HISTORY:  Status post trach and PEG and colostomy bag

placement.



ALLERGIES:  Aztreonam.



MEDICATIONS:  Baclofen, bisacodyl, calcium carbonate, chlorhexidine,

gluconate, cholecalciferol, clonidine, cranberry, dextran, Colace,

famotidine, Robinul, insulin regular, DuoNeb, Keppra, losartan,

multivitamin, nystatin, polyethylene glycol, Januvia and Aldactone.



REVIEW OF SYSTEMS:  The patient is currently nonverbal.



PHYSICAL EXAMINATION:

VITAL SIGNS:  Blood pressure is 121/78, respirations 19, pulse of 69,

temperature 98.1 degrees Fahrenheit, and O2 saturation 100% on FiO2 of

30%.

HEENT:  Atraumatic and normocephalic.  He has got a tracheostomy tube in

place.  The tongue is protruded.

CARDIOVASCULAR:  Normal S1, S2.  Regular rhythm.  A 2/6 mid systolic murmur

at the left sternal border.  PMI is at fourth intercostal space in the

midclavicular line.

LUNGS:  Clear to auscultation bilaterally.

ABDOMEN:  Soft, nontender, and nondistended.  No hepatosplenomegaly.

Positive G-tube.

EXTREMITIES:  No evidence of edema, clubbing, or cyanosis.



LABORATORY AND DIAGNOSTIC DATA:  WBC 8.6, hemoglobin 13.7, hematocrit of

43.9, and platelet count is 350,000.  Sodium 135, potassium 3.9, chloride

100, bicarbonate 21, BUN of 28, creatinine 1.0, and glucose is 102.

Calcium is 10.3.  Magnesium is 2.2.



ASSESSMENT AND PLAN:  The patient is a very unfortunate 52-year-old

gentleman, seen in Invasive Cardiology consultation for evaluation,

assessment, and transesophageal echocardiography.



1. Bacteremia with multiorganism _____ Staphylococcus epidermidis.  The

patient has had no growth in the blood culture in the past week, however,

since the transthoracic echocardiogram was inconclusive for endocarditis,

we will like to proceed with transesophageal echocardiography.

2. Next of kin has already signed the informed consent after explaining

the risks, benefits, and alternatives of the procedure.

3. The patient requires at least conscious sedation for this

procedure.



Thank you very much for involving me in the care of this patient.









  ______________________________________________

  Anibal Davis M.D.





DR:  ISAAK

D:  08/13/2018 13:33

T:  08/13/2018 17:17

JOB#:  [id number]

CC:

## 2018-08-14 NOTE — CARDIOLOGY PROGRESS NOTE
Assessment/Plan


Status:  stable


Assessment/Plan


Assessment:


(1) Acute on chronic respiratory failure


(2) Acute on chronic renal insufficiency


(3) Severe sepsis


(4) Vegetative state


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


(6) Diabetes mellitus





Plan:


Continue antibiotics


Echocardiogram reviewed- no endocarditis, STACI unable to be performed


Patient afebrile, normal WBC otherwise


Continue trach care and tube feeds


Continue keppra for seizures 


Continue abx treatment for six weeks via PICC line until 9/15


Dispo planning





Subjective


Cardiovascular:  Reports: no symptoms


Respiratory:  Reports: no symptoms


Gastrointestinal/Abdominal:  Reports: no symptoms


Genitourinary:  Reports: no symptoms


Subjective


Afebrile vitals stable, Telemetry shows sinus rhythm. No acute events. Left 

pleural effusion unchanted. 


Ventilator settings: portex 7, AC 16, TV: 600, FiO2: 30%, PEEP: 5. GT is in 

place running glucerna 1.2 @ 65 ml/hr. No residual present. Colostomy bag in 

place.


STACI not completed, probe unable to be passed.  Patient should be treated for 

total six weeks abx for presumptive endocarditis at this juncture.





Objective





Last 24 Hour Vital Signs








  Date Time  Temp Pulse Resp B/P (MAP) Pulse Ox O2 Delivery O2 Flow Rate FiO2


 


8/14/18 10:50  90 17     30


 


8/14/18 09:40  75 16     30


 


8/14/18 08:00  82      


 


8/14/18 08:00      Mechanical Ventilator  


 


8/14/18 08:00 98.2 78 16 119/79 (92) 99   





 98.2       


 


8/14/18 08:00        30


 


8/14/18 06:54  88 18     30


 


8/14/18 05:20  82 16     30


 


8/14/18 04:00        30


 


8/14/18 04:00 98.1 79 18 121/69 (86) 99   





 98.1       


 


8/14/18 04:00      Mechanical Ventilator  


 


8/14/18 04:00  77      


 


8/14/18 03:10  84 16     30


 


8/14/18 01:16  82 16     30


 


8/14/18 00:00 99.1 83 16 118/79 (92) 99   





 99.1       


 


8/14/18 00:00  79      


 


8/14/18 00:00      Mechanical Ventilator  


 


8/14/18 00:00        30


 


8/13/18 23:18  77 16     30


 


8/13/18 20:54  80 16     30


 


8/13/18 20:00 97.9 76 18 109/85 (93) 100   





 97.9       


 


8/13/18 20:00      Mechanical Ventilator  


 


8/13/18 20:00  87      


 


8/13/18 20:00        30


 


8/13/18 19:08  86 16     30


 


8/13/18 17:00  79 16     30


 


8/13/18 16:00 98.3 81 16 105/77 (86) 100   





 98.3       


 


8/13/18 16:00        30


 


8/13/18 16:00      Mechanical Ventilator  


 


8/13/18 16:00  79      


 


8/13/18 15:13  80 16     30


 


8/13/18 14:18 208.0 81 20  100   


 


8/13/18 13:15  74 16     30


 


8/13/18 12:03 97.1 82 16 99/79 (86) 100   





 97.1       


 


8/13/18 12:00        30


 


8/13/18 12:00  78      


 


8/13/18 12:00      Mechanical Ventilator  








General Appearance:  no apparent distress, alert, on vent


EENT:  PERRL/EOMI, normal ENT inspection


Neck:  non-tender, normal alignment


Rhythm:  NSR


Cardiovascular:  normal peripheral pulses, normal rate


Respiratory/Chest:  chest wall non-tender, decreased breath sounds, crackles/

rales


Abdomen:  normal bowel sounds, non tender


Extremities:  normal range of motion, non-tender


Neurologic:  CNs II-XII grossly normal











Intake and Output  


 


 8/13/18 8/14/18





 19:00 07:00


 


Intake Total 620.273 ml 900 ml


 


Output Total 700 ml 1000 ml


 


Balance -79.727 ml -100 ml


 


  


 


Free Water 50 ml 50 ml


 


IV Total 320.273 ml 250 ml


 


Tube Feeding 250 ml 600 ml


 


Output Urine Total 450 ml 525 ml


 


Stool Total 250 ml 475 ml











Laboratory Tests








Test


  8/14/18


04:00


 


White Blood Count


  9.4 K/UL


(4.8-10.8)


 


Red Blood Count


  4.64 M/UL


(4.70-6.10)  L


 


Hemoglobin


  13.3 G/DL


(14.2-18.0)  L


 


Hematocrit


  40.9 %


(42.0-52.0)  L


 


Mean Corpuscular Volume 88 FL (80-99)  


 


Mean Corpuscular Hemoglobin


  28.6 PG


(27.0-31.0)


 


Mean Corpuscular Hemoglobin


Concent 32.5 G/DL


(32.0-36.0)


 


Red Cell Distribution Width


  15.4 %


(11.6-14.8)  H


 


Platelet Count


  330 K/UL


(150-450)


 


Mean Platelet Volume


  7.1 FL


(6.5-10.1)


 


Neutrophils (%) (Auto)


  64.5 %


(45.0-75.0)


 


Lymphocytes (%) (Auto)


  22.7 %


(20.0-45.0)


 


Monocytes (%) (Auto)


  6.0 %


(1.0-10.0)


 


Eosinophils (%) (Auto)


  6.4 %


(0.0-3.0)  H


 


Basophils (%) (Auto)


  0.4 %


(0.0-2.0)


 


Sodium Level


  135 MMOL/L


(136-145)  L


 


Potassium Level


  4.0 MMOL/L


(3.5-5.1)


 


Chloride Level


  100 MMOL/L


()


 


Carbon Dioxide Level


  21 MMOL/L


(21-32)


 


Anion Gap


  14 mmol/L


(5-15)


 


Blood Urea Nitrogen


  18 mg/dL


(7-18)


 


Creatinine


  1.0 MG/DL


(0.55-1.30)


 


Estimat Glomerular Filtration


Rate > 60 mL/min


(>60)


 


Glucose Level


  112 MG/DL


()  H


 


Calcium Level


  10.2 MG/DL


(8.5-10.1)  H

















Robert Hernandez M.D. Aug 14, 2018 11:54

## 2018-08-14 NOTE — PROGRESS NOTE
DATE:  08/13/2018



SUBJECTIVE:  The patient is afebrile, hemodynamically stable.



PHYSICAL EXAMINATION:

VITAL SIGNS:  Blood pressure 109/55, pulse 87, respirations 16, and

temperature 97.9.

HEENT:  Eyes were normal.  ENT, mucous membranes were moist and intact.

NECK:  Supple with no JVD without lymph nodes.  Tracheostomy site is

clean.

LUNGS:  Clear without rhonchi, rales, or wheezing.  Secretions are small,

thin, and grey.

HEART:  Normal sounds with regular heart beat.  There is no S3, S4, or

pericardial rub.

ABDOMEN:  Soft and nontender with normal bowel sounds.  Gastrostomy site is

clean.

EXTREMITIES:  Warm without cyanosis, clubbing, or edema.



LABORATORY AND DIAGNOSTIC DATA:  Hemoglobin is 13.7, hematocrit 43.9,

_____, WBC of 8.6, and platelets of 350.  BUN and creatinine is 20 and 1.0

respectively.  His sodium is 135, potassium 3.9, chloride 100, and CO2 is

21.  His calcium is 10.3.  His phosphorus is 2.1 and magnesium is 2.2.

SGOT and SGPT are normal.  Alkaline phosphatase is slightly elevated.

Albumin is 3.9.  Globulin is _____.  Total protein was 9.3.  Chest x-ray

done today revealed unchanged left pleural effusion and blunt left

costophrenic sulci, and otherwise in stable condition.



Repeat laboratory tests will be done in the a.m.









  ______________________________________________

  Etienne Bloom M.D.





DR:  SHAMA

D:  08/13/2018 22:48

T:  08/14/2018 01:33

JOB#:  [id number]

CC:

## 2018-08-14 NOTE — PROGRESS NOTE
DATE:  08/11/2018



SUBJECTIVE:  The patient is afebrile.  Hemodynamically stable without

tachycardia.



PHYSICAL EXAMINATION:

VITAL SIGNS:  Blood pressure 127/82, his pulse is 86, respirations 16, and

temperature 99.

HEENT:  Eyes were normal.  ENT, mucous membranes were moist and intact.

NECK:  Supple with no JVD without lymph nodes.  Tracheostomy site is

clean.

LUNGS:  Clear without rhonchi, rales, or wheezing.  Secretions are small,

thin, and whitish.

HEART:  Normal sounds with regular heart beats.  There is no tachycardia.

ABDOMEN:  Soft and nontender with normal bowel sounds.  Gastrostomy site is

clean.

EXTREMITIES:  Warm without cyanosis, clubbing, or edema.



LABORATORY AND DIAGNOSTIC DATA:  His hemoglobin is 12.7, hematocrit 38.0

with MCV of 86, WBC of 8.1, and platelets 327,000.  His BUN and creatinine

are 16 and 1.1 respectively.  Sodium is 132, potassium 3.8, chloride 100,

and CO2 is 21.



IMPRESSION AND PLAN:  The patient is in stable condition.  He remained

unconscious, nonresponsive, but does not _____.  Infectious processes

appeared to be controlled.  Repeat laboratory tests will be done in the

a.m.









  ______________________________________________

  Etienen Bloom M.D.





DR:  VIKTOR

D:  08/11/2018 17:48

T:  08/11/2018 22:02

JOB#:  [id number]

CC:

## 2018-08-14 NOTE — PULMONOLGY CRITICAL CARE NOTE
Critical Care - Asmt/Plan


Problems:  


(1) Acute on chronic respiratory failure


(2) Acute on chronic renal insufficiency


(3) Severe sepsis


(4) Vegetative state


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


(6) Diabetes mellitus


Respiratory:  monitor respiratory rate


Cardiac:  start pressors, continue to monitor HR/BP


Renal:  F/U I&O


Infectious Disease:  check cultures


Gastrointestinal:  continue feedings/current rate


Endocrine:  monitor blood sugar


Hematologic:  monitor H/H


Neurologic:  PRN Morphine, keep patient comfortable


Prophylaxis:  Heparin


Notes Reviewed:  cardio


Discussed with:  nurses, consultants, 





Critical Care - Objective





Last 24 Hour Vital Signs








  Date Time  Temp Pulse Resp B/P (MAP) Pulse Ox O2 Delivery O2 Flow Rate FiO2


 


8/14/18 14:21 208.8 77 16  99   


 


8/14/18 13:20  79 16     30


 


8/14/18 12:00  74      


 


8/14/18 12:00      Mechanical Ventilator  


 


8/14/18 12:00        30


 


8/14/18 12:00 98.2 76 16 128/77 (94) 99   





 98.2       


 


8/14/18 10:50  90 17     30


 


8/14/18 09:40  75 16     30


 


8/14/18 08:00  82      


 


8/14/18 08:00      Mechanical Ventilator  


 


8/14/18 08:00 98.2 78 16 119/79 (92) 99   





 98.2       


 


8/14/18 08:00        30


 


8/14/18 06:54  88 18     30


 


8/14/18 05:20  82 16     30


 


8/14/18 04:00        30


 


8/14/18 04:00 98.1 79 18 121/69 (86) 99   





 98.1       


 


8/14/18 04:00      Mechanical Ventilator  


 


8/14/18 04:00  77      


 


8/14/18 03:10  84 16     30


 


8/14/18 01:16  82 16     30


 


8/14/18 00:00 99.1 83 16 118/79 (92) 99   





 99.1       


 


8/14/18 00:00  79      


 


8/14/18 00:00      Mechanical Ventilator  


 


8/14/18 00:00        30


 


8/13/18 23:18  77 16     30


 


8/13/18 20:54  80 16     30


 


8/13/18 20:00 97.9 76 18 109/85 (93) 100   





 97.9       


 


8/13/18 20:00      Mechanical Ventilator  


 


8/13/18 20:00  87      


 


8/13/18 20:00        30


 


8/13/18 19:08  86 16     30


 


8/13/18 17:00  79 16     30


 


8/13/18 16:00 98.3 81 16 105/77 (86) 100   





 98.3       


 


8/13/18 16:00        30


 


8/13/18 16:00      Mechanical Ventilator  


 


8/13/18 16:00  79      








Status:  awake


Condition:  critical


Neck:  full ROM


Heart:  HR/BP stable, HR/BP unstable


Abdomen:  soft, non-tender, feeding tube


Extremities:  edema


Accucheck:  114





Critical Care - Subjective


ROS Limited/Unobtainable:  No


Condition:  critical


EKG Rhythm:  Sinus Bradycardia


FI02:  30


Vent Support Breath Rate:  16


Vent Support Mode:  AC


Vent Tidal Volume:  600


Sputum Amount:  Small


PEEP:  5.0


PIP:  30


Tube Feeding Amount:  50


I&O:











Intake and Output  


 


 8/13/18 8/14/18





 19:00 07:00


 


Intake Total 620.273 ml 900 ml


 


Output Total 700 ml 1000 ml


 


Balance -79.727 ml -100 ml


 


  


 


Free Water 50 ml 50 ml


 


IV Total 320.273 ml 250 ml


 


Tube Feeding 250 ml 600 ml


 


Output Urine Total 450 ml 525 ml


 


Stool Total 250 ml 475 ml








Labs:





Laboratory Tests








Test


  8/14/18


04:00


 


White Blood Count


  9.4 K/UL


(4.8-10.8)


 


Red Blood Count


  4.64 M/UL


(4.70-6.10)  L


 


Hemoglobin


  13.3 G/DL


(14.2-18.0)  L


 


Hematocrit


  40.9 %


(42.0-52.0)  L


 


Mean Corpuscular Volume 88 FL (80-99)  


 


Mean Corpuscular Hemoglobin


  28.6 PG


(27.0-31.0)


 


Mean Corpuscular Hemoglobin


Concent 32.5 G/DL


(32.0-36.0)


 


Red Cell Distribution Width


  15.4 %


(11.6-14.8)  H


 


Platelet Count


  330 K/UL


(150-450)


 


Mean Platelet Volume


  7.1 FL


(6.5-10.1)


 


Neutrophils (%) (Auto)


  64.5 %


(45.0-75.0)


 


Lymphocytes (%) (Auto)


  22.7 %


(20.0-45.0)


 


Monocytes (%) (Auto)


  6.0 %


(1.0-10.0)


 


Eosinophils (%) (Auto)


  6.4 %


(0.0-3.0)  H


 


Basophils (%) (Auto)


  0.4 %


(0.0-2.0)


 


Sodium Level


  135 MMOL/L


(136-145)  L


 


Potassium Level


  4.0 MMOL/L


(3.5-5.1)


 


Chloride Level


  100 MMOL/L


()


 


Carbon Dioxide Level


  21 MMOL/L


(21-32)


 


Anion Gap


  14 mmol/L


(5-15)


 


Blood Urea Nitrogen


  18 mg/dL


(7-18)


 


Creatinine


  1.0 MG/DL


(0.55-1.30)


 


Estimat Glomerular Filtration


Rate > 60 mL/min


(>60)


 


Glucose Level


  112 MG/DL


()  H


 


Calcium Level


  10.2 MG/DL


(8.5-10.1)  H

















Yury Pena MD Aug 14, 2018 15:15

## 2018-08-14 NOTE — PROGRESS NOTE
DATE:  08/12/2018



SUBJECTIVE:  The patient's condition remain stable.  He is afebrile,

hemodynamically stable without acute distress.



PHYSICAL EXAMINATION:

VITAL SIGNS:  Blood pressure 109/76, his pulse is 82, respirations were 18,

temperature 97.8 degrees.

HEENT:  Eyes were normal.  ENT, mucous membranes were moist and intact.

There is hypersalivation and saliva drooling from the left labial _____.

NECK:  Supple with no JVD without lymph nodes.  Tracheostomy site is

clean.

LUNGS:  Clear without rhonchi, rales, or wheezing.  Secretions are small,

thin, and whitish.

HEART:  Normal sounds with regular beats.  There is no tachycardia at rest.

Sinus rhythm on monitor.

ABDOMEN:  Soft and nontender with normal bowel sounds.  Gastrostomy site is

clean.

EXTREMITIES:  Warm without cyanosis, clubbing, or edema.



LABORATORY AND DIAGNOSTIC DATA:  His hemoglobin is 12.2, hematocrit 37.9

with _____ 57, WBC of 8.7 and platelets 311.  His BUN and creatinine is 17

and 1.0 respectively.  His sodium is 135, potassium 3.5, chloride 101, and

CO2 was 20.



IMPRESSION:  The patient is in stable condition.  His condition has been

stable for the last several days in regard to vital signs and laboratory

findings.  Repeat laboratory tests will be done in the morning.









  ______________________________________________

  Etienne Bloom M.D.





DR:  JAMIE

D:  08/12/2018 17:29

T:  08/12/2018 21:47

JOB#:  [id number]

CC:

## 2018-08-14 NOTE — 48 HOUR POST ANESTHESIA EVAL
Post Anesthesia Evaluation


Procedure:  STACI


Date of Evaluation:  Aug 14, 2018


Time of Evaluation:  14:40


Blood Pressure Systolic:  128


0:  77


Pulse Rate:  77


Respiratory Rate:  16


Temperature (Fahrenheit):  98.2


O2 Sat by Pulse Oximetry:  99


Airway:  patent


Nausea:  No


Vomiting:  No


Pain Intensity:  0


Hydration Status:  adequate


Cardiopulmonary Status:


Stable


Mental Status/LOC:  patient returned to baseline


Follow-up Care/Observations:


As per cardiology


Post-Anesthesia Complications:


No anesthetic complication


Follow-up care needed:  N/A











Sukhjinder Vallecillo MD Aug 14, 2018 14:21

## 2018-08-14 NOTE — NEPHROLOGY PROGRESS NOTE
Assessment/Plan


Assessment/Plan


1. BRAYDON- resolved


2. Sepsis- on Abx. 


3. Chronic Resp FL- trached on vent 


4. Hyponatremia - Na stable 135. 


5. SZ- Keppra  -monitor NA for now, stable


6. Hypophos- corrected





Subjective


Date patient seen:  Aug 14, 2018


Time patient seen:  08:26


ROS Limited/Unobtainable:  Yes


Allergies:  


Coded Allergies:  


     AZTREONAM (Verified  Allergy, Unknown, 7/23/18)


Subjective


Patient trached/vent. STACI was unsuccessful





Objective





Last 24 Hour Vital Signs








  Date Time  Temp Pulse Resp B/P (MAP) Pulse Ox O2 Delivery O2 Flow Rate FiO2


 


8/14/18 08:00  82      


 


8/14/18 08:00      Mechanical Ventilator  


 


8/14/18 08:00        30


 


8/14/18 06:54  88 18     30


 


8/14/18 05:20  82 16     30


 


8/14/18 04:00        30


 


8/14/18 04:00 98.1 79 18 121/69 (86) 99   





 98.1       


 


8/14/18 04:00      Mechanical Ventilator  


 


8/14/18 04:00  77      


 


8/14/18 03:10  84 16     30


 


8/14/18 01:16  82 16     30


 


8/14/18 00:00 99.1 83 16 118/79 (92) 99   





 99.1       


 


8/14/18 00:00  79      


 


8/14/18 00:00      Mechanical Ventilator  


 


8/14/18 00:00        30


 


8/13/18 23:18  77 16     30


 


8/13/18 20:54  80 16     30


 


8/13/18 20:00 97.9 76 18 109/85 (93) 100   





 97.9       


 


8/13/18 20:00      Mechanical Ventilator  


 


8/13/18 20:00  87      


 


8/13/18 20:00        30


 


8/13/18 19:08  86 16     30


 


8/13/18 17:00  79 16     30


 


8/13/18 16:00 98.3 81 16 105/77 (86) 100   





 98.3       


 


8/13/18 16:00        30


 


8/13/18 16:00      Mechanical Ventilator  


 


8/13/18 16:00  79      


 


8/13/18 15:13  80 16     30


 


8/13/18 14:18 208.0 81 20  100   


 


8/13/18 13:15  74 16     30


 


8/13/18 12:03 97.1 82 16 99/79 (86) 100   





 97.1       


 


8/13/18 12:00        30


 


8/13/18 12:00  78      


 


8/13/18 12:00      Mechanical Ventilator  


 


8/13/18 10:30  77 16     30


 


8/13/18 08:59  70 16     30

















Intake and Output  


 


 8/13/18 8/14/18





 19:00 07:00


 


Intake Total 620.273 ml 900 ml


 


Output Total 700 ml 1000 ml


 


Balance -79.727 ml -100 ml


 


  


 


Free Water 50 ml 50 ml


 


IV Total 320.273 ml 250 ml


 


Tube Feeding 250 ml 600 ml


 


Output Urine Total 450 ml 525 ml


 


Stool Total 250 ml 475 ml








Laboratory Tests


8/14/18 04:00: 


White Blood Count 9.4, Red Blood Count 4.64L, Hemoglobin 13.3L, Hematocrit 40.9L

, Mean Corpuscular Volume 88, Mean Corpuscular Hemoglobin 28.6, Mean 

Corpuscular Hemoglobin Concent 32.5, Red Cell Distribution Width 15.4H, 

Platelet Count 330, Mean Platelet Volume 7.1, Neutrophils (%) (Auto) 64.5, 

Lymphocytes (%) (Auto) 22.7, Monocytes (%) (Auto) 6.0, Eosinophils (%) (Auto) 

6.4H, Basophils (%) (Auto) 0.4, Sodium Level 135L, Potassium Level 4.0, 

Chloride Level 100, Carbon Dioxide Level 21, Anion Gap 14, Blood Urea Nitrogen 

18, Creatinine 1.0, Estimat Glomerular Filtration Rate > 60, Glucose Level 112H

, Calcium Level 10.2H


Height (Feet):  6


Height (Inches):  0.00


Weight (Pounds):  201


General Appearance:  WD/WN, no apparent distress, alert


EENT:  PERRL/EOMI


Neck:  non-tender


Cardiovascular:  normal peripheral pulses, normal rate


Respiratory/Chest:  rhonchi - bilaterally


Abdomen:  non tender, soft


Edema:  no edema noted Arm (L), no edema noted Arm (R), no edema noted Leg (L), 

no edema noted Leg (R), no edema noted Pedal (L), no edema noted Pedal (R), no 

edema noted Generalized











Jewel Templeton M.D. Aug 14, 2018 08:27

## 2018-08-14 NOTE — INFECTIOUS DISEASES PROG NOTE
Assessment/Plan


Assessment/Plan


Sepsis, resolving- 2ry to UTI and Bacteremia Blood cultures postive 4/4 bottles 

with GPC Unclear source will need to R/O Endocarditis  Possible PNA initially 


 -u/a wbc 40-60, nit neg, leuk +2; ucx >100k E. aerogenes (S cefepime, cipro/

levo; R Zosyn)


 -BCX 4/4 E. aerogenes, prob Amp C (S cefepime, cipro/levo; R Zosyn), P. 

mirabilis ESBL (S Zosyn, Ertapenem); repeta 7/26 1/4 CoNS (suspect contaminant)

, 7/28 Staph f/u final results if CoNS may need TTE and IE work up.


  -CXR 7/27: Increased left pleural effusion, over 3 days. Overall decreased 

interstitial congestion. Right infrahilar atelectasis


 -CXR: Cardiomegaly. Mild interstitial congestion. Suspect small bilateral 

pleural effusions


 -CT abd/p: Right lower quadrant double barrel colostomy, as described. Small 

peristomal hernia contains bowel loops without evidence of obstruction or 

strangulation. Left renal staghorn calculus. Bilateral intrarenal calyceal 

calculi. Borderline hydronephrosis on the right without evidence of downstream 

obstructive lesion. May indicate mild ureteral pelvic junction obstruction. 

Somewhat atrophic left kidney. Inspissated contrast ball within the distal 

sigmoid colon. Gastrostomy in good position. Nonspecific bilateral perinephric 

fat stranding, could indicate pyelonephritis or could be chronic. Guzmán 

catheter in place. Apparent bladder wall thickening, possibly an artifact of 

under distention but cystitis is not excludable, particularly in view of mild 

perivesical fat stranding. Bilateral pulmonary parenchymal atelectasis and 

consolidation


  -Blood Cx from PICC CoNS 2/2 bottles Peripheral Neg- (May be contaminant but 

will repeat blood Cx given new leukocytosis if positive will need ECHO.


  - Blood Cx 7/30/18 - GPC - Will need TTE for IE work up and the PICC removed.


 


PICC line infection/colonization - TTE negative. Blood cultures only positive 

from PICC. Not positive from Peripheral 


- Will repeat blood cultures x 1 to confirm clearance after PICC replaced. [place] 

suspicion for IE.


- PICC replace 8/1/18





Fever/Leukocytosis; Resolved - i recurs Re-evaluate lines, Diarrhea? C. dif? 


BRAYDON, improving


Lactic acidosis, resolved





chronic resp failure trach/vent dependant


BPH


GERD


 HTN


DM2


seizure disorder


colostomy


 s/p GT 


constipation





VRE and MRSA colonized


 


Plan:





STACI could not be performed due to technical problems


   - If unable to repeat STACI then patient should be treated for presumed 

endocarditis for 6 weeks





- Continue empiric IV Vancomycin #15/42 for bacteremia/line infection cant r/o 

Endocarditis  - Abx end date 9/12/18 if STACI attempted again and negative then 

ed date would be 8/15/18





-Last day of Ertapenem #14/14 for Amp-C Enterobacter and ESBL P.mirabilis 

bacteremia ; will treat for 14 days - End date 8/9/18


    -7/27 SP Zosyn #4





- Monitor CBC/CMP, temperatures


- F/U STACI


- Pulmonary care








Thank you for this consultation. Will continue to follow along with you.





Subjective


Allergies:  


Coded Allergies:  


     AZTREONAM (Verified  Allergy, Unknown, 7/23/18)


Subjective


STACI could not be performed for technical difficulties 


On Vent still and stable at 30%


Afebrile





Objective


Vital Signs





Last 24 Hour Vital Signs








  Date Time  Temp Pulse Resp B/P (MAP) Pulse Ox O2 Delivery O2 Flow Rate FiO2


 


8/14/18 08:00  82      


 


8/14/18 08:00      Mechanical Ventilator  


 


8/14/18 08:00        30


 


8/14/18 06:54  88 18     30


 


8/14/18 05:20  82 16     30


 


8/14/18 04:00        30


 


8/14/18 04:00 98.1 79 18 121/69 (86) 99   





 98.1       


 


8/14/18 04:00      Mechanical Ventilator  


 


8/14/18 04:00  77      


 


8/14/18 03:10  84 16     30


 


8/14/18 01:16  82 16     30


 


8/14/18 00:00 99.1 83 16 118/79 (92) 99   





 99.1       


 


8/14/18 00:00  79      


 


8/14/18 00:00      Mechanical Ventilator  


 


8/14/18 00:00        30


 


8/13/18 23:18  77 16     30


 


8/13/18 20:54  80 16     30


 


8/13/18 20:00 97.9 76 18 109/85 (93) 100   





 97.9       


 


8/13/18 20:00      Mechanical Ventilator  


 


8/13/18 20:00  87      


 


8/13/18 20:00        30


 


8/13/18 19:08  86 16     30


 


8/13/18 17:00  79 16     30


 


8/13/18 16:00 98.3 81 16 105/77 (86) 100   





 98.3       


 


8/13/18 16:00        30


 


8/13/18 16:00      Mechanical Ventilator  


 


8/13/18 16:00  79      


 


8/13/18 15:13  80 16     30


 


8/13/18 14:18 208.0 81 20  100   


 


8/13/18 13:15  74 16     30


 


8/13/18 12:03 97.1 82 16 99/79 (86) 100   





 97.1       


 


8/13/18 12:00        30


 


8/13/18 12:00  78      


 


8/13/18 12:00      Mechanical Ventilator  


 


8/13/18 10:30  77 16     30


 


8/13/18 08:59  70 16     30








Height (Feet):  6


Height (Inches):  0.00


Weight (Pounds):  201


Objective


GENERAL:  NAD. On vent 30% O2, Calm


HEENT:  NCAT, DMM, Tracheostomy (C/D/I)


PULM: Mild crackles in bases B/L, No wheezing


CARDIOVASCULAR:  RRR, S1 and S2


ABDOMEN:  Obese and nondistended. +BS, The G-tube and stoma noted.


EXTREMITIES:  No edema.  1+ pedal pulses.





Laboratory Tests








Test


  8/14/18


04:00


 


White Blood Count


  9.4 K/UL


(4.8-10.8)


 


Red Blood Count


  4.64 M/UL


(4.70-6.10)  L


 


Hemoglobin


  13.3 G/DL


(14.2-18.0)  L


 


Hematocrit


  40.9 %


(42.0-52.0)  L


 


Mean Corpuscular Volume 88 FL (80-99)  


 


Mean Corpuscular Hemoglobin


  28.6 PG


(27.0-31.0)


 


Mean Corpuscular Hemoglobin


Concent 32.5 G/DL


(32.0-36.0)


 


Red Cell Distribution Width


  15.4 %


(11.6-14.8)  H


 


Platelet Count


  330 K/UL


(150-450)


 


Mean Platelet Volume


  7.1 FL


(6.5-10.1)


 


Neutrophils (%) (Auto)


  64.5 %


(45.0-75.0)


 


Lymphocytes (%) (Auto)


  22.7 %


(20.0-45.0)


 


Monocytes (%) (Auto)


  6.0 %


(1.0-10.0)


 


Eosinophils (%) (Auto)


  6.4 %


(0.0-3.0)  H


 


Basophils (%) (Auto)


  0.4 %


(0.0-2.0)


 


Sodium Level


  135 MMOL/L


(136-145)  L


 


Potassium Level


  4.0 MMOL/L


(3.5-5.1)


 


Chloride Level


  100 MMOL/L


()


 


Carbon Dioxide Level


  21 MMOL/L


(21-32)


 


Anion Gap


  14 mmol/L


(5-15)


 


Blood Urea Nitrogen


  18 mg/dL


(7-18)


 


Creatinine


  1.0 MG/DL


(0.55-1.30)


 


Estimat Glomerular Filtration


Rate > 60 mL/min


(>60)


 


Glucose Level


  112 MG/DL


()  H


 


Calcium Level


  10.2 MG/DL


(8.5-10.1)  H











Current Medications








 Medications


  (Trade)  Dose


 Ordered  Sig/Jan


 Route


 PRN Reason  Start Time


 Stop Time Status Last Admin


Dose Admin


 


 Acetaminophen


  (Tylenol)  650 mg  Q4H  PRN


 ORAL


 FEVER  7/26/18 14:00


 8/23/18 09:59  8/12/18 23:43


 


 


 Baclofen


  (Lioresal)  10 mg  EVERY 8  HOURS


 GT


   7/30/18 14:00


 8/23/18 12:59  8/14/18 05:14


 


 


 Chlorhexidine


 Gluconate


  (Elaine-Hex 2%)  1 applic  DAILY@2000


 TOPIC


   7/26/18 20:00


 8/23/18 19:59  8/13/18 20:12


 


 


 Dextrose


  (Dextrose 50%)  25 ml  STAT  PRN


 IV


 Hypoglycemia  7/27/18 08:45


 8/24/18 08:44   


 


 


 Dextrose


  (Dextrose 50%)  50 ml  STAT  PRN


 IV


 Hypoglycemia  7/27/18 08:45


 8/24/18 08:44   


 


 


 Heparin Sodium


  (Porcine)


  (Heparin 5000


 units/ml)  5,000 units  EVERY 12  HOURS


 SUBQ


   7/26/18 21:00


 8/23/18 20:59  8/14/18 08:26


 


 


 Insulin Aspart


  (NovoLOG)    Q6HR


 SUBQ


   7/26/18 18:00


 8/24/18 11:29  8/13/18 23:53


 


 


 Lansoprazole


  (Prevacid)  30 mg  DAILY


 GT


   7/27/18 09:00


 8/25/18 08:59  8/14/18 08:27


 


 


 Levetiracetam


  (Keppra)  1,000 mg  Q8HR


 GT


   7/26/18 14:00


 8/23/18 12:59  8/14/18 05:14


 


 


 Ondansetron HCl


  (Zofran)  4 mg  Q6H  PRN


 IVP


 Nausea & Vomiting  7/26/18 16:00


 8/23/18 09:59   


 


 


 Polyethylene


 Glycol


  (Miralax)  17 gm  BEDTIME


 GT


   7/30/18 21:00


 8/24/18 20:59  8/13/18 20:12


 


 


 Vancomycin HCl


  (Vanco rx to


 dose)  1 ea  DAILY  PRN


 MISC


 PER RX PROTOCOL  7/28/18 13:15


 8/27/18 13:14   


 


 


 Vancomycin HCl/


 Dextrose  250 ml @ 


 125 mls/hr  Q18H


 IVPB


   8/8/18 21:00


 8/15/18 20:59  8/14/18 02:06


 

















Robert Williamson M.D. Aug 14, 2018 08:41

## 2018-08-15 VITALS — DIASTOLIC BLOOD PRESSURE: 78 MMHG | SYSTOLIC BLOOD PRESSURE: 117 MMHG

## 2018-08-15 VITALS — SYSTOLIC BLOOD PRESSURE: 120 MMHG | DIASTOLIC BLOOD PRESSURE: 77 MMHG

## 2018-08-15 VITALS — DIASTOLIC BLOOD PRESSURE: 80 MMHG | SYSTOLIC BLOOD PRESSURE: 123 MMHG

## 2018-08-15 VITALS — DIASTOLIC BLOOD PRESSURE: 73 MMHG | SYSTOLIC BLOOD PRESSURE: 113 MMHG

## 2018-08-15 VITALS — DIASTOLIC BLOOD PRESSURE: 78 MMHG | SYSTOLIC BLOOD PRESSURE: 115 MMHG

## 2018-08-15 VITALS — SYSTOLIC BLOOD PRESSURE: 122 MMHG | DIASTOLIC BLOOD PRESSURE: 82 MMHG

## 2018-08-15 VITALS — DIASTOLIC BLOOD PRESSURE: 80 MMHG | SYSTOLIC BLOOD PRESSURE: 115 MMHG

## 2018-08-15 LAB
ADD MANUAL DIFF: NO
ANION GAP SERPL CALC-SCNC: 13 MMOL/L (ref 5–15)
BASOPHILS NFR BLD AUTO: 0.9 % (ref 0–2)
BUN SERPL-MCNC: 20 MG/DL (ref 7–18)
CALCIUM SERPL-MCNC: 10.2 MG/DL (ref 8.5–10.1)
CHLORIDE SERPL-SCNC: 102 MMOL/L (ref 98–107)
CO2 SERPL-SCNC: 22 MMOL/L (ref 21–32)
CREAT SERPL-MCNC: 1 MG/DL (ref 0.55–1.3)
EOSINOPHIL NFR BLD AUTO: 7.7 % (ref 0–3)
ERYTHROCYTE [DISTWIDTH] IN BLOOD BY AUTOMATED COUNT: 15.4 % (ref 11.6–14.8)
HCT VFR BLD CALC: 41.2 % (ref 42–52)
HGB BLD-MCNC: 13 G/DL (ref 14.2–18)
LYMPHOCYTES NFR BLD AUTO: 25.2 % (ref 20–45)
MCV RBC AUTO: 88 FL (ref 80–99)
MONOCYTES NFR BLD AUTO: 8.4 % (ref 1–10)
NEUTROPHILS NFR BLD AUTO: 57.7 % (ref 45–75)
PLATELET # BLD: 322 K/UL (ref 150–450)
POTASSIUM SERPL-SCNC: 3.9 MMOL/L (ref 3.5–5.1)
RBC # BLD AUTO: 4.69 M/UL (ref 4.7–6.1)
SODIUM SERPL-SCNC: 137 MMOL/L (ref 136–145)
WBC # BLD AUTO: 7.5 K/UL (ref 4.8–10.8)

## 2018-08-15 RX ADMIN — LEVETIRACETAM SCH MG: 100 SOLUTION ORAL at 14:39

## 2018-08-15 RX ADMIN — CHLORHEXIDINE GLUCONATE SCH APPLIC: 213 SOLUTION TOPICAL at 20:17

## 2018-08-15 RX ADMIN — LEVETIRACETAM SCH MG: 100 SOLUTION ORAL at 06:35

## 2018-08-15 RX ADMIN — INSULIN ASPART SCH UNITS: 100 INJECTION, SOLUTION INTRAVENOUS; SUBCUTANEOUS at 06:00

## 2018-08-15 RX ADMIN — INSULIN ASPART SCH UNITS: 100 INJECTION, SOLUTION INTRAVENOUS; SUBCUTANEOUS at 23:58

## 2018-08-15 RX ADMIN — INSULIN ASPART SCH UNITS: 100 INJECTION, SOLUTION INTRAVENOUS; SUBCUTANEOUS at 12:04

## 2018-08-15 RX ADMIN — HEPARIN SODIUM SCH UNITS: 5000 INJECTION INTRAVENOUS; SUBCUTANEOUS at 21:49

## 2018-08-15 RX ADMIN — LEVETIRACETAM SCH MG: 100 SOLUTION ORAL at 21:36

## 2018-08-15 RX ADMIN — POLYETHYLENE GLYCOL 3350 SCH GM: 17 POWDER, FOR SOLUTION ORAL at 21:36

## 2018-08-15 RX ADMIN — INSULIN ASPART SCH UNITS: 100 INJECTION, SOLUTION INTRAVENOUS; SUBCUTANEOUS at 17:29

## 2018-08-15 RX ADMIN — Medication SCH MLS/HR: at 14:40

## 2018-08-15 RX ADMIN — INSULIN ASPART SCH UNITS: 100 INJECTION, SOLUTION INTRAVENOUS; SUBCUTANEOUS at 00:00

## 2018-08-15 RX ADMIN — HEPARIN SODIUM SCH UNITS: 5000 INJECTION INTRAVENOUS; SUBCUTANEOUS at 08:51

## 2018-08-15 NOTE — GI PROGRESS NOTE
Assessment/Plan


Problems:  


(1) Parastomal hernia


ICD Codes:  K43.5 - Parastomal hernia without obstruction or gangrene


SNOMED:  413870326


Qualifiers:  


   Qualified Codes:  K43.5 - Parastomal hernia without obstruction or gangrene


(2) Stoma malfunction


SNOMED:  493542864


(3) Colostomy in place


ICD Codes:  Z93.3 - Colostomy status


SNOMED:  519737955, 499169408


(4) Vegetative state


ICD Codes:  R40.3 - Persistent vegetative state


SNOMED:  98605529


(5) Diabetes mellitus


ICD Codes:  E11.9 - Type 2 diabetes mellitus without complications


SNOMED:  16426000


(6) Acute on chronic respiratory failure


ICD Codes:  J96.20 - Acute and chronic respiratory failure, unspecified whether 

with hypoxia or hypercapnia


SNOMED:  60336752


(7) Severe sepsis


ICD Codes:  A41.9 - Sepsis, unspecified organism; R65.20 - Severe sepsis 

without septic shock


SNOMED:  44399914


Status:  stable


Status Narrative


Discussed with Dr. Pickard.


Assessment/Plan


parastomal >> no s/sx of infection.  no obstruction. 


anemia work up reviewed >> iron deficiency


G tube dependent


hepatitis panel negative


OB negative





supportive care


monitor H&H, prn transfusions


venofer


ppi


GTFs per RD, adv to goal


GT site care daily/prn


abx


bowel regime


fu labs


dc planning





The patient was seen and examined at bedside and all new and available data was 

reviewed in the patients chart. I agree with the above findings, impression 

and plan.  (Patient seen earlier today. Signature stamp does not reflect 

patient encounter time.). - Miles Pickard MD





Subjective


Subjective


limited





Objective





Last 24 Hour Vital Signs








  Date Time  Temp Pulse Resp B/P (MAP) Pulse Ox O2 Delivery O2 Flow Rate FiO2


 


8/15/18 09:00  76 16     30


 


8/15/18 08:07  73      


 


8/15/18 08:00      Mechanical Ventilator  


 


8/15/18 08:00        30


 


8/15/18 08:00 98.3 76 16 120/77 (91) 97   





 98.3       


 


8/15/18 06:31  73 16     30


 


8/15/18 05:05  99 16     30


 


8/15/18 04:00 98.3 77 16 123/80 (94) 99   





 98.3       


 


8/15/18 04:00  74      


 


8/15/18 04:00      Mechanical Ventilator  


 


8/15/18 04:00        30


 


8/15/18 03:49  79 16     30


 


8/15/18 01:17  77 16     30


 


8/15/18 00:00        30


 


8/15/18 00:00 99.1 78 16 117/78 (91) 99   





 99.1       


 


8/15/18 00:00      Mechanical Ventilator  


 


8/15/18 00:00  74      


 


8/14/18 23:13  80 16     30


 


8/14/18 20:52  75 16     30


 


8/14/18 20:20 98.1 71 16 117/70 (86) 99   





 98.1       


 


8/14/18 20:00  74      


 


8/14/18 19:55        30


 


8/14/18 19:50      Mechanical Ventilator  


 


8/14/18 18:55  74 16     30


 


8/14/18 16:51  77 16     30


 


8/14/18 16:00        30


 


8/14/18 16:00  75      


 


8/14/18 16:00 98.3 73 16 121/75 (90) 100   





 98.3       


 


8/14/18 16:00      Mechanical Ventilator  


 


8/14/18 14:47  71 16     30


 


8/14/18 14:21 208.8 77 16  99   


 


8/14/18 13:20  79 16     30


 


8/14/18 12:00  74      


 


8/14/18 12:00      Mechanical Ventilator  


 


8/14/18 12:00        30


 


8/14/18 12:00 98.2 76 16 128/77 (94) 99   





 98.2       

















Intake and Output  


 


 8/14/18 8/15/18





 19:00 07:00


 


Intake Total 750 ml 950 ml


 


Output Total 850 ml 975 ml


 


Balance -100 ml -25 ml


 


  


 


Free Water 150 ml 


 


IV Total  250 ml


 


Tube Feeding 600 ml 600 ml


 


Other  100 ml


 


Output Urine Total 450 ml 425 ml


 


Stool Total 400 ml 550 ml











Laboratory Tests








Test


  8/15/18


04:00


 


White Blood Count


  7.5 K/UL


(4.8-10.8)


 


Red Blood Count


  4.69 M/UL


(4.70-6.10)  L


 


Hemoglobin


  13.0 G/DL


(14.2-18.0)  L


 


Hematocrit


  41.2 %


(42.0-52.0)  L


 


Mean Corpuscular Volume 88 FL (80-99)  


 


Mean Corpuscular Hemoglobin


  27.8 PG


(27.0-31.0)


 


Mean Corpuscular Hemoglobin


Concent 31.6 G/DL


(32.0-36.0)  L


 


Red Cell Distribution Width


  15.4 %


(11.6-14.8)  H


 


Platelet Count


  322 K/UL


(150-450)


 


Mean Platelet Volume


  7.6 FL


(6.5-10.1)


 


Neutrophils (%) (Auto)


  57.7 %


(45.0-75.0)


 


Lymphocytes (%) (Auto)


  25.2 %


(20.0-45.0)


 


Monocytes (%) (Auto)


  8.4 %


(1.0-10.0)


 


Eosinophils (%) (Auto)


  7.7 %


(0.0-3.0)  H


 


Basophils (%) (Auto)


  0.9 %


(0.0-2.0)


 


Sodium Level


  137 MMOL/L


(136-145)


 


Potassium Level


  3.9 MMOL/L


(3.5-5.1)


 


Chloride Level


  102 MMOL/L


()


 


Carbon Dioxide Level


  22 MMOL/L


(21-32)


 


Anion Gap


  13 mmol/L


(5-15)


 


Blood Urea Nitrogen


  20 mg/dL


(7-18)  H


 


Creatinine


  1.0 MG/DL


(0.55-1.30)


 


Estimat Glomerular Filtration


Rate > 60 mL/min


(>60)


 


Glucose Level


  94 MG/DL


()


 


Calcium Level


  10.2 MG/DL


(8.5-10.1)  H








Height (Feet):  6


Height (Inches):  0.00


Weight (Pounds):  192


General Appearance:  no apparent distress


Cardiovascular:  normal rate


Respiratory/Chest:  normal breath sounds, no respiratory distress, other - 

trach to vent


Abdominal Exam:  normal bowel sounds, non tender, soft, GT site - c/d/i, other 

- colostomy


Extremities:  non-tender











Remy Lyons NP Aug 15, 2018 11:22

## 2018-08-15 NOTE — CARDIOLOGY PROGRESS NOTE
Assessment/Plan


Status:  stable


Assessment/Plan


Assessment:


(1) Acute on chronic respiratory failure


(2) Acute on chronic renal insufficiency


(3) Severe sepsis


(4) Vegetative state


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


(6) Diabetes mellitus





Plan:


Continue antibiotics


Echocardiogram reviewed- no endocarditis, STACI unable to be performed


Patient afebrile, normal WBC otherwise


Continue trach care and tube feeds


Continue keppra for seizures 


Continue abx treatment for six weeks via PICC line until 9/15


Dispo planning





Subjective


Cardiovascular:  Reports: no symptoms


Respiratory:  Reports: no symptoms


Gastrointestinal/Abdominal:  Reports: no symptoms


Genitourinary:  Reports: no symptoms


Subjective


Afebrile vitals stable, Telemetry shows sinus rhythm. No acute events. Left 

pleural effusion unchanged. 


Ventilator settings: portex 7, AC 16, TV: 600, FiO2: 30%, PEEP: 5. GT is in 

place running glucerna 1.2 @ 65 ml/hr. No residual present. Colostomy bag in 

place.


STACI not completed, probe unable to be passed.  Patient should be treated for 

total six weeks abx for presumptive endocarditis at this juncture. Hyponatremia 

resolved, BRAYDON resolved, patient stable .





Objective





Last 24 Hour Vital Signs








  Date Time  Temp Pulse Resp B/P (MAP) Pulse Ox O2 Delivery O2 Flow Rate FiO2


 


8/15/18 08:07  73      


 


8/15/18 08:00      Mechanical Ventilator  


 


8/15/18 08:00        30


 


8/15/18 08:00 98.3 76 16 120/77 (91) 97   





 98.3       


 


8/15/18 06:31  73 16     30


 


8/15/18 05:05  99 16     30


 


8/15/18 04:00 98.3 77 16 123/80 (94) 99   





 98.3       


 


8/15/18 04:00  74      


 


8/15/18 04:00      Mechanical Ventilator  


 


8/15/18 04:00        30


 


8/15/18 03:49  79 16     30


 


8/15/18 01:17  77 16     30


 


8/15/18 00:00        30


 


8/15/18 00:00 99.1 78 16 117/78 (91) 99   





 99.1       


 


8/15/18 00:00      Mechanical Ventilator  


 


8/15/18 00:00  74      


 


8/14/18 23:13  80 16     30


 


8/14/18 20:52  75 16     30


 


8/14/18 20:20 98.1 71 16 117/70 (86) 99   





 98.1       


 


8/14/18 20:00  74      


 


8/14/18 19:55        30


 


8/14/18 19:50      Mechanical Ventilator  


 


8/14/18 18:55  74 16     30


 


8/14/18 16:51  77 16     30


 


8/14/18 16:00        30


 


8/14/18 16:00  75      


 


8/14/18 16:00 98.3 73 16 121/75 (90) 100   





 98.3       


 


8/14/18 16:00      Mechanical Ventilator  


 


8/14/18 14:47  71 16     30


 


8/14/18 14:21 208.8 77 16  99   


 


8/14/18 13:20  79 16     30


 


8/14/18 12:00  74      


 


8/14/18 12:00      Mechanical Ventilator  


 


8/14/18 12:00        30


 


8/14/18 12:00 98.2 76 16 128/77 (94) 99   





 98.2       


 


8/14/18 10:50  90 17     30








General Appearance:  no apparent distress, on vent


EENT:  PERRL/EOMI, normal ENT inspection


Neck:  non-tender, normal alignment


Rhythm:  NSR


Cardiovascular:  normal peripheral pulses, normal rate


Respiratory/Chest:  chest wall non-tender, lungs clear


Abdomen:  normal bowel sounds, non tender


Extremities:  normal range of motion, non-tender


Neurologic:  abnormal CN, motor weakness, sensory deficit











Intake and Output  


 


 8/14/18 8/15/18





 19:00 07:00


 


Intake Total 750 ml 950 ml


 


Output Total 850 ml 975 ml


 


Balance -100 ml -25 ml


 


  


 


Free Water 150 ml 


 


IV Total  250 ml


 


Tube Feeding 600 ml 600 ml


 


Other  100 ml


 


Output Urine Total 450 ml 425 ml


 


Stool Total 400 ml 550 ml











Laboratory Tests








Test


  8/15/18


04:00


 


White Blood Count


  7.5 K/UL


(4.8-10.8)


 


Red Blood Count


  4.69 M/UL


(4.70-6.10)  L


 


Hemoglobin


  13.0 G/DL


(14.2-18.0)  L


 


Hematocrit


  41.2 %


(42.0-52.0)  L


 


Mean Corpuscular Volume 88 FL (80-99)  


 


Mean Corpuscular Hemoglobin


  27.8 PG


(27.0-31.0)


 


Mean Corpuscular Hemoglobin


Concent 31.6 G/DL


(32.0-36.0)  L


 


Red Cell Distribution Width


  15.4 %


(11.6-14.8)  H


 


Platelet Count


  322 K/UL


(150-450)


 


Mean Platelet Volume


  7.6 FL


(6.5-10.1)


 


Neutrophils (%) (Auto)


  57.7 %


(45.0-75.0)


 


Lymphocytes (%) (Auto)


  25.2 %


(20.0-45.0)


 


Monocytes (%) (Auto)


  8.4 %


(1.0-10.0)


 


Eosinophils (%) (Auto)


  7.7 %


(0.0-3.0)  H


 


Basophils (%) (Auto)


  0.9 %


(0.0-2.0)


 


Sodium Level


  137 MMOL/L


(136-145)


 


Potassium Level


  3.9 MMOL/L


(3.5-5.1)


 


Chloride Level


  102 MMOL/L


()


 


Carbon Dioxide Level


  22 MMOL/L


(21-32)


 


Anion Gap


  13 mmol/L


(5-15)


 


Blood Urea Nitrogen


  20 mg/dL


(7-18)  H


 


Creatinine


  1.0 MG/DL


(0.55-1.30)


 


Estimat Glomerular Filtration


Rate > 60 mL/min


(>60)


 


Glucose Level


  94 MG/DL


()


 


Calcium Level


  10.2 MG/DL


(8.5-10.1)  H

















Robert Hernandez M.D. Aug 15, 2018 09:54

## 2018-08-15 NOTE — INFECTIOUS DISEASES PROG NOTE
Assessment/Plan


Assessment/Plan


Sepsis, resolving- 2ry to UTI and Bacteremia Blood cultures postive 4/4 bottles 

with [facility] Unclear source will need to R/O Endocarditis  Possible PNA initially 


 -u/a wbc 40-60, nit neg, leuk +2; ucx >100k E. aerogenes (S cefepime, cipro/

levo; R Zosyn)


 -BCX 4/4 E. aerogenes, prob Amp C (S cefepime, cipro/levo; R Zosyn), P. 

mirabilis ESBL (S Zosyn, Ertapenem); repeta 7/26 1/4 CoNS (suspect contaminant)

, 7/28 Staph f/u final results if CoNS may need TTE and IE work up.


  -CXR 7/27: Increased left pleural effusion, over 3 days. Overall decreased 

interstitial congestion. Right infrahilar atelectasis


 -CXR: Cardiomegaly. Mild interstitial congestion. Suspect small bilateral 

pleural effusions


 -CT abd/p: Right lower quadrant double barrel colostomy, as described. Small 

peristomal hernia contains bowel loops without evidence of obstruction or 

strangulation. Left renal staghorn calculus. Bilateral intrarenal calyceal 

calculi. Borderline hydronephrosis on the right without evidence of downstream 

obstructive lesion. May indicate mild ureteral pelvic junction obstruction. 

Somewhat atrophic left kidney. Inspissated contrast ball within the distal 

sigmoid colon. Gastrostomy in good position. Nonspecific bilateral perinephric 

fat stranding, could indicate pyelonephritis or could be chronic. Guzmán 

catheter in place. Apparent bladder wall thickening, possibly an artifact of 

under distention but cystitis is not excludable, particularly in view of mild 

perivesical fat stranding. Bilateral pulmonary parenchymal atelectasis and 

consolidation


  -Blood Cx from PICC CoNS 2/2 bottles Peripheral Neg- (May be contaminant but 

will repeat blood Cx given new leukocytosis if positive will need ECHO.


  - Blood Cx 7/30/18 - [facility] - Will need TTE for IE work up and the PICC removed.


 


PICC line infection/colonization - TTE negative. Blood cultures only positive 

from PICC. Not positive from Peripheral 


- Will repeat blood cultures x 1 to confirm clearance after PICC replaced. [place] 

suspicion for IE.


- PICC replace 8/1/18





Fever/Leukocytosis; Resolved - i recurs Re-evaluate lines, Diarrhea? C. dif? 


BRAYDON, improving


Lactic acidosis, resolved





chronic resp failure trach/vent dependant


BPH


GERD


 HTN


DM2


seizure disorder


colostomy


 s/p GT 


constipation





VRE and MRSA colonized


 


Plan:





STACI could not be performed due to technical problems


   - Unable to repeat STACI so patient should be treated for presumed 

endocarditis for 6 weeks





- Continue empiric IV Vancomycin #16/42 for bacteremia/line infection cant r/o 

Endocarditis  - Abx end date 9/12/18 if STACI attempted again and negative then 

ed date would be 8/15/18





-Last day of Ertapenem #14/14 for Amp-C Enterobacter and ESBL P.mirabilis 

bacteremia ; will treat for 14 days - End date 8/9/18


    -7/27 SP Zosyn #4





- Monitor CBC/CMP, temperatures


- F/U STACI


- Pulmonary care








Thank you for this consultation. Will continue to follow along with you.





Subjective


Allergies:  


Coded Allergies:  


     AZTREONAM (Verified  Allergy, Unknown, 7/23/18)


Subjective


On Vent still and stable at 30%


Afebrile





Objective


Vital Signs





Last 24 Hour Vital Signs








  Date Time  Temp Pulse Resp B/P (MAP) Pulse Ox O2 Delivery O2 Flow Rate FiO2


 


8/15/18 09:00  76 16     30


 


8/15/18 08:07  73      


 


8/15/18 08:00      Mechanical Ventilator  


 


8/15/18 08:00        30


 


8/15/18 08:00 98.3 76 16 120/77 (91) 97   





 98.3       


 


8/15/18 06:31  73 16     30


 


8/15/18 05:05  99 16     30


 


8/15/18 04:00 98.3 77 16 123/80 (94) 99   





 98.3       


 


8/15/18 04:00  74      


 


8/15/18 04:00      Mechanical Ventilator  


 


8/15/18 04:00        30


 


8/15/18 03:49  79 16     30


 


8/15/18 01:17  77 16     30


 


8/15/18 00:00        30


 


8/15/18 00:00 99.1 78 16 117/78 (91) 99   





 99.1       


 


8/15/18 00:00      Mechanical Ventilator  


 


8/15/18 00:00  74      


 


8/14/18 23:13  80 16     30


 


8/14/18 20:52  75 16     30


 


8/14/18 20:20 98.1 71 16 117/70 (86) 99   





 98.1       


 


8/14/18 20:00  74      


 


8/14/18 19:55        30


 


8/14/18 19:50      Mechanical Ventilator  


 


8/14/18 18:55  74 16     30


 


8/14/18 16:51  77 16     30


 


8/14/18 16:00        30


 


8/14/18 16:00  75      


 


8/14/18 16:00 98.3 73 16 121/75 (90) 100   





 98.3       


 


8/14/18 16:00      Mechanical Ventilator  


 


8/14/18 14:47  71 16     30


 


8/14/18 14:21 208.8 77 16  99   


 


8/14/18 13:20  79 16     30








Height (Feet):  6


Height (Inches):  0.00


Weight (Pounds):  192


Objective


GENERAL: On vent 30% O2


HEENT:  NCAT, DMM, Tracheostomy (C/D/I)


PULM: Mild crackles in bases B/L, No wheezing


CARDIOVASCULAR:  RRR, S1 and S2


ABDOMEN:  Obese and nondistended. +BS, The G-tube and stoma noted.


EXTREMITIES:  No edema.  1+ pedal pulses.





Laboratory Tests








Test


  8/15/18


04:00


 


White Blood Count


  7.5 K/UL


(4.8-10.8)


 


Red Blood Count


  4.69 M/UL


(4.70-6.10)  L


 


Hemoglobin


  13.0 G/DL


(14.2-18.0)  L


 


Hematocrit


  41.2 %


(42.0-52.0)  L


 


Mean Corpuscular Volume 88 FL (80-99)  


 


Mean Corpuscular Hemoglobin


  27.8 PG


(27.0-31.0)


 


Mean Corpuscular Hemoglobin


Concent 31.6 G/DL


(32.0-36.0)  L


 


Red Cell Distribution Width


  15.4 %


(11.6-14.8)  H


 


Platelet Count


  322 K/UL


(150-450)


 


Mean Platelet Volume


  7.6 FL


(6.5-10.1)


 


Neutrophils (%) (Auto)


  57.7 %


(45.0-75.0)


 


Lymphocytes (%) (Auto)


  25.2 %


(20.0-45.0)


 


Monocytes (%) (Auto)


  8.4 %


(1.0-10.0)


 


Eosinophils (%) (Auto)


  7.7 %


(0.0-3.0)  H


 


Basophils (%) (Auto)


  0.9 %


(0.0-2.0)


 


Sodium Level


  137 MMOL/L


(136-145)


 


Potassium Level


  3.9 MMOL/L


(3.5-5.1)


 


Chloride Level


  102 MMOL/L


()


 


Carbon Dioxide Level


  22 MMOL/L


(21-32)


 


Anion Gap


  13 mmol/L


(5-15)


 


Blood Urea Nitrogen


  20 mg/dL


(7-18)  H


 


Creatinine


  1.0 MG/DL


(0.55-1.30)


 


Estimat Glomerular Filtration


Rate > 60 mL/min


(>60)


 


Glucose Level


  94 MG/DL


()


 


Calcium Level


  10.2 MG/DL


(8.5-10.1)  H











Current Medications








 Medications


  (Trade)  Dose


 Ordered  Sig/Jan


 Route


 PRN Reason  Start Time


 Stop Time Status Last Admin


Dose Admin


 


 Acetaminophen


  (Tylenol)  650 mg  Q4H  PRN


 ORAL


 FEVER  7/26/18 14:00


 8/23/18 09:59  8/12/18 23:43


 


 


 Baclofen


  (Lioresal)  10 mg  EVERY 8  HOURS


 GT


   7/30/18 14:00


 8/23/18 12:59  8/15/18 06:35


 


 


 Chlorhexidine


 Gluconate


  (Elaine-Hex 2%)  1 applic  DAILY@2000


 TOPIC


   7/26/18 20:00


 8/23/18 19:59  8/14/18 20:21


 


 


 Dextrose


  (Dextrose 50%)  25 ml  STAT  PRN


 IV


 Hypoglycemia  7/27/18 08:45


 8/24/18 08:44   


 


 


 Dextrose


  (Dextrose 50%)  50 ml  STAT  PRN


 IV


 Hypoglycemia  7/27/18 08:45


 8/24/18 08:44   


 


 


 Heparin Sodium


  (Porcine)


  (Heparin 5000


 units/ml)  5,000 units  EVERY 12  HOURS


 SUBQ


   7/26/18 21:00


 8/23/18 20:59  8/15/18 08:51


 


 


 Insulin Aspart


  (NovoLOG)    Q6HR


 SUBQ


   7/26/18 18:00


 8/24/18 11:29  8/15/18 12:04


 


 


 Lansoprazole


  (Prevacid)  30 mg  DAILY


 GT


   7/27/18 09:00


 8/25/18 08:59  8/15/18 08:50


 


 


 Levetiracetam


  (Keppra)  1,000 mg  Q8HR


 GT


   7/26/18 14:00


 8/23/18 12:59  8/15/18 06:35


 


 


 Ondansetron HCl


  (Zofran)  4 mg  Q6H  PRN


 IVP


 Nausea & Vomiting  7/26/18 16:00


 8/23/18 09:59   


 


 


 Polyethylene


 Glycol


  (Miralax)  17 gm  BEDTIME


 GT


   7/30/18 21:00


 8/24/18 20:59  8/14/18 20:22


 


 


 Vancomycin HCl


  (Vanco rx to


 dose)  1 ea  DAILY  PRN


 MISC


 PER RX PROTOCOL  7/28/18 13:15


 8/27/18 13:14   


 


 


 Vancomycin HCl/


 Dextrose  250 ml @ 


 125 mls/hr  Q18H


 IVPB


   8/8/18 21:00


 8/19/18 20:59  8/14/18 20:21


 

















Robert Williamson M.D. Aug 15, 2018 12:07

## 2018-08-15 NOTE — NEPHROLOGY PROGRESS NOTE
Assessment/Plan


Assessment/Plan


1. BRAYDON- resolved. Will sign off today. 


2. Sepsis- on Abx. 


3. Chronic Resp FL- trached on vent 


4. Hyponatremia - resolved. Will sign off today


5. RODDY- Keppra  -monitor NA for now


6. Hypophos- corrected





I will be out until Monday Aug 20th am. Plz call Dr Conti with any inquiries





Subjective


Date patient seen:  Aug 15, 2018


Time patient seen:  08:23


ROS Limited/Unobtainable:  Yes


Allergies:  


Coded Allergies:  


     AZTREONAM (Verified  Allergy, Unknown, 7/23/18)


Subjective


Patient trached/vent.





Objective





Last 24 Hour Vital Signs








  Date Time  Temp Pulse Resp B/P (MAP) Pulse Ox O2 Delivery O2 Flow Rate FiO2


 


8/15/18 08:07  73      


 


8/15/18 08:00      Mechanical Ventilator  


 


8/15/18 08:00        30


 


8/15/18 08:00 98.3 76 16 120/77 (91) 97   





 98.3       


 


8/15/18 05:05  99 16     30


 


8/15/18 04:00 98.3 77 16 123/80 (94) 99   





 98.3       


 


8/15/18 04:00  74      


 


8/15/18 04:00      Mechanical Ventilator  


 


8/15/18 04:00        30


 


8/15/18 03:49  79 16     30


 


8/15/18 01:17  77 16     30


 


8/15/18 00:00        30


 


8/15/18 00:00 99.1 78 16 117/78 (91) 99   





 99.1       


 


8/15/18 00:00      Mechanical Ventilator  


 


8/15/18 00:00  74      


 


8/14/18 23:13  80 16     30


 


8/14/18 20:52  75 16     30


 


8/14/18 20:20 98.1 71 16 117/70 (86) 99   





 98.1       


 


8/14/18 20:00  74      


 


8/14/18 19:55        30


 


8/14/18 19:50      Mechanical Ventilator  


 


8/14/18 18:55  74 16     30


 


8/14/18 16:51  77 16     30


 


8/14/18 16:00        30


 


8/14/18 16:00  75      


 


8/14/18 16:00 98.3 73 16 121/75 (90) 100   





 98.3       


 


8/14/18 16:00      Mechanical Ventilator  


 


8/14/18 14:47  71 16     30


 


8/14/18 14:21 208.8 77 16  99   


 


8/14/18 13:20  79 16     30


 


8/14/18 12:00  74      


 


8/14/18 12:00      Mechanical Ventilator  


 


8/14/18 12:00        30


 


8/14/18 12:00 98.2 76 16 128/77 (94) 99   





 98.2       


 


8/14/18 10:50  90 17     30


 


8/14/18 09:40  75 16     30

















Intake and Output  


 


 8/14/18 8/15/18





 19:00 07:00


 


Intake Total 750 ml 900 ml


 


Output Total 850 ml 975 ml


 


Balance -100 ml -75 ml


 


  


 


Free Water 150 ml 


 


IV Total  250 ml


 


Tube Feeding 600 ml 550 ml


 


Other  100 ml


 


Output Urine Total 450 ml 425 ml


 


Stool Total 400 ml 550 ml








Laboratory Tests


8/15/18 04:00: 


White Blood Count 7.5, Red Blood Count 4.69L, Hemoglobin 13.0L, Hematocrit 41.2L

, Mean Corpuscular Volume 88, Mean Corpuscular Hemoglobin 27.8, Mean 

Corpuscular Hemoglobin Concent 31.6L, Red Cell Distribution Width 15.4H, 

Platelet Count 322, Mean Platelet Volume 7.6, Neutrophils (%) (Auto) 57.7, 

Lymphocytes (%) (Auto) 25.2, Monocytes (%) (Auto) 8.4, Eosinophils (%) (Auto) 

7.7H, Basophils (%) (Auto) 0.9, Sodium Level 137, Potassium Level 3.9, Chloride 

Level 102, Carbon Dioxide Level 22, Anion Gap 13, Blood Urea Nitrogen 20H, 

Creatinine 1.0, Estimat Glomerular Filtration Rate > 60, Glucose Level 94, 

Calcium Level 10.2H


Height (Feet):  6


Height (Inches):  0.00


Weight (Pounds):  192


General Appearance:  WD/WN, no apparent distress


EENT:  PERRL/EOMI


Neck:  non-tender


Cardiovascular:  normal rate, regular rhythm


Respiratory/Chest:  lungs clear


Abdomen:  non tender, soft


Edema:  no edema noted Arm (L), no edema noted Arm (R), no edema noted Leg (L), 

no edema noted Leg (R), no edema noted Pedal (L), no edema noted Pedal (R), no 

edema noted Generalized











Jewel Templeton M.D. Aug 15, 2018 08:26

## 2018-08-15 NOTE — PULMONOLGY CRITICAL CARE NOTE
Critical Care - Asmt/Plan


Problems:  


(1) Acute on chronic respiratory failure


(2) Acute on chronic renal insufficiency


(3) Severe sepsis


(4) Vegetative state


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


(6) Diabetes mellitus


Respiratory:  adjust tidal volume, monitor respiratory rate


Cardiac:  start pressors, stop pressors


Renal:  keep IV fluid


Infectious Disease:  continue antibiotics


Gastrointestinal:  hold feedings


Endocrine:  check TSH


Hematologic:  monitor H/H


Affect:  PRN ativan


Prophylaxis:  Protonix, Heparin


Notes Reviewed:  cardio


Discussed with:  nurses, consultants, 





Critical Care - Objective





Last 24 Hour Vital Signs








  Date Time  Temp Pulse Resp B/P (MAP) Pulse Ox O2 Delivery O2 Flow Rate FiO2


 


8/15/18 13:10  84 16     30


 


8/15/18 12:19  76      


 


8/15/18 12:00 98.1 76 16 115/80 (92) 99   





 98.1       


 


8/15/18 12:00        30


 


8/15/18 12:00      Mechanical Ventilator  


 


8/15/18 11:00  79 16     30


 


8/15/18 09:00  76 16     30


 


8/15/18 08:07  73      


 


8/15/18 08:00      Mechanical Ventilator  


 


8/15/18 08:00        30


 


8/15/18 08:00 98.3 76 16 120/77 (91) 97   





 98.3       


 


8/15/18 06:31  73 16     30


 


8/15/18 05:05  99 16     30


 


8/15/18 04:00 98.3 77 16 123/80 (94) 99   





 98.3       


 


8/15/18 04:00  74      


 


8/15/18 04:00      Mechanical Ventilator  


 


8/15/18 04:00        30


 


8/15/18 03:49  79 16     30


 


8/15/18 01:17  77 16     30


 


8/15/18 00:00        30


 


8/15/18 00:00 99.1 78 16 117/78 (91) 99   





 99.1       


 


8/15/18 00:00      Mechanical Ventilator  


 


8/15/18 00:00  74      


 


8/14/18 23:13  80 16     30


 


8/14/18 20:52  75 16     30


 


8/14/18 20:20 98.1 71 16 117/70 (86) 99   





 98.1       


 


8/14/18 20:00  74      


 


8/14/18 19:55        30


 


8/14/18 19:50      Mechanical Ventilator  


 


8/14/18 18:55  74 16     30


 


8/14/18 16:51  77 16     30


 


8/14/18 16:00        30


 


8/14/18 16:00  75      


 


8/14/18 16:00 98.3 73 16 121/75 (90) 100   





 98.3       


 


8/14/18 16:00      Mechanical Ventilator  








Status:  awake


Neck:  full ROM


Lungs:  chest wall tender


Heart:  HR/BP stable, regular


Abdomen:  non-tender


Accucheck:  112





Critical Care - Subjective


ROS Limited/Unobtainable:  Yes


EKG Rhythm:  Sinus Tachycardia


FI02:  30


Vent Support Breath Rate:  16


Vent Support Mode:  AC


Vent Tidal Volume:  600


Sputum Amount:  Small


PEEP:  5.0


PIP:  34


Tube Feeding Amount:  50


I&O:











Intake and Output  


 


 8/14/18 8/15/18





 19:00 07:00


 


Intake Total 750 ml 950 ml


 


Output Total 850 ml 975 ml


 


Balance -100 ml -25 ml


 


  


 


Free Water 150 ml 


 


IV Total  250 ml


 


Tube Feeding 600 ml 600 ml


 


Other  100 ml


 


Output Urine Total 450 ml 425 ml


 


Stool Total 400 ml 550 ml








Labs:





Laboratory Tests








Test


  8/15/18


04:00


 


White Blood Count


  7.5 K/UL


(4.8-10.8)


 


Red Blood Count


  4.69 M/UL


(4.70-6.10)  L


 


Hemoglobin


  13.0 G/DL


(14.2-18.0)  L


 


Hematocrit


  41.2 %


(42.0-52.0)  L


 


Mean Corpuscular Volume 88 FL (80-99)  


 


Mean Corpuscular Hemoglobin


  27.8 PG


(27.0-31.0)


 


Mean Corpuscular Hemoglobin


Concent 31.6 G/DL


(32.0-36.0)  L


 


Red Cell Distribution Width


  15.4 %


(11.6-14.8)  H


 


Platelet Count


  322 K/UL


(150-450)


 


Mean Platelet Volume


  7.6 FL


(6.5-10.1)


 


Neutrophils (%) (Auto)


  57.7 %


(45.0-75.0)


 


Lymphocytes (%) (Auto)


  25.2 %


(20.0-45.0)


 


Monocytes (%) (Auto)


  8.4 %


(1.0-10.0)


 


Eosinophils (%) (Auto)


  7.7 %


(0.0-3.0)  H


 


Basophils (%) (Auto)


  0.9 %


(0.0-2.0)


 


Sodium Level


  137 MMOL/L


(136-145)


 


Potassium Level


  3.9 MMOL/L


(3.5-5.1)


 


Chloride Level


  102 MMOL/L


()


 


Carbon Dioxide Level


  22 MMOL/L


(21-32)


 


Anion Gap


  13 mmol/L


(5-15)


 


Blood Urea Nitrogen


  20 mg/dL


(7-18)  H


 


Creatinine


  1.0 MG/DL


(0.55-1.30)


 


Estimat Glomerular Filtration


Rate > 60 mL/min


(>60)


 


Glucose Level


  94 MG/DL


()


 


Calcium Level


  10.2 MG/DL


(8.5-10.1)  H

















Yury Pena MD Aug 15, 2018 15:41

## 2018-08-15 NOTE — PROGRESS NOTE
DATE:  08/14/2018



SUBJECTIVE:  The patient is afebrile and hemodynamically stable.  Eyes are

open, but with no eye contact.  His tongue is protruded and had rhythmic

movement at the rate of his heart.



PHYSICAL EXAMINATION:

VITAL SIGNS:  Blood pressure 117/70s, pulse is 71, respirations were 16,

and temperature of 98.1 degrees.

HEENT:  Eyes were normal.  ENT, mucous membranes were moist and intact.

NECK:  Supple with no JVD without lymph nodes.  Tracheostomy site is

clean.

LUNGS:  Clear without rhonchi, rales, or wheezing.  Secretions are small,

thin, and whitish.

HEART:  Normal sounds with regular beats.  There is no S3, S4, or

pericardial rub.

ABDOMEN:  Soft and nontender with normal bowel sounds.  Gastrostomy site is

clean.

EXTREMITIES:  Warm without cyanosis, clubbing, or edema.



LABORATORY AND DIAGNOSTIC DATA:  His hemoglobin is 13.3, hematocrit is 40.9

with MCV of 88, WBC of 9.4, and platelets are 330.  His BUN and creatinine

are 18 and 1.0, respectively.  His sodium is 135, potassium 4.0, chloride

100, and CO2 is 21.



IMPRESSION AND PLAN:

1. The patient has intracerebral hemorrhage.  He has been in stable

condition.  There are no cognitive changes.

2. The patient is respiratory-dependent.  We will continue with the same

respiratory setting.  Continue him with his albuterol sulfate, ipratropium

bromide inhalation therapy every 6 hours.

3. The patient has tracheostomy.  Continue to monitor O2 saturation and

bronchial secretion.

4. The patient has gastrostomy feeding.  No gastric residual.  Continue

with _____ 6 hours.

5. The patient is on antibiotic vancomycin 250 mg IV piggyback q.18

hours.

6. The patient _____ deep vein thrombosis prevention program.  We will

continue with heparin 5000 units subcutaneously q.12 hours.

7. The patient has seizure disorder, had no new seizure.  Continue with

levetiracetam 1 g IV piggyback q.12 hours.  Repeat laboratory tests will

be done in the morning.









  ______________________________________________

  Etienne Bloom M.D.





DR:  JAMIE

D:  08/14/2018 23:53

T:  08/15/2018 03:12

JOB#:  [id number]

CC:

## 2018-08-15 NOTE — GENERAL PROGRESS NOTE
Assessment/Plan


Status:  stable


Assessment/Plan


# Leukocytosis - Sepsis with elevated temperature of 103 degrees Fahrenheit.  

Had uti v bacteremia (CoNS) on abx, has improved.


--> Pt on antibiotics, has received a dose of Zosyn and currently is on 

vancomycin q.12 h. now on cefepime


--> Appreciate Infectious Disease recs


--> 2D echo per ID performed on 8/1: No discrete vegetations seen, however SBE 

may not be excluded by transthoracic 2-D echo.


--> 8/15: WBC of 7.5, wnl 


--> Currently afebrile 


# Anemia of chronic disease. No hemolysis, peripheral smear reviewed, ferritin 

was elevated.


--> Continue to closely monitor


--> Hgb goal >7


--> 8/15: Hgb 13.0


# Acute kidney injury.  


--> Currently on IV fluids, IV bolus given to see if the patient responds along 

with IV pressor as needed.  


--> Closely monitor with Nephrology team.


# Septic shock, use antibiotic per ID services.


--> potential uti, on abx and bacteremia


# Respiratory failure, status post tracheostomy, on mechanical ventilation per 

Dr. Pena


--> Remains on vent 


--> 8/13 CXR: Unchanged left pleural effusion versus scarring, over 4 days


# Hypercalcemia. Monitor PTH and calcium level.





The time the note was entered does not necessarily correspond to the time the 

patient was seen.





Subjective


Date patient seen:  Aug 15, 2018


ROS Limited/Unobtainable:  Yes


Hematologic/Lymphatic:  Reports: anemia


Allergies:  


Coded Allergies:  


     AZTREONAM (Verified  Allergy, Unknown, 7/23/18)


All Systems:  reviewed and negative except above


Subjective


Pt remains obtunded and stable.  No acute events. H/H stable.





Objective





Last 24 Hour Vital Signs








  Date Time  Temp Pulse Resp B/P (MAP) Pulse Ox O2 Delivery O2 Flow Rate FiO2


 


8/15/18 16:00        30


 


8/15/18 16:00 97.6 79 16 122/82 (95) 96   





 97.6       


 


8/15/18 16:00  82      


 


8/15/18 16:00      Mechanical Ventilator  


 


8/15/18 15:29  79 18     30


 


8/15/18 13:10  84 16     30


 


8/15/18 12:19  76      


 


8/15/18 12:00 98.1 76 16 115/80 (92) 99   





 98.1       


 


8/15/18 12:00        30


 


8/15/18 12:00      Mechanical Ventilator  


 


8/15/18 11:00  79 16     30


 


8/15/18 09:00  76 16     30


 


8/15/18 08:07  73      


 


8/15/18 08:00      Mechanical Ventilator  


 


8/15/18 08:00        30


 


8/15/18 08:00 98.3 76 16 120/77 (91) 97   





 98.3       


 


8/15/18 06:31  73 16     30


 


8/15/18 05:05  99 16     30


 


8/15/18 04:00 98.3 77 16 123/80 (94) 99   





 98.3       


 


8/15/18 04:00  74      


 


8/15/18 04:00      Mechanical Ventilator  


 


8/15/18 04:00        30


 


8/15/18 03:49  79 16     30


 


8/15/18 01:17  77 16     30


 


8/15/18 00:00        30


 


8/15/18 00:00 99.1 78 16 117/78 (91) 99   





 99.1       


 


8/15/18 00:00      Mechanical Ventilator  


 


8/15/18 00:00  74      


 


8/14/18 23:13  80 16     30


 


8/14/18 20:52  75 16     30


 


8/14/18 20:20 98.1 71 16 117/70 (86) 99   





 98.1       


 


8/14/18 20:00  74      


 


8/14/18 19:55        30


 


8/14/18 19:50      Mechanical Ventilator  


 


8/14/18 18:55  74 16     30

















Intake and Output  


 


 8/14/18 8/15/18





 19:00 07:00


 


Intake Total 750 ml 950 ml


 


Output Total 850 ml 975 ml


 


Balance -100 ml -25 ml


 


  


 


Free Water 150 ml 


 


IV Total  250 ml


 


Tube Feeding 600 ml 600 ml


 


Other  100 ml


 


Output Urine Total 450 ml 425 ml


 


Stool Total 400 ml 550 ml








Laboratory Tests


8/15/18 04:00: 


White Blood Count 7.5, Red Blood Count 4.69L, Hemoglobin 13.0L, Hematocrit 41.2L

, Mean Corpuscular Volume 88, Mean Corpuscular Hemoglobin 27.8, Mean 

Corpuscular Hemoglobin Concent 31.6L, Red Cell Distribution Width 15.4H, 

Platelet Count 322, Mean Platelet Volume 7.6, Neutrophils (%) (Auto) 57.7, 

Lymphocytes (%) (Auto) 25.2, Monocytes (%) (Auto) 8.4, Eosinophils (%) (Auto) 

7.7H, Basophils (%) (Auto) 0.9, Sodium Level 137, Potassium Level 3.9, Chloride 

Level 102, Carbon Dioxide Level 22, Anion Gap 13, Blood Urea Nitrogen 20H, 

Creatinine 1.0, Estimat Glomerular Filtration Rate > 60, Glucose Level 94, 

Calcium Level 10.2H


Height (Feet):  6


Height (Inches):  0.00


Weight (Pounds):  192


General Appearance:  no apparent distress


EENT:  PERRL/EOMI


Neck:  normal alignment


Cardiovascular:  normal peripheral pulses


Respiratory/Chest:  no respiratory distress


Abdomen:  soft











Tejas Gerber MD Aug 15, 2018 17:33

## 2018-08-16 VITALS — SYSTOLIC BLOOD PRESSURE: 102 MMHG | DIASTOLIC BLOOD PRESSURE: 75 MMHG

## 2018-08-16 VITALS — DIASTOLIC BLOOD PRESSURE: 69 MMHG | SYSTOLIC BLOOD PRESSURE: 119 MMHG

## 2018-08-16 VITALS — DIASTOLIC BLOOD PRESSURE: 74 MMHG | SYSTOLIC BLOOD PRESSURE: 128 MMHG

## 2018-08-16 VITALS — DIASTOLIC BLOOD PRESSURE: 66 MMHG | SYSTOLIC BLOOD PRESSURE: 129 MMHG

## 2018-08-16 VITALS — SYSTOLIC BLOOD PRESSURE: 124 MMHG | DIASTOLIC BLOOD PRESSURE: 80 MMHG

## 2018-08-16 LAB
ADD MANUAL DIFF: NO
ANION GAP SERPL CALC-SCNC: 12 MMOL/L (ref 5–15)
BASOPHILS NFR BLD AUTO: 1.5 % (ref 0–2)
BUN SERPL-MCNC: 18 MG/DL (ref 7–18)
CALCIUM SERPL-MCNC: 10.3 MG/DL (ref 8.5–10.1)
CHLORIDE SERPL-SCNC: 102 MMOL/L (ref 98–107)
CO2 SERPL-SCNC: 23 MMOL/L (ref 21–32)
CREAT SERPL-MCNC: 1 MG/DL (ref 0.55–1.3)
EOSINOPHIL NFR BLD AUTO: 11.2 % (ref 0–3)
ERYTHROCYTE [DISTWIDTH] IN BLOOD BY AUTOMATED COUNT: 15.4 % (ref 11.6–14.8)
HCT VFR BLD CALC: 41.3 % (ref 42–52)
HGB BLD-MCNC: 13.1 G/DL (ref 14.2–18)
LYMPHOCYTES NFR BLD AUTO: 25.6 % (ref 20–45)
MCV RBC AUTO: 87 FL (ref 80–99)
MONOCYTES NFR BLD AUTO: 12 % (ref 1–10)
NEUTROPHILS NFR BLD AUTO: 49.7 % (ref 45–75)
PLATELET # BLD: 293 K/UL (ref 150–450)
POTASSIUM SERPL-SCNC: 4 MMOL/L (ref 3.5–5.1)
RBC # BLD AUTO: 4.74 M/UL (ref 4.7–6.1)
SODIUM SERPL-SCNC: 137 MMOL/L (ref 136–145)
WBC # BLD AUTO: 7.1 K/UL (ref 4.8–10.8)

## 2018-08-16 RX ADMIN — HEPARIN SODIUM SCH UNITS: 5000 INJECTION INTRAVENOUS; SUBCUTANEOUS at 09:40

## 2018-08-16 RX ADMIN — Medication SCH MLS/HR: at 09:37

## 2018-08-16 RX ADMIN — LEVETIRACETAM SCH MG: 100 SOLUTION ORAL at 14:40

## 2018-08-16 RX ADMIN — HEPARIN SODIUM SCH UNITS: 5000 INJECTION INTRAVENOUS; SUBCUTANEOUS at 21:20

## 2018-08-16 RX ADMIN — INSULIN ASPART SCH UNITS: 100 INJECTION, SOLUTION INTRAVENOUS; SUBCUTANEOUS at 23:54

## 2018-08-16 RX ADMIN — CHLORHEXIDINE GLUCONATE SCH APPLIC: 213 SOLUTION TOPICAL at 21:18

## 2018-08-16 RX ADMIN — INSULIN ASPART SCH UNITS: 100 INJECTION, SOLUTION INTRAVENOUS; SUBCUTANEOUS at 06:00

## 2018-08-16 RX ADMIN — LEVETIRACETAM SCH MG: 100 SOLUTION ORAL at 05:31

## 2018-08-16 RX ADMIN — INSULIN ASPART SCH UNITS: 100 INJECTION, SOLUTION INTRAVENOUS; SUBCUTANEOUS at 13:03

## 2018-08-16 RX ADMIN — INSULIN ASPART SCH UNITS: 100 INJECTION, SOLUTION INTRAVENOUS; SUBCUTANEOUS at 18:00

## 2018-08-16 RX ADMIN — POLYETHYLENE GLYCOL 3350 SCH GM: 17 POWDER, FOR SOLUTION ORAL at 21:19

## 2018-08-16 RX ADMIN — LEVETIRACETAM SCH MG: 100 SOLUTION ORAL at 21:19

## 2018-08-16 NOTE — GENERAL PROGRESS NOTE
Assessment/Plan


Status:  stable, unchanged


Assessment/Plan


# Leukocytosis - Sepsis with elevated temperature of 103 degrees Fahrenheit.  

Had uti v bacteremia (CoNS) on abx, has improved.


--> Pt on antibiotics, has received a dose of Zosyn and currently is on 

vancomycin q.12 h. now on cefepime


--> Appreciate Infectious Disease recs


--> 2D echo per ID performed on 8/1: No discrete vegetations seen, however SBE 

may not be excluded by transthoracic 2-D echo.


--> 8/16: WBC of 7.1, wnl 


--> Currently afebrile 


# Anemia of chronic disease. No hemolysis, peripheral smear reviewed, ferritin 

was elevated.


--> Continue to closely monitor


--> Hgb goal >7


--> 8/16: Hgb 13.1


# Acute kidney injury.  


--> Currently on IV fluids, IV bolus given to see if the patient responds along 

with IV pressor as needed.  


--> Closely monitor with Nephrology team.


# Septic shock, use antibiotic per ID services.


--> potential uti, on abx and bacteremia


# Respiratory failure, status post tracheostomy, on mechanical ventilation per 

Dr. Pena


--> Remains on vent 


--> 8/13 CXR: Unchanged left pleural effusion versus scarring, over 4 days


# Hypercalcemia. Monitor PTH and calcium level.





The time the note was entered does not necessarily correspond to the time the 

patient was seen.





Subjective


Date patient seen:  Aug 16, 2018


ROS Limited/Unobtainable:  Yes


Hematologic/Lymphatic:  Reports: anemia


Allergies:  


Coded Allergies:  


     AZTREONAM (Verified  Allergy, Unknown, 7/23/18)


All Systems:  reviewed and negative except above


Subjective


Pt remains obtunded and vented.  No acute events. H/H stable.





Objective





Last 24 Hour Vital Signs








  Date Time  Temp Pulse Resp B/P (MAP) Pulse Ox O2 Delivery O2 Flow Rate FiO2


 


8/16/18 12:55  82 16     30


 


8/16/18 12:11      Mechanical Ventilator  


 


8/16/18 12:10        30


 


8/16/18 12:00  105      


 


8/16/18 12:00      Mechanical Ventilator  


 


8/16/18 11:56 98.9 104 18 119/69 (86) 100   





 98.9       


 


8/16/18 11:12  89 16     30


 


8/16/18 08:51  81 16     30


 


8/16/18 08:00  88      


 


8/16/18 08:00      Mechanical Ventilator  


 


8/16/18 08:00 98.6 92 18 102/75 (84) 96   





 98.6       


 


8/16/18 08:00        30


 


8/16/18 07:26  79 16     30


 


8/16/18 05:25  85 16     30


 


8/16/18 04:00        30


 


8/16/18 04:00  78      


 


8/16/18 04:00      Mechanical Ventilator  


 


8/16/18 04:00 98.7 80 16 129/66 (87) 100   





 98.7       


 


8/16/18 03:18  80 16     30


 


8/16/18 01:07  73 16     30


 


8/16/18 00:00        30


 


8/16/18 00:00  84      


 


8/16/18 00:00      Mechanical Ventilator  


 


8/15/18 23:59 98.3 75 17 113/73 (86) 99   





 98.3       


 


8/15/18 23:02  77 16     30


 


8/15/18 20:46  82 16     30


 


8/15/18 20:00        30


 


8/15/18 20:00      Mechanical Ventilator  


 


8/15/18 20:00 98.4 78 16 115/78 (90) 98   





 98.4       


 


8/15/18 19:55  79      


 


8/15/18 19:00  78 16     30


 


8/15/18 17:30  84 16     30


 


8/15/18 16:00        30


 


8/15/18 16:00 97.6 79 16 122/82 (95) 96   





 97.6       


 


8/15/18 16:00  82      


 


8/15/18 16:00      Mechanical Ventilator  


 


8/15/18 15:29  79 18     30


 


8/15/18 13:10  84 16     30

















Intake and Output  


 


 8/15/18 8/16/18





 19:00 07:00


 


Intake Total 1100 ml 600 ml


 


Output Total 640 ml 800 ml


 


Balance 460 ml -200 ml


 


  


 


Free Water 200 ml 


 


IV Total 250 ml 


 


Tube Feeding 650 ml 600 ml


 


Output Urine Total 400 ml 550 ml


 


Stool Total 240 ml 250 ml








Laboratory Tests


8/16/18 04:58: 


White Blood Count 7.1, Red Blood Count 4.74, Hemoglobin 13.1L, Hematocrit 41.3L

, Mean Corpuscular Volume 87, Mean Corpuscular Hemoglobin 27.6, Mean 

Corpuscular Hemoglobin Concent 31.7L, Red Cell Distribution Width 15.4H, 

Platelet Count 293, Mean Platelet Volume 7.2, Neutrophils (%) (Auto) 49.7, 

Lymphocytes (%) (Auto) 25.6, Monocytes (%) (Auto) 12.0H, Eosinophils (%) (Auto) 

11.2H, Basophils (%) (Auto) 1.5, Sodium Level 137, Potassium Level 4.0, 

Chloride Level 102, Carbon Dioxide Level 23, Anion Gap 12, Blood Urea Nitrogen 

18, Creatinine 1.0, Estimat Glomerular Filtration Rate > 60, Glucose Level 91, 

Calcium Level 10.3H


Height (Feet):  6


Height (Inches):  0.00


Weight (Pounds):  205


General Appearance:  no apparent distress


EENT:  PERRL/EOMI


Neck:  normal alignment


Cardiovascular:  normal peripheral pulses


Respiratory/Chest:  no respiratory distress


Abdomen:  soft











Tejas Gerber MD Aug 16, 2018 13:11

## 2018-08-16 NOTE — CARDIOLOGY PROGRESS NOTE
Assessment/Plan


Status:  stable, progressing


Assessment/Plan


Assessment:


(1) Acute on chronic respiratory failure


(2) Acute on chronic renal insufficiency


(3) Severe sepsis


(4) Vegetative state


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


(6) Diabetes mellitus





Plan:


Continue antibiotics


Echocardiogram reviewed- no endocarditis, STACI unable to be performed


Patient afebrile, normal WBC otherwise


Continue trach care and tube feeds


Continue keppra for seizures 


Continue abx treatment for six weeks via PICC line until 9/15


Dispo planning





Subjective


Cardiovascular:  Reports: no symptoms


Respiratory:  Reports: no symptoms


Gastrointestinal/Abdominal:  Reports: no symptoms


Genitourinary:  Reports: no symptoms


Subjective


Afebrile vitals stable, Telemetry shows sinus rhythm. No acute events. Left 

pleural effusion unchanged. 


Ventilator settings: portex 7, AC 16, TV: 600, FiO2: 30%, PEEP: 5. GT is in 

place running glucerna 1.2 @ 65 ml/hr. No residual present. Colostomy bag in 

place.


STACI not completed, probe unable to be passed.  Patient should be treated for 

total six weeks abx for presumptive endocarditis at this juncture. Hyponatremia 

resolved, BRAYDON resolved, patient stable .





Objective





Last 24 Hour Vital Signs








  Date Time  Temp Pulse Resp B/P (MAP) Pulse Ox O2 Delivery O2 Flow Rate FiO2


 


8/16/18 12:55  82 16     30


 


8/16/18 12:11      Mechanical Ventilator  


 


8/16/18 12:10        30


 


8/16/18 12:00  105      


 


8/16/18 12:00      Mechanical Ventilator  


 


8/16/18 11:56 98.9 104 18 119/69 (86) 100   





 98.9       


 


8/16/18 11:12  89 16     30


 


8/16/18 08:51  81 16     30


 


8/16/18 08:00  88      


 


8/16/18 08:00      Mechanical Ventilator  


 


8/16/18 08:00 98.6 92 18 102/75 (84) 96   





 98.6       


 


8/16/18 08:00        30


 


8/16/18 07:26  79 16     30


 


8/16/18 05:25  85 16     30


 


8/16/18 04:00        30


 


8/16/18 04:00  78      


 


8/16/18 04:00      Mechanical Ventilator  


 


8/16/18 04:00 98.7 80 16 129/66 (87) 100   





 98.7       


 


8/16/18 03:18  80 16     30


 


8/16/18 01:07  73 16     30


 


8/16/18 00:00        30


 


8/16/18 00:00  84      


 


8/16/18 00:00      Mechanical Ventilator  


 


8/15/18 23:59 98.3 75 17 113/73 (86) 99   





 98.3       


 


8/15/18 23:02  77 16     30


 


8/15/18 20:46  82 16     30


 


8/15/18 20:00        30


 


8/15/18 20:00      Mechanical Ventilator  


 


8/15/18 20:00 98.4 78 16 115/78 (90) 98   





 98.4       


 


8/15/18 19:55  79      


 


8/15/18 19:00  78 16     30


 


8/15/18 17:30  84 16     30


 


8/15/18 16:00        30


 


8/15/18 16:00 97.6 79 16 122/82 (95) 96   





 97.6       


 


8/15/18 16:00  82      


 


8/15/18 16:00      Mechanical Ventilator  


 


8/15/18 15:29  79 18     30








General Appearance:  no apparent distress, on vent


EENT:  PERRL/EOMI, normal ENT inspection, pharynx normal


Neck:  non-tender, normal alignment, supple, normal inspection, no JVD


Rhythm:  NSR


Cardiovascular:  normal peripheral pulses, normal rate, regular rhythm


Respiratory/Chest:  chest wall non-tender, lungs clear


Abdomen:  normal bowel sounds, non tender


Extremities:  normal range of motion, non-tender


Neurologic:  abnormal CN, motor weakness, sensory deficit











Intake and Output  


 


 8/15/18 8/16/18





 19:00 07:00


 


Intake Total 1100 ml 600 ml


 


Output Total 640 ml 800 ml


 


Balance 460 ml -200 ml


 


  


 


Free Water 200 ml 


 


IV Total 250 ml 


 


Tube Feeding 650 ml 600 ml


 


Output Urine Total 400 ml 550 ml


 


Stool Total 240 ml 250 ml











Laboratory Tests








Test


  8/16/18


04:58


 


White Blood Count


  7.1 K/UL


(4.8-10.8)


 


Red Blood Count


  4.74 M/UL


(4.70-6.10)


 


Hemoglobin


  13.1 G/DL


(14.2-18.0)  L


 


Hematocrit


  41.3 %


(42.0-52.0)  L


 


Mean Corpuscular Volume 87 FL (80-99)  


 


Mean Corpuscular Hemoglobin


  27.6 PG


(27.0-31.0)


 


Mean Corpuscular Hemoglobin


Concent 31.7 G/DL


(32.0-36.0)  L


 


Red Cell Distribution Width


  15.4 %


(11.6-14.8)  H


 


Platelet Count


  293 K/UL


(150-450)


 


Mean Platelet Volume


  7.2 FL


(6.5-10.1)


 


Neutrophils (%) (Auto)


  49.7 %


(45.0-75.0)


 


Lymphocytes (%) (Auto)


  25.6 %


(20.0-45.0)


 


Monocytes (%) (Auto)


  12.0 %


(1.0-10.0)  H


 


Eosinophils (%) (Auto)


  11.2 %


(0.0-3.0)  H


 


Basophils (%) (Auto)


  1.5 %


(0.0-2.0)


 


Sodium Level


  137 MMOL/L


(136-145)


 


Potassium Level


  4.0 MMOL/L


(3.5-5.1)


 


Chloride Level


  102 MMOL/L


()


 


Carbon Dioxide Level


  23 MMOL/L


(21-32)


 


Anion Gap


  12 mmol/L


(5-15)


 


Blood Urea Nitrogen


  18 mg/dL


(7-18)


 


Creatinine


  1.0 MG/DL


(0.55-1.30)


 


Estimat Glomerular Filtration


Rate > 60 mL/min


(>60)


 


Glucose Level


  91 MG/DL


()


 


Calcium Level


  10.3 MG/DL


(8.5-10.1)  H

















Robert Hernandez M.D. Aug 16, 2018 13:20

## 2018-08-16 NOTE — PROGRESS NOTE
DATE:  08/10/2018



SUBJECTIVE:  The patient again is febrile without tachycardia.



PHYSICAL EXAMINATION:

VITAL SIGNS:  Blood pressure 135/79, his pulse is 78, respirations are 18,

and temperature is 100.5 degrees.

HEENT:  Eyes were normal.  ENT, mucous membranes were moist and intact.

NECK:  Supple with no JVD without lymph nodes.  Tracheostomy site is clean.

The oral cavity is opened and the tongue is protruded.

LUNGS:  Clear without rhonchi, rales, or wheezing.  Secretions are small,

thin, and grey.

HEART:  Normal sounds with regular beats.  There is no S3, S4, or

pericardial rub.

ABDOMEN:  Soft and nontender with normal bowel sounds.  Gastrostomy site is

clean.

EXTREMITIES:  Warm without cyanosis, clubbing, or edema.



LABORATORY AND DIAGNOSTIC DATA:  Hemoglobin is 12.9, hematocrit 40.3 with

MCV of 87, WBC of 8.7, and platelets of 315.  His BUN and creatinine are

14 and 0.9, respectively.  Sodium is 133, potassium 4.1, chloride 100, and

CO2 is 22.  His calcium is 10.  His phosphorus is 1.9.  Magnesium is 2.1.

A chest x-ray was done on 08/09/2018 and showed _____ and left pleural

effusion.



IMPRESSION AND PLAN:  The patient is in stable condition.  Repeat

laboratory tests will be done in the a.m.









  ______________________________________________

  Etienne Bloom M.D.





DR:  JAMIE

D:  08/11/2018 00:50

T:  08/11/2018 03:29

JOB#:  [id number]

CC:

## 2018-08-16 NOTE — PULMONOLGY CRITICAL CARE NOTE
Critical Care - Asmt/Plan


Problems:  


(1) Acute on chronic respiratory failure


(2) Acute on chronic renal insufficiency


(3) Severe sepsis


(4) Vegetative state


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


(6) Diabetes mellitus


Respiratory:  monitor respiratory rate, adjust FIO2, CXR


Cardiac:  continue to monitor HR/BP


Renal:  F/U I&O, keep IV fluid


Infectious Disease:  check cultures


Gastrointestinal:  continue feedings/current rate


Endocrine:  check TSH, check HgA1C


Hematologic:  transfuse if hgb<8.5


Neurologic:  PRN Ativan, keep patient comfortable


Affect:  PRN ativan


Time Spent (Minutes):  40


Notes Reviewed:  internist, cardio, renal


Discussed with:  nurses, consultants, 





Critical Care - Objective





Last 24 Hour Vital Signs








  Date Time  Temp Pulse Resp B/P (MAP) Pulse Ox O2 Delivery O2 Flow Rate FiO2


 


8/16/18 12:11      Mechanical Ventilator  


 


8/16/18 12:10        30


 


8/16/18 12:00  105      


 


8/16/18 12:00      Mechanical Ventilator  


 


8/16/18 11:56 98.9 104 18 119/69 (86) 100   





 98.9       


 


8/16/18 11:12  89 16     30


 


8/16/18 08:51  81 16     30


 


8/16/18 08:00  88      


 


8/16/18 08:00      Mechanical Ventilator  


 


8/16/18 08:00 98.6 92 18 102/75 (84) 96   





 98.6       


 


8/16/18 08:00        30


 


8/16/18 07:26  79 16     30


 


8/16/18 05:25  85 16     30


 


8/16/18 04:00        30


 


8/16/18 04:00  78      


 


8/16/18 04:00      Mechanical Ventilator  


 


8/16/18 04:00 98.7 80 16 129/66 (87) 100   





 98.7       


 


8/16/18 03:18  80 16     30


 


8/16/18 01:07  73 16     30


 


8/16/18 00:00        30


 


8/16/18 00:00  84      


 


8/16/18 00:00      Mechanical Ventilator  


 


8/15/18 23:59 98.3 75 17 113/73 (86) 99   





 98.3       


 


8/15/18 23:02  77 16     30


 


8/15/18 20:46  82 16     30


 


8/15/18 20:00        30


 


8/15/18 20:00      Mechanical Ventilator  


 


8/15/18 20:00 98.4 78 16 115/78 (90) 98   





 98.4       


 


8/15/18 19:55  79      


 


8/15/18 19:00  78 16     30


 


8/15/18 17:30  84 16     30


 


8/15/18 16:00        30


 


8/15/18 16:00 97.6 79 16 122/82 (95) 96   





 97.6       


 


8/15/18 16:00  82      


 


8/15/18 16:00      Mechanical Ventilator  


 


8/15/18 15:29  79 18     30


 


8/15/18 13:10  84 16     30








Status:  sedated


Condition:  critical


Neck:  full ROM


Lungs:  chest wall tender


Heart:  HR/BP stable, HR/BP unstable


Abdomen:  non-tender, active bowel sounds


Extremities:  no C/C/E


Decubiti:  location


Accucheck:  118





Critical Care - Subjective


ROS Limited/Unobtainable:  No


Condition:  critical


EKG Rhythm:  Sinus Rhythm


FI02:  30


Vent Support Breath Rate:  16


Vent Support Mode:  AC


Vent Tidal Volume:  600


Sputum Amount:  Moderate


PEEP:  5.0


PIP:  30


Tube Feeding Amount:  50


I&O:











Intake and Output  


 


 8/15/18 8/16/18





 19:00 07:00


 


Intake Total 1100 ml 600 ml


 


Output Total 640 ml 800 ml


 


Balance 460 ml -200 ml


 


  


 


Free Water 200 ml 


 


IV Total 250 ml 


 


Tube Feeding 650 ml 600 ml


 


Output Urine Total 400 ml 550 ml


 


Stool Total 240 ml 250 ml








Labs:





Laboratory Tests








Test


  8/16/18


04:58


 


White Blood Count


  7.1 K/UL


(4.8-10.8)


 


Red Blood Count


  4.74 M/UL


(4.70-6.10)


 


Hemoglobin


  13.1 G/DL


(14.2-18.0)  L


 


Hematocrit


  41.3 %


(42.0-52.0)  L


 


Mean Corpuscular Volume 87 FL (80-99)  


 


Mean Corpuscular Hemoglobin


  27.6 PG


(27.0-31.0)


 


Mean Corpuscular Hemoglobin


Concent 31.7 G/DL


(32.0-36.0)  L


 


Red Cell Distribution Width


  15.4 %


(11.6-14.8)  H


 


Platelet Count


  293 K/UL


(150-450)


 


Mean Platelet Volume


  7.2 FL


(6.5-10.1)


 


Neutrophils (%) (Auto)


  49.7 %


(45.0-75.0)


 


Lymphocytes (%) (Auto)


  25.6 %


(20.0-45.0)


 


Monocytes (%) (Auto)


  12.0 %


(1.0-10.0)  H


 


Eosinophils (%) (Auto)


  11.2 %


(0.0-3.0)  H


 


Basophils (%) (Auto)


  1.5 %


(0.0-2.0)


 


Sodium Level


  137 MMOL/L


(136-145)


 


Potassium Level


  4.0 MMOL/L


(3.5-5.1)


 


Chloride Level


  102 MMOL/L


()


 


Carbon Dioxide Level


  23 MMOL/L


(21-32)


 


Anion Gap


  12 mmol/L


(5-15)


 


Blood Urea Nitrogen


  18 mg/dL


(7-18)


 


Creatinine


  1.0 MG/DL


(0.55-1.30)


 


Estimat Glomerular Filtration


Rate > 60 mL/min


(>60)


 


Glucose Level


  91 MG/DL


()


 


Calcium Level


  10.3 MG/DL


(8.5-10.1)  H

















Yury Pena MD Aug 16, 2018 12:34

## 2018-08-16 NOTE — INFECTIOUS DISEASES PROG NOTE
Assessment/Plan


Sepsis, resolving- 2ry to UTI and Bacteremia Blood cultures postive 4/4 bottles 

with [facility] Unclear source will need to R/O Endocarditis  Possible PNA initially 


 -u/a wbc 40-60, nit neg, leuk +2; ucx >100k E. aerogenes (S cefepime, cipro/

levo; R Zosyn)


 -BCX 4/4 E. aerogenes, prob Amp C (S cefepime, cipro/levo; R Zosyn), P. 

mirabilis ESBL (S Zosyn, Ertapenem); repeta 7/26 1/4 CoNS (suspect contaminant)

, 7/28 Staph f/u final results if CoNS may need TTE and IE work up.


  -CXR 7/27: Increased left pleural effusion, over 3 days. Overall decreased 

interstitial congestion. Right infrahilar atelectasis


 -CXR: Cardiomegaly. Mild interstitial congestion. Suspect small bilateral 

pleural effusions


 -CT abd/p: Right lower quadrant double barrel colostomy, as described. Small 

peristomal hernia contains bowel loops without evidence of obstruction or 

strangulation. Left renal staghorn calculus. Bilateral intrarenal calyceal 

calculi. Borderline hydronephrosis on the right without evidence of downstream 

obstructive lesion. May indicate mild ureteral pelvic junction obstruction. 

Somewhat atrophic left kidney. Inspissated contrast ball within the distal 

sigmoid colon. Gastrostomy in good position. Nonspecific bilateral perinephric 

fat stranding, could indicate pyelonephritis or could be chronic. Guzmán 

catheter in place. Apparent bladder wall thickening, possibly an artifact of 

under distention but cystitis is not excludable, particularly in view of mild 

perivesical fat stranding. Bilateral pulmonary parenchymal atelectasis and 

consolidation


  -Blood Cx from PICC CoNS 2/2 bottles Peripheral Neg- (May be contaminant but 

will repeat blood Cx given new leukocytosis if positive will need ECHO.


  - Blood Cx 7/30/18 - GPC - Will need TTE for IE work up and the PICC removed.


 


PICC line infection/colonization - TTE negative. Blood cultures only positive 

from PICC. Not positive from Peripheral 


- Will repeat blood cultures x 1 to confirm clearance after PICC replaced. [place] 

suspicion for IE.


- PICC replace 8/1/18





Fever/Leukocytosis; Resolved - i recurs Re-evaluate lines, Diarrhea? C. dif? 


BRAYDON, improving


Lactic acidosis, resolved





chronic resp failure trach/vent dependant


BPH


GERD


 HTN


DM2


seizure disorder


colostomy


 s/p GT 


constipation





VRE and MRSA colonized


 


Plan:





STACI could not be performed due to technical problems


   - Unable to repeat STACI so patient should be treated for presumed 

endocarditis for 6 weeks





- Continue empiric IV Vancomycin #17/42 for bacteremia/line infection cant r/o 

Endocarditis  - Abx end date 9/12/18 if STACI attempted again and negative then 

ed date would be 8/15/18


    -8/9 SP Ertapenem #14


    -7/27 SP Zosyn #4





- Monitor CBC/CMP, temperatures


- F/U STACI


- Pulmonary care








Thank you for this consultation. Will continue to follow along with you.





Subjective


Allergies:  


Coded Allergies:  


     AZTREONAM (Verified  Allergy, Unknown, 7/23/18)


Subjective


afebrile


no leukocytosis





Objective


Vital Signs





Last 24 Hour Vital Signs








  Date Time  Temp Pulse Resp B/P (MAP) Pulse Ox O2 Delivery O2 Flow Rate FiO2


 


8/16/18 11:12  89 16     30


 


8/16/18 08:51  81 16     30


 


8/16/18 08:00  88      


 


8/16/18 08:00      Mechanical Ventilator  


 


8/16/18 08:00 98.6 92 18 102/75 (84) 96   





 98.6       


 


8/16/18 08:00        30


 


8/16/18 07:26  79 16     30


 


8/16/18 05:25  85 16     30


 


8/16/18 04:00        30


 


8/16/18 04:00  78      


 


8/16/18 04:00      Mechanical Ventilator  


 


8/16/18 04:00 98.7 80 16 129/66 (87) 100   





 98.7       


 


8/16/18 03:18  80 16     30


 


8/16/18 01:07  73 16     30


 


8/16/18 00:00        30


 


8/16/18 00:00  84      


 


8/16/18 00:00      Mechanical Ventilator  


 


8/15/18 23:59 98.3 75 17 113/73 (86) 99   





 98.3       


 


8/15/18 23:02  77 16     30


 


8/15/18 20:46  82 16     30


 


8/15/18 20:00        30


 


8/15/18 20:00      Mechanical Ventilator  


 


8/15/18 20:00 98.4 78 16 115/78 (90) 98   





 98.4       


 


8/15/18 19:55  79      


 


8/15/18 19:00  78 16     30


 


8/15/18 17:30  84 16     30


 


8/15/18 16:00        30


 


8/15/18 16:00 97.6 79 16 122/82 (95) 96   





 97.6       


 


8/15/18 16:00  82      


 


8/15/18 16:00      Mechanical Ventilator  


 


8/15/18 15:29  79 18     30


 


8/15/18 13:10  84 16     30


 


8/15/18 12:19  76      


 


8/15/18 12:00 98.1 76 16 115/80 (92) 99   





 98.1       


 


8/15/18 12:00        30


 


8/15/18 12:00      Mechanical Ventilator  








Height (Feet):  6


Height (Inches):  0.00


Weight (Pounds):  205


Objective


GENERAL:  The patient is awake, in no overt distress.


HEENT:  Extraocular muscles intact.  No lymphadenopathy noted.


Tracheostomy noted.


CARDIOVASCULAR:  S1 and S2.  Tachycardic.  No rubs or gallops.


PULMONARY:  Mild diffuse expiratory rhonchi with basilar rales.


ABDOMEN:  Obese and nondistended.  The G-tube and stoma noted.


EXTREMITIES:  No edema.  Fair pedal pulses.





Laboratory Tests








Test


  8/16/18


04:58


 


White Blood Count


  7.1 K/UL


(4.8-10.8)


 


Red Blood Count


  4.74 M/UL


(4.70-6.10)


 


Hemoglobin


  13.1 G/DL


(14.2-18.0)  L


 


Hematocrit


  41.3 %


(42.0-52.0)  L


 


Mean Corpuscular Volume 87 FL (80-99)  


 


Mean Corpuscular Hemoglobin


  27.6 PG


(27.0-31.0)


 


Mean Corpuscular Hemoglobin


Concent 31.7 G/DL


(32.0-36.0)  L


 


Red Cell Distribution Width


  15.4 %


(11.6-14.8)  H


 


Platelet Count


  293 K/UL


(150-450)


 


Mean Platelet Volume


  7.2 FL


(6.5-10.1)


 


Neutrophils (%) (Auto)


  49.7 %


(45.0-75.0)


 


Lymphocytes (%) (Auto)


  25.6 %


(20.0-45.0)


 


Monocytes (%) (Auto)


  12.0 %


(1.0-10.0)  H


 


Eosinophils (%) (Auto)


  11.2 %


(0.0-3.0)  H


 


Basophils (%) (Auto)


  1.5 %


(0.0-2.0)


 


Sodium Level


  137 MMOL/L


(136-145)


 


Potassium Level


  4.0 MMOL/L


(3.5-5.1)


 


Chloride Level


  102 MMOL/L


()


 


Carbon Dioxide Level


  23 MMOL/L


(21-32)


 


Anion Gap


  12 mmol/L


(5-15)


 


Blood Urea Nitrogen


  18 mg/dL


(7-18)


 


Creatinine


  1.0 MG/DL


(0.55-1.30)


 


Estimat Glomerular Filtration


Rate > 60 mL/min


(>60)


 


Glucose Level


  91 MG/DL


()


 


Calcium Level


  10.3 MG/DL


(8.5-10.1)  H











Current Medications








 Medications


  (Trade)  Dose


 Ordered  Sig/Jan


 Route


 PRN Reason  Start Time


 Stop Time Status Last Admin


Dose Admin


 


 Acetaminophen


  (Tylenol)  650 mg  Q4H  PRN


 ORAL


 FEVER  7/26/18 14:00


 8/23/18 09:59  8/12/18 23:43


 


 


 Baclofen


  (Lioresal)  10 mg  EVERY 8  HOURS


 GT


   7/30/18 14:00


 8/23/18 12:59  8/16/18 05:31


 


 


 Chlorhexidine


 Gluconate


  (Elaine-Hex 2%)  1 applic  DAILY@2000


 TOPIC


   7/26/18 20:00


 8/23/18 19:59  8/15/18 20:17


 


 


 Dextrose


  (Dextrose 50%)  25 ml  STAT  PRN


 IV


 Hypoglycemia  7/27/18 08:45


 8/24/18 08:44   


 


 


 Dextrose


  (Dextrose 50%)  50 ml  STAT  PRN


 IV


 Hypoglycemia  7/27/18 08:45


 8/24/18 08:44   


 


 


 Heparin Sodium


  (Porcine)


  (Heparin 5000


 units/ml)  5,000 units  EVERY 12  HOURS


 SUBQ


   7/26/18 21:00


 8/23/18 20:59  8/16/18 09:40


 


 


 Insulin Aspart


  (NovoLOG)    Q6HR


 SUBQ


   7/26/18 18:00


 8/24/18 11:29  8/15/18 23:58


 


 


 Lansoprazole


  (Prevacid)  30 mg  DAILY


 GT


   7/27/18 09:00


 8/25/18 08:59  8/16/18 09:37


 


 


 Levetiracetam


  (Keppra)  1,000 mg  Q8HR


 GT


   7/26/18 14:00


 8/23/18 12:59  8/16/18 05:31


 


 


 Ondansetron HCl


  (Zofran)  4 mg  Q6H  PRN


 IVP


 Nausea & Vomiting  7/26/18 16:00


 8/23/18 09:59   


 


 


 Polyethylene


 Glycol


  (Miralax)  17 gm  BEDTIME


 GT


   7/30/18 21:00


 8/24/18 20:59  8/15/18 21:36


 


 


 Vancomycin HCl


  (Vanco rx to


 dose)  1 ea  DAILY  PRN


 MISC


 PER RX PROTOCOL  7/28/18 13:15


 8/27/18 13:14   


 


 


 Vancomycin HCl/


 Dextrose  250 ml @ 


 125 mls/hr  Q18H


 IVPB


   8/8/18 21:00


 8/19/18 20:59  8/16/18 09:37


 

















Joy Love M.D. Aug 16, 2018 11:20

## 2018-08-16 NOTE — GI PROGRESS NOTE
Assessment/Plan


Problems:  


(1) Parastomal hernia


ICD Codes:  K43.5 - Parastomal hernia without obstruction or gangrene


SNOMED:  220800575


Qualifiers:  


   Qualified Codes:  K43.5 - Parastomal hernia without obstruction or gangrene


(2) Stoma malfunction


SNOMED:  662346608


(3) Colostomy in place


ICD Codes:  Z93.3 - Colostomy status


SNOMED:  513272000, 357003675


(4) Vegetative state


ICD Codes:  R40.3 - Persistent vegetative state


SNOMED:  30414891


(5) Diabetes mellitus


ICD Codes:  E11.9 - Type 2 diabetes mellitus without complications


SNOMED:  99262951


(6) Acute on chronic respiratory failure


ICD Codes:  J96.20 - Acute and chronic respiratory failure, unspecified whether 

with hypoxia or hypercapnia


SNOMED:  15788410


(7) Severe sepsis


ICD Codes:  A41.9 - Sepsis, unspecified organism; R65.20 - Severe sepsis 

without septic shock


SNOMED:  06703075


Status:  stable


Status Narrative


Discussed with Dr. Pickard.


Assessment/Plan


parastomal >> no s/sx of infection.  no obstruction. 


anemia work up reviewed >> iron deficiency


G tube dependent


hepatitis panel negative


OB negative





supportive care


monitor H&H, prn transfusions


venofer


ppi


GTFs per RD, adv to goal


GT site care daily/prn


abx


bowel regime


fu labs


dc planning





The patient was seen and examined at bedside and all new and available data was 

reviewed in the patients chart. I agree with the above findings, impression 

and plan.  (Patient seen earlier today. Signature stamp does not reflect 

patient encounter time.). - Miles Pickard MD





Subjective


Subjective


limited





Objective





Last 24 Hour Vital Signs








  Date Time  Temp Pulse Resp B/P (MAP) Pulse Ox O2 Delivery O2 Flow Rate FiO2


 


8/16/18 08:51  81 16     30


 


8/16/18 08:00  88      


 


8/16/18 08:00      Mechanical Ventilator  


 


8/16/18 08:00 98.6 92 18 102/75 (84) 96   





 98.6       


 


8/16/18 08:00        30


 


8/16/18 07:26  79 16     30


 


8/16/18 05:25  85 16     30


 


8/16/18 04:00        30


 


8/16/18 04:00  78      


 


8/16/18 04:00      Mechanical Ventilator  


 


8/16/18 04:00 98.7 80 16 129/66 (87) 100   





 98.7       


 


8/16/18 03:18  80 16     30


 


8/16/18 01:07  73 16     30


 


8/16/18 00:00        30


 


8/16/18 00:00  84      


 


8/16/18 00:00      Mechanical Ventilator  


 


8/15/18 23:59 98.3 75 17 113/73 (86) 99   





 98.3       


 


8/15/18 23:02  77 16     30


 


8/15/18 20:46  82 16     30


 


8/15/18 20:00        30


 


8/15/18 20:00      Mechanical Ventilator  


 


8/15/18 20:00 98.4 78 16 115/78 (90) 98   





 98.4       


 


8/15/18 19:55  79      


 


8/15/18 19:00  78 16     30


 


8/15/18 17:30  84 16     30


 


8/15/18 16:00        30


 


8/15/18 16:00 97.6 79 16 122/82 (95) 96   





 97.6       


 


8/15/18 16:00  82      


 


8/15/18 16:00      Mechanical Ventilator  


 


8/15/18 15:29  79 18     30


 


8/15/18 13:10  84 16     30


 


8/15/18 12:19  76      


 


8/15/18 12:00 98.1 76 16 115/80 (92) 99   





 98.1       


 


8/15/18 12:00        30


 


8/15/18 12:00      Mechanical Ventilator  


 


8/15/18 11:00  79 16     30

















Intake and Output  


 


 8/15/18 8/16/18





 19:00 07:00


 


Intake Total 1100 ml 600 ml


 


Output Total 640 ml 800 ml


 


Balance 460 ml -200 ml


 


  


 


Free Water 200 ml 


 


IV Total 250 ml 


 


Tube Feeding 650 ml 600 ml


 


Output Urine Total 400 ml 550 ml


 


Stool Total 240 ml 250 ml











Laboratory Tests








Test


  8/16/18


04:58


 


White Blood Count


  7.1 K/UL


(4.8-10.8)


 


Red Blood Count


  4.74 M/UL


(4.70-6.10)


 


Hemoglobin


  13.1 G/DL


(14.2-18.0)  L


 


Hematocrit


  41.3 %


(42.0-52.0)  L


 


Mean Corpuscular Volume 87 FL (80-99)  


 


Mean Corpuscular Hemoglobin


  27.6 PG


(27.0-31.0)


 


Mean Corpuscular Hemoglobin


Concent 31.7 G/DL


(32.0-36.0)  L


 


Red Cell Distribution Width


  15.4 %


(11.6-14.8)  H


 


Platelet Count


  293 K/UL


(150-450)


 


Mean Platelet Volume


  7.2 FL


(6.5-10.1)


 


Neutrophils (%) (Auto)


  49.7 %


(45.0-75.0)


 


Lymphocytes (%) (Auto)


  25.6 %


(20.0-45.0)


 


Monocytes (%) (Auto)


  12.0 %


(1.0-10.0)  H


 


Eosinophils (%) (Auto)


  11.2 %


(0.0-3.0)  H


 


Basophils (%) (Auto)


  1.5 %


(0.0-2.0)


 


Sodium Level


  137 MMOL/L


(136-145)


 


Potassium Level


  4.0 MMOL/L


(3.5-5.1)


 


Chloride Level


  102 MMOL/L


()


 


Carbon Dioxide Level


  23 MMOL/L


(21-32)


 


Anion Gap


  12 mmol/L


(5-15)


 


Blood Urea Nitrogen


  18 mg/dL


(7-18)


 


Creatinine


  1.0 MG/DL


(0.55-1.30)


 


Estimat Glomerular Filtration


Rate > 60 mL/min


(>60)


 


Glucose Level


  91 MG/DL


()


 


Calcium Level


  10.3 MG/DL


(8.5-10.1)  H








Height (Feet):  6


Height (Inches):  0.00


Weight (Pounds):  205


General Appearance:  WD/WN, no apparent distress, alert


Cardiovascular:  normal rate


Respiratory/Chest:  normal breath sounds, no respiratory distress


Abdominal Exam:  normal bowel sounds, non tender, soft, other - colostomy, 

parastoma


Extremities:  non-tender











Remy Lyons NP Aug 16, 2018 10:46

## 2018-08-16 NOTE — PROGRESS NOTE
DATE:  08/15/2018



SUBJECTIVE:  The patient is not awake, not alert, afebrile and

hemodynamically stable.



PHYSICAL EXAMINATION:

VITAL SIGNS:  Blood pressure is 115/78, his pulse is 78, respirations are

16, and temperature of 98.4.

HEENT:  Eyes were normal.  ENT, mucous membranes were moist and intact.

NECK:  Supple with no JVD without lymph nodes.  Tracheostomy site is

clean.

LUNGS:  Clear without rhonchi, rales, or wheezing.  Secretions are small,

thin, and grey.

HEART:  Normal sounds with regular beats.  There is no S3, S4, or

pericardial rub.

ABDOMEN:  Soft and nontender with normal bowel sounds.  Gastrostomy site is

clean.

EXTREMITIES:  Warm without cyanosis, clubbing, or edema.



LABORATORY DATA:  His hemoglobin is 13.0, hematocrit 41.2 with MCV of 88,

WBC of 7.5, and platelets of 322.  His BUN and creatinine is 20 and 1.0

respectively.  His sodium is 137, potassium 3.9, chloride 102, CO2 is 22.

His calcium is 10.2.



IMPRESSION:  The patient is hemodynamically stable.  He is afebrile without

leukocytosis, no tachycardia.  We are awaiting for patient's placement.









  ______________________________________________

  Etienne Bloom M.D.





DR:  SHAMA

D:  08/15/2018 22:45

T:  08/16/2018 02:42

JOB#:  [id number]

CC:

## 2018-08-17 VITALS — SYSTOLIC BLOOD PRESSURE: 124 MMHG | DIASTOLIC BLOOD PRESSURE: 82 MMHG

## 2018-08-17 VITALS — DIASTOLIC BLOOD PRESSURE: 69 MMHG | SYSTOLIC BLOOD PRESSURE: 130 MMHG

## 2018-08-17 VITALS — SYSTOLIC BLOOD PRESSURE: 127 MMHG | DIASTOLIC BLOOD PRESSURE: 81 MMHG

## 2018-08-17 VITALS — SYSTOLIC BLOOD PRESSURE: 132 MMHG | DIASTOLIC BLOOD PRESSURE: 88 MMHG

## 2018-08-17 VITALS — DIASTOLIC BLOOD PRESSURE: 84 MMHG | SYSTOLIC BLOOD PRESSURE: 120 MMHG

## 2018-08-17 VITALS — DIASTOLIC BLOOD PRESSURE: 78 MMHG | SYSTOLIC BLOOD PRESSURE: 124 MMHG

## 2018-08-17 LAB
ADD MANUAL DIFF: NO
ANION GAP SERPL CALC-SCNC: 12 MMOL/L (ref 5–15)
BASOPHILS NFR BLD AUTO: 0.8 % (ref 0–2)
BUN SERPL-MCNC: 18 MG/DL (ref 7–18)
CALCIUM SERPL-MCNC: 9.9 MG/DL (ref 8.5–10.1)
CHLORIDE SERPL-SCNC: 102 MMOL/L (ref 98–107)
CO2 SERPL-SCNC: 23 MMOL/L (ref 21–32)
CREAT SERPL-MCNC: 1.1 MG/DL (ref 0.55–1.3)
EOSINOPHIL NFR BLD AUTO: 11.4 % (ref 0–3)
ERYTHROCYTE [DISTWIDTH] IN BLOOD BY AUTOMATED COUNT: 15.1 % (ref 11.6–14.8)
HCT VFR BLD CALC: 40.2 % (ref 42–52)
HGB BLD-MCNC: 12.8 G/DL (ref 14.2–18)
LYMPHOCYTES NFR BLD AUTO: 26.4 % (ref 20–45)
MCV RBC AUTO: 87 FL (ref 80–99)
MONOCYTES NFR BLD AUTO: 8.1 % (ref 1–10)
NEUTROPHILS NFR BLD AUTO: 53.3 % (ref 45–75)
PLATELET # BLD: 303 K/UL (ref 150–450)
POTASSIUM SERPL-SCNC: 3.9 MMOL/L (ref 3.5–5.1)
RBC # BLD AUTO: 4.6 M/UL (ref 4.7–6.1)
SODIUM SERPL-SCNC: 137 MMOL/L (ref 136–145)
WBC # BLD AUTO: 7.9 K/UL (ref 4.8–10.8)

## 2018-08-17 RX ADMIN — INSULIN ASPART SCH UNITS: 100 INJECTION, SOLUTION INTRAVENOUS; SUBCUTANEOUS at 17:21

## 2018-08-17 RX ADMIN — POLYETHYLENE GLYCOL 3350 SCH GM: 17 POWDER, FOR SOLUTION ORAL at 20:54

## 2018-08-17 RX ADMIN — INSULIN ASPART SCH UNITS: 100 INJECTION, SOLUTION INTRAVENOUS; SUBCUTANEOUS at 11:39

## 2018-08-17 RX ADMIN — Medication SCH MLS/HR: at 03:04

## 2018-08-17 RX ADMIN — LEVETIRACETAM SCH MG: 100 SOLUTION ORAL at 05:31

## 2018-08-17 RX ADMIN — LEVETIRACETAM SCH MG: 100 SOLUTION ORAL at 14:21

## 2018-08-17 RX ADMIN — INSULIN ASPART SCH UNITS: 100 INJECTION, SOLUTION INTRAVENOUS; SUBCUTANEOUS at 05:32

## 2018-08-17 RX ADMIN — Medication SCH MLS/HR: at 20:54

## 2018-08-17 RX ADMIN — HEPARIN SODIUM SCH UNITS: 5000 INJECTION INTRAVENOUS; SUBCUTANEOUS at 20:57

## 2018-08-17 RX ADMIN — HEPARIN SODIUM SCH UNITS: 5000 INJECTION INTRAVENOUS; SUBCUTANEOUS at 09:04

## 2018-08-17 RX ADMIN — CHLORHEXIDINE GLUCONATE SCH APPLIC: 213 SOLUTION TOPICAL at 20:54

## 2018-08-17 RX ADMIN — LEVETIRACETAM SCH MG: 100 SOLUTION ORAL at 22:25

## 2018-08-17 NOTE — PROGRESS NOTE
DATE:  08/17/2018



SUBJECTIVE:  The patient appears more awake and more alert than previously.

However, there is no eye contact and he remained in the same depth of

coma.



PHYSICAL EXAMINATION:

VITAL SIGNS:  Blood pressure 132/98, his pulse is 89, respirations 17, and

temperature 98.8.

HEENT:  Eyes were normal.  ENT, mucous membranes were moist and intact.

NECK:  Supple with no JVD without lymph nodes.  Tracheostomy site is

clean.

LUNGS:  Clear without rhonchi, rales, or wheezing.  Secretions are small,

thin, and grey.

HEART:  Normal sounds with regular beats.  There is no tachycardia at

rest.

ABDOMEN:  Soft and nontender with normal bowel sounds.  Gastrostomy site is

clean.

EXTREMITIES:  Warm without cyanosis, clubbing, or edema.



LABORATORY AND DIAGNOSTIC DATA:  His hemoglobin is 12.8, hematocrit 40.2

with MCV of 87, WBC of 7.9, and platelets is 303,000.  His BUN and

creatinine are 18 and 1.1 respectively.  Sodium is 137, potassium 3.9,

chloride 102, and CO2 is 23.  A chest x-ray today revealed left pleural

effusion and right basilar atelectasis unchanged in the last several

days.



PLAN:  Repeat laboratory tests will be done in the morning.









  ______________________________________________

  Etienne Bloom M.D.





DR:  NED

D:  08/17/2018 17:49

T:  08/17/2018 23:40

JOB#:  [id number]

CC:

## 2018-08-17 NOTE — DIAGNOSTIC IMAGING REPORT
Indication: Tachypnea

 

Technique: One view of the chest

 

Comparison: 8/13/2018

 

Findings: There is a left-sided pleural effusion and some associated compressive

atelectasis. This is unchanged. The right pleural space and bilateral lungs are

otherwise clear. The heart size is normal. There is a tracheostomy. Findings are

unchanged.

 

Impression: Unchanged, over 4 days, including left pleural effusion and basilar

atelectasis

 

This agrees with the preliminary interpretation provided overnight by [facility]

teleradiology service.

## 2018-08-17 NOTE — INFECTIOUS DISEASES PROG NOTE
Assessment/Plan


Sepsis, SP- 2ry to UTI and Bacteremia Blood cultures postive 4/4 bottles with 

[facility] Unclear source will need to R/O Endocarditis  Possible PNA initially 


 -u/a wbc 40-60, nit neg, leuk +2; ucx >100k E. aerogenes (S cefepime, cipro/

levo; R Zosyn)


 -BCX 4/4 E. aerogenes, prob Amp C (S cefepime, cipro/levo; R Zosyn), P. 

mirabilis ESBL (S Zosyn, Ertapenem); repeta 7/26 1/4 CoNS (suspect contaminant)

, 7/28 Staph f/u final results if CoNS may need TTE and IE work up.


  -CXR 7/27: Increased left pleural effusion, over 3 days. Overall decreased 

interstitial congestion. Right infrahilar atelectasis


 -CXR: Cardiomegaly. Mild interstitial congestion. Suspect small bilateral 

pleural effusions


 -CT abd/p: Right lower quadrant double barrel colostomy, as described. Small 

peristomal hernia contains bowel loops without evidence of obstruction or 

strangulation. Left renal staghorn calculus. Bilateral intrarenal calyceal 

calculi. Borderline hydronephrosis on the right without evidence of downstream 

obstructive lesion. May indicate mild ureteral pelvic junction obstruction. 

Somewhat atrophic left kidney. Inspissated contrast ball within the distal 

sigmoid colon. Gastrostomy in good position. Nonspecific bilateral perinephric 

fat stranding, could indicate pyelonephritis or could be chronic. Guzmán 

catheter in place. Apparent bladder wall thickening, possibly an artifact of 

under distention but cystitis is not excludable, particularly in view of mild 

perivesical fat stranding. Bilateral pulmonary parenchymal atelectasis and 

consolidation


  -Blood Cx from PICC CoNS 2/2 bottles Peripheral Neg- (May be contaminant but 

will repeat blood Cx given new leukocytosis if positive will need ECHO.


  - Blood Cx 7/30/18 - GPC - Will need TTE for IE work up and the PICC removed.


 


PICC line infection/colonization - TTE negative. Blood cultures only positive 

from PICC. Not positive from Peripheral 


- Will repeat blood cultures x 1 to confirm clearance after PICC replaced. [place] 

suspicion for IE.


- PICC replace 8/1/18





Fever/Leukocytosis; Resolved - i recurs Re-evaluate lines, Diarrhea? C. dif? 


BRAYDON, improving


Lactic acidosis, resolved





chronic resp failure trach/vent dependant


BPH


GERD


 HTN


DM2


seizure disorder


colostomy


 s/p GT 


constipation





VRE and MRSA colonized


 


Plan:





- Continue empiric IV Vancomycin #18/42 for bacteremia/line infection cant r/o 

Endocarditis  


   -Abx end date 9/12/18 if STACI attempted again and negative then ed date would 

be 8/15/18 


       -weekly CBC, BMP, vanco through


    -8/9 SP Ertapenem #14


    -7/27 SP Zosyn #4








STACI could not be performed due to technical problems


   - Unable to repeat STACI so patient should be treated for presumed 

endocarditis for 6 weeks








- Monitor CBC/CMP, temperatures


- Pulmonary care








Thank you for this consultation. Will continue to follow along with you.





Subjective


Allergies:  


Coded Allergies:  


     AZTREONAM (Verified  Allergy, Unknown, 7/23/18)


Subjective


afebrile


no leukocytosis





Objective


Vital Signs





Last 24 Hour Vital Signs








  Date Time  Temp Pulse Resp B/P (MAP) Pulse Ox O2 Delivery O2 Flow Rate FiO2


 


8/17/18 16:33  77 16     30


 


8/17/18 16:00        30


 


8/17/18 16:00  89      


 


8/17/18 16:00      Mechanical Ventilator  


 


8/17/18 15:00  80 17     30


 


8/17/18 12:30  78 17     30


 


8/17/18 12:00  81      


 


8/17/18 12:00 98.6 80 16 124/78 (93) 99   





 98.6       


 


8/17/18 12:00      Mechanical Ventilator  


 


8/17/18 12:00        30


 


8/17/18 10:43  81 16     30


 


8/17/18 09:15  80 16     30


 


8/17/18 08:18        30


 


8/17/18 08:15 98.5 78 16 127/81 (96) 91   





 98.5       


 


8/17/18 08:14      Mechanical Ventilator  


 


8/17/18 08:02  77      


 


8/17/18 07:05  80 17     30


 


8/17/18 05:30  84 17     30


 


8/17/18 04:04  90      


 


8/17/18 04:00        30


 


8/17/18 04:00      Mechanical Ventilator  


 


8/17/18 04:00 98.4 78 39 120/84 (96) 97   





 98.4       


 


8/17/18 03:27  82 16     30


 


8/17/18 01:30  84 16     30


 


8/17/18 00:00 99.1 88 30 130/69 (89) 98   





 99.1       


 


8/17/18 00:00      Mechanical Ventilator  


 


8/16/18 23:30  85 16     30


 


8/16/18 23:27  84      


 


8/16/18 21:30  80 16     30


 


8/16/18 20:00        30


 


8/16/18 20:00      Mechanical Ventilator  


 


8/16/18 20:00  81      


 


8/16/18 20:00 98.8 98 33 128/74 (92) 98   





 98.8       


 


8/16/18 19:30  83 15     30








Height (Feet):  6


Height (Inches):  0.00


Weight (Pounds):  204


Objective


GENERAL:  The patient is awake, in no overt distress.


HEENT:  Extraocular muscles intact.  No lymphadenopathy noted.


Tracheostomy noted.


CARDIOVASCULAR:  S1 and S2.  Tachycardic.  No rubs or gallops.


PULMONARY:  Mild diffuse expiratory rhonchi with basilar rales.


ABDOMEN:  Obese and nondistended.  The G-tube and stoma noted.


EXTREMITIES:  No edema.  Fair pedal pulses.





Laboratory Tests








Test


  8/17/18


04:12


 


White Blood Count


  7.9 K/UL


(4.8-10.8)


 


Red Blood Count


  4.60 M/UL


(4.70-6.10)  L


 


Hemoglobin


  12.8 G/DL


(14.2-18.0)  L


 


Hematocrit


  40.2 %


(42.0-52.0)  L


 


Mean Corpuscular Volume 87 FL (80-99)  


 


Mean Corpuscular Hemoglobin


  27.9 PG


(27.0-31.0)


 


Mean Corpuscular Hemoglobin


Concent 31.9 G/DL


(32.0-36.0)  L


 


Red Cell Distribution Width


  15.1 %


(11.6-14.8)  H


 


Platelet Count


  303 K/UL


(150-450)


 


Mean Platelet Volume


  7.5 FL


(6.5-10.1)


 


Neutrophils (%) (Auto)


  53.3 %


(45.0-75.0)


 


Lymphocytes (%) (Auto)


  26.4 %


(20.0-45.0)


 


Monocytes (%) (Auto)


  8.1 %


(1.0-10.0)


 


Eosinophils (%) (Auto)


  11.4 %


(0.0-3.0)  H


 


Basophils (%) (Auto)


  0.8 %


(0.0-2.0)


 


Sodium Level


  137 MMOL/L


(136-145)


 


Potassium Level


  3.9 MMOL/L


(3.5-5.1)


 


Chloride Level


  102 MMOL/L


()


 


Carbon Dioxide Level


  23 MMOL/L


(21-32)


 


Anion Gap


  12 mmol/L


(5-15)


 


Blood Urea Nitrogen


  18 mg/dL


(7-18)


 


Creatinine


  1.1 MG/DL


(0.55-1.30)


 


Estimat Glomerular Filtration


Rate > 60 mL/min


(>60)


 


Glucose Level


  118 MG/DL


()  H


 


Calcium Level


  9.9 MG/DL


(8.5-10.1)











Current Medications








 Medications


  (Trade)  Dose


 Ordered  Sig/Jan


 Route


 PRN Reason  Start Time


 Stop Time Status Last Admin


Dose Admin


 


 Acetaminophen


  (Tylenol)  650 mg  Q4H  PRN


 ORAL


 FEVER  7/26/18 14:00


 8/23/18 09:59  8/12/18 23:43


 


 


 Baclofen


  (Lioresal)  10 mg  EVERY 8  HOURS


 GT


   7/30/18 14:00


 8/23/18 12:59  8/17/18 14:21


 


 


 Chlorhexidine


 Gluconate


  (Elaine-Hex 2%)  1 applic  DAILY@2000


 TOPIC


   7/26/18 20:00


 8/23/18 19:59  8/16/18 21:18


 


 


 Dextrose


  (Dextrose 50%)  25 ml  STAT  PRN


 IV


 Hypoglycemia  7/27/18 08:45


 8/24/18 08:44   


 


 


 Dextrose


  (Dextrose 50%)  50 ml  STAT  PRN


 IV


 Hypoglycemia  7/27/18 08:45


 8/24/18 08:44   


 


 


 Heparin Sodium


  (Porcine)


  (Heparin 5000


 units/ml)  5,000 units  EVERY 12  HOURS


 SUBQ


   7/26/18 21:00


 8/23/18 20:59  8/17/18 09:04


 


 


 Insulin Aspart


  (NovoLOG)    Q6HR


 SUBQ


   7/26/18 18:00


 8/24/18 11:29  8/17/18 17:21


 


 


 Lansoprazole


  (Prevacid)  30 mg  DAILY


 GT


   7/27/18 09:00


 8/25/18 08:59  8/17/18 09:04


 


 


 Levetiracetam


  (Keppra)  1,000 mg  Q8HR


 GT


   7/26/18 14:00


 8/23/18 12:59  8/17/18 14:21


 


 


 Ondansetron HCl


  (Zofran)  4 mg  Q6H  PRN


 IVP


 Nausea & Vomiting  7/26/18 16:00


 8/23/18 09:59   


 


 


 Polyethylene


 Glycol


  (Miralax)  17 gm  BEDTIME


 GT


   7/30/18 21:00


 8/24/18 20:59  8/16/18 21:19


 


 


 Vancomycin HCl


  (Vanco rx to


 dose)  1 ea  DAILY  PRN


 MISC


 PER RX PROTOCOL  7/28/18 13:15


 8/27/18 13:14   


 


 


 Vancomycin HCl/


 Dextrose  250 ml @ 


 125 mls/hr  Q18H


 IVPB


   8/8/18 21:00


 9/12/18 22:00  8/17/18 03:04


 

















Joy Love M.D. Aug 17, 2018 17:28

## 2018-08-17 NOTE — PULMONOLGY CRITICAL CARE NOTE
Critical Care - Asmt/Plan


Problems:  


(1) Acute on chronic respiratory failure


(2) Acute on chronic renal insufficiency


(3) Severe sepsis


(4) Vegetative state


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


(6) Diabetes mellitus


Respiratory:  monitor respiratory rate, adjust FIO2


Cardiac:  start pressors, continue pressors


Renal:  F/U I&O, keep IV fluid


Infectious Disease:  continue antibiotics


Gastrointestinal:  continue feedings/current rate


Endocrine:  monitor blood sugar, check HgA1C, continue sliding scale insulin


Hematologic:  transfuse if hgb<8.5


Neurologic:  PRN Ativan, PRN Morphine, keep patient comfortable


Affect:  PRN ativan


Prophylaxis:  Protonix


Notes Reviewed:  cardio


Discussed with:  nurses, consultants, 





Critical Care - Objective





Last 24 Hour Vital Signs








  Date Time  Temp Pulse Resp B/P (MAP) Pulse Ox O2 Delivery O2 Flow Rate FiO2


 


8/17/18 12:00  81      


 


8/17/18 12:00 98.6 80 16 124/78 (93) 99   





 98.6       


 


8/17/18 12:00      Mechanical Ventilator  


 


8/17/18 12:00        30


 


8/17/18 10:43  81 16     30


 


8/17/18 09:15  80 16     30


 


8/17/18 08:18        30


 


8/17/18 08:15 98.5 78 16 127/81 (96) 91   





 98.5       


 


8/17/18 08:14      Mechanical Ventilator  


 


8/17/18 08:02  77      


 


8/17/18 07:05  80 17     30


 


8/17/18 05:30  84 17     30


 


8/17/18 04:04  90      


 


8/17/18 04:00        30


 


8/17/18 04:00      Mechanical Ventilator  


 


8/17/18 04:00 98.4 78 39 120/84 (96) 97   





 98.4       


 


8/17/18 03:27  82 16     30


 


8/17/18 01:30  84 16     30


 


8/17/18 00:00 99.1 88 30 130/69 (89) 98   





 99.1       


 


8/17/18 00:00      Mechanical Ventilator  


 


8/16/18 23:30  85 16     30


 


8/16/18 23:27  84      


 


8/16/18 21:30  80 16     30


 


8/16/18 20:00        30


 


8/16/18 20:00      Mechanical Ventilator  


 


8/16/18 20:00  81      


 


8/16/18 20:00 98.8 98 33 128/74 (92) 98   





 98.8       


 


8/16/18 19:30  83 15     30


 


8/16/18 17:12  82 16     30


 


8/16/18 16:00        30


 


8/16/18 16:00      Mechanical Ventilator  


 


8/16/18 16:00  104      


 


8/16/18 15:51 98.2 92 18 124/80 (95)    





 98.2       


 


8/16/18 15:27  84 16     30








Status:  awake


Condition:  critical, grave


Lungs:  clear


Heart:  HR/BP stable, HR/BP unstable


Abdomen:  soft, active bowel sounds


Extremities:  no C/C/E, edema


Accucheck:  117





Critical Care - Subjective


EKG Rhythm:  Sinus Bradycardia


FI02:  30


Vent Support Breath Rate:  16


Vent Support Mode:  AC


Vent Tidal Volume:  600


Sputum Amount:  Small


PEEP:  5.0


PIP:  33


Tube Feeding Amount:  50


I&O:











Intake and Output  


 


 8/16/18 8/17/18





 19:00 07:00


 


Intake Total 1100 ml 970.0 ml


 


Output Total 780 ml 725 ml


 


Balance 320 ml 245.0 ml


 


  


 


Free Water 250 ml 120 ml


 


IV Total 250 ml 250.0 ml


 


Tube Feeding 600 ml 600 ml


 


Output Urine Total 400 ml 350 ml


 


Stool Total 380 ml 375 ml








Labs:





Laboratory Tests








Test


  8/17/18


04:12


 


White Blood Count


  7.9 K/UL


(4.8-10.8)


 


Red Blood Count


  4.60 M/UL


(4.70-6.10)  L


 


Hemoglobin


  12.8 G/DL


(14.2-18.0)  L


 


Hematocrit


  40.2 %


(42.0-52.0)  L


 


Mean Corpuscular Volume 87 FL (80-99)  


 


Mean Corpuscular Hemoglobin


  27.9 PG


(27.0-31.0)


 


Mean Corpuscular Hemoglobin


Concent 31.9 G/DL


(32.0-36.0)  L


 


Red Cell Distribution Width


  15.1 %


(11.6-14.8)  H


 


Platelet Count


  303 K/UL


(150-450)


 


Mean Platelet Volume


  7.5 FL


(6.5-10.1)


 


Neutrophils (%) (Auto)


  53.3 %


(45.0-75.0)


 


Lymphocytes (%) (Auto)


  26.4 %


(20.0-45.0)


 


Monocytes (%) (Auto)


  8.1 %


(1.0-10.0)


 


Eosinophils (%) (Auto)


  11.4 %


(0.0-3.0)  H


 


Basophils (%) (Auto)


  0.8 %


(0.0-2.0)


 


Sodium Level


  137 MMOL/L


(136-145)


 


Potassium Level


  3.9 MMOL/L


(3.5-5.1)


 


Chloride Level


  102 MMOL/L


()


 


Carbon Dioxide Level


  23 MMOL/L


(21-32)


 


Anion Gap


  12 mmol/L


(5-15)


 


Blood Urea Nitrogen


  18 mg/dL


(7-18)


 


Creatinine


  1.1 MG/DL


(0.55-1.30)


 


Estimat Glomerular Filtration


Rate > 60 mL/min


(>60)


 


Glucose Level


  118 MG/DL


()  H


 


Calcium Level


  9.9 MG/DL


(8.5-10.1)

















Yury Pena MD Aug 17, 2018 13:08

## 2018-08-17 NOTE — GENERAL PROGRESS NOTE
Assessment/Plan


Problem List:  


(1) Parastomal hernia


ICD Codes:  K43.5 - Parastomal hernia without obstruction or gangrene


SNOMED:  945944278


Qualifiers:  


   Qualified Codes:  K43.5 - Parastomal hernia without obstruction or gangrene


(2) Stoma malfunction


SNOMED:  650194580


(3) Gastrostomy tube dependent


ICD Codes:  Z93.1 - Gastrostomy status


SNOMED:  279004322, 220531920


(4) Colostomy in place


ICD Codes:  Z93.3 - Colostomy status


SNOMED:  880890573, 866610053


(5) Diabetes mellitus


ICD Codes:  E11.9 - Type 2 diabetes mellitus without complications


SNOMED:  57574528


Assessment/Plan


parastomal >> no s/sx of infection.  no obstruction. 


anemia work up reviewed >> iron deficiency


G tube dependent


hepatitis panel negative


OB negative





supportive care


monitor H&H, prn transfusions


venofer


ppi


GTFs per RD, adv to goal


GT site care daily/prn


abx


bowel regime


fu labs


dc planning





Subjective


ROS Limited/Unobtainable:  No


Allergies:  


Coded Allergies:  


     AZTREONAM (Verified  Allergy, Unknown, 7/23/18)





Objective





Last 24 Hour Vital Signs








  Date Time  Temp Pulse Resp B/P (MAP) Pulse Ox O2 Delivery O2 Flow Rate FiO2


 


8/17/18 09:15  80 16     30


 


8/17/18 08:18        30


 


8/17/18 08:15 98.5 78 16 127/81 (96) 91   





 98.5       


 


8/17/18 08:14      Mechanical Ventilator  


 


8/17/18 08:02  77      


 


8/17/18 07:05  80 17     30


 


8/17/18 05:30  84 17     30


 


8/17/18 04:04  90      


 


8/17/18 04:00        30


 


8/17/18 04:00      Mechanical Ventilator  


 


8/17/18 04:00 98.4 78 39 120/84 (96) 97   





 98.4       


 


8/17/18 03:27  82 16     30


 


8/17/18 01:30  84 16     30


 


8/17/18 00:00 99.1 88 30 130/69 (89) 98   





 99.1       


 


8/17/18 00:00      Mechanical Ventilator  


 


8/16/18 23:30  85 16     30


 


8/16/18 23:27  84      


 


8/16/18 21:30  80 16     30


 


8/16/18 20:00        30


 


8/16/18 20:00      Mechanical Ventilator  


 


8/16/18 20:00  81      


 


8/16/18 20:00 98.8 98 33 128/74 (92) 98   





 98.8       


 


8/16/18 19:30  83 15     30


 


8/16/18 17:12  82 16     30


 


8/16/18 16:00        30


 


8/16/18 16:00      Mechanical Ventilator  


 


8/16/18 16:00  104      


 


8/16/18 15:51 98.2 92 18 124/80 (95)    





 98.2       


 


8/16/18 15:27  84 16     30


 


8/16/18 12:55  82 16     30


 


8/16/18 12:11      Mechanical Ventilator  


 


8/16/18 12:10        30


 


8/16/18 12:00  105      


 


8/16/18 12:00      Mechanical Ventilator  


 


8/16/18 11:56 98.9 104 18 119/69 (86) 100   





 98.9       


 


8/16/18 11:12  89 16     30

















Intake and Output  


 


 8/16/18 8/17/18





 19:00 07:00


 


Intake Total 1100 ml 970.0 ml


 


Output Total 780 ml 725 ml


 


Balance 320 ml 245.0 ml


 


  


 


Free Water 250 ml 120 ml


 


IV Total 250 ml 250.0 ml


 


Tube Feeding 600 ml 600 ml


 


Output Urine Total 400 ml 350 ml


 


Stool Total 380 ml 375 ml








Laboratory Tests


8/17/18 04:12: 


White Blood Count 7.9, Red Blood Count 4.60L, Hemoglobin 12.8L, Hematocrit 40.2L

, Mean Corpuscular Volume 87, Mean Corpuscular Hemoglobin 27.9, Mean 

Corpuscular Hemoglobin Concent 31.9L, Red Cell Distribution Width 15.1H, 

Platelet Count 303, Mean Platelet Volume 7.5, Neutrophils (%) (Auto) 53.3, 

Lymphocytes (%) (Auto) 26.4, Monocytes (%) (Auto) 8.1, Eosinophils (%) (Auto) 

11.4H, Basophils (%) (Auto) 0.8, Sodium Level 137, Potassium Level 3.9, 

Chloride Level 102, Carbon Dioxide Level 23, Anion Gap 12, Blood Urea Nitrogen 

18, Creatinine 1.1, Estimat Glomerular Filtration Rate > 60, Glucose Level 118H

, Calcium Level 9.9


Height (Feet):  6


Height (Inches):  0.00


Weight (Pounds):  204


General Appearance:  no apparent distress


EENT:  normal ENT inspection


Neck:  supple


Cardiovascular:  normal rate


Respiratory/Chest:  decreased breath sounds


Abdomen:  normal bowel sounds, non tender, soft


Extremities:  non-tender











Miles Pickard MD Aug 17, 2018 10:36

## 2018-08-17 NOTE — PROGRESS NOTE
DATE:  08/16/2018



SUBJECTIVE:  The patient is awake, alert, afebrile, and hemodynamically

stable.



PHYSICAL EXAMINATION:

VITAL SIGNS:  Blood pressure 130/69, his pulse is 85, respirations were 16,

and temperature was 99.1.

HEENT:  Eyes were normal.  ENT, mucous membranes were moist and intact.

NECK:  Supple with no JVD without lymph nodes.  Tracheostomy site is

clean.

LUNGS:  Clear without rhonchi, rales, or wheezing.  Secretions are small,

thin, and grey.

HEART:  Normal sounds with regular beats.  There is no S3, S4, or

pericardial rub.

ABDOMEN:  Soft and nontender with normal bowel sounds.  Gastrostomy site is

clean.

EXTREMITIES:  Warm without cyanosis, clubbing, or edema.



LABORATORY AND DIAGNOSTIC DATA:  Hemoglobin is 13.1, hematocrit 41.3 with

MCV of 87, WBC of 7.1, and platelets is 293,000.  His BUN and creatinine

are 18 and 1.0 respectively.  Sodium is 137, potassium 4.0, chloride 102,

and CO2 is 23.



IMPRESSION:  The patient is now afebrile and hemodynamically stable without

_____ state.



PLAN:  Repeat laboratory tests will be done in the morning as well as chest

x-ray.









  ______________________________________________

  Etienne Bloom M.D.





DR:  NED

D:  08/17/2018 00:27

T:  08/17/2018 01:05

JOB#:  [id number]

CC:

## 2018-08-17 NOTE — GENERAL PROGRESS NOTE
Assessment/Plan


Status:  unchanged


Assessment/Plan


# Leukocytosis - Sepsis with elevated temperature of 103 degrees Fahrenheit.  

Had uti v bacteremia (CoNS) on abx, has improved.


--> Pt on antibiotics, has received a dose of Zosyn and currently is on 

vancomycin q.12 h. now on cefepime


--> Appreciate Infectious Disease recs


--> 2D echo per ID performed on 8/1: No discrete vegetations seen, however SBE 

may not be excluded by transthoracic 2-D echo.


--> 8/17: WBC of 7.9, wnl 


--> Currently afebrile 


# Anemia of chronic disease. No hemolysis, peripheral smear reviewed, ferritin 

was elevated.


--> Continue to closely monitor


--> Hgb goal >7


--> 8/17: Hgb 12.8


# Acute kidney injury.  


--> Currently on IV fluids, IV bolus given to see if the patient responds along 

with IV pressor as needed.  


--> Closely monitor with Nephrology team.


# Septic shock, use antibiotic per ID services.


--> potential uti, on abx and bacteremia


# Respiratory failure, status post tracheostomy, on mechanical ventilation per 

Dr. Pena


--> Remains on vent 


--> 8/13 CXR: Unchanged left pleural effusion versus scarring, over 4 days


# Hypercalcemia. Monitor PTH and calcium level.





The time the note was entered does not necessarily correspond to the time the 

patient was seen.





Subjective


Date patient seen:  Aug 17, 2018


ROS Limited/Unobtainable:  Yes


Hematologic/Lymphatic:  Reports: anemia


Allergies:  


Coded Allergies:  


     AZTREONAM (Verified  Allergy, Unknown, 7/23/18)


All Systems:  reviewed and negative except above


Subjective


Pt remains obtunded and vented.  No acute events. H/H stable. CXR today 

unchanged over 4 days.





Objective





Last 24 Hour Vital Signs








  Date Time  Temp Pulse Resp B/P (MAP) Pulse Ox O2 Delivery O2 Flow Rate FiO2


 


8/17/18 08:18        30


 


8/17/18 08:15 98.5 78 16 127/81 (96) 91   





 98.5       


 


8/17/18 08:14      Mechanical Ventilator  


 


8/17/18 08:02  77      


 


8/17/18 07:05  80 17     30


 


8/17/18 05:30  84 17     30


 


8/17/18 04:04  90      


 


8/17/18 04:00        30


 


8/17/18 04:00      Mechanical Ventilator  


 


8/17/18 04:00 98.4 78 39 120/84 (96) 97   





 98.4       


 


8/17/18 03:27  82 16     30


 


8/17/18 01:30  84 16     30


 


8/17/18 00:00 99.1 88 30 130/69 (89) 98   





 99.1       


 


8/17/18 00:00      Mechanical Ventilator  


 


8/16/18 23:30  85 16     30


 


8/16/18 23:27  84      


 


8/16/18 21:30  80 16     30


 


8/16/18 20:00        30


 


8/16/18 20:00      Mechanical Ventilator  


 


8/16/18 20:00  81      


 


8/16/18 20:00 98.8 98 33 128/74 (92) 98   





 98.8       


 


8/16/18 19:30  83 15     30


 


8/16/18 17:12  82 16     30


 


8/16/18 16:00        30


 


8/16/18 16:00      Mechanical Ventilator  


 


8/16/18 16:00  104      


 


8/16/18 15:51 98.2 92 18 124/80 (95)    





 98.2       


 


8/16/18 15:27  84 16     30


 


8/16/18 12:55  82 16     30


 


8/16/18 12:11      Mechanical Ventilator  


 


8/16/18 12:10        30


 


8/16/18 12:00  105      


 


8/16/18 12:00      Mechanical Ventilator  


 


8/16/18 11:56 98.9 104 18 119/69 (86) 100   





 98.9       


 


8/16/18 11:12  89 16     30

















Intake and Output  


 


 8/16/18 8/17/18





 19:00 07:00


 


Intake Total 1100 ml 970.0 ml


 


Output Total 780 ml 725 ml


 


Balance 320 ml 245.0 ml


 


  


 


Free Water 250 ml 120 ml


 


IV Total 250 ml 250.0 ml


 


Tube Feeding 600 ml 600 ml


 


Output Urine Total 400 ml 350 ml


 


Stool Total 380 ml 375 ml








Laboratory Tests


8/17/18 04:12: 


White Blood Count 7.9, Red Blood Count 4.60L, Hemoglobin 12.8L, Hematocrit 40.2L

, Mean Corpuscular Volume 87, Mean Corpuscular Hemoglobin 27.9, Mean 

Corpuscular Hemoglobin Concent 31.9L, Red Cell Distribution Width 15.1H, 

Platelet Count 303, Mean Platelet Volume 7.5, Neutrophils (%) (Auto) 53.3, 

Lymphocytes (%) (Auto) 26.4, Monocytes (%) (Auto) 8.1, Eosinophils (%) (Auto) 

11.4H, Basophils (%) (Auto) 0.8, Sodium Level 137, Potassium Level 3.9, 

Chloride Level 102, Carbon Dioxide Level 23, Anion Gap 12, Blood Urea Nitrogen 

18, Creatinine 1.1, Estimat Glomerular Filtration Rate > 60, Glucose Level 118H

, Calcium Level 9.9


Height (Feet):  6


Height (Inches):  0.00


Weight (Pounds):  204


General Appearance:  no apparent distress


EENT:  PERRL/EOMI


Neck:  normal alignment


Cardiovascular:  normal peripheral pulses


Respiratory/Chest:  no respiratory distress


Abdomen:  soft











Tejas Gerber MD Aug 17, 2018 09:31

## 2018-08-17 NOTE — CARDIOLOGY PROGRESS NOTE
Assessment/Plan


Status:  stable, progressing


Assessment/Plan


Assessment:


(1) Acute on chronic respiratory failure


(2) Acute on chronic renal insufficiency


(3) Severe sepsis


(4) Vegetative state


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


(6) Diabetes mellitus





Plan:


Continue antibiotics


Echocardiogram reviewed- no endocarditis, STACI unable to be performed


Patient afebrile, normal WBC otherwise


Continue trach care and tube feeds


Continue keppra for seizures 


Continue abx treatment for six weeks via PICC line until 9/15


Dispo planning





Subjective


Cardiovascular:  Reports: no symptoms


Respiratory:  Reports: no symptoms


Gastrointestinal/Abdominal:  Reports: no symptoms


Genitourinary:  Reports: no symptoms


Subjective


Afebrile vitals stable, Telemetry shows sinus rhythm. No acute events. Left 

pleural effusion unchanged. 


Ventilator settings: portex 7, AC 16, TV: 600, FiO2: 30%, PEEP: 5. GT is in 

place running glucerna 1.2 @ 65 ml/hr. No residual present. Colostomy bag in 

place.


STACI not completed, probe unable to be passed.  Patient should be treated for 

total six weeks abx for presumptive endocarditis at this juncture. Hyponatremia 

resolved, BRAYDON resolved, patient stable .





Objective





Last 24 Hour Vital Signs








  Date Time  Temp Pulse Resp B/P (MAP) Pulse Ox O2 Delivery O2 Flow Rate FiO2


 


8/17/18 16:33  77 16     30


 


8/17/18 16:00        30


 


8/17/18 16:00  89      


 


8/17/18 16:00      Mechanical Ventilator  


 


8/17/18 16:00 98.8 79 18 132/88 (103) 99   





 98.8       


 


8/17/18 15:00  80 17     30


 


8/17/18 12:30  78 17     30


 


8/17/18 12:00  81      


 


8/17/18 12:00 98.6 80 16 124/78 (93) 99   





 98.6       


 


8/17/18 12:00      Mechanical Ventilator  


 


8/17/18 12:00        30


 


8/17/18 10:43  81 16     30


 


8/17/18 09:15  80 16     30


 


8/17/18 08:18        30


 


8/17/18 08:15 98.5 78 16 127/81 (96) 91   





 98.5       


 


8/17/18 08:14      Mechanical Ventilator  


 


8/17/18 08:02  77      


 


8/17/18 07:05  80 17     30


 


8/17/18 05:30  84 17     30


 


8/17/18 04:04  90      


 


8/17/18 04:00        30


 


8/17/18 04:00      Mechanical Ventilator  


 


8/17/18 04:00 98.4 78 39 120/84 (96) 97   





 98.4       


 


8/17/18 03:27  82 16     30


 


8/17/18 01:30  84 16     30


 


8/17/18 00:00 99.1 88 30 130/69 (89) 98   





 99.1       


 


8/17/18 00:00      Mechanical Ventilator  


 


8/16/18 23:30  85 16     30


 


8/16/18 23:27  84      


 


8/16/18 21:30  80 16     30


 


8/16/18 20:00        30


 


8/16/18 20:00      Mechanical Ventilator  


 


8/16/18 20:00  81      


 


8/16/18 20:00 98.8 98 33 128/74 (92) 98   





 98.8       


 


8/16/18 19:30  83 15     30








General Appearance:  no apparent distress, on vent


EENT:  PERRL/EOMI, normal ENT inspection


Neck:  non-tender, normal alignment


Rhythm:  NSR


Cardiovascular:  normal rate, regular rhythm


Respiratory/Chest:  chest wall non-tender, lungs clear


Abdomen:  normal bowel sounds, non tender


Extremities:  normal range of motion, non-tender, normal inspection


Neurologic:  CNs II-XII grossly normal











Intake and Output  


 


 8/16/18 8/17/18





 19:00 07:00


 


Intake Total 1100 ml 1020.0 ml


 


Output Total 780 ml 725 ml


 


Balance 320 ml 295.0 ml


 


  


 


Free Water 250 ml 120 ml


 


IV Total 250 ml 250.0 ml


 


Tube Feeding 600 ml 650 ml


 


Output Urine Total 400 ml 350 ml


 


Stool Total 380 ml 375 ml











Laboratory Tests








Test


  8/17/18


04:12


 


White Blood Count


  7.9 K/UL


(4.8-10.8)


 


Red Blood Count


  4.60 M/UL


(4.70-6.10)  L


 


Hemoglobin


  12.8 G/DL


(14.2-18.0)  L


 


Hematocrit


  40.2 %


(42.0-52.0)  L


 


Mean Corpuscular Volume 87 FL (80-99)  


 


Mean Corpuscular Hemoglobin


  27.9 PG


(27.0-31.0)


 


Mean Corpuscular Hemoglobin


Concent 31.9 G/DL


(32.0-36.0)  L


 


Red Cell Distribution Width


  15.1 %


(11.6-14.8)  H


 


Platelet Count


  303 K/UL


(150-450)


 


Mean Platelet Volume


  7.5 FL


(6.5-10.1)


 


Neutrophils (%) (Auto)


  53.3 %


(45.0-75.0)


 


Lymphocytes (%) (Auto)


  26.4 %


(20.0-45.0)


 


Monocytes (%) (Auto)


  8.1 %


(1.0-10.0)


 


Eosinophils (%) (Auto)


  11.4 %


(0.0-3.0)  H


 


Basophils (%) (Auto)


  0.8 %


(0.0-2.0)


 


Sodium Level


  137 MMOL/L


(136-145)


 


Potassium Level


  3.9 MMOL/L


(3.5-5.1)


 


Chloride Level


  102 MMOL/L


()


 


Carbon Dioxide Level


  23 MMOL/L


(21-32)


 


Anion Gap


  12 mmol/L


(5-15)


 


Blood Urea Nitrogen


  18 mg/dL


(7-18)


 


Creatinine


  1.1 MG/DL


(0.55-1.30)


 


Estimat Glomerular Filtration


Rate > 60 mL/min


(>60)


 


Glucose Level


  118 MG/DL


()  H


 


Calcium Level


  9.9 MG/DL


(8.5-10.1)

















Robert Hernandez M.D. Aug 17, 2018 19:10

## 2018-08-18 VITALS — DIASTOLIC BLOOD PRESSURE: 87 MMHG | SYSTOLIC BLOOD PRESSURE: 124 MMHG

## 2018-08-18 VITALS — DIASTOLIC BLOOD PRESSURE: 78 MMHG | SYSTOLIC BLOOD PRESSURE: 128 MMHG

## 2018-08-18 VITALS — DIASTOLIC BLOOD PRESSURE: 79 MMHG | SYSTOLIC BLOOD PRESSURE: 121 MMHG

## 2018-08-18 VITALS — SYSTOLIC BLOOD PRESSURE: 124 MMHG | DIASTOLIC BLOOD PRESSURE: 84 MMHG

## 2018-08-18 VITALS — DIASTOLIC BLOOD PRESSURE: 73 MMHG | SYSTOLIC BLOOD PRESSURE: 125 MMHG

## 2018-08-18 VITALS — SYSTOLIC BLOOD PRESSURE: 122 MMHG | DIASTOLIC BLOOD PRESSURE: 83 MMHG

## 2018-08-18 RX ADMIN — LEVETIRACETAM SCH MG: 100 SOLUTION ORAL at 15:52

## 2018-08-18 RX ADMIN — LEVETIRACETAM SCH MG: 100 SOLUTION ORAL at 21:51

## 2018-08-18 RX ADMIN — POLYETHYLENE GLYCOL 3350 SCH GM: 17 POWDER, FOR SOLUTION ORAL at 21:51

## 2018-08-18 RX ADMIN — Medication SCH MLS/HR: at 15:52

## 2018-08-18 RX ADMIN — INSULIN ASPART SCH UNITS: 100 INJECTION, SOLUTION INTRAVENOUS; SUBCUTANEOUS at 00:00

## 2018-08-18 RX ADMIN — LEVETIRACETAM SCH MG: 100 SOLUTION ORAL at 05:47

## 2018-08-18 RX ADMIN — CHLORHEXIDINE GLUCONATE SCH APPLIC: 213 SOLUTION TOPICAL at 21:51

## 2018-08-18 RX ADMIN — HEPARIN SODIUM SCH UNITS: 5000 INJECTION INTRAVENOUS; SUBCUTANEOUS at 08:43

## 2018-08-18 RX ADMIN — INSULIN ASPART SCH UNITS: 100 INJECTION, SOLUTION INTRAVENOUS; SUBCUTANEOUS at 11:32

## 2018-08-18 RX ADMIN — INSULIN ASPART SCH UNITS: 100 INJECTION, SOLUTION INTRAVENOUS; SUBCUTANEOUS at 17:56

## 2018-08-18 RX ADMIN — INSULIN ASPART SCH UNITS: 100 INJECTION, SOLUTION INTRAVENOUS; SUBCUTANEOUS at 05:48

## 2018-08-18 RX ADMIN — HEPARIN SODIUM SCH UNITS: 5000 INJECTION INTRAVENOUS; SUBCUTANEOUS at 21:53

## 2018-08-18 RX ADMIN — INSULIN ASPART SCH UNITS: 100 INJECTION, SOLUTION INTRAVENOUS; SUBCUTANEOUS at 23:38

## 2018-08-18 NOTE — CARDIOLOGY PROGRESS NOTE
Assessment/Plan


Status:  stable


Assessment/Plan


Assessment:


(1) Acute on chronic respiratory failure


(2) Acute on chronic renal insufficiency


(3) Severe sepsis


(4) Vegetative state


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


(6) Diabetes mellitus





Plan:


Continue antibiotics


Echocardiogram reviewed- no endocarditis, STACI unable to be performed


Patient afebrile, normal WBC otherwise


Continue trach care and tube feeds


Continue keppra for seizures 


Continue abx treatment for six weeks via PICC line until 9/15


Dispo planning





Subjective


Cardiovascular:  Reports: no symptoms


Respiratory:  Reports: no symptoms


Gastrointestinal/Abdominal:  Reports: no symptoms


Genitourinary:  Reports: no symptoms


Subjective


Afebrile vitals stable, Telemetry shows sinus rhythm. No acute events. Left 

pleural effusion unchanged. 


Ventilator settings: portex 7, AC 16, TV: 600, FiO2: 30%, PEEP: 5. GT is in 

place running glucerna 1.2 @ 65 ml/hr. No residual present. Colostomy bag in 

place.


STACI not completed, probe unable to be passed.  Patient should be treated for 

total six weeks abx for presumptive endocarditis at this juncture. Hyponatremia 

resolved, BRAYDON resolved, patient stable .





Objective





Last 24 Hour Vital Signs








  Date Time  Temp Pulse Resp B/P (MAP) Pulse Ox O2 Delivery O2 Flow Rate FiO2


 


8/18/18 09:29  78 16     30


 


8/18/18 08:32        30


 


8/18/18 08:30      Mechanical Ventilator  


 


8/18/18 08:24 98.4 76 18 128/78 (95) 99   





 98.4       


 


8/18/18 08:00  75      


 


8/18/18 06:35  74 16     30


 


8/18/18 05:00  79 16     30


 


8/18/18 04:00  72      


 


8/18/18 04:00        30


 


8/18/18 04:00 99.3 72 16 122/83 (96) 99   





 99.3       


 


8/18/18 04:00      Mechanical Ventilator  


 


8/18/18 03:28  77 16     30


 


8/18/18 01:21  78 16     30


 


8/18/18 00:00      Mechanical Ventilator  


 


8/18/18 00:00 99.4 79 17 124/84 (97) 98   





 99.4       


 


8/18/18 00:00  80      


 


8/18/18 00:00        30


 


8/17/18 23:29  81 16     30


 


8/17/18 20:35  82 16     30


 


8/17/18 20:00  86      


 


8/17/18 20:00 98.4 75 16 124/82 (96) 99   





 98.4       


 


8/17/18 20:00      Mechanical Ventilator  


 


8/17/18 20:00        30


 


8/17/18 19:09  79 16     30


 


8/17/18 16:33  77 16     30


 


8/17/18 16:00        30


 


8/17/18 16:00  89      


 


8/17/18 16:00      Mechanical Ventilator  


 


8/17/18 16:00 98.8 79 18 132/88 (103) 99   





 98.8       


 


8/17/18 15:00  80 17     30


 


8/17/18 12:30  78 17     30


 


8/17/18 12:00  81      


 


8/17/18 12:00 98.6 80 16 124/78 (93) 99   





 98.6       


 


8/17/18 12:00      Mechanical Ventilator  


 


8/17/18 12:00        30








General Appearance:  no apparent distress, on vent


EENT:  PERRL/EOMI, normal ENT inspection, TMs normal


Neck:  non-tender, normal alignment, supple, normal inspection


Rhythm:  NSR


Cardiovascular:  normal peripheral pulses, normal rate, regular rhythm


Respiratory/Chest:  chest wall non-tender, lungs clear


Abdomen:  normal bowel sounds, non tender


Extremities:  normal range of motion, non-tender


Neurologic:  CNs II-XII grossly normal











Intake and Output  


 


 8/17/18 8/18/18





 19:00 07:00


 


Intake Total 700 ml 950 ml


 


Output Total 650 ml 775 ml


 


Balance 50 ml 175 ml


 


  


 


Free Water 80 ml 


 


IV Total  250 ml


 


Tube Feeding 600 ml 600 ml


 


Blood Product 20 ml 


 


Other  100 ml


 


Output Urine Total 650 ml 300 ml


 


Stool Total  475 ml

















Robert Hernandez M.D. Aug 18, 2018 10:55

## 2018-08-18 NOTE — GENERAL PROGRESS NOTE
Assessment/Plan


Problem List:  


(1) Parastomal hernia


ICD Codes:  K43.5 - Parastomal hernia without obstruction or gangrene


SNOMED:  549429704


Qualifiers:  


   Qualified Codes:  K43.5 - Parastomal hernia without obstruction or gangrene


(2) Stoma malfunction


SNOMED:  896845893


(3) Gastrostomy tube dependent


ICD Codes:  Z93.1 - Gastrostomy status


SNOMED:  217463066, 527309794


(4) Colostomy in place


ICD Codes:  Z93.3 - Colostomy status


SNOMED:  986069280, 057602949


(5) Diabetes mellitus


ICD Codes:  E11.9 - Type 2 diabetes mellitus without complications


SNOMED:  90552427


Assessment/Plan


parastomal >> no s/sx of infection.  no obstruction. 


anemia work up reviewed >> iron deficiency


G tube dependent


hepatitis panel negative


OB negative





supportive care


monitor H&H, prn transfusions


venofer


ppi


GTFs per RD, adv to goal


GT site care daily/prn


abx


bowel regime


fu labs


dc planning





Subjective


ROS Limited/Unobtainable:  No


Allergies:  


Coded Allergies:  


     AZTREONAM (Verified  Allergy, Unknown, 7/23/18)





Objective





Last 24 Hour Vital Signs








  Date Time  Temp Pulse Resp B/P (MAP) Pulse Ox O2 Delivery O2 Flow Rate FiO2


 


8/18/18 11:25  75 16     30


 


8/18/18 09:29  78 16     30


 


8/18/18 08:32        30


 


8/18/18 08:30      Mechanical Ventilator  


 


8/18/18 08:24 98.4 76 18 128/78 (95) 99   





 98.4       


 


8/18/18 08:00  75      


 


8/18/18 06:35  74 16     30


 


8/18/18 05:00  79 16     30


 


8/18/18 04:00  72      


 


8/18/18 04:00        30


 


8/18/18 04:00 99.3 72 16 122/83 (96) 99   





 99.3       


 


8/18/18 04:00      Mechanical Ventilator  


 


8/18/18 03:28  77 16     30


 


8/18/18 01:21  78 16     30


 


8/18/18 00:00      Mechanical Ventilator  


 


8/18/18 00:00 99.4 79 17 124/84 (97) 98   





 99.4       


 


8/18/18 00:00  80      


 


8/18/18 00:00        30


 


8/17/18 23:29  81 16     30


 


8/17/18 20:35  82 16     30


 


8/17/18 20:00  86      


 


8/17/18 20:00 98.4 75 16 124/82 (96) 99   





 98.4       


 


8/17/18 20:00      Mechanical Ventilator  


 


8/17/18 20:00        30


 


8/17/18 19:09  79 16     30


 


8/17/18 16:33  77 16     30


 


8/17/18 16:00        30


 


8/17/18 16:00  89      


 


8/17/18 16:00      Mechanical Ventilator  


 


8/17/18 16:00 98.8 79 18 132/88 (103) 99   





 98.8       


 


8/17/18 15:00  80 17     30


 


8/17/18 12:30  78 17     30

















Intake and Output  


 


 8/17/18 8/18/18





 19:00 07:00


 


Intake Total 700 ml 950 ml


 


Output Total 650 ml 775 ml


 


Balance 50 ml 175 ml


 


  


 


Free Water 80 ml 


 


IV Total  250 ml


 


Tube Feeding 600 ml 600 ml


 


Blood Product 20 ml 


 


Other  100 ml


 


Output Urine Total 650 ml 300 ml


 


Stool Total  475 ml








Height (Feet):  6


Height (Inches):  0.00


Weight (Pounds):  191


General Appearance:  no apparent distress


EENT:  normal ENT inspection


Neck:  supple


Cardiovascular:  normal rate


Respiratory/Chest:  decreased breath sounds


Abdomen:  normal bowel sounds, non tender, soft


Extremities:  non-tender











Miles Pickard MD Aug 18, 2018 12:19

## 2018-08-18 NOTE — PROGRESS NOTE
DATE:  08/18/2018



SUBJECTIVE:  The patient is awake, alert, afebrile, and hemodynamically

stable.



PHYSICAL EXAMINATION:

VITAL SIGNS:  Blood pressure 121/ 79, his pulse is 86, respirations 16 and

temperature 98.6 degrees.

HEENT:  Eyes were normal.  ENT, mucous membranes were moist and intact.

Oral cavity is open and tongue is protruded.

NECK:  Supple with no JVD without lymph nodes.  Tracheostomy site is

clean.

LUNGS:  Clear without rhonchi, rales, or wheezing.  Secretions are small,

thin, and whitish.

HEART:  Normal sounds with regular beats.  No S3, S4, or pericardial rub.

ABDOMEN:  Soft and nontender with normal bowel sounds.  Gastrostomy site is

clean.

EXTREMITIES:  Warm without cyanosis, clubbing, or edema.



LABORATORY AND DIAGNOSTIC DATA:  No new laboratory data were available at

the time of this dictation.



IMPRESSION:

1. 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

changes.

2. The patient has seizure disorder.  No evidence of seizure.  Continue

with levetiracetam 1000 mg q.12 h.

3. The patient with respiratory failure.  We will continue with the same

setting with him.  Continue with albuterol sulfate, ipratropium bromide

inhalation therapy every 6 hours.

4. The patient has tracheostomy.  Continue to monitor O2 saturation and

bronchial secretion.

5. The patient has gastrostomy feeding.  There is no gastric residual.

Continue with _____.  The patient is on vancomycin 1 g IV piggyback q.8 h.

Repeat laboratory tests will be done in the morning.









  ______________________________________________

  Etienne Bloom M.D.





DR:  JAMIE

D:  08/18/2018 21:26

T:  08/18/2018 22:46

JOB#:  [id number]

CC:

## 2018-08-18 NOTE — INFECTIOUS DISEASES PROG NOTE
Assessment/Plan


Sepsis, SP- 2ry to UTI and Bacteremia Blood cultures postive 4/4 bottles with 

[facility] Unclear source will need to R/O Endocarditis  Possible PNA initially 


 -u/a wbc 40-60, nit neg, leuk +2; ucx >100k E. aerogenes (S cefepime, cipro/

levo; R Zosyn)


 -BCX 4/4 E. aerogenes, prob Amp C (S cefepime, cipro/levo; R Zosyn), P. 

mirabilis ESBL (S Zosyn, Ertapenem); repeta 7/26 1/4 CoNS (suspect contaminant)

, 7/28 Staph f/u final results if CoNS may need TTE and IE work up.


  -CXR 7/27: Increased left pleural effusion, over 3 days. Overall decreased 

interstitial congestion. Right infrahilar atelectasis


 -CXR: Cardiomegaly. Mild interstitial congestion. Suspect small bilateral 

pleural effusions


 -CT abd/p: Right lower quadrant double barrel colostomy, as described. Small 

peristomal hernia contains bowel loops without evidence of obstruction or 

strangulation. Left renal staghorn calculus. Bilateral intrarenal calyceal 

calculi. Borderline hydronephrosis on the right without evidence of downstream 

obstructive lesion. May indicate mild ureteral pelvic junction obstruction. 

Somewhat atrophic left kidney. Inspissated contrast ball within the distal 

sigmoid colon. Gastrostomy in good position. Nonspecific bilateral perinephric 

fat stranding, could indicate pyelonephritis or could be chronic. Guzmán 

catheter in place. Apparent bladder wall thickening, possibly an artifact of 

under distention but cystitis is not excludable, particularly in view of mild 

perivesical fat stranding. Bilateral pulmonary parenchymal atelectasis and 

consolidation


  -Blood Cx from PICC CoNS 2/2 bottles Peripheral Neg- (May be contaminant but 

will repeat blood Cx given new leukocytosis if positive will need ECHO.


  - Blood Cx 7/30/18 - [facility] - Will need TTE for IE work up and the PICC removed.


 


PICC line infection/colonization - TTE negative. Blood cultures only positive 

from PICC. Not positive from Peripheral 


- Will repeat blood cultures x 1 to confirm clearance after PICC replaced. [place] 

suspicion for IE.


- PICC replace 8/1/18





Fever/Leukocytosis; Resolved - i recurs Re-evaluate lines, Diarrhea? C. dif? 


BRAYDON, improving


Lactic acidosis, resolved





chronic resp failure trach/vent dependant


BPH


GERD


 HTN


DM2


seizure disorder


colostomy


 s/p GT 


constipation





VRE and MRSA colonized


 


Plan:





- Continue empiric IV Vancomycin #19/42 for bacteremia/line infection cant r/o 

Endocarditis  


   -Abx end date 9/12/18 if STACI attempted again and negative then ed date would 

be 8/15/18 


       -weekly CBC, BMP, vanco through


    -8/9 SP Ertapenem #14


    -7/27 SP Zosyn #4








STACI could not be performed due to technical problems


   - Unable to repeat STACI so patient should be treated for presumed 

endocarditis for 6 weeks








- Monitor CBC/CMP, temperatures


- Pulmonary care








Thank you for this consultation. Will continue to follow along with you.





Subjective


Allergies:  


Coded Allergies:  


     AZTREONAM (Verified  Allergy, Unknown, 7/23/18)


Subjective


afebrile


no leukocytosis





Objective


Vital Signs





Last 24 Hour Vital Signs








  Date Time  Temp Pulse Resp B/P (MAP) Pulse Ox O2 Delivery O2 Flow Rate FiO2


 


8/18/18 12:00      Mechanical Ventilator  


 


8/18/18 12:00        30


 


8/18/18 11:25  75 16     30


 


8/18/18 09:29  78 16     30


 


8/18/18 08:32        30


 


8/18/18 08:30      Mechanical Ventilator  


 


8/18/18 08:24 98.4 76 18 128/78 (95) 99   





 98.4       


 


8/18/18 08:00  75      


 


8/18/18 06:35  74 16     30


 


8/18/18 05:00  79 16     30


 


8/18/18 04:00  72      


 


8/18/18 04:00        30


 


8/18/18 04:00 99.3 72 16 122/83 (96) 99   





 99.3       


 


8/18/18 04:00      Mechanical Ventilator  


 


8/18/18 03:28  77 16     30


 


8/18/18 01:21  78 16     30


 


8/18/18 00:00      Mechanical Ventilator  


 


8/18/18 00:00 99.4 79 17 124/84 (97) 98   





 99.4       


 


8/18/18 00:00  80      


 


8/18/18 00:00        30


 


8/17/18 23:29  81 16     30


 


8/17/18 20:35  82 16     30


 


8/17/18 20:00  86      


 


8/17/18 20:00 98.4 75 16 124/82 (96) 99   





 98.4       


 


8/17/18 20:00      Mechanical Ventilator  


 


8/17/18 20:00        30


 


8/17/18 19:09  79 16     30


 


8/17/18 16:33  77 16     30


 


8/17/18 16:00        30


 


8/17/18 16:00  89      


 


8/17/18 16:00      Mechanical Ventilator  


 


8/17/18 16:00 98.8 79 18 132/88 (103) 99   





 98.8       


 


8/17/18 15:00  80 17     30








Height (Feet):  6


Height (Inches):  0.00


Weight (Pounds):  191


Objective


GENERAL:  The patient is awake, in no overt distress.


HEENT:  Extraocular muscles intact.  No lymphadenopathy noted.


Tracheostomy noted.


CARDIOVASCULAR:  S1 and S2.  Tachycardic.  No rubs or gallops.


PULMONARY:  Mild diffuse expiratory rhonchi with basilar rales.


ABDOMEN:  Obese and nondistended.  The G-tube and stoma noted.


EXTREMITIES:  No edema.  Fair pedal pulses.





Current Medications








 Medications


  (Trade)  Dose


 Ordered  Sig/Jan


 Route


 PRN Reason  Start Time


 Stop Time Status Last Admin


Dose Admin


 


 Acetaminophen


  (Tylenol)  650 mg  Q4H  PRN


 ORAL


 FEVER  7/26/18 14:00


 8/23/18 09:59  8/12/18 23:43


 


 


 Baclofen


  (Lioresal)  10 mg  EVERY 8  HOURS


 GT


   7/30/18 14:00


 8/23/18 12:59  8/18/18 05:47


 


 


 Chlorhexidine


 Gluconate


  (Elaine-Hex 2%)  1 applic  DAILY@2000


 TOPIC


   7/26/18 20:00


 8/23/18 19:59  8/17/18 20:54


 


 


 Dextrose


  (Dextrose 50%)  25 ml  STAT  PRN


 IV


 Hypoglycemia  7/27/18 08:45


 8/24/18 08:44   


 


 


 Dextrose


  (Dextrose 50%)  50 ml  STAT  PRN


 IV


 Hypoglycemia  7/27/18 08:45


 8/24/18 08:44   


 


 


 Heparin Sodium


  (Porcine)


  (Heparin 5000


 units/ml)  5,000 units  EVERY 12  HOURS


 SUBQ


   7/26/18 21:00


 8/23/18 20:59  8/18/18 08:43


 


 


 Insulin Aspart


  (NovoLOG)    Q6HR


 SUBQ


   7/26/18 18:00


 8/24/18 11:29  8/18/18 05:48


 


 


 Lansoprazole


  (Prevacid)  30 mg  DAILY


 GT


   7/27/18 09:00


 8/25/18 08:59  8/18/18 08:41


 


 


 Levetiracetam


  (Keppra)  1,000 mg  Q8HR


 GT


   7/26/18 14:00


 8/23/18 12:59  8/18/18 05:47


 


 


 Ondansetron HCl


  (Zofran)  4 mg  Q6H  PRN


 IVP


 Nausea & Vomiting  7/26/18 16:00


 8/23/18 09:59   


 


 


 Polyethylene


 Glycol


  (Miralax)  17 gm  BEDTIME


 GT


   7/30/18 21:00


 8/24/18 20:59  8/17/18 20:54


 


 


 Vancomycin HCl


  (Vanco rx to


 dose)  1 ea  DAILY  PRN


 MISC


 PER RX PROTOCOL  7/28/18 13:15


 8/27/18 13:14   


 


 


 Vancomycin HCl/


 Dextrose  250 ml @ 


 125 mls/hr  Q18H


 IVPB


   8/8/18 21:00


 9/12/18 22:00  8/17/18 20:54


 

















Joy Love M.D. Aug 18, 2018 12:32

## 2018-08-18 NOTE — GENERAL PROGRESS NOTE
Assessment/Plan


Status:  unchanged


Assessment/Plan


# Leukocytosis - Sepsis with elevated temperature of 103 degrees Fahrenheit.  

Had uti v bacteremia (CoNS) on abx, has improved.


--> Pt on antibiotics, has received a dose of Zosyn and currently is on 

vancomycin q.12 h. now on cefepime


--> Appreciate Infectious Disease recs


--> 2D echo per ID performed on 8/1: No discrete vegetations seen, however SBE 

may not be excluded by transthoracic 2-D echo.


--> CURRENT WBC of 7.9, wnl 


--> Currently afebrile 


# Anemia of chronic disease. No hemolysis, peripheral smear reviewed, ferritin 

was elevated.


--> Continue to closely monitor


--> Hgb goal >7


--> CURRENT Hgb 12.8


# Acute kidney injury.  


--> Currently on IV fluids, IV bolus given to see if the patient responds along 

with IV pressor as needed.  


--> Closely monitor with Nephrology team.


# Septic shock, use antibiotic per ID services.


--> potential uti, on abx and bacteremia


# Respiratory failure, status post tracheostomy, on mechanical ventilation per 

Dr. Pena


--> Remains on vent 


--> 8/13 CXR: Unchanged left pleural effusion versus scarring, over 4 days


# Hypercalcemia. Monitor PTH and calcium level.





The time the note was entered does not necessarily correspond to the time the 

patient was seen.





Subjective


Date patient seen:  Aug 18, 2018


ROS Limited/Unobtainable:  Yes


Hematologic/Lymphatic:  Reports: anemia


Allergies:  


Coded Allergies:  


     AZTREONAM (Verified  Allergy, Unknown, 7/23/18)


All Systems:  reviewed and negative except above


Subjective


Pt remains obtunded and vented.  No acute events. H/H stable.





Objective





Last 24 Hour Vital Signs








  Date Time  Temp Pulse Resp B/P (MAP) Pulse Ox O2 Delivery O2 Flow Rate FiO2


 


8/18/18 12:00  77      


 


8/18/18 12:00 98.7 81 16 124/87 (99) 98   





 98.7       


 


8/18/18 12:00      Mechanical Ventilator  


 


8/18/18 12:00        30


 


8/18/18 11:25  75 16     30


 


8/18/18 09:29  78 16     30


 


8/18/18 08:32        30


 


8/18/18 08:30      Mechanical Ventilator  


 


8/18/18 08:24 98.4 76 18 128/78 (95) 99   





 98.4       


 


8/18/18 08:00  75      


 


8/18/18 06:35  74 16     30


 


8/18/18 05:00  79 16     30


 


8/18/18 04:00  72      


 


8/18/18 04:00        30


 


8/18/18 04:00 99.3 72 16 122/83 (96) 99   





 99.3       


 


8/18/18 04:00      Mechanical Ventilator  


 


8/18/18 03:28  77 16     30


 


8/18/18 01:21  78 16     30


 


8/18/18 00:00      Mechanical Ventilator  


 


8/18/18 00:00 99.4 79 17 124/84 (97) 98   





 99.4       


 


8/18/18 00:00  80      


 


8/18/18 00:00        30


 


8/17/18 23:29  81 16     30


 


8/17/18 20:35  82 16     30


 


8/17/18 20:00  86      


 


8/17/18 20:00 98.4 75 16 124/82 (96) 99   





 98.4       


 


8/17/18 20:00      Mechanical Ventilator  


 


8/17/18 20:00        30


 


8/17/18 19:09  79 16     30


 


8/17/18 16:33  77 16     30


 


8/17/18 16:00        30


 


8/17/18 16:00  89      


 


8/17/18 16:00      Mechanical Ventilator  


 


8/17/18 16:00 98.8 79 18 132/88 (103) 99   





 98.8       


 


8/17/18 15:00  80 17     30

















Intake and Output  


 


 8/17/18 8/18/18





 19:00 07:00


 


Intake Total 700 ml 950 ml


 


Output Total 650 ml 775 ml


 


Balance 50 ml 175 ml


 


  


 


Free Water 80 ml 


 


IV Total  250 ml


 


Tube Feeding 600 ml 600 ml


 


Blood Product 20 ml 


 


Other  100 ml


 


Output Urine Total 650 ml 300 ml


 


Stool Total  475 ml








Height (Feet):  6


Height (Inches):  0.00


Weight (Pounds):  191


General Appearance:  no apparent distress


EENT:  PERRL/EOMI


Neck:  normal alignment


Cardiovascular:  normal peripheral pulses


Respiratory/Chest:  no respiratory distress


Abdomen:  soft











Tejas Gerber MD Aug 18, 2018 14:16

## 2018-08-19 VITALS — SYSTOLIC BLOOD PRESSURE: 117 MMHG | DIASTOLIC BLOOD PRESSURE: 81 MMHG

## 2018-08-19 VITALS — DIASTOLIC BLOOD PRESSURE: 84 MMHG | SYSTOLIC BLOOD PRESSURE: 120 MMHG

## 2018-08-19 VITALS — DIASTOLIC BLOOD PRESSURE: 79 MMHG | SYSTOLIC BLOOD PRESSURE: 113 MMHG

## 2018-08-19 VITALS — SYSTOLIC BLOOD PRESSURE: 118 MMHG | DIASTOLIC BLOOD PRESSURE: 86 MMHG

## 2018-08-19 VITALS — SYSTOLIC BLOOD PRESSURE: 124 MMHG | DIASTOLIC BLOOD PRESSURE: 87 MMHG

## 2018-08-19 VITALS — DIASTOLIC BLOOD PRESSURE: 88 MMHG | SYSTOLIC BLOOD PRESSURE: 131 MMHG

## 2018-08-19 LAB
ADD MANUAL DIFF: NO
ANION GAP SERPL CALC-SCNC: 14 MMOL/L (ref 5–15)
BASOPHILS NFR BLD AUTO: 0.9 % (ref 0–2)
BUN SERPL-MCNC: 18 MG/DL (ref 7–18)
CALCIUM SERPL-MCNC: 10.4 MG/DL (ref 8.5–10.1)
CHLORIDE SERPL-SCNC: 102 MMOL/L (ref 98–107)
CO2 SERPL-SCNC: 20 MMOL/L (ref 21–32)
CREAT SERPL-MCNC: 1 MG/DL (ref 0.55–1.3)
EOSINOPHIL NFR BLD AUTO: 13.4 % (ref 0–3)
ERYTHROCYTE [DISTWIDTH] IN BLOOD BY AUTOMATED COUNT: 14.8 % (ref 11.6–14.8)
HCT VFR BLD CALC: 39.3 % (ref 42–52)
HGB BLD-MCNC: 12.7 G/DL (ref 14.2–18)
LYMPHOCYTES NFR BLD AUTO: 24.3 % (ref 20–45)
MCV RBC AUTO: 88 FL (ref 80–99)
MONOCYTES NFR BLD AUTO: 9.6 % (ref 1–10)
NEUTROPHILS NFR BLD AUTO: 51.9 % (ref 45–75)
PLATELET # BLD: 311 K/UL (ref 150–450)
POTASSIUM SERPL-SCNC: 3.9 MMOL/L (ref 3.5–5.1)
RBC # BLD AUTO: 4.49 M/UL (ref 4.7–6.1)
SODIUM SERPL-SCNC: 136 MMOL/L (ref 136–145)
WBC # BLD AUTO: 7.6 K/UL (ref 4.8–10.8)

## 2018-08-19 RX ADMIN — CHLORHEXIDINE GLUCONATE SCH APPLIC: 213 SOLUTION TOPICAL at 21:24

## 2018-08-19 RX ADMIN — LEVETIRACETAM SCH MG: 100 SOLUTION ORAL at 14:16

## 2018-08-19 RX ADMIN — Medication SCH MLS/HR: at 08:23

## 2018-08-19 RX ADMIN — HEPARIN SODIUM SCH UNITS: 5000 INJECTION INTRAVENOUS; SUBCUTANEOUS at 08:25

## 2018-08-19 RX ADMIN — INSULIN ASPART SCH UNITS: 100 INJECTION, SOLUTION INTRAVENOUS; SUBCUTANEOUS at 23:22

## 2018-08-19 RX ADMIN — POLYETHYLENE GLYCOL 3350 SCH GM: 17 POWDER, FOR SOLUTION ORAL at 21:25

## 2018-08-19 RX ADMIN — LEVETIRACETAM SCH MG: 100 SOLUTION ORAL at 06:26

## 2018-08-19 RX ADMIN — INSULIN ASPART SCH UNITS: 100 INJECTION, SOLUTION INTRAVENOUS; SUBCUTANEOUS at 06:00

## 2018-08-19 RX ADMIN — INSULIN ASPART SCH UNITS: 100 INJECTION, SOLUTION INTRAVENOUS; SUBCUTANEOUS at 17:30

## 2018-08-19 RX ADMIN — INSULIN ASPART SCH UNITS: 100 INJECTION, SOLUTION INTRAVENOUS; SUBCUTANEOUS at 11:31

## 2018-08-19 RX ADMIN — LEVETIRACETAM SCH MG: 100 SOLUTION ORAL at 21:25

## 2018-08-19 RX ADMIN — HEPARIN SODIUM SCH UNITS: 5000 INJECTION INTRAVENOUS; SUBCUTANEOUS at 21:26

## 2018-08-19 NOTE — CARDIOLOGY PROGRESS NOTE
Assessment/Plan


Status:  stable


Assessment/Plan


Assessment:


(1) Acute on chronic respiratory failure


(2) Acute on chronic renal insufficiency


(3) Severe sepsis


(4) Vegetative state


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


(6) Diabetes mellitus





Plan:


Continue antibiotics


Echocardiogram reviewed- no endocarditis, STACI unable to be performed


Patient afebrile, normal WBC otherwise


Continue trach care and tube feeds


Continue keppra for seizures 


Continue abx treatment for six weeks via PICC line until 9/15


Dispo planning





Subjective


Cardiovascular:  Reports: no symptoms


Respiratory:  Reports: no symptoms


Gastrointestinal/Abdominal:  Reports: no symptoms


Genitourinary:  Reports: no symptoms


Subjective


Afebrile vitals stable, Telemetry shows sinus rhythm. No acute events. Left 

pleural effusion unchanged. 


Ventilator settings: portex 7, AC 16, TV: 600, FiO2: 30%, PEEP: 5. GT is in 

place running glucerna 1.2 @ 65 ml/hr. No residual present. Colostomy bag in 

place.


STACI not completed, probe unable to be passed.  Patient should be treated for 

total six weeks abx for presumptive endocarditis at this juncture. Hyponatremia 

resolved, BRAYDON resolved, patient stable .





Objective





Last 24 Hour Vital Signs








  Date Time  Temp Pulse Resp B/P (MAP) Pulse Ox O2 Delivery O2 Flow Rate FiO2


 


8/19/18 12:36  79 16     30


 


8/19/18 12:00      Mechanical Ventilator  


 


8/19/18 12:00        30


 


8/19/18 12:00 97.3 76 16 117/81 (93) 100   





 97.3       


 


8/19/18 12:00  78      


 


8/19/18 10:40  74 16     30


 


8/19/18 08:44  80 17     30


 


8/19/18 08:00  85      


 


8/19/18 08:00 97.2 71 17 131/88 (102) 100   





 97.2       


 


8/19/18 08:00        30


 


8/19/18 08:00      Mechanical Ventilator  


 


8/19/18 06:54  72 15     30


 


8/19/18 04:55  73 16     30


 


8/19/18 04:00 97.7 77 20 124/87 (99) 100   





 97.7       


 


8/19/18 04:00      Mechanical Ventilator  


 


8/19/18 04:00  78      


 


8/19/18 04:00        30


 


8/19/18 03:05  72 16     30


 


8/19/18 01:24  76 16     30


 


8/19/18 00:00      Mechanical Ventilator  


 


8/19/18 00:00 98.1 93 18 113/79 (90) 100   





 98.1       


 


8/19/18 00:00  72      


 


8/18/18 22:34  76 17     30


 


8/18/18 20:51  77 16     30


 


8/18/18 20:00      Mechanical Ventilator  


 


8/18/18 20:00 98.1 73 19 125/73 (90) 99   





 98.1       


 


8/18/18 20:00  77      


 


8/18/18 20:00        30


 


8/18/18 19:24  74 16     30


 


8/18/18 17:11  76 16     30


 


8/18/18 16:22 98.6 86 18 121/79 (93) 98   





 98.6       


 


8/18/18 16:00      Mechanical Ventilator  


 


8/18/18 16:00  88      


 


8/18/18 16:00        30








General Appearance:  no apparent distress, on vent


EENT:  PERRL/EOMI, normal ENT inspection


Neck:  non-tender, normal alignment, supple, normal inspection, no JVD


Rhythm:  NSR, PVCs


Cardiovascular:  normal rate, regular rhythm


Respiratory/Chest:  chest wall non-tender, lungs clear


Abdomen:  normal bowel sounds, non tender


Extremities:  normal range of motion, non-tender


Neurologic:  CNs II-XII grossly normal











Intake and Output  


 


 8/18/18 8/19/18





 19:00 07:00


 


Intake Total 890 ml 660 ml


 


Output Total 650 ml 550 ml


 


Balance 240 ml 110 ml


 


  


 


Free Water 90 ml 110 ml


 


IV Total 250 ml 


 


Tube Feeding 550 ml 550 ml


 


Output Urine Total 400 ml 200 ml


 


Stool Total 250 ml 350 ml


 


# Bowel Movements 2 











Laboratory Tests








Test


  8/19/18


07:40


 


White Blood Count


  7.6 K/UL


(4.8-10.8)


 


Red Blood Count


  4.49 M/UL


(4.70-6.10)  L


 


Hemoglobin


  12.7 G/DL


(14.2-18.0)  L


 


Hematocrit


  39.3 %


(42.0-52.0)  L


 


Mean Corpuscular Volume 88 FL (80-99)  


 


Mean Corpuscular Hemoglobin


  28.3 PG


(27.0-31.0)


 


Mean Corpuscular Hemoglobin


Concent 32.3 G/DL


(32.0-36.0)


 


Red Cell Distribution Width


  14.8 %


(11.6-14.8)


 


Platelet Count


  311 K/UL


(150-450)


 


Mean Platelet Volume


  7.3 FL


(6.5-10.1)


 


Neutrophils (%) (Auto)


  51.9 %


(45.0-75.0)


 


Lymphocytes (%) (Auto)


  24.3 %


(20.0-45.0)


 


Monocytes (%) (Auto)


  9.6 %


(1.0-10.0)


 


Eosinophils (%) (Auto)


  13.4 %


(0.0-3.0)  H


 


Basophils (%) (Auto)


  0.9 %


(0.0-2.0)


 


Sodium Level


  136 MMOL/L


(136-145)


 


Potassium Level


  3.9 MMOL/L


(3.5-5.1)


 


Chloride Level


  102 MMOL/L


()


 


Carbon Dioxide Level


  20 MMOL/L


(21-32)  L


 


Anion Gap


  14 mmol/L


(5-15)


 


Blood Urea Nitrogen


  18 mg/dL


(7-18)


 


Creatinine


  1.0 MG/DL


(0.55-1.30)


 


Estimat Glomerular Filtration


Rate > 60 mL/min


(>60)


 


Glucose Level


  117 MG/DL


()  H


 


Calcium Level


  10.4 MG/DL


(8.5-10.1)  H

















Robert Hernandez M.D. Aug 19, 2018 15:06

## 2018-08-19 NOTE — GENERAL PROGRESS NOTE
Assessment/Plan


Problem List:  


(1) Parastomal hernia


ICD Codes:  K43.5 - Parastomal hernia without obstruction or gangrene


SNOMED:  039550311


Qualifiers:  


   Qualified Codes:  K43.5 - Parastomal hernia without obstruction or gangrene


(2) Stoma malfunction


SNOMED:  401918728


(3) Gastrostomy tube dependent


ICD Codes:  Z93.1 - Gastrostomy status


SNOMED:  065562446, 588250741


(4) Colostomy in place


ICD Codes:  Z93.3 - Colostomy status


SNOMED:  352514428, 728243354


(5) Diabetes mellitus


ICD Codes:  E11.9 - Type 2 diabetes mellitus without complications


SNOMED:  90919050


Assessment/Plan


parastomal >> no s/sx of infection.  no obstruction. 


anemia work up reviewed >> iron deficiency


G tube dependent


hepatitis panel negative


OB negative





supportive care


monitor H&H, prn transfusions


venofer


ppi


GTFs per RD, adv to goal


GT site care daily/prn


abx


bowel regime


fu labs


dc planning





Subjective


ROS Limited/Unobtainable:  No


Allergies:  


Coded Allergies:  


     AZTREONAM (Verified  Allergy, Unknown, 7/23/18)





Objective





Last 24 Hour Vital Signs








  Date Time  Temp Pulse Resp B/P (MAP) Pulse Ox O2 Delivery O2 Flow Rate FiO2


 


8/19/18 08:00 97.2 71 17 131/88 (102) 100   





 97.2       


 


8/19/18 08:00        30


 


8/19/18 08:00      Mechanical Ventilator  


 


8/19/18 04:55  73 16     30


 


8/19/18 04:00 97.7 77 20 124/87 (99) 100   





 97.7       


 


8/19/18 04:00      Mechanical Ventilator  


 


8/19/18 04:00  78      


 


8/19/18 04:00        30


 


8/19/18 03:05  72 16     30


 


8/19/18 01:24  76 16     30


 


8/19/18 00:00      Mechanical Ventilator  


 


8/19/18 00:00 98.1 93 18 113/79 (90) 100   





 98.1       


 


8/19/18 00:00  72      


 


8/18/18 22:34  76 17     30


 


8/18/18 20:51  77 16     30


 


8/18/18 20:00      Mechanical Ventilator  


 


8/18/18 20:00 98.1 73 19 125/73 (90) 99   





 98.1       


 


8/18/18 20:00  77      


 


8/18/18 20:00        30


 


8/18/18 19:24  74 16     30


 


8/18/18 17:11  76 16     30


 


8/18/18 16:22 98.6 86 18 121/79 (93) 98   





 98.6       


 


8/18/18 16:00      Mechanical Ventilator  


 


8/18/18 16:00  88      


 


8/18/18 16:00        30


 


8/18/18 14:36  78 16     30


 


8/18/18 12:34  77 16     30


 


8/18/18 12:00  77      


 


8/18/18 12:00 98.7 81 16 124/87 (99) 98   





 98.7       


 


8/18/18 12:00      Mechanical Ventilator  


 


8/18/18 12:00        30


 


8/18/18 11:25  75 16     30


 


8/18/18 09:29  78 16     30


 


8/18/18 08:32        30


 


8/18/18 08:30      Mechanical Ventilator  


 


8/18/18 08:24 98.4 76 18 128/78 (95) 99   





 98.4       

















Intake and Output  


 


 8/18/18 8/19/18





 19:00 07:00


 


Intake Total 890 ml 660 ml


 


Output Total 650 ml 550 ml


 


Balance 240 ml 110 ml


 


  


 


Free Water 90 ml 110 ml


 


IV Total 250 ml 


 


Tube Feeding 550 ml 550 ml


 


Output Urine Total 400 ml 200 ml


 


Stool Total 250 ml 350 ml


 


# Bowel Movements 2 








Laboratory Tests


8/19/18 07:40: 


White Blood Count 7.6, Red Blood Count 4.49L, Hemoglobin 12.7L, Hematocrit 39.3L

, Mean Corpuscular Volume 88, Mean Corpuscular Hemoglobin 28.3, Mean 

Corpuscular Hemoglobin Concent 32.3, Red Cell Distribution Width 14.8, Platelet 

Count 311, Mean Platelet Volume 7.3, Neutrophils (%) (Auto) 51.9, Lymphocytes (%

) (Auto) 24.3, Monocytes (%) (Auto) 9.6, Eosinophils (%) (Auto) 13.4H, 

Basophils (%) (Auto) 0.9, Sodium Level 136, Potassium Level 3.9, Chloride Level 

102, Carbon Dioxide Level 20L, Anion Gap 14, Blood Urea Nitrogen 18, Creatinine 

1.0, Estimat Glomerular Filtration Rate > 60, Glucose Level 117H, Calcium Level 

10.4H


Height (Feet):  6


Height (Inches):  0.00


Weight (Pounds):  193


General Appearance:  no apparent distress


EENT:  normal ENT inspection


Neck:  supple


Cardiovascular:  normal rate


Respiratory/Chest:  decreased breath sounds


Abdomen:  normal bowel sounds, non tender, soft


Extremities:  non-tender











Miles Pickard MD Aug 19, 2018 08:18

## 2018-08-20 VITALS — DIASTOLIC BLOOD PRESSURE: 93 MMHG | SYSTOLIC BLOOD PRESSURE: 125 MMHG

## 2018-08-20 VITALS — DIASTOLIC BLOOD PRESSURE: 82 MMHG | SYSTOLIC BLOOD PRESSURE: 117 MMHG

## 2018-08-20 VITALS — DIASTOLIC BLOOD PRESSURE: 97 MMHG | SYSTOLIC BLOOD PRESSURE: 128 MMHG

## 2018-08-20 VITALS — SYSTOLIC BLOOD PRESSURE: 115 MMHG | DIASTOLIC BLOOD PRESSURE: 84 MMHG

## 2018-08-20 VITALS — DIASTOLIC BLOOD PRESSURE: 81 MMHG | SYSTOLIC BLOOD PRESSURE: 134 MMHG

## 2018-08-20 VITALS — SYSTOLIC BLOOD PRESSURE: 126 MMHG | DIASTOLIC BLOOD PRESSURE: 68 MMHG

## 2018-08-20 LAB
ADD MANUAL DIFF: NO
ANION GAP SERPL CALC-SCNC: 14 MMOL/L (ref 5–15)
BASOPHILS NFR BLD AUTO: 2.1 % (ref 0–2)
BUN SERPL-MCNC: 17 MG/DL (ref 7–18)
CALCIUM SERPL-MCNC: 10.1 MG/DL (ref 8.5–10.1)
CHLORIDE SERPL-SCNC: 101 MMOL/L (ref 98–107)
CO2 SERPL-SCNC: 22 MMOL/L (ref 21–32)
CREAT SERPL-MCNC: 1.2 MG/DL (ref 0.55–1.3)
EOSINOPHIL NFR BLD AUTO: 11.7 % (ref 0–3)
ERYTHROCYTE [DISTWIDTH] IN BLOOD BY AUTOMATED COUNT: 14.9 % (ref 11.6–14.8)
HCT VFR BLD CALC: 43 % (ref 42–52)
HGB BLD-MCNC: 13.8 G/DL (ref 14.2–18)
LYMPHOCYTES NFR BLD AUTO: 24 % (ref 20–45)
MCV RBC AUTO: 87 FL (ref 80–99)
MONOCYTES NFR BLD AUTO: 9.1 % (ref 1–10)
NEUTROPHILS NFR BLD AUTO: 53 % (ref 45–75)
PLATELET # BLD: 306 K/UL (ref 150–450)
POTASSIUM SERPL-SCNC: 4 MMOL/L (ref 3.5–5.1)
RBC # BLD AUTO: 4.95 M/UL (ref 4.7–6.1)
SODIUM SERPL-SCNC: 137 MMOL/L (ref 136–145)
WBC # BLD AUTO: 9 K/UL (ref 4.8–10.8)

## 2018-08-20 RX ADMIN — CHLORHEXIDINE GLUCONATE SCH APPLIC: 213 SOLUTION TOPICAL at 20:08

## 2018-08-20 RX ADMIN — INSULIN ASPART SCH UNITS: 100 INJECTION, SOLUTION INTRAVENOUS; SUBCUTANEOUS at 17:40

## 2018-08-20 RX ADMIN — HEPARIN SODIUM SCH UNITS: 5000 INJECTION INTRAVENOUS; SUBCUTANEOUS at 09:17

## 2018-08-20 RX ADMIN — Medication SCH MLS/HR: at 02:39

## 2018-08-20 RX ADMIN — INSULIN ASPART SCH UNITS: 100 INJECTION, SOLUTION INTRAVENOUS; SUBCUTANEOUS at 11:50

## 2018-08-20 RX ADMIN — HEPARIN SODIUM SCH UNITS: 5000 INJECTION INTRAVENOUS; SUBCUTANEOUS at 20:09

## 2018-08-20 RX ADMIN — POLYETHYLENE GLYCOL 3350 SCH GM: 17 POWDER, FOR SOLUTION ORAL at 20:09

## 2018-08-20 RX ADMIN — INSULIN ASPART SCH UNITS: 100 INJECTION, SOLUTION INTRAVENOUS; SUBCUTANEOUS at 05:24

## 2018-08-20 RX ADMIN — LEVETIRACETAM SCH MG: 100 SOLUTION ORAL at 05:23

## 2018-08-20 RX ADMIN — LEVETIRACETAM SCH MG: 100 SOLUTION ORAL at 13:32

## 2018-08-20 RX ADMIN — LEVETIRACETAM SCH MG: 100 SOLUTION ORAL at 21:43

## 2018-08-20 NOTE — PULMONOLGY CRITICAL CARE NOTE
Critical Care - Asmt/Plan


Problems:  


(1) Acute on chronic respiratory failure


(2) Acute on chronic renal insufficiency


(3) Severe sepsis


(4) Vegetative state


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


(6) Diabetes mellitus


Respiratory:  monitor respiratory rate, adjust FIO2


Cardiac:  continue to monitor HR/BP


Renal:  F/U I&O


Infectious Disease:  check cultures


Gastrointestinal:  continue feedings/current rate


Endocrine:  monitor blood sugar


Hematologic:  transfuse if hgb<8.5


Neurologic:  PRN Ativan, keep patient comfortable


Affect:  PRN ativan


Prophylaxis:  Protonix


Notes Reviewed:  internist, cardio


Discussed with:  nurses, consultants, 





Critical Care - Objective





Last 24 Hour Vital Signs








  Date Time  Temp Pulse Resp B/P (MAP) Pulse Ox O2 Delivery O2 Flow Rate FiO2


 


8/20/18 08:51  86 18     30


 


8/20/18 08:00  95      


 


8/20/18 08:00      Mechanical Ventilator  


 


8/20/18 08:00        30


 


8/20/18 08:00 98.2 99 18 134/81 (98) 96   





 98.2       


 


8/20/18 06:49  82 16     30


 


8/20/18 04:55  84 16     30


 


8/20/18 04:00        30


 


8/20/18 04:00 98.2 85 16 128/97 (107) 99   





 98.2       


 


8/20/18 04:00      Mechanical Ventilator  


 


8/20/18 03:59  96      


 


8/20/18 03:09  80 16     30


 


8/20/18 00:40  84 20     30


 


8/20/18 00:00 97.9 92 18 126/68 (87) 99   





 97.9       


 


8/20/18 00:00        30


 


8/20/18 00:00      Mechanical Ventilator  


 


8/19/18 23:34  88      


 


8/19/18 22:38  88 20     30


 


8/19/18 21:25  94 20     30


 


8/19/18 20:00 98.1 90 30 118/86 (97) 100   





 98.1       


 


8/19/18 20:00        30


 


8/19/18 20:00      Mechanical Ventilator  


 


8/19/18 19:40  92      


 


8/19/18 18:44  95 22     30


 


8/19/18 16:52  93 22     30


 


8/19/18 16:20  82      


 


8/19/18 16:19 98.4 84 18 120/84 (96) 100   





 98.4       


 


8/19/18 16:00        30


 


8/19/18 16:00      Mechanical Ventilator  


 


8/19/18 15:22  73 18     30


 


8/19/18 12:36  79 16     30


 


8/19/18 12:00      Mechanical Ventilator  


 


8/19/18 12:00        30


 


8/19/18 12:00 97.3 76 16 117/81 (93) 100   





 97.3       


 


8/19/18 12:00  78      


 


8/19/18 10:40  74 16     30








Status:  awake


Condition:  critical


HEENT:  atraumatic


Neck:  full ROM


Lungs:  chest wall tender


Abdomen:  soft, feeding tube


Extremities:  no C/C/E


Accucheck:  121





Critical Care - Subjective


ROS Limited/Unobtainable:  Yes


Condition:  critical


EKG Rhythm:  Sinus Rhythm


FI02:  30


Vent Support Breath Rate:  16


Vent Support Mode:  AC


Vent Tidal Volume:  600


Sputum Amount:  Moderate


PEEP:  5.0


PIP:  27


Tube Feeding Amount:  50


I&O:











Intake and Output  


 


 8/19/18 8/20/18





 19:00 07:00


 


Intake Total 940 ml 1080.0 ml


 


Output Total 400 ml 725 ml


 


Balance 540 ml 355.0 ml


 


  


 


Free Water 30 ml 60 ml


 


IV Total 250 ml 250.0 ml


 


Tube Feeding 600 ml 650 ml


 


Other 60 ml 120 ml


 


Output Urine Total 150 ml 350 ml


 


Stool Total 250 ml 375 ml


 


# Voids 1 








Labs:





Laboratory Tests








Test


  8/20/18


04:20


 


White Blood Count


  9.0 K/UL


(4.8-10.8)


 


Red Blood Count


  4.95 M/UL


(4.70-6.10)


 


Hemoglobin


  13.8 G/DL


(14.2-18.0)  L


 


Hematocrit


  43.0 %


(42.0-52.0)


 


Mean Corpuscular Volume 87 FL (80-99)  


 


Mean Corpuscular Hemoglobin


  28.0 PG


(27.0-31.0)


 


Mean Corpuscular Hemoglobin


Concent 32.2 G/DL


(32.0-36.0)


 


Red Cell Distribution Width


  14.9 %


(11.6-14.8)  H


 


Platelet Count


  306 K/UL


(150-450)


 


Mean Platelet Volume


  7.5 FL


(6.5-10.1)


 


Neutrophils (%) (Auto)


  53.0 %


(45.0-75.0)


 


Lymphocytes (%) (Auto)


  24.0 %


(20.0-45.0)


 


Monocytes (%) (Auto)


  9.1 %


(1.0-10.0)


 


Eosinophils (%) (Auto)


  11.7 %


(0.0-3.0)  H


 


Basophils (%) (Auto)


  2.1 %


(0.0-2.0)  H


 


Sodium Level


  137 MMOL/L


(136-145)


 


Potassium Level


  4.0 MMOL/L


(3.5-5.1)


 


Chloride Level


  101 MMOL/L


()


 


Carbon Dioxide Level


  22 MMOL/L


(21-32)


 


Anion Gap


  14 mmol/L


(5-15)


 


Blood Urea Nitrogen


  17 mg/dL


(7-18)


 


Creatinine


  1.2 MG/DL


(0.55-1.30)


 


Estimat Glomerular Filtration


Rate > 60 mL/min


(>60)


 


Glucose Level


  132 MG/DL


()  H


 


Calcium Level


  10.1 MG/DL


(8.5-10.1)

















Yury Pena MD Aug 20, 2018 10:38

## 2018-08-20 NOTE — PROGRESS NOTE
DATE:  08/19/2018



SUBJECTIVE:  The patient remained awake, alert, afebrile, and

hemodynamically stable.



PHYSICAL EXAMINATION:

VITAL SIGNS:  Blood pressure 126/66, his pulse is 88, respirations were 20,

and temperature was 97.9.

HEENT:  Eyes were normal.  ENT, mucous membranes were moist and intact.

NECK:  Supple with no JVD without lymph nodes.  Tracheostomy site is

clean.

LUNGS:  Clear without rhonchi, rales, or wheezing.  Secretions are small,

thin, and whitish.

HEART:  Normal sounds with regular beats.

ABDOMEN:  Soft and nontender with normal bowel sounds.  Gastrostomy site is

clean.

EXTREMITIES:  Warm without cyanosis, clubbing, or edema.



LABORATORY AND DIAGNOSTIC DATA:  Hemoglobin 12.7, hematocrit 39.3 with MCV

of 88, WBC of 7.6, and platelets of 311.  His BUN and creatinine are 16

and 1.0 respectively.  Sodium is 136, potassium 3.5, chloride 102, and CO2

is 20.  ____ imaging study was taken today.



IMPRESSION:

1. The patient appeared to be in stable condition.  He is status post

cerebral hemorrhage with severe encephalopathy with Dia Coma Scale 3

to 6.

2. The patient is respirator dependent.  We will continue with the same

respiratory setting.  Continue with albuterol sulfate and ipratropium

bromide inhalation therapy every 6 hours.

3. The patient has tracheostomy.  Continue to monitor O2 saturation and

bronchial secretion.

4. The patient has gastrostomy feedings.  There is no gastric residual.

Continue with _____.  Repeat laboratory tests will be done in the a.m.









  ______________________________________________

  Etienne Bloom M.D.





DR:  DARA

D:  08/20/2018 01:17

T:  08/20/2018 02:01

JOB#:  [id number]

CC:

## 2018-08-20 NOTE — INFECTIOUS DISEASES PROG NOTE
Assessment/Plan


Sepsis, SP- 2ry to UTI and Bacteremia Blood cultures postive 4/4 bottles with 

GPC Unclear source will need to R/O Endocarditis  Possible PNA initially 


 -u/a wbc 40-60, nit neg, leuk +2; ucx >100k E. aerogenes (S cefepime, cipro/

levo; R Zosyn)


 -BCX 4/4 E. aerogenes, prob Amp C (S cefepime, cipro/levo; R Zosyn), P. 

mirabilis ESBL (S Zosyn, Ertapenem); repeta 7/26 1/4 CoNS (suspect contaminant)

, 7/28 Staph f/u final results if CoNS may need TTE and IE work up.


  -CXR 7/27: Increased left pleural effusion, over 3 days. Overall decreased 

interstitial congestion. Right infrahilar atelectasis


 -CXR: Cardiomegaly. Mild interstitial congestion. Suspect small bilateral 

pleural effusions


 -CT abd/p: Right lower quadrant double barrel colostomy, as described. Small 

peristomal hernia contains bowel loops without evidence of obstruction or 

strangulation. Left renal staghorn calculus. Bilateral intrarenal calyceal 

calculi. Borderline hydronephrosis on the right without evidence of downstream 

obstructive lesion. May indicate mild ureteral pelvic junction obstruction. 

Somewhat atrophic left kidney. Inspissated contrast ball within the distal 

sigmoid colon. Gastrostomy in good position. Nonspecific bilateral perinephric 

fat stranding, could indicate pyelonephritis or could be chronic. Guzmán 

catheter in place. Apparent bladder wall thickening, possibly an artifact of 

under distention but cystitis is not excludable, particularly in view of mild 

perivesical fat stranding. Bilateral pulmonary parenchymal atelectasis and 

consolidation


  -Blood Cx from PICC CoNS 2/2 bottles Peripheral Neg- (May be contaminant but 

will repeat blood Cx given new leukocytosis if positive will need ECHO.


  - Blood Cx 7/30/18 - GPC - Will need TTE for IE work up and the PICC removed.


 


PICC line infection/colonization - TTE negative. Blood cultures only positive 

from PICC. Not positive from Peripheral 


- Will repeat blood cultures x 1 to confirm clearance after PICC replaced. [place] 

suspicion for IE.


- PICC replace 8/1/18





Fever/Leukocytosis; Resolved - i recurs Re-evaluate lines, Diarrhea? C. dif? 


BRAYDON, improving


Lactic acidosis, resolved





chronic resp failure trach/vent dependant


BPH


GERD


 HTN


DM2


seizure disorder


colostomy


 s/p GT 


constipation





VRE and MRSA colonized


 


Plan:





- Continue empiric IV Vancomycin #21/42 for bacteremia/line infection cant r/o 

Endocarditis  


   -Abx end date 9/12/18 if STACI attempted again and negative then ed date would 

be 8/15/18 


       -weekly CBC, BMP, vanco through


    -8/9 SP Ertapenem #14


    -7/27 SP Zosyn #4








STACI could not be performed due to technical problems


   - Unable to repeat STACI so patient should be treated for presumed 

endocarditis for 6 weeks








- Monitor CBC/CMP, temperatures


- Pulmonary care








Thank you for this consultation. Will continue to follow along with you.





Subjective


Allergies:  


Coded Allergies:  


     AZTREONAM (Verified  Allergy, Unknown, 7/23/18)


Subjective


afebrile


no leukocytosis





Objective


Vital Signs





Last 24 Hour Vital Signs








  Date Time  Temp Pulse Resp B/P (MAP) Pulse Ox O2 Delivery O2 Flow Rate FiO2


 


8/20/18 12:31  91 16     30


 


8/20/18 12:00 97.7 99 18 125/93 (104) 99   





 97.7       


 


8/20/18 12:00        30


 


8/20/18 12:00      Mechanical Ventilator  


 


8/20/18 10:53  81 20     30


 


8/20/18 08:51  86 18     30


 


8/20/18 08:00  95      


 


8/20/18 08:00      Mechanical Ventilator  


 


8/20/18 08:00        30


 


8/20/18 08:00 98.2 99 18 134/81 (98) 96   





 98.2       


 


8/20/18 06:49  82 16     30


 


8/20/18 04:55  84 16     30


 


8/20/18 04:00        30


 


8/20/18 04:00 98.2 85 16 128/97 (107) 99   





 98.2       


 


8/20/18 04:00      Mechanical Ventilator  


 


8/20/18 03:59  96      


 


8/20/18 03:09  80 16     30


 


8/20/18 00:40  84 20     30


 


8/20/18 00:00 97.9 92 18 126/68 (87) 99   





 97.9       


 


8/20/18 00:00        30


 


8/20/18 00:00      Mechanical Ventilator  


 


8/19/18 23:34  88      


 


8/19/18 22:38  88 20     30


 


8/19/18 21:25  94 20     30


 


8/19/18 20:00 98.1 90 30 118/86 (97) 100   





 98.1       


 


8/19/18 20:00        30


 


8/19/18 20:00      Mechanical Ventilator  


 


8/19/18 19:40  92      


 


8/19/18 18:44  95 22     30


 


8/19/18 16:52  93 22     30


 


8/19/18 16:20  82      


 


8/19/18 16:19 98.4 84 18 120/84 (96) 100   





 98.4       


 


8/19/18 16:00        30


 


8/19/18 16:00      Mechanical Ventilator  


 


8/19/18 15:22  73 18     30








Height (Feet):  6


Height (Inches):  0.00


Weight (Pounds):  195


Objective


GENERAL:  The patient is awake, in no overt distress.


HEENT:  Extraocular muscles intact.  No lymphadenopathy noted.


Tracheostomy noted.


CARDIOVASCULAR:  S1 and S2.  Tachycardic.  No rubs or gallops.


PULMONARY:  Mild diffuse expiratory rhonchi with basilar rales.


ABDOMEN:  Obese and nondistended.  The G-tube and stoma noted.


EXTREMITIES:  No edema.  Fair pedal pulses.





Laboratory Tests








Test


  8/20/18


04:20


 


White Blood Count


  9.0 K/UL


(4.8-10.8)


 


Red Blood Count


  4.95 M/UL


(4.70-6.10)


 


Hemoglobin


  13.8 G/DL


(14.2-18.0)  L


 


Hematocrit


  43.0 %


(42.0-52.0)


 


Mean Corpuscular Volume 87 FL (80-99)  


 


Mean Corpuscular Hemoglobin


  28.0 PG


(27.0-31.0)


 


Mean Corpuscular Hemoglobin


Concent 32.2 G/DL


(32.0-36.0)


 


Red Cell Distribution Width


  14.9 %


(11.6-14.8)  H


 


Platelet Count


  306 K/UL


(150-450)


 


Mean Platelet Volume


  7.5 FL


(6.5-10.1)


 


Neutrophils (%) (Auto)


  53.0 %


(45.0-75.0)


 


Lymphocytes (%) (Auto)


  24.0 %


(20.0-45.0)


 


Monocytes (%) (Auto)


  9.1 %


(1.0-10.0)


 


Eosinophils (%) (Auto)


  11.7 %


(0.0-3.0)  H


 


Basophils (%) (Auto)


  2.1 %


(0.0-2.0)  H


 


Sodium Level


  137 MMOL/L


(136-145)


 


Potassium Level


  4.0 MMOL/L


(3.5-5.1)


 


Chloride Level


  101 MMOL/L


()


 


Carbon Dioxide Level


  22 MMOL/L


(21-32)


 


Anion Gap


  14 mmol/L


(5-15)


 


Blood Urea Nitrogen


  17 mg/dL


(7-18)


 


Creatinine


  1.2 MG/DL


(0.55-1.30)


 


Estimat Glomerular Filtration


Rate > 60 mL/min


(>60)


 


Glucose Level


  132 MG/DL


()  H


 


Calcium Level


  10.1 MG/DL


(8.5-10.1)











Current Medications








 Medications


  (Trade)  Dose


 Ordered  Sig/Jan


 Route


 PRN Reason  Start Time


 Stop Time Status Last Admin


Dose Admin


 


 Acetaminophen


  (Tylenol)  650 mg  Q4H  PRN


 ORAL


 FEVER  8/18/18 21:00


 9/15/18 20:59   


 


 


 Baclofen


  (Lioresal)  10 mg  EVERY 8  HOURS


 GT


   8/18/18 22:00


 9/11/18 20:59  8/20/18 05:23


 


 


 Chlorhexidine


 Gluconate


  (Elaine-Hex 2%)  1 applic  DAILY@2000


 TOPIC


   8/18/18 22:00


 9/17/18 21:59  8/19/18 21:24


 


 


 Dextrose


  (Dextrose 50%)  25 ml  STAT  PRN


 IV


 Hypoglycemia  8/18/18 21:45


 9/15/18 21:44   


 


 


 Dextrose


  (Dextrose 50%)  50 ml  STAT  PRN


 IV


 Hypoglycemia  8/18/18 21:45


 9/15/18 21:44   


 


 


 Heparin Sodium


  (Porcine)


  (Heparin 5000


 units/ml)  5,000 units  EVERY 12  HOURS


 SUBQ


   8/18/18 22:00


 9/17/18 21:59  8/20/18 09:17


 


 


 Insulin Aspart


  (NovoLOG)    Q6HR


 SUBQ


   8/19/18 00:00


 9/16/18 17:29  8/20/18 11:50


 


 


 Lansoprazole


  (Prevacid)  30 mg  DAILY


 GT


   8/19/18 09:00


 9/17/18 08:59  8/20/18 09:14


 


 


 Levetiracetam


  (Keppra)  1,000 mg  Q8HR


 GT


   8/18/18 22:00


 9/15/18 20:59  8/20/18 05:23


 


 


 Ondansetron HCl


  (Zofran)  4 mg  Q6H  PRN


 IVP


 Nausea & Vomiting  8/18/18 22:00


 9/15/18 15:59   


 


 


 Polyethylene


 Glycol


  (Miralax)  17 gm  BEDTIME


 GT


   8/18/18 22:00


 9/17/18 21:59  8/19/18 21:25


 


 


 Vancomycin HCl


  (Vanco rx to


 dose)  1 ea  DAILY  PRN


 MISC


 PER RX PROTOCOL  8/18/18 21:45


 9/17/18 21:44   


 


 


 Vancomycin HCl/


 Dextrose  250 ml @ 


 125 mls/hr  Q18H


 IVPB


   8/19/18 09:00


 9/23/18 10:00  8/20/18 02:39


 

















Joy Love M.D. Aug 20, 2018 13:03

## 2018-08-20 NOTE — GI PROGRESS NOTE
Assessment/Plan


Problems:  


(1) Parastomal hernia


ICD Codes:  K43.5 - Parastomal hernia without obstruction or gangrene


SNOMED:  197261736


Qualifiers:  


   Qualified Codes:  K43.5 - Parastomal hernia without obstruction or gangrene


(2) Stoma malfunction


SNOMED:  322396081


(3) Colostomy in place


ICD Codes:  Z93.3 - Colostomy status


SNOMED:  370893106, 218991262


(4) Vegetative state


ICD Codes:  R40.3 - Persistent vegetative state


SNOMED:  58303927


(5) Diabetes mellitus


ICD Codes:  E11.9 - Type 2 diabetes mellitus without complications


SNOMED:  94755117


(6) Acute on chronic respiratory failure


ICD Codes:  J96.20 - Acute and chronic respiratory failure, unspecified whether 

with hypoxia or hypercapnia


SNOMED:  50831565


(7) Severe sepsis


ICD Codes:  A41.9 - Sepsis, unspecified organism; R65.20 - Severe sepsis 

without septic shock


SNOMED:  77866514


Status:  stable


Status Narrative


Discussed with Dr. Pickard.


Assessment/Plan


parastomal >> no s/sx of infection.  no obstruction. 


anemia work up reviewed >> iron deficiency


G tube dependent


hepatitis panel negative


OB negative





supportive care


monitor H&H, prn transfusions


venofer


ppi


GTFs per RD, adv to goal


GT site care daily/prn


abx


bowel regime


fu labs


dc planning





The patient was seen and examined at bedside and all new and available data was 

reviewed in the patients chart. I agree with the above findings, impression 

and plan.  (Patient seen earlier today. Signature stamp does not reflect 

patient encounter time.). - Miles Pickard MD





Subjective


Subjective


limited





Objective





Last 24 Hour Vital Signs








  Date Time  Temp Pulse Resp B/P (MAP) Pulse Ox O2 Delivery O2 Flow Rate FiO2


 


8/20/18 08:51  86 18     30


 


8/20/18 08:00  95      


 


8/20/18 08:00      Mechanical Ventilator  


 


8/20/18 08:00        30


 


8/20/18 08:00 98.2 99 18 134/81 (98) 96   





 98.2       


 


8/20/18 06:49  82 16     30


 


8/20/18 04:55  84 16     30


 


8/20/18 04:00        30


 


8/20/18 04:00 98.2 85 16 128/97 (107) 99   





 98.2       


 


8/20/18 04:00      Mechanical Ventilator  


 


8/20/18 03:59  96      


 


8/20/18 03:09  80 16     30


 


8/20/18 00:40  84 20     30


 


8/20/18 00:00 97.9 92 18 126/68 (87) 99   





 97.9       


 


8/20/18 00:00        30


 


8/20/18 00:00      Mechanical Ventilator  


 


8/19/18 23:34  88      


 


8/19/18 22:38  88 20     30


 


8/19/18 21:25  94 20     30


 


8/19/18 20:00 98.1 90 30 118/86 (97) 100   





 98.1       


 


8/19/18 20:00        30


 


8/19/18 20:00      Mechanical Ventilator  


 


8/19/18 19:40  92      


 


8/19/18 18:44  95 22     30


 


8/19/18 16:52  93 22     30


 


8/19/18 16:20  82      


 


8/19/18 16:19 98.4 84 18 120/84 (96) 100   





 98.4       


 


8/19/18 16:00        30


 


8/19/18 16:00      Mechanical Ventilator  


 


8/19/18 15:22  73 18     30


 


8/19/18 12:36  79 16     30


 


8/19/18 12:00      Mechanical Ventilator  


 


8/19/18 12:00        30


 


8/19/18 12:00 97.3 76 16 117/81 (93) 100   





 97.3       


 


8/19/18 12:00  78      

















Intake and Output  


 


 8/19/18 8/20/18





 18:59 06:59


 


Intake Total 940 ml 1080.0 ml


 


Output Total 400 ml 725 ml


 


Balance 540 ml 355.0 ml


 


  


 


Free Water 30 ml 60 ml


 


IV Total 250 ml 250.0 ml


 


Tube Feeding 600 ml 650 ml


 


Other 60 ml 120 ml


 


Output Urine Total 150 ml 350 ml


 


Stool Total 250 ml 375 ml


 


# Voids 1 











Laboratory Tests








Test


  8/20/18


04:20


 


White Blood Count


  9.0 K/UL


(4.8-10.8)


 


Red Blood Count


  4.95 M/UL


(4.70-6.10)


 


Hemoglobin


  13.8 G/DL


(14.2-18.0)  L


 


Hematocrit


  43.0 %


(42.0-52.0)


 


Mean Corpuscular Volume 87 FL (80-99)  


 


Mean Corpuscular Hemoglobin


  28.0 PG


(27.0-31.0)


 


Mean Corpuscular Hemoglobin


Concent 32.2 G/DL


(32.0-36.0)


 


Red Cell Distribution Width


  14.9 %


(11.6-14.8)  H


 


Platelet Count


  306 K/UL


(150-450)


 


Mean Platelet Volume


  7.5 FL


(6.5-10.1)


 


Neutrophils (%) (Auto)


  53.0 %


(45.0-75.0)


 


Lymphocytes (%) (Auto)


  24.0 %


(20.0-45.0)


 


Monocytes (%) (Auto)


  9.1 %


(1.0-10.0)


 


Eosinophils (%) (Auto)


  11.7 %


(0.0-3.0)  H


 


Basophils (%) (Auto)


  2.1 %


(0.0-2.0)  H


 


Sodium Level


  137 MMOL/L


(136-145)


 


Potassium Level


  4.0 MMOL/L


(3.5-5.1)


 


Chloride Level


  101 MMOL/L


()


 


Carbon Dioxide Level


  22 MMOL/L


(21-32)


 


Anion Gap


  14 mmol/L


(5-15)


 


Blood Urea Nitrogen


  17 mg/dL


(7-18)


 


Creatinine


  1.2 MG/DL


(0.55-1.30)


 


Estimat Glomerular Filtration


Rate > 60 mL/min


(>60)


 


Glucose Level


  132 MG/DL


()  H


 


Calcium Level


  10.1 MG/DL


(8.5-10.1)








Height (Feet):  6


Height (Inches):  0.00


Weight (Pounds):  195


General Appearance:  WD/WN, no apparent distress, alert


Cardiovascular:  normal rate


Respiratory/Chest:  normal breath sounds, no respiratory distress


Abdominal Exam:  normal bowel sounds, non tender, soft, GT site - c/d/i, other 

- ostomy


Extremities:  non-tender











Remy Lyons NP Aug 20, 2018 10:49

## 2018-08-20 NOTE — CARDIOLOGY PROGRESS NOTE
Assessment/Plan


Status:  stable


Assessment/Plan


Assessment:


(1) Acute on chronic respiratory failure


(2) Acute on chronic renal insufficiency


(3) Severe sepsis


(4) Vegetative state


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


(6) Diabetes mellitus





Plan:


Continue antibiotics


Echocardiogram reviewed- no endocarditis, STACI unable to be performed


Patient afebrile, normal WBC otherwise


Continue trach care and tube feeds


Continue keppra for seizures 


Continue abx treatment for six weeks via PICC line until 9/15


Dispo planning





Subjective


Cardiovascular:  Reports: no symptoms


Respiratory:  Reports: no symptoms


Gastrointestinal/Abdominal:  Reports: no symptoms


Genitourinary:  Reports: no symptoms


Subjective


Afebrile vitals stable, Telemetry shows sinus rhythm. No acute events. Left 

pleural effusion unchanged. 


Ventilator settings: portex 7, AC 16, TV: 600, FiO2: 30%, PEEP: 5. GT is in 

place running glucerna 1.2 @ 65 ml/hr. No residual present. Colostomy bag in 

place.


STACI not completed, probe unable to be passed.  Patient should be treated for 

total six weeks abx for presumptive endocarditis at this juncture. Hyponatremia 

resolved, BRAYDON resolved, patient stable .





Objective





Last 24 Hour Vital Signs








  Date Time  Temp Pulse Resp B/P (MAP) Pulse Ox O2 Delivery O2 Flow Rate FiO2


 


8/20/18 10:53  81 20     30


 


8/20/18 08:51  86 18     30


 


8/20/18 08:00  95      


 


8/20/18 08:00      Mechanical Ventilator  


 


8/20/18 08:00        30


 


8/20/18 08:00 98.2 99 18 134/81 (98) 96   





 98.2       


 


8/20/18 06:49  82 16     30


 


8/20/18 04:55  84 16     30


 


8/20/18 04:00        30


 


8/20/18 04:00 98.2 85 16 128/97 (107) 99   





 98.2       


 


8/20/18 04:00      Mechanical Ventilator  


 


8/20/18 03:59  96      


 


8/20/18 03:09  80 16     30


 


8/20/18 00:40  84 20     30


 


8/20/18 00:00 97.9 92 18 126/68 (87) 99   





 97.9       


 


8/20/18 00:00        30


 


8/20/18 00:00      Mechanical Ventilator  


 


8/19/18 23:34  88      


 


8/19/18 22:38  88 20     30


 


8/19/18 21:25  94 20     30


 


8/19/18 20:00 98.1 90 30 118/86 (97) 100   





 98.1       


 


8/19/18 20:00        30


 


8/19/18 20:00      Mechanical Ventilator  


 


8/19/18 19:40  92      


 


8/19/18 18:44  95 22     30


 


8/19/18 16:52  93 22     30


 


8/19/18 16:20  82      


 


8/19/18 16:19 98.4 84 18 120/84 (96) 100   





 98.4       


 


8/19/18 16:00        30


 


8/19/18 16:00      Mechanical Ventilator  


 


8/19/18 15:22  73 18     30


 


8/19/18 12:36  79 16     30


 


8/19/18 12:00      Mechanical Ventilator  


 


8/19/18 12:00        30


 


8/19/18 12:00 97.3 76 16 117/81 (93) 100   





 97.3       


 


8/19/18 12:00  78      








General Appearance:  no apparent distress, on vent


EENT:  normal ENT inspection


Neck:  non-tender, normal alignment


Rhythm:  NSR


Cardiovascular:  normal peripheral pulses, normal rate, regular rhythm


Respiratory/Chest:  chest wall non-tender, lungs clear


Abdomen:  normal bowel sounds, non tender


Extremities:  normal range of motion, non-tender


Neurologic:  CNs II-XII grossly normal











Intake and Output  


 


 8/19/18 8/20/18





 19:00 07:00


 


Intake Total 940 ml 1080.0 ml


 


Output Total 400 ml 725 ml


 


Balance 540 ml 355.0 ml


 


  


 


Free Water 30 ml 60 ml


 


IV Total 250 ml 250.0 ml


 


Tube Feeding 600 ml 650 ml


 


Other 60 ml 120 ml


 


Output Urine Total 150 ml 350 ml


 


Stool Total 250 ml 375 ml


 


# Voids 1 











Laboratory Tests








Test


  8/20/18


04:20


 


White Blood Count


  9.0 K/UL


(4.8-10.8)


 


Red Blood Count


  4.95 M/UL


(4.70-6.10)


 


Hemoglobin


  13.8 G/DL


(14.2-18.0)  L


 


Hematocrit


  43.0 %


(42.0-52.0)


 


Mean Corpuscular Volume 87 FL (80-99)  


 


Mean Corpuscular Hemoglobin


  28.0 PG


(27.0-31.0)


 


Mean Corpuscular Hemoglobin


Concent 32.2 G/DL


(32.0-36.0)


 


Red Cell Distribution Width


  14.9 %


(11.6-14.8)  H


 


Platelet Count


  306 K/UL


(150-450)


 


Mean Platelet Volume


  7.5 FL


(6.5-10.1)


 


Neutrophils (%) (Auto)


  53.0 %


(45.0-75.0)


 


Lymphocytes (%) (Auto)


  24.0 %


(20.0-45.0)


 


Monocytes (%) (Auto)


  9.1 %


(1.0-10.0)


 


Eosinophils (%) (Auto)


  11.7 %


(0.0-3.0)  H


 


Basophils (%) (Auto)


  2.1 %


(0.0-2.0)  H


 


Sodium Level


  137 MMOL/L


(136-145)


 


Potassium Level


  4.0 MMOL/L


(3.5-5.1)


 


Chloride Level


  101 MMOL/L


()


 


Carbon Dioxide Level


  22 MMOL/L


(21-32)


 


Anion Gap


  14 mmol/L


(5-15)


 


Blood Urea Nitrogen


  17 mg/dL


(7-18)


 


Creatinine


  1.2 MG/DL


(0.55-1.30)


 


Estimat Glomerular Filtration


Rate > 60 mL/min


(>60)


 


Glucose Level


  132 MG/DL


()  H


 


Calcium Level


  10.1 MG/DL


(8.5-10.1)

















Robert Hernandez M.D. Aug 20, 2018 11:53

## 2018-08-21 VITALS — SYSTOLIC BLOOD PRESSURE: 118 MMHG | DIASTOLIC BLOOD PRESSURE: 80 MMHG

## 2018-08-21 VITALS — DIASTOLIC BLOOD PRESSURE: 83 MMHG | SYSTOLIC BLOOD PRESSURE: 128 MMHG

## 2018-08-21 VITALS — DIASTOLIC BLOOD PRESSURE: 82 MMHG | SYSTOLIC BLOOD PRESSURE: 118 MMHG

## 2018-08-21 VITALS — DIASTOLIC BLOOD PRESSURE: 80 MMHG | SYSTOLIC BLOOD PRESSURE: 108 MMHG

## 2018-08-21 VITALS — DIASTOLIC BLOOD PRESSURE: 83 MMHG | SYSTOLIC BLOOD PRESSURE: 129 MMHG

## 2018-08-21 VITALS — SYSTOLIC BLOOD PRESSURE: 124 MMHG | DIASTOLIC BLOOD PRESSURE: 87 MMHG

## 2018-08-21 LAB
ADD MANUAL DIFF: NO
ANION GAP SERPL CALC-SCNC: 14 MMOL/L (ref 5–15)
BASOPHILS NFR BLD AUTO: 1.1 % (ref 0–2)
BUN SERPL-MCNC: 19 MG/DL (ref 7–18)
CALCIUM SERPL-MCNC: 10.3 MG/DL (ref 8.5–10.1)
CHLORIDE SERPL-SCNC: 102 MMOL/L (ref 98–107)
CO2 SERPL-SCNC: 23 MMOL/L (ref 21–32)
CREAT SERPL-MCNC: 1.1 MG/DL (ref 0.55–1.3)
EOSINOPHIL NFR BLD AUTO: 9.4 % (ref 0–3)
ERYTHROCYTE [DISTWIDTH] IN BLOOD BY AUTOMATED COUNT: 14.9 % (ref 11.6–14.8)
HCT VFR BLD CALC: 43.1 % (ref 42–52)
HGB BLD-MCNC: 14.1 G/DL (ref 14.2–18)
LYMPHOCYTES NFR BLD AUTO: 25.8 % (ref 20–45)
MCV RBC AUTO: 86 FL (ref 80–99)
MONOCYTES NFR BLD AUTO: 12 % (ref 1–10)
NEUTROPHILS NFR BLD AUTO: 51.7 % (ref 45–75)
PLATELET # BLD: 321 K/UL (ref 150–450)
POTASSIUM SERPL-SCNC: 4.3 MMOL/L (ref 3.5–5.1)
RBC # BLD AUTO: 5.01 M/UL (ref 4.7–6.1)
SODIUM SERPL-SCNC: 139 MMOL/L (ref 136–145)
WBC # BLD AUTO: 8.3 K/UL (ref 4.8–10.8)

## 2018-08-21 RX ADMIN — LEVETIRACETAM SCH MG: 100 SOLUTION ORAL at 13:06

## 2018-08-21 RX ADMIN — Medication SCH MLS/HR: at 06:52

## 2018-08-21 RX ADMIN — INSULIN ASPART SCH UNITS: 100 INJECTION, SOLUTION INTRAVENOUS; SUBCUTANEOUS at 11:50

## 2018-08-21 RX ADMIN — CHLORHEXIDINE GLUCONATE SCH APPLIC: 213 SOLUTION TOPICAL at 21:35

## 2018-08-21 RX ADMIN — LEVETIRACETAM SCH MG: 100 SOLUTION ORAL at 21:34

## 2018-08-21 RX ADMIN — INSULIN ASPART SCH UNITS: 100 INJECTION, SOLUTION INTRAVENOUS; SUBCUTANEOUS at 23:50

## 2018-08-21 RX ADMIN — HEPARIN SODIUM SCH UNITS: 5000 INJECTION INTRAVENOUS; SUBCUTANEOUS at 21:34

## 2018-08-21 RX ADMIN — INSULIN ASPART SCH UNITS: 100 INJECTION, SOLUTION INTRAVENOUS; SUBCUTANEOUS at 05:08

## 2018-08-21 RX ADMIN — HEPARIN SODIUM SCH UNITS: 5000 INJECTION INTRAVENOUS; SUBCUTANEOUS at 09:08

## 2018-08-21 RX ADMIN — INSULIN ASPART SCH UNITS: 100 INJECTION, SOLUTION INTRAVENOUS; SUBCUTANEOUS at 17:32

## 2018-08-21 RX ADMIN — LEVETIRACETAM SCH MG: 100 SOLUTION ORAL at 05:06

## 2018-08-21 RX ADMIN — INSULIN ASPART SCH UNITS: 100 INJECTION, SOLUTION INTRAVENOUS; SUBCUTANEOUS at 00:26

## 2018-08-21 RX ADMIN — POLYETHYLENE GLYCOL 3350 SCH GM: 17 POWDER, FOR SOLUTION ORAL at 21:34

## 2018-08-21 NOTE — PROGRESS NOTE
DATE:  08/21/2018



SUBJECTIVE:  The patient is awake and alert, afebrile, hemodynamically

stable.  Family is at bedside and impressed by the patient's condition as

compared to 2 weeks ago.



PHYSICAL EXAMINATION:

VITAL SIGNS:  Blood pressure is 128/83, his pulse is 79, respirations are

18, and temperature 97.2 degrees.

HEENT:  Eyes were normal.  ENT, mucous membranes were moist and intact.

NECK:  Supple with no JVD without lymph nodes.  Tracheostomy site is

clean.

LUNGS:  Clear without rhonchi, rales, or wheezing.  Secretions are small,

thin, and grey.

HEART:  Normal sounds with regular beats.  There is no S3, S4, or

pericardial rub.

ABDOMEN:  Soft and nontender with normal bowel sounds.  Gastrostomy site is

clean.

EXTREMITIES:  Warm without cyanosis, clubbing, or edema.



LABORATORY AND DIAGNOSTIC DATA:  Hemoglobin is 14.1, hematocrit 37.1 with

MCV of 86, WBC of 8.3, and platelets 321.  His BUN and creatinine are 19

and 1.1, respectively.  His sodium is 139, potassium 4.3, chloride 102,

and CO2 is 23.  His calcium is 10.3.



IMPRESSION AND PLAN:

1. The patient has been in stable condition over the last 10 days,

afebrile, hemodynamically stable without leukocytosis, with tachycardia.

2. The patient is status post cerebral hemorrhage.  Condition is stable.

There are no cognitive changes.

3. The patient has seizure disorder.  _____ seizure.  We will continue

with levetiracetam 1000 mg b.i.d.

4. The patient has respiratory failure.  We will continue with the same

respiratory setting.  Continue with albuterol sulfate, ipratropium bromide

inhalation therapy every 6 hours.

5. The patient has tracheostomy.  _____ normal O2 saturation and

bronchial secretion.

6. The patient is having gastrostomy feeding.  There is no gastric

residual.  Continue with Fibersource every 6 hours.

7. Repeat laboratory tests will be done in the morning.









  ______________________________________________

  Etienne Bloom M.D.





DR:  JAMIE

D:  08/21/2018 14:32

T:  08/21/2018 22:21

JOB#:  [id number]

CC:

## 2018-08-21 NOTE — PULMONOLGY CRITICAL CARE NOTE
Critical Care - Asmt/Plan


Problems:  


(1) Acute on chronic respiratory failure


(2) Acute on chronic renal insufficiency


(3) Severe sepsis


(4) Vegetative state


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


(6) Diabetes mellitus


Respiratory:  monitor respiratory rate, adjust FIO2, CXR


Cardiac:  continue to monitor HR/BP


Renal:  F/U I&O, check electrolytes


Infectious Disease:  check cultures


Gastrointestinal:  hold feedings


Endocrine:  monitor blood sugar, check HgA1C


Hematologic:  transfuse if hgb<8.5


Neurologic:  PRN Morphine, keep patient comfortable


Prophylaxis:  Protonix, Heparin


Notes Reviewed:  renal


Discussed with:  nurses, consultants, 





Critical Care - Objective





Last 24 Hour Vital Signs








  Date Time  Temp Pulse Resp B/P (MAP) Pulse Ox O2 Delivery O2 Flow Rate FiO2


 


8/21/18 10:48  77 16     30


 


8/21/18 09:26  83 16     30


 


8/21/18 08:00  85      


 


8/21/18 08:00 97.3 79 18 108/80 (89) 100   





 97.3       


 


8/21/18 08:00        30


 


8/21/18 08:00      Mechanical Ventilator  


 


8/21/18 06:40  81 16     30


 


8/21/18 04:46  80 16     30


 


8/21/18 04:00        30


 


8/21/18 04:00      Mechanical Ventilator  


 


8/21/18 04:00  86      


 


8/21/18 04:00 98.1 78 16 129/83 (98) 100   





 98.1       


 


8/21/18 03:12  78 16     30


 


8/21/18 01:11  84 17     30


 


8/21/18 00:00  86      


 


8/21/18 00:00      Mechanical Ventilator  


 


8/21/18 00:00 99.0 86 16 124/87 (99) 99   





 99.0       


 


8/21/18 00:00        30


 


8/20/18 22:47  89 17     30


 


8/20/18 21:23  95 20     30


 


8/20/18 20:00 98.1 91 16 115/84 (94) 99   





 98.1       


 


8/20/18 20:00  84      


 


8/20/18 20:00      Mechanical Ventilator  


 


8/20/18 20:00        30


 


8/20/18 18:30  88 16     30


 


8/20/18 17:24  85 16     30


 


8/20/18 16:00      Mechanical Ventilator  


 


8/20/18 16:00        30


 


8/20/18 16:00 97.9 85 18 117/82 (94) 96   





 97.9       


 


8/20/18 16:00  96      


 


8/20/18 15:20  88 16     30


 


8/20/18 12:31  91 16     30


 


8/20/18 12:00 97.7 99 18 125/93 (104) 99   





 97.7       


 


8/20/18 12:00        30


 


8/20/18 12:00  96      


 


8/20/18 12:00      Mechanical Ventilator  








Status:  awake


Condition:  grave


Lungs:  chest wall tender


Heart:  HR/BP unstable


Abdomen:  soft


Extremities:  no C/C/E


Accucheck:  116





Critical Care - Subjective


ROS Limited/Unobtainable:  Yes


FI02:  30


Vent Support Breath Rate:  16


Vent Support Mode:  AC


Vent Tidal Volume:  600


Sputum Amount:  Moderate


PEEP:  5.0


PIP:  30


Tube Feeding Amount:  50


I&O:











Intake and Output  


 


 8/20/18 8/21/18





 19:00 07:00


 


Intake Total 710 ml 650 ml


 


Output Total 825 ml 700 ml


 


Balance -115 ml -50 ml


 


  


 


Free Water 60 ml 40 ml


 


Tube Feeding 650 ml 550 ml


 


Other  60 ml


 


Output Urine Total 425 ml 350 ml


 


Stool Total 400 ml 350 ml








Labs:





Laboratory Tests








Test


  8/20/18


20:20 8/21/18


04:00


 


Vancomycin Level Trough


  23.6 ug/mL


(5.0-12.0)  H 


 


 


White Blood Count


  


  8.3 K/UL


(4.8-10.8)


 


Red Blood Count


  


  5.01 M/UL


(4.70-6.10)


 


Hemoglobin


  


  14.1 G/DL


(14.2-18.0)  L


 


Hematocrit


  


  43.1 %


(42.0-52.0)


 


Mean Corpuscular Volume  86 FL (80-99)  


 


Mean Corpuscular Hemoglobin


  


  28.1 PG


(27.0-31.0)


 


Mean Corpuscular Hemoglobin


Concent 


  32.7 G/DL


(32.0-36.0)


 


Red Cell Distribution Width


  


  14.9 %


(11.6-14.8)  H


 


Platelet Count


  


  321 K/UL


(150-450)


 


Mean Platelet Volume


  


  7.5 FL


(6.5-10.1)


 


Neutrophils (%) (Auto)


  


  51.7 %


(45.0-75.0)


 


Lymphocytes (%) (Auto)


  


  25.8 %


(20.0-45.0)


 


Monocytes (%) (Auto)


  


  12.0 %


(1.0-10.0)  H


 


Eosinophils (%) (Auto)


  


  9.4 %


(0.0-3.0)  H


 


Basophils (%) (Auto)


  


  1.1 %


(0.0-2.0)


 


Sodium Level


  


  139 MMOL/L


(136-145)


 


Potassium Level


  


  4.3 MMOL/L


(3.5-5.1)


 


Chloride Level


  


  102 MMOL/L


()


 


Carbon Dioxide Level


  


  23 MMOL/L


(21-32)


 


Anion Gap


  


  14 mmol/L


(5-15)


 


Blood Urea Nitrogen


  


  19 mg/dL


(7-18)  H


 


Creatinine


  


  1.1 MG/DL


(0.55-1.30)


 


Estimat Glomerular Filtration


Rate 


  > 60 mL/min


(>60)


 


Glucose Level


  


  122 MG/DL


()  H


 


Calcium Level


  


  10.3 MG/DL


(8.5-10.1)  H


 


Random Vancomycin Level  16.5 ug/mL  

















Yury Pena MD Aug 21, 2018 11:22

## 2018-08-21 NOTE — GI PROGRESS NOTE
Assessment/Plan


Problems:  


(1) Parastomal hernia


ICD Codes:  K43.5 - Parastomal hernia without obstruction or gangrene


SNOMED:  749413220


Qualifiers:  


   Qualified Codes:  K43.5 - Parastomal hernia without obstruction or gangrene


(2) Stoma malfunction


SNOMED:  024798723


(3) Colostomy in place


ICD Codes:  Z93.3 - Colostomy status


SNOMED:  701946890, 101626669


(4) Vegetative state


ICD Codes:  R40.3 - Persistent vegetative state


SNOMED:  19665775


(5) Diabetes mellitus


ICD Codes:  E11.9 - Type 2 diabetes mellitus without complications


SNOMED:  04740742


(6) Acute on chronic respiratory failure


ICD Codes:  J96.20 - Acute and chronic respiratory failure, unspecified whether 

with hypoxia or hypercapnia


SNOMED:  15905122


(7) Severe sepsis


ICD Codes:  A41.9 - Sepsis, unspecified organism; R65.20 - Severe sepsis 

without septic shock


SNOMED:  85191829


Status:  unchanged


Status Narrative


Discussed with Dr. Pickard.


Assessment/Plan


parastomal >> no s/sx of infection.  no obstruction. 


anemia work up reviewed >> iron deficiency


G tube dependent


hepatitis panel negative


OB negative





supportive care


monitor H&H, prn transfusions


venofer


ppi


GTFs per RD, adv to goal


GT site care daily/prn


abx


bowel regime


fu labs


dc planning





The patient was seen and examined at bedside and all new and available data was 

reviewed in the patients chart. I agree with the above findings, impression 

and plan.  (Patient seen earlier today. Signature stamp does not reflect 

patient encounter time.). - Miles Pickard MD





Subjective


Subjective


limited





Objective





Last 24 Hour Vital Signs








  Date Time  Temp Pulse Resp B/P (MAP) Pulse Ox O2 Delivery O2 Flow Rate FiO2


 


8/21/18 10:48  77 16     30


 


8/21/18 09:26  83 16     30


 


8/21/18 08:00  85      


 


8/21/18 08:00 97.3 79 18 108/80 (89) 100   





 97.3       


 


8/21/18 08:00        30


 


8/21/18 08:00      Mechanical Ventilator  


 


8/21/18 06:40  81 16     30


 


8/21/18 04:46  80 16     30


 


8/21/18 04:00        30


 


8/21/18 04:00      Mechanical Ventilator  


 


8/21/18 04:00  86      


 


8/21/18 04:00 98.1 78 16 129/83 (98) 100   





 98.1       


 


8/21/18 03:12  78 16     30


 


8/21/18 01:11  84 17     30


 


8/21/18 00:00  86      


 


8/21/18 00:00      Mechanical Ventilator  


 


8/21/18 00:00 99.0 86 16 124/87 (99) 99   





 99.0       


 


8/21/18 00:00        30


 


8/20/18 22:47  89 17     30


 


8/20/18 21:23  95 20     30


 


8/20/18 20:00 98.1 91 16 115/84 (94) 99   





 98.1       


 


8/20/18 20:00  84      


 


8/20/18 20:00      Mechanical Ventilator  


 


8/20/18 20:00        30


 


8/20/18 18:30  88 16     30


 


8/20/18 17:24  85 16     30


 


8/20/18 16:00      Mechanical Ventilator  


 


8/20/18 16:00        30


 


8/20/18 16:00 97.9 85 18 117/82 (94) 96   





 97.9       


 


8/20/18 16:00  96      


 


8/20/18 15:20  88 16     30

















Intake and Output  


 


 8/20/18 8/21/18





 19:00 07:00


 


Intake Total 710 ml 650 ml


 


Output Total 825 ml 700 ml


 


Balance -115 ml -50 ml


 


  


 


Free Water 60 ml 40 ml


 


Tube Feeding 650 ml 550 ml


 


Other  60 ml


 


Output Urine Total 425 ml 350 ml


 


Stool Total 400 ml 350 ml











Laboratory Tests








Test


  8/20/18


20:20 8/21/18


04:00


 


Vancomycin Level Trough


  23.6 ug/mL


(5.0-12.0)  H 


 


 


White Blood Count


  


  8.3 K/UL


(4.8-10.8)


 


Red Blood Count


  


  5.01 M/UL


(4.70-6.10)


 


Hemoglobin


  


  14.1 G/DL


(14.2-18.0)  L


 


Hematocrit


  


  43.1 %


(42.0-52.0)


 


Mean Corpuscular Volume  86 FL (80-99)  


 


Mean Corpuscular Hemoglobin


  


  28.1 PG


(27.0-31.0)


 


Mean Corpuscular Hemoglobin


Concent 


  32.7 G/DL


(32.0-36.0)


 


Red Cell Distribution Width


  


  14.9 %


(11.6-14.8)  H


 


Platelet Count


  


  321 K/UL


(150-450)


 


Mean Platelet Volume


  


  7.5 FL


(6.5-10.1)


 


Neutrophils (%) (Auto)


  


  51.7 %


(45.0-75.0)


 


Lymphocytes (%) (Auto)


  


  25.8 %


(20.0-45.0)


 


Monocytes (%) (Auto)


  


  12.0 %


(1.0-10.0)  H


 


Eosinophils (%) (Auto)


  


  9.4 %


(0.0-3.0)  H


 


Basophils (%) (Auto)


  


  1.1 %


(0.0-2.0)


 


Sodium Level


  


  139 MMOL/L


(136-145)


 


Potassium Level


  


  4.3 MMOL/L


(3.5-5.1)


 


Chloride Level


  


  102 MMOL/L


()


 


Carbon Dioxide Level


  


  23 MMOL/L


(21-32)


 


Anion Gap


  


  14 mmol/L


(5-15)


 


Blood Urea Nitrogen


  


  19 mg/dL


(7-18)  H


 


Creatinine


  


  1.1 MG/DL


(0.55-1.30)


 


Estimat Glomerular Filtration


Rate 


  > 60 mL/min


(>60)


 


Glucose Level


  


  122 MG/DL


()  H


 


Calcium Level


  


  10.3 MG/DL


(8.5-10.1)  H


 


Random Vancomycin Level  16.5 ug/mL  








Height (Feet):  6


Height (Inches):  0.00


Weight (Pounds):  203


General Appearance:  WD/WN, no apparent distress, alert


Cardiovascular:  normal rate


Respiratory/Chest:  normal breath sounds, no respiratory distress, other - mech 

vent


Abdominal Exam:  normal bowel sounds, non tender, soft, other - colostomy


Extremities:  non-tender











Remy Lyons NP Aug 21, 2018 13:07

## 2018-08-21 NOTE — PROGRESS NOTE
DATE:  08/20/2018



NOTE:  POOR AUDIO



SUBJECTIVE:  The patient is afebrile, hemodynamically stable.  Eyes are

open, there is no eye contact.



PHYSICAL EXAMINATION:

VITAL SIGNS:  Blood pressure 125/93, pulse was 81, respirations were 20,

and temperature 97.7 degrees.

HEENT:  Eyes were normal.  ENT, mucous membranes were moist and intact.

NECK:  Supple with no JVD without lymph nodes.  Tracheostomy site is

clean.

LUNGS:  Clear without rhonchi, rales, or wheezing.  Secretions are small,

thin, and whitish.

HEART:  Normal sounds with regular beats.  There is no S3, S4, or

pericardial rub.

ABDOMEN:  Soft and nontender with normal bowel sounds.  Gastrostomy site is

clean.

EXTREMITIES:  Warm without cyanosis, clubbing, or edema.



LABORATORY AND DIAGNOSTIC DATA:  Hemoglobin is 13.8, hematocrit 43.0 with

MCV of 87, WBC of 9.0, and platelets of 306.  BUN and creatinine are 17

and 1.2, respectively.  His sodium is 137, potassium 4.0, chloride 101,

and CO2 is 22.  _____ was negative _____ 102.  Repeat between 91 to _____.

His calcium is 10.1.



IMPRESSION AND PLAN:

1. The patient has anoxic encephalopathy.  There are no cognitive

changes.  He is stable from neurological point of view.

2. The patient is respiratory dependent.  Continue with the same

respiratory setting.  We will continue with albuterol sulfate, ipratropium

bromide inhalation therapy every 6 hours.

3. The patient has tracheostomy.  Continue to monitor oxygen saturation

and bronchial secretion.

4. The patient has _____ gastrostomy feeding.  There is no gastric

residual.  Continue with Fibersource _____ mL/hour.

5. The patient has seizure disorder _____ seizure.  Continue with

levetiracetam 1000 mg b.i.d.

6. Repeat laboratory tests will be done in the morning.









  ______________________________________________

  Etienne Bloom M.D.





DR:  JAMIE

D:  08/20/2018 15:59

T:  08/21/2018 00:34

JOB#:  [id number]

CC:

## 2018-08-21 NOTE — CARDIOLOGY PROGRESS NOTE
Assessment/Plan


Status:  stable


Assessment/Plan


Assessment:


(1) Acute on chronic respiratory failure


(2) Acute on chronic renal insufficiency


(3) Severe sepsis


(4) Vegetative state


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


(6) Diabetes mellitus





Plan:


Continue antibiotics


Echocardiogram reviewed- no endocarditis, STACI unable to be performed


Patient afebrile, normal WBC otherwise


Continue trach care and tube feeds


Continue keppra for seizures 


Continue abx treatment for six weeks via PICC line until 9/15


Dispo planning





Subjective


Cardiovascular:  Reports: no symptoms


Respiratory:  Reports: no symptoms


Gastrointestinal/Abdominal:  Reports: no symptoms


Genitourinary:  Reports: no symptoms


Subjective


Afebrile vitals stable, Telemetry shows sinus rhythm. No acute events. Left 

pleural effusion unchanged. 


Ventilator settings: portex 7, AC 16, TV: 600, FiO2: 30%, PEEP: 5. GT is in 

place running glucerna 1.2 @ 65 ml/hr. No residual present. Colostomy bag in 

place.


STACI not completed, probe unable to be passed.  Patient should be treated for 

total six weeks abx for presumptive endocarditis at this juncture. Hyponatremia 

resolved, BRAYDON resolved, patient stable .





Objective





Last 24 Hour Vital Signs








  Date Time  Temp Pulse Resp B/P (MAP) Pulse Ox O2 Delivery O2 Flow Rate FiO2


 


8/21/18 10:48  77 16     30


 


8/21/18 09:26  83 16     30


 


8/21/18 08:00  85      


 


8/21/18 08:00 97.3 79 18 108/80 (89) 100   





 97.3       


 


8/21/18 08:00        30


 


8/21/18 08:00      Mechanical Ventilator  


 


8/21/18 06:40  81 16     30


 


8/21/18 04:46  80 16     30


 


8/21/18 04:00        30


 


8/21/18 04:00      Mechanical Ventilator  


 


8/21/18 04:00  86      


 


8/21/18 04:00 98.1 78 16 129/83 (98) 100   





 98.1       


 


8/21/18 03:12  78 16     30


 


8/21/18 01:11  84 17     30


 


8/21/18 00:00  86      


 


8/21/18 00:00      Mechanical Ventilator  


 


8/21/18 00:00 99.0 86 16 124/87 (99) 99   





 99.0       


 


8/21/18 00:00        30


 


8/20/18 22:47  89 17     30


 


8/20/18 21:23  95 20     30


 


8/20/18 20:00 98.1 91 16 115/84 (94) 99   





 98.1       


 


8/20/18 20:00  84      


 


8/20/18 20:00      Mechanical Ventilator  


 


8/20/18 20:00        30


 


8/20/18 18:30  88 16     30


 


8/20/18 17:24  85 16     30


 


8/20/18 16:00      Mechanical Ventilator  


 


8/20/18 16:00        30


 


8/20/18 16:00 97.9 85 18 117/82 (94) 96   





 97.9       


 


8/20/18 16:00  96      


 


8/20/18 15:20  88 16     30








General Appearance:  no apparent distress, alert, on vent


EENT:  PERRL/EOMI, normal ENT inspection


Neck:  non-tender, normal alignment


Rhythm:  NSR


Cardiovascular:  normal peripheral pulses, normal rate


Respiratory/Chest:  chest wall non-tender, lungs clear


Abdomen:  normal bowel sounds, non tender


Extremities:  normal range of motion, non-tender


Neurologic:  CNs II-XII grossly normal, no motor/sensory deficits, abnormal gait











Intake and Output  


 


 8/20/18 8/21/18





 19:00 07:00


 


Intake Total 710 ml 650 ml


 


Output Total 825 ml 700 ml


 


Balance -115 ml -50 ml


 


  


 


Free Water 60 ml 40 ml


 


Tube Feeding 650 ml 550 ml


 


Other  60 ml


 


Output Urine Total 425 ml 350 ml


 


Stool Total 400 ml 350 ml











Laboratory Tests








Test


  8/20/18


20:20 8/21/18


04:00


 


Vancomycin Level Trough


  23.6 ug/mL


(5.0-12.0)  H 


 


 


White Blood Count


  


  8.3 K/UL


(4.8-10.8)


 


Red Blood Count


  


  5.01 M/UL


(4.70-6.10)


 


Hemoglobin


  


  14.1 G/DL


(14.2-18.0)  L


 


Hematocrit


  


  43.1 %


(42.0-52.0)


 


Mean Corpuscular Volume  86 FL (80-99)  


 


Mean Corpuscular Hemoglobin


  


  28.1 PG


(27.0-31.0)


 


Mean Corpuscular Hemoglobin


Concent 


  32.7 G/DL


(32.0-36.0)


 


Red Cell Distribution Width


  


  14.9 %


(11.6-14.8)  H


 


Platelet Count


  


  321 K/UL


(150-450)


 


Mean Platelet Volume


  


  7.5 FL


(6.5-10.1)


 


Neutrophils (%) (Auto)


  


  51.7 %


(45.0-75.0)


 


Lymphocytes (%) (Auto)


  


  25.8 %


(20.0-45.0)


 


Monocytes (%) (Auto)


  


  12.0 %


(1.0-10.0)  H


 


Eosinophils (%) (Auto)


  


  9.4 %


(0.0-3.0)  H


 


Basophils (%) (Auto)


  


  1.1 %


(0.0-2.0)


 


Sodium Level


  


  139 MMOL/L


(136-145)


 


Potassium Level


  


  4.3 MMOL/L


(3.5-5.1)


 


Chloride Level


  


  102 MMOL/L


()


 


Carbon Dioxide Level


  


  23 MMOL/L


(21-32)


 


Anion Gap


  


  14 mmol/L


(5-15)


 


Blood Urea Nitrogen


  


  19 mg/dL


(7-18)  H


 


Creatinine


  


  1.1 MG/DL


(0.55-1.30)


 


Estimat Glomerular Filtration


Rate 


  > 60 mL/min


(>60)


 


Glucose Level


  


  122 MG/DL


()  H


 


Calcium Level


  


  10.3 MG/DL


(8.5-10.1)  H


 


Random Vancomycin Level  16.5 ug/mL  

















Robert Hernandez M.D. Aug 21, 2018 13:23

## 2018-08-21 NOTE — INFECTIOUS DISEASES PROG NOTE
Assessment/Plan


Sepsis, SP- 2ry to UTI and Bacteremia Blood cultures postive 4/4 bottles with 

[facility] Unclear source will need to R/O Endocarditis  Possible PNA initially 


 -u/a wbc 40-60, nit neg, leuk +2; ucx >100k E. aerogenes (S cefepime, cipro/

levo; R Zosyn)


 -BCX 4/4 E. aerogenes, prob Amp C (S cefepime, cipro/levo; R Zosyn), P. 

mirabilis ESBL (S Zosyn, Ertapenem); repeta 7/26 1/4 CoNS (suspect contaminant)

, 7/28 Staph f/u final results if CoNS may need TTE and IE work up.


  -CXR 7/27: Increased left pleural effusion, over 3 days. Overall decreased 

interstitial congestion. Right infrahilar atelectasis


 -CXR: Cardiomegaly. Mild interstitial congestion. Suspect small bilateral 

pleural effusions


 -CT abd/p: Right lower quadrant double barrel colostomy, as described. Small 

peristomal hernia contains bowel loops without evidence of obstruction or 

strangulation. Left renal staghorn calculus. Bilateral intrarenal calyceal 

calculi. Borderline hydronephrosis on the right without evidence of downstream 

obstructive lesion. May indicate mild ureteral pelvic junction obstruction. 

Somewhat atrophic left kidney. Inspissated contrast ball within the distal 

sigmoid colon. Gastrostomy in good position. Nonspecific bilateral perinephric 

fat stranding, could indicate pyelonephritis or could be chronic. Guzmán 

catheter in place. Apparent bladder wall thickening, possibly an artifact of 

under distention but cystitis is not excludable, particularly in view of mild 

perivesical fat stranding. Bilateral pulmonary parenchymal atelectasis and 

consolidation


  -Blood Cx from PICC CoNS 2/2 bottles Peripheral Neg- (May be contaminant but 

will repeat blood Cx given new leukocytosis if positive will need ECHO.


  - Blood Cx 7/30/18 - [facility] - Will need TTE for IE work up and the PICC removed.


 


PICC line infection/colonization - TTE negative. Blood cultures only positive 

from PICC. Not positive from Peripheral 


- Will repeat blood cultures x 1 to confirm clearance after PICC replaced. [place] 

suspicion for IE.


- PICC replace 8/1/18





Fever/Leukocytosis; Resolved - i recurs Re-evaluate lines, Diarrhea? C. dif? 


BRAYDON, improving


Lactic acidosis, resolved





chronic resp failure trach/vent dependant


BPH


GERD


 HTN


DM2


seizure disorder


colostomy


 s/p GT 


constipation





VRE and MRSA colonized


 


Plan:





- Continue empiric IV Vancomycin #22/42 for bacteremia/line infection cant r/o 

Endocarditis  


   -Abx end date 9/12/18 if STACI attempted again and negative then ed date would 

be 8/15/18 


       -weekly CBC, BMP, vanco through


    -8/9 SP Ertapenem #14


    -7/27 SP Zosyn #4








STACI could not be performed due to technical problems


   - Unable to repeat STACI so patient should be treated for presumed 

endocarditis for 6 weeks








- Monitor CBC/CMP, temperatures


- Pulmonary care








Thank you for this consultation. Will continue to follow along with you.





Subjective


Allergies:  


Coded Allergies:  


     AZTREONAM (Verified  Allergy, Unknown, 7/23/18)


Subjective


afebrile


no leukocytosis





Objective


Vital Signs





Last 24 Hour Vital Signs








  Date Time  Temp Pulse Resp B/P (MAP) Pulse Ox O2 Delivery O2 Flow Rate FiO2


 


8/21/18 10:48  77 16     30


 


8/21/18 09:26  83 16     30


 


8/21/18 08:00  85      


 


8/21/18 08:00 97.3 79 18 108/80 (89) 100   





 97.3       


 


8/21/18 08:00        30


 


8/21/18 08:00      Mechanical Ventilator  


 


8/21/18 06:40  81 16     30


 


8/21/18 04:46  80 16     30


 


8/21/18 04:00        30


 


8/21/18 04:00      Mechanical Ventilator  


 


8/21/18 04:00  86      


 


8/21/18 04:00 98.1 78 16 129/83 (98) 100   





 98.1       


 


8/21/18 03:12  78 16     30


 


8/21/18 01:11  84 17     30


 


8/21/18 00:00  86      


 


8/21/18 00:00      Mechanical Ventilator  


 


8/21/18 00:00 99.0 86 16 124/87 (99) 99   





 99.0       


 


8/21/18 00:00        30


 


8/20/18 22:47  89 17     30


 


8/20/18 21:23  95 20     30


 


8/20/18 20:00 98.1 91 16 115/84 (94) 99   





 98.1       


 


8/20/18 20:00  84      


 


8/20/18 20:00      Mechanical Ventilator  


 


8/20/18 20:00        30


 


8/20/18 18:30  88 16     30


 


8/20/18 17:24  85 16     30


 


8/20/18 16:00      Mechanical Ventilator  


 


8/20/18 16:00        30


 


8/20/18 16:00 97.9 85 18 117/82 (94) 96   





 97.9       


 


8/20/18 16:00  96      


 


8/20/18 15:20  88 16     30


 


8/20/18 12:31  91 16     30








Height (Feet):  6


Height (Inches):  0.00


Weight (Pounds):  203


Objective


GENERAL:  The patient is awake, in no overt distress.


HEENT:  Extraocular muscles intact.  No lymphadenopathy noted.


Tracheostomy noted.


CARDIOVASCULAR:  S1 and S2.  Tachycardic.  No rubs or gallops.


PULMONARY:  Mild diffuse expiratory rhonchi with basilar rales.


ABDOMEN:  Obese and nondistended.  The G-tube and stoma noted.


EXTREMITIES:  No edema.  Fair pedal pulses.





Laboratory Tests








Test


  8/20/18


20:20 8/21/18


04:00


 


Vancomycin Level Trough


  23.6 ug/mL


(5.0-12.0)  H 


 


 


White Blood Count


  


  8.3 K/UL


(4.8-10.8)


 


Red Blood Count


  


  5.01 M/UL


(4.70-6.10)


 


Hemoglobin


  


  14.1 G/DL


(14.2-18.0)  L


 


Hematocrit


  


  43.1 %


(42.0-52.0)


 


Mean Corpuscular Volume  86 FL (80-99)  


 


Mean Corpuscular Hemoglobin


  


  28.1 PG


(27.0-31.0)


 


Mean Corpuscular Hemoglobin


Concent 


  32.7 G/DL


(32.0-36.0)


 


Red Cell Distribution Width


  


  14.9 %


(11.6-14.8)  H


 


Platelet Count


  


  321 K/UL


(150-450)


 


Mean Platelet Volume


  


  7.5 FL


(6.5-10.1)


 


Neutrophils (%) (Auto)


  


  51.7 %


(45.0-75.0)


 


Lymphocytes (%) (Auto)


  


  25.8 %


(20.0-45.0)


 


Monocytes (%) (Auto)


  


  12.0 %


(1.0-10.0)  H


 


Eosinophils (%) (Auto)


  


  9.4 %


(0.0-3.0)  H


 


Basophils (%) (Auto)


  


  1.1 %


(0.0-2.0)


 


Sodium Level


  


  139 MMOL/L


(136-145)


 


Potassium Level


  


  4.3 MMOL/L


(3.5-5.1)


 


Chloride Level


  


  102 MMOL/L


()


 


Carbon Dioxide Level


  


  23 MMOL/L


(21-32)


 


Anion Gap


  


  14 mmol/L


(5-15)


 


Blood Urea Nitrogen


  


  19 mg/dL


(7-18)  H


 


Creatinine


  


  1.1 MG/DL


(0.55-1.30)


 


Estimat Glomerular Filtration


Rate 


  > 60 mL/min


(>60)


 


Glucose Level


  


  122 MG/DL


()  H


 


Calcium Level


  


  10.3 MG/DL


(8.5-10.1)  H


 


Random Vancomycin Level  16.5 ug/mL  











Current Medications








 Medications


  (Trade)  Dose


 Ordered  Sig/Jan


 Route


 PRN Reason  Start Time


 Stop Time Status Last Admin


Dose Admin


 


 Acetaminophen


  (Tylenol)  650 mg  Q4H  PRN


 ORAL


 FEVER  8/18/18 21:00


 9/15/18 20:59   


 


 


 Baclofen


  (Lioresal)  10 mg  EVERY 8  HOURS


 GT


   8/18/18 22:00


 9/11/18 20:59  8/21/18 05:06


 


 


 Chlorhexidine


 Gluconate


  (Elaine-Hex 2%)  1 applic  DAILY@2000


 TOPIC


   8/18/18 22:00


 9/17/18 21:59  8/20/18 20:08


 


 


 Dextrose


  (Dextrose 50%)  25 ml  STAT  PRN


 IV


 Hypoglycemia  8/18/18 21:45


 9/15/18 21:44   


 


 


 Dextrose


  (Dextrose 50%)  50 ml  STAT  PRN


 IV


 Hypoglycemia  8/18/18 21:45


 9/15/18 21:44   


 


 


 Heparin Sodium


  (Porcine)


  (Heparin 5000


 units/ml)  5,000 units  EVERY 12  HOURS


 SUBQ


   8/18/18 22:00


 9/17/18 21:59  8/21/18 09:08


 


 


 Insulin Aspart


  (NovoLOG)    Q6HR


 SUBQ


   8/19/18 00:00


 9/16/18 17:29  8/21/18 11:50


 


 


 Lansoprazole


  (Prevacid)  30 mg  DAILY


 GT


   8/19/18 09:00


 9/17/18 08:59  8/21/18 09:02


 


 


 Levetiracetam


  (Keppra)  1,000 mg  Q8HR


 GT


   8/18/18 22:00


 9/15/18 20:59  8/21/18 05:06


 


 


 Ondansetron HCl


  (Zofran)  4 mg  Q6H  PRN


 IVP


 Nausea & Vomiting  8/18/18 22:00


 9/15/18 15:59   


 


 


 Polyethylene


 Glycol


  (Miralax)  17 gm  BEDTIME


 GT


   8/18/18 22:00


 9/17/18 21:59  8/19/18 21:25


 


 


 Vancomycin HCl


  (Vanco rx to


 dose)  1 ea  DAILY  PRN


 MISC


 PER RX PROTOCOL  8/18/18 21:45


 9/17/18 21:44   


 


 


 Vancomycin HCl/


 Dextrose  250 ml @ 


 125 mls/hr  Q24H


 IVPB


   8/21/18 07:00


 8/26/18 06:59  8/21/18 06:52


 

















Joy Love M.D. Aug 21, 2018 12:19

## 2018-08-22 VITALS — SYSTOLIC BLOOD PRESSURE: 125 MMHG | DIASTOLIC BLOOD PRESSURE: 92 MMHG

## 2018-08-22 VITALS — DIASTOLIC BLOOD PRESSURE: 78 MMHG | SYSTOLIC BLOOD PRESSURE: 114 MMHG

## 2018-08-22 VITALS — SYSTOLIC BLOOD PRESSURE: 127 MMHG | DIASTOLIC BLOOD PRESSURE: 88 MMHG

## 2018-08-22 VITALS — DIASTOLIC BLOOD PRESSURE: 79 MMHG | SYSTOLIC BLOOD PRESSURE: 113 MMHG

## 2018-08-22 VITALS — SYSTOLIC BLOOD PRESSURE: 114 MMHG | DIASTOLIC BLOOD PRESSURE: 76 MMHG

## 2018-08-22 VITALS — SYSTOLIC BLOOD PRESSURE: 125 MMHG | DIASTOLIC BLOOD PRESSURE: 88 MMHG

## 2018-08-22 LAB
ADD MANUAL DIFF: NO
ANION GAP SERPL CALC-SCNC: 14 MMOL/L (ref 5–15)
BASOPHILS NFR BLD AUTO: 1.4 % (ref 0–2)
BUN SERPL-MCNC: 19 MG/DL (ref 7–18)
CALCIUM SERPL-MCNC: 10.6 MG/DL (ref 8.5–10.1)
CHLORIDE SERPL-SCNC: 101 MMOL/L (ref 98–107)
CO2 SERPL-SCNC: 23 MMOL/L (ref 21–32)
CREAT SERPL-MCNC: 1.1 MG/DL (ref 0.55–1.3)
EOSINOPHIL NFR BLD AUTO: 9.9 % (ref 0–3)
ERYTHROCYTE [DISTWIDTH] IN BLOOD BY AUTOMATED COUNT: 14.9 % (ref 11.6–14.8)
HCT VFR BLD CALC: 44.3 % (ref 42–52)
HGB BLD-MCNC: 14.4 G/DL (ref 14.2–18)
LYMPHOCYTES NFR BLD AUTO: 28.5 % (ref 20–45)
MCV RBC AUTO: 87 FL (ref 80–99)
MONOCYTES NFR BLD AUTO: 9.7 % (ref 1–10)
NEUTROPHILS NFR BLD AUTO: 50.5 % (ref 45–75)
PLATELET # BLD: 313 K/UL (ref 150–450)
POTASSIUM SERPL-SCNC: 3.9 MMOL/L (ref 3.5–5.1)
RBC # BLD AUTO: 5.09 M/UL (ref 4.7–6.1)
SODIUM SERPL-SCNC: 138 MMOL/L (ref 136–145)
WBC # BLD AUTO: 6.9 K/UL (ref 4.8–10.8)

## 2018-08-22 RX ADMIN — INSULIN ASPART SCH UNITS: 100 INJECTION, SOLUTION INTRAVENOUS; SUBCUTANEOUS at 05:45

## 2018-08-22 RX ADMIN — INSULIN ASPART SCH UNITS: 100 INJECTION, SOLUTION INTRAVENOUS; SUBCUTANEOUS at 11:35

## 2018-08-22 RX ADMIN — HEPARIN SODIUM SCH UNITS: 5000 INJECTION INTRAVENOUS; SUBCUTANEOUS at 20:49

## 2018-08-22 RX ADMIN — LEVETIRACETAM SCH MG: 100 SOLUTION ORAL at 05:45

## 2018-08-22 RX ADMIN — POLYETHYLENE GLYCOL 3350 SCH GM: 17 POWDER, FOR SOLUTION ORAL at 20:47

## 2018-08-22 RX ADMIN — INSULIN ASPART SCH UNITS: 100 INJECTION, SOLUTION INTRAVENOUS; SUBCUTANEOUS at 18:02

## 2018-08-22 RX ADMIN — CHLORHEXIDINE GLUCONATE SCH APPLIC: 213 SOLUTION TOPICAL at 20:47

## 2018-08-22 RX ADMIN — HEPARIN SODIUM SCH UNITS: 5000 INJECTION INTRAVENOUS; SUBCUTANEOUS at 09:09

## 2018-08-22 RX ADMIN — Medication SCH MLS/HR: at 06:26

## 2018-08-22 RX ADMIN — LEVETIRACETAM SCH MG: 100 SOLUTION ORAL at 22:46

## 2018-08-22 RX ADMIN — LEVETIRACETAM SCH MG: 100 SOLUTION ORAL at 16:47

## 2018-08-22 NOTE — GI PROGRESS NOTE
Assessment/Plan


Problems:  


(1) Parastomal hernia


ICD Codes:  K43.5 - Parastomal hernia without obstruction or gangrene


SNOMED:  089842654


Qualifiers:  


   Qualified Codes:  K43.5 - Parastomal hernia without obstruction or gangrene


(2) Stoma malfunction


SNOMED:  878084802


(3) Colostomy in place


ICD Codes:  Z93.3 - Colostomy status


SNOMED:  307260912, 708644323


(4) Vegetative state


ICD Codes:  R40.3 - Persistent vegetative state


SNOMED:  67297892


(5) Diabetes mellitus


ICD Codes:  E11.9 - Type 2 diabetes mellitus without complications


SNOMED:  23520791


(6) Acute on chronic respiratory failure


ICD Codes:  J96.20 - Acute and chronic respiratory failure, unspecified whether 

with hypoxia or hypercapnia


SNOMED:  80889928


(7) Severe sepsis


ICD Codes:  A41.9 - Sepsis, unspecified organism; R65.20 - Severe sepsis 

without septic shock


SNOMED:  88045686


Status:  stable


Status Narrative


Discussed with Dr. Pickrad.


Assessment/Plan


parastomal >> no s/sx of infection.  no obstruction. 


anemia work up reviewed >> iron deficiency


G tube dependent


hepatitis panel negative


OB negative





supportive care


monitor H&H, prn transfusions


venofer


ppi


GTFs per RD, adv to goal


GT site care daily/prn


abx


bowel regime


fu labs


dc planning





The patient was seen and examined at bedside and all new and available data was 

reviewed in the patients chart. I agree with the above findings, impression 

and plan.  (Patient seen earlier today. Signature stamp does not reflect 

patient encounter time.). - Miles Pickard MD





Subjective


Subjective


limited





Objective





Last 24 Hour Vital Signs








  Date Time  Temp Pulse Resp B/P (MAP) Pulse Ox O2 Delivery O2 Flow Rate FiO2


 


8/22/18 11:51 96.7 76 18 114/78 (90) 96   





 96.7       


 


8/22/18 10:36  83 16     30


 


8/22/18 09:15  84 17     30


 


8/22/18 08:00 96.6 87 16 113/79 (90) 100   





 96.6       


 


8/22/18 08:00        30


 


8/22/18 08:00      Mechanical Ventilator  


 


8/22/18 08:00  81      


 


8/22/18 07:00  82 17     30


 


8/22/18 05:06  93 17     30


 


8/22/18 04:00      Mechanical Ventilator  


 


8/22/18 04:00  80      


 


8/22/18 04:00 97.3 94 20 127/88 (101) 100   





 97.3       


 


8/22/18 04:00        30


 


8/22/18 03:23  82 16     30


 


8/22/18 01:26  82 17     30


 


8/22/18 00:00 97.7 78 18 114/76 (89) 100   





 97.7       


 


8/22/18 00:00      Mechanical Ventilator  


 


8/22/18 00:00        30


 


8/21/18 23:11  71 17     30


 


8/21/18 21:04  73 16     30


 


8/21/18 20:00  77      


 


8/21/18 20:00  78 17     30


 


8/21/18 20:00 97.7 76 18 118/80 (93) 100   





 97.7       


 


8/21/18 20:00      Mechanical Ventilator  


 


8/21/18 20:00        30


 


8/21/18 17:28  79 17     30


 


8/21/18 16:00      Mechanical Ventilator  


 


8/21/18 16:00 98.0 80 16 118/82 (94) 100   





 98.0       


 


8/21/18 16:00  78      


 


8/21/18 16:00        30


 


8/21/18 14:55  83 16     30


 


8/21/18 13:16  76 16     30


 


8/21/18 12:00        30


 


8/21/18 12:00      Mechanical Ventilator  


 


8/21/18 12:00 97.2 79 18 128/83 (98) 100   





 97.2       

















Intake and Output  


 


 8/21/18 8/22/18





 19:00 07:00


 


Intake Total 670 ml 670 ml


 


Output Total 675 ml 650 ml


 


Balance -5 ml 20 ml


 


  


 


Free Water 70 ml 120 ml


 


Tube Feeding 600 ml 550 ml


 


Output Urine Total 275 ml 250 ml


 


Stool Total 400 ml 400 ml


 


# Bowel Movements 2 











Laboratory Tests








Test


  8/22/18


04:00


 


White Blood Count


  6.9 K/UL


(4.8-10.8)


 


Red Blood Count


  5.09 M/UL


(4.70-6.10)


 


Hemoglobin


  14.4 G/DL


(14.2-18.0)


 


Hematocrit


  44.3 %


(42.0-52.0)


 


Mean Corpuscular Volume 87 FL (80-99)  


 


Mean Corpuscular Hemoglobin


  28.3 PG


(27.0-31.0)


 


Mean Corpuscular Hemoglobin


Concent 32.5 G/DL


(32.0-36.0)


 


Red Cell Distribution Width


  14.9 %


(11.6-14.8)  H


 


Platelet Count


  313 K/UL


(150-450)


 


Mean Platelet Volume


  7.6 FL


(6.5-10.1)


 


Neutrophils (%) (Auto)


  50.5 %


(45.0-75.0)


 


Lymphocytes (%) (Auto)


  28.5 %


(20.0-45.0)


 


Monocytes (%) (Auto)


  9.7 %


(1.0-10.0)


 


Eosinophils (%) (Auto)


  9.9 %


(0.0-3.0)  H


 


Basophils (%) (Auto)


  1.4 %


(0.0-2.0)


 


Sodium Level


  138 MMOL/L


(136-145)


 


Potassium Level


  3.9 MMOL/L


(3.5-5.1)


 


Chloride Level


  101 MMOL/L


()


 


Carbon Dioxide Level


  23 MMOL/L


(21-32)


 


Anion Gap


  14 mmol/L


(5-15)


 


Blood Urea Nitrogen


  19 mg/dL


(7-18)  H


 


Creatinine


  1.1 MG/DL


(0.55-1.30)


 


Estimat Glomerular Filtration


Rate > 60 mL/min


(>60)


 


Glucose Level


  113 MG/DL


()  H


 


Calcium Level


  10.6 MG/DL


(8.5-10.1)  H








Height (Feet):  6


Height (Inches):  0.00


Weight (Pounds):  196


General Appearance:  WD/WN, no apparent distress, alert


Cardiovascular:  normal rate


Respiratory/Chest:  normal breath sounds, no respiratory distress, other - mech 

vent


Abdominal Exam:  normal bowel sounds, non tender, soft, GT site - c/d/i, other 

- colostomy


Extremities:  normal range of motion, non-tender











Remy Lyons NP Aug 22, 2018 11:55

## 2018-08-22 NOTE — INFECTIOUS DISEASES PROG NOTE
Assessment/Plan


Sepsis, SP- 2ry to UTI and Bacteremia Blood cultures postive 4/4 bottles with 

GPC Unclear source will need to R/O Endocarditis  Possible PNA initially 


 -u/a wbc 40-60, nit neg, leuk +2; ucx >100k E. aerogenes (S cefepime, cipro/

levo; R Zosyn)


 -BCX 4/4 E. aerogenes, prob Amp C (S cefepime, cipro/levo; R Zosyn), P. 

mirabilis ESBL (S Zosyn, Ertapenem); repeta 7/26 1/4 CoNS (suspect contaminant)

, 7/28 Staph f/u final results if CoNS may need TTE and IE work up.


  -CXR 7/27: Increased left pleural effusion, over 3 days. Overall decreased 

interstitial congestion. Right infrahilar atelectasis


 -CXR: Cardiomegaly. Mild interstitial congestion. Suspect small bilateral 

pleural effusions


 -CT abd/p: Right lower quadrant double barrel colostomy, as described. Small 

peristomal hernia contains bowel loops without evidence of obstruction or 

strangulation. Left renal staghorn calculus. Bilateral intrarenal calyceal 

calculi. Borderline hydronephrosis on the right without evidence of downstream 

obstructive lesion. May indicate mild ureteral pelvic junction obstruction. 

Somewhat atrophic left kidney. Inspissated contrast ball within the distal 

sigmoid colon. Gastrostomy in good position. Nonspecific bilateral perinephric 

fat stranding, could indicate pyelonephritis or could be chronic. Guzmán 

catheter in place. Apparent bladder wall thickening, possibly an artifact of 

under distention but cystitis is not excludable, particularly in view of mild 

perivesical fat stranding. Bilateral pulmonary parenchymal atelectasis and 

consolidation


  -Blood Cx from PICC CoNS 2/2 bottles Peripheral Neg- (May be contaminant but 

will repeat blood Cx given new leukocytosis if positive will need ECHO.


  - Blood Cx 7/30/18 - GPC - Will need TTE for IE work up and the PICC removed.


 


PICC line infection/colonization - TTE negative. Blood cultures only positive 

from PICC. Not positive from Peripheral 


- Will repeat blood cultures x 1 to confirm clearance after PICC replaced. [place] 

suspicion for IE.


- PICC replace 8/1/18





Fever/Leukocytosis; Resolved - i recurs Re-evaluate lines, Diarrhea? C. dif? 


BRAYDON, improving


Lactic acidosis, resolved





chronic resp failure trach/vent dependant


BPH


GERD


 HTN


DM2


seizure disorder


colostomy


 s/p GT 


constipation





VRE and MRSA colonized


 


Plan:





- Continue empiric IV Vancomycin #23/42 for bacteremia/line infection cant r/o 

Endocarditis  


   -Abx end date 9/12/18 if STACI attempted again and negative then ed date would 

be 8/15/18 


       -weekly CBC, BMP, vanco through


    -8/9 SP Ertapenem #14


    -7/27 SP Zosyn #4








STACI could not be performed due to technical problems


   - Unable to repeat STACI so patient should be treated for presumed 

endocarditis for 6 weeks








- Monitor CBC/CMP, temperatures


- Pulmonary care








Thank you for this consultation. Will continue to follow along with you.





Subjective


Allergies:  


Coded Allergies:  


     AZTREONAM (Verified  Allergy, Unknown, 7/23/18)


Subjective


afebrile


no leukocytosis





Objective


Vital Signs





Last 24 Hour Vital Signs








  Date Time  Temp Pulse Resp B/P (MAP) Pulse Ox O2 Delivery O2 Flow Rate FiO2


 


8/22/18 15:00  86 17     30


 


8/22/18 13:28  82 16     30


 


8/22/18 12:02        30


 


8/22/18 12:00      Mechanical Ventilator  


 


8/22/18 12:00  81      


 


8/22/18 11:51 96.7 76 18 114/78 (90) 96   





 96.7       


 


8/22/18 10:36  83 16     30


 


8/22/18 09:15  84 17     30


 


8/22/18 08:00 96.6 87 16 113/79 (90) 100   





 96.6       


 


8/22/18 08:00        30


 


8/22/18 08:00      Mechanical Ventilator  


 


8/22/18 08:00  81      


 


8/22/18 07:00  82 17     30


 


8/22/18 05:06  93 17     30


 


8/22/18 04:00      Mechanical Ventilator  


 


8/22/18 04:00  80      


 


8/22/18 04:00 97.3 94 20 127/88 (101) 100   





 97.3       


 


8/22/18 04:00        30


 


8/22/18 03:23  82 16     30


 


8/22/18 01:26  82 17     30


 


8/22/18 00:00 97.7 78 18 114/76 (89) 100   





 97.7       


 


8/22/18 00:00      Mechanical Ventilator  


 


8/22/18 00:00        30


 


8/21/18 23:11  71 17     30


 


8/21/18 21:04  73 16     30


 


8/21/18 20:00  77      


 


8/21/18 20:00  78 17     30


 


8/21/18 20:00 97.7 76 18 118/80 (93) 100   





 97.7       


 


8/21/18 20:00      Mechanical Ventilator  


 


8/21/18 20:00        30


 


8/21/18 17:28  79 17     30








Height (Feet):  6


Height (Inches):  0.00


Weight (Pounds):  196


Objective


GENERAL:  The patient is awake, in no overt distress.


HEENT:  Extraocular muscles intact.  No lymphadenopathy noted.


Tracheostomy noted.


CARDIOVASCULAR:  S1 and S2.  Tachycardic.  No rubs or gallops.


PULMONARY:  Mild diffuse expiratory rhonchi with basilar rales.


ABDOMEN:  Obese and nondistended.  The G-tube and stoma noted.


EXTREMITIES:  No edema.  Fair pedal pulses.





Laboratory Tests








Test


  8/22/18


04:00


 


White Blood Count


  6.9 K/UL


(4.8-10.8)


 


Red Blood Count


  5.09 M/UL


(4.70-6.10)


 


Hemoglobin


  14.4 G/DL


(14.2-18.0)


 


Hematocrit


  44.3 %


(42.0-52.0)


 


Mean Corpuscular Volume 87 FL (80-99)  


 


Mean Corpuscular Hemoglobin


  28.3 PG


(27.0-31.0)


 


Mean Corpuscular Hemoglobin


Concent 32.5 G/DL


(32.0-36.0)


 


Red Cell Distribution Width


  14.9 %


(11.6-14.8)  H


 


Platelet Count


  313 K/UL


(150-450)


 


Mean Platelet Volume


  7.6 FL


(6.5-10.1)


 


Neutrophils (%) (Auto)


  50.5 %


(45.0-75.0)


 


Lymphocytes (%) (Auto)


  28.5 %


(20.0-45.0)


 


Monocytes (%) (Auto)


  9.7 %


(1.0-10.0)


 


Eosinophils (%) (Auto)


  9.9 %


(0.0-3.0)  H


 


Basophils (%) (Auto)


  1.4 %


(0.0-2.0)


 


Sodium Level


  138 MMOL/L


(136-145)


 


Potassium Level


  3.9 MMOL/L


(3.5-5.1)


 


Chloride Level


  101 MMOL/L


()


 


Carbon Dioxide Level


  23 MMOL/L


(21-32)


 


Anion Gap


  14 mmol/L


(5-15)


 


Blood Urea Nitrogen


  19 mg/dL


(7-18)  H


 


Creatinine


  1.1 MG/DL


(0.55-1.30)


 


Estimat Glomerular Filtration


Rate > 60 mL/min


(>60)


 


Glucose Level


  113 MG/DL


()  H


 


Calcium Level


  10.6 MG/DL


(8.5-10.1)  H











Current Medications








 Medications


  (Trade)  Dose


 Ordered  Sig/Jan


 Route


 PRN Reason  Start Time


 Stop Time Status Last Admin


Dose Admin


 


 Acetaminophen


  (Tylenol)  650 mg  Q4H  PRN


 ORAL


 FEVER  8/18/18 21:00


 9/15/18 20:59   


 


 


 Baclofen


  (Lioresal)  10 mg  EVERY 8  HOURS


 GT


   8/18/18 22:00


 9/11/18 20:59  8/22/18 05:45


 


 


 Chlorhexidine


 Gluconate


  (Elaine-Hex 2%)  1 applic  DAILY@2000


 TOPIC


   8/18/18 22:00


 9/17/18 21:59  8/21/18 21:35


 


 


 Dextrose


  (Dextrose 50%)  25 ml  STAT  PRN


 IV


 Hypoglycemia  8/18/18 21:45


 9/15/18 21:44   


 


 


 Dextrose


  (Dextrose 50%)  50 ml  STAT  PRN


 IV


 Hypoglycemia  8/18/18 21:45


 9/15/18 21:44   


 


 


 Heparin Sodium


  (Porcine)


  (Heparin 5000


 units/ml)  5,000 units  EVERY 12  HOURS


 SUBQ


   8/18/18 22:00


 9/17/18 21:59  8/22/18 09:09


 


 


 Insulin Aspart


  (NovoLOG)    Q6HR


 SUBQ


   8/19/18 00:00


 9/16/18 17:29  8/21/18 23:50


 


 


 Lansoprazole


  (Prevacid)  30 mg  DAILY


 GT


   8/19/18 09:00


 9/17/18 08:59  8/22/18 09:08


 


 


 Levetiracetam


  (Keppra)  1,000 mg  Q8HR


 GT


   8/18/18 22:00


 9/15/18 20:59  8/22/18 05:45


 


 


 Ondansetron HCl


  (Zofran)  4 mg  Q6H  PRN


 IVP


 Nausea & Vomiting  8/18/18 22:00


 9/15/18 15:59   


 


 


 Polyethylene


 Glycol


  (Miralax)  17 gm  BEDTIME


 GT


   8/18/18 22:00


 9/17/18 21:59  8/21/18 21:34


 


 


 Vancomycin HCl


  (Vanco rx to


 dose)  1 ea  DAILY  PRN


 MISC


 PER RX PROTOCOL  8/18/18 21:45


 9/17/18 21:44   


 


 


 Vancomycin HCl/


 Dextrose  250 ml @ 


 125 mls/hr  Q24H


 IVPB


   8/21/18 07:00


 8/26/18 06:59  8/22/18 06:26


 

















Joy Love M.D. Aug 22, 2018 16:07

## 2018-08-22 NOTE — CARDIOLOGY PROGRESS NOTE
Assessment/Plan


Status:  stable


Assessment/Plan


Assessment:


(1) Acute on chronic respiratory failure


(2) Acute on chronic renal insufficiency


(3) Severe sepsis


(4) Vegetative state


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


(6) Diabetes mellitus





Plan:


Continue antibiotics


Echocardiogram reviewed- no endocarditis, STACI unable to be performed


Patient afebrile, normal WBC otherwise


Continue trach care and tube feeds


Continue keppra for seizures 


Continue abx treatment for six weeks via PICC line until 9/15


Dispo planning





Subjective


Cardiovascular:  Reports: no symptoms


Respiratory:  Reports: no symptoms


Gastrointestinal/Abdominal:  Reports: no symptoms


Genitourinary:  Reports: no symptoms


Subjective


Afebrile vitals stable, Telemetry shows sinus rhythm. No acute events. Left 

pleural effusion unchanged. 


Ventilator settings: portex 7, AC 16, TV: 600, FiO2: 30%, PEEP: 5. GT is in 

place running glucerna 1.2 @ 65 ml/hr. No residual present. Colostomy bag in 

place.


STACI not completed, probe unable to be passed.  Patient should be treated for 

total six weeks abx for presumptive endocarditis at this juncture. Hyponatremia 

resolved, BRAYDON resolved, patient stable .





Objective





Last 24 Hour Vital Signs








  Date Time  Temp Pulse Resp B/P (MAP) Pulse Ox O2 Delivery O2 Flow Rate FiO2


 


8/22/18 15:00  86 17     30


 


8/22/18 13:28  82 16     30


 


8/22/18 12:02        30


 


8/22/18 12:00      Mechanical Ventilator  


 


8/22/18 12:00  81      


 


8/22/18 11:51 96.7 76 18 114/78 (90) 96   





 96.7       


 


8/22/18 10:36  83 16     30


 


8/22/18 09:15  84 17     30


 


8/22/18 08:00 96.6 87 16 113/79 (90) 100   





 96.6       


 


8/22/18 08:00        30


 


8/22/18 08:00      Mechanical Ventilator  


 


8/22/18 08:00  81      


 


8/22/18 07:00  82 17     30


 


8/22/18 05:06  93 17     30


 


8/22/18 04:00      Mechanical Ventilator  


 


8/22/18 04:00  80      


 


8/22/18 04:00 97.3 94 20 127/88 (101) 100   





 97.3       


 


8/22/18 04:00        30


 


8/22/18 03:23  82 16     30


 


8/22/18 01:26  82 17     30


 


8/22/18 00:00 97.7 78 18 114/76 (89) 100   





 97.7       


 


8/22/18 00:00      Mechanical Ventilator  


 


8/22/18 00:00        30


 


8/21/18 23:11  71 17     30


 


8/21/18 21:04  73 16     30


 


8/21/18 20:00  77      


 


8/21/18 20:00  78 17     30


 


8/21/18 20:00 97.7 76 18 118/80 (93) 100   





 97.7       


 


8/21/18 20:00      Mechanical Ventilator  


 


8/21/18 20:00        30


 


8/21/18 17:28  79 17     30








General Appearance:  no apparent distress, severe distress, on vent


EENT:  PERRL/EOMI, normal ENT inspection


Neck:  non-tender, normal alignment, supple


Rhythm:  NSR


Cardiovascular:  normal peripheral pulses, normal rate, regular rhythm


Respiratory/Chest:  chest wall non-tender, lungs clear


Abdomen:  normal bowel sounds, non tender


Extremities:  normal range of motion, non-tender


Neurologic:  CNs II-XII grossly normal











Intake and Output  


 


 8/21/18 8/22/18





 19:00 07:00


 


Intake Total 670 ml 740 ml


 


Output Total 675 ml 650 ml


 


Balance -5 ml 90 ml


 


  


 


Free Water 70 ml 140 ml


 


Tube Feeding 600 ml 600 ml


 


Output Urine Total 275 ml 250 ml


 


Stool Total 400 ml 400 ml


 


# Bowel Movements 2 











Laboratory Tests








Test


  8/22/18


04:00


 


White Blood Count


  6.9 K/UL


(4.8-10.8)


 


Red Blood Count


  5.09 M/UL


(4.70-6.10)


 


Hemoglobin


  14.4 G/DL


(14.2-18.0)


 


Hematocrit


  44.3 %


(42.0-52.0)


 


Mean Corpuscular Volume 87 FL (80-99)  


 


Mean Corpuscular Hemoglobin


  28.3 PG


(27.0-31.0)


 


Mean Corpuscular Hemoglobin


Concent 32.5 G/DL


(32.0-36.0)


 


Red Cell Distribution Width


  14.9 %


(11.6-14.8)  H


 


Platelet Count


  313 K/UL


(150-450)


 


Mean Platelet Volume


  7.6 FL


(6.5-10.1)


 


Neutrophils (%) (Auto)


  50.5 %


(45.0-75.0)


 


Lymphocytes (%) (Auto)


  28.5 %


(20.0-45.0)


 


Monocytes (%) (Auto)


  9.7 %


(1.0-10.0)


 


Eosinophils (%) (Auto)


  9.9 %


(0.0-3.0)  H


 


Basophils (%) (Auto)


  1.4 %


(0.0-2.0)


 


Sodium Level


  138 MMOL/L


(136-145)


 


Potassium Level


  3.9 MMOL/L


(3.5-5.1)


 


Chloride Level


  101 MMOL/L


()


 


Carbon Dioxide Level


  23 MMOL/L


(21-32)


 


Anion Gap


  14 mmol/L


(5-15)


 


Blood Urea Nitrogen


  19 mg/dL


(7-18)  H


 


Creatinine


  1.1 MG/DL


(0.55-1.30)


 


Estimat Glomerular Filtration


Rate > 60 mL/min


(>60)


 


Glucose Level


  113 MG/DL


()  H


 


Calcium Level


  10.6 MG/DL


(8.5-10.1)  H

















Robert Hernandez M.D. Aug 22, 2018 16:38

## 2018-08-22 NOTE — PULMONOLGY CRITICAL CARE NOTE
Critical Care - Asmt/Plan


Problems:  


(1) Acute on chronic respiratory failure


(2) Acute on chronic renal insufficiency


(3) Severe sepsis


(4) Vegetative state


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


(6) Diabetes mellitus


Respiratory:  monitor respiratory rate, adjust FIO2, CXR


Cardiac:  continue to monitor HR/BP


Renal:  F/U I&O


Infectious Disease:  check cultures


Gastrointestinal:  continue feedings/current rate


Endocrine:  monitor blood sugar


Neurologic:  PRN Ativan, PRN Morphine


Affect:  PRN ativan


Disposition:  keep in ICU


Time Spent (Minutes):  40





Critical Care - Objective





Last 24 Hour Vital Signs








  Date Time  Temp Pulse Resp B/P (MAP) Pulse Ox O2 Delivery O2 Flow Rate FiO2


 


8/22/18 09:15  84 17     30


 


8/22/18 08:00 96.6 87 16 113/79 (90) 100   





 96.6       


 


8/22/18 08:00        30


 


8/22/18 08:00      Mechanical Ventilator  


 


8/22/18 08:00  81      


 


8/22/18 07:00  82 17     30


 


8/22/18 05:06  93 17     30


 


8/22/18 04:00      Mechanical Ventilator  


 


8/22/18 04:00  80      


 


8/22/18 04:00 97.3 94 20 127/88 (101) 100   





 97.3       


 


8/22/18 04:00        30


 


8/22/18 03:23  82 16     30


 


8/22/18 01:26  82 17     30


 


8/22/18 00:00 97.7 78 18 114/76 (89) 100   





 97.7       


 


8/22/18 00:00      Mechanical Ventilator  


 


8/22/18 00:00        30


 


8/21/18 23:11  71 17     30


 


8/21/18 21:04  73 16     30


 


8/21/18 20:00  77      


 


8/21/18 20:00  78 17     30


 


8/21/18 20:00 97.7 76 18 118/80 (93) 100   





 97.7       


 


8/21/18 20:00      Mechanical Ventilator  


 


8/21/18 20:00        30


 


8/21/18 17:28  79 17     30


 


8/21/18 16:00      Mechanical Ventilator  


 


8/21/18 16:00 98.0 80 16 118/82 (94) 100   





 98.0       


 


8/21/18 16:00  78      


 


8/21/18 16:00        30


 


8/21/18 14:55  83 16     30


 


8/21/18 13:16  76 16     30


 


8/21/18 12:00        30


 


8/21/18 12:00      Mechanical Ventilator  


 


8/21/18 12:00 97.2 79 18 128/83 (98) 100   





 97.2       


 


8/21/18 11:25  84      


 


8/21/18 10:48  77 16     30








Status:  awake


Condition:  critical


HEENT:  atraumatic


Lungs:  clear


Heart:  HR/BP stable


Abdomen:  soft, active bowel sounds, feeding tube


Extremities:  no C/C/E, edema


Accucheck:  110





Critical Care - Subjective


ROS Limited/Unobtainable:  Yes


Condition:  critical


FI02:  30


Vent Support Breath Rate:  16


Vent Support Mode:  AC


Vent Tidal Volume:  600


Sputum Amount:  Small


PEEP:  5.0


PIP:  44


Tube Feeding Amount:  50


I&O:











Intake and Output  


 


 8/21/18 8/22/18





 19:00 07:00


 


Intake Total 670 ml 670 ml


 


Output Total 675 ml 650 ml


 


Balance -5 ml 20 ml


 


  


 


Free Water 70 ml 120 ml


 


Tube Feeding 600 ml 550 ml


 


Output Urine Total 275 ml 250 ml


 


Stool Total 400 ml 400 ml


 


# Bowel Movements 2 








Labs:





Laboratory Tests








Test


  8/22/18


04:00


 


White Blood Count


  6.9 K/UL


(4.8-10.8)


 


Red Blood Count


  5.09 M/UL


(4.70-6.10)


 


Hemoglobin


  14.4 G/DL


(14.2-18.0)


 


Hematocrit


  44.3 %


(42.0-52.0)


 


Mean Corpuscular Volume 87 FL (80-99)  


 


Mean Corpuscular Hemoglobin


  28.3 PG


(27.0-31.0)


 


Mean Corpuscular Hemoglobin


Concent 32.5 G/DL


(32.0-36.0)


 


Red Cell Distribution Width


  14.9 %


(11.6-14.8)  H


 


Platelet Count


  313 K/UL


(150-450)


 


Mean Platelet Volume


  7.6 FL


(6.5-10.1)


 


Neutrophils (%) (Auto)


  50.5 %


(45.0-75.0)


 


Lymphocytes (%) (Auto)


  28.5 %


(20.0-45.0)


 


Monocytes (%) (Auto)


  9.7 %


(1.0-10.0)


 


Eosinophils (%) (Auto)


  9.9 %


(0.0-3.0)  H


 


Basophils (%) (Auto)


  1.4 %


(0.0-2.0)


 


Sodium Level


  138 MMOL/L


(136-145)


 


Potassium Level


  3.9 MMOL/L


(3.5-5.1)


 


Chloride Level


  101 MMOL/L


()


 


Carbon Dioxide Level


  23 MMOL/L


(21-32)


 


Anion Gap


  14 mmol/L


(5-15)


 


Blood Urea Nitrogen


  19 mg/dL


(7-18)  H


 


Creatinine


  1.1 MG/DL


(0.55-1.30)


 


Estimat Glomerular Filtration


Rate > 60 mL/min


(>60)


 


Glucose Level


  113 MG/DL


()  H


 


Calcium Level


  10.6 MG/DL


(8.5-10.1)  H

















Yury Pena MD Aug 22, 2018 10:16

## 2018-08-23 VITALS — DIASTOLIC BLOOD PRESSURE: 76 MMHG | SYSTOLIC BLOOD PRESSURE: 112 MMHG

## 2018-08-23 VITALS — DIASTOLIC BLOOD PRESSURE: 84 MMHG | SYSTOLIC BLOOD PRESSURE: 123 MMHG

## 2018-08-23 VITALS — SYSTOLIC BLOOD PRESSURE: 110 MMHG | DIASTOLIC BLOOD PRESSURE: 79 MMHG

## 2018-08-23 VITALS — SYSTOLIC BLOOD PRESSURE: 122 MMHG | DIASTOLIC BLOOD PRESSURE: 80 MMHG

## 2018-08-23 VITALS — DIASTOLIC BLOOD PRESSURE: 76 MMHG | SYSTOLIC BLOOD PRESSURE: 126 MMHG

## 2018-08-23 VITALS — SYSTOLIC BLOOD PRESSURE: 108 MMHG | DIASTOLIC BLOOD PRESSURE: 75 MMHG

## 2018-08-23 LAB
ADD MANUAL DIFF: NO
ALBUMIN SERPL-MCNC: 3.8 G/DL (ref 3.4–5)
ALBUMIN/GLOB SERPL: 0.7 {RATIO} (ref 1–2.7)
ALP SERPL-CCNC: 143 U/L (ref 46–116)
ALT SERPL-CCNC: 47 U/L (ref 12–78)
ANION GAP SERPL CALC-SCNC: 13 MMOL/L (ref 5–15)
AST SERPL-CCNC: 22 U/L (ref 15–37)
BASOPHILS NFR BLD AUTO: 1.5 % (ref 0–2)
BILIRUB SERPL-MCNC: 0.5 MG/DL (ref 0.2–1)
BUN SERPL-MCNC: 18 MG/DL (ref 7–18)
CALCIUM SERPL-MCNC: 10.3 MG/DL (ref 8.5–10.1)
CHLORIDE SERPL-SCNC: 102 MMOL/L (ref 98–107)
CO2 SERPL-SCNC: 23 MMOL/L (ref 21–32)
CREAT SERPL-MCNC: 1.1 MG/DL (ref 0.55–1.3)
EOSINOPHIL NFR BLD AUTO: 9.1 % (ref 0–3)
ERYTHROCYTE [DISTWIDTH] IN BLOOD BY AUTOMATED COUNT: 14.9 % (ref 11.6–14.8)
GLOBULIN SER-MCNC: 5.4 G/DL
HCT VFR BLD CALC: 47.1 % (ref 42–52)
HGB BLD-MCNC: 14.7 G/DL (ref 14.2–18)
LYMPHOCYTES NFR BLD AUTO: 29 % (ref 20–45)
MCV RBC AUTO: 87 FL (ref 80–99)
MONOCYTES NFR BLD AUTO: 10.1 % (ref 1–10)
NEUTROPHILS NFR BLD AUTO: 50.3 % (ref 45–75)
PHOSPHATE SERPL-MCNC: 2.7 MG/DL (ref 2.5–4.9)
PLATELET # BLD: 323 K/UL (ref 150–450)
POTASSIUM SERPL-SCNC: 4.3 MMOL/L (ref 3.5–5.1)
RBC # BLD AUTO: 5.39 M/UL (ref 4.7–6.1)
SODIUM SERPL-SCNC: 138 MMOL/L (ref 136–145)
WBC # BLD AUTO: 7.3 K/UL (ref 4.8–10.8)

## 2018-08-23 RX ADMIN — INSULIN ASPART SCH UNITS: 100 INJECTION, SOLUTION INTRAVENOUS; SUBCUTANEOUS at 23:47

## 2018-08-23 RX ADMIN — POLYETHYLENE GLYCOL 3350 SCH GM: 17 POWDER, FOR SOLUTION ORAL at 20:55

## 2018-08-23 RX ADMIN — INSULIN ASPART SCH UNITS: 100 INJECTION, SOLUTION INTRAVENOUS; SUBCUTANEOUS at 05:47

## 2018-08-23 RX ADMIN — LEVETIRACETAM SCH MG: 100 SOLUTION ORAL at 13:58

## 2018-08-23 RX ADMIN — CHLORHEXIDINE GLUCONATE SCH APPLIC: 213 SOLUTION TOPICAL at 20:55

## 2018-08-23 RX ADMIN — INSULIN ASPART SCH UNITS: 100 INJECTION, SOLUTION INTRAVENOUS; SUBCUTANEOUS at 17:08

## 2018-08-23 RX ADMIN — HEPARIN SODIUM SCH UNITS: 5000 INJECTION INTRAVENOUS; SUBCUTANEOUS at 08:38

## 2018-08-23 RX ADMIN — LEVETIRACETAM SCH MG: 100 SOLUTION ORAL at 20:57

## 2018-08-23 RX ADMIN — Medication SCH MLS/HR: at 07:10

## 2018-08-23 RX ADMIN — HEPARIN SODIUM SCH UNITS: 5000 INJECTION INTRAVENOUS; SUBCUTANEOUS at 20:56

## 2018-08-23 RX ADMIN — INSULIN ASPART SCH UNITS: 100 INJECTION, SOLUTION INTRAVENOUS; SUBCUTANEOUS at 00:00

## 2018-08-23 RX ADMIN — LEVETIRACETAM SCH MG: 100 SOLUTION ORAL at 05:45

## 2018-08-23 RX ADMIN — INSULIN ASPART SCH UNITS: 100 INJECTION, SOLUTION INTRAVENOUS; SUBCUTANEOUS at 11:56

## 2018-08-23 NOTE — GI PROGRESS NOTE
Assessment/Plan


Problems:  


(1) Parastomal hernia


ICD Codes:  K43.5 - Parastomal hernia without obstruction or gangrene


SNOMED:  337477179


Qualifiers:  


   Qualified Codes:  K43.5 - Parastomal hernia without obstruction or gangrene


(2) Stoma malfunction


SNOMED:  788957086


(3) Colostomy in place


ICD Codes:  Z93.3 - Colostomy status


SNOMED:  188130130, 529351891


(4) Vegetative state


ICD Codes:  R40.3 - Persistent vegetative state


SNOMED:  92252818


(5) Diabetes mellitus


ICD Codes:  E11.9 - Type 2 diabetes mellitus without complications


SNOMED:  35793416


(6) Acute on chronic respiratory failure


ICD Codes:  J96.20 - Acute and chronic respiratory failure, unspecified whether 

with hypoxia or hypercapnia


SNOMED:  75990807


(7) Severe sepsis


ICD Codes:  A41.9 - Sepsis, unspecified organism; R65.20 - Severe sepsis 

without septic shock


SNOMED:  54840902


Status:  stable


Status Narrative


Discussed with Dr. Pickard.


Assessment/Plan


parastomal >> no s/sx of infection.  no obstruction. 


anemia work up reviewed >> iron deficiency


G tube dependent


hepatitis panel negative


OB negative





supportive care


monitor H&H, prn transfusions


venofer


ppi


GTFs per RD, adv to goal


GT site care daily/prn


abx


bowel regime


fu labs


dc planning





The patient was seen and examined at bedside and all new and available data was 

reviewed in the patients chart. I agree with the above findings, impression 

and plan.  (Patient seen earlier today. Signature stamp does not reflect 

patient encounter time.). - Miles Pickard MD





Subjective


Subjective


limited





Objective





Last 24 Hour Vital Signs








  Date Time  Temp Pulse Resp B/P (MAP) Pulse Ox O2 Delivery O2 Flow Rate FiO2


 


8/23/18 10:38  86 16     30


 


8/23/18 08:40  85 16     30


 


8/23/18 08:00        30


 


8/23/18 08:00      Mechanical Ventilator  


 


8/23/18 08:00  82      


 


8/23/18 08:00 97.9 84 16 123/84 (97) 100   





 97.9       


 


8/23/18 06:44  82 16     30


 


8/23/18 05:13  78 16     30


 


8/23/18 04:06      Mechanical Ventilator  


 


8/23/18 04:00  81      


 


8/23/18 04:00        30


 


8/23/18 03:52 98.2 78 16 112/76 (88) 100   





 98.2       


 


8/23/18 03:16  82 16     30


 


8/23/18 01:25  82 16     30


 


8/23/18 00:00 97.3 83 16 108/75 (86) 100   





 97.3       


 


8/23/18 00:00      Mechanical Ventilator  


 


8/23/18 00:00  85      


 


8/23/18 00:00        30


 


8/22/18 23:05  89 17     30


 


8/22/18 21:29  84 17     30


 


8/22/18 20:00  90      


 


8/22/18 20:00 98.1 104 18 125/88 (100) 100   





 98.1       


 


8/22/18 20:00        30


 


8/22/18 20:00      Mechanical Ventilator  


 


8/22/18 19:07  83 18     30


 


8/22/18 17:25  84 16     30


 


8/22/18 16:52        30


 


8/22/18 16:50      Mechanical Ventilator  


 


8/22/18 16:50 98.0 80 18 125/92 (103) 100   





 98.0       


 


8/22/18 16:00  80      


 


8/22/18 15:00  86 17     30


 


8/22/18 13:28  82 16     30

















Intake and Output  


 


 8/22/18 8/23/18





 19:00 07:00


 


Intake Total 690 ml 650 ml


 


Output Total 675 ml 700 ml


 


Balance 15 ml -50 ml


 


  


 


Free Water 90 ml 50 ml


 


Tube Feeding 600 ml 550 ml


 


Other  50 ml


 


Output Urine Total 275 ml 250 ml


 


Stool Total 400 ml 450 ml


 


# Bowel Movements 2 











Laboratory Tests








Test


  8/23/18


06:15


 


White Blood Count


  7.3 K/UL


(4.8-10.8)


 


Red Blood Count


  5.39 M/UL


(4.70-6.10)


 


Hemoglobin


  14.7 G/DL


(14.2-18.0)


 


Hematocrit


  47.1 %


(42.0-52.0)


 


Mean Corpuscular Volume 87 FL (80-99)  


 


Mean Corpuscular Hemoglobin


  27.2 PG


(27.0-31.0)


 


Mean Corpuscular Hemoglobin


Concent 31.2 G/DL


(32.0-36.0)  L


 


Red Cell Distribution Width


  14.9 %


(11.6-14.8)  H


 


Platelet Count


  323 K/UL


(150-450)


 


Mean Platelet Volume


  8.5 FL


(6.5-10.1)


 


Neutrophils (%) (Auto)


  50.3 %


(45.0-75.0)


 


Lymphocytes (%) (Auto)


  29.0 %


(20.0-45.0)


 


Monocytes (%) (Auto)


  10.1 %


(1.0-10.0)  H


 


Eosinophils (%) (Auto)


  9.1 %


(0.0-3.0)  H


 


Basophils (%) (Auto)


  1.5 %


(0.0-2.0)


 


Sodium Level


  138 MMOL/L


(136-145)


 


Potassium Level


  4.3 MMOL/L


(3.5-5.1)


 


Chloride Level


  102 MMOL/L


()


 


Carbon Dioxide Level


  23 MMOL/L


(21-32)


 


Anion Gap


  13 mmol/L


(5-15)


 


Blood Urea Nitrogen


  18 mg/dL


(7-18)


 


Creatinine


  1.1 MG/DL


(0.55-1.30)


 


Estimat Glomerular Filtration


Rate > 60 mL/min


(>60)


 


Glucose Level


  115 MG/DL


()  H


 


Calcium Level


  10.3 MG/DL


(8.5-10.1)  H


 


Phosphorus Level


  2.7 MG/DL


(2.5-4.9)


 


Magnesium Level


  2.0 MG/DL


(1.8-2.4)


 


Total Bilirubin


  0.5 MG/DL


(0.2-1.0)


 


Aspartate Amino Transf


(AST/SGOT) 22 U/L (15-37)


 


 


Alanine Aminotransferase


(ALT/SGPT) 47 U/L (12-78)


 


 


Alkaline Phosphatase


  143 U/L


()  H


 


Total Protein


  9.2 G/DL


(6.4-8.2)  H


 


Albumin


  3.8 G/DL


(3.4-5.0)


 


Globulin 5.4 g/dL  


 


Albumin/Globulin Ratio


  0.7 (1.0-2.7)


L


 


Vancomycin Level Trough


  17.3 ug/mL


(5.0-12.0)  H








Height (Feet):  6


Height (Inches):  0.00


Weight (Pounds):  184


General Appearance:  no apparent distress


Cardiovascular:  normal rate


Respiratory/Chest:  normal breath sounds, no respiratory distress


Abdominal Exam:  normal bowel sounds, non tender, soft, GT site - c/d/i, other 

- ostomy


Extremities:  non-tender











Remy Lyons NP Aug 23, 2018 12:17

## 2018-08-23 NOTE — CARDIOLOGY PROGRESS NOTE
Assessment/Plan


Status:  stable


Assessment/Plan


Assessment:


(1) Acute on chronic respiratory failure


(2) Acute on chronic renal insufficiency


(3) Severe sepsis


(4) Vegetative state


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


(6) Diabetes mellitus





Plan:


Continue antibiotics


Echocardiogram reviewed- no endocarditis, STACI unable to be performed


Patient afebrile, normal WBC otherwise


Continue trach care and tube feeds


Continue keppra for seizures 


Continue abx treatment for six weeks via PICC line until 9/15


Dispo planning





Subjective


Cardiovascular:  Reports: no symptoms


Respiratory:  Reports: no symptoms


Gastrointestinal/Abdominal:  Reports: no symptoms


Genitourinary:  Reports: no symptoms


Subjective


Afebrile vitals stable, Telemetry shows sinus rhythm. No acute events. Left 

pleural effusion unchanged. 


Ventilator settings: portex 7, AC 16, TV: 600, FiO2: 30%, PEEP: 5. GT is in 

place running glucerna 1.2 @ 65 ml/hr. No residual present. Colostomy bag in 

place.


STACI not completed, probe unable to be passed.  Patient should be treated for 

total six weeks abx for presumptive endocarditis at this juncture. Hyponatremia 

resolved, BRAYDON resolved, patient stable .





Objective





Last 24 Hour Vital Signs








  Date Time  Temp Pulse Resp B/P (MAP) Pulse Ox O2 Delivery O2 Flow Rate FiO2


 


8/23/18 21:15  94 16     30


 


8/23/18 20:00 98.1 96 16 126/76 (93) 98   





 98.1       


 


8/23/18 20:00      Mechanical Ventilator  


 


8/23/18 20:00        30


 


8/23/18 19:12  95 16     30


 


8/23/18 19:07  95      


 


8/23/18 17:03  89 16     30


 


8/23/18 16:00 97.9 88 16 122/80 (94) 99   





 97.9       


 


8/23/18 16:00      Mechanical Ventilator  


 


8/23/18 16:00        30


 


8/23/18 16:00  94      


 


8/23/18 14:51  84 16     30


 


8/23/18 13:00  84 16     30


 


8/23/18 12:00        30


 


8/23/18 12:00      Mechanical Ventilator  


 


8/23/18 12:00  86      


 


8/23/18 12:00 98.1 87 16 110/79 (89) 99   





 98.1       


 


8/23/18 10:38  86 16     30


 


8/23/18 08:40  85 16     30


 


8/23/18 08:00        30


 


8/23/18 08:00      Mechanical Ventilator  


 


8/23/18 08:00  82      


 


8/23/18 08:00 97.9 84 16 123/84 (97) 100   





 97.9       


 


8/23/18 06:44  82 16     30


 


8/23/18 05:13  78 16     30


 


8/23/18 04:06      Mechanical Ventilator  


 


8/23/18 04:00  81      


 


8/23/18 04:00        30


 


8/23/18 03:52 98.2 78 16 112/76 (88) 100   





 98.2       


 


8/23/18 03:16  82 16     30


 


8/23/18 01:25  82 16     30


 


8/23/18 00:00 97.3 83 16 108/75 (86) 100   





 97.3       


 


8/23/18 00:00      Mechanical Ventilator  


 


8/23/18 00:00  85      


 


8/23/18 00:00        30


 


8/22/18 23:05  89 17     30








General Appearance:  no apparent distress, on vent


EENT:  PERRL/EOMI, normal ENT inspection


Neck:  non-tender, normal alignment


Rhythm:  NSR


Cardiovascular:  normal peripheral pulses, normal rate


Respiratory/Chest:  chest wall non-tender, lungs clear


Abdomen:  normal bowel sounds, non tender


Extremities:  normal range of motion, non-tender


Neurologic:  CNs II-XII grossly normal, no motor/sensory deficits











Intake and Output  


 


 8/22/18 8/23/18





 19:00 07:00


 


Intake Total 690 ml 700 ml


 


Output Total 675 ml 700 ml


 


Balance 15 ml 0 ml


 


  


 


Free Water 90 ml 50 ml


 


Tube Feeding 600 ml 600 ml


 


Other  50 ml


 


Output Urine Total 275 ml 250 ml


 


Stool Total 400 ml 450 ml


 


# Bowel Movements 2 











Laboratory Tests








Test


  8/23/18


06:15


 


White Blood Count


  7.3 K/UL


(4.8-10.8)


 


Red Blood Count


  5.39 M/UL


(4.70-6.10)


 


Hemoglobin


  14.7 G/DL


(14.2-18.0)


 


Hematocrit


  47.1 %


(42.0-52.0)


 


Mean Corpuscular Volume 87 FL (80-99)  


 


Mean Corpuscular Hemoglobin


  27.2 PG


(27.0-31.0)


 


Mean Corpuscular Hemoglobin


Concent 31.2 G/DL


(32.0-36.0)  L


 


Red Cell Distribution Width


  14.9 %


(11.6-14.8)  H


 


Platelet Count


  323 K/UL


(150-450)


 


Mean Platelet Volume


  8.5 FL


(6.5-10.1)


 


Neutrophils (%) (Auto)


  50.3 %


(45.0-75.0)


 


Lymphocytes (%) (Auto)


  29.0 %


(20.0-45.0)


 


Monocytes (%) (Auto)


  10.1 %


(1.0-10.0)  H


 


Eosinophils (%) (Auto)


  9.1 %


(0.0-3.0)  H


 


Basophils (%) (Auto)


  1.5 %


(0.0-2.0)


 


Sodium Level


  138 MMOL/L


(136-145)


 


Potassium Level


  4.3 MMOL/L


(3.5-5.1)


 


Chloride Level


  102 MMOL/L


()


 


Carbon Dioxide Level


  23 MMOL/L


(21-32)


 


Anion Gap


  13 mmol/L


(5-15)


 


Blood Urea Nitrogen


  18 mg/dL


(7-18)


 


Creatinine


  1.1 MG/DL


(0.55-1.30)


 


Estimat Glomerular Filtration


Rate > 60 mL/min


(>60)


 


Glucose Level


  115 MG/DL


()  H


 


Calcium Level


  10.3 MG/DL


(8.5-10.1)  H


 


Phosphorus Level


  2.7 MG/DL


(2.5-4.9)


 


Magnesium Level


  2.0 MG/DL


(1.8-2.4)


 


Total Bilirubin


  0.5 MG/DL


(0.2-1.0)


 


Aspartate Amino Transf


(AST/SGOT) 22 U/L (15-37)


 


 


Alanine Aminotransferase


(ALT/SGPT) 47 U/L (12-78)


 


 


Alkaline Phosphatase


  143 U/L


()  H


 


Total Protein


  9.2 G/DL


(6.4-8.2)  H


 


Albumin


  3.8 G/DL


(3.4-5.0)


 


Globulin 5.4 g/dL  


 


Albumin/Globulin Ratio


  0.7 (1.0-2.7)


L


 


Vancomycin Level Trough


  17.3 ug/mL


(5.0-12.0)  H

















Robert Hernandez M.D. Aug 23, 2018 22:23

## 2018-08-23 NOTE — INFECTIOUS DISEASES PROG NOTE
Assessment/Plan


Sepsis, SP- 2ry to UTI and Bacteremia Blood cultures postive 4/4 bottles with 

[facility] Unclear source will need to R/O Endocarditis  Possible PNA initially 


 -u/a wbc 40-60, nit neg, leuk +2; ucx >100k E. aerogenes (S cefepime, cipro/

levo; R Zosyn)


 -BCX 4/4 E. aerogenes, prob Amp C (S cefepime, cipro/levo; R Zosyn), P. 

mirabilis ESBL (S Zosyn, Ertapenem); repeta 7/26 1/4 CoNS (suspect contaminant)

, 7/28 Staph f/u final results if CoNS may need TTE and IE work up.


  -CXR 7/27: Increased left pleural effusion, over 3 days. Overall decreased 

interstitial congestion. Right infrahilar atelectasis


 -CXR: Cardiomegaly. Mild interstitial congestion. Suspect small bilateral 

pleural effusions


 -CT abd/p: Right lower quadrant double barrel colostomy, as described. Small 

peristomal hernia contains bowel loops without evidence of obstruction or 

strangulation. Left renal staghorn calculus. Bilateral intrarenal calyceal 

calculi. Borderline hydronephrosis on the right without evidence of downstream 

obstructive lesion. May indicate mild ureteral pelvic junction obstruction. 

Somewhat atrophic left kidney. Inspissated contrast ball within the distal 

sigmoid colon. Gastrostomy in good position. Nonspecific bilateral perinephric 

fat stranding, could indicate pyelonephritis or could be chronic. Guzmán 

catheter in place. Apparent bladder wall thickening, possibly an artifact of 

under distention but cystitis is not excludable, particularly in view of mild 

perivesical fat stranding. Bilateral pulmonary parenchymal atelectasis and 

consolidation


  -Blood Cx from PICC CoNS 2/2 bottles Peripheral Neg- (May be contaminant but 

will repeat blood Cx given new leukocytosis if positive will need ECHO.


  - Blood Cx 7/30/18 - [facility] - Will need TTE for IE work up and the PICC removed.


 


PICC line infection/colonization - TTE negative. Blood cultures only positive 

from PICC. Not positive from Peripheral 


- Will repeat blood cultures x 1 to confirm clearance after PICC replaced. [place] 

suspicion for IE.


- PICC replace 8/1/18





Fever/Leukocytosis; Resolved - i recurs Re-evaluate lines, Diarrhea? C. dif? 


BRAYDON, improving


Lactic acidosis, resolved





chronic resp failure trach/vent dependant


BPH


GERD


 HTN


DM2


seizure disorder


colostomy


 s/p GT 


constipation





VRE and MRSA colonized


 


Plan:





- Continue empiric IV Vancomycin #24/42 for bacteremia/line infection cant r/o 

Endocarditis  


   -Abx end date 9/12/18 if STACI attempted again and negative then ed date would 

be 8/15/18 


       -weekly CBC, BMP, vanco through


    -8/9 SP Ertapenem #14


    -7/27 SP Zosyn #4








STACI could not be performed due to technical problems


   - Unable to repeat STACI so patient should be treated for presumed 

endocarditis for 6 weeks








- Monitor CBC/CMP, temperatures


- Pulmonary care








Thank you for this consultation. Will continue to follow along with you.





Subjective


Allergies:  


Coded Allergies:  


     AZTREONAM (Verified  Allergy, Unknown, 7/23/18)


Subjective


afebrile


no leukocytosis





Objective


Vital Signs





Last 24 Hour Vital Signs








  Date Time  Temp Pulse Resp B/P (MAP) Pulse Ox O2 Delivery O2 Flow Rate FiO2


 


8/23/18 10:38  86 16     30


 


8/23/18 08:40  85 16     30


 


8/23/18 08:00        30


 


8/23/18 08:00      Mechanical Ventilator  


 


8/23/18 08:00  82      


 


8/23/18 08:00 97.9 84 16 123/84 (97) 100   





 97.9       


 


8/23/18 06:44  82 16     30


 


8/23/18 05:13  78 16     30


 


8/23/18 04:06      Mechanical Ventilator  


 


8/23/18 04:00  81      


 


8/23/18 04:00        30


 


8/23/18 03:52 98.2 78 16 112/76 (88) 100   





 98.2       


 


8/23/18 03:16  82 16     30


 


8/23/18 01:25  82 16     30


 


8/23/18 00:00 97.3 83 16 108/75 (86) 100   





 97.3       


 


8/23/18 00:00      Mechanical Ventilator  


 


8/23/18 00:00  85      


 


8/23/18 00:00        30


 


8/22/18 23:05  89 17     30


 


8/22/18 21:29  84 17     30


 


8/22/18 20:00  90      


 


8/22/18 20:00 98.1 104 18 125/88 (100) 100   





 98.1       


 


8/22/18 20:00        30


 


8/22/18 20:00      Mechanical Ventilator  


 


8/22/18 19:07  83 18     30


 


8/22/18 17:25  84 16     30


 


8/22/18 16:52        30


 


8/22/18 16:50      Mechanical Ventilator  


 


8/22/18 16:50 98.0 80 18 125/92 (103) 100   





 98.0       


 


8/22/18 16:00  80      


 


8/22/18 15:00  86 17     30


 


8/22/18 13:28  82 16     30


 


8/22/18 12:02        30


 


8/22/18 12:00      Mechanical Ventilator  


 


8/22/18 12:00  81      


 


8/22/18 11:51 96.7 76 18 114/78 (90) 96   





 96.7       








Height (Feet):  6


Height (Inches):  0.00


Weight (Pounds):  184


Objective


GENERAL:  The patient is awake, in no overt distress.


HEENT:  Extraocular muscles intact.  No lymphadenopathy noted.


Tracheostomy noted.


CARDIOVASCULAR:  S1 and S2.  Tachycardic.  No rubs or gallops.


PULMONARY:  Mild diffuse expiratory rhonchi with basilar rales.


ABDOMEN:  Obese and nondistended.  The G-tube and stoma noted.


EXTREMITIES:  No edema.  Fair pedal pulses.





Laboratory Tests








Test


  8/23/18


06:15


 


White Blood Count


  7.3 K/UL


(4.8-10.8)


 


Red Blood Count


  5.39 M/UL


(4.70-6.10)


 


Hemoglobin


  14.7 G/DL


(14.2-18.0)


 


Hematocrit


  47.1 %


(42.0-52.0)


 


Mean Corpuscular Volume 87 FL (80-99)  


 


Mean Corpuscular Hemoglobin


  27.2 PG


(27.0-31.0)


 


Mean Corpuscular Hemoglobin


Concent 31.2 G/DL


(32.0-36.0)  L


 


Red Cell Distribution Width


  14.9 %


(11.6-14.8)  H


 


Platelet Count


  323 K/UL


(150-450)


 


Mean Platelet Volume


  8.5 FL


(6.5-10.1)


 


Neutrophils (%) (Auto)


  50.3 %


(45.0-75.0)


 


Lymphocytes (%) (Auto)


  29.0 %


(20.0-45.0)


 


Monocytes (%) (Auto)


  10.1 %


(1.0-10.0)  H


 


Eosinophils (%) (Auto)


  9.1 %


(0.0-3.0)  H


 


Basophils (%) (Auto)


  1.5 %


(0.0-2.0)


 


Sodium Level


  138 MMOL/L


(136-145)


 


Potassium Level


  4.3 MMOL/L


(3.5-5.1)


 


Chloride Level


  102 MMOL/L


()


 


Carbon Dioxide Level


  23 MMOL/L


(21-32)


 


Anion Gap


  13 mmol/L


(5-15)


 


Blood Urea Nitrogen


  18 mg/dL


(7-18)


 


Creatinine


  1.1 MG/DL


(0.55-1.30)


 


Estimat Glomerular Filtration


Rate > 60 mL/min


(>60)


 


Glucose Level


  115 MG/DL


()  H


 


Calcium Level


  10.3 MG/DL


(8.5-10.1)  H


 


Phosphorus Level


  2.7 MG/DL


(2.5-4.9)


 


Magnesium Level


  2.0 MG/DL


(1.8-2.4)


 


Total Bilirubin


  0.5 MG/DL


(0.2-1.0)


 


Aspartate Amino Transf


(AST/SGOT) 22 U/L (15-37)


 


 


Alanine Aminotransferase


(ALT/SGPT) 47 U/L (12-78)


 


 


Alkaline Phosphatase


  143 U/L


()  H


 


Total Protein


  9.2 G/DL


(6.4-8.2)  H


 


Albumin


  3.8 G/DL


(3.4-5.0)


 


Globulin 5.4 g/dL  


 


Albumin/Globulin Ratio


  0.7 (1.0-2.7)


L


 


Vancomycin Level Trough


  17.3 ug/mL


(5.0-12.0)  H











Current Medications








 Medications


  (Trade)  Dose


 Ordered  Sig/Jan


 Route


 PRN Reason  Start Time


 Stop Time Status Last Admin


Dose Admin


 


 Acetaminophen


  (Tylenol)  650 mg  Q4H  PRN


 ORAL


 FEVER  8/18/18 21:00


 9/15/18 20:59   


 


 


 Baclofen


  (Lioresal)  10 mg  EVERY 8  HOURS


 GT


   8/18/18 22:00


 9/11/18 20:59  8/23/18 05:45


 


 


 Chlorhexidine


 Gluconate


  (Elaine-Hex 2%)  1 applic  DAILY@2000


 TOPIC


   8/18/18 22:00


 9/17/18 21:59  8/22/18 20:47


 


 


 Dextrose


  (Dextrose 50%)  25 ml  STAT  PRN


 IV


 Hypoglycemia  8/18/18 21:45


 9/15/18 21:44   


 


 


 Dextrose


  (Dextrose 50%)  50 ml  STAT  PRN


 IV


 Hypoglycemia  8/18/18 21:45


 9/15/18 21:44   


 


 


 Heparin Sodium


  (Porcine)


  (Heparin 5000


 units/ml)  5,000 units  EVERY 12  HOURS


 SUBQ


   8/18/18 22:00


 9/17/18 21:59  8/23/18 08:38


 


 


 Insulin Aspart


  (NovoLOG)    Q6HR


 SUBQ


   8/19/18 00:00


 9/16/18 17:29  8/23/18 05:47


 


 


 Lansoprazole


  (Prevacid)  30 mg  DAILY


 GT


   8/19/18 09:00


 9/17/18 08:59  8/23/18 08:38


 


 


 Levetiracetam


  (Keppra)  1,000 mg  Q8HR


 GT


   8/18/18 22:00


 9/15/18 20:59  8/23/18 05:45


 


 


 Ondansetron HCl


  (Zofran)  4 mg  Q6H  PRN


 IVP


 Nausea & Vomiting  8/18/18 22:00


 9/15/18 15:59   


 


 


 Polyethylene


 Glycol


  (Miralax)  17 gm  BEDTIME


 GT


   8/18/18 22:00


 9/17/18 21:59  8/22/18 20:47


 


 


 Vancomycin HCl


  (Vanco rx to


 dose)  1 ea  DAILY  PRN


 MISC


 PER RX PROTOCOL  8/18/18 21:45


 9/17/18 21:44   


 


 


 Vancomycin HCl/


 Dextrose  250 ml @ 


 125 mls/hr  Q24H


 IVPB


   8/21/18 07:00


 8/26/18 06:59  8/23/18 07:10


 

















Joy Love M.D. Aug 23, 2018 11:31

## 2018-08-23 NOTE — PULMONOLGY CRITICAL CARE NOTE
Critical Care - Asmt/Plan


Problems:  


(1) Acute on chronic respiratory failure


(2) Acute on chronic renal insufficiency


(3) Severe sepsis


(4) Vegetative state


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


(6) Diabetes mellitus


Respiratory:  monitor respiratory rate


Cardiac:  continue pressors


Infectious Disease:  continue antibiotics


Gastrointestinal:  continue feedings/current rate


Endocrine:  monitor blood sugar, check TSH


Hematologic:  monitor H/H, transfuse if hgb<8.5


Neurologic:  keep patient comfortable


Affect:  PRN ativan


Prophylaxis:  Heparin


Notes Reviewed:  cardio


Discussed with:  nurses, consultants, 





Critical Care - Objective





Last 24 Hour Vital Signs








  Date Time  Temp Pulse Resp B/P (MAP) Pulse Ox O2 Delivery O2 Flow Rate FiO2


 


8/23/18 10:38  86 16     30


 


8/23/18 08:40  85 16     30


 


8/23/18 08:00        30


 


8/23/18 08:00      Mechanical Ventilator  


 


8/23/18 08:00  82      


 


8/23/18 08:00 97.9 84 16 123/84 (97) 100   





 97.9       


 


8/23/18 06:44  82 16     30


 


8/23/18 05:13  78 16     30


 


8/23/18 04:06      Mechanical Ventilator  


 


8/23/18 04:00  81      


 


8/23/18 04:00        30


 


8/23/18 03:52 98.2 78 16 112/76 (88) 100   





 98.2       


 


8/23/18 03:16  82 16     30


 


8/23/18 01:25  82 16     30


 


8/23/18 00:00 97.3 83 16 108/75 (86) 100   





 97.3       


 


8/23/18 00:00      Mechanical Ventilator  


 


8/23/18 00:00  85      


 


8/23/18 00:00        30


 


8/22/18 23:05  89 17     30


 


8/22/18 21:29  84 17     30


 


8/22/18 20:00  90      


 


8/22/18 20:00 98.1 104 18 125/88 (100) 100   





 98.1       


 


8/22/18 20:00        30


 


8/22/18 20:00      Mechanical Ventilator  


 


8/22/18 19:07  83 18     30


 


8/22/18 17:25  84 16     30


 


8/22/18 16:52        30


 


8/22/18 16:50      Mechanical Ventilator  


 


8/22/18 16:50 98.0 80 18 125/92 (103) 100   





 98.0       


 


8/22/18 16:00  80      


 


8/22/18 15:00  86 17     30


 


8/22/18 13:28  82 16     30


 


8/22/18 12:02        30


 


8/22/18 12:00      Mechanical Ventilator  


 


8/22/18 12:00  81      


 


8/22/18 11:51 96.7 76 18 114/78 (90) 96   





 96.7       








Status:  awake, obtunded


Condition:  critical


HEENT:  atraumatic


Neck:  full ROM


Heart:  HR/BP stable, HR/BP unstable


Abdomen:  soft, active bowel sounds


Extremities:  no C/C/E


Accucheck:  130





Critical Care - Subjective


ROS Limited/Unobtainable:  No


Condition:  critical


EKG Rhythm:  Sinus Rhythm


FI02:  30


Vent Support Breath Rate:  16


Vent Support Mode:  AC


Vent Tidal Volume:  600


Sputum Amount:  Small


PEEP:  5.0


PIP:  30


Tube Feeding Amount:  50


I&O:











Intake and Output  


 


 8/22/18 8/23/18





 19:00 07:00


 


Intake Total 690 ml 650 ml


 


Output Total 675 ml 700 ml


 


Balance 15 ml -50 ml


 


  


 


Free Water 90 ml 50 ml


 


Tube Feeding 600 ml 550 ml


 


Other  50 ml


 


Output Urine Total 275 ml 250 ml


 


Stool Total 400 ml 450 ml


 


# Bowel Movements 2 








Labs:





Laboratory Tests








Test


  8/23/18


06:15


 


White Blood Count


  7.3 K/UL


(4.8-10.8)


 


Red Blood Count


  5.39 M/UL


(4.70-6.10)


 


Hemoglobin


  14.7 G/DL


(14.2-18.0)


 


Hematocrit


  47.1 %


(42.0-52.0)


 


Mean Corpuscular Volume 87 FL (80-99)  


 


Mean Corpuscular Hemoglobin


  27.2 PG


(27.0-31.0)


 


Mean Corpuscular Hemoglobin


Concent 31.2 G/DL


(32.0-36.0)  L


 


Red Cell Distribution Width


  14.9 %


(11.6-14.8)  H


 


Platelet Count


  323 K/UL


(150-450)


 


Mean Platelet Volume


  8.5 FL


(6.5-10.1)


 


Neutrophils (%) (Auto)


  50.3 %


(45.0-75.0)


 


Lymphocytes (%) (Auto)


  29.0 %


(20.0-45.0)


 


Monocytes (%) (Auto)


  10.1 %


(1.0-10.0)  H


 


Eosinophils (%) (Auto)


  9.1 %


(0.0-3.0)  H


 


Basophils (%) (Auto)


  1.5 %


(0.0-2.0)


 


Sodium Level


  138 MMOL/L


(136-145)


 


Potassium Level


  4.3 MMOL/L


(3.5-5.1)


 


Chloride Level


  102 MMOL/L


()


 


Carbon Dioxide Level


  23 MMOL/L


(21-32)


 


Anion Gap


  13 mmol/L


(5-15)


 


Blood Urea Nitrogen


  18 mg/dL


(7-18)


 


Creatinine


  1.1 MG/DL


(0.55-1.30)


 


Estimat Glomerular Filtration


Rate > 60 mL/min


(>60)


 


Glucose Level


  115 MG/DL


()  H


 


Calcium Level


  10.3 MG/DL


(8.5-10.1)  H


 


Phosphorus Level


  2.7 MG/DL


(2.5-4.9)


 


Magnesium Level


  2.0 MG/DL


(1.8-2.4)


 


Total Bilirubin


  0.5 MG/DL


(0.2-1.0)


 


Aspartate Amino Transf


(AST/SGOT) 22 U/L (15-37)


 


 


Alanine Aminotransferase


(ALT/SGPT) 47 U/L (12-78)


 


 


Alkaline Phosphatase


  143 U/L


()  H


 


Total Protein


  9.2 G/DL


(6.4-8.2)  H


 


Albumin


  3.8 G/DL


(3.4-5.0)


 


Globulin 5.4 g/dL  


 


Albumin/Globulin Ratio


  0.7 (1.0-2.7)


L


 


Vancomycin Level Trough


  17.3 ug/mL


(5.0-12.0)  H

















Yury Pena MD Aug 23, 2018 10:57

## 2018-08-23 NOTE — PROGRESS NOTE
DATE:  08/22/2018



SUBJECTIVE:  The patient remained stable.



PHYSICAL EXAMINATION:

VITAL SIGNS:  Blood pressure 125/88, his pulse is 84, respirations 17, and

temperature 98.1.

HEENT:  Eyes were normal.  ENT, mucous membranes were moist and intact.

NECK:  Supple with no JVD without lymph nodes.  Tracheostomy site is

clean.

LUNGS:  Clear without rhonchi, rales, or wheezing.  Secretions are small,

thin, and whitish.

HEART:  Normal sounds with regular heart beat.  There is no tachycardia at

rest.

ABDOMEN:  Soft and nontender with normal bowel sounds.  Gastrostomy site is

clean.

EXTREMITIES:  Warm without cyanosis, clubbing, or edema.



LABORATORY DATA:  Hemoglobin is 14.4, hematocrit 44.3 with MCV of 87, WBC

of 6.9, and platelets of 303,000.  His BUN and creatinine is 19 and 1.1

respectively.  Sodium is 136, potassium 3.9, chloride 101, CO2 is 23.  His

calcium is 10.6.



IMPRESSION:

1. The patient is status post cerebral hemorrhage.  There are no

cognitive changes.  The patient is stable from neurological point of

view.

2. The patient has seizure disorder and _____ seizure.  Continue

Levetiracetam 1000 mg twice a day.

3. The patient with respiratory failure.  We will continue the same

respiratory setting.  We will continue with albuterol sulfate, ipratropium

bromide inhalation therapy every six hours.

4. The patient has tracheostomy.  Continue to monitor O2 saturation  and

bronchial secretion.

5. The patient has gastrostomy feeding.  No gastric residual.  Continue

with Fibersource _____ per hour.  Repeat laboratory tests will be done in

the a.m.









  ______________________________________________

  Etienne Bloom M.D.





DR:  SHAMA

D:  08/23/2018 00:12

T:  08/23/2018 00:31

JOB#:  [id number]

CC:

## 2018-08-24 VITALS — DIASTOLIC BLOOD PRESSURE: 78 MMHG | SYSTOLIC BLOOD PRESSURE: 128 MMHG

## 2018-08-24 VITALS — SYSTOLIC BLOOD PRESSURE: 120 MMHG | DIASTOLIC BLOOD PRESSURE: 86 MMHG

## 2018-08-24 VITALS — DIASTOLIC BLOOD PRESSURE: 80 MMHG | SYSTOLIC BLOOD PRESSURE: 111 MMHG

## 2018-08-24 VITALS — DIASTOLIC BLOOD PRESSURE: 87 MMHG | SYSTOLIC BLOOD PRESSURE: 132 MMHG

## 2018-08-24 VITALS — DIASTOLIC BLOOD PRESSURE: 74 MMHG | SYSTOLIC BLOOD PRESSURE: 133 MMHG

## 2018-08-24 VITALS — SYSTOLIC BLOOD PRESSURE: 130 MMHG | DIASTOLIC BLOOD PRESSURE: 71 MMHG

## 2018-08-24 LAB
ADD MANUAL DIFF: NO
ANION GAP SERPL CALC-SCNC: 14 MMOL/L (ref 5–15)
BASOPHILS NFR BLD AUTO: 1.5 % (ref 0–2)
BUN SERPL-MCNC: 18 MG/DL (ref 7–18)
CALCIUM SERPL-MCNC: 10.4 MG/DL (ref 8.5–10.1)
CHLORIDE SERPL-SCNC: 103 MMOL/L (ref 98–107)
CO2 SERPL-SCNC: 22 MMOL/L (ref 21–32)
CREAT SERPL-MCNC: 1.3 MG/DL (ref 0.55–1.3)
EOSINOPHIL NFR BLD AUTO: 7 % (ref 0–3)
ERYTHROCYTE [DISTWIDTH] IN BLOOD BY AUTOMATED COUNT: 14.8 % (ref 11.6–14.8)
HCT VFR BLD CALC: 44 % (ref 42–52)
HGB BLD-MCNC: 13.8 G/DL (ref 14.2–18)
LYMPHOCYTES NFR BLD AUTO: 28.1 % (ref 20–45)
MCV RBC AUTO: 88 FL (ref 80–99)
MONOCYTES NFR BLD AUTO: 10.7 % (ref 1–10)
NEUTROPHILS NFR BLD AUTO: 52.7 % (ref 45–75)
PLATELET # BLD: 320 K/UL (ref 150–450)
POTASSIUM SERPL-SCNC: 4.4 MMOL/L (ref 3.5–5.1)
RBC # BLD AUTO: 5 M/UL (ref 4.7–6.1)
SODIUM SERPL-SCNC: 139 MMOL/L (ref 136–145)
WBC # BLD AUTO: 7.4 K/UL (ref 4.8–10.8)

## 2018-08-24 RX ADMIN — POLYETHYLENE GLYCOL 3350 SCH GM: 17 POWDER, FOR SOLUTION ORAL at 21:34

## 2018-08-24 RX ADMIN — LEVETIRACETAM SCH MG: 100 SOLUTION ORAL at 21:34

## 2018-08-24 RX ADMIN — INSULIN ASPART SCH UNITS: 100 INJECTION, SOLUTION INTRAVENOUS; SUBCUTANEOUS at 17:57

## 2018-08-24 RX ADMIN — Medication SCH MLS/HR: at 06:09

## 2018-08-24 RX ADMIN — LEVETIRACETAM SCH MG: 100 SOLUTION ORAL at 15:16

## 2018-08-24 RX ADMIN — HEPARIN SODIUM SCH UNITS: 5000 INJECTION INTRAVENOUS; SUBCUTANEOUS at 08:19

## 2018-08-24 RX ADMIN — LEVETIRACETAM SCH MG: 100 SOLUTION ORAL at 05:16

## 2018-08-24 RX ADMIN — CHLORHEXIDINE GLUCONATE SCH APPLIC: 213 SOLUTION TOPICAL at 21:34

## 2018-08-24 RX ADMIN — INSULIN ASPART SCH UNITS: 100 INJECTION, SOLUTION INTRAVENOUS; SUBCUTANEOUS at 05:17

## 2018-08-24 RX ADMIN — INSULIN ASPART SCH UNITS: 100 INJECTION, SOLUTION INTRAVENOUS; SUBCUTANEOUS at 12:35

## 2018-08-24 RX ADMIN — HEPARIN SODIUM SCH UNITS: 5000 INJECTION INTRAVENOUS; SUBCUTANEOUS at 21:36

## 2018-08-24 NOTE — CARDIOLOGY PROGRESS NOTE
Assessment/Plan


Status:  stable


Assessment/Plan


Assessment:


(1) Acute on chronic respiratory failure


(2) Acute on chronic renal insufficiency


(3) Severe sepsis


(4) Vegetative state


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


(6) Diabetes mellitus





Plan:


Continue antibiotics


Echocardiogram reviewed- no endocarditis, STACI unable to be performed


Patient afebrile, normal WBC otherwise


Continue trach care and tube feeds


Continue keppra for seizures 


Continue abx treatment for six weeks via PICC line until 9/15


Dispo planning





Subjective


Cardiovascular:  Reports: no symptoms


Respiratory:  Reports: no symptoms


Gastrointestinal/Abdominal:  Reports: no symptoms


Genitourinary:  Reports: no symptoms


Subjective


Afebrile vitals stable, Telemetry shows sinus rhythm. No acute events. Left 

pleural effusion unchanged. 


Ventilator settings: portex 7, AC 16, TV: 600, FiO2: 30%, PEEP: 5. GT is in 

place running glucerna 1.2 @ 65 ml/hr. No residual present. Colostomy bag in 

place.


STACI not completed, probe unable to be passed.  Patient should be treated for 

total six weeks abx for presumptive endocarditis at this juncture. Hyponatremia 

resolved, BRAYDON resolved, patient stable .





Objective





Last 24 Hour Vital Signs








  Date Time  Temp Pulse Resp B/P (MAP) Pulse Ox O2 Delivery O2 Flow Rate FiO2


 


8/24/18 11:37  92 16     30


 


8/24/18 08:44  88 16     30


 


8/24/18 08:00  80      


 


8/24/18 08:00 98.4 78 16 120/86 (97) 98   





 98.4       


 


8/24/18 08:00      Mechanical Ventilator  


 


8/24/18 08:00        30


 


8/24/18 06:48  83 16     30


 


8/24/18 04:43  87 16     30


 


8/24/18 04:00 98.2 87 16 133/74 (93) 97   





 98.2       


 


8/24/18 04:00        30


 


8/24/18 04:00      Mechanical Ventilator  


 


8/24/18 03:42  93      


 


8/24/18 02:41  93 16     30


 


8/24/18 01:20  94 17     30


 


8/24/18 00:00 98.2 99 22 130/71 (90) 98   





 98.2       


 


8/24/18 00:00        30


 


8/24/18 00:00      Mechanical Ventilator  


 


8/23/18 23:31  99      


 


8/23/18 23:08  101 17     30


 


8/23/18 21:15  94 16     30


 


8/23/18 20:00 98.1 96 16 126/76 (93) 98   





 98.1       


 


8/23/18 20:00      Mechanical Ventilator  


 


8/23/18 20:00        30


 


8/23/18 19:12  95 16     30


 


8/23/18 19:07  95      


 


8/23/18 17:03  89 16     30


 


8/23/18 16:00 97.9 88 16 122/80 (94) 99   





 97.9       


 


8/23/18 16:00      Mechanical Ventilator  


 


8/23/18 16:00        30


 


8/23/18 16:00  94      


 


8/23/18 14:51  84 16     30


 


8/23/18 13:00  84 16     30








General Appearance:  no apparent distress


EENT:  PERRL/EOMI, normal ENT inspection


Neck:  non-tender, normal alignment


Rhythm:  SB


Cardiovascular:  normal peripheral pulses, normal rate


Respiratory/Chest:  chest wall non-tender, lungs clear


Abdomen:  normal bowel sounds, non tender, soft


Extremities:  normal range of motion, non-tender


Neurologic:  CNs II-XII grossly normal, no motor/sensory deficits











Intake and Output  


 


 8/23/18 8/24/18





 19:00 07:00


 


Intake Total 700 ml 720 ml


 


Output Total 700 ml 1450 ml


 


Balance 0 ml -730 ml


 


  


 


Free Water 150 ml 120 ml


 


Tube Feeding 550 ml 600 ml


 


Output Urine Total 400 ml 1100 ml


 


Stool Total 300 ml 350 ml











Laboratory Tests








Test


  8/24/18


04:00


 


White Blood Count


  7.4 K/UL


(4.8-10.8)


 


Red Blood Count


  5.00 M/UL


(4.70-6.10)


 


Hemoglobin


  13.8 G/DL


(14.2-18.0)  L


 


Hematocrit


  44.0 %


(42.0-52.0)


 


Mean Corpuscular Volume 88 FL (80-99)  


 


Mean Corpuscular Hemoglobin


  27.6 PG


(27.0-31.0)


 


Mean Corpuscular Hemoglobin


Concent 31.3 G/DL


(32.0-36.0)  L


 


Red Cell Distribution Width


  14.8 %


(11.6-14.8)


 


Platelet Count


  320 K/UL


(150-450)


 


Mean Platelet Volume


  7.4 FL


(6.5-10.1)


 


Neutrophils (%) (Auto)


  52.7 %


(45.0-75.0)


 


Lymphocytes (%) (Auto)


  28.1 %


(20.0-45.0)


 


Monocytes (%) (Auto)


  10.7 %


(1.0-10.0)  H


 


Eosinophils (%) (Auto)


  7.0 %


(0.0-3.0)  H


 


Basophils (%) (Auto)


  1.5 %


(0.0-2.0)


 


Sodium Level


  139 MMOL/L


(136-145)


 


Potassium Level


  4.4 MMOL/L


(3.5-5.1)


 


Chloride Level


  103 MMOL/L


()


 


Carbon Dioxide Level


  22 MMOL/L


(21-32)


 


Anion Gap


  14 mmol/L


(5-15)


 


Blood Urea Nitrogen


  18 mg/dL


(7-18)


 


Creatinine


  1.3 MG/DL


(0.55-1.30)


 


Estimat Glomerular Filtration


Rate > 60 mL/min


(>60)


 


Glucose Level


  108 MG/DL


()  H


 


Calcium Level


  10.4 MG/DL


(8.5-10.1)  H

















Robert Hernandez M.D. Aug 24, 2018 12:36

## 2018-08-24 NOTE — PULMONOLGY CRITICAL CARE NOTE
Critical Care - Asmt/Plan


Problems:  


(1) Acute on chronic respiratory failure


(2) Acute on chronic renal insufficiency


(3) Severe sepsis


(4) Vegetative state


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


(6) Diabetes mellitus


Respiratory:  monitor respiratory rate, adjust FIO2, CXR


Cardiac:  continue to monitor HR/BP


Renal:  F/U I&O


Infectious Disease:  check cultures


Gastrointestinal:  continue feedings/current rate


Endocrine:  check TSH


Neurologic:  PRN Morphine


Prophylaxis:  Protonix, Heparin


Time Spent (Minutes):  40





Critical Care - Objective





Last 24 Hour Vital Signs








  Date Time  Temp Pulse Resp B/P (MAP) Pulse Ox O2 Delivery O2 Flow Rate FiO2


 


8/24/18 08:44  88 16     30


 


8/24/18 08:00  80      


 


8/24/18 08:00 98.4 78 16 120/86 (97) 98   





 98.4       


 


8/24/18 08:00      Mechanical Ventilator  


 


8/24/18 08:00        30


 


8/24/18 06:48  83 16     30


 


8/24/18 04:43  87 16     30


 


8/24/18 04:00 98.2 87 16 133/74 (93) 97   





 98.2       


 


8/24/18 04:00        30


 


8/24/18 04:00      Mechanical Ventilator  


 


8/24/18 03:42  93      


 


8/24/18 02:41  93 16     30


 


8/24/18 01:20  94 17     30


 


8/24/18 00:00 98.2 99 22 130/71 (90) 98   





 98.2       


 


8/24/18 00:00        30


 


8/24/18 00:00      Mechanical Ventilator  


 


8/23/18 23:31  99      


 


8/23/18 23:08  101 17     30


 


8/23/18 21:15  94 16     30


 


8/23/18 20:00 98.1 96 16 126/76 (93) 98   





 98.1       


 


8/23/18 20:00      Mechanical Ventilator  


 


8/23/18 20:00        30


 


8/23/18 19:12  95 16     30


 


8/23/18 19:07  95      


 


8/23/18 17:03  89 16     30


 


8/23/18 16:00 97.9 88 16 122/80 (94) 99   





 97.9       


 


8/23/18 16:00      Mechanical Ventilator  


 


8/23/18 16:00        30


 


8/23/18 16:00  94      


 


8/23/18 14:51  84 16     30


 


8/23/18 13:00  84 16     30


 


8/23/18 12:00        30


 


8/23/18 12:00      Mechanical Ventilator  


 


8/23/18 12:00  86      


 


8/23/18 12:00 98.1 87 16 110/79 (89) 99   





 98.1       








Status:  awake, obtunded


Condition:  critical


HEENT:  atraumatic


Lungs:  clear


Heart:  HR/BP stable


Abdomen:  soft, active bowel sounds


Extremities:  no C/C/E


Accucheck:  124





Critical Care - Subjective


ROS Limited/Unobtainable:  No


Condition:  critical


EKG Rhythm:  Sinus Rhythm


FI02:  30


Vent Support Breath Rate:  16


Vent Support Mode:  AC


Vent Tidal Volume:  600


Sputum Amount:  Scant


PEEP:  5.0


PIP:  29


Tube Feeding Amount:  50


I&O:











Intake and Output  


 


 8/23/18 8/24/18





 19:00 07:00


 


Intake Total 700 ml 720 ml


 


Output Total 700 ml 1450 ml


 


Balance 0 ml -730 ml


 


  


 


Free Water 150 ml 120 ml


 


Tube Feeding 550 ml 600 ml


 


Output Urine Total 400 ml 1100 ml


 


Stool Total 300 ml 350 ml








Labs:





Laboratory Tests








Test


  8/24/18


04:00


 


White Blood Count


  7.4 K/UL


(4.8-10.8)


 


Red Blood Count


  5.00 M/UL


(4.70-6.10)


 


Hemoglobin


  13.8 G/DL


(14.2-18.0)  L


 


Hematocrit


  44.0 %


(42.0-52.0)


 


Mean Corpuscular Volume 88 FL (80-99)  


 


Mean Corpuscular Hemoglobin


  27.6 PG


(27.0-31.0)


 


Mean Corpuscular Hemoglobin


Concent 31.3 G/DL


(32.0-36.0)  L


 


Red Cell Distribution Width


  14.8 %


(11.6-14.8)


 


Platelet Count


  320 K/UL


(150-450)


 


Mean Platelet Volume


  7.4 FL


(6.5-10.1)


 


Neutrophils (%) (Auto)


  52.7 %


(45.0-75.0)


 


Lymphocytes (%) (Auto)


  28.1 %


(20.0-45.0)


 


Monocytes (%) (Auto)


  10.7 %


(1.0-10.0)  H


 


Eosinophils (%) (Auto)


  7.0 %


(0.0-3.0)  H


 


Basophils (%) (Auto)


  1.5 %


(0.0-2.0)


 


Sodium Level


  139 MMOL/L


(136-145)


 


Potassium Level


  4.4 MMOL/L


(3.5-5.1)


 


Chloride Level


  103 MMOL/L


()


 


Carbon Dioxide Level


  22 MMOL/L


(21-32)


 


Anion Gap


  14 mmol/L


(5-15)


 


Blood Urea Nitrogen


  18 mg/dL


(7-18)


 


Creatinine


  1.3 MG/DL


(0.55-1.30)


 


Estimat Glomerular Filtration


Rate > 60 mL/min


(>60)


 


Glucose Level


  108 MG/DL


()  H


 


Calcium Level


  10.4 MG/DL


(8.5-10.1)  H

















Yury Pena MD Aug 24, 2018 11:20

## 2018-08-24 NOTE — PROGRESS NOTE
DATE:  08/23/2018



SUBJECTIVE:  The patient is awake, alert, afebrile, and hemodynamically

stable.



PHYSICAL EXAMINATION:

VITAL SIGNS:  Blood pressure 126/76, pulse is 96, respirations 16, and

temperature 98.1 degrees.

HEENT:  Eyes were normal.  ENT, mucous membranes were moist and intact.

NECK:  Supple with no JVD without lymph nodes.  Tracheostomy site is

clean.

LUNGS:  Clear without rhonchi, rales, or wheezing.  Secretions are small,

thin, and grey.

HEART:  Normal sounds with regular beats.  There is no tachycardia at

rest.

ABDOMEN:  Soft and nontender with normal bowel sounds.  Gastrostomy site is

clean.

EXTREMITIES:  Warm without cyanosis, clubbing, or edema.



LABORATORY AND DIAGNOSTIC DATA:  His hemoglobin is 14.7, hematocrit 37.1

with MCV of 87, WBC of 7.3 and platelets is 323.  His BUN and creatinine

are 8 and 1.1 respectively.  His sodium is 138, potassium 4.3, chloride

102, and CO2 is 23.  His calcium was 10.3.  SGOT and SGPT are normal.  His

phosphorus is 2.7.  His magnesium is 2.0.



IMPRESSION AND PLAN:  The patient has been in stable condition.  Repeat

laboratory tests will be done in the morning.









  ______________________________________________

  Etienne Bloom M.D.





DR:  JAMIE

D:  08/23/2018 23:17

T:  08/24/2018 04:19

JOB#:  [id number]

CC:

## 2018-08-24 NOTE — GI PROGRESS NOTE
Assessment/Plan


Problems:  


(1) Parastomal hernia


ICD Codes:  K43.5 - Parastomal hernia without obstruction or gangrene


SNOMED:  505933308


Qualifiers:  


   Qualified Codes:  K43.5 - Parastomal hernia without obstruction or gangrene


(2) Stoma malfunction


SNOMED:  057617187


(3) Colostomy in place


ICD Codes:  Z93.3 - Colostomy status


SNOMED:  074687446, 205477765


(4) Vegetative state


ICD Codes:  R40.3 - Persistent vegetative state


SNOMED:  07182651


(5) Diabetes mellitus


ICD Codes:  E11.9 - Type 2 diabetes mellitus without complications


SNOMED:  53246636


(6) Acute on chronic respiratory failure


ICD Codes:  J96.20 - Acute and chronic respiratory failure, unspecified whether 

with hypoxia or hypercapnia


SNOMED:  94250334


(7) Severe sepsis


ICD Codes:  A41.9 - Sepsis, unspecified organism; R65.20 - Severe sepsis 

without septic shock


SNOMED:  92378889


Status:  stable


Status Narrative


Discussed with Dr. Pickard.


Assessment/Plan


parastomal >> no s/sx of infection.  no obstruction. 


anemia work up reviewed >> iron deficiency


G tube dependent


hepatitis panel negative


OB negative





supportive care


monitor H&H, prn transfusions


venofer


ppi


GTFs per RD, adv to goal


GT site care daily/prn


abx


bowel regime


fu labs


dc planning





The patient was seen and examined at bedside and all new and available data was 

reviewed in the patients chart. I agree with the above findings, impression 

and plan.  (Patient seen earlier today. Signature stamp does not reflect 

patient encounter time.). - Miles Pickard MD





Subjective


Subjective


limited





Objective





Last 24 Hour Vital Signs








  Date Time  Temp Pulse Resp B/P (MAP) Pulse Ox O2 Delivery O2 Flow Rate FiO2


 


8/24/18 11:37  92 16     30


 


8/24/18 08:44  88 16     30


 


8/24/18 08:00  80      


 


8/24/18 08:00 98.4 78 16 120/86 (97) 98   





 98.4       


 


8/24/18 08:00      Mechanical Ventilator  


 


8/24/18 08:00        30


 


8/24/18 06:48  83 16     30


 


8/24/18 04:43  87 16     30


 


8/24/18 04:00 98.2 87 16 133/74 (93) 97   





 98.2       


 


8/24/18 04:00        30


 


8/24/18 04:00      Mechanical Ventilator  


 


8/24/18 03:42  93      


 


8/24/18 02:41  93 16     30


 


8/24/18 01:20  94 17     30


 


8/24/18 00:00 98.2 99 22 130/71 (90) 98   





 98.2       


 


8/24/18 00:00        30


 


8/24/18 00:00      Mechanical Ventilator  


 


8/23/18 23:31  99      


 


8/23/18 23:08  101 17     30


 


8/23/18 21:15  94 16     30


 


8/23/18 20:00 98.1 96 16 126/76 (93) 98   





 98.1       


 


8/23/18 20:00      Mechanical Ventilator  


 


8/23/18 20:00        30


 


8/23/18 19:12  95 16     30


 


8/23/18 19:07  95      


 


8/23/18 17:03  89 16     30


 


8/23/18 16:00 97.9 88 16 122/80 (94) 99   





 97.9       


 


8/23/18 16:00      Mechanical Ventilator  


 


8/23/18 16:00        30


 


8/23/18 16:00  94      


 


8/23/18 14:51  84 16     30


 


8/23/18 13:00  84 16     30


 


8/23/18 12:00        30


 


8/23/18 12:00      Mechanical Ventilator  


 


8/23/18 12:00  86      


 


8/23/18 12:00 98.1 87 16 110/79 (89) 99   





 98.1       

















Intake and Output  


 


 8/23/18 8/24/18





 19:00 07:00


 


Intake Total 700 ml 720 ml


 


Output Total 700 ml 1450 ml


 


Balance 0 ml -730 ml


 


  


 


Free Water 150 ml 120 ml


 


Tube Feeding 550 ml 600 ml


 


Output Urine Total 400 ml 1100 ml


 


Stool Total 300 ml 350 ml











Laboratory Tests








Test


  8/24/18


04:00


 


White Blood Count


  7.4 K/UL


(4.8-10.8)


 


Red Blood Count


  5.00 M/UL


(4.70-6.10)


 


Hemoglobin


  13.8 G/DL


(14.2-18.0)  L


 


Hematocrit


  44.0 %


(42.0-52.0)


 


Mean Corpuscular Volume 88 FL (80-99)  


 


Mean Corpuscular Hemoglobin


  27.6 PG


(27.0-31.0)


 


Mean Corpuscular Hemoglobin


Concent 31.3 G/DL


(32.0-36.0)  L


 


Red Cell Distribution Width


  14.8 %


(11.6-14.8)


 


Platelet Count


  320 K/UL


(150-450)


 


Mean Platelet Volume


  7.4 FL


(6.5-10.1)


 


Neutrophils (%) (Auto)


  52.7 %


(45.0-75.0)


 


Lymphocytes (%) (Auto)


  28.1 %


(20.0-45.0)


 


Monocytes (%) (Auto)


  10.7 %


(1.0-10.0)  H


 


Eosinophils (%) (Auto)


  7.0 %


(0.0-3.0)  H


 


Basophils (%) (Auto)


  1.5 %


(0.0-2.0)


 


Sodium Level


  139 MMOL/L


(136-145)


 


Potassium Level


  4.4 MMOL/L


(3.5-5.1)


 


Chloride Level


  103 MMOL/L


()


 


Carbon Dioxide Level


  22 MMOL/L


(21-32)


 


Anion Gap


  14 mmol/L


(5-15)


 


Blood Urea Nitrogen


  18 mg/dL


(7-18)


 


Creatinine


  1.3 MG/DL


(0.55-1.30)


 


Estimat Glomerular Filtration


Rate > 60 mL/min


(>60)


 


Glucose Level


  108 MG/DL


()  H


 


Calcium Level


  10.4 MG/DL


(8.5-10.1)  H








Height (Feet):  6


Height (Inches):  0.00


Weight (Pounds):  179


General Appearance:  alert


Cardiovascular:  normal rate


Respiratory/Chest:  normal breath sounds, other - mech vent


Abdominal Exam:  GT site, other - colostomy











Remy Lyons NP Aug 24, 2018 11:48

## 2018-08-24 NOTE — INFECTIOUS DISEASES PROG NOTE
Assessment/Plan


Sepsis, SP- 2ry to UTI and Bacteremia Blood cultures postive 4/4 bottles with 

[facility] Unclear source will need to R/O Endocarditis  Possible PNA initially 


 -u/a wbc 40-60, nit neg, leuk +2; ucx >100k E. aerogenes (S cefepime, cipro/

levo; R Zosyn)


 -BCX 4/4 E. aerogenes, prob Amp C (S cefepime, cipro/levo; R Zosyn), P. 

mirabilis ESBL (S Zosyn, Ertapenem); repeta 7/26 1/4 CoNS (suspect contaminant)

, 7/28 Staph f/u final results if CoNS may need TTE and IE work up.


  -CXR 7/27: Increased left pleural effusion, over 3 days. Overall decreased 

interstitial congestion. Right infrahilar atelectasis


 -CXR: Cardiomegaly. Mild interstitial congestion. Suspect small bilateral 

pleural effusions


 -CT abd/p: Right lower quadrant double barrel colostomy, as described. Small 

peristomal hernia contains bowel loops without evidence of obstruction or 

strangulation. Left renal staghorn calculus. Bilateral intrarenal calyceal 

calculi. Borderline hydronephrosis on the right without evidence of downstream 

obstructive lesion. May indicate mild ureteral pelvic junction obstruction. 

Somewhat atrophic left kidney. Inspissated contrast ball within the distal 

sigmoid colon. Gastrostomy in good position. Nonspecific bilateral perinephric 

fat stranding, could indicate pyelonephritis or could be chronic. Guzmán 

catheter in place. Apparent bladder wall thickening, possibly an artifact of 

under distention but cystitis is not excludable, particularly in view of mild 

perivesical fat stranding. Bilateral pulmonary parenchymal atelectasis and 

consolidation


  -Blood Cx from PICC CoNS 2/2 bottles Peripheral Neg- (May be contaminant but 

will repeat blood Cx given new leukocytosis if positive will need ECHO.


  - Blood Cx 7/30/18 - GPC - Will need TTE for IE work up and the PICC removed.


 


PICC line infection/colonization - TTE negative. Blood cultures only positive 

from PICC. Not positive from Peripheral 


- Will repeat blood cultures x 1 to confirm clearance after PICC replaced. [place] 

suspicion for IE.


- PICC replace 8/1/18





Fever/Leukocytosis; Resolved - i recurs Re-evaluate lines, Diarrhea? C. dif? 


BRAYDON, improving


Lactic acidosis, resolved





chronic resp failure trach/vent dependant


BPH


GERD


 HTN


DM2


seizure disorder


colostomy


 s/p GT 


constipation





VRE and MRSA colonized


 


Plan:





- Continue empiric IV Vancomycin #25/42 for bacteremia/line infection cant r/o 

Endocarditis  


   -Abx end date 9/12/18 if STACI attempted again and negative then ed date would 

be 8/15/18 


       -weekly CBC, BMP, vanco through


    -8/9 SP Ertapenem #14


    -7/27 SP Zosyn #4








STACI could not be performed due to technical problems


   - Unable to repeat STACI so patient should be treated for presumed 

endocarditis for 6 weeks








- Monitor CBC/CMP, temperatures


- Pulmonary care








Thank you for this consultation. Will continue to follow along with you.





Subjective


Allergies:  


Coded Allergies:  


     AZTREONAM (Verified  Allergy, Unknown, 7/23/18)


Subjective


afebrile


no leukocytosis





Objective


Vital Signs





Last 24 Hour Vital Signs








  Date Time  Temp Pulse Resp B/P (MAP) Pulse Ox O2 Delivery O2 Flow Rate FiO2


 


8/24/18 16:40  85 16     30


 


8/24/18 16:00        30


 


8/24/18 16:00      Mechanical Ventilator  


 


8/24/18 16:00 98.1 91 16 111/80 (90) 98   





 98.1       


 


8/24/18 16:00  86      


 


8/24/18 14:50  89 16     30


 


8/24/18 12:52  86 16     30


 


8/24/18 12:00      Mechanical Ventilator  


 


8/24/18 12:00  88      


 


8/24/18 12:00 97.9 88 16 132/87 (102) 97   





 97.9       


 


8/24/18 12:00        30


 


8/24/18 11:37  92 16     30


 


8/24/18 08:44  88 16     30


 


8/24/18 08:00  80      


 


8/24/18 08:00 98.4 78 16 120/86 (97) 98   





 98.4       


 


8/24/18 08:00      Mechanical Ventilator  


 


8/24/18 08:00        30


 


8/24/18 06:48  83 16     30


 


8/24/18 04:43  87 16     30


 


8/24/18 04:00 98.2 87 16 133/74 (93) 97   





 98.2       


 


8/24/18 04:00        30


 


8/24/18 04:00      Mechanical Ventilator  


 


8/24/18 03:42  93      


 


8/24/18 02:41  93 16     30


 


8/24/18 01:20  94 17     30


 


8/24/18 00:00 98.2 99 22 130/71 (90) 98   





 98.2       


 


8/24/18 00:00        30


 


8/24/18 00:00      Mechanical Ventilator  


 


8/23/18 23:31  99      


 


8/23/18 23:08  101 17     30


 


8/23/18 21:15  94 16     30


 


8/23/18 20:00 98.1 96 16 126/76 (93) 98   





 98.1       


 


8/23/18 20:00      Mechanical Ventilator  


 


8/23/18 20:00        30


 


8/23/18 19:12  95 16     30


 


8/23/18 19:07  95      








Height (Feet):  6


Height (Inches):  0.00


Weight (Pounds):  179


Objective


GENERAL:  The patient is awake, in no overt distress.


HEENT:  Extraocular muscles intact.  No lymphadenopathy noted.


Tracheostomy noted.


CARDIOVASCULAR:  S1 and S2.  Tachycardic.  No rubs or gallops.


PULMONARY:  Mild diffuse expiratory rhonchi with basilar rales.


ABDOMEN:  Obese and nondistended.  The G-tube and stoma noted.


EXTREMITIES:  No edema.  Fair pedal pulses.





Laboratory Tests








Test


  8/24/18


04:00


 


White Blood Count


  7.4 K/UL


(4.8-10.8)


 


Red Blood Count


  5.00 M/UL


(4.70-6.10)


 


Hemoglobin


  13.8 G/DL


(14.2-18.0)  L


 


Hematocrit


  44.0 %


(42.0-52.0)


 


Mean Corpuscular Volume 88 FL (80-99)  


 


Mean Corpuscular Hemoglobin


  27.6 PG


(27.0-31.0)


 


Mean Corpuscular Hemoglobin


Concent 31.3 G/DL


(32.0-36.0)  L


 


Red Cell Distribution Width


  14.8 %


(11.6-14.8)


 


Platelet Count


  320 K/UL


(150-450)


 


Mean Platelet Volume


  7.4 FL


(6.5-10.1)


 


Neutrophils (%) (Auto)


  52.7 %


(45.0-75.0)


 


Lymphocytes (%) (Auto)


  28.1 %


(20.0-45.0)


 


Monocytes (%) (Auto)


  10.7 %


(1.0-10.0)  H


 


Eosinophils (%) (Auto)


  7.0 %


(0.0-3.0)  H


 


Basophils (%) (Auto)


  1.5 %


(0.0-2.0)


 


Sodium Level


  139 MMOL/L


(136-145)


 


Potassium Level


  4.4 MMOL/L


(3.5-5.1)


 


Chloride Level


  103 MMOL/L


()


 


Carbon Dioxide Level


  22 MMOL/L


(21-32)


 


Anion Gap


  14 mmol/L


(5-15)


 


Blood Urea Nitrogen


  18 mg/dL


(7-18)


 


Creatinine


  1.3 MG/DL


(0.55-1.30)


 


Estimat Glomerular Filtration


Rate > 60 mL/min


(>60)


 


Glucose Level


  108 MG/DL


()  H


 


Calcium Level


  10.4 MG/DL


(8.5-10.1)  H











Current Medications








 Medications


  (Trade)  Dose


 Ordered  Sig/Jan


 Route


 PRN Reason  Start Time


 Stop Time Status Last Admin


Dose Admin


 


 Acetaminophen


  (Tylenol)  650 mg  Q4H  PRN


 ORAL


 FEVER  8/18/18 21:00


 9/15/18 20:59   


 


 


 Baclofen


  (Lioresal)  10 mg  EVERY 8  HOURS


 GT


   8/18/18 22:00


 9/11/18 20:59  8/24/18 15:16


 


 


 Chlorhexidine


 Gluconate


  (Elaine-Hex 2%)  1 applic  DAILY@2000


 TOPIC


   8/18/18 22:00


 9/17/18 21:59  8/23/18 20:55


 


 


 Dextrose


  (Dextrose 50%)  25 ml  STAT  PRN


 IV


 Hypoglycemia  8/18/18 21:45


 9/15/18 21:44   


 


 


 Dextrose


  (Dextrose 50%)  50 ml  STAT  PRN


 IV


 Hypoglycemia  8/18/18 21:45


 9/15/18 21:44   


 


 


 Heparin Sodium


  (Porcine)


  (Heparin 5000


 units/ml)  5,000 units  EVERY 12  HOURS


 SUBQ


   8/18/18 22:00


 9/17/18 21:59  8/24/18 08:19


 


 


 Insulin Aspart


  (NovoLOG)    Q6HR


 SUBQ


   8/19/18 00:00


 9/16/18 17:29  8/24/18 12:35


 


 


 Lansoprazole


  (Prevacid)  30 mg  DAILY


 GT


   8/19/18 09:00


 9/17/18 08:59  8/24/18 08:17


 


 


 Levetiracetam


  (Keppra)  1,000 mg  Q8HR


 GT


   8/18/18 22:00


 9/15/18 20:59  8/24/18 15:16


 


 


 Ondansetron HCl


  (Zofran)  4 mg  Q6H  PRN


 IVP


 Nausea & Vomiting  8/18/18 22:00


 9/15/18 15:59   


 


 


 Polyethylene


 Glycol


  (Miralax)  17 gm  BEDTIME


 GT


   8/18/18 22:00


 9/17/18 21:59  8/23/18 20:55


 


 


 Vancomycin HCl


  (Vanco rx to


 dose)  1 ea  DAILY  PRN


 MISC


 PER RX PROTOCOL  8/18/18 21:45


 9/17/18 21:44   


 


 


 Vancomycin HCl/


 Dextrose  250 ml @ 


 125 mls/hr  Q24H


 IVPB


   8/21/18 07:00


 8/26/18 06:59  8/24/18 06:09


 

















Joy Love M.D. Aug 24, 2018 17:49

## 2018-08-25 VITALS — DIASTOLIC BLOOD PRESSURE: 84 MMHG | SYSTOLIC BLOOD PRESSURE: 130 MMHG

## 2018-08-25 VITALS — DIASTOLIC BLOOD PRESSURE: 68 MMHG | SYSTOLIC BLOOD PRESSURE: 132 MMHG

## 2018-08-25 VITALS — DIASTOLIC BLOOD PRESSURE: 80 MMHG | SYSTOLIC BLOOD PRESSURE: 132 MMHG

## 2018-08-25 VITALS — DIASTOLIC BLOOD PRESSURE: 78 MMHG | SYSTOLIC BLOOD PRESSURE: 134 MMHG

## 2018-08-25 VITALS — SYSTOLIC BLOOD PRESSURE: 128 MMHG | DIASTOLIC BLOOD PRESSURE: 85 MMHG

## 2018-08-25 VITALS — DIASTOLIC BLOOD PRESSURE: 78 MMHG | SYSTOLIC BLOOD PRESSURE: 128 MMHG

## 2018-08-25 LAB
ADD MANUAL DIFF: NO
ANION GAP SERPL CALC-SCNC: 15 MMOL/L (ref 5–15)
BASOPHILS NFR BLD AUTO: 1.7 % (ref 0–2)
BUN SERPL-MCNC: 20 MG/DL (ref 7–18)
CALCIUM SERPL-MCNC: 10.6 MG/DL (ref 8.5–10.1)
CHLORIDE SERPL-SCNC: 103 MMOL/L (ref 98–107)
CO2 SERPL-SCNC: 21 MMOL/L (ref 21–32)
CREAT SERPL-MCNC: 1.2 MG/DL (ref 0.55–1.3)
EOSINOPHIL NFR BLD AUTO: 6.3 % (ref 0–3)
ERYTHROCYTE [DISTWIDTH] IN BLOOD BY AUTOMATED COUNT: 15.2 % (ref 11.6–14.8)
HCT VFR BLD CALC: 43.6 % (ref 42–52)
HGB BLD-MCNC: 14 G/DL (ref 14.2–18)
LYMPHOCYTES NFR BLD AUTO: 27.2 % (ref 20–45)
MCV RBC AUTO: 88 FL (ref 80–99)
MONOCYTES NFR BLD AUTO: 8.6 % (ref 1–10)
NEUTROPHILS NFR BLD AUTO: 56.2 % (ref 45–75)
PLATELET # BLD: 311 K/UL (ref 150–450)
POTASSIUM SERPL-SCNC: 4.1 MMOL/L (ref 3.5–5.1)
RBC # BLD AUTO: 4.97 M/UL (ref 4.7–6.1)
SODIUM SERPL-SCNC: 139 MMOL/L (ref 136–145)
WBC # BLD AUTO: 8.7 K/UL (ref 4.8–10.8)

## 2018-08-25 RX ADMIN — INSULIN ASPART SCH UNITS: 100 INJECTION, SOLUTION INTRAVENOUS; SUBCUTANEOUS at 17:57

## 2018-08-25 RX ADMIN — LEVETIRACETAM SCH MG: 100 SOLUTION ORAL at 13:33

## 2018-08-25 RX ADMIN — LEVETIRACETAM SCH MG: 100 SOLUTION ORAL at 05:53

## 2018-08-25 RX ADMIN — HEPARIN SODIUM SCH UNITS: 5000 INJECTION INTRAVENOUS; SUBCUTANEOUS at 08:50

## 2018-08-25 RX ADMIN — Medication SCH MLS/HR: at 05:54

## 2018-08-25 RX ADMIN — INSULIN ASPART SCH UNITS: 100 INJECTION, SOLUTION INTRAVENOUS; SUBCUTANEOUS at 00:00

## 2018-08-25 RX ADMIN — INSULIN ASPART SCH UNITS: 100 INJECTION, SOLUTION INTRAVENOUS; SUBCUTANEOUS at 12:15

## 2018-08-25 RX ADMIN — INSULIN ASPART SCH UNITS: 100 INJECTION, SOLUTION INTRAVENOUS; SUBCUTANEOUS at 05:55

## 2018-08-25 RX ADMIN — CHLORHEXIDINE GLUCONATE SCH APPLIC: 213 SOLUTION TOPICAL at 19:56

## 2018-08-25 NOTE — INFECTIOUS DISEASES PROG NOTE
Assessment/Plan


Assessment/Plan


A:





Sepsis, SP 


- Probable  UTI 


 -u/a wbc 40-60, nit neg, leuk +2; ucx >100k E. aerogenes (S cefepime, cipro/

levo; R Zosyn)


Bacteremia Blood cultures postive 4/4 bottles with GPC Unclear source will need 

to R/O Endocarditis  


 -BCX 4/4 E. aerogenes, prob Amp C (S cefepime, cipro/levo; R Zosyn), P. 

mirabilis ESBL (S Zosyn, Ertapenem); repeta 7/26 1/4 CoNS (suspect contaminant)

, 7/28 Staph f/u final results if CoNS may need TTE and IE work up.


  -CXR 7/27: Increased left pleural effusion, over 3 days. Overall decreased 

interstitial congestion. Right infrahilar atelectasis


 -CXR: Cardiomegaly. Mild interstitial congestion. Suspect small bilateral 

pleural effusions


 -CT abd/p: Right lower quadrant double barrel colostomy, as described. Small 

peristomal hernia contains bowel loops without evidence of obstruction or 

strangulation. Left renal staghorn calculus. Bilateral intrarenal calyceal 

calculi. Borderline hydronephrosis on the right without evidence of downstream 

obstructive lesion. May indicate mild ureteral pelvic junction obstruction. 

Somewhat atrophic left kidney. Inspissated contrast ball within the distal 

sigmoid colon. Gastrostomy in good position. Nonspecific bilateral perinephric 

fat stranding, could indicate pyelonephritis or could be chronic. Guzmán 

catheter in place. Apparent bladder wall thickening, possibly an artifact of 

under distention but cystitis is not excludable, particularly in view of mild 

perivesical fat stranding. Bilateral pulmonary parenchymal atelectasis and 

consolidation


  -Blood Cx from PICC CoNS 2/2 bottles Peripheral Neg- (May be contaminant but 

will repeat blood Cx given new leukocytosis if positive will need ECHO.


  - Blood Cx 7/30/18 - GPC - Will need TTE for IE work up and the PICC removed.


 


PICC line infection/colonization - TTE negative. Blood cultures only positive 

from PICC. Not positive from Peripheral 


- PICC replace 8/1/18





Fever/Leukocytosis; Resolved











BRAYDON, improving


Lactic acidosis, resolved


chronic resp failure trach/vent dependant


BPH


GERD


 HTN


DM2


seizure disorder


colostomy


 s/p GT 


constipation





VRE and MRSA colonized


 


Plan:








- Continue empiric IV Vancomycin #26/42 for bacteremia/line infection cant r/o 

Endocarditis  


       


    -8/9 SP Ertapenem #14


    -7/27 SP Zosyn #4








STACI could not be performed due to technical problems


   - Unable to repeat STACI so patient should be treated for presumed 

endocarditis for 6 weeks





- Monitor CBC/CMP, temperatures


- Pulmonary care





Subjective


Allergies:  


Coded Allergies:  


     AZTREONAM (Verified  Allergy, Unknown, 7/23/18)


Subjective


Afebrile





Objective


Vital Signs





Last 24 Hour Vital Signs








  Date Time  Temp Pulse Resp B/P (MAP) Pulse Ox O2 Delivery O2 Flow Rate FiO2


 


8/25/18 10:44  87 16     30


 


8/25/18 09:04  83 16     30


 


8/25/18 08:26      Mechanical Ventilator  


 


8/25/18 08:24 98.5 82 16 130/84 (99) 98   





 98.5       


 


8/25/18 08:00        30


 


8/25/18 08:00  81      


 


8/25/18 06:30  84 16     30


 


8/25/18 05:30  98 17     30


 


8/25/18 04:00 97.9 85 18 132/80 (97) 100   





 97.9       


 


8/25/18 04:00  82      


 


8/25/18 04:00        30


 


8/25/18 04:00      Mechanical Ventilator  


 


8/25/18 03:24  102 17     30


 


8/25/18 01:30  101 17     30


 


8/25/18 00:00 97.2 87 16 128/85 (99) 99   





 97.2       


 


8/25/18 00:00  83      


 


8/25/18 00:00      Mechanical Ventilator  


 


8/24/18 23:30  103 16     30


 


8/24/18 21:03  102 17     30


 


8/24/18 20:00 98.2 90 17 128/78 (95) 97   





 98.2       


 


8/24/18 20:00      Mechanical Ventilator  


 


8/24/18 20:00        30


 


8/24/18 19:47  94      


 


8/24/18 19:30  90 16     30


 


8/24/18 16:40  85 16     30


 


8/24/18 16:00        30


 


8/24/18 16:00      Mechanical Ventilator  


 


8/24/18 16:00 98.1 91 16 111/80 (90) 98   





 98.1       


 


8/24/18 16:00  86      


 


8/24/18 14:50  89 16     30


 


8/24/18 12:52  86 16     30


 


8/24/18 12:00      Mechanical Ventilator  


 


8/24/18 12:00  88      


 


8/24/18 12:00 97.9 88 16 132/87 (102) 97   





 97.9       


 


8/24/18 12:00        30


 


8/24/18 11:37  92 16     30








Height (Feet):  6


Height (Inches):  0.00


Weight (Pounds):  178





Laboratory Tests








Test


  8/25/18


03:20


 


White Blood Count


  8.7 K/UL


(4.8-10.8)


 


Red Blood Count


  4.97 M/UL


(4.70-6.10)


 


Hemoglobin


  14.0 G/DL


(14.2-18.0)  L


 


Hematocrit


  43.6 %


(42.0-52.0)


 


Mean Corpuscular Volume 88 FL (80-99)  


 


Mean Corpuscular Hemoglobin


  28.1 PG


(27.0-31.0)


 


Mean Corpuscular Hemoglobin


Concent 32.0 G/DL


(32.0-36.0)


 


Red Cell Distribution Width


  15.2 %


(11.6-14.8)  H


 


Platelet Count


  311 K/UL


(150-450)


 


Mean Platelet Volume


  8.0 FL


(6.5-10.1)


 


Neutrophils (%) (Auto)


  56.2 %


(45.0-75.0)


 


Lymphocytes (%) (Auto)


  27.2 %


(20.0-45.0)


 


Monocytes (%) (Auto)


  8.6 %


(1.0-10.0)


 


Eosinophils (%) (Auto)


  6.3 %


(0.0-3.0)  H


 


Basophils (%) (Auto)


  1.7 %


(0.0-2.0)


 


Sodium Level


  139 MMOL/L


(136-145)


 


Potassium Level


  4.1 MMOL/L


(3.5-5.1)


 


Chloride Level


  103 MMOL/L


()


 


Carbon Dioxide Level


  21 MMOL/L


(21-32)


 


Anion Gap


  15 mmol/L


(5-15)


 


Blood Urea Nitrogen


  20 mg/dL


(7-18)  H


 


Creatinine


  1.2 MG/DL


(0.55-1.30)


 


Estimat Glomerular Filtration


Rate > 60 mL/min


(>60)


 


Glucose Level


  121 MG/DL


()  H


 


Calcium Level


  10.6 MG/DL


(8.5-10.1)  H











Current Medications








 Medications


  (Trade)  Dose


 Ordered  Sig/Jan


 Route


 PRN Reason  Start Time


 Stop Time Status Last Admin


Dose Admin


 


 Acetaminophen


  (Tylenol)  650 mg  Q4H  PRN


 ORAL


 FEVER  8/18/18 21:00


 9/15/18 20:59   


 


 


 Baclofen


  (Lioresal)  10 mg  EVERY 8  HOURS


 GT


   8/18/18 22:00


 9/11/18 20:59  8/25/18 05:53


 


 


 Chlorhexidine


 Gluconate


  (Elaine-Hex 2%)  1 applic  DAILY@2000


 TOPIC


   8/18/18 22:00


 9/17/18 21:59  8/24/18 21:34


 


 


 Dextrose


  (Dextrose 50%)  25 ml  STAT  PRN


 IV


 Hypoglycemia  8/18/18 21:45


 9/15/18 21:44   


 


 


 Dextrose


  (Dextrose 50%)  50 ml  STAT  PRN


 IV


 Hypoglycemia  8/18/18 21:45


 9/15/18 21:44   


 


 


 Heparin Sodium


  (Porcine)


  (Heparin 5000


 units/ml)  5,000 units  EVERY 12  HOURS


 SUBQ


   8/18/18 22:00


 9/17/18 21:59  8/25/18 08:50


 


 


 Insulin Aspart


  (NovoLOG)    Q6HR


 SUBQ


   8/19/18 00:00


 9/16/18 17:29  8/25/18 05:55


 


 


 Lansoprazole


  (Prevacid)  30 mg  DAILY


 GT


   8/19/18 09:00


 9/17/18 08:59  8/25/18 08:48


 


 


 Levetiracetam


  (Keppra)  1,000 mg  Q8HR


 GT


   8/18/18 22:00


 9/15/18 20:59  8/25/18 05:53


 


 


 Ondansetron HCl


  (Zofran)  4 mg  Q6H  PRN


 IVP


 Nausea & Vomiting  8/18/18 22:00


 9/15/18 15:59   


 


 


 Polyethylene


 Glycol


  (Miralax)  17 gm  BEDTIME


 GT


   8/18/18 22:00


 9/17/18 21:59  8/24/18 21:34


 


 


 Vancomycin HCl


  (Vanco rx to


 dose)  1 ea  DAILY  PRN


 MISC


 PER RX PROTOCOL  8/18/18 21:45


 9/17/18 21:44   


 


 


 Vancomycin HCl/


 Dextrose  250 ml @ 


 125 mls/hr  Q24H


 IVPB


   8/21/18 07:00


 9/12/18 06:59  8/25/18 05:54


 

















Roderick Fitzgerald MD Aug 25, 2018 11:31

## 2018-08-25 NOTE — CARDIOLOGY PROGRESS NOTE
Assessment/Plan


Status:  stable


Assessment/Plan


Assessment:


(1) Acute on chronic respiratory failure


(2) Acute on chronic renal insufficiency


(3) Severe sepsis


(4) Vegetative state


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


(6) Diabetes mellitus





Plan:


Continue antibiotics


Echocardiogram reviewed- no endocarditis, STACI unable to be performed


Patient afebrile, normal WBC otherwise


Continue trach care and tube feeds


Continue keppra for seizures 


Continue abx treatment for six weeks via PICC line until 9/15


Dispo planning





Subjective


Cardiovascular:  Reports: no symptoms


Respiratory:  Reports: no symptoms


Gastrointestinal/Abdominal:  Reports: no symptoms


Genitourinary:  Reports: no symptoms


Subjective


Afebrile vitals stable, Telemetry shows sinus rhythm. No acute events. Left 

pleural effusion unchanged. 


Ventilator settings: portex 7, AC 16, TV: 600, FiO2: 30%, PEEP: 5. GT is in 

place running glucerna 1.2 @ 65 ml/hr. No residual present. Colostomy bag in 

place.


STACI not completed, probe unable to be passed.  Patient should be treated for 

total six weeks abx for presumptive endocarditis at this juncture. Hyponatremia 

resolved, BRAYDON resolved, patient stable .





Objective





Last 24 Hour Vital Signs








  Date Time  Temp Pulse Resp B/P (MAP) Pulse Ox O2 Delivery O2 Flow Rate FiO2


 


8/25/18 12:11 98.6 84 18 134/78 (96) 98   





 98.6       


 


8/25/18 12:10      Mechanical Ventilator  


 


8/25/18 12:00        30


 


8/25/18 10:44  87 16     30


 


8/25/18 09:04  83 16     30


 


8/25/18 08:26      Mechanical Ventilator  


 


8/25/18 08:24 98.5 82 16 130/84 (99) 98   





 98.5       


 


8/25/18 08:00        30


 


8/25/18 08:00  81      


 


8/25/18 06:30  84 16     30


 


8/25/18 05:30  98 17     30


 


8/25/18 04:00 97.9 85 18 132/80 (97) 100   





 97.9       


 


8/25/18 04:00  82      


 


8/25/18 04:00        30


 


8/25/18 04:00      Mechanical Ventilator  


 


8/25/18 03:24  102 17     30


 


8/25/18 01:30  101 17     30


 


8/25/18 00:00 97.2 87 16 128/85 (99) 99   





 97.2       


 


8/25/18 00:00  83      


 


8/25/18 00:00      Mechanical Ventilator  


 


8/24/18 23:30  103 16     30


 


8/24/18 21:03  102 17     30


 


8/24/18 20:00 98.2 90 17 128/78 (95) 97   





 98.2       


 


8/24/18 20:00      Mechanical Ventilator  


 


8/24/18 20:00        30


 


8/24/18 19:47  94      


 


8/24/18 19:30  90 16     30


 


8/24/18 16:40  85 16     30


 


8/24/18 16:00        30


 


8/24/18 16:00      Mechanical Ventilator  


 


8/24/18 16:00 98.1 91 16 111/80 (90) 98   





 98.1       


 


8/24/18 16:00  86      


 


8/24/18 14:50  89 16     30


 


8/24/18 12:52  86 16     30








General Appearance:  no apparent distress, on vent


EENT:  PERRL/EOMI, normal ENT inspection, TMs normal


Neck:  non-tender, normal alignment, supple


Rhythm:  NSR


Cardiovascular:  normal peripheral pulses, normal rate, regular rhythm


Respiratory/Chest:  chest wall non-tender, lungs clear


Abdomen:  normal bowel sounds, non tender, soft


Extremities:  normal range of motion, non-tender


Neurologic:  CNs II-XII grossly normal











Intake and Output  


 


 8/24/18 8/25/18





 19:00 07:00


 


Intake Total 700 ml 835 ml


 


Output Total 1150 ml 500 ml


 


Balance -450 ml 335 ml


 


  


 


Free Water 150 ml 110 ml


 


IV Total  125 ml


 


Tube Feeding 550 ml 600 ml


 


Output Urine Total 500 ml 350 ml


 


Stool Total 650 ml 150 ml











Laboratory Tests








Test


  8/25/18


03:20


 


White Blood Count


  8.7 K/UL


(4.8-10.8)


 


Red Blood Count


  4.97 M/UL


(4.70-6.10)


 


Hemoglobin


  14.0 G/DL


(14.2-18.0)  L


 


Hematocrit


  43.6 %


(42.0-52.0)


 


Mean Corpuscular Volume 88 FL (80-99)  


 


Mean Corpuscular Hemoglobin


  28.1 PG


(27.0-31.0)


 


Mean Corpuscular Hemoglobin


Concent 32.0 G/DL


(32.0-36.0)


 


Red Cell Distribution Width


  15.2 %


(11.6-14.8)  H


 


Platelet Count


  311 K/UL


(150-450)


 


Mean Platelet Volume


  8.0 FL


(6.5-10.1)


 


Neutrophils (%) (Auto)


  56.2 %


(45.0-75.0)


 


Lymphocytes (%) (Auto)


  27.2 %


(20.0-45.0)


 


Monocytes (%) (Auto)


  8.6 %


(1.0-10.0)


 


Eosinophils (%) (Auto)


  6.3 %


(0.0-3.0)  H


 


Basophils (%) (Auto)


  1.7 %


(0.0-2.0)


 


Sodium Level


  139 MMOL/L


(136-145)


 


Potassium Level


  4.1 MMOL/L


(3.5-5.1)


 


Chloride Level


  103 MMOL/L


()


 


Carbon Dioxide Level


  21 MMOL/L


(21-32)


 


Anion Gap


  15 mmol/L


(5-15)


 


Blood Urea Nitrogen


  20 mg/dL


(7-18)  H


 


Creatinine


  1.2 MG/DL


(0.55-1.30)


 


Estimat Glomerular Filtration


Rate > 60 mL/min


(>60)


 


Glucose Level


  121 MG/DL


()  H


 


Calcium Level


  10.6 MG/DL


(8.5-10.1)  H

















Robert Hernandez M.D. Aug 25, 2018 12:33

## 2018-08-25 NOTE — PULMONOLGY CRITICAL CARE NOTE
Critical Care - Asmt/Plan


Problems:  


(1) Acute on chronic respiratory failure


(2) Acute on chronic renal insufficiency


(3) Severe sepsis


(4) Vegetative state


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


(6) Diabetes mellitus


Respiratory:  monitor respiratory rate, adjust FIO2, CXR


Cardiac:  continue to monitor HR/BP


Renal:  F/U I&O, keep IV fluid


Infectious Disease:  check cultures


Gastrointestinal:  continue feedings/current rate


Endocrine:  check HgA1C


Hematologic:  monitor H/H


Prophylaxis:  Protonix, Heparin


Time Spent (Minutes):  30


Notes Reviewed:  renal


Discussed with:  nurses, consultants, 





Critical Care - Objective





Last 24 Hour Vital Signs








  Date Time  Temp Pulse Resp B/P (MAP) Pulse Ox O2 Delivery O2 Flow Rate FiO2


 


8/25/18 09:04  83 16     30


 


8/25/18 08:26      Mechanical Ventilator  


 


8/25/18 08:24 98.5 82 16 130/84 (99) 98   





 98.5       


 


8/25/18 08:00        30


 


8/25/18 06:30  84 16     30


 


8/25/18 05:30  98 17     30


 


8/25/18 04:00 97.9 85 18 132/80 (97) 100   





 97.9       


 


8/25/18 04:00  82      


 


8/25/18 04:00        30


 


8/25/18 04:00      Mechanical Ventilator  


 


8/25/18 03:24  102 17     30


 


8/25/18 01:30  101 17     30


 


8/25/18 00:00 97.2 87 16 128/85 (99) 99   





 97.2       


 


8/25/18 00:00  83      


 


8/25/18 00:00      Mechanical Ventilator  


 


8/24/18 23:30  103 16     30


 


8/24/18 21:03  102 17     30


 


8/24/18 20:00 98.2 90 17 128/78 (95) 97   





 98.2       


 


8/24/18 20:00      Mechanical Ventilator  


 


8/24/18 20:00        30


 


8/24/18 19:47  94      


 


8/24/18 19:30  90 16     30


 


8/24/18 16:40  85 16     30


 


8/24/18 16:00        30


 


8/24/18 16:00      Mechanical Ventilator  


 


8/24/18 16:00 98.1 91 16 111/80 (90) 98   





 98.1       


 


8/24/18 16:00  86      


 


8/24/18 14:50  89 16     30


 


8/24/18 12:52  86 16     30


 


8/24/18 12:00      Mechanical Ventilator  


 


8/24/18 12:00  88      


 


8/24/18 12:00 97.9 88 16 132/87 (102) 97   





 97.9       


 


8/24/18 12:00        30


 


8/24/18 11:37  92 16     30








Status:  obtunded


Condition:  critical


HEENT:  atraumatic


Neck:  full ROM


Lungs:  clear


Heart:  HR/BP unstable


Abdomen:  soft, non-tender, active bowel sounds


Decubiti:  location


Accucheck:  128





Critical Care - Subjective


ROS Limited/Unobtainable:  No


Condition:  critical


EKG Rhythm:  Sinus Rhythm


FI02:  30


Vent Support Breath Rate:  16


Vent Support Mode:  AC


Vent Tidal Volume:  600


Sputum Amount:  Small


PEEP:  5.0


PIP:  30


Tube Feeding Amount:  50


I&O:











Intake and Output  


 


 8/24/18 8/25/18





 19:00 07:00


 


Intake Total 700 ml 835 ml


 


Output Total 1150 ml 500 ml


 


Balance -450 ml 335 ml


 


  


 


Free Water 150 ml 110 ml


 


IV Total  125 ml


 


Tube Feeding 550 ml 600 ml


 


Output Urine Total 500 ml 350 ml


 


Stool Total 650 ml 150 ml








Labs:





Laboratory Tests








Test


  8/25/18


03:20


 


White Blood Count


  8.7 K/UL


(4.8-10.8)


 


Red Blood Count


  4.97 M/UL


(4.70-6.10)


 


Hemoglobin


  14.0 G/DL


(14.2-18.0)  L


 


Hematocrit


  43.6 %


(42.0-52.0)


 


Mean Corpuscular Volume 88 FL (80-99)  


 


Mean Corpuscular Hemoglobin


  28.1 PG


(27.0-31.0)


 


Mean Corpuscular Hemoglobin


Concent 32.0 G/DL


(32.0-36.0)


 


Red Cell Distribution Width


  15.2 %


(11.6-14.8)  H


 


Platelet Count


  311 K/UL


(150-450)


 


Mean Platelet Volume


  8.0 FL


(6.5-10.1)


 


Neutrophils (%) (Auto)


  56.2 %


(45.0-75.0)


 


Lymphocytes (%) (Auto)


  27.2 %


(20.0-45.0)


 


Monocytes (%) (Auto)


  8.6 %


(1.0-10.0)


 


Eosinophils (%) (Auto)


  6.3 %


(0.0-3.0)  H


 


Basophils (%) (Auto)


  1.7 %


(0.0-2.0)


 


Sodium Level


  139 MMOL/L


(136-145)


 


Potassium Level


  4.1 MMOL/L


(3.5-5.1)


 


Chloride Level


  103 MMOL/L


()


 


Carbon Dioxide Level


  21 MMOL/L


(21-32)


 


Anion Gap


  15 mmol/L


(5-15)


 


Blood Urea Nitrogen


  20 mg/dL


(7-18)  H


 


Creatinine


  1.2 MG/DL


(0.55-1.30)


 


Estimat Glomerular Filtration


Rate > 60 mL/min


(>60)


 


Glucose Level


  121 MG/DL


()  H


 


Calcium Level


  10.6 MG/DL


(8.5-10.1)  H

















Yury Pena MD Aug 25, 2018 10:43

## 2018-08-25 NOTE — PROGRESS NOTE
DATE:  08/24/2018



SUBJECTIVE:  The patient is awake, alert, afebrile, and hemodynamically

stable.



PHYSICAL EXAMINATION:

VITAL SIGNS:  Blood pressure is 178/78.  His pulse is 94, respirations of

16 and temperature is 98.2 degrees.

HEENT:  Eyes were normal.  ENT, mucous membranes were moist and intact.

NECK:  Supple with no JVD without lymph nodes.  Tracheostomy site is

clean.

LUNGS:  Clear without rhonchi, rales, or wheezing.  Secretions are small,

thin, and whitish.

HEART:  Normal sounds with regular beats.  There is near tachycardia at

rest.

ABDOMEN:  Soft and nontender with normal bowel sounds.  Gastrostomy site is

clean.

EXTREMITIES:  Warm without cyanosis, clubbing, or edema.



LABORATORY AND DIAGNOSTIC DATA:  Hemoglobin is 13.3, hematocrit 44.0 with

MCV of 88, WBC of 7.4, and platelets 320.  His BUN and creatinine are 18

and 1.3 respectively.  His sodium is 139, potassium 4.4, chloride 103, and

CO2 is 22.



IMPRESSION AND PLAN:

1. The patient is status post cerebral hemorrhage.  There are no

cognitive changes.  The patient is stable from neurological point of

view.

2. The patient is ventilator dependent.  We will continue the same

ventilator settings and continue with albuterol sulfate, ipratropium

bromide inhalation therapy every 6 hours.

3. The patient has tracheostomy, but need to monitor O2 saturation and

bronchial secretion.

4. The patient has gastrostomy feeding.  There is no gastric residual.

Continue with Fibersource at 80 mL/hour.

5. The patient has seizure disorder, and no new seizure.  Continue with

levetiracetam 1000 mg q.8 h.  Complete laboratory tests will be done in

the morning.









  ______________________________________________

  Etienne Bloom M.D.





DR:  JAMIE

D:  08/24/2018 22:05

T:  08/25/2018 02:11

JOB#:  [id number]

CC:

## 2018-08-26 NOTE — PROGRESS NOTE
DATE:  08/25/2018



SUBJECTIVE:  The patient is awake, not alert, afebrile, and hemodynamically

stable.



PHYSICAL EXAMINATION:

VITAL SIGNS:  Blood pressure is 132/68, his pulse is 87, respirations 16,

and temperature 98.4.

HEENT:  Eyes were normal.  ENT, mucous membranes were moist and intact.

NECK:  Supple with no JVD without lymph nodes.  Tracheostomy site is

clean.

LUNGS:  Clear without rhonchi, rales, or wheezing.  Secretions are small,

thin, and grey.

HEART:  Normal sounds with regular beats.  There is no S3, S4, or

pericardial rub.

ABDOMEN:  Soft and nontender with normal bowel sounds.  Gastrostomy site is

clean.

EXTREMITIES:  Warm without cyanosis, clubbing, or edema.



LABORATORY AND DIAGNOSTIC DATA:  His hemoglobin is 14.0, hematocrit 43.6,

MCV of 88, WBC 8.7, and platelets are 311,000.  His BUN and creatinine are

20 and 1.2, respectively.  Sodium is 139, potassium 4.1, chloride 103, and

CO2 is 21.



IMPRESSION AND PLAN:

1. The patient has anoxic encephalopathy.  He is stable from

neurological point of view.

2. The patient is ventilator dependent.  Continue the same ventilator

setting.  Continue with albuterol sulfate and ipratropium bromide

inhalation therapy every 6 hours.

3. The patient has tracheostomy.  Continue to monitor O2 saturation and

bronchial secretion.

4. The patient has gastrostomy feeding.  There is no gastric residual.

Continue with Fibersource at _____ per hour.

5. The patient has seizure disorder and no new seizure.  We will

continue on levetiracetam 1000 mg q.8 h.

6. Repeat laboratory tests will be done in the a.m.









  ______________________________________________

  Etienne Bloom M.D.





DR:  NED

D:  08/25/2018 19:27

T:  08/26/2018 03:49

JOB#:  [id number]

CC:

## 2018-08-27 NOTE — DISCHARGE SUMMARY
DATE OF ADMISSION:  07/24/2018



DATE OF DISCHARGE:  08/25/2018



HISTORY:  This is the first admission to [facility] of this

53-year-old patient because of sepsis.



HISTORY OF PRESENT ILLNESS:  Details of the event and circumstances that

led the patient to be admitted to this medical unit can be found in the

History and Physical.  In brief, the patient is a resident of an extended

care facility subacute unit where he has been in relatively stable

condition.  Over the last several months, he is known to have a long list

of chronic medical syndrome, but has been stable on his current medication

_____00:46 syndrome was intracerebral hemorrhage _____ [place] coma scale

is 6, respiratory failure, mechanical ventilation dependence with

tracheostomy and fed via gastrostomy tube.  In addition, the patient has

chronic anemia and chronic renal failure.  She developed fever,

tachycardia and sepsis, was transferred from _____ to [facility] ER and was admitted.



HOSPITAL COURSE:  Upon admission, the patient underwent clinical,

biological, and imaging study.  Clinical assessment revealed the patient

was hypotensive, tachycardic with leukocytosis.  _____ consult was called

to assist in management of this case.  He has been assessed by the

pulmonary specialist, nephrologist and Infectious Disease specialist and

was placed on broad-spectrum antibiotics that include initially Zosyn

3.375 g _____ vancomycin, meropenem and Diflucan.  His leukocytosis

progressively declined and finally normalized as well as his blood

pressure and his renal function.  Over the last 3 weeks, the patient

resided in [facility] unit while waiting for placement.  He was finally

transferred to [facility] on his current medication where

he will be managed by [facility] team.









  ______________________________________________

  Etienne Bloom M.D.





DR:  JAMIE

D:  08/26/2018 20:39

T:  08/27/2018 22:43

JOB#:  [id number]

CC:

## 2018-08-27 NOTE — DISCHARGE SUMMARY
Discharge Summary


Discharge Summary


_


DATE OF ADMISSION: 07/24/2018





DATE OF DISCHARGE: 08/25/2018 








REASON FOR ADMISSION: 


53 years old male with past medical history significant for ventilator-

dependent respiratory failure, tracheostomy status, dysphagia, G-tube, colostomy

, seizure disorder, hypertension, diabetes mellitus, was sent from the skilled 

nursing facility with fever and tachycardia.  


Upon presentation in ED,  temperature 103.7 and  heart rate 144.   Blood 

pressure on the low side. 


Laboratory workup revealed leukocytosis with WBC 11.2 


BUN 34, creatinine 1.4.  


Blood glucose -232.


Lactic acid 4.0.  


Hemoglobin and hematocrit initially stable.  


LFT within normal limits.  


Troponin negative.  


Urinalysis with evidence of pyuria, hematuria and some bacteria.  


EKG revealed sinus tachycardia,  no acute ischemic changes.  


Chest x-ray revealed no acute cardiopulmonary pathology , but showed  mild 

interstitial congestion and cardiomegaly.  


CT of the abdomen and pelvis revealed right lower quadrant double-barreled 

colostomy.  


Small peristomal hernia , containing bowel loops without evidence of 

obstruction or strangulation.


Gastrostomy tube in good position.  


Borderline hydronephrosis on the right without evidence off downstream 

obstructive lesion.  


Nonspecific bilateral perinephric fat stranding , could  be indicative of 

pyelonephritis or could be chronic.  


Apparent bladder wall thickening.  Guzmán catheter in place.  


Patient admitted with diagnoses of severe sepsis, urinary tract infection ,

acute kidney injury, respiratory failure, lactic acidosis, hypotension, 

possible early shock.


 


CONSULTANTS:


cardiologist Dr. Hernandez 


pulmonary Dr. Pena


ID specialist Dr. Fitzgerald


GI specialist Dr. Pickard


nephrologist Dr.De Monterroso


hematologist/oncologist Dr. Gerber


[facility] COURSE: 


Patient admitted to direct observational unit.  


Patient started on   IV fluids and empiric antibiotics.  


Blood pressure responded to IV fluids and stabilized.  


Infectious disease specialist closely followed.  


Initially blood culture revealed Proteus ESBL and Enterobacter . Urine culture 

revealed Enterobacter.  


Patient was on antibiotic  as per ID specialist recommendations.  





Repeated blood culture reveled  on different occasions 1/4 Staphylococcus 

coagulase  negative,   1/4 Staphylococcus epidermidis,  2/4 Staphylococcus 

hominis, all obtained via PICC line. 


Echocardiogram revealed preserved ejection fraction 55-60% and right 

ventricular systolic pressure of 16.  No discrete vegetation was seen.  


Subsequently STACI was attempted . However due to technical difficulty , it could 

not be done.  


PICC line was discontinued, and later another PICC line was placed. 


Repeated later  blood culture  on two different occasions were both negative.


Per ID recommendations,  patient likely had PICC line infection versus possible 

colonization. 


However  unable to rule out infective endocarditics,  since STACI unable to be 

done.


Subsequently ID specialist recommended to continue antibiotic for total of 42 

days for presumed endocarditis.  


Anticipated end of treatment day - 09/12/2018.  


Patient will need to complete antibiotics at the facility.





Pulmonologist closely followed.  


Ventilator support and pulmonary toilet provided.  


Tracheostomy care provided.  


Patient  was followed up with  chest x-ray. 


Ventilator settings  titrated as needed.  


Strict aspiration/reflux  precautions were  maintained.  


Venous duplex bilateral lower extremity was negative for acute DVT.  


Patient was on DVT prophylaxis.





Nephrologist closely followed  for initial acute kidney injury.  


Patient was on IV hydration.  


Renal parameters  and electrolytes were closely monitored.  


Electrolytes were corrected as needed.  


Nephrotoxins were avoided.  


Acute renal failure was likely secondary to sepsis , and resolved with IV 

hydration.  





Seizure precautions were maintained.  


No evidence of seizure  activity while in the hospital.  


Keppra was continued.  





Surgeon closely followed.  


As mentioned above, CT of the abdomen and pelvis revealed no evidence of 

strangulation or obstruction.  


Parastomal hernia with  bowel  loops.


Per surgeon, larger appearance of patient's stoma  was due to location and use 

of loop colostomy in hepatic flexure.  Otherwise  stoma was viable and stable.  


Surgeon highly recommended fixing parastomal hernia,   but   it could be done 

electively.  


However it patient   will develop obstruction,  then patient will need an 

urgent repair , currently nonobstructive.  





Bowel regimen instituted.  


GI specialist closely followed.  


Hemoglobin and hematocrit were closely monitored with goal to keep hemoglobin 

above 7 , remained stable.


Hematologist followed. 


According to hematologist,  anemia workup was consistent with anemia of chronic 

disease.  


Stool for occult blood was negative.  


Hepatitis panel was negative.  


Strict aspiration precaution were maintained.  


G-tube site care provided.  


Patient was able to tolerate G-tube feeding.  


GI prophylaxis  provided.


Cardiologist followed throughout patient's stay in the hospital. 


Blood pressure  was closely monitored, and remained stable.  


Blood sugar was managed with sliding scale  of insulin.  


Supportive care provided.  


Pain management was addressed.  


Patient   clinically stabilized and was ready for    transfer  to [facility] for further management and completion of antibiotic 

regimen. 





FINAL DIAGNOSES: 


Severe sepsis with bacteremia


Enterobacter UTI


Probably PICC line infection


Acute on chronic respiratory failure


Chronic respiratory failure with ventilator dependency and tracheostomy status


Acute kidney injury -resolved


Stoma malfunction


Parastomal hernia


Colostomy status


Seizure disorder


Diabetes mellitus


Hypertension


Dysphagia, G-tube


BPH


GERD


Vegetative state


Anemia  of chronic disease








DISCHARGE MEDICATIONS:


See Medication Reconciliation list.





DISCHARGE INSTRUCTIONS:


Patient was transferred to [facility] for further management.





I have been assigned to dictate discharge summary for this account. I was not 

involved in the patient's management.











Isabel Aguilar NP Aug 27, 2018 12:39

## 2018-09-29 ENCOUNTER — HOSPITAL ENCOUNTER (INPATIENT)
Dept: HOSPITAL 72 - EMR | Age: 53
LOS: 9 days | Discharge: NURSING, REHAB OR LONG-TERM CARE | DRG: 870 | End: 2018-10-08
Payer: COMMERCIAL

## 2018-09-29 VITALS — DIASTOLIC BLOOD PRESSURE: 98 MMHG | SYSTOLIC BLOOD PRESSURE: 136 MMHG

## 2018-09-29 VITALS
DIASTOLIC BLOOD PRESSURE: 101 MMHG | BODY MASS INDEX: 24.71 KG/M2 | HEIGHT: 68 IN | SYSTOLIC BLOOD PRESSURE: 137 MMHG | WEIGHT: 163 LBS

## 2018-09-29 VITALS — SYSTOLIC BLOOD PRESSURE: 143 MMHG | DIASTOLIC BLOOD PRESSURE: 102 MMHG

## 2018-09-29 VITALS — DIASTOLIC BLOOD PRESSURE: 82 MMHG | SYSTOLIC BLOOD PRESSURE: 118 MMHG

## 2018-09-29 VITALS — SYSTOLIC BLOOD PRESSURE: 133 MMHG | DIASTOLIC BLOOD PRESSURE: 91 MMHG

## 2018-09-29 DIAGNOSIS — J96.21: ICD-10-CM

## 2018-09-29 DIAGNOSIS — D64.9: ICD-10-CM

## 2018-09-29 DIAGNOSIS — R65.21: ICD-10-CM

## 2018-09-29 DIAGNOSIS — Z93.1: ICD-10-CM

## 2018-09-29 DIAGNOSIS — G93.40: ICD-10-CM

## 2018-09-29 DIAGNOSIS — J44.0: ICD-10-CM

## 2018-09-29 DIAGNOSIS — E83.52: ICD-10-CM

## 2018-09-29 DIAGNOSIS — R40.3: ICD-10-CM

## 2018-09-29 DIAGNOSIS — N39.0: ICD-10-CM

## 2018-09-29 DIAGNOSIS — K59.00: ICD-10-CM

## 2018-09-29 DIAGNOSIS — J18.9: ICD-10-CM

## 2018-09-29 DIAGNOSIS — N40.0: ICD-10-CM

## 2018-09-29 DIAGNOSIS — G40.909: ICD-10-CM

## 2018-09-29 DIAGNOSIS — Z93.3: ICD-10-CM

## 2018-09-29 DIAGNOSIS — E11.9: ICD-10-CM

## 2018-09-29 DIAGNOSIS — N17.9: ICD-10-CM

## 2018-09-29 DIAGNOSIS — R13.10: ICD-10-CM

## 2018-09-29 DIAGNOSIS — A41.9: Primary | ICD-10-CM

## 2018-09-29 DIAGNOSIS — B96.5: ICD-10-CM

## 2018-09-29 DIAGNOSIS — Z93.0: ICD-10-CM

## 2018-09-29 DIAGNOSIS — E86.0: ICD-10-CM

## 2018-09-29 DIAGNOSIS — Z99.11: ICD-10-CM

## 2018-09-29 DIAGNOSIS — Z86.73: ICD-10-CM

## 2018-09-29 LAB
ADD MANUAL DIFF: YES
ALBUMIN SERPL-MCNC: 2.8 G/DL (ref 3.4–5)
ALBUMIN/GLOB SERPL: 0.5 {RATIO} (ref 1–2.7)
ALP SERPL-CCNC: 99 U/L (ref 46–116)
ALT SERPL-CCNC: 26 U/L (ref 12–78)
ANION GAP SERPL CALC-SCNC: 12 MMOL/L (ref 5–15)
APPEARANCE UR: (no result)
APTT PPP: YELLOW S
AST SERPL-CCNC: 41 U/L (ref 15–37)
BILIRUB SERPL-MCNC: 0.5 MG/DL (ref 0.2–1)
BUN SERPL-MCNC: 47 MG/DL (ref 7–18)
CALCIUM SERPL-MCNC: 10.1 MG/DL (ref 8.5–10.1)
CHLORIDE SERPL-SCNC: 110 MMOL/L (ref 98–107)
CK MB SERPL-MCNC: < 0.5 NG/ML (ref 0–3.6)
CK SERPL-CCNC: 1618 U/L (ref 26–308)
CO2 SERPL-SCNC: 22 MMOL/L (ref 21–32)
CREAT SERPL-MCNC: 1.8 MG/DL (ref 0.55–1.3)
ERYTHROCYTE [DISTWIDTH] IN BLOOD BY AUTOMATED COUNT: 14.4 % (ref 11.6–14.8)
GLOBULIN SER-MCNC: 5.8 G/DL
GLUCOSE UR STRIP-MCNC: (no result) MG/DL
HCT VFR BLD CALC: 50.3 % (ref 42–52)
HGB BLD-MCNC: 15.6 G/DL (ref 14.2–18)
KETONES UR QL STRIP: NEGATIVE
LEUKOCYTE ESTERASE UR QL STRIP: (no result)
MCV RBC AUTO: 91 FL (ref 80–99)
NITRITE UR QL STRIP: NEGATIVE
PH UR STRIP: 6 [PH] (ref 4.5–8)
PLATELET # BLD: 311 K/UL (ref 150–450)
POTASSIUM SERPL-SCNC: 4.2 MMOL/L (ref 3.5–5.1)
PROT UR QL STRIP: (no result)
RBC # BLD AUTO: 5.55 M/UL (ref 4.7–6.1)
SODIUM SERPL-SCNC: 144 MMOL/L (ref 136–145)
SP GR UR STRIP: 1.01 (ref 1–1.03)
UROBILINOGEN UR-MCNC: NORMAL MG/DL (ref 0–1)
WBC # BLD AUTO: 22.8 K/UL (ref 4.8–10.8)

## 2018-09-29 PROCEDURE — 80048 BASIC METABOLIC PNL TOTAL CA: CPT

## 2018-09-29 PROCEDURE — 51702 INSERT TEMP BLADDER CATH: CPT

## 2018-09-29 PROCEDURE — 82803 BLOOD GASES ANY COMBINATION: CPT

## 2018-09-29 PROCEDURE — 83880 ASSAY OF NATRIURETIC PEPTIDE: CPT

## 2018-09-29 PROCEDURE — 87181 SC STD AGAR DILUTION PER AGT: CPT

## 2018-09-29 PROCEDURE — 94664 DEMO&/EVAL PT USE INHALER: CPT

## 2018-09-29 PROCEDURE — 83970 ASSAY OF PARATHORMONE: CPT

## 2018-09-29 PROCEDURE — 93970 EXTREMITY STUDY: CPT

## 2018-09-29 PROCEDURE — 80069 RENAL FUNCTION PANEL: CPT

## 2018-09-29 PROCEDURE — 93306 TTE W/DOPPLER COMPLETE: CPT

## 2018-09-29 PROCEDURE — 87040 BLOOD CULTURE FOR BACTERIA: CPT

## 2018-09-29 PROCEDURE — 80053 COMPREHEN METABOLIC PANEL: CPT

## 2018-09-29 PROCEDURE — 85651 RBC SED RATE NONAUTOMATED: CPT

## 2018-09-29 PROCEDURE — 82728 ASSAY OF FERRITIN: CPT

## 2018-09-29 PROCEDURE — 82962 GLUCOSE BLOOD TEST: CPT

## 2018-09-29 PROCEDURE — 81003 URINALYSIS AUTO W/O SCOPE: CPT

## 2018-09-29 PROCEDURE — 99291 CRITICAL CARE FIRST HOUR: CPT

## 2018-09-29 PROCEDURE — 86140 C-REACTIVE PROTEIN: CPT

## 2018-09-29 PROCEDURE — 82607 VITAMIN B-12: CPT

## 2018-09-29 PROCEDURE — 87081 CULTURE SCREEN ONLY: CPT

## 2018-09-29 PROCEDURE — 83605 ASSAY OF LACTIC ACID: CPT

## 2018-09-29 PROCEDURE — 96361 HYDRATE IV INFUSION ADD-ON: CPT

## 2018-09-29 PROCEDURE — 71045 X-RAY EXAM CHEST 1 VIEW: CPT

## 2018-09-29 PROCEDURE — 85610 PROTHROMBIN TIME: CPT

## 2018-09-29 PROCEDURE — 87086 URINE CULTURE/COLONY COUNT: CPT

## 2018-09-29 PROCEDURE — 94640 AIRWAY INHALATION TREATMENT: CPT

## 2018-09-29 PROCEDURE — 87070 CULTURE OTHR SPECIMN AEROBIC: CPT

## 2018-09-29 PROCEDURE — 36415 COLL VENOUS BLD VENIPUNCTURE: CPT

## 2018-09-29 PROCEDURE — 84100 ASSAY OF PHOSPHORUS: CPT

## 2018-09-29 PROCEDURE — 93005 ELECTROCARDIOGRAM TRACING: CPT

## 2018-09-29 PROCEDURE — 82550 ASSAY OF CK (CPK): CPT

## 2018-09-29 PROCEDURE — 5A1955Z RESPIRATORY VENTILATION, GREATER THAN 96 CONSECUTIVE HOURS: ICD-10-PCS

## 2018-09-29 PROCEDURE — 94003 VENT MGMT INPAT SUBQ DAY: CPT

## 2018-09-29 PROCEDURE — 87205 SMEAR GRAM STAIN: CPT

## 2018-09-29 PROCEDURE — 82553 CREATINE MB FRACTION: CPT

## 2018-09-29 PROCEDURE — 84484 ASSAY OF TROPONIN QUANT: CPT

## 2018-09-29 PROCEDURE — 96360 HYDRATION IV INFUSION INIT: CPT

## 2018-09-29 PROCEDURE — 85025 COMPLETE CBC W/AUTO DIFF WBC: CPT

## 2018-09-29 PROCEDURE — 85007 BL SMEAR W/DIFF WBC COUNT: CPT

## 2018-09-29 PROCEDURE — 81001 URINALYSIS AUTO W/SCOPE: CPT

## 2018-09-29 PROCEDURE — 94002 VENT MGMT INPAT INIT DAY: CPT

## 2018-09-29 PROCEDURE — 84443 ASSAY THYROID STIM HORMONE: CPT

## 2018-09-29 PROCEDURE — 83735 ASSAY OF MAGNESIUM: CPT

## 2018-09-29 PROCEDURE — 83540 ASSAY OF IRON: CPT

## 2018-09-29 PROCEDURE — 83550 IRON BINDING TEST: CPT

## 2018-09-29 PROCEDURE — 36600 WITHDRAWAL OF ARTERIAL BLOOD: CPT

## 2018-09-29 RX ADMIN — HEPARIN SODIUM SCH UNITS: 5000 INJECTION INTRAVENOUS; SUBCUTANEOUS at 21:14

## 2018-09-29 RX ADMIN — LEVETIRACETAM SCH MG: 100 SOLUTION ORAL at 17:24

## 2018-09-29 RX ADMIN — VITAMIN A AND VITAMIN D SCH APPLIC: 929.3 OINTMENT TOPICAL at 21:13

## 2018-09-29 NOTE — DIAGNOSTIC IMAGING REPORT
EXAM:

  XR Chest, 1 View

 

CLINICAL HISTORY:

  Shortness of breath

 

TECHNIQUE:

  Frontal view of the chest.

 

COMPARISON:

  Chest x-ray dated 8/17/18

 

FINDINGS:

  Lungs:  Unchanged subsegmental atelectasis versus infiltrate in the 

left lung base.

  Pleural space:  Blunting of the left costophrenic angle, suggesting a 

small left effusion, not significant changed.

  Heart:  Unremarkable.  No cardiomegaly.

  Mediastinum:  Unremarkable.

  Bones/joints:  Unremarkable.

  Tubes, lines and devices:  EKG leads overlie the thorax.  Tracheostomy 

tube is visualized, with expected positioning.

 

IMPRESSION:     

1.  Small left pleural effusion, not significantly changed.

2.  Unchanged subsegmental atelectasis versus infiltrate in the left lung 

base.

## 2018-09-29 NOTE — EMERGENCY ROOM REPORT
History of Present Illness


General


Chief Complaint:  Fever


Source:  Medical Record





Present Illness


HPI


53-year-old male presents ED for evaluation.  Brought in by EMS from skilled 

nursing facility for evaluation of fever.  Started yesterday.  Was given 

Tylenol by nursing staff.  Tachycardic to 150s.  Patient is on trach/on 

ventilator.  Nonverbal at baseline.  Febrile in triage.  No reported 

respiratory distress.  No other aggravating relieving factors.  No other 

associated symptoms


Allergies:  


Coded Allergies:  


     AZTREONAM (Verified  Allergy, Unknown, 7/23/18)





Patient History


Past Medical History:  DM, COPD, seizures


Past Surgical History:  none


Pertinent Family History:  none


Social History:  Denies: smoking, alcohol use, drug use


Immunizations:  UTD


Reviewed Nursing Documentation:  PMH: Agreed; PSxH: Agreed





Nursing Documentation-PMH


Hx Cardiac Problems:  Yes


Hx Hypertension:  Yes


Hx COPD:  Yes


Hx Diabetes:  Yes


Hx Cancer:  No


Hx Gastrointestinal Problems:  Yes


Hx Neurological Problems:  Yes


Hx Seizures:  Yes





Review of Systems


All Other Systems:  limited





Physical Exam





Vital Signs








  Date Time  Temp Pulse Resp B/P (MAP) Pulse Ox O2 Delivery O2 Flow Rate FiO2


 


9/29/18 11:04 102.0 155 18 139/60 100 Mechanical Ventilator  





 102.0       


 


9/29/18 11:06       15.0 30








Sp02 EP Interpretation:  reviewed, normal


General Appearance:  other - nonverbal


Head:  normocephalic


Eyes:  bilateral eye normal inspection, bilateral eye PERRL


ENT:  normal ENT inspection


Neck:  normal inspection


Respiratory:  chest non-tender, lungs clear, normal breath sounds, speaking 

full sentences


Cardiovascular #1:  no edema, tachycardia


Gastrointestinal:  normal bowel sounds, non tender, soft, non-distended, no 

guarding, no rebound


Rectal:  deferred


Genitourinary:  no CVA tenderness


Musculoskeletal:  normal inspection


Neurologic:  other - nonverbal


Psychiatric:  other - nonverbal


Skin:  normal inspection


Lymphatic:  normal inspection





Procedures


Critical Care Time


Critical Care Time


i.  I feel this is a highly complex case requiring extensive working including 

EKG/Rhythm strip, Xray/CT/US, Blood/urine lab work, repeat exams while in ED, 


and administration of strong opiates/narcotics for pain control, admission to 

hospital or close patient follow up.  





Total time: 30 min bedside evaluation and treatment excludes procedures (EKG). 


Reason for critical care: fever, tachycardic


Possible complications: hypotension, hypertension, MI, shock, arrhythmias, 

metabolic acidosis, end organ damage, respiratory failure. 


Interventions: labs, IVFS, tylenol, EKG, CXR, UA.  abx


Course: Patient presenting with fever, tachycardia.  Labs show significant  

UTI.  Lactic okay.  Tachycardia resolving with IV fluids, Tylenol.  Broad-

spectrum antibiotics given


Consultations: nursing staff, EMS, family 


Performed by: Dr Jones


Tolerated well condition = serious





j.  because of unstable vital signs this patient had a condition that could 

potentially threaten life or limb.  I feel this is a critical patient who 

required my full attention while patient was considered critical.  Total 

Critical Care Time excluding procedures was greater than 35 minutes





Medical Decision Making


Diagnostic Impression:  


 Primary Impression:  


 Acute on chronic renal insufficiency


 Additional Impressions:  


 Acute on chronic respiratory failure


 Qualified Codes:  J96.20 - Acute and chronic respiratory failure, unspecified 

whether with hypoxia or hypercapnia


 UTI (urinary tract infection)


 Qualified Codes:  N39.0 - Urinary tract infection, site not specified


ER Course


Hospital Course 


52 yo M presents with fever, tachycardia from SNF





Differential diagnoses include: Pneumonia, UTI, sepsis, dehydration, MI/

unstable angina





Clinical course


Patient placed on stretcher.  On cardiac monitor with tachycardia. given 

tylenol.  After initial history and physical, I ordered labs, IV fluids, EKG, 

chest x-ray, blood cultures, UA. 





Labs - BUN/Cr elevated, noted leukocytosis, lactate 1.9, UA grossly positive 

for UTI 


EKG - sinus tachycarda, no acute ischemic changes interpreted by me 


CXR - atelectasis





Abx given.  Given 30 mL per KG fluid bolus.  Tachycardia slowly resolving





Case discussed with Dr Bloom and they agreed to admit patient to their 

service for further care and support





I feel this is a highly complex case requiring extensive working including EKG/

Rhythm strip, Xray/CT/US, Blood/urine lab work, repeat exams while in ED, and 

administration of strong opiates/narcotics for pain control, admission to 

hospital or close patient follow up.  





Diagnosis - acute on chronic renal insufficiency, acute on chronic respiratory 

failure, UTI





Patient admitted to SDU in serious condition





Labs








Test


  9/29/18


11:15 9/29/18


12:00


 


White Blood Count


  22.8 K/UL


(4.8-10.8) 


 


 


Red Blood Count


  5.55 M/UL


(4.70-6.10) 


 


 


Hemoglobin


  15.6 G/DL


(14.2-18.0) 


 


 


Hematocrit


  50.3 %


(42.0-52.0) 


 


 


Mean Corpuscular Volume 91 FL (80-99)  


 


Mean Corpuscular Hemoglobin


  28.1 PG


(27.0-31.0) 


 


 


Mean Corpuscular Hemoglobin


Concent 31.0 G/DL


(32.0-36.0) 


 


 


Red Cell Distribution Width


  14.4 %


(11.6-14.8) 


 


 


Platelet Count


  311 K/UL


(150-450) 


 


 


Mean Platelet Volume


  9.3 FL


(6.5-10.1) 


 


 


Neutrophils (%) (Auto)  % (45.0-75.0)  


 


Lymphocytes (%) (Auto)  % (20.0-45.0)  


 


Monocytes (%) (Auto)  % (1.0-10.0)  


 


Eosinophils (%) (Auto)  % (0.0-3.0)  


 


Basophils (%) (Auto)  % (0.0-2.0)  


 


Differential Total Cells


Counted 100 


  


 


 


Neutrophils % (Manual) 88 % (45-75)  


 


Lymphocytes % (Manual) 6 % (20-45)  


 


Monocytes % (Manual) 3 % (1-10)  


 


Eosinophils % (Manual) 0 % (0-3)  


 


Basophils % (Manual) 1 % (0-2)  


 


Myelocytes % 1 % (0-0)  


 


Band Neutrophils 1 % (0-8)  


 


Platelet Estimate Adequate  


 


Platelet Morphology Normal  


 


Lactic Acid Level


  1.90 mmol/L


(0.4-2.0) 


 


 


Urine Color  Yellow 


 


Urine Appearance


  


  Slightly


cloudy


 


Urine pH  6 (4.5-8.0) 


 


Urine Specific Gravity


  


  1.010


(1.005-1.035)


 


Urine Protein  4+ (NEGATIVE) 


 


Urine Glucose (UA)  2+ (NEGATIVE) 


 


Urine Ketones


  


  Negative


(NEGATIVE)


 


Urine Blood  5+ (NEGATIVE) 


 


Urine Nitrite


  


  Negative


(NEGATIVE)


 


Urine Bilirubin


  


  Negative


(NEGATIVE)


 


Urine Urobilinogen


  


  Normal MG/DL


(0.0-1.0)


 


Urine Leukocyte Esterase  2+ (NEGATIVE) 


 


Urine RBC


  


  15-20 /HPF (0


- 0)


 


Urine WBC


  


  30-40 /HPF (0


- 0)


 


Urine Squamous Epithelial


Cells 


  Occasional


/LPF


 


Urine Bacteria


  


  Few /HPF


(NONE)


 


Sodium Level


  


  144 MMOL/L


(136-145)


 


Potassium Level


  


  4.2 MMOL/L


(3.5-5.1)


 


Chloride Level


  


  110 MMOL/L


()


 


Carbon Dioxide Level


  


  22 MMOL/L


(21-32)


 


Anion Gap


  


  12 mmol/L


(5-15)


 


Blood Urea Nitrogen


  


  47 mg/dL


(7-18)


 


Creatinine


  


  1.8 MG/DL


(0.55-1.30)


 


Estimat Glomerular Filtration


Rate 


  48.5 mL/min


(>60)


 


Glucose Level


  


  332 MG/DL


()


 


Calcium Level


  


  10.1 MG/DL


(8.5-10.1)


 


Total Bilirubin


  


  0.5 MG/DL


(0.2-1.0)


 


Aspartate Amino Transf


(AST/SGOT) 


  41 U/L (15-37) 


 


 


Alanine Aminotransferase


(ALT/SGPT) 


  26 U/L (12-78) 


 


 


Alkaline Phosphatase


  


  99 U/L


()


 


Total Creatine Kinase


  


  1618 U/L


()


 


Creatine Kinase MB


  


  < 0.5 NG/ML


(0.0-3.6)


 


Creatine Kinase MB Relative


Index 


  0.0 


 


 


Troponin I


  


  0.048 ng/mL


(0.000-0.056)


 


Pro-B-Type Natriuretic Peptide


  


  285 pg/mL


(0-125)


 


Total Protein


  


  8.6 G/DL


(6.4-8.2)


 


Albumin


  


  2.8 G/DL


(3.4-5.0)


 


Globulin  5.8 g/dL 


 


Albumin/Globulin Ratio  0.5 (1.0-2.7) 








EKG Diagnostic Results


Rate:  tachycardiac


Rhythm:  NSR


ST Segments:  no acute changes


ASA given to the pt in ED:  No





Rhythm Strip Diag. Results


EP Interpretation:  yes


Rhythm:  NSR, no PVC's, no ectopy





Chest X-Ray Diagnostic Results


Chest X-Ray Diagnostic Results :  


   Chest X-Ray Ordered:  Yes


   # of Views/Limited/Complete:  1 View


   Indication:  Shortness of Breath


   EP Interpretation:  Yes


   Interpretation:  no consolidation, no pneumothorax, no acute cardiopulmonary 

disease, other - effusion/atelectasis LLL


   Impression:  Other - atelectasis


   Electronically Signed by:  Electronically signed by Devon Jones MD





Last Vital Signs








  Date Time  Temp Pulse Resp B/P (MAP) Pulse Ox O2 Delivery O2 Flow Rate FiO2


 


9/29/18 13:10 101.1 129 16 136/98 100 Mechanical Ventilator 15.0 30





 101.1       








Status:  improved


Disposition:  ADMITTED AS INPATIENT


Condition:  Serious


Referrals:  


Etienne Bloom MD (PCP)











Devon Jones MD Sep 29, 2018 14:28

## 2018-09-30 VITALS — DIASTOLIC BLOOD PRESSURE: 96 MMHG | SYSTOLIC BLOOD PRESSURE: 114 MMHG

## 2018-09-30 VITALS — DIASTOLIC BLOOD PRESSURE: 78 MMHG | SYSTOLIC BLOOD PRESSURE: 102 MMHG

## 2018-09-30 VITALS — DIASTOLIC BLOOD PRESSURE: 78 MMHG | SYSTOLIC BLOOD PRESSURE: 127 MMHG

## 2018-09-30 VITALS — SYSTOLIC BLOOD PRESSURE: 120 MMHG | DIASTOLIC BLOOD PRESSURE: 98 MMHG

## 2018-09-30 VITALS — SYSTOLIC BLOOD PRESSURE: 114 MMHG | DIASTOLIC BLOOD PRESSURE: 91 MMHG

## 2018-09-30 VITALS — DIASTOLIC BLOOD PRESSURE: 78 MMHG | SYSTOLIC BLOOD PRESSURE: 108 MMHG

## 2018-09-30 LAB
ADD MANUAL DIFF: NO
ALBUMIN SERPL-MCNC: 2.6 G/DL (ref 3.4–5)
ANION GAP SERPL CALC-SCNC: 10 MMOL/L (ref 5–15)
BASOPHILS NFR BLD AUTO: 1.1 % (ref 0–2)
BUN SERPL-MCNC: 34 MG/DL (ref 7–18)
CALCIUM SERPL-MCNC: 10.3 MG/DL (ref 8.5–10.1)
CHLORIDE SERPL-SCNC: 115 MMOL/L (ref 98–107)
CO2 SERPL-SCNC: 23 MMOL/L (ref 21–32)
CREAT SERPL-MCNC: 1.5 MG/DL (ref 0.55–1.3)
EOSINOPHIL NFR BLD AUTO: 1.3 % (ref 0–3)
ERYTHROCYTE [DISTWIDTH] IN BLOOD BY AUTOMATED COUNT: 14 % (ref 11.6–14.8)
HCT VFR BLD CALC: 41.5 % (ref 42–52)
HGB BLD-MCNC: 13.1 G/DL (ref 14.2–18)
LYMPHOCYTES NFR BLD AUTO: 13.3 % (ref 20–45)
MCV RBC AUTO: 89 FL (ref 80–99)
MONOCYTES NFR BLD AUTO: 7.5 % (ref 1–10)
NEUTROPHILS NFR BLD AUTO: 76.8 % (ref 45–75)
PHOSPHATE SERPL-MCNC: 1.4 MG/DL (ref 2.5–4.9)
PLATELET # BLD: 281 K/UL (ref 150–450)
POTASSIUM SERPL-SCNC: 3.9 MMOL/L (ref 3.5–5.1)
RBC # BLD AUTO: 4.65 M/UL (ref 4.7–6.1)
SODIUM SERPL-SCNC: 149 MMOL/L (ref 136–145)
WBC # BLD AUTO: 15.1 K/UL (ref 4.8–10.8)

## 2018-09-30 RX ADMIN — INSULIN ASPART SCH UNITS: 100 INJECTION, SOLUTION INTRAVENOUS; SUBCUTANEOUS at 11:50

## 2018-09-30 RX ADMIN — HEPARIN SODIUM SCH UNITS: 5000 INJECTION INTRAVENOUS; SUBCUTANEOUS at 21:26

## 2018-09-30 RX ADMIN — VITAMIN A AND VITAMIN D SCH APPLIC: 929.3 OINTMENT TOPICAL at 21:23

## 2018-09-30 RX ADMIN — LEVETIRACETAM SCH MG: 100 SOLUTION ORAL at 08:13

## 2018-09-30 RX ADMIN — INSULIN ASPART SCH UNITS: 100 INJECTION, SOLUTION INTRAVENOUS; SUBCUTANEOUS at 00:10

## 2018-09-30 RX ADMIN — DEXTROSE AND SODIUM CHLORIDE SCH MLS/HR: 5; .2 INJECTION, SOLUTION INTRAVENOUS at 08:25

## 2018-09-30 RX ADMIN — HEPARIN SODIUM SCH UNITS: 5000 INJECTION INTRAVENOUS; SUBCUTANEOUS at 08:16

## 2018-09-30 RX ADMIN — LEVETIRACETAM SCH MG: 100 SOLUTION ORAL at 17:30

## 2018-09-30 RX ADMIN — SITAGLIPTIN SCH MG: 25 TABLET, FILM COATED ORAL at 08:13

## 2018-09-30 RX ADMIN — VITAMIN A AND VITAMIN D SCH APPLIC: 929.3 OINTMENT TOPICAL at 08:13

## 2018-09-30 RX ADMIN — PANTOPRAZOLE SODIUM SCH MG: 40 INJECTION, POWDER, FOR SOLUTION INTRAVENOUS at 08:13

## 2018-09-30 RX ADMIN — INSULIN ASPART SCH UNITS: 100 INJECTION, SOLUTION INTRAVENOUS; SUBCUTANEOUS at 17:31

## 2018-09-30 RX ADMIN — INSULIN ASPART SCH UNITS: 100 INJECTION, SOLUTION INTRAVENOUS; SUBCUTANEOUS at 06:07

## 2018-09-30 NOTE — HISTORY AND PHYSICAL REPORT
DATE OF ADMISSION:  09/29/2018



"NOTE:  POOR AUDIO QUALITY"



CHIEF COMPLAINT:  [place] [facility] [place] 53[place] because of tachycardia and sepsis.



HISTORY OF PRESENT ILLNESS:  The patient is a resident of an extended care

facility subacute unit where he has been in precarious condition over the

last several days.  _____ was admitted from [facility] _____ he was

more than six weeks for the treatment of sepsis state and respiratory

failure.  He was transferred to [facility] from [facility]

where he was admitted with diagnosis of septic shock and pneumonia.  At

the time of this admission to the subacute unit, he was hemodynamically

stable.  He was on antibiotic, but was afebrile without tachycardia.  He

developed tachycardia and fever for the last 24 hours.  He did not respond

to systemic antibiotics.  He was transferred to [facility] ER

and was admitted.



PAST MEDICAL HISTORY:  The patient is status post cerebral hemorrhage _____

hypoxic respiratory failure, on mechanical ventilation _____ tube and has

renal failure and seizure disorder.



ALLERGIES:  No known drug allergy.



MEDICATIONS:  The patient is on pantoprazole 40 mg daily, heparin 5000

units subcutaneously daily, vitamin D 5000 units daily, levetiracetam 1000

mg b.i.d., _____ respiratory therapy.



FAMILY HISTORY:  Noncontributory.



SOCIAL HISTORY:  He is .  He was born in [place].  He has been on

SSI since his cerebral hemorrhage.



HABITS:  The patient did not smoke, drink, or use illicit drugs.



REVIEW OF SYSTEMS:  The patient is unable to give any information regarding

his state of health.



PHYSICAL EXAMINATION:

VITAL SIGNS:  The blood pressure is 114/96, his pulse is _____,

respirations 16, and temperature 98.4 degrees.

HEENT:  Eyes were normal.  Pupils were round, equal, and reactive to light.

Sclerae were white.  Conjunctivae were pink.  Extraocular movement could

not be assessed.  Temporal arteries were palpable bilaterally.  There was

no bilateral temporal wasting.  Visual fields to confrontation.  Neglect

sign could not be assessed.  ENT, mucous membranes were not dehydrated.

Auditory canals were clear and tympanic membranes could not be visualized.

Nasal cavity was not congested.  Nasal septum was intact.  Soft palate

was free of ulcerations.  Pharynx was clear from exudate or tonsillar

hypertrophy.  Uvula max to phonation.  Tongue was moist, midline, and

normally papillated.

NECK:  Supple.  There was no goiter.  No mass.  No lymphadenopathy.  There

was no JVD.  No bruits.  Carotid upstroke was 2+.

LUNGS:  Clear.

HEART:  PMI was in fifth left intercostal space in midclavicular line.

There was normal S1 and normal S2.  There was no murmur.  No arrhythmia.

No S3.  No S4.  No pericardial rub.

ABDOMEN:  Soft and nontender without organomegaly.  There were no masses

palpable.  Normal bowel sounds without bruits.  There was no guarding.  No

rebound tenderness.  No ascites.  No hernia.  No CVA tenderness.  Liver

span was 8 cm, smooth, and nontender.

EXTREMITIES:  No cyanosis, no clubbing, and no edema.  Extremities were

warm.

NEUROLOGICAL:  Reflexes in biceps, triceps, and brachioradialis were

symmetric and equal.  Patellar retinaculum was symmetric and equal.

Plantars were in extension.  Cranial nerves II through XII were symmetric

and equal.  Cerebellar function, there was no tremor.  No nystagmus.  No

extrapyramidal rigidity.  Sensory exam to pinprick, cotton touch, and

position, and motor strength could not be assessed.



LABORATORY AND DIAGNOSTIC DATA:  Hemoglobin is 15.6, hematocrit of 50.3

with MCV of 91, WBC of 22,000, and platelets are 311,000.  His BUN and

creatinine are 47 and 1.9 respectively.  His sodium is 144, potassium 4.2,

chloride 110, and CO2 is 32.  His lactic acid is 1.9.  His calcium was

_____.  Total CK was 1618.  ProBNP is 285.  Total protein is 6.6.  Albumin

is 2.8.  Chest x-ray revealed small pleural effusion, unchanged from six

weeks ago, and subsegmental atelectasis in left lower lobe.



IMPRESSION:  The patient became hypotensive, tachycardic, and febrile with

leukocytosis indicating _____ infection state.



PLAN:  Plan is to start on vancomycin 1 g IV piggyback q.12 h. and cefepime

1 g IV piggyback q.12 h.  Cardiac rhythm _____ reviewed.  Pulmonary

consultant and Infectious Disease consultant was called to assist in the

management of this case.  Repeat laboratory tests will be done in a.m.









  ______________________________________________

  Etienne Bloom M.D.





DR:  JIE

D:  09/30/2018 00:51

T:  09/30/2018 03:29

JOB#:  [id number]

CC:

## 2018-09-30 NOTE — CONSULTATION
History of Present Illness


General


Date patient seen:  Sep 30, 2018


Time patient seen:  06:15


Chief Complaint:  Fever


Referring physician:  dr Bloom


Reason for Consultation:  pulm consult





Present Illness


HPI


53 y/old male with PMH of VDRF, tracheostomy status, s/p intracerebral 

hemorrhage.  COPD, DM, seizure disorder, BPH, DM, dysphagia, G tube, colostomy 

status, renal insufficiency, was sent for evaluation from SNF  due to fever.


Upon evaluation in ED patient was found to be febrile T-102 and tachycardic-155.


Laboratory workup revealed significant leukocytosis WBC-22.8. Lactic acid -1.9


BUN 47, creatinine 1.8. Pro .


Troponin  negative ; ECG  with ST, no acute ischemic changes.


UA grossly + for UTI 


CXR with atelectasis versus   L base infiltrate  


Septic workup initiated in ED. 


Patient was admitted for further management .


Allergies:  


Coded Allergies:  


     AZTREONAM (Verified  Allergy, Unknown, 7/23/18)





Medication History


Scheduled


Baclofen* (Baclofen*), 10 MG GT THREE TIMES A DAY, (Reported)


Bisacodyl* (Dulcolax*), 10 MG RECTAL DAILY, (Reported)


Calcium Carbonate (Calcium Carbonate), 1,000 MG GT BID, (Reported)


Chlorhexidine Gluconate (Peridex), 15 ML MM Q12HR, (Reported)


Cholecalciferol (Vitamin D3)* (Vitamin D*), 5,000 UNIT GT ONCE A WEEK, (Reported

)


Clonidine Hcl* (Catapres*), 0.1 MG GT EVERY 6 HOURS, (Reported)


Cranberry (Cranberry), 400 MG GT DAILY, (Reported)


Dextran 70/Hypromellose (Artificial Tears Eye Drops*), 1 DROP BOTH EYES Q4HR, (

Reported)


Docusate Sodium* (Docusate Sodium*), 100 MG GT TWICE A DAY, (Reported)


Famotidine (Famotidine), 20 MG GT DAILY, (Reported)


Fenofibrate Nanocrystallized (Fenofibrate), 145 MG GT DAILY, (Reported)


Glycopyrrolate (Robinul), 2 MG GT BID, (Reported)


Insulin Regular, Human* (Novolin R*), 0 SUBQ .SLIDING SCALE, (Reported)


Ipratropium/Albuterol Sulfate (DuoNeb 0.5-3(2.5)mg/3ml), 3 ML HHN Q3HR, (

Reported)


Levetiracetam* (Levetiracetam*), 1,000 MG GT TID, (Reported)


Losartan Potassium* (Losartan Potassium*), 25 MG GT DAILY, (Reported)


Multivitamin With Minerals (Multivitamins With Minerals*), 1 TAB GT DAILY, (

Reported)


Nystatin* (Nystatin*), 1 APPLIC TOPIC THREE TIMES A DAY, (Reported)


Polyethylene Glycol 3350* (Miralax*), 17 GM GT DAILY, (Reported)


Sitagliptin* (Januvia*), 50 MG GT DAILY, (Reported)


Spironolactone* (Aldactone*), 25 MG GT DAILY, (Reported)





Scheduled PRN


Acetaminophen 160MG/5ML* (Acetaminophen*), 20.3 ML GT Q4HR PRN for Fever/

Headache/Mild Pain, (Reported)


Albuterol Sulfate* (Albuterol Sulfate Hhn*), 3 ML INH Q2H PRN for Shortness of 

Breath, (Reported)





Miscellaneous Medications


Arginine/Ascorbate Sod/Siddharth AC (Arginaid Powder), 1 EACH PO, (Reported)


Lactulose (Lactulose*), 30 ML GT, (Reported)


Tuberculin,Purif.prot.deriv. (Aplisol), 5 TU ID, (Reported)


Vancomycin Hcl (Vancomycin), 750 MG IVPB, (Reported)





Patient History


Limited by:  medical condition


History Provided By:  Medical Record


Healthcare decision maker





Resuscitation status


Full Code


Advanced Directive on File


No





Past Medical/Surgical History


Past Medical/Surgical History:  


(1) Gastrostomy tube dependent


(2) Colostomy in place


(3) Vegetative state


(4) Sepsis


(5) UTI (urinary tract infection)


(6) Acute on chronic renal insufficiency


(7) Acute on chronic respiratory failure





Review of Systems


ROS Narrative


unable to obtain 2 to AMS





Physical Exam


General Appearance:  other - lethargic, on Vent -16-30%


Lines, tubes and drains:  peripheral, trach - Portex#7, secretions moderate 

amount, white color, thick consistency , gtube


HEENT:  other - protruded tongue


Neck:  trach - Portex #7


Respiratory/Chest:  on vent, other - few scattered rhonchi


Cardiovascular/Chest:  tachycardia - ST on monitor


Abdomen:  normal bowel sounds, non tender, soft, feeding tube - G tube, other - 

colostomy


Genitourinary/Rectal:  mayorga


Neurologic:  abnormal gait - bedridden , other - letahrgic


Musculoskeletal:  atrophy





Last 24 Hour Vital Signs








  Date Time  Temp Pulse Resp B/P (MAP) Pulse Ox O2 Delivery O2 Flow Rate FiO2


 


9/30/18 05:24  127 17     30


 


9/30/18 04:00      Mechanical Ventilator  


 


9/30/18 04:00  125      


 


9/30/18 04:00        30


 


9/30/18 04:00 98.5 120 19 114/91 (99) 99   





 98.5       


 


9/30/18 03:15  126 17     30


 


9/30/18 01:05  124 17     30


 


9/30/18 00:00      Mechanical Ventilator  


 


9/30/18 00:00  125      


 


9/30/18 00:00 98.4 125 16 114/96 (102) 99   





 98.4       


 


9/29/18 23:22  125 16     30


 


9/29/18 20:43  125 17     30


 


9/29/18 20:00 98.1 121 18 118/82 (94) 100   





 98.1       


 


9/29/18 20:00  124      


 


9/29/18 20:00        30


 


9/29/18 20:00      Mechanical Ventilator  


 


9/29/18 19:11  127 17     30


 


9/29/18 17:10  131 18     30


 


9/29/18 16:00  126      


 


9/29/18 15:46      Mechanical Ventilator 30.0 


 


9/29/18 15:44 97.7 126 18 133/91 (105)    





 97.7       


 


9/29/18 15:31  125      


 


9/29/18 14:51 101.1 129 18 136/98 100 Mechanical Ventilator 15.0 30





 101.1       


 


9/29/18 14:33  130 18     30


 


9/29/18 13:10 101.1 129 16 136/98 100 Mechanical Ventilator 15.0 30





 101.1       


 


9/29/18 12:58 101.1 133 16 143/102 100 Mechanical Ventilator 15.0 30





 101.1       


 


9/29/18 12:41       15.0 30


 


9/29/18 12:30  143 16     30


 


9/29/18 11:55 101.1       


 


9/29/18 11:25 104.0       


 


9/29/18 11:12 104.0 147 16 137/101 99 Mechanical Ventilator  





 104.0       


 


9/29/18 11:06  150 18     30


 


9/29/18 11:06  150 18   Mechanical Ventilator 15.0 30


 


9/29/18 11:04 102.0 155 18 139/60 100 Mechanical Ventilator  





 102.0       

















Intake and Output  


 


 9/29/18 9/30/18





 19:00 07:00


 


Intake Total 2190 ml 270 ml


 


Output Total 1500 ml 


 


Balance 690 ml 270 ml


 


  


 


Intake Free Water 100 ml 


 


IV Total 2000 ml 


 


Tube Feeding 90 ml 270 ml


 


Output Urine Total 1100 ml 


 


Stool Total 400 ml 


 


# Bowel Movements 2 











Laboratory Tests








Test


  9/29/18


11:15 9/29/18


12:00 9/30/18


04:10


 


White Blood Count


  22.8 K/UL


(4.8-10.8)  *H 


  15.1 K/UL


(4.8-10.8)  H


 


Red Blood Count


  5.55 M/UL


(4.70-6.10) 


  4.65 M/UL


(4.70-6.10)  L


 


Hemoglobin


  15.6 G/DL


(14.2-18.0) 


  13.1 G/DL


(14.2-18.0)  L


 


Hematocrit


  50.3 %


(42.0-52.0) 


  41.5 %


(42.0-52.0)  L


 


Mean Corpuscular Volume 91 FL (80-99)    89 FL (80-99)  


 


Mean Corpuscular Hemoglobin


  28.1 PG


(27.0-31.0) 


  28.1 PG


(27.0-31.0)


 


Mean Corpuscular Hemoglobin


Concent 31.0 G/DL


(32.0-36.0)  L 


  31.5 G/DL


(32.0-36.0)  L


 


Red Cell Distribution Width


  14.4 %


(11.6-14.8) 


  14.0 %


(11.6-14.8)


 


Platelet Count


  311 K/UL


(150-450) 


  281 K/UL


(150-450)


 


Mean Platelet Volume


  9.3 FL


(6.5-10.1) 


  9.4 FL


(6.5-10.1)


 


Neutrophils (%) (Auto)


  % (45.0-75.0)


  


  76.8 %


(45.0-75.0)  H


 


Lymphocytes (%) (Auto)


  % (20.0-45.0)


  


  13.3 %


(20.0-45.0)  L


 


Monocytes (%) (Auto)


   % (1.0-10.0)  


  


  7.5 %


(1.0-10.0)


 


Eosinophils (%) (Auto)


   % (0.0-3.0)  


  


  1.3 %


(0.0-3.0)


 


Basophils (%) (Auto)


   % (0.0-2.0)  


  


  1.1 %


(0.0-2.0)


 


Differential Total Cells


Counted 100  


  


  


 


 


Neutrophils % (Manual) 88 % (45-75)  H  


 


Lymphocytes % (Manual) 6 % (20-45)  L  


 


Monocytes % (Manual) 3 % (1-10)    


 


Eosinophils % (Manual) 0 % (0-3)    


 


Basophils % (Manual) 1 % (0-2)    


 


Myelocytes % 1 % (0-0)  H  


 


Band Neutrophils 1 % (0-8)    


 


Platelet Estimate Adequate    


 


Platelet Morphology Normal    


 


Lactic Acid Level


  1.90 mmol/L


(0.4-2.0) 


  


 


 


Urine Color  Yellow   


 


Urine Appearance


  


  Slightly


cloudy 


 


 


Urine pH  6 (4.5-8.0)   


 


Urine Specific Gravity


  


  1.010


(1.005-1.035) 


 


 


Urine Protein


  


  4+ (NEGATIVE)


H 


 


 


Urine Glucose (UA)


  


  2+ (NEGATIVE)


H 


 


 


Urine Ketones


  


  Negative


(NEGATIVE) 


 


 


Urine Blood


  


  5+ (NEGATIVE)


H 


 


 


Urine Nitrite


  


  Negative


(NEGATIVE) 


 


 


Urine Bilirubin


  


  Negative


(NEGATIVE) 


 


 


Urine Urobilinogen


  


  Normal MG/DL


(0.0-1.0) 


 


 


Urine Leukocyte Esterase


  


  2+ (NEGATIVE)


H 


 


 


Urine RBC


  


  15-20 /HPF (0


- 0)  H 


 


 


Urine WBC


  


  30-40 /HPF (0


- 0)  H 


 


 


Urine Squamous Epithelial


Cells 


  Occasional


/LPF 


 


 


Urine Bacteria


  


  Few /HPF


(NONE) 


 


 


Sodium Level


  


  144 MMOL/L


(136-145) 149 MMOL/L


(136-145)  H


 


Potassium Level


  


  4.2 MMOL/L


(3.5-5.1) 3.9 MMOL/L


(3.5-5.1)


 


Chloride Level


  


  110 MMOL/L


()  H 115 MMOL/L


()  H


 


Carbon Dioxide Level


  


  22 MMOL/L


(21-32) 24 MMOL/L


(21-32)


 


Anion Gap


  


  12 mmol/L


(5-15) 10 mmol/L


(5-15)


 


Blood Urea Nitrogen


  


  47 mg/dL


(7-18)  H 35 mg/dL


(7-18)  H


 


Creatinine


  


  1.8 MG/DL


(0.55-1.30)  H 1.4 MG/DL


(0.55-1.30)  H


 


Estimat Glomerular Filtration


Rate 


  48.5 mL/min


(>60) > 60 mL/min


(>60)


 


Glucose Level


  


  332 MG/DL


()  H 248 MG/DL


()  H


 


Calcium Level


  


  10.1 MG/DL


(8.5-10.1) 10.3 MG/DL


(8.5-10.1)  H


 


Total Bilirubin


  


  0.5 MG/DL


(0.2-1.0) 


 


 


Aspartate Amino Transf


(AST/SGOT) 


  41 U/L (15-37)


H 


 


 


Alanine Aminotransferase


(ALT/SGPT) 


  26 U/L (12-78)


  


 


 


Alkaline Phosphatase


  


  99 U/L


() 


 


 


Total Creatine Kinase


  


  1618 U/L


()  H 


 


 


Creatine Kinase MB


  


  < 0.5 NG/ML


(0.0-3.6) 


 


 


Creatine Kinase MB Relative


Index 


  0.0  


  


 


 


Troponin I


  


  0.048 ng/mL


(0.000-0.056) 


 


 


Pro-B-Type Natriuretic Peptide


  


  285 pg/mL


(0-125)  H 


 


 


Total Protein


  


  8.6 G/DL


(6.4-8.2)  H 


 


 


Albumin


  


  2.8 G/DL


(3.4-5.0)  L 2.6 G/DL


(3.4-5.0)  L


 


Globulin  5.8 g/dL   


 


Albumin/Globulin Ratio


  


  0.5 (1.0-2.7)


L 


 


 


Phosphorus Level


  


  


  1.4 MG/DL


(2.5-4.9)  L











Microbiology








 Date/Time


Source Procedure


Growth Status


 


 


 9/29/18 18:00


Sputum Gram Stain - Final Resulted


 


 9/29/18 18:00


Sputum Sputum Culture


Pending Resulted








Height (Feet):  5


Height (Inches):  8.00


Weight (Pounds):  160


Medications





Current Medications








 Medications


  (Trade)  Dose


 Ordered  Sig/Jan


 Route


 PRN Reason  Start Time


 Stop Time Status Last Admin


Dose Admin


 


 Acetaminophen


  (Tylenol)  650 mg  Q4H  PRN


 ORAL


 FEVER  9/29/18 15:05


 10/29/18 15:04   


 


 


 Albuterol/


 Ipratropium


  (Albuterol/


 Ipratropium)  3 ml  Q4H  PRN


 HHN


 Shortness of Breath  9/29/18 15:04


 10/4/18 15:03   


 


 


 Baclofen


  (Lioresal)  10 mg  THREE TIMES A  DAY


 GT


   9/29/18 18:00


 10/29/18 17:59  9/29/18 17:24


 


 


 Dextrose


  (Dextrose 50%)  25 ml  Q30M  PRN


 IV


 Hypoglycemia  9/29/18 15:12


 10/29/18 15:11   


 


 


 Heparin Sodium


  (Porcine)


  (Heparin 5000


 units/ml)  5,000 units  EVERY 12  HOURS


 SUBQ


   9/29/18 21:00


 10/29/18 20:59  9/29/18 21:14


 


 


 Insulin Aspart


  (NovoLOG)    Q6HR


 SUBQ


   9/30/18 00:00


 10/29/18 16:29  9/30/18 06:07


 


 


 Levetiracetam


  (Keppra)  1,000 mg  BID


 GT


   9/29/18 18:00


 10/29/18 17:59  9/29/18 17:24


 


 


 Lorazepam


  (Ativan 2mg/ml


 1ml)  2 mg  Q2H  PRN


 IV


 For Anxiety  9/29/18 15:11


 10/6/18 15:10   


 


 


 Morphine Sulfate


  (Morphine


 Sulfate)  4 mg  Q4H  PRN


 IVP


 Severe Pain (Pain Scale 7-10)  9/29/18 15:08


 10/6/18 15:07   


 


 


 Ondansetron HCl


  (Zofran)  4 mg  Q6H  PRN


 IVP


 Nausea & Vomiting  9/29/18 15:05


 10/29/18 15:04   


 


 


 Pantoprazole


  (Protonix)  40 mg  DAILY


 IV


   9/30/18 09:00


 10/30/18 08:59   


 


 


 Polyethylene


 Glycol


  (Miralax)  17 gm  DAILYPRN  PRN


 GT


 Constipation  9/29/18 15:45


 10/29/18 15:04   


 


 


 Sitagliptin


 Phosphate


  (Januvia)  50 mg  DAILY


 GT


   9/30/18 09:00


 10/30/18 08:59   


 


 


 Vancomycin HCl


  (Rx Monitoring


 Vancomycin)  1 ea  DAILY  PRN


 MISC


 PER RX  9/29/18 15:30


 10/29/18 15:29   


 


 


 Vitamin A/Vitamin


 D


  (A & D Oint)  1 applic  Q12H


 TOPIC


   9/29/18 21:00


 10/29/18 20:59  9/29/18 21:13


 











Assessment/Plan


Assessment/Plan


ASSESSMENT


sepsis 


UTI  


possible PNA 


acute on chronic hypoxemic respiratory failure 


chronic VDRF/tracheostomy status acute on chronic remal failrue


possible dehydration 


chronic encephalopathy with vegetative state


dysphagia,. GT feeding


DM 


COPD 


seizure disorder 


hx of intracerebral hemorrhage 


colostomy status    





PLAN OF CARE


JOSIAH status 


IVF 


empiric abx, fup with cx 


ID consult


vent support trach care


pulm toilet 


baseline ABG and titrate settings as needed 


fup with CXR in am  


Venous Duplex BLE


monitor renal parameters and e/lytes, correct lytes as needed, avoid nephrotoxic


acute RF likely due to sepsis and probable dehydration


strict aspiration precautions, 


GT feeding, monitor tolerance, G tube site care


seizure precautions, continue Keppra


BS management with Januvia and SSI


colostomy care


wound care consult 


pain management prn 


supportive care  


DVT,  GI prophayxlis   





case discussed and evaluated by supervising physician











Isabel Aguilar NP Sep 30, 2018 07:17

## 2018-10-01 VITALS — SYSTOLIC BLOOD PRESSURE: 124 MMHG | DIASTOLIC BLOOD PRESSURE: 77 MMHG

## 2018-10-01 VITALS — SYSTOLIC BLOOD PRESSURE: 110 MMHG | DIASTOLIC BLOOD PRESSURE: 70 MMHG

## 2018-10-01 VITALS — SYSTOLIC BLOOD PRESSURE: 118 MMHG | DIASTOLIC BLOOD PRESSURE: 79 MMHG

## 2018-10-01 VITALS — DIASTOLIC BLOOD PRESSURE: 78 MMHG | SYSTOLIC BLOOD PRESSURE: 119 MMHG

## 2018-10-01 VITALS — DIASTOLIC BLOOD PRESSURE: 89 MMHG | SYSTOLIC BLOOD PRESSURE: 120 MMHG

## 2018-10-01 VITALS — SYSTOLIC BLOOD PRESSURE: 139 MMHG | DIASTOLIC BLOOD PRESSURE: 96 MMHG

## 2018-10-01 LAB
ADD MANUAL DIFF: NO
ANION GAP SERPL CALC-SCNC: 12 MMOL/L (ref 5–15)
ANION GAP SERPL CALC-SCNC: 12 MMOL/L (ref 5–15)
BASOPHILS NFR BLD AUTO: 0.6 % (ref 0–2)
BUN SERPL-MCNC: 21 MG/DL (ref 7–18)
BUN SERPL-MCNC: 23 MG/DL (ref 7–18)
CALCIUM SERPL-MCNC: 10 MG/DL (ref 8.5–10.1)
CALCIUM SERPL-MCNC: 9.2 MG/DL (ref 8.5–10.1)
CHLORIDE SERPL-SCNC: 105 MMOL/L (ref 98–107)
CHLORIDE SERPL-SCNC: 113 MMOL/L (ref 98–107)
CO2 SERPL-SCNC: 21 MMOL/L (ref 21–32)
CO2 SERPL-SCNC: 22 MMOL/L (ref 21–32)
CREAT SERPL-MCNC: 1.3 MG/DL (ref 0.55–1.3)
CREAT SERPL-MCNC: 1.4 MG/DL (ref 0.55–1.3)
EOSINOPHIL NFR BLD AUTO: 4 % (ref 0–3)
ERYTHROCYTE [DISTWIDTH] IN BLOOD BY AUTOMATED COUNT: 14.1 % (ref 11.6–14.8)
HCT VFR BLD CALC: 35.9 % (ref 42–52)
HGB BLD-MCNC: 11.3 G/DL (ref 14.2–18)
LYMPHOCYTES NFR BLD AUTO: 16.1 % (ref 20–45)
MCV RBC AUTO: 91 FL (ref 80–99)
MONOCYTES NFR BLD AUTO: 6.3 % (ref 1–10)
NEUTROPHILS NFR BLD AUTO: 73 % (ref 45–75)
PLATELET # BLD: 263 K/UL (ref 150–450)
POTASSIUM SERPL-SCNC: 2.9 MMOL/L (ref 3.5–5.1)
POTASSIUM SERPL-SCNC: 3.4 MMOL/L (ref 3.5–5.1)
RBC # BLD AUTO: 3.96 M/UL (ref 4.7–6.1)
SODIUM SERPL-SCNC: 137 MMOL/L (ref 136–145)
SODIUM SERPL-SCNC: 147 MMOL/L (ref 136–145)
WBC # BLD AUTO: 10.1 K/UL (ref 4.8–10.8)

## 2018-10-01 RX ADMIN — HEPARIN SODIUM SCH UNITS: 5000 INJECTION INTRAVENOUS; SUBCUTANEOUS at 20:45

## 2018-10-01 RX ADMIN — HEPARIN SODIUM SCH UNITS: 5000 INJECTION INTRAVENOUS; SUBCUTANEOUS at 09:01

## 2018-10-01 RX ADMIN — VITAMIN A AND VITAMIN D SCH APPLIC: 929.3 OINTMENT TOPICAL at 20:43

## 2018-10-01 RX ADMIN — INSULIN ASPART SCH UNITS: 100 INJECTION, SOLUTION INTRAVENOUS; SUBCUTANEOUS at 18:32

## 2018-10-01 RX ADMIN — INSULIN ASPART SCH UNITS: 100 INJECTION, SOLUTION INTRAVENOUS; SUBCUTANEOUS at 12:42

## 2018-10-01 RX ADMIN — INSULIN ASPART SCH UNITS: 100 INJECTION, SOLUTION INTRAVENOUS; SUBCUTANEOUS at 23:56

## 2018-10-01 RX ADMIN — LEVETIRACETAM SCH MG: 100 SOLUTION ORAL at 08:59

## 2018-10-01 RX ADMIN — MEROPENEM SCH MLS/HR: 1 INJECTION INTRAVENOUS at 21:31

## 2018-10-01 RX ADMIN — INSULIN ASPART SCH UNITS: 100 INJECTION, SOLUTION INTRAVENOUS; SUBCUTANEOUS at 06:20

## 2018-10-01 RX ADMIN — MEROPENEM SCH MLS/HR: 1 INJECTION INTRAVENOUS at 13:14

## 2018-10-01 RX ADMIN — Medication SCH MG: at 09:00

## 2018-10-01 RX ADMIN — PANTOPRAZOLE SODIUM SCH MG: 40 INJECTION, POWDER, FOR SOLUTION INTRAVENOUS at 08:59

## 2018-10-01 RX ADMIN — VITAMIN A AND VITAMIN D SCH APPLIC: 929.3 OINTMENT TOPICAL at 09:02

## 2018-10-01 RX ADMIN — INSULIN ASPART SCH UNITS: 100 INJECTION, SOLUTION INTRAVENOUS; SUBCUTANEOUS at 00:03

## 2018-10-01 RX ADMIN — DEXTROSE AND SODIUM CHLORIDE SCH MLS/HR: 5; .2 INJECTION, SOLUTION INTRAVENOUS at 23:22

## 2018-10-01 RX ADMIN — SITAGLIPTIN SCH MG: 25 TABLET, FILM COATED ORAL at 09:00

## 2018-10-01 RX ADMIN — LEVETIRACETAM SCH MG: 100 SOLUTION ORAL at 18:33

## 2018-10-01 RX ADMIN — DEXTROSE AND SODIUM CHLORIDE SCH MLS/HR: 5; .2 INJECTION, SOLUTION INTRAVENOUS at 03:39

## 2018-10-01 NOTE — DIAGNOSTIC IMAGING REPORT
--------------- APPROVED REPORT --------------





CPT Code: 20254



Present Symptoms

Lower Extremity Pain:   





BILATERAL: Imaging reveals a patent deep venous system bilaterally. There is no evidence 

of thrombus within the femoral, or tibial segments. The greater saphenous veins are also 

within normal limits. Doppler indicates normal spontaneous flow within these segments.The 

bilateral popliteal veins are not visualized.

## 2018-10-01 NOTE — PROGRESS NOTE
DATE:  09/30/2018



SUBJECTIVE:  The patient spiked fever today to 100.  He has tachycardia

between 124 to 130.



PHYSICAL EXAMINATION:

VITAL SIGNS:  Blood pressure 102/78, his pulse _____, respirations 18, and

temperature was 100.

HEENT:  Eyes were normal.  ENT, mucous membranes were moist and intact.

NECK:  Supple with no JVD without lymph nodes.  Tracheostomy site is

clean.

LUNGS:  Clear without rhonchi, rales, or wheezing.  Secretions are small,

thin, and gray.

HEART:  Normal sounds with regular beats.  There is no S3, S4, or

pericardial rub.

ABDOMEN:  Soft and nontender with normal bowel sounds.  Gastrostomy site is

clean.

EXTREMITIES:  Warm without cyanosis, clubbing, or edema.



LABORATORY AND DIAGNOSTIC DATA:  His hemoglobin is 13.1, hematocrit 31.5

with MCV of 89, WBC 15.1, and platelets 281.  His WBC yesterday was 22.8.

His BUN and creatinine are 35 and 1.4 respectively.  His sodium is 149,

potassium 3.9, chloride 115, and CO2 is 24.  His glucose is 248.  His

calcium is 10.3.  His albumin is 2.3.  His troponin is 0.048.



Yesterday, chest x-ray revealed subsegmental atelectasis associated with

pneumonia on the left lower lobe and left pleural effusion.



IMPRESSION:  The patient has left lower lobe pneumonia.  He is currently on

vancomycin 1 g IV piggyback q.24 h., managed by the pharmacist.  The

patient has seizure disorder for which he is on levetiracetam.  He is on

morphine sulfate 4 mg q.4 h. p.r.n. for pain.  He is on Januvia 50 mg

daily plus sliding scale regular insulin of moderate intensity and

respiratory therapy with albuterol sulfate, ipratropium bromide inhalation

therapy every 6 hours.  Repeat laboratory tests will be done in the a.m.

We will repeat chest x-ray.









  ______________________________________________

  Etienne Bloom M.D.





DR:  NED

D:  09/30/2018 20:11

T:  10/01/2018 01:10

JOB#:  [id number]

CC:

## 2018-10-01 NOTE — CONSULTATION
Consult Note


Assessment/Plan


Hematology Consultation





JOSSELYN RICH: Etienne Bloom


DOS: 10/1/2018


RFC: Anemia, FTT





HPI


52 year old male with history of chronic respiratory failure who has prior 

trach and is vent dependant, BPH, GERD, HTN, DM2, seizure disorder, colostomy, 

feeding tube, and chronic  constipation who presents to ED on 7/23 with fever, 

tachycardia and hypotension / sepsis.  During admission CT Scan performed and 

ostomy noted with herniation of bowel into colostomy.  Surgery called to 

evaluate.  patient seen, chart reviewed, patient examined. Has been started on 

abx





Coded Allergies:  


     AZTREONAM (Verified  Allergy, Unknown, 7/23/18)





Medication History


Scheduled


Baclofen* (Baclofen*), 10 MG GT THREE TIMES A DAY, (Reported)


Bisacodyl* (Dulcolax*), 10 MG RECTAL DAILY, (Reported)


Calcium Carbonate (Calcium Carbonate), 1,000 MG GT BID, (Reported)


Chlorhexidine Gluconate (Peridex), 15 ML MM Q12HR, (Reported)


Cholecalciferol (Vitamin D3)* (Vitamin D*), 5,000 UNIT GT ONCE A WEEK, (Reported

)


Clonidine Hcl* (Catapres*), 0.1 MG GT EVERY 6 HOURS, (Reported)


Cranberry (Cranberry), 400 MG GT DAILY, (Reported)


Dextran 70/Hypromellose (Artificial Tears Eye Drops*), 1 DROP BOTH EYES Q4HR, (

Reported)


Docusate Sodium* (Docusate Sodium*), 100 MG GT TWICE A DAY, (Reported)


Famotidine (Famotidine), 20 MG GT DAILY, (Reported)


Glycopyrrolate (Robinul), 2 MG GT BID, (Reported)


Insulin Regular, Human* (Novolin R*), 0 SUBQ .SLIDING SCALE, (Reported)


Ipratropium/Albuterol Sulfate (DuoNeb 0.5-3(2.5)mg/3ml), 3 ML HHN Q3HR, (

Reported)


Levetiracetam* (Levetiracetam*), 1,000 MG GT TID, (Reported)


Losartan Potassium* (Losartan Potassium*), 25 MG GT DAILY, (Reported)


Multivitamin With Minerals (Multivitamins With Minerals*), 1 TAB GT DAILY, (

Reported)


Nystatin* (Nystatin*), 1 APPLIC TOPIC THREE TIMES A DAY, (Reported)


Polyethylene Glycol 3350* (Miralax*), 17 GM GT DAILY, (Reported)


Sitagliptin* (Januvia*), 50 MG GT DAILY, (Reported)


Spironolactone* (Aldactone*), 25 MG GT DAILY, (Reported)





Scheduled PRN


Acetaminophen 160MG/5ML* (Acetaminophen*), 20.3 ML GT Q4HR PRN for Fever/

Headache/Mild Pain, (Reported)





Miscellaneous Medications


Lactulose (Lactulose*), 30 ML GT, (Reported)





Patient History


Limited by:  medical condition


History Provided By:  Medical Record, PMD


Healthcare decision maker


unk


Resuscitation status


Full Code


Advanced Directive on File


No





Past Medical/Surgical History


Past Medical/Surgical History:  


(1) Parastomal hernia


(2) Diabetes mellitus


(3) Vegetative state


(4) Acute on chronic renal insufficiency


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


(6) Acute on chronic respiratory failure


(7) Severe sepsis


(8) Gastrostomy tube dependent


(9) Stoma malfunction





Review of Systems


ROS Narrative


cannot obtain given medical condition





Physical Exam


General Appearance:  no apparent distress


Lines, tubes and drains:  central line, trach/vent


HEENT:  mucous membranes moist


Neck:  trach


Respiratory/Chest:  on vent


Cardiovascular/Chest:  regular rhythm


Abdomen:  normal bowel sounds, soft, distended, feeding tube, other - loop 

colostomy with parastomal hernia


Extremities:  other


Skin Exam:  warm/dry


Neurologic:  unresponsiveness





Labs: reviewed





Imaging: reviewed





Assessment/Recs:


# Leukocytosis - sepsis with elevated temperature of 103 degrees Fahrenheit.  

Had uti v bacteremia (CoNS) on abx, has improved. had infection on prior 

admission.


--> Pt on antibiotics, has received a dose of Zosyn and currently is on 

vancomycin q.12 h. 


--> Appreciate Infectious Disease recs


--> 2D echo per ID performed on 8/1: No discrete vegetations seen, however SBE 

may not be excluded by transthoracic 2-D echo.


--> CURRENT WBC of 7.9, wnl 


--> Currently afebrile 


# Anemia of chronic disease. No hemolysis, peripheral smear reviewed, ferritin 

was elevated.


--> Continue to closely monitor


--> Hgb goal >7


--> CURRENT Hgb 13.1--> hemoconcentration


# Acute kidney injury.  


--> Currently on IV fluids, IV bolus given to see if the patient responds along 

with IV pressor as needed.  


--> Closely monitor with Nephrology team.


# Septic shock, use antibiotic per ID services.


--> potential uti, on abx and bacteremia


# Respiratory failure, status post tracheostomy, on mechanical ventilation per 

Dr. Pena


--> Remains on vent 


--> 8/13 CXR: Unchanged left pleural effusion versus scarring, over 4 days


# Hypercalcemia. Monitor PTH and calcium level.





The time the note was entered does not necessarily correspond to the time the 

patient was seen.











Tejas Gerber MD Oct 1, 2018 06:51

## 2018-10-01 NOTE — PULMONOLGY CRITICAL CARE NOTE
Critical Care - Asmt/Plan


Problems:  


(1) Acute on chronic respiratory failure


(2) Sepsis


(3) UTI (urinary tract infection)


(4) Diabetes mellitus


(5) Vegetative state


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


Respiratory:  monitor respiratory rate, adjust FIO2


Cardiac:  continue pressors, continue to monitor HR/BP


Renal:  F/U I&O, keep IV fluid


Infectious Disease:  check cultures


Gastrointestinal:  continue feedings/current rate


Endocrine:  monitor blood sugar, check HgA1C, continue sliding scale insulin


Hematologic:  transfuse if hgb<8.5


Neurologic:  PRN Morphine, keep patient comfortable


Prophylaxis:  Protonix


Disposition:  keep in ICU


Time Spent (Minutes):  40


Notes Reviewed:  renal


Discussed with:  nurses, consultants, 





Critical Care - Objective





Last 24 Hour Vital Signs








  Date Time  Temp Pulse Resp B/P (MAP) Pulse Ox O2 Delivery O2 Flow Rate FiO2


 


10/1/18 09:53  112 17     30


 


10/1/18 07:47  113 27     30


 


10/1/18 05:21  108 22     30


 


10/1/18 04:00      Mechanical Ventilator  


 


10/1/18 04:00        30


 


10/1/18 04:00 99.0 111 19 139/96 (110) 100   





 99.0       


 


10/1/18 04:00  111      


 


10/1/18 03:30  109 18     30


 


10/1/18 01:33  108 18     30


 


10/1/18 00:00        30


 


10/1/18 00:00  103      


 


10/1/18 00:00 98.9 103 19 120/89 (99) 100   





 98.9       


 


10/1/18 00:00      Mechanical Ventilator  


 


9/30/18 23:29  110 17     30


 


9/30/18 21:53 99.5       


 


9/30/18 21:44  118 18     30


 


9/30/18 21:23 100.7       


 


9/30/18 20:05 100.7       





 100.7       


 


9/30/18 20:00 100.4 120 19 108/78 (88) 99   





 100.4       


 


9/30/18 20:00        30


 


9/30/18 20:00      Mechanical Ventilator  


 


9/30/18 20:00  117      


 


9/30/18 19:59  124 22     30


 


9/30/18 16:55  126 18     30


 


9/30/18 16:14  125      


 


9/30/18 16:00      Mechanical Ventilator  


 


9/30/18 16:00        30


 


9/30/18 16:00 99.9 125 17 102/78 (86) 100   





 99.9       


 


9/30/18 15:27  130 18     30


 


9/30/18 13:03  128 17     30


 


9/30/18 12:55 100.0       


 


9/30/18 12:00 100.0 127 18 120/98 (105) 100   





 100.0       


 


9/30/18 12:00      Mechanical Ventilator  


 


9/30/18 12:00        30


 


9/30/18 12:00  127      


 


9/30/18 11:27  127 17     30








Status:  sedated, somnolent


Condition:  critical


HEENT:  atraumatic


Lungs:  clear


Heart:  HR/BP unstable, regular


Abdomen:  non-tender, feeding tube


Extremities:  edema


Decubiti:  location


Micro:





Microbiology








 Date/Time


Source Procedure


Growth Status


 


 


 9/29/18 11:15


Blood Blood Culture - Preliminary


NO GROWTH AFTER 24 HOURS Resulted


 


 9/29/18 11:00


Blood Blood Culture - Preliminary


NO GROWTH AFTER 24 HOURS Resulted





 9/29/18 18:00


Sputum Gram Stain - Final Resulted


 


 9/29/18 18:00 Sputum Culture - Preliminary


Gram Negative Bacillus 1 Resulted


 


 9/29/18 12:00


Urine,Clean Catch Urine Culture - Preliminary


Gram Negative Bacillus 1 Resulted





 9/29/18 12:00


Rectum VRE Culture - Final


Enterococcus Faecalis - Vre Resulted


 


 9/29/18 12:00


Rectum 


Pending Resulted








Accucheck:  223





Critical Care - Subjective


ROS Limited/Unobtainable:  Yes


Condition:  critical


EKG Rhythm:  Sinus Rhythm


FI02:  30


Vent Support Breath Rate:  16


Vent Support Mode:  AC


Vent Tidal Volume:  600


Sputum Amount:  Moderate


PEEP:  5.0


PIP:  29


Tube Feeding Amount:  50


I&O:











Intake and Output  


 


 9/30/18 10/1/18





 19:00 07:00


 


Intake Total 1405.0 ml 1095 ml


 


Output Total 650 ml 750 ml


 


Balance 755.0 ml 345 ml


 


  


 


Intake Free Water 260 ml 


 


IV Total 785.0 ml 575 ml


 


Tube Feeding 360 ml 520 ml


 


Output Urine Total 550 ml 500 ml


 


Stool Total 100 ml 250 ml








Labs:





Laboratory Tests








Test


  10/1/18


07:06 10/1/18


07:59


 


White Blood Count


  10.1 K/UL


(4.8-10.8) 


 


 


Red Blood Count


  3.96 M/UL


(4.70-6.10)  L 


 


 


Hemoglobin


  11.3 G/DL


(14.2-18.0)  L 


 


 


Hematocrit


  35.9 %


(42.0-52.0)  L 


 


 


Mean Corpuscular Volume 91 FL (80-99)   


 


Mean Corpuscular Hemoglobin


  28.6 PG


(27.0-31.0) 


 


 


Mean Corpuscular Hemoglobin


Concent 31.5 G/DL


(32.0-36.0)  L 


 


 


Red Cell Distribution Width


  14.1 %


(11.6-14.8) 


 


 


Platelet Count


  263 K/UL


(150-450) 


 


 


Mean Platelet Volume


  10.3 FL


(6.5-10.1)  H 


 


 


Neutrophils (%) (Auto)


  73.0 %


(45.0-75.0) 


 


 


Lymphocytes (%) (Auto)


  16.1 %


(20.0-45.0)  L 


 


 


Monocytes (%) (Auto)


  6.3 %


(1.0-10.0) 


 


 


Eosinophils (%) (Auto)


  4.0 %


(0.0-3.0)  H 


 


 


Basophils (%) (Auto)


  0.6 %


(0.0-2.0) 


 


 


Sodium Level


  137 MMOL/L


(136-145) 


 


 


Potassium Level


  2.9 MMOL/L


(3.5-5.1)  L 


 


 


Chloride Level


  105 MMOL/L


() 


 


 


Carbon Dioxide Level


  21 MMOL/L


(21-32) 


 


 


Anion Gap


  12 mmol/L


(5-15) 


 


 


Blood Urea Nitrogen


  21 mg/dL


(7-18)  H 


 


 


Creatinine


  1.4 MG/DL


(0.55-1.30)  H 


 


 


Estimat Glomerular Filtration


Rate > 60 mL/min


(>60) 


 


 


Glucose Level


  678 MG/DL


()  #*H 


 


 


Calcium Level


  9.2 MG/DL


(8.5-10.1) 


 


 


Calcium (Send out) Pending   


 


Parathyroid Hormone (Intact) Pending   


 


Arterial Blood pH


  


  7.471


(7.350-7.450)


 


Arterial Blood Partial


Pressure CO2 


  26.9 mmHg


(35.0-45.0)  L


 


Arterial Blood Partial


Pressure O2 


  124.3 mmHg


(75.0-100.0)  H


 


Arterial Blood HCO3


  


  19.2 mmol/L


(22.0-26.0)  L


 


Arterial Blood Oxygen


Saturation 


  98.1 %


()


 


Arterial Blood Base Excess  -3.3 (-2-2)  L


 


Arsh Test  Positive  

















Yury Pena MD Oct 1, 2018 10:19

## 2018-10-01 NOTE — DIAGNOSTIC IMAGING REPORT
Indication: Shortness of breath

 

Technique: One view of the chest

 

Comparison: 9/29/2018

 

Findings: Tracheostomy remains. Left basilar scarring, intimal retrocardiac

consolidation, and pleural fluid persists, unchanged. Right lung and pleural space

remain clear. Left upper lung is clear. The heart is borderline enlarged.

 

Impression:  Unchanged, over 2 days, findings as above.

## 2018-10-01 NOTE — CONSULTATION
History of Present Illness


General


Date patient seen:  Oct 1, 2018


Chief Complaint:  Fever





Present Illness


HPI


51 y/o M with hx of chronic resp failure vent/trach dependent, ICH, COPD, Dm2, 

seizure disoder, BPH, DM2,  dysphagia, G tube, colostomy status, SN[facility] resident 

presents to ED on 9/29 with fever. In ED T up to 102 and HR up to 155. WBC 22 

and elevated lactic acid. CXR showed L basilar infiltrate and u/a suggestive of 

infection. 





Of note, patient was admitted here on Mid July 2018 for 1 month with sepsis 2ry 

to UTI, Enterobacter and ESBL proteus bacteremia as well as CONS bacteremia; 

PICC line infection s/p removal and replacement. Was treated for presumed 

endocarditis at the time as recurrent CONS bacteremia.








Tm up to 104. Tm int he last 24hrs is 100.7


WBC upon admission in the 20s, now resolved


bacteremic with GPC


Allergies:  


Coded Allergies:  


     AZTREONAM (Verified  Allergy, Unknown, 7/23/18)





Medication History


Scheduled


Baclofen* (Baclofen*), 10 MG GT THREE TIMES A DAY, (Reported)


Bisacodyl* (Dulcolax*), 10 MG RECTAL DAILY, (Reported)


Calcium Carbonate (Calcium Carbonate), 1,000 MG GT BID, (Reported)


Chlorhexidine Gluconate (Peridex), 15 ML MM Q12HR, (Reported)


Cholecalciferol (Vitamin D3)* (Vitamin D*), 5,000 UNIT GT ONCE A WEEK, (Reported

)


Clonidine Hcl* (Catapres*), 0.1 MG GT EVERY 6 HOURS, (Reported)


Cranberry (Cranberry), 400 MG GT DAILY, (Reported)


Dextran 70/Hypromellose (Artificial Tears Eye Drops*), 1 DROP BOTH EYES Q4HR, (

Reported)


Docusate Sodium* (Docusate Sodium*), 100 MG GT TWICE A DAY, (Reported)


Famotidine (Famotidine), 20 MG GT DAILY, (Reported)


Fenofibrate Nanocrystallized (Fenofibrate), 145 MG GT DAILY, (Reported)


Glycopyrrolate (Robinul), 2 MG GT BID, (Reported)


Insulin Regular, Human* (Novolin R*), 0 SUBQ .SLIDING SCALE, (Reported)


Ipratropium/Albuterol Sulfate (DuoNeb 0.5-3(2.5)mg/3ml), 3 ML HHN Q3HR, (

Reported)


Levetiracetam* (Levetiracetam*), 1,000 MG GT TID, (Reported)


Losartan Potassium* (Losartan Potassium*), 25 MG GT DAILY, (Reported)


Multivitamin With Minerals (Multivitamins With Minerals*), 1 TAB GT DAILY, (

Reported)


Nystatin* (Nystatin*), 1 APPLIC TOPIC THREE TIMES A DAY, (Reported)


Polyethylene Glycol 3350* (Miralax*), 17 GM GT DAILY, (Reported)


Sitagliptin* (Januvia*), 50 MG GT DAILY, (Reported)


Spironolactone* (Aldactone*), 25 MG GT DAILY, (Reported)





Scheduled PRN


Acetaminophen 160MG/5ML* (Acetaminophen*), 20.3 ML GT Q4HR PRN for Fever/

Headache/Mild Pain, (Reported)


Albuterol Sulfate* (Albuterol Sulfate Hhn*), 3 ML INH Q2H PRN for Shortness of 

Breath, (Reported)





Miscellaneous Medications


Arginine/Ascorbate Sod/Siddharth AC (Arginaid Powder), 1 EACH PO, (Reported)


Lactulose (Lactulose*), 30 ML GT, (Reported)


Tuberculin,Purif.prot.deriv. (Aplisol), 5 TU ID, (Reported)


Vancomycin Hcl (Vancomycin), 750 MG IVPB, (Reported)





Patient History


Healthcare decision maker





Resuscitation status


Full Code


Advanced Directive on File


No


Patient History Narrative








Pmhx: as above





Shx: He is .  He was born in [place].  He has been on


SSI since his cerebral hemorrhage.





Fhx: non contributory





Review of Systems


ROS Narrative


unable to obtain





Physical Exam


Physical Exam Narrative


HEENT: Conjunctivae were pink. mucous membranes were not dehydrated..  Soft 

palate was free of ulcerations.  Pharynx was clear from exudate or tonsillar


hypertrophy. 


NECK:  Supple.  There was no goiter.  No mass.  No lymphadenopathy.  There


was no JVD.  No bruits.  Carotid upstroke was 2+.


LUNGS:  Clear.


HEART:  PMI was in fifth left intercostal space in midclavicular line.


There was normal S1 and normal S2.  There was no murmur.  No arrhythmia.


No S3.  No S4.  No pericardial rub.


ABDOMEN:  Soft and nontender without organomegaly.  There were no masses


palpable.  Normal bowel sounds without bruits.  There was no guarding.  No


rebound tenderness.  No ascites.  No hernia.  No CVA tenderness.  Liver


span was 8 cm, smooth, and nontender.


EXTREMITIES:  No cyanosis, no clubbing, and no edema.  Extremities were


warm.





Last 24 Hour Vital Signs








  Date Time  Temp Pulse Resp B/P (MAP) Pulse Ox O2 Delivery O2 Flow Rate FiO2


 


10/1/18 11:11  107 16     30


 


10/1/18 09:53  112 17     30


 


10/1/18 07:47  113 27     30


 


10/1/18 05:21  108 22     30


 


10/1/18 04:00      Mechanical Ventilator  


 


10/1/18 04:00        30


 


10/1/18 04:00 99.0 111 19 139/96 (110) 100   





 99.0       


 


10/1/18 04:00  111      


 


10/1/18 03:30  109 18     30


 


10/1/18 01:33  108 18     30


 


10/1/18 00:00        30


 


10/1/18 00:00  103      


 


10/1/18 00:00 98.9 103 19 120/89 (99) 100   





 98.9       


 


10/1/18 00:00      Mechanical Ventilator  


 


9/30/18 23:29  110 17     30


 


9/30/18 21:53 99.5       


 


9/30/18 21:44  118 18     30


 


9/30/18 21:23 100.7       


 


9/30/18 20:05 100.7       





 100.7       


 


9/30/18 20:00 100.4 120 19 108/78 (88) 99   





 100.4       


 


9/30/18 20:00        30


 


9/30/18 20:00      Mechanical Ventilator  


 


9/30/18 20:00  117      


 


9/30/18 19:59  124 22     30


 


9/30/18 16:55  126 18     30


 


9/30/18 16:14  125      


 


9/30/18 16:00      Mechanical Ventilator  


 


9/30/18 16:00        30


 


9/30/18 16:00 99.9 125 17 102/78 (86) 100   





 99.9       


 


9/30/18 15:27  130 18     30


 


9/30/18 13:03  128 17     30


 


9/30/18 12:55 100.0       


 


9/30/18 12:00 100.0 127 18 120/98 (105) 100   





 100.0       


 


9/30/18 12:00      Mechanical Ventilator  


 


9/30/18 12:00        30


 


9/30/18 12:00  127      

















Intake and Output  


 


 9/30/18 10/1/18





 19:00 07:00


 


Intake Total 1405.0 ml 1095 ml


 


Output Total 650 ml 750 ml


 


Balance 755.0 ml 345 ml


 


  


 


Intake Free Water 260 ml 


 


IV Total 785.0 ml 575 ml


 


Tube Feeding 360 ml 520 ml


 


Output Urine Total 550 ml 500 ml


 


Stool Total 100 ml 250 ml











Laboratory Tests








Test


  10/1/18


07:06 10/1/18


07:59 10/1/18


10:00


 


White Blood Count


  10.1 K/UL


(4.8-10.8) 


  


 


 


Red Blood Count


  3.96 M/UL


(4.70-6.10)  L 


  


 


 


Hemoglobin


  11.3 G/DL


(14.2-18.0)  L 


  


 


 


Hematocrit


  35.9 %


(42.0-52.0)  L 


  


 


 


Mean Corpuscular Volume 91 FL (80-99)    


 


Mean Corpuscular Hemoglobin


  28.6 PG


(27.0-31.0) 


  


 


 


Mean Corpuscular Hemoglobin


Concent 31.5 G/DL


(32.0-36.0)  L 


  


 


 


Red Cell Distribution Width


  14.1 %


(11.6-14.8) 


  


 


 


Platelet Count


  263 K/UL


(150-450) 


  


 


 


Mean Platelet Volume


  10.3 FL


(6.5-10.1)  H 


  


 


 


Neutrophils (%) (Auto)


  73.0 %


(45.0-75.0) 


  


 


 


Lymphocytes (%) (Auto)


  16.1 %


(20.0-45.0)  L 


  


 


 


Monocytes (%) (Auto)


  6.3 %


(1.0-10.0) 


  


 


 


Eosinophils (%) (Auto)


  4.0 %


(0.0-3.0)  H 


  


 


 


Basophils (%) (Auto)


  0.6 %


(0.0-2.0) 


  


 


 


Sodium Level


  137 MMOL/L


(136-145) 


  147 MMOL/L


(136-145)  #H


 


Potassium Level


  2.9 MMOL/L


(3.5-5.1)  L 


  3.4 MMOL/L


(3.5-5.1)  L


 


Chloride Level


  105 MMOL/L


() 


  113 MMOL/L


()  H


 


Carbon Dioxide Level


  21 MMOL/L


(21-32) 


  22 MMOL/L


(21-32)


 


Anion Gap


  12 mmol/L


(5-15) 


  12 mmol/L


(5-15)


 


Blood Urea Nitrogen


  21 mg/dL


(7-18)  H 


  23 mg/dL


(7-18)  H


 


Creatinine


  1.4 MG/DL


(0.55-1.30)  H 


  1.3 MG/DL


(0.55-1.30)


 


Estimat Glomerular Filtration


Rate > 60 mL/min


(>60) 


  > 60 mL/min


(>60)


 


Glucose Level


  678 MG/DL


()  #*H 


  231 MG/DL


()  #H


 


Calcium Level


  9.2 MG/DL


(8.5-10.1) 


  10.0 MG/DL


(8.5-10.1)


 


Calcium (Send out) Pending    


 


Parathyroid Hormone (Intact) Pending    


 


Arterial Blood pH


  


  7.471


(7.350-7.450) 


 


 


Arterial Blood Partial


Pressure CO2 


  26.9 mmHg


(35.0-45.0)  L 


 


 


Arterial Blood Partial


Pressure O2 


  124.3 mmHg


(75.0-100.0)  H 


 


 


Arterial Blood HCO3


  


  19.2 mmol/L


(22.0-26.0)  L 


 


 


Arterial Blood Oxygen


Saturation 


  98.1 %


() 


 


 


Arterial Blood Base Excess  -3.3 (-2-2)  L 


 


Arsh Test  Positive   








Height (Feet):  5


Height (Inches):  8.00


Weight (Pounds):  160


Medications





Current Medications








 Medications


  (Trade)  Dose


 Ordered  Sig/Jan


 Route


 PRN Reason  Start Time


 Stop Time Status Last Admin


Dose Admin


 


 Acetaminophen


  (Tylenol)  650 mg  Q4H  PRN


 ORAL


 FEVER  9/29/18 15:05


 10/29/18 15:04  9/30/18 21:23


 


 


 Albuterol/


 Ipratropium


  (Albuterol/


 Ipratropium)  3 ml  Q4H  PRN


 HHN


 Shortness of Breath  9/29/18 15:04


 10/4/18 15:03   


 


 


 Ascorbic Acid


  (Vitamin C)  500 mg  DAILY


 GT


   10/1/18 09:00


 10/31/18 08:59  10/1/18 09:00


 


 


 Baclofen


  (Lioresal)  10 mg  THREE TIMES A  DAY


 GT


   9/29/18 18:00


 10/29/18 17:59  10/1/18 09:00


 


 


 Dextrose


  (Dextrose 50%)  25 ml  Q30M  PRN


 IV


 Hypoglycemia  9/29/18 15:12


 10/29/18 15:11   


 


 


 Dextrose/Sodium


 Chloride  1,000 ml @ 


 50 mls/hr  Q20H


 IV


   9/30/18 07:30


 10/30/18 07:29  10/1/18 03:39


 


 


 Heparin Sodium


  (Porcine)


  (Heparin 5000


 units/ml)  5,000 units  EVERY 12  HOURS


 SUBQ


   9/29/18 21:00


 10/29/18 20:59  10/1/18 09:01


 


 


 Insulin Aspart


  (NovoLOG)    Q6HR


 SUBQ


   9/30/18 00:00


 10/29/18 16:29  10/1/18 06:20


 


 


 Levetiracetam


  (Keppra)  1,000 mg  BID


 GT


   9/29/18 18:00


 10/29/18 17:59  10/1/18 08:59


 


 


 Lorazepam


  (Ativan 2mg/ml


 1ml)  2 mg  Q2H  PRN


 IV


 For Anxiety  9/29/18 15:11


 10/6/18 15:10   


 


 


 Morphine Sulfate


  (Morphine


 Sulfate)  4 mg  Q4H  PRN


 IVP


 Severe Pain (Pain Scale 7-10)  9/29/18 15:08


 10/6/18 15:07   


 


 


 Ondansetron HCl


  (Zofran)  4 mg  Q6H  PRN


 IVP


 Nausea & Vomiting  9/29/18 15:05


 10/29/18 15:04   


 


 


 Pantoprazole


  (Protonix)  40 mg  DAILY


 IV


   9/30/18 09:00


 10/30/18 08:59  10/1/18 08:59


 


 


 Polyethylene


 Glycol


  (Miralax)  17 gm  DAILYPRN  PRN


 GT


 Constipation  9/29/18 15:45


 10/29/18 15:04   


 


 


 Sitagliptin


 Phosphate


  (Januvia)  50 mg  DAILY


 GT


   9/30/18 09:00


 10/30/18 08:59  10/1/18 09:00


 


 


 Vancomycin HCl


  (Rx Monitoring


 Vancomycin)  1 ea  DAILY  PRN


 MISC


 PER RX  9/29/18 15:30


 10/29/18 15:29   


 


 


 Vitamin A/Vitamin


 D


  (A & D Oint)  1 applic  Q12H


 TOPIC


   9/29/18 21:00


 10/29/18 20:59  10/1/18 09:02


 











Assessment/Plan


Assessment/Plan





Abx:


IV Vanco 9/29


Cefepime x1 9/29





Assessment:


Severe sepsis 2ry to Gram positive bacteremia, UTI and probable PNA


  -u/a wbc 30-40, nit neg, leuk +2; ucx GNR


  -Bcx 2/4 GPC clusters


  -sp cx GNR


  -CXR:  Small left pleural effusion, not significantly changed. Unchanged 

subsegmental atelectasis versus infiltrate in the left lung base.





Fever, improving


Leukocytosis, improving


BRAYDON, improving





Hx of UTI


   -7/2018 ucx >100k E. aerogenes (S cefepime, cipro/levo; R Zosyn)





Hx of Bacteremia 7/2018 (PICC line infection, presumed endocarditis)


  -BCX 4/4 E. aerogenes, prob Amp C (S cefepime, cipro/levo; R Zosyn), P. 

mirabilis ESBL (S Zosyn, Ertapenem)


   -repeta 7/26 1/4 CoNS (suspect contaminant)


  -7/28 Staph 





chronic resp failure trach/vent dependant


BPH


GERD


 HTN


DM2


seizure disorder


colostomy


 s/p GT 


hx of VRE and MRSA colonization








Plan:


-Continue empiric IV Vancomycin #3 and start Meropenem pending cutlures


    -9/9 SP IV Vancomycin #42


    -8/9 SP Ertapenem #14


    -7/27/18 SP Zosyn #4





-Bcx x2, 2d echo


-f/u cx


-Monitor CBC/CMP, temperatures


-trach/peg care


-wound care/prevention per hospital care





Thank you for this consultation. Will continue to follow along with you.





Discussed with Joy Whitley M.D. Oct 1, 2018 11:58

## 2018-10-02 VITALS — SYSTOLIC BLOOD PRESSURE: 118 MMHG | DIASTOLIC BLOOD PRESSURE: 79 MMHG

## 2018-10-02 VITALS — DIASTOLIC BLOOD PRESSURE: 77 MMHG | SYSTOLIC BLOOD PRESSURE: 123 MMHG

## 2018-10-02 VITALS — SYSTOLIC BLOOD PRESSURE: 128 MMHG | DIASTOLIC BLOOD PRESSURE: 83 MMHG

## 2018-10-02 VITALS — SYSTOLIC BLOOD PRESSURE: 134 MMHG | DIASTOLIC BLOOD PRESSURE: 81 MMHG

## 2018-10-02 VITALS — SYSTOLIC BLOOD PRESSURE: 108 MMHG | DIASTOLIC BLOOD PRESSURE: 75 MMHG

## 2018-10-02 VITALS — DIASTOLIC BLOOD PRESSURE: 69 MMHG | SYSTOLIC BLOOD PRESSURE: 121 MMHG

## 2018-10-02 LAB
% IRON SATURATION: 26 % (ref 15–50)
ADD MANUAL DIFF: NO
ALBUMIN SERPL-MCNC: 2.6 G/DL (ref 3.4–5)
ALBUMIN/GLOB SERPL: 0.5 {RATIO} (ref 1–2.7)
ALP SERPL-CCNC: 87 U/L (ref 46–116)
ALT SERPL-CCNC: 24 U/L (ref 12–78)
ANION GAP SERPL CALC-SCNC: 12 MMOL/L (ref 5–15)
APPEARANCE UR: (no result)
APTT PPP: YELLOW S
AST SERPL-CCNC: 24 U/L (ref 15–37)
BASOPHILS NFR BLD AUTO: 0.8 % (ref 0–2)
BILIRUB SERPL-MCNC: 0.3 MG/DL (ref 0.2–1)
BUN SERPL-MCNC: 19 MG/DL (ref 7–18)
CALCIUM SERPL-MCNC: 10.2 MG/DL (ref 8.5–10.1)
CHLORIDE SERPL-SCNC: 113 MMOL/L (ref 98–107)
CO2 SERPL-SCNC: 22 MMOL/L (ref 21–32)
CREAT SERPL-MCNC: 1.4 MG/DL (ref 0.55–1.3)
EOSINOPHIL NFR BLD AUTO: 5.9 % (ref 0–3)
ERYTHROCYTE [DISTWIDTH] IN BLOOD BY AUTOMATED COUNT: 14.1 % (ref 11.6–14.8)
FERRITIN SERPL-MCNC: 1371 NG/ML (ref 8–388)
GLOBULIN SER-MCNC: 5 G/DL
GLUCOSE UR STRIP-MCNC: (no result) MG/DL
HCT VFR BLD CALC: 37.1 % (ref 42–52)
HGB BLD-MCNC: 11.9 G/DL (ref 14.2–18)
IRON SERPL-MCNC: 48 UG/DL (ref 50–175)
KETONES UR QL STRIP: (no result)
LEUKOCYTE ESTERASE UR QL STRIP: (no result)
LYMPHOCYTES NFR BLD AUTO: 19.9 % (ref 20–45)
MCV RBC AUTO: 90 FL (ref 80–99)
MONOCYTES NFR BLD AUTO: 5.5 % (ref 1–10)
NEUTROPHILS NFR BLD AUTO: 68 % (ref 45–75)
NITRITE UR QL STRIP: NEGATIVE
PH UR STRIP: 6 [PH] (ref 4.5–8)
PHOSPHATE SERPL-MCNC: 2.1 MG/DL (ref 2.5–4.9)
PLATELET # BLD: 268 K/UL (ref 150–450)
POTASSIUM SERPL-SCNC: 3.4 MMOL/L (ref 3.5–5.1)
PROT UR QL STRIP: (no result)
RBC # BLD AUTO: 4.14 M/UL (ref 4.7–6.1)
SODIUM SERPL-SCNC: 147 MMOL/L (ref 136–145)
SP GR UR STRIP: 1.02 (ref 1–1.03)
TIBC SERPL-MCNC: 186 UG/DL (ref 250–450)
UNSATURATED IRON BINDING: 138 UG/DL (ref 112–346)
UROBILINOGEN UR-MCNC: NORMAL MG/DL (ref 0–1)
WBC # BLD AUTO: 8.6 K/UL (ref 4.8–10.8)

## 2018-10-02 RX ADMIN — MEROPENEM SCH MLS/HR: 1 INJECTION INTRAVENOUS at 06:08

## 2018-10-02 RX ADMIN — MEROPENEM SCH MLS/HR: 1 INJECTION INTRAVENOUS at 21:03

## 2018-10-02 RX ADMIN — INSULIN ASPART SCH UNITS: 100 INJECTION, SOLUTION INTRAVENOUS; SUBCUTANEOUS at 23:19

## 2018-10-02 RX ADMIN — HEPARIN SODIUM SCH UNITS: 5000 INJECTION INTRAVENOUS; SUBCUTANEOUS at 21:05

## 2018-10-02 RX ADMIN — VITAMIN A AND VITAMIN D SCH APPLIC: 929.3 OINTMENT TOPICAL at 08:22

## 2018-10-02 RX ADMIN — HEPARIN SODIUM SCH UNITS: 5000 INJECTION INTRAVENOUS; SUBCUTANEOUS at 08:23

## 2018-10-02 RX ADMIN — LEVETIRACETAM SCH MG: 100 SOLUTION ORAL at 08:21

## 2018-10-02 RX ADMIN — INSULIN ASPART SCH UNITS: 100 INJECTION, SOLUTION INTRAVENOUS; SUBCUTANEOUS at 17:41

## 2018-10-02 RX ADMIN — MEROPENEM SCH MLS/HR: 1 INJECTION INTRAVENOUS at 13:08

## 2018-10-02 RX ADMIN — SODIUM CHLORIDE SCH MLS/HR: 900 INJECTION, SOLUTION INTRAVENOUS at 14:49

## 2018-10-02 RX ADMIN — INSULIN ASPART SCH UNITS: 100 INJECTION, SOLUTION INTRAVENOUS; SUBCUTANEOUS at 12:03

## 2018-10-02 RX ADMIN — Medication SCH MG: at 08:22

## 2018-10-02 RX ADMIN — SITAGLIPTIN SCH MG: 25 TABLET, FILM COATED ORAL at 08:22

## 2018-10-02 RX ADMIN — VITAMIN A AND VITAMIN D SCH APPLIC: 929.3 OINTMENT TOPICAL at 21:03

## 2018-10-02 RX ADMIN — Medication SCH MLS/HR: at 15:34

## 2018-10-02 RX ADMIN — INSULIN ASPART SCH UNITS: 100 INJECTION, SOLUTION INTRAVENOUS; SUBCUTANEOUS at 06:06

## 2018-10-02 RX ADMIN — PANTOPRAZOLE SODIUM SCH MG: 40 INJECTION, POWDER, FOR SOLUTION INTRAVENOUS at 08:22

## 2018-10-02 RX ADMIN — LEVETIRACETAM SCH MG: 100 SOLUTION ORAL at 17:40

## 2018-10-02 NOTE — INFECTIOUS DISEASES PROG NOTE
Assessment/Plan


Assessment/Plan








Assessment:


Severe sepsis 2ry to Gram positive bacteremia, UTI and probable PNA


  -u/a wbc 30-40, nit neg, leuk +2; ucx 10-20k GNR


  -9/29 Bcx 2/4 CONS; 9/30 1/4 GPC 


  -sp cx ABC (I Tygecicline, ohter sensi pending), GNR #2


  -10/2 CXR : Improvement of persistent left pleural effusion and retrocardiac 

consolidation, over one day


  -CXR:  Small left pleural effusion, not significantly changed. Unchanged 

subsegmental atelectasis versus infiltrate in the left lung base.





Fever, improving


Leukocytosis, SP


BRAYDON, improving





Hx of UTI


   -7/2018 ucx >100k E. aerogenes (S cefepime, cipro/levo; R Zosyn)





Hx of Bacteremia 7/2018 (PICC line infection, presumed endocarditis)


  -BCX 4/4 E. aerogenes, prob Amp C (S cefepime, cipro/levo; R Zosyn), P. 

mirabilis ESBL (S Zosyn, Ertapenem)


   -repeta 7/26 1/4 CoNS (suspect contaminant)


  -7/28 Staph 





chronic resp failure trach/vent dependant


BPH


GERD


 HTN


DM2


seizure disorder


colostomy


 s/p GT 


hx of VRE and MRSA colonization








Plan:


-Continue empiric IV Vancomycin #4 and Meropenem #2 pending cutlures


    -9/29 SP Cefepime x1


    -9/9 SP IV Vancomycin #42


    -8/9 SP Ertapenem #14


    -7/27/18 SP Zosyn #4





-f/u Bcx x2, 2d echo


-f/u cx


-Monitor CBC/CMP, temperatures


-trach/peg care


-wound care/prevention per hospital care





Thank you for this consultation. Will continue to follow along with you.





Discussed with RN.





Subjective


Allergies:  


Coded Allergies:  


     AZTREONAM (Verified  Allergy, Unknown, 7/23/18)


Subjective


Tm 100.5


no leukocytosis


bacteremic


CXR improved





Objective


Vital Signs





Last 24 Hour Vital Signs








  Date Time  Temp Pulse Resp B/P (MAP) Pulse Ox O2 Delivery O2 Flow Rate FiO2


 


10/2/18 11:24  115 17     30


 


10/2/18 09:34  109 21     30


 


10/2/18 08:00      Mechanical Ventilator  





      Mechanical Ventilator  


 


10/2/18 08:00        30


 


10/2/18 08:00 98.4 103 18 123/77 (92) 100   





 98.4       


 


10/2/18 07:52  104      


 


10/2/18 07:24  106 20     30


 


10/2/18 05:29  104 17     30


 


10/2/18 04:05  105      


 


10/2/18 04:00      Mechanical Ventilator  





      Mechanical Ventilator  


 


10/2/18 04:00 98.2 107 17 128/83 (98) 100   





 98.2       


 


10/2/18 04:00        30


 


10/2/18 03:28  107 18     30


 


10/2/18 01:23  104 17     30


 


10/2/18 00:46 99.8       


 


10/2/18 00:00      Mechanical Ventilator  





      Mechanical Ventilator  


 


10/2/18 00:00        30


 


10/2/18 00:00 100.5 113 20 134/81 (98) 100   





 100.5       


 


10/2/18 00:00  115      


 


10/1/18 23:46 100.5       


 


10/1/18 22:35  116 18     30


 


10/1/18 21:02  115 17     30


 


10/1/18 20:00        30


 


10/1/18 20:00 98.9 107 20 110/70 (83) 100   





 98.9       


 


10/1/18 20:00      Mechanical Ventilator  





      Mechanical Ventilator  


 


10/1/18 19:53  114      


 


10/1/18 18:50  110 18     30


 


10/1/18 16:30  113 17     30


 


10/1/18 16:00  115      


 


10/1/18 16:00      Mechanical Ventilator  





      Mechanical Ventilator  


 


10/1/18 16:00        30


 


10/1/18 16:00 98.8 115 20 124/77 (93) 100   





 98.8       


 


10/1/18 15:46  109 18     30


 


10/1/18 12:52  111 17     30


 


10/1/18 12:00        30


 


10/1/18 12:00      Mechanical Ventilator  





      Mechanical Ventilator  


 


10/1/18 12:00  104      


 


10/1/18 11:57 99.1 109 18 119/78 (92) 100   





 99.1       








Height (Feet):  5


Height (Inches):  8.00


Weight (Pounds):  160


Objective


HEENT: Conjunctivae were pink. mucous membranes were not dehydrated..  Soft 

palate was free of ulcerations.  Pharynx was clear from exudate or tonsillar


hypertrophy. 


NECK:  Supple.  There was no goiter.  No mass.  No lymphadenopathy.  There


was no JVD.  No bruits.  Carotid upstroke was 2+.


LUNGS:  Clear.


HEART:  PMI was in fifth left intercostal space in midclavicular line.


There was normal S1 and normal S2.  There was no murmur.  No arrhythmia.


No S3.  No S4.  No pericardial rub.


ABDOMEN:  Soft and nontender without organomegaly.  There were no masses


palpable.  Normal bowel sounds without bruits.  There was no guarding.  No


rebound tenderness.  No ascites.  No hernia.  No CVA tenderness.  Liver


span was 8 cm, smooth, and nontender.


EXTREMITIES:  No cyanosis, no clubbing, and no edema.  Extremities were


warm.





Microbiology








 Date/Time


Source Procedure


Growth Status


 


 


 9/30/18 21:15


Blood Blood Culture - Preliminary Resulted


 


 9/30/18 21:15


Blood Blood Culture - Preliminary


NO GROWTH AFTER 24 HOURS Resulted





 9/29/18 18:00


Sputum Gram Stain - Final Resulted


 


 9/29/18 18:00 Sputum Culture - Preliminary


Acinetobacter Baumannii Complx


Gram Negative Bacillus 2 Resulted


 


 9/29/18 12:00


Nasal Nares MRSA Culture - Final


NO METHICILLIN RESISTANT STAPH AUREUS... Complete


 


 9/29/18 12:00


Urine,Clean Catch Urine Culture - Preliminary


Gram Negative Bacillus 1 Resulted





 9/29/18 12:00


Rectum VRE Culture - Final


Enterococcus Faecalis - Vre Complete


 


 9/29/18 12:00


Rectum - Final


NO CARBAPENEM-RESISTANT ENTEROBACTERI... Complete











Laboratory Tests








Test


  10/2/18


03:46 10/2/18


06:38 10/2/18


08:15


 


Urine Color Yellow    


 


Urine Appearance


  Slightly


cloudy 


  


 


 


Urine pH 6 (4.5-8.0)    


 


Urine Specific Gravity


  1.020


(1.005-1.035) 


  


 


 


Urine Protein


  4+ (NEGATIVE)


H 


  


 


 


Urine Glucose (UA)


  2+ (NEGATIVE)


H 


  


 


 


Urine Ketones


  1+ (NEGATIVE)


H 


  


 


 


Urine Blood


  4+ (NEGATIVE)


H 


  


 


 


Urine Nitrite


  Negative


(NEGATIVE) 


  


 


 


Urine Bilirubin


  Negative


(NEGATIVE) 


  


 


 


Urine Urobilinogen


  Normal MG/DL


(0.0-1.0) 


  


 


 


Urine Leukocyte Esterase


  3+ (NEGATIVE)


H 


  


 


 


Urine RBC


  5-10 /HPF (0 -


0)  H 


  


 


 


Urine WBC


  30-40 /HPF (0


- 0)  H 


  


 


 


Urine Squamous Epithelial


Cells Few /LPF


(NONE/OCC) 


  


 


 


Urine Calcium Oxalate Crystals


  Occasional


/LPF (NONE) 


  


 


 


Urine Bacteria


  Few /HPF


(NONE) 


  


 


 


White Blood Count


  


  8.6 K/UL


(4.8-10.8) 


 


 


Red Blood Count


  


  4.14 M/UL


(4.70-6.10)  L 


 


 


Hemoglobin


  


  11.9 G/DL


(14.2-18.0)  L 


 


 


Hematocrit


  


  37.1 %


(42.0-52.0)  L 


 


 


Mean Corpuscular Volume  90 FL (80-99)   


 


Mean Corpuscular Hemoglobin


  


  28.8 PG


(27.0-31.0) 


 


 


Mean Corpuscular Hemoglobin


Concent 


  32.1 G/DL


(32.0-36.0) 


 


 


Red Cell Distribution Width


  


  14.1 %


(11.6-14.8) 


 


 


Platelet Count


  


  268 K/UL


(150-450) 


 


 


Mean Platelet Volume


  


  9.6 FL


(6.5-10.1) 


 


 


Neutrophils (%) (Auto)


  


  68.0 %


(45.0-75.0) 


 


 


Lymphocytes (%) (Auto)


  


  19.9 %


(20.0-45.0)  L 


 


 


Monocytes (%) (Auto)


  


  5.5 %


(1.0-10.0) 


 


 


Eosinophils (%) (Auto)


  


  5.9 %


(0.0-3.0)  H 


 


 


Basophils (%) (Auto)


  


  0.8 %


(0.0-2.0) 


 


 


Erythrocyte Sedimentation Rate


  


  108 MM/HR


(0-20)  H 


 


 


Sodium Level


  


  147 MMOL/L


(136-145)  H 


 


 


Potassium Level


  


  3.4 MMOL/L


(3.5-5.1)  L 


 


 


Chloride Level


  


  113 MMOL/L


()  H 


 


 


Carbon Dioxide Level


  


  22 MMOL/L


(21-32) 


 


 


Anion Gap


  


  12 mmol/L


(5-15) 


 


 


Blood Urea Nitrogen


  


  19 mg/dL


(7-18)  H 


 


 


Creatinine


  


  1.4 MG/DL


(0.55-1.30)  H 


 


 


Estimat Glomerular Filtration


Rate 


  > 60 mL/min


(>60) 


 


 


Glucose Level


  


  231 MG/DL


()  H 


 


 


Calcium Level


  


  10.2 MG/DL


(8.5-10.1)  H 


 


 


Phosphorus Level


  


  2.1 MG/DL


(2.5-4.9)  L 


 


 


Magnesium Level


  


  2.1 MG/DL


(1.8-2.4) 


 


 


Iron Level


  


  48 ug/dL


()  L 


 


 


Total Iron Binding Capacity


  


  186 ug/dL


(250-450)  L 


 


 


Percent Iron Saturation  26 % (15-50)   


 


Unsaturated Iron Binding


  


  138 ug/dL


(112-346) 


 


 


Ferritin


  


  1371 NG/ML


(8-388)  H 


 


 


Total Bilirubin


  


  0.3 MG/DL


(0.2-1.0) 


 


 


Aspartate Amino Transf


(AST/SGOT) 


  24 U/L (15-37)


  


 


 


Alanine Aminotransferase


(ALT/SGPT) 


  24 U/L (12-78)


  


 


 


Alkaline Phosphatase


  


  87 U/L


() 


 


 


C-Reactive Protein,


Quantitative 


  7.7 mg/dL


(0.00-0.90)  H 


 


 


Total Protein


  


  7.6 G/DL


(6.4-8.2) 


 


 


Albumin


  


  2.6 G/DL


(3.4-5.0)  L 


 


 


Globulin  5.0 g/dL   


 


Albumin/Globulin Ratio


  


  0.5 (1.0-2.7)


L 


 


 


Vitamin B12 Level


  


  1644 PG/ML


(193-986)  H 


 


 


Thyroid Stimulating Hormone


(TSH) 


  1.643 uiU/mL


(0.358-3.740) 


 


 


Arterial Blood pH


  


  


  7.453


(7.350-7.450)


 


Arterial Blood Partial


Pressure CO2 


  


  29.8 mmHg


(35.0-45.0)  L


 


Arterial Blood Partial


Pressure O2 


  


  126.5 mmHg


(75.0-100.0)  H


 


Arterial Blood HCO3


  


  


  20.4 mmol/L


(22.0-26.0)  L


 


Arterial Blood Oxygen


Saturation 


  


  98.1 %


()


 


Arterial Blood Base Excess   -2.6 (-2-2)  L


 


Arsh Test   Positive  











Current Medications








 Medications


  (Trade)  Dose


 Ordered  Sig/Jan


 Route


 PRN Reason  Start Time


 Stop Time Status Last Admin


Dose Admin


 


 Acetaminophen


  (Tylenol)  650 mg  Q4H  PRN


 ORAL


 FEVER  9/29/18 15:05


 10/29/18 15:04  10/1/18 23:46


 


 


 Albuterol/


 Ipratropium


  (Albuterol/


 Ipratropium)  3 ml  Q4H  PRN


 HHN


 Shortness of Breath  9/29/18 15:04


 10/4/18 15:03   


 


 


 Ascorbic Acid


  (Vitamin C)  500 mg  DAILY


 GT


   10/1/18 09:00


 10/31/18 08:59  10/2/18 08:22


 


 


 Baclofen


  (Lioresal)  10 mg  THREE TIMES A  DAY


 GT


   9/29/18 18:00


 10/29/18 17:59  10/2/18 08:21


 


 


 Dextrose


  (Dextrose 50%)  25 ml  Q30M  PRN


 IV


 Hypoglycemia  9/29/18 15:12


 10/29/18 15:11   


 


 


 Heparin Sodium


  (Porcine)


  (Heparin 5000


 units/ml)  5,000 units  EVERY 12  HOURS


 SUBQ


   9/29/18 21:00


 10/29/18 20:59  10/2/18 08:23


 


 


 Insulin Aspart


  (NovoLOG)    Q6HR


 SUBQ


   9/30/18 00:00


 10/29/18 16:29  10/2/18 06:06


 


 


 Levetiracetam


  (Keppra)  1,000 mg  BID


 GT


   9/29/18 18:00


 10/29/18 17:59  10/2/18 08:21


 


 


 Lorazepam


  (Ativan 2mg/ml


 1ml)  2 mg  Q2H  PRN


 IV


 For Anxiety  9/29/18 15:11


 10/6/18 15:10   


 


 


 Meropenem 1 gm/


 Sodium Chloride  55 ml @ 


 110 mls/hr  Q8HR


 IVPB


   10/1/18 14:00


 10/6/18 13:59  10/2/18 06:08


 


 


 Morphine Sulfate


  (Morphine


 Sulfate)  4 mg  Q4H  PRN


 IVP


 Severe Pain (Pain Scale 7-10)  9/29/18 15:08


 10/6/18 15:07   


 


 


 Ondansetron HCl


  (Zofran)  4 mg  Q6H  PRN


 IVP


 Nausea & Vomiting  9/29/18 15:05


 10/29/18 15:04   


 


 


 Pantoprazole


  (Protonix)  40 mg  DAILY


 IV


   9/30/18 09:00


 10/30/18 08:59  10/2/18 08:22


 


 


 Polyethylene


 Glycol


  (Miralax)  17 gm  DAILYPRN  PRN


 GT


 Constipation  9/29/18 15:45


 10/29/18 15:04   


 


 


 Potassium


 Chloride 40 meq/


 Dextrose/Sodium


 Chloride  1,020 ml @ 


 50 mls/hr  [id number]


 IV


   10/3/18 13:00


 10/30/18 12:59   


 


 


 Sitagliptin


 Phosphate


  (Januvia)  50 mg  DAILY


 GT


   9/30/18 09:00


 10/30/18 08:59  10/2/18 08:22


 


 


 Vancomycin HCl


  (Rx Monitoring


 Vancomycin)  1 ea  DAILY  PRN


 MISC


 PER RX  9/29/18 15:30


 10/29/18 15:29   


 


 


 Vitamin A/Vitamin


 D


  (A & D Oint)  1 applic  Q12H


 TOPIC


   9/29/18 21:00


 10/29/18 20:59  10/2/18 08:22


 

















Joy Love M.D. Oct 2, 2018 11:41

## 2018-10-02 NOTE — GENERAL PROGRESS NOTE
Assessment/Plan


Assessment/Plan





# Leukocytosis - sepsis with elevated temperature of 103 degrees Fahrenheit.  

Had uti v bacteremia (CoNS) on abx, has improved. had infection on prior 

admission.


--> Pt on antibiotics, has received a dose of merop and currently is on 

vancomycin q.12 h. 


--> Appreciate Infectious Disease recs


--> 2D echo per ID performed on 8/1: No discrete vegetations seen, however SBE 

may not be excluded by transthoracic 2-D echo.


--> CURRENT WBC of 7.9, wnl 


--> Currently afebrile 


# Anemia of chronic disease. No hemolysis, peripheral smear reviewed, ferritin 

was elevated.


--> Continue to closely monitor


--> Hgb goal >7


--> CURRENT Hgb 13.1--> hemoconcentration


--> ordered anemia panel, retic, iron panel


# Acute kidney injury.  


--> Currently on IV fluids, IV bolus given to see if the patient responds along 

with IV pressor as needed.  


--> Closely monitor with Nephrology team.


# Septic shock, use antibiotic per ID services.


--> potential uti, on abx and bacteremia on abx


# Respiratory failure, status post tracheostomy, on mechanical ventilation per 

Dr. Pena


--> Remains on vent/trach


--> 8/13 CXR: Unchanged left pleural effusion versus scarring, over 4 days


# Hypercalcemia. Monitor PTH and calcium level.





The time the note was entered does not necessarily correspond to the time the 

patient was seen.





Greatly appreciate consultation!





Subjective


HEENT:  Reports: no symptoms


Cardiovascular:  Reports: no symptoms


Respiratory:  Reports: no symptoms


Gastrointestinal/Abdominal:  Reports: no symptoms


Genitourinary:  Reports: no symptoms


Neurologic/Psychiatric:  Reports: no symptoms


Endocrine:  Reports: no symptoms


Hematologic/Lymphatic:  Reports: no symptoms


Allergies:  


Coded Allergies:  


     AZTREONAM (Verified  Allergy, Unknown, 7/23/18)


Subjective


no events noted, remains tachycardic





Objective





Last 24 Hour Vital Signs








  Date Time  Temp Pulse Resp B/P (MAP) Pulse Ox O2 Delivery O2 Flow Rate FiO2


 


10/2/18 05:29  104 17     30


 


10/2/18 04:05  105      


 


10/2/18 04:00      Mechanical Ventilator  





      Mechanical Ventilator  


 


10/2/18 04:00 98.2 107 17 128/83 (98) 100   





 98.2       


 


10/2/18 04:00        30


 


10/2/18 03:28  107 18     30


 


10/2/18 01:23  104 17     30


 


10/2/18 00:46 99.8       


 


10/2/18 00:00      Mechanical Ventilator  





      Mechanical Ventilator  


 


10/2/18 00:00        30


 


10/2/18 00:00 100.5 113 20 134/81 (98) 100   





 100.5       


 


10/2/18 00:00  115      


 


10/1/18 23:46 100.5       


 


10/1/18 22:35  116 18     30


 


10/1/18 21:02  115 17     30


 


10/1/18 20:00        30


 


10/1/18 20:00 98.9 107 20 110/70 (83) 100   





 98.9       


 


10/1/18 20:00      Mechanical Ventilator  





      Mechanical Ventilator  


 


10/1/18 19:53  114      


 


10/1/18 18:50  110 18     30


 


10/1/18 16:30  113 17     30


 


10/1/18 16:00  115      


 


10/1/18 16:00      Mechanical Ventilator  





      Mechanical Ventilator  


 


10/1/18 16:00        30


 


10/1/18 16:00 98.8 115 20 124/77 (93) 100   





 98.8       


 


10/1/18 15:46  109 18     30


 


10/1/18 12:52  111 17     30


 


10/1/18 12:00        30


 


10/1/18 12:00      Mechanical Ventilator  





      Mechanical Ventilator  


 


10/1/18 12:00  104      


 


10/1/18 11:57 99.1 109 18 119/78 (92) 100   





 99.1       


 


10/1/18 11:11  107 16     30


 


10/1/18 09:53  112 17     30


 


10/1/18 08:00        30


 


10/1/18 08:00  121      


 


10/1/18 08:00 99.3 109 18 118/79 (92) 100   





 99.3       


 


10/1/18 08:00      Mechanical Ventilator  





      Mechanical Ventilator  


 


10/1/18 07:47  113 27     30

















Intake and Output  


 


 10/1/18 10/2/18





 19:00 07:00


 


Intake Total 990 ml 1190 ml


 


Output Total 600 ml 850 ml


 


Balance 390 ml 340 ml


 


  


 


Intake Free Water 220 ml 


 


IV Total 50 ml 530 ml


 


Tube Feeding 720 ml 660 ml


 


Output Urine Total 500 ml 500 ml


 


Stool Total 100 ml 350 ml








Laboratory Tests


10/1/18 07:06: 


White Blood Count 10.1, Red Blood Count 3.96L, Hemoglobin 11.3L, Hematocrit 

35.9L, Mean Corpuscular Volume 91, Mean Corpuscular Hemoglobin 28.6, Mean 

Corpuscular Hemoglobin Concent 31.5L, Red Cell Distribution Width 14.1, 

Platelet Count 263, Mean Platelet Volume 10.3H, Neutrophils (%) (Auto) 73.0, 

Lymphocytes (%) (Auto) 16.1L, Monocytes (%) (Auto) 6.3, Eosinophils (%) (Auto) 

4.0H, Basophils (%) (Auto) 0.6, Sodium Level 137, Potassium Level 2.9L, 

Chloride Level 105, Carbon Dioxide Level 21, Anion Gap 12, Blood Urea Nitrogen 

21H, Creatinine 1.4H, Estimat Glomerular Filtration Rate > 60, Glucose Level 678

#*H, Calcium Level 9.2, Calcium (Send out) [Pending], Parathyroid Hormone (

Intact) [Pending]


10/1/18 07:59: 


Arterial Blood pH 7.471H, Arterial Blood Partial Pressure CO2 26.9L, Arterial 

Blood Partial Pressure O2 124.3H, Arterial Blood HCO3 19.2L, Arterial Blood 

Oxygen Saturation 98.1, Arterial Blood Base Excess -3.3L, Arsh Test Positive


10/1/18 10:00: 


Sodium Level 147#H, Potassium Level 3.4L, Chloride Level 113H, Carbon Dioxide 

Level 22, Anion Gap 12, Blood Urea Nitrogen 23H, Creatinine 1.3, Estimat 

Glomerular Filtration Rate > 60, Glucose Level 231#H, Calcium Level 10.0


Height (Feet):  5


Height (Inches):  8.00


Weight (Pounds):  160


General Appearance:  no apparent distress


EENT:  TMs normal


Neck:  supple


Cardiovascular:  regular rhythm


Respiratory/Chest:  no respiratory distress, other - trach/vent


Abdomen:  non tender


Extremities:  non-tender


Edema:  no edema noted Leg (R), no edema noted Pedal (L)


Neurologic:  alert


Skin:  warm/dry











Tejas Gerber MD Oct 2, 2018 07:01

## 2018-10-02 NOTE — PULMONOLGY CRITICAL CARE NOTE
Critical Care - Asmt/Plan


Problems:  


(1) Acute on chronic respiratory failure


(2) Sepsis


(3) UTI (urinary tract infection)


(4) Diabetes mellitus


(5) Vegetative state


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


Respiratory:  monitor respiratory rate, adjust FIO2


Cardiac:  continue to monitor HR/BP


Renal:  F/U I&O, keep IV fluid


Infectious Disease:  check cultures


Gastrointestinal:  continue feedings/current rate


Endocrine:  monitor blood sugar


Hematologic:  monitor H/H, transfuse if hgb<8.5


Neurologic:  PRN Ativan, keep patient comfortable


Affect:  PRN ativan


Prophylaxis:  Protonix


Disposition:  keep in ICU


Notes Reviewed:  internist, cardio


Discussed with:  nurses, consultants, 





Critical Care - Objective





Last 24 Hour Vital Signs








  Date Time  Temp Pulse Resp B/P (MAP) Pulse Ox O2 Delivery O2 Flow Rate FiO2


 


10/2/18 09:34  109 21     30


 


10/2/18 08:00      Mechanical Ventilator  





      Mechanical Ventilator  


 


10/2/18 08:00        30


 


10/2/18 08:00 98.4 103 18 123/77 (92) 100   





 98.4       


 


10/2/18 07:52  104      


 


10/2/18 07:24  106 20     30


 


10/2/18 05:29  104 17     30


 


10/2/18 04:05  105      


 


10/2/18 04:00      Mechanical Ventilator  





      Mechanical Ventilator  


 


10/2/18 04:00 98.2 107 17 128/83 (98) 100   





 98.2       


 


10/2/18 04:00        30


 


10/2/18 03:28  107 18     30


 


10/2/18 01:23  104 17     30


 


10/2/18 00:46 99.8       


 


10/2/18 00:00      Mechanical Ventilator  





      Mechanical Ventilator  


 


10/2/18 00:00        30


 


10/2/18 00:00 100.5 113 20 134/81 (98) 100   





 100.5       


 


10/2/18 00:00  115      


 


10/1/18 23:46 100.5       


 


10/1/18 22:35  116 18     30


 


10/1/18 21:02  115 17     30


 


10/1/18 20:00        30


 


10/1/18 20:00 98.9 107 20 110/70 (83) 100   





 98.9       


 


10/1/18 20:00      Mechanical Ventilator  





      Mechanical Ventilator  


 


10/1/18 19:53  114      


 


10/1/18 18:50  110 18     30


 


10/1/18 16:30  113 17     30


 


10/1/18 16:00  115      


 


10/1/18 16:00      Mechanical Ventilator  





      Mechanical Ventilator  


 


10/1/18 16:00        30


 


10/1/18 16:00 98.8 115 20 124/77 (93) 100   





 98.8       


 


10/1/18 15:46  109 18     30


 


10/1/18 12:52  111 17     30


 


10/1/18 12:00        30


 


10/1/18 12:00      Mechanical Ventilator  





      Mechanical Ventilator  


 


10/1/18 12:00  104      


 


10/1/18 11:57 99.1 109 18 119/78 (92) 100   





 99.1       








Status:  obtunded


Condition:  critical


HEENT:  atraumatic


Lungs:  clear


Heart:  HR/BP stable, HR/BP unstable


Abdomen:  soft, active bowel sounds


Extremities:  no C/C/E, edema


Micro:





Microbiology








 Date/Time


Source Procedure


Growth Status


 


 


 9/30/18 21:15


Blood Blood Culture - Preliminary


NO GROWTH AFTER 24 HOURS Resulted


 


 9/30/18 21:15


Blood Blood Culture - Preliminary


NO GROWTH AFTER 24 HOURS Resulted


 


 9/29/18 11:15


Blood Blood Culture - Preliminary


Staphylococcus Sp Coag Neg Resulted





 9/29/18 18:00


Sputum Gram Stain - Final Resulted


 


 9/29/18 18:00 Sputum Culture - Preliminary


Acinetobacter Baumannii Complx


Gram Negative Bacillus 2 Resulted


 


 9/29/18 12:00


Nasal Nares MRSA Culture - Final


NO METHICILLIN RESISTANT STAPH AUREUS... Complete


 


 9/29/18 12:00


Urine,Clean Catch Urine Culture - Preliminary


Gram Negative Bacillus 1 Resulted





 9/29/18 12:00


Rectum VRE Culture - Final


Enterococcus Faecalis - Vre Complete


 


 9/29/18 12:00


Rectum - Final


NO CARBAPENEM-RESISTANT ENTEROBACTERI... Complete








Accucheck:  266





Critical Care - Subjective


ROS Limited/Unobtainable:  Yes


Condition:  critical


EKG Rhythm:  Sinus Rhythm


FI02:  30


Vent Support Breath Rate:  16


Vent Support Mode:  AC


Vent Tidal Volume:  600


Sputum Amount:  Moderate


PEEP:  5.0


PIP:  28


Tube Feeding Amount:  60


I&O:











Intake and Output  


 


 10/1/18 10/2/18





 19:00 07:00


 


Intake Total 990 ml 1355 ml


 


Output Total 600 ml 850 ml


 


Balance 390 ml 505 ml


 


  


 


Intake Free Water 220 ml 


 


IV Total 50 ml 635 ml


 


Tube Feeding 720 ml 720 ml


 


Output Urine Total 500 ml 500 ml


 


Stool Total 100 ml 350 ml








CXR:


no change


Labs:





Laboratory Tests








Test


  10/2/18


03:46 10/2/18


06:38 10/2/18


08:15


 


Urine Color Yellow    


 


Urine Appearance


  Slightly


cloudy 


  


 


 


Urine pH 6 (4.5-8.0)    


 


Urine Specific Gravity


  1.020


(1.005-1.035) 


  


 


 


Urine Protein


  4+ (NEGATIVE)


H 


  


 


 


Urine Glucose (UA)


  2+ (NEGATIVE)


H 


  


 


 


Urine Ketones


  1+ (NEGATIVE)


H 


  


 


 


Urine Blood


  4+ (NEGATIVE)


H 


  


 


 


Urine Nitrite


  Negative


(NEGATIVE) 


  


 


 


Urine Bilirubin


  Negative


(NEGATIVE) 


  


 


 


Urine Urobilinogen


  Normal MG/DL


(0.0-1.0) 


  


 


 


Urine Leukocyte Esterase


  3+ (NEGATIVE)


H 


  


 


 


Urine RBC


  5-10 /HPF (0 -


0)  H 


  


 


 


Urine WBC


  30-40 /HPF (0


- 0)  H 


  


 


 


Urine Squamous Epithelial


Cells Few /LPF


(NONE/OCC) 


  


 


 


Urine Calcium Oxalate Crystals


  Occasional


/LPF (NONE) 


  


 


 


Urine Bacteria


  Few /HPF


(NONE) 


  


 


 


White Blood Count


  


  8.6 K/UL


(4.8-10.8) 


 


 


Red Blood Count


  


  4.14 M/UL


(4.70-6.10)  L 


 


 


Hemoglobin


  


  11.9 G/DL


(14.2-18.0)  L 


 


 


Hematocrit


  


  37.1 %


(42.0-52.0)  L 


 


 


Mean Corpuscular Volume  90 FL (80-99)   


 


Mean Corpuscular Hemoglobin


  


  28.8 PG


(27.0-31.0) 


 


 


Mean Corpuscular Hemoglobin


Concent 


  32.1 G/DL


(32.0-36.0) 


 


 


Red Cell Distribution Width


  


  14.1 %


(11.6-14.8) 


 


 


Platelet Count


  


  268 K/UL


(150-450) 


 


 


Mean Platelet Volume


  


  9.6 FL


(6.5-10.1) 


 


 


Neutrophils (%) (Auto)


  


  68.0 %


(45.0-75.0) 


 


 


Lymphocytes (%) (Auto)


  


  19.9 %


(20.0-45.0)  L 


 


 


Monocytes (%) (Auto)


  


  5.5 %


(1.0-10.0) 


 


 


Eosinophils (%) (Auto)


  


  5.9 %


(0.0-3.0)  H 


 


 


Basophils (%) (Auto)


  


  0.8 %


(0.0-2.0) 


 


 


Erythrocyte Sedimentation Rate


  


  108 MM/HR


(0-20)  H 


 


 


Sodium Level


  


  147 MMOL/L


(136-145)  H 


 


 


Potassium Level


  


  3.4 MMOL/L


(3.5-5.1)  L 


 


 


Chloride Level


  


  113 MMOL/L


()  H 


 


 


Carbon Dioxide Level


  


  22 MMOL/L


(21-32) 


 


 


Anion Gap


  


  12 mmol/L


(5-15) 


 


 


Blood Urea Nitrogen


  


  19 mg/dL


(7-18)  H 


 


 


Creatinine


  


  1.4 MG/DL


(0.55-1.30)  H 


 


 


Estimat Glomerular Filtration


Rate 


  > 60 mL/min


(>60) 


 


 


Glucose Level


  


  231 MG/DL


()  H 


 


 


Calcium Level


  


  10.2 MG/DL


(8.5-10.1)  H 


 


 


Phosphorus Level


  


  2.1 MG/DL


(2.5-4.9)  L 


 


 


Magnesium Level


  


  2.1 MG/DL


(1.8-2.4) 


 


 


Iron Level


  


  48 ug/dL


()  L 


 


 


Total Iron Binding Capacity


  


  186 ug/dL


(250-450)  L 


 


 


Percent Iron Saturation  26 % (15-50)   


 


Unsaturated Iron Binding


  


  138 ug/dL


(112-346) 


 


 


Ferritin


  


  1371 NG/ML


(8-388)  H 


 


 


Total Bilirubin


  


  0.3 MG/DL


(0.2-1.0) 


 


 


Aspartate Amino Transf


(AST/SGOT) 


  24 U/L (15-37)


  


 


 


Alanine Aminotransferase


(ALT/SGPT) 


  24 U/L (12-78)


  


 


 


Alkaline Phosphatase


  


  87 U/L


() 


 


 


C-Reactive Protein,


Quantitative 


  7.7 mg/dL


(0.00-0.90)  H 


 


 


Total Protein


  


  7.6 G/DL


(6.4-8.2) 


 


 


Albumin


  


  2.6 G/DL


(3.4-5.0)  L 


 


 


Globulin  5.0 g/dL   


 


Albumin/Globulin Ratio


  


  0.5 (1.0-2.7)


L 


 


 


Vitamin B12 Level


  


  1644 PG/ML


(193-986)  H 


 


 


Thyroid Stimulating Hormone


(TSH) 


  1.643 uiU/mL


(0.358-3.740) 


 


 


Arterial Blood pH


  


  


  7.453


(7.350-7.450)


 


Arterial Blood Partial


Pressure CO2 


  


  29.8 mmHg


(35.0-45.0)  L


 


Arterial Blood Partial


Pressure O2 


  


  126.5 mmHg


(75.0-100.0)  H


 


Arterial Blood HCO3


  


  


  20.4 mmol/L


(22.0-26.0)  L


 


Arterial Blood Oxygen


Saturation 


  


  98.1 %


()


 


Arterial Blood Base Excess   -2.6 (-2-2)  L


 


Arsh Test   Positive  

















Yury Pena MD Oct 2, 2018 11:15

## 2018-10-02 NOTE — CARDIOLOGY REPORT
--------------- APPROVED REPORT --------------





EKG Measurement

Heart [id number]

DC 124P58

[id number]

[id number]

[id number]





Sinus tachycardia

Otherwise normal ECG

## 2018-10-02 NOTE — DIAGNOSTIC IMAGING REPORT
Indication: Dyspnea

 

Technique: One view of the chest

 

Comparison: 10/1/2018

 

Findings: Tracheostomy remains. Right lung and pleural spaces remain clear. There is

evidence of pleural fluid on the left, but this appears decreased from the previous

exam. Retrocardiac consolidation also appears slightly improved. The heart size is

normal

 

Impression: Improvement of persistent left pleural effusion and retrocardiac

consolidation, over one day

## 2018-10-02 NOTE — PROGRESS NOTE
DATE:  10/01/2018



"NOTE:  POOR AUDIO QUALITY"



SUBJECTIVE:  The patient's eyes were closed with no eye contact.  He is not

awake or alert.  Febrile with persistent tachycardia.



PHYSICAL EXAMINATION:

VITAL SIGNS:  Blood pressure 110/78, pulse is 115, respirations of 20, and

temperature of 98.5 degrees.

HEENT:  Eyes were normal.  ENT, mucous membranes were moist and intact.

NECK:  Supple with no JVD without lymph nodes.  Tracheostomy site is

clean.

LUNGS:  Clear without rhonchi, rales, or wheezing.  Secretions are small,

thin, and gray.

HEART:  Normal sounds with regular beats.  There is no S3, S4, or

pericardial rub.

ABDOMEN:  Soft and nontender with normal bowel sounds.  Gastrostomy site is

clean.

EXTREMITIES:  Warm without cyanosis, clubbing, or edema.



LABORATORY AND DIAGNOSTIC DATA:  His hemoglobin is 11.3, hematocrit 35.9

with MCV of 91, WBC of 10.1, and platelet is 263,000.  His BUN and

creatinine are 23 and 1.3 respectively.  His sodium is 147, potassium 3.4,

chloride _____, CO2 is 22, and his calcium is 10.  _____.  His sputum

culture grew _____.  His urine culture grew gram-negative rods.  Chest

x-ray is pending in the computer _____ will be obtained.



IMPRESSION:  The patient is progressively improving.  Since admission, he

is febrile and tachycardic _____ 100 and respiratory rate is 16.  Repeat

laboratory tests will be done in morning.









  ______________________________________________

  Etienne Bloom M.D.





DR:  JOSÉ MIGUEL

D:  10/01/2018 22:48

T:  10/02/2018 05:14

JOB#:  [id number]

CC:

## 2018-10-03 VITALS — DIASTOLIC BLOOD PRESSURE: 93 MMHG | SYSTOLIC BLOOD PRESSURE: 118 MMHG

## 2018-10-03 VITALS — DIASTOLIC BLOOD PRESSURE: 90 MMHG | SYSTOLIC BLOOD PRESSURE: 117 MMHG

## 2018-10-03 VITALS — SYSTOLIC BLOOD PRESSURE: 121 MMHG | DIASTOLIC BLOOD PRESSURE: 93 MMHG

## 2018-10-03 VITALS — DIASTOLIC BLOOD PRESSURE: 80 MMHG | SYSTOLIC BLOOD PRESSURE: 120 MMHG

## 2018-10-03 VITALS — DIASTOLIC BLOOD PRESSURE: 94 MMHG | SYSTOLIC BLOOD PRESSURE: 139 MMHG

## 2018-10-03 VITALS — DIASTOLIC BLOOD PRESSURE: 67 MMHG | SYSTOLIC BLOOD PRESSURE: 111 MMHG

## 2018-10-03 LAB
ADD MANUAL DIFF: NO
ALBUMIN SERPL-MCNC: 2.5 G/DL (ref 3.4–5)
ALBUMIN/GLOB SERPL: 0.5 {RATIO} (ref 1–2.7)
ALP SERPL-CCNC: 90 U/L (ref 46–116)
ALT SERPL-CCNC: 23 U/L (ref 12–78)
ANION GAP SERPL CALC-SCNC: 11 MMOL/L (ref 5–15)
AST SERPL-CCNC: 29 U/L (ref 15–37)
BASOPHILS NFR BLD AUTO: 0.9 % (ref 0–2)
BILIRUB SERPL-MCNC: 0.3 MG/DL (ref 0.2–1)
BUN SERPL-MCNC: 19 MG/DL (ref 7–18)
CALCIUM SERPL-MCNC: 10.2 MG/DL (ref 8.5–10.1)
CHLORIDE SERPL-SCNC: 110 MMOL/L (ref 98–107)
CO2 SERPL-SCNC: 22 MMOL/L (ref 21–32)
CREAT SERPL-MCNC: 1.3 MG/DL (ref 0.55–1.3)
EOSINOPHIL NFR BLD AUTO: 5.1 % (ref 0–3)
ERYTHROCYTE [DISTWIDTH] IN BLOOD BY AUTOMATED COUNT: 14 % (ref 11.6–14.8)
GLOBULIN SER-MCNC: 5.2 G/DL
HCT VFR BLD CALC: 38.4 % (ref 42–52)
HGB BLD-MCNC: 11.5 G/DL (ref 14.2–18)
LYMPHOCYTES NFR BLD AUTO: 22.5 % (ref 20–45)
MCV RBC AUTO: 91 FL (ref 80–99)
MONOCYTES NFR BLD AUTO: 5.5 % (ref 1–10)
NEUTROPHILS NFR BLD AUTO: 66 % (ref 45–75)
PHOSPHATE SERPL-MCNC: 2.1 MG/DL (ref 2.5–4.9)
PLATELET # BLD: 286 K/UL (ref 150–450)
POTASSIUM SERPL-SCNC: 3.8 MMOL/L (ref 3.5–5.1)
RBC # BLD AUTO: 4.21 M/UL (ref 4.7–6.1)
SODIUM SERPL-SCNC: 143 MMOL/L (ref 136–145)
WBC # BLD AUTO: 10.1 K/UL (ref 4.8–10.8)

## 2018-10-03 RX ADMIN — SODIUM CHLORIDE SCH MLS/HR: 900 INJECTION, SOLUTION INTRAVENOUS at 09:57

## 2018-10-03 RX ADMIN — Medication SCH MG: at 08:54

## 2018-10-03 RX ADMIN — INSULIN ASPART SCH UNITS: 100 INJECTION, SOLUTION INTRAVENOUS; SUBCUTANEOUS at 05:21

## 2018-10-03 RX ADMIN — INSULIN ASPART SCH UNITS: 100 INJECTION, SOLUTION INTRAVENOUS; SUBCUTANEOUS at 23:45

## 2018-10-03 RX ADMIN — Medication SCH MLS/HR: at 15:30

## 2018-10-03 RX ADMIN — LEVETIRACETAM SCH MG: 100 SOLUTION ORAL at 08:54

## 2018-10-03 RX ADMIN — HEPARIN SODIUM SCH UNITS: 5000 INJECTION INTRAVENOUS; SUBCUTANEOUS at 21:40

## 2018-10-03 RX ADMIN — VITAMIN A AND VITAMIN D SCH APPLIC: 929.3 OINTMENT TOPICAL at 08:54

## 2018-10-03 RX ADMIN — HEPARIN SODIUM SCH UNITS: 5000 INJECTION INTRAVENOUS; SUBCUTANEOUS at 08:56

## 2018-10-03 RX ADMIN — LEVETIRACETAM SCH MG: 100 SOLUTION ORAL at 17:49

## 2018-10-03 RX ADMIN — SITAGLIPTIN SCH MG: 25 TABLET, FILM COATED ORAL at 08:54

## 2018-10-03 RX ADMIN — MEROPENEM SCH MLS/HR: 1 INJECTION INTRAVENOUS at 05:20

## 2018-10-03 RX ADMIN — COLISTIMETHATE SODIUM SCH MG: 150 INJECTION, POWDER, LYOPHILIZED, FOR SOLUTION INTRAMUSCULAR; INTRAVENOUS at 21:38

## 2018-10-03 RX ADMIN — COLISTIMETHATE SODIUM SCH MG: 150 INJECTION, POWDER, LYOPHILIZED, FOR SOLUTION INTRAMUSCULAR; INTRAVENOUS at 01:12

## 2018-10-03 RX ADMIN — INSULIN ASPART SCH UNITS: 100 INJECTION, SOLUTION INTRAVENOUS; SUBCUTANEOUS at 17:48

## 2018-10-03 RX ADMIN — INSULIN ASPART SCH UNITS: 100 INJECTION, SOLUTION INTRAVENOUS; SUBCUTANEOUS at 11:55

## 2018-10-03 RX ADMIN — VITAMIN A AND VITAMIN D SCH APPLIC: 929.3 OINTMENT TOPICAL at 21:38

## 2018-10-03 RX ADMIN — MEROPENEM SCH MLS/HR: 1 INJECTION INTRAVENOUS at 13:21

## 2018-10-03 RX ADMIN — PANTOPRAZOLE SODIUM SCH MG: 40 INJECTION, POWDER, FOR SOLUTION INTRAVENOUS at 08:54

## 2018-10-03 NOTE — INFECTIOUS DISEASES PROG NOTE
Assessment/Plan


Assessment/Plan








Assessment:


Severe sepsis 2ry to Gram positive bacteremia, UTI and probable PNA


  -u/a wbc 30-40, nit neg, leuk +2; ucx 10-20k PsA (R CIpro/levo, otherwise S)


  -9/29 Bcx 2/4 CONS; 9/30 1/4 GPC ; 10/1 NTD


  -sp cx ABC (I Tygecicline), CRE K,PNA


  -10/2 CXR : Improvement of persistent left pleural effusion and retrocardiac 

consolidation, over one day


  -CXR:  Small left pleural effusion, not significantly changed. Unchanged 

subsegmental atelectasis versus infiltrate in the left lung base.


  -2d echo: no vegetations





Fever


Leukocytosis, SP


BRAYDON, improving





Hx of UTI


   -7/2018 ucx >100k E. aerogenes (S cefepime, cipro/levo; R Zosyn)





Hx of Bacteremia 7/2018 (PICC line infection, presumed endocarditis)


  -BCX 4/4 E. aerogenes, prob Amp C (S cefepime, cipro/levo; R Zosyn), P. 

mirabilis ESBL (S Zosyn, Ertapenem)


   -repeta 7/26 1/4 CoNS (suspect contaminant)


  -7/28 Staph 





chronic resp failure trach/vent dependant


BPH


GERD


 HTN


DM2


seizure disorder


colostomy


 s/p GT 


hx of VRE and MRSA colonization








Plan:


-Switch empiric IV Vancomycin #5 to Daptomycin for gram positive bacteremia 

pending repeat cultures and to minimize nephrotoxicity


- Switch Meropenem #3 to IV Colistin and add INH colistin for MDR ABC and CRE 

K.pna in sputum cx


    -9/29 SP Cefepime x1


    -9/9 SP IV Vancomycin #42


    -8/9 SP Ertapenem #14


    -7/27/18 SP Zosyn #4





-f/u Bcx x2


-f/u cx


-Monitor CBC/CMP, temperatures


-trach/peg care


-wound care/prevention per hospital care





Thank you for this consultation. Will continue to follow along with you.





Discussed with RN.





Subjective


Allergies:  


Coded Allergies:  


     AZTREONAM (Verified  Allergy, Unknown, 7/23/18)


Subjective


Tm 101.8


tachycardic


no leukocytosis


MDRO in sputum


repeat Bcx NTD





Objective


Vital Signs





Last 24 Hour Vital Signs








  Date Time  Temp Pulse Resp B/P (MAP) Pulse Ox O2 Delivery O2 Flow Rate FiO2


 


10/3/18 15:03  122 18     30


 


10/3/18 14:25  130      


 


10/3/18 13:22 100.6       


 


10/3/18 13:11  123 19     30


 


10/3/18 12:00      Mechanical Ventilator  


 


10/3/18 12:00        30


 


10/3/18 12:00 101.8 129 18 120/80 (93) 100   





 101.8       


 


10/3/18 11:17  132 18     30


 


10/3/18 10:27 101.0       


 


10/3/18 09:57 101.2       


 


10/3/18 09:30  124      


 


10/3/18 09:07  131 21     30


 


10/3/18 08:00        30


 


10/3/18 08:00      Mechanical Ventilator  


 


10/3/18 08:00 101.2 130 22 121/93 (102) 99   





 101.2       


 


10/3/18 07:50  121 19     30


 


10/3/18 04:56  114 17     30


 


10/3/18 04:00        30


 


10/3/18 04:00      Mechanical Ventilator  





      Mechanical Ventilator  


 


10/3/18 04:00 99.5 110 16 118/93 (101) 100   





 99.5       


 


10/3/18 03:39  114      


 


10/3/18 03:00  111 17     30


 


10/3/18 00:45  115 16     30


 


10/3/18 00:00      Mechanical Ventilator  





      Mechanical Ventilator  


 


10/3/18 00:00 100.2 115 16 111/67 (82) 100   





 100.2       


 


10/2/18 23:51 100.6       


 


10/2/18 23:44  115      


 


10/2/18 22:46  116 18     30


 


10/2/18 20:55  114 18     30


 


10/2/18 20:00        30


 


10/2/18 20:00      Mechanical Ventilator  





      Mechanical Ventilator  


 


10/2/18 20:00 100.1 120 16 108/75 (86) 100   





 100.1       


 


10/2/18 19:38  113      


 


10/2/18 19:31  11 19     30


 


10/2/18 16:52  112 22     30


 


10/2/18 16:00        30


 


10/2/18 16:00      Mechanical Ventilator  





      Mechanical Ventilator  


 


10/2/18 16:00  112      


 


10/2/18 16:00 99.9 117 17 118/79 (92) 100   





 99.9       








Height (Feet):  5


Height (Inches):  8.00


Weight (Pounds):  163


Objective


HEENT: Conjunctivae were pink. mucous membranes were not dehydrated..  Soft 

palate was free of ulcerations.  Pharynx was clear from exudate or tonsillar


hypertrophy. 


NECK:  Supple.  There was no goiter.  No mass.  No lymphadenopathy.  There


was no JVD.  No bruits.  Carotid upstroke was 2+.


LUNGS:  Clear.


HEART:  PMI was in fifth left intercostal space in midclavicular line.


There was normal S1 and normal S2.  There was no murmur.  No arrhythmia.


No S3.  No S4.  No pericardial rub.


ABDOMEN:  Soft and nontender without organomegaly.  There were no masses


palpable.  Normal bowel sounds without bruits.  There was no guarding.  No


rebound tenderness.  No ascites.  No hernia.  No CVA tenderness.  Liver


span was 8 cm, smooth, and nontender.


EXTREMITIES:  No cyanosis, no clubbing, and no edema.  Extremities were


warm.





Microbiology








 Date/Time


Source Procedure


Growth Status


 


 


 10/1/18 14:35


Blood Blood Culture - Preliminary


NO GROWTH AFTER 24 HOURS Resulted


 


 10/1/18 14:20


Blood Blood Culture - Preliminary


NO GROWTH AFTER 24 HOURS Resulted


 


 9/30/18 21:15


Blood Blood Culture - Preliminary Resulted


 


 9/30/18 21:15


Blood Blood Culture - Preliminary


NO GROWTH AFTER 48 HOURS Resulted


 


 10/2/18 03:46


Urine,Clean Catch Urine Culture - Preliminary


Gram Negative Bacillus 1 Resulted











Laboratory Tests








Test


  10/3/18


04:00


 


White Blood Count


  10.1 K/UL


(4.8-10.8)


 


Red Blood Count


  4.21 M/UL


(4.70-6.10)  L


 


Hemoglobin


  11.5 G/DL


(14.2-18.0)  L


 


Hematocrit


  38.4 %


(42.0-52.0)  L


 


Mean Corpuscular Volume 91 FL (80-99)  


 


Mean Corpuscular Hemoglobin


  27.4 PG


(27.0-31.0)


 


Mean Corpuscular Hemoglobin


Concent 30.0 G/DL


(32.0-36.0)  L


 


Red Cell Distribution Width


  14.0 %


(11.6-14.8)


 


Platelet Count


  286 K/UL


(150-450)


 


Mean Platelet Volume


  9.4 FL


(6.5-10.1)


 


Neutrophils (%) (Auto)


  66.0 %


(45.0-75.0)


 


Lymphocytes (%) (Auto)


  22.5 %


(20.0-45.0)


 


Monocytes (%) (Auto)


  5.5 %


(1.0-10.0)


 


Eosinophils (%) (Auto)


  5.1 %


(0.0-3.0)  H


 


Basophils (%) (Auto)


  0.9 %


(0.0-2.0)


 


Sodium Level


  143 MMOL/L


(136-145)


 


Potassium Level


  3.8 MMOL/L


(3.5-5.1)


 


Chloride Level


  110 MMOL/L


()  H


 


Carbon Dioxide Level


  22 MMOL/L


(21-32)


 


Anion Gap


  11 mmol/L


(5-15)


 


Blood Urea Nitrogen


  19 mg/dL


(7-18)  H


 


Creatinine


  1.3 MG/DL


(0.55-1.30)


 


Estimat Glomerular Filtration


Rate > 60 mL/min


(>60)


 


Glucose Level


  226 MG/DL


()  H


 


Calcium Level


  10.2 MG/DL


(8.5-10.1)  H


 


Phosphorus Level


  2.1 MG/DL


(2.5-4.9)  L


 


Magnesium Level


  2.1 MG/DL


(1.8-2.4)


 


Total Bilirubin


  0.3 MG/DL


(0.2-1.0)


 


Aspartate Amino Transf


(AST/SGOT) 29 U/L (15-37)


 


 


Alanine Aminotransferase


(ALT/SGPT) 23 U/L (12-78)


 


 


Alkaline Phosphatase


  90 U/L


()


 


Total Protein


  7.7 G/DL


(6.4-8.2)


 


Albumin


  2.5 G/DL


(3.4-5.0)  L


 


Globulin 5.2 g/dL  


 


Albumin/Globulin Ratio


  0.5 (1.0-2.7)


L











Current Medications








 Medications


  (Trade)  Dose


 Ordered  Sig/Jan


 Route


 PRN Reason  Start Time


 Stop Time Status Last Admin


Dose Admin


 


 Acetaminophen


  (Tylenol)  650 mg  Q4H  PRN


 ORAL


 FEVER  9/29/18 15:05


 10/29/18 15:04  10/3/18 13:22


 


 


 Albuterol/


 Ipratropium


  (Albuterol/


 Ipratropium)  3 ml  Q4H  PRN


 HHN


 Shortness of Breath  9/29/18 15:04


 10/4/18 15:03   


 


 


 Ascorbic Acid


  (Vitamin C)  500 mg  DAILY


 GT


   10/1/18 09:00


 10/31/18 08:59  10/3/18 08:54


 


 


 Baclofen


  (Lioresal)  10 mg  THREE TIMES A  DAY


 GT


   9/29/18 18:00


 10/29/18 17:59  10/3/18 13:21


 


 


 Dextrose


  (Dextrose 50%)  25 ml  Q30M  PRN


 IV


 Hypoglycemia  10/3/18 15:15


 11/2/18 15:14   


 


 


 Dextrose


  (Dextrose 50%)  50 ml  Q30M  PRN


 IV


 Hypoglycemia  10/3/18 15:15


 11/2/18 15:14   


 


 


 Heparin Sodium


  (Porcine)


  (Heparin 5000


 units/ml)  5,000 units  EVERY 12  HOURS


 SUBQ


   9/29/18 21:00


 10/29/18 20:59  10/3/18 08:56


 


 


 Insulin Aspart


  (NovoLOG)    Q6HR


 SUBQ


   9/30/18 00:00


 10/29/18 16:29  10/3/18 11:55


 


 


 Levetiracetam


  (Keppra)  1,000 mg  BID


 GT


   9/29/18 18:00


 10/29/18 17:59  10/3/18 08:54


 


 


 Lorazepam


  (Ativan 2mg/ml


 1ml)  2 mg  Q2H  PRN


 IV


 For Anxiety  9/29/18 15:11


 10/6/18 15:10   


 


 


 Meropenem 1 gm/


 Sodium Chloride  55 ml @ 


 110 mls/hr  Q8HR


 IVPB


   10/1/18 14:00


 10/6/18 13:59  10/3/18 13:21


 


 


 Morphine Sulfate


  (Morphine


 Sulfate)  4 mg  Q4H  PRN


 IVP


 Severe Pain (Pain Scale 7-10)  9/29/18 15:08


 10/6/18 15:07   


 


 


 Ondansetron HCl


  (Zofran)  4 mg  Q6H  PRN


 IVP


 Nausea & Vomiting  9/29/18 15:05


 10/29/18 15:04   


 


 


 Pantoprazole


  (Protonix)  40 mg  DAILY


 IV


   9/30/18 09:00


 10/30/18 08:59  10/3/18 08:54


 


 


 Polyethylene


 Glycol


  (Miralax)  17 gm  DAILYPRN  PRN


 GT


 Constipation  9/29/18 15:45


 10/29/18 15:04   


 


 


 Potassium


 Chloride 40 meq/


 Dextrose/Sodium


 Chloride  1,020 ml @ 


 50 mls/hr  [id number]


 IV


   10/2/18 13:30


 11/1/18 13:29  10/3/18 09:57


 


 


 Sitagliptin


 Phosphate


  (Januvia)  50 mg  DAILY


 GT


   9/30/18 09:00


 10/30/18 08:59  10/3/18 08:54


 


 


 Vancomycin HCl


  (Rx Monitoring


 Vancomycin)  1 ea  DAILY  PRN


 MISC


 PER RX  9/29/18 15:30


 10/29/18 15:29   


 


 


 Vancomycin HCl/


 Dextrose  250 ml @ 


 125 mls/hr  Q24H


 IVPB


   10/2/18 15:30


 10/7/18 15:29  10/2/18 15:34


 


 


 Vitamin A/Vitamin


 D


  (A & D Oint)  1 applic  Q12H


 TOPIC


   9/29/18 21:00


 10/29/18 20:59  10/3/18 08:54


 

















Joy Love M.D. Oct 3, 2018 15:54

## 2018-10-03 NOTE — CARDIOLOGY REPORT
--------------- APPROVED REPORT --------------





EXAM: Two-dimensional and M-mode echocardiogram with Doppler and color 

Doppler.



INDICATION

VEGITATION 



M-Mode DIMENSIONS 

IVSd1.2 (0.7-1.1cm)Left Atrium (MM)2.5 (1.6-4.0cm)

LVDd4.2 (3.5-5.6cm)Aortic Root3.8 (2.0-3.7cm)

PWd1.4 (0.7-1.1cm)Aortic Cusp Exc.1.9 (1.5-2.0cm)

IVSs1.3 cm

LVDs2.7 (2.5-4.0cm)

PWs1.4 cm







Normal left ventricular chamber size, systolic function and wall motion  

Left ventricular ejection fraction estimated to be 55-60  %.

Mild to moderate  left ventricular hypertrophy by 2-D.

Small Anterior Echo-free space, may be due to pericardial fat or effusion.

All other cardiac chamber sizes are within normal limits. 

Focal aortic valve sclerosis with adequate cusp excursion.

Thickened mitral valve leaflets with normal excursion.

Mitral annulus and aortic root calcification.

Pulmonic valve not well visualized.

Normal tricuspid valve structure. 

IVC at size 1.6 cm without  physiologic collapse.

No vegitation seen, however SBE may not be excluded by TTE - 2-D echocardiogram .



A  color flow and spectral Doppler study was performed and revealed:

No aortic insufficiency .

Trace mitral regurgitation.

Normal Left ventricular diastolic function.

Trace  tricuspid regurgitation.

Tricuspid  systolic velocities suggests peak right ventricular systolic pressure of  

19mmHg.

## 2018-10-03 NOTE — GENERAL PROGRESS NOTE
Assessment/Plan


Assessment/Plan





# Leukocytosis - sepsis with elevated temperature of 103 degrees Fahrenheit on 

admission. Had uti v bacteremia (CoNS) on abx, has improved. had infection on 

prior admission.


--> Pt on antibiotics, has received a dose of merop and currently is on 

vancomycin q.12 h. 


--> Appreciate Infectious Disease recs


--> 2D echo per ID performed on 8/1: No discrete vegetations seen, however SBE 

may not be excluded by transthoracic 2-D echo.


--> CURRENT WBC of 7.9, wnl 


--> Currently afebrile 


# Anemia of chronic disease. No hemolysis, peripheral smear reviewed, ferritin 

was elevated.


--> Continue to closely monitor


--> Hgb goal >7


--> CURRENT Hgb 13.1--> hemoconcentration


--> retic 1371, % sat 24%


# Acute kidney injury.  


--> Currently on IV fluids, IV bolus given to see if the patient responds along 

with IV pressor as needed.  


--> Closely monitor with Nephrology team.


# Septic shock, use antibiotic per ID services.


--> potential uti, on abx and bacteremia on abx


# Respiratory failure, status post tracheostomy, on mechanical ventilation per 

Dr. Pena


--> Remains on vent/trach


--> 8/13 CXR: Unchanged left pleural effusion versus scarring, over 4 days


# Hypercalcemia. PTH is 37 and Ca++ is elevated


--> monitor for improvement





The time the note was entered does not necessarily correspond to the time the 

patient was seen.





Greatly appreciate consultation!





Subjective


Constitutional:  Denies: no symptoms, chills, diaphoresis, fever, malaise, 

weakness, other


HEENT:  Denies: no symptoms, eye pain, blurred vision, tearing, double vision, 

ear pain, ear discharge, nose pain, nose congestion, throat pain, throat 

swelling, mouth pain, mouth swelling, other


Cardiovascular:  Denies: no symptoms, chest pain, edema, irregular heart rate, 

lightheadedness, palpitations, syncope, other


Respiratory:  Denies: no symptoms, cough, orthopnea, shortness of breath, SOB 

with excertion, SOB at rest, sputum, stridor, wheezing, other


Genitourinary:  Denies: no symptoms, burning, discharge, frequency, flank pain, 

hematuria, incontinence, pain, urgency, other


Neurologic/Psychiatric:  Denies: no symptoms, anxiety, depressed, emotional 

problems, headache, numbness, paresthesia, pre-existing deficit, seizure, 

tingling, tremors, weakness, other


Endocrine:  Denies: no symptoms, excessive sweating, flushing, intolerance to 

cold, intolerance to heat, increased hunger, increased thirst, increased urine, 

unexplained weight gain, unexplained weight loss, other


Hematologic/Lymphatic:  Denies: no symptoms, anemia, easy bleeding, easy 

bruising, other


Allergies:  


Coded Allergies:  


     AZTREONAM (Verified  Allergy, Unknown, 7/23/18)


Subjective


no events noted, remains tachycardic, on abx





Objective





Last 24 Hour Vital Signs








  Date Time  Temp Pulse Resp B/P (MAP) Pulse Ox O2 Delivery O2 Flow Rate FiO2


 


10/3/18 09:57 101.2       


 


10/3/18 09:30  124      


 


10/3/18 09:07  131 21     30


 


10/3/18 07:50  121 19     30


 


10/3/18 04:56  114 17     30


 


10/3/18 04:00        30


 


10/3/18 04:00      Mechanical Ventilator  





      Mechanical Ventilator  


 


10/3/18 04:00 99.5 110 16 118/93 (101) 100   





 99.5       


 


10/3/18 03:39  114      


 


10/3/18 03:00  111 17     30


 


10/3/18 00:45  115 16     30


 


10/3/18 00:21 100.2       


 


10/3/18 00:00      Mechanical Ventilator  





      Mechanical Ventilator  


 


10/3/18 00:00 100.2 115 16 111/67 (82) 100   





 100.2       


 


10/2/18 23:51 100.6       


 


10/2/18 23:44  115      


 


10/2/18 22:46  116 18     30


 


10/2/18 20:55  114 18     30


 


10/2/18 20:00        30


 


10/2/18 20:00      Mechanical Ventilator  





      Mechanical Ventilator  


 


10/2/18 20:00 100.1 120 16 108/75 (86) 100   





 100.1       


 


10/2/18 19:38  113      


 


10/2/18 19:31  11 19     30


 


10/2/18 16:52  112 22     30


 


10/2/18 16:00        30


 


10/2/18 16:00      Mechanical Ventilator  





      Mechanical Ventilator  


 


10/2/18 16:00  112      


 


10/2/18 16:00 99.9 117 17 118/79 (92) 100   





 99.9       


 


10/2/18 15:35  119 19     30


 


10/2/18 15:19 100.2       


 


10/2/18 13:16  119 18     30


 


10/2/18 12:00        30


 


10/2/18 12:00  116      


 


10/2/18 12:00      Mechanical Ventilator  





      Mechanical Ventilator  


 


10/2/18 12:00 99.1 112 18 121/69 (86) 100   





 99.1       


 


10/2/18 11:24  115 17     30

















Intake and Output  


 


 10/2/18 10/3/18





 19:00 07:00


 


Intake Total 1693.8 ml 1490 ml


 


Output Total 930 ml 875 ml


 


Balance 763.8 ml 615 ml


 


  


 


Intake Free Water 260 ml 60 ml


 


IV Total 713.8 ml 710 ml


 


Tube Feeding 720 ml 720 ml


 


Output Urine Total 650 ml 575 ml


 


Stool Total 280 ml 300 ml








Laboratory Tests


10/3/18 04:00: 


White Blood Count 10.1, Red Blood Count 4.21L, Hemoglobin 11.5L, Hematocrit 

38.4L, Mean Corpuscular Volume 91, Mean Corpuscular Hemoglobin 27.4, Mean 

Corpuscular Hemoglobin Concent 30.0L, Red Cell Distribution Width 14.0, 

Platelet Count 286, Mean Platelet Volume 9.4, Neutrophils (%) (Auto) 66.0, 

Lymphocytes (%) (Auto) 22.5, Monocytes (%) (Auto) 5.5, Eosinophils (%) (Auto) 

5.1H, Basophils (%) (Auto) 0.9, Sodium Level 143, Potassium Level 3.8, Chloride 

Level 110H, Carbon Dioxide Level 22, Anion Gap 11, Blood Urea Nitrogen 19H, 

Creatinine 1.3, Estimat Glomerular Filtration Rate > 60, Glucose Level 226H, 

Calcium Level 10.2H, Phosphorus Level 2.1L, Magnesium Level 2.1, Total 

Bilirubin 0.3, Aspartate Amino Transf (AST/SGOT) 29, Alanine Aminotransferase (

ALT/SGPT) 23, Alkaline Phosphatase 90, Total Protein 7.7, Albumin 2.5L, 

Globulin 5.2, Albumin/Globulin Ratio 0.5L


Height (Feet):  5


Height (Inches):  8.00


Weight (Pounds):  163


General Appearance:  no apparent distress


EENT:  TMs normal


Neck:  other - trach/vent


Cardiovascular:  regular rhythm


Respiratory/Chest:  no respiratory distress


Abdomen:  no mass


Extremities:  normal range of motion


Edema:  mild edema


Neurologic:  responsive











Tejas Gerber MD Oct 3, 2018 10:49

## 2018-10-03 NOTE — PULMONOLGY CRITICAL CARE NOTE
Critical Care - Asmt/Plan


Problems:  


(1) Acute on chronic respiratory failure


(2) Sepsis


(3) UTI (urinary tract infection)


(4) Diabetes mellitus


(5) Vegetative state


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


Respiratory:  monitor respiratory rate, adjust FIO2, CXR


Cardiac:  continue pressors, continue to monitor HR/BP


Infectious Disease:  check cultures, other - low grade fever, on imipenem and 

voncomycin


Gastrointestinal:  continue feedings/current rate


Endocrine:  monitor blood sugar, check HgA1C


Neurologic:  PRN Ativan





Critical Care - Objective





Last 24 Hour Vital Signs








  Date Time  Temp Pulse Resp B/P (MAP) Pulse Ox O2 Delivery O2 Flow Rate FiO2


 


10/3/18 09:30  124      


 


10/3/18 09:07  131 21     30


 


10/3/18 07:50  121 19     30


 


10/3/18 04:56  114 17     30


 


10/3/18 04:00        30


 


10/3/18 04:00      Mechanical Ventilator  





      Mechanical Ventilator  


 


10/3/18 04:00 99.5 110 16 118/93 (101) 100   





 99.5       


 


10/3/18 03:39  114      


 


10/3/18 03:00  111 17     30


 


10/3/18 00:45  115 16     30


 


10/3/18 00:21 100.2       


 


10/3/18 00:00      Mechanical Ventilator  





      Mechanical Ventilator  


 


10/3/18 00:00 100.2 115 16 111/67 (82) 100   





 100.2       


 


10/2/18 23:51 100.6       


 


10/2/18 23:44  115      


 


10/2/18 22:46  116 18     30


 


10/2/18 20:55  114 18     30


 


10/2/18 20:00        30


 


10/2/18 20:00      Mechanical Ventilator  





      Mechanical Ventilator  


 


10/2/18 20:00 100.1 120 16 108/75 (86) 100   





 100.1       


 


10/2/18 19:38  113      


 


10/2/18 19:31  11 19     30


 


10/2/18 16:52  112 22     30


 


10/2/18 16:00        30


 


10/2/18 16:00      Mechanical Ventilator  





      Mechanical Ventilator  


 


10/2/18 16:00  112      


 


10/2/18 16:00 99.9 117 17 118/79 (92) 100   





 99.9       


 


10/2/18 15:35  119 19     30


 


10/2/18 15:19 100.2       


 


10/2/18 13:16  119 18     30


 


10/2/18 12:00        30


 


10/2/18 12:00  116      


 


10/2/18 12:00      Mechanical Ventilator  





      Mechanical Ventilator  


 


10/2/18 12:00 99.1 112 18 121/69 (86) 100   





 99.1       


 


10/2/18 11:24  115 17     30








Status:  awake, sedated


Condition:  critical


HEENT:  atraumatic


Neck:  full ROM


Lungs:  clear


Heart:  HR/BP stable


Abdomen:  soft, non-tender


Extremities:  no C/C/E, edema


Micro:





Microbiology








 Date/Time


Source Procedure


Growth Status


 


 


 10/1/18 14:35


Blood Blood Culture - Preliminary


NO GROWTH AFTER 24 HOURS Resulted


 


 10/1/18 14:20


Blood Blood Culture - Preliminary


NO GROWTH AFTER 24 HOURS Resulted


 


 9/30/18 21:15


Blood Blood Culture - Preliminary Resulted


 


 9/30/18 21:15


Blood Blood Culture - Preliminary


NO GROWTH AFTER 48 HOURS Resulted


 


 10/2/18 03:46


Urine,Clean Catch Urine Culture - Preliminary


Gram Negative Bacillus 1 Resulted








Accucheck:  198





Critical Care - Subjective


ROS Limited/Unobtainable:  Yes


EKG Rhythm:  Sinus Rhythm


FI02:  30


Vent Support Breath Rate:  16


Vent Support Mode:  AC


Vent Tidal Volume:  600


Sputum Amount:  Large


PEEP:  5.0


PIP:  30


Tube Feeding Amount:  60


I&O:











Intake and Output  


 


 10/2/18 10/3/18





 19:00 07:00


 


Intake Total 1693.8 ml 1490 ml


 


Output Total 930 ml 875 ml


 


Balance 763.8 ml 615 ml


 


  


 


Intake Free Water 260 ml 60 ml


 


IV Total 713.8 ml 710 ml


 


Tube Feeding 720 ml 720 ml


 


Output Urine Total 650 ml 575 ml


 


Stool Total 280 ml 300 ml








CXR:


improving infiltrate


Labs:





Laboratory Tests








Test


  10/3/18


04:00


 


White Blood Count


  10.1 K/UL


(4.8-10.8)


 


Red Blood Count


  4.21 M/UL


(4.70-6.10)  L


 


Hemoglobin


  11.5 G/DL


(14.2-18.0)  L


 


Hematocrit


  38.4 %


(42.0-52.0)  L


 


Mean Corpuscular Volume 91 FL (80-99)  


 


Mean Corpuscular Hemoglobin


  27.4 PG


(27.0-31.0)


 


Mean Corpuscular Hemoglobin


Concent 30.0 G/DL


(32.0-36.0)  L


 


Red Cell Distribution Width


  14.0 %


(11.6-14.8)


 


Platelet Count


  286 K/UL


(150-450)


 


Mean Platelet Volume


  9.4 FL


(6.5-10.1)


 


Neutrophils (%) (Auto)


  66.0 %


(45.0-75.0)


 


Lymphocytes (%) (Auto)


  22.5 %


(20.0-45.0)


 


Monocytes (%) (Auto)


  5.5 %


(1.0-10.0)


 


Eosinophils (%) (Auto)


  5.1 %


(0.0-3.0)  H


 


Basophils (%) (Auto)


  0.9 %


(0.0-2.0)


 


Sodium Level


  143 MMOL/L


(136-145)


 


Potassium Level


  3.8 MMOL/L


(3.5-5.1)


 


Chloride Level


  110 MMOL/L


()  H


 


Carbon Dioxide Level


  22 MMOL/L


(21-32)


 


Anion Gap


  11 mmol/L


(5-15)


 


Blood Urea Nitrogen


  19 mg/dL


(7-18)  H


 


Creatinine


  1.3 MG/DL


(0.55-1.30)


 


Estimat Glomerular Filtration


Rate > 60 mL/min


(>60)


 


Glucose Level


  226 MG/DL


()  H


 


Calcium Level


  10.2 MG/DL


(8.5-10.1)  H


 


Phosphorus Level


  2.1 MG/DL


(2.5-4.9)  L


 


Magnesium Level


  2.1 MG/DL


(1.8-2.4)


 


Total Bilirubin


  0.3 MG/DL


(0.2-1.0)


 


Aspartate Amino Transf


(AST/SGOT) 29 U/L (15-37)


 


 


Alanine Aminotransferase


(ALT/SGPT) 23 U/L (12-78)


 


 


Alkaline Phosphatase


  90 U/L


()


 


Total Protein


  7.7 G/DL


(6.4-8.2)


 


Albumin


  2.5 G/DL


(3.4-5.0)  L


 


Globulin 5.2 g/dL  


 


Albumin/Globulin Ratio


  0.5 (1.0-2.7)


Yury Winn MD Oct 3, 2018 09:47

## 2018-10-04 VITALS — DIASTOLIC BLOOD PRESSURE: 98 MMHG | SYSTOLIC BLOOD PRESSURE: 137 MMHG

## 2018-10-04 VITALS — DIASTOLIC BLOOD PRESSURE: 84 MMHG | SYSTOLIC BLOOD PRESSURE: 123 MMHG

## 2018-10-04 VITALS — SYSTOLIC BLOOD PRESSURE: 136 MMHG | DIASTOLIC BLOOD PRESSURE: 96 MMHG

## 2018-10-04 VITALS — DIASTOLIC BLOOD PRESSURE: 75 MMHG | SYSTOLIC BLOOD PRESSURE: 101 MMHG

## 2018-10-04 VITALS — SYSTOLIC BLOOD PRESSURE: 103 MMHG | DIASTOLIC BLOOD PRESSURE: 76 MMHG

## 2018-10-04 VITALS — DIASTOLIC BLOOD PRESSURE: 77 MMHG | SYSTOLIC BLOOD PRESSURE: 111 MMHG

## 2018-10-04 LAB
ADD MANUAL DIFF: YES
ANION GAP SERPL CALC-SCNC: 11 MMOL/L (ref 5–15)
BUN SERPL-MCNC: 18 MG/DL (ref 7–18)
CALCIUM SERPL-MCNC: 10.1 MG/DL (ref 8.5–10.1)
CHLORIDE SERPL-SCNC: 109 MMOL/L (ref 98–107)
CK SERPL-CCNC: 439 U/L (ref 26–308)
CO2 SERPL-SCNC: 22 MMOL/L (ref 21–32)
CREAT SERPL-MCNC: 1.3 MG/DL (ref 0.55–1.3)
ERYTHROCYTE [DISTWIDTH] IN BLOOD BY AUTOMATED COUNT: 14.4 % (ref 11.6–14.8)
HCT VFR BLD CALC: 44.2 % (ref 42–52)
HGB BLD-MCNC: 14 G/DL (ref 14.2–18)
MCV RBC AUTO: 88 FL (ref 80–99)
PLATELET # BLD: 336 K/UL (ref 150–450)
POTASSIUM SERPL-SCNC: 4 MMOL/L (ref 3.5–5.1)
RBC # BLD AUTO: 5.02 M/UL (ref 4.7–6.1)
SODIUM SERPL-SCNC: 142 MMOL/L (ref 136–145)
WBC # BLD AUTO: 21.5 K/UL (ref 4.8–10.8)

## 2018-10-04 RX ADMIN — SITAGLIPTIN SCH MG: 25 TABLET, FILM COATED ORAL at 09:08

## 2018-10-04 RX ADMIN — INSULIN ASPART SCH UNITS: 100 INJECTION, SOLUTION INTRAVENOUS; SUBCUTANEOUS at 06:02

## 2018-10-04 RX ADMIN — COLISTIMETHATE SODIUM SCH MG: 150 INJECTION, POWDER, LYOPHILIZED, FOR SOLUTION INTRAMUSCULAR; INTRAVENOUS at 09:14

## 2018-10-04 RX ADMIN — SODIUM CHLORIDE SCH MLS/HR: 900 INJECTION, SOLUTION INTRAVENOUS at 20:53

## 2018-10-04 RX ADMIN — COLISTIMETHATE SODIUM SCH MG: 150 INJECTION, POWDER, LYOPHILIZED, FOR SOLUTION INTRAMUSCULAR; INTRAVENOUS at 10:01

## 2018-10-04 RX ADMIN — COLISTIMETHATE SODIUM SCH MG: 150 INJECTION, POWDER, LYOPHILIZED, FOR SOLUTION INTRAMUSCULAR; INTRAVENOUS at 20:52

## 2018-10-04 RX ADMIN — HEPARIN SODIUM SCH UNITS: 5000 INJECTION INTRAVENOUS; SUBCUTANEOUS at 09:09

## 2018-10-04 RX ADMIN — INSULIN ASPART SCH UNITS: 100 INJECTION, SOLUTION INTRAVENOUS; SUBCUTANEOUS at 18:00

## 2018-10-04 RX ADMIN — PANTOPRAZOLE SODIUM SCH MG: 40 INJECTION, POWDER, FOR SOLUTION INTRAVENOUS at 09:08

## 2018-10-04 RX ADMIN — INSULIN ASPART SCH UNITS: 100 INJECTION, SOLUTION INTRAVENOUS; SUBCUTANEOUS at 23:28

## 2018-10-04 RX ADMIN — VITAMIN A AND VITAMIN D SCH APPLIC: 929.3 OINTMENT TOPICAL at 09:08

## 2018-10-04 RX ADMIN — SODIUM CHLORIDE SCH MLS/HR: 900 INJECTION, SOLUTION INTRAVENOUS at 06:18

## 2018-10-04 RX ADMIN — LEVETIRACETAM SCH MG: 100 SOLUTION ORAL at 09:07

## 2018-10-04 RX ADMIN — DAPTOMYCIN SCH MLS/HR: 500 INJECTION, POWDER, LYOPHILIZED, FOR SOLUTION INTRAVENOUS at 20:00

## 2018-10-04 RX ADMIN — VITAMIN A AND VITAMIN D SCH APPLIC: 929.3 OINTMENT TOPICAL at 20:52

## 2018-10-04 RX ADMIN — Medication SCH MG: at 09:08

## 2018-10-04 RX ADMIN — HEPARIN SODIUM SCH UNITS: 5000 INJECTION INTRAVENOUS; SUBCUTANEOUS at 20:30

## 2018-10-04 RX ADMIN — LEVETIRACETAM SCH MG: 100 SOLUTION ORAL at 18:00

## 2018-10-04 RX ADMIN — INSULIN ASPART SCH UNITS: 100 INJECTION, SOLUTION INTRAVENOUS; SUBCUTANEOUS at 12:00

## 2018-10-04 RX ADMIN — COLISTIMETHATE SODIUM SCH MG: 150 INJECTION, POWDER, LYOPHILIZED, FOR SOLUTION INTRAMUSCULAR; INTRAVENOUS at 21:26

## 2018-10-04 NOTE — PULMONOLGY CRITICAL CARE NOTE
Critical Care - Asmt/Plan


Problems:  


(1) Acute on chronic respiratory failure


(2) Sepsis


(3) UTI (urinary tract infection)


(4) Diabetes mellitus


(5) Vegetative state


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


Respiratory:  monitor respiratory rate, adjust FIO2, CXR


Cardiac:  continue to monitor HR/BP


Renal:  F/U I&O


Infectious Disease:  check cultures


Gastrointestinal:  continue feedings/current rate


Endocrine:  monitor blood sugar, check TSH


Hematologic:  monitor H/H, transfuse if hgb<8.5


Neurologic:  keep patient comfortable


Prophylaxis:  Heparin


Notes Reviewed:  renal


Discussed with:  nurses, consultants, 





Critical Care - Objective





Last 24 Hour Vital Signs








  Date Time  Temp Pulse Resp B/P (MAP) Pulse Ox O2 Delivery O2 Flow Rate FiO2


 


10/4/18 10:10  134 16  99 Mechanical Ventilator 15.0 30


 


10/4/18 10:00  139 23  100 Mechanical Ventilator 15.0 30


 


10/4/18 10:00        30


 


10/4/18 09:15  135 18     30


 


10/4/18 08:33  137      


 


10/4/18 08:00        30


 


10/4/18 08:00 101.2 136 21 123/84 (97) 100   





 101.2       


 


10/4/18 08:00      Mechanical Ventilator  


 


10/4/18 06:45  138 20     30


 


10/4/18 05:05  137 21     30


 


10/4/18 04:32  92      


 


10/4/18 04:00 99.9 137 21 136/96 (109) 100   





 99.9       


 


10/4/18 04:00        30


 


10/4/18 04:00      Mechanical Ventilator  


 


10/4/18 03:10  140 20     30


 


10/4/18 01:13      Mechanical Ventilator  30


 


10/4/18 01:13      Mechanical Ventilator  30


 


10/4/18 01:10  137 19     30


 


10/4/18 00:00      Mechanical Ventilator  


 


10/4/18 00:00        30


 


10/4/18 00:00 99.9 134 22 137/98 (111) 99   





 99.9       


 


10/3/18 23:39  134      


 


10/3/18 22:45  135 20     30


 


10/3/18 22:10 99.9       


 


10/3/18 21:40 100.6       


 


10/3/18 20:45  130 18     30


 


10/3/18 20:00      Mechanical Ventilator  


 


10/3/18 20:00 100.8 128 20 139/94 (109) 99   





 100.8       


 


10/3/18 20:00        30


 


10/3/18 19:09  126 18     30


 


10/3/18 19:04  127      


 


10/3/18 17:06  123 19     30


 


10/3/18 17:04  121      


 


10/3/18 16:00 97.3 125 18 117/90 (99) 100   





 97.3       


 


10/3/18 16:00      Mechanical Ventilator  


 


10/3/18 16:00        30


 


10/3/18 16:00        30


 


10/3/18 15:03  122 18     30


 


10/3/18 14:25  130      


 


10/3/18 13:22 100.6       


 


10/3/18 13:11  123 19     30


 


10/3/18 12:00      Mechanical Ventilator  


 


10/3/18 12:00        30


 


10/3/18 12:00 101.8 129 18 120/80 (93) 100   





 101.8       


 


10/3/18 11:17  132 18     30








Status:  obtunded


Condition:  critical


HEENT:  atraumatic


Neck:  full ROM


Lungs:  chest wall tender


Heart:  HR/BP stable, regular


Abdomen:  non-tender, feeding tube


Extremities:  no C/C/E, edema


Micro:





Microbiology








 Date/Time


Source Procedure


Growth Status


 


 


 10/1/18 14:35


Blood Blood Culture - Preliminary


NO GROWTH AFTER 48 HOURS Resulted


 


 10/1/18 14:20


Blood Blood Culture - Preliminary


NO GROWTH AFTER 48 HOURS Resulted


 


 10/2/18 03:46


Urine,Clean Catch Urine Culture - Final


Pseudomonas Aeruginosa Complete








Accucheck:  237





Critical Care - Subjective


ROS Limited/Unobtainable:  Yes


Condition:  critical


FI02:  30


Vent Support Breath Rate:  16


Vent Support Mode:  AC


Vent Tidal Volume:  600


Sputum Amount:  Scant


PEEP:  5.0


PIP:  26


Tube Feeding Amount:  60


I&O:











Intake and Output  


 


 10/3/18 10/4/18





 19:00 07:00


 


Intake Total 1235 ml 1355 ml


 


Output Total 150 ml 700 ml


 


Balance 1085 ml 655 ml


 


  


 


Intake Free Water 100 ml 180 ml


 


IV Total 655 ml 500 ml


 


Tube Feeding 480 ml 675 ml


 


Output Urine Total 150 ml 550 ml


 


Stool Total  150 ml








CXR:


no changes


Labs:





Laboratory Tests








Test


  10/4/18


03:45


 


White Blood Count


  21.5 K/UL


(4.8-10.8)  #H


 


Red Blood Count


  5.02 M/UL


(4.70-6.10)


 


Hemoglobin


  14.0 G/DL


(14.2-18.0)  L


 


Hematocrit


  44.2 %


(42.0-52.0)


 


Mean Corpuscular Volume 88 FL (80-99)  


 


Mean Corpuscular Hemoglobin


  27.9 PG


(27.0-31.0)


 


Mean Corpuscular Hemoglobin


Concent 31.8 G/DL


(32.0-36.0)  L


 


Red Cell Distribution Width


  14.4 %


(11.6-14.8)


 


Platelet Count


  336 K/UL


(150-450)


 


Mean Platelet Volume


  9.2 FL


(6.5-10.1)


 


Neutrophils (%) (Auto)


  % (45.0-75.0)


 


 


Lymphocytes (%) (Auto)


  % (20.0-45.0)


 


 


Monocytes (%) (Auto)  % (1.0-10.0)  


 


Eosinophils (%) (Auto)  % (0.0-3.0)  


 


Basophils (%) (Auto)  % (0.0-2.0)  


 


Differential Total Cells


Counted 100  


 


 


Neutrophils % (Manual) 81 % (45-75)  H


 


Lymphocytes % (Manual) 8 % (20-45)  L


 


Monocytes % (Manual) 8 % (1-10)  


 


Eosinophils % (Manual) 3 % (0-3)  


 


Basophils % (Manual) 0 % (0-2)  


 


Band Neutrophils 0 % (0-8)  


 


Nucleated Red Blood Cells 1 /100 WBC  


 


Platelet Estimate Adequate  


 


Platelet Morphology Normal  


 


Red Blood Cell Morphology Normal  


 


Sodium Level


  142 MMOL/L


(136-145)


 


Potassium Level


  4.0 MMOL/L


(3.5-5.1)


 


Chloride Level


  109 MMOL/L


()  H


 


Carbon Dioxide Level


  22 MMOL/L


(21-32)


 


Anion Gap


  11 mmol/L


(5-15)


 


Blood Urea Nitrogen


  18 mg/dL


(7-18)


 


Creatinine


  1.3 MG/DL


(0.55-1.30)


 


Estimat Glomerular Filtration


Rate > 60 mL/min


(>60)


 


Glucose Level


  234 MG/DL


()  H


 


Calcium Level


  10.1 MG/DL


(8.5-10.1)


 


Total Creatine Kinase


  439 U/L


()  H

















Yury Pena MD Oct 4, 2018 10:38

## 2018-10-04 NOTE — PROGRESS NOTE
DATE:  10/03/2018



SUBJECTIVE:  The patient again had low-grade fever and tachycardia.



PHYSICAL EXAMINATION:

VITAL SIGNS:  Blood pressure 139/94, his pulse is 130, respirations 19, and

temperature is 100.6.

HEENT:  Eyes were normal.  ENT, mucous membranes were moist and intact.

NECK:  Supple with no JVD without lymph nodes.  Tracheostomy site is

clean.

LUNGS:  Clear without rhonchi, rales, or wheezing.  Secretions are small,

thin, and grey.

HEART:  Normal sounds with regular beats.  There is no S3, S4, or

pericardial rub.

ABDOMEN:  Soft and nontender with normal bowel sounds.  Gastrostomy site is

clean.

EXTREMITIES:  Warm without cyanosis, clubbing, or edema.



LABORATORY AND DIAGNOSTIC DATA:  His hemoglobin is 11.5, hematocrit 38.4

with MCV of 91, WBC of 10.1, and platelets is 286,000.  His BUN and

creatinine is 19 and 1.3 respectively.  His sodium is 143, potassium 3.8,

and chloride 110.  CO2 is 22.  His calcium is 10.2.  His glucose is 226.

SGOT and SGPT are normal.  His albumin is 2.5 and total protein is 7.7.

Chest x-ray showed improvement in _____.  An echocardiogram for the

patient _____ improve.



IMPRESSION:  The patient has recurrent pneumonia and left pleural effusion.

Urine culture from _____ gram-negative bacilli.  Blood culture taken

_____ showed no growth in 24 hours.  Repeat laboratory tests will be done

in the a.m.









  ______________________________________________

  Etienne Bloom M.D.





DR:  NED

D:  10/03/2018 23:52

T:  10/04/2018 17:57

JOB#:  [id number]

CC:

## 2018-10-04 NOTE — INFECTIOUS DISEASES PROG NOTE
Assessment/Plan


Assessment/Plan


Assessment:


Severe sepsis 2ry to Gram positive bacteremia, UTI and probable PNA


  -u/a wbc 30-40, nit neg, leuk +2; ucx 10-20k PsA (R CIpro/levo, otherwise S)


  -9/29 Bcx 2/4 CONS; 9/30 1/4 GPC ; 10/1 NTD


  -sp cx ABC (I Tygecicline), CRE K,PNA


  -10/2 CXR : Improvement of persistent left pleural effusion and retrocardiac 

consolidation, over one day


  -CXR:  Small left pleural effusion, not significantly changed. Unchanged 

subsegmental atelectasis versus infiltrate in the left lung base.


  -2d echo: no vegetations





Fever


Leukocytosis,


BRAYDON, improving





Hx of UTI


   -7/2018 ucx >100k E. aerogenes (S cefepime, cipro/levo; R Zosyn)





Hx of Bacteremia 7/2018 (PICC line infection, presumed endocarditis)


  -BCX 4/4 E. aerogenes, prob Amp C (S cefepime, cipro/levo; R Zosyn), P. 

mirabilis ESBL (S Zosyn, Ertapenem)


   -repeta 7/26 1/4 CoNS (suspect contaminant)


  -7/28 Staph 





chronic resp failure trach/vent dependant


BPH


GERD


 HTN


DM2


seizure disorder


colostomy


 s/p GT 


hx of VRE and MRSA colonization








Plan:





 - Add Tygacil d# 1 ( Coverage of [facility] , as Avycaz not available ) 


-  Cont Daptomycind# 2 ( AB Rx d# 6 )for gram positive bacteremia pending 

repeat cultures and to minimize nephrotoxicity


-  Cont IV Colistin and add INH colistin d# 2  for MDR ABC and CRE K.pna in 

sputum cx





    -10/3 SP Switch empiric IV Vancomycin # 5 and Merrem d# 3 


    -9/29 SP Cefepime x1


    -9/9 SP IV Vancomycin #42


    -8/9 SP Ertapenem #14


    -7/27/18 SP Zosyn #4





-f/u Bcx x2


-f/u cx


-Monitor CBC/CMP, temperatures


-trach/peg care


-wound care/prevention per hospital care





Subjective


Allergies:  


Coded Allergies:  


     AZTREONAM (Verified  Allergy, Unknown, 7/23/18)


Subjective








wbc increased today





Objective


Vital Signs





Last 24 Hour Vital Signs








  Date Time  Temp Pulse Resp B/P (MAP) Pulse Ox O2 Delivery O2 Flow Rate FiO2


 


10/4/18 06:45  138 20     30


 


10/4/18 05:05  137 21     30


 


10/4/18 04:32  92      


 


10/4/18 04:00 99.9 137 21 136/96 (109) 100   





 99.9       


 


10/4/18 04:00        30


 


10/4/18 04:00      Mechanical Ventilator  


 


10/4/18 03:10  140 20     30


 


10/4/18 01:13      Mechanical Ventilator  30


 


10/4/18 01:13      Mechanical Ventilator  30


 


10/4/18 01:10  137 19     30


 


10/4/18 00:00      Mechanical Ventilator  


 


10/4/18 00:00        30


 


10/4/18 00:00 99.9 134 22 137/98 (111) 99   





 99.9       


 


10/3/18 23:39  134      


 


10/3/18 22:45  135 20     30


 


10/3/18 22:10 99.9       


 


10/3/18 21:40 100.6       


 


10/3/18 20:45  130 18     30


 


10/3/18 20:00      Mechanical Ventilator  


 


10/3/18 20:00 100.8 128 20 139/94 (109) 99   





 100.8       


 


10/3/18 20:00        30


 


10/3/18 19:09  126 18     30


 


10/3/18 19:04  127      


 


10/3/18 17:06  123 19     30


 


10/3/18 17:04  121      


 


10/3/18 16:00 97.3 125 18 117/90 (99) 100   





 97.3       


 


10/3/18 16:00      Mechanical Ventilator  


 


10/3/18 16:00        30


 


10/3/18 16:00        30


 


10/3/18 15:03  122 18     30


 


10/3/18 14:25  130      


 


10/3/18 13:22 100.6       


 


10/3/18 13:11  123 19     30


 


10/3/18 12:00      Mechanical Ventilator  


 


10/3/18 12:00        30


 


10/3/18 12:00 101.8 129 18 120/80 (93) 100   





 101.8       


 


10/3/18 11:17  132 18     30


 


10/3/18 09:57 101.2       


 


10/3/18 09:30  124      


 


10/3/18 09:07  131 21     30








Height (Feet):  5


Height (Inches):  8.00


Weight (Pounds):  163


HEENT:  anicteric


Respiratory/Chest:  no respiratory distress


Cardiovascular:  no gallop/murmur


Abdomen:  no organomegaly





Microbiology








 Date/Time


Source Procedure


Growth Status


 


 


 10/1/18 14:35


Blood Blood Culture - Preliminary


NO GROWTH AFTER 48 HOURS Resulted


 


 10/1/18 14:20


Blood Blood Culture - Preliminary


NO GROWTH AFTER 48 HOURS Resulted


 


 10/2/18 03:46


Urine,Clean Catch Urine Culture - Preliminary


Gram Negative Bacillus 1 Resulted











Laboratory Tests








Test


  10/4/18


03:45


 


White Blood Count


  21.5 K/UL


(4.8-10.8)  #H


 


Red Blood Count


  5.02 M/UL


(4.70-6.10)


 


Hemoglobin


  14.0 G/DL


(14.2-18.0)  L


 


Hematocrit


  44.2 %


(42.0-52.0)


 


Mean Corpuscular Volume 88 FL (80-99)  


 


Mean Corpuscular Hemoglobin


  27.9 PG


(27.0-31.0)


 


Mean Corpuscular Hemoglobin


Concent 31.8 G/DL


(32.0-36.0)  L


 


Red Cell Distribution Width


  14.4 %


(11.6-14.8)


 


Platelet Count


  336 K/UL


(150-450)


 


Mean Platelet Volume


  9.2 FL


(6.5-10.1)


 


Neutrophils (%) (Auto)


  % (45.0-75.0)


 


 


Lymphocytes (%) (Auto)


  % (20.0-45.0)


 


 


Monocytes (%) (Auto)  % (1.0-10.0)  


 


Eosinophils (%) (Auto)  % (0.0-3.0)  


 


Basophils (%) (Auto)  % (0.0-2.0)  


 


Neutrophils % (Manual) Pending  


 


Lymphocytes % (Manual) Pending  


 


Platelet Estimate Pending  


 


Platelet Morphology Pending  


 


Sodium Level


  142 MMOL/L


(136-145)


 


Potassium Level


  4.0 MMOL/L


(3.5-5.1)


 


Chloride Level


  109 MMOL/L


()  H


 


Carbon Dioxide Level


  22 MMOL/L


(21-32)


 


Anion Gap


  11 mmol/L


(5-15)


 


Blood Urea Nitrogen


  18 mg/dL


(7-18)


 


Creatinine


  1.3 MG/DL


(0.55-1.30)


 


Estimat Glomerular Filtration


Rate > 60 mL/min


(>60)


 


Glucose Level


  234 MG/DL


()  H


 


Calcium Level


  10.1 MG/DL


(8.5-10.1)


 


Total Creatine Kinase


  439 U/L


()  H











Current Medications








 Medications


  (Trade)  Dose


 Ordered  Sig/Jan


 Route


 PRN Reason  Start Time


 Stop Time Status Last Admin


Dose Admin


 


 Acetaminophen


  (Tylenol)  650 mg  Q4H  PRN


 ORAL


 FEVER  9/29/18 15:05


 10/29/18 15:04  10/3/18 21:40


 


 


 Albuterol/


 Ipratropium


  (Albuterol/


 Ipratropium)  3 ml  Q4H  PRN


 HHN


 Shortness of Breath  9/29/18 15:04


 10/4/18 15:03   


 


 


 Ascorbic Acid


  (Vitamin C)  500 mg  DAILY


 GT


   10/1/18 09:00


 10/31/18 08:59  10/3/18 08:54


 


 


 Baclofen


  (Lioresal)  10 mg  THREE TIMES A  DAY


 GT


   9/29/18 18:00


 10/29/18 17:59  10/3/18 17:49


 


 


 Colistimethate


 Sodium


  (Colistin


 *inhalation use


 only*)  150 mg  Q12HR@10,22


 INH


   10/3/18 22:00


 10/10/18 21:59   


 


 


 Colistimethate


 Sodium


  (Colistin)  150 mg  EVERY 12  HOURS


 IVP


   10/3/18 21:00


 10/10/18 20:59  10/3/18 21:38


 


 


 Daptomycin 450 mg/


 Sodium Chloride  55 ml @ 


 100 mls/hr  Q24H


 IV


   10/3/18 18:00


 10/10/18 17:59  10/3/18 18:00


 


 


 Dextrose


  (Dextrose 50%)  25 ml  Q30M  PRN


 IV


 Hypoglycemia  10/3/18 15:15


 11/2/18 15:14   


 


 


 Dextrose


  (Dextrose 50%)  50 ml  Q30M  PRN


 IV


 Hypoglycemia  10/3/18 15:15


 11/2/18 15:14   


 


 


 Heparin Sodium


  (Porcine)


  (Heparin 5000


 units/ml)  5,000 units  EVERY 12  HOURS


 SUBQ


   9/29/18 21:00


 10/29/18 20:59  10/3/18 21:40


 


 


 Insulin Aspart


  (NovoLOG)    Q6HR


 SUBQ


   9/30/18 00:00


 10/29/18 16:29  10/4/18 06:02


 


 


 Levetiracetam


  (Keppra)  1,000 mg  BID


 GT


   9/29/18 18:00


 10/29/18 17:59  10/3/18 17:49


 


 


 Lorazepam


  (Ativan 2mg/ml


 1ml)  2 mg  Q2H  PRN


 IV


 For Anxiety  9/29/18 15:11


 10/6/18 15:10   


 


 


 Morphine Sulfate


  (Morphine


 Sulfate)  4 mg  Q4H  PRN


 IVP


 Severe Pain (Pain Scale 7-10)  9/29/18 15:08


 10/6/18 15:07   


 


 


 Ondansetron HCl


  (Zofran)  4 mg  Q6H  PRN


 IVP


 Nausea & Vomiting  9/29/18 15:05


 10/29/18 15:04   


 


 


 Pantoprazole


  (Protonix)  40 mg  DAILY


 IV


   9/30/18 09:00


 10/30/18 08:59  10/3/18 08:54


 


 


 Polyethylene


 Glycol


  (Miralax)  17 gm  DAILYPRN  PRN


 GT


 Constipation  9/29/18 15:45


 10/29/18 15:04   


 


 


 Potassium


 Chloride 40 meq/


 Dextrose/Sodium


 Chloride  1,020 ml @ 


 50 mls/hr  [id number]


 IV


   10/2/18 13:30


 11/1/18 13:29  10/3/18 09:57


 


 


 Sitagliptin


 Phosphate


  (Januvia)  50 mg  DAILY


 GT


   9/30/18 09:00


 10/30/18 08:59  10/3/18 08:54


 


 


 Vitamin A/Vitamin


 D


  (A & D Oint)  1 applic  Q12H


 TOPIC


   9/29/18 21:00


 10/29/18 20:59  10/3/18 21:38


 

















Roderick Fitzgerald MD Oct 4, 2018 08:10

## 2018-10-04 NOTE — GENERAL PROGRESS NOTE
Assessment/Plan


Assessment/Plan





# Leukocytosis - sepsis with elevated temperature of 103 degrees Fahrenheit on 

admission. Had uti v bacteremia (CoNS) on abx, has improved, had infection on 

prior admission.


--> Pt on antibiotics, has received colistin and daptomycin


--> Appreciate Infectious Disease recs


--> 2D echo per ID performed on 8/1: No discrete vegetations seen, however SBE 

may not be excluded by transthoracic 2-D echo


--> CURRENT WBC of 7.9, wnl 


--> Currently afebrile 


# Anemia of chronic disease. No hemolysis, peripheral smear reviewed, ferritin 

was elevated.


--> Continue to closely monitor


--> Hgb goal >7


--> CURRENT Hgb 13.1--> hemoconcentration


--> retic 1371, % sat 24%


# Acute kidney injury on ivf


--> Currently on IV fluids, IV bolus given to see if the patient responds along 

with IV pressor as needed.  


--> Closely monitor with Nephrology team.


# Septic shock, use antibiotic per ID services.


--> potential uti, on abx and bacteremia on abx


# Respiratory failure, status post tracheostomy, on mechanical ventilation per 

Dr. Pena


--> Remains on vent/trach


--> 8/13 CXR: Unchanged left pleural effusion versus scarring, over 4 days


# Hypercalcemia. PTH is 37 and Ca++ is elevated


--> monitor for improvement





The time the note was entered does not necessarily correspond to the time the 

patient was seen.





Greatly appreciate consultation!





Subjective


Cardiovascular:  Denies: no symptoms, chest pain, edema, irregular heart rate, 

lightheadedness, palpitations, syncope, other


Respiratory:  Denies: no symptoms, cough, orthopnea, shortness of breath, SOB 

with excertion, SOB at rest, sputum, stridor, wheezing, other


Gastrointestinal/Abdominal:  Denies: no symptoms, abdomen distended, abdominal 

pain, black stools, tarry stools, blood in stool, constipated, diarrhea, 

difficulty swallowing, nausea, poor appetite, poor fluid intake, rectal bleeding

, vomiting, other


Genitourinary:  Denies: no symptoms, burning, discharge, frequency, flank pain, 

hematuria, incontinence, pain, urgency, other


Endocrine:  Denies: no symptoms, excessive sweating, flushing, intolerance to 

cold, intolerance to heat, increased hunger, increased thirst, increased urine, 

unexplained weight gain, unexplained weight loss, other


Allergies:  


Coded Allergies:  


     AZTREONAM (Verified  Allergy, Unknown, 7/23/18)


Subjective


no events noted, remains tachycardic, on abx at this time





Objective





Last 24 Hour Vital Signs








  Date Time  Temp Pulse Resp B/P (MAP) Pulse Ox O2 Delivery O2 Flow Rate FiO2


 


10/4/18 08:33  137      


 


10/4/18 06:45  138 20     30


 


10/4/18 05:05  137 21     30


 


10/4/18 04:32  92      


 


10/4/18 04:00 99.9 137 21 136/96 (109) 100   





 99.9       


 


10/4/18 04:00        30


 


10/4/18 04:00      Mechanical Ventilator  


 


10/4/18 03:10  140 20     30


 


10/4/18 01:13      Mechanical Ventilator  30


 


10/4/18 01:13      Mechanical Ventilator  30


 


10/4/18 01:10  137 19     30


 


10/4/18 00:00      Mechanical Ventilator  


 


10/4/18 00:00        30


 


10/4/18 00:00 99.9 134 22 137/98 (111) 99   





 99.9       


 


10/3/18 23:39  134      


 


10/3/18 22:45  135 20     30


 


10/3/18 22:10 99.9       


 


10/3/18 21:40 100.6       


 


10/3/18 20:45  130 18     30


 


10/3/18 20:00      Mechanical Ventilator  


 


10/3/18 20:00 100.8 128 20 139/94 (109) 99   





 100.8       


 


10/3/18 20:00        30


 


10/3/18 19:09  126 18     30


 


10/3/18 19:04  127      


 


10/3/18 17:06  123 19     30


 


10/3/18 17:04  121      


 


10/3/18 16:00 97.3 125 18 117/90 (99) 100   





 97.3       


 


10/3/18 16:00      Mechanical Ventilator  


 


10/3/18 16:00        30


 


10/3/18 16:00        30


 


10/3/18 15:03  122 18     30


 


10/3/18 14:25  130      


 


10/3/18 13:22 100.6       


 


10/3/18 13:11  123 19     30


 


10/3/18 12:00      Mechanical Ventilator  


 


10/3/18 12:00        30


 


10/3/18 12:00 101.8 129 18 120/80 (93) 100   





 101.8       


 


10/3/18 11:17  132 18     30


 


10/3/18 09:57 101.2       


 


10/3/18 09:30  124      


 


10/3/18 09:07  131 21     30

















Intake and Output  


 


 10/3/18 10/4/18





 19:00 07:00


 


Intake Total 1235 ml 1355 ml


 


Output Total 150 ml 700 ml


 


Balance 1085 ml 655 ml


 


  


 


Intake Free Water 100 ml 180 ml


 


IV Total 655 ml 500 ml


 


Tube Feeding 480 ml 675 ml


 


Output Urine Total 150 ml 550 ml


 


Stool Total  150 ml








Laboratory Tests


10/4/18 03:45: 


White Blood Count 21.5#H, Red Blood Count 5.02, Hemoglobin 14.0L, Hematocrit 

44.2, Mean Corpuscular Volume 88, Mean Corpuscular Hemoglobin 27.9, Mean 

Corpuscular Hemoglobin Concent 31.8L, Red Cell Distribution Width 14.4, 

Platelet Count 336, Mean Platelet Volume 9.2, Neutrophils (%) (Auto) , 

Lymphocytes (%) (Auto) , Monocytes (%) (Auto) , Eosinophils (%) (Auto) , 

Basophils (%) (Auto) , Differential Total Cells Counted 100, Neutrophils % (

Manual) 81H, Lymphocytes % (Manual) 8L, Monocytes % (Manual) 8, Eosinophils % (

Manual) 3, Basophils % (Manual) 0, Band Neutrophils 0, Nucleated Red Blood 

Cells 1, Platelet Estimate Adequate, Platelet Morphology Normal, Red Blood Cell 

Morphology Normal, Sodium Level 142, Potassium Level 4.0, Chloride Level 109H, 

Carbon Dioxide Level 22, Anion Gap 11, Blood Urea Nitrogen 18, Creatinine 1.3, 

Estimat Glomerular Filtration Rate > 60, Glucose Level 234H, Calcium Level 10.1

, Total Creatine Kinase 439H


Height (Feet):  5


Height (Inches):  8.00


Weight (Pounds):  163


Respiratory/Chest:  other - trach/vent











Tejas Gerber MD Oct 4, 2018 08:57

## 2018-10-05 VITALS — SYSTOLIC BLOOD PRESSURE: 102 MMHG | DIASTOLIC BLOOD PRESSURE: 70 MMHG

## 2018-10-05 VITALS — DIASTOLIC BLOOD PRESSURE: 67 MMHG | SYSTOLIC BLOOD PRESSURE: 103 MMHG

## 2018-10-05 VITALS — DIASTOLIC BLOOD PRESSURE: 67 MMHG | SYSTOLIC BLOOD PRESSURE: 105 MMHG

## 2018-10-05 VITALS — DIASTOLIC BLOOD PRESSURE: 78 MMHG | SYSTOLIC BLOOD PRESSURE: 109 MMHG

## 2018-10-05 VITALS — DIASTOLIC BLOOD PRESSURE: 56 MMHG | SYSTOLIC BLOOD PRESSURE: 103 MMHG

## 2018-10-05 VITALS — SYSTOLIC BLOOD PRESSURE: 108 MMHG | DIASTOLIC BLOOD PRESSURE: 66 MMHG

## 2018-10-05 LAB
ADD MANUAL DIFF: NO
ALBUMIN SERPL-MCNC: 2.3 G/DL (ref 3.4–5)
ALBUMIN/GLOB SERPL: 0.4 {RATIO} (ref 1–2.7)
ALP SERPL-CCNC: 99 U/L (ref 46–116)
ALT SERPL-CCNC: 24 U/L (ref 12–78)
ANION GAP SERPL CALC-SCNC: 11 MMOL/L (ref 5–15)
AST SERPL-CCNC: 20 U/L (ref 15–37)
BASOPHILS NFR BLD AUTO: 1 % (ref 0–2)
BILIRUB SERPL-MCNC: 0.8 MG/DL (ref 0.2–1)
BUN SERPL-MCNC: 23 MG/DL (ref 7–18)
CALCIUM SERPL-MCNC: 10 MG/DL (ref 8.5–10.1)
CHLORIDE SERPL-SCNC: 108 MMOL/L (ref 98–107)
CO2 SERPL-SCNC: 23 MMOL/L (ref 21–32)
CREAT SERPL-MCNC: 1.2 MG/DL (ref 0.55–1.3)
EOSINOPHIL NFR BLD AUTO: 2.9 % (ref 0–3)
ERYTHROCYTE [DISTWIDTH] IN BLOOD BY AUTOMATED COUNT: 14.3 % (ref 11.6–14.8)
GLOBULIN SER-MCNC: 5.3 G/DL
HCT VFR BLD CALC: 42.1 % (ref 42–52)
HGB BLD-MCNC: 13.3 G/DL (ref 14.2–18)
LYMPHOCYTES NFR BLD AUTO: 20.3 % (ref 20–45)
MCV RBC AUTO: 89 FL (ref 80–99)
MONOCYTES NFR BLD AUTO: 5.8 % (ref 1–10)
NEUTROPHILS NFR BLD AUTO: 70 % (ref 45–75)
PLATELET # BLD: 308 K/UL (ref 150–450)
POTASSIUM SERPL-SCNC: 4.3 MMOL/L (ref 3.5–5.1)
RBC # BLD AUTO: 4.74 M/UL (ref 4.7–6.1)
SODIUM SERPL-SCNC: 142 MMOL/L (ref 136–145)
WBC # BLD AUTO: 17.3 K/UL (ref 4.8–10.8)

## 2018-10-05 RX ADMIN — INSULIN ASPART SCH UNITS: 100 INJECTION, SOLUTION INTRAVENOUS; SUBCUTANEOUS at 18:30

## 2018-10-05 RX ADMIN — INSULIN ASPART SCH UNITS: 100 INJECTION, SOLUTION INTRAVENOUS; SUBCUTANEOUS at 05:23

## 2018-10-05 RX ADMIN — INSULIN ASPART SCH UNITS: 100 INJECTION, SOLUTION INTRAVENOUS; SUBCUTANEOUS at 12:32

## 2018-10-05 RX ADMIN — COLISTIMETHATE SODIUM SCH MG: 150 INJECTION, POWDER, LYOPHILIZED, FOR SOLUTION INTRAMUSCULAR; INTRAVENOUS at 09:06

## 2018-10-05 RX ADMIN — SODIUM CHLORIDE SCH MLS/HR: 900 INJECTION, SOLUTION INTRAVENOUS at 21:16

## 2018-10-05 RX ADMIN — HEPARIN SODIUM SCH UNITS: 5000 INJECTION INTRAVENOUS; SUBCUTANEOUS at 09:03

## 2018-10-05 RX ADMIN — PANTOPRAZOLE SODIUM SCH MG: 40 INJECTION, POWDER, FOR SOLUTION INTRAVENOUS at 09:03

## 2018-10-05 RX ADMIN — LEVETIRACETAM SCH MG: 100 SOLUTION ORAL at 09:02

## 2018-10-05 RX ADMIN — TIGECYCLINE SCH MLS/HR: 50 INJECTION, POWDER, LYOPHILIZED, FOR SOLUTION INTRAVENOUS at 04:15

## 2018-10-05 RX ADMIN — LEVETIRACETAM SCH MG: 100 SOLUTION ORAL at 18:29

## 2018-10-05 RX ADMIN — COLISTIMETHATE SODIUM SCH MG: 150 INJECTION, POWDER, LYOPHILIZED, FOR SOLUTION INTRAMUSCULAR; INTRAVENOUS at 21:23

## 2018-10-05 RX ADMIN — TIGECYCLINE SCH MLS/HR: 50 INJECTION, POWDER, LYOPHILIZED, FOR SOLUTION INTRAVENOUS at 16:13

## 2018-10-05 RX ADMIN — VITAMIN A AND VITAMIN D SCH APPLIC: 929.3 OINTMENT TOPICAL at 09:03

## 2018-10-05 RX ADMIN — SITAGLIPTIN SCH MG: 25 TABLET, FILM COATED ORAL at 09:02

## 2018-10-05 RX ADMIN — COLISTIMETHATE SODIUM SCH MG: 150 INJECTION, POWDER, LYOPHILIZED, FOR SOLUTION INTRAMUSCULAR; INTRAVENOUS at 09:41

## 2018-10-05 RX ADMIN — COLISTIMETHATE SODIUM SCH MG: 150 INJECTION, POWDER, LYOPHILIZED, FOR SOLUTION INTRAMUSCULAR; INTRAVENOUS at 21:22

## 2018-10-05 RX ADMIN — HEPARIN SODIUM SCH UNITS: 5000 INJECTION INTRAVENOUS; SUBCUTANEOUS at 20:37

## 2018-10-05 RX ADMIN — Medication SCH MG: at 09:02

## 2018-10-05 RX ADMIN — DAPTOMYCIN SCH MLS/HR: 500 INJECTION, POWDER, LYOPHILIZED, FOR SOLUTION INTRAVENOUS at 21:18

## 2018-10-05 RX ADMIN — INSULIN ASPART SCH UNITS: 100 INJECTION, SOLUTION INTRAVENOUS; SUBCUTANEOUS at 23:09

## 2018-10-05 RX ADMIN — VITAMIN A AND VITAMIN D SCH APPLIC: 929.3 OINTMENT TOPICAL at 20:34

## 2018-10-05 NOTE — INFECTIOUS DISEASES PROG NOTE
Assessment/Plan


Assessment/Plan


Assessment:








Severe sepsis 


bacteremia


  9/29 Bcx 2/4 CONS; 9/30 1/4 CoNS ; 10/1 :neg , 10/4 :  NTD


UTI 


 10/2  UCx : 10k  PSA 


probable PNA


  -u/a wbc 30-40, nit neg, leuk +2; ucx 10-20k PsA (R CIpro/levo, otherwise S)


  -sp cx ABC (I Tygecicline), CRE K,PNA


  -10/2 CXR : Improvement of persistent left pleural effusion and retrocardiac 

consolidation, over one day


  -CXR:  Small left pleural effusion, not significantly changed. Unchanged 

subsegmental atelectasis versus infiltrate in the left lung base.


  -2d echo: no vegetations





Fever, improving 


Leukocytosis, improving 


BRAYDON, improving


Hx of UTI


   -7/2018 ucx >100k E. aerogenes (S cefepime, cipro/levo; R Zosyn)





Hx of Bacteremia 7/2018 (PICC line infection, presumed endocarditis)


  -BCX 4/4 E. aerogenes, prob Amp C (S cefepime, cipro/levo; R Zosyn), P. 

mirabilis ESBL (S Zosyn, Ertapenem)


   -repeta 7/26 1/4 CoNS (suspect contaminant)


  -7/28 Staph 





chronic resp failure trach/vent dependant


BPH


GERD


 HTN


DM2


seizure disorder


colostomy


 s/p GT 


hx of VRE and MRSA colonization








Plan:





 -  Cont  Tygacil d#  2 / 7  ( Coverage of [facility] , as Avycaz not available ) 


-  Cont Daptomycind# 3 ( AB Rx d#  7/10  )for gram positive bacteremia pending 

repeat cultures and to minimize nephrotoxicity


-  Cont IV Colistin and add INH colistin d# 3 / 7   for MDR ABC and CRE K.pna 

in sputum cx


 empiric IV Vancomycin # 5 and Merrem d# 3 


    -9/29 SP Cefepime x1


    -9/9 SP IV Vancomycin #42


    -8/9 SP Ertapenem #14


    -7/27/18 SP Zosyn #4





-f/u Bcx x2


-Monitor CBC/CMP, temperatures


-trach/peg care


-wound care/prevention per hospital care








asked micro to check susceptibility  for UCx : PSA to colistin





Subjective


Allergies:  


Coded Allergies:  


     AZTREONAM (Verified  Allergy, Unknown, 7/23/18)


Subjective








wbc improved today





Objective


Vital Signs





Last 24 Hour Vital Signs








  Date Time  Temp Pulse Resp B/P (MAP) Pulse Ox O2 Delivery O2 Flow Rate FiO2


 


10/5/18 12:12 102.4       


 


10/5/18 09:53  124 20  100 Mechanical Ventilator  30


 


10/5/18 09:41  124 16  100 Mechanical Ventilator  30


 


10/5/18 09:41        30


 


10/5/18 08:00        30


 


10/5/18 08:00  124      


 


10/5/18 08:00      Mechanical Ventilator  


 


10/5/18 08:00 100.1 126 19 103/56 (72) 100   





 100.1       


 


10/5/18 05:06  120 16     30


 


10/5/18 04:00      Mechanical Ventilator  


 


10/5/18 04:00 99.0 121 18 109/78 (88) 100   





 99.0       


 


10/5/18 04:00        30


 


10/5/18 03:33  121      


 


10/5/18 03:12  122 17     30


 


10/5/18 01:11  122 17     30


 


10/5/18 00:00        30


 


10/5/18 00:00 98.9 122 17 105/67 (80) 100   





 98.9       


 


10/5/18 00:00      Mechanical Ventilator  


 


10/4/18 23:53  122      


 


10/4/18 23:21  131 25     30


 


10/4/18 23:09 98.9       


 


10/4/18 21:40  128 18  100 Mechanical Ventilator  30


 


10/4/18 21:24        30


 


10/4/18 21:24  131 18  100 Mechanical Ventilator  30


 


10/4/18 20:34  128 18     30


 


10/4/18 20:29 102.1       


 


10/4/18 20:26  137      


 


10/4/18 20:00        30


 


10/4/18 20:00      Mechanical Ventilator  


 


10/4/18 20:00 102.1 138 24 101/75 (84) 99   





 102.1       


 


10/4/18 19:17  137 20     30


 


10/4/18 17:02  134 22     30


 


10/4/18 16:19  135      


 


10/4/18 16:00      Mechanical Ventilator  


 


10/4/18 16:00 101.6 137 20 103/76 (85) 99   





 101.6       


 


10/4/18 16:00        30


 


10/4/18 14:42  135 21     30


 


10/4/18 12:40  139 25     30


 


10/4/18 12:40  139 25   Mechanical Ventilator 15.0 30








Height (Feet):  5


Height (Inches):  8.00


Weight (Pounds):  163


HEENT:  anicteric


Respiratory/Chest:  lungs clear


Cardiovascular:  regularly irregular


Abdomen:  no mass





Microbiology








 Date/Time


Source Procedure


Growth Status


 


 


 10/4/18 03:55


Blood Blood Culture - Preliminary


NO GROWTH AFTER 24 HOURS Resulted


 


 10/4/18 03:45


Blood Blood Culture - Preliminary


NO GROWTH AFTER 24 HOURS Resulted











Laboratory Tests








Test


  10/5/18


05:42


 


White Blood Count


  17.3 K/UL


(4.8-10.8)  H


 


Red Blood Count


  4.74 M/UL


(4.70-6.10)


 


Hemoglobin


  13.3 G/DL


(14.2-18.0)  L


 


Hematocrit


  42.1 %


(42.0-52.0)


 


Mean Corpuscular Volume 89 FL (80-99)  


 


Mean Corpuscular Hemoglobin


  28.1 PG


(27.0-31.0)


 


Mean Corpuscular Hemoglobin


Concent 31.6 G/DL


(32.0-36.0)  L


 


Red Cell Distribution Width


  14.3 %


(11.6-14.8)


 


Platelet Count


  308 K/UL


(150-450)


 


Mean Platelet Volume


  8.8 FL


(6.5-10.1)


 


Neutrophils (%) (Auto)


  70.0 %


(45.0-75.0)


 


Lymphocytes (%) (Auto)


  20.3 %


(20.0-45.0)


 


Monocytes (%) (Auto)


  5.8 %


(1.0-10.0)


 


Eosinophils (%) (Auto)


  2.9 %


(0.0-3.0)


 


Basophils (%) (Auto)


  1.0 %


(0.0-2.0)


 


Sodium Level


  142 MMOL/L


(136-145)


 


Potassium Level


  4.3 MMOL/L


(3.5-5.1)


 


Chloride Level


  108 MMOL/L


()  H


 


Carbon Dioxide Level


  23 MMOL/L


(21-32)


 


Anion Gap


  11 mmol/L


(5-15)


 


Blood Urea Nitrogen


  23 mg/dL


(7-18)  H


 


Creatinine


  1.2 MG/DL


(0.55-1.30)


 


Estimat Glomerular Filtration


Rate > 60 mL/min


(>60)


 


Glucose Level


  190 MG/DL


()  H


 


Calcium Level


  10.0 MG/DL


(8.5-10.1)


 


Total Bilirubin


  0.8 MG/DL


(0.2-1.0)


 


Aspartate Amino Transf


(AST/SGOT) 20 U/L (15-37)


 


 


Alanine Aminotransferase


(ALT/SGPT) 24 U/L (12-78)


 


 


Alkaline Phosphatase


  99 U/L


()


 


Pro-B-Type Natriuretic Peptide


  47 pg/mL


(0-125)


 


Total Protein


  7.6 G/DL


(6.4-8.2)


 


Albumin


  2.3 G/DL


(3.4-5.0)  L


 


Globulin 5.3 g/dL  


 


Albumin/Globulin Ratio


  0.4 (1.0-2.7)


L











Current Medications








 Medications


  (Trade)  Dose


 Ordered  Sig/Jan


 Route


 PRN Reason  Start Time


 Stop Time Status Last Admin


Dose Admin


 


 Acetaminophen


  (Tylenol)  650 mg  Q4H  PRN


 ORAL


 FEVER  9/29/18 15:05


 10/29/18 15:04  10/5/18 12:12


 


 


 Ascorbic Acid


  (Vitamin C)  500 mg  DAILY


 GT


   10/1/18 09:00


 10/31/18 08:59  10/5/18 09:02


 


 


 Baclofen


  (Lioresal)  10 mg  THREE TIMES A  DAY


 GT


   9/29/18 18:00


 10/29/18 17:59  10/5/18 09:02


 


 


 Colistimethate


 Sodium


  (Colistin


 *inhalation use


 only*)  150 mg  Q12HR@10,22


 INH


   10/3/18 22:00


 10/10/18 21:59  10/4/18 21:26


 


 


 Colistimethate


 Sodium


  (Colistin)  150 mg  EVERY 12  HOURS


 IVP


   10/3/18 21:00


 10/10/18 20:59  10/5/18 09:41


 


 


 Daptomycin 450 mg/


 Sodium Chloride  55 ml @ 


 100 mls/hr  Q24H


 IV


   10/4/18 20:00


 10/11/18 19:59  10/4/18 20:00


 


 


 Dextrose


  (Dextrose 50%)  25 ml  Q30M  PRN


 IV


 Hypoglycemia  10/3/18 15:15


 11/2/18 15:14   


 


 


 Dextrose


  (Dextrose 50%)  50 ml  Q30M  PRN


 IV


 Hypoglycemia  10/3/18 15:15


 11/2/18 15:14   


 


 


 Heparin Sodium


  (Porcine)


  (Heparin 5000


 units/ml)  5,000 units  EVERY 12  HOURS


 SUBQ


   9/29/18 21:00


 10/29/18 20:59  10/5/18 09:03


 


 


 Insulin Aspart


  (NovoLOG)    Q6HR


 SUBQ


   9/30/18 00:00


 10/29/18 16:29  10/5/18 05:23


 


 


 Levetiracetam


  (Keppra)  1,000 mg  BID


 GT


   9/29/18 18:00


 10/29/18 17:59  10/5/18 09:02


 


 


 Lorazepam


  (Ativan 2mg/ml


 1ml)  2 mg  Q2H  PRN


 IV


 For Anxiety  9/29/18 15:11


 10/6/18 15:10   


 


 


 Morphine Sulfate


  (Morphine


 Sulfate)  4 mg  Q4H  PRN


 IVP


 Severe Pain (Pain Scale 7-10)  9/29/18 15:08


 10/6/18 15:07   


 


 


 Ondansetron HCl


  (Zofran)  4 mg  Q6H  PRN


 IVP


 Nausea & Vomiting  9/29/18 15:05


 10/29/18 15:04   


 


 


 Pantoprazole


  (Protonix)  40 mg  DAILY


 IV


   9/30/18 09:00


 10/30/18 08:59  10/5/18 09:03


 


 


 Polyethylene


 Glycol


  (Miralax)  17 gm  DAILYPRN  PRN


 GT


 Constipation  9/29/18 15:45


 10/29/18 15:04   


 


 


 Potassium


 Chloride 40 meq/


 Dextrose/Sodium


 Chloride  1,020 ml @ 


 50 mls/hr  [id number]


 IV


   10/2/18 13:30


 11/1/18 13:29  10/4/18 20:53


 


 


 Sitagliptin


 Phosphate


  (Januvia)  50 mg  DAILY


 GT


   10/6/18 09:00


 10/30/18 08:59   


 


 


 Tigecycline 50 mg/


 Dextrose  110 ml @ 


 220 mls/hr  Q12H


 IVPB


   10/5/18 04:00


 10/12/18 03:59  10/5/18 04:15


 


 


 Vitamin A/Vitamin


 D


  (A & D Oint)  1 applic  Q12H


 TOPIC


   9/29/18 21:00


 10/29/18 20:59  10/5/18 09:03


 

















Roderick Fitzgerald MD Oct 5, 2018 12:31

## 2018-10-05 NOTE — PROGRESS NOTE
DATE:  10/04/2018



SUBJECTIVE:  The patient remained tachycardic and he is minimally

febrile.



PHYSICAL EXAMINATION:

VITAL SIGNS:  Blood pressure is 105/67, pulse is 131, respirations 25, and

temperature 98.9.

HEENT:  Eyes are normal.  ENT, mucous membranes were moist and intact.

NECK:  Supple with no JVD and without lymph nodes.  Tracheostomy site is

clean.

LUNGS:  Clear without rhonchi, rales, or wheezing.  Secretions are small,

thin, and grey.

HEART:  Normal sounds with regular beats.  There is no S3, S4, or

pericardial rub.

ABDOMEN:  Soft and nontender with normal bowel sounds.  Gastrostomy site is

clean.

EXTREMITIES:  Warm without cyanosis, clubbing, or edema.



LABORATORY DATA:  Laboratory data, his hemoglobin is 14.1, hematocrit 44.2,

with MCV of 88, WBC of 21, and platelets 326.  His BUN and creatinine are

18 and 1.3 respectively.  His sodium is 142, potassium 4.0, chloride 109,

and CO2 is 22.  His total CK is 439.  Urine culture reported on 10/02/2018

_____ Pseudomonas aeruginosa.  He was started on amikacin and Tygacil

antibiotic.



IMPRESSION AND PLAN:  Recurrent sepsis _____ Tygacil antibiotic.  Repeat

laboratory tests will be done in the a.m.









  ______________________________________________

  Etienne Bloom M.D.





DR:  SHAMA

D:  10/05/2018 00:49

T:  10/05/2018 04:37

JOB#:  [id number]

CC:

## 2018-10-05 NOTE — PULMONOLGY CRITICAL CARE NOTE
Critical Care - Asmt/Plan


Problems:  


(1) Acute on chronic respiratory failure


(2) Sepsis


(3) UTI (urinary tract infection)


(4) Diabetes mellitus


(5) Vegetative state


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


Respiratory:  monitor respiratory rate, adjust FIO2, CXR


Cardiac:  continue to monitor HR/BP


Renal:  F/U I&O


Infectious Disease:  check cultures


Gastrointestinal:  continue feedings/current rate


Endocrine:  monitor blood sugar


Hematologic:  monitor H/H


Neurologic:  PRN Ativan


Affect:  PRN ativan


Notes Reviewed:  internist


Discussed with:  nurses, consultants





Critical Care - Objective





Last 24 Hour Vital Signs








  Date Time  Temp Pulse Resp B/P (MAP) Pulse Ox O2 Delivery O2 Flow Rate FiO2


 


10/5/18 09:53  124 20  100 Mechanical Ventilator  30


 


10/5/18 09:41  124 16  100 Mechanical Ventilator  30


 


10/5/18 09:41        30


 


10/5/18 08:00        30


 


10/5/18 08:00  124      


 


10/5/18 08:00 100.1 126 19 103/56 (72) 100   





 100.1       


 


10/5/18 05:06  120 16     30


 


10/5/18 04:00      Mechanical Ventilator  


 


10/5/18 04:00 99.0 121 18 109/78 (88) 100   





 99.0       


 


10/5/18 04:00        30


 


10/5/18 03:33  121      


 


10/5/18 03:12  122 17     30


 


10/5/18 01:11  122 17     30


 


10/5/18 00:00        30


 


10/5/18 00:00 98.9 122 17 105/67 (80) 100   





 98.9       


 


10/5/18 00:00      Mechanical Ventilator  


 


10/4/18 23:53  122      


 


10/4/18 23:21  131 25     30


 


10/4/18 23:09 98.9       


 


10/4/18 21:40  128 18  100 Mechanical Ventilator  30


 


10/4/18 21:24        30


 


10/4/18 21:24  131 18  100 Mechanical Ventilator  30


 


10/4/18 20:34  128 18     30


 


10/4/18 20:29 102.1       


 


10/4/18 20:26  137      


 


10/4/18 20:00        30


 


10/4/18 20:00      Mechanical Ventilator  


 


10/4/18 20:00 102.1 138 24 101/75 (84) 99   





 102.1       


 


10/4/18 19:17  137 20     30


 


10/4/18 17:02  134 22     30


 


10/4/18 16:19  135      


 


10/4/18 16:00      Mechanical Ventilator  


 


10/4/18 16:00 101.6 137 20 103/76 (85) 99   





 101.6       


 


10/4/18 16:00        30


 


10/4/18 14:42  135 21     30


 


10/4/18 12:40  139 25     30


 


10/4/18 12:40  139 25   Mechanical Ventilator 15.0 30


 


10/4/18 12:00        30


 


10/4/18 12:00      Mechanical Ventilator  


 


10/4/18 12:00 101.2 137 21 111/77 (88) 97   





 101.2       


 


10/4/18 12:00  137      








Status:  obtunded


Condition:  critical


HEENT:  atraumatic


Lungs:  clear


Heart:  HR/BP stable, regular


Abdomen:  active bowel sounds


Extremities:  no C/C/E, edema


Decubiti:  location


Micro:





Microbiology








 Date/Time


Source Procedure


Growth Status


 


 


 10/4/18 03:55


Blood Blood Culture - Preliminary


NO GROWTH AFTER 24 HOURS Resulted


 


 10/4/18 03:45


Blood Blood Culture - Preliminary


NO GROWTH AFTER 24 HOURS Resulted








Accucheck:  201





Critical Care - Subjective


ROS Limited/Unobtainable:  No


EKG Rhythm:  Sinus Rhythm


FI02:  30


Vent Support Breath Rate:  16


Vent Support Mode:  AC


Vent Tidal Volume:  600


Sputum Amount:  Small


PEEP:  5.0


PIP:  34


Tube Feeding Amount:  60


I&O:











Intake and Output  


 


 10/4/18 10/5/18





 19:00 07:00


 


Intake Total 120 ml 1565 ml


 


Output Total 950 ml 1400 ml


 


Balance -830 ml 165 ml


 


  


 


Intake Free Water  180 ml


 


IV Total  665 ml


 


Tube Feeding 120 ml 720 ml


 


Output Urine Total 400 ml 1150 ml


 


Stool Total 550 ml 250 ml








CXR:


no change


Labs:





Laboratory Tests








Test


  10/5/18


05:42


 


White Blood Count


  17.3 K/UL


(4.8-10.8)  H


 


Red Blood Count


  4.74 M/UL


(4.70-6.10)


 


Hemoglobin


  13.3 G/DL


(14.2-18.0)  L


 


Hematocrit


  42.1 %


(42.0-52.0)


 


Mean Corpuscular Volume 89 FL (80-99)  


 


Mean Corpuscular Hemoglobin


  28.1 PG


(27.0-31.0)


 


Mean Corpuscular Hemoglobin


Concent 31.6 G/DL


(32.0-36.0)  L


 


Red Cell Distribution Width


  14.3 %


(11.6-14.8)


 


Platelet Count


  308 K/UL


(150-450)


 


Mean Platelet Volume


  8.8 FL


(6.5-10.1)


 


Neutrophils (%) (Auto)


  70.0 %


(45.0-75.0)


 


Lymphocytes (%) (Auto)


  20.3 %


(20.0-45.0)


 


Monocytes (%) (Auto)


  5.8 %


(1.0-10.0)


 


Eosinophils (%) (Auto)


  2.9 %


(0.0-3.0)


 


Basophils (%) (Auto)


  1.0 %


(0.0-2.0)


 


Sodium Level


  142 MMOL/L


(136-145)


 


Potassium Level


  4.3 MMOL/L


(3.5-5.1)


 


Chloride Level


  108 MMOL/L


()  H


 


Carbon Dioxide Level


  23 MMOL/L


(21-32)


 


Anion Gap


  11 mmol/L


(5-15)


 


Blood Urea Nitrogen


  23 mg/dL


(7-18)  H


 


Creatinine


  1.2 MG/DL


(0.55-1.30)


 


Estimat Glomerular Filtration


Rate > 60 mL/min


(>60)


 


Glucose Level


  190 MG/DL


()  H


 


Calcium Level


  10.0 MG/DL


(8.5-10.1)


 


Total Bilirubin


  0.8 MG/DL


(0.2-1.0)


 


Aspartate Amino Transf


(AST/SGOT) 20 U/L (15-37)


 


 


Alanine Aminotransferase


(ALT/SGPT) 24 U/L (12-78)


 


 


Alkaline Phosphatase


  99 U/L


()


 


Pro-B-Type Natriuretic Peptide


  47 pg/mL


(0-125)


 


Total Protein


  7.6 G/DL


(6.4-8.2)


 


Albumin


  2.3 G/DL


(3.4-5.0)  L


 


Globulin 5.3 g/dL  


 


Albumin/Globulin Ratio


  0.4 (1.0-2.7)


L

















Yury Pena MD Oct 5, 2018 11:30

## 2018-10-05 NOTE — GENERAL PROGRESS NOTE
Assessment/Plan


Assessment/Plan





# Leukocytosis - sepsis with elevated temperature of 103 degrees Fahrenheit on 

admission. Had uti v bacteremia (CoNS) on abx, has improved, had infection on 

prior admission. Still fever persists on abx


--> Pt on antibiotics, has received colistin and daptomycin


--> Appreciate Infectious Disease recs


--> 2D echo per ID performed on 8/1: No discrete vegetations seen, however SBE 

may not be excluded by transthoracic 2-D echo


--> wbc elevation unlikely related to bone marrow process


# Anemia of chronic disease. No hemolysis, peripheral smear reviewed, ferritin 

was elevated.


--> Continue to closely monitor


--> Hgb goal >7


--> CURRENT Hgb 13.1--> hemoconcentration


--> retic 1371, % sat 24%


# Acute kidney injury on ivf


--> Currently on IV fluids, IV bolus given to see if the patient responds along 

with IV pressor as needed.  


--> Closely monitor with Nephrology team.


# Septic shock, use antibiotic per ID services.


--> potential uti, on abx and bacteremia on abx


# Respiratory failure, status post tracheostomy, on mechanical ventilation per 

Dr. Pena


--> Remains on vent/trach


--> 8/13 CXR: Unchanged left pleural effusion versus scarring, over 4 days


# Hypercalcemia. PTH is 37 and Ca++ is elevated


--> monitor for improvement





The time the note was entered does not necessarily correspond to the time the 

patient was seen.





Greatly appreciate consultation!





Subjective


Constitutional:  Denies: no symptoms, chills, diaphoresis, fever, malaise, 

weakness, other


HEENT:  Denies: no symptoms, eye pain, blurred vision, tearing, double vision, 

ear pain, ear discharge, nose pain, nose congestion, throat pain, throat 

swelling, mouth pain, mouth swelling, other


Cardiovascular:  Denies: no symptoms, chest pain, edema, irregular heart rate, 

lightheadedness, palpitations, syncope, other


Respiratory:  Denies: no symptoms, cough, orthopnea, shortness of breath, SOB 

with excertion, SOB at rest, sputum, stridor, wheezing, other


Gastrointestinal/Abdominal:  Denies: no symptoms, abdomen distended, abdominal 

pain, black stools, tarry stools, blood in stool, constipated, diarrhea, 

difficulty swallowing, nausea, poor appetite, poor fluid intake, rectal bleeding

, vomiting, other


Genitourinary:  Denies: no symptoms, burning, discharge, frequency, flank pain, 

hematuria, incontinence, pain, urgency, other


Neurologic/Psychiatric:  Denies: no symptoms, anxiety, depressed, emotional 

problems, headache, numbness, paresthesia, pre-existing deficit, seizure, 

tingling, tremors, weakness, other


Endocrine:  Denies: no symptoms, excessive sweating, flushing, intolerance to 

cold, intolerance to heat, increased hunger, increased thirst, increased urine, 

unexplained weight gain, unexplained weight loss, other


Hematologic/Lymphatic:  Denies: no symptoms, anemia, easy bleeding, easy 

bruising, other


Allergies:  


Coded Allergies:  


     AZTREONAM (Verified  Allergy, Unknown, 7/23/18)


Subjective


remains febrile, remains tachycardic, is on abx at this time, vent/trach





Objective





Last 24 Hour Vital Signs








  Date Time  Temp Pulse Resp B/P (MAP) Pulse Ox O2 Delivery O2 Flow Rate FiO2


 


10/5/18 12:12 102.4       


 


10/5/18 12:00        30


 


10/5/18 12:00 102.4 126 19 108/66 (80) 100   





 102.4       


 


10/5/18 12:00      Mechanical Ventilator  


 


10/5/18 12:00  127      


 


10/5/18 10:33  124 21     30


 


10/5/18 09:53  124 20  100 Mechanical Ventilator  30


 


10/5/18 09:41  124 16  100 Mechanical Ventilator  30


 


10/5/18 09:41        30


 


10/5/18 09:03  124 17     30


 


10/5/18 08:00        30


 


10/5/18 08:00  124      


 


10/5/18 08:00      Mechanical Ventilator  


 


10/5/18 08:00 100.1 126 19 103/56 (72) 100   





 100.1       


 


10/5/18 07:15  121 20     30


 


10/5/18 05:06  120 16     30


 


10/5/18 04:00      Mechanical Ventilator  


 


10/5/18 04:00 99.0 121 18 109/78 (88) 100   





 99.0       


 


10/5/18 04:00        30


 


10/5/18 03:33  121      


 


10/5/18 03:12  122 17     30


 


10/5/18 01:11  122 17     30


 


10/5/18 00:00        30


 


10/5/18 00:00 98.9 122 17 105/67 (80) 100   





 98.9       


 


10/5/18 00:00      Mechanical Ventilator  


 


10/4/18 23:53  122      


 


10/4/18 23:21  131 25     30


 


10/4/18 23:09 98.9       


 


10/4/18 21:40  128 18  100 Mechanical Ventilator  30


 


10/4/18 21:24        30


 


10/4/18 21:24  131 18  100 Mechanical Ventilator  30


 


10/4/18 20:34  128 18     30


 


10/4/18 20:29 102.1       


 


10/4/18 20:26  137      


 


10/4/18 20:00        30


 


10/4/18 20:00      Mechanical Ventilator  


 


10/4/18 20:00 102.1 138 24 101/75 (84) 99   





 102.1       


 


10/4/18 19:17  137 20     30


 


10/4/18 17:02  134 22     30


 


10/4/18 16:19  135      


 


10/4/18 16:00      Mechanical Ventilator  


 


10/4/18 16:00 101.6 137 20 103/76 (85) 99   





 101.6       


 


10/4/18 16:00        30

















Intake and Output  


 


 10/4/18 10/5/18





 19:00 07:00


 


Intake Total 120 ml 1565 ml


 


Output Total 950 ml 1400 ml


 


Balance -830 ml 165 ml


 


  


 


Intake Free Water  180 ml


 


IV Total  665 ml


 


Tube Feeding 120 ml 720 ml


 


Output Urine Total 400 ml 1150 ml


 


Stool Total 550 ml 250 ml








Laboratory Tests


10/5/18 05:42: 


White Blood Count 17.3H, Red Blood Count 4.74, Hemoglobin 13.3L, Hematocrit 42.1

, Mean Corpuscular Volume 89, Mean Corpuscular Hemoglobin 28.1, Mean 

Corpuscular Hemoglobin Concent 31.6L, Red Cell Distribution Width 14.3, 

Platelet Count 308, Mean Platelet Volume 8.8, Neutrophils (%) (Auto) 70.0, 

Lymphocytes (%) (Auto) 20.3, Monocytes (%) (Auto) 5.8, Eosinophils (%) (Auto) 

2.9, Basophils (%) (Auto) 1.0, Sodium Level 142, Potassium Level 4.3, Chloride 

Level 108H, Carbon Dioxide Level 23, Anion Gap 11, Blood Urea Nitrogen 23H, 

Creatinine 1.2, Estimat Glomerular Filtration Rate > 60, Glucose Level 190H, 

Calcium Level 10.0, Total Bilirubin 0.8, Aspartate Amino Transf (AST/SGOT) 20, 

Alanine Aminotransferase (ALT/SGPT) 24, Alkaline Phosphatase 99, Pro-B-Type 

Natriuretic Peptide 47, Total Protein 7.6, Albumin 2.3L, Globulin 5.3, Albumin/

Globulin Ratio 0.4L


Height (Feet):  5


Height (Inches):  8.00


Weight (Pounds):  163


General Appearance:  alert


EENT:  TMs normal


Neck:  normal alignment


Cardiovascular:  no gallop/murmur


Respiratory/Chest:  lungs clear, other - trach/vent


Extremities:  normal range of motion


Edema:  1+ Leg (L), 1+ Leg (R)


Neurologic:  responsive


Skin:  warm/dry











Tejas Gerber MD Oct 5, 2018 14:44

## 2018-10-06 VITALS — SYSTOLIC BLOOD PRESSURE: 108 MMHG | DIASTOLIC BLOOD PRESSURE: 65 MMHG

## 2018-10-06 VITALS — DIASTOLIC BLOOD PRESSURE: 77 MMHG | SYSTOLIC BLOOD PRESSURE: 111 MMHG

## 2018-10-06 VITALS — DIASTOLIC BLOOD PRESSURE: 73 MMHG | SYSTOLIC BLOOD PRESSURE: 97 MMHG

## 2018-10-06 VITALS — DIASTOLIC BLOOD PRESSURE: 67 MMHG | SYSTOLIC BLOOD PRESSURE: 107 MMHG

## 2018-10-06 VITALS — SYSTOLIC BLOOD PRESSURE: 91 MMHG | DIASTOLIC BLOOD PRESSURE: 60 MMHG

## 2018-10-06 VITALS — DIASTOLIC BLOOD PRESSURE: 68 MMHG | SYSTOLIC BLOOD PRESSURE: 106 MMHG

## 2018-10-06 LAB
ADD MANUAL DIFF: NO
ALBUMIN SERPL-MCNC: 2.1 G/DL (ref 3.4–5)
ALBUMIN/GLOB SERPL: 0.4 {RATIO} (ref 1–2.7)
ALP SERPL-CCNC: 105 U/L (ref 46–116)
ALT SERPL-CCNC: 23 U/L (ref 12–78)
ANION GAP SERPL CALC-SCNC: 10 MMOL/L (ref 5–15)
AST SERPL-CCNC: 41 U/L (ref 15–37)
BASOPHILS NFR BLD AUTO: 1.2 % (ref 0–2)
BILIRUB SERPL-MCNC: 0.4 MG/DL (ref 0.2–1)
BUN SERPL-MCNC: 26 MG/DL (ref 7–18)
CALCIUM SERPL-MCNC: 9.6 MG/DL (ref 8.5–10.1)
CHLORIDE SERPL-SCNC: 106 MMOL/L (ref 98–107)
CO2 SERPL-SCNC: 21 MMOL/L (ref 21–32)
CREAT SERPL-MCNC: 1.1 MG/DL (ref 0.55–1.3)
EOSINOPHIL NFR BLD AUTO: 5.3 % (ref 0–3)
ERYTHROCYTE [DISTWIDTH] IN BLOOD BY AUTOMATED COUNT: 14.7 % (ref 11.6–14.8)
GLOBULIN SER-MCNC: 5 G/DL
HCT VFR BLD CALC: 36.3 % (ref 42–52)
HGB BLD-MCNC: 11.8 G/DL (ref 14.2–18)
LYMPHOCYTES NFR BLD AUTO: 19.8 % (ref 20–45)
MCV RBC AUTO: 89 FL (ref 80–99)
MONOCYTES NFR BLD AUTO: 4.5 % (ref 1–10)
NEUTROPHILS NFR BLD AUTO: 69.1 % (ref 45–75)
PHOSPHATE SERPL-MCNC: 2.7 MG/DL (ref 2.5–4.9)
PLATELET # BLD: 339 K/UL (ref 150–450)
POTASSIUM SERPL-SCNC: 4.1 MMOL/L (ref 3.5–5.1)
RBC # BLD AUTO: 4.07 M/UL (ref 4.7–6.1)
SODIUM SERPL-SCNC: 137 MMOL/L (ref 136–145)
WBC # BLD AUTO: 13.5 K/UL (ref 4.8–10.8)

## 2018-10-06 RX ADMIN — SITAGLIPTIN SCH MG: 50 TABLET, FILM COATED ORAL at 09:03

## 2018-10-06 RX ADMIN — LEVETIRACETAM SCH MG: 100 SOLUTION ORAL at 09:02

## 2018-10-06 RX ADMIN — PANTOPRAZOLE SODIUM SCH MG: 40 INJECTION, POWDER, FOR SOLUTION INTRAVENOUS at 09:02

## 2018-10-06 RX ADMIN — INSULIN ASPART SCH UNITS: 100 INJECTION, SOLUTION INTRAVENOUS; SUBCUTANEOUS at 12:01

## 2018-10-06 RX ADMIN — VITAMIN A AND VITAMIN D SCH APPLIC: 929.3 OINTMENT TOPICAL at 09:04

## 2018-10-06 RX ADMIN — COLISTIMETHATE SODIUM SCH MG: 150 INJECTION, POWDER, LYOPHILIZED, FOR SOLUTION INTRAMUSCULAR; INTRAVENOUS at 20:17

## 2018-10-06 RX ADMIN — INSULIN ASPART SCH UNITS: 100 INJECTION, SOLUTION INTRAVENOUS; SUBCUTANEOUS at 23:26

## 2018-10-06 RX ADMIN — TIGECYCLINE SCH MLS/HR: 50 INJECTION, POWDER, LYOPHILIZED, FOR SOLUTION INTRAVENOUS at 03:14

## 2018-10-06 RX ADMIN — HEPARIN SODIUM SCH UNITS: 5000 INJECTION INTRAVENOUS; SUBCUTANEOUS at 09:01

## 2018-10-06 RX ADMIN — VITAMIN A AND VITAMIN D SCH APPLIC: 929.3 OINTMENT TOPICAL at 20:22

## 2018-10-06 RX ADMIN — Medication SCH MG: at 09:03

## 2018-10-06 RX ADMIN — INSULIN ASPART SCH UNITS: 100 INJECTION, SOLUTION INTRAVENOUS; SUBCUTANEOUS at 17:07

## 2018-10-06 RX ADMIN — COLISTIMETHATE SODIUM SCH MG: 150 INJECTION, POWDER, LYOPHILIZED, FOR SOLUTION INTRAMUSCULAR; INTRAVENOUS at 22:24

## 2018-10-06 RX ADMIN — COLISTIMETHATE SODIUM SCH MG: 150 INJECTION, POWDER, LYOPHILIZED, FOR SOLUTION INTRAMUSCULAR; INTRAVENOUS at 09:47

## 2018-10-06 RX ADMIN — DAPTOMYCIN SCH MLS/HR: 500 INJECTION, POWDER, LYOPHILIZED, FOR SOLUTION INTRAVENOUS at 20:17

## 2018-10-06 RX ADMIN — INSULIN ASPART SCH UNITS: 100 INJECTION, SOLUTION INTRAVENOUS; SUBCUTANEOUS at 05:05

## 2018-10-06 RX ADMIN — COLISTIMETHATE SODIUM SCH MG: 150 INJECTION, POWDER, LYOPHILIZED, FOR SOLUTION INTRAMUSCULAR; INTRAVENOUS at 09:11

## 2018-10-06 RX ADMIN — SODIUM CHLORIDE SCH MLS/HR: 900 INJECTION, SOLUTION INTRAVENOUS at 18:04

## 2018-10-06 RX ADMIN — TIGECYCLINE SCH MLS/HR: 50 INJECTION, POWDER, LYOPHILIZED, FOR SOLUTION INTRAVENOUS at 16:27

## 2018-10-06 RX ADMIN — HEPARIN SODIUM SCH UNITS: 5000 INJECTION INTRAVENOUS; SUBCUTANEOUS at 20:18

## 2018-10-06 RX ADMIN — LEVETIRACETAM SCH MG: 100 SOLUTION ORAL at 17:10

## 2018-10-06 NOTE — GENERAL PROGRESS NOTE
Assessment/Plan


Status:  stable


Assessment/Plan





# Leukocytosis - sepsis with elevated temperature of 103 degrees Fahrenheit on 

admission. Had uti v bacteremia (CoNS) on abx, has improved, had infection on 

prior admission. Still fever persists on abx


--> Pt on antibiotics, has received colistin and daptomycin


--> Appreciate Infectious Disease recs


--> 2D echo per ID performed on 8/1: No discrete vegetations seen, however SBE 

may not be excluded by transthoracic 2-D echo


--> wbc elevation unlikely related to bone marrow process


--> Currently, WBC has been improving. 


# Anemia of chronic disease. No hemolysis, peripheral smear reviewed, ferritin 

was elevated.


--> Continue to closely monitor


--> Hgb goal >7


--> CURRENT Hgb 13.1--> hemoconcentration


--> retic 1371, % sat 24%


# Acute kidney injury on ivf


--> Currently on IV fluids, IV bolus given to see if the patient responds along 

with IV pressor as needed.  


--> Closely monitor with Nephrology team.


# Septic shock, use antibiotic per ID services.


--> potential uti, on abx and bacteremia on abx


# Respiratory failure, status post tracheostomy, on mechanical ventilation per 

Dr. Pena


--> Remains on vent/trach


--> 8/13 CXR: Unchanged left pleural effusion versus scarring, over 4 days


# Hypercalcemia. PTH is 37 and Ca++ is elevated


--> monitor for improvement





The time the note was entered does not necessarily correspond to the time the 

patient was seen.





Greatly appreciate consultation!





Subjective


Date patient seen:  Oct 6, 2018


Hematologic/Lymphatic:  Reports: anemia


Allergies:  


Coded Allergies:  


     AZTREONAM (Verified  Allergy, Unknown, 7/23/18)


Subjective


WBC improving, is on abx. H/H stable.





Objective





Last 24 Hour Vital Signs








  Date Time  Temp Pulse Resp B/P (MAP) Pulse Ox O2 Delivery O2 Flow Rate FiO2


 


10/6/18 16:39      Mechanical Ventilator  


 


10/6/18 16:00        30


 


10/6/18 16:00 98.2 105 16 108/65 (79) 100   





 98.2       


 


10/6/18 15:10  109 18     30


 


10/6/18 13:15  115 17     30


 


10/6/18 12:17      Mechanical Ventilator  


 


10/6/18 12:00 97.3 107 18 91/60 (70) 99   





 97.3       


 


10/6/18 12:00        30


 


10/6/18 11:35  110      


 


10/6/18 10:58  111 18     30


 


10/6/18 09:58  115 20  100 Mechanical Ventilator  30


 


10/6/18 09:48  115 16  99 Mechanical Ventilator  30


 


10/6/18 09:48        30


 


10/6/18 09:20  108 18     30


 


10/6/18 08:00        30


 


10/6/18 08:00      Mechanical Ventilator  


 


10/6/18 08:00  114      


 


10/6/18 08:00 98.1 114 18 97/73 (81) 100   





 98.1       


 


10/6/18 07:08  115 18     30


 


10/6/18 04:58  110 17     30


 


10/6/18 04:00 99.1 111 18 107/67 (80) 100   





 99.1       


 


10/6/18 04:00        30


 


10/6/18 04:00      Mechanical Ventilator  


 


10/6/18 04:00  110      


 


10/6/18 03:01  108 17     30


 


10/6/18 01:07  110 16     30


 


10/6/18 00:00      Mechanical Ventilator  


 


10/6/18 00:00 98.9 111 17 106/68 (81) 99   





 98.9       


 


10/6/18 00:00        30


 


10/6/18 00:00  109      


 


10/5/18 22:45  103 16     30


 


10/5/18 21:44  102 20  100 Mechanical Ventilator  30


 


10/5/18 21:25        30


 


10/5/18 21:24  104 18     30


 


10/5/18 21:23  104 17  100 Mechanical Ventilator  30


 


10/5/18 20:00        30


 


10/5/18 20:00 98.6 103 18 103/67 (79) 99   





 98.6       


 


10/5/18 20:00      Mechanical Ventilator  


 


10/5/18 19:33  105      


 


10/5/18 19:25  106 18     30


 


10/5/18 17:41  110 17     30

















Intake and Output  


 


 10/5/18 10/6/18





 19:00 07:00


 


Intake Total 1550 ml 1582.5 ml


 


Output Total 750 ml 1350 ml


 


Balance 800 ml 232.5 ml


 


  


 


Intake Free Water  100 ml


 


IV Total 630 ml 762.5 ml


 


Tube Feeding 720 ml 720 ml


 


Other 200 ml 


 


Output Urine Total 500 ml 1000 ml


 


Stool Total 250 ml 350 ml








Laboratory Tests


10/6/18 03:14: 


Sodium Level 137, Potassium Level 4.1, Chloride Level 106, Carbon Dioxide Level 

21, Anion Gap 10, Blood Urea Nitrogen 26H, Creatinine 1.1, Estimat Glomerular 

Filtration Rate > 60, Glucose Level 204H, Calcium Level 9.6, Total Bilirubin 0.4

, Aspartate Amino Transf (AST/SGOT) 41H, Alanine Aminotransferase (ALT/SGPT) 23

, Alkaline Phosphatase 105, Total Protein 7.1, Albumin 2.1L, Globulin 5.0, 

Albumin/Globulin Ratio 0.4L


10/6/18 03:45: 


White Blood Count 13.5H, Red Blood Count 4.07L, Hemoglobin 11.8L, Hematocrit 

36.3L, Mean Corpuscular Volume 89, Mean Corpuscular Hemoglobin 28.9, Mean 

Corpuscular Hemoglobin Concent 32.5, Red Cell Distribution Width 14.7, Platelet 

Count 339, Mean Platelet Volume 9.5, Neutrophils (%) (Auto) 69.1, Lymphocytes (%

) (Auto) 19.8L, Monocytes (%) (Auto) 4.5, Eosinophils (%) (Auto) 5.3H, 

Basophils (%) (Auto) 1.2, Erythrocyte Sedimentation Rate 20, Phosphorus Level 

2.7, Magnesium Level 1.7L, Pro-B-Type Natriuretic Peptide 14


10/6/18 09:00: Miscellaneous Test 2 [Pending]


Height (Feet):  5


Height (Inches):  8.00


Weight (Pounds):  163


General Appearance:  no apparent distress


EENT:  PERRL/EOMI


Neck:  normal alignment


Cardiovascular:  tachycardia


Respiratory/Chest:  no respiratory distress


Abdomen:  no mass











Tejas Gerber MD Oct 6, 2018 17:20

## 2018-10-06 NOTE — PROGRESS NOTE
DATE:  10/05/2018



SUBJECTIVE:  The patient _____ remained unchanged, eyes are closed, has not

responded to audio or visual stimuli and responds to pain by extension.



PHYSICAL EXAMINATION:

VITAL SIGNS:  Blood pressure is 103/67, his pulse is _____, respirations of

18, and temperature of 98.6.

HEENT:  Eyes were normal.  ENT, mucous membranes were moist and intact.

NECK:  Supple with no JVD without lymph nodes.  Tracheostomy site is

clean.

LUNGS:  Clear without rhonchi, rales, or wheezing.  Secretions are small,

thin, and grey.

HEART:  Normal sounds with regular beats.  There is tachycardia at rest.

Sinus tachycardia on monitor.

ABDOMEN:  Soft and nontender with normal bowel sounds.  Gastrostomy site is

clean.

EXTREMITIES:  Warm without cyanosis, clubbing, or edema.



LABORATORY AND DIAGNOSTIC DATA:  His hemoglobin is 13.3, hematocrit 42.1

with MCV of 89, WBC of 17.3, and platelets of 308,000.  WBC was 21.5

yesterday.  His BUN and creatinine are 23 and 1.2 respectively.  His

sodium is 142, potassium 4.3, chloride 108, and CO2 is 23.  His creatinine

was 1.3 yesterday.  SGOT, SGPT, alkaline phosphatase are normal.  Albumin

is 2.3 and total protein is 7.6.  Chest x-ray today showed new onset

atelectasis on the right and left pleural effusion.  _____ unchanged.



IMPRESSION:  The patient has positive _____ antibody and he _____

daptomycin and colistin.  His WBC, BUN, and creatinine have significantly

improved, but not the tachycardia as yet.  Repeat laboratory tests will be

done in the a.m.  _____ report of Infectious Disease and Pulmonology have

been reviewed in detail.









  ______________________________________________

  Etienne Bloom M.D.





DR:  JIE

D:  10/05/2018 22:48

T:  10/06/2018 05:17

JOB#:  [id number]

CC:

## 2018-10-06 NOTE — PULMONOLGY CRITICAL CARE NOTE
Critical Care - Asmt/Plan


Problems:  


(1) Acute on chronic respiratory failure


(2) Sepsis


(3) UTI (urinary tract infection)


(4) Diabetes mellitus


(5) Vegetative state


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


Respiratory:  monitor respiratory rate, adjust FIO2, CXR


Cardiac:  continue pressors, continue to monitor HR/BP


Renal:  F/U I&O


Infectious Disease:  check cultures


Gastrointestinal:  hold feedings


Endocrine:  monitor blood sugar, check HgA1C


Hematologic:  monitor H/H, transfuse if hgb<8.5


Neurologic:  PRN Ativan, keep patient comfortable


Affect:  PRN ativan


Prophylaxis:  Protonix


Notes Reviewed:  internist, cardio


Discussed with:  nurses, consultants, 





Critical Care - Objective





Last 24 Hour Vital Signs








  Date Time  Temp Pulse Resp B/P (MAP) Pulse Ox O2 Delivery O2 Flow Rate FiO2


 


10/6/18 09:58  115 20  100 Mechanical Ventilator  30


 


10/6/18 09:48  115 16  99 Mechanical Ventilator  30


 


10/6/18 09:48        30


 


10/6/18 09:20  108 18     30


 


10/6/18 08:00        30


 


10/6/18 08:00 98.1 114 18 97/73 (81) 100   





 98.1       


 


10/6/18 07:08  115 18     30


 


10/6/18 04:58  110 17     30


 


10/6/18 04:00 99.1 111 18 107/67 (80) 100   





 99.1       


 


10/6/18 04:00        30


 


10/6/18 04:00      Mechanical Ventilator  


 


10/6/18 04:00  110      


 


10/6/18 03:01  108 17     30


 


10/6/18 01:07  110 16     30


 


10/6/18 00:00      Mechanical Ventilator  


 


10/6/18 00:00 98.9 111 17 106/68 (81) 99   





 98.9       


 


10/6/18 00:00        30


 


10/6/18 00:00  109      


 


10/5/18 22:45  103 16     30


 


10/5/18 21:44  102 20  100 Mechanical Ventilator  30


 


10/5/18 21:25        30


 


10/5/18 21:24  104 18     30


 


10/5/18 21:23  104 17  100 Mechanical Ventilator  30


 


10/5/18 20:00        30


 


10/5/18 20:00 98.6 103 18 103/67 (79) 99   





 98.6       


 


10/5/18 20:00      Mechanical Ventilator  


 


10/5/18 19:33  105      


 


10/5/18 19:25  106 18     30


 


10/5/18 17:41  110 17     30


 


10/5/18 16:43 99.1       


 


10/5/18 16:13 101.6       


 


10/5/18 16:00        30


 


10/5/18 16:00 101.8 120 17 102/70 (81) 100   





 101.8       


 


10/5/18 16:00      Mechanical Ventilator  


 


10/5/18 16:00  123      


 


10/5/18 16:00 101.6 137 20 103/76 (85) 99   





 101.6       


 


10/5/18 14:36  124 20     30


 


10/5/18 13:23  125 17     30


 


10/5/18 12:12 102.4       


 


10/5/18 12:00        30


 


10/5/18 12:00 102.4 126 19 108/66 (80) 100   





 102.4       


 


10/5/18 12:00      Mechanical Ventilator  


 


10/5/18 12:00  127      


 


10/5/18 10:33  124 21     30








Status:  awake


Condition:  critical


Neck:  full ROM


Lungs:  chest wall tender


Heart:  HR/BP stable


Abdomen:  soft, active bowel sounds


Extremities:  edema


Decubiti:  stage


Micro:





Microbiology








 Date/Time


Source Procedure


Growth Status


 


 


 10/4/18 03:55


Blood Blood Culture - Preliminary


NO GROWTH AFTER 24 HOURS Resulted


 


 10/4/18 03:45


Blood Blood Culture - Preliminary


NO GROWTH AFTER 24 HOURS Resulted








Accucheck:  203





Critical Care - Subjective


ROS Limited/Unobtainable:  Yes


Condition:  critical


EKG Rhythm:  Sinus Rhythm


FI02:  30


Vent Support Breath Rate:  16


Vent Support Mode:  AC


Vent Tidal Volume:  600


Sputum Amount:  Small


PEEP:  5.0


PIP:  27


Tube Feeding Amount:  60


I&O:











Intake and Output  


 


 10/5/18 10/6/18





 19:00 07:00


 


Intake Total 1550 ml 1472.5 ml


 


Output Total 750 ml 1350 ml


 


Balance 800 ml 122.5 ml


 


  


 


Intake Free Water  100 ml


 


IV Total 630 ml 712.5 ml


 


Tube Feeding 720 ml 660 ml


 


Other 200 ml 


 


Output Urine Total 500 ml 1000 ml


 


Stool Total 250 ml 350 ml








CXR:


no change


Labs:





Laboratory Tests








Test


  10/6/18


03:14 10/6/18


03:45 10/6/18


09:00


 


Sodium Level


  137 MMOL/L


(136-145) 


  


 


 


Potassium Level


  4.1 MMOL/L


(3.5-5.1) 


  


 


 


Chloride Level


  106 MMOL/L


() 


  


 


 


Carbon Dioxide Level


  21 MMOL/L


(21-32) 


  


 


 


Anion Gap


  10 mmol/L


(5-15) 


  


 


 


Blood Urea Nitrogen


  26 mg/dL


(7-18)  H 


  


 


 


Creatinine


  1.1 MG/DL


(0.55-1.30) 


  


 


 


Estimat Glomerular Filtration


Rate > 60 mL/min


(>60) 


  


 


 


Glucose Level


  204 MG/DL


()  H 


  


 


 


Calcium Level


  9.6 MG/DL


(8.5-10.1) 


  


 


 


Total Bilirubin


  0.4 MG/DL


(0.2-1.0) 


  


 


 


Aspartate Amino Transf


(AST/SGOT) 41 U/L (15-37)


H 


  


 


 


Alanine Aminotransferase


(ALT/SGPT) 23 U/L (12-78)


  


  


 


 


Alkaline Phosphatase


  105 U/L


() 


  


 


 


Total Protein


  7.1 G/DL


(6.4-8.2) 


  


 


 


Albumin


  2.1 G/DL


(3.4-5.0)  L 


  


 


 


Globulin 5.0 g/dL    


 


Albumin/Globulin Ratio


  0.4 (1.0-2.7)


L 


  


 


 


White Blood Count


  


  13.5 K/UL


(4.8-10.8)  H 


 


 


Red Blood Count


  


  4.07 M/UL


(4.70-6.10)  L 


 


 


Hemoglobin


  


  11.8 G/DL


(14.2-18.0)  L 


 


 


Hematocrit


  


  36.3 %


(42.0-52.0)  L 


 


 


Mean Corpuscular Volume  89 FL (80-99)   


 


Mean Corpuscular Hemoglobin


  


  28.9 PG


(27.0-31.0) 


 


 


Mean Corpuscular Hemoglobin


Concent 


  32.5 G/DL


(32.0-36.0) 


 


 


Red Cell Distribution Width


  


  14.7 %


(11.6-14.8) 


 


 


Platelet Count


  


  339 K/UL


(150-450) 


 


 


Mean Platelet Volume


  


  9.5 FL


(6.5-10.1) 


 


 


Neutrophils (%) (Auto)


  


  69.1 %


(45.0-75.0) 


 


 


Lymphocytes (%) (Auto)


  


  19.8 %


(20.0-45.0)  L 


 


 


Monocytes (%) (Auto)


  


  4.5 %


(1.0-10.0) 


 


 


Eosinophils (%) (Auto)


  


  5.3 %


(0.0-3.0)  H 


 


 


Basophils (%) (Auto)


  


  1.2 %


(0.0-2.0) 


 


 


Erythrocyte Sedimentation Rate


  


  20 MM/HR


(0-20) 


 


 


Phosphorus Level


  


  2.7 MG/DL


(2.5-4.9) 


 


 


Magnesium Level


  


  1.7 MG/DL


(1.8-2.4)  L 


 


 


Pro-B-Type Natriuretic Peptide


  


  14 pg/mL


(0-125) 


 


 


Miscellaneous Test 2   Pending  

















Yury Pena MD Oct 6, 2018 10:30

## 2018-10-07 VITALS — SYSTOLIC BLOOD PRESSURE: 115 MMHG | DIASTOLIC BLOOD PRESSURE: 79 MMHG

## 2018-10-07 VITALS — DIASTOLIC BLOOD PRESSURE: 74 MMHG | SYSTOLIC BLOOD PRESSURE: 114 MMHG

## 2018-10-07 VITALS — DIASTOLIC BLOOD PRESSURE: 64 MMHG | SYSTOLIC BLOOD PRESSURE: 104 MMHG

## 2018-10-07 VITALS — SYSTOLIC BLOOD PRESSURE: 100 MMHG | DIASTOLIC BLOOD PRESSURE: 75 MMHG

## 2018-10-07 VITALS — DIASTOLIC BLOOD PRESSURE: 70 MMHG | SYSTOLIC BLOOD PRESSURE: 110 MMHG

## 2018-10-07 VITALS — DIASTOLIC BLOOD PRESSURE: 69 MMHG | SYSTOLIC BLOOD PRESSURE: 101 MMHG

## 2018-10-07 LAB
ADD MANUAL DIFF: NO
ALBUMIN SERPL-MCNC: 2.2 G/DL (ref 3.4–5)
ALBUMIN/GLOB SERPL: 0.4 {RATIO} (ref 1–2.7)
ALP SERPL-CCNC: 108 U/L (ref 46–116)
ALT SERPL-CCNC: 22 U/L (ref 12–78)
ANION GAP SERPL CALC-SCNC: 11 MMOL/L (ref 5–15)
AST SERPL-CCNC: 19 U/L (ref 15–37)
BASOPHILS NFR BLD AUTO: 0.7 % (ref 0–2)
BILIRUB SERPL-MCNC: 0.4 MG/DL (ref 0.2–1)
BUN SERPL-MCNC: 21 MG/DL (ref 7–18)
CALCIUM SERPL-MCNC: 9.9 MG/DL (ref 8.5–10.1)
CHLORIDE SERPL-SCNC: 103 MMOL/L (ref 98–107)
CO2 SERPL-SCNC: 21 MMOL/L (ref 21–32)
CREAT SERPL-MCNC: 1 MG/DL (ref 0.55–1.3)
EOSINOPHIL NFR BLD AUTO: 5.8 % (ref 0–3)
ERYTHROCYTE [DISTWIDTH] IN BLOOD BY AUTOMATED COUNT: 14.4 % (ref 11.6–14.8)
GLOBULIN SER-MCNC: 5.1 G/DL
HCT VFR BLD CALC: 36.8 % (ref 42–52)
HGB BLD-MCNC: 11.9 G/DL (ref 14.2–18)
LYMPHOCYTES NFR BLD AUTO: 21.3 % (ref 20–45)
MCV RBC AUTO: 89 FL (ref 80–99)
MONOCYTES NFR BLD AUTO: 8.8 % (ref 1–10)
NEUTROPHILS NFR BLD AUTO: 63.4 % (ref 45–75)
PHOSPHATE SERPL-MCNC: 2.6 MG/DL (ref 2.5–4.9)
PLATELET # BLD: 332 K/UL (ref 150–450)
POTASSIUM SERPL-SCNC: 4.4 MMOL/L (ref 3.5–5.1)
RBC # BLD AUTO: 4.11 M/UL (ref 4.7–6.1)
SODIUM SERPL-SCNC: 135 MMOL/L (ref 136–145)
WBC # BLD AUTO: 12.4 K/UL (ref 4.8–10.8)

## 2018-10-07 RX ADMIN — INSULIN ASPART SCH UNITS: 100 INJECTION, SOLUTION INTRAVENOUS; SUBCUTANEOUS at 23:06

## 2018-10-07 RX ADMIN — INSULIN ASPART SCH UNITS: 100 INJECTION, SOLUTION INTRAVENOUS; SUBCUTANEOUS at 13:47

## 2018-10-07 RX ADMIN — HEPARIN SODIUM SCH UNITS: 5000 INJECTION INTRAVENOUS; SUBCUTANEOUS at 20:13

## 2018-10-07 RX ADMIN — PANTOPRAZOLE SODIUM SCH MG: 40 INJECTION, POWDER, FOR SOLUTION INTRAVENOUS at 10:09

## 2018-10-07 RX ADMIN — SITAGLIPTIN SCH MG: 50 TABLET, FILM COATED ORAL at 10:08

## 2018-10-07 RX ADMIN — COLISTIMETHATE SODIUM SCH MG: 150 INJECTION, POWDER, LYOPHILIZED, FOR SOLUTION INTRAMUSCULAR; INTRAVENOUS at 20:12

## 2018-10-07 RX ADMIN — HEPARIN SODIUM SCH UNITS: 5000 INJECTION INTRAVENOUS; SUBCUTANEOUS at 10:09

## 2018-10-07 RX ADMIN — COLISTIMETHATE SODIUM SCH MG: 150 INJECTION, POWDER, LYOPHILIZED, FOR SOLUTION INTRAMUSCULAR; INTRAVENOUS at 11:09

## 2018-10-07 RX ADMIN — INSULIN ASPART SCH UNITS: 100 INJECTION, SOLUTION INTRAVENOUS; SUBCUTANEOUS at 05:02

## 2018-10-07 RX ADMIN — INSULIN ASPART SCH UNITS: 100 INJECTION, SOLUTION INTRAVENOUS; SUBCUTANEOUS at 17:16

## 2018-10-07 RX ADMIN — INSULIN ASPART SCH UNITS: 100 INJECTION, SOLUTION INTRAVENOUS; SUBCUTANEOUS at 22:57

## 2018-10-07 RX ADMIN — TIGECYCLINE SCH MLS/HR: 50 INJECTION, POWDER, LYOPHILIZED, FOR SOLUTION INTRAVENOUS at 03:19

## 2018-10-07 RX ADMIN — SODIUM CHLORIDE SCH MLS/HR: 900 INJECTION, SOLUTION INTRAVENOUS at 15:42

## 2018-10-07 RX ADMIN — VITAMIN A AND VITAMIN D SCH APPLIC: 929.3 OINTMENT TOPICAL at 10:12

## 2018-10-07 RX ADMIN — TIGECYCLINE SCH MLS/HR: 50 INJECTION, POWDER, LYOPHILIZED, FOR SOLUTION INTRAVENOUS at 15:42

## 2018-10-07 RX ADMIN — COLISTIMETHATE SODIUM SCH MG: 150 INJECTION, POWDER, LYOPHILIZED, FOR SOLUTION INTRAMUSCULAR; INTRAVENOUS at 16:07

## 2018-10-07 RX ADMIN — DAPTOMYCIN SCH MLS/HR: 500 INJECTION, POWDER, LYOPHILIZED, FOR SOLUTION INTRAVENOUS at 20:11

## 2018-10-07 RX ADMIN — LEVETIRACETAM SCH MG: 100 SOLUTION ORAL at 17:16

## 2018-10-07 RX ADMIN — COLISTIMETHATE SODIUM SCH MG: 150 INJECTION, POWDER, LYOPHILIZED, FOR SOLUTION INTRAMUSCULAR; INTRAVENOUS at 17:33

## 2018-10-07 RX ADMIN — Medication SCH MG: at 10:08

## 2018-10-07 RX ADMIN — LEVETIRACETAM SCH MG: 100 SOLUTION ORAL at 10:08

## 2018-10-07 RX ADMIN — VITAMIN A AND VITAMIN D SCH APPLIC: 929.3 OINTMENT TOPICAL at 20:11

## 2018-10-07 RX ADMIN — LEVETIRACETAM SCH MG: 100 SOLUTION ORAL at 13:39

## 2018-10-07 NOTE — GENERAL PROGRESS NOTE
Assessment/Plan


Status:  stable


Assessment/Plan





# Leukocytosis - sepsis with elevated temperature of 103 degrees Fahrenheit on 

admission. Had uti v bacteremia (CoNS) on abx, has improved, had infection on 

prior admission. Still fever persists on abx


--> Pt on antibiotics, has received colistin and daptomycin


--> Appreciate Infectious Disease recs


--> 2D echo per ID performed on 8/1: No discrete vegetations seen, however SBE 

may not be excluded by transthoracic 2-D echo


--> wbc elevation unlikely related to bone marrow process


--> Currently, WBC at 12.4, has been improving. Afebrile. 


# Anemia of chronic disease. No hemolysis, peripheral smear reviewed, ferritin 

was elevated.


--> Continue to closely monitor


--> Hgb goal >7


--> CURRENT Hgb 13.1--> hemoconcentration


--> retic 1371, % sat 24%


# Acute kidney injury on ivf


--> Currently on IV fluids, IV bolus given to see if the patient responds along 

with IV pressor as needed.  


--> Closely monitor with Nephrology team.


# Septic shock, use antibiotic per ID services.


--> potential uti, on abx and bacteremia on abx


# Respiratory failure, status post tracheostomy, on mechanical ventilation per 

Dr. Pena


--> Remains on vent/trach


--> 8/13 CXR: Unchanged left pleural effusion versus scarring, over 4 days


# Hypercalcemia. PTH is 37 and Ca++ is elevated


--> monitor for improvement





The time the note was entered does not necessarily correspond to the time the 

patient was seen.





Greatly appreciate consultation!





Subjective


Hematologic/Lymphatic:  Reports: anemia


Allergies:  


Coded Allergies:  


     AZTREONAM (Verified  Allergy, Unknown, 7/23/18)


Subjective


No acute events. WBC improving, is on abx. Afebrile H/H stable.





Objective





Last 24 Hour Vital Signs








  Date Time  Temp Pulse Resp B/P (MAP) Pulse Ox O2 Delivery O2 Flow Rate FiO2


 


10/7/18 16:28  105      


 


10/7/18 16:17  108 18  100 Mechanical Ventilator  30


 


10/7/18 16:07  104 16     30


 


10/7/18 16:07  109 16  100 Mechanical Ventilator  30


 


10/7/18 16:00 98.1 104 16 114/74 (87) 100   





 98.1       


 


10/7/18 16:00        30


 


10/7/18 16:00      Mechanical Ventilator  


 


10/7/18 12:29  100 17     30


 


10/7/18 12:00 97.9 105 18 104/64 (77) 100   





 97.9       


 


10/7/18 12:00        30


 


10/7/18 12:00      Mechanical Ventilator  


 


10/7/18 11:47  101      


 


10/7/18 08:33  102 17     30


 


10/7/18 08:00        30


 


10/7/18 08:00 98.1 101 16 115/79 (91) 100   





 98.1       


 


10/7/18 08:00      Mechanical Ventilator  


 


10/7/18 07:59  102      


 


10/7/18 05:05  103 17     30


 


10/7/18 04:00 98.4 103 17 110/70 (83) 100   





 98.4       


 


10/7/18 04:00      Mechanical Ventilator  


 


10/7/18 04:00  103      


 


10/7/18 04:00        30


 


10/7/18 03:30  103 16     30


 


10/7/18 01:10  105 18     30


 


10/7/18 00:00  104      


 


10/7/18 00:00 98.2 107 17 100/75 (83) 100   





 98.2       


 


10/7/18 00:00        30


 


10/7/18 00:00      Mechanical Ventilator  


 


10/6/18 22:31  104 18     30


 


10/6/18 21:45  106 17  100 Mechanical Ventilator  30


 


10/6/18 21:30  102 17  99 Mechanical Ventilator  30


 


10/6/18 20:36  105 17     30


 


10/6/18 20:00      Mechanical Ventilator  


 


10/6/18 20:00 98.1 105 16 111/77 (88) 100   





 98.1       


 


10/6/18 20:00  104      


 


10/6/18 20:00        30


 


10/6/18 19:30  101 18     30

















Intake and Output  


 


 10/6/18 10/7/18





 19:00 07:00


 


Intake Total 1858 ml 1695 ml


 


Output Total 1075 ml 900 ml


 


Balance 783 ml 795 ml


 


  


 


Intake Free Water 220 ml 100 ml


 


IV Total 918 ml 875 ml


 


Tube Feeding 720 ml 720 ml


 


Output Urine Total 775 ml 550 ml


 


Stool Total 300 ml 350 ml








Laboratory Tests


10/7/18 05:25: 


White Blood Count 12.4H, Red Blood Count 4.11L, Hemoglobin 11.9L, Hematocrit 

36.8L, Mean Corpuscular Volume 89, Mean Corpuscular Hemoglobin 29.0, Mean 

Corpuscular Hemoglobin Concent 32.4, Red Cell Distribution Width 14.4, Platelet 

Count 332, Mean Platelet Volume 9.5, Neutrophils (%) (Auto) 63.4, Lymphocytes (%

) (Auto) 21.3, Monocytes (%) (Auto) 8.8, Eosinophils (%) (Auto) 5.8H, Basophils 

(%) (Auto) 0.7, Sodium Level 135L, Potassium Level 4.4, Chloride Level 103, 

Carbon Dioxide Level 21, Anion Gap 11, Blood Urea Nitrogen 21H, Creatinine 1.0, 

Estimat Glomerular Filtration Rate > 60, Glucose Level 191H, Calcium Level 9.9, 

Phosphorus Level 2.6, Magnesium Level 1.9, Total Bilirubin 0.4, Aspartate Amino 

Transf (AST/SGOT) 19, Alanine Aminotransferase (ALT/SGPT) 22, Alkaline 

Phosphatase 108, Total Protein 7.3, Albumin 2.2L, Globulin 5.1, Albumin/

Globulin Ratio 0.4L


Height (Feet):  5


Height (Inches):  8.00


Weight (Pounds):  163


General Appearance:  no apparent distress


EENT:  PERRL/EOMI


Neck:  normal alignment


Cardiovascular:  tachycardia


Respiratory/Chest:  no respiratory distress


Abdomen:  soft











Tejas Gerber MD Oct 7, 2018 19:29

## 2018-10-07 NOTE — DIAGNOSTIC IMAGING REPORT
Chest PA and lateral views 

 

INDICATION:  Pneumothorax

 

COMPARISON:  Radiograph dated 10/5/18

 

FINDINGS:  PA and lateral views of the chest are obtained.  Stable 

tracheostomy.  Stable opacity in the left lower lung zone.  Stable 

atelectatic band in the right mid to lower lung zone.  The 

cardiomediastinal silhouette is within normal limits.  No pleural 

effusions.  Bony elements are within normal limits. 

 

IMPRESSION:  No unchanged opacity in the left lower lung zone with stable 

atelectatic bands in the right mid to lower lung zone.  Unchanged 

tracheostomy.  No pneumothorax.

## 2018-10-07 NOTE — PROGRESS NOTE
DATE:  10/06/2018



SUBJECTIVE:  The patient is not awake, not alert, not afebrile, but

remained tachycardic.



PHYSICAL EXAMINATION:

VITAL SIGNS:  Blood pressure 108/65, his pulse is 115, respirations of 16,

and temperature 98.2.

HEENT:  Eyes were normal.  ENT, mucous membranes were moist and intact.

NECK:  Tongue is protruded through the oral cavity and intermittent

_____.

HEART:  Normal sounds with regular beat.  There is no S3, S4, or

pericardial rub.

ABDOMEN:  Soft and nontender with normal bowel sounds.  Gastrostomy site is

clean.

EXTREMITIES:  Warm without cyanosis, clubbing, or edema.



LABORATORY AND DIAGNOSTIC DATA:  His hemoglobin is 11.8, hematocrit 36.3

with MCV of 89, WBC of 13.5, and platelets of 339,000.  His WBC was 17.3

yesterday and 21.5 _____.



His BUN and creatinine 25 and 1.1 respectively.  His sodium is 137,

potassium 4.1, chloride 106, CO2 is 21.  His phosphorus is 2.7 and his

magnesium is 1.7.  His proBNP is 14.  His ESR is 20.



IMPRESSION:  The patient is on triple antibiotics that include penicillin

60 mg q.12 h., daptomycin 450 mg q.24 h., and colistin 150 mg q.12 h.

With this regimen, the patient's _____ and WBC dramatically declined.

Repeat laboratory tests will be done in the a.m.  Repeat chest x-ray will

be done in the a.m. as well.









  ______________________________________________

  Etienne Bloom M.D. DR:  NED

D:  10/06/2018 18:45

T:  10/07/2018 13:38

JOB#:  [id number]

CC:

## 2018-10-07 NOTE — INFECTIOUS DISEASES PROG NOTE
Assessment/Plan


Assessment/Plan


Assessment:








Severe sepsis 


bacteremia


  9/29 Bcx 2/4 CONS; 9/30 1/4 CoNS ; 10/1 :neg , 10/4 :  NTD


UTI 


 10/2  UCx : 10k  PSA 


probable PNA


  -u/a wbc 30-40, nit neg, leuk +2; ucx 10-20k PsA (R CIpro/levo, otherwise S)


  -sp cx ABC (I Tygecicline), CRE K,PNA


  -10/2 CXR : Improvement of persistent left pleural effusion and retrocardiac 

consolidation, over one day


  -CXR:  Small left pleural effusion, not significantly changed. Unchanged 

subsegmental atelectasis versus infiltrate in the left lung base.


  -2d echo: no vegetations





Fever, Sp 


Leukocytosis, improving 


BRAYDON, improving


Hx of UTI


   -7/2018 ucx >100k E. aerogenes (S cefepime, cipro/levo; R Zosyn)





Hx of Bacteremia 7/2018 (PICC line infection, presumed endocarditis)


  -BCX 4/4 E. aerogenes, prob Amp C (S cefepime, cipro/levo; R Zosyn), P. 

mirabilis ESBL (S Zosyn, Ertapenem)


   -repeta 7/26 1/4 CoNS (suspect contaminant)


  -7/28 Staph 





chronic resp failure trach/vent dependant


BPH


GERD


 HTN


DM2


seizure disorder


colostomy


 s/p GT 


hx of VRE and MRSA colonization








Plan:





 -  Cont  Tygacil d#  4  / 7  ( Coverage of [facility] , as Av[facility] not available ) 


-  Cont Daptomycind# 3 ( AB Rx d#  9/10  )for gram positive bacteremia pending 

repeat cultures and to minimize nephrotoxicity


-  Cont IV Colistin and add INH colistin d#  5  / 7   for MDR ABC and CRE K.pna 

in sputum cx


 


\


    - 10/3 SP IV Vancomycin # 5 and Merrem d# 3 


    -9/29 SP Cefepime x1


    -9/9 SP IV Vancomycin #42


    -8/9 SP Ertapenem #14


    -7/27/18 SP Zosyn #4





-f/u Bcx x2


-Monitor CBC/CMP, temperatures


-trach/peg care


-wound care/prevention per hospital care





Subjective


Allergies:  


Coded Allergies:  


     AZTREONAM (Verified  Allergy, Unknown, 7/23/18)


Subjective





afebrile 





wbc improved today





Objective


Vital Signs





Last 24 Hour Vital Signs








  Date Time  Temp Pulse Resp B/P (MAP) Pulse Ox O2 Delivery O2 Flow Rate FiO2


 


10/7/18 19:30  102 16     30


 


10/7/18 16:28  105      


 


10/7/18 16:17  108 18  100 Mechanical Ventilator  30


 


10/7/18 16:07  104 16     30


 


10/7/18 16:07  109 16  100 Mechanical Ventilator  30


 


10/7/18 16:00 98.1 104 16 114/74 (87) 100   





 98.1       


 


10/7/18 16:00        30


 


10/7/18 16:00      Mechanical Ventilator  


 


10/7/18 12:29  100 17     30


 


10/7/18 12:00 97.9 105 18 104/64 (77) 100   





 97.9       


 


10/7/18 12:00        30


 


10/7/18 12:00      Mechanical Ventilator  


 


10/7/18 11:47  101      


 


10/7/18 08:33  102 17     30


 


10/7/18 08:00        30


 


10/7/18 08:00 98.1 101 16 115/79 (91) 100   





 98.1       


 


10/7/18 08:00      Mechanical Ventilator  


 


10/7/18 07:59  102      


 


10/7/18 05:05  103 17     30


 


10/7/18 04:00 98.4 103 17 110/70 (83) 100   





 98.4       


 


10/7/18 04:00      Mechanical Ventilator  


 


10/7/18 04:00  103      


 


10/7/18 04:00        30


 


10/7/18 03:30  103 16     30


 


10/7/18 01:10  105 18     30


 


10/7/18 00:00  104      


 


10/7/18 00:00 98.2 107 17 100/75 (83) 100   





 98.2       


 


10/7/18 00:00        30


 


10/7/18 00:00      Mechanical Ventilator  


 


10/6/18 22:31  104 18     30


 


10/6/18 21:45  106 17  100 Mechanical Ventilator  30


 


10/6/18 21:30  102 17  99 Mechanical Ventilator  30








Height (Feet):  5


Height (Inches):  8.00


Weight (Pounds):  163


HEENT:  atraumatic


Respiratory/Chest:  no accessory muscle use


Cardiovascular:  regular rhythm


Abdomen:  soft, non tender





Laboratory Tests








Test


  10/7/18


05:25


 


White Blood Count


  12.4 K/UL


(4.8-10.8)  H


 


Red Blood Count


  4.11 M/UL


(4.70-6.10)  L


 


Hemoglobin


  11.9 G/DL


(14.2-18.0)  L


 


Hematocrit


  36.8 %


(42.0-52.0)  L


 


Mean Corpuscular Volume 89 FL (80-99)  


 


Mean Corpuscular Hemoglobin


  29.0 PG


(27.0-31.0)


 


Mean Corpuscular Hemoglobin


Concent 32.4 G/DL


(32.0-36.0)


 


Red Cell Distribution Width


  14.4 %


(11.6-14.8)


 


Platelet Count


  332 K/UL


(150-450)


 


Mean Platelet Volume


  9.5 FL


(6.5-10.1)


 


Neutrophils (%) (Auto)


  63.4 %


(45.0-75.0)


 


Lymphocytes (%) (Auto)


  21.3 %


(20.0-45.0)


 


Monocytes (%) (Auto)


  8.8 %


(1.0-10.0)


 


Eosinophils (%) (Auto)


  5.8 %


(0.0-3.0)  H


 


Basophils (%) (Auto)


  0.7 %


(0.0-2.0)


 


Sodium Level


  135 MMOL/L


(136-145)  L


 


Potassium Level


  4.4 MMOL/L


(3.5-5.1)


 


Chloride Level


  103 MMOL/L


()


 


Carbon Dioxide Level


  21 MMOL/L


(21-32)


 


Anion Gap


  11 mmol/L


(5-15)


 


Blood Urea Nitrogen


  21 mg/dL


(7-18)  H


 


Creatinine


  1.0 MG/DL


(0.55-1.30)


 


Estimat Glomerular Filtration


Rate > 60 mL/min


(>60)


 


Glucose Level


  191 MG/DL


()  H


 


Calcium Level


  9.9 MG/DL


(8.5-10.1)


 


Phosphorus Level


  2.6 MG/DL


(2.5-4.9)


 


Magnesium Level


  1.9 MG/DL


(1.8-2.4)


 


Total Bilirubin


  0.4 MG/DL


(0.2-1.0)


 


Aspartate Amino Transf


(AST/SGOT) 19 U/L (15-37)


 


 


Alanine Aminotransferase


(ALT/SGPT) 22 U/L (12-78)


 


 


Alkaline Phosphatase


  108 U/L


()


 


Total Protein


  7.3 G/DL


(6.4-8.2)


 


Albumin


  2.2 G/DL


(3.4-5.0)  L


 


Globulin 5.1 g/dL  


 


Albumin/Globulin Ratio


  0.4 (1.0-2.7)


L











Current Medications








 Medications


  (Trade)  Dose


 Ordered  Sig/Jan


 Route


 PRN Reason  Start Time


 Stop Time Status Last Admin


Dose Admin


 


 Acetaminophen


  (Tylenol)  650 mg  Q4H  PRN


 ORAL


 FEVER  9/29/18 15:05


 10/29/18 15:04  10/5/18 16:13


 


 


 Ascorbic Acid


  (Vitamin C)  500 mg  DAILY


 GT


   10/1/18 09:00


 10/31/18 08:59  10/7/18 10:08


 


 


 Baclofen


  (Lioresal)  10 mg  THREE TIMES A  DAY


 GT


   9/29/18 18:00


 10/29/18 17:59  10/7/18 17:15


 


 


 Colistimethate


 Sodium


  (Colistin


 *inhalation use


 only*)  150 mg  Q12HR@10,22


 INH


   10/3/18 22:00


 10/10/18 21:59  10/7/18 16:07


 


 


 Colistimethate


 Sodium


  (Colistin)  150 mg  EVERY 12  HOURS


 IVP


   10/3/18 21:00


 10/10/18 20:59  10/7/18 20:12


 


 


 Daptomycin 450 mg/


 Sodium Chloride  55 ml @ 


 100 mls/hr  Q24H


 IV


   10/4/18 20:00


 10/11/18 19:59  10/7/18 20:11


 


 


 Dextrose


  (Dextrose 50%)  25 ml  Q30M  PRN


 IV


 Hypoglycemia  10/3/18 15:15


 11/2/18 15:14   


 


 


 Dextrose


  (Dextrose 50%)  50 ml  Q30M  PRN


 IV


 Hypoglycemia  10/3/18 15:15


 11/2/18 15:14   


 


 


 Heparin Sodium


  (Porcine)


  (Heparin 5000


 units/ml)  5,000 units  EVERY 12  HOURS


 SUBQ


   9/29/18 21:00


 10/29/18 20:59  10/7/18 20:13


 


 


 Insulin Aspart


  (NovoLOG)    Q6HR


 SUBQ


   9/30/18 00:00


 10/29/18 16:29  10/7/18 17:16


 


 


 Levetiracetam


  (Keppra)  1,000 mg  TID


 GT


   10/7/18 13:00


 11/6/18 12:59  10/7/18 17:16


 


 


 Ondansetron HCl


  (Zofran)  4 mg  Q6H  PRN


 IVP


 Nausea & Vomiting  9/29/18 15:05


 10/29/18 15:04   


 


 


 Pantoprazole


  (Protonix)  40 mg  DAILY


 IV


   9/30/18 09:00


 10/30/18 08:59  10/7/18 10:09


 


 


 Polyethylene


 Glycol


  (Miralax)  17 gm  DAILYPRN  PRN


 GT


 Constipation  9/29/18 15:45


 10/29/18 15:04   


 


 


 Potassium


 Chloride 40 meq/


 Dextrose/Sodium


 Chloride  1,020 ml @ 


 50 mls/hr  [id number]


 IV


   10/2/18 13:30


 11/1/18 13:29  10/7/18 15:42


 


 


 Sitagliptin


 Phosphate


  (Januvia)  50 mg  DAILY


 GT


   10/6/18 09:00


 10/30/18 08:59  10/7/18 10:08


 


 


 Tigecycline 50 mg/


 Dextrose  110 ml @ 


 220 mls/hr  Q12H


 IVPB


   10/5/18 04:00


 10/12/18 03:59  10/7/18 15:42


 


 


 Vitamin A/Vitamin


 D


  (A & D Oint)  1 applic  Q12H


 TOPIC


   9/29/18 21:00


 10/29/18 20:59  10/7/18 20:11


 

















Roderick Fitzgerald MD Oct 7, 2018 20:48

## 2018-10-08 VITALS — DIASTOLIC BLOOD PRESSURE: 70 MMHG | SYSTOLIC BLOOD PRESSURE: 103 MMHG

## 2018-10-08 VITALS — SYSTOLIC BLOOD PRESSURE: 106 MMHG | DIASTOLIC BLOOD PRESSURE: 67 MMHG

## 2018-10-08 VITALS — SYSTOLIC BLOOD PRESSURE: 114 MMHG | DIASTOLIC BLOOD PRESSURE: 70 MMHG

## 2018-10-08 VITALS — DIASTOLIC BLOOD PRESSURE: 75 MMHG | SYSTOLIC BLOOD PRESSURE: 110 MMHG

## 2018-10-08 VITALS — DIASTOLIC BLOOD PRESSURE: 77 MMHG | SYSTOLIC BLOOD PRESSURE: 112 MMHG

## 2018-10-08 LAB
ADD MANUAL DIFF: NO
ANION GAP SERPL CALC-SCNC: 13 MMOL/L (ref 5–15)
BASOPHILS NFR BLD AUTO: 2.1 % (ref 0–2)
BUN SERPL-MCNC: 20 MG/DL (ref 7–18)
CALCIUM SERPL-MCNC: 9.5 MG/DL (ref 8.5–10.1)
CHLORIDE SERPL-SCNC: 102 MMOL/L (ref 98–107)
CO2 SERPL-SCNC: 18 MMOL/L (ref 21–32)
CREAT SERPL-MCNC: 1.1 MG/DL (ref 0.55–1.3)
EOSINOPHIL NFR BLD AUTO: 4.6 % (ref 0–3)
ERYTHROCYTE [DISTWIDTH] IN BLOOD BY AUTOMATED COUNT: 14.1 % (ref 11.6–14.8)
HCT VFR BLD CALC: 33.4 % (ref 42–52)
HGB BLD-MCNC: 11.1 G/DL (ref 14.2–18)
INR PPP: 1 (ref 0.9–1.1)
LYMPHOCYTES NFR BLD AUTO: 18.1 % (ref 20–45)
MCV RBC AUTO: 88 FL (ref 80–99)
MONOCYTES NFR BLD AUTO: 6.2 % (ref 1–10)
NEUTROPHILS NFR BLD AUTO: 69 % (ref 45–75)
PLATELET # BLD: 353 K/UL (ref 150–450)
POTASSIUM SERPL-SCNC: 4.3 MMOL/L (ref 3.5–5.1)
RBC # BLD AUTO: 3.81 M/UL (ref 4.7–6.1)
SODIUM SERPL-SCNC: 133 MMOL/L (ref 136–145)
WBC # BLD AUTO: 11.5 K/UL (ref 4.8–10.8)

## 2018-10-08 RX ADMIN — LEVETIRACETAM SCH MG: 100 SOLUTION ORAL at 08:08

## 2018-10-08 RX ADMIN — HEPARIN SODIUM SCH UNITS: 5000 INJECTION INTRAVENOUS; SUBCUTANEOUS at 08:19

## 2018-10-08 RX ADMIN — INSULIN ASPART SCH UNITS: 100 INJECTION, SOLUTION INTRAVENOUS; SUBCUTANEOUS at 11:41

## 2018-10-08 RX ADMIN — SODIUM CHLORIDE SCH MLS/HR: 900 INJECTION, SOLUTION INTRAVENOUS at 11:49

## 2018-10-08 RX ADMIN — INSULIN ASPART SCH UNITS: 100 INJECTION, SOLUTION INTRAVENOUS; SUBCUTANEOUS at 17:32

## 2018-10-08 RX ADMIN — TIGECYCLINE SCH MLS/HR: 50 INJECTION, POWDER, LYOPHILIZED, FOR SOLUTION INTRAVENOUS at 17:27

## 2018-10-08 RX ADMIN — TIGECYCLINE SCH MLS/HR: 50 INJECTION, POWDER, LYOPHILIZED, FOR SOLUTION INTRAVENOUS at 03:00

## 2018-10-08 RX ADMIN — SITAGLIPTIN SCH MG: 50 TABLET, FILM COATED ORAL at 08:07

## 2018-10-08 RX ADMIN — PANTOPRAZOLE SODIUM SCH MG: 40 INJECTION, POWDER, FOR SOLUTION INTRAVENOUS at 08:07

## 2018-10-08 RX ADMIN — LEVETIRACETAM SCH MG: 100 SOLUTION ORAL at 12:07

## 2018-10-08 RX ADMIN — COLISTIMETHATE SODIUM SCH MG: 150 INJECTION, POWDER, LYOPHILIZED, FOR SOLUTION INTRAMUSCULAR; INTRAVENOUS at 09:07

## 2018-10-08 RX ADMIN — DAPTOMYCIN SCH MLS/HR: 500 INJECTION, POWDER, LYOPHILIZED, FOR SOLUTION INTRAVENOUS at 16:30

## 2018-10-08 RX ADMIN — LEVETIRACETAM SCH MG: 100 SOLUTION ORAL at 17:26

## 2018-10-08 RX ADMIN — INSULIN ASPART SCH UNITS: 100 INJECTION, SOLUTION INTRAVENOUS; SUBCUTANEOUS at 05:10

## 2018-10-08 RX ADMIN — Medication SCH MG: at 08:08

## 2018-10-08 RX ADMIN — VITAMIN A AND VITAMIN D SCH APPLIC: 929.3 OINTMENT TOPICAL at 08:07

## 2018-10-08 RX ADMIN — COLISTIMETHATE SODIUM SCH MG: 150 INJECTION, POWDER, LYOPHILIZED, FOR SOLUTION INTRAMUSCULAR; INTRAVENOUS at 08:09

## 2018-10-08 NOTE — PROGRESS NOTE
DATE:  10/07/2018



SUBJECTIVE:  The patient is awake, _____ alert, afebrile, and

hemodynamically stable.



PHYSICAL EXAMINATION:

VITAL SIGNS:  Blood pressure 101/69, his pulse is 107, respirations 21, and

temperature 98.1.

HEENT:  Eyes were normal.  ENT, mucous membranes were moist and intact.

Tongue is protruded.

NECK:  Supple with no JVD without lymph nodes.  Tracheostomy site is

clean

LUNGS:  Clear without rhonchi, rales, or wheezing.  Secretions are small,

thin, and grey.

HEART:  Normal sounds with regular beats.  There is no S3, S4, or

pericardial rub.

ABDOMEN:  Soft and nontender with normal bowel sounds.  Gastrostomy site is

clean.

EXTREMITIES:  Warm without cyanosis, clubbing, or edema.



LABORATORY AND DIAGNOSTIC DATA:  Hemoglobin is 11.9, hematocrit _____, MCV

of 89, WBC of 12.4, and platelets _____.  His BUN and creatinine are 21

and 1.0 respectively.  His sodium is 125, potassium _____, chloride 103,

CO2 was 21.  His chest x-ray revealed _____ in the right midlung field

with _____ infiltrate.



IMPRESSION AND PLAN:  _____ be continued.  _____, repeat laboratory testing

will be done in the morning.









  ______________________________________________

  Etienne Bloom M.D.





DR:  [email or web address]

D:  10/08/2018 00:19

T:  10/08/2018 14:13

JOB#:  [id number]

CC:

## 2018-10-08 NOTE — INFECTIOUS DISEASES PROG NOTE
Assessment/Plan


Assessment/Plan


Assessment:








Severe sepsis 


bacteremia


  9/29 Bcx 2/4 CONS; 9/30 1/4 CoNS ; 10/1 :neg , 10/4 :  NTD


UTI 


 10/2  UCx : 10k  PSA 


probable PNA


  -u/a wbc 30-40, nit neg, leuk +2; ucx 10-20k PsA (R CIpro/levo, otherwise S)


  -sp cx ABC (I Tygecicline), CRE K,PNA


  -10/2 CXR : Improvement of persistent left pleural effusion and retrocardiac 

consolidation, over one day


  -CXR:  Small left pleural effusion, not significantly changed. Unchanged 

subsegmental atelectasis versus infiltrate in the left lung base.


  -2d echo: no vegetations





Fever, Sp 


Leukocytosis, improving 


BRAYDON, improving


Elevated CK, improving


Hx of UTI


   -7/2018 ucx >100k E. aerogenes (S cefepime, cipro/levo; R Zosyn)





Hx of Bacteremia 7/2018 (PICC line infection, presumed endocarditis)


  -BCX 4/4 E. aerogenes, prob Amp C (S cefepime, cipro/levo; R Zosyn), P. 

mirabilis ESBL (S Zosyn, Ertapenem)


   -repeta 7/26 1/4 CoNS (suspect contaminant)


  -7/28 Staph 





chronic resp failure trach/vent dependant


BPH


GERD


 HTN


DM2


seizure disorder


colostomy


 s/p GT 


hx of VRE and MRSA colonization








Plan:





 -  Cont  Tygacil d#  5 / 7  ( Coverage of [facility] , as Avycaz not available ) 


-  Cont Daptomycind# 4 ( AB Rx d#  10/10  )for gram positive bacteremia pending 

repeat cultures and to minimize nephrotoxicity


-  Cont IV Colistin and INH colistin d#  6  / 7   for MDR ABC and CRE K.pna in 

sputum cx


 


    - 10/3 SP IV Vancomycin # 5 and Merrem d# 3 


    -9/29 SP Cefepime x1


    -9/9 SP IV Vancomycin #42


    -8/9 SP Ertapenem #14


    -7/27/18 SP Zosyn #4





-f/u Bcx x2


-Monitor CBC/CMP, temperatures


-trach/peg care


-wound care/prevention per hospital care





Subjective


Allergies:  


Coded Allergies:  


     AZTREONAM (Verified  Allergy, Unknown, 7/23/18)


Subjective


afebrile >48hrs


leukocytosis improving


Bcx NTD





Objective


Vital Signs





Last 24 Hour Vital Signs








  Date Time  Temp Pulse Resp B/P (MAP) Pulse Ox O2 Delivery O2 Flow Rate FiO2


 


10/8/18 12:33  109 18     30


 


10/8/18 12:00 97.7 105 18 110/75 (87) 100   





 97.7       


 


10/8/18 12:00        30


 


10/8/18 12:00      Mechanical Ventilator  


 


10/8/18 10:48  110 18     30


 


10/8/18 09:46 98.1 109 18 103/70 (81) 99   





 98.1       


 


10/8/18 09:45  109      


 


10/8/18 09:25  106 18  100 Mechanical Ventilator  30


 


10/8/18 09:08  107 18  98 Mechanical Ventilator  30


 


10/8/18 09:06  107 18     30


 


10/8/18 08:00        30


 


10/8/18 08:00      Mechanical Ventilator  


 


10/8/18 08:00 98.1 109 18 103/70 (81) 99   





 98.1       


 


10/8/18 06:51  101 18     30


 


10/8/18 05:30  105 19     30


 


10/8/18 04:00 98.3 106 17 112/77 (89) 98   





 98.3       


 


10/8/18 04:00        30


 


10/8/18 04:00  106      


 


10/8/18 04:00      Mechanical Ventilator  


 


10/8/18 03:30  103 18     30


 


10/8/18 00:47  102 18     30


 


10/8/18 00:00  107      


 


10/8/18 00:00        30


 


10/8/18 00:00 98.4 107 17 106/67 (80) 100   





 98.4       


 


10/8/18 00:00      Mechanical Ventilator  


 


10/7/18 23:30  104 17     30


 


10/7/18 21:30  103 16     30


 


10/7/18 20:00        30


 


10/7/18 20:00      Mechanical Ventilator  


 


10/7/18 20:00 98.1 107 21 101/69 (80) 100   





 98.1       


 


10/7/18 20:00  106      


 


10/7/18 19:30  102 16     30


 


10/7/18 16:28  105      


 


10/7/18 16:17  108 18  100 Mechanical Ventilator  30


 


10/7/18 16:07  104 16     30


 


10/7/18 16:07  109 16  100 Mechanical Ventilator  30


 


10/7/18 16:00 98.1 104 16 114/74 (87) 100   





 98.1       


 


10/7/18 16:00        30


 


10/7/18 16:00      Mechanical Ventilator  








Height (Feet):  5


Height (Inches):  8.00


Weight (Pounds):  163


Objective


HEENT: Conjunctivae were pink. mucous membranes were not dehydrated..  Soft 

palate was free of ulcerations.  Pharynx was clear from exudate or tonsillar


hypertrophy. 


NECK:  Supple.  There was no goiter.  No mass.  No lymphadenopathy.  There


was no JVD.  No bruits.  Carotid upstroke was 2+.


LUNGS:  Clear.


HEART:  PMI was in fifth left intercostal space in midclavicular line.


There was normal S1 and normal S2.  There was no murmur.  No arrhythmia.


No S3.  No S4.  No pericardial rub.


ABDOMEN:  Soft and nontender without organomegaly.  There were no masses


palpable.  Normal bowel sounds without bruits.  There was no guarding.  No


rebound tenderness.  No ascites.  No hernia.  No CVA tenderness.  Liver


span was 8 cm, smooth, and nontender.


EXTREMITIES:  No cyanosis, no clubbing, and no edema.  Extremities were


warm.





Laboratory Tests








Test


  10/8/18


06:33 10/8/18


10:40


 


White Blood Count


  11.5 K/UL


(4.8-10.8)  H 


 


 


Red Blood Count


  3.81 M/UL


(4.70-6.10)  L 


 


 


Hemoglobin


  11.1 G/DL


(14.2-18.0)  L 


 


 


Hematocrit


  33.4 %


(42.0-52.0)  L 


 


 


Mean Corpuscular Volume 88 FL (80-99)   


 


Mean Corpuscular Hemoglobin


  29.0 PG


(27.0-31.0) 


 


 


Mean Corpuscular Hemoglobin


Concent 33.1 G/DL


(32.0-36.0) 


 


 


Red Cell Distribution Width


  14.1 %


(11.6-14.8) 


 


 


Platelet Count


  353 K/UL


(150-450) 


 


 


Mean Platelet Volume


  8.8 FL


(6.5-10.1) 


 


 


Neutrophils (%) (Auto)


  69.0 %


(45.0-75.0) 


 


 


Lymphocytes (%) (Auto)


  18.1 %


(20.0-45.0)  L 


 


 


Monocytes (%) (Auto)


  6.2 %


(1.0-10.0) 


 


 


Eosinophils (%) (Auto)


  4.6 %


(0.0-3.0)  H 


 


 


Basophils (%) (Auto)


  2.1 %


(0.0-2.0)  H 


 


 


Sodium Level


  133 MMOL/L


(136-145)  L 


 


 


Potassium Level


  4.3 MMOL/L


(3.5-5.1) 


 


 


Chloride Level


  102 MMOL/L


() 


 


 


Carbon Dioxide Level


  18 MMOL/L


(21-32)  L 


 


 


Anion Gap


  13 mmol/L


(5-15) 


 


 


Blood Urea Nitrogen


  20 mg/dL


(7-18)  H 


 


 


Creatinine


  1.1 MG/DL


(0.55-1.30) 


 


 


Estimat Glomerular Filtration


Rate > 60 mL/min


(>60) 


 


 


Glucose Level


  163 MG/DL


()  H 


 


 


Calcium Level


  9.5 MG/DL


(8.5-10.1) 


 


 


Prothrombin Time


  


  11.0 SEC


(9.30-11.50)


 


Prothromb Time International


Ratio 


  1.0 (0.9-1.1)  


 











Current Medications








 Medications


  (Trade)  Dose


 Ordered  Sig/Jan


 Route


 PRN Reason  Start Time


 Stop Time Status Last Admin


Dose Admin


 


 Acetaminophen


  (Tylenol)  650 mg  Q4H  PRN


 ORAL


 FEVER  9/29/18 15:05


 10/29/18 15:04  10/5/18 16:13


 


 


 Ascorbic Acid


  (Vitamin C)  500 mg  DAILY


 GT


   10/1/18 09:00


 10/31/18 08:59  10/8/18 08:08


 


 


 Baclofen


  (Lioresal)  10 mg  THREE TIMES A  DAY


 GT


   9/29/18 18:00


 10/29/18 17:59  10/8/18 12:07


 


 


 Colistimethate


 Sodium


  (Colistin


 *inhalation use


 only*)  150 mg  Q12HR@10,22


 INH


   10/3/18 22:00


 10/10/18 21:59  10/8/18 09:07


 


 


 Colistimethate


 Sodium


  (Colistin)  150 mg  EVERY 12  HOURS


 IVP


   10/3/18 21:00


 10/10/18 20:59  10/8/18 08:09


 


 


 Daptomycin 450 mg/


 Sodium Chloride  55 ml @ 


 100 mls/hr  Q24H


 IV


   10/4/18 20:00


 10/11/18 19:59  10/7/18 20:11


 


 


 Dextrose


  (Dextrose 50%)  25 ml  Q30M  PRN


 IV


 Hypoglycemia  10/3/18 15:15


 11/2/18 15:14   


 


 


 Dextrose


  (Dextrose 50%)  50 ml  Q30M  PRN


 IV


 Hypoglycemia  10/3/18 15:15


 11/2/18 15:14   


 


 


 Heparin Sodium


  (Porcine)


  (Heparin 5000


 units/ml)  5,000 units  EVERY 12  HOURS


 SUBQ


   9/29/18 21:00


 10/29/18 20:59  10/8/18 08:19


 


 


 Insulin Aspart


  (NovoLOG)    Q6HR


 SUBQ


   9/30/18 00:00


 10/29/18 16:29  10/8/18 11:41


 


 


 Levetiracetam


  (Keppra)  1,000 mg  TID


 GT


   10/7/18 13:00


 11/6/18 12:59  10/8/18 12:07


 


 


 Ondansetron HCl


  (Zofran)  4 mg  Q6H  PRN


 IVP


 Nausea & Vomiting  9/29/18 15:05


 10/29/18 15:04   


 


 


 Pantoprazole


  (Protonix)  40 mg  DAILY


 IV


   9/30/18 09:00


 10/30/18 08:59  10/8/18 08:07


 


 


 Polyethylene


 Glycol


  (Miralax)  17 gm  DAILYPRN  PRN


 GT


 Constipation  9/29/18 15:45


 10/29/18 15:04   


 


 


 Potassium


 Chloride 40 meq/


 Dextrose/Sodium


 Chloride  1,020 ml @ 


 50 mls/hr  [id number]


 IV


   10/2/18 13:30


 11/1/18 13:29  10/8/18 11:49


 


 


 Sitagliptin


 Phosphate


  (Januvia)  50 mg  DAILY


 GT


   10/6/18 09:00


 10/30/18 08:59  10/8/18 08:07


 


 


 Tigecycline 50 mg/


 Dextrose  110 ml @ 


 220 mls/hr  Q12H


 IVPB


   10/5/18 04:00


 10/12/18 03:59  10/8/18 03:00


 


 


 Vitamin A/Vitamin


 D


  (A & D Oint)  1 applic  Q12H


 TOPIC


   9/29/18 21:00


 10/29/18 20:59  10/8/18 08:07


 

















Joy Love M.D. Oct 8, 2018 12:54

## 2018-10-08 NOTE — GENERAL PROGRESS NOTE
Assessment/Plan


Assessment/Plan





# Leukocytosis - sepsis with elevated temperature of 103 degrees F on 

admission. Had uti v bacteremia (CoNS) on abx, has improved, had infection on 

prior admission. Still fever persists on abx


--> Pt on antibiotics, have been changed as per ID


--> Appreciate Infectious Disease recs


--> 2D echo per ID performed on 8/1: No discrete vegetations seen, however SBE 

may not be excluded by transthoracic 2-D echo


--> wbc elevation unlikely related to bone marrow process


--> Currently, WBC at 12.4, has been improving. Afebrile. 


# Anemia of chronic disease. No hemolysis, peripheral smear reviewed, ferritin 

was elevated.


--> Continue to closely monitor


--> Hgb goal >7


--> CURRENT Hgb 10 - 13.1--> due to hemoconcentration


--> retic 1371, % sat 24%


# Acute kidney injury on ivf


--> Currently on IV fluids, IV bolus given to see if the patient responds along 

with IV pressor as needed.  


--> Closely monitor with Nephrology team.


# Septic shock, use antibiotic per ID services.


--> potential uti, on abx and bacteremia on abx


# Respiratory failure, status post tracheostomy, on mechanical ventilation per 

Dr. Pena


--> Remains on vent/trach


--> 8/13 CXR: Unchanged left pleural effusion versus scarring, over 4 days


# Hypercalcemia. PTH is 37 and Ca++ is elevated


--> monitor for improvement





The time the note was entered does not necessarily correspond to the time the 

patient was seen.





Greatly appreciate consultation!





Subjective


ROS Limited/Unobtainable:  Yes


Gastrointestinal/Abdominal:  Denies: no symptoms, abdomen distended, abdominal 

pain, black stools, tarry stools, blood in stool, constipated, diarrhea, 

difficulty swallowing, nausea, poor appetite, poor fluid intake, rectal bleeding

, vomiting, other


Allergies:  


Coded Allergies:  


     AZTREONAM (Verified  Allergy, Unknown, 7/23/18)


Subjective


No acute events. WBC improving, is on abx. Afebrile H/H stable.





Objective





Last 24 Hour Vital Signs








  Date Time  Temp Pulse Resp B/P (MAP) Pulse Ox O2 Delivery O2 Flow Rate FiO2


 


10/8/18 05:30  105 19     30


 


10/8/18 04:00 98.3 106 17 112/77 (89) 98   





 98.3       


 


10/8/18 04:00        30


 


10/8/18 04:00  106      


 


10/8/18 04:00      Mechanical Ventilator  


 


10/8/18 03:30  103 18     30


 


10/8/18 00:47  102 18     30


 


10/8/18 00:00  107      


 


10/8/18 00:00        30


 


10/8/18 00:00 98.4 107 17 106/67 (80) 100   





 98.4       


 


10/8/18 00:00      Mechanical Ventilator  


 


10/7/18 23:30  104 17     30


 


10/7/18 21:30  103 16     30


 


10/7/18 20:00        30


 


10/7/18 20:00      Mechanical Ventilator  


 


10/7/18 20:00 98.1 107 21 101/69 (80) 100   





 98.1       


 


10/7/18 20:00  106      


 


10/7/18 19:30  102 16     30


 


10/7/18 16:28  105      


 


10/7/18 16:17  108 18  100 Mechanical Ventilator  30


 


10/7/18 16:07  104 16     30


 


10/7/18 16:07  109 16  100 Mechanical Ventilator  30


 


10/7/18 16:00 98.1 104 16 114/74 (87) 100   





 98.1       


 


10/7/18 16:00        30


 


10/7/18 16:00      Mechanical Ventilator  


 


10/7/18 12:29  100 17     30


 


10/7/18 12:00 97.9 105 18 104/64 (77) 100   





 97.9       


 


10/7/18 12:00        30


 


10/7/18 12:00      Mechanical Ventilator  


 


10/7/18 11:47  101      


 


10/7/18 08:33  102 17     30


 


10/7/18 08:00        30


 


10/7/18 08:00 98.1 101 16 115/79 (91) 100   





 98.1       


 


10/7/18 08:00      Mechanical Ventilator  


 


10/7/18 07:59  102      

















Intake and Output  


 


 10/7/18 10/8/18





 19:00 07:00


 


Intake Total 1245 ml 1587 ml


 


Output Total 1700 ml 400 ml


 


Balance -455 ml 1187 ml


 


  


 


Intake Free Water  100 ml


 


IV Total 525 ml 827 ml


 


Tube Feeding 720 ml 660 ml


 


Output Urine Total 1450 ml 400 ml


 


Stool Total 250 ml 


 


# Bowel Movements  1








Laboratory Tests


10/8/18 06:33: 


White Blood Count [Pending], Red Blood Count [Pending], Hemoglobin [Pending], 

Hematocrit [Pending], Mean Corpuscular Volume [Pending], Mean Corpuscular 

Hemoglobin [Pending], Mean Corpuscular Hemoglobin Concent [Pending], Red Cell 

Distribution Width [Pending], Platelet Count [Pending], Mean Platelet Volume [

Pending], Neutrophils (%) (Auto) [Pending], Lymphocytes (%) (Auto) [Pending], 

Monocytes (%) (Auto) [Pending], Eosinophils (%) (Auto) [Pending], Basophils (%) 

(Auto) [Pending], Sodium Level [Pending], Potassium Level [Pending], Chloride 

Level [Pending], Carbon Dioxide Level [Pending], Blood Urea Nitrogen [Pending], 

Creatinine [Pending], Estimat Glomerular Filtration Rate [Pending], Glucose 

Level [Pending], Calcium Level [Pending]


Height (Feet):  5


Height (Inches):  8.00


Weight (Pounds):  163


General Appearance:  no apparent distress


EENT:  TMs normal


Neck:  supple


Cardiovascular:  regular rhythm


Respiratory/Chest:  normal breath sounds, other - trach/vent


Abdomen:  soft, other - +peg tfeed


Extremities:  non-tender


Edema:  1+ Leg (L), 1+ Leg (R)


Edema:  mild edema


Neurologic:  alert


Skin:  warm/dry











Tejas Gerber MD Oct 8, 2018 06:53

## 2018-10-08 NOTE — PULMONOLGY CRITICAL CARE NOTE
Critical Care - Asmt/Plan


Problems:  


(1) Acute on chronic respiratory failure


(2) Sepsis


(3) UTI (urinary tract infection)


(4) Diabetes mellitus


(5) Vegetative state


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


Respiratory:  monitor respiratory rate, adjust FIO2, CXR


Cardiac:  continue to monitor HR/BP


Renal:  F/U I&O


Infectious Disease:  check cultures


Gastrointestinal:  continue feedings/current rate


Endocrine:  check HgA1C


Hematologic:  monitor H/H


Neurologic:  PRN Morphine


Prophylaxis:  Protonix


Notes Reviewed:  cardio, renal


Discussed with:  consultants, 





Critical Care - Objective





Last 24 Hour Vital Signs








  Date Time  Temp Pulse Resp B/P (MAP) Pulse Ox O2 Delivery O2 Flow Rate FiO2


 


10/8/18 09:46 98.1 109 18 103/70 (81) 99   





 98.1       


 


10/8/18 09:45  109      


 


10/8/18 09:25  106 18  100 Mechanical Ventilator  30


 


10/8/18 09:08  107 18  98 Mechanical Ventilator  30


 


10/8/18 09:06  107 18     30


 


10/8/18 08:00        30


 


10/8/18 08:00      Mechanical Ventilator  


 


10/8/18 08:00 98.1 109 18 103/70 (81) 99   





 98.1       


 


10/8/18 06:51  101 18     30


 


10/8/18 05:30  105 19     30


 


10/8/18 04:00 98.3 106 17 112/77 (89) 98   





 98.3       


 


10/8/18 04:00        30


 


10/8/18 04:00  106      


 


10/8/18 04:00      Mechanical Ventilator  


 


10/8/18 03:30  103 18     30


 


10/8/18 00:47  102 18     30


 


10/8/18 00:00  107      


 


10/8/18 00:00        30


 


10/8/18 00:00 98.4 107 17 106/67 (80) 100   





 98.4       


 


10/8/18 00:00      Mechanical Ventilator  


 


10/7/18 23:30  104 17     30


 


10/7/18 21:30  103 16     30


 


10/7/18 20:00        30


 


10/7/18 20:00      Mechanical Ventilator  


 


10/7/18 20:00 98.1 107 21 101/69 (80) 100   





 98.1       


 


10/7/18 20:00  106      


 


10/7/18 19:30  102 16     30


 


10/7/18 16:28  105      


 


10/7/18 16:17  108 18  100 Mechanical Ventilator  30


 


10/7/18 16:07  104 16     30


 


10/7/18 16:07  109 16  100 Mechanical Ventilator  30


 


10/7/18 16:00 98.1 104 16 114/74 (87) 100   





 98.1       


 


10/7/18 16:00        30


 


10/7/18 16:00      Mechanical Ventilator  


 


10/7/18 12:29  100 17     30


 


10/7/18 12:00 97.9 105 18 104/64 (77) 100   





 97.9       


 


10/7/18 12:00        30


 


10/7/18 12:00      Mechanical Ventilator  


 


10/7/18 11:47  101      








Status:  obtunded


Condition:  critical


HEENT:  atraumatic


Lungs:  clear


Heart:  HR/BP stable


Abdomen:  active bowel sounds, feeding tube


Extremities:  edema


Decubiti:  location


Accucheck:  146





Critical Care - Subjective


ROS Limited/Unobtainable:  Yes


Condition:  critical


EKG Rhythm:  Sinus Rhythm


FI02:  30


Vent Support Breath Rate:  16


Vent Support Mode:  AC


Vent Tidal Volume:  600


Sputum Amount:  Moderate


PEEP:  5.0


PIP:  28


Tube Feeding Amount:  60


I&O:











Intake and Output  


 


 10/7/18 10/8/18





 19:00 07:00


 


Intake Total 1245 ml 1697 ml


 


Output Total 1700 ml 400 ml


 


Balance -455 ml 1297 ml


 


  


 


Intake Free Water  100 ml


 


IV Total 525 ml 877 ml


 


Tube Feeding 720 ml 720 ml


 


Output Urine Total 1450 ml 400 ml


 


Stool Total 250 ml 


 


# Bowel Movements  1








CXR:


no new changes


Labs:





Laboratory Tests








Test


  10/8/18


06:33


 


White Blood Count


  11.5 K/UL


(4.8-10.8)  H


 


Red Blood Count


  3.81 M/UL


(4.70-6.10)  L


 


Hemoglobin


  11.1 G/DL


(14.2-18.0)  L


 


Hematocrit


  33.4 %


(42.0-52.0)  L


 


Mean Corpuscular Volume 88 FL (80-99)  


 


Mean Corpuscular Hemoglobin


  29.0 PG


(27.0-31.0)


 


Mean Corpuscular Hemoglobin


Concent 33.1 G/DL


(32.0-36.0)


 


Red Cell Distribution Width


  14.1 %


(11.6-14.8)


 


Platelet Count


  353 K/UL


(150-450)


 


Mean Platelet Volume


  8.8 FL


(6.5-10.1)


 


Neutrophils (%) (Auto)


  69.0 %


(45.0-75.0)


 


Lymphocytes (%) (Auto)


  18.1 %


(20.0-45.0)  L


 


Monocytes (%) (Auto)


  6.2 %


(1.0-10.0)


 


Eosinophils (%) (Auto)


  4.6 %


(0.0-3.0)  H


 


Basophils (%) (Auto)


  2.1 %


(0.0-2.0)  H


 


Sodium Level


  133 MMOL/L


(136-145)  L


 


Potassium Level


  4.3 MMOL/L


(3.5-5.1)


 


Chloride Level


  102 MMOL/L


()


 


Carbon Dioxide Level


  18 MMOL/L


(21-32)  L


 


Anion Gap


  13 mmol/L


(5-15)


 


Blood Urea Nitrogen


  20 mg/dL


(7-18)  H


 


Creatinine


  1.1 MG/DL


(0.55-1.30)


 


Estimat Glomerular Filtration


Rate > 60 mL/min


(>60)


 


Glucose Level


  163 MG/DL


()  H


 


Calcium Level


  9.5 MG/DL


(8.5-10.1)

















Yury Pena MD Oct 8, 2018 11:00

## 2018-10-11 NOTE — DISCHARGE SUMMARY
Discharge Summary


Discharge Summary


_


DATE OF ADMISSION: 09/29/2018





DATE OF DISCHARGE: 10/08/2018





REASON FOR ADMISSION: 


53 years old male with past medical history of ventilator dependent respiratory 

failure, tracheostomy status, status post intracerebral hemorrhage, COPD, 

diabetes mellitus, seizure disorder, BPH, dysphagia, G-tube , colostomy status,

  was sent for evaluation from skilled nursing facility due to fever.  


Upon evaluation in emergency department patient was found to be febrile and 

tachycardic . 


Laboratory workup revealed significant leukocytosis along with the evidence of 

renal failure.  


Troponin was negative.  


EKG revealed sinus tachycardia,  but no acute ischemic changes.  


Urinalysis was grossly positive for UTI. 


Chest x-ray revealed atelectasis versus infiltrate.  


Septic workup was initiated in emergency department. 


Patient was admitted for further management to direct observational unit with 

diagnoses of  sepsis, urinary tract infection, possible pneumonia ,acute on 

chronic respiratory failure, chronic ventilator dependent respiratory failure, 

tracheostomy status, acute ,renal failure, possible dehydration, chronic 

encephalopathy with  vegetative state, dysphagia, G-tube, diabetes mellitus, 

COPD, seizure disorder, history of intracerebral hemorrhage, colostomy status.


 


CONSULTANTS:


pulmonary Dr. Pena 


[facility]


hematologist/oncologist Dr. Gerber


 


[facility] COURSE: 


Patient admitted to direct observational unit and started on empiric antibiotic 

and IV fluids. 


Infectious disease specialist closely followed.  


Urine culture revealed Pseudomonas.  


Blood culture initially revealed Staphylococcus auricularis and repeated blood 

culture revealed Staphylococcus coagulase negative. 


Last blood culture on October 1 came back negative.  


Sputum culture revealed Acinetobacter MDR, Klebsiella pneumonia carbapenem  

resistant and Morganella.  


IV antibiotic regimen optimized as per ID specialist recommendations and  needs 

to be continued at the skilled nursing facility to complete the course.  


Leukocytosis trending down, prior to discharge WBC 11.5 ,no fevers. 


Echocardiogram revealed preserved ejection fraction of 55-60%, no wall  motion 

abnormality.  


No evidence of vegetation.





Baseline ABG was stable on current settings.  


Ventilator support and tracheostomy care provided.  


Pulmonary toilet provided as needed. 


Patient was followed up with the chest x-ray.  


Venous duplex bilateral lower extremities revealed no evidence of acute DVT.  


Renal parameters and  electrolytes were closely monitored.  Electrolytes 

corrected as needed, and nephrotoxins were avoided.  


Acute renal failure was likely due to sepsis and dehydration, and resolved as 

patient clinically stabilized.   





Strict aspiration precautions were maintained.  


Patient started on G-tube feeding .  


G-tube site care provided.  


Patient was able to tolerate tube feeding. 


Seizure precautions were maintained.  


Keppra was continued.  


No evidence of seizure activity while in the hospital.  


Blood sugar was managed with Januvia and sliding scale of  insulin.  


Colostomy care provided.  


Pain management was addressed,  and pain was controlled.  


Supportive care provided.  


DVT and GI prophylaxis provided.  





Hematologist closely followed.  


Hemoglobin and hematocrit were closely monitored with goal to keep hemoglobin 

above 7.  


Hemoglobin and hematocrit remained at   the baseline, no need for transfusion.  


Anemia workup was consistent with anemia of chronic disease.  


No evidence  of hemolysis  on peripheral blood smear review. 


Initially patient was hypercalcemic . PTH  was within normal limits . Calcium 

down to normal .  


Patient  was clinically stabilized and ready for transfer to skilled nursing 

facility for continuation of care. 





FINAL DIAGNOSES: 


Severe sepsis with bacteremia


Pseudomonas UTI


Probable pneumonia


Acute on chronic hypoxemic respiratory failure


Chronic ventilator dependent respiratory failure with tracheostomy status


Acute kidney injury due to sepsis and dehydration, resolved


Dysphagia G-tube 


Chronic encephalopathy  with  vegetative state 


Diabetes mellitus 


COPD i


Seizure disorder 


History of intracerebral hemorrhage 


Colostomy status 


Anemia of chronic disease





DISCHARGE MEDICATIONS:


List of medication was sent to accepting facility.





DISCHARGE INSTRUCTIONS:


Patient was discharged to the skilled nursing facility. Follow up with medical 

doctor at the facility.





I have been assigned to dictate discharge summary for this account. I was not 

involved in the patient's management.











Isabel Aguilar NP Oct 11, 2018 10:48

## 2019-01-27 ENCOUNTER — HOSPITAL ENCOUNTER (INPATIENT)
Dept: HOSPITAL 72 - EMR | Age: 54
LOS: 16 days | Discharge: NURSING, REHAB OR LONG-TERM CARE | DRG: 870 | End: 2019-02-12
Payer: COMMERCIAL

## 2019-01-27 VITALS — SYSTOLIC BLOOD PRESSURE: 136 MMHG | DIASTOLIC BLOOD PRESSURE: 80 MMHG

## 2019-01-27 VITALS — BODY MASS INDEX: 30.92 KG/M2 | WEIGHT: 197 LBS | HEIGHT: 67 IN

## 2019-01-27 DIAGNOSIS — R13.10: ICD-10-CM

## 2019-01-27 DIAGNOSIS — K85.90: ICD-10-CM

## 2019-01-27 DIAGNOSIS — G93.40: ICD-10-CM

## 2019-01-27 DIAGNOSIS — J96.20: ICD-10-CM

## 2019-01-27 DIAGNOSIS — R40.3: ICD-10-CM

## 2019-01-27 DIAGNOSIS — A41.9: Primary | ICD-10-CM

## 2019-01-27 DIAGNOSIS — R33.9: ICD-10-CM

## 2019-01-27 DIAGNOSIS — J44.9: ICD-10-CM

## 2019-01-27 DIAGNOSIS — N39.0: ICD-10-CM

## 2019-01-27 DIAGNOSIS — Z93.3: ICD-10-CM

## 2019-01-27 DIAGNOSIS — I10: ICD-10-CM

## 2019-01-27 DIAGNOSIS — Z93.1: ICD-10-CM

## 2019-01-27 DIAGNOSIS — D11.9: ICD-10-CM

## 2019-01-27 DIAGNOSIS — Z99.11: ICD-10-CM

## 2019-01-27 DIAGNOSIS — G40.909: ICD-10-CM

## 2019-01-27 DIAGNOSIS — Z86.73: ICD-10-CM

## 2019-01-27 DIAGNOSIS — D64.9: ICD-10-CM

## 2019-01-27 DIAGNOSIS — Z93.0: ICD-10-CM

## 2019-01-27 LAB
ADD MANUAL DIFF: NO
ALBUMIN SERPL-MCNC: 2.8 G/DL (ref 3.4–5)
ALBUMIN/GLOB SERPL: 0.5 {RATIO} (ref 1–2.7)
ALP SERPL-CCNC: 101 U/L (ref 46–116)
ALT SERPL-CCNC: < 6 U/L (ref 12–78)
ANION GAP SERPL CALC-SCNC: 12 MMOL/L (ref 5–15)
AST SERPL-CCNC: < 5 U/L (ref 15–37)
BASOPHILS NFR BLD AUTO: 1.1 % (ref 0–2)
BILIRUB SERPL-MCNC: 0.9 MG/DL (ref 0.2–1)
BUN SERPL-MCNC: 36 MG/DL (ref 7–18)
CALCIUM SERPL-MCNC: 10.2 MG/DL (ref 8.5–10.1)
CHLORIDE SERPL-SCNC: 104 MMOL/L (ref 98–107)
CK MB SERPL-MCNC: < 0.5 NG/ML (ref 0–3.6)
CK SERPL-CCNC: 139 U/L (ref 26–308)
CO2 SERPL-SCNC: 25 MMOL/L (ref 21–32)
CREAT SERPL-MCNC: 1.8 MG/DL (ref 0.55–1.3)
EOSINOPHIL NFR BLD AUTO: 2.2 % (ref 0–3)
ERYTHROCYTE [DISTWIDTH] IN BLOOD BY AUTOMATED COUNT: 15.9 % (ref 11.6–14.8)
GLOBULIN SER-MCNC: 5.9 G/DL
HCT VFR BLD CALC: 47.9 % (ref 42–52)
HGB BLD-MCNC: 15.5 G/DL (ref 14.2–18)
LYMPHOCYTES NFR BLD AUTO: 10.7 % (ref 20–45)
MCV RBC AUTO: 91 FL (ref 80–99)
MONOCYTES NFR BLD AUTO: 6.5 % (ref 1–10)
NEUTROPHILS NFR BLD AUTO: 79.4 % (ref 45–75)
PLATELET # BLD: 293 K/UL (ref 150–450)
POTASSIUM SERPL-SCNC: 4.8 MMOL/L (ref 3.5–5.1)
RBC # BLD AUTO: 5.24 M/UL (ref 4.7–6.1)
SODIUM SERPL-SCNC: 140 MMOL/L (ref 136–145)
WBC # BLD AUTO: 17.9 K/UL (ref 4.8–10.8)

## 2019-01-27 PROCEDURE — 83036 HEMOGLOBIN GLYCOSYLATED A1C: CPT

## 2019-01-27 PROCEDURE — 80053 COMPREHEN METABOLIC PANEL: CPT

## 2019-01-27 PROCEDURE — 85007 BL SMEAR W/DIFF WBC COUNT: CPT

## 2019-01-27 PROCEDURE — 84165 PROTEIN E-PHORESIS SERUM: CPT

## 2019-01-27 PROCEDURE — 84484 ASSAY OF TROPONIN QUANT: CPT

## 2019-01-27 PROCEDURE — 83690 ASSAY OF LIPASE: CPT

## 2019-01-27 PROCEDURE — 85651 RBC SED RATE NONAUTOMATED: CPT

## 2019-01-27 PROCEDURE — 83880 ASSAY OF NATRIURETIC PEPTIDE: CPT

## 2019-01-27 PROCEDURE — 71045 X-RAY EXAM CHEST 1 VIEW: CPT

## 2019-01-27 PROCEDURE — 84100 ASSAY OF PHOSPHORUS: CPT

## 2019-01-27 PROCEDURE — 82962 GLUCOSE BLOOD TEST: CPT

## 2019-01-27 PROCEDURE — 99291 CRITICAL CARE FIRST HOUR: CPT

## 2019-01-27 PROCEDURE — 96365 THER/PROPH/DIAG IV INF INIT: CPT

## 2019-01-27 PROCEDURE — 83605 ASSAY OF LACTIC ACID: CPT

## 2019-01-27 PROCEDURE — 96368 THER/DIAG CONCURRENT INF: CPT

## 2019-01-27 PROCEDURE — 94002 VENT MGMT INPAT INIT DAY: CPT

## 2019-01-27 PROCEDURE — 82553 CREATINE MB FRACTION: CPT

## 2019-01-27 PROCEDURE — 80202 ASSAY OF VANCOMYCIN: CPT

## 2019-01-27 PROCEDURE — 87181 SC STD AGAR DILUTION PER AGT: CPT

## 2019-01-27 PROCEDURE — 5A1955Z RESPIRATORY VENTILATION, GREATER THAN 96 CONSECUTIVE HOURS: ICD-10-PCS

## 2019-01-27 PROCEDURE — 93005 ELECTROCARDIOGRAM TRACING: CPT

## 2019-01-27 PROCEDURE — 80076 HEPATIC FUNCTION PANEL: CPT

## 2019-01-27 PROCEDURE — 86710 INFLUENZA VIRUS ANTIBODY: CPT

## 2019-01-27 PROCEDURE — 82150 ASSAY OF AMYLASE: CPT

## 2019-01-27 PROCEDURE — 87081 CULTURE SCREEN ONLY: CPT

## 2019-01-27 PROCEDURE — 83735 ASSAY OF MAGNESIUM: CPT

## 2019-01-27 PROCEDURE — 81001 URINALYSIS AUTO W/SCOPE: CPT

## 2019-01-27 PROCEDURE — 94664 DEMO&/EVAL PT USE INHALER: CPT

## 2019-01-27 PROCEDURE — 87040 BLOOD CULTURE FOR BACTERIA: CPT

## 2019-01-27 PROCEDURE — 76937 US GUIDE VASCULAR ACCESS: CPT

## 2019-01-27 PROCEDURE — 85025 COMPLETE CBC W/AUTO DIFF WBC: CPT

## 2019-01-27 PROCEDURE — 36569 INSJ PICC 5 YR+ W/O IMAGING: CPT

## 2019-01-27 PROCEDURE — 06HM33Z INSERTION OF INFUSION DEVICE INTO RIGHT FEMORAL VEIN, PERCUTANEOUS APPROACH: ICD-10-PCS

## 2019-01-27 PROCEDURE — 94003 VENT MGMT INPAT SUBQ DAY: CPT

## 2019-01-27 PROCEDURE — 87086 URINE CULTURE/COLONY COUNT: CPT

## 2019-01-27 PROCEDURE — 74176 CT ABD & PELVIS W/O CONTRAST: CPT

## 2019-01-27 PROCEDURE — 80048 BASIC METABOLIC PNL TOTAL CA: CPT

## 2019-01-27 PROCEDURE — 36415 COLL VENOUS BLD VENIPUNCTURE: CPT

## 2019-01-27 PROCEDURE — 82550 ASSAY OF CK (CPK): CPT

## 2019-01-27 NOTE — NUR
ED Nurse Note:

pt brought in by [facility] from [facility] due to tachycardia 124 
and fever 101.7

## 2019-01-28 VITALS — DIASTOLIC BLOOD PRESSURE: 90 MMHG | SYSTOLIC BLOOD PRESSURE: 119 MMHG

## 2019-01-28 VITALS — SYSTOLIC BLOOD PRESSURE: 150 MMHG | DIASTOLIC BLOOD PRESSURE: 104 MMHG

## 2019-01-28 VITALS — DIASTOLIC BLOOD PRESSURE: 67 MMHG | SYSTOLIC BLOOD PRESSURE: 102 MMHG

## 2019-01-28 VITALS — SYSTOLIC BLOOD PRESSURE: 124 MMHG | DIASTOLIC BLOOD PRESSURE: 94 MMHG

## 2019-01-28 VITALS — DIASTOLIC BLOOD PRESSURE: 78 MMHG | SYSTOLIC BLOOD PRESSURE: 111 MMHG

## 2019-01-28 VITALS — SYSTOLIC BLOOD PRESSURE: 134 MMHG | DIASTOLIC BLOOD PRESSURE: 82 MMHG

## 2019-01-28 VITALS — DIASTOLIC BLOOD PRESSURE: 95 MMHG | SYSTOLIC BLOOD PRESSURE: 137 MMHG

## 2019-01-28 VITALS — SYSTOLIC BLOOD PRESSURE: 114 MMHG | DIASTOLIC BLOOD PRESSURE: 83 MMHG

## 2019-01-28 VITALS — DIASTOLIC BLOOD PRESSURE: 70 MMHG | SYSTOLIC BLOOD PRESSURE: 112 MMHG

## 2019-01-28 LAB
APPEARANCE UR: (no result)
APTT PPP: YELLOW S
GLUCOSE UR STRIP-MCNC: (no result) MG/DL
KETONES UR QL STRIP: (no result)
LEUKOCYTE ESTERASE UR QL STRIP: (no result)
NITRITE UR QL STRIP: POSITIVE
PH UR STRIP: 5 [PH] (ref 4.5–8)
PROT UR QL STRIP: (no result)
SP GR UR STRIP: 1.01 (ref 1–1.03)
UROBILINOGEN UR-MCNC: NORMAL MG/DL (ref 0–1)

## 2019-01-28 RX ADMIN — VANCOMYCIN HCL-SODIUM CHLORIDE IV SOLN 1.25 GM/250ML-0.9% SCH MLS/HR: 1.25-0.9/25 SOLUTION at 21:44

## 2019-01-28 RX ADMIN — INSULIN ASPART SCH UNITS: 100 INJECTION, SOLUTION INTRAVENOUS; SUBCUTANEOUS at 13:28

## 2019-01-28 RX ADMIN — ACETAMINOPHEN PRN MG: 160 SOLUTION ORAL at 13:57

## 2019-01-28 RX ADMIN — INSULIN ASPART SCH UNITS: 100 INJECTION, SOLUTION INTRAVENOUS; SUBCUTANEOUS at 23:44

## 2019-01-28 RX ADMIN — LEVETIRACETAM SCH MG: 100 SOLUTION ORAL at 21:45

## 2019-01-28 RX ADMIN — POLYVINYL ALCOHOL PRN DROP: 14 SOLUTION/ DROPS OPHTHALMIC at 09:24

## 2019-01-28 RX ADMIN — ACETAMINOPHEN PRN MG: 160 SOLUTION ORAL at 09:03

## 2019-01-28 RX ADMIN — SITAGLIPTIN SCH MG: 50 TABLET, FILM COATED ORAL at 09:03

## 2019-01-28 RX ADMIN — PANTOPRAZOLE SODIUM SCH MG: 40 INJECTION, POWDER, FOR SOLUTION INTRAVENOUS at 09:04

## 2019-01-28 RX ADMIN — HEPARIN SODIUM SCH UNITS: 5000 INJECTION INTRAVENOUS; SUBCUTANEOUS at 21:48

## 2019-01-28 RX ADMIN — VITAMIN D, TAB 1000IU (100/BT) SCH INTLU: 25 TAB at 09:04

## 2019-01-28 RX ADMIN — HEPARIN SODIUM SCH UNITS: 5000 INJECTION INTRAVENOUS; SUBCUTANEOUS at 09:00

## 2019-01-28 RX ADMIN — LEVETIRACETAM SCH MG: 100 SOLUTION ORAL at 13:51

## 2019-01-28 RX ADMIN — DEXTROSE MONOHYDRATE SCH MLS/HR: 50 INJECTION, SOLUTION INTRAVENOUS at 10:54

## 2019-01-28 RX ADMIN — DEXTROSE MONOHYDRATE SCH MLS/HR: 50 INJECTION, SOLUTION INTRAVENOUS at 21:44

## 2019-01-28 RX ADMIN — INSULIN ASPART SCH UNITS: 100 INJECTION, SOLUTION INTRAVENOUS; SUBCUTANEOUS at 18:16

## 2019-01-28 NOTE — NUR
NURSE NOTES:

Left message to Dr. Bloom for admission order by Nursing supervisor; Morgan. Awaiting 
call back.

## 2019-01-28 NOTE — NUR
RD ASSESSMENT & RECOMMENDATIONS

SEE CARE ACTIVITY FOR COMPLETE ASSESSMENT



DAILY ESTIMATED NEEDS:

Needs based on Critical care, wound /71kg abw 

24-30  kcals/kg 

4077-8401  total kcals

1.25-2  g protein/kg

  g total protein 

25-30  mL/kg

0132-6261  total fluid mLs



NUTRITION DIAGNOSIS:

* Swallowing difficulty R/T respiratory status, as evidenced by trach/vent

dep, PEG dep.

* Increased kcal and protein needs r/t wound healing as evidenced by pt

with sacral wound, pending eval





CURRENT TF:Glucerna 1.5 @ 65ml/hr x 20 hrs 







ENTERAL NUTRITION RECOMMENDATIONS:

Glucerna 1.5 @ 65ml/hr x 20 hrs  to provide 1300ml, 1950kcal, 107g prot, 987ml free water 



* Rec to continue current TF order- meets 100% est kcal/prot needs

* HOB over 30 degrees/ water flush per MD







ADDITIONAL RECOMMENDATIONS:

* Calibrated bedscale wt for accurate CBW  

* Monitor lytes, replete as needed 

* Wound healing: Add Carlito 1pkt BID, vit C 500mg QD 

              -f/up with wound eval 

* Monitor BGs, need for long acting insulin for improved BG control  

. 

.

## 2019-01-28 NOTE — NUR
NURSE NOTES:



Patient currently afebrile with temp of 98.3F. Cardiac rhythm also improved, patient is NSR 
95 at this time on cardiac monitor.

-------------------------------------------------------------------------------

Addendum: 01/28/19 at 1740 by MAT CROCKER RN

-------------------------------------------------------------------------------

Will continue to monitor.

## 2019-01-28 NOTE — NUR
NURSE NOTES:



Received phone call from Dr. Fish regarding patient's need for mayorga catheter, unable to 
insert in ED. Patient currently voiding via condom cath at this time. Received TO from Dr. Fish to do a bladder scan now and at 12:00. Orders read back and verified, noted and 
carried out. Patient currently has 144 cc of residual in the bladder. Will continue to 
monitor.

## 2019-01-28 NOTE — NUR
54 YO MALE BIBA FROM [facility] TO ER



CC      FEVER,ELEVATED HR



SI:     RESP FAILURE TRACH/VENT DEPENDENT,SEPSIS

T. 101.7  RR 16 B/P 136/80

AC 16  FIO2 40%PEEP 5

WBC 17.9 BUN 36 CR 1.8 

CXR=LEFT PLEURAL THICKNESS VS LEFT PLEURAL EFFUSION







IS:    IVF NS BOLUS X 1 LITER

VANCO IV

ZOSYN IV

*****ADMITTED TO STEP DOWN******

**********STEP DOWN STATUS*******

## 2019-01-28 NOTE — DIAGNOSTIC IMAGING REPORT
Indication: Shortness of breath

 

Technique: One view of the chest

 

Comparison: 10/7/2018

 

Findings: Tracheostomy, right perihilar scarring again noted. Left costophrenic angle

blunting, likely likely pleural fluid, is noted, similar to the prior exam. The heart

size remains borderline enlarged.

 

Impression: Left pleural thickening versus pleural effusion

 

Tracheostomy

 

Right perihilar scarring or atelectasis

## 2019-01-28 NOTE — NUR
NURSE NOTES:



RECEIVED PATIENT FROM CORRINA CROCKER RN. HOOKED TO CARDIAC MONITOR. TRACH TO  VENT. PORTEX 7, 
VENT SETTINGS AC 16, , FIO2 40, PEEP 5. NO SIGNS OF DISTRESS AS OF THE MOMENT. GT 
NOTED, DRY AND INTACT. NOTED COLOSTOMY BAG, PATENT. NOTED SKIN ALTERATION. R FEMORAL TLC, 
PATENT AND INTACT AND R HAND G24, PATENT. CALL LIGHT WITHIN REACH. SIDE RAILS UP. BED AT 
LOWEST POSITION. WILL CONTINUE TO MONITOR.

## 2019-01-28 NOTE — NUR
HAND-OFF: 



Report given to YASH Mitchell RN. Patient remains afebrile with temp of 97.1F. Cooling blanket 
turned off.

## 2019-01-28 NOTE — NUR
NURSE NOTES:WOUND CARE NOTES:Pt presents with Partial thickness wound L side neck under 
trach collar. Wound bed clean ,viable,moist edges noted. Resolving full thickness pressure 
injury sacrum(L)12cm x (W)11.5cm .Wound bed dark and indurated with scattered openings at 
sacral area and R buttocks . Wounds with biofilm easily removed with gentle friction.Wound 
at Sacrococcygeal area with approx 5% slough at base of wound. Minimal sanguineous exudate 
noted to Wound R sacrum. Resolving pressure injury to L trochanter .Wound bed moist -viable 
,epithelialized borders with shearing periwound. No odor or exudate noted (L)3.5cm x (W)3cm. 
Reabsorbing serous blister lateral L heel.Fluctuant in center with dry brown borders without 
erythema R heel dry and blanchable. 



Tx.Plan:Cleanse Sacral wound with Saline.Apply Hydrogel to wounds sacrum .Cavilon Skin 
Barrier periwound.Cover with 

           Optifoam drsg Daily and prn.

           Cleanse wound L trochanter with Saline.Apply Hydrogel .Cavilon skin Barrier 
periwound.Cover with Optifoam drsg

           Daily and prn.

           Apply Cavilon Skin Barrier to both heels .Cover with Optifoam drsg. Change weekly 
and prn.

           Cleanse L side of neck with Saline .Cover with Optifoam drsg.Change weekly and 
prn.

           Reposition at least every 2 hours or as tolerated.

           Off-load heels with pillow.

           Air fluidized mattress.

## 2019-01-28 NOTE — NUR
TRANSFER TO FLOOR:

Patient transferred to [facility] as ordered, per MD Merle .   Report given 
to JOSHUA Albright.  Belongings and medications given to receiveing RN.

## 2019-01-28 NOTE — NUR
NURSE NOTES:



Received report from REYNALDO Blancas RN. Patient obtunded, opens eyes to pain, nonverbal, 
unable to follow commands and make needs known. Trach to vent with settings of AC 16, TV 
600, FiO2 40%, PEEP 5, no s/s of respiratory distress noted. GT feeding of Glucerna 1.5 
running @ 20 cc/hr, no residuals noted. HoB elevated. Condom catheter intact and draining 
well. Right femoral TLC infusing Zosyn 3.375g @ 27.5 cc/hr, no s/s of infiltration noted. 
Right hand 24g saline lock patent and asymptomatic. Patient febrile at this time with temp 
of 100.9F, patient on cooling blanket. Bed locked in lowest position with padded side rails 
up x 3. All needs attended to. Will continue to monitor.

## 2019-01-28 NOTE — NUR
NURSE NOTES:



Telephone admission orders received from Dr. Etienne Bloom. Orders read back and verified.

## 2019-01-28 NOTE — NUR
NURSE NOTES:

Bedside report received from JOSHUA Campbell. Patient obtunded, opens eyes to pain, nonverbal, 
unable to follow commands. Cardiac monitor showing NSR, previously ST, will monitor.  Trach 
to vent with settings of AC 16, , FiO2 40%, PEEP 5, no s/s of respiratory distress 
noted. GT feeding of Glucerna 1.5 running @ 20 cc/hr, 20 cc residual, GOAL 50. HoB elevated. 
Condom catheter intact and draining well. Right femoral TLC, asymptomatic. Right hand 24g 
saline lock patent and asymptomatic.  Colostomy asymptomatic, emptied w/ 100 ml liquid 
stool. Skin is clean and dry, dressings intact.  Patient afebrile at this time, patient has 
cooling blanket in room for previous temp. Bed locked in lowest position, padded side rails 
up x 3, bed alarm on, call bell within reach. All needs attended to. Will continue to 
monitor and follow plan of care.

## 2019-01-28 NOTE — NUR
NURSE NOTES:



Received report from ENZO Lombardo RN. Patient asleep in bed, opens eyes to pain, nonverbal, 
unable to follow commands and make needs known. Trach to vent with settings of AC 16, TV 
600, FiO2 40%, PEEP 5, no s/s of respiratory distress noted. GT in place and flushed. Condom 
catheter intact and draining well. Right femoral TLC and right hand 24g saline lock patent 
and asymptomatic. Patient febrile at this time, cooling measures provided. Bed locked in 
lowest position with padded side rails up x 3. All needs attended to. Will continue to 
monitor.

## 2019-01-28 NOTE — EMERGENCY ROOM REPORT
History of Present Illness


General


Chief Complaint:  Fever


Source:  EMS





Present Illness


HPI


Patient presents from nursing facility with reports of fever


Patient himself is not verbal


Has tracheostomy and is vent dependent appears


Ill





There is no reports of vomiting or diarrhea


History of present illness is significantly limited





Symptoms are unclear of onset


Allergies:  


Coded Allergies:  


     AZTREONAM (Verified  Allergy, Unknown, 7/23/18)





Patient History


Limited by:  medical condition


Past Medical History:  see triage record


Pertinent Family History:  unable to obtain


Reviewed Nursing Documentation:  PMH: Agreed; PSxH: Agreed





Nursing Documentation-PM


Past Medical History:  No History, Except For


Hx Hypertension:  Yes


Hx COPD:  Yes


Hx Diabetes:  Yes


Hx Cancer:  No


Hx Gastrointestinal Problems:  Yes


Hx Neurological Problems:  Yes


Hx Seizures:  Yes





Review of Systems


All Other Systems:  limited - Other than the ones mentioned in the history of 

present illness all others are reviewed however they do stay limited due to the 

patient's mental status





Physical Exam





Vital Signs








  Date Time  Temp Pulse Resp B/P (MAP) Pulse Ox O2 Delivery O2 Flow Rate FiO2


 


1/27/19 21:52 101.7 124 16 136/80 100   


 


1/27/19 22:15      Mechanical Ventilator  40








Sp02 EP Interpretation:  reviewed, normal


General Appearance:  no apparent distress


Head:  normocephalic, atraumatic


ENT:  dry mucus membranes


Neck:  supple


Respiratory:  no retraction, no accessory muscle use, crackles - Bilaterally


Cardiovascular #1:  tachycardia


Gastrointestinal:  soft, other - Feeding tube in place


Genitourinary:  other - Circumcised


Musculoskeletal:  other - Patient chronically debilitated extended and lower 

extremities does not respond to stimuli


Neurologic:  other - Significantly decreased GCS, patient has significant 

chronic illness not verbal does not follow commands


Skin:  other - Chronic skin breakdowns edema bilaterally


Lymphatic:  no adenopathy





Procedures


Critical Care Time


Critical Care Time


50 minutes for multiple re-evaluations critical presentation concerning for 

cardiac cardiopulmonary arrest not include any procedural time





Central Line


Central Line :  


   Consent:  Emergent


   Central Line Lumen:  triple


   Maximal Sterile Barrier Tech:  yes cap, yes mask, yes sterile gown, yes 

sterile gloves, yes large sterile sheet, yes hand hygiene, yes chlorhexidine 

prep


   Central Line Postion:  femoral (R)


   Anesthesia:  Lidocaine


   cc's of anesthesia:  2


   Complications:  none


   Central Line Post Position:  sutured


   Patient Tolerated:  Well


   Complications:  None





Medical Decision Making


Diagnostic Impression:  


 Primary Impression:  


 Fever


 Additional Impressions:  


 Sepsis


 Urinary retention


ER Course


Patient is a fairly complex patient with multiple differential to consideration 

including but not limited to cardiac cardiopulmonary , infectious and vascular 

emergencies





Patient's white blood cell count is elevated patient show signs of sepsis with 

fever and likely source of infection


Aggressive IV hydration initiated and broad-spectrum antibiotics


Patient's blood pressure remains appropriate and does not show signs of septic 

shock








Sepsis reexamination





Time:eval


VS refer to nursing note


cvs: Tachycardic


respiratory: improved respiration


peripheral pulses: 2+radial


cap refill:<2 seconds


skin exam: warm, dry, not mottled





On further evaluation, patient reveals some fullness over the suprapubic area 

attempt of placing a Guzmán catheter is


Futile and is aborted





Ultrasound does show approximately 3-400 cc of urine retained





Urology is consulted for inpatient care and patient admitted for further eval





Labs








Test


  1/27/19


22:10


 


White Blood Count


  17.9 K/UL


(4.8-10.8)


 


Red Blood Count


  5.24 M/UL


(4.70-6.10)


 


Hemoglobin


  15.5 G/DL


(14.2-18.0)


 


Hematocrit


  47.9 %


(42.0-52.0)


 


Mean Corpuscular Volume 91 FL (80-99) 


 


Mean Corpuscular Hemoglobin


  29.5 PG


(27.0-31.0)


 


Mean Corpuscular Hemoglobin


Concent 32.3 G/DL


(32.0-36.0)


 


Red Cell Distribution Width


  15.9 %


(11.6-14.8)


 


Platelet Count


  293 K/UL


(150-450)


 


Mean Platelet Volume


  9.4 FL


(6.5-10.1)


 


Neutrophils (%) (Auto)


  79.4 %


(45.0-75.0)


 


Lymphocytes (%) (Auto)


  10.7 %


(20.0-45.0)


 


Monocytes (%) (Auto)


  6.5 %


(1.0-10.0)


 


Eosinophils (%) (Auto)


  2.2 %


(0.0-3.0)


 


Basophils (%) (Auto)


  1.1 %


(0.0-2.0)


 


Sodium Level


  140 MMOL/L


(136-145)


 


Potassium Level


  4.8 MMOL/L


(3.5-5.1)


 


Chloride Level


  104 MMOL/L


()


 


Carbon Dioxide Level


  25 MMOL/L


(21-32)


 


Anion Gap


  12 mmol/L


(5-15)


 


Blood Urea Nitrogen


  36 mg/dL


(7-18)


 


Creatinine


  1.8 MG/DL


(0.55-1.30)


 


Estimat Glomerular Filtration


Rate 48.5 mL/min


(>60)


 


Glucose Level


  434 MG/DL


()


 


Lactic Acid Level


  1.20 mmol/L


(0.4-2.0)


 


Calcium Level


  10.2 MG/DL


(8.5-10.1)


 


Total Bilirubin


  0.9 MG/DL


(0.2-1.0)


 


Aspartate Amino Transf


(AST/SGOT) < 5 U/L


(15-37)


 


Alanine Aminotransferase


(ALT/SGPT) < 6 U/L


(12-78)


 


Alkaline Phosphatase


  101 U/L


()


 


Total Creatine Kinase


  139 U/L


()


 


Creatine Kinase MB


  < 0.5 NG/ML


(0.0-3.6)


 


Creatine Kinase MB Relative


Index 0.3 


 


 


Troponin I


  0.000 ng/mL


(0.000-0.056)


 


Pro-B-Type Natriuretic Peptide


  257 pg/mL


(0-125)


 


Total Protein


  8.7 G/DL


(6.4-8.2)


 


Albumin


  2.8 G/DL


(3.4-5.0)


 


Globulin 5.9 g/dL 


 


Albumin/Globulin Ratio 0.5 (1.0-2.7) 


 


Lipase


  502 U/L


()








Rhythm Strip Diag. Results


EP Interpretation:  yes


Rate:  120


Rhythm:  no PVC's, no ectopy, other - Sinus tach





Chest X-Ray Diagnostic Results


Chest X-Ray Diagnostic Results :  


   Chest X-Ray Ordered:  Yes


   # of Views/Limited/Complete:  1 View


   Indication:  Chest Pain


   EP Interpretation:  Yes


   Interpretation:  no pneumothorax, other - Left effusion, right lower 

atelectasis


   Impression:  Other - Left effusion right lower atelectasis


   Electronically Signed by:  Mendoza Whyte DO





Last Vital Signs








  Date Time  Temp Pulse Resp B/P (MAP) Pulse Ox O2 Delivery O2 Flow Rate FiO2


 


1/28/19 02:55  136 18     40


 


1/28/19 02:30 99.7       


 


1/28/19 02:30    114/83 100 Mechanical Ventilator  








Status:  improved


Disposition:  ADMITTED AS INPATIENT


Condition:  Critical


Referrals:  


Etienne Bloom MD (PCP)











Mendoza Whyte DO Jan 28, 2019 03:26

## 2019-01-28 NOTE — PULMONOLGY CRITICAL CARE NOTE
Critical Care - Asmt/Plan


Problems:  


(1) Acute on chronic respiratory failure


(2) Sepsis


(3) Fever


(4) Gastrostomy tube dependent


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


(6) Vegetative state


(7) Diabetes mellitus


Respiratory:  monitor respiratory rate, adjust FIO2


Cardiac:  continue to monitor HR/BP


Renal:  check electrolytes


Infectious Disease:  check cultures


Gastrointestinal:  continue feedings/current rate, hold feedings


Hematologic:  transfuse if hgb<8.5


Neurologic:  PRN Ativan, PRN Morphine, keep patient comfortable


Prophylaxis:  Heparin


Notes Reviewed:  internist, renal


Discussed with:  nurses, consultants, 





Critical Care - Objective





Last 24 Hour Vital Signs








  Date Time  Temp Pulse Resp B/P (MAP) Pulse Ox O2 Delivery O2 Flow Rate FiO2


 


1/28/19 09:00  147 17     40


 


1/28/19 08:00      Mechanical Ventilator  


 


1/28/19 08:00 103.3 145 16 112/70 (84) 99   


 


1/28/19 08:00        40


 


1/28/19 07:00  140 18     40


 


1/28/19 06:00 100.5 130 16 102/67 (79) 100   


 


1/28/19 05:12  132 18     40


 


1/28/19 04:41      Mechanical Ventilator  


 


1/28/19 04:00 99.7  16 124/94 (104)    


 


1/28/19 04:00        40


 


1/28/19 04:00      Mechanical Ventilator  


 


1/28/19 04:00  134      


 


1/28/19 03:00 100.0 140 16 124/94 (104) 100   


 


1/28/19 02:55  136 18     40


 


1/28/19 02:30 99.7       


 


1/28/19 02:30 99.7 134 17 114/83 100 Mechanical Ventilator  40


 


1/28/19 02:11 102.2 134 17 114/83 100 Mechanical Ventilator  40


 


1/28/19 01:10  135 16     40


 


1/28/19 01:00 101.5 140 18 150/104 100 Mechanical Ventilator  40


 


1/28/19 00:00 101.5 138 18 137/95 100 Mechanical Ventilator  40


 


1/27/19 23:30  136 18     40


 


1/27/19 22:59 101.5 138 19 136/80 100 Mechanical Ventilator  40


 


1/27/19 22:59  138 19   Mechanical Ventilator  40


 


1/27/19 22:17  138 19     40


 


1/27/19 22:15  138 19   Mechanical Ventilator  40


 


1/27/19 21:52 101.7 124 16 136/80 100   








Status:  obtunded


Condition:  critical


HEENT:  atraumatic


Lungs:  chest wall tender


Heart:  HR/BP unstable


Abdomen:  soft


Extremities:  no C/C/E


Decubiti:  location


Micro:





Microbiology








 Date/Time


Source Procedure


Growth Status


 


 


 1/27/19 22:30


Nasal Nares Influenza Types A,B Antigen (ALVERTO) - Final Complete


 


 1/27/19 22:30


Rectum  Received











Critical Care - Subjective


ROS Limited/Unobtainable:  Yes


Condition:  critical


EKG Rhythm:  Sinus Rhythm


FI02:  40


Vent Support Breath Rate:  16


Vent Support Mode:  AC


Vent Tidal Volume:  600


Sputum Amount:  Small


PEEP:  5.0


PIP:  27


I&O:











Intake and Output  


 


 1/27/19 1/28/19





 19:00 07:00


 


Intake Total  50 ml


 


Output Total  252 ml


 


Balance  -202 ml


 


  


 


Intake Oral  0 ml


 


Free Water  50 ml


 


Output Urine Total  2 ml


 


Stool Total  250 ml


 


# Bowel Movements  2








CXR:


trach intact, no acute infiltrate


Labs:





Laboratory Tests








Test


  1/27/19


22:10


 


White Blood Count


  17.9 K/UL


(4.8-10.8)  H


 


Red Blood Count


  5.24 M/UL


(4.70-6.10)


 


Hemoglobin


  15.5 G/DL


(14.2-18.0)


 


Hematocrit


  47.9 %


(42.0-52.0)


 


Mean Corpuscular Volume 91 FL (80-99)  


 


Mean Corpuscular Hemoglobin


  29.5 PG


(27.0-31.0)


 


Mean Corpuscular Hemoglobin


Concent 32.3 G/DL


(32.0-36.0)


 


Red Cell Distribution Width


  15.9 %


(11.6-14.8)  H


 


Platelet Count


  293 K/UL


(150-450)


 


Mean Platelet Volume


  9.4 FL


(6.5-10.1)


 


Neutrophils (%) (Auto)


  79.4 %


(45.0-75.0)  H


 


Lymphocytes (%) (Auto)


  10.7 %


(20.0-45.0)  L


 


Monocytes (%) (Auto)


  6.5 %


(1.0-10.0)


 


Eosinophils (%) (Auto)


  2.2 %


(0.0-3.0)


 


Basophils (%) (Auto)


  1.1 %


(0.0-2.0)


 


Sodium Level


  140 MMOL/L


(136-145)


 


Potassium Level


  4.8 MMOL/L


(3.5-5.1)


 


Chloride Level


  104 MMOL/L


()


 


Carbon Dioxide Level


  25 MMOL/L


(21-32)


 


Anion Gap


  12 mmol/L


(5-15)


 


Blood Urea Nitrogen


  36 mg/dL


(7-18)  H


 


Creatinine


  1.8 MG/DL


(0.55-1.30)  H


 


Estimat Glomerular Filtration


Rate 48.5 mL/min


(>60)


 


Glucose Level


  434 MG/DL


()  H


 


Lactic Acid Level


  1.20 mmol/L


(0.4-2.0)


 


Calcium Level


  10.2 MG/DL


(8.5-10.1)  H


 


Total Bilirubin


  0.9 MG/DL


(0.2-1.0)


 


Aspartate Amino Transf


(AST/SGOT) < 5 U/L


(15-37)  L


 


Alanine Aminotransferase


(ALT/SGPT) < 6 U/L


(12-78)  L


 


Alkaline Phosphatase


  101 U/L


()


 


Total Creatine Kinase


  139 U/L


()


 


Creatine Kinase MB


  < 0.5 NG/ML


(0.0-3.6)


 


Creatine Kinase MB Relative


Index 0.3  


 


 


Troponin I


  0.000 ng/mL


(0.000-0.056)


 


Pro-B-Type Natriuretic Peptide


  257 pg/mL


(0-125)  H


 


Total Protein


  8.7 G/DL


(6.4-8.2)  H


 


Albumin


  2.8 G/DL


(3.4-5.0)  L


 


Globulin 5.9 g/dL  


 


Albumin/Globulin Ratio


  0.5 (1.0-2.7)


L


 


Lipase


  502 U/L


()  H

















Yury Pena MD Jan 28, 2019 10:44

## 2019-01-28 NOTE — NUR
NURSE NOTES:Received pt obtunded, respond to pain stimuli only , lower extremities flaccid. 
Trache to vent portex 7, on AC mode ST 130s on the monitor. Temp 99.7 pt with stage2-3 
sacral and trochanter area, Rt hand skin tear and left hand, severely dry. picture taken 
done. TX was initiated Gt clamped. Colostomy to drain Rt abdominal side full of flatus and 
yellow brownish soft stool, Suctioned whitish to beige secretions moderate in amt. Cleaned 
up pt. Incontinent of urine, Meatal care done.

## 2019-01-28 NOTE — NUR
NURSE NOTES:



PROVIDED COOLING BLANKET. PATIENT IS STILL FEBRILE. PATIENT KEPT CLEAN AND DRY. WILL 
CONTINUE TO MONITOR.

## 2019-01-29 VITALS — SYSTOLIC BLOOD PRESSURE: 117 MMHG | DIASTOLIC BLOOD PRESSURE: 73 MMHG

## 2019-01-29 VITALS — SYSTOLIC BLOOD PRESSURE: 118 MMHG | DIASTOLIC BLOOD PRESSURE: 84 MMHG

## 2019-01-29 VITALS — DIASTOLIC BLOOD PRESSURE: 77 MMHG | SYSTOLIC BLOOD PRESSURE: 117 MMHG

## 2019-01-29 VITALS — SYSTOLIC BLOOD PRESSURE: 119 MMHG | DIASTOLIC BLOOD PRESSURE: 83 MMHG

## 2019-01-29 VITALS — SYSTOLIC BLOOD PRESSURE: 118 MMHG | DIASTOLIC BLOOD PRESSURE: 83 MMHG

## 2019-01-29 VITALS — SYSTOLIC BLOOD PRESSURE: 124 MMHG | DIASTOLIC BLOOD PRESSURE: 80 MMHG

## 2019-01-29 LAB
ADD MANUAL DIFF: NO
ALBUMIN SERPL-MCNC: 2.3 G/DL (ref 3.4–5)
ALP SERPL-CCNC: 90 U/L (ref 46–116)
ALT SERPL-CCNC: 22 U/L (ref 12–78)
AMYLASE SERPL-CCNC: 45 U/L (ref 25–115)
ANION GAP SERPL CALC-SCNC: 14 MMOL/L (ref 5–15)
AST SERPL-CCNC: 17 U/L (ref 15–37)
BASOPHILS NFR BLD AUTO: 1 % (ref 0–2)
BILIRUB DIRECT SERPL-MCNC: 0.1 MG/DL (ref 0–0.3)
BILIRUB SERPL-MCNC: 0.5 MG/DL (ref 0.2–1)
BUN SERPL-MCNC: 35 MG/DL (ref 7–18)
CALCIUM SERPL-MCNC: 10 MG/DL (ref 8.5–10.1)
CHLORIDE SERPL-SCNC: 109 MMOL/L (ref 98–107)
CO2 SERPL-SCNC: 20 MMOL/L (ref 21–32)
CREAT SERPL-MCNC: 2 MG/DL (ref 0.55–1.3)
EOSINOPHIL NFR BLD AUTO: 5.7 % (ref 0–3)
ERYTHROCYTE [DISTWIDTH] IN BLOOD BY AUTOMATED COUNT: 15.5 % (ref 11.6–14.8)
HCT VFR BLD CALC: 43.8 % (ref 42–52)
HGB BLD-MCNC: 13.8 G/DL (ref 14.2–18)
LYMPHOCYTES NFR BLD AUTO: 12.2 % (ref 20–45)
MCV RBC AUTO: 90 FL (ref 80–99)
MONOCYTES NFR BLD AUTO: 7.5 % (ref 1–10)
NEUTROPHILS NFR BLD AUTO: 73.6 % (ref 45–75)
PLATELET # BLD: 262 K/UL (ref 150–450)
POTASSIUM SERPL-SCNC: 3.5 MMOL/L (ref 3.5–5.1)
RBC # BLD AUTO: 4.89 M/UL (ref 4.7–6.1)
SODIUM SERPL-SCNC: 143 MMOL/L (ref 136–145)
WBC # BLD AUTO: 14.8 K/UL (ref 4.8–10.8)

## 2019-01-29 RX ADMIN — LEVETIRACETAM SCH MG: 100 SOLUTION ORAL at 05:18

## 2019-01-29 RX ADMIN — ACETAMINOPHEN PRN MG: 160 SOLUTION ORAL at 05:18

## 2019-01-29 RX ADMIN — SITAGLIPTIN SCH MG: 50 TABLET, FILM COATED ORAL at 10:17

## 2019-01-29 RX ADMIN — INSULIN DETEMIR SCH UNITS: 100 INJECTION, SOLUTION SUBCUTANEOUS at 02:21

## 2019-01-29 RX ADMIN — INSULIN ASPART SCH UNITS: 100 INJECTION, SOLUTION INTRAVENOUS; SUBCUTANEOUS at 12:33

## 2019-01-29 RX ADMIN — DEXTROSE MONOHYDRATE SCH MLS/HR: 50 INJECTION, SOLUTION INTRAVENOUS at 05:18

## 2019-01-29 RX ADMIN — LEVETIRACETAM SCH MG: 100 SOLUTION ORAL at 21:20

## 2019-01-29 RX ADMIN — INSULIN DETEMIR SCH UNITS: 100 INJECTION, SOLUTION SUBCUTANEOUS at 10:55

## 2019-01-29 RX ADMIN — VITAMIN D, TAB 1000IU (100/BT) SCH INTLU: 25 TAB at 10:17

## 2019-01-29 RX ADMIN — VANCOMYCIN HCL-SODIUM CHLORIDE IV SOLN 1.25 GM/250ML-0.9% SCH MLS/HR: 1.25-0.9/25 SOLUTION at 21:19

## 2019-01-29 RX ADMIN — DEXTROSE MONOHYDRATE SCH MLS/HR: 50 INJECTION, SOLUTION INTRAVENOUS at 13:20

## 2019-01-29 RX ADMIN — HEPARIN SODIUM SCH UNITS: 5000 INJECTION INTRAVENOUS; SUBCUTANEOUS at 10:21

## 2019-01-29 RX ADMIN — LEVETIRACETAM SCH MG: 100 SOLUTION ORAL at 13:20

## 2019-01-29 RX ADMIN — INSULIN DETEMIR SCH UNITS: 100 INJECTION, SOLUTION SUBCUTANEOUS at 21:26

## 2019-01-29 RX ADMIN — DEXTROSE MONOHYDRATE SCH MLS/HR: 50 INJECTION, SOLUTION INTRAVENOUS at 21:30

## 2019-01-29 RX ADMIN — INSULIN ASPART SCH UNITS: 100 INJECTION, SOLUTION INTRAVENOUS; SUBCUTANEOUS at 17:09

## 2019-01-29 RX ADMIN — HEPARIN SODIUM SCH UNITS: 5000 INJECTION INTRAVENOUS; SUBCUTANEOUS at 21:22

## 2019-01-29 RX ADMIN — PANTOPRAZOLE SODIUM SCH MG: 40 INJECTION, POWDER, FOR SOLUTION INTRAVENOUS at 10:18

## 2019-01-29 RX ADMIN — INSULIN ASPART SCH UNITS: 100 INJECTION, SOLUTION INTRAVENOUS; SUBCUTANEOUS at 05:26

## 2019-01-29 NOTE — NUR
NURSE NOTES: PT WITH HIGH TEMP 100.4 AX,GIVEN B.B APPLIED COOLING MEASURES AND REGULATED 
ROOM TEMP.ROOM TEMP WAS VERY HOT,[facility] [place] [facility] FIXED ROOM TEMP.WILL CONT TO 
MONITOR.

## 2019-01-29 NOTE — NUR
NURSE NOTES: REASSESSED TEMP 97.8 AX.PT ESCORTED TO CT-ABD ACCOMPANIED WITH ATILIO MORTON AND 
LICHA Coffee and Power CT STAFF.PT TOLERATED WELL PROCEDURE.ESCORTED PT BACK TO HIS ROOM.NO ACUTE 
DISTRESS NOTED AT THIS TIME.WILL CONT TO MONITOR.

## 2019-01-29 NOTE — DIAGNOSTIC IMAGING REPORT
Indication: Abdominal pain

 

Technique: Spiral acquisitions obtained through the abdomen and pelvis. No patient

received enteric contrast. No IV contrast utilized,  per referring physician

request.. Multiplanar reconstructions were generated. Total dose length product

1050.38 mGycm. CTDIvol(s) 16.58 mGy. Dose reduction achieved using automated exposure

control

 

 

Comparison: 7/24/2018

 

Findings: Lack of IV contrast limits assessment of the solid organs.

 

The liver demonstrates multiple irregular areas of fat deposition. One such area is

seen in the posteromedial subcapsular region of segment 8. Contiguous with this is an

area coursing through the medial parenchyma of segment 8 contiguous with a

subcapsular area under the dome of the right hepatic lobe. Smaller but

similar-appearing area is seen inferiorly and posteriorly in segment 3. Triangular

area of hypoattenuation is seen posterolaterally in segment 7. This demonstrates

slightly higher attenuation than fat, however. These findings are not evident on the

prior exam

 

The pancreatic head is prominent. Inflammatory changes are seen within the mesenteric

root inferior to the pancreas and coursing along the anterior right Gerota's fascia.

No discrete pancreatic mass is demonstrated

 

The gallbladder, bile ducts are unremarkable. The spleen demonstrates a central

subcentimeter low-attenuation lesion which is unchanged in size. It is mildly

enlarged, measuring 13 cm long axis dimension, slightly larger than on the prior

exam. The bilateral adrenals are unremarkable.

 

The right kidney is mildly enlarged. There is a 3 mm calculus at the right

ureterovesical junction. There is only minimal hydronephrosis despite this, and no

jac hydroureter. There is some perinephric fat stranding, which appears similar in

extent to the previous exam. Multiple intrarenal calyceal calculi are noted, which

are slightly more numerous than on the prior exam; in particular, new calculi are

seen in the upper pole. The bladder wall is equivocally minimally thickened.

Previously demonstrated Guzmán catheter is no longer evident. A staghorn calculus is

again seen within the left renal pelvis, and multiple intrarenal calyceal calculi are

present on the left. A cyst is seen in the upper pole region. The left kidney is

somewhat atrophic.

 

No pelvic mass or adenopathy. The prostate demonstrates some central low attenuation,

also evident previously.

 

There is a double barrel proximal transverse colostomy in the right lower quadrant.

Contrast is seen throughout the entirety of the small bowel and colon and is seen

within the colostomy bag. The appendix is contiguous with some of the inflammatory

changes described above, but is normal in caliber and fills with contrast. No

evidence of diverticulosis or diverticulitis. No small bowel distention. No free or

loculated intraperitoneal gas is evident.

 

A small superficial skin defect is seen in the pubic region just to the right of

midline. There is a right femoral central venous catheter in place. The distal

esophagus is unremarkable. The stomach demonstrates a gastrostomy tube in good

position, also evident previously. The duodenum demonstrates some wall thickening,

likely reactive related to the adjacent peripancreatic inflammation.

 

Pulmonary parenchymal consolidation and atelectasis are seen in the visualized

portions of both lower lobes. The bones demonstrate degenerative spondylosis changes.

 

Impression: Positive for 3 mm distal right ureteral calculus at the ureterovesical

junction. Only minimal resultant hydronephrosis. Renal enlargement and perinephric

fat stranding could be related to this but is similar to the prior exam and could be

baseline for this patient

 

Bilateral intrarenal calculi

 

Somewhat atrophic left kidney, containing a large staghorn calculus and multiple

intrarenal calyceal calculi, also previously described

 

Prominent pancreatic head. Inflammatory changes within the mesenteric root inferior

to the pancreas and phlegmon/fluid coursing along Gerota's fascia on the right

anteriorly. This is highly suggestive of acute pancreatitis

 

Very unusual appearance to the liver, with multiple areas of fatty attenuation in an

irregular but geographic distribution. Most likely, this represents a very unusual

pattern of geographic intense hepatic fat deposition. Metastatic liposarcoma could

conceivably have this appearance as well, although the distribution is not

particularly masslike. Ultrasound and/or contrast MRI may be useful to better

characterize as clinically indicated

 

Small superficial skin defect in the pubic region just to the right of midline. This

should be clinically apparent

 

Bilateral basilar pulmonary parenchymal consolidation and atelectasis

 

Borderline splenomegaly

 

Stable small central splenic lesion, probably a cyst

 

Gastrostomy

 

Right femoral central line in good position

 

Right upper quadrant transverse colostomy and mucous fistula, also previously

reported

 

Left renal cyst, degenerative spondylosis incidentally noted

 

Findings discussed by phone with Dr. Bloom at the time of interpretation

 

The CT scanner at [facility] is accredited by the [employer] College of

Radiology and the scans are performed using protocols designed to limit radiation

exposure to as low as reasonably achievable to attain images of sufficient resolution

adequate for diagnostic evaluation.

## 2019-01-29 NOTE — PULMONOLGY CRITICAL CARE NOTE
Critical Care - Asmt/Plan


Problems:  


(1) Acute on chronic respiratory failure


(2) Sepsis


(3) Fever


(4) Gastrostomy tube dependent


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


(6) Vegetative state


(7) Diabetes mellitus


Respiratory:  monitor respiratory rate, adjust FIO2, CXR


Cardiac:  continue to monitor HR/BP


Renal:  F/U I&O, keep IV fluid, check electrolytes


Infectious Disease:  check cultures, continue antibiotics


Gastrointestinal:  continue feedings/current rate


Endocrine:  check TSH


Hematologic:  monitor H/H, transfuse if hgb<8.5


Neurologic:  PRN Ativan, PRN Morphine, keep patient comfortable


Affect:  PRN ativan


Prophylaxis:  Protonix, Heparin


Time Spent (Minutes):  40


Notes Reviewed:  internist, cardio, renal


Discussed with:  nurses, consultants, 





Critical Care - Objective





Last 24 Hour Vital Signs








  Date Time  Temp Pulse Resp B/P (MAP) Pulse Ox O2 Delivery O2 Flow Rate FiO2


 


1/29/19 11:15  129 18     40


 


1/29/19 09:25  124 18     40


 


1/29/19 09:00 97.2 109 16 117/77 (90) 100   


 


1/29/19 08:00  112      


 


1/29/19 08:00      Mechanical Ventilator  


 


1/29/19 08:00        40


 


1/29/19 07:16  112 16     40


 


1/29/19 05:48 98.7       


 


1/29/19 05:04  119 18     40


 


1/29/19 04:00      Mechanical Ventilator  


 


1/29/19 04:00        40


 


1/29/19 04:00 101.5 130 17 118/84 (95) 99   


 


1/29/19 04:00  128      


 


1/29/19 03:09  122 18     40


 


1/29/19 00:44  93 18     40


 


1/29/19 00:00      Mechanical Ventilator  


 


1/29/19 00:00 100.2 121 16 124/80 (95) 99   


 


1/29/19 00:00  113      


 


1/28/19 22:40  112 19     40


 


1/28/19 21:53  94 19     40


 


1/28/19 20:00 97.9 95 16 134/82 (99) 99   


 


1/28/19 20:00  93      


 


1/28/19 20:00      Mechanical Ventilator  


 


1/28/19 20:00        40


 


1/28/19 18:40  94 18     40


 


1/28/19 17:26  97 16     40


 


1/28/19 16:00  111      


 


1/28/19 16:00 99.3 111 16 119/90 (100) 100   


 


1/28/19 16:00        40


 


1/28/19 16:00      Mechanical Ventilator  


 


1/28/19 15:28  113 16     40


 


1/28/19 13:25  123 17     40


 


1/28/19 12:00  134      


 


1/28/19 12:00        40


 


1/28/19 12:00 102.4 138 16 111/78 (89) 100   


 


1/28/19 12:00      Mechanical Ventilator  








Status:  obtunded


Condition:  critical


HEENT:  atraumatic


Neck:  trach


Lungs:  rhonchi


Heart:  HR/BP stable


Abdomen:  soft, feeding tube


Extremities:  edema


Decubiti:  stage


Micro:





Microbiology








 Date/Time


Source Procedure


Growth Status


 


 


 1/27/19 22:10


Blood Blood Culture - Preliminary


NO GROWTH AFTER 24 HOURS Resulted


 


 1/27/19 22:10


Blood Blood Culture - Preliminary


NO GROWTH AFTER 24 HOURS Resulted


 


 1/27/19 22:30


Nasal Nares Influenza Types A,B Antigen (ALVERTO) - Final Complete


 


 1/28/19 09:00


Urine,Clean Catch Urine Culture - Preliminary


Gram Negative Bacillus 1 Resulted


 


 1/27/19 22:30


Rectum  Received








Accucheck:  335





Critical Care - Subjective


ROS Limited/Unobtainable:  No


Condition:  critical


EKG Rhythm:  Sinus Rhythm


FI02:  40


Vent Support Breath Rate:  16


Vent Support Mode:  AC


Vent Tidal Volume:  600


Sputum Amount:  Small


PEEP:  5.0


PIP:  29


Tube Feeding Amount:  50


I&O:











Intake and Output  


 


 1/28/19 1/29/19





 19:00 07:00


 


Intake Total 460.0 ml 550 ml


 


Output Total 800 ml 450 ml


 


Balance -340.0 ml 100 ml


 


  


 


Free Water 200 ml 90 ml


 


IV Total 110.0 ml 


 


Tube Feeding 150 ml 460 ml


 


Output Urine Total 500 ml 200 ml


 


Stool Total 300 ml 250 ml


 


# Bowel Movements  100








CXR:


no change


Labs:





Laboratory Tests








Test


  1/29/19


03:30


 


White Blood Count


  14.8 K/UL


(4.8-10.8)  H


 


Red Blood Count


  4.89 M/UL


(4.70-6.10)


 


Hemoglobin


  13.8 G/DL


(14.2-18.0)  L


 


Hematocrit


  43.8 %


(42.0-52.0)


 


Mean Corpuscular Volume 90 FL (80-99)  


 


Mean Corpuscular Hemoglobin


  28.3 PG


(27.0-31.0)


 


Mean Corpuscular Hemoglobin


Concent 31.6 G/DL


(32.0-36.0)  L


 


Red Cell Distribution Width


  15.5 %


(11.6-14.8)  H


 


Platelet Count


  262 K/UL


(150-450)


 


Mean Platelet Volume


  11.7 FL


(6.5-10.1)  H


 


Neutrophils (%) (Auto)


  73.6 %


(45.0-75.0)


 


Lymphocytes (%) (Auto)


  12.2 %


(20.0-45.0)  L


 


Monocytes (%) (Auto)


  7.5 %


(1.0-10.0)


 


Eosinophils (%) (Auto)


  5.7 %


(0.0-3.0)  H


 


Basophils (%) (Auto)


  1.0 %


(0.0-2.0)


 


Erythrocyte Sedimentation Rate


  18 MM/HR


(0-20)


 


Sodium Level


  143 MMOL/L


(136-145)


 


Potassium Level


  3.5 MMOL/L


(3.5-5.1)


 


Chloride Level


  109 MMOL/L


()  H


 


Carbon Dioxide Level


  20 MMOL/L


(21-32)  L


 


Anion Gap


  14 mmol/L


(5-15)


 


Blood Urea Nitrogen


  35 mg/dL


(7-18)  H


 


Creatinine


  2.0 MG/DL


(0.55-1.30)  H


 


Estimat Glomerular Filtration


Rate 42.9 mL/min


(>60)


 


Glucose Level


  332 MG/DL


()  #H


 


Hemoglobin A1c


  7.9 %


(4.3-6.0)  H


 


Calcium Level


  10.0 MG/DL


(8.5-10.1)


 


Total Bilirubin


  0.5 MG/DL


(0.2-1.0)


 


Direct Bilirubin


  0.1 MG/DL


(0.0-0.3)


 


Aspartate Amino Transf


(AST/SGOT) 17 U/L (15-37)


 


 


Alanine Aminotransferase


(ALT/SGPT) 22 U/L (12-78)


 


 


Alkaline Phosphatase


  90 U/L


()


 


Total Protein


  8.0 G/DL


(6.4-8.2)


 


Albumin


  2.3 G/DL


(3.4-5.0)  L


 


Amylase Level


  45 U/L


()


 


Lipase


  277 U/L


()

















Yury Pena MD Jan 29, 2019 11:56

## 2019-01-29 NOTE — NUR
NURSE NOTES:RECEIVED REPORT FROM MARCIANO RN STAFF OF NOC [facility]IFT.RECEIVED PT WITH HOB ELEVATED 
45 DEGREE ,OBTUNDED TOLERATING WELL CURRENTS VENT SETTINGS ,AC 16,  PEEP 5,FIO2 40%,PT 
SAT IS 98%.PT ON SCHEDULE FOR CT OF ABD TODAY ,HELD GTF AT THIS TIME.PT GIVEN ALL AM MED,S 
VIA GT AS PER M.D ORDERS,TOLERATING WELL.FULL AM BODY ASSESSMENT DONE.NO ACUTE DISTRESS 
NOTED AT THIS TIME.WILL CONT TO MONITOR.

## 2019-01-29 NOTE — NUR
NURSE NOTES:

Bedside report received from JOSHUA Srivastava. Patient obtunded, opens eyes to pain, nonverbal, 
unable to follow commands. Cardiac monitor showing ST, will monitor.  Trach to vent with 
settings of AC 16, , FiO2 40%, PEEP 5, no s/s of respiratory distress noted. GT 
feeding of Glucerna 1.5 running @ 50 cc/hr, GOAL 65; blood sugar very elevated.  MD aware. 
HoB elevated. Condom catheter intact and draining well. Right femoral TLC, asymptomatic. 
Right hand 24g saline lock patent and asymptomatic.  Colostomy asymptomatic, draining. Skin 
is clean and dry, dressings intact.  Patient afebrile at this time, patient has cooling 
blanket in room for previous temp. Bed locked in lowest position, padded side rails up x 3, 
bed alarm on, call bell within reach. All needs attended to. Will continue to monitor and 
follow plan of care.

## 2019-01-29 NOTE — DIAGNOSTIC IMAGING REPORT
Indication: Dyspnea

 

Technique: One view of the chest

 

Comparison: 1/27/2019

 

Findings: Left lateral pleural thickening versus fluid is unchanged. Tracheostomy

remains. The lungs and right pleural space remain clear. Findings are unchanged

 

Impression:  Unchanged, over 2 days, findings as above.

## 2019-01-29 NOTE — HISTORY AND PHYSICAL REPORT
DATE OF ADMISSION:  01/27/2019

CHIEF COMPLAINT:  This is one of several admissions to [facility] of this 53-year-old patient because of sepsis.



HISTORY OF PRESENT ILLNESS:  The patient is a resident of an extended care

facility subacute unit where he has been in stable condition over the last

several months.  He is known to have _____ syndrome, but has been stable

on current medication.  Two days prior to the present admission, he

developed fever, tachycardia, and moderate hypotension.  Treatment in

facility _____ the patient with IV fluid and broad-spectrum antibiotics

with ceftriaxone and the patient provisionally improved, he was

transferred to [facility] and was admitted.



PAST MEDICAL HISTORY:  _____ he developed respiratory failure, had to be

intubated and placed on mechanical ventilation.  The patient is being

_____ ventilator-dependent and fed via gastrostomy tube.  He is _____ at

this time.  The patient is now more than a year ventilator-dependent _____

tracheostomy.  He had multiple infections in the past with pulmonary and

urinary infection, currently we are treating in this hospital.



ALLERGIES:  The patient is allergic to _____.



MEDICATIONS:  The patient is on:



1. Baclofen 10 mg at bedtime.

2. Levetiracetam 1000 mg q.8 h.

3. Humalog insulin _____ with sliding scale every six hours.

4. He is on _____ tazobactam _____ and 240 mg IV every four hours

daily.

5. _____ daily.

6. _____ daily.

7. _____.



FAMILY HISTORY:  He is  and has no children.



SOCIAL HISTORY:  The patient does not smoke, drink, or use illicit drugs.



REVIEW OF SYSTEMS:  The patient is unable to give any information regarding

his state of health.



PHYSICAL EXAMINATION:

VITAL SIGNS:  Temperature is 124/82, his pulse is 95, respirations 16, and

temperature 97.9.

HEENT:  Eyes were normal.  Pupils were round, equal, and reactive to light.

Sclerae were white.  Conjunctivae were pink.  Extraocular movements were

normal.  Temporal arteries were palpable bilaterally.  There was no

bilateral temporal wasting.  Visual fields to confrontation was normal.

Neglect sign was negative.  ENT, mucous membranes were not dehydrated.

Auditory canals were clear and tympanic membranes could not be visualized.

Nasal cavity was not congested.  Nasal septum was intact.  Soft palate

was free of ulcerations.  Pharynx was clear from exudate or tonsillar

hypertrophy.  Uvula could not be visualized.  Tongue was moist, midline,

and normally palpated.

NECK:  Supple.  There was no goiter.  No mass.  No lymphadenopathy.  There

was no JVD.  No bruits.  Carotid upstroke was 2+.

LUNGS:  Clear.

HEART:  PMI was in fifth left intercostal space in midclavicular line.

There was normal S1 and normal S2.  There was no murmur.  No arrhythmia.

No S3.  No S4.  No pericardial rub.

ABDOMEN:  Soft and nontender without organomegaly.  There were no masses

palpable.  Normal bowel sounds without bruits.  There was no guarding.  No

rebound tenderness.  No ascites.  No hernia.  No CVA tenderness.  Liver

span was 8 cm, smooth and nontender.  Gastrostomy site was clean.

EXTREMITIES:  Warm without cyanosis, clubbing, or edema.



LABORATORY AND DIAGNOSTIC DATA:  Hemoglobin is 15.5, hematocrit 47.9 with

MCV of 91, WBC of 17.9, and platelets are 293,000.  His BUN and creatinine

are 36 and 1.8 respectively.  His sodium is 140, potassium 4.3, chloride

104, CO2 of 25, his glucose is 434, and his calcium is 10.2.  ProBNP was

257.  Total protein is 8.7.  His albumin is 2.8.  His lipase was 502.

Troponin was not detected.



IMPRESSION:

1. The patient has acute pancreatitis, cannot explain the clinical

picture.  He is status post cerebral hemorrhage with severe

encephalopathy.

2. The patient is ventilator-dependent.  We will continue the same

ventilator setting.  Continue with albuterol sulfate and ipratropium

bromide inhalation therapy _____ therapy.

3. _____.  The patient is on Zosyn, given 3.375 mg IV piggyback q.6 h.

and vancomycin 750 mg IV piggyback q.24 h.  Repeat laboratory tests will

be done in the a.m. and Pulmonary consulted and _____ management of this

case.  _____ as well.









  ______________________________________________

  Etienne Bloom M.D.





DR:  JIE

D:  01/28/2019 23:25

T:  01/29/2019 11:03

JOB#:  [id number]

CC:

## 2019-01-30 VITALS — DIASTOLIC BLOOD PRESSURE: 80 MMHG | SYSTOLIC BLOOD PRESSURE: 121 MMHG

## 2019-01-30 VITALS — DIASTOLIC BLOOD PRESSURE: 81 MMHG | SYSTOLIC BLOOD PRESSURE: 107 MMHG

## 2019-01-30 VITALS — SYSTOLIC BLOOD PRESSURE: 117 MMHG | DIASTOLIC BLOOD PRESSURE: 70 MMHG

## 2019-01-30 VITALS — SYSTOLIC BLOOD PRESSURE: 110 MMHG | DIASTOLIC BLOOD PRESSURE: 76 MMHG

## 2019-01-30 VITALS — SYSTOLIC BLOOD PRESSURE: 113 MMHG | DIASTOLIC BLOOD PRESSURE: 80 MMHG

## 2019-01-30 VITALS — DIASTOLIC BLOOD PRESSURE: 72 MMHG | SYSTOLIC BLOOD PRESSURE: 121 MMHG

## 2019-01-30 LAB
ADD MANUAL DIFF: NO
ALBUMIN SERPL-MCNC: 2.4 G/DL (ref 3.4–5)
ALBUMIN/GLOB SERPL: 0.4 {RATIO} (ref 1–2.7)
ALP SERPL-CCNC: 84 U/L (ref 46–116)
ALT SERPL-CCNC: 33 U/L (ref 12–78)
ANION GAP SERPL CALC-SCNC: 12 MMOL/L (ref 5–15)
AST SERPL-CCNC: 51 U/L (ref 15–37)
BASOPHILS NFR BLD AUTO: 0.5 % (ref 0–2)
BILIRUB SERPL-MCNC: 0.5 MG/DL (ref 0.2–1)
BUN SERPL-MCNC: 33 MG/DL (ref 7–18)
CALCIUM SERPL-MCNC: 9.9 MG/DL (ref 8.5–10.1)
CHLORIDE SERPL-SCNC: 111 MMOL/L (ref 98–107)
CO2 SERPL-SCNC: 23 MMOL/L (ref 21–32)
CREAT SERPL-MCNC: 1.6 MG/DL (ref 0.55–1.3)
EOSINOPHIL NFR BLD AUTO: 7.5 % (ref 0–3)
ERYTHROCYTE [DISTWIDTH] IN BLOOD BY AUTOMATED COUNT: 15.6 % (ref 11.6–14.8)
GLOBULIN SER-MCNC: 5.5 G/DL
HCT VFR BLD CALC: 41.6 % (ref 42–52)
HGB BLD-MCNC: 13.2 G/DL (ref 14.2–18)
LYMPHOCYTES NFR BLD AUTO: 20 % (ref 20–45)
MCV RBC AUTO: 90 FL (ref 80–99)
MONOCYTES NFR BLD AUTO: 6.7 % (ref 1–10)
NEUTROPHILS NFR BLD AUTO: 65.4 % (ref 45–75)
PHOSPHATE SERPL-MCNC: 1 MG/DL (ref 2.5–4.9)
PLATELET # BLD: 249 K/UL (ref 150–450)
POTASSIUM SERPL-SCNC: 3.4 MMOL/L (ref 3.5–5.1)
RBC # BLD AUTO: 4.64 M/UL (ref 4.7–6.1)
SODIUM SERPL-SCNC: 146 MMOL/L (ref 136–145)
WBC # BLD AUTO: 11.3 K/UL (ref 4.8–10.8)

## 2019-01-30 RX ADMIN — LEVETIRACETAM SCH MG: 100 SOLUTION ORAL at 13:58

## 2019-01-30 RX ADMIN — DEXTROSE MONOHYDRATE SCH MLS/HR: 50 INJECTION, SOLUTION INTRAVENOUS at 05:51

## 2019-01-30 RX ADMIN — SITAGLIPTIN SCH MG: 50 TABLET, FILM COATED ORAL at 08:32

## 2019-01-30 RX ADMIN — DEXTROSE MONOHYDRATE SCH MLS/HR: 50 INJECTION, SOLUTION INTRAVENOUS at 13:58

## 2019-01-30 RX ADMIN — INSULIN DETEMIR SCH UNITS: 100 INJECTION, SOLUTION SUBCUTANEOUS at 20:15

## 2019-01-30 RX ADMIN — HEPARIN SODIUM SCH UNITS: 5000 INJECTION INTRAVENOUS; SUBCUTANEOUS at 08:33

## 2019-01-30 RX ADMIN — LEVETIRACETAM SCH MG: 100 SOLUTION ORAL at 05:51

## 2019-01-30 RX ADMIN — INSULIN ASPART SCH UNITS: 100 INJECTION, SOLUTION INTRAVENOUS; SUBCUTANEOUS at 17:01

## 2019-01-30 RX ADMIN — INSULIN ASPART SCH UNITS: 100 INJECTION, SOLUTION INTRAVENOUS; SUBCUTANEOUS at 20:14

## 2019-01-30 RX ADMIN — INSULIN ASPART SCH UNITS: 100 INJECTION, SOLUTION INTRAVENOUS; SUBCUTANEOUS at 00:02

## 2019-01-30 RX ADMIN — INSULIN ASPART SCH UNITS: 100 INJECTION, SOLUTION INTRAVENOUS; SUBCUTANEOUS at 05:52

## 2019-01-30 RX ADMIN — LEVETIRACETAM SCH MG: 100 SOLUTION ORAL at 21:27

## 2019-01-30 RX ADMIN — DEXTROSE MONOHYDRATE SCH MLS/HR: 50 INJECTION, SOLUTION INTRAVENOUS at 21:27

## 2019-01-30 RX ADMIN — VANCOMYCIN HCL-SODIUM CHLORIDE IV SOLN 1.25 GM/250ML-0.9% SCH MLS/HR: 1.25-0.9/25 SOLUTION at 20:12

## 2019-01-30 RX ADMIN — HEPARIN SODIUM SCH UNITS: 5000 INJECTION INTRAVENOUS; SUBCUTANEOUS at 20:16

## 2019-01-30 RX ADMIN — INSULIN DETEMIR SCH UNITS: 100 INJECTION, SOLUTION SUBCUTANEOUS at 08:34

## 2019-01-30 RX ADMIN — PANTOPRAZOLE SODIUM SCH MG: 40 INJECTION, POWDER, FOR SOLUTION INTRAVENOUS at 08:32

## 2019-01-30 RX ADMIN — INSULIN ASPART SCH UNITS: 100 INJECTION, SOLUTION INTRAVENOUS; SUBCUTANEOUS at 13:22

## 2019-01-30 RX ADMIN — INSULIN ASPART SCH UNITS: 100 INJECTION, SOLUTION INTRAVENOUS; SUBCUTANEOUS at 11:53

## 2019-01-30 RX ADMIN — VITAMIN D, TAB 1000IU (100/BT) SCH INTLU: 25 TAB at 08:32

## 2019-01-30 NOTE — PROGRESS NOTE
DATE:  01/29/2019

NOTE:  POOR AUDIO



SUBJECTIVE:  The patient's fever resolved, but remained

tachycardic.



PHYSICAL EXAMINATION:

VITAL SIGNS:  Blood pressure 113/73, his pulse is 114, respirations 18, and

temperature 98.

HEENT:  Eyes were normal.  ENT, mucous membranes were moist and intact.

NECK:  Supple with no JVD without lymph nodes.  Tracheostomy site is clean.

Oral cavity is open and tongue is protruded.  _____ movement.

LUNGS:  Clear.

HEART:  Normal sounds with regular beats.

ABDOMEN:  Soft and nontender with normal bowel sounds.

EXTREMITIES:  Warm without cyanosis, clubbing, or edema.



LABORATORY AND DIAGNOSTIC DATA:  Hemoglobin is 13.8, hematocrit 33.8 with

MCV of 90, WBC of 14.8, and platelets 262,000.  His BUN and creatinine are

35 and 2.0 respectively.  Sodium is 143, potassium 3.5, chloride 109, and

CO2 is 20.  Glucose is 332.  His glycohemoglobin level is 7.9.  Creatinine

1.2.



IMAGING STUDY:  CT scan of the abdomen and pelvis is without any

significant suspicion of pancreatitis, and at this time, I discussed

_____.  CT scan of the abdomen without contrast revealed _____ suspect of

being fatty liver _____ abdominal ultrasound will be ordered to identify

_____.  The patient has acute pancreatitis _____ inferior head of the

pancreas _____ with no pelvic mass.  Because of pancreatitis, _____ was

identified.



PLAN:  The abdominal pelvic ultrasound will be ordered as well and

intraabdominal ultrasound by the gastroenterologist.  Repeat laboratory

tests will be done in the a.m.









  ______________________________________________

  Etienne Bloom M.D.





DR:  NED

D:  01/29/2019 22:58

T:  01/30/2019 07:11

JOB#:  [id number]

CC:

## 2019-01-30 NOTE — NUR
NURSE NOTES:

Received report from Shannan RN, pt. in bed obtunded, no signs or symptoms of acute cardiac 
or respiratory distress noted, bed in lowest position and call light within easy reach, bed 
alarm on, side rails up x's3, safety brakes engaged, pt. appears to be tolerating current 
vent settings well- AC16, , Fio2 @40% and PEEP of 5- no distress noted, G tube feeding 
running Glucerna 1.5 at 65cc/hr- no residual noted, Colostomy intact and running to gravity, 
Guzmán intact and draining to gravity, dressings dry and intact, rt. femoral TLC intact and 
patent, Rt. hand 24G- iv intact and patent, safety measures continued, will continue with 
plan of care.

## 2019-01-30 NOTE — NUR
NURSE NOTES:



Patient's  now. Insulin 20 units IV x 1 given and novolog 12 units given 
subcutaneously per protocol. Will continue to monitor.

## 2019-01-30 NOTE — NUR
NURSE NOTES:



Patient noted with  during routine q6hr accu-check. Per protocol, 16 units of novolog 
were give subcutaneously and result was reported to MD. Received telephone order to give 
insulin 20 units IV and change accu-check to q4hr, no lab draw. Orders read back and 
verified. Awaiting pharmacy to prepare meds. Will continue to monitor.

## 2019-01-30 NOTE — NUR
NURSE NOTES:



Received report from YASH Mitchell RN. Patient obtunded, opens eyes to pain, nonverbal, unable 
to follow commands and make needs known. Trach to vent with settings of AC 16, , FiO2 
40%, PEEP 5, no s/s of respiratory distress noted. GT feeding of Glucerna 1.5 running @ 60 
cc/hr, no residuals noted, raised to goal of 65 cc/hr. HoB elevated. Condom catheter intact 
and draining well. Right femoral TLC infusing Zosyn 3.375g @ 27.5 cc/hr, no s/s of 
infiltration noted. Right hand 24g saline lock patent and asymptomatic. Patient afebrile at 
this time. Bed locked in lowest position with padded side rails up x 3. All needs attended 
to. Will continue to monitor.

## 2019-01-30 NOTE — PULMONOLGY CRITICAL CARE NOTE
Critical Care - Asmt/Plan


Problems:  


(1) Acute on chronic respiratory failure


(2) Sepsis


(3) Fever


(4) Gastrostomy tube dependent


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


(6) Vegetative state


(7) Diabetes mellitus


Respiratory:  monitor respiratory rate, adjust FIO2, CXR


Cardiac:  continue pressors, continue to monitor HR/BP


Renal:  F/U I&O, keep IV fluid


Infectious Disease:  check cultures


Gastrointestinal:  hold feedings


Endocrine:  check TSH


Hematologic:  monitor H/H, transfuse if hgb<8.5


Neurologic:  PRN Morphine, keep patient comfortable


Prophylaxis:  Protonix, Heparin


Notes Reviewed:  internist, renal


Discussed with:  nurses, consultants, 





Critical Care - Objective





Last 24 Hour Vital Signs








  Date Time  Temp Pulse Resp B/P (MAP) Pulse Ox O2 Delivery O2 Flow Rate FiO2


 


1/30/19 09:30  110 18     40


 


1/30/19 08:00      Mechanical Ventilator  


 


1/30/19 08:00        40


 


1/30/19 08:00 98.1 117 18 121/72 (88) 100   


 


1/30/19 06:53  115 19     40


 


1/30/19 04:56  107 17     40


 


1/30/19 04:00      Mechanical Ventilator  


 


1/30/19 04:00 98.1 111 18 121/80 (94) 100   


 


1/30/19 04:00        40


 


1/30/19 04:00  106      


 


1/30/19 02:57  110 17     40


 


1/30/19 00:55  111 19     40


 


1/30/19 00:00      Mechanical Ventilator  


 


1/30/19 00:00 97.9 113 18 117/70 (86) 100   


 


1/30/19 00:00  112      


 


1/29/19 23:03  112 17     40


 


1/29/19 21:58  116 22     40


 


1/29/19 20:00        40


 


1/29/19 20:00 98.0 114 18 117/73 (88) 100   


 


1/29/19 20:00  114      


 


1/29/19 20:00      Mechanical Ventilator  


 


1/29/19 19:05  117 18     40


 


1/29/19 17:00  115 18     40


 


1/29/19 16:00 97.8 2 17 119/83 (95) 100   


 


1/29/19 16:00      Mechanical Ventilator  


 


1/29/19 16:00        40


 


1/29/19 16:00  101      


 


1/29/19 12:00  122      


 


1/29/19 12:00        40


 


1/29/19 12:00      Mechanical Ventilator  


 


1/29/19 12:00 100.6 122 17 118/83 (95) 100   


 


1/29/19 11:15  129 18     40








Status:  awake


Condition:  critical


Neck:  full ROM


Lungs:  chest wall tender


Heart:  HR/BP unstable, regular


Abdomen:  non-tender


Decubiti:  location, stage


Micro:





Microbiology








 Date/Time


Source Procedure


Growth Status


 


 


 1/27/19 22:10


Blood Blood Culture - Preliminary


NO GROWTH AFTER 48 HOURS Resulted


 


 1/27/19 22:10


Blood Blood Culture - Preliminary


NO GROWTH AFTER 48 HOURS Resulted


 


 1/27/19 22:30


Nasal Nares MRSA Culture - Final


NO METHICILLIN RESISTANT STAPH AUREUS... Complete


 


 1/27/19 22:30


Nasal Nares Influenza Types A,B Antigen (ALVERTO) - Final Complete


 


 1/28/19 09:00


Urine,Clean Catch Urine Culture - Final


Pseudomonas Aeruginosa Complete


 


 1/27/19 22:30


Rectum VRE Culture - Final


Enterococcus Faecium - Vre Complete








Accucheck:  355





Critical Care - Subjective


ROS Limited/Unobtainable:  No


Condition:  critical


EKG Rhythm:  Sinus Rhythm


FI02:  40


Vent Support Breath Rate:  16


Vent Support Mode:  AC


Vent Tidal Volume:  600


Sputum Amount:  Small


PEEP:  5.0


PIP:  30


Tube Feeding Amount:  60


I&O:











Intake and Output  


 


 1/29/19 1/30/19





 19:00 07:00


 


Intake Total 960.0 ml 1030.0 ml


 


Output Total 750 ml 1400 ml


 


Balance 210.0 ml -370.0 ml


 


  


 


Free Water 150 ml 60 ml


 


IV Total 110.0 ml 360.0 ml


 


Tube Feeding 350 ml 610 ml


 


Other 350 ml 


 


Output Urine Total 250 ml 1000 ml


 


Stool Total 500 ml 400 ml








CXR:


no new changes


Labs:





Laboratory Tests








Test


  1/29/19


20:55 1/30/19


04:00


 


Vancomycin Level Trough


  14.8 ug/mL


(5.0-12.0)  H 


 


 


White Blood Count


  


  11.3 K/UL


(4.8-10.8)  H


 


Red Blood Count


  


  4.64 M/UL


(4.70-6.10)  L


 


Hemoglobin


  


  13.2 G/DL


(14.2-18.0)  L


 


Hematocrit


  


  41.6 %


(42.0-52.0)  L


 


Mean Corpuscular Volume  90 FL (80-99)  


 


Mean Corpuscular Hemoglobin


  


  28.5 PG


(27.0-31.0)


 


Mean Corpuscular Hemoglobin


Concent 


  31.8 G/DL


(32.0-36.0)  L


 


Red Cell Distribution Width


  


  15.6 %


(11.6-14.8)  H


 


Platelet Count


  


  249 K/UL


(150-450)


 


Mean Platelet Volume


  


  11.1 FL


(6.5-10.1)  H


 


Neutrophils (%) (Auto)


  


  65.4 %


(45.0-75.0)


 


Lymphocytes (%) (Auto)


  


  20.0 %


(20.0-45.0)


 


Monocytes (%) (Auto)


  


  6.7 %


(1.0-10.0)


 


Eosinophils (%) (Auto)


  


  7.5 %


(0.0-3.0)  H


 


Basophils (%) (Auto)


  


  0.5 %


(0.0-2.0)


 


Sodium Level


  


  146 MMOL/L


(136-145)  H


 


Potassium Level


  


  3.4 MMOL/L


(3.5-5.1)  L


 


Chloride Level


  


  111 MMOL/L


()  H


 


Carbon Dioxide Level


  


  23 MMOL/L


(21-32)


 


Anion Gap


  


  12 mmol/L


(5-15)


 


Blood Urea Nitrogen


  


  33 mg/dL


(7-18)  H


 


Creatinine


  


  1.6 MG/DL


(0.55-1.30)  H


 


Estimat Glomerular Filtration


Rate 


  55.5 mL/min


(>60)


 


Glucose Level


  


  253 MG/DL


()  H


 


Calcium Level


  


  9.9 MG/DL


(8.5-10.1)


 


Phosphorus Level


  


  1.0 MG/DL


(2.5-4.9)  L


 


Magnesium Level


  


  2.2 MG/DL


(1.8-2.4)


 


Total Bilirubin


  


  0.5 MG/DL


(0.2-1.0)


 


Aspartate Amino Transf


(AST/SGOT) 


  51 U/L (15-37)


H


 


Alanine Aminotransferase


(ALT/SGPT) 


  33 U/L (12-78)


 


 


Alkaline Phosphatase


  


  84 U/L


()


 


Total Protein


  


  7.9 G/DL


(6.4-8.2)


 


Albumin


  


  2.4 G/DL


(3.4-5.0)  L


 


Globulin  5.5 g/dL  


 


Albumin/Globulin Ratio


  


  0.4 (1.0-2.7)


L

















Yury Pena MD Jan 30, 2019 10:56

## 2019-01-31 VITALS — SYSTOLIC BLOOD PRESSURE: 104 MMHG | DIASTOLIC BLOOD PRESSURE: 87 MMHG

## 2019-01-31 VITALS — SYSTOLIC BLOOD PRESSURE: 118 MMHG | DIASTOLIC BLOOD PRESSURE: 83 MMHG

## 2019-01-31 VITALS — DIASTOLIC BLOOD PRESSURE: 84 MMHG | SYSTOLIC BLOOD PRESSURE: 119 MMHG

## 2019-01-31 VITALS — SYSTOLIC BLOOD PRESSURE: 107 MMHG | DIASTOLIC BLOOD PRESSURE: 77 MMHG

## 2019-01-31 VITALS — DIASTOLIC BLOOD PRESSURE: 83 MMHG | SYSTOLIC BLOOD PRESSURE: 124 MMHG

## 2019-01-31 VITALS — DIASTOLIC BLOOD PRESSURE: 75 MMHG | SYSTOLIC BLOOD PRESSURE: 114 MMHG

## 2019-01-31 LAB
ADD MANUAL DIFF: NO
ALBUMIN SERPL-MCNC: 2.4 G/DL (ref 3.4–5)
ALBUMIN/GLOB SERPL: 0.4 {RATIO} (ref 1–2.7)
ALP SERPL-CCNC: 90 U/L (ref 46–116)
ALT SERPL-CCNC: 26 U/L (ref 12–78)
ANION GAP SERPL CALC-SCNC: 15 MMOL/L (ref 5–15)
AST SERPL-CCNC: 49 U/L (ref 15–37)
BASOPHILS NFR BLD AUTO: 1.3 % (ref 0–2)
BILIRUB SERPL-MCNC: 0.5 MG/DL (ref 0.2–1)
BUN SERPL-MCNC: 32 MG/DL (ref 7–18)
CALCIUM SERPL-MCNC: 9.7 MG/DL (ref 8.5–10.1)
CHLORIDE SERPL-SCNC: 115 MMOL/L (ref 98–107)
CO2 SERPL-SCNC: 21 MMOL/L (ref 21–32)
CREAT SERPL-MCNC: 1.8 MG/DL (ref 0.55–1.3)
EOSINOPHIL NFR BLD AUTO: 8.1 % (ref 0–3)
ERYTHROCYTE [DISTWIDTH] IN BLOOD BY AUTOMATED COUNT: 15.7 % (ref 11.6–14.8)
GLOBULIN SER-MCNC: 5.6 G/DL
HCT VFR BLD CALC: 40 % (ref 42–52)
HGB BLD-MCNC: 12.5 G/DL (ref 14.2–18)
LYMPHOCYTES NFR BLD AUTO: 23.5 % (ref 20–45)
MCV RBC AUTO: 91 FL (ref 80–99)
MONOCYTES NFR BLD AUTO: 7.9 % (ref 1–10)
NEUTROPHILS NFR BLD AUTO: 59.2 % (ref 45–75)
PHOSPHATE SERPL-MCNC: 1.3 MG/DL (ref 2.5–4.9)
PLATELET # BLD: 243 K/UL (ref 150–450)
POTASSIUM SERPL-SCNC: 4.3 MMOL/L (ref 3.5–5.1)
RBC # BLD AUTO: 4.4 M/UL (ref 4.7–6.1)
SODIUM SERPL-SCNC: 151 MMOL/L (ref 136–145)
WBC # BLD AUTO: 9.2 K/UL (ref 4.8–10.8)

## 2019-01-31 RX ADMIN — INSULIN ASPART SCH UNITS: 100 INJECTION, SOLUTION INTRAVENOUS; SUBCUTANEOUS at 10:39

## 2019-01-31 RX ADMIN — HEPARIN SODIUM SCH UNITS: 5000 INJECTION INTRAVENOUS; SUBCUTANEOUS at 20:44

## 2019-01-31 RX ADMIN — LEVETIRACETAM SCH MG: 100 SOLUTION ORAL at 13:15

## 2019-01-31 RX ADMIN — INSULIN ASPART SCH UNITS: 100 INJECTION, SOLUTION INTRAVENOUS; SUBCUTANEOUS at 17:52

## 2019-01-31 RX ADMIN — DEXTROSE MONOHYDRATE SCH MLS/HR: 50 INJECTION, SOLUTION INTRAVENOUS at 05:03

## 2019-01-31 RX ADMIN — INSULIN DETEMIR SCH UNITS: 100 INJECTION, SOLUTION SUBCUTANEOUS at 10:17

## 2019-01-31 RX ADMIN — INSULIN DETEMIR SCH UNITS: 100 INJECTION, SOLUTION SUBCUTANEOUS at 20:45

## 2019-01-31 RX ADMIN — LEVETIRACETAM SCH MG: 100 SOLUTION ORAL at 05:05

## 2019-01-31 RX ADMIN — VANCOMYCIN HCL-SODIUM CHLORIDE IV SOLN 1.25 GM/250ML-0.9% SCH MLS/HR: 1.25-0.9/25 SOLUTION at 21:09

## 2019-01-31 RX ADMIN — DEXTROSE MONOHYDRATE SCH MLS/HR: 50 INJECTION, SOLUTION INTRAVENOUS at 14:36

## 2019-01-31 RX ADMIN — INSULIN ASPART SCH UNITS: 100 INJECTION, SOLUTION INTRAVENOUS; SUBCUTANEOUS at 13:03

## 2019-01-31 RX ADMIN — LEVETIRACETAM SCH MG: 100 SOLUTION ORAL at 21:09

## 2019-01-31 RX ADMIN — DEXTROSE MONOHYDRATE SCH MLS/HR: 50 INJECTION, SOLUTION INTRAVENOUS at 23:00

## 2019-01-31 RX ADMIN — SITAGLIPTIN SCH MG: 50 TABLET, FILM COATED ORAL at 10:20

## 2019-01-31 RX ADMIN — INSULIN ASPART SCH UNITS: 100 INJECTION, SOLUTION INTRAVENOUS; SUBCUTANEOUS at 00:59

## 2019-01-31 RX ADMIN — INSULIN ASPART SCH UNITS: 100 INJECTION, SOLUTION INTRAVENOUS; SUBCUTANEOUS at 20:43

## 2019-01-31 RX ADMIN — HEPARIN SODIUM SCH UNITS: 5000 INJECTION INTRAVENOUS; SUBCUTANEOUS at 10:19

## 2019-01-31 RX ADMIN — VITAMIN D, TAB 1000IU (100/BT) SCH INTLU: 25 TAB at 10:21

## 2019-01-31 RX ADMIN — INSULIN ASPART SCH UNITS: 100 INJECTION, SOLUTION INTRAVENOUS; SUBCUTANEOUS at 05:06

## 2019-01-31 NOTE — NUR
NURSE NOTES:

Received report from Lee RN, pt. in bed obtunded, no signs or symptoms of acute cardiac 
or respiratory distress noted, bed in lowest position and call light within easy reach, bed 
alarm on, side rails up x's3, safety brakes engaged, pt. appears to be tolerating current 
vent settings well- AC16, , Fio2 @40% and PEEP of 5- no distress noted, G tube feeding 
running Glucerna 1.5 at 40cc/hr- no residual noted, Colostomy intact and running to gravity, 
Guzmán intact and draining to gravity, dressings dry and intact, rt. femoral TLC intact and 
patent-TKO, Rt. hand 24G- iv intact and patent, safety measures continued, will continue 
with plan of care.

## 2019-01-31 NOTE — PROGRESS NOTE
DATE:  01/30/2019

ADDENDUM



IMPRESSION:  The patient with acute pancreatitis.  It was identified also

on CT scan of the abdomen and pelvis.  The patient is NPO.  His amylase is

_____ and lipase is 277.  His lactic acid is 7.9.  His sodium is 143,

potassium is _____.  His SGOT is 61 and SGPT is 33.  The alkaline

phosphatase is 84.



PLAN:  His abdominal CT scan has been described yesterday.  Repeat

laboratory tests will be done in the a.m.









  ______________________________________________

  Etienne Bloom M.D.





DR:  NED

D:  01/31/2019 00:39

T:  01/31/2019 05:46

JOB#:  [id number]

CC:

## 2019-01-31 NOTE — CONSULTATION
History of Present Illness


General


Chief Complaint:  Fever





Present Illness


Allergies:  


Coded Allergies:  


     AZTREONAM (Verified  Allergy, Unknown, 7/23/18)





Medication History


Scheduled


Baclofen* (Baclofen*), 10 MG GT THREE TIMES A DAY, (Reported)


Bisacodyl* (Dulcolax*), 10 MG RECTAL DAILY, (Reported)


Calcium Carbonate (Calcium Carbonate), 1,000 MG GT BID, (Reported)


Chlorhexidine Gluconate (Peridex), 15 ML MM Q12HR, (Reported)


Cholecalciferol (Vitamin D3)* (Vitamin D*), 5,000 UNIT GT ONCE A WEEK, (Reported

)


Clonidine Hcl* (Catapres*), 0.1 MG GT EVERY 6 HOURS, (Reported)


Cranberry (Cranberry), 400 MG GT DAILY, (Reported)


Dextran 70/Hypromellose (Artificial Tears Eye Drops*), 1 DROP BOTH EYES Q4HR, (

Reported)


Docusate Sodium* (Docusate Sodium*), 100 MG GT TWICE A DAY, (Reported)


Famotidine (Famotidine), 20 MG GT DAILY, (Reported)


Fenofibrate Nanocrystallized (Fenofibrate), 145 MG GT DAILY, (Reported)


Fenofibrate,Micronized (Fenofibrate), 0 ORAL DAILY, (Reported)


Glycopyrrolate (Robinul), 2 MG GT BID, (Reported)


Heparin Sod (Porcine) (Heparin Sodium*), 5,000 UNITS SUBQ EVERY 12 HOURS, (

Reported)


Insulin Regular, Human* (Novolin R*), 0 SUBQ .SLIDING SCALE, (Reported)


Ipratropium/Albuterol Sulfate (DuoNeb 0.5-3(2.5)mg/3ml), 3 ML HHN Q3HR, (

Reported)


Levetiracetam (Levetiracetam), 100 MG ORAL TWICE A DAY, (Reported)


Levetiracetam* (Levetiracetam*), 1,000 MG GT TID, (Reported)


Losartan Potassium* (Losartan Potassium*), 25 MG GT DAILY, (Reported)


Multivitamin With Minerals (Multivitamins With Minerals*), 1 TAB GT DAILY, (

Reported)


Nystatin* (Nystatin*), 1 APPLIC TOPIC THREE TIMES A DAY, (Reported)


Piperacillin-Tazo-Dextrose,Iso (Zosyn 3.375 Gm Pre Mix-Bag), 3.375 GM IVPB 

EVERY 8 HOURS, (Reported)


Polyethylene Glycol 3350* (Miralax*), 17 GM GT DAILY, (Reported)


Polyethylene Glycol 3350* (Miralax*), 17 GM ORAL DAILY, (Reported)


Sitagliptin (Januvia), 50 MG ORAL DAILY, (Reported)


Sitagliptin* (Januvia*), 50 MG GT DAILY, (Reported)


Spironolactone* (Aldactone*), 25 MG GT DAILY, (Reported)





Scheduled PRN


Acetaminophen 160MG/5ML* (Acetaminophen*), 20.3 ML GT Q4HR PRN for Fever/

Headache/Mild Pain, (Reported)


Albuterol Sulfate* (Albuterol Sulfate Hhn*), 3 ML INH Q2H PRN for Shortness of 

Breath, (Reported)


Ondansetron* (Zofran*), 4 MG ORAL Q6H PRN for Nausea & Vomiting, (Reported)





Miscellaneous Medications


Arginine/Ascorbate Sod/Siddharth AC (Arginaid Powder), 1 EACH PO, (Reported)


Dextran 70/Hypromellose/Pf (Artificial Tears Drops), 1 EACH OP, (Reported)


Insulin Lispro (Humalog), 0 SUBQ, (Reported)


Ipratropium/Albuterol Sulfate (DuoNeb 0.5-3(2.5)mg/3ml), 3 ML HHN, (Reported)


Lactulose (Lactulose*), 30 ML GT, (Reported)


Silver Sulfadiazine (Silvadene), 20 GM TP, (Reported)


Tuberculin,Purif.prot.deriv. (Aplisol), 5 TU ID, (Reported)


Vancomycin Hcl (Vancomycin), 750 MG IVPB, (Reported)





Patient History


Healthcare decision maker





Resuscitation status





Advanced Directive on File








Physical Exam





Last 24 Hour Vital Signs








  Date Time  Temp Pulse Resp B/P (MAP) Pulse Ox O2 Delivery O2 Flow Rate FiO2


 


1/31/19 20:00 98.1 94 16 104/87 (93) 99   


 


1/31/19 19:30  92 16     40


 


1/31/19 19:25  92      


 


1/31/19 17:01  98 16     40


 


1/31/19 16:00 98.1 100 16 114/75 (88) 100   


 


1/31/19 16:00        40


 


1/31/19 15:30  98      


 


1/31/19 15:15  101 17     40


 


1/31/19 15:00      Mechanical Ventilator  


 


1/31/19 13:02  106 17     40


 


1/31/19 12:00 98.6 100 16 118/83 (95) 100   


 


1/31/19 12:00        40


 


1/31/19 11:50  109      


 


1/31/19 10:57  111 17     40


 


1/31/19 09:14  113 16     40


 


1/31/19 08:00        40


 


1/31/19 08:00  112      


 


1/31/19 08:00 99.3 113 18 124/83 (97) 100   


 


1/31/19 08:00      Mechanical Ventilator  


 


1/31/19 06:55  111 18     40


 


1/31/19 05:17  105 16     40


 


1/31/19 04:00 98.0 106 17 107/77 (87) 99   


 


1/31/19 04:00      Mechanical Ventilator  


 


1/31/19 04:00        40


 


1/31/19 04:00  107      


 


1/31/19 03:11  104 17     40


 


1/31/19 01:03  104 17     40


 


1/31/19 00:00  102      


 


1/31/19 00:00      Mechanical Ventilator  


 


1/31/19 00:00        40


 


1/31/19 00:00 97.9 102 17 119/84 (96) 100   


 


1/30/19 23:14  103 16     40


 


1/30/19 21:27  99 18     40

















Intake and Output  


 


 1/30/19 1/31/19





 19:00 07:00


 


Intake Total 1062.5 ml 1550.7 ml


 


Output Total 550 ml 700 ml


 


Balance 512.5 ml 850.7 ml


 


  


 


Free Water 150 ml 300 ml


 


IV Total 342.5 ml 470.7 ml


 


Tube Feeding 520 ml 780 ml


 


Other 50 ml 


 


Output Urine Total 300 ml 450 ml


 


Stool Total 250 ml 250 ml











Laboratory Tests








Test


  1/31/19


03:15 1/31/19


20:15


 


White Blood Count


  9.2 K/UL


(4.8-10.8) 


 


 


Red Blood Count


  4.40 M/UL


(4.70-6.10)  L 


 


 


Hemoglobin


  12.5 G/DL


(14.2-18.0)  L 


 


 


Hematocrit


  40.0 %


(42.0-52.0)  L 


 


 


Mean Corpuscular Volume 91 FL (80-99)   


 


Mean Corpuscular Hemoglobin


  28.3 PG


(27.0-31.0) 


 


 


Mean Corpuscular Hemoglobin


Concent 31.1 G/DL


(32.0-36.0)  L 


 


 


Red Cell Distribution Width


  15.7 %


(11.6-14.8)  H 


 


 


Platelet Count


  243 K/UL


(150-450) 


 


 


Mean Platelet Volume


  11.6 FL


(6.5-10.1)  H 


 


 


Neutrophils (%) (Auto)


  59.2 %


(45.0-75.0) 


 


 


Lymphocytes (%) (Auto)


  23.5 %


(20.0-45.0) 


 


 


Monocytes (%) (Auto)


  7.9 %


(1.0-10.0) 


 


 


Eosinophils (%) (Auto)


  8.1 %


(0.0-3.0)  H 


 


 


Basophils (%) (Auto)


  1.3 %


(0.0-2.0) 


 


 


Sodium Level


  151 MMOL/L


(136-145)  H 


 


 


Potassium Level


  4.3 MMOL/L


(3.5-5.1) 


 


 


Chloride Level


  115 MMOL/L


()  H 


 


 


Carbon Dioxide Level


  21 MMOL/L


(21-32) 


 


 


Anion Gap


  15 mmol/L


(5-15) 


 


 


Blood Urea Nitrogen


  32 mg/dL


(7-18)  H 


 


 


Creatinine


  1.8 MG/DL


(0.55-1.30)  H 


 


 


Estimat Glomerular Filtration


Rate 48.5 mL/min


(>60) 


 


 


Glucose Level


  230 MG/DL


()  H 


 


 


Calcium Level


  9.7 MG/DL


(8.5-10.1) 


 


 


Phosphorus Level


  1.3 MG/DL


(2.5-4.9)  L 


 


 


Magnesium Level


  2.4 MG/DL


(1.8-2.4) 


 


 


Total Bilirubin


  0.5 MG/DL


(0.2-1.0) 


 


 


Aspartate Amino Transf


(AST/SGOT) 49 U/L (15-37)


H 


 


 


Alanine Aminotransferase


(ALT/SGPT) 26 U/L (12-78)


  


 


 


Alkaline Phosphatase


  90 U/L


() 


 


 


Total Protein


  8.0 G/DL


(6.4-8.2) 


 


 


Albumin


  2.4 G/DL


(3.4-5.0)  L 


 


 


Globulin 5.6 g/dL   


 


Albumin/Globulin Ratio


  0.4 (1.0-2.7)


L 


 


 


Vancomycin Level Trough


  


  15.4 ug/mL


(5.0-12.0)  H








Height (Feet):  5


Height (Inches):  7.00


Weight (Pounds):  202


Medications





Current Medications








 Medications


  (Trade)  Dose


 Ordered  Sig/Jan


 Route


 PRN Reason  Start Time


 Stop Time Status Last Admin


Dose Admin


 


 Acetaminophen


  (Tylenol)  650 mg  Q4H  PRN


 GT


 Mild Pain/Temp > 100.5  1/28/19 08:15


 2/27/19 08:14  1/29/19 05:18


 


 


 Albuterol/


 Ipratropium


  (Albuterol/


 Ipratropium)  3 ml  Q6H  PRN


 HHN


 Shortness of Breath  1/28/19 07:45


 2/2/19 07:44   


 


 


 Artificial Tears


  (Akwa-Tears)  1 drop  Q4H  PRN


 BOTH EYES


 Dry Eyes  1/28/19 07:45


 2/27/19 07:44  1/28/19 09:24


 


 


 Baclofen


  (Lioresal)  10 mg  EVERY 8  HOURS


 GT


   1/28/19 14:00


 2/27/19 13:59  1/31/19 13:14


 


 


 Dextrose


  (Dextrose 50%)  25 ml  Q30M  PRN


 IV


 Hypoglycemia  1/28/19 07:45


 2/27/19 07:44   


 


 


 Dextrose


  (Dextrose 50%)  50 ml  Q30M  PRN


 IV


 Hypoglycemia  1/28/19 07:45


 2/27/19 07:44   


 


 


 Heparin Sodium


  (Porcine)


  (Heparin 5000


 units/ml)  5,000 units  EVERY 12  HOURS


 SUBQ


   1/28/19 09:00


 2/27/19 08:59  1/31/19 20:44


 


 


 Insulin Aspart


  (NovoLOG)    EVERY 4  HOURS


 SUBQ


   1/30/19 13:00


 2/27/19 11:59  1/31/19 20:43


 


 


 Insulin Detemir


  (Levemir)  15 units  Q12HR


 SUBQ


   1/29/19 00:00


 2/28/19 00:00  1/31/19 20:45


 


 


 Lansoprazole


  (Prevacid)  30 mg  DAILY


 GT


   1/31/19 09:00


 3/2/19 08:59  1/31/19 10:21


 


 


 Levetiracetam


  (Keppra)  1,000 mg  EVERY 8  HOURS


 GT


   1/28/19 14:00


 2/27/19 13:59  1/31/19 13:15


 


 


 Nystatin


  (Nystatin Cr)  1 applic  EVERY 12  HOURS


 TOPIC


   1/28/19 09:00


 2/27/19 08:59  1/31/19 20:48


 


 


 Piperacillin Sod/


 Tazobactam Sod


 3.375 gm/Sodium


 Chloride  110 ml @ 


 27.5 mls/hr  Q8HR


 IVPB


   1/28/19 10:00


 2/4/19 09:59  1/31/19 14:36


 


 


 Polyethylene


 Glycol


  (Miralax)  17 gm  DAILYPRN  PRN


 GT


 Constipation  1/28/19 07:45


 2/27/19 07:44   


 


 


 Silver


 Sulfadiazine


  (Silvadene Cream


 25gm)  1 applic  DAILY


 TOPIC


   1/28/19 09:00


 2/27/19 08:59  1/31/19 10:22


 


 


 Sitagliptin


 Phosphate


  (Januvia)  50 mg  DAILY


 GT


   1/28/19 09:00


 2/27/19 08:59  1/31/19 10:20


 


 


 Vancomycin HCl


  (Vanco rx to


 dose)  1 ea  DAILY  PRN


 MISC


 PER RX PROTOCOL  1/28/19 08:00


 2/27/19 07:59   


 


 


 Vancomycin HCl/


 Dextrose  250 ml @ 


 166.667


 mls/hr  Q24H


 IVPB


   1/28/19 21:00


 2/2/19 20:59  1/30/19 20:12


 


 


 Vitamin D


  (Vitamin D)  1,000 intlu  DAILY


 GT


   1/28/19 09:00


 2/27/19 08:59  1/31/19 10:21


 











Assessment/Plan


Assessment/Plan





Hematology/Oncology Consultation   


   


Requesting MD: Merle Clifton   


Date of Service:1/31/19    


Reason for consultation: Leukocytosis   





HISTORY OF PRESENT ILLNESS:  The patient is a resident of an extended care 

facility subacute unit where he has been in stable condition over the last 

several months. Two days prior to the present admission, he developed fever, 

tachycardia, and moderate hypotension. Treatment in facility the patient with 

IV fluid and broad-spectrum antibiotics with ceftriaxone and the patient 

provisionally improved, he was transferred to [facility] and 

was admitted. Hematology/Oncology was consulted for Leukocytosis, wbc 17.





PAST MEDICAL HISTORY: He developed respiratory failure, had to be intubated and 

placed on mechanical ventilation.The patient is being ventilator dependent and 

fed via gastrostomy tube. He had multiple infections in the past with pulmonary 

and urinary infection, currently we are treating in this hospital.





ALLERGIES:  The patient is allergic to Aztreonam





MEDICATIONS: The patient is on: Baclofen, Levetiracetam ,Humalog insulin  with 

sliding scale every six hours, tazobactam etc.





FAMILY HISTORY:  He is  and has no children.





SOCIAL HISTORY:  The patient does not smoke, drink, or use illicit drugs.





REVIEW OF SYSTEMS:  The patient is unable to give any information regarding his 

state of health.





PHYSICAL EXAMINATION:


VITAL SIGNS:  Temperature is 124/82, his pulse is 95, respirations 16, and


temperature 97.9.


HEENT:  Eyes were normal.  Pupils were round, equal, and reactive to light.


Sclerae were white.  Conjunctivae were pink.  Extraocular movements were


normal.  Temporal arteries were palpable bilaterally.  There was no


bilateral temporal wasting.  Visual fields to confrontation was normal.


Neglect sign was negative.  ENT, mucous membranes were not dehydrated.


Auditory canals were clear and tympanic membranes could not be visualized.


Nasal cavity was not congested.  Nasal septum was intact.  Soft palate


was free of ulcerations.  Pharynx was clear from exudate or tonsillar


hypertrophy.  Uvula could not be visualized.  Tongue was moist, midline,


and normally palpated.


NECK:  Supple.  There was no goiter.  No mass.  No lymphadenopathy.  There


was no JVD.  No bruits.  Carotid upstroke was 2+.


LUNGS:  Clear.


HEART:  PMI was in fifth left intercostal space in midclavicular line.


There was normal S1 and normal S2.  There was no murmur.  No arrhythmia.


No S3.  No S4.  No pericardial rub.


ABDOMEN:  Soft and nontender without organomegaly.  There were no masses


palpable.  Normal bowel sounds without bruits.  There was no guarding.  No


rebound tenderness.  No ascites.  No hernia.  No CVA tenderness.  Liver


span was 8 cm, smooth and nontender.  Gastrostomy site was clean.


EXTREMITIES:  Warm without cyanosis, clubbing, or edema.





LABORATORY AND DIAGNOSTIC DATA: Hemoglobin is 15.5, hematocrit 47.9 with MCV of 

91, WBC of 17.9, and platelets are 293,000. 





Assessment/Recommendations


Leukocytosis. Likely related to underlying infection versus reactive process. 


--> Peripheral has been ordered, results are pending 


--> Medications have been reviewed 


--> Imaging has been reviewed


 -->Blood cultures and urine cultures prn


--> has been started on abx, empiric treatment 


# acute pancreatitis,   status post cerebral hemorrhage with severe 

encephalopathy.


# ventilator-dependent.  We will continue the same ventilator setting.  


-->Continue with albuterol sulfate and ipratropium


# Acute on chronic respiratory failure


--> ventilator dependent and fed via gastrostomy tube


# Diabetes mellitus


-->Humalog insulin  with sliding scale every six hours


# Sepsis


# Gastrostomy tube dependent


# Colostomy in place


# Vegetative state





The timing of this note does not necessarily reflect the time of the patient 

was seen


Greatly appreciate consultation!











Tejas Gerber MD Jan 31, 2019 21:20

## 2019-01-31 NOTE — NUR
NURSE NOTES:  PLACED A TELEPHONE CALL TO DR MORA REGARDING PHOSPHORUS LEVEL IS 
1.3.MESSAGE LEFT ON EMERGENCY VOICE MAIL. A WAITING FOR M.D TO CALL ME BACK.REPORT ENDORSE 
TO YURI LEWIS STAFF OF NOC SHIFT.WILL CONT TO MONITOR.

## 2019-01-31 NOTE — NUR
NURSE NOTES: RECEIVED BED SIDE REPORT FROM CARLYLE RN STAFF OF NOC SHIFT.RECEIVED PT WITH 
HOB ELEVATED 45 DEGREE OBTUNDED TRACH TO VENT TOLERATING WELL CURRENTS VENT 
SETTINGS.RENDERED TRACH CARE AND ORAL HYGIENE,SX,D LG AMT OF WHITE TICK SECRETIONS .PT 
RECEIVING GTF GLUCERNA 1.5 @ 65CC/HRS ,NO RESIDUAL TOLERATING WELL.FULL BODY ASSESSMENT 
DONE.PT REPOSITIONED Q 2HRS TO PROVIDE COMFORT AND TO PREVENT FURTHER SKIN BREAK DOWN .WILL 
CONT TO MONITOR.

## 2019-01-31 NOTE — PROGRESS NOTE
DATE:  01/30/2019

SUBJECTIVE:  The patient is awake, alert, afebrile, and hemodynamically

stable.



PHYSICAL EXAMINATION:

VITAL SIGNS:  Blood pressure 107/80, pulse is 105, respirations 16, and

temperature 97.9.

HEENT:  Eyes were normal.  ENT, mucous membranes were moist and intact.

NECK:  Supple with no JVD without lymph nodes.  Tracheostomy site is

clean.

LUNGS:  Clear without rhonchi, rales, or wheezing.  Secretions are small,

thin, and grey.

HEART:  Normal sounds with regular beats.  There is no S3, S4, or

pericardial rub.

ABDOMEN:  Soft and nontender with normal bowel sounds.  Gastrostomy site is

clean.

EXTREMITIES:  Warm without cyanosis, clubbing, or edema.



LABORATORY DATA:  His hemoglobin is 13.2, hematocrit 41.3 with MCV of 90,

WBC of 11.3, and platelets 249,000.  BUN and creatinine is 33 and 1.6

respectively.  It was 35 and 2.0 on 01/29/2018.  His sodium is 146,

potassium 3.4, chloride 111, and CO2 is 23.  His phosphorus is 2.3.  His

magnesium is 2.5.



Repeat laboratory tests will be done in the a.m.









  ______________________________________________

  Etienne Bloom M.D.





DR:  SHAMA

D:  01/31/2019 00:37

T:  01/31/2019 03:38

JOB#:  [id number]

CC:

## 2019-01-31 NOTE — NUR
SI; SEPSIS,HYPERNATREMIA TRACH/VENT

T. 99.3  RR 18 B/P 124/83

 BUN 32 CR 1.8 AST 49







IS:    ZOSYN IV

ALB HHN

PREVACID GT

*******STEP DOWN STATUS******

## 2019-01-31 NOTE — PULMONOLGY CRITICAL CARE NOTE
Critical Care - Asmt/Plan


Problems:  


(1) Acute on chronic respiratory failure


(2) Sepsis


(3) Fever


(4) Gastrostomy tube dependent


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


(6) Vegetative state


(7) Diabetes mellitus


Respiratory:  monitor respiratory rate, adjust FIO2, CXR


Cardiac:  continue to monitor HR/BP


Renal:  F/U I&O, keep IV fluid, check electrolytes


Infectious Disease:  check cultures


Gastrointestinal:  continue feedings/current rate


Endocrine:  check TSH


Neurologic:  PRN Ativan, keep patient comfortable


Affect:  PRN ativan


Prophylaxis:  Heparin


Notes Reviewed:  cardio, renal


Discussed with:  nurses, consultants, 





Critical Care - Objective





Last 24 Hour Vital Signs








  Date Time  Temp Pulse Resp B/P (MAP) Pulse Ox O2 Delivery O2 Flow Rate FiO2


 


1/31/19 10:57  111 17     40


 


1/31/19 09:14  113 16     40


 


1/31/19 08:00        40


 


1/31/19 08:00  112      


 


1/31/19 08:00 99.3 113 18 124/83 (97) 100   


 


1/31/19 08:00      Mechanical Ventilator  


 


1/31/19 06:55  111 18     40


 


1/31/19 05:17  105 16     40


 


1/31/19 04:00 98.0 106 17 107/77 (87) 99   


 


1/31/19 04:00      Mechanical Ventilator  


 


1/31/19 04:00        40


 


1/31/19 04:00  107      


 


1/31/19 03:11  104 17     40


 


1/31/19 01:03  104 17     40


 


1/31/19 00:00  102      


 


1/31/19 00:00      Mechanical Ventilator  


 


1/31/19 00:00        40


 


1/31/19 00:00 97.9 102 17 119/84 (96) 100   


 


1/30/19 23:14  103 16     40


 


1/30/19 21:27  99 18     40


 


1/30/19 20:00      Mechanical Ventilator  


 


1/30/19 20:00 97.9 102 16 107/81 (90) 100   


 


1/30/19 20:00        40


 


1/30/19 20:00  105      


 


1/30/19 19:37  103 16     40


 


1/30/19 16:50  108 16     40


 


1/30/19 16:00      Mechanical Ventilator  


 


1/30/19 16:00        40


 


1/30/19 16:00 98.1 109 16 113/80 (91) 100   


 


1/30/19 16:00  113      


 


1/30/19 14:48  112 21     40


 


1/30/19 12:45  114 18     40


 


1/30/19 12:00  114      


 


1/30/19 12:00        40


 


1/30/19 12:00 98.2 113 18 110/76 (87) 100   


 


1/30/19 12:00      Mechanical Ventilator  








Status:  awake


Condition:  critical


Lungs:  clear


Heart:  HR/BP stable


Abdomen:  soft, non-tender


Extremities:  no C/C/E, edema


Accucheck:  253





Critical Care - Subjective


ROS Limited/Unobtainable:  No


Condition:  critical


EKG Rhythm:  Sinus Rhythm


FI02:  40


Vent Support Breath Rate:  16


Vent Support Mode:  AC


Vent Tidal Volume:  600


Sputum Amount:  Small


PEEP:  5.0


PIP:  30


Tube Feeding Amount:  65


I&O:











Intake and Output  


 


 1/30/19 1/31/19





 19:00 07:00


 


Intake Total 1062.5 ml 1550.7 ml


 


Output Total 550 ml 700 ml


 


Balance 512.5 ml 850.7 ml


 


  


 


Free Water 150 ml 300 ml


 


IV Total 342.5 ml 470.7 ml


 


Tube Feeding 520 ml 780 ml


 


Other 50 ml 


 


Output Urine Total 300 ml 450 ml


 


Stool Total 250 ml 250 ml








CXR:


no change


Labs:





Laboratory Tests








Test


  1/31/19


03:15


 


White Blood Count


  9.2 K/UL


(4.8-10.8)


 


Red Blood Count


  4.40 M/UL


(4.70-6.10)  L


 


Hemoglobin


  12.5 G/DL


(14.2-18.0)  L


 


Hematocrit


  40.0 %


(42.0-52.0)  L


 


Mean Corpuscular Volume 91 FL (80-99)  


 


Mean Corpuscular Hemoglobin


  28.3 PG


(27.0-31.0)


 


Mean Corpuscular Hemoglobin


Concent 31.1 G/DL


(32.0-36.0)  L


 


Red Cell Distribution Width


  15.7 %


(11.6-14.8)  H


 


Platelet Count


  243 K/UL


(150-450)


 


Mean Platelet Volume


  11.6 FL


(6.5-10.1)  H


 


Neutrophils (%) (Auto)


  59.2 %


(45.0-75.0)


 


Lymphocytes (%) (Auto)


  23.5 %


(20.0-45.0)


 


Monocytes (%) (Auto)


  7.9 %


(1.0-10.0)


 


Eosinophils (%) (Auto)


  8.1 %


(0.0-3.0)  H


 


Basophils (%) (Auto)


  1.3 %


(0.0-2.0)


 


Sodium Level


  151 MMOL/L


(136-145)  H


 


Potassium Level


  4.3 MMOL/L


(3.5-5.1)


 


Chloride Level


  115 MMOL/L


()  H


 


Carbon Dioxide Level


  21 MMOL/L


(21-32)


 


Anion Gap


  15 mmol/L


(5-15)


 


Blood Urea Nitrogen


  32 mg/dL


(7-18)  H


 


Creatinine


  1.8 MG/DL


(0.55-1.30)  H


 


Estimat Glomerular Filtration


Rate 48.5 mL/min


(>60)


 


Glucose Level


  230 MG/DL


()  H


 


Calcium Level


  9.7 MG/DL


(8.5-10.1)


 


Phosphorus Level


  1.3 MG/DL


(2.5-4.9)  L


 


Magnesium Level


  2.4 MG/DL


(1.8-2.4)


 


Total Bilirubin


  0.5 MG/DL


(0.2-1.0)


 


Aspartate Amino Transf


(AST/SGOT) 49 U/L (15-37)


H


 


Alanine Aminotransferase


(ALT/SGPT) 26 U/L (12-78)


 


 


Alkaline Phosphatase


  90 U/L


()


 


Total Protein


  8.0 G/DL


(6.4-8.2)


 


Albumin


  2.4 G/DL


(3.4-5.0)  L


 


Globulin 5.6 g/dL  


 


Albumin/Globulin Ratio


  0.4 (1.0-2.7)


L

















Yury Pena MD Jan 31, 2019 11:37

## 2019-02-01 VITALS — DIASTOLIC BLOOD PRESSURE: 74 MMHG | SYSTOLIC BLOOD PRESSURE: 113 MMHG

## 2019-02-01 VITALS — SYSTOLIC BLOOD PRESSURE: 125 MMHG | DIASTOLIC BLOOD PRESSURE: 77 MMHG

## 2019-02-01 VITALS — DIASTOLIC BLOOD PRESSURE: 70 MMHG | SYSTOLIC BLOOD PRESSURE: 121 MMHG

## 2019-02-01 VITALS — DIASTOLIC BLOOD PRESSURE: 74 MMHG | SYSTOLIC BLOOD PRESSURE: 121 MMHG

## 2019-02-01 VITALS — DIASTOLIC BLOOD PRESSURE: 76 MMHG | SYSTOLIC BLOOD PRESSURE: 119 MMHG

## 2019-02-01 VITALS — SYSTOLIC BLOOD PRESSURE: 119 MMHG | DIASTOLIC BLOOD PRESSURE: 78 MMHG

## 2019-02-01 LAB
ADD MANUAL DIFF: NO
ALBUMIN SERPL-MCNC: 2.6 G/DL (ref 3.4–5)
ALBUMIN/GLOB SERPL: 0.5 {RATIO} (ref 1–2.7)
ALP SERPL-CCNC: 95 U/L (ref 46–116)
ALT SERPL-CCNC: 26 U/L (ref 12–78)
ANION GAP SERPL CALC-SCNC: 15 MMOL/L (ref 5–15)
AST SERPL-CCNC: 36 U/L (ref 15–37)
BASOPHILS NFR BLD AUTO: 1.3 % (ref 0–2)
BILIRUB SERPL-MCNC: 0.6 MG/DL (ref 0.2–1)
BUN SERPL-MCNC: 33 MG/DL (ref 7–18)
CALCIUM SERPL-MCNC: 10.5 MG/DL (ref 8.5–10.1)
CHLORIDE SERPL-SCNC: 116 MMOL/L (ref 98–107)
CO2 SERPL-SCNC: 22 MMOL/L (ref 21–32)
CREAT SERPL-MCNC: 1.7 MG/DL (ref 0.55–1.3)
EOSINOPHIL NFR BLD AUTO: 9.7 % (ref 0–3)
ERYTHROCYTE [DISTWIDTH] IN BLOOD BY AUTOMATED COUNT: 16.1 % (ref 11.6–14.8)
GLOBULIN SER-MCNC: 5.6 G/DL
HCT VFR BLD CALC: 39.1 % (ref 42–52)
HGB BLD-MCNC: 12.4 G/DL (ref 14.2–18)
LYMPHOCYTES NFR BLD AUTO: 17.8 % (ref 20–45)
MCV RBC AUTO: 90 FL (ref 80–99)
MONOCYTES NFR BLD AUTO: 6 % (ref 1–10)
NEUTROPHILS NFR BLD AUTO: 65.1 % (ref 45–75)
PHOSPHATE SERPL-MCNC: 1.5 MG/DL (ref 2.5–4.9)
PLATELET # BLD: 238 K/UL (ref 150–450)
POTASSIUM SERPL-SCNC: 4.1 MMOL/L (ref 3.5–5.1)
RBC # BLD AUTO: 4.33 M/UL (ref 4.7–6.1)
SODIUM SERPL-SCNC: 152 MMOL/L (ref 136–145)
WBC # BLD AUTO: 12.3 K/UL (ref 4.8–10.8)

## 2019-02-01 PROCEDURE — 05H933Z INSERTION OF INFUSION DEVICE INTO RIGHT BRACHIAL VEIN, PERCUTANEOUS APPROACH: ICD-10-PCS

## 2019-02-01 PROCEDURE — B54MZZA ULTRASONOGRAPHY OF RIGHT UPPER EXTREMITY VEINS, GUIDANCE: ICD-10-PCS

## 2019-02-01 RX ADMIN — DEXTROSE MONOHYDRATE SCH MLS/HR: 50 INJECTION, SOLUTION INTRAVENOUS at 14:07

## 2019-02-01 RX ADMIN — INSULIN ASPART SCH UNITS: 100 INJECTION, SOLUTION INTRAVENOUS; SUBCUTANEOUS at 12:48

## 2019-02-01 RX ADMIN — LEVETIRACETAM SCH MG: 100 SOLUTION ORAL at 21:27

## 2019-02-01 RX ADMIN — DEXTROSE MONOHYDRATE SCH MLS/HR: 50 INJECTION, SOLUTION INTRAVENOUS at 05:07

## 2019-02-01 RX ADMIN — LEVETIRACETAM SCH MG: 100 SOLUTION ORAL at 13:44

## 2019-02-01 RX ADMIN — INSULIN ASPART SCH UNITS: 100 INJECTION, SOLUTION INTRAVENOUS; SUBCUTANEOUS at 21:37

## 2019-02-01 RX ADMIN — INSULIN ASPART SCH UNITS: 100 INJECTION, SOLUTION INTRAVENOUS; SUBCUTANEOUS at 01:01

## 2019-02-01 RX ADMIN — LEVETIRACETAM SCH MG: 100 SOLUTION ORAL at 05:06

## 2019-02-01 RX ADMIN — HEPARIN SODIUM SCH UNITS: 5000 INJECTION INTRAVENOUS; SUBCUTANEOUS at 21:38

## 2019-02-01 RX ADMIN — INSULIN DETEMIR SCH UNITS: 100 INJECTION, SOLUTION SUBCUTANEOUS at 09:06

## 2019-02-01 RX ADMIN — HEPARIN SODIUM SCH UNITS: 5000 INJECTION INTRAVENOUS; SUBCUTANEOUS at 09:05

## 2019-02-01 RX ADMIN — DEXTROSE MONOHYDRATE SCH MLS/HR: 50 INJECTION, SOLUTION INTRAVENOUS at 21:24

## 2019-02-01 RX ADMIN — INSULIN ASPART SCH UNITS: 100 INJECTION, SOLUTION INTRAVENOUS; SUBCUTANEOUS at 05:06

## 2019-02-01 RX ADMIN — INSULIN ASPART SCH UNITS: 100 INJECTION, SOLUTION INTRAVENOUS; SUBCUTANEOUS at 17:37

## 2019-02-01 RX ADMIN — SITAGLIPTIN SCH MG: 50 TABLET, FILM COATED ORAL at 09:04

## 2019-02-01 RX ADMIN — VITAMIN D, TAB 1000IU (100/BT) SCH INTLU: 25 TAB at 09:04

## 2019-02-01 RX ADMIN — ACETAMINOPHEN PRN MG: 160 SOLUTION ORAL at 17:38

## 2019-02-01 RX ADMIN — INSULIN DETEMIR SCH UNITS: 100 INJECTION, SOLUTION SUBCUTANEOUS at 21:38

## 2019-02-01 RX ADMIN — INSULIN ASPART SCH UNITS: 100 INJECTION, SOLUTION INTRAVENOUS; SUBCUTANEOUS at 09:06

## 2019-02-01 NOTE — GENERAL PROGRESS NOTE
Assessment/Plan


Assessment/Plan





Assessment/Recommendations


Leukocytosis. Likely related to underlying infection versus reactive process. 


--> Peripheral has been reviewed


--> Medications have been reviewed 


--> Imaging has been reviewed


 -->Blood cultures and urine cultures prn


--> has been started on abx, empiric treatment 


--> wbc trend: 14-->11-->9-->12


# acute pancreatitis,   status post cerebral hemorrhage with severe 

encephalopathy.


# ventilator-dependent.  We will continue the same ventilator setting.  


-->Continue with albuterol sulfate and ipratropium


# Acute on chronic respiratory failure


--> ventilator dependent and fed via gastrostomy tube


# Diabetes mellitus


-->Humalog insulin  with sliding scale every six hours


# Sepsis


# Gastrostomy tube dependent


# Colostomy in place


# Vegetative state





The timing of this note does not necessarily reflect the time of the patient 

was seen


Greatly appreciate consultation!





Subjective


ROS Limited/Unobtainable:  Yes


Allergies:  


Coded Allergies:  


     AZTREONAM (Verified  Allergy, Unknown, 7/23/18)


Subjective


2/1: Pt is seen in the room,on vent, G tube, leukocytosis has been improving 

and his fevers have resolved.





Objective





Last 24 Hour Vital Signs








  Date Time  Temp Pulse Resp B/P (MAP) Pulse Ox O2 Delivery O2 Flow Rate FiO2


 


2/1/19 20:52  100 16     40


 


2/1/19 19:17  99 16     40


 


2/1/19 18:08 100.3       


 


2/1/19 17:10  106 17     40


 


2/1/19 16:00      Mechanical Ventilator  


 


2/1/19 16:00  107      


 


2/1/19 16:00 100.3 107 16 121/74 (90) 100   


 


2/1/19 16:00        40


 


2/1/19 15:10  107 16     40


 


2/1/19 12:54  109 16     40


 


2/1/19 12:00 100.0 110 17 121/70 (87) 100   


 


2/1/19 12:00      Mechanical Ventilator  


 


2/1/19 12:00        40


 


2/1/19 12:00  110      


 


2/1/19 11:05  110 17     40


 


2/1/19 09:10  102 16     40


 


2/1/19 08:00  99      


 


2/1/19 08:00      Mechanical Ventilator  


 


2/1/19 08:00        40


 


2/1/19 08:00 99.9 99 16 119/76 (90) 100   


 


2/1/19 07:08  99 16     40


 


2/1/19 05:17  90 17     40


 


2/1/19 04:00 98.1 98 18 125/77 (93) 100   


 


2/1/19 04:00        40


 


2/1/19 04:00      Mechanical Ventilator  


 


2/1/19 03:41  98      


 


2/1/19 03:22  92 16     40


 


2/1/19 01:26  94 16     40


 


2/1/19 00:00      Mechanical Ventilator  


 


2/1/19 00:00 97.9 91 16 119/78 (92) 100   


 


2/1/19 00:00        40


 


1/31/19 23:38  90      


 


1/31/19 23:16  88 16     40

















Intake and Output  


 


 1/31/19 2/1/19





 19:00 07:00


 


Intake Total 937.5 ml 1150.7 ml


 


Output Total 750 ml 700 ml


 


Balance 187.5 ml 450.7 ml


 


  


 


Free Water 300 ml 200 ml


 


IV Total 82.5 ml 470.7 ml


 


Tube Feeding 555 ml 480 ml


 


Output Urine Total 400 ml 450 ml


 


Stool Total 350 ml 250 ml








Laboratory Tests


2/1/19 03:30: 


White Blood Count 12.3H, Red Blood Count 4.33L, Hemoglobin 12.4L, Hematocrit 

39.1L, Mean Corpuscular Volume 90, Mean Corpuscular Hemoglobin 28.6, Mean 

Corpuscular Hemoglobin Concent 31.7L, Red Cell Distribution Width 16.1H, 

Platelet Count 238, Mean Platelet Volume 10.1, Neutrophils (%) (Auto) 65.1, 

Lymphocytes (%) (Auto) 17.8L, Monocytes (%) (Auto) 6.0, Eosinophils (%) (Auto) 

9.7H, Basophils (%) (Auto) 1.3, Sodium Level 152H, Potassium Level 4.1, 

Chloride Level 116H, Carbon Dioxide Level 22, Anion Gap 15, Blood Urea Nitrogen 

33H, Creatinine 1.7H, Estimat Glomerular Filtration Rate 51.8, Glucose Level 

224H, Calcium Level 10.5H, Phosphorus Level 1.5L, Magnesium Level 2.5H, Total 

Bilirubin 0.6, Aspartate Amino Transf (AST/SGOT) 36, Alanine Aminotransferase (

ALT/SGPT) 26, Alkaline Phosphatase 95, Total Protein 8.2, Albumin 2.6L, 

Globulin 5.6, Albumin/Globulin Ratio 0.5L


Height (Feet):  5


Height (Inches):  7.00


Weight (Pounds):  202


Objective


PHYSICAL EXAMINATION


General Appearance:  on vent      


HEENT:  normocephalic, atraumatic      


Neck:  non-tender, normal alignment      


Respiratory/Chest:  chest wall non-tender, lungs clear      


Cardiovascular/Chest:  normal peripheral pulses, normal rate      


Abdomen:  normal bowel sounds, non tender      


Extremities:  poorl range of motion











Tejas Gerber MD Feb 1, 2019 21:35

## 2019-02-01 NOTE — NUR
RESPIRATORY NOTE: Received pt on AC RR 12, , FIO2 40%, and peep +5. Moderate amount of 
white, tan secretions and will sxn prn. Ambu bad near bedside. Vent is plugged into red 
outlet. Will continue to monitor.

## 2019-02-01 NOTE — PULMONOLGY CRITICAL CARE NOTE
Critical Care - Asmt/Plan


Problems:  


(1) Acute on chronic respiratory failure


(2) Sepsis


(3) Fever


(4) Gastrostomy tube dependent


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


(6) Vegetative state


(7) Diabetes mellitus


Respiratory:  monitor respiratory rate, adjust FIO2, CXR


Cardiac:  continue to monitor HR/BP


Renal:  F/U I&O, keep IV fluid


Infectious Disease:  check cultures


Gastrointestinal:  continue feedings/current rate


Endocrine:  monitor blood sugar, check HgA1C


Hematologic:  transfuse if hgb<8.5


Neurologic:  PRN Ativan, keep patient comfortable


Affect:  PRN ativan


Prophylaxis:  Protonix


Time Spent (Minutes):  40


Notes Reviewed:  cardio, renal


Discussed with:  nurses, consultants, 





Critical Care - Objective





Last 24 Hour Vital Signs








  Date Time  Temp Pulse Resp B/P (MAP) Pulse Ox O2 Delivery O2 Flow Rate FiO2


 


2/1/19 09:10  102 16     40


 


2/1/19 08:00  99      


 


2/1/19 08:00      Mechanical Ventilator  


 


2/1/19 08:00        40


 


2/1/19 08:00 99.9 99 16 119/76 (90) 100   


 


2/1/19 07:08  99 16     40


 


2/1/19 05:17  90 17     40


 


2/1/19 04:00 98.1 98 18 125/77 (93) 100   


 


2/1/19 04:00        40


 


2/1/19 04:00      Mechanical Ventilator  


 


2/1/19 03:41  98      


 


2/1/19 03:22  92 16     40


 


2/1/19 01:26  94 16     40


 


2/1/19 00:00      Mechanical Ventilator  


 


2/1/19 00:00 97.9 91 16 119/78 (92) 100   


 


2/1/19 00:00        40


 


1/31/19 23:38  90      


 


1/31/19 23:16  88 16     40


 


1/31/19 21:05  90 16     40


 


1/31/19 20:00        40


 


1/31/19 20:00      Mechanical Ventilator  


 


1/31/19 20:00 98.1 94 16 104/87 (93) 99   


 


1/31/19 19:30  92 16     40


 


1/31/19 19:25  92      


 


1/31/19 17:01  98 16     40


 


1/31/19 16:00 98.1 100 16 114/75 (88) 100   


 


1/31/19 16:00        40


 


1/31/19 15:30  98      


 


1/31/19 15:15  101 17     40


 


1/31/19 15:00      Mechanical Ventilator  


 


1/31/19 13:02  106 17     40


 


1/31/19 12:00 98.6 100 16 118/83 (95) 100   


 


1/31/19 12:00        40


 


1/31/19 11:50  109      


 


1/31/19 10:57  111 17     40








Condition:  critical


HEENT:  atraumatic


Neck:  full ROM


Heart:  HR/BP stable


Abdomen:  non-tender, active bowel sounds


Extremities:  edema


Decubiti:  location


Accucheck:  248





Critical Care - Subjective


ROS Limited/Unobtainable:  Yes


Condition:  critical


EKG Rhythm:  Sinus Rhythm


FI02:  40


Vent Support Breath Rate:  16


Vent Support Mode:  AC


Vent Tidal Volume:  600


Sputum Amount:  Small


PEEP:  5.0


PIP:  30


Tube Feeding Amount:  40


I&O:











Intake and Output  


 


 1/31/19 2/1/19





 19:00 07:00


 


Intake Total 937.5 ml 1110.7 ml


 


Output Total 750 ml 700 ml


 


Balance 187.5 ml 410.7 ml


 


  


 


Free Water 300 ml 200 ml


 


IV Total 82.5 ml 470.7 ml


 


Tube Feeding 555 ml 440 ml


 


Output Urine Total 400 ml 450 ml


 


Stool Total 350 ml 250 ml








Labs:





Laboratory Tests








Test


  1/31/19


20:15 2/1/19


03:30


 


Vancomycin Level Trough


  15.4 ug/mL


(5.0-12.0)  H 


 


 


White Blood Count


  


  12.3 K/UL


(4.8-10.8)  H


 


Red Blood Count


  


  4.33 M/UL


(4.70-6.10)  L


 


Hemoglobin


  


  12.4 G/DL


(14.2-18.0)  L


 


Hematocrit


  


  39.1 %


(42.0-52.0)  L


 


Mean Corpuscular Volume  90 FL (80-99)  


 


Mean Corpuscular Hemoglobin


  


  28.6 PG


(27.0-31.0)


 


Mean Corpuscular Hemoglobin


Concent 


  31.7 G/DL


(32.0-36.0)  L


 


Red Cell Distribution Width


  


  16.1 %


(11.6-14.8)  H


 


Platelet Count


  


  238 K/UL


(150-450)


 


Mean Platelet Volume


  


  10.1 FL


(6.5-10.1)


 


Neutrophils (%) (Auto)


  


  65.1 %


(45.0-75.0)


 


Lymphocytes (%) (Auto)


  


  17.8 %


(20.0-45.0)  L


 


Monocytes (%) (Auto)


  


  6.0 %


(1.0-10.0)


 


Eosinophils (%) (Auto)


  


  9.7 %


(0.0-3.0)  H


 


Basophils (%) (Auto)


  


  1.3 %


(0.0-2.0)


 


Sodium Level


  


  152 MMOL/L


(136-145)  H


 


Potassium Level


  


  4.1 MMOL/L


(3.5-5.1)


 


Chloride Level


  


  116 MMOL/L


()  H


 


Carbon Dioxide Level


  


  22 MMOL/L


(21-32)


 


Anion Gap


  


  15 mmol/L


(5-15)


 


Blood Urea Nitrogen


  


  33 mg/dL


(7-18)  H


 


Creatinine


  


  1.7 MG/DL


(0.55-1.30)  H


 


Estimat Glomerular Filtration


Rate 


  51.8 mL/min


(>60)


 


Glucose Level


  


  224 MG/DL


()  H


 


Calcium Level


  


  10.5 MG/DL


(8.5-10.1)  H


 


Phosphorus Level


  


  1.5 MG/DL


(2.5-4.9)  L


 


Magnesium Level


  


  2.5 MG/DL


(1.8-2.4)  H


 


Total Bilirubin


  


  0.6 MG/DL


(0.2-1.0)


 


Aspartate Amino Transf


(AST/SGOT) 


  36 U/L (15-37)


 


 


Alanine Aminotransferase


(ALT/SGPT) 


  26 U/L (12-78)


 


 


Alkaline Phosphatase


  


  95 U/L


()


 


Total Protein


  


  8.2 G/DL


(6.4-8.2)


 


Albumin


  


  2.6 G/DL


(3.4-5.0)  L


 


Globulin  5.6 g/dL  


 


Albumin/Globulin Ratio


  


  0.5 (1.0-2.7)


L

















Yury Pena MD Feb 1, 2019 10:48

## 2019-02-01 NOTE — NUR
RESPIRATORY NOTE: Patient received on mechanical ventilator, patient is trach PTX 7, current 
setting sare , 16, 40%,+5. Alarms are set and audible, plugged into red outlet, spare 
trach bedside, ambu bag bedside, will continue to monitor patient.

## 2019-02-01 NOTE — NUR
NURSE NOTES:

vital signs stable, no co pain, midline inserted on RT upper arm, repositioned, continue 
monitoring.

## 2019-02-01 NOTE — DIAGNOSTIC IMAGING REPORT
Indications: Needs long-term IV access

 

Technique: Procedure performed at bedside. Procedural timeout performed. Ultrasound

confirms patent compressible right brachial vein. Total sterile technique, including

sterile probe cover and sterile gel, sterile gloves, hand hygiene, hat, mask,,

sterile gown, large sterile drape, and preparation with 2% chlorhexidine utilized.

Local anesthesia with 1% lidocaine. Multiple attempts made at venipuncture at

multiple locations, with successful entry into the vein documented by blood return.

However, at all locations, the wire would only extend a limited distance. Real-time

ultrasound imaging demonstrated multiple veins surrounding the brachial artery,

appearance suggestive of central venoocclusive disease ultimately, an upstream

brachial vein was selected, accessed with 21-gauge needle, passage 0.018 guidewire,

exchange for 5 Yi peel-away sheath. 5 Yi Bard dual-lumen power PICC cut to 10

cm. It was inserted through the peel-away sheath. Peel-away sheath and guidewire

removed. Catheter fixed to the skin. Both catheter ports aspirated and flushed.

Patient tolerated procedure well, without immediate complication. Followup chest

x-ray obtained, documents catheter tip position at the level of the right brachial

vein

 

Impression: Attempted bedside placement of right arm PICC. Failure of central passage

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 Catheter cut short, suitable

only for use as a peripheral IV.

## 2019-02-01 NOTE — NUR
NURSE NOTES:

Consent for PICC line insertion got over the phone from next of kin, darin Ribeiro- 2nd 
nurse to consent Shannan LEWIS.

## 2019-02-01 NOTE — CONSULTATION
History of Present Illness


General


Date patient seen:  Feb 1, 2019


Chief Complaint:  Fever


Reason for Consultation:  UTI





Present Illness


HPI


Mr. Black is a 54 yo male with PMHx of chronic resp failure - vent/trach 

dependent, ICH, COPD, Dm2, seizure disoder, BPH, DM2,  dysphagia, G tube, 

colostomy who was admitted to the hospital on 1/27/19 with fever, hypotension 

and tachycardia. He was found to have P.a. UTI. He has been on zosyn. His 

inital leukocytosis has been improving and his fevers have resoved. 





ID was consulted for UTI





PMHx/PSHx


Chronic resp failure - vent/trach dependent


ICH


COPD


DM


Seizure disoder


BPH


Dysphagia, G tube


Colostomy  





SocHx


Lives at a nursing home





FamHx


Unable to obtain as patient not verbal


Allergies:  


Coded Allergies:  


     AZTREONAM (Verified  Allergy, Unknown, 7/23/18)





Medication History


Scheduled


Baclofen* (Baclofen*), 10 MG GT THREE TIMES A DAY, (Reported)


Bisacodyl* (Dulcolax*), 10 MG RECTAL DAILY, (Reported)


Calcium Carbonate (Calcium Carbonate), 1,000 MG GT BID, (Reported)


Chlorhexidine Gluconate (Peridex), 15 ML MM Q12HR, (Reported)


Cholecalciferol (Vitamin D3)* (Vitamin D*), 5,000 UNIT GT ONCE A WEEK, (Reported

)


Clonidine Hcl* (Catapres*), 0.1 MG GT EVERY 6 HOURS, (Reported)


Cranberry (Cranberry), 400 MG GT DAILY, (Reported)


Dextran 70/Hypromellose (Artificial Tears Eye Drops*), 1 DROP BOTH EYES Q4HR, (

Reported)


Docusate Sodium* (Docusate Sodium*), 100 MG GT TWICE A DAY, (Reported)


Famotidine (Famotidine), 20 MG GT DAILY, (Reported)


Fenofibrate Nanocrystallized (Fenofibrate), 145 MG GT DAILY, (Reported)


Fenofibrate,Micronized (Fenofibrate), 0 ORAL DAILY, (Reported)


Glycopyrrolate (Robinul), 2 MG GT BID, (Reported)


Heparin Sod (Porcine) (Heparin Sodium*), 5,000 UNITS SUBQ EVERY 12 HOURS, (

Reported)


Insulin Regular, Human* (Novolin R*), 0 SUBQ .SLIDING SCALE, (Reported)


Ipratropium/Albuterol Sulfate (DuoNeb 0.5-3(2.5)mg/3ml), 3 ML HHN Q3HR, (

Reported)


Levetiracetam (Levetiracetam), 100 MG ORAL TWICE A DAY, (Reported)


Levetiracetam* (Levetiracetam*), 1,000 MG GT TID, (Reported)


Losartan Potassium* (Losartan Potassium*), 25 MG GT DAILY, (Reported)


Multivitamin With Minerals (Multivitamins With Minerals*), 1 TAB GT DAILY, (

Reported)


Nystatin* (Nystatin*), 1 APPLIC TOPIC THREE TIMES A DAY, (Reported)


Piperacillin-Tazo-Dextrose,Iso (Zosyn 3.375 Gm Pre Mix-Bag), 3.375 GM IVPB 

EVERY 8 HOURS, (Reported)


Polyethylene Glycol 3350* (Miralax*), 17 GM GT DAILY, (Reported)


Polyethylene Glycol 3350* (Miralax*), 17 GM ORAL DAILY, (Reported)


Sitagliptin (Januvia), 50 MG ORAL DAILY, (Reported)


Sitagliptin* (Januvia*), 50 MG GT DAILY, (Reported)


Spironolactone* (Aldactone*), 25 MG GT DAILY, (Reported)





Scheduled PRN


Acetaminophen 160MG/5ML* (Acetaminophen*), 20.3 ML GT Q4HR PRN for Fever/

Headache/Mild Pain, (Reported)


Albuterol Sulfate* (Albuterol Sulfate Hhn*), 3 ML INH Q2H PRN for Shortness of 

Breath, (Reported)


Ondansetron* (Zofran*), 4 MG ORAL Q6H PRN for Nausea & Vomiting, (Reported)





Miscellaneous Medications


Arginine/Ascorbate Sod/Siddharth AC (Arginaid Powder), 1 EACH PO, (Reported)


Dextran 70/Hypromellose/Pf (Artificial Tears Drops), 1 EACH OP, (Reported)


Insulin Lispro (Humalog), 0 SUBQ, (Reported)


Ipratropium/Albuterol Sulfate (DuoNeb 0.5-3(2.5)mg/3ml), 3 ML HHN, (Reported)


Lactulose (Lactulose*), 30 ML GT, (Reported)


Silver Sulfadiazine (Silvadene), 20 GM TP, (Reported)


Tuberculin,Purif.prot.deriv. (Aplisol), 5 TU ID, (Reported)


Vancomycin Hcl (Vancomycin), 750 MG IVPB, (Reported)





Patient History


Healthcare decision maker





Resuscitation status





Advanced Directive on File








Review of Systems


ROS Narrative


Unable to obtain as patient not verbal





Physical Exam





Last 24 Hour Vital Signs








  Date Time  Temp Pulse Resp B/P (MAP) Pulse Ox O2 Delivery O2 Flow Rate FiO2


 


2/1/19 08:00      Mechanical Ventilator  


 


2/1/19 08:00        40


 


2/1/19 08:00 99.9 99 16 119/76 (90) 100   


 


2/1/19 07:08  99 16     40


 


2/1/19 05:17  90 17     40


 


2/1/19 04:00 98.1 98 18 125/77 (93) 100   


 


2/1/19 04:00        40


 


2/1/19 04:00      Mechanical Ventilator  


 


2/1/19 03:41  98      


 


2/1/19 03:22  92 16     40


 


2/1/19 01:26  94 16     40


 


2/1/19 00:00      Mechanical Ventilator  


 


2/1/19 00:00 97.9 91 16 119/78 (92) 100   


 


2/1/19 00:00        40


 


1/31/19 23:38  90      


 


1/31/19 23:16  88 16     40


 


1/31/19 21:05  90 16     40


 


1/31/19 20:00        40


 


1/31/19 20:00      Mechanical Ventilator  


 


1/31/19 20:00 98.1 94 16 104/87 (93) 99   


 


1/31/19 19:30  92 16     40


 


1/31/19 19:25  92      


 


1/31/19 17:01  98 16     40


 


1/31/19 16:00 98.1 100 16 114/75 (88) 100   


 


1/31/19 16:00        40


 


1/31/19 15:30  98      


 


1/31/19 15:15  101 17     40


 


1/31/19 15:00      Mechanical Ventilator  


 


1/31/19 13:02  106 17     40


 


1/31/19 12:00 98.6 100 16 118/83 (95) 100   


 


1/31/19 12:00        40


 


1/31/19 11:50  109      


 


1/31/19 10:57  111 17     40


 


1/31/19 09:14  113 16     40

















Intake and Output  


 


 1/31/19 2/1/19





 18:59 06:59


 


Intake Total 1062.5 ml 1150.7 ml


 


Output Total 750 ml 700 ml


 


Balance 312.5 ml 450.7 ml


 


  


 


Free Water 400 ml 200 ml


 


IV Total 82.5 ml 470.7 ml


 


Tube Feeding 580 ml 480 ml


 


Output Urine Total 400 ml 450 ml


 


Stool Total 350 ml 250 ml











Laboratory Tests








Test


  1/31/19


20:15 2/1/19


03:30


 


Vancomycin Level Trough


  15.4 ug/mL


(5.0-12.0)  H 


 


 


White Blood Count


  


  12.3 K/UL


(4.8-10.8)  H


 


Red Blood Count


  


  4.33 M/UL


(4.70-6.10)  L


 


Hemoglobin


  


  12.4 G/DL


(14.2-18.0)  L


 


Hematocrit


  


  39.1 %


(42.0-52.0)  L


 


Mean Corpuscular Volume  90 FL (80-99)  


 


Mean Corpuscular Hemoglobin


  


  28.6 PG


(27.0-31.0)


 


Mean Corpuscular Hemoglobin


Concent 


  31.7 G/DL


(32.0-36.0)  L


 


Red Cell Distribution Width


  


  16.1 %


(11.6-14.8)  H


 


Platelet Count


  


  238 K/UL


(150-450)


 


Mean Platelet Volume


  


  10.1 FL


(6.5-10.1)


 


Neutrophils (%) (Auto)


  


  65.1 %


(45.0-75.0)


 


Lymphocytes (%) (Auto)


  


  17.8 %


(20.0-45.0)  L


 


Monocytes (%) (Auto)


  


  6.0 %


(1.0-10.0)


 


Eosinophils (%) (Auto)


  


  9.7 %


(0.0-3.0)  H


 


Basophils (%) (Auto)


  


  1.3 %


(0.0-2.0)


 


Sodium Level


  


  152 MMOL/L


(136-145)  H


 


Potassium Level


  


  4.1 MMOL/L


(3.5-5.1)


 


Chloride Level


  


  116 MMOL/L


()  H


 


Carbon Dioxide Level


  


  22 MMOL/L


(21-32)


 


Anion Gap


  


  15 mmol/L


(5-15)


 


Blood Urea Nitrogen


  


  33 mg/dL


(7-18)  H


 


Creatinine


  


  1.7 MG/DL


(0.55-1.30)  H


 


Estimat Glomerular Filtration


Rate 


  51.8 mL/min


(>60)


 


Glucose Level


  


  224 MG/DL


()  H


 


Calcium Level


  


  10.5 MG/DL


(8.5-10.1)  H


 


Phosphorus Level


  


  1.5 MG/DL


(2.5-4.9)  L


 


Magnesium Level


  


  2.5 MG/DL


(1.8-2.4)  H


 


Total Bilirubin


  


  0.6 MG/DL


(0.2-1.0)


 


Aspartate Amino Transf


(AST/SGOT) 


  36 U/L (15-37)


 


 


Alanine Aminotransferase


(ALT/SGPT) 


  26 U/L (12-78)


 


 


Alkaline Phosphatase


  


  95 U/L


()


 


Total Protein


  


  8.2 G/DL


(6.4-8.2)


 


Albumin


  


  2.6 G/DL


(3.4-5.0)  L


 


Globulin  5.6 g/dL  


 


Albumin/Globulin Ratio


  


  0.5 (1.0-2.7)


L








Height (Feet):  5


Height (Inches):  7.00


Weight (Pounds):  202


Medications





Current Medications








 Medications


  (Trade)  Dose


 Ordered  Sig/Jan


 Route


 PRN Reason  Start Time


 Stop Time Status Last Admin


Dose Admin


 


 Acetaminophen


  (Tylenol)  650 mg  Q4H  PRN


 GT


 Mild Pain/Temp > 100.5  1/28/19 08:15


 2/27/19 08:14  1/29/19 05:18


 


 


 Albuterol/


 Ipratropium


  (Albuterol/


 Ipratropium)  3 ml  Q6H  PRN


 HHN


 Shortness of Breath  1/28/19 07:45


 2/2/19 07:44   


 


 


 Artificial Tears


  (Akwa-Tears)  1 drop  Q4H  PRN


 BOTH EYES


 Dry Eyes  1/28/19 07:45


 2/27/19 07:44  1/28/19 09:24


 


 


 Baclofen


  (Lioresal)  10 mg  EVERY 8  HOURS


 GT


   1/28/19 14:00


 2/27/19 13:59  2/1/19 05:06


 


 


 Chlorhexidine


 Gluconate


  (Elaine-Hex 2%)  1 applic  DAILY@2000


 TOPIC


   2/1/19 20:00


 3/3/19 19:59   


 


 


 Dextrose


  (Dextrose 50%)  25 ml  Q30M  PRN


 IV


 Hypoglycemia  1/28/19 07:45


 2/27/19 07:44   


 


 


 Dextrose


  (Dextrose 50%)  50 ml  Q30M  PRN


 IV


 Hypoglycemia  1/28/19 07:45


 2/27/19 07:44   


 


 


 Heparin Sodium


  (Porcine)


  (Heparin 5000


 units/ml)  5,000 units  EVERY 12  HOURS


 SUBQ


   1/28/19 09:00


 2/27/19 08:59  1/31/19 20:44


 


 


 Heparin Sodium/


 Sodium Chloride


  (Heparin 2000


 units/Ns 1000ml


 premix)  2,000 unit  ONCE  PRN


 INJ


 PICC line placement  2/1/19 01:45


 2/3/19 01:44   


 


 


 Insulin Aspart


  (NovoLOG)    EVERY 4  HOURS


 SUBQ


   1/30/19 13:00


 2/27/19 11:59  2/1/19 05:06


 


 


 Insulin Detemir


  (Levemir)  15 units  Q12HR


 SUBQ


   1/29/19 00:00


 2/28/19 00:00  1/31/19 20:45


 


 


 Lansoprazole


  (Prevacid)  30 mg  DAILY


 GT


   1/31/19 09:00


 3/2/19 08:59  1/31/19 10:21


 


 


 Levetiracetam


  (Keppra)  1,000 mg  EVERY 8  HOURS


 GT


   1/28/19 14:00


 2/27/19 13:59  2/1/19 05:06


 


 


 Lidocaine HCl


  (Xylocaine 1%


 30ml)  30 ml  ONCE  PRN


 INJ


 PICC line placement  2/1/19 01:45


 2/3/19 01:44   


 


 


 Nystatin


  (Nystatin Cr)  1 applic  EVERY 12  HOURS


 TOPIC


   1/28/19 09:00


 2/27/19 08:59  1/31/19 20:48


 


 


 Piperacillin Sod/


 Tazobactam Sod


 3.375 gm/Sodium


 Chloride  110 ml @ 


 27.5 mls/hr  Q8HR


 IVPB


   1/28/19 10:00


 2/4/19 09:59  2/1/19 05:07


 


 


 Polyethylene


 Glycol


  (Miralax)  17 gm  DAILYPRN  PRN


 GT


 Constipation  1/28/19 07:45


 2/27/19 07:44   


 


 


 Silver


 Sulfadiazine


  (Silvadene Cream


 25gm)  1 applic  DAILY


 TOPIC


   1/28/19 09:00


 2/27/19 08:59  1/31/19 10:22


 


 


 Sitagliptin


 Phosphate


  (Januvia)  50 mg  DAILY


 GT


   1/28/19 09:00


 2/27/19 08:59  1/31/19 10:20


 


 


 Vancomycin HCl


  (Vanco rx to


 dose)  1 ea  DAILY  PRN


 MISC


 PER RX PROTOCOL  1/28/19 08:00


 2/27/19 07:59   


 


 


 Vancomycin HCl/


 Dextrose  250 ml @ 


 166.667


 mls/hr  Q24H


 IVPB


   1/28/19 21:00


 2/2/19 20:59  1/31/19 21:09


 


 


 Vitamin D


  (Vitamin D)  1,000 intlu  DAILY


 GT


   1/28/19 09:00


 2/27/19 08:59  1/31/19 10:21


 








Objective Narrative


Gen:  NAD, On vent


HEENT: NCAT, MMM, PERRL, No Oral lesion, no scleral icterus, trached


NECK:  full range of motion, supple, no meningismus, No LAD, No JVD


LUNGS:  CTAB, No W/C, No Accessory muscle use


CARDS: RRR, S1, S2, No M/R/G, 


ABD: Soft, Obese, NT, ND, No R/G, + BS, No HSM, No Masses, PEG and Colostomy (

No E/P)


: Deferred


Ext: C/C/E, Pulses 2+ B/L (DP, Rad): 


NEURO: Not responding, No spontaneous movements


SKIN:  Warm/dry, No rashes





Assessment/Plan


Assessment/Plan


82 yo female with PMHx of DM, HTN and a Cervical spinal tumor s/p resection 1/14 /19 who presneted to the ED on 1/31/19 with SOB, nausea and constipation. 





UTI complicated


   UA (+)


   Indewlling mayorga


   UCx 1/27/18 P.a. 


   CT abd 1/29/19 3 mm distal right ureteral calculus, minimal resultant 

hydronephrosis. Atrophic left kidney, containing a large staghorn calculus and 

multiple intrarenal calyceal calculi, previously described





Leukocytosis 15 on admit


Aebrile to 102





Mild pancreatitis  





Chronic resp failure - vent/trach dependent


ICH


COPD


DM


Seizure disoder


BPH


Dysphagia, G tube


Colostomy  





 Plan 





- Conitnue Zosyn # 4/10 for UTI


        - 2/1/19 SP Vanocmcyin #4- No evidence for MRSA


- Monitor CBC and temps





Thank you for this consult. We will continue to follow the patient during this 

hospitalization.











Robert Williamson MD Feb 1, 2019 09:01

## 2019-02-01 NOTE — PROGRESS NOTE
DATE:  01/31/2019

SUBJECTIVE:  The patient is afebrile and hemodynamically stable.



PHYSICAL EXAMINATION:

VITAL SIGNS:  Blood pressure 119/78, his pulse is 90, respirations are 16,

and temperature 97.9.

HEENT:  Eyes were normal.  ENT, mucous membranes were moist and intact.

NECK:  Supple with no JVD without lymph nodes.  Tracheostomy site is

clean.

LUNGS:  Clear without rhonchi, rales, or wheezes.  Secretions are small,

thin, and grey.

HEART:  Normal sounds with regular beats.  There is no S3, S4, or

pericardial rub.

ABDOMEN:  Soft and nontender with normal bowel sounds.  Gastrostomy site is

clean.

EXTREMITIES:  Warm without cyanosis, clubbing, or edema.



LABORATORY AND DIAGNOSTIC DATA:  His hemoglobin is 12.5, hematocrit is 40.7

with MCV of 91, WBC of 9.2, and platelets are 243,000.  His BUN and

creatinine are 32 and _____ respectively.  His sodium is 151, potassium

4.3, chloride 115, and CO2 is 21.  His glucose _____ 253.  His calcium is

9.7, phosphorus is 1.3, and magnesium is 2.4.



IMPRESSION:  The patient is status post severe pancreatitis _____ tolerate

feeding now well.  He is afebrile and hemodynamically stable.  Repeat

laboratory tests will be done in the a.m.









  ______________________________________________

  Etienne Bloom M.D.





DR:  JIE

D:  02/01/2019 01:44

T:  02/01/2019 12:27

JOB#:  [id number]

CC:

## 2019-02-01 NOTE — NUR
NURSE NOTES:

Received report from Inocente RN, pt. in bed obtunded, no signs or symptoms of acute cardiac 
or respiratory distress noted, cardiac monitoring on,  bed in lowest position and call light 
within easy reach, bed alarm on, side rails up x's3, safety brakes engaged, pt. appears to 
be tolerating current vent settings well- AC16, , Fio2 @40% and PEEP of 5- no distress 
noted, G tube feeding running Glucerna 1.5 at 40cc/hr- no residual noted, Colostomy intact 
and draining to gravity, Guzmán intact and draining to gravity, dressings dry and intact,pt. 
is clean and dry, comfort measures provided, Rt. hand 24G- iv intact and patent, MASHA midline 
intact and patent, safety measures continued, will continue with plan of care.

## 2019-02-01 NOTE — NUR
NURSE NOTES:

received pt in the bed, vent dependent, obtunded, vital signs stable, no co pain, no SOB, 
skin warm and dry to touch, intact, dressing dry and intact, tolerate GT feeding well, 
condom catheter with yellow urine, colostomy, TLC on RT femoral, dressing dry and intact, 
bed in low position, HOB elevated.

## 2019-02-02 VITALS — SYSTOLIC BLOOD PRESSURE: 122 MMHG | DIASTOLIC BLOOD PRESSURE: 74 MMHG

## 2019-02-02 VITALS — SYSTOLIC BLOOD PRESSURE: 114 MMHG | DIASTOLIC BLOOD PRESSURE: 78 MMHG

## 2019-02-02 VITALS — DIASTOLIC BLOOD PRESSURE: 78 MMHG | SYSTOLIC BLOOD PRESSURE: 110 MMHG

## 2019-02-02 VITALS — DIASTOLIC BLOOD PRESSURE: 78 MMHG | SYSTOLIC BLOOD PRESSURE: 118 MMHG

## 2019-02-02 VITALS — SYSTOLIC BLOOD PRESSURE: 123 MMHG | DIASTOLIC BLOOD PRESSURE: 75 MMHG

## 2019-02-02 LAB
ADD MANUAL DIFF: NO
ALBUMIN SERPL-MCNC: 2.8 G/DL (ref 3.4–5)
ALBUMIN/GLOB SERPL: 0.5 {RATIO} (ref 1–2.7)
ALP SERPL-CCNC: 97 U/L (ref 46–116)
ALT SERPL-CCNC: 33 U/L (ref 12–78)
ANION GAP SERPL CALC-SCNC: 16 MMOL/L (ref 5–15)
APPEARANCE UR: (no result)
APTT PPP: (no result) S
AST SERPL-CCNC: 51 U/L (ref 15–37)
BASOPHILS NFR BLD AUTO: 0.8 % (ref 0–2)
BILIRUB SERPL-MCNC: 0.6 MG/DL (ref 0.2–1)
BUN SERPL-MCNC: 33 MG/DL (ref 7–18)
CALCIUM SERPL-MCNC: 10.7 MG/DL (ref 8.5–10.1)
CHLORIDE SERPL-SCNC: 118 MMOL/L (ref 98–107)
CO2 SERPL-SCNC: 21 MMOL/L (ref 21–32)
CREAT SERPL-MCNC: 2.1 MG/DL (ref 0.55–1.3)
EOSINOPHIL NFR BLD AUTO: 8.6 % (ref 0–3)
ERYTHROCYTE [DISTWIDTH] IN BLOOD BY AUTOMATED COUNT: 16.2 % (ref 11.6–14.8)
GLOBULIN SER-MCNC: 5.7 G/DL
GLUCOSE UR STRIP-MCNC: NEGATIVE MG/DL
HCT VFR BLD CALC: 43.5 % (ref 42–52)
HGB BLD-MCNC: 13.6 G/DL (ref 14.2–18)
KETONES UR QL STRIP: NEGATIVE
LEUKOCYTE ESTERASE UR QL STRIP: (no result)
LYMPHOCYTES NFR BLD AUTO: 18.2 % (ref 20–45)
MCV RBC AUTO: 91 FL (ref 80–99)
MONOCYTES NFR BLD AUTO: 5.6 % (ref 1–10)
NEUTROPHILS NFR BLD AUTO: 66.8 % (ref 45–75)
NITRITE UR QL STRIP: POSITIVE
PH UR STRIP: 5 [PH] (ref 4.5–8)
PHOSPHATE SERPL-MCNC: 2.4 MG/DL (ref 2.5–4.9)
PLATELET # BLD: 244 K/UL (ref 150–450)
POTASSIUM SERPL-SCNC: 4.3 MMOL/L (ref 3.5–5.1)
PROT UR QL STRIP: (no result)
RBC # BLD AUTO: 4.77 M/UL (ref 4.7–6.1)
SODIUM SERPL-SCNC: 155 MMOL/L (ref 136–145)
SP GR UR STRIP: 1.01 (ref 1–1.03)
UROBILINOGEN UR-MCNC: NORMAL MG/DL (ref 0–1)
WBC # BLD AUTO: 12.7 K/UL (ref 4.8–10.8)

## 2019-02-02 RX ADMIN — INSULIN ASPART SCH UNITS: 100 INJECTION, SOLUTION INTRAVENOUS; SUBCUTANEOUS at 13:42

## 2019-02-02 RX ADMIN — INSULIN DETEMIR SCH UNITS: 100 INJECTION, SOLUTION SUBCUTANEOUS at 20:28

## 2019-02-02 RX ADMIN — INSULIN ASPART SCH UNITS: 100 INJECTION, SOLUTION INTRAVENOUS; SUBCUTANEOUS at 01:05

## 2019-02-02 RX ADMIN — DEXTROSE MONOHYDRATE SCH MLS/HR: 50 INJECTION, SOLUTION INTRAVENOUS at 05:02

## 2019-02-02 RX ADMIN — DEXTROSE MONOHYDRATE SCH MLS/HR: 50 INJECTION, SOLUTION INTRAVENOUS at 15:03

## 2019-02-02 RX ADMIN — LEVETIRACETAM SCH MG: 100 SOLUTION ORAL at 20:30

## 2019-02-02 RX ADMIN — VITAMIN D, TAB 1000IU (100/BT) SCH INTLU: 25 TAB at 09:08

## 2019-02-02 RX ADMIN — INSULIN DETEMIR SCH UNITS: 100 INJECTION, SOLUTION SUBCUTANEOUS at 09:09

## 2019-02-02 RX ADMIN — HEPARIN SODIUM SCH UNITS: 5000 INJECTION INTRAVENOUS; SUBCUTANEOUS at 09:09

## 2019-02-02 RX ADMIN — INSULIN ASPART SCH UNITS: 100 INJECTION, SOLUTION INTRAVENOUS; SUBCUTANEOUS at 20:29

## 2019-02-02 RX ADMIN — SITAGLIPTIN SCH MG: 50 TABLET, FILM COATED ORAL at 09:08

## 2019-02-02 RX ADMIN — HEPARIN SODIUM SCH UNITS: 5000 INJECTION INTRAVENOUS; SUBCUTANEOUS at 20:27

## 2019-02-02 RX ADMIN — INSULIN ASPART SCH UNITS: 100 INJECTION, SOLUTION INTRAVENOUS; SUBCUTANEOUS at 09:10

## 2019-02-02 RX ADMIN — LEVETIRACETAM SCH MG: 100 SOLUTION ORAL at 05:03

## 2019-02-02 RX ADMIN — LEVETIRACETAM SCH MG: 100 SOLUTION ORAL at 15:04

## 2019-02-02 RX ADMIN — DEXTROSE MONOHYDRATE SCH MLS/HR: 50 INJECTION, SOLUTION INTRAVENOUS at 20:31

## 2019-02-02 RX ADMIN — INSULIN ASPART SCH UNITS: 100 INJECTION, SOLUTION INTRAVENOUS; SUBCUTANEOUS at 05:04

## 2019-02-02 RX ADMIN — INSULIN ASPART SCH UNITS: 100 INJECTION, SOLUTION INTRAVENOUS; SUBCUTANEOUS at 18:34

## 2019-02-02 NOTE — PROGRESS NOTE
SUBJECTIVE:  The patient is alert and awake, febrile, and tachycardic.



PHYSICAL EXAMINATION:

VITAL SIGNS:  Blood pressure 114/74, his pulse is 102, respirations 16, and

temperature was 100.3.

HEENT:  Eyes were normal.  ENT, mucous membranes were moist and intact.

Tongue is protuberant, can move very quickly.   Oral cavity is moist.

NECK:  Supple with no JVD without lymph nodes.  Tracheostomy site is

clean.

LUNGS:  Clear without rhonchi, rales, or wheezing.  There is bibasilar

rhonchi laterally only.

HEART:  Normal sounds with regular beats.  There is tachycardia at rest.

Sinus tachycardia on monitor.

ABDOMEN:  Soft and nontender with normal bowel sounds.  Gastrostomy site is

clean.

EXTREMITIES:  Warm without cyanosis, clubbing, or edema.



LABORATORY AND DIAGNOSTIC DATA:  His hemoglobin is 12.4, hematocrit 39.1

with MCV of 90, WBC of 12.3 and platelets of 238,000.  His BUN and

creatinine is 33 and 1.7 respectively.  Sodium is 152, potassium 4.1,

chloride 102, CO2 is 22, and calcium is 10.5.  His salicylate is 1.5.

Magnesium 2.5.  SGOT, SGPT, and alkaline phosphatase are normal.  Albumin

is 2.6.  Total protein is 8.2.  His chest x-ray is clear.



The patient has acute pancreatitis, now tolerating feeding without

nausea, vomiting, abdominal pain, or diarrhea.



_____ is not exactly clear.  Repeat laboratory tests will be done in the

a.m.









  ______________________________________________

  Etienne Bloom M.D.





DR:  JT

D:  02/01/2019 23:22

T:  02/02/2019 01:47

JOB#:  [id number]

CC:

## 2019-02-02 NOTE — NUR
NURSE NOTES:

Received report from Sofía BARBA RN.  Patient seen in bed in juarez position.  No S/Sx of pain 
is present.  Patient is on trach to vent with setting of AC16, , Fi02 40% , PEEP 5.  
SP02 is at 99%.  Patient is on GT feeling of glucerna 1.5 running at 40cc/hr. GT site is 
intact.  Patient has colostomy and site is intact. Patient has condom cath and is intact.  
IV site noted to right hand 24g and PICC line at right upper arm, intact.  Bed is in lowest 
position with padded side rails. Call light is within reach. Will continue to monitor.

## 2019-02-02 NOTE — DIAGNOSTIC IMAGING REPORT
EXAM:

  XR Chest, 2 Views

 

CLINICAL HISTORY:

  COPD

 

TECHNIQUE:

  Frontal and lateral views of the chest.

 

COMPARISON:

  Chest x-ray 2/1/19 1112 and 816

 

FINDINGS:

  Lungs:  Hypoventilatory lungs.  Left lung base atelectasis.

  Pleural space:  Small left pleural effusion.  No pneumothorax.

  Heart:  Unremarkable.  No cardiomegaly.

  Mediastinum:  Unremarkable.

  Bones/joints:  Unremarkable.

  Tubes, lines and devices:  Tracheostomy tube.

 

IMPRESSION:     

  Small left pleural effusion.  No significant change.

## 2019-02-02 NOTE — NUR
NURSE NOTES:



Received patient in bed. Vent dependent. On continuous GTF as tolerated. Right upper arm 
midline noted. Contact isolation observed. No respiratory distress. Will continue plan of 
care.

## 2019-02-03 VITALS — DIASTOLIC BLOOD PRESSURE: 77 MMHG | SYSTOLIC BLOOD PRESSURE: 134 MMHG

## 2019-02-03 VITALS — DIASTOLIC BLOOD PRESSURE: 76 MMHG | SYSTOLIC BLOOD PRESSURE: 109 MMHG

## 2019-02-03 VITALS — SYSTOLIC BLOOD PRESSURE: 101 MMHG | DIASTOLIC BLOOD PRESSURE: 77 MMHG

## 2019-02-03 VITALS — SYSTOLIC BLOOD PRESSURE: 112 MMHG | DIASTOLIC BLOOD PRESSURE: 85 MMHG

## 2019-02-03 VITALS — SYSTOLIC BLOOD PRESSURE: 118 MMHG | DIASTOLIC BLOOD PRESSURE: 77 MMHG

## 2019-02-03 VITALS — DIASTOLIC BLOOD PRESSURE: 76 MMHG | SYSTOLIC BLOOD PRESSURE: 120 MMHG

## 2019-02-03 LAB
ADD MANUAL DIFF: NO
ANION GAP SERPL CALC-SCNC: 16 MMOL/L (ref 5–15)
BASOPHILS NFR BLD AUTO: 0.8 % (ref 0–2)
BUN SERPL-MCNC: 32 MG/DL (ref 7–18)
CALCIUM SERPL-MCNC: 10.8 MG/DL (ref 8.5–10.1)
CHLORIDE SERPL-SCNC: 118 MMOL/L (ref 98–107)
CO2 SERPL-SCNC: 21 MMOL/L (ref 21–32)
CREAT SERPL-MCNC: 2 MG/DL (ref 0.55–1.3)
EOSINOPHIL NFR BLD AUTO: 9.3 % (ref 0–3)
ERYTHROCYTE [DISTWIDTH] IN BLOOD BY AUTOMATED COUNT: 16.5 % (ref 11.6–14.8)
HCT VFR BLD CALC: 42.3 % (ref 42–52)
HGB BLD-MCNC: 12.9 G/DL (ref 14.2–18)
LYMPHOCYTES NFR BLD AUTO: 15.7 % (ref 20–45)
MCV RBC AUTO: 91 FL (ref 80–99)
MONOCYTES NFR BLD AUTO: 6.1 % (ref 1–10)
NEUTROPHILS NFR BLD AUTO: 68.2 % (ref 45–75)
PLATELET # BLD: 276 K/UL (ref 150–450)
POTASSIUM SERPL-SCNC: 3.8 MMOL/L (ref 3.5–5.1)
RBC # BLD AUTO: 4.64 M/UL (ref 4.7–6.1)
SODIUM SERPL-SCNC: 155 MMOL/L (ref 136–145)
WBC # BLD AUTO: 13.1 K/UL (ref 4.8–10.8)

## 2019-02-03 RX ADMIN — INSULIN ASPART SCH UNITS: 100 INJECTION, SOLUTION INTRAVENOUS; SUBCUTANEOUS at 04:54

## 2019-02-03 RX ADMIN — VITAMIN D, TAB 1000IU (100/BT) SCH INTLU: 25 TAB at 09:12

## 2019-02-03 RX ADMIN — INSULIN ASPART SCH UNITS: 100 INJECTION, SOLUTION INTRAVENOUS; SUBCUTANEOUS at 21:22

## 2019-02-03 RX ADMIN — DEXTROSE MONOHYDRATE SCH MLS/HR: 50 INJECTION, SOLUTION INTRAVENOUS at 05:06

## 2019-02-03 RX ADMIN — HEPARIN SODIUM SCH UNITS: 5000 INJECTION INTRAVENOUS; SUBCUTANEOUS at 09:11

## 2019-02-03 RX ADMIN — LEVETIRACETAM SCH MG: 100 SOLUTION ORAL at 14:00

## 2019-02-03 RX ADMIN — DEXTROSE MONOHYDRATE SCH MLS/HR: 50 INJECTION, SOLUTION INTRAVENOUS at 21:14

## 2019-02-03 RX ADMIN — INSULIN ASPART SCH UNITS: 100 INJECTION, SOLUTION INTRAVENOUS; SUBCUTANEOUS at 14:00

## 2019-02-03 RX ADMIN — LEVETIRACETAM SCH MG: 100 SOLUTION ORAL at 21:14

## 2019-02-03 RX ADMIN — INSULIN DETEMIR SCH UNITS: 100 INJECTION, SOLUTION SUBCUTANEOUS at 09:11

## 2019-02-03 RX ADMIN — SITAGLIPTIN SCH MG: 50 TABLET, FILM COATED ORAL at 09:12

## 2019-02-03 RX ADMIN — INSULIN ASPART SCH UNITS: 100 INJECTION, SOLUTION INTRAVENOUS; SUBCUTANEOUS at 01:01

## 2019-02-03 RX ADMIN — INSULIN ASPART SCH UNITS: 100 INJECTION, SOLUTION INTRAVENOUS; SUBCUTANEOUS at 09:09

## 2019-02-03 RX ADMIN — LEVETIRACETAM SCH MG: 100 SOLUTION ORAL at 05:05

## 2019-02-03 RX ADMIN — INSULIN DETEMIR SCH UNITS: 100 INJECTION, SOLUTION SUBCUTANEOUS at 21:18

## 2019-02-03 RX ADMIN — DEXTROSE MONOHYDRATE SCH MLS/HR: 50 INJECTION, SOLUTION INTRAVENOUS at 14:22

## 2019-02-03 RX ADMIN — HEPARIN SODIUM SCH UNITS: 5000 INJECTION INTRAVENOUS; SUBCUTANEOUS at 21:21

## 2019-02-03 RX ADMIN — INSULIN ASPART SCH UNITS: 100 INJECTION, SOLUTION INTRAVENOUS; SUBCUTANEOUS at 17:25

## 2019-02-03 NOTE — NUR
RD ASSESSMENT & RECOMMENDATIONS

SEE CARE ACTIVITY FOR COMPLETE ASSESSMENT



DAILY ESTIMATED NEEDS:

Needs based on Critical care, wound /71kg abw 

22-30  kcals/kg 

8228-9371  total kcals

1.25-2  g protein/kg

  g total protein 

25-30  mL/kg

6562-1798  total fluid mLs





NUTRITION DIAGNOSIS:

* Swallowing difficulty R/T respiratory status, as evidenced by trach/vent

dep, PEG dep.

* Increased kcal and protein needs r/t wound healing as evidenced by pt

with multiple wounds, including partial thickness wound @ L side neck

under trach collar, resolving full thickness pressure injury @ sacrum,

resolving pressure injury to L trochanter, and reabsorbing serous blister

lateral L heel, refer to WC eval.

* Altered nutrition related lab values R/T hyperglycemia, h/o DM as

evidenced by elev BGs and POC glu ([phone]), elev A1C 7.9





CURRENT TF:Glucerna 1.5 @ 40ml/hr x 24 hrs + PS TID  





ENTERAL NUTRITION RECOMMENDATIONS:

Glucerna 1.5 @ 40ml/hr x 24 hrs + Prosource 1pkt TID  

to provide 960ml, 1440kcal (+120kcal), 79g (+33g prot), 729ml free water, 127g CHO 



* Maintain LOWER rate and run time of 24 hours for improved glycemic

control :will provide total of 127g CHO per day, 46g less CHO than

previous TF

* Add Prosource 1pkt TID to meet protein needs

  (TF + Prosource TID will provide 100% est kcal/prot needs)

* HOB over 30 degrees/ Increase water flushes







ADDITIONAL RECOMMENDATIONS:

* Calibrated bedscale wt for accurate CBW  

* Monitor lytes, replete as needed 

* Wound healing: Add Carlito 1pkt BID, vit C 500mg QD 

* Monitor BGs closely, need to adjust insulin/TF 

* Increase water flushes- new TF rec provides less free H2O 

     -> , trending up  

.

## 2019-02-03 NOTE — NUR
NURSE NOTES:

Received bedside report from JOSHUA Srivastava.Patient obtunded,stable,SR-ST on cardiac monitor,no s/s 
of pain,no respiratory distress noted,no fever at this moment,trach-vent Portex 7 ac 16 tv 
600 fIo2 40% peep 5,GT running with Glucerna 1.5 @ 40 ml/hr,colostomy on place,BS active in 
all quadrants,condom cath is on place, IV asymptomatic intact MASHA midline,Bed in a low 
safety position,call light within a reach,will continue to monitor and follow POC.

## 2019-02-03 NOTE — INFECTIOUS DISEASES PROG NOTE
Assessment/Plan


Assessment/Plan





82 yo female with PMHx of DM, HTN and a Cervical spinal tumor s/p resection 1/14 /19 who presneted to the ED on 1/31/19 with SOB, nausea and constipation. 





UTI complicated


   UA (+)


   Indewlling mayorga


   UCx 1/27/18 P.a.  (R Levo, otherwise S)


   CT abd 1/29/19 3 mm distal right ureteral calculus, minimal resultant 

hydronephrosis. Atrophic left kidney, containing a large staghorn calculus and 

multiple intrarenal calyceal calculi, previously described





Leukocytosis 15 on admit;  improved


Aebrile to 102; improving





   -2/2 CXR:  Hypoventilatory lungs.  Left lung base atelectasis.





Mild pancreatitis  





Chronic resp failure - vent/trach dependent


ICH


COPD


DM


Seizure disoder


BPH


Dysphagia, G tube


Colostomy  





 Plan 





- Conitnue Zosyn # 6/10 for UTI


        - 2/1/19 SP Vanocmcyin #4- No evidence for MRSA


- Monitor CBC and temps





Thank you for this consult. We will continue to follow the patient during this 

hospitalization.





Subjective


Allergies:  


Coded Allergies:  


     AZTREONAM (Verified  Allergy, Unknown, 7/23/18)


Subjective


Tm 100; afebrile in ~24hrs





Objective


Vital Signs





Last 24 Hour Vital Signs








  Date Time  Temp Pulse Resp B/P (MAP) Pulse Ox O2 Delivery O2 Flow Rate FiO2


 


2/3/19 09:10  98 16     40


 


2/3/19 08:00 98.3 91 20 112/85 (94) 100   


 


2/3/19 08:00        40


 


2/3/19 08:00      Mechanical Ventilator  


 


2/3/19 07:47  92      


 


2/3/19 07:13  90 16     40


 


2/3/19 05:12  91 16     40


 


2/3/19 04:00 99.1 96 18 120/76 (91) 100   


 


2/3/19 04:00      Mechanical Ventilator  


 


2/3/19 04:00        40


 


2/3/19 03:35  94      


 


2/3/19 02:43  93 16     40


 


2/3/19 00:57  94 16     40


 


2/3/19 00:00 99.3 94 17 118/77 (91) 100   


 


2/3/19 00:00        40


 


2/3/19 00:00      Mechanical Ventilator  


 


2/2/19 23:36  94      


 


2/2/19 23:07  97 16     40


 


2/2/19 20:55  94 17     40


 


2/2/19 20:00        40


 


2/2/19 20:00      Mechanical Ventilator  


 


2/2/19 20:00  91      


 


2/2/19 20:00 97.7 90 16 118/78 (91) 100   


 


2/2/19 19:08  91 16     40


 


2/2/19 17:00  97 16     40


 


2/2/19 16:00 99.3 101 16 122/74 (90) 99   


 


2/2/19 16:00      Mechanical Ventilator  


 


2/2/19 16:00        40


 


2/2/19 15:09  95      


 


2/2/19 15:00  95 16     40


 


2/2/19 13:00  97 16     40


 


2/2/19 12:00        40


 


2/2/19 12:00      Mechanical Ventilator  


 


2/2/19 12:00 100.0 98 16 114/78 (90) 100   


 


2/2/19 11:54  99      


 


2/2/19 10:37  102 16     40








Height (Feet):  5


Height (Inches):  7.00


Weight (Pounds):  202


Objective





Gen:  NAD, On vent


HEENT: NCAT, MMM, PERRL, No Oral lesion, no scleral icterus, trached


NECK:  full range of motion, supple, no meningismus, No LAD, No JVD


LUNGS:  CTAB, No W/C, No Accessory muscle use


CARDS: RRR, S1, S2, No M/R/G, 


ABD: Soft, Obese, NT, ND, No R/G, + BS, No HSM, No Masses, PEG and Colostomy (

No E/P)


: Deferred


Ext: C/C/E, Pulses 2+ B/L (DP, Rad): 


NEURO: Not responding, No spontaneous movements


SKIN:  Warm/dry, No rashes





Microbiology








 Date/Time


Source Procedure


Growth Status


 


 


 2/2/19 04:00


Urine,Clean Catch Urine Culture - Preliminary


Gram Negative Bacillus 1


Yeast Species Resulted











Laboratory Tests








Test


  2/3/19


04:00


 


White Blood Count


  13.1 K/UL


(4.8-10.8)  H


 


Red Blood Count


  4.64 M/UL


(4.70-6.10)  L


 


Hemoglobin


  12.9 G/DL


(14.2-18.0)  L


 


Hematocrit


  42.3 %


(42.0-52.0)


 


Mean Corpuscular Volume 91 FL (80-99)  


 


Mean Corpuscular Hemoglobin


  27.9 PG


(27.0-31.0)


 


Mean Corpuscular Hemoglobin


Concent 30.6 G/DL


(32.0-36.0)  L


 


Red Cell Distribution Width


  16.5 %


(11.6-14.8)  H


 


Platelet Count


  276 K/UL


(150-450)


 


Mean Platelet Volume


  9.6 FL


(6.5-10.1)


 


Neutrophils (%) (Auto)


  68.2 %


(45.0-75.0)


 


Lymphocytes (%) (Auto)


  15.7 %


(20.0-45.0)  L


 


Monocytes (%) (Auto)


  6.1 %


(1.0-10.0)


 


Eosinophils (%) (Auto)


  9.3 %


(0.0-3.0)  H


 


Basophils (%) (Auto)


  0.8 %


(0.0-2.0)


 


Sodium Level


  155 MMOL/L


(136-145)  H


 


Potassium Level


  3.8 MMOL/L


(3.5-5.1)


 


Chloride Level


  118 MMOL/L


()  H


 


Carbon Dioxide Level


  21 MMOL/L


(21-32)


 


Anion Gap


  16 mmol/L


(5-15)  H


 


Blood Urea Nitrogen


  32 mg/dL


(7-18)  H


 


Creatinine


  2.0 MG/DL


(0.55-1.30)  H


 


Estimat Glomerular Filtration


Rate 42.9 mL/min


(>60)


 


Glucose Level


  224 MG/DL


()  H


 


Calcium Level


  10.8 MG/DL


(8.5-10.1)  H











Current Medications








 Medications


  (Trade)  Dose


 Ordered  Sig/Jan


 Route


 PRN Reason  Start Time


 Stop Time Status Last Admin


Dose Admin


 


 Acetaminophen


  (Tylenol)  650 mg  Q4H  PRN


 GT


 Mild Pain/Temp > 100.5  1/28/19 08:15


 2/27/19 08:14  2/1/19 17:38


 


 


 Artificial Tears


  (Akwa-Tears)  1 drop  Q4H  PRN


 BOTH EYES


 Dry Eyes  1/28/19 07:45


 2/27/19 07:44  1/28/19 09:24


 


 


 Baclofen


  (Lioresal)  10 mg  EVERY 8  HOURS


 GT


   1/28/19 14:00


 2/27/19 13:59  2/3/19 05:04


 


 


 Dextrose


  (Dextrose 50%)  25 ml  Q30M  PRN


 IV


 Hypoglycemia  1/28/19 07:45


 2/27/19 07:44   


 


 


 Dextrose


  (Dextrose 50%)  50 ml  Q30M  PRN


 IV


 Hypoglycemia  1/28/19 07:45


 2/27/19 07:44   


 


 


 Heparin Sodium


  (Porcine)


  (Heparin 5000


 units/ml)  5,000 units  EVERY 12  HOURS


 SUBQ


   1/28/19 09:00


 2/27/19 08:59  2/3/19 09:11


 


 


 Insulin Aspart


  (NovoLOG)    EVERY 4  HOURS


 SUBQ


   1/30/19 13:00


 2/27/19 11:59  2/3/19 09:09


 


 


 Insulin Detemir


  (Levemir)  15 units  Q12HR


 SUBQ


   1/29/19 00:00


 2/28/19 00:00  2/3/19 09:11


 


 


 Lansoprazole


  (Prevacid)  30 mg  DAILY


 GT


   1/31/19 09:00


 3/2/19 08:59  2/3/19 09:12


 


 


 Levetiracetam


  (Keppra)  1,000 mg  EVERY 8  HOURS


 GT


   1/28/19 14:00


 2/27/19 13:59  2/3/19 05:05


 


 


 Nystatin


  (Nystatin Cr)  1 applic  EVERY 12  HOURS


 TOPIC


   1/28/19 09:00


 2/27/19 08:59  2/3/19 09:12


 


 


 Piperacillin Sod/


 Tazobactam Sod


 3.375 gm/Sodium


 Chloride  110 ml @ 


 27.5 mls/hr  Q8HR


 IVPB


   1/28/19 10:00


 2/4/19 09:59  2/3/19 05:06


 


 


 Polyethylene


 Glycol


  (Miralax)  17 gm  DAILYPRN  PRN


 GT


 Constipation  1/28/19 07:45


 2/27/19 07:44   


 


 


 Silver


 Sulfadiazine


  (Silvadene Cream


 25gm)  1 applic  BEDTIME


 TOPIC


   2/2/19 21:00


 2/27/19 08:59  2/2/19 20:30


 


 


 Sitagliptin


 Phosphate


  (Januvia)  50 mg  DAILY


 GT


   1/28/19 09:00


 2/27/19 08:59  2/3/19 09:12


 


 


 Vitamin D


  (Vitamin D)  1,000 intlu  DAILY


 GT


   1/28/19 09:00


 2/27/19 08:59  2/3/19 09:12


 

















Kate,Joy M.D. Feb 3, 2019 10:25

## 2019-02-03 NOTE — NUR
NURSE NOTES:  RECEIVED BED SIDE REPORT FROM REBEKAH RN STAFF OF NOC SHIFT.RECEIVED PT WITH HOB 
ELEVATED 45 DEGREE TRACH TO VENT OBTUNDED.PT WITH TRACH PATENT ,PORTEX #7 WELL SECURE.PT 
TOLERATED WELL CURRENTS VENT SETTINGS AC-16,,PEEP 5 ,FIO2 40%.PT %.RENDERED 
TRACH AND ORAL CARE SX,D LG AMT OF TICK WITH SECRETIONS.PT WITH GTF PATENT ,NO RESIDUAL 
NOTED TOLERATING WELL GLUCERNA 1.5  @ 40cc/hrs.PT WITH MID-LINE ON RT UPPER ARM CLEAN AND 
INTACT PT RECEIVING ZOSYN 3.375 IVPB INFUSING WELL .PT REPOSITIONED Q 2HRS TO PROVIDE 
COMFORT AND PREVENT FURTHER SKIN BREAK DOWN.FULL BODY ASSESSMENT DONE.WILL CONT TO MONITOR.

## 2019-02-03 NOTE — PROGRESS NOTE
DATE:  02/02/2019

SUBJECTIVE:  The patient has low-grade fever, but his tachycardia

resolved.



PHYSICAL EXAMINATION:

VITAL SIGNS:  Blood pressure 118/77, his pulse is 94, respirations of 16,

and temperature of 99.3.

HEENT:  Eyes were normal.  ENT, mucous membranes are moist and intact.

NECK:  Supple with no JVD without lymph nodes.  Tracheostomy site is

clean.

LUNGS:  Clear without rhonchi, rales, or wheezing.  Secretions are small,

thin, and grey.

HEART:  Normal sounds with regular beats.  There is no S3, S4, or

pericardial rub.

ABDOMEN:  Soft and nontender with normal bowel sounds.  Gastrostomy site is

clean.

EXTREMITIES:  Warm without cyanosis, clubbing, or edema.



LABORATORY AND DIAGNOSTIC DATA:  His hemoglobin is 13.6, hematocrit 33.5

with MCV of 91, WBC of 12.7, and platelets of 244,000.  His BUN and

creatinine are ____ and 2.1 respectively.  His sodium is 155, potassium

4.3, chloride 118, CO2 is 21, and calcium is 10.7.  His phosphorus is 2.4

and magnesium is 2.5.  SGOT, SGPT _____ elevated.  Albumin is low.

Globulin is elevated.  Chest x-ray done today revealed persistent of small

left pleural effusion.



IMPRESSION:  The patient's tachycardia appears stable.  He still has

low-grade fever without tachycardia.  Repeat laboratory tests will be done

in the a.m.









  ______________________________________________

  Etienne Bloom M.D.





DR:  JESSENIA

D:  02/03/2019 02:16

T:  02/03/2019 03:56

JOB#:  [id number]

CC:

## 2019-02-03 NOTE — NUR
NURSE NOTES:

patient blood sugar is 219.  Novolog pen is out and lable machine is down, unable to obtain 
novolog pen at this time.  House supp and charge nurse made aware.  Per house Supp, re check 
the blood sugar at 0700 and administer novolog insulin per protocol.

## 2019-02-03 NOTE — NUR
RESPIRATORY NOTE:

Received pt on AC 16, 600VT, 40%, PEEP +5. Pt trach-dependent w/ a cuffed, Portex 7 tube. 
Pt obtunded. b/S luis manuel. rhonchi, sxn small amounts of thin, white, frothy secretions. Vent 
plugged into red outlet, ambubag & spare trach kit at bedside. Pt in no apparent distress at 
this time. Will continue plan of care.

## 2019-02-03 NOTE — NUR
HAND-OFF: 

Report given to JOSHUA Srivastava.  endorsed about the novolog needs to be administered when 
delivered.

## 2019-02-03 NOTE — GENERAL PROGRESS NOTE
Assessment/Plan


Assessment/Plan





Assessment/Recommendations


Leukocytosis. Likely related to underlying infection versus reactive process. 


--> Peripheral has been reviewed


--> Medications have been reviewed 


--> Imaging has been reviewed


 -->Blood cultures and urine cultures prn


--> has been started on abx, empiric treatment 


--> wbc trend: 14-->11-->9-->12


# acute pancreatitis,   status post cerebral hemorrhage with severe 

encephalopathy.


# ventilator-dependent.  We will continue the same ventilator setting.  


-->Continue with albuterol sulfate and ipratropium


# Acute on chronic respiratory failure


--> ventilator dependent and fed via gastrostomy tube


# Diabetes mellitus


-->Humalog insulin  with sliding scale every six hours


# Sepsis


# Gastrostomy tube dependent


# Colostomy in place


# Vegetative state





The timing of this note does not necessarily reflect the time of the patient 

was seen


Greatly appreciate consultation!





Subjective


ROS Limited/Unobtainable:  Yes


Allergies:  


Coded Allergies:  


     AZTREONAM (Verified  Allergy, Unknown, 7/23/18)


Subjective


2/1: Pt is seen in the room,on vent, G tube, leukocytosis has been improving 

and his fevers have resolved. 


2/3: on vent, resting in bed, wbc trending up, no fevers or chills,





Objective





Last 24 Hour Vital Signs








  Date Time  Temp Pulse Resp B/P (MAP) Pulse Ox O2 Delivery O2 Flow Rate FiO2


 


2/3/19 16:19 98.7 103 17 101/77 (85) 100   


 


2/3/19 15:43  106      


 


2/3/19 15:12  104 16     40


 


2/3/19 13:00  102 16     40


 


2/3/19 12:00        40


 


2/3/19 12:00      Mechanical Ventilator  


 


2/3/19 11:50  110      


 


2/3/19 11:47 98.9 107 18 109/76 (87) 100   


 


2/3/19 11:13  101 16     40


 


2/3/19 09:10  98 16     40


 


2/3/19 08:00 98.3 91 20 112/85 (94) 100   


 


2/3/19 08:00        40


 


2/3/19 08:00      Mechanical Ventilator  


 


2/3/19 07:47  92      


 


2/3/19 07:13  90 16     40


 


2/3/19 05:12  91 16     40


 


2/3/19 04:00 99.1 96 18 120/76 (91) 100   


 


2/3/19 04:00      Mechanical Ventilator  


 


2/3/19 04:00        40


 


2/3/19 03:35  94      


 


2/3/19 02:43  93 16     40


 


2/3/19 00:57  94 16     40


 


2/3/19 00:00 99.3 94 17 118/77 (91) 100   


 


2/3/19 00:00        40


 


2/3/19 00:00      Mechanical Ventilator  


 


2/2/19 23:36  94      


 


2/2/19 23:07  97 16     40


 


2/2/19 20:55  94 17     40


 


2/2/19 20:00        40


 


2/2/19 20:00      Mechanical Ventilator  


 


2/2/19 20:00  91      


 


2/2/19 20:00 97.7 90 16 118/78 (91) 100   


 


2/2/19 19:08  91 16     40


 


2/2/19 17:00  97 16     40

















Intake and Output  


 


 2/2/19 2/3/19





 19:00 07:00


 


Intake Total 290.0 ml 845.0 ml


 


Output Total 500 ml 750 ml


 


Balance -210.0 ml 95.0 ml


 


  


 


Free Water 100 ml 200 ml


 


IV Total 110.0 ml 165.0 ml


 


Tube Feeding 80 ml 480 ml


 


Output Urine Total 50 ml 500 ml


 


Stool Total 450 ml 250 ml


 


# Voids 1 








Laboratory Tests


2/3/19 04:00: 


White Blood Count 13.1H, Red Blood Count 4.64L, Hemoglobin 12.9L, Hematocrit 

42.3, Mean Corpuscular Volume 91, Mean Corpuscular Hemoglobin 27.9, Mean 

Corpuscular Hemoglobin Concent 30.6L, Red Cell Distribution Width 16.5H, 

Platelet Count 276, Mean Platelet Volume 9.6, Neutrophils (%) (Auto) 68.2, 

Lymphocytes (%) (Auto) 15.7L, Monocytes (%) (Auto) 6.1, Eosinophils (%) (Auto) 

9.3H, Basophils (%) (Auto) 0.8, Sodium Level 155H, Potassium Level 3.8, 

Chloride Level 118H, Carbon Dioxide Level 21, Anion Gap 16H, Blood Urea 

Nitrogen 32H, Creatinine 2.0H, Estimat Glomerular Filtration Rate 42.9, Glucose 

Level 224H, Calcium Level 10.8H


Height (Feet):  5


Height (Inches):  7.00


Weight (Pounds):  202


Objective


PHYSICAL EXAMINATION


General Appearance:  on vent      


HEENT:  normocephalic, atraumatic      


Neck:  non-tender, normal alignment      


Respiratory/Chest:  chest wall non-tender, lungs clear      


Cardiovascular/Chest:  normal peripheral pulses, normal rate      


Abdomen:  normal bowel sounds, non tender      


Extremities:  poorl range of motion











Tejas Gerber MD Feb 3, 2019 16:42

## 2019-02-04 VITALS — SYSTOLIC BLOOD PRESSURE: 118 MMHG | DIASTOLIC BLOOD PRESSURE: 85 MMHG

## 2019-02-04 VITALS — SYSTOLIC BLOOD PRESSURE: 110 MMHG | DIASTOLIC BLOOD PRESSURE: 74 MMHG

## 2019-02-04 VITALS — DIASTOLIC BLOOD PRESSURE: 85 MMHG | SYSTOLIC BLOOD PRESSURE: 115 MMHG

## 2019-02-04 VITALS — DIASTOLIC BLOOD PRESSURE: 84 MMHG | SYSTOLIC BLOOD PRESSURE: 132 MMHG

## 2019-02-04 VITALS — SYSTOLIC BLOOD PRESSURE: 133 MMHG | DIASTOLIC BLOOD PRESSURE: 101 MMHG

## 2019-02-04 VITALS — DIASTOLIC BLOOD PRESSURE: 93 MMHG | SYSTOLIC BLOOD PRESSURE: 141 MMHG

## 2019-02-04 RX ADMIN — SITAGLIPTIN SCH MG: 50 TABLET, FILM COATED ORAL at 09:54

## 2019-02-04 RX ADMIN — SODIUM CHLORIDE SCH MLS/HR: 0.9 INJECTION INTRAVENOUS at 13:00

## 2019-02-04 RX ADMIN — LEVETIRACETAM SCH MG: 100 SOLUTION ORAL at 05:16

## 2019-02-04 RX ADMIN — INSULIN ASPART SCH UNITS: 100 INJECTION, SOLUTION INTRAVENOUS; SUBCUTANEOUS at 20:33

## 2019-02-04 RX ADMIN — INSULIN ASPART SCH UNITS: 100 INJECTION, SOLUTION INTRAVENOUS; SUBCUTANEOUS at 10:01

## 2019-02-04 RX ADMIN — INSULIN ASPART SCH UNITS: 100 INJECTION, SOLUTION INTRAVENOUS; SUBCUTANEOUS at 00:48

## 2019-02-04 RX ADMIN — INSULIN ASPART SCH UNITS: 100 INJECTION, SOLUTION INTRAVENOUS; SUBCUTANEOUS at 17:46

## 2019-02-04 RX ADMIN — INSULIN ASPART SCH UNITS: 100 INJECTION, SOLUTION INTRAVENOUS; SUBCUTANEOUS at 17:50

## 2019-02-04 RX ADMIN — INSULIN DETEMIR SCH UNITS: 100 INJECTION, SOLUTION SUBCUTANEOUS at 20:33

## 2019-02-04 RX ADMIN — POLYVINYL ALCOHOL PRN DROP: 14 SOLUTION/ DROPS OPHTHALMIC at 10:03

## 2019-02-04 RX ADMIN — VITAMIN D, TAB 1000IU (100/BT) SCH INTLU: 25 TAB at 09:54

## 2019-02-04 RX ADMIN — HEPARIN SODIUM SCH UNITS: 5000 INJECTION INTRAVENOUS; SUBCUTANEOUS at 10:00

## 2019-02-04 RX ADMIN — DEXTROSE MONOHYDRATE SCH MLS/HR: 50 INJECTION, SOLUTION INTRAVENOUS at 05:35

## 2019-02-04 RX ADMIN — LEVETIRACETAM SCH MG: 100 SOLUTION ORAL at 14:29

## 2019-02-04 RX ADMIN — LEVETIRACETAM SCH MG: 100 SOLUTION ORAL at 21:19

## 2019-02-04 RX ADMIN — INSULIN ASPART SCH UNITS: 100 INJECTION, SOLUTION INTRAVENOUS; SUBCUTANEOUS at 13:00

## 2019-02-04 RX ADMIN — INSULIN DETEMIR SCH UNITS: 100 INJECTION, SOLUTION SUBCUTANEOUS at 10:03

## 2019-02-04 RX ADMIN — HEPARIN SODIUM SCH UNITS: 5000 INJECTION INTRAVENOUS; SUBCUTANEOUS at 20:34

## 2019-02-04 RX ADMIN — INSULIN ASPART SCH UNITS: 100 INJECTION, SOLUTION INTRAVENOUS; SUBCUTANEOUS at 05:18

## 2019-02-04 NOTE — NUR
*-* INSURANCE *-*



CLINICALS AND REVIEW FAXED TO:





[facility] [employer] [facility]

KRISSY:SANKET

P:[phone]

F:[phone]

REF#[id number]

## 2019-02-04 NOTE — NUR
NURSE NOTES:



RECEIVED PATIENT FROM DAMIAN GRIFFITH RN. HOOKED TO CARDIAC MONITOR. TRACH TO  VENT. PORTEX 7, 
VENT SETTINGS AC 16, , FIO2 40, PEEP 5. NO SIGNS OF DISTRESS AS OF THE MOMENT. GT 
NOTED, DRY AND INTACT. NOTED COLOSTOMY BAG, PATENT. NOTED SKIN ALTERATION. R UA MIDLINE. 
CALL LIGHT WITHIN REACH. SIDE RAILS UP. BED AT LOWEST POSITION. WILL CONTINUE TO MONITOR.

## 2019-02-04 NOTE — NUR
RESPIRATORY NOTE:

received pt on vent with current vent orders. pt is trached with portex 8 tube. no resp 
distress noted at this time. vent is plugged into redoutlet with alarms on and audible, ambu 
bag at bedside and back up trach. will cont to monitor.

## 2019-02-04 NOTE — INFECTIOUS DISEASES PROG NOTE
Assessment/Plan


Assessment/Plan


84 yo female with PMHx of DM, HTN and a Cervical spinal tumor s/p resection 1/14 /19 who presneted to the ED on 1/31/19 with SOB, nausea and constipation. 





UTI complicated


   UA (+)


   Indewlling mayorga


   UCx 1/27/18 P.a.  (R Levo, otherwise S)


   CT abd 1/29/19 3 mm distal right ureteral calculus, minimal resultant 

hydronephrosis. Atrophic left kidney, containing a large staghorn calculus and 

multiple intrarenal calyceal calculi, previously described





Leukocytosis 15 on admit;  improved


Aebrile to 102; improving





   -2/2 CXR:  Hypoventilatory lungs.  Left lung base atelectasis.





Mild pancreatitis  





Chronic resp failure - vent/trach dependent


ICH


COPD


DM


Seizure disorder


BPH


Dysphagia, G tube


Colostomy  





 Plan 





- Start Cefepime for UTI - Tx enterobacter





        - 2/4/19 SP  Zosyn # 7


        - 2/1/19 SP Vanocmcyin #4- No evidence for MRSA


- Monitor CBC and temps





Thank you for this consult. We will continue to follow the patient during this 

hospitalization.





Subjective


Allergies:  


Coded Allergies:  


     AZTREONAM (Verified  Allergy, Unknown, 7/23/18)


Subjective


Afebrile


Mild leukocytosis continues





Objective


Vital Signs





Last 24 Hour Vital Signs








  Date Time  Temp Pulse Resp B/P (MAP) Pulse Ox O2 Delivery O2 Flow Rate FiO2


 


2/4/19 08:50  95 17     40


 


2/4/19 08:00  113      


 


2/4/19 08:00 98.8 96 16 141/93 (109) 99   


 


2/4/19 07:43  98 21     40


 


2/4/19 05:25  93 16     40


 


2/4/19 04:00 99.3 93 22 133/101 (112) 100   


 


2/4/19 04:00        40


 


2/4/19 04:00      Mechanical Ventilator  


 


2/4/19 03:41  90      


 


2/4/19 03:15  91 17     40


 


2/4/19 01:25  91 16     40


 


2/4/19 00:00 99.1 92 18 132/84 (100) 100   


 


2/4/19 00:00      Mechanical Ventilator  


 


2/3/19 23:41  92      


 


2/3/19 22:54  90 17     40


 


2/3/19 21:07  93 16     40


 


2/3/19 20:00  96      


 


2/3/19 20:00        40


 


2/3/19 20:00 98.2 91 20 134/77 (96) 100   


 


2/3/19 20:00      Mechanical Ventilator  


 


2/3/19 19:10  94 16     40


 


2/3/19 16:45  101 16     40


 


2/3/19 16:19 98.7 103 17 101/77 (85) 100   


 


2/3/19 16:00        40


 


2/3/19 16:00      Mechanical Ventilator  


 


2/3/19 15:43  106      


 


2/3/19 15:12  104 16     40


 


2/3/19 13:00  102 16     40


 


2/3/19 12:00        40


 


2/3/19 12:00      Mechanical Ventilator  


 


2/3/19 11:50  110      


 


2/3/19 11:47 98.9 107 18 109/76 (87) 100   


 


2/3/19 11:13  101 16     40








Height (Feet):  5


Height (Inches):  7.00


Weight (Pounds):  202


Objective





Gen:  NAD, On vent


HEENT: NCAT, MMM, trached


LUNGS:  CTAB, No W


CARDS: RRR, S1, S2, No M/R/G, 


ABD: Soft, Obese, NT, ND,, + BS,PEG and Colostomy (No E/P)





Microbiology








 Date/Time


Source Procedure


Growth Status


 


 


 2/2/19 04:00


Urine,Clean Catch Urine Culture - Preliminary


Enterobacter Aerogenes


Yeast Species Resulted











Current Medications








 Medications


  (Trade)  Dose


 Ordered  Sig/Jan


 Route


 PRN Reason  Start Time


 Stop Time Status Last Admin


Dose Admin


 


 Acetaminophen


  (Tylenol)  650 mg  Q4H  PRN


 GT


 Mild Pain/Temp > 100.5  1/28/19 08:15


 2/27/19 08:14  2/1/19 17:38


 


 


 Artificial Tears


  (Akwa-Tears)  1 drop  Q4H  PRN


 BOTH EYES


 Dry Eyes  1/28/19 07:45


 2/27/19 07:44  2/4/19 10:03


 


 


 Baclofen


  (Lioresal)  10 mg  EVERY 8  HOURS


 GT


   1/28/19 14:00


 2/27/19 13:59  2/4/19 05:15


 


 


 Dextrose


  (Dextrose 50%)  25 ml  Q30M  PRN


 IV


 Hypoglycemia  1/28/19 07:45


 2/27/19 07:44   


 


 


 Dextrose


  (Dextrose 50%)  50 ml  Q30M  PRN


 IV


 Hypoglycemia  1/28/19 07:45


 2/27/19 07:44   


 


 


 Heparin Sodium


  (Porcine)


  (Heparin 5000


 units/ml)  5,000 units  EVERY 12  HOURS


 SUBQ


   1/28/19 09:00


 2/27/19 08:59  2/4/19 10:00


 


 


 Insulin Aspart


  (NovoLOG)    EVERY 4  HOURS


 SUBQ


   1/30/19 13:00


 2/27/19 11:59  2/4/19 10:01


 


 


 Insulin Detemir


  (Levemir)  15 units  Q12HR


 SUBQ


   1/29/19 00:00


 2/28/19 00:00  2/4/19 10:03


 


 


 Lansoprazole


  (Prevacid)  30 mg  DAILY


 GT


   1/31/19 09:00


 3/2/19 08:59  2/4/19 09:54


 


 


 Levetiracetam


  (Keppra)  1,000 mg  EVERY 8  HOURS


 GT


   1/28/19 14:00


 2/27/19 13:59  2/4/19 05:16


 


 


 Nystatin


  (Nystatin Cr)  1 applic  EVERY 12  HOURS


 TOPIC


   1/28/19 09:00


 2/27/19 08:59  2/4/19 10:04


 


 


 Piperacillin Sod/


 Tazobactam Sod


 3.375 gm/Sodium


 Chloride  110 ml @ 


 27.5 mls/hr  Q8HR


 IVPB


   1/28/19 10:00


 2/9/19 09:59  2/4/19 05:35


 


 


 Polyethylene


 Glycol


  (Miralax)  17 gm  DAILYPRN  PRN


 GT


 Constipation  1/28/19 07:45


 2/27/19 07:44   


 


 


 Silver


 Sulfadiazine


  (Silvadene Cream


 25gm)  1 applic  BEDTIME


 TOPIC


   2/2/19 21:00


 2/27/19 08:59  2/3/19 21:15


 


 


 Sitagliptin


 Phosphate


  (Januvia)  50 mg  DAILY


 GT


   1/28/19 09:00


 2/27/19 08:59  2/4/19 09:54


 


 


 Vitamin D


  (Vitamin D)  1,000 intlu  DAILY


 GT


   1/28/19 09:00


 2/27/19 08:59  2/4/19 09:54


 

















Robert Williamson MD Feb 4, 2019 11:04

## 2019-02-04 NOTE — PROGRESS NOTE
DATE:  02/03/2019

SUBJECTIVE:  The patient has low-grade fever with tachycardia.



PHYSICAL EXAMINATION:

VITAL SIGNS:  Blood pressure 132/84, pulse 92, respirations 16, and

temperature 98.2.

HEENT:  Eyes were normal.  ENT, mucous membranes are moist and intact.

NECK:  Supple with no JVD without lymph nodes.  Tracheostomy site is

clean.

LUNGS:  Clear without rhonchi, rales, or wheezing.  Secretions are small,

thin, and grey.

HEART:  Normal sounds with regular beats.  There is no tachycardia at

rest.

ABDOMEN:  Soft and nontender with normal bowel sounds.  Gastrostomy site is

clean.

EXTREMITIES:  Warm without cyanosis, clubbing, or edema.



LABORATORY AND DIAGNOSTIC DATA:  Hemoglobin is 12.9, hematocrit is 32.3

with MCV of 91.  WBC of 17.1 and platelets 276,000.  Urine culture,

gram-negative bacilli species.  _____.



The patient had leukocytosis in urine.  BUN and creatinine 32 and 2.0

respectively.  Sodium is 135, potassium 3.8, chloride 118, CO2 21, and

calcium is 10.8.



PLAN:  The patient is in need of vitamin _____ vitamin D will be

discontinued.  Repeat laboratory tests will be done in the a.m.









  ______________________________________________

  Etienne Bloom M.D.





DR:  JT

D:  02/04/2019 00:52

T:  02/04/2019 01:45

JOB#:  [id number]

CC:

## 2019-02-04 NOTE — GENERAL PROGRESS NOTE
Assessment/Plan


Assessment/Plan





Assessment/Recommendations


Leukocytosis. Likely related to underlying infection versus reactive process. 


--> Peripheral has been reviewed


--> Medications have been reviewed 


--> Imaging has been reviewed


 -->Blood cultures and urine cultures prn


--> has been started on abx, empiric treatment 


--> wbc trend: 14-->11-->9-->12->13


# acute pancreatitis,   status post cerebral hemorrhage with severe 

encephalopathy.


# ventilator-dependent.  We will continue the same ventilator setting.  


-->Continue with albuterol sulfate and ipratropium


# Acute on chronic respiratory failure


--> ventilator dependent and fed via gastrostomy tube


# Diabetes mellitus


-->Humalog insulin  with sliding scale every six hours


# Sepsis


# Gastrostomy tube dependent


# Colostomy in place


# Vegetative state





The timing of this note does not necessarily reflect the time of the patient 

was seen


Greatly appreciate consultation!





Subjective


ROS Limited/Unobtainable:  Yes


Allergies:  


Coded Allergies:  


     AZTREONAM (Verified  Allergy, Unknown, 7/23/18)


Subjective


2/1: Pt is seen in the room,on vent, G tube, leukocytosis has been improving 

and his fevers have resolved. 


2/3: on vent, resting in bed, wbc trending up, no fevers or chills,


2/4: The patient has low-grade fever with tachycardia, CXR reveals small left 

pleural effusion.





Objective





Last 24 Hour Vital Signs








  Date Time  Temp Pulse Resp B/P (MAP) Pulse Ox O2 Delivery O2 Flow Rate FiO2


 


2/4/19 16:43  109 17     40


 


2/4/19 16:00 98.6 107 16 110/74 (86) 99   


 


2/4/19 16:00      Mechanical Ventilator  


 


2/4/19 16:00  103      


 


2/4/19 16:00        40


 


2/4/19 14:32  97 17     40


 


2/4/19 13:10  95 16     40


 


2/4/19 12:00 98.4 98 16 115/85 (95) 100   


 


2/4/19 12:00  99      


 


2/4/19 12:00        40


 


2/4/19 12:00      Mechanical Ventilator  


 


2/4/19 11:03  97 16     40


 


2/4/19 08:50  95 17     40


 


2/4/19 08:00      Mechanical Ventilator  


 


2/4/19 08:00  113      


 


2/4/19 08:00 98.8 96 16 141/93 (109) 99   


 


2/4/19 08:00        40


 


2/4/19 07:43  98 21     40


 


2/4/19 05:25  93 16     40


 


2/4/19 04:00 99.3 93 22 133/101 (112) 100   


 


2/4/19 04:00        40


 


2/4/19 04:00      Mechanical Ventilator  


 


2/4/19 03:41  90      


 


2/4/19 03:15  91 17     40


 


2/4/19 01:25  91 16     40


 


2/4/19 00:00 99.1 92 18 132/84 (100) 100   


 


2/4/19 00:00      Mechanical Ventilator  


 


2/3/19 23:41  92      


 


2/3/19 22:54  90 17     40


 


2/3/19 21:07  93 16     40


 


2/3/19 20:00  96      


 


2/3/19 20:00        40


 


2/3/19 20:00 98.2 91 20 134/77 (96) 100   


 


2/3/19 20:00      Mechanical Ventilator  


 


2/3/19 19:10  94 16     40

















Intake and Output  


 


 2/3/19 2/4/19





 18:59 06:59


 


Intake Total 840.0 ml 307.5 ml


 


Output Total 950 ml 600 ml


 


Balance -110.0 ml -292.5 ml


 


  


 


Free Water 250 ml 20 ml


 


IV Total 110.0 ml 87.5 ml


 


Tube Feeding 480 ml 200 ml


 


Output Urine Total 650 ml 400 ml


 


Stool Total 300 ml 200 ml








Height (Feet):  5


Height (Inches):  7.00


Weight (Pounds):  202


Objective


PHYSICAL EXAMINATION


General Appearance:  on vent      


HEENT:  normocephalic, atraumatic      


Neck:  non-tender, normal alignment      


Respiratory/Chest:  chest wall non-tender, lungs clear      


Cardiovascular/Chest:  normal peripheral pulses, normal rate      


Abdomen:  normal bowel sounds, non tender      


Extremities:  poorl range of motion











Tejas Gerber MD Feb 4, 2019 18:26

## 2019-02-04 NOTE — NUR
NURSE NOTES:

Received pt at bedside by JOSHUA Wang.  Pt is obtunded, no response to deep pain.  Cardiac 
monitor showing ST.  Portex 7, AC 16, , FiO2 40, PEEP 5; sating 100%.  GT running 
Glucerna 1.5 @ 40, , prosource BID; patent, 60cc residual, will monitor. Colostomy 
asymptomatic, 125 ml output.  Condom cath intact, yellow urine noted.  MASHA midline 
asymptomatic running TKO.  Skin is clean and dry, dressings intact, pt turned.  LABS 
unremarkable MD aware of sodium.  Bed is locked in lowest position, call bell within reach, 
side rails x3, sz precautions continued.  Will continue with plan of care and continue to 
monitor.

## 2019-02-04 NOTE — CARDIOLOGY REPORT
--------------- APPROVED REPORT --------------





EKG Measurement

Heart [id number]

[place] [id number]

[id number]

[id number]

[id number]





Sinus tachycardia

Possible Left atrial enlargement

Nonspecific T wave abnormality

Abnormal ECG

## 2019-02-04 NOTE — PULMONOLGY CRITICAL CARE NOTE
Critical Care - Asmt/Plan


Problems:  


(1) Acute on chronic respiratory failure


(2) Sepsis


(3) Fever


(4) Gastrostomy tube dependent


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


(6) Vegetative state


(7) Diabetes mellitus


Respiratory:  monitor respiratory rate


Cardiac:  continue pressors


Renal:  keep IV fluid


Infectious Disease:  check cultures


Gastrointestinal:  continue feedings/current rate


Endocrine:  monitor blood sugar, check HgA1C


Hematologic:  transfuse if hgb<8.5


Neurologic:  PRN Morphine, keep patient comfortable


Affect:  PRN ativan


Prophylaxis:  Heparin


Time Spent (Minutes):  40


Notes Reviewed:  internist, cardio, renal


Discussed with:  nurses, consultants, 





Critical Care - Objective





Last 24 Hour Vital Signs








  Date Time  Temp Pulse Resp B/P (MAP) Pulse Ox O2 Delivery O2 Flow Rate FiO2


 


2/4/19 11:03  97 16     40


 


2/4/19 08:50  95 17     40


 


2/4/19 08:00      Mechanical Ventilator  


 


2/4/19 08:00  113      


 


2/4/19 08:00 98.8 96 16 141/93 (109) 99   


 


2/4/19 08:00        40


 


2/4/19 07:43  98 21     40


 


2/4/19 05:25  93 16     40


 


2/4/19 04:00 99.3 93 22 133/101 (112) 100   


 


2/4/19 04:00        40


 


2/4/19 04:00      Mechanical Ventilator  


 


2/4/19 03:41  90      


 


2/4/19 03:15  91 17     40


 


2/4/19 01:25  91 16     40


 


2/4/19 00:00 99.1 92 18 132/84 (100) 100   


 


2/4/19 00:00      Mechanical Ventilator  


 


2/3/19 23:41  92      


 


2/3/19 22:54  90 17     40


 


2/3/19 21:07  93 16     40


 


2/3/19 20:00  96      


 


2/3/19 20:00        40


 


2/3/19 20:00 98.2 91 20 134/77 (96) 100   


 


2/3/19 20:00      Mechanical Ventilator  


 


2/3/19 19:10  94 16     40


 


2/3/19 16:45  101 16     40


 


2/3/19 16:19 98.7 103 17 101/77 (85) 100   


 


2/3/19 16:00        40


 


2/3/19 16:00      Mechanical Ventilator  


 


2/3/19 15:43  106      


 


2/3/19 15:12  104 16     40


 


2/3/19 13:00  102 16     40








HEENT:  atraumatic


Lungs:  chest wall tender


Heart:  HR/BP stable


Abdomen:  soft, active bowel sounds


Extremities:  no C/C/E, edema


Micro:





Microbiology








 Date/Time


Source Procedure


Growth Status


 


 


 2/2/19 04:00


Urine,Clean Catch Urine Culture - Preliminary


Enterobacter Aerogenes


Yeast Species Resulted








Accucheck:  143





Critical Care - Subjective


ROS Limited/Unobtainable:  Yes


Condition:  critical


EKG Rhythm:  Sinus Rhythm


FI02:  40


Vent Support Breath Rate:  16


Vent Support Mode:  AC


Vent Tidal Volume:  600


Sputum Amount:  Moderate


PEEP:  5.0


PIP:  31


Tube Feeding Amount:  40


I&O:











Intake and Output  


 


 2/3/19 2/4/19





 18:59 06:59


 


Intake Total 840.0 ml 307.5 ml


 


Output Total 950 ml 600 ml


 


Balance -110.0 ml -292.5 ml


 


  


 


Free Water 250 ml 20 ml


 


IV Total 110.0 ml 87.5 ml


 


Tube Feeding 480 ml 200 ml


 


Output Urine Total 650 ml 400 ml


 


Stool Total 300 ml 200 ml

















Yury Pena MD Feb 4, 2019 12:58

## 2019-02-05 VITALS — DIASTOLIC BLOOD PRESSURE: 86 MMHG | SYSTOLIC BLOOD PRESSURE: 147 MMHG

## 2019-02-05 VITALS — DIASTOLIC BLOOD PRESSURE: 81 MMHG | SYSTOLIC BLOOD PRESSURE: 115 MMHG

## 2019-02-05 VITALS — DIASTOLIC BLOOD PRESSURE: 84 MMHG | SYSTOLIC BLOOD PRESSURE: 119 MMHG

## 2019-02-05 VITALS — DIASTOLIC BLOOD PRESSURE: 79 MMHG | SYSTOLIC BLOOD PRESSURE: 107 MMHG

## 2019-02-05 VITALS — DIASTOLIC BLOOD PRESSURE: 88 MMHG | SYSTOLIC BLOOD PRESSURE: 130 MMHG

## 2019-02-05 VITALS — DIASTOLIC BLOOD PRESSURE: 76 MMHG | SYSTOLIC BLOOD PRESSURE: 118 MMHG

## 2019-02-05 LAB
ADD MANUAL DIFF: NO
ALBUMIN SERPL-MCNC: 2.8 G/DL (ref 3.4–5)
ALBUMIN/GLOB SERPL: 0.5 {RATIO} (ref 1–2.7)
ALP SERPL-CCNC: 97 U/L (ref 46–116)
ALT SERPL-CCNC: 56 U/L (ref 12–78)
ANION GAP SERPL CALC-SCNC: 16 MMOL/L (ref 5–15)
AST SERPL-CCNC: 81 U/L (ref 15–37)
BASOPHILS NFR BLD AUTO: 1 % (ref 0–2)
BILIRUB SERPL-MCNC: 0.6 MG/DL (ref 0.2–1)
BUN SERPL-MCNC: 33 MG/DL (ref 7–18)
CALCIUM SERPL-MCNC: 9.9 MG/DL (ref 8.5–10.1)
CHLORIDE SERPL-SCNC: 120 MMOL/L (ref 98–107)
CO2 SERPL-SCNC: 19 MMOL/L (ref 21–32)
CREAT SERPL-MCNC: 2.2 MG/DL (ref 0.55–1.3)
EOSINOPHIL NFR BLD AUTO: 10.9 % (ref 0–3)
ERYTHROCYTE [DISTWIDTH] IN BLOOD BY AUTOMATED COUNT: 16.5 % (ref 11.6–14.8)
GLOBULIN SER-MCNC: 5.7 G/DL
HCT VFR BLD CALC: 38.6 % (ref 42–52)
HGB BLD-MCNC: 12.1 G/DL (ref 14.2–18)
LYMPHOCYTES NFR BLD AUTO: 18.8 % (ref 20–45)
MCV RBC AUTO: 92 FL (ref 80–99)
MONOCYTES NFR BLD AUTO: 5.1 % (ref 1–10)
NEUTROPHILS NFR BLD AUTO: 64.2 % (ref 45–75)
PHOSPHATE SERPL-MCNC: 2.3 MG/DL (ref 2.5–4.9)
PLATELET # BLD: 292 K/UL (ref 150–450)
POTASSIUM SERPL-SCNC: 3.5 MMOL/L (ref 3.5–5.1)
RBC # BLD AUTO: 4.19 M/UL (ref 4.7–6.1)
SODIUM SERPL-SCNC: 155 MMOL/L (ref 136–145)
WBC # BLD AUTO: 14.4 K/UL (ref 4.8–10.8)

## 2019-02-05 RX ADMIN — INSULIN ASPART SCH UNITS: 100 INJECTION, SOLUTION INTRAVENOUS; SUBCUTANEOUS at 08:58

## 2019-02-05 RX ADMIN — HEPARIN SODIUM SCH UNITS: 5000 INJECTION INTRAVENOUS; SUBCUTANEOUS at 21:29

## 2019-02-05 RX ADMIN — SITAGLIPTIN SCH MG: 50 TABLET, FILM COATED ORAL at 08:52

## 2019-02-05 RX ADMIN — LEVETIRACETAM SCH MG: 100 SOLUTION ORAL at 15:53

## 2019-02-05 RX ADMIN — INSULIN ASPART SCH UNITS: 100 INJECTION, SOLUTION INTRAVENOUS; SUBCUTANEOUS at 02:21

## 2019-02-05 RX ADMIN — INSULIN DETEMIR SCH UNITS: 100 INJECTION, SOLUTION SUBCUTANEOUS at 21:30

## 2019-02-05 RX ADMIN — POLYVINYL ALCOHOL PRN DROP: 14 SOLUTION/ DROPS OPHTHALMIC at 08:59

## 2019-02-05 RX ADMIN — INSULIN ASPART SCH UNITS: 100 INJECTION, SOLUTION INTRAVENOUS; SUBCUTANEOUS at 21:30

## 2019-02-05 RX ADMIN — INSULIN ASPART SCH UNITS: 100 INJECTION, SOLUTION INTRAVENOUS; SUBCUTANEOUS at 13:19

## 2019-02-05 RX ADMIN — INSULIN DETEMIR SCH UNITS: 100 INJECTION, SOLUTION SUBCUTANEOUS at 08:57

## 2019-02-05 RX ADMIN — HEPARIN SODIUM SCH UNITS: 5000 INJECTION INTRAVENOUS; SUBCUTANEOUS at 08:53

## 2019-02-05 RX ADMIN — VITAMIN D, TAB 1000IU (100/BT) SCH INTLU: 25 TAB at 08:52

## 2019-02-05 RX ADMIN — ACETAMINOPHEN PRN MG: 160 SOLUTION ORAL at 21:28

## 2019-02-05 RX ADMIN — SODIUM CHLORIDE SCH MLS/HR: 0.9 INJECTION INTRAVENOUS at 13:15

## 2019-02-05 RX ADMIN — INSULIN ASPART SCH UNITS: 100 INJECTION, SOLUTION INTRAVENOUS; SUBCUTANEOUS at 05:08

## 2019-02-05 RX ADMIN — INSULIN ASPART SCH UNITS: 100 INJECTION, SOLUTION INTRAVENOUS; SUBCUTANEOUS at 16:55

## 2019-02-05 RX ADMIN — LEVETIRACETAM SCH MG: 100 SOLUTION ORAL at 05:06

## 2019-02-05 RX ADMIN — LEVETIRACETAM SCH MG: 100 SOLUTION ORAL at 21:27

## 2019-02-05 NOTE — NUR
SI:    RESP FAILURE TRACH/VENT DEPENDENT, SEPSIS

T. 98.1  RR 20 B/P 147/86

AC 16  FIO2 40% PEEP 5

WBC 14.4  AGAP 16 BUN 33 CR 2.2







IS:     CEFEPIME IV

KEPPRA GT

HEPARIN SUBC

BACLOFEN GT

*********STEP DOWN UNIT*********

## 2019-02-05 NOTE — NUR
NOTES







[facility], TELEPHONE REVIEW GIVEN.







ELIZABETH [phone]

## 2019-02-05 NOTE — NUR
NURSE NOTES:WOUND CARE FOLLOW-UP NOTES:Pt noted to have open serous blister L earlobe with 
25% flap loss .wound bed viable.Periwound without erythema .Skin assessed under trach collar 
and no evidence of skin breakdown noted. Bilat upper ext edematous .Multiple serous blisters 
noted to all 5 digits on R hand with open blister laterally R5th(Baby)finger with 80% flap 
loss.Wound bed moist with biofilm .Dry eschar noted distally R index finger. Area around R 
wrist macerated. L hand swollen with reabsorbed blister noted to dorsally R hand with dry 
brown eschar.Small serous blister noted to L baby finger. fungal dermatitis lower abd 
,Lumbar/Sacral area ,groin, scrotum  and penis with erythema and dry peeling skin..Full 
thickness pressure injury to sacrum resolving(L)3cm x (W)2cm .Wound bed viable with 
non-blanchable erythema Periwound  with additional scattered partial thickness openings 
encompassing an area totaling (L)7cm x (W)8cm. Resolving pressure injury L trochanter 
(L)1.5cm x (W01cm .Wound bed viable ,edges adherent .Periwound without erythema or 
induration .No odor or exudate noted.Both heels dry and blanchable and are off-loaded with 
pillows.



Tx.Plan:Cleanse R pinky finger with Saline.Apply Silvasorb Gel .Cover with Optifoam drsg 
.Change every 3 days and prn.

           Apply Cavilon Skin Barrier to blisters on fingers R hand .Wrap R hand weaving 
kerlix loosely around fingers.Change   

           every 3 days and prn. 

           Apply Hydrogel to sacral wound.Apply Triad periwound .Cover with Optifoam drsg 
.Change every 3 days and prn.

           Apply Cavilon to both heels and off-load heels with pillow.

           Reposition at least every 2hours or as tolerated.

## 2019-02-05 NOTE — NUR
NURSE NOTES:

talib of lab was informed that blood draw from the patients midline was not successful. 
talib will draw blood from patients peripheral for electrophoresis serum.

## 2019-02-05 NOTE — INFECTIOUS DISEASES PROG NOTE
Assessment/Plan


Assessment/Plan


82 yo female with PMHx of DM, HTN and a Cervical spinal tumor s/p resection 1/14 /19 who presneted to the ED on 1/31/19 with SOB, nausea and constipation. 





UTI complicated


   UA (+)


   Indewlling mayorga


   UCx 1/27/19 P.a.  (R Levo, otherwise S)


   UCx 2/2/19 - Enterobacter and yeast 


   CT abd 1/29/19 3 mm distal right ureteral calculus, minimal resultant 

hydronephrosis. Atrophic left kidney, containing a large staghorn calculus and 

multiple intrarenal calyceal calculi, previously described





Leukocytosis 15 on admit;  improved


Aebrile to 102; resolved





   -2/2 CXR:  Hypoventilatory lungs.  Left lung base atelectasis.





Mild pancreatitis  





Chronic resp failure - vent/trach dependent


ICH


COPD


DM


Seizure disorder


BPH


Dysphagia, G tube


Colostomy  





 Plan 





- Cefepime #2 for UTI - Tx enterobacter





        - 2/4/19 SP  Zosyn # 7


        - 2/1/19 SP Vanocmcyin #4- No evidence for MRSA


- Monitor CBC and temps





We will continue to follow the patient during this hospitalization.





Subjective


Allergies:  


Coded Allergies:  


     AZTREONAM (Verified  Allergy, Unknown, 7/23/18)


Subjective


Afebrile


Mild leukocytosis





Objective


Vital Signs





Last 24 Hour Vital Signs








  Date Time  Temp Pulse Resp B/P (MAP) Pulse Ox O2 Delivery O2 Flow Rate FiO2


 


2/5/19 09:11  100 16     40


 


2/5/19 08:00 98.1 107 20 147/86 (106) 100   


 


2/5/19 08:00      Mechanical Ventilator  


 


2/5/19 08:00        40


 


2/5/19 08:00  100      


 


2/5/19 07:00  102 16     40


 


2/5/19 05:20  101 16     40


 


2/5/19 04:00 98.2 107 16 118/76 (90) 100   


 


2/5/19 04:00        40


 


2/5/19 04:00      Mechanical Ventilator  


 


2/5/19 04:00  104      


 


2/5/19 03:00  107 18     40


 


2/5/19 01:07  104 16     40


 


2/5/19 00:00      Mechanical Ventilator  


 


2/5/19 00:00        40


 


2/5/19 00:00 99.5 102 16 107/79 (88) 100   


 


2/5/19 00:00  106      


 


2/4/19 23:17  105 17     40


 


2/4/19 21:30  104 16     40


 


2/4/19 20:00 99.3 102 16 118/85 (96) 100   


 


2/4/19 20:00  103      


 


2/4/19 20:00      Mechanical Ventilator  


 


2/4/19 19:38  106 17     40


 


2/4/19 16:43  109 17     40


 


2/4/19 16:00 98.6 107 16 110/74 (86) 99   


 


2/4/19 16:00      Mechanical Ventilator  


 


2/4/19 16:00  103      


 


2/4/19 16:00        40


 


2/4/19 14:32  97 17     40


 


2/4/19 13:10  95 16     40


 


2/4/19 12:00 98.4 98 16 115/85 (95) 100   


 


2/4/19 12:00  99      


 


2/4/19 12:00        40


 


2/4/19 12:00      Mechanical Ventilator  


 


2/4/19 11:03  97 16     40








Height (Feet):  5


Height (Inches):  7.00


Weight (Pounds):  202


Objective





Gen:  NAD, On vent satting well


HEENT: NCAT, MMM, trached


LUNGS:  CTAB, No W


CARDS: RRR, S1, S2, No M/R/G, 


ABD: Soft, Obese, NT, ND,, + BS,PEG and Colostomy (No E/P)





Laboratory Tests








Test


  2/5/19


03:20


 


White Blood Count


  14.4 K/UL


(4.8-10.8)  H


 


Red Blood Count


  4.19 M/UL


(4.70-6.10)  L


 


Hemoglobin


  12.1 G/DL


(14.2-18.0)  L


 


Hematocrit


  38.6 %


(42.0-52.0)  L


 


Mean Corpuscular Volume 92 FL (80-99)  


 


Mean Corpuscular Hemoglobin


  28.9 PG


(27.0-31.0)


 


Mean Corpuscular Hemoglobin


Concent 31.5 G/DL


(32.0-36.0)  L


 


Red Cell Distribution Width


  16.5 %


(11.6-14.8)  H


 


Platelet Count


  292 K/UL


(150-450)


 


Mean Platelet Volume


  9.5 FL


(6.5-10.1)


 


Neutrophils (%) (Auto)


  64.2 %


(45.0-75.0)


 


Lymphocytes (%) (Auto)


  18.8 %


(20.0-45.0)  L


 


Monocytes (%) (Auto)


  5.1 %


(1.0-10.0)


 


Eosinophils (%) (Auto)


  10.9 %


(0.0-3.0)  H


 


Basophils (%) (Auto)


  1.0 %


(0.0-2.0)


 


Sodium Level


  155 MMOL/L


(136-145)  H


 


Potassium Level


  3.5 MMOL/L


(3.5-5.1)


 


Chloride Level


  120 MMOL/L


()  H


 


Carbon Dioxide Level


  19 MMOL/L


(21-32)  L


 


Anion Gap


  16 mmol/L


(5-15)  H


 


Blood Urea Nitrogen


  33 mg/dL


(7-18)  H


 


Creatinine


  2.2 MG/DL


(0.55-1.30)  H


 


Estimat Glomerular Filtration


Rate 38.4 mL/min


(>60)


 


Glucose Level


  200 MG/DL


()  H


 


Calcium Level


  9.9 MG/DL


(8.5-10.1)


 


Phosphorus Level


  2.3 MG/DL


(2.5-4.9)  L


 


Magnesium Level


  2.4 MG/DL


(1.8-2.4)


 


Total Bilirubin


  0.6 MG/DL


(0.2-1.0)


 


Aspartate Amino Transf


(AST/SGOT) 81 U/L (15-37)


H


 


Alanine Aminotransferase


(ALT/SGPT) 56 U/L (12-78)


 


 


Alkaline Phosphatase


  97 U/L


()


 


Total Protein


  8.5 G/DL


(6.4-8.2)  H


 


Albumin


  2.8 G/DL


(3.4-5.0)  L


 


Globulin 5.7 g/dL  


 


Albumin/Globulin Ratio


  0.5 (1.0-2.7)


L











Current Medications








 Medications


  (Trade)  Dose


 Ordered  Sig/Jan


 Route


 PRN Reason  Start Time


 Stop Time Status Last Admin


Dose Admin


 


 Acetaminophen


  (Tylenol)  650 mg  Q4H  PRN


 GT


 Mild Pain/Temp > 100.5  1/28/19 08:15


 2/27/19 08:14  2/1/19 17:38


 


 


 Artificial Tears


  (Akwa-Tears)  1 drop  Q4H  PRN


 BOTH EYES


 Dry Eyes  1/28/19 07:45


 2/27/19 07:44  2/5/19 08:59


 


 


 Baclofen


  (Lioresal)  10 mg  EVERY 8  HOURS


 GT


   1/28/19 14:00


 2/27/19 13:59  2/5/19 05:03


 


 


 Cefepime HCl 2 gm/


 Dextrose  55 ml @ 


 110 mls/hr  Q24H


 IVPB


   2/4/19 13:00


 2/11/19 12:59  2/4/19 13:00


 


 


 Dextrose


  (Dextrose 50%)  25 ml  Q30M  PRN


 IV


 Hypoglycemia  1/28/19 07:45


 2/27/19 07:44   


 


 


 Dextrose


  (Dextrose 50%)  50 ml  Q30M  PRN


 IV


 Hypoglycemia  1/28/19 07:45


 2/27/19 07:44   


 


 


 Heparin Sodium


  (Porcine)


  (Heparin 5000


 units/ml)  5,000 units  EVERY 12  HOURS


 SUBQ


   1/28/19 09:00


 2/27/19 08:59  2/5/19 08:53


 


 


 Insulin Aspart


  (NovoLOG)    EVERY 4  HOURS


 SUBQ


   1/30/19 13:00


 2/27/19 11:59  2/5/19 08:58


 


 


 Insulin Detemir


  (Levemir)  15 units  Q12HR


 SUBQ


   1/29/19 00:00


 2/28/19 00:00  2/5/19 08:57


 


 


 Lansoprazole


  (Prevacid)  30 mg  DAILY


 GT


   1/31/19 09:00


 3/2/19 08:59  2/5/19 08:52


 


 


 Levetiracetam


  (Keppra)  1,000 mg  EVERY 8  HOURS


 GT


   1/28/19 14:00


 2/27/19 13:59  2/5/19 05:06


 


 


 Nystatin


  (Nystatin Cr)  1 applic  EVERY 12  HOURS


 TOPIC


   1/28/19 09:00


 2/27/19 08:59  2/5/19 09:30


 


 


 Polyethylene


 Glycol


  (Miralax)  17 gm  DAILYPRN  PRN


 GT


 Constipation  1/28/19 07:45


 2/27/19 07:44   


 


 


 Silver


 Sulfadiazine


  (Silvadene Cream


 25gm)  1 applic  BEDTIME


 TOPIC


   2/2/19 21:00


 2/27/19 08:59  2/4/19 20:23


 


 


 Sitagliptin


 Phosphate


  (Januvia)  50 mg  DAILY


 GT


   1/28/19 09:00


 2/27/19 08:59  2/5/19 08:52


 


 


 Vitamin D


  (Vitamin D)  1,000 intlu  DAILY


 GT


   1/28/19 09:00


 2/27/19 08:59  2/5/19 08:52


 

















Robert Williamson MD Feb 5, 2019 09:37

## 2019-02-05 NOTE — NUR
*-* INSURANCE *-*



CLINICALS AND REVIEW FAXED TO:





[facility]

KRISSY:SANKET

P:[phone]

F:[phone]

REF#[id number]

## 2019-02-05 NOTE — GENERAL PROGRESS NOTE
Assessment/Plan


Assessment/Plan





Assessment/Recommendations


# Leukocytosis. Likely related to underlying infection versus reactive process. 


--> Peripheral has been reviewed


--> Medications have been reviewed 


--> Imaging has been reviewed


 -->Blood cultures and urine cultures prn


--> has been started on abx, empiric treatment 


--> wbc trend: 14-->11-->9-->12->13


# Anemia of chronic disease


--> w/u has been reviewed from prior hospitalizations


# Acute pancreatitis,   status post cerebral hemorrhage with severe 

encephalopathy.


# Acute on chronic respiratory failure


--> ventilator dependent and fed via gastrostomy tube


--> Continue with albuterol sulfate and ipratropium


# Diabetes mellitus


-->Humalog insulin  with sliding scale every six hours


# Sepsis


# Gastrostomy tube dependent


# Colostomy in place


# Vegetative state





The timing of this note does not necessarily reflect the time of the patient 

was seen





Greatly appreciate consultation!





Subjective


Constitutional:  Denies: no symptoms, chills, diaphoresis, fever, malaise, 

weakness, other


HEENT:  Denies: no symptoms, eye pain, blurred vision, tearing, double vision, 

ear pain, ear discharge, nose pain, nose congestion, throat pain, throat 

swelling, mouth pain, mouth swelling, other


Respiratory:  Denies: no symptoms, cough, orthopnea, shortness of breath, SOB 

with excertion, SOB at rest, sputum, stridor, wheezing, other


Gastrointestinal/Abdominal:  Denies: no symptoms, abdomen distended, abdominal 

pain, black stools, tarry stools, blood in stool, constipated, diarrhea, 

difficulty swallowing, nausea, poor appetite, poor fluid intake, rectal bleeding

, vomiting, other


Genitourinary:  Denies: no symptoms, burning, discharge, frequency, flank pain, 

hematuria, incontinence, pain, urgency, other


Neurologic/Psychiatric:  Denies: no symptoms, anxiety, depressed, emotional 

problems, headache, numbness, paresthesia, pre-existing deficit, seizure, 

tingling, tremors, weakness, other


Allergies:  


Coded Allergies:  


     AZTREONAM (Verified  Allergy, Unknown, 7/23/18)


Subjective


2/1: Pt is seen in the room,on vent, G tube, leukocytosis has been improving 

and his fevers have resolved. 


2/3: on vent, resting in bed, wbc trending up, no fevers or chills,


2/4: The patient has low-grade fever with tachycardia, CXR reveals small left 

pleural effusion.


2/5: no events, labs reviewed, h/h appers stable at this time, imaging reviewed





Objective





Last 24 Hour Vital Signs








  Date Time  Temp Pulse Resp B/P (MAP) Pulse Ox O2 Delivery O2 Flow Rate FiO2


 


2/5/19 16:00        40


 


2/5/19 16:00      Mechanical Ventilator  


 


2/5/19 16:00 98.8 104 16 115/81 (92) 100   


 


2/5/19 15:02  105 17     40


 


2/5/19 13:27  102 17     40


 


2/5/19 12:00  105      


 


2/5/19 12:00      Mechanical Ventilator  


 


2/5/19 12:00        40


 


2/5/19 11:50 98.6 100 16 119/84 (96) 100   


 


2/5/19 11:13  102 16     40


 


2/5/19 09:11  100 16     40


 


2/5/19 08:00 98.1 107 20 147/86 (106) 100   


 


2/5/19 08:00      Mechanical Ventilator  


 


2/5/19 08:00        40


 


2/5/19 08:00  100      


 


2/5/19 07:00  102 16     40


 


2/5/19 05:20  101 16     40


 


2/5/19 04:00 98.2 107 16 118/76 (90) 100   


 


2/5/19 04:00        40


 


2/5/19 04:00      Mechanical Ventilator  


 


2/5/19 04:00  104      


 


2/5/19 03:00  107 18     40


 


2/5/19 01:07  104 16     40


 


2/5/19 00:00      Mechanical Ventilator  


 


2/5/19 00:00        40


 


2/5/19 00:00 99.5 102 16 107/79 (88) 100   


 


2/5/19 00:00  106      


 


2/4/19 23:17  105 17     40


 


2/4/19 21:30  104 16     40


 


2/4/19 20:00 99.3 102 16 118/85 (96) 100   


 


2/4/19 20:00  103      


 


2/4/19 20:00      Mechanical Ventilator  


 


2/4/19 19:38  106 17     40

















Intake and Output  


 


 2/4/19 2/5/19





 18:59 06:59


 


Intake Total 677.5 ml 570 ml


 


Output Total 250 ml 275 ml


 


Balance 427.5 ml 295 ml


 


  


 


Free Water 30 ml 90 ml


 


IV Total 127.5 ml 


 


Tube Feeding 520 ml 480 ml


 


Output Urine Total 250 ml 


 


Stool Total  275 ml








Laboratory Tests


2/5/19 03:20: 


White Blood Count 14.4H, Red Blood Count 4.19L, Hemoglobin 12.1L, Hematocrit 

38.6L, Mean Corpuscular Volume 92, Mean Corpuscular Hemoglobin 28.9, Mean 

Corpuscular Hemoglobin Concent 31.5L, Red Cell Distribution Width 16.5H, 

Platelet Count 292, Mean Platelet Volume 9.5, Neutrophils (%) (Auto) 64.2, 

Lymphocytes (%) (Auto) 18.8L, Monocytes (%) (Auto) 5.1, Eosinophils (%) (Auto) 

10.9H, Basophils (%) (Auto) 1.0, Sodium Level 155H, Potassium Level 3.5, 

Chloride Level 120H, Carbon Dioxide Level 19L, Anion Gap 16H, Blood Urea 

Nitrogen 33H, Creatinine 2.2H, Estimat Glomerular Filtration Rate 38.4, Glucose 

Level 200H, Calcium Level 9.9, Phosphorus Level 2.3L, Magnesium Level 2.4, 

Total Bilirubin 0.6, Aspartate Amino Transf (AST/SGOT) 81H, Alanine 

Aminotransferase (ALT/SGPT) 56, Alkaline Phosphatase 97, Total Protein 8.5H, 

Albumin 2.8L, Globulin 5.7, Albumin/Globulin Ratio 0.5L


Height (Feet):  5


Height (Inches):  7.00


Weight (Pounds):  202


Objective


PHYSICAL EXAMINATION


General Appearance:  on vent      


HEENT:  normocephalic, atraumatic      


Neck:  non-tender, normal alignment      


Respiratory/Chest:  ++ VENT/trach   


Cardiovascular/Chest:  normal peripheral pulses, normal rate      


Abdomen:  normal bowel sounds ++ peg      


Extremities:  poorl range of motion











Tejas Gerber MD Feb 5, 2019 17:07

## 2019-02-05 NOTE — NUR
NURSE NOTES:

Received pt at bedside by JOSHUA Fletcher.  Pt is obtunded, no response to deep pain.  Cardiac 
monitor showing ST.  Portex 7, AC 16, , FiO2 40, PEEP 5; sating 100%.  GT running 
Glucerna 1.5 @ 40, , prosource BID; patent, 20cc residual, will monitor. Colostomy 
asymptomatic.  Condom cath intact, yellow urine noted.  MASHA midline asymptomatic running 
TKO.  Skin is clean and dry, dressings intact, pt turned.  LABS unremarkable MD aware per 
RN.  Bed is locked in lowest position, call bell within reach, side rails x3, sz precautions 
continued.  Will continue with plan of care and continue to monitor.

## 2019-02-05 NOTE — PULMONOLGY CRITICAL CARE NOTE
Critical Care - Asmt/Plan


Problems:  


(1) Acute on chronic respiratory failure


(2) Sepsis


(3) Fever


(4) Gastrostomy tube dependent


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


(6) Vegetative state


(7) Diabetes mellitus


Respiratory:  monitor respiratory rate, adjust FIO2, CXR


Cardiac:  continue to monitor HR/BP


Renal:  F/U I&O, keep IV fluid


Infectious Disease:  check cultures


Gastrointestinal:  continue feedings/current rate


Endocrine:  monitor blood sugar, check TSH


Hematologic:  transfuse if hgb<8.5


Neurologic:  PRN Ativan, keep patient comfortable


Prophylaxis:  Heparin


Disposition:  keep in ICU


Notes Reviewed:  internist, renal


Discussed with:  nurses, consultants, 





Critical Care - Objective





Last 24 Hour Vital Signs








  Date Time  Temp Pulse Resp B/P (MAP) Pulse Ox O2 Delivery O2 Flow Rate FiO2


 


2/5/19 09:11  100 16     40


 


2/5/19 08:00 98.1 107 20 147/86 (106) 100   


 


2/5/19 08:00      Mechanical Ventilator  


 


2/5/19 08:00        40


 


2/5/19 08:00  100      


 


2/5/19 07:00  102 16     40


 


2/5/19 05:20  101 16     40


 


2/5/19 04:00 98.2 107 16 118/76 (90) 100   


 


2/5/19 04:00        40


 


2/5/19 04:00      Mechanical Ventilator  


 


2/5/19 04:00  104      


 


2/5/19 03:00  107 18     40


 


2/5/19 01:07  104 16     40


 


2/5/19 00:00      Mechanical Ventilator  


 


2/5/19 00:00        40


 


2/5/19 00:00 99.5 102 16 107/79 (88) 100   


 


2/5/19 00:00  106      


 


2/4/19 23:17  105 17     40


 


2/4/19 21:30  104 16     40


 


2/4/19 20:00 99.3 102 16 118/85 (96) 100   


 


2/4/19 20:00  103      


 


2/4/19 20:00      Mechanical Ventilator  


 


2/4/19 19:38  106 17     40


 


2/4/19 16:43  109 17     40


 


2/4/19 16:00 98.6 107 16 110/74 (86) 99   


 


2/4/19 16:00      Mechanical Ventilator  


 


2/4/19 16:00  103      


 


2/4/19 16:00        40


 


2/4/19 14:32  97 17     40


 


2/4/19 13:10  95 16     40


 


2/4/19 12:00 98.4 98 16 115/85 (95) 100   


 


2/4/19 12:00  99      


 


2/4/19 12:00        40


 


2/4/19 12:00      Mechanical Ventilator  








Status:  awake


Condition:  critical


Neck:  full ROM


Heart:  HR/BP stable, HR/BP unstable


Abdomen:  soft, active bowel sounds


Extremities:  no C/C/E


Decubiti:  location


Accucheck:  198





Critical Care - Subjective


ROS Limited/Unobtainable:  Yes


Condition:  critical


EKG Rhythm:  Sinus Rhythm


FI02:  40


Vent Support Breath Rate:  16


Vent Support Mode:  AC


Vent Tidal Volume:  600


Sputum Amount:  Small


PEEP:  5.0


PIP:  37


Tube Feeding Amount:  40


I&O:











Intake and Output  


 


 2/4/19 2/5/19





 19:00 07:00


 


Intake Total 650 ml 530 ml


 


Output Total 250 ml 275 ml


 


Balance 400 ml 255 ml


 


  


 


Free Water 30 ml 90 ml


 


IV Total 100 ml 


 


Tube Feeding 520 ml 440 ml


 


Output Urine Total 250 ml 


 


Stool Total  275 ml








CXR:


EMILIE,


Labs:





Laboratory Tests








Test


  2/5/19


03:20


 


White Blood Count


  14.4 K/UL


(4.8-10.8)  H


 


Red Blood Count


  4.19 M/UL


(4.70-6.10)  L


 


Hemoglobin


  12.1 G/DL


(14.2-18.0)  L


 


Hematocrit


  38.6 %


(42.0-52.0)  L


 


Mean Corpuscular Volume 92 FL (80-99)  


 


Mean Corpuscular Hemoglobin


  28.9 PG


(27.0-31.0)


 


Mean Corpuscular Hemoglobin


Concent 31.5 G/DL


(32.0-36.0)  L


 


Red Cell Distribution Width


  16.5 %


(11.6-14.8)  H


 


Platelet Count


  292 K/UL


(150-450)


 


Mean Platelet Volume


  9.5 FL


(6.5-10.1)


 


Neutrophils (%) (Auto)


  64.2 %


(45.0-75.0)


 


Lymphocytes (%) (Auto)


  18.8 %


(20.0-45.0)  L


 


Monocytes (%) (Auto)


  5.1 %


(1.0-10.0)


 


Eosinophils (%) (Auto)


  10.9 %


(0.0-3.0)  H


 


Basophils (%) (Auto)


  1.0 %


(0.0-2.0)


 


Sodium Level


  155 MMOL/L


(136-145)  H


 


Potassium Level


  3.5 MMOL/L


(3.5-5.1)


 


Chloride Level


  120 MMOL/L


()  H


 


Carbon Dioxide Level


  19 MMOL/L


(21-32)  L


 


Anion Gap


  16 mmol/L


(5-15)  H


 


Blood Urea Nitrogen


  33 mg/dL


(7-18)  H


 


Creatinine


  2.2 MG/DL


(0.55-1.30)  H


 


Estimat Glomerular Filtration


Rate 38.4 mL/min


(>60)


 


Glucose Level


  200 MG/DL


()  H


 


Calcium Level


  9.9 MG/DL


(8.5-10.1)


 


Phosphorus Level


  2.3 MG/DL


(2.5-4.9)  L


 


Magnesium Level


  2.4 MG/DL


(1.8-2.4)


 


Total Bilirubin


  0.6 MG/DL


(0.2-1.0)


 


Aspartate Amino Transf


(AST/SGOT) 81 U/L (15-37)


H


 


Alanine Aminotransferase


(ALT/SGPT) 56 U/L (12-78)


 


 


Alkaline Phosphatase


  97 U/L


()


 


Total Protein


  8.5 G/DL


(6.4-8.2)  H


 


Albumin


  2.8 G/DL


(3.4-5.0)  L


 


Globulin 5.7 g/dL  


 


Albumin/Globulin Ratio


  0.5 (1.0-2.7)


L

















Yury Pena MD Feb 5, 2019 11:12

## 2019-02-06 VITALS — DIASTOLIC BLOOD PRESSURE: 79 MMHG | SYSTOLIC BLOOD PRESSURE: 109 MMHG

## 2019-02-06 VITALS — SYSTOLIC BLOOD PRESSURE: 123 MMHG | DIASTOLIC BLOOD PRESSURE: 87 MMHG

## 2019-02-06 VITALS — DIASTOLIC BLOOD PRESSURE: 74 MMHG | SYSTOLIC BLOOD PRESSURE: 120 MMHG

## 2019-02-06 VITALS — DIASTOLIC BLOOD PRESSURE: 88 MMHG | SYSTOLIC BLOOD PRESSURE: 120 MMHG

## 2019-02-06 VITALS — SYSTOLIC BLOOD PRESSURE: 115 MMHG | DIASTOLIC BLOOD PRESSURE: 82 MMHG

## 2019-02-06 VITALS — SYSTOLIC BLOOD PRESSURE: 121 MMHG | DIASTOLIC BLOOD PRESSURE: 76 MMHG

## 2019-02-06 LAB
ADD MANUAL DIFF: NO
ALBUMIN SERPL-MCNC: 2.8 G/DL (ref 3.4–5)
ALBUMIN/GLOB SERPL: 0.5 {RATIO} (ref 1–2.7)
ALP SERPL-CCNC: 101 U/L (ref 46–116)
ALT SERPL-CCNC: 54 U/L (ref 12–78)
ANION GAP SERPL CALC-SCNC: 16 MMOL/L (ref 5–15)
AST SERPL-CCNC: 68 U/L (ref 15–37)
BASOPHILS NFR BLD AUTO: 0.4 % (ref 0–2)
BILIRUB SERPL-MCNC: 0.9 MG/DL (ref 0.2–1)
BUN SERPL-MCNC: 34 MG/DL (ref 7–18)
CALCIUM SERPL-MCNC: 10.2 MG/DL (ref 8.5–10.1)
CHLORIDE SERPL-SCNC: 121 MMOL/L (ref 98–107)
CO2 SERPL-SCNC: 20 MMOL/L (ref 21–32)
CREAT SERPL-MCNC: 2.1 MG/DL (ref 0.55–1.3)
EOSINOPHIL NFR BLD AUTO: 12 % (ref 0–3)
ERYTHROCYTE [DISTWIDTH] IN BLOOD BY AUTOMATED COUNT: 16.6 % (ref 11.6–14.8)
GLOBULIN SER-MCNC: 5.9 G/DL
HCT VFR BLD CALC: 38 % (ref 42–52)
HGB BLD-MCNC: 12.1 G/DL (ref 14.2–18)
LYMPHOCYTES NFR BLD AUTO: 17.3 % (ref 20–45)
MCV RBC AUTO: 92 FL (ref 80–99)
MONOCYTES NFR BLD AUTO: 4.7 % (ref 1–10)
NEUTROPHILS NFR BLD AUTO: 65.6 % (ref 45–75)
PHOSPHATE SERPL-MCNC: 2.4 MG/DL (ref 2.5–4.9)
PLATELET # BLD: 292 K/UL (ref 150–450)
POTASSIUM SERPL-SCNC: 3.7 MMOL/L (ref 3.5–5.1)
RBC # BLD AUTO: 4.13 M/UL (ref 4.7–6.1)
SODIUM SERPL-SCNC: 157 MMOL/L (ref 136–145)
WBC # BLD AUTO: 16.6 K/UL (ref 4.8–10.8)

## 2019-02-06 RX ADMIN — HEPARIN SODIUM SCH UNITS: 5000 INJECTION INTRAVENOUS; SUBCUTANEOUS at 21:34

## 2019-02-06 RX ADMIN — INSULIN ASPART SCH UNITS: 100 INJECTION, SOLUTION INTRAVENOUS; SUBCUTANEOUS at 16:40

## 2019-02-06 RX ADMIN — VITAMIN D, TAB 1000IU (100/BT) SCH INTLU: 25 TAB at 09:18

## 2019-02-06 RX ADMIN — LEVETIRACETAM SCH MG: 100 SOLUTION ORAL at 13:33

## 2019-02-06 RX ADMIN — INSULIN ASPART SCH UNITS: 100 INJECTION, SOLUTION INTRAVENOUS; SUBCUTANEOUS at 04:13

## 2019-02-06 RX ADMIN — CHLORHEXIDINE GLUCONATE SCH APPLIC: 213 SOLUTION TOPICAL at 22:49

## 2019-02-06 RX ADMIN — INSULIN DETEMIR SCH UNITS: 100 INJECTION, SOLUTION SUBCUTANEOUS at 21:33

## 2019-02-06 RX ADMIN — ACETAMINOPHEN PRN MG: 160 SOLUTION ORAL at 16:39

## 2019-02-06 RX ADMIN — ACETAMINOPHEN PRN MG: 160 SOLUTION ORAL at 06:34

## 2019-02-06 RX ADMIN — SITAGLIPTIN SCH MG: 50 TABLET, FILM COATED ORAL at 09:18

## 2019-02-06 RX ADMIN — INSULIN ASPART SCH UNITS: 100 INJECTION, SOLUTION INTRAVENOUS; SUBCUTANEOUS at 21:32

## 2019-02-06 RX ADMIN — LEVETIRACETAM SCH MG: 100 SOLUTION ORAL at 06:35

## 2019-02-06 RX ADMIN — INSULIN ASPART SCH UNITS: 100 INJECTION, SOLUTION INTRAVENOUS; SUBCUTANEOUS at 13:35

## 2019-02-06 RX ADMIN — LEVETIRACETAM SCH MG: 100 SOLUTION ORAL at 21:28

## 2019-02-06 RX ADMIN — SODIUM CHLORIDE SCH MLS/HR: 0.9 INJECTION INTRAVENOUS at 13:34

## 2019-02-06 RX ADMIN — INSULIN ASPART SCH UNITS: 100 INJECTION, SOLUTION INTRAVENOUS; SUBCUTANEOUS at 00:22

## 2019-02-06 RX ADMIN — INSULIN ASPART SCH UNITS: 100 INJECTION, SOLUTION INTRAVENOUS; SUBCUTANEOUS at 09:21

## 2019-02-06 RX ADMIN — HEPARIN SODIUM SCH UNITS: 5000 INJECTION INTRAVENOUS; SUBCUTANEOUS at 09:22

## 2019-02-06 RX ADMIN — INSULIN DETEMIR SCH UNITS: 100 INJECTION, SOLUTION SUBCUTANEOUS at 10:08

## 2019-02-06 NOTE — PROGRESS NOTE
DATE:  02/05/2019

SUBJECTIVE:  The patient _____ and tachycardia.



PHYSICAL EXAMINATION:

VITAL SIGNS:  Blood pressure 130/88, pulse 100, respirations 16, and

temperature of 100.0.

HEENT:  Eyes were normal.  ENT, mucous membranes are moist and intact.

NECK:  Supple with no JVD without lymph nodes.  Tracheostomy site is

clean.

LUNGS:  Clear without rhonchi, rales, or wheezing in the upper

lobe.

EXTREMITIES:  Warm without cyanosis, clubbing, or edema.



LABORATORY AND DIAGNOSTIC DATA:  Hemoglobin 12.9, hematocrit 38.3 with MCV

of 92, WBC of 14.4, and platelets are 292,000.  Repeat hemoglobin is

_____, hematocrit _____ with MCV of 92, WBC of 14.4, and platelets are

292,000.  His WBC was 17.1 yesterday and 12.7 on February 2nd.  BUN and

creatinine is 33 and 2.2 respectively.  His sodium is 155, potassium is

3.5, chloride 120, CO2 is 19.  His calcium is 9.9, phosphorus is 2.3, and

magnesium is 2.4.



_____ on board-spectrum antibiotics.  Repeat laboratory tests will be

done in the a.m.









  ______________________________________________

  Etienne Bloom M.D.





DR:  JASPAL

D:  02/06/2019 00:14

T:  02/06/2019 12:29

JOB#:  [id number]

CC:

## 2019-02-06 NOTE — PULMONOLGY CRITICAL CARE NOTE
Critical Care - Asmt/Plan


Problems:  


(1) Acute on chronic respiratory failure


(2) Sepsis


(3) Fever


(4) Gastrostomy tube dependent


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


(6) Vegetative state


(7) Diabetes mellitus


Respiratory:  monitor respiratory rate, adjust FIO2, CXR


Cardiac:  continue to monitor HR/BP


Renal:  F/U I&O, keep IV fluid


Infectious Disease:  continue antibiotics


Gastrointestinal:  continue feedings/current rate


Endocrine:  monitor blood sugar, check TSH


Hematologic:  monitor H/H, transfuse if hgb<8.5


Neurologic:  PRN Morphine, keep patient comfortable


Prophylaxis:  Protonix, Heparin


Notes Reviewed:  cardio


Discussed with:  nurses, consultants, 





Critical Care - Objective





Last 24 Hour Vital Signs








  Date Time  Temp Pulse Resp B/P (MAP) Pulse Ox O2 Delivery O2 Flow Rate FiO2


 


2/6/19 08:45  96 17     40


 


2/6/19 08:00 98.5 94 17 109/79 (89) 100   


 


2/6/19 08:00      Mechanical Ventilator  


 


2/6/19 08:00  95      


 


2/6/19 08:00        40


 


2/6/19 07:23  93 16     40


 


2/6/19 07:04 99.0       


 


2/6/19 05:06  95 15     40


 


2/6/19 04:00        40


 


2/6/19 04:00      Mechanical Ventilator  


 


2/6/19 04:00  95      


 


2/6/19 04:00 99.2 95 16 123/87 (99) 100   


 


2/6/19 03:08  103 17     40


 


2/6/19 01:03  97 16     40


 


2/6/19 00:00      Mechanical Ventilator  


 


2/6/19 00:00  105      


 


2/6/19 00:00 99.7 97 18 121/76 (91) 100   


 


2/5/19 23:18  100 16     40


 


2/5/19 21:38  100 16     40


 


2/5/19 20:00        40


 


2/5/19 20:00 100.0 103 16 130/88 (102) 100   


 


2/5/19 20:00  106      


 


2/5/19 20:00      Mechanical Ventilator  


 


2/5/19 18:56  105 17     40


 


2/5/19 17:19  102 16     40


 


2/5/19 16:00  104      


 


2/5/19 16:00        40


 


2/5/19 16:00      Mechanical Ventilator  


 


2/5/19 16:00 98.8 104 16 115/81 (92) 100   


 


2/5/19 15:02  105 17     40


 


2/5/19 13:27  102 17     40


 


2/5/19 12:00  105      


 


2/5/19 12:00      Mechanical Ventilator  


 


2/5/19 12:00        40


 


2/5/19 11:50 98.6 100 16 119/84 (96) 100   








Status:  awake


Condition:  critical


Neck:  full ROM


Lungs:  chest wall tender


Heart:  HR/BP stable, HR/BP unstable


Abdomen:  soft, active bowel sounds


Extremities:  no C/C/E, edema


Decubiti:  stage


Accucheck:  165





Critical Care - Subjective


ROS Limited/Unobtainable:  Yes


Condition:  critical


EKG Rhythm:  Sinus Rhythm


FI02:  40


Vent Support Breath Rate:  16


Vent Support Mode:  AC


Vent Tidal Volume:  600


Sputum Amount:  Small


PEEP:  5.0


PIP:  29


Tube Feeding Amount:  40


I&O:











Intake and Output  


 


 2/5/19 2/6/19





 19:00 07:00


 


Intake Total 695 ml 640 ml


 


Output Total 500 ml 600 ml


 


Balance 195 ml 40 ml


 


  


 


Free Water 105 ml 120 ml


 


IV Total 110 ml 


 


Tube Feeding 480 ml 520 ml


 


Output Urine Total 300 ml 300 ml


 


Stool Total 200 ml 300 ml








Labs:





Laboratory Tests








Test


  2/5/19


18:30 2/6/19


03:16


 


Total Protein (PEP)


  8.1 g/dL


(6.0-8.5) 


 


 


Albumin (PEP)


  3.3 g/dL


(2.9-4.4) 


 


 


Globulin (PEP)


  4.8 g/dL


(2.2-3.9)  H 


 


 


Albumin/Globulin Ratio


  0.7 (0.7-1.7)  


  0.5 (1.0-2.7)


L


 


Alpha-1-Globulins


  0.3 g/dL


(0.0-0.4) 


 


 


Alpha-2-Globulins


  1.4 g/dL


(0.4-1.0)  H 


 


 


Beta Globulins


  1.4 g/dL


(0.7-1.3)  H 


 


 


Beta Gamma Globulin


  1.7 g/dL


(0.4-1.8) 


 


 


PEP Abnormal Protein Bands


  Not observed


g/dL (Not 


 


 


Protein Electrophoresis


Interpret Comment (.)  


  


 


 


White Blood Count


  


  16.6 K/UL


(4.8-10.8)  H


 


Red Blood Count


  


  4.13 M/UL


(4.70-6.10)  L


 


Hemoglobin


  


  12.1 G/DL


(14.2-18.0)  L


 


Hematocrit


  


  38.0 %


(42.0-52.0)  L


 


Mean Corpuscular Volume  92 FL (80-99)  


 


Mean Corpuscular Hemoglobin


  


  29.3 PG


(27.0-31.0)


 


Mean Corpuscular Hemoglobin


Concent 


  31.8 G/DL


(32.0-36.0)  L


 


Red Cell Distribution Width


  


  16.6 %


(11.6-14.8)  H


 


Platelet Count


  


  292 K/UL


(150-450)


 


Mean Platelet Volume


  


  9.7 FL


(6.5-10.1)


 


Neutrophils (%) (Auto)


  


  65.6 %


(45.0-75.0)


 


Lymphocytes (%) (Auto)


  


  17.3 %


(20.0-45.0)  L


 


Monocytes (%) (Auto)


  


  4.7 %


(1.0-10.0)


 


Eosinophils (%) (Auto)


  


  12.0 %


(0.0-3.0)  H


 


Basophils (%) (Auto)


  


  0.4 %


(0.0-2.0)


 


Sodium Level


  


  157 MMOL/L


(136-145)  H


 


Potassium Level


  


  3.7 MMOL/L


(3.5-5.1)


 


Chloride Level


  


  121 MMOL/L


()  H


 


Carbon Dioxide Level


  


  20 MMOL/L


(21-32)  L


 


Anion Gap


  


  16 mmol/L


(5-15)  H


 


Blood Urea Nitrogen


  


  34 mg/dL


(7-18)  H


 


Creatinine


  


  2.1 MG/DL


(0.55-1.30)  H


 


Estimat Glomerular Filtration


Rate 


  40.6 mL/min


(>60)


 


Glucose Level


  


  201 MG/DL


()  H


 


Calcium Level


  


  10.2 MG/DL


(8.5-10.1)  H


 


Phosphorus Level


  


  2.4 MG/DL


(2.5-4.9)  L


 


Magnesium Level


  


  2.5 MG/DL


(1.8-2.4)  H


 


Total Bilirubin


  


  0.9 MG/DL


(0.2-1.0)


 


Aspartate Amino Transf


(AST/SGOT) 


  68 U/L (15-37)


H


 


Alanine Aminotransferase


(ALT/SGPT) 


  54 U/L (12-78)


 


 


Alkaline Phosphatase


  


  101 U/L


()


 


Total Protein


  


  8.7 G/DL


(6.4-8.2)  H


 


Albumin


  


  2.8 G/DL


(3.4-5.0)  L


 


Globulin  5.9 g/dL  

















Yury Pena MD Feb 6, 2019 11:33

## 2019-02-06 NOTE — GENERAL PROGRESS NOTE
Assessment/Plan


Assessment/Plan





Assessment/Recommendations


# Leukocytosis. Likely related to underlying infection versus reactive process. 


--> Peripheral has been reviewed


--> Medications have been reviewed 


--> Imaging has been reviewed


 -->Blood cultures and urine cultures prn


--> has been started on abx, empiric treatment 


--> wbc trend: 14-->11-->9-->12->13


# Anemia of chronic disease


--> w/u has been reviewed from prior hospitalizations


# Acute pancreatitis,   status post cerebral hemorrhage with severe 

encephalopathy.


# Acute on chronic respiratory failure


--> ventilator dependent and fed via gastrostomy tube


--> Continue with albuterol sulfate and ipratropium


# Diabetes mellitus


-->Humalog insulin  with sliding scale every six hours


# Sepsis


# Gastrostomy tube dependent


# Colostomy in place


# Vegetative state





The timing of this note does not necessarily reflect the time of the patient 

was seen





Greatly appreciate consultation!





Subjective


ROS Limited/Unobtainable:  Yes


Allergies:  


Coded Allergies:  


     AZTREONAM (Verified  Allergy, Unknown, 7/23/18)


Subjective


2/1: Pt is seen in the room,on vent, G tube, leukocytosis has been improving 

and his fevers have resolved. 


2/3: on vent, resting in bed, wbc trending up, no fevers or chills,


2/4: The patient has low-grade fever with tachycardia, CXR reveals small left 

pleural effusion.


2/5: no events, labs reviewed, h/h appers stable at this time, imaging reviewed


2/6: on vent, resting, wbc trending up, no overnight events reported.





Objective





Last 24 Hour Vital Signs








  Date Time  Temp Pulse Resp B/P (MAP) Pulse Ox O2 Delivery O2 Flow Rate FiO2


 


2/6/19 08:45  96 17     40


 


2/6/19 08:00 98.5 94 17 109/79 (89) 100   


 


2/6/19 08:00      Mechanical Ventilator  


 


2/6/19 08:00  95      


 


2/6/19 08:00        40


 


2/6/19 07:23  93 16     40


 


2/6/19 07:04 99.0       


 


2/6/19 05:06  95 15     40


 


2/6/19 04:00        40


 


2/6/19 04:00      Mechanical Ventilator  


 


2/6/19 04:00  95      


 


2/6/19 04:00 99.2 95 16 123/87 (99) 100   


 


2/6/19 03:08  103 17     40


 


2/6/19 01:03  97 16     40


 


2/6/19 00:00      Mechanical Ventilator  


 


2/6/19 00:00  105      


 


2/6/19 00:00 99.7 97 18 121/76 (91) 100   


 


2/5/19 23:18  100 16     40


 


2/5/19 21:38  100 16     40


 


2/5/19 20:00        40


 


2/5/19 20:00 100.0 103 16 130/88 (102) 100   


 


2/5/19 20:00  106      


 


2/5/19 20:00      Mechanical Ventilator  


 


2/5/19 18:56  105 17     40


 


2/5/19 17:19  102 16     40


 


2/5/19 16:00  104      


 


2/5/19 16:00        40


 


2/5/19 16:00      Mechanical Ventilator  


 


2/5/19 16:00 98.8 104 16 115/81 (92) 100   


 


2/5/19 15:02  105 17     40


 


2/5/19 13:27  102 17     40


 


2/5/19 12:00  105      


 


2/5/19 12:00      Mechanical Ventilator  


 


2/5/19 12:00        40


 


2/5/19 11:50 98.6 100 16 119/84 (96) 100   

















Intake and Output  


 


 2/5/19 2/6/19





 19:00 07:00


 


Intake Total 695 ml 640 ml


 


Output Total 500 ml 600 ml


 


Balance 195 ml 40 ml


 


  


 


Free Water 105 ml 120 ml


 


IV Total 110 ml 


 


Tube Feeding 480 ml 520 ml


 


Output Urine Total 300 ml 300 ml


 


Stool Total 200 ml 300 ml








Laboratory Tests


2/5/19 18:30: 


Total Protein (PEP) 8.1, Albumin (PEP) 3.3, Globulin (PEP) 4.8H, Albumin/

Globulin Ratio 0.7, Alpha-1-Globulins 0.3, Alpha-2-Globulins 1.4H, Beta 

Globulins 1.4H, Beta Gamma Globulin 1.7, PEP Abnormal Protein Bands Not observed

, Protein Electrophoresis Interpret Comment


2/6/19 03:16: 


Albumin/Globulin Ratio 0.5L, White Blood Count 16.6H, Red Blood Count 4.13L, 

Hemoglobin 12.1L, Hematocrit 38.0L, Mean Corpuscular Volume 92, Mean 

Corpuscular Hemoglobin 29.3, Mean Corpuscular Hemoglobin Concent 31.8L, Red 

Cell Distribution Width 16.6H, Platelet Count 292, Mean Platelet Volume 9.7, 

Neutrophils (%) (Auto) 65.6, Lymphocytes (%) (Auto) 17.3L, Monocytes (%) (Auto) 

4.7, Eosinophils (%) (Auto) 12.0H, Basophils (%) (Auto) 0.4, Sodium Level 157H, 

Potassium Level 3.7, Chloride Level 121H, Carbon Dioxide Level 20L, Anion Gap 

16H, Blood Urea Nitrogen 34H, Creatinine 2.1H, Estimat Glomerular Filtration 

Rate 40.6, Glucose Level 201H, Calcium Level 10.2H, Phosphorus Level 2.4L, 

Magnesium Level 2.5H, Total Bilirubin 0.9, Aspartate Amino Transf (AST/SGOT) 68H

, Alanine Aminotransferase (ALT/SGPT) 54, Alkaline Phosphatase 101, Total 

Protein 8.7H, Albumin 2.8L, Globulin 5.9


Height (Feet):  5


Height (Inches):  7.00


Weight (Pounds):  197


Objective


PHYSICAL EXAMINATION


General Appearance:  on vent      


HEENT:  normocephalic, atraumatic      


Neck:  non-tender, normal alignment      


Respiratory/Chest:  ++ VENT/trach   


Cardiovascular/Chest:  normal peripheral pulses, normal rate      


Abdomen:  normal bowel sounds ++ peg      


Extremities:  poorl range of motion











Tejas Gerber MD Feb 6, 2019 11:19

## 2019-02-06 NOTE — NUR
RD ASSESSMENT & RECOMMENDATIONS

SEE CARE ACTIVITY FOR COMPLETE ASSESSMENT



DAILY ESTIMATED NEEDS:

Needs based on Critical care, wound /71kg abw 

22-30  kcals/kg 

2085-9730  total kcals

1.25-2  g protein/kg

  g total protein 

25-30  mL/kg

7716-4324  total fluid mLs



NUTRITION DIAGNOSIS:

* Swallowing difficulty R/T respiratory status, as evidenced by trach/vent

dep, PEG dep.

* Increased kcal and protein needs r/t wound healing as evidenced by pt

with multiple wounds, including partial thickness wound @ L side neck

under trach collar, resolving full thickness pressure injury @ sacrum,

resolving pressure injury to L trochanter, and reabsorbing serous blister

lateral L heel, refer to WC eval.

* Altered nutrition related lab values R/T hyperglycemia, h/o DM as

evidenced by elev BGs and POC glu (300's and 400's-> 170 167 164 189

improved), elev A1C 7.9





CURRENT TF:Glucerna 1.5 @ 40ml/hr x 24 hrs + PS TID  







ENTERAL NUTRITION RECOMMENDATIONS:

Glucerna 1.5 @ 40ml/hr x 24 hrs + Prosource 1pkt TID  to provide 960ml, 1440kcal (+120kcal), 
79g (+33g prot), 729ml free water, 127g CHO 



* Maintain LOWER rate for improved glycemic control

     :will provide total of 127g CHO per day, 46g less CHO than previous TF

* Add Prosource 1pkt TID to meet protein needs

    (TF + Prosource TID will provide 100% est kcal/prot needs)

* HOB over 30 degrees/ Increase water flushes







ADDITIONAL RECOMMENDATIONS:

* Calibrated bedscale wt for accurate CBW  

* Monitor lytes, replete as needed (low phos) 

* Wound healing: Add Carlito 1pkt BID, vit C 500mg QD 

* Monitor BGs closely, need to adjust insulin/TF 

* Increase water flushes-> , trending up  

. 

.

## 2019-02-06 NOTE — INFECTIOUS DISEASES PROG NOTE
Assessment/Plan


Assessment/Plan


82 yo female with PMHx of DM, HTN and a Cervical spinal tumor s/p resection 1/14 /19 who presneted to the ED on 1/31/19 with SOB, nausea and constipation. 





UTI complicated


   UA (+)


   Indewlling mayorga


   UCx 1/27/19 P.a.  (R Levo, otherwise S)


   UCx 2/2/19 - Enterobacter and yeast 


   CT abd 1/29/19 3 mm distal right ureteral calculus, minimal resultant 

hydronephrosis. Atrophic left kidney, containing a large staghorn calculus and 

multiple intrarenal calyceal calculi, previously described





Leukocytosis 15 on admit;  improved


Aebrile to 102; resolved





   -2/2 CXR:  Hypoventilatory lungs.  Left lung base atelectasis.





Mild pancreatitis  





Chronic resp failure - vent/trach dependent


ICH


COPD


DM


Seizure disorder


BPH


Dysphagia, G tube


Colostomy  





 Plan 





- Cefepime #3 for UTI - Tx enterobacter





- f/u urine Cx





        - 2/4/19 SP  Zosyn # 7


        - 2/1/19 SP Vanocmcyin #4- No evidence for MRSA


- Monitor CBC and temps





We will continue to follow the patient during this hospitalization.





Subjective


Allergies:  


Coded Allergies:  


     AZTREONAM (Verified  Allergy, Unknown, 7/23/18)


Subjective


Afebrile


Mild leukocytosis increasing





Objective


Vital Signs





Last 24 Hour Vital Signs








  Date Time  Temp Pulse Resp B/P (MAP) Pulse Ox O2 Delivery O2 Flow Rate FiO2


 


2/6/19 08:45  96 17     40


 


2/6/19 08:00 98.5 94 17 109/79 (89) 100   


 


2/6/19 08:00      Mechanical Ventilator  


 


2/6/19 08:00  95      


 


2/6/19 08:00        40


 


2/6/19 07:23  93 16     40


 


2/6/19 07:04 99.0       


 


2/6/19 05:06  95 15     40


 


2/6/19 04:00        40


 


2/6/19 04:00      Mechanical Ventilator  


 


2/6/19 04:00  95      


 


2/6/19 04:00 99.2 95 16 123/87 (99) 100   


 


2/6/19 03:08  103 17     40


 


2/6/19 01:03  97 16     40


 


2/6/19 00:00      Mechanical Ventilator  


 


2/6/19 00:00  105      


 


2/6/19 00:00 99.7 97 18 121/76 (91) 100   


 


2/5/19 23:18  100 16     40


 


2/5/19 21:38  100 16     40


 


2/5/19 20:00        40


 


2/5/19 20:00 100.0 103 16 130/88 (102) 100   


 


2/5/19 20:00  106      


 


2/5/19 20:00      Mechanical Ventilator  


 


2/5/19 18:56  105 17     40


 


2/5/19 17:19  102 16     40


 


2/5/19 16:00  104      


 


2/5/19 16:00        40


 


2/5/19 16:00      Mechanical Ventilator  


 


2/5/19 16:00 98.8 104 16 115/81 (92) 100   


 


2/5/19 15:02  105 17     40


 


2/5/19 13:27  102 17     40








Height (Feet):  5


Height (Inches):  7.00


Weight (Pounds):  197


Objective





Gen:  NAD, No responsive , On vent satting well


HEENT: NCAT, MMM, trached


LUNGS:  CTAB, No W


CARDS: RRR, S1, S2, No M


ABD: Soft, Obese, NT, ND, + BS,PEG and Colostomy (No E/P)





Laboratory Tests








Test


  2/5/19


18:30 2/6/19


03:16


 


Total Protein (PEP)


  8.1 g/dL


(6.0-8.5) 


 


 


Albumin (PEP)


  3.3 g/dL


(2.9-4.4) 


 


 


Globulin (PEP)


  4.8 g/dL


(2.2-3.9)  H 


 


 


Albumin/Globulin Ratio


  0.7 (0.7-1.7)  


  0.5 (1.0-2.7)


L


 


Alpha-1-Globulins


  0.3 g/dL


(0.0-0.4) 


 


 


Alpha-2-Globulins


  1.4 g/dL


(0.4-1.0)  H 


 


 


Beta Globulins


  1.4 g/dL


(0.7-1.3)  H 


 


 


Beta Gamma Globulin


  1.7 g/dL


(0.4-1.8) 


 


 


PEP Abnormal Protein Bands


  Not observed


g/dL (Not 


 


 


Protein Electrophoresis


Interpret Comment (.)  


  


 


 


White Blood Count


  


  16.6 K/UL


(4.8-10.8)  H


 


Red Blood Count


  


  4.13 M/UL


(4.70-6.10)  L


 


Hemoglobin


  


  12.1 G/DL


(14.2-18.0)  L


 


Hematocrit


  


  38.0 %


(42.0-52.0)  L


 


Mean Corpuscular Volume  92 FL (80-99)  


 


Mean Corpuscular Hemoglobin


  


  29.3 PG


(27.0-31.0)


 


Mean Corpuscular Hemoglobin


Concent 


  31.8 G/DL


(32.0-36.0)  L


 


Red Cell Distribution Width


  


  16.6 %


(11.6-14.8)  H


 


Platelet Count


  


  292 K/UL


(150-450)


 


Mean Platelet Volume


  


  9.7 FL


(6.5-10.1)


 


Neutrophils (%) (Auto)


  


  65.6 %


(45.0-75.0)


 


Lymphocytes (%) (Auto)


  


  17.3 %


(20.0-45.0)  L


 


Monocytes (%) (Auto)


  


  4.7 %


(1.0-10.0)


 


Eosinophils (%) (Auto)


  


  12.0 %


(0.0-3.0)  H


 


Basophils (%) (Auto)


  


  0.4 %


(0.0-2.0)


 


Sodium Level


  


  157 MMOL/L


(136-145)  H


 


Potassium Level


  


  3.7 MMOL/L


(3.5-5.1)


 


Chloride Level


  


  121 MMOL/L


()  H


 


Carbon Dioxide Level


  


  20 MMOL/L


(21-32)  L


 


Anion Gap


  


  16 mmol/L


(5-15)  H


 


Blood Urea Nitrogen


  


  34 mg/dL


(7-18)  H


 


Creatinine


  


  2.1 MG/DL


(0.55-1.30)  H


 


Estimat Glomerular Filtration


Rate 


  40.6 mL/min


(>60)


 


Glucose Level


  


  201 MG/DL


()  H


 


Calcium Level


  


  10.2 MG/DL


(8.5-10.1)  H


 


Phosphorus Level


  


  2.4 MG/DL


(2.5-4.9)  L


 


Magnesium Level


  


  2.5 MG/DL


(1.8-2.4)  H


 


Total Bilirubin


  


  0.9 MG/DL


(0.2-1.0)


 


Aspartate Amino Transf


(AST/SGOT) 


  68 U/L (15-37)


H


 


Alanine Aminotransferase


(ALT/SGPT) 


  54 U/L (12-78)


 


 


Alkaline Phosphatase


  


  101 U/L


()


 


Total Protein


  


  8.7 G/DL


(6.4-8.2)  H


 


Albumin


  


  2.8 G/DL


(3.4-5.0)  L


 


Globulin  5.9 g/dL  











Current Medications








 Medications


  (Trade)  Dose


 Ordered  Sig/Jan


 Route


 PRN Reason  Start Time


 Stop Time Status Last Admin


Dose Admin


 


 Acetaminophen


  (Tylenol)  650 mg  Q4H  PRN


 GT


 Mild Pain/Temp > 100.5  1/28/19 08:15


 2/27/19 08:14  2/6/19 06:34


 


 


 Artificial Tears


  (Akwa-Tears)  1 drop  Q4H  PRN


 BOTH EYES


 Dry Eyes  1/28/19 07:45


 2/27/19 07:44  2/5/19 08:59


 


 


 Baclofen


  (Lioresal)  10 mg  EVERY 8  HOURS


 GT


   1/28/19 14:00


 2/27/19 13:59  2/6/19 06:34


 


 


 Cefepime HCl 2 gm/


 Dextrose  55 ml @ 


 110 mls/hr  Q24H


 IVPB


   2/4/19 13:00


 2/11/19 12:59  2/5/19 13:15


 


 


 Dextrose


  (Dextrose 50%)  25 ml  Q30M  PRN


 IV


 Hypoglycemia  1/28/19 07:45


 2/27/19 07:44   


 


 


 Dextrose


  (Dextrose 50%)  50 ml  Q30M  PRN


 IV


 Hypoglycemia  1/28/19 07:45


 2/27/19 07:44   


 


 


 Heparin Sodium


  (Porcine)


  (Heparin 5000


 units/ml)  5,000 units  EVERY 12  HOURS


 SUBQ


   1/28/19 09:00


 2/27/19 08:59  2/6/19 09:22


 


 


 Insulin Aspart


  (NovoLOG)    EVERY 4  HOURS


 SUBQ


   1/30/19 13:00


 2/27/19 11:59  2/6/19 09:21


 


 


 Insulin Detemir


  (Levemir)  15 units  Q12HR


 SUBQ


   1/29/19 00:00


 2/28/19 00:00  2/6/19 10:08


 


 


 Lansoprazole


  (Prevacid)  30 mg  DAILY


 GT


   1/31/19 09:00


 3/2/19 08:59  2/6/19 09:18


 


 


 Levetiracetam


  (Keppra)  1,000 mg  EVERY 8  HOURS


 GT


   1/28/19 14:00


 2/27/19 13:59  2/6/19 06:35


 


 


 Nystatin


  (Nystatin Cr)  1 applic  EVERY 12  HOURS


 TOPIC


   1/28/19 09:00


 2/27/19 08:59  2/6/19 09:18


 


 


 Polyethylene


 Glycol


  (Miralax)  17 gm  DAILYPRN  PRN


 GT


 Constipation  1/28/19 07:45


 2/27/19 07:44   


 


 


 Silver


 Sulfadiazine


  (Silvadene Cream


 25gm)  1 applic  BEDTIME


 TOPIC


   2/2/19 21:00


 2/27/19 08:59  2/5/19 21:31


 


 


 Sitagliptin


 Phosphate


  (Januvia)  50 mg  DAILY


 GT


   1/28/19 09:00


 2/27/19 08:59  2/6/19 09:18


 


 


 Vitamin D


  (Vitamin D)  1,000 intlu  DAILY


 GT


   1/28/19 09:00


 2/27/19 08:59  2/6/19 09:18


 

















Robert Williamson MD Feb 6, 2019 12:19

## 2019-02-06 NOTE — NUR
NURSE NOTES:

Dr. Bloom visited and assessed Pt and new order received. Will continue to monitor any 
change of condition.

## 2019-02-06 NOTE — NUR
NURSE NOTES:

Report received from JOSHUA Nesbitt. Observed patient in bed sleeping. Obtunded and non-verbal. On 
ventilator with previous setting and pt. is tolerated well with ventilator. GT site intact 
with ongoing feeding. HOB elevated. Condom catheter intact and draining well. Mid-line 
intact and patent. Bed in lowest position. Call light within reach. Will continue to 
monitor.

## 2019-02-06 NOTE — NUR
NURSE NOTES:

Received Pt is resting on the bed and obtunded. Trach to Vent dependent setting with Ac: 16, 
T; 600, P:5, and FiO2 40% and SaO2 100%. BT : 99.5F noted. Given tracheal and oral suction. 
Provided oral care. Applied cooling measure. On G-tube feeding with Glucerna 1.5 @ 40cc/hr. 
No residual noted. Zhao cath out and applied new condom cath. Dressing is clean and dry on 
wound area. Changed position. Rt. upper arm mid line area dressing is intact and patent. 
Placed fall and seizure precaution. Will continue to care plan.

## 2019-02-06 NOTE — NUR
RESPIRATORY NOTE:



Patient received mechanically ventilated on  with current ordered vent settings. 
Patient has trach size 7.0 Portex cuffed that is secured with trach tie and guard. Patient 
presents with bilateral coarse breath sounds and moderated amount of thick yellow secretions 
were suctioned without incident. There is an ambu bag available at the bedside and the vent 
is connected to a red outlet. Vent alarms are functional and audible. Will continue to 
monitor.

## 2019-02-07 VITALS — DIASTOLIC BLOOD PRESSURE: 78 MMHG | SYSTOLIC BLOOD PRESSURE: 129 MMHG

## 2019-02-07 VITALS — DIASTOLIC BLOOD PRESSURE: 76 MMHG | SYSTOLIC BLOOD PRESSURE: 119 MMHG

## 2019-02-07 VITALS — DIASTOLIC BLOOD PRESSURE: 77 MMHG | SYSTOLIC BLOOD PRESSURE: 104 MMHG

## 2019-02-07 VITALS — SYSTOLIC BLOOD PRESSURE: 102 MMHG | DIASTOLIC BLOOD PRESSURE: 77 MMHG

## 2019-02-07 VITALS — SYSTOLIC BLOOD PRESSURE: 121 MMHG | DIASTOLIC BLOOD PRESSURE: 80 MMHG

## 2019-02-07 VITALS — SYSTOLIC BLOOD PRESSURE: 119 MMHG | DIASTOLIC BLOOD PRESSURE: 78 MMHG

## 2019-02-07 LAB
ADD MANUAL DIFF: YES
ANION GAP SERPL CALC-SCNC: 16 MMOL/L (ref 5–15)
BUN SERPL-MCNC: 34 MG/DL (ref 7–18)
CALCIUM SERPL-MCNC: 9.8 MG/DL (ref 8.5–10.1)
CHLORIDE SERPL-SCNC: 119 MMOL/L (ref 98–107)
CO2 SERPL-SCNC: 18 MMOL/L (ref 21–32)
CREAT SERPL-MCNC: 2.1 MG/DL (ref 0.55–1.3)
ERYTHROCYTE [DISTWIDTH] IN BLOOD BY AUTOMATED COUNT: 16.9 % (ref 11.6–14.8)
HCT VFR BLD CALC: 38.2 % (ref 42–52)
HGB BLD-MCNC: 12.4 G/DL (ref 14.2–18)
MCV RBC AUTO: 93 FL (ref 80–99)
PLATELET # BLD: 282 K/UL (ref 150–450)
POTASSIUM SERPL-SCNC: 3.9 MMOL/L (ref 3.5–5.1)
RBC # BLD AUTO: 4.13 M/UL (ref 4.7–6.1)
SODIUM SERPL-SCNC: 153 MMOL/L (ref 136–145)
WBC # BLD AUTO: 19.7 K/UL (ref 4.8–10.8)

## 2019-02-07 RX ADMIN — INSULIN ASPART SCH UNITS: 100 INJECTION, SOLUTION INTRAVENOUS; SUBCUTANEOUS at 21:17

## 2019-02-07 RX ADMIN — SITAGLIPTIN SCH MG: 50 TABLET, FILM COATED ORAL at 08:31

## 2019-02-07 RX ADMIN — INSULIN DETEMIR SCH UNITS: 100 INJECTION, SOLUTION SUBCUTANEOUS at 08:33

## 2019-02-07 RX ADMIN — HEPARIN SODIUM SCH UNITS: 5000 INJECTION INTRAVENOUS; SUBCUTANEOUS at 08:33

## 2019-02-07 RX ADMIN — INSULIN ASPART SCH UNITS: 100 INJECTION, SOLUTION INTRAVENOUS; SUBCUTANEOUS at 08:33

## 2019-02-07 RX ADMIN — VITAMIN D, TAB 1000IU (100/BT) SCH INTLU: 25 TAB at 08:31

## 2019-02-07 RX ADMIN — INSULIN ASPART SCH UNITS: 100 INJECTION, SOLUTION INTRAVENOUS; SUBCUTANEOUS at 13:16

## 2019-02-07 RX ADMIN — INSULIN ASPART SCH UNITS: 100 INJECTION, SOLUTION INTRAVENOUS; SUBCUTANEOUS at 17:28

## 2019-02-07 RX ADMIN — INSULIN ASPART SCH UNITS: 100 INJECTION, SOLUTION INTRAVENOUS; SUBCUTANEOUS at 01:02

## 2019-02-07 RX ADMIN — LEVETIRACETAM SCH MG: 100 SOLUTION ORAL at 05:49

## 2019-02-07 RX ADMIN — INSULIN DETEMIR SCH UNITS: 100 INJECTION, SOLUTION SUBCUTANEOUS at 21:17

## 2019-02-07 RX ADMIN — LEVETIRACETAM SCH MG: 100 SOLUTION ORAL at 13:15

## 2019-02-07 RX ADMIN — HEPARIN SODIUM SCH UNITS: 5000 INJECTION INTRAVENOUS; SUBCUTANEOUS at 21:18

## 2019-02-07 RX ADMIN — LEVETIRACETAM SCH MG: 100 SOLUTION ORAL at 21:57

## 2019-02-07 RX ADMIN — INSULIN ASPART SCH UNITS: 100 INJECTION, SOLUTION INTRAVENOUS; SUBCUTANEOUS at 05:03

## 2019-02-07 RX ADMIN — MEROPENEM SCH MLS/HR: 1 INJECTION INTRAVENOUS at 09:55

## 2019-02-07 RX ADMIN — MEROPENEM SCH MLS/HR: 1 INJECTION INTRAVENOUS at 21:18

## 2019-02-07 RX ADMIN — CHLORHEXIDINE GLUCONATE SCH APPLIC: 213 SOLUTION TOPICAL at 19:59

## 2019-02-07 NOTE — NUR
HAND-OFF: 

Report given to JOSHUA Newsome. Pt is sleeping on the bed and tolerated well with current Vent 
setting. No sign of acute distress noted.

## 2019-02-07 NOTE — PULMONOLGY CRITICAL CARE NOTE
Critical Care - Asmt/Plan


Problems:  


(1) Acute on chronic respiratory failure


(2) Sepsis


(3) Fever


(4) Gastrostomy tube dependent


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


(6) Vegetative state


(7) Diabetes mellitus


Respiratory:  monitor respiratory rate, adjust FIO2, CXR


Cardiac:  continue to monitor HR/BP


Renal:  F/U I&O, keep IV fluid


Infectious Disease:  check cultures, continue antibiotics


Gastrointestinal:  continue feedings/current rate


Endocrine:  check TSH


Hematologic:  monitor H/H, transfuse if hgb<8.5


Neurologic:  PRN Ativan, keep patient comfortable


Prophylaxis:  Heparin


Disposition:  keep in ICU


Time Spent (Minutes):  40


Notes Reviewed:  cardio


Discussed with:  nurses, consultants, 





Critical Care - Objective





Last 24 Hour Vital Signs








  Date Time  Temp Pulse Resp B/P (MAP) Pulse Ox O2 Delivery O2 Flow Rate FiO2


 


2/7/19 08:00 97.8 100 16 129/78 (95) 100   


 


2/7/19 08:00      Mechanical Ventilator  


 


2/7/19 08:00        40


 


2/7/19 08:00  99      


 


2/7/19 06:55  101 16     40


 


2/7/19 05:27  101 16     40


 


2/7/19 04:00 99.0 104 19 119/76 (90) 100   


 


2/7/19 04:00      Mechanical Ventilator  


 


2/7/19 04:00        40


 


2/7/19 04:00  108      


 


2/7/19 03:12  109 16     40


 


2/7/19 01:30  112 16     40


 


2/7/19 00:00      Mechanical Ventilator  


 


2/7/19 00:00        40


 


2/7/19 00:00 98.8 111 19 121/80 (94) 100   


 


2/7/19 00:00  111      


 


2/6/19 23:15  111 16     40


 


2/6/19 23:15  111 16   Mechanical Ventilator  


 


2/6/19 22:02  103 16     40


 


2/6/19 20:00        40


 


2/6/19 20:00      Mechanical Ventilator  


 


2/6/19 20:00  104      


 


2/6/19 20:00 99.5 106 19 120/74 (89) 100   


 


2/6/19 19:26  101 16     40


 


2/6/19 17:09 99.1       


 


2/6/19 16:31  104 18     40


 


2/6/19 16:00      Mechanical Ventilator  


 


2/6/19 16:00  106      


 


2/6/19 16:00        40


 


2/6/19 16:00 100.8 113 19 120/88 (99) 100   


 


2/6/19 14:46  102 17     40


 


2/6/19 13:07  101 17     40


 


2/6/19 12:00 99.5 100 17 115/82 (93) 100   


 


2/6/19 12:00        40


 


2/6/19 12:00      Mechanical Ventilator  


 


2/6/19 12:00  103      


 


2/6/19 10:59  99 16     40








Status:  obtunded


Condition:  critical


HEENT:  atraumatic


Lungs:  rhonchi


Heart:  HR/BP stable


Abdomen:  soft, non-tender, feeding tube


Extremities:  edema


Decubiti:  location


Accucheck:  213





Critical Care - Subjective


ROS Limited/Unobtainable:  No


Condition:  critical


EKG Rhythm:  Sinus Rhythm


FI02:  40


Vent Support Breath Rate:  16


Vent Support Mode:  AC


Vent Tidal Volume:  600


Sputum Amount:  Small


PEEP:  5.0


PIP:  29


Tube Feeding Amount:  40


I&O:











Intake and Output  


 


 2/6/19 2/7/19





 19:00 07:00


 


Intake Total 795 ml 1380 ml


 


Output Total 650 ml 700 ml


 


Balance 145 ml 680 ml


 


  


 


Free Water 260 ml 200 ml


 


IV Total 55 ml 700 ml


 


Tube Feeding 480 ml 480 ml


 


Output Urine Total 350 ml 400 ml


 


Stool Total 300 ml 300 ml








CXR:


no change


Labs:





Laboratory Tests








Test


  2/7/19


03:40


 


White Blood Count


  19.7 K/UL


(4.8-10.8)  H


 


Red Blood Count


  4.13 M/UL


(4.70-6.10)  L


 


Hemoglobin


  12.4 G/DL


(14.2-18.0)  L


 


Hematocrit


  38.2 %


(42.0-52.0)  L


 


Mean Corpuscular Volume 93 FL (80-99)  


 


Mean Corpuscular Hemoglobin


  30.1 PG


(27.0-31.0)


 


Mean Corpuscular Hemoglobin


Concent 32.5 G/DL


(32.0-36.0)


 


Red Cell Distribution Width


  16.9 %


(11.6-14.8)  H


 


Platelet Count


  282 K/UL


(150-450)


 


Mean Platelet Volume


  9.5 FL


(6.5-10.1)


 


Neutrophils (%) (Auto)


  % (45.0-75.0)


 


 


Lymphocytes (%) (Auto)


  % (20.0-45.0)


 


 


Monocytes (%) (Auto)  % (1.0-10.0)  


 


Eosinophils (%) (Auto)  % (0.0-3.0)  


 


Basophils (%) (Auto)  % (0.0-2.0)  


 


Differential Total Cells


Counted 100  


 


 


Neutrophils % (Manual) 70 % (45-75)  


 


Lymphocytes % (Manual) 13 % (20-45)  L


 


Monocytes % (Manual) 3 % (1-10)  


 


Eosinophils % (Manual) 9 % (0-3)  H


 


Myelocytes % 3 % (0-0)  H


 


Band Neutrophils 2 % (0-8)  


 


Nucleated Red Blood Cells 1 /100 WBC  


 


Platelet Estimate Pending  


 


Platelet Morphology Normal  


 


Anisocytosis 1+  


 


Sodium Level


  153 MMOL/L


(136-145)  H


 


Potassium Level


  3.9 MMOL/L


(3.5-5.1)


 


Chloride Level


  119 MMOL/L


()  H


 


Carbon Dioxide Level


  18 MMOL/L


(21-32)  L


 


Anion Gap


  16 mmol/L


(5-15)  H


 


Blood Urea Nitrogen


  34 mg/dL


(7-18)  H


 


Creatinine


  2.1 MG/DL


(0.55-1.30)  H


 


Estimat Glomerular Filtration


Rate 40.6 mL/min


(>60)


 


Glucose Level


  226 MG/DL


()  H


 


Hemoglobin A1c


  7.8 %


(4.3-6.0)  H


 


Calcium Level


  9.8 MG/DL


(8.5-10.1)

















Yury Pena MD Feb 7, 2019 10:19

## 2019-02-07 NOTE — NUR
NURSE NOTES:

Report received from JOSHUA Pinto. Observed patient in bed sleeping. Obtunded and non-verbal. 
On ventilator with previous setting and tolerated well with no distress noted. GT site 
intact with ongoing feeding. HOB elevated. No residual noted. Condom cath intact and 
draining well. Mid-line intact and patent. Bed in lowest position. Call light within reach. 
Will continue to monitor.

## 2019-02-07 NOTE — INFECTIOUS DISEASES PROG NOTE
Assessment/Plan


Assessment/Plan


Mr. Black is a 52 yo male with PMHx of chronic resp failure - vent/trach 

dependent, ICH, COPD, Dm2, seizure disoder, BPH, DM2,  dysphagia, G tube, 

colostomy who was admitted to the hospital on 1/27/19 with fever, hypotension 

and tachycardia. He was found to have P.a. UTI. He has been on zosyn. His 

inital leukocytosis has been improving and his fevers have resoved. 





UTI complicated


   UA (+)


   Indewlling mayorga


   UCx 1/27/19 P.a.  (R Levo, otherwise S)


   UCx 2/2/19 - Enterobacter and yeast and Pa


   CT abd 1/29/19 3 mm distal right ureteral calculus, minimal resultant 

hydronephrosis. Atrophic left kidney, containing a large staghorn calculus and 

multiple intrarenal calyceal calculi, previously described





Leukocytosis 15 on admit;  improved


Aebrile to 102; resolved





   -2/2 CXR:  Hypoventilatory lungs.  Left lung base atelectasis.





Mild pancreatitis  





Chronic resp failure - vent/trach dependent


ICH


COPD


DM


Seizure disorder


BPH


Dysphagia, G tube


Colostomy  





 Plan 





- Start Merpenem and flucoazole 








Repeat blood Cx





         - 2/7/19 SP Ccfepeim #4


         - 2/4/19 SP  Zosyn # 7


        - 2/1/19 SP Vanocmcyin #4- No evidence for MRSA





- Monitor CBC and temps





We will continue to follow the patient during this hospitalization.





Subjective


Allergies:  


Coded Allergies:  


     AZTREONAM (Verified  Allergy, Unknown, 7/23/18)


Subjective


Afebrile


Leukocytosis increasing





Objective


Vital Signs





Last 24 Hour Vital Signs








  Date Time  Temp Pulse Resp B/P (MAP) Pulse Ox O2 Delivery O2 Flow Rate FiO2


 


2/7/19 08:00      Mechanical Ventilator  


 


2/7/19 08:00        40


 


2/7/19 06:55  101 16     40


 


2/7/19 05:27  101 16     40


 


2/7/19 04:00 99.0 104 19 119/76 (90) 100   


 


2/7/19 04:00      Mechanical Ventilator  


 


2/7/19 04:00        40


 


2/7/19 04:00  108      


 


2/7/19 03:12  109 16     40


 


2/7/19 01:30  112 16     40


 


2/7/19 00:00      Mechanical Ventilator  


 


2/7/19 00:00        40


 


2/7/19 00:00 98.8 111 19 121/80 (94) 100   


 


2/7/19 00:00  111      


 


2/6/19 23:15  111 16     40


 


2/6/19 23:15  111 16   Mechanical Ventilator  


 


2/6/19 22:02  103 16     40


 


2/6/19 20:00        40


 


2/6/19 20:00      Mechanical Ventilator  


 


2/6/19 20:00  104      


 


2/6/19 20:00 99.5 106 19 120/74 (89) 100   


 


2/6/19 19:26  101 16     40


 


2/6/19 17:09 99.1       


 


2/6/19 16:31  104 18     40


 


2/6/19 16:00      Mechanical Ventilator  


 


2/6/19 16:00  106      


 


2/6/19 16:00        40


 


2/6/19 16:00 100.8 113 19 120/88 (99) 100   


 


2/6/19 14:46  102 17     40


 


2/6/19 13:07  101 17     40


 


2/6/19 12:00 99.5 100 17 115/82 (93) 100   


 


2/6/19 12:00        40


 


2/6/19 12:00      Mechanical Ventilator  


 


2/6/19 12:00  103      


 


2/6/19 10:59  99 16     40








Height (Feet):  5


Height (Inches):  7.00


Weight (Pounds):  197


Objective





Gen:  NAD, No responsive , On vent satting well


HEENT: NCAT, MMM, trached


ABD: Soft, Obese, NT, ND, + BS,PEG and Colostomy (No E/P)


Skin: Sacral ulcers - Not infected stage 2





Laboratory Tests








Test


  2/7/19


03:40


 


White Blood Count


  19.7 K/UL


(4.8-10.8)  H


 


Red Blood Count


  4.13 M/UL


(4.70-6.10)  L


 


Hemoglobin


  12.4 G/DL


(14.2-18.0)  L


 


Hematocrit


  38.2 %


(42.0-52.0)  L


 


Mean Corpuscular Volume 93 FL (80-99)  


 


Mean Corpuscular Hemoglobin


  30.1 PG


(27.0-31.0)


 


Mean Corpuscular Hemoglobin


Concent 32.5 G/DL


(32.0-36.0)


 


Red Cell Distribution Width


  16.9 %


(11.6-14.8)  H


 


Platelet Count


  282 K/UL


(150-450)


 


Mean Platelet Volume


  9.5 FL


(6.5-10.1)


 


Neutrophils (%) (Auto)


  % (45.0-75.0)


 


 


Lymphocytes (%) (Auto)


  % (20.0-45.0)


 


 


Monocytes (%) (Auto)  % (1.0-10.0)  


 


Eosinophils (%) (Auto)  % (0.0-3.0)  


 


Basophils (%) (Auto)  % (0.0-2.0)  


 


Neutrophils % (Manual) Pending  


 


Lymphocytes % (Manual) Pending  


 


Platelet Estimate Pending  


 


Platelet Morphology Pending  


 


Sodium Level


  153 MMOL/L


(136-145)  H


 


Potassium Level


  3.9 MMOL/L


(3.5-5.1)


 


Chloride Level


  119 MMOL/L


()  H


 


Carbon Dioxide Level


  18 MMOL/L


(21-32)  L


 


Anion Gap


  16 mmol/L


(5-15)  H


 


Blood Urea Nitrogen


  34 mg/dL


(7-18)  H


 


Creatinine


  2.1 MG/DL


(0.55-1.30)  H


 


Estimat Glomerular Filtration


Rate 40.6 mL/min


(>60)


 


Glucose Level


  226 MG/DL


()  H


 


Hemoglobin A1c


  7.8 %


(4.3-6.0)  H


 


Calcium Level


  9.8 MG/DL


(8.5-10.1)











Current Medications








 Medications


  (Trade)  Dose


 Ordered  Sig/Jan


 Route


 PRN Reason  Start Time


 Stop Time Status Last Admin


Dose Admin


 


 Acetaminophen


  (Tylenol)  650 mg  Q4H  PRN


 GT


 Mild Pain/Temp > 100.5  1/28/19 08:15


 2/27/19 08:14  2/6/19 16:39


 


 


 Artificial Tears


  (Akwa-Tears)  1 drop  Q4H  PRN


 BOTH EYES


 Dry Eyes  1/28/19 07:45


 2/27/19 07:44  2/5/19 08:59


 


 


 Baclofen


  (Lioresal)  10 mg  EVERY 8  HOURS


 GT


   1/28/19 14:00


 2/27/19 13:59  2/7/19 05:46


 


 


 Cefepime HCl 2 gm/


 Dextrose  55 ml @ 


 110 mls/hr  Q24H


 IVPB


   2/4/19 13:00


 2/11/19 12:59  2/6/19 13:34


 


 


 Chlorhexidine


 Gluconate


  (Elaine-Hex 2%)  1 applic  DAILY@2000


 TOPIC


   2/6/19 22:45


 3/9/19 19:59  2/6/19 22:49


 


 


 Dextrose  1,000 ml @ 


 100 mls/hr  Q10H


 IV


   2/7/19 00:00


 3/9/19 00:00  2/7/19 00:01


 


 


 Dextrose


  (Dextrose 50%)  25 ml  Q30M  PRN


 IV


 Hypoglycemia  1/28/19 07:45


 2/27/19 07:44   


 


 


 Dextrose


  (Dextrose 50%)  50 ml  Q30M  PRN


 IV


 Hypoglycemia  1/28/19 07:45


 2/27/19 07:44   


 


 


 Heparin Sodium


  (Porcine)


  (Heparin 5000


 units/ml)  5,000 units  EVERY 12  HOURS


 SUBQ


   1/28/19 09:00


 2/27/19 08:59  2/7/19 08:33


 


 


 Insulin Aspart


  (NovoLOG)    EVERY 4  HOURS


 SUBQ


   1/30/19 13:00


 2/27/19 11:59  2/7/19 08:33


 


 


 Insulin Detemir


  (Levemir)  15 units  Q12HR


 SUBQ


   1/29/19 00:00


 2/28/19 00:00  2/7/19 08:33


 


 


 Lansoprazole


  (Prevacid)  30 mg  DAILY


 GT


   1/31/19 09:00


 3/2/19 08:59  2/7/19 08:31


 


 


 Levetiracetam


  (Keppra)  1,000 mg  EVERY 8  HOURS


 GT


   1/28/19 14:00


 2/27/19 13:59  2/7/19 05:49


 


 


 Nystatin


  (Nystatin Cr)  1 applic  EVERY 12  HOURS


 TOPIC


   1/28/19 09:00


 2/27/19 08:59  2/7/19 08:31


 


 


 Polyethylene


 Glycol


  (Miralax)  17 gm  DAILYPRN  PRN


 GT


 Constipation  1/28/19 07:45


 2/27/19 07:44   


 


 


 Silver


 Sulfadiazine


  (Silvadene Cream


 25gm)  1 applic  BEDTIME


 TOPIC


   2/2/19 21:00


 2/27/19 08:59  2/6/19 21:31


 


 


 Sitagliptin


 Phosphate


  (Januvia)  50 mg  DAILY


 GT


   1/28/19 09:00


 2/27/19 08:59  2/7/19 08:31


 


 


 Vitamin D


  (Vitamin D)  1,000 intlu  DAILY


 GT


   1/28/19 09:00


 2/27/19 08:59  2/7/19 08:31


 

















Robert Williamson MD Feb 7, 2019 08:55

## 2019-02-07 NOTE — DIAGNOSTIC IMAGING REPORT
Indication: Dyspnea

 

Comparison:  2/2/2019

 

A single view chest radiograph was obtained.

 

Findings:

 

There is blunting of the left costophrenic angle which is probably due to pleural

scarring or thickening. This was seen previously. Heart is mildly enlarged.

Tracheostomy noted.

 

IMPRESSION:

 

No acute findings

## 2019-02-07 NOTE — GENERAL PROGRESS NOTE
Assessment/Plan


Assessment/Plan





Assessment/Recommendations


# Leukocytosis. Likely related to underlying infection versus reactive process. 


--> Peripheral has been reviewed


--> Medications have been reviewed 


--> Imaging has been reviewed


 -->Blood cultures and urine cultures prn


--> has been started on abx, empiric treatment 


--> wbc trend: 14-->11-->9-->12->13


# Anemia of chronic disease


--> w/u has been reviewed from prior hospitalizations


# Acute pancreatitis,   status post cerebral hemorrhage with severe 

encephalopathy.


# Acute on chronic respiratory failure


--> ventilator dependent and fed via gastrostomy tube


--> Continue with albuterol sulfate and ipratropium


# Diabetes mellitus


-->Humalog insulin  with sliding scale every six hours


# Sepsis


# Gastrostomy tube dependent


# Colostomy in place


# Vegetative state





The timing of this note does not necessarily reflect the time of the patient 

was seen





Greatly appreciate consultation!





Subjective


Constitutional:  Denies: no symptoms, chills, diaphoresis, fever, malaise, 

weakness, other


HEENT:  Denies: no symptoms, eye pain, blurred vision, tearing, double vision, 

ear pain, ear discharge, nose pain, nose congestion, throat pain, throat 

swelling, mouth pain, mouth swelling, other


Cardiovascular:  Denies: no symptoms, chest pain, edema, irregular heart rate, 

lightheadedness, palpitations, syncope, other


Respiratory:  Denies: no symptoms, cough, orthopnea, shortness of breath, SOB 

with excertion, SOB at rest, sputum, stridor, wheezing, other


Gastrointestinal/Abdominal:  Denies: no symptoms, abdomen distended, abdominal 

pain, black stools, tarry stools, blood in stool, constipated, diarrhea, 

difficulty swallowing, nausea, poor appetite, poor fluid intake, rectal bleeding

, vomiting, other


Genitourinary:  Denies: no symptoms, burning, discharge, frequency, flank pain, 

hematuria, incontinence, pain, urgency, other


Neurologic/Psychiatric:  Denies: no symptoms, anxiety, depressed, emotional 

problems, headache, numbness, paresthesia, pre-existing deficit, seizure, 

tingling, tremors, weakness, other


Endocrine:  Denies: no symptoms, excessive sweating, flushing, intolerance to 

cold, intolerance to heat, increased hunger, increased thirst, increased urine, 

unexplained weight gain, unexplained weight loss, other


Hematologic/Lymphatic:  Denies: no symptoms, anemia, easy bleeding, easy 

bruising, other


Allergies:  


Coded Allergies:  


     AZTREONAM (Verified  Allergy, Unknown, 7/23/18)


Subjective


2/1: Pt is seen in the room,on vent, G tube, leukocytosis has been improving 

and his fevers have resolved. 


2/3: on vent, resting in bed, wbc trending up, no fevers or chills,


2/4: The patient has low-grade fever with tachycardia, CXR reveals small left 

pleural effusion.


2/5: no events, labs reviewed, h/h appers stable at this time, imaging reviewed


2/6: on vent, resting, wbc trending up, no overnight events reported.  


2/7: wbc trending up, 19 today, The patient remained febrile and tachycardic, 

but hemodynamically stable.





Objective





Last 24 Hour Vital Signs








  Date Time  Temp Pulse Resp B/P (MAP) Pulse Ox O2 Delivery O2 Flow Rate FiO2


 


2/7/19 16:00      Mechanical Ventilator  


 


2/7/19 16:00        40


 


2/7/19 15:20  101 16     40


 


2/7/19 12:50  100 16     40


 


2/7/19 12:00      Mechanical Ventilator  


 


2/7/19 12:00        40


 


2/7/19 12:00 99.7 102 16 119/78 (92) 100   


 


2/7/19 11:54  97      


 


2/7/19 10:55  99 16     40


 


2/7/19 08:56  101 16     40


 


2/7/19 08:00 97.8 100 16 129/78 (95) 100   


 


2/7/19 08:00      Mechanical Ventilator  


 


2/7/19 08:00        40


 


2/7/19 08:00  99      


 


2/7/19 06:55  101 16     40


 


2/7/19 05:27  101 16     40


 


2/7/19 04:00 99.0 104 19 119/76 (90) 100   


 


2/7/19 04:00      Mechanical Ventilator  


 


2/7/19 04:00        40


 


2/7/19 04:00  108      


 


2/7/19 03:12  109 16     40


 


2/7/19 01:30  112 16     40


 


2/7/19 00:00      Mechanical Ventilator  


 


2/7/19 00:00        40


 


2/7/19 00:00 98.8 111 19 121/80 (94) 100   


 


2/7/19 00:00  111      


 


2/6/19 23:15  111 16     40


 


2/6/19 23:15  111 16   Mechanical Ventilator  


 


2/6/19 22:02  103 16     40


 


2/6/19 20:00        40


 


2/6/19 20:00      Mechanical Ventilator  


 


2/6/19 20:00  104      


 


2/6/19 20:00 99.5 106 19 120/74 (89) 100   


 


2/6/19 19:26  101 16     40


 


2/6/19 17:09 99.1       

















Intake and Output  


 


 2/6/19 2/7/19





 19:00 07:00


 


Intake Total 795 ml 1380 ml


 


Output Total 650 ml 700 ml


 


Balance 145 ml 680 ml


 


  


 


Free Water 260 ml 200 ml


 


IV Total 55 ml 700 ml


 


Tube Feeding 480 ml 480 ml


 


Output Urine Total 350 ml 400 ml


 


Stool Total 300 ml 300 ml








Laboratory Tests


2/7/19 03:40: 


White Blood Count 19.7H, Red Blood Count 4.13L, Hemoglobin 12.4L, Hematocrit 

38.2L, Mean Corpuscular Volume 93, Mean Corpuscular Hemoglobin 30.1, Mean 

Corpuscular Hemoglobin Concent 32.5, Red Cell Distribution Width 16.9H, 

Platelet Count 282, Mean Platelet Volume 9.5, Neutrophils (%) (Auto) , 

Lymphocytes (%) (Auto) , Monocytes (%) (Auto) , Eosinophils (%) (Auto) , 

Basophils (%) (Auto) , Differential Total Cells Counted 100, Neutrophils % (

Manual) 70, Lymphocytes % (Manual) 13L, Monocytes % (Manual) 3, Eosinophils % (

Manual) 9H, Basophils % (Manual) 0, Myelocytes % 3H, Band Neutrophils 2, 

Nucleated Red Blood Cells 1, Platelet Estimate Adequate, Platelet Morphology 

Normal, Anisocytosis 1+, Sodium Level 153H, Potassium Level 3.9, Chloride Level 

119H, Carbon Dioxide Level 18L, Anion Gap 16H, Blood Urea Nitrogen 34H, 

Creatinine 2.1H, Estimat Glomerular Filtration Rate 40.6, Glucose Level 226H, 

Hemoglobin A1c 7.8H, Calcium Level 9.8


Height (Feet):  5


Height (Inches):  7.00


Weight (Pounds):  197


Objective


PHYSICAL EXAMINATION


General Appearance:  on vent      


HEENT:  normocephalic, atraumatic      


Neck:  non-tender, normal alignment      


Respiratory/Chest:  ++ VENT/trach   


Cardiovascular/Chest:  normal peripheral pulses, normal rate      


Abdomen:  normal bowel sounds ++ peg      


Extremities:  poorl range of motion











Tejas Gerber MD Feb 7, 2019 16:50

## 2019-02-07 NOTE — NUR
NURSE NOTES:



Received report from CORRINA Reyes RN. Patient is asleep in bed, obtunded. No s/s of acute 
distress noted at this time. Sinus rhythm on cardiac monitor. Trach-vent settings: Portex 7, 
AC 16, Vt 600, FiO2 40%, PEEP 5 and saturating well. Receiving Glucerna 1.5 @ 40 cc/hr via 
GT and tolerating well. Right colostomy bag noted and intact. Condom catheter in place and 
draining well to gravity. Right upper arm double lumen midline intact and patent running D5W 
@ 100 cc/hr. Bed locked in lowest position with padded sided rails up x 3. Call light left 
within reach. Will continue to monitor.

## 2019-02-07 NOTE — NUR
NURSE NOTES:received pt obtunded trache to vent on ac mode SR on the monitor, bp stable 
afebrile. Pt tolerating glucerna  1.5 1t 40 ml/hr, no residuals. HOB kept elevated  on 
aspiration precautions. Suctioned tk beige secretions lg in amt. oral care done. Condom cath 
accidentally out. Replaced condom and now draining moderate amt of christine yellow urine. Pt 
also has multiple pressure sores with drsg sry and intact (pls see pictures)- Turned q 2hrs 
prn with good skin care done. Pt on p200 mattress. Will continue to monitor.

## 2019-02-07 NOTE — PROGRESS NOTE
DATE:  02/06/2019

SUBJECTIVE:  The patient remained febrile and tachycardic, but

hemodynamically stable.



PHYSICAL EXAMINATION:

VITAL SIGNS:  Blood pressure 120/74, pulse 104, respirations 19, and

temperature 99.5.

HEENT:  Eyes were normal.  ENT, mucous membranes are moist and intact.

NECK:  Supple with no JVD without lymph nodes.  Tracheostomy site is

clean.

LUNGS:  Clear.

HEART:  Normal sounds with regular heart beats.  There are no S3, S4, or

pericardial rub.

ABDOMEN:  Soft and nontender with normal bowel sounds.  Gastrostomy site is

clean.

EXTREMITIES:  Warm without cyanosis, clubbing, or edema.



LABORATORY DATA:  Hemoglobin is 12.1, hematocrit 38.0, with MCV of 92, WBC

of 16.6, compared to day before yesterday at 17.1 on the 3rd and 12.2 on

the 2nd.  WBC of 16.6 and platelets 292.  His BUN and creatinine are 34

and 2.1 respectively, sodium is 157, potassium 3.7, chloride _____, CO2 is

20, calcium is 10.2, _____, magnesium is 2.5.  SGOT is 68.  Albumin is

2.8.



IMPRESSION AND PLAN:  The patient has progressively increasing _____.

Repeat laboratory tests will be done in the a.m.









  ______________________________________________

  Etienne Bloom M.D.





DR:  JT

D:  02/06/2019 23:47

T:  02/07/2019 04:47

JOB#:  [id number]

CC:

## 2019-02-08 VITALS — SYSTOLIC BLOOD PRESSURE: 108 MMHG | DIASTOLIC BLOOD PRESSURE: 75 MMHG

## 2019-02-08 VITALS — DIASTOLIC BLOOD PRESSURE: 78 MMHG | SYSTOLIC BLOOD PRESSURE: 113 MMHG

## 2019-02-08 VITALS — SYSTOLIC BLOOD PRESSURE: 121 MMHG | DIASTOLIC BLOOD PRESSURE: 81 MMHG

## 2019-02-08 VITALS — SYSTOLIC BLOOD PRESSURE: 107 MMHG | DIASTOLIC BLOOD PRESSURE: 65 MMHG

## 2019-02-08 VITALS — SYSTOLIC BLOOD PRESSURE: 111 MMHG | DIASTOLIC BLOOD PRESSURE: 74 MMHG

## 2019-02-08 VITALS — DIASTOLIC BLOOD PRESSURE: 81 MMHG | SYSTOLIC BLOOD PRESSURE: 112 MMHG

## 2019-02-08 LAB
ADD MANUAL DIFF: NO
ANION GAP SERPL CALC-SCNC: 13 MMOL/L (ref 5–15)
BASOPHILS NFR BLD AUTO: 0.9 % (ref 0–2)
BUN SERPL-MCNC: 29 MG/DL (ref 7–18)
CALCIUM SERPL-MCNC: 9.6 MG/DL (ref 8.5–10.1)
CHLORIDE SERPL-SCNC: 110 MMOL/L (ref 98–107)
CO2 SERPL-SCNC: 21 MMOL/L (ref 21–32)
CREAT SERPL-MCNC: 1.6 MG/DL (ref 0.55–1.3)
EOSINOPHIL NFR BLD AUTO: 9.6 % (ref 0–3)
ERYTHROCYTE [DISTWIDTH] IN BLOOD BY AUTOMATED COUNT: 16.8 % (ref 11.6–14.8)
HCT VFR BLD CALC: 37.7 % (ref 42–52)
HGB BLD-MCNC: 12.2 G/DL (ref 14.2–18)
LYMPHOCYTES NFR BLD AUTO: 13.7 % (ref 20–45)
MCV RBC AUTO: 93 FL (ref 80–99)
MONOCYTES NFR BLD AUTO: 3.7 % (ref 1–10)
NEUTROPHILS NFR BLD AUTO: 72.2 % (ref 45–75)
PLATELET # BLD: 245 K/UL (ref 150–450)
POTASSIUM SERPL-SCNC: 3.9 MMOL/L (ref 3.5–5.1)
RBC # BLD AUTO: 4.04 M/UL (ref 4.7–6.1)
SODIUM SERPL-SCNC: 144 MMOL/L (ref 136–145)
WBC # BLD AUTO: 16.4 K/UL (ref 4.8–10.8)

## 2019-02-08 RX ADMIN — HEPARIN SODIUM SCH UNITS: 5000 INJECTION INTRAVENOUS; SUBCUTANEOUS at 20:50

## 2019-02-08 RX ADMIN — LEVETIRACETAM SCH MG: 100 SOLUTION ORAL at 05:52

## 2019-02-08 RX ADMIN — INSULIN ASPART SCH UNITS: 100 INJECTION, SOLUTION INTRAVENOUS; SUBCUTANEOUS at 04:54

## 2019-02-08 RX ADMIN — INSULIN DETEMIR SCH UNITS: 100 INJECTION, SOLUTION SUBCUTANEOUS at 20:51

## 2019-02-08 RX ADMIN — INSULIN ASPART SCH UNITS: 100 INJECTION, SOLUTION INTRAVENOUS; SUBCUTANEOUS at 09:10

## 2019-02-08 RX ADMIN — LEVETIRACETAM SCH MG: 100 SOLUTION ORAL at 14:10

## 2019-02-08 RX ADMIN — INSULIN DETEMIR SCH UNITS: 100 INJECTION, SOLUTION SUBCUTANEOUS at 09:09

## 2019-02-08 RX ADMIN — MEROPENEM SCH MLS/HR: 1 INJECTION INTRAVENOUS at 20:48

## 2019-02-08 RX ADMIN — INSULIN ASPART SCH UNITS: 100 INJECTION, SOLUTION INTRAVENOUS; SUBCUTANEOUS at 01:17

## 2019-02-08 RX ADMIN — VITAMIN D, TAB 1000IU (100/BT) SCH INTLU: 25 TAB at 09:02

## 2019-02-08 RX ADMIN — SITAGLIPTIN SCH MG: 50 TABLET, FILM COATED ORAL at 09:02

## 2019-02-08 RX ADMIN — INSULIN ASPART SCH UNITS: 100 INJECTION, SOLUTION INTRAVENOUS; SUBCUTANEOUS at 17:27

## 2019-02-08 RX ADMIN — LEVETIRACETAM SCH MG: 100 SOLUTION ORAL at 21:51

## 2019-02-08 RX ADMIN — MEROPENEM SCH MLS/HR: 1 INJECTION INTRAVENOUS at 09:04

## 2019-02-08 RX ADMIN — INSULIN ASPART SCH UNITS: 100 INJECTION, SOLUTION INTRAVENOUS; SUBCUTANEOUS at 20:51

## 2019-02-08 RX ADMIN — INSULIN ASPART SCH UNITS: 100 INJECTION, SOLUTION INTRAVENOUS; SUBCUTANEOUS at 14:12

## 2019-02-08 RX ADMIN — HEPARIN SODIUM SCH UNITS: 5000 INJECTION INTRAVENOUS; SUBCUTANEOUS at 09:09

## 2019-02-08 NOTE — NUR
NURSE NOTES:

Report received from JOSHUA Gore. Observed pt lying on the bed. Pt is obtunded. SR with 
cardiac monitor. Trach to vent, portex6, AC 16, , FIO2 40%, PEEP 5. Gtube intact and 
running Glucerna 1.5 @ 40cc/hr. Colostomy bag on R abdomen and intact. Condom catheter in 
place and draining well. R UA midline intact and running D5 at 100cc/hr. Bed in the lowest 
position. Padded side rails up x3. Will continue to monitor.

## 2019-02-08 NOTE — GENERAL PROGRESS NOTE
Assessment/Plan


Assessment/Plan





Assessment/Recommendations


# Leukocytosis. Likely related to underlying infection versus reactive process. 


--> Peripheral has been reviewed


--> Medications have been reviewed 


--> Imaging has been reviewed


 -->Blood cultures and urine cultures prn


--> has been started on abx, empiric treatment 


--> wbc trend: 14-->11-->9-->12->13


# Anemia of chronic disease


--> w/u has been reviewed from prior hospitalizations


# Acute pancreatitis,   status post cerebral hemorrhage with severe 

encephalopathy.


# Acute on chronic respiratory failure


--> ventilator dependent and fed via gastrostomy tube


--> Continue with albuterol sulfate and ipratropium


# Diabetes mellitus


-->Humalog insulin  with sliding scale every six hours


# Sepsis


# Gastrostomy tube dependent


# Colostomy in place


# Vegetative state





The timing of this note does not necessarily reflect the time of the patient 

was seen





Greatly appreciate consultation!





Subjective


ROS Limited/Unobtainable:  Yes


Allergies:  


Coded Allergies:  


     AZTREONAM (Verified  Allergy, Unknown, 7/23/18)


Subjective


2/1: Pt is seen in the room,on vent, G tube, leukocytosis has been improving 

and his fevers have resolved. 


2/3: on vent, resting in bed, wbc trending up, no fevers or chills,


2/4: The patient has low-grade fever with tachycardia, CXR reveals small left 

pleural effusion.


2/5: no events, labs reviewed, h/h appers stable at this time, imaging reviewed


2/6: on vent, resting, wbc trending up, no overnight events reported.  


2/7: wbc trending up, 19 today, The patient remained febrile and tachycardic, 

but hemodynamically stable.


2/8: wbc trending down, remains afebrile, no acute distress reported.





Objective





Last 24 Hour Vital Signs








  Date Time  Temp Pulse Resp B/P (MAP) Pulse Ox O2 Delivery O2 Flow Rate FiO2


 


2/8/19 14:30  91 17     40


 


2/8/19 13:01  95 17     40


 


2/8/19 12:00      Mechanical Ventilator  


 


2/8/19 12:00        40


 


2/8/19 12:00 98.2 94 18 121/81 (94) 100   


 


2/8/19 11:49  97      


 


2/8/19 10:47  96 16     40


 


2/8/19 08:40  95 18     40


 


2/8/19 08:00 97.7 96 16 108/75 (86) 100   


 


2/8/19 08:00      Mechanical Ventilator  


 


2/8/19 08:00        40


 


2/8/19 07:54  97      


 


2/8/19 06:35  97 16     40


 


2/8/19 04:42  90 17     40


 


2/8/19 04:00      Mechanical Ventilator  


 


2/8/19 04:00 98.6 96 17 107/65 (79) 100   


 


2/8/19 04:00        40


 


2/8/19 03:41  102      


 


2/8/19 03:30  98 16     40


 


2/8/19 01:40  99 16     40


 


2/8/19 00:00 99.4 92 17 112/81 (91) 100   


 


2/8/19 00:00  92      


 


2/8/19 00:00      Mechanical Ventilator  


 


2/7/19 22:57  102 16     40


 


2/7/19 21:27  101 17     40


 


2/7/19 20:00        40


 


2/7/19 20:00      Mechanical Ventilator  


 


2/7/19 20:00 99.5 105 16 104/77 (86) 100   


 


2/7/19 19:30  106 16     40


 


2/7/19 19:29  109      


 


2/7/19 16:55  111 19     40


 


2/7/19 16:00      Mechanical Ventilator  


 


2/7/19 16:00 99.6 107 17 102/77 (85) 100   


 


2/7/19 16:00        40


 


2/7/19 15:37  104      


 


2/7/19 15:20  101 16     40

















Intake and Output  


 


 2/7/19 2/8/19





 19:00 07:00


 


Intake Total 1795 ml 1695 ml


 


Output Total 525 ml 750 ml


 


Balance 1270 ml 945 ml


 


  


 


Free Water 160 ml 200 ml


 


IV Total 1155 ml 1155 ml


 


Tube Feeding 480 ml 280 ml


 


Other  60 ml


 


Output Urine Total 375 ml 400 ml


 


Stool Total 150 ml 350 ml


 


# Voids 1 








Laboratory Tests


2/8/19 08:30: 


White Blood Count 16.4H, Red Blood Count 4.04L, Hemoglobin 12.2L, Hematocrit 

37.7L, Mean Corpuscular Volume 93, Mean Corpuscular Hemoglobin 30.3, Mean 

Corpuscular Hemoglobin Concent 32.4, Red Cell Distribution Width 16.8H, 

Platelet Count 245, Mean Platelet Volume 10.6H, Neutrophils (%) (Auto) 72.2, 

Lymphocytes (%) (Auto) 13.7L, Monocytes (%) (Auto) 3.7, Eosinophils (%) (Auto) 

9.6H, Basophils (%) (Auto) 0.9, Sodium Level 144, Potassium Level 3.9, Chloride 

Level 110H, Carbon Dioxide Level 21, Anion Gap 13, Blood Urea Nitrogen 29H, 

Creatinine 1.6H, Estimat Glomerular Filtration Rate 55.5, Glucose Level 215H, 

Calcium Level 9.6


Height (Feet):  5


Height (Inches):  7.00


Weight (Pounds):  197


Objective


PHYSICAL EXAMINATION


General Appearance:  on vent      


HEENT:  normocephalic, atraumatic      


Neck:  non-tender, normal alignment      


Respiratory/Chest:  ++ VENT/trach   


Cardiovascular/Chest:  normal peripheral pulses, normal rate      


Abdomen:  normal bowel sounds ++ peg      


Extremities:  poorl range of motion











Tejas Gerber MD Feb 8, 2019 15:01

## 2019-02-08 NOTE — INFECTIOUS DISEASES PROG NOTE
Assessment/Plan


Assessment/Plan


Mr. Black is a 54 yo male with PMHx of chronic resp failure - vent/trach 

dependent, ICH, COPD, Dm2, seizure disoder, BPH, DM2,  dysphagia, G tube, 

colostomy who was admitted to the hospital on 1/27/19 with fever, hypotension 

and tachycardia. He was found to have P.a. UTI. He has been on zosyn. His 

inital leukocytosis has been improving and his fevers have resoved. 





UTI complicated


   UA (+)


   Indewlling mayorga


   UCx 1/27/19 P.a.  (R Levo, otherwise S)


   UCx 2/2/19 - Enterobacter and yeast and Pa


   CT abd 1/29/19 3 mm distal right ureteral calculus, minimal resultant 

hydronephrosis. Atrophic left kidney, containing a large staghorn calculus and 

multiple intrarenal calyceal calculi, previously described





Leukocytosis 15 on admit;  improved


Aebrile to 102; resolved





   -2/2 CXR:  Hypoventilatory lungs.  Left lung base atelectasis.





Mild pancreatitis  





Chronic resp failure - vent/trach dependent


ICH


COPD


DM


Seizure disorder


BPH


Dysphagia, G tube


Colostomy  





 Plan 





- Continue Merpenem #2/7 and fluconazole #2/7 








f/u Repeat blood Cx





         - 2/7/19 SP Ccfepeim #4


         - 2/4/19 SP  Zosyn # 7


        - 2/1/19 SP Vanocmcyin #4- No evidence for MRSA





- Monitor CBC and temps





We will continue to follow the patient during this hospitalization.





Subjective


Allergies:  


Coded Allergies:  


     AZTREONAM (Verified  Allergy, Unknown, 7/23/18)


Subjective


Afebrile


Leukocytosis decreasing





Objective


Vital Signs





Last 24 Hour Vital Signs








  Date Time  Temp Pulse Resp B/P (MAP) Pulse Ox O2 Delivery O2 Flow Rate FiO2


 


2/8/19 08:40  95 18     40


 


2/8/19 08:00 97.7 96 16 108/75 (86) 100   


 


2/8/19 08:00      Mechanical Ventilator  


 


2/8/19 08:00        40


 


2/8/19 06:35  97 16     40


 


2/8/19 04:42  90 17     40


 


2/8/19 04:00      Mechanical Ventilator  


 


2/8/19 04:00 98.6 96 17 107/65 (79) 100   


 


2/8/19 04:00        40


 


2/8/19 03:41  102      


 


2/8/19 03:30  98 16     40


 


2/8/19 01:40  99 16     40


 


2/8/19 00:00 99.4 92 17 112/81 (91) 100   


 


2/8/19 00:00  92      


 


2/8/19 00:00      Mechanical Ventilator  


 


2/7/19 22:57  102 16     40


 


2/7/19 21:27  101 17     40


 


2/7/19 20:00        40


 


2/7/19 20:00      Mechanical Ventilator  


 


2/7/19 20:00 99.5 105 16 104/77 (86) 100   


 


2/7/19 19:30  106 16     40


 


2/7/19 19:29  109      


 


2/7/19 16:55  111 19     40


 


2/7/19 16:00      Mechanical Ventilator  


 


2/7/19 16:00 99.6 107 17 102/77 (85) 100   


 


2/7/19 16:00        40


 


2/7/19 15:37  104      


 


2/7/19 15:20  101 16     40


 


2/7/19 12:50  100 16     40


 


2/7/19 12:00      Mechanical Ventilator  


 


2/7/19 12:00        40


 


2/7/19 12:00 99.7 102 16 119/78 (92) 100   


 


2/7/19 11:54  97      


 


2/7/19 10:55  99 16     40








Height (Feet):  5


Height (Inches):  7.00


Weight (Pounds):  197


Objective





Gen:  NAD, No responsive , On vent satting well


HEENT: NCAT, MMM, trached


LUNGS: Course B/L


HEART: RRR


ABD: Soft, Obese, NT, ND, + BS,PEG and Colostomy (No E/P)


Skin: Sacral ulcers - Not infected stage 2





Laboratory Tests








Test


  2/8/19


08:30


 


White Blood Count


  16.4 K/UL


(4.8-10.8)  H


 


Red Blood Count


  4.04 M/UL


(4.70-6.10)  L


 


Hemoglobin


  12.2 G/DL


(14.2-18.0)  L


 


Hematocrit


  37.7 %


(42.0-52.0)  L


 


Mean Corpuscular Volume 93 FL (80-99)  


 


Mean Corpuscular Hemoglobin


  30.3 PG


(27.0-31.0)


 


Mean Corpuscular Hemoglobin


Concent 32.4 G/DL


(32.0-36.0)


 


Red Cell Distribution Width


  16.8 %


(11.6-14.8)  H


 


Platelet Count


  245 K/UL


(150-450)


 


Mean Platelet Volume


  10.6 FL


(6.5-10.1)  H


 


Neutrophils (%) (Auto)


  72.2 %


(45.0-75.0)


 


Lymphocytes (%) (Auto)


  13.7 %


(20.0-45.0)  L


 


Monocytes (%) (Auto)


  3.7 %


(1.0-10.0)


 


Eosinophils (%) (Auto)


  9.6 %


(0.0-3.0)  H


 


Basophils (%) (Auto)


  0.9 %


(0.0-2.0)


 


Sodium Level


  144 MMOL/L


(136-145)


 


Potassium Level


  3.9 MMOL/L


(3.5-5.1)


 


Chloride Level


  110 MMOL/L


()  H


 


Carbon Dioxide Level


  21 MMOL/L


(21-32)


 


Anion Gap


  13 mmol/L


(5-15)


 


Blood Urea Nitrogen


  29 mg/dL


(7-18)  H


 


Creatinine


  1.6 MG/DL


(0.55-1.30)  H


 


Estimat Glomerular Filtration


Rate 55.5 mL/min


(>60)


 


Glucose Level


  215 MG/DL


()  H


 


Calcium Level


  9.6 MG/DL


(8.5-10.1)











Current Medications








 Medications


  (Trade)  Dose


 Ordered  Sig/Jan


 Route


 PRN Reason  Start Time


 Stop Time Status Last Admin


Dose Admin


 


 Acetaminophen


  (Tylenol)  650 mg  Q4H  PRN


 GT


 Mild Pain/Temp > 100.5  1/28/19 08:15


 2/27/19 08:14  2/6/19 16:39


 


 


 Artificial Tears


  (Akwa-Tears)  1 drop  Q4H  PRN


 BOTH EYES


 Dry Eyes  1/28/19 07:45


 2/27/19 07:44  2/5/19 08:59


 


 


 Baclofen


  (Lioresal)  10 mg  EVERY 8  HOURS


 GT


   1/28/19 14:00


 2/27/19 13:59  2/8/19 05:52


 


 


 Chlorhexidine


 Gluconate


  (Elaine-Hex 2%)  1 applic  DAILY@2000


 TOPIC


   2/6/19 22:45


 3/9/19 19:59  2/7/19 19:59


 


 


 Dextrose  1,000 ml @ 


 100 mls/hr  Q10H


 IV


   2/7/19 00:00


 3/9/19 00:00  2/8/19 06:00


 


 


 Dextrose


  (Dextrose 50%)  25 ml  Q30M  PRN


 IV


 Hypoglycemia  1/28/19 07:45


 2/27/19 07:44   


 


 


 Dextrose


  (Dextrose 50%)  50 ml  Q30M  PRN


 IV


 Hypoglycemia  1/28/19 07:45


 2/27/19 07:44   


 


 


 Heparin Sodium


  (Porcine)


  (Heparin 5000


 units/ml)  5,000 units  EVERY 12  HOURS


 SUBQ


   1/28/19 09:00


 2/27/19 08:59  2/8/19 09:09


 


 


 Insulin Aspart


  (NovoLOG)    EVERY 4  HOURS


 SUBQ


   1/30/19 13:00


 2/27/19 11:59  2/8/19 09:10


 


 


 Insulin Detemir


  (Levemir)  15 units  Q12HR


 SUBQ


   1/29/19 00:00


 2/28/19 00:00  2/8/19 09:09


 


 


 Lansoprazole


  (Prevacid)  30 mg  DAILY


 GT


   1/31/19 09:00


 3/2/19 08:59  2/8/19 09:02


 


 


 Levetiracetam


  (Keppra)  1,000 mg  EVERY 8  HOURS


 GT


   1/28/19 14:00


 2/27/19 13:59  2/8/19 05:52


 


 


 Meropenem 1 gm/


 Sodium Chloride  55 ml @ 


 110 mls/hr  Q12HR


 IVPB


   2/7/19 10:00


 2/12/19 09:59  2/8/19 09:04


 


 


 Nystatin


  (Nystatin Cr)  1 applic  EVERY 12  HOURS


 TOPIC


   1/28/19 09:00


 2/27/19 08:59  2/8/19 09:02


 


 


 Polyethylene


 Glycol


  (Miralax)  17 gm  DAILYPRN  PRN


 GT


 Constipation  1/28/19 07:45


 2/27/19 07:44   


 


 


 Silver


 Sulfadiazine


  (Silvadene Cream


 25gm)  1 applic  BEDTIME


 TOPIC


   2/2/19 21:00


 2/27/19 08:59  2/7/19 21:19


 


 


 Sitagliptin


 Phosphate


  (Januvia)  50 mg  DAILY


 GT


   1/28/19 09:00


 2/27/19 08:59  2/8/19 09:02


 


 


 Vitamin D


  (Vitamin D)  1,000 intlu  DAILY


 GT


   1/28/19 09:00


 2/27/19 08:59  2/8/19 09:02


 

















Robert Williamson MD Feb 8, 2019 10:06

## 2019-02-08 NOTE — PULMONOLGY CRITICAL CARE NOTE
Critical Care - Asmt/Plan


Problems:  


(1) Acute on chronic respiratory failure


(2) Sepsis


(3) Fever


(4) Gastrostomy tube dependent


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


(6) Vegetative state


(7) Diabetes mellitus


Respiratory:  monitor respiratory rate, adjust FIO2, CXR


Cardiac:  continue to monitor HR/BP


Renal:  F/U I&O


Infectious Disease:  check cultures


Gastrointestinal:  continue feedings/current rate


Endocrine:  monitor blood sugar


Hematologic:  monitor H/H, transfuse if hgb<8.5


Neurologic:  PRN Ativan, keep patient comfortable


Disposition:  keep in ICU


Notes Reviewed:  renal


Discussed with:  nurses, consultants, 





Critical Care - Objective





Last 24 Hour Vital Signs








  Date Time  Temp Pulse Resp B/P (MAP) Pulse Ox O2 Delivery O2 Flow Rate FiO2


 


2/8/19 08:40  95 18     40


 


2/8/19 08:00 97.7 96 16 108/75 (86) 100   


 


2/8/19 08:00      Mechanical Ventilator  


 


2/8/19 08:00        40


 


2/8/19 06:35  97 16     40


 


2/8/19 04:42  90 17     40


 


2/8/19 04:00      Mechanical Ventilator  


 


2/8/19 04:00 98.6 96 17 107/65 (79) 100   


 


2/8/19 04:00        40


 


2/8/19 03:41  102      


 


2/8/19 03:30  98 16     40


 


2/8/19 01:40  99 16     40


 


2/8/19 00:00 99.4 92 17 112/81 (91) 100   


 


2/8/19 00:00  92      


 


2/8/19 00:00      Mechanical Ventilator  


 


2/7/19 22:57  102 16     40


 


2/7/19 21:27  101 17     40


 


2/7/19 20:00        40


 


2/7/19 20:00      Mechanical Ventilator  


 


2/7/19 20:00 99.5 105 16 104/77 (86) 100   


 


2/7/19 19:30  106 16     40


 


2/7/19 19:29  109      


 


2/7/19 16:55  111 19     40


 


2/7/19 16:00      Mechanical Ventilator  


 


2/7/19 16:00 99.6 107 17 102/77 (85) 100   


 


2/7/19 16:00        40


 


2/7/19 15:37  104      


 


2/7/19 15:20  101 16     40


 


2/7/19 12:50  100 16     40


 


2/7/19 12:00      Mechanical Ventilator  


 


2/7/19 12:00        40


 


2/7/19 12:00 99.7 102 16 119/78 (92) 100   


 


2/7/19 11:54  97      


 


2/7/19 10:55  99 16     40








Status:  awake


Condition:  critical


Lungs:  chest wall tender


Heart:  HR/BP stable


Abdomen:  soft, feeding tube


Extremities:  no C/C/E


Accucheck:  211





Critical Care - Subjective


FI02:  40


Vent Support Breath Rate:  16


Vent Support Mode:  AC


Vent Tidal Volume:  600


Sputum Amount:  Small


PEEP:  5.0


PIP:  27


Tube Feeding Amount:  40


I&O:











Intake and Output  


 


 2/7/19 2/8/19





 19:00 07:00


 


Intake Total 1795 ml 1695 ml


 


Output Total 525 ml 750 ml


 


Balance 1270 ml 945 ml


 


  


 


Free Water 160 ml 200 ml


 


IV Total 1155 ml 1155 ml


 


Tube Feeding 480 ml 280 ml


 


Other  60 ml


 


Output Urine Total 375 ml 400 ml


 


Stool Total 150 ml 350 ml


 


# Voids 1 








Labs:





Laboratory Tests








Test


  2/8/19


08:30


 


White Blood Count


  16.4 K/UL


(4.8-10.8)  H


 


Red Blood Count


  4.04 M/UL


(4.70-6.10)  L


 


Hemoglobin


  12.2 G/DL


(14.2-18.0)  L


 


Hematocrit


  37.7 %


(42.0-52.0)  L


 


Mean Corpuscular Volume 93 FL (80-99)  


 


Mean Corpuscular Hemoglobin


  30.3 PG


(27.0-31.0)


 


Mean Corpuscular Hemoglobin


Concent 32.4 G/DL


(32.0-36.0)


 


Red Cell Distribution Width


  16.8 %


(11.6-14.8)  H


 


Platelet Count


  245 K/UL


(150-450)


 


Mean Platelet Volume


  10.6 FL


(6.5-10.1)  H


 


Neutrophils (%) (Auto)


  72.2 %


(45.0-75.0)


 


Lymphocytes (%) (Auto)


  13.7 %


(20.0-45.0)  L


 


Monocytes (%) (Auto)


  3.7 %


(1.0-10.0)


 


Eosinophils (%) (Auto)


  9.6 %


(0.0-3.0)  H


 


Basophils (%) (Auto)


  0.9 %


(0.0-2.0)


 


Sodium Level


  144 MMOL/L


(136-145)


 


Potassium Level


  3.9 MMOL/L


(3.5-5.1)


 


Chloride Level


  110 MMOL/L


()  H


 


Carbon Dioxide Level


  21 MMOL/L


(21-32)


 


Anion Gap


  13 mmol/L


(5-15)


 


Blood Urea Nitrogen


  29 mg/dL


(7-18)  H


 


Creatinine


  1.6 MG/DL


(0.55-1.30)  H


 


Estimat Glomerular Filtration


Rate 55.5 mL/min


(>60)


 


Glucose Level


  215 MG/DL


()  H


 


Calcium Level


  9.6 MG/DL


(8.5-10.1)

















Yury Pena MD Feb 8, 2019 10:04

## 2019-02-08 NOTE — NUR
NURSE NOTES:



Received patient back from JOS[facility] Albright. Patient remains asleep at this time. No s/s of acute 
distress noted. Will continue to monitor.

## 2019-02-08 NOTE — NUR
NURSE NOTES:



Received patient in bed. Vent dependent. On continuous GTF. Right upper arm midline noted. 
Condom cath inplace. Contact isolation observed. Will continue plan of care.

## 2019-02-08 NOTE — NUR
*-* INSURANCE *-*



CLINICALS AND REVIEW FAXED TO:





[facility]

KRISSY:SANKET

P:[phone]

F:[phone]

REF#[id number]

## 2019-02-09 VITALS — DIASTOLIC BLOOD PRESSURE: 70 MMHG | SYSTOLIC BLOOD PRESSURE: 106 MMHG

## 2019-02-09 VITALS — SYSTOLIC BLOOD PRESSURE: 133 MMHG | DIASTOLIC BLOOD PRESSURE: 95 MMHG

## 2019-02-09 VITALS — DIASTOLIC BLOOD PRESSURE: 78 MMHG | SYSTOLIC BLOOD PRESSURE: 127 MMHG

## 2019-02-09 VITALS — SYSTOLIC BLOOD PRESSURE: 109 MMHG | DIASTOLIC BLOOD PRESSURE: 68 MMHG

## 2019-02-09 VITALS — DIASTOLIC BLOOD PRESSURE: 57 MMHG | SYSTOLIC BLOOD PRESSURE: 120 MMHG

## 2019-02-09 VITALS — DIASTOLIC BLOOD PRESSURE: 72 MMHG | SYSTOLIC BLOOD PRESSURE: 104 MMHG

## 2019-02-09 LAB
ADD MANUAL DIFF: NO
ANION GAP SERPL CALC-SCNC: 14 MMOL/L (ref 5–15)
BASOPHILS NFR BLD AUTO: 1.7 % (ref 0–2)
BUN SERPL-MCNC: 22 MG/DL (ref 7–18)
CALCIUM SERPL-MCNC: 10.2 MG/DL (ref 8.5–10.1)
CHLORIDE SERPL-SCNC: 105 MMOL/L (ref 98–107)
CO2 SERPL-SCNC: 21 MMOL/L (ref 21–32)
CREAT SERPL-MCNC: 1.5 MG/DL (ref 0.55–1.3)
EOSINOPHIL NFR BLD AUTO: 7.7 % (ref 0–3)
ERYTHROCYTE [DISTWIDTH] IN BLOOD BY AUTOMATED COUNT: 16.6 % (ref 11.6–14.8)
HCT VFR BLD CALC: 38.9 % (ref 42–52)
HGB BLD-MCNC: 11.9 G/DL (ref 14.2–18)
LYMPHOCYTES NFR BLD AUTO: 13.2 % (ref 20–45)
MCV RBC AUTO: 94 FL (ref 80–99)
MONOCYTES NFR BLD AUTO: 5.4 % (ref 1–10)
NEUTROPHILS NFR BLD AUTO: 72.1 % (ref 45–75)
PLATELET # BLD: 229 K/UL (ref 150–450)
POTASSIUM SERPL-SCNC: 3.7 MMOL/L (ref 3.5–5.1)
RBC # BLD AUTO: 4.15 M/UL (ref 4.7–6.1)
SODIUM SERPL-SCNC: 140 MMOL/L (ref 136–145)
WBC # BLD AUTO: 15.3 K/UL (ref 4.8–10.8)

## 2019-02-09 RX ADMIN — INSULIN DETEMIR SCH UNITS: 100 INJECTION, SOLUTION SUBCUTANEOUS at 20:47

## 2019-02-09 RX ADMIN — VITAMIN D, TAB 1000IU (100/BT) SCH INTLU: 25 TAB at 09:25

## 2019-02-09 RX ADMIN — INSULIN ASPART SCH UNITS: 100 INJECTION, SOLUTION INTRAVENOUS; SUBCUTANEOUS at 09:28

## 2019-02-09 RX ADMIN — SITAGLIPTIN SCH MG: 50 TABLET, FILM COATED ORAL at 09:26

## 2019-02-09 RX ADMIN — INSULIN ASPART SCH UNITS: 100 INJECTION, SOLUTION INTRAVENOUS; SUBCUTANEOUS at 16:39

## 2019-02-09 RX ADMIN — LEVETIRACETAM SCH MG: 100 SOLUTION ORAL at 13:37

## 2019-02-09 RX ADMIN — HEPARIN SODIUM SCH UNITS: 5000 INJECTION INTRAVENOUS; SUBCUTANEOUS at 20:46

## 2019-02-09 RX ADMIN — INSULIN ASPART SCH UNITS: 100 INJECTION, SOLUTION INTRAVENOUS; SUBCUTANEOUS at 12:04

## 2019-02-09 RX ADMIN — LEVETIRACETAM SCH MG: 100 SOLUTION ORAL at 05:16

## 2019-02-09 RX ADMIN — MEROPENEM SCH MLS/HR: 1 INJECTION INTRAVENOUS at 09:34

## 2019-02-09 RX ADMIN — LEVETIRACETAM SCH MG: 100 SOLUTION ORAL at 21:31

## 2019-02-09 RX ADMIN — INSULIN ASPART SCH UNITS: 100 INJECTION, SOLUTION INTRAVENOUS; SUBCUTANEOUS at 01:03

## 2019-02-09 RX ADMIN — INSULIN DETEMIR SCH UNITS: 100 INJECTION, SOLUTION SUBCUTANEOUS at 09:28

## 2019-02-09 RX ADMIN — INSULIN ASPART SCH UNITS: 100 INJECTION, SOLUTION INTRAVENOUS; SUBCUTANEOUS at 20:48

## 2019-02-09 RX ADMIN — INSULIN ASPART SCH UNITS: 100 INJECTION, SOLUTION INTRAVENOUS; SUBCUTANEOUS at 05:14

## 2019-02-09 RX ADMIN — MEROPENEM SCH MLS/HR: 1 INJECTION INTRAVENOUS at 20:45

## 2019-02-09 RX ADMIN — HEPARIN SODIUM SCH UNITS: 5000 INJECTION INTRAVENOUS; SUBCUTANEOUS at 09:29

## 2019-02-09 NOTE — NUR
CASE MANAGEMENT: REVIEW





SI: SEPSIS

T 97.9  RR 18 /57 % MECH VENT FIO2 40

WBC 15.3 H/H 11.9/38.9 BUN 22 CR 1.5 



IS: MEROPENEM IV Q12HR

D5W IVF @ 100ML/HR

KEPPRA GT Q8HR

BACLOFEN GT Q8HR 





***STEP DOWN UNIT STATUS***

DCP: PATIENT IS FROM [facility]

## 2019-02-09 NOTE — NUR
NURSE NOTES:

no change in pt's status,remains stable,turned and repositioned ,oral/tracheal secretions 
suctioned PRN

## 2019-02-09 NOTE — PULMONOLGY CRITICAL CARE NOTE
Critical Care - Asmt/Plan


Problems:  


(1) Acute on chronic respiratory failure


(2) Sepsis


(3) Fever


(4) Gastrostomy tube dependent


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


(6) Vegetative state


(7) Diabetes mellitus


Respiratory:  monitor respiratory rate, adjust FIO2, CXR


Cardiac:  continue to monitor HR/BP


Renal:  F/U I&O, keep IV fluid


Infectious Disease:  check cultures


Gastrointestinal:  continue feedings/current rate


Endocrine:  monitor blood sugar, check HgA1C, continue sliding scale insulin


Hematologic:  transfuse if hgb<8.5


Neurologic:  PRN Morphine, keep patient comfortable


Affect:  PRN ativan


Prophylaxis:  Protonix, Heparin


Notes Reviewed:  internist, cardio


Discussed with:  nurses, consultants, 





Critical Care - Objective





Last 24 Hour Vital Signs








  Date Time  Temp Pulse Resp B/P (MAP) Pulse Ox O2 Delivery O2 Flow Rate FiO2


 


2/9/19 10:49  99 18     40


 


2/9/19 09:46  97      


 


2/9/19 08:39  99 18     40


 


2/9/19 08:00        40


 


2/9/19 08:00      Mechanical Ventilator  


 


2/9/19 08:00 97.9 94 18 120/57 (78) 100   


 


2/9/19 07:18  100 18     40


 


2/9/19 05:27  98 17     40


 


2/9/19 04:00  95      


 


2/9/19 04:00      Mechanical Ventilator  


 


2/9/19 04:00 97.9 96 17 106/70 (82) 100   


 


2/9/19 04:00        40


 


2/9/19 03:10  96 16     40


 


2/9/19 00:41  98 17     40


 


2/9/19 00:00        40


 


2/9/19 00:00      Mechanical Ventilator  


 


2/9/19 00:00  99      


 


2/9/19 00:00 98.7 98 18 104/72 (83) 100   


 


2/8/19 22:53  98 22     40


 


2/8/19 20:33  96 22     40


 


2/8/19 20:00      Mechanical Ventilator  


 


2/8/19 20:00  97      


 


2/8/19 20:00 98.4 100 16 113/78 (90) 100   


 


2/8/19 20:00        40


 


2/8/19 19:08  95 17     40


 


2/8/19 17:15  96 17     40


 


2/8/19 16:06  96      


 


2/8/19 16:00 98.1 94 16 111/74 (86) 100   


 


2/8/19 16:00        40


 


2/8/19 16:00      Mechanical Ventilator  


 


2/8/19 14:30  91 17     40


 


2/8/19 13:01  95 17     40








Status:  awake


HEENT:  atraumatic


Heart:  HR/BP stable


Abdomen:  soft, active bowel sounds


Extremities:  no C/C/E


Decubiti:  location


Accucheck:  175





Critical Care - Subjective


ROS Limited/Unobtainable:  Yes


Condition:  critical


EKG Rhythm:  Sinus Rhythm


FI02:  40


Vent Support Breath Rate:  16


Vent Support Mode:  AC


Vent Tidal Volume:  600


Sputum Amount:  Moderate


PEEP:  5.0


PIP:  31


Tube Feeding Amount:  40


I&O:











Intake and Output  


 


 2/8/19 2/9/19





 19:00 07:00


 


Intake Total 1985 ml 1715 ml


 


Output Total 600 ml 850 ml


 


Balance 1385 ml 865 ml


 


  


 


Free Water 200 ml 120 ml


 


IV Total 1255 ml 1055 ml


 


Tube Feeding 480 ml 440 ml


 


Blood Product  100 ml


 


Other 50 ml 


 


Output Urine Total 350 ml 700 ml


 


Stool Total 250 ml 150 ml








CXR:


no change


Labs:





Laboratory Tests








Test


  2/9/19


04:30


 


White Blood Count


  15.3 K/UL


(4.8-10.8)  H


 


Red Blood Count


  4.15 M/UL


(4.70-6.10)  L


 


Hemoglobin


  11.9 G/DL


(14.2-18.0)  L


 


Hematocrit


  38.9 %


(42.0-52.0)  L


 


Mean Corpuscular Volume 94 FL (80-99)  


 


Mean Corpuscular Hemoglobin


  28.7 PG


(27.0-31.0)


 


Mean Corpuscular Hemoglobin


Concent 30.7 G/DL


(32.0-36.0)  L


 


Red Cell Distribution Width


  16.6 %


(11.6-14.8)  H


 


Platelet Count


  229 K/UL


(150-450)


 


Mean Platelet Volume


  9.7 FL


(6.5-10.1)


 


Neutrophils (%) (Auto)


  72.1 %


(45.0-75.0)


 


Lymphocytes (%) (Auto)


  13.2 %


(20.0-45.0)  L


 


Monocytes (%) (Auto)


  5.4 %


(1.0-10.0)


 


Eosinophils (%) (Auto)


  7.7 %


(0.0-3.0)  H


 


Basophils (%) (Auto)


  1.7 %


(0.0-2.0)


 


Sodium Level


  140 MMOL/L


(136-145)


 


Potassium Level


  3.7 MMOL/L


(3.5-5.1)


 


Chloride Level


  105 MMOL/L


()


 


Carbon Dioxide Level


  21 MMOL/L


(21-32)


 


Anion Gap


  14 mmol/L


(5-15)


 


Blood Urea Nitrogen


  22 mg/dL


(7-18)  H


 


Creatinine


  1.5 MG/DL


(0.55-1.30)  H


 


Estimat Glomerular Filtration


Rate 59.8 mL/min


(>60)


 


Glucose Level


  179 MG/DL


()  H


 


Calcium Level


  10.2 MG/DL


(8.5-10.1)  H

















Yury Pena MD Feb 9, 2019 12:17

## 2019-02-09 NOTE — NUR
NURSE NOTES:

Received pt in no acute distress; obtunded; non verbal due to trach. Vent dependent; 
tolerating current vent settings of AC16  fiO2.40 peep 5. Temp 99 axillary;  BP 
109/68; secretions mod white; sats 100%. Midline cath on MASHA intact with IVF of D5W running 
at 100ml/h. GT patent GTF with Glucerna 1.5 running at 40ml/h with 0 residuals. Right 
colostomy intact draining liquid brown stools. Replaced condom cath. Wound dressing dry and 
intact. Will continue to monitor. Bed locked and in low position.

## 2019-02-09 NOTE — PROGRESS NOTE
DATE:  02/08/2019

SUBJECTIVE:  The patient is afebrile, hemodynamically stable.  His

tachycardia resolved.



PHYSICAL EXAMINATION:

VITAL SIGNS:  Blood pressure 111/74, pulse is 96, respirations 17, and

temperature 98.1.

HEENT:  Eyes were normal.  ENT, mucous membranes were moist and intact.

NECK:  Supple with no JVD without lymph nodes.  Tracheostomy site is

clean.

LUNGS:  Clear without rhonchi, rales, or wheezing.  Secretions are small,

thin, and grey.

HEART:  Normal sounds with regular beat.  There is no tachycardia at

rest.

ABDOMEN:  Soft and nontender with normal bowel sounds.  Gastrostomy site is

clean.

EXTREMITIES:  Warm without cyanosis, clubbing, or edema.



LABORATORY AND DIAGNOSTIC DATA:  Hemoglobin is 12.2, hematocrit 37.7 with

MCV of 93, WBC of 16.4, and platelets 245.  His BUN and creatinine are 29

and 1.6 respectively.  His sodium is 144, potassium 3.9, chloride 110, CO2

is 21, and his calcium is 9.6.  Chest x-ray done yesterday revealed

blunting of the left costophrenic angle, _____ scarring, otherwise, no

significant change.



The patient has _____ declined.  Repeat laboratory tests will be done in

the a.m.









  ______________________________________________

  Etienne Bloom M.D.





DR:  DARA

D:  02/08/2019 23:10

T:  02/09/2019 01:02

JOB#:  [id number]

CC:

## 2019-02-09 NOTE — NUR
NURSE NOTES:

Observed pt lying on the bed. Well tolerated to current vent setting. No acute distress 
noted. Oral care done. 100cc residual noted and hold the feeding. Will continue to monitor. 

-------------------------------------------------------------------------------

Addendum: 02/09/19 at 0347 by Doris Moran RN

-------------------------------------------------------------------------------

Note time: 0000

## 2019-02-09 NOTE — NUR
NURSE NOTES:

Report received from Doris Moran RN.pt resting quietly in bed ,awake,obtunded,noted no resp 
distress noted ,with trach tube to vent ,current settings tolerated,no signs of pain or 
discomfort,SR on the monitor,GTF Glucerna 1,5 at 4o ml/hr,no residual noted, Rt colostomy in 
placed with yellow brown soft formed stools, pt  incontinent of urine, inserted a new condom 
cath,MASHA Midline intact,with IVF D5NS running at 100 ml/hr,SR up x2 HOB elevated ,bed  lock 
in lowest position,will continue with plans of care.

## 2019-02-10 VITALS — SYSTOLIC BLOOD PRESSURE: 121 MMHG | DIASTOLIC BLOOD PRESSURE: 82 MMHG

## 2019-02-10 VITALS — DIASTOLIC BLOOD PRESSURE: 69 MMHG | SYSTOLIC BLOOD PRESSURE: 102 MMHG

## 2019-02-10 VITALS — DIASTOLIC BLOOD PRESSURE: 75 MMHG | SYSTOLIC BLOOD PRESSURE: 111 MMHG

## 2019-02-10 VITALS — SYSTOLIC BLOOD PRESSURE: 104 MMHG | DIASTOLIC BLOOD PRESSURE: 56 MMHG

## 2019-02-10 VITALS — DIASTOLIC BLOOD PRESSURE: 56 MMHG | SYSTOLIC BLOOD PRESSURE: 106 MMHG

## 2019-02-10 VITALS — SYSTOLIC BLOOD PRESSURE: 102 MMHG | DIASTOLIC BLOOD PRESSURE: 67 MMHG

## 2019-02-10 RX ADMIN — INSULIN ASPART SCH UNITS: 100 INJECTION, SOLUTION INTRAVENOUS; SUBCUTANEOUS at 12:29

## 2019-02-10 RX ADMIN — INSULIN ASPART SCH UNITS: 100 INJECTION, SOLUTION INTRAVENOUS; SUBCUTANEOUS at 09:51

## 2019-02-10 RX ADMIN — INSULIN ASPART SCH UNITS: 100 INJECTION, SOLUTION INTRAVENOUS; SUBCUTANEOUS at 04:42

## 2019-02-10 RX ADMIN — HEPARIN SODIUM SCH UNITS: 5000 INJECTION INTRAVENOUS; SUBCUTANEOUS at 22:00

## 2019-02-10 RX ADMIN — INSULIN ASPART SCH UNITS: 100 INJECTION, SOLUTION INTRAVENOUS; SUBCUTANEOUS at 17:09

## 2019-02-10 RX ADMIN — LEVETIRACETAM SCH MG: 100 SOLUTION ORAL at 05:27

## 2019-02-10 RX ADMIN — HEPARIN SODIUM SCH UNITS: 5000 INJECTION INTRAVENOUS; SUBCUTANEOUS at 09:45

## 2019-02-10 RX ADMIN — LEVETIRACETAM SCH MG: 100 SOLUTION ORAL at 21:47

## 2019-02-10 RX ADMIN — INSULIN ASPART SCH UNITS: 100 INJECTION, SOLUTION INTRAVENOUS; SUBCUTANEOUS at 01:01

## 2019-02-10 RX ADMIN — INSULIN ASPART SCH UNITS: 100 INJECTION, SOLUTION INTRAVENOUS; SUBCUTANEOUS at 21:00

## 2019-02-10 RX ADMIN — INSULIN DETEMIR SCH UNITS: 100 INJECTION, SOLUTION SUBCUTANEOUS at 21:59

## 2019-02-10 RX ADMIN — MEROPENEM SCH MLS/HR: 1 INJECTION INTRAVENOUS at 09:42

## 2019-02-10 RX ADMIN — INSULIN DETEMIR SCH UNITS: 100 INJECTION, SOLUTION SUBCUTANEOUS at 09:51

## 2019-02-10 RX ADMIN — SITAGLIPTIN SCH MG: 50 TABLET, FILM COATED ORAL at 09:42

## 2019-02-10 RX ADMIN — LEVETIRACETAM SCH MG: 100 SOLUTION ORAL at 14:33

## 2019-02-10 RX ADMIN — MEROPENEM SCH MLS/HR: 1 INJECTION INTRAVENOUS at 21:46

## 2019-02-10 RX ADMIN — VITAMIN D, TAB 1000IU (100/BT) SCH INTLU: 25 TAB at 09:42

## 2019-02-10 NOTE — NUR
NURSE NOTES:

Remains obtunded. No seizure activity noted.GTF well tolerated at 40ml/h; no residuals. 
Saturating 100% on .40 fiO2. Afebrile; ST on the scope. BP stable. No distress

## 2019-02-10 NOTE — PULMONOLGY CRITICAL CARE NOTE
Critical Care - Asmt/Plan


Problems:  


(1) Acute on chronic respiratory failure


(2) Sepsis


(3) Fever


(4) Gastrostomy tube dependent


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


(6) Vegetative state


(7) Diabetes mellitus


Respiratory:  monitor respiratory rate, adjust FIO2, CXR


Cardiac:  start pressors, continue to monitor HR/BP


Renal:  F/U I&O


Infectious Disease:  check cultures


Gastrointestinal:  continue feedings/current rate


Endocrine:  monitor blood sugar


Hematologic:  monitor H/H, transfuse if hgb<8.5


Neurologic:  PRN Morphine, keep patient comfortable


Affect:  PRN ativan


Prophylaxis:  Protonix


Notes Reviewed:  internist, cardio


Discussed with:  nurses, consultants, 





Critical Care - Objective





Last 24 Hour Vital Signs








  Date Time  Temp Pulse Resp B/P (MAP) Pulse Ox O2 Delivery O2 Flow Rate FiO2


 


2/10/19 16:52  93 16     40


 


2/10/19 16:00      Mechanical Ventilator  


 


2/10/19 16:00  97      


 


2/10/19 16:00        40


 


2/10/19 16:00 98.1 94 16 102/67 (79) 100   


 


2/10/19 14:45  92 16     40


 


2/10/19 12:49  97 16     40


 


2/10/19 12:07      Mechanical Ventilator  


 


2/10/19 12:06        40


 


2/10/19 12:00  95      


 


2/10/19 12:00 97.9 100 16 102/69 (80) 100   


 


2/10/19 11:01  91 18     40


 


2/10/19 08:52  92 18     40


 


2/10/19 08:01      Mechanical Ventilator  


 


2/10/19 08:00  94      


 


2/10/19 08:00 98.1 98 16 106/56 (73) 100   


 


2/10/19 08:00        40


 


2/10/19 06:42  90 18     40


 


2/10/19 05:12  91 17     40


 


2/10/19 04:00  97      


 


2/10/19 04:00      Mechanical Ventilator  


 


2/10/19 04:00 97.7 97 16 111/75 (87) 100   


 


2/10/19 04:00        40


 


2/10/19 03:03  88 18     40


 


2/10/19 01:02  96 17     40


 


2/10/19 00:00 98.7 100 16 121/82 (95) 100   


 


2/10/19 00:00        40


 


2/10/19 00:00  100      


 


2/10/19 00:00      Mechanical Ventilator  


 


2/9/19 22:55  94 17     40


 


2/9/19 21:02  100 16     40








Status:  awake


Condition:  critical


HEENT:  atraumatic


Neck:  full ROM


Lungs:  chest wall tender


Heart:  HR/BP unstable


Abdomen:  non-tender


Extremities:  no C/C/E


Decubiti:  location


Accucheck:  163





Critical Care - Subjective


ROS Limited/Unobtainable:  Yes


Condition:  critical


EKG Rhythm:  Sinus Rhythm


FI02:  40


Vent Support Breath Rate:  16


Vent Support Mode:  AC


Vent Tidal Volume:  600


Sputum Amount:  Small


PEEP:  5.0


PIP:  31


Tube Feeding Amount:  40


I&O:











Intake and Output  


 


 2/9/19 2/10/19





 19:00 07:00


 


Intake Total 1760 ml 2190 ml


 


Output Total 800 ml 1350 ml


 


Balance 960 ml 840 ml


 


  


 


Free Water  100 ml


 


IV Total 1000 ml 1410 ml


 


Tube Feeding 480 ml 480 ml


 


Blood Product 100 ml 


 


Other 180 ml 200 ml


 


Output Urine Total 550 ml 1100 ml


 


Stool Total 250 ml 250 ml








CXR:


no change











Yury Pena MD Feb 10, 2019 20:18

## 2019-02-10 NOTE — NUR
NURSE NOTES:

pt trach -vent ac16 tv 600 fio2 60 o2 sat 100 0/0 reposition and suction non verbal no acute 
resp noted TOLERATED TUBE FEEDING  ZONIA CATN  CHANGE CLEAN REPOSITION AND SUCTION

## 2019-02-10 NOTE — NUR
NURSE NOTES:

Pt  asleep no resp distress presented,V/S stable, turned and repositioned ,oral/tracheal 
secretions suctioned PRN.

## 2019-02-10 NOTE — INFECTIOUS DISEASES PROG NOTE
Assessment/Plan


Assessment/Plan


Mr. Black is a 52 yo male with PMHx of chronic resp failure - vent/trach 

dependent, ICH, COPD, Dm2, seizure disoder, BPH, DM2,  dysphagia, G tube, 

colostomy who was admitted to the hospital on 1/27/19 with fever, hypotension 

and tachycardia. He was found to have P.a. UTI. He has been on zosyn. His 

inital leukocytosis has been improving and his fevers have resoved. 





UTI complicated


   UA (+)


   Indewlling mayorga


   UCx 1/27/19 P.a.  (R Levo, otherwise S)


   UCx 2/2/19 - Enterobacter and yeast and Pa


   CT abd 1/29/19 3 mm distal right ureteral calculus, minimal resultant 

hydronephrosis. Atrophic left kidney, containing a large staghorn calculus and 

multiple intrarenal calyceal calculi, previously described





Leukocytosis 15 on admit;  improved with meropenem


Aebrile to 102; resolved





   -2/2 CXR:  Hypoventilatory lungs.  Left lung base atelectasis.





Mild pancreatitis  





Chronic resp failure - vent/trach dependent


ICH


COPD


DM


Seizure disorder


BPH


Dysphagia, G tube


Colostomy  





 Plan 





- Continue Merpenem #4/7-10








         - 2/7/19 SP Cefepeim #4


         - 2/4/19 SP  Zosyn # 7


        - 2/1/19 SP Vanocmcyin #4- No evidence for MRSA





- Monitor CBC and temps





We will continue to follow the patient during this hospitalization.





Subjective


Allergies:  


Coded Allergies:  


     AZTREONAM (Verified  Allergy, Unknown, 7/23/18)


Subjective


Afebrile


Leukocytosis continues to  decrease





Objective


Vital Signs





Last 24 Hour Vital Signs








  Date Time  Temp Pulse Resp B/P (MAP) Pulse Ox O2 Delivery O2 Flow Rate FiO2


 


2/10/19 06:42  90 18     40


 


2/10/19 05:12  91 17     40


 


2/10/19 04:00  97      


 


2/10/19 04:00      Mechanical Ventilator  


 


2/10/19 04:00 97.7 97 16 111/75 (87) 100   


 


2/10/19 04:00        40


 


2/10/19 03:03  88 18     40


 


2/10/19 01:02  96 17     40


 


2/10/19 00:00 98.7 100 16 121/82 (95) 100   


 


2/10/19 00:00        40


 


2/10/19 00:00  100      


 


2/10/19 00:00      Mechanical Ventilator  


 


2/9/19 22:55  94 17     40


 


2/9/19 21:02  100 16     40


 


2/9/19 20:00      Mechanical Ventilator  


 


2/9/19 20:00        40


 


2/9/19 20:00 99.0 100 22 109/68 (82) 100   


 


2/9/19 20:00  100      


 


2/9/19 18:47  101 17     40


 


2/9/19 16:33  103 19     40


 


2/9/19 16:14  102      


 


2/9/19 16:00 98.4 104 22 127/78 (94) 100   


 


2/9/19 16:00      Mechanical Ventilator  


 


2/9/19 16:00        40


 


2/9/19 14:58  97 17     40


 


2/9/19 13:30  107 20     40


 


2/9/19 12:00        40


 


2/9/19 12:00      Mechanical Ventilator  


 


2/9/19 12:00 98.2 96 28 133/95 (108) 98   


 


2/9/19 12:00  113      


 


2/9/19 10:49  99 18     40


 


2/9/19 09:46  97      


 


2/9/19 08:39  99 18     40








Height (Feet):  5


Height (Inches):  7.00


Weight (Pounds):  197


Objective





Gen:  NAD, Not responsive


HEENT: NCAT, MMM, trached


LUNGS: Course B/L


HEART: RRR


ABD: Soft, Obese, NT, ND, + BS,PEG and Colostomy (No E/P)


Skin: Sacral ulcers - Not infected stage 2





Current Medications








 Medications


  (Trade)  Dose


 Ordered  Sig/Jan


 Route


 PRN Reason  Start Time


 Stop Time Status Last Admin


Dose Admin


 


 Acetaminophen


  (Tylenol)  650 mg  Q4H  PRN


 GT


 Mild Pain/Temp > 100.5  1/28/19 08:15


 2/27/19 08:14  2/6/19 16:39


 


 


 Artificial Tears


  (Akwa-Tears)  1 drop  Q4H  PRN


 BOTH EYES


 Dry Eyes  1/28/19 07:45


 2/27/19 07:44  2/5/19 08:59


 


 


 Baclofen


  (Lioresal)  10 mg  EVERY 8  HOURS


 GT


   1/28/19 14:00


 2/27/19 13:59  2/10/19 05:27


 


 


 Dextrose  1,000 ml @ 


 100 mls/hr  Q10H


 IV


   2/7/19 00:00


 3/9/19 00:00  2/9/19 21:32


 


 


 Dextrose


  (Dextrose 50%)  25 ml  Q30M  PRN


 IV


 Hypoglycemia  1/28/19 07:45


 2/27/19 07:44   


 


 


 Dextrose


  (Dextrose 50%)  50 ml  Q30M  PRN


 IV


 Hypoglycemia  1/28/19 07:45


 2/27/19 07:44   


 


 


 Heparin Sodium


  (Porcine)


  (Heparin 5000


 units/ml)  5,000 units  EVERY 12  HOURS


 SUBQ


   1/28/19 09:00


 2/27/19 08:59  2/9/19 20:46


 


 


 Insulin Aspart


  (NovoLOG)    EVERY 4  HOURS


 SUBQ


   1/30/19 13:00


 2/27/19 11:59  2/10/19 04:42


 


 


 Insulin Detemir


  (Levemir)  15 units  Q12HR


 SUBQ


   1/29/19 00:00


 2/28/19 00:00  2/9/19 20:47


 


 


 Lansoprazole


  (Prevacid)  30 mg  DAILY


 GT


   1/31/19 09:00


 3/2/19 08:59  2/9/19 09:25


 


 


 Levetiracetam


  (Keppra)  1,000 mg  EVERY 8  HOURS


 GT


   1/28/19 14:00


 2/27/19 13:59  2/10/19 05:27


 


 


 Meropenem 1 gm/


 Sodium Chloride  55 ml @ 


 110 mls/hr  Q12HR


 IVPB


   2/7/19 10:00


 2/13/19 23:59  2/9/19 20:45


 


 


 Nystatin


  (Nystatin Cr)  1 applic  EVERY 12  HOURS


 TOPIC


   1/28/19 09:00


 2/27/19 08:59  2/9/19 20:54


 


 


 Polyethylene


 Glycol


  (Miralax)  17 gm  DAILYPRN  PRN


 GT


 Constipation  1/28/19 07:45


 2/27/19 07:44   


 


 


 Silver


 Sulfadiazine


  (Silvadene Cream


 25gm)  1 applic  BEDTIME


 TOPIC


   2/2/19 21:00


 2/27/19 08:59  2/9/19 20:55


 


 


 Sitagliptin


 Phosphate


  (Januvia)  50 mg  DAILY


 GT


   1/28/19 09:00


 2/27/19 08:59  2/9/19 09:26


 


 


 Vitamin D


  (Vitamin D)  1,000 intlu  DAILY


 GT


   1/28/19 09:00


 2/27/19 08:59  2/9/19 09:25


 

















Robert Williamson MD Feb 10, 2019 08:24

## 2019-02-10 NOTE — NUR
NURSE NOTES:

Report received from NERY Antonio RN.Pt resting in bed,obtunded,with trach tube to vent, current 
vent settings tolerated ,no resp distress presented,no signs of pain or discomfort S-R on 
the monitor,GTF Glucerna 1.5 at 40 ml/hr no residual noted,Rt Colostomy in placed with 
liquid yellow stools in mod amount,condom cath intact,draining yellow urine, with MASHA 
Midline with IVF running,D5W at 100 ml/hr,iv site intact,skin warm and dry,SR up x2 HOB 
elevated,bed lock in lowest position ,will continue with plans of care.

## 2019-02-10 NOTE — NUR
RESPIRATORY NOTE:

PT RECEIVED STABLE ON AC 16,600,40%,+5. ALARMS ON AND AUDIBLE. VENT PLUGGED INTO RED OUTLET. 
VENT CIRCUIT AND SX TUBING SECURE AND OUT OF THE WAY. PT COMFORTABLE SHOWING NO S/S OF 
RESPIRATORY DISTRESS. WILL CONTINUE TO MONITOR.

## 2019-02-10 NOTE — NUR
CASE MANAGEMENT: REVIEW



2/10/2019



SI: SEPSIS.

T 98.1 HR 98 RR 16 B/P 106/56 SATS 100% ON MECH VENT FiO2 40

NO LABS TODAY 



IS: MEROPENEM IV Q12HR

D5W IVF @ 100ML/HR

KEPPRA GT Q8HR

BACLOFEN GT Q8HR 



***STEP DOWN UNIT STATUS***



DCP: PATIENT IS FROM [facility]

## 2019-02-10 NOTE — NUR
NURSE NOTES:

complete bed bath done, Wound dressings done. PEG dressing changed. Colostomy intact. 
Midline cath patent. Afebrile; sats 100%. No seizures noted.

## 2019-02-10 NOTE — PROGRESS NOTE
DATE:  02/09/2019

SUBJECTIVE:  The patient is awake, alert, and afebrile and hemodynamically

stable.



PHYSICAL EXAMINATION:

VITAL SIGNS:  Blood pressure is 109/68.  His pulse is 100, respirations 22,

temperature 99.

HEENT:  Eyes were normal.  ENT, mucous membranes were moist and intact.

NECK:  Supple with no JVD and without lymph nodes.  Tracheostomy site is

clean.

LUNGS:  Clear without rhonchi, rales, or wheezing.  Secretions are small,

thin, and grey.

HEART:  Normal sounds with regular beats.  There is no S3, S4, or

pericardial rub.

ABDOMEN:  Soft and nontender with normal bowel sounds.  Gastrostomy site is

clean.

EXTREMITIES:  Warm without cyanosis, clubbing, or edema.



LABORATORY AND DIAGNOSTIC DATA:  Hemoglobin is 11.9, hematocrit 38.9 with

MCV of 94, WBC of 15.3, and platelets 229.  The WBC was 19.7 on

02/07/2019, 16.4 on 02/08/2019 and 15.3 on 02/09/2019.  BUN and creatinine

22 and 1.5 respectively.  It was 24 and 2.1 on 02/07/2019; 29 and 1.6 on

02/08/2019.  Creatinine 1.5 and BUN is 22.  Calcium level of 10.2.  Repeat

laboratory tests will be done in the a.m.









  ______________________________________________

  Etienne Bloom M.D.





DR:  VIKTOR

D:  02/09/2019 22:51

T:  02/10/2019 02:07

JOB#:  [id number]

CC:

## 2019-02-10 NOTE — NUR
RESPIRATORY NOTE: Received pt on current vent settings. Pt tracheostomy tube is patent and 
secured. No resp distress noted. Suctioned pt prn. Vent alarms are on and audible. Vent is 
plugged into red outlet. Will monitor pt progress.

## 2019-02-11 VITALS — SYSTOLIC BLOOD PRESSURE: 111 MMHG | DIASTOLIC BLOOD PRESSURE: 74 MMHG

## 2019-02-11 VITALS — SYSTOLIC BLOOD PRESSURE: 116 MMHG | DIASTOLIC BLOOD PRESSURE: 77 MMHG

## 2019-02-11 VITALS — DIASTOLIC BLOOD PRESSURE: 76 MMHG | SYSTOLIC BLOOD PRESSURE: 121 MMHG

## 2019-02-11 VITALS — DIASTOLIC BLOOD PRESSURE: 79 MMHG | SYSTOLIC BLOOD PRESSURE: 112 MMHG

## 2019-02-11 VITALS — SYSTOLIC BLOOD PRESSURE: 104 MMHG | DIASTOLIC BLOOD PRESSURE: 56 MMHG

## 2019-02-11 VITALS — DIASTOLIC BLOOD PRESSURE: 53 MMHG | SYSTOLIC BLOOD PRESSURE: 131 MMHG

## 2019-02-11 RX ADMIN — HEPARIN SODIUM SCH UNITS: 5000 INJECTION INTRAVENOUS; SUBCUTANEOUS at 09:13

## 2019-02-11 RX ADMIN — MEROPENEM SCH MLS/HR: 1 INJECTION INTRAVENOUS at 09:16

## 2019-02-11 RX ADMIN — INSULIN ASPART SCH UNITS: 100 INJECTION, SOLUTION INTRAVENOUS; SUBCUTANEOUS at 12:43

## 2019-02-11 RX ADMIN — LEVETIRACETAM SCH MG: 100 SOLUTION ORAL at 14:45

## 2019-02-11 RX ADMIN — VITAMIN D, TAB 1000IU (100/BT) SCH INTLU: 25 TAB at 09:05

## 2019-02-11 RX ADMIN — INSULIN ASPART SCH UNITS: 100 INJECTION, SOLUTION INTRAVENOUS; SUBCUTANEOUS at 09:13

## 2019-02-11 RX ADMIN — INSULIN ASPART SCH UNITS: 100 INJECTION, SOLUTION INTRAVENOUS; SUBCUTANEOUS at 01:19

## 2019-02-11 RX ADMIN — INSULIN ASPART SCH UNITS: 100 INJECTION, SOLUTION INTRAVENOUS; SUBCUTANEOUS at 21:02

## 2019-02-11 RX ADMIN — LEVETIRACETAM SCH MG: 100 SOLUTION ORAL at 06:14

## 2019-02-11 RX ADMIN — INSULIN DETEMIR SCH UNITS: 100 INJECTION, SOLUTION SUBCUTANEOUS at 09:14

## 2019-02-11 RX ADMIN — MEROPENEM SCH MLS/HR: 1 INJECTION INTRAVENOUS at 21:01

## 2019-02-11 RX ADMIN — INSULIN DETEMIR SCH UNITS: 100 INJECTION, SOLUTION SUBCUTANEOUS at 21:03

## 2019-02-11 RX ADMIN — SITAGLIPTIN SCH MG: 50 TABLET, FILM COATED ORAL at 09:05

## 2019-02-11 RX ADMIN — HEPARIN SODIUM SCH UNITS: 5000 INJECTION INTRAVENOUS; SUBCUTANEOUS at 21:03

## 2019-02-11 RX ADMIN — LEVETIRACETAM SCH MG: 100 SOLUTION ORAL at 21:34

## 2019-02-11 RX ADMIN — INSULIN ASPART SCH UNITS: 100 INJECTION, SOLUTION INTRAVENOUS; SUBCUTANEOUS at 04:48

## 2019-02-11 RX ADMIN — CHLORHEXIDINE GLUCONATE SCH APPLIC: 213 SOLUTION TOPICAL at 22:33

## 2019-02-11 RX ADMIN — INSULIN ASPART SCH UNITS: 100 INJECTION, SOLUTION INTRAVENOUS; SUBCUTANEOUS at 17:19

## 2019-02-11 NOTE — GENERAL PROGRESS NOTE
Assessment/Plan


Assessment/Plan





Assessment/Recommendations


# Leukocytosis. Likely related to underlying infection versus reactive process. 


--> Peripheral has been reviewed


--> Medications have been reviewed 


--> Imaging has been reviewed


 -->Blood cultures and urine cultures prn


--> has been started on abx, empiric treatment 


--> wbc trend: 14-->11-->9-->12->13


# Anemia of chronic disease


--> w/u has been reviewed from prior hospitalizations


# Acute pancreatitis,   status post cerebral hemorrhage with severe 

encephalopathy.


# Acute on chronic respiratory failure


--> ventilator dependent and fed via gastrostomy tube


--> Continue with albuterol sulfate and ipratropium


# Diabetes mellitus


-->Humalog insulin  with sliding scale every six hours


# Sepsis


# Gastrostomy tube dependent


# Colostomy in place


# Vegetative state





The timing of this note does not necessarily reflect the time of the patient 

was seen





Greatly appreciate consultation!





Subjective


Constitutional:  Denies: no symptoms, chills, diaphoresis, fever, malaise, 

weakness, other


HEENT:  Denies: no symptoms, eye pain, blurred vision, tearing, double vision, 

ear pain, ear discharge, nose pain, nose congestion, throat pain, throat 

swelling, mouth pain, mouth swelling, other


Cardiovascular:  Denies: no symptoms, chest pain, edema, irregular heart rate, 

lightheadedness, palpitations, syncope, other


Respiratory:  Denies: no symptoms, cough, orthopnea, shortness of breath, SOB 

with excertion, SOB at rest, sputum, stridor, wheezing, other


Gastrointestinal/Abdominal:  Denies: no symptoms, abdomen distended, abdominal 

pain, black stools, tarry stools, blood in stool, constipated, diarrhea, 

difficulty swallowing, nausea, poor appetite, poor fluid intake, rectal bleeding

, vomiting, other


Genitourinary:  Denies: no symptoms, burning, discharge, frequency, flank pain, 

hematuria, incontinence, pain, urgency, other


Neurologic/Psychiatric:  Denies: no symptoms, anxiety, depressed, emotional 

problems, headache, numbness, paresthesia, pre-existing deficit, seizure, 

tingling, tremors, weakness, other


Endocrine:  Denies: no symptoms, excessive sweating, flushing, intolerance to 

cold, intolerance to heat, increased hunger, increased thirst, increased urine, 

unexplained weight gain, unexplained weight loss, other


Hematologic/Lymphatic:  Denies: no symptoms, anemia, easy bleeding, easy 

bruising, other


Allergies:  


Coded Allergies:  


     AZTREONAM (Verified  Allergy, Unknown, 7/23/18)


Subjective


2/1: Pt is seen in the room,on vent, G tube, leukocytosis has been improving 

and his fevers have resolved. 


2/3: on vent, resting in bed, wbc trending up, no fevers or chills,


2/4: The patient has low-grade fever with tachycardia, CXR reveals small left 

pleural effusion.


2/5: no events, labs reviewed, h/h appers stable at this time, imaging reviewed


2/6: on vent, resting, wbc trending up, no overnight events reported.  


2/7: wbc trending up, 19 today, The patient remained febrile and tachycardic, 

but hemodynamically stable.


2/8: wbc trending down, remains afebrile, no acute distress reported


2/11: seen by bedside, no events, labs reviewed, h/h appers stable at this time

, imaging reviewed





Objective





Last 24 Hour Vital Signs








  Date Time  Temp Pulse Resp B/P (MAP) Pulse Ox O2 Delivery O2 Flow Rate FiO2


 


2/11/19 21:10  101 22     40


 


2/11/19 20:00 99.2 102 17 112/79 (90) 100   


 


2/11/19 20:00      Mechanical Ventilator  


 


2/11/19 20:00  100      


 


2/11/19 20:00        40


 


2/11/19 19:16  101 17     40


 


2/11/19 17:14  96 17     40


 


2/11/19 16:00  101      


 


2/11/19 16:00 97.9 100 17 121/76 (91) 100   


 


2/11/19 16:00        40


 


2/11/19 16:00      Mechanical Ventilator  


 


2/11/19 15:15  102 17     40


 


2/11/19 12:23  101 17     40


 


2/11/19 12:00  97      


 


2/11/19 12:00 98.5 92 18 131/53 (79) 100   


 


2/11/19 12:00      Mechanical Ventilator  


 


2/11/19 12:00        40


 


2/11/19 09:25  98 16     40


 


2/11/19 08:00        40


 


2/11/19 08:00      Mechanical Ventilator  


 


2/11/19 08:00  95      


 


2/11/19 08:00 98.6 96 16 116/77 (90) 100   


 


2/11/19 07:12  92 17     40


 


2/11/19 05:10  100 16     40


 


2/11/19 04:00      Mechanical Ventilator  


 


2/11/19 04:00  98      


 


2/11/19 04:00        40


 


2/11/19 04:00 98.1 93 16 111/74 (86) 100   


 


2/11/19 03:20  97 16     40


 


2/11/19 01:17  99 16     40


 


2/11/19 00:00      Mechanical Ventilator  


 


2/11/19 00:00        40


 


2/11/19 00:00 98.5 98 16 104/56 (72) 100   


 


2/11/19 00:00  95      


 


2/10/19 23:15  100 16     40

















Intake and Output  


 


 2/10/19 2/11/19





 19:00 07:00


 


Intake Total 1455 ml 1835 ml


 


Output Total 475 ml 1350 ml


 


Balance 980 ml 485 ml


 


  


 


Free Water 100 ml 100 ml


 


IV Total 755 ml 1255 ml


 


Tube Feeding 400 ml 480 ml


 


Other 200 ml 


 


Output Urine Total 300 ml 1050 ml


 


Stool Total 175 ml 300 ml








Height (Feet):  5


Height (Inches):  7.00


Weight (Pounds):  197


Objective


PHYSICAL EXAMINATION


General Appearance:  on vent      


HEENT:  normocephalic, atraumatic      


Neck:  non-tender, normal alignment      


Respiratory/Chest:  ++ VENT/trach   


Cardiovascular/Chest:  normal peripheral pulses, normal rate      


Abdomen:  normal bowel sounds ++ peg      


Extremities:  poorl range of motion











Tejas Gerber MD Feb 11, 2019 22:18

## 2019-02-11 NOTE — PULMONOLGY CRITICAL CARE NOTE
Critical Care - Asmt/Plan


Problems:  


(1) Acute on chronic respiratory failure


(2) Sepsis


(3) Fever


(4) Gastrostomy tube dependent


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


(6) Vegetative state


(7) Diabetes mellitus


Respiratory:  monitor respiratory rate, adjust FIO2, CXR


Cardiac:  continue to monitor HR/BP


Renal:  F/U I&O, check electrolytes


Infectious Disease:  check cultures


Endocrine:  monitor blood sugar, check TSH


Neurologic:  PRN Morphine


Affect:  PRN ativan


Notes Reviewed:  internist, renal


Discussed with:  consultants, 





Critical Care - Objective





Last 24 Hour Vital Signs








  Date Time  Temp Pulse Resp B/P (MAP) Pulse Ox O2 Delivery O2 Flow Rate FiO2


 


2/11/19 09:25  98 16     40


 


2/11/19 08:00        40


 


2/11/19 08:00      Mechanical Ventilator  


 


2/11/19 08:00  95      


 


2/11/19 08:00 98.6 96 16 116/77 (90) 100   


 


2/11/19 07:12  92 17     40


 


2/11/19 05:10  100 16     40


 


2/11/19 04:00      Mechanical Ventilator  


 


2/11/19 04:00  98      


 


2/11/19 04:00        40


 


2/11/19 04:00 98.1 93 16 111/74 (86) 100   


 


2/11/19 03:20  97 16     40


 


2/11/19 01:17  99 16     40


 


2/11/19 00:00      Mechanical Ventilator  


 


2/11/19 00:00        40


 


2/11/19 00:00 98.5 98 16 104/56 (72) 100   


 


2/11/19 00:00  95      


 


2/10/19 23:15  100 16     40


 


2/10/19 22:00        40


 


2/10/19 21:28  101 17     40


 


2/10/19 20:00  98      


 


2/10/19 20:00 98.5 98 16 104/56 (72) 100   


 


2/10/19 20:00        40


 


2/10/19 20:00      Mechanical Ventilator  


 


2/10/19 19:05  100 23     40


 


2/10/19 16:52  93 16     40


 


2/10/19 16:00      Mechanical Ventilator  


 


2/10/19 16:00  97      


 


2/10/19 16:00        40


 


2/10/19 16:00 98.1 94 16 102/67 (79) 100   


 


2/10/19 14:45  92 16     40


 


2/10/19 12:49  97 16     40








Status:  obtunded


Condition:  critical


Lungs:  clear


Heart:  HR/BP stable


Abdomen:  soft


Extremities:  no C/C/E, edema


Accucheck:  170





Critical Care - Subjective


ROS Limited/Unobtainable:  Yes


Condition:  critical


EKG Rhythm:  Sinus Rhythm


FI02:  40


Vent Support Breath Rate:  16


Vent Support Mode:  AC


Vent Tidal Volume:  600


Sputum Amount:  Small


PEEP:  5.0


PIP:  28


Tube Feeding Amount:  40


I&O:











Intake and Output  


 


 2/10/19 2/11/19





 18:59 06:59


 


Intake Total 1695 ml 1695 ml


 


Output Total 475 ml 1350 ml


 


Balance 1220 ml 345 ml


 


  


 


Free Water 200 ml 100 ml


 


IV Total 855 ml 1155 ml


 


Tube Feeding 440 ml 440 ml


 


Other 200 ml 


 


Output Urine Total 300 ml 1050 ml


 


Stool Total 175 ml 300 ml

















Yury Pena MD Feb 11, 2019 12:11

## 2019-02-11 NOTE — INFECTIOUS DISEASES PROG NOTE
Assessment/Plan


Assessment/Plan


Mr. Black is a 54 yo male with PMHx of chronic resp failure - vent/trach 

dependent, ICH, COPD, Dm2, seizure disoder, BPH, DM2,  dysphagia, G tube, 

colostomy who was admitted to the hospital on 1/27/19 with fever, hypotension 

and tachycardia. He was found to have P.a. UTI. He has been on zosyn. His 

inital leukocytosis has been improving and his fevers have resoved. 





UTI complicated


   UA (+)


   Indewlling mayorga


   UCx 1/27/19 P.a.  (R Levo, otherwise S)


   UCx 2/2/19 - Enterobacter (S Cefepime) and yeast and Pa


   CT abd 1/29/19 3 mm distal right ureteral calculus, minimal resultant 

hydronephrosis. Atrophic left kidney, containing a large staghorn calculus and 

multiple intrarenal calyceal calculi, previously described





Leukocytosis 15 on admit;  improved with meropenem


Aebrile to 102; resolved





   -2/2 CXR:  Hypoventilatory lungs.  Left lung base atelectasis.





Mild pancreatitis  





Chronic resp failure - vent/trach dependent


ICH


COPD


DM


Seizure disorder


BPH


Dysphagia, G tube


Colostomy  





 Plan 





- Continue Merpenem #5/7








         - 2/7/19 SP Cefepeim #4


         - 2/4/19 SP  Zosyn # 7


        - 2/1/19 SP Vanocmcyin #4- No evidence for MRSA





- Monitor CBC and temps


-CBC, CMp am


We will continue to follow the patient during this hospitalization.





Subjective


Allergies:  


Coded Allergies:  


     AZTREONAM (Verified  Allergy, Unknown, 7/23/18)


Subjective


afebrile


no cbc today





Objective


Vital Signs





Last 24 Hour Vital Signs








  Date Time  Temp Pulse Resp B/P (MAP) Pulse Ox O2 Delivery O2 Flow Rate FiO2


 


2/11/19 09:25  98 16     40


 


2/11/19 08:00        40


 


2/11/19 08:00      Mechanical Ventilator  


 


2/11/19 08:00  95      


 


2/11/19 08:00 98.6 96 16 116/77 (90) 100   


 


2/11/19 07:12  92 17     40


 


2/11/19 05:10  100 16     40


 


2/11/19 04:00      Mechanical Ventilator  


 


2/11/19 04:00  98      


 


2/11/19 04:00        40


 


2/11/19 04:00 98.1 93 16 111/74 (86) 100   


 


2/11/19 03:20  97 16     40


 


2/11/19 01:17  99 16     40


 


2/11/19 00:00      Mechanical Ventilator  


 


2/11/19 00:00        40


 


2/11/19 00:00 98.5 98 16 104/56 (72) 100   


 


2/11/19 00:00  95      


 


2/10/19 23:15  100 16     40


 


2/10/19 22:00        40


 


2/10/19 21:28  101 17     40


 


2/10/19 20:00  98      


 


2/10/19 20:00 98.5 98 16 104/56 (72) 100   


 


2/10/19 20:00        40


 


2/10/19 20:00      Mechanical Ventilator  


 


2/10/19 19:05  100 23     40


 


2/10/19 16:52  93 16     40


 


2/10/19 16:00      Mechanical Ventilator  


 


2/10/19 16:00  97      


 


2/10/19 16:00        40


 


2/10/19 16:00 98.1 94 16 102/67 (79) 100   


 


2/10/19 14:45  92 16     40


 


2/10/19 12:49  97 16     40


 


2/10/19 12:07      Mechanical Ventilator  


 


2/10/19 12:06        40


 


2/10/19 12:00  95      


 


2/10/19 12:00 97.9 100 16 102/69 (80) 100   








Height (Feet):  5


Height (Inches):  7.00


Weight (Pounds):  197


Objective





Gen:  NAD, On vent


HEENT: NCAT, MMM, PERRL, No Oral lesion, no scleral icterus, trached


NECK:  full range of motion, supple, no meningismus, No LAD, No JVD


LUNGS:  CTAB, No W/C, No Accessory muscle use


CARDS: RRR, S1, S2, No M/R/G, 


ABD: Soft, Obese, NT, ND, No R/G, + BS, No HSM, No Masses, PEG and Colostomy (

No E/P)


: Deferred


Ext: C/C/E, Pulses 2+ B/L (DP, Rad): 


NEURO: Not responding, No spontaneous movements


SKIN:  Warm/dry, No rashes





Current Medications








 Medications


  (Trade)  Dose


 Ordered  Sig/Jan


 Route


 PRN Reason  Start Time


 Stop Time Status Last Admin


Dose Admin


 


 Acetaminophen


  (Tylenol)  650 mg  Q4H  PRN


 GT


 Mild Pain/Temp > 100.5  1/28/19 08:15


 2/27/19 08:14  2/6/19 16:39


 


 


 Artificial Tears


  (Akwa-Tears)  1 drop  Q4H  PRN


 BOTH EYES


 Dry Eyes  1/28/19 07:45


 2/27/19 07:44  2/5/19 08:59


 


 


 Baclofen


  (Lioresal)  10 mg  EVERY 8  HOURS


 GT


   1/28/19 14:00


 2/27/19 13:59  2/11/19 06:14


 


 


 Dextrose  1,000 ml @ 


 100 mls/hr  Q10H


 IV


   2/7/19 00:00


 3/9/19 00:00  2/11/19 04:45


 


 


 Dextrose


  (Dextrose 50%)  25 ml  Q30M  PRN


 IV


 Hypoglycemia  1/28/19 07:45


 2/27/19 07:44   


 


 


 Dextrose


  (Dextrose 50%)  50 ml  Q30M  PRN


 IV


 Hypoglycemia  1/28/19 07:45


 2/27/19 07:44   


 


 


 Heparin Sodium


  (Porcine)


  (Heparin 5000


 units/ml)  5,000 units  EVERY 12  HOURS


 SUBQ


   1/28/19 09:00


 2/27/19 08:59  2/11/19 09:13


 


 


 Insulin Aspart


  (NovoLOG)    EVERY 4  HOURS


 SUBQ


   1/30/19 13:00


 2/27/19 11:59  2/11/19 09:13


 


 


 Insulin Detemir


  (Levemir)  15 units  Q12HR


 SUBQ


   1/29/19 00:00


 2/28/19 00:00  2/11/19 09:14


 


 


 Lansoprazole


  (Prevacid)  30 mg  DAILY


 GT


   1/31/19 09:00


 3/2/19 08:59  2/11/19 09:05


 


 


 Levetiracetam


  (Keppra)  1,000 mg  EVERY 8  HOURS


 GT


   1/28/19 14:00


 2/27/19 13:59  2/11/19 06:14


 


 


 Meropenem 1 gm/


 Sodium Chloride  55 ml @ 


 110 mls/hr  Q12HR


 IVPB


   2/7/19 10:00


 2/13/19 23:59  2/11/19 09:16


 


 


 Nystatin


  (Nystatin Cr)  1 applic  EVERY 12  HOURS


 TOPIC


   1/28/19 09:00


 2/27/19 08:59  2/11/19 09:15


 


 


 Polyethylene


 Glycol


  (Miralax)  17 gm  DAILYPRN  PRN


 GT


 Constipation  1/28/19 07:45


 2/27/19 07:44   


 


 


 Silver


 Sulfadiazine


  (Silvadene Cream


 25gm)  1 applic  BEDTIME


 TOPIC


   2/2/19 21:00


 2/27/19 08:59  2/10/19 21:00


 


 


 Sitagliptin


 Phosphate


  (Januvia)  50 mg  DAILY


 GT


   1/28/19 09:00


 2/27/19 08:59  2/11/19 09:05


 


 


 Vitamin D


  (Vitamin D)  1,000 intlu  DAILY


 GT


   1/28/19 09:00


 2/27/19 08:59  2/11/19 09:05


 

















Joy Love M.D. Feb 11, 2019 11:42

## 2019-02-11 NOTE — NUR
NURSE NOTES:

no change in condition, vital signs stable, no co pain, bed bath given, tolerate GT feeding 
well, continue monitoring.

## 2019-02-11 NOTE — NUR
SI:      RESP FAILURE TRACH/VENT DEPENDENT, SEPSIS

T. 98.5  RR 17 B/P 131/53

AC 16  FIO2 40% PEEP 5







IS:      MEROPENEM IV

IVF D5@ 100ML/HR

KEPPRA GT

HEPARIN SUBC

*******STEP DOWN STATUS******

## 2019-02-11 NOTE — PROGRESS NOTE
DATE:  02/10/2019

SUBJECTIVE:  The patient is awake, alert, afebrile, and hemodynamically

stable.



PHYSICAL EXAMINATION:

HEENT:  Eyes were normal.  ENT, mucous membranes were moist and intact.

NECK:  Supple with no JVD and without lymph nodes.  Tracheostomy site is

clean.

LUNGS:  Clear without rhonchi, rales, or wheezing.  Secretions are small,

thin, and grey.

HEART:  Normal sounds with regular beats.  There is no S3, S4, or

pericardial rub.

ABDOMEN:  Soft and nontender with normal bowel sounds.

EXTREMITIES:  Warm without cyanosis, clubbing, or edema.



LABORATORY DATA:  Hemoglobin is 11.9, hematocrit 38.9 with MCV of 94, and

WBC of _____.  BUN and creatinine is 22 and 1.5 respectively.  His sodium

is 140, potassium 3.7, chloride 105, and CO2 is _____.



IMPRESSION AND PLAN:  The patient remained in stable condition.  His

creatinine is slightly improved.  Repeat laboratory tests will be done in

the a.m.









  ______________________________________________

  Etienne Bloom M.D.





DR:  JT

D:  02/10/2019 23:12

T:  02/10/2019 23:44

JOB#:  [id number]

CC:

## 2019-02-11 NOTE — NUR
RESPIRATORY NOTE:

PT. STABLE ON CMV WITH CURRENT ORDERS. SXN. PRN WITHOUT ADVERSE REACTION. VENT CIRCUIT 
SECURE AND OUT OF THE WAY.NO S/S OF RESPIRATORY DISTRESS NOTED AT THIS TIME.

## 2019-02-11 NOTE — NUR
ARASELI NOTES:

Given report from JOSHUA Brower. Received Pt is resting on the bed and obtunded. Trach to Vent 
dependent setting with Ac: 16, T; 600, P:5, and FiO2 40% and SaO2 100%. BT : 99.2F noted. 
Provided cooling measure. Given tracheal and oral suction. Provided oral care. Applied 
cooling measure. On G-tube feeding with Glucerna 1.5 @ 40cc/hr. No residual noted. Carson 
cath out and applied new condom cath. Dressing is clean and dry on wound area. Changed 
position. On colostomy bag and yellowish liquid stool noted. Rt. upper arm mid line area 
dressing is intact and patent. Placed fall and seizure precaution. Will continue to care 
plan.

## 2019-02-11 NOTE — NUR
NURSE NOTES:

received pt in the bed, vent dependent, vital signs stable, obtunded, no SOB, no co pain, 
skin warm and dry to touch, dressing dry and intact on sacral, midline on RT upper arm, 
dressing dry and intact, tolerate GT feeding well, bed in low position, HOB elevated, condom 
catheter with yellow urine, colostomy.

## 2019-02-12 VITALS — SYSTOLIC BLOOD PRESSURE: 120 MMHG | DIASTOLIC BLOOD PRESSURE: 67 MMHG

## 2019-02-12 VITALS — DIASTOLIC BLOOD PRESSURE: 77 MMHG | SYSTOLIC BLOOD PRESSURE: 114 MMHG

## 2019-02-12 VITALS — DIASTOLIC BLOOD PRESSURE: 81 MMHG | SYSTOLIC BLOOD PRESSURE: 120 MMHG

## 2019-02-12 VITALS — DIASTOLIC BLOOD PRESSURE: 83 MMHG | SYSTOLIC BLOOD PRESSURE: 130 MMHG

## 2019-02-12 VITALS — SYSTOLIC BLOOD PRESSURE: 97 MMHG | DIASTOLIC BLOOD PRESSURE: 77 MMHG

## 2019-02-12 VITALS — SYSTOLIC BLOOD PRESSURE: 125 MMHG | DIASTOLIC BLOOD PRESSURE: 69 MMHG

## 2019-02-12 LAB
ADD MANUAL DIFF: NO
ALBUMIN SERPL-MCNC: 2.5 G/DL (ref 3.4–5)
ALP SERPL-CCNC: 138 U/L (ref 46–116)
ALT SERPL-CCNC: 47 U/L (ref 12–78)
ANION GAP SERPL CALC-SCNC: 14 MMOL/L (ref 5–15)
AST SERPL-CCNC: 43 U/L (ref 15–37)
BASOPHILS NFR BLD AUTO: 1 % (ref 0–2)
BILIRUB SERPL-MCNC: 0.4 MG/DL (ref 0.2–1)
BUN SERPL-MCNC: 13 MG/DL (ref 7–18)
CALCIUM SERPL-MCNC: 8.9 MG/DL (ref 8.5–10.1)
CHLORIDE SERPL-SCNC: 99 MMOL/L (ref 98–107)
CO2 SERPL-SCNC: 20 MMOL/L (ref 21–32)
CREAT SERPL-MCNC: 1 MG/DL (ref 0.55–1.3)
EOSINOPHIL NFR BLD AUTO: 9.7 % (ref 0–3)
ERYTHROCYTE [DISTWIDTH] IN BLOOD BY AUTOMATED COUNT: 16.9 % (ref 11.6–14.8)
GLOBULIN SER-MCNC: 5.3 G/DL
HCT VFR BLD CALC: 32.2 % (ref 42–52)
HGB BLD-MCNC: 10.7 G/DL (ref 14.2–18)
LYMPHOCYTES NFR BLD AUTO: 21.7 % (ref 20–45)
MCV RBC AUTO: 91 FL (ref 80–99)
MONOCYTES NFR BLD AUTO: 3.6 % (ref 1–10)
NEUTROPHILS NFR BLD AUTO: 64 % (ref 45–75)
PLATELET # BLD: 242 K/UL (ref 150–450)
POTASSIUM SERPL-SCNC: 3.5 MMOL/L (ref 3.5–5.1)
RBC # BLD AUTO: 3.53 M/UL (ref 4.7–6.1)
SODIUM SERPL-SCNC: 133 MMOL/L (ref 136–145)
WBC # BLD AUTO: 12.2 K/UL (ref 4.8–10.8)

## 2019-02-12 RX ADMIN — CHLORHEXIDINE GLUCONATE SCH APPLIC: 213 SOLUTION TOPICAL at 20:10

## 2019-02-12 RX ADMIN — INSULIN ASPART SCH UNITS: 100 INJECTION, SOLUTION INTRAVENOUS; SUBCUTANEOUS at 01:11

## 2019-02-12 RX ADMIN — INSULIN ASPART SCH UNITS: 100 INJECTION, SOLUTION INTRAVENOUS; SUBCUTANEOUS at 17:20

## 2019-02-12 RX ADMIN — MEROPENEM SCH MLS/HR: 1 INJECTION INTRAVENOUS at 09:03

## 2019-02-12 RX ADMIN — SITAGLIPTIN SCH MG: 50 TABLET, FILM COATED ORAL at 09:00

## 2019-02-12 RX ADMIN — VITAMIN D, TAB 1000IU (100/BT) SCH INTLU: 25 TAB at 09:00

## 2019-02-12 RX ADMIN — INSULIN DETEMIR SCH UNITS: 100 INJECTION, SOLUTION SUBCUTANEOUS at 08:59

## 2019-02-12 RX ADMIN — MEROPENEM SCH MLS/HR: 1 INJECTION INTRAVENOUS at 17:23

## 2019-02-12 RX ADMIN — INSULIN ASPART SCH UNITS: 100 INJECTION, SOLUTION INTRAVENOUS; SUBCUTANEOUS at 12:35

## 2019-02-12 RX ADMIN — INSULIN ASPART SCH UNITS: 100 INJECTION, SOLUTION INTRAVENOUS; SUBCUTANEOUS at 05:05

## 2019-02-12 RX ADMIN — LEVETIRACETAM SCH MG: 100 SOLUTION ORAL at 14:16

## 2019-02-12 RX ADMIN — HEPARIN SODIUM SCH UNITS: 5000 INJECTION INTRAVENOUS; SUBCUTANEOUS at 08:58

## 2019-02-12 RX ADMIN — INSULIN ASPART SCH UNITS: 100 INJECTION, SOLUTION INTRAVENOUS; SUBCUTANEOUS at 08:59

## 2019-02-12 RX ADMIN — LEVETIRACETAM SCH MG: 100 SOLUTION ORAL at 05:36

## 2019-02-12 NOTE — NUR
NURSE NOTES:

Pt is sleeping on the bed and no sign of acute distress noted. Tolerated well with current 
Vent setting. Given oral care and suction. On G-tubed feeding and tolerated well. No fever 
noted at this time. Changed position. Placed fall precaution. Will continue to care plan.

## 2019-02-12 NOTE — PULMONOLGY CRITICAL CARE NOTE
Critical Care - Asmt/Plan


Problems:  


(1) Acute on chronic respiratory failure


(2) Sepsis


(3) Fever


(4) Gastrostomy tube dependent


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


(6) Vegetative state


(7) Diabetes mellitus


Respiratory:  monitor respiratory rate, adjust FIO2, CXR


Cardiac:  continue to monitor HR/BP


Renal:  F/U I&O


Infectious Disease:  check cultures, continue antibiotics


Gastrointestinal:  continue feedings/current rate


Endocrine:  monitor blood sugar, check HgA1C


Hematologic:  monitor H/H, transfuse if hgb<8.5


Neurologic:  PRN Morphine, keep patient comfortable


Prophylaxis:  Protonix


Notes Reviewed:  internist


Discussed with:  nurses, consultants, 





Critical Care - Objective





Last 24 Hour Vital Signs








  Date Time  Temp Pulse Resp B/P (MAP) Pulse Ox O2 Delivery O2 Flow Rate FiO2


 


2/12/19 09:10  105 19     40


 


2/12/19 08:00  103      


 


2/12/19 08:00      Mechanical Ventilator  


 


2/12/19 08:00 98.4 99 17 114/77 (89) 100   


 


2/12/19 08:00        40


 


2/12/19 06:40  103 16     40


 


2/12/19 05:08  102 17     40


 


2/12/19 04:00      Mechanical Ventilator  


 


2/12/19 04:00 99.5 103 17 120/81 (94) 100   


 


2/12/19 04:00  96      


 


2/12/19 04:00        40


 


2/12/19 03:01  97 17     40


 


2/12/19 01:28  99 16     40


 


2/12/19 00:00  104      


 


2/12/19 00:00      Mechanical Ventilator  


 


2/12/19 00:00 99.0 103 17 125/69 (87) 100   


 


2/11/19 22:57  104 19     40


 


2/11/19 22:15  104 19   Mechanical Ventilator  40


 


2/11/19 21:10  101 22     40


 


2/11/19 20:00 99.2 102 17 112/79 (90) 100   


 


2/11/19 20:00      Mechanical Ventilator  


 


2/11/19 20:00  100      


 


2/11/19 20:00        40


 


2/11/19 19:16  101 17     40


 


2/11/19 17:14  96 17     40


 


2/11/19 16:00  101      


 


2/11/19 16:00 97.9 100 17 121/76 (91) 100   


 


2/11/19 16:00        40


 


2/11/19 16:00      Mechanical Ventilator  


 


2/11/19 15:15  102 17     40


 


2/11/19 12:23  101 17     40


 


2/11/19 12:00  97      


 


2/11/19 12:00 98.5 92 18 131/53 (79) 100   


 


2/11/19 12:00      Mechanical Ventilator  


 


2/11/19 12:00        40








Status:  obtunded


Condition:  critical


HEENT:  atraumatic


Lungs:  clear


Heart:  HR/BP stable, regular


Abdomen:  active bowel sounds


Extremities:  edema


Accucheck:  158





Critical Care - Subjective


ROS Limited/Unobtainable:  Yes


FI02:  40


Vent Support Breath Rate:  16


Vent Support Mode:  AC


Vent Tidal Volume:  600


Sputum Amount:  Small


PEEP:  5.0


PIP:  28


Tube Feeding Amount:  40


I&O:











Intake and Output  


 


 2/11/19 2/12/19





 19:00 07:00


 


Intake Total 1785 ml 1671 ml


 


Output Total 400 ml 1350 ml


 


Balance 1385 ml 321 ml


 


  


 


Free Water 150 ml 


 


IV Total 1155 ml 1191 ml


 


Tube Feeding 480 ml 480 ml


 


Output Urine Total 250 ml 1050 ml


 


Stool Total 150 ml 300 ml


 


# Voids 2 








Labs:





Laboratory Tests








Test


  2/12/19


04:00


 


White Blood Count


  12.2 K/UL


(4.8-10.8)  H


 


Red Blood Count


  3.53 M/UL


(4.70-6.10)  L


 


Hemoglobin


  10.7 G/DL


(14.2-18.0)  L


 


Hematocrit


  32.2 %


(42.0-52.0)  L


 


Mean Corpuscular Volume 91 FL (80-99)  


 


Mean Corpuscular Hemoglobin


  30.2 PG


(27.0-31.0)


 


Mean Corpuscular Hemoglobin


Concent 33.1 G/DL


(32.0-36.0)


 


Red Cell Distribution Width


  16.9 %


(11.6-14.8)  H


 


Platelet Count


  242 K/UL


(150-450)


 


Mean Platelet Volume


  11.2 FL


(6.5-10.1)  H


 


Neutrophils (%) (Auto)


  64.0 %


(45.0-75.0)


 


Lymphocytes (%) (Auto)


  21.7 %


(20.0-45.0)


 


Monocytes (%) (Auto)


  3.6 %


(1.0-10.0)


 


Eosinophils (%) (Auto)


  9.7 %


(0.0-3.0)  H


 


Basophils (%) (Auto)


  1.0 %


(0.0-2.0)


 


Sodium Level


  133 MMOL/L


(136-145)  L


 


Potassium Level


  3.5 MMOL/L


(3.5-5.1)


 


Chloride Level


  99 MMOL/L


()


 


Carbon Dioxide Level


  20 MMOL/L


(21-32)  L


 


Anion Gap


  14 mmol/L


(5-15)


 


Blood Urea Nitrogen


  13 mg/dL


(7-18)


 


Creatinine


  1.0 MG/DL


(0.55-1.30)


 


Estimat Glomerular Filtration


Rate > 60 mL/min


(>60)


 


Glucose Level


  179 MG/DL


()  H


 


Calcium Level


  8.9 MG/DL


(8.5-10.1)


 


Total Bilirubin


  0.4 MG/DL


(0.2-1.0)


 


Aspartate Amino Transf


(AST/SGOT) 43 U/L (15-37)


H


 


Alanine Aminotransferase


(ALT/SGPT) 47 U/L (12-78)


 


 


Alkaline Phosphatase


  138 U/L


()  H


 


Total Protein


  7.8 G/DL


(6.4-8.2)


 


Albumin


  2.5 G/DL


(3.4-5.0)  L


 


Globulin 5.3 g/dL  

















Yury Pena MD Feb 12, 2019 11:10

## 2019-02-12 NOTE — NUR
RD ASSESSMENT & RECOMMENDATIONS

SEE CARE ACTIVITY FOR COMPLETE ASSESSMENT



DAILY ESTIMATED NEEDS:

Needs based on Critical care, wound /71kg abw 

22-30  kcals/kg 

8135-8470  total kcals

1.25-2  g protein/kg

  g total protein 

25-30  mL/kg

0673-6948  total fluid mLs



NUTRITION DIAGNOSIS:

* Swallowing difficulty R/T respiratory status, as evidenced by trach/vent

dep, PEG dep.

* Increased kcal and protein needs r/t wound healing as evidenced by pt

with multiple wounds, including partial thickness wound @ L side neck

under trach collar, resolving full thickness pressure injury @ sacrum,

resolving pressure injury to L trochanter, and reabsorbing serous blister

lateral L heel, refer to WC eval.

* Altered nutrition related lab values R/T hyperglycemia, h/o DM as

evidenced by elev BGs and POC glu (300's and 400's-> 177 172 169 161 152

improved), elev A1C 7.9





CURRENT TF:Glucerna 1.5 @ 40ml/hr x 24 hrs + PS TID  





ENTERAL NUTRITION RECOMMENDATIONS:

Glucerna 1.5 @ 40ml/hr x 24 hrs + Prosource 1pkt TID  to provide 960ml, 1440kcal (+120kcal), 
79g (+33g prot), 729ml free water, 127g CHO 



* Maintain LOWER rate for improved glycemic control

     :will provide total of 127g CHO per day, 46g less CHO than previous TF

* Add Prosource 1pkt TID to meet protein needs

    (TF + Prosource TID will provide 100% est kcal/prot needs)

* HOB over 30 degrees/ Increase water flushes





ADDITIONAL RECOMMENDATIONS:

* Calibrated bedscale wt for accurate CBW  

* Monitor lytes, replete as needed, check updated phos level 

* Wound healing: Add Carlito 1pkt BID, vit C 500mg QD 

* Monitor BGs closely, need to adjust insulin/TF 

* Rec to DC D5 IVF and increase water flushes 

       -> for glycemic control and Na is now low 

.

## 2019-02-12 NOTE — NUR
CASE MANAGEMENT: REVIEW





SI: SEPSIS

T 98.2  RR 20 /83 % MECH VENT FIO2 40

WBC 12.2 H/H 10.7/32.2  





IS: MEROPENEM IV Q8HR

D5W IVF @ 100ML/HR

KEPPRA GT Q8HR

BACLOFEN GT Q8HR 





***STEP DOWN UNIT STATUS***

DCP: PATIENT IS FROM [facility]

## 2019-02-12 NOTE — NUR
NURSE NOTES:

received pt in the bed, obtunded, vent dependent, vital signs stable, no co pain, no SOB, 
skin warm and dry to touch, dressing dry and intact on sacral area, tolerate GT feeding 
well, midline on RT upper arm, dressing dry and intact, bed in low position, HOB elevated.

## 2019-02-12 NOTE — DIAGNOSTIC IMAGING REPORT
Indication: Dyspnea

 

Technique: One view of the chest

 

Comparison: 2/7/2019

 

Findings: Interim increase left lateral basilar opacity, likely reflects pleural

fluid but may reflect some infiltrate. There is also generalized circumferential

pleural thickening on the left. The right lung and pleural space remain clear. The

heart size is upper limits of normal

 

Impression: Possibly increasing left basilar pleural fluid and/or consolidation, over

5 days

 

Other stable findings as noted

## 2019-02-12 NOTE — NUR
*-* INSURANCE *-*



CLINICALS AND REVIEW FAXED TO:





[facility]

KRIS[facility]

P:[phone]

F:[phone]

## 2019-02-12 NOTE — NUR
NURSE NOTES:

Pt discharged to [facility] with RT and ambulance staff. Given report to Clif. Pt is 
awake and obtunded. No sign of acute distress noted. V/S stable and no fever. Removed Tele 
monitor. No belonging noted.

## 2019-02-12 NOTE — NUR
HAND-OFF: 

Report given to [facility]. Pt is sleeping on the bed and no sign of acute distress noted. 
Tolerated well with current Vent setting.

## 2019-02-12 NOTE — GENERAL PROGRESS NOTE
Assessment/Plan


Assessment/Plan





Assessment/Recommendations


# Leukocytosis. Likely related to underlying infection versus reactive process. 


--> Peripheral has been reviewed


--> Medications have been reviewed 


--> Imaging has been reviewed


 -->Blood cultures and urine cultures prn


--> has been started on abx, empiric treatment 


--> wbc trend: 14-->11-->9-->12->13


# Anemia of chronic disease


--> w/u has been reviewed from prior hospitalizations


# Acute pancreatitis,   status post cerebral hemorrhage with severe 

encephalopathy.


# Acute on chronic respiratory failure


--> ventilator dependent and fed via gastrostomy tube


--> Continue with albuterol sulfate and ipratropium


# Diabetes mellitus


-->Humalog insulin  with sliding scale every six hours


# Sepsis


# Gastrostomy tube dependent


# Colostomy in place


# Vegetative state





The timing of this note does not necessarily reflect the time of the patient 

was seen





Greatly appreciate consultation!





Subjective


ROS Limited/Unobtainable:  Yes


Allergies:  


Coded Allergies:  


     AZTREONAM (Verified  Allergy, Unknown, 7/23/18)


Subjective


2/1: Pt is seen in the room,on vent, G tube, leukocytosis has been improving 

and his fevers have resolved. 


2/3: on vent, resting in bed, wbc trending up, no fevers or chills,


2/4: The patient has low-grade fever with tachycardia, CXR reveals small left 

pleural effusion.


2/5: no events, labs reviewed, h/h appers stable at this time, imaging reviewed


2/6: on vent, resting, wbc trending up, no overnight events reported.  


2/7: wbc trending up, 19 today, The patient remained febrile and tachycardic, 

but hemodynamically stable.


2/8: wbc trending down, remains afebrile, no acute distress reported


2/11: seen by bedside, no events, labs reviewed, h/h appers stable at this time

, imaging reviewed


2/12 pt is resting in the bed, obtunded, vent dependent, no acute events





Objective





Last 24 Hour Vital Signs








  Date Time  Temp Pulse Resp B/P (MAP) Pulse Ox O2 Delivery O2 Flow Rate FiO2


 


2/12/19 16:30  105 17     40


 


2/12/19 16:00 98.1 104 18 130/83 (99) 100   


 


2/12/19 16:00  104      


 


2/12/19 16:00        40


 


2/12/19 16:00      Mechanical Ventilator  


 


2/12/19 15:02  104 20     40


 


2/12/19 14:00  106      


 


2/12/19 13:02  101 18     40


 


2/12/19 12:00 97.5 104 17 97/77 (84) 100   


 


2/12/19 12:00        40


 


2/12/19 12:00      Mechanical Ventilator  


 


2/12/19 10:50  99 20     40


 


2/12/19 09:10  105 19     40


 


2/12/19 08:00  103      


 


2/12/19 08:00      Mechanical Ventilator  


 


2/12/19 08:00 98.4 99 17 114/77 (89) 100   


 


2/12/19 08:00        40


 


2/12/19 06:40  103 16     40


 


2/12/19 05:08  102 17     40


 


2/12/19 04:00      Mechanical Ventilator  


 


2/12/19 04:00 99.5 103 17 120/81 (94) 100   


 


2/12/19 04:00  96      


 


2/12/19 04:00        40


 


2/12/19 03:01  97 17     40


 


2/12/19 01:28  99 16     40


 


2/12/19 00:00  104      


 


2/12/19 00:00      Mechanical Ventilator  


 


2/12/19 00:00 99.0 103 17 125/69 (87) 100   


 


2/11/19 22:57  104 19     40


 


2/11/19 22:15  104 19   Mechanical Ventilator  40


 


2/11/19 21:10  101 22     40


 


2/11/19 20:00 99.2 102 17 112/79 (90) 100   


 


2/11/19 20:00      Mechanical Ventilator  


 


2/11/19 20:00  100      


 


2/11/19 20:00        40


 


2/11/19 19:16  101 17     40

















Intake and Output  


 


 2/11/19 2/12/19





 19:00 07:00


 


Intake Total 1785 ml 1671 ml


 


Output Total 400 ml 1350 ml


 


Balance 1385 ml 321 ml


 


  


 


Free Water 150 ml 


 


IV Total 1155 ml 1191 ml


 


Tube Feeding 480 ml 480 ml


 


Output Urine Total 250 ml 1050 ml


 


Stool Total 150 ml 300 ml


 


# Voids 2 








Laboratory Tests


2/12/19 04:00: 


White Blood Count 12.2H, Red Blood Count 3.53L, Hemoglobin 10.7L, Hematocrit 

32.2L, Mean Corpuscular Volume 91, Mean Corpuscular Hemoglobin 30.2, Mean 

Corpuscular Hemoglobin Concent 33.1, Red Cell Distribution Width 16.9H, 

Platelet Count 242, Mean Platelet Volume 11.2H, Neutrophils (%) (Auto) 64.0, 

Lymphocytes (%) (Auto) 21.7, Monocytes (%) (Auto) 3.6, Eosinophils (%) (Auto) 

9.7H, Basophils (%) (Auto) 1.0, Sodium Level 133L, Potassium Level 3.5, 

Chloride Level 99, Carbon Dioxide Level 20L, Anion Gap 14, Blood Urea Nitrogen 

13, Creatinine 1.0, Estimat Glomerular Filtration Rate > 60, Glucose Level 179H

, Calcium Level 8.9, Total Bilirubin 0.4, Aspartate Amino Transf (AST/SGOT) 43H

, Alanine Aminotransferase (ALT/SGPT) 47, Alkaline Phosphatase 138H, Total 

Protein 7.8, Albumin 2.5L, Globulin 5.3


Height (Feet):  5


Height (Inches):  7.00


Weight (Pounds):  197


Objective


PHYSICAL EXAMINATION


General Appearance:  on vent      


HEENT:  normocephalic, atraumatic      


Neck:  non-tender, normal alignment      


Respiratory/Chest:  ++ VENT/trach   


Cardiovascular/Chest:  normal peripheral pulses, normal rate      


Abdomen:  normal bowel sounds ++ peg      


Extremities:  poorl range of motion











Tejas Gerber MD Feb 12, 2019 18:45

## 2019-02-12 NOTE — INFECTIOUS DISEASES PROG NOTE
Assessment/Plan


Assessment/Plan


Mr. Black is a 54 yo male with PMHx of chronic resp failure - vent/trach 

dependent, ICH, COPD, Dm2, seizure disoder, BPH, DM2,  dysphagia, G tube, 

colostomy who was admitted to the hospital on 1/27/19 with fever, hypotension 

and tachycardia. He was found to have P.a. UTI. He has been on zosyn. His 

inital leukocytosis has been improving and his fevers have resoved. 





UTI complicated


   UA (+)


   Indewlling mayorga


   UCx 1/27/19 P.a.  (R Levo, otherwise S)


   UCx 2/2/19 - Enterobacter (S Cefepime) and yeast and Pa


   CT abd 1/29/19 3 mm distal right ureteral calculus, minimal resultant 

hydronephrosis. Atrophic left kidney, containing a large staghorn calculus and 

multiple intrarenal calyceal calculi, previously described





Leukocytosis 15 on admit;  improvingwith meropenem


Febrile to 102; resolved





  -2/12 CXR: Possibly increasing left basilar pleural fluid and/or consolidation

, over 5 days


   -2/2 CXR:  Hypoventilatory lungs.  Left lung base atelectasis.





Mild pancreatitis  





Chronic resp failure - vent/trach dependent


ICH


COPD


DM


Seizure disorder


BPH


Dysphagia, G tube


Colostomy  





 Plan 





- Continue Meropenem #6/7








         - 2/7/19 SP Cefepeim #4


         - 2/4/19 SP  Zosyn # 7


        - 2/1/19 SP Vanocmcyin #4- No evidence for MRSA





- Monitor CBC/CMP and temps


We will continue to follow the patient during this hospitalization.





Subjective


Allergies:  


Coded Allergies:  


     AZTREONAM (Verified  Allergy, Unknown, 7/23/18)


Subjective


afebrile


leukocytosis improving





Objective


Vital Signs





Last 24 Hour Vital Signs








  Date Time  Temp Pulse Resp B/P (MAP) Pulse Ox O2 Delivery O2 Flow Rate FiO2


 


2/12/19 15:02  104 20     40


 


2/12/19 14:00  106      


 


2/12/19 13:02  101 18     40


 


2/12/19 12:00 97.5 104 17 97/77 (84) 100   


 


2/12/19 12:00        40


 


2/12/19 12:00      Mechanical Ventilator  


 


2/12/19 10:50  99 20     40


 


2/12/19 09:10  105 19     40


 


2/12/19 08:00  103      


 


2/12/19 08:00      Mechanical Ventilator  


 


2/12/19 08:00 98.4 99 17 114/77 (89) 100   


 


2/12/19 08:00        40


 


2/12/19 06:40  103 16     40


 


2/12/19 05:08  102 17     40


 


2/12/19 04:00      Mechanical Ventilator  


 


2/12/19 04:00 99.5 103 17 120/81 (94) 100   


 


2/12/19 04:00  96      


 


2/12/19 04:00        40


 


2/12/19 03:01  97 17     40


 


2/12/19 01:28  99 16     40


 


2/12/19 00:00  104      


 


2/12/19 00:00      Mechanical Ventilator  


 


2/12/19 00:00 99.0 103 17 125/69 (87) 100   


 


2/11/19 22:57  104 19     40


 


2/11/19 22:15  104 19   Mechanical Ventilator  40


 


2/11/19 21:10  101 22     40


 


2/11/19 20:00 99.2 102 17 112/79 (90) 100   


 


2/11/19 20:00      Mechanical Ventilator  


 


2/11/19 20:00  100      


 


2/11/19 20:00        40


 


2/11/19 19:16  101 17     40


 


2/11/19 17:14  96 17     40


 


2/11/19 16:00  101      


 


2/11/19 16:00 97.9 100 17 121/76 (91) 100   


 


2/11/19 16:00        40


 


2/11/19 16:00      Mechanical Ventilator  








Height (Feet):  5


Height (Inches):  7.00


Weight (Pounds):  197


Objective





Gen:  NAD, On vent


HEENT: NCAT, MMM, PERRL, No Oral lesion, no scleral icterus, trached


NECK:  full range of motion, supple, no meningismus, No LAD, No JVD


LUNGS:  CTAB, No W/C, No Accessory muscle use


CARDS: RRR, S1, S2, No M/R/G, 


ABD: Soft, Obese, NT, ND, No R/G, + BS, No HSM, No Masses, PEG and Colostomy (

No E/P)


: Deferred


Ext: C/C/E, Pulses 2+ B/L (DP, Rad): 


NEURO: Not responding, No spontaneous movements


SKIN:  Warm/dry, No rashes





Laboratory Tests








Test


  2/12/19


04:00


 


White Blood Count


  12.2 K/UL


(4.8-10.8)  H


 


Red Blood Count


  3.53 M/UL


(4.70-6.10)  L


 


Hemoglobin


  10.7 G/DL


(14.2-18.0)  L


 


Hematocrit


  32.2 %


(42.0-52.0)  L


 


Mean Corpuscular Volume 91 FL (80-99)  


 


Mean Corpuscular Hemoglobin


  30.2 PG


(27.0-31.0)


 


Mean Corpuscular Hemoglobin


Concent 33.1 G/DL


(32.0-36.0)


 


Red Cell Distribution Width


  16.9 %


(11.6-14.8)  H


 


Platelet Count


  242 K/UL


(150-450)


 


Mean Platelet Volume


  11.2 FL


(6.5-10.1)  H


 


Neutrophils (%) (Auto)


  64.0 %


(45.0-75.0)


 


Lymphocytes (%) (Auto)


  21.7 %


(20.0-45.0)


 


Monocytes (%) (Auto)


  3.6 %


(1.0-10.0)


 


Eosinophils (%) (Auto)


  9.7 %


(0.0-3.0)  H


 


Basophils (%) (Auto)


  1.0 %


(0.0-2.0)


 


Sodium Level


  133 MMOL/L


(136-145)  L


 


Potassium Level


  3.5 MMOL/L


(3.5-5.1)


 


Chloride Level


  99 MMOL/L


()


 


Carbon Dioxide Level


  20 MMOL/L


(21-32)  L


 


Anion Gap


  14 mmol/L


(5-15)


 


Blood Urea Nitrogen


  13 mg/dL


(7-18)


 


Creatinine


  1.0 MG/DL


(0.55-1.30)


 


Estimat Glomerular Filtration


Rate > 60 mL/min


(>60)


 


Glucose Level


  179 MG/DL


()  H


 


Calcium Level


  8.9 MG/DL


(8.5-10.1)


 


Total Bilirubin


  0.4 MG/DL


(0.2-1.0)


 


Aspartate Amino Transf


(AST/SGOT) 43 U/L (15-37)


H


 


Alanine Aminotransferase


(ALT/SGPT) 47 U/L (12-78)


 


 


Alkaline Phosphatase


  138 U/L


()  H


 


Total Protein


  7.8 G/DL


(6.4-8.2)


 


Albumin


  2.5 G/DL


(3.4-5.0)  L


 


Globulin 5.3 g/dL  











Current Medications








 Medications


  (Trade)  Dose


 Ordered  Sig/Jan


 Route


 PRN Reason  Start Time


 Stop Time Status Last Admin


Dose Admin


 


 Acetaminophen


  (Tylenol)  650 mg  Q4H  PRN


 GT


 Mild Pain/Temp > 100.5  1/28/19 08:15


 2/27/19 08:14  2/6/19 16:39


 


 


 Artificial Tears


  (Akwa-Tears)  1 drop  Q4H  PRN


 BOTH EYES


 Dry Eyes  1/28/19 07:45


 2/27/19 07:44  2/5/19 08:59


 


 


 Baclofen


  (Lioresal)  10 mg  EVERY 8  HOURS


 GT


   1/28/19 14:00


 2/27/19 13:59  2/12/19 14:16


 


 


 Chlorhexidine


 Gluconate


  (Elaine-Hex 2%)  1 applic  DAILY@2000


 TOPIC


   2/11/19 22:30


 3/13/19 22:29  2/11/19 22:33


 


 


 Dextrose  1,000 ml @ 


 100 mls/hr  Q10H


 IV


   2/7/19 00:00


 3/9/19 00:00  2/12/19 09:01


 


 


 Dextrose


  (Dextrose 50%)  25 ml  Q30M  PRN


 IV


 Hypoglycemia  1/28/19 07:45


 2/27/19 07:44   


 


 


 Dextrose


  (Dextrose 50%)  50 ml  Q30M  PRN


 IV


 Hypoglycemia  1/28/19 07:45


 2/27/19 07:44   


 


 


 Heparin Sodium


  (Porcine)


  (Heparin 5000


 units/ml)  5,000 units  EVERY 12  HOURS


 SUBQ


   1/28/19 09:00


 2/27/19 08:59  2/12/19 08:58


 


 


 Insulin Aspart


  (NovoLOG)    EVERY 4  HOURS


 SUBQ


   1/30/19 13:00


 2/27/19 11:59  2/12/19 12:35


 


 


 Insulin Detemir


  (Levemir)  15 units  Q12HR


 SUBQ


   1/29/19 00:00


 2/28/19 00:00  2/12/19 08:59


 


 


 Lansoprazole


  (Prevacid)  30 mg  DAILY


 GT


   1/31/19 09:00


 3/2/19 08:59  2/12/19 09:00


 


 


 Levetiracetam


  (Keppra)  1,000 mg  EVERY 8  HOURS


 GT


   1/28/19 14:00


 2/27/19 13:59  2/12/19 14:16


 


 


 Meropenem 1 gm/


 Sodium Chloride  55 ml @ 


 110 mls/hr  Q8H


 IVPB


   2/12/19 10:00


 2/13/19 23:59  2/12/19 09:03


 


 


 Nystatin


  (Nystatin Cr)  1 applic  EVERY 12  HOURS


 TOPIC


   1/28/19 09:00


 2/27/19 08:59  2/12/19 09:00


 


 


 Polyethylene


 Glycol


  (Miralax)  17 gm  DAILYPRN  PRN


 GT


 Constipation  1/28/19 07:45


 2/27/19 07:44   


 


 


 Silver


 Sulfadiazine


  (Silvadene Cream


 25gm)  1 applic  BEDTIME


 TOPIC


   2/2/19 21:00


 2/27/19 08:59  2/11/19 21:01


 


 


 Sitagliptin


 Phosphate


  (Januvia)  50 mg  DAILY


 GT


   1/28/19 09:00


 2/27/19 08:59  2/12/19 09:00


 


 


 Vitamin D


  (Vitamin D)  1,000 intlu  DAILY


 GT


   1/28/19 09:00


 2/27/19 08:59  2/12/19 09:00


 

















Joy Love M.D. Feb 12, 2019 15:48

## 2019-02-12 NOTE — NUR
*-* INSURANCE *-*



PATIENT HAS BEEN REFERRED BACK TO:



[facility];[phone]

F:[phone]

## 2019-02-12 NOTE — NUR
*-* DISCHARGE PLANNED *-*



PATIENT IS DISCHARGE TO:



[facility]# 10

intermediate

T:[phone] FOR NURSE TO NURSE REPORT

[facility] HAS BEEN ARRANGED FOR  AT 730PM S/W L[facility]

## 2019-02-13 NOTE — NUR
*-* INSURANCE *-*



Updated clinicals, Review and DC Sum faxed to:





[facility]

KRISSY:SANKET

P:[phone]

F:[phone]

## 2019-02-13 NOTE — DISCHARGE SUMMARY
Discharge Summary


Discharge Summary


_


DATE OF ADMISSION: 1/27/2019





DATE OF DISCHARGE: 2/12/2019








DISCHARGED BY: Dr. Bloom





REASON FOR ADMISSION:  


83 years old male with past medical history of intracerebral hemorrhage, 

chronic respiratory failure ventilator dependent with tracheostomy status, 

dysphagia, G-tube, colostomy status, COPD, seizure disorder, BPH, 

nephrolithiasis,recurrent 


urinary and respiratory infection with sepsis, was sent from the [facility] 

nursing [facility] for evaluation due to fever, tachycardia and moderate 

hypotension.  


Upon evaluation in emergency department patient was found to be febrile 101.7, 

tachycardic 124.  Blood pressure improved . 


Laboratory workup revealed evidence of significant leukocytosis WBC 17.9 ,

stable hemoglobin  and hematocrit.  Lactic acid 1.2.  


Stable electrolytes.  


BUN 36, creatinine 1.8.  


Glucose 434.  


Troponin negative.  Pro .  EKG reveals sinus tachycardia no acute 

ischemic changes 


Stable LFT.  Elevated lipase- 502.  


Albumin 2.8.  


Chest x-ray revealed right lower lobe atelectasis and left pleural effusion 

versus left pleural thickening.  


Patient was admitted for further management.


 


CONSULTANTS:


pulmonary Dr. Pena


ID specialist Dr. Fitzgerald


hematologist/oncologist Dr. Gerber


 


 


HOSPITAL COURSE: 


Patient admitted to [facility].  


Ventilator support and tracheostomy care provided.  


Patient started on empiric antibiotic as per ID specialist recommendation.  


Initial urine culture revealed Pseudomonas aeruginosa and repeated urine 

culture revealed Pseudomonas aeruginosa, Enterobacter and Candida.  


Blood cultures were negative.  


Rapid influenza screen test was negative.  


Patient had persistent leukocytosis but with  trend down.  Fevers resolved.


ID  specialist recommended to complete  additional day of meropenem at the 

facility and then discontinue antibiotics and monitor patient clinically off 

antibiotics for fever and  leukocytosis.  


Pulmonologist followed.  


Patient was followed up with CXR.   


No signs of respiratory distress on current  ventilator settings. 


Meticulous pulmonary toilet provided with handheld nebulizer along with 

suctioning as needed.  





CT of the abdomen and pelvis revealed findings suspicious for acute 

pancreatitis along with   evidence of multiply renal stones,  including 3 mm 

distal right ureteral calculus at the uretero-vesicular junction 


with minimal resulting hydronephrosis, bilateral intrarenal calculi, somewhat 

atrophic left kidney containing a large staghorn calculus and multiple right 

intrarenal stones as  seen before. 


Patient was   initially  kept n.p.o. for acute pancreatitis  and  started on 

the IV fluids.  


Lipase in two days trended down to normal -277.  


Patient slowly started on tube feeding with strict aspiration / reflux 

precautions. 


DVT and GI  prophylaxis provided.  


Nutritionist recommendation regarding G-tube formula,  goal rate and protein 

supplements implemented in plan of care.





Blood sugar was managed with Starlix,  long-acting Levemir and sliding scale of

  short acting insulin as needed.  


Hemoglobin A1c 7.8.  


Patient will need further optimization of anti-glycemic regimen as outpatient 

to bring hemoglobin A1c  under goal.  


Renal electrolyte and electrolytes were closely monitored.


Electrolytes corrected as needed.  Nephrotoxins were avoided. 


With IV hydration BUN from 26 down to 13 and creatinine from initial 1.8 down 

to 1.0. 


Acute kidney injury resolved, was  likely due to combination of dehydration and 

infections process. along with likely contributing nephrolithiasis.   


Seizure precaution maintained.  Keppra continued.  


No evidence of seizure activity while in the hospital.


Pain management was addressed as needed.  


Bowel regimen instituted.  


Hemoglobin and hematocrit were closely monitored with goal to keep hemoglobin 

above 7.  


Hematologist followed.  


Anemia workup was reviewed from previous admission and was consistent with 

anemia of chronic disease.  No need for transfusion.


Upon discharge hemoglobin 10.7, hematocrit 32.2.   


Patient symptomatically improved and was ready for  discharge.  


 had a challenging time with placing the patient.  


Finally placement was found at the skilled nursing facility under correction 

care.  


Patient was subsequently transferred to the facility via ambulance for further 

management.





FINAL DIAGNOSES: 


Sepsis , likely due to complicated UTI


Complicated urinary tract infection


Acute on chronic respiratory failure


Ventilator dependent respiratory failure with tracheostomy


Acute kidney injury- resolved


Dysphagia , gastrostomy tube feeding 


Diabetes mellitus


Acute pancreatitis


Anemia of chronic disease


History of intracranial hemorrhage


COPD


Diabetes mellitus


BPH 


Colostomy care


Vegetative state





DISCHARGE MEDICATIONS:


See Medication Reconciliation list.





DISCHARGE INSTRUCTIONS:


Patient was discharged to [facility] nursing [facility] under correction care.

  








I have been assigned to dictate discharge summary for this account. 


I was not involved in the patient's management.











Isabel Aguilar NP Feb 13, 2019 14:21

## 2019-11-01 NOTE — NUR
Anesthesia Evaluation     Patient summary reviewed   NPO Solid Status: > 8 hours  NPO Liquid Status: > 8 hours           Airway   Mallampati: II  TM distance: >3 FB  Possible difficult intubation  Dental    (+) upper dentures and lower dentures    Pulmonary    (+) a smoker Current Abstained day of surgery,   Cardiovascular - normal exam  Exercise tolerance: good (4-7 METS)    (+) hypertension well controlled 2 medications or greater, hyperlipidemia,       Neuro/Psych  (+) seizures (Last seizure 2 years ago) well controlled, psychiatric history Anxiety and Depression,     GI/Hepatic/Renal/Endo    (+) obesity, morbid obesity, GERD,      Musculoskeletal     (+) chronic pain,   Abdominal   (+) obese,    Substance History   (+) drug use (Patient on Suboxone)     OB/GYN      Comment: Hysterectomy        Other                        Anesthesia Plan    ASA 3     MAC     intravenous induction   Anesthetic plan, all risks, benefits, and alternatives have been provided, discussed and informed consent has been obtained with: patient.    Plan discussed with CRNA.       NURSE NOTES:

Given report from [facility]. Received Pt is resting on the bed and obtunded. Trach to Vent 
dependent setting with Ac: 16, T; 600, P:5, and FiO2 40% and SaO2 100%. BT : 99.2F noted. 
Provided cooling measure. Given tracheal and oral suction. Provided oral care. Applied 
cooling measure. On G-tube feeding with Glucerna 1.5 @ 40cc/hr. No residual noted. Zhao 
cath out and applied new condom cath. Dressing is clean and dry on wound area. Changed 
position. On colostomy bag and yellowish liquid stool noted. Rt. upper arm mid line area 
dressing is intact and patent. Placed fall and seizure precaution. Pt will discharge today 
and awaiting ambulance. Will continue to care plan.

## 2019-11-24 ENCOUNTER — HOSPITAL ENCOUNTER (INPATIENT)
Dept: HOSPITAL 72 - EMR | Age: 54
LOS: 23 days | DRG: 853 | End: 2019-12-17
Payer: COMMERCIAL

## 2019-11-24 VITALS — DIASTOLIC BLOOD PRESSURE: 85 MMHG | SYSTOLIC BLOOD PRESSURE: 123 MMHG

## 2019-11-24 VITALS — SYSTOLIC BLOOD PRESSURE: 158 MMHG | DIASTOLIC BLOOD PRESSURE: 87 MMHG

## 2019-11-24 VITALS — DIASTOLIC BLOOD PRESSURE: 85 MMHG | SYSTOLIC BLOOD PRESSURE: 128 MMHG

## 2019-11-24 VITALS — SYSTOLIC BLOOD PRESSURE: 127 MMHG | DIASTOLIC BLOOD PRESSURE: 87 MMHG

## 2019-11-24 VITALS — SYSTOLIC BLOOD PRESSURE: 132 MMHG | DIASTOLIC BLOOD PRESSURE: 76 MMHG

## 2019-11-24 VITALS — SYSTOLIC BLOOD PRESSURE: 161 MMHG | DIASTOLIC BLOOD PRESSURE: 91 MMHG

## 2019-11-24 VITALS — DIASTOLIC BLOOD PRESSURE: 83 MMHG | SYSTOLIC BLOOD PRESSURE: 126 MMHG

## 2019-11-24 VITALS — SYSTOLIC BLOOD PRESSURE: 113 MMHG | DIASTOLIC BLOOD PRESSURE: 89 MMHG

## 2019-11-24 VITALS — SYSTOLIC BLOOD PRESSURE: 129 MMHG | DIASTOLIC BLOOD PRESSURE: 75 MMHG

## 2019-11-24 VITALS — DIASTOLIC BLOOD PRESSURE: 85 MMHG | SYSTOLIC BLOOD PRESSURE: 138 MMHG

## 2019-11-24 VITALS — DIASTOLIC BLOOD PRESSURE: 94 MMHG | SYSTOLIC BLOOD PRESSURE: 145 MMHG

## 2019-11-24 VITALS — DIASTOLIC BLOOD PRESSURE: 91 MMHG | SYSTOLIC BLOOD PRESSURE: 130 MMHG

## 2019-11-24 VITALS — WEIGHT: 193.31 LBS | HEIGHT: 67 IN | BODY MASS INDEX: 30.34 KG/M2

## 2019-11-24 VITALS — SYSTOLIC BLOOD PRESSURE: 126 MMHG | DIASTOLIC BLOOD PRESSURE: 83 MMHG

## 2019-11-24 VITALS — DIASTOLIC BLOOD PRESSURE: 82 MMHG | SYSTOLIC BLOOD PRESSURE: 130 MMHG

## 2019-11-24 VITALS — SYSTOLIC BLOOD PRESSURE: 142 MMHG | DIASTOLIC BLOOD PRESSURE: 104 MMHG

## 2019-11-24 VITALS — SYSTOLIC BLOOD PRESSURE: 153 MMHG | DIASTOLIC BLOOD PRESSURE: 101 MMHG

## 2019-11-24 VITALS — DIASTOLIC BLOOD PRESSURE: 93 MMHG | SYSTOLIC BLOOD PRESSURE: 139 MMHG

## 2019-11-24 DIAGNOSIS — N39.498: ICD-10-CM

## 2019-11-24 DIAGNOSIS — E87.5: ICD-10-CM

## 2019-11-24 DIAGNOSIS — E86.0: ICD-10-CM

## 2019-11-24 DIAGNOSIS — E11.22: ICD-10-CM

## 2019-11-24 DIAGNOSIS — R40.3: ICD-10-CM

## 2019-11-24 DIAGNOSIS — L89.153: ICD-10-CM

## 2019-11-24 DIAGNOSIS — N17.0: ICD-10-CM

## 2019-11-24 DIAGNOSIS — Z93.1: ICD-10-CM

## 2019-11-24 DIAGNOSIS — Z93.0: ICD-10-CM

## 2019-11-24 DIAGNOSIS — R71.0: ICD-10-CM

## 2019-11-24 DIAGNOSIS — R13.10: ICD-10-CM

## 2019-11-24 DIAGNOSIS — Z99.11: ICD-10-CM

## 2019-11-24 DIAGNOSIS — N18.9: ICD-10-CM

## 2019-11-24 DIAGNOSIS — I26.99: ICD-10-CM

## 2019-11-24 DIAGNOSIS — N35.812: ICD-10-CM

## 2019-11-24 DIAGNOSIS — R31.9: ICD-10-CM

## 2019-11-24 DIAGNOSIS — A41.9: Primary | ICD-10-CM

## 2019-11-24 DIAGNOSIS — G40.909: ICD-10-CM
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
## 2019-11-24 DIAGNOSIS — N20.0: ICD-10-CM

## 2019-11-24 DIAGNOSIS — J96.20: ICD-10-CM

## 2019-11-24 DIAGNOSIS — Z86.73: ICD-10-CM

## 2019-11-24 DIAGNOSIS — N40.1: ICD-10-CM

## 2019-11-24 DIAGNOSIS — E11.65: ICD-10-CM

## 2019-11-24 DIAGNOSIS — R65.21: ICD-10-CM

## 2019-11-24 DIAGNOSIS — E83.42: ICD-10-CM

## 2019-11-24 DIAGNOSIS — J18.9: ICD-10-CM

## 2019-11-24 DIAGNOSIS — R33.8: ICD-10-CM

## 2019-11-24 DIAGNOSIS — Z93.3: ICD-10-CM

## 2019-11-24 DIAGNOSIS — G92: ICD-10-CM

## 2019-11-24 DIAGNOSIS — I12.9: ICD-10-CM

## 2019-11-24 DIAGNOSIS — N39.0: ICD-10-CM

## 2019-11-24 DIAGNOSIS — E87.0: ICD-10-CM

## 2019-11-24 LAB
ADD MANUAL DIFF: YES
ALBUMIN SERPL-MCNC: 2.8 G/DL (ref 3.4–5)
ALBUMIN SERPL-MCNC: 3.4 G/DL (ref 3.4–5)
ALBUMIN/GLOB SERPL: 0.5 {RATIO} (ref 1–2.7)
ALBUMIN/GLOB SERPL: 0.6 {RATIO} (ref 1–2.7)
ALP SERPL-CCNC: 44 U/L (ref 46–116)
ALP SERPL-CCNC: 63 U/L (ref 46–116)
ALT SERPL-CCNC: 29 U/L (ref 12–78)
ALT SERPL-CCNC: 35 U/L (ref 12–78)
ANION GAP SERPL CALC-SCNC: 11 MMOL/L (ref 5–15)
ANION GAP SERPL CALC-SCNC: 16 MMOL/L (ref 5–15)
APPEARANCE UR: (no result)
APTT PPP: YELLOW S
AST SERPL-CCNC: 40 U/L (ref 15–37)
AST SERPL-CCNC: 90 U/L (ref 15–37)
BILIRUB SERPL-MCNC: 0.5 MG/DL (ref 0.2–1)
BILIRUB SERPL-MCNC: 0.6 MG/DL (ref 0.2–1)
BUN SERPL-MCNC: 42 MG/DL (ref 7–18)
BUN SERPL-MCNC: 43 MG/DL (ref 7–18)
CALCIUM SERPL-MCNC: 10.4 MG/DL (ref 8.5–10.1)
CALCIUM SERPL-MCNC: 12 MG/DL (ref 8.5–10.1)
CHLORIDE SERPL-SCNC: 110 MMOL/L (ref 98–107)
CHLORIDE SERPL-SCNC: 118 MMOL/L (ref 98–107)
CK MB SERPL-MCNC: < 0.5 NG/ML (ref 0–3.6)
CK SERPL-CCNC: 222 U/L (ref 26–308)
CO2 SERPL-SCNC: 20 MMOL/L (ref 21–32)
CO2 SERPL-SCNC: 23 MMOL/L (ref 21–32)
CREAT SERPL-MCNC: 1.7 MG/DL (ref 0.55–1.3)
CREAT SERPL-MCNC: 1.8 MG/DL (ref 0.55–1.3)
ERYTHROCYTE [DISTWIDTH] IN BLOOD BY AUTOMATED COUNT: 17.8 % (ref 11.6–14.8)
GLOBULIN SER-MCNC: 5 G/DL
GLOBULIN SER-MCNC: 6.8 G/DL
GLUCOSE UR STRIP-MCNC: (no result) MG/DL
HCT VFR BLD CALC: 40 % (ref 42–52)
HGB BLD-MCNC: 12.4 G/DL (ref 14.2–18)
KETONES UR QL STRIP: (no result)
LEUKOCYTE ESTERASE UR QL STRIP: (no result)
MCV RBC AUTO: 99 FL (ref 80–99)
NITRITE UR QL STRIP: NEGATIVE
PH UR STRIP: 5 [PH] (ref 4.5–8)
PHOSPHATE SERPL-MCNC: 2.8 MG/DL (ref 2.5–4.9)
PLATELET # BLD: 306 K/UL (ref 150–450)
POTASSIUM SERPL-SCNC: 4.4 MMOL/L (ref 3.5–5.1)
POTASSIUM SERPL-SCNC: 6.7 MMOL/L (ref 3.5–5.1)
PROT UR QL STRIP: (no result)
RBC # BLD AUTO: 4.04 M/UL (ref 4.7–6.1)
SODIUM SERPL-SCNC: 149 MMOL/L (ref 136–145)
SODIUM SERPL-SCNC: 149 MMOL/L (ref 136–145)
SP GR UR STRIP: 1.01 (ref 1–1.03)
UROBILINOGEN UR-MCNC: NORMAL MG/DL (ref 0–1)
WBC # BLD AUTO: 24.7 K/UL (ref 4.8–10.8)

## 2019-11-24 PROCEDURE — 87081 CULTURE SCREEN ONLY: CPT

## 2019-11-24 PROCEDURE — 76700 US EXAM ABDOM COMPLETE: CPT

## 2019-11-24 PROCEDURE — 83735 ASSAY OF MAGNESIUM: CPT

## 2019-11-24 PROCEDURE — 86850 RBC ANTIBODY SCREEN: CPT

## 2019-11-24 PROCEDURE — 85025 COMPLETE CBC W/AUTO DIFF WBC: CPT

## 2019-11-24 PROCEDURE — 81003 URINALYSIS AUTO W/O SCOPE: CPT

## 2019-11-24 PROCEDURE — 83550 IRON BINDING TEST: CPT

## 2019-11-24 PROCEDURE — 87205 SMEAR GRAM STAIN: CPT

## 2019-11-24 PROCEDURE — 96365 THER/PROPH/DIAG IV INF INIT: CPT

## 2019-11-24 PROCEDURE — 87075 CULTR BACTERIA EXCEPT BLOOD: CPT

## 2019-11-24 PROCEDURE — 85610 PROTHROMBIN TIME: CPT

## 2019-11-24 PROCEDURE — 83970 ASSAY OF PARATHORMONE: CPT

## 2019-11-24 PROCEDURE — 36415 COLL VENOUS BLD VENIPUNCTURE: CPT

## 2019-11-24 PROCEDURE — 86710 INFLUENZA VIRUS ANTIBODY: CPT

## 2019-11-24 PROCEDURE — 88104 CYTOPATH FL NONGYN SMEARS: CPT

## 2019-11-24 PROCEDURE — 85651 RBC SED RATE NONAUTOMATED: CPT

## 2019-11-24 PROCEDURE — 83540 ASSAY OF IRON: CPT

## 2019-11-24 PROCEDURE — 86920 COMPATIBILITY TEST SPIN: CPT

## 2019-11-24 PROCEDURE — 83880 ASSAY OF NATRIURETIC PEPTIDE: CPT

## 2019-11-24 PROCEDURE — 71260 CT THORAX DX C+: CPT

## 2019-11-24 PROCEDURE — 77001 FLUOROGUIDE FOR VEIN DEVICE: CPT

## 2019-11-24 PROCEDURE — 82962 GLUCOSE BLOOD TEST: CPT

## 2019-11-24 PROCEDURE — 99291 CRITICAL CARE FIRST HOUR: CPT

## 2019-11-24 PROCEDURE — 82607 VITAMIN B-12: CPT

## 2019-11-24 PROCEDURE — 86140 C-REACTIVE PROTEIN: CPT

## 2019-11-24 PROCEDURE — 71045 X-RAY EXAM CHEST 1 VIEW: CPT

## 2019-11-24 PROCEDURE — 82803 BLOOD GASES ANY COMBINATION: CPT

## 2019-11-24 PROCEDURE — 74177 CT ABD & PELVIS W/CONTRAST: CPT

## 2019-11-24 PROCEDURE — 36600 WITHDRAWAL OF ARTERIAL BLOOD: CPT

## 2019-11-24 PROCEDURE — 84484 ASSAY OF TROPONIN QUANT: CPT

## 2019-11-24 PROCEDURE — 87324 CLOSTRIDIUM AG IA: CPT

## 2019-11-24 PROCEDURE — 80061 LIPID PANEL: CPT

## 2019-11-24 PROCEDURE — 36569 INSJ PICC 5 YR+ W/O IMAGING: CPT

## 2019-11-24 PROCEDURE — 74018 RADEX ABDOMEN 1 VIEW: CPT

## 2019-11-24 PROCEDURE — 85007 BL SMEAR W/DIFF WBC COUNT: CPT

## 2019-11-24 PROCEDURE — 94664 DEMO&/EVAL PT USE INHALER: CPT

## 2019-11-24 PROCEDURE — 96366 THER/PROPH/DIAG IV INF ADDON: CPT

## 2019-11-24 PROCEDURE — 93306 TTE W/DOPPLER COMPLETE: CPT

## 2019-11-24 PROCEDURE — 82550 ASSAY OF CK (CPK): CPT

## 2019-11-24 PROCEDURE — 94003 VENT MGMT INPAT SUBQ DAY: CPT

## 2019-11-24 PROCEDURE — 93005 ELECTROCARDIOGRAM TRACING: CPT

## 2019-11-24 PROCEDURE — 80048 BASIC METABOLIC PNL TOTAL CA: CPT

## 2019-11-24 PROCEDURE — 82164 ANGIOTENSIN I ENZYME TEST: CPT

## 2019-11-24 PROCEDURE — 81001 URINALYSIS AUTO W/SCOPE: CPT

## 2019-11-24 PROCEDURE — 82728 ASSAY OF FERRITIN: CPT

## 2019-11-24 PROCEDURE — 94002 VENT MGMT INPAT INIT DAY: CPT

## 2019-11-24 PROCEDURE — 82553 CREATINE MB FRACTION: CPT

## 2019-11-24 PROCEDURE — 84100 ASSAY OF PHOSPHORUS: CPT

## 2019-11-24 PROCEDURE — 84550 ASSAY OF BLOOD/URIC ACID: CPT

## 2019-11-24 PROCEDURE — 83036 HEMOGLOBIN GLYCOSYLATED A1C: CPT

## 2019-11-24 PROCEDURE — 76937 US GUIDE VASCULAR ACCESS: CPT

## 2019-11-24 PROCEDURE — 84443 ASSAY THYROID STIM HORMONE: CPT

## 2019-11-24 PROCEDURE — 80053 COMPREHEN METABOLIC PANEL: CPT

## 2019-11-24 PROCEDURE — 80202 ASSAY OF VANCOMYCIN: CPT

## 2019-11-24 PROCEDURE — 86901 BLOOD TYPING SEROLOGIC RH(D): CPT

## 2019-11-24 PROCEDURE — 5A1955Z RESPIRATORY VENTILATION, GREATER THAN 96 CONSECUTIVE HOURS: ICD-10-PCS

## 2019-11-24 PROCEDURE — 95819 EEG AWAKE AND ASLEEP: CPT

## 2019-11-24 PROCEDURE — 80076 HEPATIC FUNCTION PANEL: CPT

## 2019-11-24 PROCEDURE — 87070 CULTURE OTHR SPECIMN AEROBIC: CPT

## 2019-11-24 PROCEDURE — 93970 EXTREMITY STUDY: CPT

## 2019-11-24 PROCEDURE — 87040 BLOOD CULTURE FOR BACTERIA: CPT

## 2019-11-24 PROCEDURE — 82977 ASSAY OF GGT: CPT

## 2019-11-24 PROCEDURE — 87181 SC STD AGAR DILUTION PER AGT: CPT

## 2019-11-24 PROCEDURE — 85730 THROMBOPLASTIN TIME PARTIAL: CPT

## 2019-11-24 PROCEDURE — 82746 ASSAY OF FOLIC ACID SERUM: CPT

## 2019-11-24 PROCEDURE — 83605 ASSAY OF LACTIC ACID: CPT

## 2019-11-24 PROCEDURE — 94150 VITAL CAPACITY TEST: CPT

## 2019-11-24 PROCEDURE — 86900 BLOOD TYPING SEROLOGIC ABO: CPT

## 2019-11-24 PROCEDURE — 87086 URINE CULTURE/COLONY COUNT: CPT

## 2019-11-24 RX ADMIN — CLOBETASOL PROPIONATE SCH APPLIC: 0.5 OINTMENT TOPICAL at 21:01

## 2019-11-24 RX ADMIN — HEPARIN SODIUM SCH UNITS: 5000 INJECTION INTRAVENOUS; SUBCUTANEOUS at 21:05

## 2019-11-24 RX ADMIN — INSULIN ASPART SCH UNITS: 100 INJECTION, SOLUTION INTRAVENOUS; SUBCUTANEOUS at 21:09

## 2019-11-24 RX ADMIN — LEVETIRACETAM SCH MLS/HR: 5 INJECTION INTRAVENOUS at 21:34

## 2019-11-24 RX ADMIN — MEROPENEM SCH MLS/HR: 1 INJECTION INTRAVENOUS at 21:03

## 2019-11-24 RX ADMIN — Medication SCH MG: at 17:39

## 2019-11-24 RX ADMIN — INSULIN ASPART SCH UNITS: 100 INJECTION, SOLUTION INTRAVENOUS; SUBCUTANEOUS at 16:55

## 2019-11-24 NOTE — DIAGNOSTIC IMAGING REPORT
EXAM:

  XR Chest, 1 View

 

CLINICAL HISTORY:

  Tachypnea

 

TECHNIQUE:

  Frontal view of the chest.

 

COMPARISON:

Chest x-rays dated 2/12/19.

 

FINDINGS:

  Lungs:  Geographic density overlying the right upper lung.  Pulmonary 

vascular congestion.  Subsegmental atelectasis versus infiltrate in the 

left lung base.

  Pleural space:  Small left pleural effusion.

  Heart:  Unremarkable.  No cardiomegaly.

  Mediastinum:  Unremarkable.

  Bones/joints:  Unremarkable.

  Tubes, lines and devices:  Tracheostomy tube with expected positioning. 

Telemetry leads overlie the thorax.

 

IMPRESSION:     

1.  Geographic density overlying the right upper lung.  This likely 

represents overlying soft tissue although cannot exclude an underlying 

consolidation/pneumonia.

2.  Pulmonary vascular congestion.

3.  Small left pleural effusion.

4.  Subsegmental atelectasis versus infiltrate in the left lung base.

## 2019-11-24 NOTE — EMERGENCY ROOM REPORT
History of Present Illness


General


Chief Complaint:  General Complaint


Source:  Medical Record, EMS





Present Illness


HPI


This patient presents from a skilled nursing facility.  At baseline he is 

ventilator dependent with tracheostomy.  He has a history of respiratory 

failure in a persistent vegetative state.  He is brought in by EMS because the 

nursing facility noted that his heart rate was elevated.  The patient himself 

is agitated and is unable to give any kind of history.  There was no report of 

any other symptoms.


Allergies:  


Coded Allergies:  


     AZTREONAM (Verified  Allergy, Unknown, 7/23/18)





Patient History


Past Medical History:  see triage record, old chart reviewed, DM, HTN, seizures

, renal disease


Past Surgical History:  other - Trach, PEG, Colostomy


Social History:  Denies: smoking, alcohol use, drug use


Reviewed Nursing Documentation:  PMH: Agreed; PSxH: Agreed





Nursing Documentation-PMH


Hx Hypertension:  Yes


Hx COPD:  Yes


Hx Diabetes:  Yes


Hx Cancer:  No


Hx Gastrointestinal Problems:  Yes


Hx Neurological Problems:  Yes


Hx Seizures:  Yes





Review of Systems


All Other Systems:  limited





Physical Exam





Vital Signs








  Date Time  Temp Pulse Resp B/P (MAP) Pulse Ox O2 Delivery O2 Flow Rate FiO2


 


11/24/19 08:30 102.9 153 20 164/99 (120) 96 Ambu-Bag  


 


11/24/19 08:45        96


 


11/24/19 08:45       60.0 








Sp02 EP Interpretation:  reviewed, normal


General Appearance:  no apparent distress, other, Chronically Ill


Head:  normocephalic, atraumatic


ENT:  no angioedema


Neck:  full range of motion, supple/symm/no masses, tracheotomy - Trach in place


Respiratory:  no respiratory distress, rhonchi, other - On ventilator


Cardiovascular #1:  bradycardia


Gastrointestinal:  normal bowel sounds, soft, non-distended, other - Colostomy 

bag in place with stool


Rectal:  deferred


Musculoskeletal:  other - At baseline


Neurologic:  other - Vegatative state at baseline


Psychiatric:  other - vegatative state at baseline


Skin:  Decubitus/Ulcer - See RN skin exam, other - anasarca.





Medical Decision Making


Diagnostic Impression:  


 Primary Impression:  


 Sepsis


 Additional Impressions:  


 Tachycardia


 Fever


 Hyperglycemia


 Acute on chronic renal insufficiency


ER Course


This patient presents with sepsis.  I am unsure of the etiology.  Possibly a 

urinary source, however, urine was unable to be obtained secondary to urethral 

strictures and a catheter was unable to be passed.  Patient could be bacteremic 

with a pulmonary source also, although, there was no evidence of pneumonia on 

chest x-ray.  Regardless, the patient was given broad-spectrum antibiotics and 

aggressive IV fluid resuscitation.  The patient was also hyperglycemic, which I 

suspect is secondary to the infection.  Patient's white blood cell count is 24,

000.  The patient is admitted to the ICU for further evaluation and treatment.





This patient is critically ill.  This patient required complex medical decision-

making, aggressive intervention, extensive laboratory workup and monitoring.





Critical care time: 40 minutes.





Laboratory Tests








Test


  11/24/19


08:40 11/24/19


09:40 11/24/19


10:40


 


White Blood Count


  24.7 K/UL


(4.8-10.8)  *H 


  


 


 


Red Blood Count


  4.04 M/UL


(4.70-6.10)  L 


  


 


 


Hemoglobin


  12.4 G/DL


(14.2-18.0)  L 


  


 


 


Hematocrit


  40.0 %


(42.0-52.0)  L 


  


 


 


Mean Corpuscular Volume 99 FL (80-99)    


 


Mean Corpuscular Hemoglobin


  30.6 PG


(27.0-31.0) 


  


 


 


Mean Corpuscular Hemoglobin


Concent 30.9 G/DL


(32.0-36.0)  L 


  


 


 


Red Cell Distribution Width


  17.8 %


(11.6-14.8)  H 


  


 


 


Platelet Count


  306 K/UL


(150-450) 


  


 


 


Mean Platelet Volume


  8.7 FL


(6.5-10.1) 


  


 


 


Neutrophils (%) (Auto)


  % (45.0-75.0)


  


  


 


 


Lymphocytes (%) (Auto)


  % (20.0-45.0)


  


  


 


 


Monocytes (%) (Auto)  % (1.0-10.0)    


 


Eosinophils (%) (Auto)  % (0.0-3.0)    


 


Basophils (%) (Auto)  % (0.0-2.0)    


 


Differential Total Cells


Counted 100  


  


  


 


 


Neutrophils % (Manual) 70 % (45-75)    


 


Lymphocytes % (Manual) 16 % (20-45)  L  


 


Monocytes % (Manual) 4 % (1-10)    


 


Eosinophils % (Manual) 10 % (0-3)  H  


 


Basophils % (Manual) 0 % (0-2)    


 


Band Neutrophils 0 % (0-8)    


 


Platelet Estimate Adequate    


 


Platelet Morphology Normal    


 


Hypochromasia 1+    


 


Anisocytosis 1+    


 


Sodium Level


  149 MMOL/L


(136-145)  H 


  149 MMOL/L


(136-145)  H


 


Potassium Level


  6.7 MMOL/L


(3.5-5.1)  *H 


  4.4 MMOL/L


(3.5-5.1)


 


Chloride Level


  110 MMOL/L


()  H 


  118 MMOL/L


()  H


 


Carbon Dioxide Level


  23 MMOL/L


(21-32) 


  20 MMOL/L


(21-32)  L


 


Anion Gap


  16 mmol/L


(5-15)  H 


  11 mmol/L


(5-15)


 


Blood Urea Nitrogen


  43 mg/dL


(7-18)  H 


  42 mg/dL


(7-18)  H


 


Creatinine


  1.7 MG/DL


(0.55-1.30)  H 


  1.8 MG/DL


(0.55-1.30)  H


 


Estimate Glomerular


Filtration Rate 51.5 mL/min


(>60) 


  48.2 mL/min


(>60)


 


Glucose Level


  484 MG/DL


()  H 


  409 MG/DL


()  H


 


Calcium Level


  12.0 MG/DL


(8.5-10.1)  H 


  10.4 MG/DL


(8.5-10.1)  H


 


Phosphorus Level


  2.8 MG/DL


(2.5-4.9) 


  


 


 


Magnesium Level


  2.7 MG/DL


(1.8-2.4)  H 


  


 


 


Total Bilirubin


  0.6 MG/DL


(0.2-1.0) 


  0.5 MG/DL


(0.2-1.0)


 


Aspartate Amino Transferase


(AST) 90 U/L (15-37)


H 


  40 U/L (15-37)


H


 


Alanine Aminotransferase (ALT)


  35 U/L (12-78)


  


  29 U/L (12-78)


 


 


Alkaline Phosphatase


  63 U/L


() 


  44 U/L


()  L


 


Total Creatine Kinase


  222 U/L


() 


  


 


 


Creatine Kinase MB


  < 0.5 NG/ML


(0.0-3.6) 


  


 


 


Creatine Kinase MB Relative


Index 0.2  


  


  


 


 


Troponin I


  0.003 ng/mL


(0.000-0.056) 


  


 


 


Total Protein


  10.2 G/DL


(6.4-8.2)  H 


  7.8 G/DL


(6.4-8.2)


 


Albumin


  3.4 G/DL


(3.4-5.0) 


  2.8 G/DL


(3.4-5.0)  L


 


Globulin 6.8 g/dL    5.0 g/dL  


 


Albumin/Globulin Ratio


  0.5 (1.0-2.7)


L 


  0.6 (1.0-2.7)


L


 


Lactic Acid Level


  


  1.10 mmol/L


(0.4-2.0) 


 








Microbiology








 Date/Time


Source Procedure


Growth Status


 


 


 11/24/19 09:40


Nasal Nares - Final Complete


 


 11/24/19 09:40


Nasal Nares - Final Complete








EKG Diagnostic Results


Rate:  tachycardiac


Rhythm:  other - S.tachycardia


ST Segments:  other - NSST





Rhythm Strip Diag. Results


EP Interpretation:  yes


Rate:  130's


Rhythm:  other - S.tachycardia





Chest X-Ray Diagnostic Results


Chest X-Ray Diagnostic Results :  


   Chest X-Ray Ordered:  Yes


   # of Views/Limited/Complete:  1 View


   Indication:  Other


   EP Interpretation:  Yes


   Interpretation:  no consolidation, no effusion, no pneumothorax, no acute 

cardiopulmonary disease


   Impression:  No acute disease


   Electronically Signed by:  Eden Bedoya DO





Last Vital Signs








  Date Time  Temp Pulse Resp B/P (MAP) Pulse Ox O2 Delivery O2 Flow Rate FiO2


 


11/24/19 10:15  149 17 127/87 99 Mechanical Ventilator  


 


11/24/19 09:12       60.0 30


 


11/24/19 08:45 102.9       








Disposition:  ADMITTED AS INPATIENT


Condition:  Critical


Referrals:  


Etienne Bloom MD (PCP)











Eden Bedoya DO Nov 24, 2019 10:32

## 2019-11-24 NOTE — CONSULTATION
DATE OF CONSULTATION:  11/24/2019

CARDIOLOGY CONSULTATION



CONSULTING PHYSICIAN:  Robert Chan M.D.



REFERRING PHYSICIAN:  Etienne Bloom M.D.



REASON FOR CONSULTATION:  Tachycardia.



HISTORY OF PRESENT ILLNESS:  This is a debilitated male, who is ventilator

dependent and has a trach and is in a persistent vegetative state due to

prior head trauma who was brought into the emergency room from the skilled

nursing facility because of rapid heart rate.  The patient is unable to

give any historical data.



PAST MEDICAL HISTORY:  From records includes colostomy, tracheostomy,

gastrostomy tube, type 2 diabetes mellitus, hypertension, seizure

disorder, chronic kidney disease.



SOCIAL HISTORY:  No record regarding smoking, alcohol, or substance

abuse.



MEDICATIONS:  Reviewed.



ALLERGIES:  Aztreonam.



FAMILY HISTORY:  Not known.



REVIEW OF SYSTEMS:  Not obtainable.



PHYSICAL EXAMINATION:

VITAL SIGNS:  Temperature 102.9, blood pressure 164/99, heart rate 153,

respirations 20.  He is on a ventilator via trach.

GENERAL:  Noncommunicative.  Tongue fasciculation.  Moderate edema.

LUNGS:  Diminished breath sounds.

CARDIAC:  Regular rhythm.  Rapid rate.  Normal S1, S2.

ABDOMEN:  Soft with G-tube and colostomy bag.



DIAGNOSTIC DATA:  EKG with sinus tachycardia, nonspecific ST

change.



LABORATORY DATA:  White count 24.7, hemoglobin 12.3.  Urinalysis with too

numerous to count white cells.  Initial potassium 6.7, repeated 4.4,

sodium 149, chloride 118, bicarb 20, BUN 42, creatinine 1.8.  Lactic acid

1.1.  Calcium 10.4.  Albumin 2.8.  Troponin negative.



IMPRESSION:

1. Urinary tract infection.

2. Sepsis.

3. Secondary sinus tachycardia.

4. Dehydration.

5. _________.

6. Hypovolemia.

7. Acute on chronic kidney injury.

8. Seizure disorder.

9. Advanced encephalopathy due to head trauma.

10. Ventilator-dependent respiratory failure.



PLAN:

1. Panculture.

2. Empiric antimicrobials.

3. Hypotonic IV fluids.

4. Check EEG.

5. Continue antiseizure therapy.

6. Monitor electrolytes.

7. DVT and stress ulcer prophylaxis.

8. Venous duplex scan.

9. No role for antiarrhythmics at this time.









  ______________________________________________

  Robert Chan M.D.





DR:  TALISHA

D:  11/24/2019 22:01

T:  11/24/2019 22:23

JOB#:  [id number]

CC:

## 2019-11-25 VITALS — DIASTOLIC BLOOD PRESSURE: 80 MMHG | SYSTOLIC BLOOD PRESSURE: 123 MMHG

## 2019-11-25 VITALS — DIASTOLIC BLOOD PRESSURE: 75 MMHG | SYSTOLIC BLOOD PRESSURE: 111 MMHG

## 2019-11-25 VITALS — SYSTOLIC BLOOD PRESSURE: 125 MMHG | DIASTOLIC BLOOD PRESSURE: 76 MMHG

## 2019-11-25 VITALS — SYSTOLIC BLOOD PRESSURE: 117 MMHG | DIASTOLIC BLOOD PRESSURE: 79 MMHG

## 2019-11-25 VITALS — DIASTOLIC BLOOD PRESSURE: 76 MMHG | SYSTOLIC BLOOD PRESSURE: 119 MMHG

## 2019-11-25 VITALS — SYSTOLIC BLOOD PRESSURE: 101 MMHG | DIASTOLIC BLOOD PRESSURE: 78 MMHG

## 2019-11-25 VITALS — SYSTOLIC BLOOD PRESSURE: 114 MMHG | DIASTOLIC BLOOD PRESSURE: 78 MMHG

## 2019-11-25 VITALS — SYSTOLIC BLOOD PRESSURE: 121 MMHG | DIASTOLIC BLOOD PRESSURE: 81 MMHG

## 2019-11-25 VITALS — DIASTOLIC BLOOD PRESSURE: 70 MMHG | SYSTOLIC BLOOD PRESSURE: 130 MMHG

## 2019-11-25 VITALS — SYSTOLIC BLOOD PRESSURE: 113 MMHG | DIASTOLIC BLOOD PRESSURE: 77 MMHG

## 2019-11-25 VITALS — DIASTOLIC BLOOD PRESSURE: 86 MMHG | SYSTOLIC BLOOD PRESSURE: 148 MMHG

## 2019-11-25 VITALS — DIASTOLIC BLOOD PRESSURE: 76 MMHG | SYSTOLIC BLOOD PRESSURE: 117 MMHG

## 2019-11-25 VITALS — SYSTOLIC BLOOD PRESSURE: 122 MMHG | DIASTOLIC BLOOD PRESSURE: 76 MMHG

## 2019-11-25 VITALS — DIASTOLIC BLOOD PRESSURE: 78 MMHG | SYSTOLIC BLOOD PRESSURE: 129 MMHG

## 2019-11-25 VITALS — SYSTOLIC BLOOD PRESSURE: 140 MMHG | DIASTOLIC BLOOD PRESSURE: 72 MMHG

## 2019-11-25 VITALS — SYSTOLIC BLOOD PRESSURE: 133 MMHG | DIASTOLIC BLOOD PRESSURE: 82 MMHG

## 2019-11-25 VITALS — DIASTOLIC BLOOD PRESSURE: 81 MMHG | SYSTOLIC BLOOD PRESSURE: 140 MMHG

## 2019-11-25 VITALS — DIASTOLIC BLOOD PRESSURE: 78 MMHG | SYSTOLIC BLOOD PRESSURE: 127 MMHG

## 2019-11-25 VITALS — DIASTOLIC BLOOD PRESSURE: 83 MMHG | SYSTOLIC BLOOD PRESSURE: 141 MMHG

## 2019-11-25 VITALS — SYSTOLIC BLOOD PRESSURE: 114 MMHG | DIASTOLIC BLOOD PRESSURE: 86 MMHG

## 2019-11-25 VITALS — SYSTOLIC BLOOD PRESSURE: 116 MMHG | DIASTOLIC BLOOD PRESSURE: 78 MMHG

## 2019-11-25 VITALS — DIASTOLIC BLOOD PRESSURE: 81 MMHG | SYSTOLIC BLOOD PRESSURE: 133 MMHG

## 2019-11-25 VITALS — DIASTOLIC BLOOD PRESSURE: 77 MMHG | SYSTOLIC BLOOD PRESSURE: 126 MMHG

## 2019-11-25 LAB
ADD MANUAL DIFF: YES
ALBUMIN SERPL-MCNC: 2.6 G/DL (ref 3.4–5)
ALBUMIN/GLOB SERPL: 0.6 {RATIO} (ref 1–2.7)
ALP SERPL-CCNC: 42 U/L (ref 46–116)
ALT SERPL-CCNC: 26 U/L (ref 12–78)
ANION GAP SERPL CALC-SCNC: 16 MMOL/L (ref 5–15)
AST SERPL-CCNC: 28 U/L (ref 15–37)
BILIRUB SERPL-MCNC: 0.4 MG/DL (ref 0.2–1)
BUN SERPL-MCNC: 36 MG/DL (ref 7–18)
CALCIUM SERPL-MCNC: 10.7 MG/DL (ref 8.5–10.1)
CHLORIDE SERPL-SCNC: 119 MMOL/L (ref 98–107)
CO2 SERPL-SCNC: 20 MMOL/L (ref 21–32)
CREAT SERPL-MCNC: 1.5 MG/DL (ref 0.55–1.3)
ERYTHROCYTE [DISTWIDTH] IN BLOOD BY AUTOMATED COUNT: 17.5 % (ref 11.6–14.8)
GLOBULIN SER-MCNC: 4.7 G/DL
HCT VFR BLD CALC: 25.6 % (ref 42–52)
HGB BLD-MCNC: 7.9 G/DL (ref 14.2–18)
MCV RBC AUTO: 97 FL (ref 80–99)
PLATELET # BLD: 244 K/UL (ref 150–450)
POTASSIUM SERPL-SCNC: 3.5 MMOL/L (ref 3.5–5.1)
RBC # BLD AUTO: 2.65 M/UL (ref 4.7–6.1)
SODIUM SERPL-SCNC: 155 MMOL/L (ref 136–145)
WBC # BLD AUTO: 18.2 K/UL (ref 4.8–10.8)

## 2019-11-25 RX ADMIN — MEROPENEM SCH MLS/HR: 1 INJECTION INTRAVENOUS at 08:33

## 2019-11-25 RX ADMIN — INSULIN ASPART SCH UNITS: 100 INJECTION, SOLUTION INTRAVENOUS; SUBCUTANEOUS at 11:21

## 2019-11-25 RX ADMIN — HEPARIN SODIUM SCH UNITS: 5000 INJECTION INTRAVENOUS; SUBCUTANEOUS at 21:21

## 2019-11-25 RX ADMIN — Medication SCH MG: at 17:53

## 2019-11-25 RX ADMIN — LEVETIRACETAM SCH MLS/HR: 5 INJECTION INTRAVENOUS at 21:19

## 2019-11-25 RX ADMIN — INSULIN ASPART SCH UNITS: 100 INJECTION, SOLUTION INTRAVENOUS; SUBCUTANEOUS at 05:52

## 2019-11-25 RX ADMIN — Medication SCH MLS/HR: at 09:11

## 2019-11-25 RX ADMIN — CLOBETASOL PROPIONATE SCH APPLIC: 0.5 OINTMENT TOPICAL at 21:19

## 2019-11-25 RX ADMIN — Medication SCH MG: at 08:19

## 2019-11-25 RX ADMIN — PANTOPRAZOLE SODIUM SCH MG: 40 INJECTION, POWDER, FOR SOLUTION INTRAVENOUS at 08:18

## 2019-11-25 RX ADMIN — CLOBETASOL PROPIONATE SCH APPLIC: 0.5 OINTMENT TOPICAL at 08:19

## 2019-11-25 RX ADMIN — LEVETIRACETAM SCH MLS/HR: 5 INJECTION INTRAVENOUS at 14:07

## 2019-11-25 RX ADMIN — HEPARIN SODIUM SCH UNITS: 5000 INJECTION INTRAVENOUS; SUBCUTANEOUS at 08:21

## 2019-11-25 RX ADMIN — MEROPENEM SCH MLS/HR: 1 INJECTION INTRAVENOUS at 21:19

## 2019-11-25 RX ADMIN — SITAGLIPTIN SCH MG: 50 TABLET, FILM COATED ORAL at 05:53

## 2019-11-25 RX ADMIN — LEVETIRACETAM SCH MLS/HR: 5 INJECTION INTRAVENOUS at 05:47

## 2019-11-25 RX ADMIN — INSULIN ASPART SCH UNITS: 100 INJECTION, SOLUTION INTRAVENOUS; SUBCUTANEOUS at 16:30

## 2019-11-25 RX ADMIN — INSULIN ASPART SCH UNITS: 100 INJECTION, SOLUTION INTRAVENOUS; SUBCUTANEOUS at 21:00

## 2019-11-25 NOTE — DIAGNOSTIC IMAGING REPORT
Indication:  Abdominal pain

 

Technique: Grayscale and duplex Doppler imaging of the abdomen performed.

 

Comparison: None

 

Findings:

 

The liver is enlarged measuring 21 cm and is echogenic consistent with fatty

infiltration. Doppler interrogation of the main portal vein shows patency with

hepatopedal, monophasic flow. Spleen is between 12 and 13 cm in size. There is no

biliary ductal dilatation identified. Gallbladder is unremarkable. 

 

There demonstrated part of the pancreas, aorta and IVC show no definite

abnormalities.

 

Both kidneys appear unremarkable. There is a left renal cyst measuring 1.5 cm. There

may be a nonobstructive stone in the left kidney. There is no hydronephrosis.

 

IMPRESSION:

 

Hepatomegaly with fatty infiltration.

 

Suspected nonobstructive stone left kidney.

 

Left renal cyst

## 2019-11-25 NOTE — CARDIOLOGY REPORT
--------------- APPROVED REPORT --------------





EKG Measurement

Heart [id number]

DC [id number]

[id number]

[id number]

[id number]





Sinus tachycardia

Nonspecific T wave abnormality

Abnormal ECG

## 2019-11-25 NOTE — HISTORY AND PHYSICAL REPORT
DATE OF ADMISSION:  11/24/2019

This is one of several admissions to [facility] of this

54-year-old patient because of sepsis.



HISTORY OF PRESENT ILLNESS:  The patient is a resident of an extended care

facility subacute unit.  He has been in stable condition over the last

several months.  He is known to have several chronic medical symptoms, but

has been stable on current medication.  Several days ago, he developed

fever, tachycardia and leukocytosis started in the subacute unit.  The

patient has pyuria 25 to 50 and the patient was placed on piperacillin and

tazobactam 3.375 grams intravenous piggyback q.6 hours, however, the

results were poor, the patient has been febrile, tachycardia, and fever

increased to 101.8 and heart rate increased to 160.  He was transferred to

[facility] ER where he was found to have sepsis and urosepsis

and the patient was admitted.



PAST MEDICAL HISTORY:  More than 2 years ago, the patient had intracerebral

hemorrhage.  He underwent craniotomy and had  shunt inserted.  At the

same time, he had respiratory failure, and he had to be intubated and

placed on mechanical ventilation.  He was unable to be weaned.  He

underwent tracheostomy and gastrostomy and transferred to subacute unit.

Over the last year, he had had multiple urinary tract infections.  On

admission to subacute unit, he had bilateral _____ removed.  The patient

was stable for several months, however, in the last few months he has

frequent sepsis associated with pyuria.



ALLERGIES:  The patient has allergy to aztreonam.



MEDICATIONS:  The patient is on fenofibrate 125 milligrams daily,

pantoprazole 40 milligrams daily.  He was on vildagliptin, but now he is

on sitagliptin 60 mg daily, levetiracetam 1000 milligram q.8 h, Artificial

Tears 2 times daily.  He is on heparin 5000 units subcutaneously q.12

hours.  He is on clobetasol cream for atopic dermatitis 10 milligrams

b.i.d. _____ mg b.i.d.  He is currently on vancomycin and amikacin.  He is

on sliding scale aspart insulin of moderate intensity, ondansetron 4

milligrams daily, lorazepam 2 milligrams q.12 hours.  He is on multiple

medications for motility disorder.  He is on albuterol sulfate,

ipratropium bromide, inhalation therapy every six hours.



FAMILY HISTORY:  Noncontributory.



SOCIAL HISTORY:  Single.  Born in [place].  _____ since the

intracerebral hemorrhage.



HABITS:  The patient does not smoke, drink, or use illicit drugs.



REVIEW OF SYSTEMS:  The patient is unable to give any information regarding

his state of health.



PHYSICAL EXAMINATION:

VITAL SIGNS:  Blood pressure is 145/94, his pulse is 144, respirations of

20, and temperature is 99.7.

HEENT:  Eyes were normal.  Pupils were round, equal, and reactive to light.

Sclerae were white.  Conjunctivae were pink.  Extraocular movement could

not be assessed.  Temporal arteries were palpable bilaterally.  There was

no bilateral temporal wasting.  Visual fields to confrontation.  Neglect

sign could not be assessed.  ENT, mucous membranes were not dehydrated.

Auditory canals were clear and tympanic membranes could not be visualized.

Nasal cavity was not congested.  Nasal septum was intact.  Soft palate

_____.  Uvula could not be visualized.  Tongue was moist, midline, and

normally papillated.  Tongue is constantly protruded out of the lips.

_____.

NECK:  Supple.  No goiter.  No mass.  No lymphadenopathy.  There was no

JVD, no bruits.  Carotid upstroke was 2+.

LUNGS:  Clear.

HEART:  PMI was fifth left intercostal space in midclavicular line.  There

was normal S1 and normal S2.  There was no murmur.  No arrhythmia.  No S3.

No S4.  No pericardial rub.  There is tachycardia at rest.  Sinus

tachycardia on monitor.

ABDOMEN:  Soft and nontender with normal bowel sounds.  Gastrostomy site is

clean.  There was no guarding.  No rebound tenderness.  No ascites.  No

hernia.  No CVA tenderness.  Liver span was 8 cm, mostly nontender.

EXTREMITIES:  No cyanosis, no clubbing, and no edema.  Extremities were

warm.

NEUROLOGICAL EXAMINATION:  Reflexes in biceps, triceps, and brachioradialis

were present.  Patellar retinaculum were present.  Plantars were in

extension bilaterally.  Cranial nerves  II through XII were symmetric and

equal.  Cerebellar function, there was no tremor.  No nystagmus.  No

extrapyramidal rigidity.  Sensory exam to pinprick, cotton touch,

position, and motor strength could not be assessed because of patient's

coma.



LABORATORY AND DIAGNOSTIC DATA:  His hemoglobin is 12.4, hematocrit 40.0

with MCV of 99, WBC of 24.7, and platelets is 106.  His BUN and creatinine

were 43 and 1.7 respectively.  His sodium is 149.  Potassium was 6.7, but

now 4.4.  Chloride 118, CO2 is 20.  His calcium is 10.4.  His SGOT is

normal, SGPT is 44, albumin is 2.8.  Total protein 7.8.  Troponin was

0.03, lactic acid is not available.



IMPRESSION:  The patient on vancomycin, amikacin.  _____ include Pulmonary,

Infectious Disease, and Cardiology.  Complete laboratory tests will be

done in the a.m.  It has been discussed with the treatment team.









  ______________________________________________

  Etienne Bloom M.D.





DR:  Sylvia

D:  11/24/2019 19:08

T:  11/25/2019 03:26

JOB#:  [id number]

CC:

## 2019-11-25 NOTE — CONSULTATION
History of Present Illness


General


Date patient seen:  Nov 25, 2019


Chief Complaint:  General Complaint





Present Illness


HPI


53 y/o M with hx of Dm2, HTN, seizure disorder, COPD, CKD, chronic resp failure 

s/p trac/vent dependent, ICH s/p craniotomy and  shunt now w/ persistent 

vegetative state, BPH, dysphagia s/p GT, s/p colostomy, recurrent UTIs, SNF  

resident presented to ED on 11/24 with tachycardia, fever and leukocytosis at 

[facility].





 Last admitted here on 2/2019 with sepsis 2ry to Pseudomonas and enterobacter  

UTI and treated with 7 days of Meropenem.


Allergies:  


Coded Allergies:  


     AZTREONAM (Verified  Allergy, Unknown, 7/23/18)





Medication History


Scheduled


Baclofen* (Baclofen*), 10 MG GT THREE TIMES A DAY, (Reported)


Bisacodyl* (Dulcolax*), 10 MG RECTAL DAILY, (Reported)


Calcium Carbonate (Calcium Carbonate), 1,000 MG GT BID, (Reported)


Chlorhexidine Gluconate (Peridex), 15 ML MM Q12HR, (Reported)


Cholecalciferol (Vitamin D3)* (Vitamin D*), 5,000 UNIT GT ONCE A WEEK, (Reported

)


Clobetasol Propionate/Emoll (Clobetasol Emollient 0.05% Crm), 15 GM TP DAILY, (

Reported)


Clonidine Hcl* (Catapres*), 0.1 MG GT EVERY 6 HOURS, (Reported)


Cranberry (Cranberry), 400 MG GT DAILY, (Reported)


Cranberry Extract (Cranberry), 425 MG GT DAILY, (Reported)


Dextran 70/Hypromellose (Artificial Tears Eye Drops*), 1 DROP BOTH EYES Q4HR, (

Reported)


Docusate Sodium* (Docusate Sodium*), 100 MG GT TWICE A DAY, (Reported)


Famotidine* (Pepcid 20mg tablet*), 20 MG GT DAILY, (Reported)


Fenofibrate Nanocrystallized (Fenofibrate), 145 MG GT DAILY, (Reported)


Fenofibrate,Micronized (Fenofibrate), 200 GT DAILY, (Reported)


Glycopyrrolate (Robinul), 2 MG GT BID, (Reported)


Heparin Sod (Porcine) (Heparin Sodium*), 5,000 UNITS SUBQ EVERY 12 HOURS, (

Reported)


Insulin NPH Hum/Reg Insulin Hm (Novolin 70-30 Flexpen), 15 UNIT SQ Q12HR, (

Reported)


Insulin Regular, Human (Humulin R), 0 SUBQ Q6HR, (Reported)


Insulin Regular, Human* (Novolin R*), 0 SUBQ .SLIDING SCALE, (Reported)


Ipratropium/Albuterol Sulfate (DuoNeb 0.5-3(2.5)mg/3ml), 3 ML HHN Q3HR, (

Reported)


Lansoprazole* (Lansoprazole*), 30 MG GT DAILY, (Reported)


Levetiracetam (Levetiracetam), 100 MG ORAL TWICE A DAY, (Reported)


Levetiracetam* (Levetiracetam*), 1,000 MG GT TID, (Reported)


Levetiracetam* (Levetiracetam*), 1,000 MG GT Q8HR, (Reported)


Losartan Potassium* (Losartan Potassium*), 25 MG GT DAILY, (Reported)


Multivitamin With Minerals (Multivitamins With Minerals*), 1 TAB GT DAILY, (

Reported)


Nystatin* (Nystatin*), 1 APPLIC TOPIC THREE TIMES A DAY, (Reported)


Piperacillin-Tazo-Dextrose,Iso (Zosyn 3.375 Gm Pre Mix-Bag), 3.375 GM IVPB 

EVERY 8 HOURS, (Reported)


Polyethylene Glycol 3350* (Miralax*), 17 GM GT DAILY, (Reported)


Polyethylene Glycol 3350* (Miralax*), 17 GM ORAL DAILY, (Reported)


Polyethylene Glycol 3350* (Miralax*), 17 GM GT DAILY, (Reported)


Sitagliptin (Januvia), 50 MG GT DAILY, (Reported)


Sitagliptin* (Januvia*), 50 MG GT DAILY, (Reported)


Sodium Polystyrene Sulfonate (Sodium Polystyrene Sulfonate), 30 GM GT DAILY, (

Reported)


Spironolactone* (Aldactone*), 25 MG GT DAILY, (Reported)





Scheduled PRN


Acetaminophen 160MG/5ML* (Acetaminophen*), 20.3 ML GT Q4HR PRN for Fever/

Headache/Mild Pain, (Reported)


Albuterol Sulfate* (Albuterol Sulfate Hhn*), 3 ML INH Q2H PRN for Shortness of 

Breath, (Reported)


Ondansetron* (Zofran*), 4 MG ORAL Q6H PRN for Nausea & Vomiting, (Reported)





Miscellaneous Medications


Arginine/Ascorbate Sod/Siddharth AC (Arginaid Powder), 1 EACH PO, (Reported)


Dextran 70/Hypromellose/Pf (Artificial Tears Drops), 1 EACH OP, (Reported)


Insulin Lispro (Humalog), 0 SUBQ, (Reported)


Ipratropium/Albuterol Sulfate (DuoNeb 0.5-3(2.5)mg/3ml), 3 ML HHN, (Reported)


Lactulose (Lactulose*), 30 ML GT, (Reported)


Silver Sulfadiazine (Silvadene), 20 GM TP, (Reported)


Tuberculin,Purif.prot.deriv. (Aplisol), 5 TU ID, (Reported)


Vancomycin Hcl (Vancomycin), 750 MG IVPB, (Reported)





Patient History


Healthcare decision maker


darin Ribeiro


Resuscitation status


Full Code


Advanced Directive on File





Patient History Narrative





Pmhx: as above





Shx: unable to obtain





Fhx: non contributory





Review of Systems


All Other Systems:  negative except mentioned in HPI





Physical Exam


Physical Exam Narrative





GENERAL:  Noncommunicative.  Tongue fasciculation.  Moderate edema.


LUNGS:  Diminished breath sounds.


CARDIAC:  Regular rhythm.  Rapid rate.  Normal S1, S2.


ABDOMEN:  Soft with G-tube and colostomy bag.





Last 24 Hour Vital Signs








  Date Time  Temp Pulse Resp B/P (MAP) Pulse Ox O2 Delivery O2 Flow Rate FiO2


 


11/25/19 10:00  111 18 101/78 (86) 100   


 


11/25/19 09:08  117 17     30


 


11/25/19 09:00  118 18 140/72 (94) 100   


 


11/25/19 08:00  117      


 


11/25/19 08:00 98.6 117 17 140/81 (100) 100   


 


11/25/19 08:00      Mechanical Ventilator  


 


11/25/19 08:00        30


 


11/25/19 07:30  115 18     30


 


11/25/19 06:00  122 20 141/83 (102) 100   


 


11/25/19 05:00  122 20 122/76 (91) 100   


 


11/25/19 04:48  117 17     30


 


11/25/19 04:00  122 20 119/76 (90) 100   


 


11/25/19 04:00        30


 


11/25/19 04:00      Mechanical Ventilator  


 


11/25/19 04:00  114      


 


11/25/19 03:00  122 20 148/86 (106) 100   


 


11/25/19 02:44  123 17     30


 


11/25/19 02:00  123 20 130/70 (90) 100   


 


11/25/19 01:02  122 19     30


 


11/25/19 01:00  125 20 121/81 (94) 100   


 


11/25/19 00:00        30


 


11/25/19 00:00      Mechanical Ventilator  


 


11/25/19 00:00  125      


 


11/25/19 00:00 98.0 125 20 114/86 (95) 100   


 


11/24/19 23:29  125 18     30


 


11/24/19 23:00  125 20 126/83 (97) 100   


 


11/24/19 22:00  126 20 126/83 (97) 100   


 


11/24/19 21:00  126 20 132/76 (94) 100   


 


11/24/19 20:52  127 19     30


 


11/24/19 20:00  114      


 


11/24/19 20:00 98.5 131 20 138/85 (102) 100   


 


11/24/19 20:00      Mechanical Ventilator  


 


11/24/19 19:26  133 18     30


 


11/24/19 19:26  133 20  98 Mechanical Ventilator  30


 


11/24/19 19:00  134 20 129/75 (93) 100   


 


11/24/19 18:07  156 21     30


 


11/24/19 18:00  144 20 145/94 (111) 100   


 


11/24/19 17:35        35


 


11/24/19 17:29  133 21     35


 


11/24/19 17:00 99.7 130 18 130/91 (104) 100   


 


11/24/19 16:00  138      


 


11/24/19 16:00        30


 


11/24/19 16:00  123 20 123/85 (98) 100   


 


11/24/19 15:09  135 19     30


 


11/24/19 15:00  113 18 113/89 (97) 100   


 


11/24/19 14:13      Mechanical Ventilator  


 


11/24/19 14:00  130 18 130/82 (98) 100   


 


11/24/19 13:00 99.6 139 19 139/93 (108) 100   





  128      


 


11/24/19 12:38  145 18     30


 


11/24/19 12:22 100.8 120 18 143/88 99 Mechanical Ventilator 60.0 30


 


11/24/19 11:23 101.4       


 


11/24/19 11:15  140 18 153/101 93 Mechanical Ventilator  


 


11/24/19 10:45  142 18 142/104 100 Mechanical Ventilator  

















Intake and Output  


 


 11/24/19 11/25/19





 18:59 06:59


 


Intake Total 325 ml 1730 ml


 


Output Total 550 ml 605 ml


 


Balance -225 ml 1125 ml


 


  


 


Intake Oral  0 ml


 


IV Total 275 ml 1530 ml


 


Tube Feeding 30 ml 150 ml


 


Other 20 ml 50 ml


 


Output Urine Total 300 ml 505 ml


 


Stool Total 250 ml 100 ml











Laboratory Tests








Test


  11/24/19


10:40 11/24/19


16:45 11/25/19


05:00


 


Sodium Level


  149 MMOL/L


(136-145)  H 


  155 MMOL/L


(136-145)  H


 


Potassium Level


  4.4 MMOL/L


(3.5-5.1) 


  3.5 MMOL/L


(3.5-5.1)


 


Chloride Level


  118 MMOL/L


()  H 


  119 MMOL/L


()  H


 


Carbon Dioxide Level


  20 MMOL/L


(21-32)  L 


  20 MMOL/L


(21-32)  L


 


Anion Gap


  11 mmol/L


(5-15) 


  16 mmol/L


(5-15)  H


 


Blood Urea Nitrogen


  42 mg/dL


(7-18)  H 


  36 mg/dL


(7-18)  H


 


Creatinine


  1.8 MG/DL


(0.55-1.30)  H 


  1.5 MG/DL


(0.55-1.30)  H


 


Estimat Glomerular Filtration


Rate 48.2 mL/min


(>60) 


  59.5 mL/min


(>60)


 


Glucose Level


  409 MG/DL


()  H 


  269 MG/DL


()  #H


 


Calcium Level


  10.4 MG/DL


(8.5-10.1)  H 


  10.7 MG/DL


(8.5-10.1)  H


 


Total Bilirubin


  0.5 MG/DL


(0.2-1.0) 


  0.4 MG/DL


(0.2-1.0)


 


Aspartate Amino Transf


(AST/SGOT) 40 U/L (15-37)


H 


  28 U/L (15-37)


 


 


Alanine Aminotransferase


(ALT/SGPT) 29 U/L (12-78)


  


  26 U/L (12-78)


 


 


Alkaline Phosphatase


  44 U/L


()  L 


  42 U/L


()  L


 


Total Protein


  7.8 G/DL


(6.4-8.2) 


  7.3 G/DL


(6.4-8.2)


 


Albumin


  2.8 G/DL


(3.4-5.0)  L 


  2.6 G/DL


(3.4-5.0)  L


 


Globulin 5.0 g/dL    4.7 g/dL  


 


Albumin/Globulin Ratio


  0.6 (1.0-2.7)


L 


  0.6 (1.0-2.7)


L


 


Urine Color  Yellow   


 


Urine Appearance  Cloudy   


 


Urine pH  5 (4.5-8.0)   


 


Urine Specific Gravity


  


  1.015


(1.005-1.035) 


 


 


Urine Protein


  


  4+ (NEGATIVE)


H 


 


 


Urine Glucose (UA)


  


  4+ (NEGATIVE)


H 


 


 


Urine Ketones


  


  1+ (NEGATIVE)


H 


 


 


Urine Blood


  


  3+ (NEGATIVE)


H 


 


 


Urine Nitrite


  


  Negative


(NEGATIVE) 


 


 


Urine Bilirubin


  


  Negative


(NEGATIVE) 


 


 


Urine Urobilinogen


  


  Normal MG/DL


(0.0-1.0) 


 


 


Urine Leukocyte Esterase


  


  3+ (NEGATIVE)


H 


 


 


Urine RBC


  


  2-4 /HPF (0 -


0)  H 


 


 


Urine WBC


  


  Tntc /HPF (0 -


0)  H 


 


 


Urine Squamous Epithelial


Cells 


  None /LPF


(NONE/OCC) 


 


 


Urine Bacteria


  


  Few /HPF


(NONE) 


 


 


White Blood Count


  


  


  18.2 K/UL


(4.8-10.8)  H


 


Red Blood Count


  


  


  2.65 M/UL


(4.70-6.10)  L


 


Hemoglobin


  


  


  7.9 G/DL


(14.2-18.0)  #L


 


Hematocrit


  


  


  25.6 %


(42.0-52.0)  #L


 


Mean Corpuscular Volume   97 FL (80-99)  


 


Mean Corpuscular Hemoglobin


  


  


  29.7 PG


(27.0-31.0)


 


Mean Corpuscular Hemoglobin


Concent 


  


  30.8 G/DL


(32.0-36.0)  L


 


Red Cell Distribution Width


  


  


  17.5 %


(11.6-14.8)  H


 


Platelet Count


  


  


  244 K/UL


(150-450)


 


Mean Platelet Volume


  


  


  8.6 FL


(6.5-10.1)


 


Neutrophils (%) (Auto)


  


  


  % (45.0-75.0)


 


 


Lymphocytes (%) (Auto)


  


  


  % (20.0-45.0)


 


 


Monocytes (%) (Auto)    % (1.0-10.0)  


 


Eosinophils (%) (Auto)    % (0.0-3.0)  


 


Basophils (%) (Auto)    % (0.0-2.0)  


 


Differential Total Cells


Counted 


  


  100  


 


 


Neutrophils % (Manual)   69 % (45-75)  


 


Lymphocytes % (Manual)   14 % (20-45)  L


 


Monocytes % (Manual)   4 % (1-10)  


 


Eosinophils % (Manual)   13 % (0-3)  H


 


Basophils % (Manual)   0 % (0-2)  


 


Band Neutrophils   0 % (0-8)  


 


Platelet Estimate   Adequate  


 


Platelet Morphology   Normal  


 


Polychromasia   1+  


 


Hypochromasia   1+  


 


Anisocytosis   1+  


 


Troponin I


  


  


  0.009 ng/mL


(0.000-0.056)


 


Pro-B-Type Natriuretic Peptide


  


  


  158 pg/mL


(0-125)  H


 


Thyroid Stimulating Hormone


(TSH) 


  


  1.641 uiU/mL


(0.358-3.740)


 


Random Vancomycin Level   15.1 ug/mL  











Microbiology








 Date/Time


Source Procedure


Growth Status


 


 


 11/24/19 16:45


Sputum Gram Stain - Final Resulted


 


 11/24/19 16:45


Sputum Sputum Culture


Pending Resulted


 


 11/24/19 16:45


Urine,Clean Catch Urine Culture - Preliminary


NO GROWTH Resulted








Height (Feet):  5


Height (Inches):  7.00


Weight (Pounds):  180


Medications





Current Medications








 Medications


  (Trade)  Dose


 Ordered  Sig/Jan


 Route


 PRN Reason  Start Time


 Stop Time Status Last Admin


Dose Admin


 


 Acetaminophen


  (Tylenol)  650 mg  Q4H  PRN


 GT


 Mild Pain/Temp > 100.6  11/24/19 14:45


 12/24/19 14:44   


 


 


 Albuterol/


 Ipratropium


  (Albuterol/


 Ipratropium)  3 ml  Q2H  PRN


 HHN


 Shortness of Breath  11/24/19 14:45


 11/29/19 14:44   


 


 


 Artificial Tears


  (Akwa-Tears)  2 drop  Q12HR


 BOTH EYES


   11/24/19 21:00


 12/24/19 20:59  11/25/19 08:18


 


 


 Baclofen


  (Lioresal)  10 mg  THREE TIMES A  DAY


 GT


   11/24/19 18:00


 12/24/19 17:59  11/25/19 08:18


 


 


 Calcium Carbonate


  (Tums)  1,000 mg  BID


 GT


   11/24/19 18:00


 12/24/19 17:59  11/25/19 08:19


 


 


 Clobetasol


 Propionate


  (Temovate)  1 applic  EVERY 12  HOURS


 TOPIC


   11/24/19 21:00


 12/24/19 20:59  11/25/19 08:19


 


 


 Dextrose


  (Dextrose 50%)  25 ml  Q30M  PRN


 IV


 Hypoglycemia  11/24/19 14:45


 12/24/19 14:44   


 


 


 Dextrose


  (Dextrose 50%)  50 ml  Q30M  PRN


 IV


 Hypoglycemia  11/24/19 14:45


 12/24/19 14:44   


 


 


 Fenofibrate


  (Tricor)  145 mg  DAILY


 ORAL


   11/25/19 09:00


 12/25/19 08:59  11/25/19 08:19


 


 


 Heparin Sodium


  (Porcine)


  (Heparin 5000


 units/ml)  5,000 units  EVERY 12  HOURS


 SUBQ


   11/24/19 21:00


 12/24/19 20:59  11/25/19 08:21


 


 


 Insulin Aspart


  (NovoLOG)    BEFORE MEALS AND  HS


 SUBQ


   11/24/19 16:30


 12/24/19 16:29  11/25/19 05:52


 


 


 Levetiracetam  100 ml @ 


 400 mls/hr  Q8HR


 IVPB


   11/24/19 22:00


 12/24/19 21:59  11/25/19 05:47


 


 


 Lorazepam


  (Ativan 2mg/ml


 1ml)  2 mg  Q2H  PRN


 IV


 For Anxiety  11/24/19 16:00


 12/1/19 15:59   


 


 


 Meropenem 1 gm/


 Sodium Chloride  55 ml @ 


 110 mls/hr  Q12HR


 IVPB


   11/24/19 21:00


 11/29/19 20:59  11/25/19 08:33


 


 


 Morphine Sulfate


  (Morphine


 Sulfate)  4 mg  Q4H  PRN


 IVP


 Severe Pain (Pain Scale 7-10)  11/24/19 16:00


 12/1/19 15:59   


 


 


 Ondansetron HCl


  (Zofran)  4 mg  Q6H  PRN


 IVP


 Nausea & Vomiting  11/24/19 16:00


 12/24/19 15:59   


 


 


 Pantoprazole


  (Protonix)  40 mg  DAILY


 IV


   11/25/19 09:00


 12/25/19 08:59  11/25/19 08:18


 


 


 Polyethylene


 Glycol


  (Miralax)  17 gm  DAILYPRN  PRN


 GT


 Constipation  11/24/19 16:00


 12/24/19 14:44   


 


 


 Sitagliptin


 Phosphate


  (Januvia)  50 mg  ACBREAKFAST


 GT


   11/25/19 06:30


 12/25/19 06:29  11/25/19 05:53


 


 


 Sodium Chloride  1,000 ml @ 


 125 mls/hr  Q8H


 IV


   11/24/19 17:00


 12/24/19 16:59  11/25/19 02:03


 


 


 Vancomycin HCl


  (Vanco rx to


 dose)  1 ea  DAILY  PRN


 MISC


 .  11/24/19 16:00


 12/24/19 15:59   


 


 


 Vancomycin/Sodium


 Chloride  275 ml @ 


 137.5 mls/


 hr  Q24H


 IVPB


   11/25/19 09:00


 11/30/19 08:59  11/25/19 09:11


 











Assessment/Plan


Assessment/Plan:


Abx:


IV Vancomycin 11/24-


Meropenem 11/24-


Cefepime x1 11/24





Assessment:


Severe Sepsis- likely 2ry to UTI- r/o PNA


  -Bcx p


  -u/a wbc tnct, nit neg, leuk +3; ucx NTD


  -sp cx p


  -CXR:   Geographic density overlying the right upper lung.  This likely 

represents overlying soft tissue although cannot exclude an underlying 

consolidation/pneumonia. Pulmonary vascular congestion. Small left pleural 

effusion. Subsegmental atelectasis versus infiltrate in the left lung base.


 -Influenza A/B ag neg





Fever, improving


Leukocytosis, improving





BRAYDON, improving





Sacral decubitus





hx of  recurrent UTIs


  -UCx 1/27/19 P.a.  (R Levo, otherwise S)


  - CT abd 1/29/19 3 mm distal right ureteral calculus, minimal resultant 

hydronephrosis. Atrophic left kidney, containing a large staghorn calculus and 

multiple intrarenal calyceal calculi, previously described


   UCx 2/2/19 - Enterobacter (S Cefepime) and yeast and Pa





 HTN


CKD


 ICH s/p craniotomy and  shunt now w/ persistent vegetative state


 dysphagia s/p GT


Chronic resp failure - vent/trach dependent


ICH


COPD


DM


Seizure disorder


BPH


Dysphagia, G tube


Colostomy  


SNF  resident





Plan:


-Continue empiric IV Vancomycin and Meropenem #2 pending cultures


-f/u cx


-Monitor CBC/CMP, temperatures


-PEG/Trach/ICU care


-aspiration precautions


-wound care





Thank you for this consultation. Will continue to follow along with you.





Discussed with Joy Buck M.D. Nov 25, 2019 11:04

## 2019-11-25 NOTE — PROGRESS NOTE
DATE:  11/25/2019

CARDIOLOGY PROGRESS NOTE



SUBJECTIVE:  The patient remains on ventilator support.



OBJECTIVE:

VITAL SIGNS:  Blood pressure 101/78, heart rate 111, respiratory rate 18,

and afebrile.

LUNGS:  Bilateral breath sounds.  Scattered rhonchi.

HEART:  Regular rhythm.  Rapid rate.  Normal S1, S2.  Unresponsive.

EXTREMITIES:  1+ edema.



LABORATORY DATA:  White count 18 and hemoglobin 7.9.  Sodium 155, bicarb

____, potassium 3.5, BUN 36, and creatinine 1.5.  Pro-natriuretic peptide

158.



IMPRESSION:

1. Dehydration.

2. Hypernatremia.

3. Hypovolemia.

4. Hyperchloremia.

5. Secondary sinus tachycardia.

6. Sepsis.

7. Ventilator-dependent respiratory failure.

8. Anemia, rule out gastrointestinal blood loss.



PLAN:

1. Consider transfusion.

2. Continue antimicrobials.

3. Respiratory hygiene.

4. Avoid beta-agonist.

5. Free water replacement.









  ______________________________________________

  Robert Chan M.D.





DR:  [id number]

D:  11/25/2019 11:44

T:  11/25/2019 16:27

JOB#:  [id number]

CC:

## 2019-11-25 NOTE — PULMONOLGY CRITICAL CARE NOTE
Critical Care - Asmt/Plan


Problems:  


(1) Acute on chronic respiratory failure


(2) Sepsis


(3) Diabetes mellitus


(4) Vegetative state


(5) Gastrostomy tube dependent


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


(7) ATN (acute tubular necrosis)


Respiratory:  monitor respiratory rate, adjust FIO2, CXR


Cardiac:  continue to monitor HR/BP


Renal:  F/U I&O, other - tap water through Gtube


Infectious Disease:  check cultures, continue antibiotics


Gastrointestinal:  hold feedings


Endocrine:  monitor blood sugar, continue sliding scale insulin


Hematologic:  transfuse if hgb<8.5


Neurologic:  PRN Ativan, PRN Morphine, keep patient comfortable


Affect:  PRN ativan


Prophylaxis:  Heparin


Notes Reviewed:  cardio, renal


Discussed with:  nurses, consultants, 





Critical Care - Objective





Last 24 Hour Vital Signs








  Date Time  Temp Pulse Resp B/P (MAP) Pulse Ox O2 Delivery O2 Flow Rate FiO2


 


11/25/19 09:08  117 17     30


 


11/25/19 08:00 98.6 117 17 140/81 (100) 100   


 


11/25/19 08:00        30


 


11/25/19 07:30  115 18     30


 


11/25/19 06:00  122 20 141/83 (102) 100   


 


11/25/19 05:00  122 20 122/76 (91) 100   


 


11/25/19 04:48  117 17     30


 


11/25/19 04:00  122 20 119/76 (90) 100   


 


11/25/19 04:00        30


 


11/25/19 04:00      Mechanical Ventilator  


 


11/25/19 04:00  114      


 


11/25/19 03:00  122 20 148/86 (106) 100   


 


11/25/19 02:44  123 17     30


 


11/25/19 02:00  123 20 130/70 (90) 100   


 


11/25/19 01:02  122 19     30


 


11/25/19 01:00  125 20 121/81 (94) 100   


 


11/25/19 00:00        30


 


11/25/19 00:00      Mechanical Ventilator  


 


11/25/19 00:00  125      


 


11/25/19 00:00 98.0 125 20 114/86 (95) 100   


 


11/24/19 23:29  125 18     30


 


11/24/19 23:00  125 20 126/83 (97) 100   


 


11/24/19 22:00  126 20 126/83 (97) 100   


 


11/24/19 21:00  126 20 132/76 (94) 100   


 


11/24/19 20:52  127 19     30


 


11/24/19 20:00  114      


 


11/24/19 20:00 98.5 131 20 138/85 (102) 100   


 


11/24/19 20:00      Mechanical Ventilator  


 


11/24/19 19:26  133 18     30


 


11/24/19 19:26  133 20  98 Mechanical Ventilator  30


 


11/24/19 19:00  134 20 129/75 (93) 100   


 


11/24/19 18:07  156 21     30


 


11/24/19 18:00  144 20 145/94 (111) 100   


 


11/24/19 17:35        35


 


11/24/19 17:29  133 21     35


 


11/24/19 17:00 99.7 130 18 130/91 (104) 100   


 


11/24/19 16:00  138      


 


11/24/19 16:00        30


 


11/24/19 16:00  123 20 123/85 (98) 100   


 


11/24/19 15:09  135 19     30


 


11/24/19 15:00  113 18 113/89 (97) 100   


 


11/24/19 14:13      Mechanical Ventilator  


 


11/24/19 14:00  130 18 130/82 (98) 100   


 


11/24/19 13:00 99.6 139 19 139/93 (108) 100   





  128      


 


11/24/19 12:38  145 18     30


 


11/24/19 12:22 100.8 120 18 143/88 99 Mechanical Ventilator 60.0 30


 


11/24/19 11:23 101.4       


 


11/24/19 11:15  140 18 153/101 93 Mechanical Ventilator  


 


11/24/19 10:45  142 18 142/104 100 Mechanical Ventilator  


 


11/24/19 10:36 101.4       


 


11/24/19 10:34  141 17     30


 


11/24/19 10:15  149 17 127/87 99 Mechanical Ventilator  








Status:  obtunded


Condition:  critical


HEENT:  atraumatic


Heart:  HR/BP stable


Abdomen:  soft, non-tender


Extremities:  no C/C/E


Micro:





Microbiology








 Date/Time


Source Procedure


Growth Status


 


 


 11/24/19 16:45


Sputum Gram Stain - Final Resulted


 


 11/24/19 16:45


Sputum Sputum Culture


Pending Resulted





 11/24/19 09:40


Nasal Nares - Final Complete


 


 11/24/19 09:40


Nasal Nares - Final Complete


 


 11/24/19 16:45


Urine,Clean Catch Urine Culture - Preliminary


NO GROWTH Resulted








Accucheck:  264





Critical Care - Subjective


ROS Limited/Unobtainable:  Yes


FI02:  30


Vent Support Breath Rate:  16


Vent Support Mode:  AC


Vent Tidal Volume:  600


Sputum Amount:  Small


PEEP:  5.0


PIP:  31


Tube Feeding Amount:  0


I&O:











Intake and Output  


 


 11/24/19 11/25/19





 19:00 07:00


 


Intake Total 480 ml 1800 ml


 


Output Total 550 ml 605 ml


 


Balance -70 ml 1195 ml


 


  


 


Intake Oral  0 ml


 


IV Total 400 ml 1630 ml


 


Tube Feeding 60 ml 120 ml


 


Other 20 ml 50 ml


 


Output Urine Total 300 ml 505 ml


 


Stool Total 250 ml 100 ml








Labs:





Laboratory Tests








Test


  11/24/19


10:40 11/24/19


16:45 11/25/19


05:00


 


Sodium Level


  149 MMOL/L


(136-145)  H 


  155 MMOL/L


(136-145)  H


 


Potassium Level


  4.4 MMOL/L


(3.5-5.1) 


  3.5 MMOL/L


(3.5-5.1)


 


Chloride Level


  118 MMOL/L


()  H 


  119 MMOL/L


()  H


 


Carbon Dioxide Level


  20 MMOL/L


(21-32)  L 


  20 MMOL/L


(21-32)  L


 


Anion Gap


  11 mmol/L


(5-15) 


  16 mmol/L


(5-15)  H


 


Blood Urea Nitrogen


  42 mg/dL


(7-18)  H 


  36 mg/dL


(7-18)  H


 


Creatinine


  1.8 MG/DL


(0.55-1.30)  H 


  1.5 MG/DL


(0.55-1.30)  H


 


Estimat Glomerular Filtration


Rate 48.2 mL/min


(>60) 


  59.5 mL/min


(>60)


 


Glucose Level


  409 MG/DL


()  H 


  269 MG/DL


()  #H


 


Calcium Level


  10.4 MG/DL


(8.5-10.1)  H 


  10.7 MG/DL


(8.5-10.1)  H


 


Total Bilirubin


  0.5 MG/DL


(0.2-1.0) 


  0.4 MG/DL


(0.2-1.0)


 


Aspartate Amino Transf


(AST/SGOT) 40 U/L (15-37)


H 


  28 U/L (15-37)


 


 


Alanine Aminotransferase


(ALT/SGPT) 29 U/L (12-78)


  


  26 U/L (12-78)


 


 


Alkaline Phosphatase


  44 U/L


()  L 


  42 U/L


()  L


 


Total Protein


  7.8 G/DL


(6.4-8.2) 


  7.3 G/DL


(6.4-8.2)


 


Albumin


  2.8 G/DL


(3.4-5.0)  L 


  2.6 G/DL


(3.4-5.0)  L


 


Globulin 5.0 g/dL    4.7 g/dL  


 


Albumin/Globulin Ratio


  0.6 (1.0-2.7)


L 


  0.6 (1.0-2.7)


L


 


Urine Color  Yellow   


 


Urine Appearance  Cloudy   


 


Urine pH  5 (4.5-8.0)   


 


Urine Specific Gravity


  


  1.015


(1.005-1.035) 


 


 


Urine Protein


  


  4+ (NEGATIVE)


H 


 


 


Urine Glucose (UA)


  


  4+ (NEGATIVE)


H 


 


 


Urine Ketones


  


  1+ (NEGATIVE)


H 


 


 


Urine Blood


  


  3+ (NEGATIVE)


H 


 


 


Urine Nitrite


  


  Negative


(NEGATIVE) 


 


 


Urine Bilirubin


  


  Negative


(NEGATIVE) 


 


 


Urine Urobilinogen


  


  Normal MG/DL


(0.0-1.0) 


 


 


Urine Leukocyte Esterase


  


  3+ (NEGATIVE)


H 


 


 


Urine RBC


  


  2-4 /HPF (0 -


0)  H 


 


 


Urine WBC


  


  Tntc /HPF (0 -


0)  H 


 


 


Urine Squamous Epithelial


Cells 


  None /LPF


(NONE/OCC) 


 


 


Urine Bacteria


  


  Few /HPF


(NONE) 


 


 


White Blood Count


  


  


  18.2 K/UL


(4.8-10.8)  H


 


Red Blood Count


  


  


  2.65 M/UL


(4.70-6.10)  L


 


Hemoglobin


  


  


  7.9 G/DL


(14.2-18.0)  #L


 


Hematocrit


  


  


  25.6 %


(42.0-52.0)  #L


 


Mean Corpuscular Volume   97 FL (80-99)  


 


Mean Corpuscular Hemoglobin


  


  


  29.7 PG


(27.0-31.0)


 


Mean Corpuscular Hemoglobin


Concent 


  


  30.8 G/DL


(32.0-36.0)  L


 


Red Cell Distribution Width


  


  


  17.5 %


(11.6-14.8)  H


 


Platelet Count


  


  


  244 K/UL


(150-450)


 


Mean Platelet Volume


  


  


  8.6 FL


(6.5-10.1)


 


Neutrophils (%) (Auto)


  


  


  % (45.0-75.0)


 


 


Lymphocytes (%) (Auto)


  


  


  % (20.0-45.0)


 


 


Monocytes (%) (Auto)    % (1.0-10.0)  


 


Eosinophils (%) (Auto)    % (0.0-3.0)  


 


Basophils (%) (Auto)    % (0.0-2.0)  


 


Differential Total Cells


Counted 


  


  100  


 


 


Neutrophils % (Manual)   69 % (45-75)  


 


Lymphocytes % (Manual)   14 % (20-45)  L


 


Monocytes % (Manual)   4 % (1-10)  


 


Eosinophils % (Manual)   13 % (0-3)  H


 


Basophils % (Manual)   0 % (0-2)  


 


Band Neutrophils   0 % (0-8)  


 


Platelet Estimate   Adequate  


 


Platelet Morphology   Normal  


 


Polychromasia   1+  


 


Hypochromasia   1+  


 


Anisocytosis   1+  


 


Troponin I


  


  


  0.009 ng/mL


(0.000-0.056)


 


Pro-B-Type Natriuretic Peptide


  


  


  158 pg/mL


(0-125)  H


 


Thyroid Stimulating Hormone


(TSH) 


  


  1.641 uiU/mL


(0.358-3.740)


 


Random Vancomycin Level   15.1 ug/mL  

















Yury Pena MD Nov 25, 2019 09:54

## 2019-11-25 NOTE — DIAGNOSTIC IMAGING REPORT
Indication: Dyspnea

 

Comparison:  11/24/2019

 

A single view chest radiograph was obtained.

 

Findings:

 

There is blunting of the left costophrenic angle which may be due to a pleural

effusion or thickening. Tracheostomy again noted. Heart size is stable.

 

IMPRESSION:

 

No change from the previous day

## 2019-11-26 VITALS — DIASTOLIC BLOOD PRESSURE: 74 MMHG | SYSTOLIC BLOOD PRESSURE: 111 MMHG

## 2019-11-26 VITALS — SYSTOLIC BLOOD PRESSURE: 125 MMHG | DIASTOLIC BLOOD PRESSURE: 81 MMHG

## 2019-11-26 VITALS — SYSTOLIC BLOOD PRESSURE: 114 MMHG | DIASTOLIC BLOOD PRESSURE: 74 MMHG

## 2019-11-26 VITALS — SYSTOLIC BLOOD PRESSURE: 117 MMHG | DIASTOLIC BLOOD PRESSURE: 71 MMHG

## 2019-11-26 VITALS — DIASTOLIC BLOOD PRESSURE: 79 MMHG | SYSTOLIC BLOOD PRESSURE: 133 MMHG

## 2019-11-26 VITALS — DIASTOLIC BLOOD PRESSURE: 78 MMHG | SYSTOLIC BLOOD PRESSURE: 125 MMHG

## 2019-11-26 VITALS — SYSTOLIC BLOOD PRESSURE: 113 MMHG | DIASTOLIC BLOOD PRESSURE: 75 MMHG

## 2019-11-26 VITALS — DIASTOLIC BLOOD PRESSURE: 72 MMHG | SYSTOLIC BLOOD PRESSURE: 120 MMHG

## 2019-11-26 VITALS — SYSTOLIC BLOOD PRESSURE: 117 MMHG | DIASTOLIC BLOOD PRESSURE: 73 MMHG

## 2019-11-26 VITALS — SYSTOLIC BLOOD PRESSURE: 129 MMHG | DIASTOLIC BLOOD PRESSURE: 72 MMHG

## 2019-11-26 VITALS — SYSTOLIC BLOOD PRESSURE: 119 MMHG | DIASTOLIC BLOOD PRESSURE: 76 MMHG

## 2019-11-26 VITALS — DIASTOLIC BLOOD PRESSURE: 73 MMHG | SYSTOLIC BLOOD PRESSURE: 110 MMHG

## 2019-11-26 VITALS — DIASTOLIC BLOOD PRESSURE: 73 MMHG | SYSTOLIC BLOOD PRESSURE: 105 MMHG

## 2019-11-26 VITALS — SYSTOLIC BLOOD PRESSURE: 115 MMHG | DIASTOLIC BLOOD PRESSURE: 71 MMHG

## 2019-11-26 VITALS — SYSTOLIC BLOOD PRESSURE: 111 MMHG | DIASTOLIC BLOOD PRESSURE: 63 MMHG

## 2019-11-26 VITALS — DIASTOLIC BLOOD PRESSURE: 74 MMHG | SYSTOLIC BLOOD PRESSURE: 128 MMHG

## 2019-11-26 VITALS — SYSTOLIC BLOOD PRESSURE: 116 MMHG | DIASTOLIC BLOOD PRESSURE: 76 MMHG

## 2019-11-26 VITALS — DIASTOLIC BLOOD PRESSURE: 80 MMHG | SYSTOLIC BLOOD PRESSURE: 141 MMHG

## 2019-11-26 VITALS — SYSTOLIC BLOOD PRESSURE: 119 MMHG | DIASTOLIC BLOOD PRESSURE: 70 MMHG

## 2019-11-26 VITALS — DIASTOLIC BLOOD PRESSURE: 81 MMHG | SYSTOLIC BLOOD PRESSURE: 121 MMHG

## 2019-11-26 LAB
ADD MANUAL DIFF: YES
ANION GAP SERPL CALC-SCNC: 15 MMOL/L (ref 5–15)
APPEARANCE UR: (no result)
APTT PPP: YELLOW S
BUN SERPL-MCNC: 30 MG/DL (ref 7–18)
CALCIUM SERPL-MCNC: 10.7 MG/DL (ref 8.5–10.1)
CHLORIDE SERPL-SCNC: 117 MMOL/L (ref 98–107)
CHOLEST SERPL-MCNC: 241 MG/DL (ref ?–200)
CK SERPL-CCNC: 156 U/L (ref 26–308)
CO2 SERPL-SCNC: 21 MMOL/L (ref 21–32)
CREAT SERPL-MCNC: 1.4 MG/DL (ref 0.55–1.3)
ERYTHROCYTE [DISTWIDTH] IN BLOOD BY AUTOMATED COUNT: 17.3 % (ref 11.6–14.8)
GLUCOSE UR STRIP-MCNC: (no result) MG/DL
HCT VFR BLD CALC: 23 % (ref 42–52)
HDLC SERPL-MCNC: 18 MG/DL (ref 40–60)
HGB BLD-MCNC: 7 G/DL (ref 14.2–18)
KETONES UR QL STRIP: NEGATIVE
LEUKOCYTE ESTERASE UR QL STRIP: (no result)
MCV RBC AUTO: 95 FL (ref 80–99)
NITRITE UR QL STRIP: NEGATIVE
PH UR STRIP: 5 [PH] (ref 4.5–8)
PLATELET # BLD: 243 K/UL (ref 150–450)
POTASSIUM SERPL-SCNC: 3.2 MMOL/L (ref 3.5–5.1)
PROT UR QL STRIP: (no result)
RBC # BLD AUTO: 2.41 M/UL (ref 4.7–6.1)
SODIUM SERPL-SCNC: 153 MMOL/L (ref 136–145)
SP GR UR STRIP: 1.01 (ref 1–1.03)
TRIGL SERPL-MCNC: 1256 MG/DL (ref 30–150)
UROBILINOGEN UR-MCNC: NORMAL MG/DL (ref 0–1)
WBC # BLD AUTO: 13.4 K/UL (ref 4.8–10.8)

## 2019-11-26 PROCEDURE — 05HY33Z INSERTION OF INFUSION DEVICE INTO UPPER VEIN, PERCUTANEOUS APPROACH: ICD-10-PCS

## 2019-11-26 RX ADMIN — INSULIN ASPART SCH UNITS: 100 INJECTION, SOLUTION INTRAVENOUS; SUBCUTANEOUS at 11:57

## 2019-11-26 RX ADMIN — INSULIN ASPART SCH UNITS: 100 INJECTION, SOLUTION INTRAVENOUS; SUBCUTANEOUS at 06:25

## 2019-11-26 RX ADMIN — Medication SCH MG: at 08:44

## 2019-11-26 RX ADMIN — INSULIN ASPART SCH UNITS: 100 INJECTION, SOLUTION INTRAVENOUS; SUBCUTANEOUS at 16:42

## 2019-11-26 RX ADMIN — CLOBETASOL PROPIONATE SCH APPLIC: 0.5 OINTMENT TOPICAL at 20:36

## 2019-11-26 RX ADMIN — MEROPENEM SCH MLS/HR: 1 INJECTION INTRAVENOUS at 08:52

## 2019-11-26 RX ADMIN — LEVETIRACETAM SCH MLS/HR: 5 INJECTION INTRAVENOUS at 15:26

## 2019-11-26 RX ADMIN — MEROPENEM SCH MLS/HR: 1 INJECTION INTRAVENOUS at 20:38

## 2019-11-26 RX ADMIN — Medication SCH MLS/HR: at 08:52

## 2019-11-26 RX ADMIN — LEVETIRACETAM SCH MLS/HR: 5 INJECTION INTRAVENOUS at 22:39

## 2019-11-26 RX ADMIN — INSULIN ASPART SCH UNITS: 100 INJECTION, SOLUTION INTRAVENOUS; SUBCUTANEOUS at 21:00

## 2019-11-26 RX ADMIN — SITAGLIPTIN SCH MG: 50 TABLET, FILM COATED ORAL at 06:24

## 2019-11-26 RX ADMIN — LEVETIRACETAM SCH MLS/HR: 5 INJECTION INTRAVENOUS at 05:14

## 2019-11-26 RX ADMIN — PANTOPRAZOLE SODIUM SCH MG: 40 INJECTION, POWDER, FOR SOLUTION INTRAVENOUS at 08:44

## 2019-11-26 RX ADMIN — HEPARIN SODIUM SCH UNITS: 5000 INJECTION INTRAVENOUS; SUBCUTANEOUS at 08:33

## 2019-11-26 RX ADMIN — HEPARIN SODIUM SCH UNITS: 5000 INJECTION INTRAVENOUS; SUBCUTANEOUS at 21:00

## 2019-11-26 RX ADMIN — CLOBETASOL PROPIONATE SCH APPLIC: 0.5 OINTMENT TOPICAL at 08:51

## 2019-11-26 NOTE — DIAGNOSTIC IMAGING REPORT
Indication: Long term venous access

 

Findings: After the indications, procedure, risks, complications, and alternatives of

the procedure were explained, written informed consent was obtained. 

 

The right upper extremity was prepped with alcohol.  All elements of maximal sterile

barrier technique were followed including usage of a cap, mask, sterile gown, sterile

gloves, hand hygiene and a large sterile sheet.  

 

Sonographic evaluation of the upper extremity was performed demonstrating a patent

and compressible  brachial  vein.  Access was obtained under real-time ultrasound

guidance (with utilization of sterile gel and sterile probe cover) and digital image

was saved and archived. An .018 wire was introduced. Needle exchanged for a 5 Nigerian

peel-away sheath. Measurements were obtained. Several attempts at passing a wire or

catheter beyond 10 cm were unsuccessful. Ultrasound evaluation in the upper part of

the arm close to the axilla showed no appreciable vein for access.

 

 A 5 Nigerian dual-lumen Power PICC line catheter was cut to 10 cm and introduced over

the wire. Peel-away sheath and wire were removed.Catheter was secured to the skin

using 2-0 Prolene suture. Both ports aspirate and flush easily.

 

Post procedure chest x-ray demonstrates the PICC line catheter within the right upper

arm.

 

Impression: Unsuccessful placement of an upper extremity PICC line catheter.

Successful placement of a peripheral midline catheter.

## 2019-11-26 NOTE — PROGRESS NOTE
DATE:  11/26/2019

NOTE: INCOMPLETE DICTATION:



SUBJECTIVE:  The patient's condition continued to improve.  His heart rate

did not decline below 100, but still remained between 95 to 100 the whole

day.



OBJECTIVE:

VITAL SIGNS:  Blood pressure is 115/71, his pulse now is 103, respirations

are 16, temperature 98.

HEENT:  Eyes were normal.  ENT, mucous membranes were moist and intact.

NECK:  Supple with no JVD without lymph nodes.  Tracheostomy site is

clean.

LUNGS:  Clear without rhonchi, rales, or wheezing.  Secretions are small,

thin, and gray.

HEART:  Normal sounds with regular beats.  No S3, S4, or pericardial rub.

ABDOMEN:  Soft and nontender with normal bowel sounds.  Gastrostomy site is

clean.

EXTREMITIES:  Warm.  No clubbing or edema.



LABORATORY AND DIAGNOSTIC DATA:  Hemoglobin 7.2, hematocrit 23.0 with MCV

of 95, WBC of 13.4, and platelets are 243.  WBC yesterday was 18.2 and

24.7 on 11/24/2019.  His BUN and creatinine is 30 and 1.4 respectively.

His sodium is ________.









  ______________________________________________

  Etienne Bloom M.D.





DR:  ZABRINA

D:  11/26/2019 16:56

T:  11/26/2019 19:34

JOB#:  [id number]

CC:

## 2019-11-26 NOTE — CONSULTATION
DATE OF CONSULTATION:  11/26/2019

CONSULTING PHYSICIAN:  Vinnie Pollard M.D.



REFERRING PHYSICIAN:  Etienne Bloom M.D.



REASON FOR CONSULTATION:  For evaluation of difficult catheterization.



HISTORY OF PRESENT ILLNESS:  This is a 54-year-old  male.

He is a resident of a nursing home.  He was admitted to the hospital

because of sepsis.  He has had history of fevers, tachycardia, and

leukocytosis.  He has a history of intracerebral hemorrhage.  He has a

tracheostomy, chronic vent.  He was in the ICU and then transferred to the

[facility].  Nursing staff attempted to place a Guzmán catheter, some hematuria

was noted, they were unsuccessful.  He has been incontinent.  Elevated

bladder residuals have been noted.  He also had some renal insufficiency,

which is slowly improving.



PAST MEDICAL HISTORY:  Significant for above.  Again, history of

intracerebral hemorrhage, history of craniotomy,  shunt, history of

chronic ventilatory-dependent, gastrostomy, BPH history.



PAST SURGICAL HISTORY:  As above.



MEDICATIONS:  Current medication list in the hospital was reviewed.



ALLERGIES:  Aztreonam.



FAMILY HISTORY:  Unable to obtain.



PHYSICAL EXAMINATION:

GENERAL:  An elderly male, nonverbal.

VITAL SIGNS:  Temperature is 98.8, blood pressure is 133/79.

HEENT:  Trach is in place.

GASTROINTESTINAL:  The feeding tube is in place.  Abdomen shows some

distention.

GENITOURINARY:  He has a condom catheter on and urine is grossly yellow.



LABORATORY DATA:  Admit UA showed 4+ protein, 2 to 4 rbc's, too numerous to

count wbc's.  He did have a urine culture and the report is pending.  He

did have blood cultures and so far negative.  His white blood cell count

is 13.4, hemoglobin 7.0.  His BUN is 30, creatinine is 1.4.  He did come

in with a creatinine of 1.7.  I do not know what his baseline is at.



DIAGNOSTIC IMAGING STUDIES:  The patient had an abdominal ultrasound.

There was no hydronephrosis noted.  There was mention of a nonobstructing

some of the left kidney, left renal cyst.



Procedure, at the bedside, I did attempt to pass a Guzmán catheter, which

was met with resistance.  I also attempted a coude catheter, which was met

with resistance and I would imagine that he has a stricture.  I was able

to pass a filiforms into the bladder.  I did pass followers, however,

there was a significant obstruction at this stricture and I believe that

it is a very dense stricture to the point that I was not able to pass the

follower through just very barely and I did not want to cause any more

trauma.  I did attempt to pass a 12-Tunisian catheter through, but that was

met with resistance also.



IMPRESSION:

1. Urinary retention.

2. BPH.

3. Neurogenic bladder.

4. Incontinence.

5. Pyuria.

6. Hematuria.

7. Proteinuria.

8. Renal insufficiency, acute possibly on chronic.

9. Renal cyst.

10. Nephrolithiasis.



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

dense urethral stricture, which I am not able to adequately dilate at the

bedside.  The patient will need to have a urethrotomy with cystoscopy for

catheter placement.  Possibly suprapubic tube although he does have a

lower abdominal scar, so he would be safer to try to put a Guzmán catheter

through the urethra if possible.  The above was discussed with the

patient's wife who is the decision maker and it was discussed extensively

and I did explain circumstances and she is willing to sign a consent for

the procedure.  I also did discuss this with Dr. Bloom.  So, hopefully

we can schedule the procedure in the near future.  In the meantime,

clinically he seems to be improving.  His renal function is improving.

His leukocytosis is improving.  He is voiding some through the condom

catheter, and so I think his retention is probably chronic but we will try

to see if we can get a catheter in him.



Thank you for this consultation.









  ______________________________________________

  Vinnie Pollard M.D. DR:  CECIL

D:  11/26/2019 22:35

T:  11/26/2019 22:50

JOB#:  [id number]

CC:  Etienne Bloom M.D.; Fax#:  [phone]

Robert Chan M.D.

## 2019-11-26 NOTE — PROGRESS NOTE
DATE:  11/26/2019

CARDIOLOGY PROGRESS NOTE



SUBJECTIVE:  The patient remains on ventilator support via trach.  He is

unresponsive at baseline.



OBJECTIVE:

VITAL SIGNS:  Blood pressure 120/72, pulse 102, respirations 16.  No

fevers.

LUNGS:  Rhonchi.

CARDIAC:  Regular rhythm and rate.  Normal S1, S2.

ABDOMEN:  Soft, obese.

EXTREMITIES:  With dependent edema.



LABORATORY DATA:  White count 13, hemoglobin 7.  Potassium 3.2, sodium 153,

chloride 117, BUN 30, creatinine 1.4.



IMPRESSION:

1. Sepsis.

2. Dehydration.

3. Hypernatremia.

4. Chronic encephalopathy.

5. Anemia.

6. Sinus tachycardia.

7. Severe hypertriglyceridemia



PLAN:  Physiologically appropriate for current clinical parameters.  No

additional cardiovascular interventions recommended.  Continue hypertonic

IV.  Consider transfusion of packed red blood cells.  I agree with

anti-lipid therapy.  No role for antiarrhythmics in this setting.  No beta

blockers.









  ______________________________________________

  Robert Chan M.D.





DR:  HERMAN

D:  11/26/2019 21:17

T:  11/26/2019 21:38

JOB#:  [id number]

CC:

## 2019-11-26 NOTE — PULMONOLGY CRITICAL CARE NOTE
Critical Care - Asmt/Plan


Problems:  


(1) Acute on chronic respiratory failure


(2) Sepsis


(3) Diabetes mellitus


(4) Vegetative state


(5) Gastrostomy tube dependent


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


(7) ATN (acute tubular necrosis)


Respiratory:  monitor respiratory rate, adjust FIO2, CXR


Cardiac:  continue to monitor HR/BP


Renal:  F/U I&O, other - continue fluids through Gtube


Infectious Disease:  check cultures


Gastrointestinal:  hold feedings


Endocrine:  monitor blood sugar


Hematologic:  monitor H/H


Affect:  PRN ativan


Prophylaxis:  Protonix


Time Spent (Minutes):  40


Notes Reviewed:  internist, cardio


Discussed with:  nurses, consultants, , other - Dr. Blanco, for 

Urology called for insertion of a mayorga





Critical Care - Objective





Last 24 Hour Vital Signs








  Date Time  Temp Pulse Resp B/P (MAP) Pulse Ox O2 Delivery O2 Flow Rate FiO2


 


11/26/19 10:00  100 17 116/76 (89) 100   


 


11/26/19 09:00  104 17 129/72 (91) 100   


 


11/26/19 09:00  102 16     30


 


11/26/19 08:00  104      


 


11/26/19 08:00        30


 


11/26/19 08:00  104 17 121/81 (94) 100   


 


11/26/19 08:00      Mechanical Ventilator  


 


11/26/19 07:15  106 19     30


 


11/26/19 07:00 98.3 102 17 128/74 (92) 100   


 


11/26/19 06:00  100 17 119/76 (90) 100   


 


11/26/19 05:04  100 16     30


 


11/26/19 05:00  101 19 105/73 (84) 100   


 


11/26/19 04:00      Mechanical Ventilator  


 


11/26/19 04:00        30


 


11/26/19 04:00 98.3 104 17 117/71 (86) 100   


 


11/26/19 03:18  104 17     30


 


11/26/19 03:06  105      


 


11/26/19 03:00  106 17 125/78 (94) 100   


 


11/26/19 02:00  109 17 119/70 (86) 100   


 


11/26/19 01:29  110 18     30


 


11/26/19 01:00  105 17 117/73 (88) 100   


 


11/26/19 00:00        30


 


11/26/19 00:00 98.7 103 16 141/80 (100) 100   


 


11/26/19 00:00      Mechanical Ventilator  


 


11/25/19 23:42  103      


 


11/25/19 23:00  105 17 116/78 (91) 100   


 


11/25/19 22:56  105 17     30


 


11/25/19 22:00  107 17 114/78 (90) 100   


 


11/25/19 21:26  106 16     30


 


11/25/19 21:00  105 17 123/80 (94) 100   


 


11/25/19 20:00      Mechanical Ventilator  


 


11/25/19 20:00        30


 


11/25/19 20:00 98.8 103 17 125/76 (92) 100   


 


11/25/19 19:38  106 17     30


 


11/25/19 19:26  107      


 


11/25/19 19:00  107 18 117/76 (90) 100   


 


11/25/19 18:00  108 18 126/77 (93) 100   


 


11/25/19 17:00  107 17 117/79 (92) 100   


 


11/25/19 16:57  111 18     30


 


11/25/19 16:00 98.5 109 17 133/81 (98) 100   


 


11/25/19 16:00      Mechanical Ventilator  


 


11/25/19 16:00  111      


 


11/25/19 16:00        30


 


11/25/19 16:00  108 18 133/81 (98) 100   


 


11/25/19 15:24  108 19     30


 


11/25/19 15:00  110 16 113/77 (89) 100   


 


11/25/19 14:00  107 16 129/78 (95) 100   


 


11/25/19 13:22  109 16     30


 


11/25/19 13:00  111 18 111/75 (87) 100   


 


11/25/19 12:00        30


 


11/25/19 12:00  108      


 


11/25/19 12:00      Mechanical Ventilator  


 


11/25/19 12:00 98.6 107 17 133/82 (99) 100   


 


11/25/19 12:00  117      


 


11/25/19 11:02  108 18     30


 


11/25/19 11:00  108 18 127/78 (94) 100   








Status:  obtunded


Condition:  critical


Lungs:  rales, rhonchi


Heart:  HR/BP stable


Abdomen:  soft, non-tender


Extremities:  no C/C/E


Decubiti:  stage


Micro:





Microbiology








 Date/Time


Source Procedure


Growth Status


 


 


 11/24/19 09:15


Blood Blood Culture - Preliminary


NO GROWTH AFTER 24 HOURS Resulted


 


 11/24/19 09:15


Blood Blood Culture - Preliminary


NO GROWTH AFTER 24 HOURS Resulted





 11/25/19 11:00


Nasal Nares - Final Complete


 


 11/25/19 11:00


Nasal Nares - Final Complete





 11/24/19 16:45


Sputum Gram Stain - Final Resulted


 


 11/24/19 16:45 Sputum Culture - Preliminary


Gram Negative Bacillus 1 Resulted





 11/24/19 09:40


Nasal Nares - Final Complete


 


 11/24/19 09:40


Nasal Nares - Final Complete


 


 11/24/19 09:10


Nasal Nares MRSA Culture - Final


Staphylococcus Aureus - Mrsa Complete


 


 11/24/19 16:45


Urine,Clean Catch Urine Culture - Preliminary Resulted


 


 11/24/19 09:10


Rectum VRE Culture - Final


Enterococcus Faecium - Vre Complete


 


 11/24/19 09:10


Rectum - Final


NO CARBAPENEM-RESISTANT ENTEROBACTERI... Complete








Accucheck:  219





Critical Care - Subjective


ROS Limited/Unobtainable:  Yes


ICU Day:  2


Condition:  critical


EKG Rhythm:  Sinus Rhythm


FI02:  30


Vent Support Breath Rate:  16


Vent Support Mode:  AC


Vent Tidal Volume:  600


Sputum Amount:  Small


PEEP:  5.0


PIP:  31


Tube Feeding Amount:  100


I&O:











Intake and Output  


 


 11/25/19 11/26/19





 19:00 07:00


 


Intake Total 1879.58 ml 1455 ml


 


Output Total 550 ml 100 ml


 


Balance 1329.58 ml 1355 ml


 


  


 


Free Water 60 ml 


 


IV Total 919.58 ml 255 ml


 


Tube Feeding 900 ml 1200 ml


 


Output Urine Total 300 ml 0 ml


 


Stool Total 250 ml 100 ml


 


# Voids  260








CXR:


trach intact


Labs:





Laboratory Tests








Test


  11/25/19


11:00 11/26/19


03:00 11/26/19


03:15


 


Urine Legionella Antigen Pending    


 


Urine Color  Yellow   


 


Urine Appearance  Cloudy   


 


Urine pH  5 (4.5-8.0)   


 


Urine Specific Gravity


  


  1.015


(1.005-1.035) 


 


 


Urine Protein


  


  4+ (NEGATIVE)


H 


 


 


Urine Glucose (UA)


  


  1+ (NEGATIVE)


H 


 


 


Urine Ketones


  


  Negative


(NEGATIVE) 


 


 


Urine Blood


  


  3+ (NEGATIVE)


H 


 


 


Urine Nitrite


  


  Negative


(NEGATIVE) 


 


 


Urine Bilirubin


  


  Negative


(NEGATIVE) 


 


 


Urine Urobilinogen


  


  Normal MG/DL


(0.0-1.0) 


 


 


Urine Leukocyte Esterase


  


  3+ (NEGATIVE)


H 


 


 


Urine RBC


  


  20-30 /HPF (0


- 0)  H 


 


 


Urine WBC


  


  Tntc /HPF (0 -


0)  H 


 


 


Urine Squamous Epithelial


Cells 


  Few /LPF


(NONE/OCC) 


 


 


Urine Bacteria


  


  Moderate /HPF


(NONE)  H 


 


 


White Blood Count


  


  


  13.4 K/UL


(4.8-10.8)  H


 


Red Blood Count


  


  


  2.41 M/UL


(4.70-6.10)  L


 


Hemoglobin


  


  


  7.0 G/DL


(14.2-18.0)  L


 


Hematocrit


  


  


  23.0 %


(42.0-52.0)  L


 


Mean Corpuscular Volume   95 FL (80-99)  


 


Mean Corpuscular Hemoglobin


  


  


  29.2 PG


(27.0-31.0)


 


Mean Corpuscular Hemoglobin


Concent 


  


  30.6 G/DL


(32.0-36.0)  L


 


Red Cell Distribution Width


  


  


  17.3 %


(11.6-14.8)  H


 


Platelet Count


  


  


  243 K/UL


(150-450)


 


Mean Platelet Volume


  


  


  8.9 FL


(6.5-10.1)


 


Neutrophils (%) (Auto)


  


  


  % (45.0-75.0)


 


 


Lymphocytes (%) (Auto)


  


  


  % (20.0-45.0)


 


 


Monocytes (%) (Auto)    % (1.0-10.0)  


 


Eosinophils (%) (Auto)    % (0.0-3.0)  


 


Basophils (%) (Auto)    % (0.0-2.0)  


 


Differential Total Cells


Counted 


  


  100  


 


 


Neutrophils % (Manual)   74 % (45-75)  


 


Lymphocytes % (Manual)   9 % (20-45)  L


 


Monocytes % (Manual)   3 % (1-10)  


 


Eosinophils % (Manual)   14 % (0-3)  H


 


Basophils % (Manual)   0 % (0-2)  


 


Band Neutrophils   0 % (0-8)  


 


Platelet Estimate   Adequate  


 


Platelet Morphology   Normal  


 


Anisocytosis   1+  


 


Sodium Level


  


  


  153 MMOL/L


(136-145)  H


 


Potassium Level


  


  


  3.2 MMOL/L


(3.5-5.1)  L


 


Chloride Level


  


  


  117 MMOL/L


()  H


 


Carbon Dioxide Level


  


  


  21 MMOL/L


(21-32)


 


Anion Gap


  


  


  15 mmol/L


(5-15)


 


Blood Urea Nitrogen


  


  


  30 mg/dL


(7-18)  H


 


Creatinine


  


  


  1.4 MG/DL


(0.55-1.30)  H


 


Estimat Glomerular Filtration


Rate 


  


  > 60 mL/min


(>60)


 


Glucose Level


  


  


  219 MG/DL


()  H


 


Calcium Level


  


  


  10.7 MG/DL


(8.5-10.1)  H


 


Calcium (Send out)   Pending  


 


Phosphorus Level


  


  


  2.7 MG/DL


(2.5-4.9)


 


Magnesium Level


  


  


  1.9 MG/DL


(1.8-2.4)


 


Total Creatine Kinase


  


  


  156 U/L


()


 


Triglycerides Level


  


  


  1256 MG/DL


()  H


 


Cholesterol Level


  


  


  241 MG/DL (<


200)  H


 


LDL Cholesterol


  


  


  59 mg/dL


(<100)


 


HDL Cholesterol


  


  


  18 MG/DL


(40-60)  L


 


Cholesterol/HDL Ratio


  


  


  13.4 (3.3-4.4)


H


 


Parathyroid Hormone (Intact)   Pending  

















Yury Pena MD Nov 26, 2019 10:22

## 2019-11-26 NOTE — PROGRESS NOTE
DATE:  11/25/2019

SUBJECTIVE:  The patient's condition has moderately improved.



PHYSICAL EXAMINATION:

VITAL SIGNS:  Blood pressure now is 116/78, his heart rate declined from

162 to 105, respirations of 17, and temperature of 98.8.

HEENT:  Eyes were normal.  ENT, mucous membranes were moist and intact.

Tongue is protruded.

NECK:  Supple with no JVD without lymph nodes.  Tracheostomy site is

clean.

LUNGS:  Clear without rhonchi, rales, or wheezing.  Secretions are small,

thin, and white.

HEART:  Normal sounds with regular beats.   There is no S3, S4, or

pericardial rubs.

ABDOMEN:  Soft and nontender with normal bowel sounds.  Gastrostomy site is

clean.

EXTREMITIES:  Warm without cyanosis, clubbing, or edema.



LABORATORY AND DIAGNOSTIC DATA:  His hemoglobin is 7.9, hematocrit 25.6

with MCV of 97, WBC of 18.2, and platelets of 244.  His WBC was 24.7

yesterday.  His BUN and creatinine is 36 and 1.5 today and it was 42 and

1.8 yesterday.  His sodium is 155, potassium 3.5, chloride 119, and CO2 is

20.  His troponin is 0.09.  His pro-BNP is 156.  His lactic acid is

unavailable.  SGOT, SGPT, and alkaline phosphatase are normal.  It was

1.10 yesterday.  His glucose is 269, it was _____ yesterday.  His calcium

is 10.7.



His chest x-ray _____ revealed pulmonary vascular congestion, small left

pleural effusion, and subsegmental atelectasis in the left base.  He had

an abdominal ultrasound today, _____ hepatomegaly with fatty infiltrate

and an obstructing stone in the left kidney.  _____ for influenza was

negative.



Urine culture no growth in the 24-hour _____  gram-negative rods.  In

addition, the patient has distended bladder _____ output because of

ureteral stenosis.



Urology consultant was called to assist in the management of this case.

The patient currently is on vancomycin, _____ amikacin yesterday.   Repeat

laboratory tests will be done in the a.m.









  ______________________________________________

  Etienne Bloom M.D.





DR:  JT

D:  11/26/2019 00:20

T:  11/26/2019 10:55

JOB#:  [id number]

CC:

## 2019-11-27 VITALS — SYSTOLIC BLOOD PRESSURE: 119 MMHG | DIASTOLIC BLOOD PRESSURE: 85 MMHG

## 2019-11-27 VITALS — DIASTOLIC BLOOD PRESSURE: 84 MMHG | SYSTOLIC BLOOD PRESSURE: 129 MMHG

## 2019-11-27 VITALS — DIASTOLIC BLOOD PRESSURE: 84 MMHG | SYSTOLIC BLOOD PRESSURE: 128 MMHG

## 2019-11-27 VITALS — SYSTOLIC BLOOD PRESSURE: 127 MMHG | DIASTOLIC BLOOD PRESSURE: 87 MMHG

## 2019-11-27 VITALS — SYSTOLIC BLOOD PRESSURE: 115 MMHG | DIASTOLIC BLOOD PRESSURE: 75 MMHG

## 2019-11-27 VITALS — SYSTOLIC BLOOD PRESSURE: 119 MMHG | DIASTOLIC BLOOD PRESSURE: 81 MMHG

## 2019-11-27 VITALS — DIASTOLIC BLOOD PRESSURE: 81 MMHG | SYSTOLIC BLOOD PRESSURE: 119 MMHG

## 2019-11-27 VITALS — SYSTOLIC BLOOD PRESSURE: 125 MMHG | DIASTOLIC BLOOD PRESSURE: 80 MMHG

## 2019-11-27 VITALS — SYSTOLIC BLOOD PRESSURE: 123 MMHG | DIASTOLIC BLOOD PRESSURE: 75 MMHG

## 2019-11-27 VITALS — SYSTOLIC BLOOD PRESSURE: 129 MMHG | DIASTOLIC BLOOD PRESSURE: 87 MMHG

## 2019-11-27 VITALS — SYSTOLIC BLOOD PRESSURE: 126 MMHG | DIASTOLIC BLOOD PRESSURE: 83 MMHG

## 2019-11-27 VITALS — SYSTOLIC BLOOD PRESSURE: 131 MMHG | DIASTOLIC BLOOD PRESSURE: 83 MMHG

## 2019-11-27 VITALS — SYSTOLIC BLOOD PRESSURE: 122 MMHG | DIASTOLIC BLOOD PRESSURE: 81 MMHG

## 2019-11-27 VITALS — DIASTOLIC BLOOD PRESSURE: 81 MMHG | SYSTOLIC BLOOD PRESSURE: 127 MMHG

## 2019-11-27 LAB
ADD MANUAL DIFF: NO
ANION GAP SERPL CALC-SCNC: 16 MMOL/L (ref 5–15)
APTT BLD: 25 SEC (ref 23–33)
BASOPHILS NFR BLD AUTO: 1.1 % (ref 0–2)
BUN SERPL-MCNC: 21 MG/DL (ref 7–18)
CALCIUM SERPL-MCNC: 10.2 MG/DL (ref 8.5–10.1)
CHLORIDE SERPL-SCNC: 118 MMOL/L (ref 98–107)
CO2 SERPL-SCNC: 19 MMOL/L (ref 21–32)
CREAT SERPL-MCNC: 1 MG/DL (ref 0.55–1.3)
EOSINOPHIL NFR BLD AUTO: 16.7 % (ref 0–3)
ERYTHROCYTE [DISTWIDTH] IN BLOOD BY AUTOMATED COUNT: 17.4 % (ref 11.6–14.8)
HCT VFR BLD CALC: 25.8 % (ref 42–52)
HGB BLD-MCNC: 8.2 G/DL (ref 14.2–18)
INR PPP: 1.1 (ref 0.9–1.1)
LYMPHOCYTES NFR BLD AUTO: 8.6 % (ref 20–45)
MCV RBC AUTO: 93 FL (ref 80–99)
MONOCYTES NFR BLD AUTO: 4.8 % (ref 1–10)
NEUTROPHILS NFR BLD AUTO: 68.8 % (ref 45–75)
PLATELET # BLD: 253 K/UL (ref 150–450)
POTASSIUM SERPL-SCNC: 3 MMOL/L (ref 3.5–5.1)
RBC # BLD AUTO: 2.77 M/UL (ref 4.7–6.1)
SODIUM SERPL-SCNC: 153 MMOL/L (ref 136–145)
WBC # BLD AUTO: 11.5 K/UL (ref 4.8–10.8)

## 2019-11-27 PROCEDURE — 0T7D8ZZ DILATION OF URETHRA, VIA NATURAL OR ARTIFICIAL OPENING ENDOSCOPIC: ICD-10-PCS

## 2019-11-27 RX ADMIN — PANTOPRAZOLE SODIUM SCH MG: 40 INJECTION, POWDER, FOR SOLUTION INTRAVENOUS at 10:09

## 2019-11-27 RX ADMIN — CLOBETASOL PROPIONATE SCH APPLIC: 0.5 OINTMENT TOPICAL at 20:06

## 2019-11-27 RX ADMIN — SITAGLIPTIN SCH MG: 50 TABLET, FILM COATED ORAL at 05:54

## 2019-11-27 RX ADMIN — INSULIN ASPART SCH UNITS: 100 INJECTION, SOLUTION INTRAVENOUS; SUBCUTANEOUS at 11:30

## 2019-11-27 RX ADMIN — INSULIN ASPART SCH UNITS: 100 INJECTION, SOLUTION INTRAVENOUS; SUBCUTANEOUS at 06:30

## 2019-11-27 RX ADMIN — MEROPENEM SCH MLS/HR: 1 INJECTION INTRAVENOUS at 20:15

## 2019-11-27 RX ADMIN — INSULIN ASPART SCH UNITS: 100 INJECTION, SOLUTION INTRAVENOUS; SUBCUTANEOUS at 20:05

## 2019-11-27 RX ADMIN — HEPARIN SODIUM SCH UNITS: 5000 INJECTION INTRAVENOUS; SUBCUTANEOUS at 20:11

## 2019-11-27 RX ADMIN — TAMSULOSIN HYDROCHLORIDE SCH MG: 0.4 CAPSULE ORAL at 10:10

## 2019-11-27 RX ADMIN — CLOBETASOL PROPIONATE SCH APPLIC: 0.5 OINTMENT TOPICAL at 10:09

## 2019-11-27 RX ADMIN — LEVETIRACETAM SCH MLS/HR: 5 INJECTION INTRAVENOUS at 20:07

## 2019-11-27 RX ADMIN — LEVETIRACETAM SCH MLS/HR: 5 INJECTION INTRAVENOUS at 05:34

## 2019-11-27 RX ADMIN — MEROPENEM SCH MLS/HR: 1 INJECTION INTRAVENOUS at 10:31

## 2019-11-27 RX ADMIN — INSULIN ASPART SCH UNITS: 100 INJECTION, SOLUTION INTRAVENOUS; SUBCUTANEOUS at 16:30

## 2019-11-27 RX ADMIN — Medication SCH MLS/HR: at 10:32

## 2019-11-27 RX ADMIN — HEPARIN SODIUM SCH UNITS: 5000 INJECTION INTRAVENOUS; SUBCUTANEOUS at 09:00

## 2019-11-27 NOTE — BRIEF OPERATIVE NOTE
Immediate Post Operative Note


Operative Note


Pre-op Diagnosis:


urinary retention, urethral stricture


Post-op Diagnosis:  same as pre-op


Findings:  other - dense bulbar urethral or prostatic fossa stricture


Surgeon:  estelle


Anesthesiologist:  aliyah


Anesthesia:  general


Specimen:  none


Complications:  none


Condition:  stable


Fluids:  NS


Estimated Blood Loss:  minimal


Drains:  none


Implant(s) used?:  No











Vinnie Pollard MD Nov 27, 2019 18:16

## 2019-11-27 NOTE — PULMONOLGY CRITICAL CARE NOTE
Critical Care - Asmt/Plan


Problems:  


(1) Acute on chronic respiratory failure


(2) Sepsis


(3) Diabetes mellitus


(4) Vegetative state


(5) Gastrostomy tube dependent


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


(7) ATN (acute tubular necrosis)


Respiratory:  monitor respiratory rate, adjust FIO2, CXR


Cardiac:  continue to monitor HR/BP


Renal:  F/U I&O


Infectious Disease:  check cultures


Gastrointestinal:  continue feedings/current rate


Endocrine:  monitor blood sugar


Hematologic:  monitor H/H, transfuse if hgb<8.5


Neurologic:  PRN Ativan, PRN Morphine, keep patient comfortable


Affect:  PRN ativan


Prophylaxis:  Protonix, Heparin


Time Spent (Minutes):  40


Notes Reviewed:  cardio, renal, ID, GI


Discussed with:  nurses, consultants, 





Critical Care - Objective





Last 24 Hour Vital Signs








  Date Time  Temp Pulse Resp B/P (MAP) Pulse Ox O2 Delivery O2 Flow Rate FiO2


 


11/27/19 09:00  101 18     30


 


11/27/19 08:00 97.5 100 17 127/81 (96) 100   


 


11/27/19 08:00      Mechanical Ventilator  


 


11/27/19 08:00  101      


 


11/27/19 08:00        30


 


11/27/19 06:52  101 20     30


 


11/27/19 05:24  95 17     30


 


11/27/19 04:00 97.9 93 18 122/81 (95) 100   


 


11/27/19 04:00  91      


 


11/27/19 04:00      Mechanical Ventilator  


 


11/27/19 04:00        30


 


11/27/19 03:40  95 18     30


 


11/27/19 01:03  88 19     30


 


11/27/19 00:00 97.9 93 18 125/80 (95) 100   


 


11/27/19 00:00  92      


 


11/27/19 00:00      Mechanical Ventilator  


 


11/27/19 00:00        30


 


11/26/19 23:16  106 19     30


 


11/26/19 21:09  99 19     30


 


11/26/19 20:00      Mechanical Ventilator  


 


11/26/19 20:00        30


 


11/26/19 20:00 98.8 95 17 133/79 (97) 100   


 


11/26/19 19:35  94      


 


11/26/19 19:22  104 16     30


 


11/26/19 18:00 98.1 94 17 110/73 (85) 100   


 


11/26/19 17:00  96 16 120/72 (88) 100   


 


11/26/19 16:30  102 16     30


 


11/26/19 16:00      Mechanical Ventilator  


 


11/26/19 16:00 98.0 98 16 115/71 (86) 100   


 


11/26/19 16:00  103      


 


11/26/19 16:00        30


 


11/26/19 15:00  97 16 114/74 (87) 100   


 


11/26/19 14:45  99 16     30


 


11/26/19 14:00  99 17 113/75 (88) 100   


 


11/26/19 13:00  96 17 111/74 (86) 100   


 


11/26/19 12:30  95 16     30


 


11/26/19 12:00  95      


 


11/26/19 12:00 97.5 95 17 125/81 (96) 100   


 


11/26/19 12:00      Mechanical Ventilator  


 


11/26/19 12:00        30








Status:  obtunded


Condition:  critical


HEENT:  atraumatic


Lungs:  rales, rhonchi


Heart:  HR/BP stable


Abdomen:  soft, active bowel sounds, feeding tube


Extremities:  edema


Decubiti:  stage


Micro:





Microbiology








 Date/Time


Source Procedure


Growth Status


 


 


 11/25/19 11:00


Nasal Nares - Final Complete


 


 11/25/19 11:00


Nasal Nares - Final Complete





 11/24/19 16:45


Sputum Gram Stain - Final Resulted


 


 11/24/19 16:45 Sputum Culture - Preliminary


Gram Negative Bacillus 1


Gram Negative Bacillus 2 Resulted


 


 11/26/19 03:00


Urine,Clean Catch Urine Culture - Preliminary Resulted


 


 11/24/19 16:45


Urine,Clean Catch Urine Culture - Preliminary


YEAST Resulted








Accucheck:  191





Critical Care - Subjective


ROS Limited/Unobtainable:  Yes


Condition:  critical


EKG Rhythm:  Sinus Rhythm


FI02:  30


Vent Support Breath Rate:  16


Vent Support Mode:  AC


Vent Tidal Volume:  600


Sputum Amount:  Moderate


PEEP:  5.0


PIP:  27


Tube Feeding Amount:  100


I&O:











Intake and Output  


 


 11/26/19 11/27/19





 19:00 07:00


 


Intake Total 1630.0 ml 805 ml


 


Output Total 235 ml 400 ml


 


Balance 1395.0 ml 405 ml


 


  


 


IV Total 430.0 ml 255 ml


 


Tube Feeding 1200 ml 300 ml


 


Blood Product  250 ml


 


Output Urine Total 110 ml 350 ml


 


Stool Total 125 ml 50 ml








CXR:


cxr clear


Labs:





Laboratory Tests








Test


  11/27/19


04:50 11/27/19


08:25


 


White Blood Count


  11.5 K/UL


(4.8-10.8)  H 


 


 


Red Blood Count


  2.77 M/UL


(4.70-6.10)  L 


 


 


Hemoglobin


  8.2 G/DL


(14.2-18.0)  L 


 


 


Hematocrit


  25.8 %


(42.0-52.0)  L 


 


 


Mean Corpuscular Volume 93 FL (80-99)   


 


Mean Corpuscular Hemoglobin


  29.5 PG


(27.0-31.0) 


 


 


Mean Corpuscular Hemoglobin


Concent 31.7 G/DL


(32.0-36.0)  L 


 


 


Red Cell Distribution Width


  17.4 %


(11.6-14.8)  H 


 


 


Platelet Count


  253 K/UL


(150-450) 


 


 


Mean Platelet Volume


  9.0 FL


(6.5-10.1) 


 


 


Neutrophils (%) (Auto)


  68.8 %


(45.0-75.0) 


 


 


Lymphocytes (%) (Auto)


  8.6 %


(20.0-45.0)  L 


 


 


Monocytes (%) (Auto)


  4.8 %


(1.0-10.0) 


 


 


Eosinophils (%) (Auto)


  16.7 %


(0.0-3.0)  H 


 


 


Basophils (%) (Auto)


  1.1 %


(0.0-2.0) 


 


 


Prothrombin Time


  11.4 SEC


(9.30-11.50) 


 


 


Prothromb Time International


Ratio 1.1 (0.9-1.1)  


  


 


 


Activated Partial


Thromboplast Time 25 SEC (23-33)


  


 


 


Sodium Level


  153 MMOL/L


(136-145)  H 


 


 


Potassium Level


  3.0 MMOL/L


(3.5-5.1)  L 


 


 


Chloride Level


  118 MMOL/L


()  H 


 


 


Carbon Dioxide Level


  19 MMOL/L


(21-32)  L 


 


 


Anion Gap


  16 mmol/L


(5-15)  H 


 


 


Blood Urea Nitrogen


  21 mg/dL


(7-18)  H 


 


 


Creatinine


  1.0 MG/DL


(0.55-1.30) 


 


 


Estimat Glomerular Filtration


Rate > 60 mL/min


(>60) 


 


 


Glucose Level


  199 MG/DL


()  H 


 


 


Calcium Level


  10.2 MG/DL


(8.5-10.1)  H 


 


 


Vancomycin Level Trough


  


  17.5 ug/mL


(5.0-12.0)  H

















Yury Pena MD Nov 27, 2019 11:27

## 2019-11-27 NOTE — UROLOGY PROGRESS NOTE
Assessment/Plan


Assessment/Plan:


1. Urinary retention.


2. BPH.


3. Neurogenic bladder.


4. Incontinence.


5. Pyuria.


6. Hematuria.


7. Proteinuria.


8. Renal insufficiency, acute possibly on chronic.


9. Renal cyst.


10. Nephrolithiasis.





monitor clinically


abx as ordered


f/u on cx's


plan cysto/urethrotomy today to place mayorga


d/w pt's wife fully


consent obtained


has been NPO


transfused





Subjective


Allergies:  


Coded Allergies:  


     AZTREONAM (Verified  Allergy, Unknown, 7/23/18)


Subjective


all noted, urinary incontinence, blood-tinged urine reported, transfused





Objective





Last 24 Hour Vital Signs








  Date Time  Temp Pulse Resp B/P (MAP) Pulse Ox O2 Delivery O2 Flow Rate FiO2


 


11/27/19 13:15  101 17     30


 


11/27/19 12:00      Mechanical Ventilator  


 


11/27/19 12:00  113      


 


11/27/19 12:00        30


 


11/27/19 12:00 97.9 102 18 115/75 (88) 100   


 


11/27/19 11:28  103 19     30


 


11/27/19 09:00  101 18     30


 


11/27/19 08:00 97.5 100 17 127/81 (96) 100   


 


11/27/19 08:00      Mechanical Ventilator  


 


11/27/19 08:00  101      


 


11/27/19 08:00        30


 


11/27/19 06:52  101 20     30


 


11/27/19 05:24  95 17     30


 


11/27/19 04:00 97.9 93 18 122/81 (95) 100   


 


11/27/19 04:00  91      


 


11/27/19 04:00      Mechanical Ventilator  


 


11/27/19 04:00        30


 


11/27/19 03:40  95 18     30


 


11/27/19 01:03  88 19     30


 


11/27/19 00:00 97.9 93 18 125/80 (95) 100   


 


11/27/19 00:00  92      


 


11/27/19 00:00      Mechanical Ventilator  


 


11/27/19 00:00        30


 


11/26/19 23:16  106 19     30


 


11/26/19 21:09  99 19     30


 


11/26/19 20:00      Mechanical Ventilator  


 


11/26/19 20:00        30


 


11/26/19 20:00 98.8 95 17 133/79 (97) 100   


 


11/26/19 19:35  94      


 


11/26/19 19:22  104 16     30


 


11/26/19 18:00 98.1 94 17 110/73 (85) 100   


 


11/26/19 17:00  96 16 120/72 (88) 100   


 


11/26/19 16:30  102 16     30


 


11/26/19 16:00      Mechanical Ventilator  


 


11/26/19 16:00 98.0 98 16 115/71 (86) 100   


 


11/26/19 16:00  103      


 


11/26/19 16:00        30


 


11/26/19 15:00  97 16 114/74 (87) 100   


 


11/26/19 14:45  99 16     30

















Intake and Output  


 


 11/26/19 11/27/19





 18:59 06:59


 


Intake Total 1630.0 ml 905 ml


 


Output Total 235 ml 400 ml


 


Balance 1395.0 ml 505 ml


 


  


 


IV Total 430.0 ml 255 ml


 


Tube Feeding 1200 ml 400 ml


 


Blood Product  250 ml


 


Output Urine Total 110 ml 350 ml


 


Stool Total 125 ml 50 ml











Microbiology








 Date/Time


Source Procedure


Growth Status


 


 


 11/24/19 09:15


Blood Blood Culture - Preliminary


NO GROWTH AFTER 48 HOURS Resulted





 11/25/19 11:00


Nasal Nares - Final Complete


 


 11/25/19 11:00


Nasal Nares - Final Complete


 


 11/26/19 03:00


Urine,Clean Catch Urine Culture - Preliminary Resulted


 


 11/24/19 09:10


Rectum VRE Culture - Final


Enterococcus Faecium - Vre Complete








Current Medications








 Medications


  (Trade)  Dose


 Ordered  Sig/Jan


 Route


 PRN Reason  Start Time


 Stop Time Status Last Admin


Dose Admin


 


 Acetaminophen


  (Tylenol)  650 mg  Q4H  PRN


 GT


 Mild Pain/Temp > 100.6  11/26/19 18:42


 12/26/19 18:41   


 


 


 Albuterol/


 Ipratropium


  (Albuterol/


 Ipratropium)  3 ml  Q2H  PRN


 HHN


 Shortness of Breath  11/26/19 18:45


 11/29/19 14:44   


 


 


 Artificial Tears


  (Akwa-Tears)  2 drop  Q12HR


 BOTH EYES


   11/26/19 21:00


 12/24/19 20:59  11/27/19 10:08


 


 


 Baclofen


  (Lioresal)  10 mg  THREE TIMES A  DAY


 GT


   11/27/19 09:00


 12/24/19 17:59  11/27/19 10:10


 


 


 Clobetasol


 Propionate


  (Temovate)  1 applic  EVERY 12  HOURS


 TOPIC


   11/26/19 21:00


 12/24/19 20:59  11/27/19 10:09


 


 


 Dextrose


  (Dextrose 50%)  25 ml  Q30M  PRN


 IV


 Hypoglycemia  11/26/19 18:45


 12/24/19 14:44   


 


 


 Dextrose


  (Dextrose 50%)  50 ml  Q30M  PRN


 IV


 Hypoglycemia  11/26/19 18:45


 12/24/19 14:44   


 


 


 Fenofibrate


  (Tricor)  145 mg  DAILY


 ORAL


   11/27/19 09:00


 12/25/19 08:59  11/27/19 10:10


 


 


 Heparin Sodium


  (Porcine)


  (Heparin 5000


 units/ml)  5,000 units  EVERY 12  HOURS


 SUBQ


   11/26/19 21:00


 12/24/19 20:59   


 


 


 Insulin Aspart


  (NovoLOG)    BEFORE MEALS AND  HS


 SUBQ


   11/26/19 21:00


 12/24/19 16:29   


 


 


 Levetiracetam  100 ml @ 


 400 mls/hr  Q8HR


 IVPB


   11/26/19 22:00


 12/24/19 21:59  11/27/19 05:34


 


 


 Lorazepam


  (Ativan 2mg/ml


 1ml)  2 mg  Q2H  PRN


 IV


 For Anxiety  11/26/19 18:43


 12/3/19 18:42   


 


 


 Meropenem 1 gm/


 Sodium Chloride  55 ml @ 


 110 mls/hr  Q12HR


 IVPB


   11/26/19 21:00


 11/29/19 20:59  11/27/19 10:31


 


 


 Morphine Sulfate


  (Morphine


 Sulfate)  4 mg  Q4H  PRN


 IVP


 Severe Pain (Pain Scale 7-10)  11/26/19 18:44


 12/3/19 18:43   


 


 


 Ondansetron HCl


  (Zofran)  4 mg  Q6H  PRN


 IVP


 Nausea & Vomiting  11/26/19 18:44


 12/26/19 18:43   


 


 


 Pantoprazole


  (Protonix)  40 mg  DAILY


 IV


   11/27/19 09:00


 12/25/19 08:59  11/27/19 10:09


 


 


 Polyethylene


 Glycol


  (Miralax)  17 gm  DAILYPRN  PRN


 GT


 Constipation  11/26/19 18:44


 12/26/19 18:43   


 


 


 Potassium Chloride


  (K-Dur)  40 meq  TWICE A  DAY


 ORAL


   11/27/19 09:00


 11/27/19 23:00  11/27/19 10:11


 


 


 Sitagliptin


 Phosphate


  (Januvia)  50 mg  ACBREAKFAST


 GT


   11/27/19 06:30


 12/25/19 06:29  11/27/19 05:54


 


 


 Tamsulosin HCl


  (Flomax)  0.4 mg  DAILY


 ORAL


   11/27/19 09:00


 12/27/19 08:59  11/27/19 10:10


 


 


 Vancomycin HCl


  (Vanco rx to


 dose)  1 ea  DAILY  PRN


 MISC


 .  11/27/19 09:00


 12/24/19 15:59   


 


 


 Vancomycin/Sodium


 Chloride  275 ml @ 


 137.5 mls/


 hr  Q24H


 IVPB


   11/27/19 09:00


 11/30/19 08:59  11/27/19 10:32


 





Laboratory Tests


11/27/19 04:50: 


White Blood Count 11.5H, Red Blood Count 2.77L, Hemoglobin 8.2L, Hematocrit 

25.8L, Mean Corpuscular Volume 93, Mean Corpuscular Hemoglobin 29.5, Mean 

Corpuscular Hemoglobin Concent 31.7L, Red Cell Distribution Width 17.4H, 

Platelet Count 253, Mean Platelet Volume 9.0, Neutrophils (%) (Auto) 68.8, 

Lymphocytes (%) (Auto) 8.6L, Monocytes (%) (Auto) 4.8, Eosinophils (%) (Auto) 

16.7H, Basophils (%) (Auto) 1.1, Prothrombin Time 11.4, Prothromb Time 

International Ratio 1.1, Activated Partial Thromboplast Time 25, Sodium Level 

153H, Potassium Level 3.0L, Chloride Level 118H, Carbon Dioxide Level 19L, 

Anion Gap 16H, Blood Urea Nitrogen 21H, Creatinine 1.0, Estimat Glomerular 

Filtration Rate > 60, Glucose Level 199H, Calcium Level 10.2H


11/27/19 08:25: Vancomycin Level Trough 17.5H


Height (Feet):  5


Height (Inches):  7.00


Weight (Pounds):  183


Objective


 exam stable











Vinnie Pollard MD Nov 27, 2019 14:19

## 2019-11-27 NOTE — ELECTROENCEPHALOGRAM
DATE OF PROCEDURE:  11/26/2019



EEG REPORT



REQUESTING PHYSICIANS:

Robert Chan M.D. & Etienne Bloom M.D.



HISTORY:  This EEG was performed on a 54-year-old 

gentleman with a history of significant head trauma and 

multiple other medical problems who was hospitalized 

for an alteration in mental state.  The purpose of this 

EEG was to evaluate the patient for the degree and 

type of cerebral dysfunction.



TECHNICAL NOTE:  This EEG was performed on a [place]

[employer] with electrodes placed on the scalp 

according to the International 10-20 system.  Scalp-to-scalp 

and scalp-to-ear montages were used.  The EEG was 

technically satisfactory and was performed while the patient 

was in a poorly responsive state.



OBSERVATIONS:  

In the poorly responsive state, the background activity

consisted of low amplitude, 4-5 Hz theta and 1.5-2 Hz delta

activity.  



Triphasic waveforms were also seen throughout the tracing.



Activity was lower in amplitude and slightly slower over the 

right hemisphere compared to the left hemisphere.  



No epileptiform discharges were seen.



IMPRESSION:  

This is an abnormal EEG characterized by:

1. A low amplitude slow background in the 4-5 Hz theta and

1.5-2 Hz delta range.

2. Lower amplitude activity and slower frequencies over the

right hemisphere compared to the left.

3. Triphasic waveforms with an anterior to posterior gradient 

seen from time to time.



COMMENT:  

The study is consistent with:

1. An encephalopathy of a moderate degree, most probably 

with a toxic metabolic component as evidenced by the 

triphasic waveforms.

2. Right worse than left brain dysfunction.  



Please note that no interictal discharges were seen during this 

EEG in spite of the patient having lingual movements throughout 

the tracing.  



Clinical correlation is recommended.







________________________

Vinh Oates M.D., M.S.P.H.



DR:  ЕЛЕНА

D:  11/27/2019 17:23

T:  11/27/2019 21:09

JOB#:  [id number]
[email or web address]

## 2019-11-27 NOTE — IMMEDIATE POST-OP EVALUATION
Immediate Post-Op Evalulation


Immediate Post-Op Evalulation


Procedure:  cystoscopy; mayorga placement


Date of Evaluation:  Nov 27, 2019


Time of Evaluation:  18:18


IV Fluids:  800


Blood Pressure Systolic:  118


Blood Pressure Diastolic:  60


Pulse Rate:  95


Respiratory Rate:  14


O2 Sat by Pulse Oximetry:  99


Nausea:  No


Vomiting:  No


Complications


none;


Patient Status:  reacts, patent, ventilated - SIMV 100% 600 9 in the OR, none - 

recovered in OR by PACU RNs due to Scabies Preacution


Hydration Status:  adequate


Drug:  declined











Aleshia Ponce CRNA Nov 27, 2019 18:19

## 2019-11-27 NOTE — PRE-PROCEDURE NOTE/ATTESTATION
Pre-Procedure Note/Attestation


Complete Prior to Procedure


Planned Procedure:  not applicable


Procedure Narrative:


cysto, optic urethrotomy, possible suprapubic tube placement





Indications for Procedure


Pre-Operative Diagnosis:


urinary retention, urethral stricture





Attestation


I attest that I discussed the nature of the procedure; its benefits; risks and 

complications; and alternatives (and the risks and benefits of such alternatives

), prior to the procedure, with the patient (or the patient's legal 

representative).





I attest that, if there was a reasonable possibility of needing a blood 

transfusion, the patient (or the patient's legal representative) was given the 

[facility] standardized written summary, pursuant 

to the Sukhjinder [place] Blood Safety Act ([place] Health and Safety Code # 1645, as 

amended).





I attest that I re-evaluated the patient just prior to the surgery and that 

there has been no change in the patient's H&P, except as documented below:











Vinnie Pollard MD Nov 27, 2019 16:35

## 2019-11-27 NOTE — PROGRESS NOTE
DATE:  11/26/2019

ADDENDUM



LABORATORY AND DIAGNOSTIC DATA:  Phosphorus is 2.7, magnesium is 1.9.  His

intact PTH is pending.  His calcium is 10.7.  Immunostain was negative for

influenza A and B.  His urine culture showed gram-negative bacilli but the

culture is still pending.  His blood culture from yesterday are no growth

after 24 hours.  His VRE swab was _____ enterococcus faecium which is

considered a contaminant.  Staph aureus from MRSA was positive.  No new

imaging study was available at the time of this dictation.



IMPRESSION:  The patient was discovered to have scabies and he has been

treated with permethrin 5% total body coverage _____.  The patient has

sepsis.  He is on vancomycin and Merrem.  His WBC is progressively

declining associated with decline in heart rate which was 160 on

admission.  The patient is still hypernatremic.  His renal failure is

improving with hydration.  We are awaiting still to confirm details

regarding hypercalcemia, but this hypercalcemia is of mild category that

requires only hydration and _____ diuretics.  The calcium carbonate has

been discontinued.  Repeat laboratory tests will be done in the a.m.









  ______________________________________________

  Etienne Bloom M.D.





DR:  JASPAL

D:  11/26/2019 17:05

T:  11/27/2019 00:55

JOB#:  [id number]

CC:

## 2019-11-27 NOTE — OPERATIVE NOTE - DICTATED
DATE OF OPERATION:  11/27/2019

PREOPERATIVE DIAGNOSES:

1. History of sepsis.

2. Urinary retention.

3. Difficult catheterization.

4. Urethral stricture.



POSTOPERATIVE DIAGNOSES:

1. History of sepsis.

2. Urinary retention.

3. Difficult catheterization.

4. Urethral stricture.



PROCEDURE PERFORMED:

1. Cystoscopy.

2. Urethral dilation.

3. Optical urethrotomy.

4. Placement of Guzmán catheter.



OPERATING SURGEON:  Vinnie Pollard M.D.



ANESTHESIOLOGIST:  _____.



ANESTHESIA:  General.



INDICATION FOR PROCEDURE:  This is an unfortunate 54-year-old male.  He is

a resident of a nursing home.  He has encephalopathy, was admitted with

fevers and sepsis.  He was noted to have a high bladder scan volume of 500

mL.  The nursing staff were not able to place a Guzmán catheter.  I was

also not able to place a Guzmán.  He was noted to have a dense urethral

stricture.  He also did have mild renal insufficiency, which was

improving, however, request was made to place a Guzmán catheter and as

such, he was scheduled for the above procedure.  I am not able to get

consent from the patient.  I did speak with the patient's wife and consent

was obtained.  Possible risks and complications were discussed, no

guarantees were implied.



FINDINGS:  The patient had a very dense, what appeared to be a stricture in

the bulbar urethra near the sphincter, possibly the prostatic floor

anatomy was very irregular.



PROCEDURE IN DETAIL:  Informed consent was obtained from the patient's

wife.  The patient was brought to the hospital and then placed in supine

position.  After successful general anesthesia was administered, the

patient was placed in modified dorsal lithotomy position.  His genitalia

was then prepped and draped in the usual fashion.  Preoperative IV

antibiotics were administered.  A time-out was performed.  The urethral

meatus just was dilated.  Cystoscopy was then performed.  The distal

urethra was normal, however, proximally what appeared to be close to the

external sphincter or possibly within the prostate was very difficult to

tell because the whole anatomy was irregular.  There was a very dense

stricture.  I would estimate it was about maybe 6 to 8-Namibian, I was able

to pass a wire through this.  I was not able to pass the scope over the

wire.  At this point, the urethra tome was inserted and with a cold knife,

the stricture was opened at the 6 and 12 o'clock position.  It was opened

widely and was able to push again into the bladder.  Again, I did not see

any significant prostatic tissue blockage.  The bladder was trabeculated

with significant debris.  The scope was removed and the stricture was

again dilated with Sheets sounds of the wire.  A 30-Namibian and a

22-Namibian Councill tip catheter was passed over the wire into the bladder.

It was in good position and irrigated well.  The patient was awakened and

was taken to the recovery room in stable condition.  Blood loss is

minimal.  No complications.









  ______________________________________________

  Vinnie Pollard M.D.





DR:  CECIL

D:  11/27/2019 18:02

T:  11/27/2019 21:16

JOB#:  [id number]

CC:  Etienne Bloom M.D.; Fax#:  [phone]

## 2019-11-28 VITALS — DIASTOLIC BLOOD PRESSURE: 82 MMHG | SYSTOLIC BLOOD PRESSURE: 122 MMHG

## 2019-11-28 VITALS — SYSTOLIC BLOOD PRESSURE: 115 MMHG | DIASTOLIC BLOOD PRESSURE: 73 MMHG

## 2019-11-28 VITALS — SYSTOLIC BLOOD PRESSURE: 127 MMHG | DIASTOLIC BLOOD PRESSURE: 84 MMHG

## 2019-11-28 VITALS — SYSTOLIC BLOOD PRESSURE: 115 MMHG | DIASTOLIC BLOOD PRESSURE: 87 MMHG

## 2019-11-28 VITALS — SYSTOLIC BLOOD PRESSURE: 118 MMHG | DIASTOLIC BLOOD PRESSURE: 81 MMHG

## 2019-11-28 VITALS — SYSTOLIC BLOOD PRESSURE: 127 MMHG | DIASTOLIC BLOOD PRESSURE: 78 MMHG

## 2019-11-28 LAB
% IRON SATURATION: 8 % (ref 15–50)
ADD MANUAL DIFF: NO
ALBUMIN SERPL-MCNC: 2.4 G/DL (ref 3.4–5)
ALBUMIN/GLOB SERPL: 0.6 {RATIO} (ref 1–2.7)
ALP SERPL-CCNC: 52 U/L (ref 46–116)
ALT SERPL-CCNC: 26 U/L (ref 12–78)
ANION GAP SERPL CALC-SCNC: 14 MMOL/L (ref 5–15)
AST SERPL-CCNC: 31 U/L (ref 15–37)
BASOPHILS NFR BLD AUTO: 0.7 % (ref 0–2)
BILIRUB SERPL-MCNC: 0.4 MG/DL (ref 0.2–1)
BUN SERPL-MCNC: 17 MG/DL (ref 7–18)
CALCIUM SERPL-MCNC: 10 MG/DL (ref 8.5–10.1)
CHLORIDE SERPL-SCNC: 121 MMOL/L (ref 98–107)
CHOLEST SERPL-MCNC: 211 MG/DL (ref ?–200)
CK SERPL-CCNC: 83 U/L (ref 26–308)
CO2 SERPL-SCNC: 17 MMOL/L (ref 21–32)
CREAT SERPL-MCNC: 1.1 MG/DL (ref 0.55–1.3)
EOSINOPHIL NFR BLD AUTO: 12 % (ref 0–3)
ERYTHROCYTE [DISTWIDTH] IN BLOOD BY AUTOMATED COUNT: 16.9 % (ref 11.6–14.8)
FERRITIN SERPL-MCNC: 1538 NG/ML (ref 8–388)
GAMMA GLUTAMYL TRANSPEPTIDASE: 152 U/L (ref 5–85)
GLOBULIN SER-MCNC: 4.3 G/DL
HCT VFR BLD CALC: 28.8 % (ref 42–52)
HDLC SERPL-MCNC: 20 MG/DL (ref 40–60)
HGB BLD-MCNC: 9.2 G/DL (ref 14.2–18)
IRON SERPL-MCNC: 14 UG/DL (ref 50–175)
LYMPHOCYTES NFR BLD AUTO: 7.4 % (ref 20–45)
MCV RBC AUTO: 93 FL (ref 80–99)
MONOCYTES NFR BLD AUTO: 4.8 % (ref 1–10)
NEUTROPHILS NFR BLD AUTO: 75.1 % (ref 45–75)
PHOSPHATE SERPL-MCNC: 2.8 MG/DL (ref 2.5–4.9)
PLATELET # BLD: 236 K/UL (ref 150–450)
POTASSIUM SERPL-SCNC: 3.6 MMOL/L (ref 3.5–5.1)
RBC # BLD AUTO: 3.09 M/UL (ref 4.7–6.1)
SODIUM SERPL-SCNC: 152 MMOL/L (ref 136–145)
TIBC SERPL-MCNC: 170 UG/DL (ref 250–450)
TRIGL SERPL-MCNC: 838 MG/DL (ref 30–150)
UNSATURATED IRON BINDING: 156 UG/DL (ref 112–346)
WBC # BLD AUTO: 12.3 K/UL (ref 4.8–10.8)

## 2019-11-28 RX ADMIN — DEXTROSE AND POTASSIUM CHLORIDE SCH MLS/HR: 5; .15 SOLUTION INTRAVENOUS at 22:21

## 2019-11-28 RX ADMIN — LEVETIRACETAM SCH MLS/HR: 5 INJECTION INTRAVENOUS at 09:13

## 2019-11-28 RX ADMIN — INSULIN ASPART SCH UNITS: 100 INJECTION, SOLUTION INTRAVENOUS; SUBCUTANEOUS at 20:13

## 2019-11-28 RX ADMIN — CLOBETASOL PROPIONATE SCH APPLIC: 0.5 OINTMENT TOPICAL at 09:14

## 2019-11-28 RX ADMIN — HEPARIN SODIUM SCH UNITS: 5000 INJECTION INTRAVENOUS; SUBCUTANEOUS at 20:14

## 2019-11-28 RX ADMIN — TAMSULOSIN HYDROCHLORIDE SCH MG: 0.4 CAPSULE ORAL at 09:13

## 2019-11-28 RX ADMIN — MEROPENEM SCH MLS/HR: 1 INJECTION INTRAVENOUS at 20:12

## 2019-11-28 RX ADMIN — MEROPENEM SCH MLS/HR: 1 INJECTION INTRAVENOUS at 09:13

## 2019-11-28 RX ADMIN — CLOBETASOL PROPIONATE SCH APPLIC: 0.5 OINTMENT TOPICAL at 20:12

## 2019-11-28 RX ADMIN — PANTOPRAZOLE SODIUM SCH MG: 40 INJECTION, POWDER, FOR SOLUTION INTRAVENOUS at 09:13

## 2019-11-28 RX ADMIN — INSULIN ASPART SCH UNITS: 100 INJECTION, SOLUTION INTRAVENOUS; SUBCUTANEOUS at 12:37

## 2019-11-28 RX ADMIN — LEVETIRACETAM SCH MLS/HR: 5 INJECTION INTRAVENOUS at 01:07

## 2019-11-28 RX ADMIN — INSULIN ASPART SCH UNITS: 100 INJECTION, SOLUTION INTRAVENOUS; SUBCUTANEOUS at 05:39

## 2019-11-28 RX ADMIN — DEXTROSE AND POTASSIUM CHLORIDE SCH MLS/HR: 5; .15 SOLUTION INTRAVENOUS at 14:48

## 2019-11-28 RX ADMIN — SITAGLIPTIN SCH MG: 50 TABLET, FILM COATED ORAL at 05:38

## 2019-11-28 RX ADMIN — DEXTROSE AND POTASSIUM CHLORIDE SCH MLS/HR: 5; .15 SOLUTION INTRAVENOUS at 03:04

## 2019-11-28 RX ADMIN — HEPARIN SODIUM SCH UNITS: 5000 INJECTION INTRAVENOUS; SUBCUTANEOUS at 09:17

## 2019-11-28 RX ADMIN — LEVETIRACETAM SCH MLS/HR: 5 INJECTION INTRAVENOUS at 17:31

## 2019-11-28 RX ADMIN — INSULIN ASPART SCH UNITS: 100 INJECTION, SOLUTION INTRAVENOUS; SUBCUTANEOUS at 17:31

## 2019-11-28 RX ADMIN — Medication SCH MLS/HR: at 10:29

## 2019-11-28 NOTE — CONSULTATION
Consult Note


Consult Note





asked to eval for fluid and electrolyte management





This patient presents from a skilled nursing facility.  At baseline he is 

ventilator dependent with tracheostomy.  He has a history of respiratory 

failure in a persistent vegetative state.  He is brought in by EMS because the 

nursing facility noted that his heart rate was elevated.  The patient himself 

is agitated and is unable to give any kind of history.  There was no report of 

any other symptoms.


Allergies:  





     AZTREONAM (Verified  Allergy, Unknown, 7/23/18)








Past Medical History:  see triage record, old chart reviewed, DM, HTN, seizures

, renal disease


Past Surgical History:  other - Trach, PEG, Colostomy


Social History:  Denies: smoking, alcohol use, drug use


Reviewed Nursing Documentation:  PMH: Agreed; PSxH: Agreed








Hx Hypertension:  Yes


Hx COPD:  Yes


Hx Diabetes:  Yes


Hx Gastrointestinal Problems:  Yes


Hx Neurological Problems:  Yes


Hx Seizures:  Yes


Assessment/Plan





Hypercalcemia


Urinary retention, BPH , 


Acute on chronic renal failure


Electrolyte imbalance 


Colostomy


DM


PEG


Vegetative state


Acute on chronic respiratory failure








Free water


K and Phos and Mag supplements


pulm support


Aredia for high Ca

















Simone Rocha MD Nov 28, 2019 11:21

## 2019-11-28 NOTE — PULMONOLGY CRITICAL CARE NOTE
Critical Care - Asmt/Plan


Problems:  


(1) Acute on chronic respiratory failure


(2) Sepsis


(3) Diabetes mellitus


(4) Vegetative state


(5) Gastrostomy tube dependent


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


(7) ATN (acute tubular necrosis)


Respiratory:  monitor respiratory rate, adjust FIO2, CXR


Cardiac:  continue to monitor HR/BP


Renal:  F/U I&O, keep IV fluid, check electrolytes


Infectious Disease:  check cultures, continue antibiotics


Gastrointestinal:  continue feedings/current rate


Endocrine:  monitor blood sugar, check TSH


Hematologic:  monitor H/H, transfuse if hgb<8.5


Neurologic:  PRN Morphine, keep patient comfortable


Prophylaxis:  Protonix


Notes Reviewed:  internist, cardio


Discussed with:  nurses, consultants, 





Critical Care - Objective





Last 24 Hour Vital Signs








  Date Time  Temp Pulse Resp B/P (MAP) Pulse Ox O2 Delivery O2 Flow Rate FiO2


 


11/28/19 08:52  100 19     30


 


11/28/19 08:00 98.2 98 18 115/73 (87) 100   


 


11/28/19 08:00        30


 


11/28/19 07:16  97 18     30


 


11/28/19 05:15  90 21     30


 


11/28/19 04:00      Mechanical Ventilator  


 


11/28/19 04:00        30


 


11/28/19 03:55 97.0 98 16 118/81 (93) 100   


 


11/28/19 03:35  97      


 


11/28/19 03:15  88 30     30


 


11/28/19 01:45  94 23     30


 


11/28/19 00:00      Mechanical Ventilator  


 


11/28/19 00:00 96.4 92 18 122/82 (95) 100   


 


11/28/19 00:00        30


 


11/27/19 23:46  139      


 


11/27/19 22:45  92 21     30


 


11/27/19 21:19  90 18     30


 


11/27/19 20:00      Mechanical Ventilator  


 


11/27/19 20:00 96.3 92 18 131/83 (99) 100   


 


11/27/19 20:00        30


 


11/27/19 19:52  91      


 


11/27/19 19:10 97.8 89 9 127/87 100 Mechanical Ventilator  30


 


11/27/19 18:55  88 9 129/87 100 Mechanical Ventilator  30


 


11/27/19 18:40  90 9 128/84 100 Mechanical Ventilator  30


 


11/27/19 18:25  93 9 129/84 100 Mechanical Ventilator  30


 


11/27/19 18:19  95 14  99   


 


11/27/19 18:15  93 9 126/83 100 Mechanical Ventilator  30


 


11/27/19 18:05  97 11 119/81 100 Mechanical Ventilator  30


 


11/27/19 18:00  99 11 119/85 100 Mechanical Ventilator  30


 


11/27/19 17:57 97.6 97 10 119/81 100 Mechanical Ventilator  30


 


11/27/19 16:00      Mechanical Ventilator  


 


11/27/19 16:00        30


 


11/27/19 16:00  99      


 


11/27/19 16:00 98.6 102 18 123/75 (91) 100   


 


11/27/19 15:09  101 19     30


 


11/27/19 13:15  101 17     30


 


11/27/19 12:00      Mechanical Ventilator  


 


11/27/19 12:00  113      


 


11/27/19 12:00        30


 


11/27/19 12:00 97.9 102 18 115/75 (88) 100   


 


11/27/19 11:28  103 19     30








Status:  awake


HEENT:  atraumatic


Neck:  full ROM


Lungs:  clear


Heart:  HR/BP stable, regular


Abdomen:  soft, active bowel sounds


Extremities:  no C/C/E, edema


Decubiti:  location


Micro:





Microbiology








 Date/Time


Source Procedure


Growth Status


 


 


 11/25/19 11:00


Nasal Nares - Final Complete


 


 11/25/19 11:00


Nasal Nares - Final Complete


 


 11/26/19 03:00


Urine,Clean Catch Urine Culture - Preliminary


YEAST Resulted








Accucheck:  228





Critical Care - Subjective


ROS Limited/Unobtainable:  Yes


Condition:  critical


EKG Rhythm:  Sinus Rhythm


FI02:  30


Vent Support Breath Rate:  16


Vent Support Mode:  AC


Vent Tidal Volume:  600


Sputum Amount:  Small


PEEP:  5.0


PIP:  35


Tube Feeding Amount:  20


I&O:











Intake and Output  


 


 11/27/19 11/28/19





 19:00 07:00


 


Intake Total 350 ml 1981 ml


 


Output Total 540 ml 800 ml


 


Balance -190 ml 1181 ml


 


  


 


Free Water 50 ml 400 ml


 


IV Total 300 ml 1456 ml


 


Tube Feeding  125 ml


 


Output Urine Total 500 ml 400 ml


 


Stool Total 40 ml 400 ml








Labs:





Laboratory Tests








Test


  11/28/19


04:30


 


White Blood Count


  12.3 K/UL


(4.8-10.8)  H


 


Red Blood Count


  3.09 M/UL


(4.70-6.10)  L


 


Hemoglobin


  9.2 G/DL


(14.2-18.0)  L


 


Hematocrit


  28.8 %


(42.0-52.0)  L


 


Mean Corpuscular Volume 93 FL (80-99)  


 


Mean Corpuscular Hemoglobin


  29.8 PG


(27.0-31.0)


 


Mean Corpuscular Hemoglobin


Concent 32.0 G/DL


(32.0-36.0)


 


Red Cell Distribution Width


  16.9 %


(11.6-14.8)  H


 


Platelet Count


  236 K/UL


(150-450)


 


Mean Platelet Volume


  8.0 FL


(6.5-10.1)


 


Neutrophils (%) (Auto)


  75.1 %


(45.0-75.0)  H


 


Lymphocytes (%) (Auto)


  7.4 %


(20.0-45.0)  L


 


Monocytes (%) (Auto)


  4.8 %


(1.0-10.0)


 


Eosinophils (%) (Auto)


  12.0 %


(0.0-3.0)  H


 


Basophils (%) (Auto)


  0.7 %


(0.0-2.0)


 


Sodium Level


  152 MMOL/L


(136-145)  H


 


Potassium Level


  3.6 MMOL/L


(3.5-5.1)


 


Chloride Level


  121 MMOL/L


()  H


 


Carbon Dioxide Level


  17 MMOL/L


(21-32)  L


 


Anion Gap


  14 mmol/L


(5-15)


 


Blood Urea Nitrogen


  17 mg/dL


(7-18)


 


Creatinine


  1.1 MG/DL


(0.55-1.30)


 


Estimat Glomerular Filtration


Rate > 60 mL/min


(>60)


 


Glucose Level


  234 MG/DL


()  H


 


Hemoglobin A1c


  6.0 %


(4.3-6.0)


 


Uric Acid


  9.1 MG/DL


(2.6-7.2)  H


 


Calcium Level


  10.0 MG/DL


(8.5-10.1)


 


Phosphorus Level


  2.8 MG/DL


(2.5-4.9)


 


Magnesium Level


  1.8 MG/DL


(1.8-2.4)


 


Iron Level


  14 ug/dL


()  L


 


Total Iron Binding Capacity


  170 ug/dL


(250-450)  L


 


Percent Iron Saturation 8 % (15-50)  L


 


Unsaturated Iron Binding


  156 ug/dL


(112-346)


 


Ferritin


  1538 NG/ML


(8-388)  H


 


Total Bilirubin


  0.4 MG/DL


(0.2-1.0)


 


Gamma Glutamyl Transpeptidase


  152 U/L (5-85)


H


 


Aspartate Amino Transf


(AST/SGOT) 31 U/L (15-37)


 


 


Alanine Aminotransferase


(ALT/SGPT) 26 U/L (12-78)


 


 


Alkaline Phosphatase


  52 U/L


()


 


Total Creatine Kinase


  83 U/L


()


 


C-Reactive Protein,


Quantitative 13.0 mg/dL


(0.00-0.90)  H


 


Pro-B-Type Natriuretic Peptide


  394 pg/mL


(0-125)  H


 


Total Protein


  6.7 G/DL


(6.4-8.2)


 


Albumin


  2.4 G/DL


(3.4-5.0)  L


 


Globulin 4.3 g/dL  


 


Albumin/Globulin Ratio


  0.6 (1.0-2.7)


L


 


Triglycerides Level


  838 MG/DL


()  H


 


Cholesterol Level


  211 MG/DL (<


200)  H


 


LDL Cholesterol


  67 mg/dL


(<100)


 


HDL Cholesterol


  20 MG/DL


(40-60)  L


 


Cholesterol/HDL Ratio


  10.6 (3.3-4.4)


H


 


Vitamin B12 Level


  1063 PG/ML


(193-986)  H


 


Folate


  26.3 NG/ML


(8.6-58.9)


 


Thyroid Stimulating Hormone


(TSH) 1.705 uiU/mL


(0.358-3.740)

















Yury Pena MD Nov 28, 2019 10:08

## 2019-11-28 NOTE — PROGRESS NOTE
DATE:  11/27/2019

SUBJECTIVE:   Urology consultation was asked to assess the patient because

of inability to insert Guzmán catheter into the patient's bladder.  _____

discussed with the patient in detail and the patient will need to undergo

surgery to improve his ureteral stenosis.  Consent from the Surgery was

obtained and the patient underwent today urological procedure.  Procedure

was uneventful.  The patient tolerated the procedure well.  The patient

returned back to his unit bed, and his gastrostomy feeding has been

resumed.



PHYSICAL EXAMINATION:

VITAL SIGNS:  Blood pressure is 127/87, pulse is 89, respirations 20, and

temperature is 97.6.

HEENT:  Eyes were normal.  ENT, mucous membranes were moist and intact.

NECK:  Supple with no JVD without lymph nodes.  Tracheostomy site is

clean.

LUNGS:  Clear without rhonchi, rales, or wheezing.

HEART:  Normal sounds with regular beats.  There is no tachycardia at

rest.

ABDOMEN:  Soft, nontender with normal bowel sounds.  Gastrostomy site is

clean.

EXTREMITIES:  Warm without cyanosis, clubbing, or edema.



LABORATORY DATA:  His hemoglobin is 8.2, hematocrit 25.3 with MCV of 93,

his WBC of 11.5, and platelets 253.  His WBC was _____ yesterday and

_____.  His BUN and creatinine are 21 and 1.0 respectively.  Sodium is

153, potassium 3.0, chloride 118, CO2 19, and his calcium is 10.2.



IMPRESSION:  The patient has hypernatremia without prerenal azotemia.  He

is receiving _____ via G-tube, 200 mL every four hours.  PICC line

insertion was done yesterday, and procedure was uneventful.  Repeat

laboratory tests will be done in the morning.  We will continue the same

IV antibiotics.









  ______________________________________________

  Etienne Bloom M.D. DR:  SHAMA

D:  11/27/2019 22:13

T:  11/28/2019 03:12

JOB#:  [id number]

CC:

## 2019-11-28 NOTE — PROGRESS NOTE
DATE:  11/27/2019

CARDIOLOGY PROGRESS NOTE



SUBJECTIVE:  The patient is status post cystoscopy and urethral dilatation

and Guzmán catheter placement.  Monitor sinus and sinus tachycardia.



OBJECTIVE:

VITAL SIGNS:  Blood pressure 131/83, pulse 92, respiratory rate 18, and

afebrile.

HEENT:  Tongue fasciculations or trach site with thin secretions.

LUNGS:  Bilateral rhonchi.

HEART:  Regular rhythm rate.  Normal S1 and S2.

ABDOMEN:  Soft and mildly distended.

EXTREMITIES:  With dependent edema.



LABORATORY DATA:  White count 11.5 and hemoglobin 8.3.  Potassium 3, sodium

153, bicarbonate 19, BUN 21, and creatinine 1.



IMPRESSION:

1. Persistent hypernatremia, hypokalemia, and _____ improved.

2. Renal failure.

3. Sepsis.

4. Urinary tract infection.

5. Seizure disorder.

6. Chronic encephalopathy.

7. Sinus tachycardia is hemodynamically appropriate.



PLAN:

1. Continue hypotonic IV fluids.

2. Antimicrobials and other therapies as he has no additional

cardiovascular medications presently indicated.









  ______________________________________________

  Robert Chan M.D.





DR:  BUCK

D:  11/28/2019 02:21

T:  11/28/2019 02:52

JOB#:  [id number]

CC:

## 2019-11-28 NOTE — UROLOGY PROGRESS NOTE
Assessment/Plan


Assessment/Plan:


1. Urinary retention.


2. BPH.


3. Neurogenic bladder.


4. Incontinence.


5. Pyuria.


6. Hematuria.


7. Proteinuria.


8. Renal insufficiency, acute possibly on chronic.


9. Renal cyst.


10. Nephrolithiasis.


11. POD # 1, cysto/optic urethrotomy





monitor clinically


mayorga placed 11/27


hand irrigated and do PRN


abx as ordered


f/u on cx's





Subjective


Allergies:  


Coded Allergies:  


     AZTREONAM (Verified  Allergy, Unknown, 7/23/18)


Subjective


all noted, mayorga draining





Objective





Last 24 Hour Vital Signs








  Date Time  Temp Pulse Resp B/P (MAP) Pulse Ox O2 Delivery O2 Flow Rate FiO2


 


11/28/19 08:00 98.2 98 18 115/73 (87) 100   


 


11/28/19 08:00        30


 


11/28/19 07:16  97 18     30


 


11/28/19 05:15  90 21     30


 


11/28/19 04:00      Mechanical Ventilator  


 


11/28/19 04:00        30


 


11/28/19 03:55 97.0 98 16 118/81 (93) 100   


 


11/28/19 03:35  97      


 


11/28/19 03:15  88 30     30


 


11/28/19 01:45  94 23     30


 


11/28/19 00:00      Mechanical Ventilator  


 


11/28/19 00:00 96.4 92 18 122/82 (95) 100   


 


11/28/19 00:00        30


 


11/27/19 23:46  139      


 


11/27/19 22:45  92 21     30


 


11/27/19 21:19  90 18     30


 


11/27/19 20:00      Mechanical Ventilator  


 


11/27/19 20:00 96.3 92 18 131/83 (99) 100   


 


11/27/19 20:00        30


 


11/27/19 19:52  91      


 


11/27/19 19:10 97.8 89 9 127/87 100 Mechanical Ventilator  30


 


11/27/19 18:55  88 9 129/87 100 Mechanical Ventilator  30


 


11/27/19 18:40  90 9 128/84 100 Mechanical Ventilator  30


 


11/27/19 18:25  93 9 129/84 100 Mechanical Ventilator  30


 


11/27/19 18:19  95 14  99   


 


11/27/19 18:15  93 9 126/83 100 Mechanical Ventilator  30


 


11/27/19 18:05  97 11 119/81 100 Mechanical Ventilator  30


 


11/27/19 18:00  99 11 119/85 100 Mechanical Ventilator  30


 


11/27/19 17:57 97.6 97 10 119/81 100 Mechanical Ventilator  30


 


11/27/19 16:00      Mechanical Ventilator  


 


11/27/19 16:00        30


 


11/27/19 16:00  99      


 


11/27/19 16:00 98.6 102 18 123/75 (91) 100   


 


11/27/19 15:09  101 19     30


 


11/27/19 13:15  101 17     30


 


11/27/19 12:00      Mechanical Ventilator  


 


11/27/19 12:00  113      


 


11/27/19 12:00        30


 


11/27/19 12:00 97.9 102 18 115/75 (88) 100   


 


11/27/19 11:28  103 19     30


 


11/27/19 09:00  101 18     30

















Intake and Output  


 


 11/27/19 11/28/19





 19:00 07:00


 


Intake Total 350 ml 1981 ml


 


Output Total 540 ml 800 ml


 


Balance -190 ml 1181 ml


 


  


 


Free Water 50 ml 400 ml


 


IV Total 300 ml 1456 ml


 


Tube Feeding  125 ml


 


Output Urine Total 500 ml 400 ml


 


Stool Total 40 ml 400 ml











Microbiology








 Date/Time


Source Procedure


Growth Status


 


 


 11/24/19 09:15


Blood Blood Culture - Preliminary


NO GROWTH AFTER 72 HOURS Resulted





 11/25/19 11:00


Nasal Nares - Final Complete


 


 11/25/19 11:00


Nasal Nares - Final Complete


 


 11/26/19 03:00


Urine,Clean Catch Urine Culture - Preliminary


YEAST Resulted


 


 11/24/19 09:10


Rectum VRE Culture - Final


Enterococcus Faecium - Vre Complete








Current Medications








 Medications


  (Trade)  Dose


 Ordered  Sig/Jan


 Route


 PRN Reason  Start Time


 Stop Time Status Last Admin


Dose Admin


 


 Acetaminophen


  (Tylenol)  650 mg  Q4H  PRN


 GT


 Mild Pain/Temp > 100.6  11/26/19 18:42


 12/26/19 18:41   


 


 


 Albuterol/


 Ipratropium


  (Albuterol/


 Ipratropium)  3 ml  Q2H  PRN


 HHN


 Shortness of Breath  11/26/19 18:45


 11/29/19 14:44   


 


 


 Artificial Tears


  (Akwa-Tears)  2 drop  Q12HR


 BOTH EYES


   11/26/19 21:00


 12/24/19 20:59  11/27/19 20:06


 


 


 Baclofen


  (Lioresal)  10 mg  THREE TIMES A  DAY


 GT


   11/27/19 09:00


 12/24/19 17:59  11/27/19 20:07


 


 


 Clobetasol


 Propionate


  (Temovate)  1 applic  EVERY 12  HOURS


 TOPIC


   11/26/19 21:00


 12/24/19 20:59  11/27/19 20:06


 


 


 Dextrose


  (Dextrose 50%)  25 ml  Q30M  PRN


 IV


 Hypoglycemia  11/26/19 18:45


 12/24/19 14:44   


 


 


 Dextrose


  (Dextrose 50%)  50 ml  Q30M  PRN


 IV


 Hypoglycemia  11/26/19 18:45


 12/24/19 14:44   


 


 


 Dextrose/


 Electrolytes  1,000 ml @ 


 100 mls/hr  Q10H


 IV


   11/28/19 03:00


 12/28/19 02:59  11/28/19 03:04


 


 


 Fenofibrate


  (Tricor)  145 mg  DAILY


 ORAL


   11/27/19 09:00


 12/25/19 08:59  11/27/19 10:10


 


 


 Heparin Sodium


  (Porcine)


  (Heparin 5000


 units/ml)  5,000 units  EVERY 12  HOURS


 SUBQ


   11/26/19 21:00


 12/24/19 20:59  11/27/19 20:11


 


 


 Insulin Aspart


  (NovoLOG)    BEFORE MEALS AND  HS


 SUBQ


   11/26/19 21:00


 12/24/19 16:29  11/28/19 05:39


 


 


 Levetiracetam  100 ml @ 


 400 mls/hr  Q8HR@0100,0900,1700


 IVPB


   11/27/19 17:00


 12/27/19 16:59  11/28/19 01:07


 


 


 Lorazepam


  (Ativan 2mg/ml


 1ml)  2 mg  Q2H  PRN


 IV


 For Anxiety  11/26/19 18:43


 12/3/19 18:42   


 


 


 Meropenem 1 gm/


 Sodium Chloride  55 ml @ 


 110 mls/hr  Q12HR


 IVPB


   11/26/19 21:00


 11/29/19 20:59  11/27/19 20:15


 


 


 Morphine Sulfate


  (Morphine


 Sulfate)  4 mg  Q4H  PRN


 IVP


 Severe Pain (Pain Scale 7-10)  11/26/19 18:44


 12/3/19 18:43   


 


 


 Ondansetron HCl


  (Zofran)  4 mg  Q6H  PRN


 IVP


 Nausea & Vomiting  11/26/19 18:44


 12/26/19 18:43   


 


 


 Pantoprazole


  (Protonix)  40 mg  DAILY


 IV


   11/27/19 09:00


 12/25/19 08:59  11/27/19 10:09


 


 


 Polyethylene


 Glycol


  (Miralax)  17 gm  DAILYPRN  PRN


 GT


 Constipation  11/26/19 18:44


 12/26/19 18:43   


 


 


 Sitagliptin


 Phosphate


  (Januvia)  50 mg  ACBREAKFAST


 GT


   11/27/19 06:30


 12/25/19 06:29  11/28/19 05:38


 


 


 Tamsulosin HCl


  (Flomax)  0.4 mg  DAILY


 ORAL


   11/27/19 09:00


 12/27/19 08:59  11/27/19 10:10


 


 


 Vancomycin HCl


  (Vanco rx to


 dose)  1 ea  DAILY  PRN


 MISC


 .  11/27/19 09:00


 12/24/19 15:59   


 


 


 Vancomycin/Sodium


 Chloride  275 ml @ 


 137.5 mls/


 hr  Q24H


 IVPB


   11/27/19 09:00


 11/30/19 08:59  11/27/19 10:32


 





Laboratory Tests


11/28/19 04:30: 


White Blood Count 12.3H, Red Blood Count 3.09L, Hemoglobin 9.2L, Hematocrit 

28.8L, Mean Corpuscular Volume 93, Mean Corpuscular Hemoglobin 29.8, Mean 

Corpuscular Hemoglobin Concent 32.0, Red Cell Distribution Width 16.9H, 

Platelet Count 236, Mean Platelet Volume 8.0, Neutrophils (%) (Auto) 75.1H, 

Lymphocytes (%) (Auto) 7.4L, Monocytes (%) (Auto) 4.8, Eosinophils (%) (Auto) 

12.0H, Basophils (%) (Auto) 0.7, Sodium Level 152H, Potassium Level 3.6, 

Chloride Level 121H, Carbon Dioxide Level 17L, Anion Gap 14, Blood Urea 

Nitrogen 17, Creatinine 1.1, Estimat Glomerular Filtration Rate > 60, Glucose 

Level 234H, Hemoglobin A1c 6.0, Uric Acid 9.1H, Calcium Level 10.0, Phosphorus 

Level 2.8, Magnesium Level 1.8, Iron Level 14L, Total Iron Binding Capacity 170L

, Percent Iron Saturation 8L, Unsaturated Iron Binding 156, Ferritin 1538H, 

Total Bilirubin 0.4, Gamma Glutamyl Transpeptidase 152H, Aspartate Amino Transf 

(AST/SGOT) 31, Alanine Aminotransferase (ALT/SGPT) 26, Alkaline Phosphatase 52, 

Total Creatine Kinase 83, C-Reactive Protein, Quantitative 13.0H, Pro-B-Type 

Natriuretic Peptide 394H, Total Protein 6.7, Albumin 2.4L, Globulin 4.3, Albumin

/Globulin Ratio 0.6L, Triglycerides Level 838H, Cholesterol Level 211H, LDL 

Cholesterol 67, HDL Cholesterol 20L, Cholesterol/HDL Ratio 10.6H, Vitamin B12 

Level 1063H, Folate 26.3, Thyroid Stimulating Hormone (TSH) 1.705


Height (Feet):  5


Height (Inches):  7.00


Weight (Pounds):  197


Objective


 exam stable


mayorga indwelling, blood-tinged/christine urine


minimal bleeding at penile meatus











Vinnie Pollard MD Nov 28, 2019 08:50

## 2019-11-29 VITALS — SYSTOLIC BLOOD PRESSURE: 130 MMHG | DIASTOLIC BLOOD PRESSURE: 86 MMHG

## 2019-11-29 VITALS — DIASTOLIC BLOOD PRESSURE: 67 MMHG | SYSTOLIC BLOOD PRESSURE: 126 MMHG

## 2019-11-29 VITALS — SYSTOLIC BLOOD PRESSURE: 124 MMHG | DIASTOLIC BLOOD PRESSURE: 84 MMHG

## 2019-11-29 VITALS — SYSTOLIC BLOOD PRESSURE: 118 MMHG | DIASTOLIC BLOOD PRESSURE: 80 MMHG

## 2019-11-29 VITALS — DIASTOLIC BLOOD PRESSURE: 84 MMHG | SYSTOLIC BLOOD PRESSURE: 141 MMHG

## 2019-11-29 VITALS — SYSTOLIC BLOOD PRESSURE: 128 MMHG | DIASTOLIC BLOOD PRESSURE: 82 MMHG

## 2019-11-29 LAB
ADD MANUAL DIFF: NO
ALBUMIN SERPL-MCNC: 2.3 G/DL (ref 3.4–5)
ALBUMIN/GLOB SERPL: 0.5 {RATIO} (ref 1–2.7)
ALP SERPL-CCNC: 66 U/L (ref 46–116)
ALT SERPL-CCNC: 25 U/L (ref 12–78)
ANION GAP SERPL CALC-SCNC: 12 MMOL/L (ref 5–15)
AST SERPL-CCNC: 41 U/L (ref 15–37)
BASOPHILS NFR BLD AUTO: 2.3 % (ref 0–2)
BILIRUB SERPL-MCNC: 0.4 MG/DL (ref 0.2–1)
BUN SERPL-MCNC: 11 MG/DL (ref 7–18)
CALCIUM SERPL-MCNC: 9.5 MG/DL (ref 8.5–10.1)
CHLORIDE SERPL-SCNC: 116 MMOL/L (ref 98–107)
CO2 SERPL-SCNC: 20 MMOL/L (ref 21–32)
CREAT SERPL-MCNC: 1 MG/DL (ref 0.55–1.3)
EOSINOPHIL NFR BLD AUTO: 13.1 % (ref 0–3)
ERYTHROCYTE [DISTWIDTH] IN BLOOD BY AUTOMATED COUNT: 16.5 % (ref 11.6–14.8)
GLOBULIN SER-MCNC: 4.6 G/DL
HCT VFR BLD CALC: 31.7 % (ref 42–52)
HGB BLD-MCNC: 9.9 G/DL (ref 14.2–18)
LYMPHOCYTES NFR BLD AUTO: 9.4 % (ref 20–45)
MCV RBC AUTO: 93 FL (ref 80–99)
MONOCYTES NFR BLD AUTO: 7.6 % (ref 1–10)
NEUTROPHILS NFR BLD AUTO: 67.6 % (ref 45–75)
PHOSPHATE SERPL-MCNC: 2.8 MG/DL (ref 2.5–4.9)
PLATELET # BLD: 273 K/UL (ref 150–450)
POTASSIUM SERPL-SCNC: 3.7 MMOL/L (ref 3.5–5.1)
RBC # BLD AUTO: 3.42 M/UL (ref 4.7–6.1)
SODIUM SERPL-SCNC: 148 MMOL/L (ref 136–145)
WBC # BLD AUTO: 7.5 K/UL (ref 4.8–10.8)

## 2019-11-29 RX ADMIN — HEPARIN SODIUM SCH UNITS: 5000 INJECTION INTRAVENOUS; SUBCUTANEOUS at 09:43

## 2019-11-29 RX ADMIN — INSULIN ASPART SCH UNITS: 100 INJECTION, SOLUTION INTRAVENOUS; SUBCUTANEOUS at 20:34

## 2019-11-29 RX ADMIN — TAMSULOSIN HYDROCHLORIDE SCH MG: 0.4 CAPSULE ORAL at 09:37

## 2019-11-29 RX ADMIN — CLOBETASOL PROPIONATE SCH APPLIC: 0.5 OINTMENT TOPICAL at 20:23

## 2019-11-29 RX ADMIN — MEROPENEM SCH MLS/HR: 1 INJECTION INTRAVENOUS at 09:00

## 2019-11-29 RX ADMIN — LEVETIRACETAM SCH MLS/HR: 5 INJECTION INTRAVENOUS at 01:00

## 2019-11-29 RX ADMIN — LEVETIRACETAM SCH MG: 100 SOLUTION ORAL at 09:37

## 2019-11-29 RX ADMIN — ERTAPENEM SODIUM SCH MLS/HR: 1 INJECTION, POWDER, LYOPHILIZED, FOR SOLUTION INTRAMUSCULAR; INTRAVENOUS at 12:49

## 2019-11-29 RX ADMIN — INSULIN ASPART SCH UNITS: 100 INJECTION, SOLUTION INTRAVENOUS; SUBCUTANEOUS at 17:11

## 2019-11-29 RX ADMIN — HEPARIN SODIUM SCH UNITS: 5000 INJECTION INTRAVENOUS; SUBCUTANEOUS at 20:24

## 2019-11-29 RX ADMIN — LEVETIRACETAM SCH MG: 100 SOLUTION ORAL at 01:56

## 2019-11-29 RX ADMIN — DEXTROSE AND POTASSIUM CHLORIDE SCH MLS/HR: 5; .15 SOLUTION INTRAVENOUS at 09:00

## 2019-11-29 RX ADMIN — CLOBETASOL PROPIONATE SCH APPLIC: 0.5 OINTMENT TOPICAL at 09:38

## 2019-11-29 RX ADMIN — LEVETIRACETAM SCH MG: 100 SOLUTION ORAL at 18:15

## 2019-11-29 RX ADMIN — INSULIN ASPART SCH UNITS: 100 INJECTION, SOLUTION INTRAVENOUS; SUBCUTANEOUS at 05:40

## 2019-11-29 RX ADMIN — DEXTROSE AND POTASSIUM CHLORIDE SCH MLS/HR: 5; .15 SOLUTION INTRAVENOUS at 20:22

## 2019-11-29 RX ADMIN — INSULIN ASPART SCH UNITS: 100 INJECTION, SOLUTION INTRAVENOUS; SUBCUTANEOUS at 12:28

## 2019-11-29 RX ADMIN — SITAGLIPTIN SCH MG: 50 TABLET, FILM COATED ORAL at 05:38

## 2019-11-29 NOTE — 48 HOUR POST ANESTHESIA EVAL
Post Anesthesia Evaluation


Procedure:  cystoscopy; mayorga placement


Date of Evaluation:  Nov 29, 2019


Time of Evaluation:  12:41


Blood Pressure Systolic:  128


0:  82


Pulse Rate:  81


Respiratory Rate:  17 - Mech Vent


Temperature (Fahrenheit):  96.1


O2 Sat by Pulse Oximetry:  100


Airway:  patent


Nausea:  No


Vomiting:  No


Pain Intensity:  0


Cardiopulmonary Status:


Stable


Follow-up Care/Observations:


0


Post-Anesthesia Complications:


0











Celestino Gautam MD Nov 29, 2019 12:43

## 2019-11-29 NOTE — DIAGNOSTIC IMAGING REPORT
Indication: Cough

 

Comparison:  11/25/2019

 

A single view chest radiograph was obtained.

 

Findings:

 

There is no change. Tracheostomy noted. Heart size is stable. Blunting of the left

costophrenic angle again noted unchanged.

 

IMPRESSION:

 

No change over the last day

## 2019-11-29 NOTE — PULMONOLGY CRITICAL CARE NOTE
Critical Care - Asmt/Plan


Problems:  


(1) Acute on chronic respiratory failure


(2) Sepsis


(3) Diabetes mellitus


(4) Vegetative state


(5) Gastrostomy tube dependent


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


(7) ATN (acute tubular necrosis)


Respiratory:  monitor respiratory rate, adjust FIO2, CXR


Cardiac:  continue to monitor HR/BP


Renal:  F/U I&O, keep IV fluid


Infectious Disease:  check cultures, continue antibiotics


Gastrointestinal:  continue feedings/current rate


Hematologic:  monitor H/H, transfuse if hgb<8.5


Neurologic:  PRN Ativan, keep patient comfortable


Prophylaxis:  Protonix, Heparin


Notes Reviewed:  internist, renal, ID


Discussed with:  nurses, consultants, 





Critical Care - Objective





Last 24 Hour Vital Signs








  Date Time  Temp Pulse Resp B/P (MAP) Pulse Ox O2 Delivery O2 Flow Rate FiO2


 


11/29/19 09:20  99 17     30


 


11/29/19 07:01  98 18     30


 


11/29/19 05:29  99 16     30


 


11/29/19 04:00 97.0 99 17 130/86 (101) 100   


 


11/29/19 04:00      Mechanical Ventilator  


 


11/29/19 04:00        30


 


11/29/19 03:32  98      


 


11/29/19 03:25  99 17     30


 


11/29/19 01:30  98 18     30


 


11/29/19 00:00 97.3 107 17 141/84 (103) 100   


 


11/29/19 00:00        30


 


11/29/19 00:00      Mechanical Ventilator  


 


11/28/19 23:51  108      


 


11/28/19 23:00  98 19     30


 


11/28/19 21:00  100 21     30


 


11/28/19 20:00      Mechanical Ventilator  


 


11/28/19 20:00        30


 


11/28/19 20:00 97.7 102 16 127/78 (94) 100   


 


11/28/19 19:25  105      


 


11/28/19 19:00  98 19     30


 


11/28/19 17:16  100 19     30


 


11/28/19 16:00        30


 


11/28/19 16:00      Mechanical Ventilator  


 


11/28/19 16:00 98.6 105 18 115/87 (96) 100   


 


11/28/19 15:22  105      


 


11/28/19 13:00  100 19     30


 


11/28/19 12:00 98.9 104 16 127/84 (98) 100   


 


11/28/19 12:00        30


 


11/28/19 12:00      Mechanical Ventilator  


 


11/28/19 11:40  103      


 


11/28/19 11:05  102 17     30








Status:  awake


Condition:  critical


HEENT:  atraumatic


Neck:  full ROM


Lungs:  rales, rhonchi


Heart:  HR/BP stable


Abdomen:  soft, active bowel sounds


Extremities:  edema


Decubiti:  stage


Accucheck:  185





Critical Care - Subjective


ROS Limited/Unobtainable:  No


Condition:  critical


EKG Rhythm:  Sinus Rhythm


FI02:  30


Vent Support Breath Rate:  16


Vent Support Mode:  AC


Vent Tidal Volume:  600


Sputum Amount:  Small


PEEP:  5.0


PIP:  31


Tube Feeding Amount:  20


I&O:











Intake and Output  


 


 11/28/19 11/29/19





 19:00 07:00


 


Intake Total 1640 ml 1030 ml


 


Output Total 900 ml 650 ml


 


Balance 740 ml 380 ml


 


  


 


Free Water 400 ml 400 ml


 


IV Total 1000 ml 410 ml


 


Tube Feeding 240 ml 220 ml


 


Output Urine Total 500 ml 500 ml


 


Stool Total 400 ml 150 ml


 


# Bowel Movements  100








CXR:


cxr pending


Labs:





Laboratory Tests








Test


  11/29/19


08:10


 


White Blood Count


  7.5 K/UL


(4.8-10.8)


 


Red Blood Count


  3.42 M/UL


(4.70-6.10)  L


 


Hemoglobin


  9.9 G/DL


(14.2-18.0)  L


 


Hematocrit


  31.7 %


(42.0-52.0)  L


 


Mean Corpuscular Volume 93 FL (80-99)  


 


Mean Corpuscular Hemoglobin


  28.9 PG


(27.0-31.0)


 


Mean Corpuscular Hemoglobin


Concent 31.3 G/DL


(32.0-36.0)  L


 


Red Cell Distribution Width


  16.5 %


(11.6-14.8)  H


 


Platelet Count


  273 K/UL


(150-450)


 


Mean Platelet Volume


  8.0 FL


(6.5-10.1)


 


Neutrophils (%) (Auto)


  67.6 %


(45.0-75.0)


 


Lymphocytes (%) (Auto)


  9.4 %


(20.0-45.0)  L


 


Monocytes (%) (Auto)


  7.6 %


(1.0-10.0)


 


Eosinophils (%) (Auto)


  13.1 %


(0.0-3.0)  H


 


Basophils (%) (Auto)


  2.3 %


(0.0-2.0)  H


 


Sodium Level


  148 MMOL/L


(136-145)  H


 


Potassium Level


  3.7 MMOL/L


(3.5-5.1)


 


Chloride Level


  116 MMOL/L


()  H


 


Carbon Dioxide Level


  20 MMOL/L


(21-32)  L


 


Anion Gap


  12 mmol/L


(5-15)


 


Blood Urea Nitrogen


  11 mg/dL


(7-18)


 


Creatinine


  1.0 MG/DL


(0.55-1.30)


 


Estimat Glomerular Filtration


Rate > 60 mL/min


(>60)


 


Glucose Level


  192 MG/DL


()  H


 


Uric Acid


  8.2 MG/DL


(2.6-7.2)  H


 


Calcium Level


  9.5 MG/DL


(8.5-10.1)


 


Phosphorus Level


  2.8 MG/DL


(2.5-4.9)


 


Magnesium Level


  1.8 MG/DL


(1.8-2.4)


 


Total Bilirubin


  0.4 MG/DL


(0.2-1.0)


 


Aspartate Amino Transf


(AST/SGOT) 41 U/L (15-37)


H


 


Alanine Aminotransferase


(ALT/SGPT) 25 U/L (12-78)


 


 


Alkaline Phosphatase


  66 U/L


()


 


Total Protein


  6.9 G/DL


(6.4-8.2)


 


Albumin


  2.3 G/DL


(3.4-5.0)  L


 


Globulin 4.6 g/dL  


 


Albumin/Globulin Ratio


  0.5 (1.0-2.7)


L


 


Vancomycin Level Trough


  20.1 ug/mL


(5.0-12.0)  H

















Yury Pena MD Nov 29, 2019 10:48

## 2019-11-29 NOTE — DIAGNOSTIC IMAGING REPORT
Indication: Forearm painPain

 

Findings: 2  views of the right forearm were obtained. 

 

Bones are osteopenic. There is a PICC line inserted in the upper arm with the tip

projecting over the mid humerus. Small focus of air noted adjacent to the PICC line.

There is a IV catheter noted in the forearm. There is generalized soft tissue

swelling. No acute fracture identified.

 

IMPRESSION:

 

Osteopenia.

 

PICC line tip is projected over the mid humerus

## 2019-11-29 NOTE — UROLOGY PROGRESS NOTE
Assessment/Plan


Assessment/Plan:


1. Urinary retention.


2. BPH.


3. Neurogenic bladder.


4. Incontinence.


5. Pyuria/UTI/colonized.


6. Hematuria.


7. Proteinuria.


8. Renal insufficiency, acute possibly on chronic.


9. Renal cyst.


10. Nephrolithiasis.


11. POD # 2, cysto/optic urethrotomy





monitor clinically


mayorga placed 11/27


hand irrigated and do PRN


abx as ordered


f/u on blood cx


consider antifungals, per ID





Subjective


Allergies:  


Coded Allergies:  


     AZTREONAM (Verified  Allergy, Unknown, 7/23/18)


Subjective


all noted, mayorga draining, non-verbal





Objective





Last 24 Hour Vital Signs








  Date Time  Temp Pulse Resp B/P (MAP) Pulse Ox O2 Delivery O2 Flow Rate FiO2


 


11/29/19 09:20  99 17     30


 


11/29/19 07:01  98 18     30


 


11/29/19 05:29  99 16     30


 


11/29/19 04:00 97.0 99 17 130/86 (101) 100   


 


11/29/19 04:00      Mechanical Ventilator  


 


11/29/19 04:00        30


 


11/29/19 03:32  98      


 


11/29/19 03:25  99 17     30


 


11/29/19 01:30  98 18     30


 


11/29/19 00:00 97.3 107 17 141/84 (103) 100   


 


11/29/19 00:00        30


 


11/29/19 00:00      Mechanical Ventilator  


 


11/28/19 23:51  108      


 


11/28/19 23:00  98 19     30


 


11/28/19 21:00  100 21     30


 


11/28/19 20:00      Mechanical Ventilator  


 


11/28/19 20:00        30


 


11/28/19 20:00 97.7 102 16 127/78 (94) 100   


 


11/28/19 19:25  105      


 


11/28/19 19:00  98 19     30


 


11/28/19 17:16  100 19     30


 


11/28/19 16:00        30


 


11/28/19 16:00      Mechanical Ventilator  


 


11/28/19 16:00 98.6 105 18 115/87 (96) 100   


 


11/28/19 15:22  105      


 


11/28/19 13:00  100 19     30


 


11/28/19 12:00 98.9 104 16 127/84 (98) 100   


 


11/28/19 12:00        30


 


11/28/19 12:00      Mechanical Ventilator  


 


11/28/19 11:40  103      


 


11/28/19 11:05  102 17     30

















Intake and Output  


 


 11/28/19 11/29/19





 19:00 07:00


 


Intake Total 1640 ml 1030 ml


 


Output Total 900 ml 650 ml


 


Balance 740 ml 380 ml


 


  


 


Free Water 400 ml 400 ml


 


IV Total 1000 ml 410 ml


 


Tube Feeding 240 ml 220 ml


 


Output Urine Total 500 ml 500 ml


 


Stool Total 400 ml 150 ml


 


# Bowel Movements  100











Microbiology








 Date/Time


Source Procedure


Growth Status


 


 


 11/24/19 09:15


Blood Blood Culture - Preliminary


NO GROWTH AFTER 4 DAYS Resulted





 11/25/19 11:00


Nasal Nares - Final Complete


 


 11/25/19 11:00


Nasal Nares - Final Complete


 


 11/26/19 03:00


Urine,Clean Catch Urine Culture - Final


Candida Parapsilosis Complete


 


 11/24/19 09:10


Rectum VRE Culture - Final


Enterococcus Faecium - Vre Complete








Current Medications








 Medications


  (Trade)  Dose


 Ordered  Sig/Jan


 Route


 PRN Reason  Start Time


 Stop Time Status Last Admin


Dose Admin


 


 Acetaminophen


  (Tylenol)  650 mg  Q4H  PRN


 GT


 Mild Pain/Temp > 100.6  11/26/19 18:42


 12/26/19 18:41   


 


 


 Albuterol/


 Ipratropium


  (Albuterol/


 Ipratropium)  3 ml  Q2H  PRN


 HHN


 Shortness of Breath  11/26/19 18:45


 11/29/19 14:44   


 


 


 Artificial Tears


  (Akwa-Tears)  2 drop  Q12HR


 BOTH EYES


   11/26/19 21:00


 12/24/19 20:59  11/29/19 09:36


 


 


 Baclofen


  (Lioresal)  10 mg  THREE TIMES A  DAY


 GT


   11/27/19 09:00


 12/24/19 17:59  11/29/19 09:37


 


 


 Calcitonin [place]


  (Miacalcin)  1 sprays  DAILY


 NASAL


   11/28/19 12:00


 12/28/19 11:59  11/29/19 09:43


 


 


 Clobetasol


 Propionate


  (Temovate)  1 applic  EVERY 12  HOURS


 TOPIC


   11/26/19 21:00


 12/24/19 20:59  11/29/19 09:38


 


 


 Dextrose


  (Dextrose 50%)  25 ml  Q30M  PRN


 IV


 Hypoglycemia  11/26/19 18:45


 12/24/19 14:44   


 


 


 Dextrose


  (Dextrose 50%)  50 ml  Q30M  PRN


 IV


 Hypoglycemia  11/26/19 18:45


 12/24/19 14:44   


 


 


 Dextrose/


 Electrolytes  1,000 ml @ 


 100 mls/hr  Q10H


 IV


   11/28/19 03:00


 12/28/19 02:59  11/28/19 14:48


 


 


 Famotidine


  (Pepcid)  20 mg  BID


 GT


   11/28/19 18:00


 12/28/19 17:59  11/29/19 09:37


 


 


 Fenofibrate


  (Tricor)  145 mg  DAILY


 ORAL


   11/27/19 09:00


 12/25/19 08:59  11/29/19 09:37


 


 


 Heparin Sodium


  (Porcine)


  (Heparin 5000


 units/ml)  5,000 units  EVERY 12  HOURS


 SUBQ


   11/26/19 21:00


 12/24/19 20:59  11/29/19 09:43


 


 


 Insulin Aspart


  (NovoLOG)    BEFORE MEALS AND  HS


 SUBQ


   11/26/19 21:00


 12/24/19 16:29  11/29/19 05:40


 


 


 Levetiracetam


  (Keppra)  1,000 mg  Q8H


 GT


   11/29/19 02:00


 12/29/19 01:59  11/29/19 09:37


 


 


 Lorazepam


  (Ativan 2mg/ml


 1ml)  2 mg  Q2H  PRN


 IV


 For Anxiety  11/26/19 18:43


 12/3/19 18:42   


 


 


 Meropenem 1 gm/


 Sodium Chloride  55 ml @ 


 110 mls/hr  Q12HR


 IVPB


   11/26/19 21:00


 12/1/19 20:59  11/28/19 20:12


 


 


 Morphine Sulfate


  (Morphine


 Sulfate)  4 mg  Q4H  PRN


 IVP


 Severe Pain (Pain Scale 7-10)  11/26/19 18:44


 12/3/19 18:43   


 


 


 Ondansetron HCl


  (Zofran)  4 mg  Q6H  PRN


 IVP


 Nausea & Vomiting  11/26/19 18:44


 12/26/19 18:43   


 


 


 Polyethylene


 Glycol


  (Miralax)  17 gm  DAILYPRN  PRN


 GT


 Constipation  11/26/19 18:44


 12/26/19 18:43   


 


 


 Sitagliptin


 Phosphate


  (Januvia)  50 mg  ACBREAKFAST


 GT


   11/27/19 06:30


 12/25/19 06:29  11/29/19 05:38


 


 


 Tamsulosin HCl


  (Flomax)  0.4 mg  DAILY


 ORAL


   11/27/19 09:00


 12/27/19 08:59  11/29/19 09:37


 





Laboratory Tests


11/29/19 08:10: 


White Blood Count 7.5, Red Blood Count 3.42L, Hemoglobin 9.9L, Hematocrit 31.7L

, Mean Corpuscular Volume 93, Mean Corpuscular Hemoglobin 28.9, Mean 

Corpuscular Hemoglobin Concent 31.3L, Red Cell Distribution Width 16.5H, 

Platelet Count 273, Mean Platelet Volume 8.0, Neutrophils (%) (Auto) 67.6, 

Lymphocytes (%) (Auto) 9.4L, Monocytes (%) (Auto) 7.6, Eosinophils (%) (Auto) 

13.1H, Basophils (%) (Auto) 2.3H, Sodium Level 148H, Potassium Level 3.7, 

Chloride Level 116H, Carbon Dioxide Level 20L, Anion Gap 12, Blood Urea 

Nitrogen 11, Creatinine 1.0, Estimat Glomerular Filtration Rate > 60, Glucose 

Level 192H, Uric Acid 8.2H, Calcium Level 9.5, Phosphorus Level 2.8, Magnesium 

Level 1.8, Total Bilirubin 0.4, Aspartate Amino Transf (AST/SGOT) 41H, Alanine 

Aminotransferase (ALT/SGPT) 25, Alkaline Phosphatase 66, Total Protein 6.9, 

Albumin 2.3L, Globulin 4.6, Albumin/Globulin Ratio 0.5L, Vancomycin Level 

Trough 20.1H


Height (Feet):  5


Height (Inches):  7.00


Weight (Pounds):  196


Objective


 exam stable


mayorga indwelling, blood-tinged/christine urine, some debris


minimal bleeding at penile meatus











Vinnie Pollard MD Nov 29, 2019 10:12

## 2019-11-29 NOTE — NEPHROLOGY PROGRESS NOTE
Assessment/Plan


Problem List:  


(1) Urinary retention


(2) Acute on chronic respiratory failure


(3) Hypercalcemia


(4) Renal failure (ARF), acute on chronic


Assessment





Hypercalcemia


Urinary retention, BPH , 


Acute on chronic renal failure


Electrolyte imbalance 


Colostomy


DM


PEG


Vegetative state


Acute on chronic respiratory failure


Plan





Free water


K and Phos and Mag supplements


pulm support


Aredia for high Ca





Subjective


ROS Limited/Unobtainable:  Yes





Objective


Objective





Last 24 Hour Vital Signs








  Date Time  Temp Pulse Resp B/P (MAP) Pulse Ox O2 Delivery O2 Flow Rate FiO2


 


11/29/19 10:40  99 17     30


 


11/29/19 09:20  99 17     30


 


11/29/19 08:00 96.1 97 17 128/82 (97) 100   


 


11/29/19 08:00      Mechanical Ventilator  


 


11/29/19 08:00        30


 


11/29/19 07:40  105      


 


11/29/19 07:01  98 18     30


 


11/29/19 05:29  99 16     30


 


11/29/19 04:00 97.0 99 17 130/86 (101) 100   


 


11/29/19 04:00      Mechanical Ventilator  


 


11/29/19 04:00        30


 


11/29/19 03:32  98      


 


11/29/19 03:25  99 17     30


 


11/29/19 01:30  98 18     30


 


11/29/19 00:00 97.3 107 17 141/84 (103) 100   


 


11/29/19 00:00        30


 


11/29/19 00:00      Mechanical Ventilator  


 


11/28/19 23:51  108      


 


11/28/19 23:00  98 19     30


 


11/28/19 21:00  100 21     30


 


11/28/19 20:00      Mechanical Ventilator  


 


11/28/19 20:00        30


 


11/28/19 20:00 97.7 102 16 127/78 (94) 100   


 


11/28/19 19:25  105      


 


11/28/19 19:00  98 19     30


 


11/28/19 17:16  100 19     30


 


11/28/19 16:00        30


 


11/28/19 16:00      Mechanical Ventilator  


 


11/28/19 16:00 98.6 105 18 115/87 (96) 100   


 


11/28/19 15:22  105      


 


11/28/19 13:00  100 19     30


 


11/28/19 12:00 98.9 104 16 127/84 (98) 100   


 


11/28/19 12:00        30


 


11/28/19 12:00      Mechanical Ventilator  

















Intake and Output  


 


 11/28/19 11/29/19





 19:00 07:00


 


Intake Total 1640 ml 1030 ml


 


Output Total 900 ml 650 ml


 


Balance 740 ml 380 ml


 


  


 


Free Water 400 ml 400 ml


 


IV Total 1000 ml 410 ml


 


Tube Feeding 240 ml 220 ml


 


Output Urine Total 500 ml 500 ml


 


Stool Total 400 ml 150 ml


 


# Bowel Movements  100








Laboratory Tests


11/29/19 08:10: 


White Blood Count 7.5, Red Blood Count 3.42L, Hemoglobin 9.9L, Hematocrit 31.7L

, Mean Corpuscular Volume 93, Mean Corpuscular Hemoglobin 28.9, Mean 

Corpuscular Hemoglobin Concent 31.3L, Red Cell Distribution Width 16.5H, 

Platelet Count 273, Mean Platelet Volume 8.0, Neutrophils (%) (Auto) 67.6, 

Lymphocytes (%) (Auto) 9.4L, Monocytes (%) (Auto) 7.6, Eosinophils (%) (Auto) 

13.1H, Basophils (%) (Auto) 2.3H, Sodium Level 148H, Potassium Level 3.7, 

Chloride Level 116H, Carbon Dioxide Level 20L, Anion Gap 12, Blood Urea 

Nitrogen 11, Creatinine 1.0, Estimat Glomerular Filtration Rate > 60, Glucose 

Level 192H, Uric Acid 8.2H, Calcium Level 9.5, Phosphorus Level 2.8, Magnesium 

Level 1.8, Total Bilirubin 0.4, Aspartate Amino Transf (AST/SGOT) 41H, Alanine 

Aminotransferase (ALT/SGPT) 25, Alkaline Phosphatase 66, Total Protein 6.9, 

Albumin 2.3L, Globulin 4.6, Albumin/Globulin Ratio 0.5L, Vancomycin Level 

Trough 20.1H


Height (Feet):  5


Height (Inches):  7.00


Weight (Pounds):  196


EENT:  other - trach


Cardiovascular:  tachycardia


Respiratory/Chest:  decreased breath sounds


Abdomen:  distended











Simone Rocha MD Nov 29, 2019 11:57

## 2019-11-30 VITALS — SYSTOLIC BLOOD PRESSURE: 123 MMHG | DIASTOLIC BLOOD PRESSURE: 79 MMHG

## 2019-11-30 VITALS — SYSTOLIC BLOOD PRESSURE: 118 MMHG | DIASTOLIC BLOOD PRESSURE: 71 MMHG

## 2019-11-30 VITALS — SYSTOLIC BLOOD PRESSURE: 107 MMHG | DIASTOLIC BLOOD PRESSURE: 76 MMHG

## 2019-11-30 VITALS — DIASTOLIC BLOOD PRESSURE: 84 MMHG | SYSTOLIC BLOOD PRESSURE: 129 MMHG

## 2019-11-30 VITALS — DIASTOLIC BLOOD PRESSURE: 75 MMHG | SYSTOLIC BLOOD PRESSURE: 118 MMHG

## 2019-11-30 VITALS — DIASTOLIC BLOOD PRESSURE: 83 MMHG | SYSTOLIC BLOOD PRESSURE: 126 MMHG

## 2019-11-30 LAB
ADD MANUAL DIFF: NO
ALBUMIN SERPL-MCNC: 2.3 G/DL (ref 3.4–5)
ALBUMIN/GLOB SERPL: 0.5 {RATIO} (ref 1–2.7)
ALP SERPL-CCNC: 75 U/L (ref 46–116)
ALT SERPL-CCNC: 27 U/L (ref 12–78)
ANION GAP SERPL CALC-SCNC: 15 MMOL/L (ref 5–15)
AST SERPL-CCNC: 40 U/L (ref 15–37)
BASOPHILS NFR BLD AUTO: 1.7 % (ref 0–2)
BILIRUB SERPL-MCNC: 0.3 MG/DL (ref 0.2–1)
BUN SERPL-MCNC: 8 MG/DL (ref 7–18)
CALCIUM SERPL-MCNC: 9.2 MG/DL (ref 8.5–10.1)
CHLORIDE SERPL-SCNC: 111 MMOL/L (ref 98–107)
CO2 SERPL-SCNC: 19 MMOL/L (ref 21–32)
CREAT SERPL-MCNC: 1.1 MG/DL (ref 0.55–1.3)
EOSINOPHIL NFR BLD AUTO: 11.8 % (ref 0–3)
ERYTHROCYTE [DISTWIDTH] IN BLOOD BY AUTOMATED COUNT: 16.7 % (ref 11.6–14.8)
GLOBULIN SER-MCNC: 4.7 G/DL
HCT VFR BLD CALC: 32.3 % (ref 42–52)
HGB BLD-MCNC: 10.3 G/DL (ref 14.2–18)
LYMPHOCYTES NFR BLD AUTO: 9.7 % (ref 20–45)
MCV RBC AUTO: 94 FL (ref 80–99)
MONOCYTES NFR BLD AUTO: 8.8 % (ref 1–10)
NEUTROPHILS NFR BLD AUTO: 67.9 % (ref 45–75)
PHOSPHATE SERPL-MCNC: 2.5 MG/DL (ref 2.5–4.9)
PLATELET # BLD: 275 K/UL (ref 150–450)
POTASSIUM SERPL-SCNC: 3.5 MMOL/L (ref 3.5–5.1)
RBC # BLD AUTO: 3.44 M/UL (ref 4.7–6.1)
SODIUM SERPL-SCNC: 144 MMOL/L (ref 136–145)
WBC # BLD AUTO: 7.6 K/UL (ref 4.8–10.8)

## 2019-11-30 RX ADMIN — INSULIN ASPART SCH UNITS: 100 INJECTION, SOLUTION INTRAVENOUS; SUBCUTANEOUS at 12:47

## 2019-11-30 RX ADMIN — DEXTROSE AND POTASSIUM CHLORIDE SCH MLS/HR: 5; .15 SOLUTION INTRAVENOUS at 13:37

## 2019-11-30 RX ADMIN — SITAGLIPTIN SCH MG: 50 TABLET, FILM COATED ORAL at 05:39

## 2019-11-30 RX ADMIN — HEPARIN SODIUM SCH UNITS: 5000 INJECTION INTRAVENOUS; SUBCUTANEOUS at 20:27

## 2019-11-30 RX ADMIN — CLOBETASOL PROPIONATE SCH APPLIC: 0.5 OINTMENT TOPICAL at 20:26

## 2019-11-30 RX ADMIN — LEVETIRACETAM SCH MG: 100 SOLUTION ORAL at 09:13

## 2019-11-30 RX ADMIN — LEVETIRACETAM SCH MG: 100 SOLUTION ORAL at 18:03

## 2019-11-30 RX ADMIN — CLOBETASOL PROPIONATE SCH APPLIC: 0.5 OINTMENT TOPICAL at 08:34

## 2019-11-30 RX ADMIN — HEPARIN SODIUM SCH UNITS: 5000 INJECTION INTRAVENOUS; SUBCUTANEOUS at 08:39

## 2019-11-30 RX ADMIN — TAMSULOSIN HYDROCHLORIDE SCH MG: 0.4 CAPSULE ORAL at 08:33

## 2019-11-30 RX ADMIN — LEVETIRACETAM SCH MG: 100 SOLUTION ORAL at 02:00

## 2019-11-30 RX ADMIN — INSULIN ASPART SCH UNITS: 100 INJECTION, SOLUTION INTRAVENOUS; SUBCUTANEOUS at 18:04

## 2019-11-30 RX ADMIN — INSULIN ASPART SCH UNITS: 100 INJECTION, SOLUTION INTRAVENOUS; SUBCUTANEOUS at 23:39

## 2019-11-30 RX ADMIN — DEXTROSE AND POTASSIUM CHLORIDE SCH MLS/HR: 5; .15 SOLUTION INTRAVENOUS at 04:24

## 2019-11-30 RX ADMIN — INSULIN ASPART SCH UNITS: 100 INJECTION, SOLUTION INTRAVENOUS; SUBCUTANEOUS at 05:40

## 2019-11-30 RX ADMIN — ERTAPENEM SODIUM SCH MLS/HR: 1 INJECTION, POWDER, LYOPHILIZED, FOR SOLUTION INTRAMUSCULAR; INTRAVENOUS at 12:41

## 2019-11-30 NOTE — NEPHROLOGY PROGRESS NOTE
Assessment/Plan


Problem List:  


(1) Urinary retention


(2) Acute on chronic respiratory failure


(3) Hypercalcemia


(4) Renal failure (ARF), acute on chronic


Assessment





Hypercalcemia


Urinary retention, BPH , 


Acute on chronic renal failure


Electrolyte imbalance 


Colostomy


DM


PEG


Vegetative state


Acute on chronic respiratory failure


Plan





Free water


K and Phos and Mag supplements


pulm support


Aredia for high Ca





Subjective


ROS Limited/Unobtainable:  Yes





Objective


Objective





Last 24 Hour Vital Signs








  Date Time  Temp Pulse Resp B/P (MAP) Pulse Ox O2 Delivery O2 Flow Rate FiO2


 


11/30/19 17:33  99 18     30


 


11/30/19 16:00 97.5 103 17 118/75 (89) 100   


 


11/30/19 16:00        30


 


11/30/19 16:00      Mechanical Ventilator  


 


11/30/19 15:27  101      


 


11/30/19 15:23  103 16     30


 


11/30/19 13:17  107 16     30


 


11/30/19 12:00  102      


 


11/30/19 12:00        30


 


11/30/19 12:00      Mechanical Ventilator  


 


11/30/19 12:00 97.5 102 16 118/71 (87) 100   


 


11/30/19 11:08  100 16     30


 


11/30/19 09:16  100 16     30


 


11/30/19 08:00      Mechanical Ventilator  


 


11/30/19 08:00        30


 


11/30/19 08:00 97.8 103 17 123/79 (94) 100   


 


11/30/19 07:58  101 16     30


 


11/30/19 07:51  100      


 


11/30/19 05:28  90 16     30


 


11/30/19 04:00        30


 


11/30/19 04:00      Mechanical Ventilator  


 


11/30/19 04:00 97.5 107 16 126/83 (97) 100   


 


11/30/19 03:35  109      


 


11/30/19 02:35  92 16     30


 


11/30/19 00:49  94 16     30


 


11/30/19 00:00 98.1 110 17 129/84 (99) 100   


 


11/30/19 00:00      Mechanical Ventilator  


 


11/30/19 00:00        30


 


11/29/19 23:45  109      


 


11/29/19 23:00  90 18     30


 


11/29/19 20:55  94 18     30


 


11/29/19 20:00 97.8 100 16 124/84 (97) 100   


 


11/29/19 20:00        30


 


11/29/19 20:00      Mechanical Ventilator  


 


11/29/19 19:35  103      


 


11/29/19 19:27  87 16  98 Mechanical Ventilator  30


 


11/29/19 19:26  97 16     30

















Intake and Output  


 


 11/29/19 11/30/19





 19:00 07:00


 


Intake Total 640 ml 1898 ml


 


Output Total 600 ml 650 ml


 


Balance 40 ml 1248 ml


 


  


 


Free Water 400 ml 500 ml


 


IV Total  1038 ml


 


Tube Feeding 240 ml 360 ml


 


Output Urine Total 450 ml 500 ml


 


Stool Total 150 ml 150 ml








Laboratory Tests


11/30/19 05:00: 


White Blood Count 7.6, Red Blood Count 3.44L, Hemoglobin 10.3L, Hematocrit 32.3L

, Mean Corpuscular Volume 94, Mean Corpuscular Hemoglobin 29.8, Mean 

Corpuscular Hemoglobin Concent 31.8L, Red Cell Distribution Width 16.7H, 

Platelet Count 275, Mean Platelet Volume 7.7, Neutrophils (%) (Auto) 67.9, 

Lymphocytes (%) (Auto) 9.7L, Monocytes (%) (Auto) 8.8, Eosinophils (%) (Auto) 

11.8H, Basophils (%) (Auto) 1.7, Erythrocyte Sedimentation Rate 102H, Sodium 

Level 144, Potassium Level 3.5, Chloride Level 111H, Carbon Dioxide Level 19L, 

Anion Gap 15, Blood Urea Nitrogen 8, Creatinine 1.1, Estimat Glomerular 

Filtration Rate > 60, Glucose Level 227H, Calcium Level 9.2, Phosphorus Level 

2.5, Magnesium Level 1.7L, Total Bilirubin 0.3, Aspartate Amino Transf (AST/SGOT

) 40H, Alanine Aminotransferase (ALT/SGPT) 27, Alkaline Phosphatase 75, C-

Reactive Protein, Quantitative 18.6H, Total Protein 7.0, Albumin 2.3L, Globulin 

4.7, Albumin/Globulin Ratio 0.5L


Height (Feet):  5


Height (Inches):  7.00


Weight (Pounds):  195


General Appearance:  no apparent distress


EENT:  other - trach


Cardiovascular:  tachycardia


Respiratory/Chest:  decreased breath sounds


Abdomen:  distended











Simone Rocha MD Nov 30, 2019 17:37

## 2019-11-30 NOTE — PULMONOLOGY PROGRESS NOTE
Assessment/Plan


Assessment/Plan


ASSESSMENT


Acute on chronic respiratory failure


VDRF/tracheostomy status


Severe sepsis likely due to UTI and PNA 


UTI with   history of recurrent UTI


Probably pneumonia


ATN/BRAYDON on chronic kidney disease -resolved 


s/p cystoscopy 11/27


Sacral decubitus ulcer , present on admission


ICH, s/pt craniotomy and  shunt placement 


HTN 


DM


COPD


Electrolyte imbalance


Seizure disorder


Colostomy status


Dysphagia ,feeding by G-tube


Vegetative state


Chronic encephalopathy


Anemia of chronic disease , status post 2 units PRBC


Neurogenic bladder








PLAN OF CARE


JOSIAH


Vent support , trach care 


fup  with CXR 


baseline ABG and titrate settings as needed


Venous duplex BLE no acute DVT





abx as per ID recs 


BCX negative UCX + Candida 


SCX+  Proteus ESBL , Providencia


aspiration precaution, G-tube feeding, monitor tolerance


DVT ,GI prophylaxis





colostomy care


BRAYDON - resolved ; hyperNa -  resolved 


decrease IV fluids 


monitor renal parameters,  electrolytes correct electrolytes as needed,  avoid 

nephrotoxic's


abdominaUS with hepatomegaly with fatty infiltration ; both kidneys unremarkable

; no hydronephrosis 


LFT stable





BS management with Januvia and SSI, HgA1c at goal   


seizure precaution,  continue Keppra


pain management moniotr HH with goal to keep Hgb> 7, s/p 2 u PRBC 


supportive care


bowel regimen








 case discussed and evaluated by supervising physician





Subjective


Allergies:  


Coded Allergies:  


     AZTREONAM (Verified  Allergy, Unknown, 7/23/18)


Subjective


leuk resolved, afebrile, 


no signs of resp distress on current settings





Objective





Last 24 Hour Vital Signs








  Date Time  Temp Pulse Resp B/P (MAP) Pulse Ox O2 Delivery O2 Flow Rate FiO2


 


11/30/19 11:08  100 16     30


 


11/30/19 09:16  100 16     30


 


11/30/19 08:00      Mechanical Ventilator  


 


11/30/19 08:00        35


 


11/30/19 08:00 97.8 103 17 123/79 (94) 100   


 


11/30/19 07:58  101 16     30


 


11/30/19 07:51  100      


 


11/30/19 05:28  90 16     30


 


11/30/19 04:00        30


 


11/30/19 04:00      Mechanical Ventilator  


 


11/30/19 04:00 97.5 107 16 126/83 (97) 100   


 


11/30/19 03:35  109      


 


11/30/19 02:35  92 16     30


 


11/30/19 00:49  94 16     30


 


11/30/19 00:00 98.1 110 17 129/84 (99) 100   


 


11/30/19 00:00      Mechanical Ventilator  


 


11/30/19 00:00        30


 


11/29/19 23:45  109      


 


11/29/19 23:00  90 18     30


 


11/29/19 20:55  94 18     30


 


11/29/19 20:00 97.8 100 16 124/84 (97) 100   


 


11/29/19 20:00        30


 


11/29/19 20:00      Mechanical Ventilator  


 


11/29/19 19:35  103      


 


11/29/19 19:27  87 16  98 Mechanical Ventilator  30


 


11/29/19 19:26  97 16     30


 


11/29/19 16:33  98 18     30


 


11/29/19 16:00        30


 


11/29/19 16:00 97.7 102 18 118/80 (93) 95   


 


11/29/19 16:00      Mechanical Ventilator  


 


11/29/19 16:00  100      


 


11/29/19 14:31  99 17     30


 


11/29/19 12:43  81 17  100   


 


11/29/19 12:33  98 18     30

















Intake and Output  


 


 11/29/19 11/30/19





 19:00 07:00


 


Intake Total 640 ml 1898 ml


 


Output Total 600 ml 650 ml


 


Balance 40 ml 1248 ml


 


  


 


Free Water 400 ml 500 ml


 


IV Total  1038 ml


 


Tube Feeding 240 ml 360 ml


 


Output Urine Total 450 ml 500 ml


 


Stool Total 150 ml 150 ml








General Appearance:  no acute distress, other - bedridden chroncially ill 

looking, vent dependent  AA male ; vent -30%-16


HEENT:  status post trach - Portex#7, secretions moderate amount, white color, 

thick cnsistency


Respiratory/Chest:  crackles/rales - few  scattered


Cardiovascular:  tachycardia - ST on tele 100-110 , other - RUE PICC intact


Abdomen:  normal bowel sounds, soft, non tender, other - G tube , colostomy  

with brown output


Genitourinary:  other - Guzmán


Skin:  other - sacral decub POA


Neurologic/Psychiatric:  abnormal gait


Musculoskeletal:  atrophy - BLE


Laboratory Tests


11/30/19 05:00: 


White Blood Count 7.6, Red Blood Count 3.44L, Hemoglobin 10.3L, Hematocrit 32.3L

, Mean Corpuscular Volume 94, Mean Corpuscular Hemoglobin 29.8, Mean 

Corpuscular Hemoglobin Concent 31.8L, Red Cell Distribution Width 16.7H, 

Platelet Count 275, Mean Platelet Volume 7.7, Neutrophils (%) (Auto) 67.9, 

Lymphocytes (%) (Auto) 9.7L, Monocytes (%) (Auto) 8.8, Eosinophils (%) (Auto) 

11.8H, Basophils (%) (Auto) 1.7, Erythrocyte Sedimentation Rate 102H, Sodium 

Level 144, Potassium Level 3.5, Chloride Level 111H, Carbon Dioxide Level 19L, 

Anion Gap 15, Blood Urea Nitrogen 8, Creatinine 1.1, Estimat Glomerular 

Filtration Rate > 60, Glucose Level 227H, Calcium Level 9.2, Phosphorus Level 

2.5, Magnesium Level 1.7L, Total Bilirubin 0.3, Aspartate Amino Transf (AST/SGOT

) 40H, Alanine Aminotransferase (ALT/SGPT) 27, Alkaline Phosphatase 75, C-

Reactive Protein, Quantitative 18.6H, Total Protein 7.0, Albumin 2.3L, Globulin 

4.7, Albumin/Globulin Ratio 0.5L





Current Medications








 Medications


  (Trade)  Dose


 Ordered  Sig/Jan


 Route


 PRN Reason  Start Time


 Stop Time Status Last Admin


Dose Admin


 


 Acetaminophen


  (Tylenol)  650 mg  Q4H  PRN


 GT


 Mild Pain/Temp > 100.6  11/26/19 18:42


 12/26/19 18:41   


 


 


 Artificial Tears


  (Akwa-Tears)  2 drop  Q12HR


 BOTH EYES


   11/26/19 21:00


 12/24/19 20:59  11/30/19 08:33


 


 


 Baclofen


  (Lioresal)  10 mg  THREE TIMES A  DAY


 GT


   11/27/19 09:00


 12/24/19 17:59  11/30/19 08:33


 


 


 Calcitonin [place]


  (Miacalcin)  1 sprays  DAILY


 NASAL


   11/28/19 12:00


 12/28/19 11:59  11/30/19 08:34


 


 


 Clobetasol


 Propionate


  (Temovate)  1 applic  EVERY 12  HOURS


 TOPIC


   11/26/19 21:00


 12/24/19 20:59  11/30/19 08:34


 


 


 Dextrose


  (Dextrose 50%)  25 ml  Q30M  PRN


 IV


 Hypoglycemia  11/26/19 18:45


 12/24/19 14:44   


 


 


 Dextrose


  (Dextrose 50%)  50 ml  Q30M  PRN


 IV


 Hypoglycemia  11/26/19 18:45


 12/24/19 14:44   


 


 


 Dextrose/


 Electrolytes  1,000 ml @ 


 100 mls/hr  Q10H


 IV


   11/28/19 03:00


 12/28/19 02:59  11/30/19 04:24


 


 


 Ertapenem 1 gm/


 Sodium Chloride  55 ml @ 


 110 mls/hr  Q24H


 IVPB


   11/29/19 13:00


 12/4/19 12:59   


 


 


 Famotidine


  (Pepcid)  20 mg  BID


 GT


   11/28/19 18:00


 12/28/19 17:59  11/30/19 08:33


 


 


 Fenofibrate


  (Tricor)  145 mg  DAILY


 ORAL


   11/27/19 09:00


 12/25/19 08:59  11/30/19 08:33


 


 


 Heparin Sodium


  (Porcine)


  (Heparin 5000


 units/ml)  5,000 units  EVERY 12  HOURS


 SUBQ


   11/26/19 21:00


 12/24/19 20:59  11/30/19 08:39


 


 


 Insulin Aspart


  (NovoLOG)    EVERY 6  HOURS


 SUBQ


   11/30/19 12:00


 12/24/19 16:29   


 


 


 Levetiracetam


  (Keppra)  1,000 mg  Q8H


 GT


   11/29/19 02:00


 12/29/19 01:59  11/30/19 09:13


 


 


 Lorazepam


  (Ativan 2mg/ml


 1ml)  2 mg  Q2H  PRN


 IV


 For Anxiety  11/26/19 18:43


 12/3/19 18:42   


 


 


 Morphine Sulfate


  (Morphine


 Sulfate)  4 mg  Q4H  PRN


 IVP


 Severe Pain (Pain Scale 7-10)  11/26/19 18:44


 12/3/19 18:43   


 


 


 Ondansetron HCl


  (Zofran)  4 mg  Q6H  PRN


 IVP


 Nausea & Vomiting  11/26/19 18:44


 12/26/19 18:43   


 


 


 Polyethylene


 Glycol


  (Miralax)  17 gm  DAILYPRN  PRN


 GT


 Constipation  11/26/19 18:44


 12/26/19 18:43   


 


 


 Sitagliptin


 Phosphate


  (Januvia)  50 mg  ACBREAKFAST


 GT


   11/27/19 06:30


 12/25/19 06:29  11/30/19 05:39


 


 


 Tamsulosin HCl


  (Flomax)  0.4 mg  DAILY


 ORAL


   11/27/19 09:00


 12/27/19 08:59  11/30/19 08:33


 

















Isabel Aguilar NP Nov 30, 2019 12:12

## 2019-11-30 NOTE — UROLOGY PROGRESS NOTE
Assessment/Plan


Assessment/Plan:


1. Urinary retention.


2. BPH.


3. Neurogenic bladder.


4. Incontinence.


5. Pyuria/UTI/colonized.


6. Hematuria.


7. Proteinuria.


8. Renal insufficiency, acute possibly on chronic.


9. Renal cyst.


10. Nephrolithiasis.


11. POD # 3, cysto/optic urethrotomy





monitor clinically


mayorga placed 11/27


hand irrigated and do PRN


abx as ordered


consider antifungals, per ID





Subjective


Allergies:  


Coded Allergies:  


     AZTREONAM (Verified  Allergy, Unknown, 7/23/18)


Subjective


all noted, mayorga draining, non-verbal





Objective





Last 24 Hour Vital Signs








  Date Time  Temp Pulse Resp B/P (MAP) Pulse Ox O2 Delivery O2 Flow Rate FiO2


 


11/30/19 09:16  100 16     30


 


11/30/19 08:00      Mechanical Ventilator  


 


11/30/19 08:00        35


 


11/30/19 08:00 97.8 103 17 123/79 (94) 100   


 


11/30/19 07:58  101 16     30


 


11/30/19 07:51  100      


 


11/30/19 05:28  90 16     30


 


11/30/19 04:00        30


 


11/30/19 04:00      Mechanical Ventilator  


 


11/30/19 04:00 97.5 107 16 126/83 (97) 100   


 


11/30/19 03:35  109      


 


11/30/19 02:35  92 16     30


 


11/30/19 00:49  94 16     30


 


11/30/19 00:00 98.1 110 17 129/84 (99) 100   


 


11/30/19 00:00      Mechanical Ventilator  


 


11/30/19 00:00        30


 


11/29/19 23:45  109      


 


11/29/19 23:00  90 18     30


 


11/29/19 20:55  94 18     30


 


11/29/19 20:00 97.8 100 16 124/84 (97) 100   


 


11/29/19 20:00        30


 


11/29/19 20:00      Mechanical Ventilator  


 


11/29/19 19:35  103      


 


11/29/19 19:27  87 16  98 Mechanical Ventilator  30


 


11/29/19 19:26  97 16     30


 


11/29/19 16:33  98 18     30


 


11/29/19 16:00        30


 


11/29/19 16:00 97.7 102 18 118/80 (93) 95   


 


11/29/19 16:00      Mechanical Ventilator  


 


11/29/19 16:00  100      


 


11/29/19 14:31  99 17     30


 


11/29/19 12:43  81 17  100   


 


11/29/19 12:33  98 18     30


 


11/29/19 12:00        30


 


11/29/19 12:00      Mechanical Ventilator  


 


11/29/19 12:00 98.1 100 18 126/67 (86) 97   


 


11/29/19 11:40  99      


 


11/29/19 10:40  99 17     30

















Intake and Output  


 


 11/29/19 11/30/19





 19:00 07:00


 


Intake Total 640 ml 1898 ml


 


Output Total 600 ml 650 ml


 


Balance 40 ml 1248 ml


 


  


 


Free Water 400 ml 500 ml


 


IV Total  1038 ml


 


Tube Feeding 240 ml 360 ml


 


Output Urine Total 450 ml 500 ml


 


Stool Total 150 ml 150 ml











Microbiology








 Date/Time


Source Procedure


Growth Status


 


 


 11/24/19 09:15


Blood Blood Culture - Final


NO GROWTH AFTER 5 DAYS Complete





 11/25/19 11:00


Nasal Nares - Final Complete


 


 11/25/19 11:00


Nasal Nares - Final Complete


 


 11/26/19 03:00


Urine,Clean Catch Urine Culture - Final


Candida Parapsilosis Complete


 


 11/24/19 09:10


Rectum VRE Culture - Final


Enterococcus Faecium - Vre Complete








Current Medications








 Medications


  (Trade)  Dose


 Ordered  Sig/Jan


 Route


 PRN Reason  Start Time


 Stop Time Status Last Admin


Dose Admin


 


 Acetaminophen


  (Tylenol)  650 mg  Q4H  PRN


 GT


 Mild Pain/Temp > 100.6  11/26/19 18:42


 12/26/19 18:41   


 


 


 Artificial Tears


  (Akwa-Tears)  2 drop  Q12HR


 BOTH EYES


   11/26/19 21:00


 12/24/19 20:59  11/30/19 08:33


 


 


 Baclofen


  (Lioresal)  10 mg  THREE TIMES A  DAY


 GT


   11/27/19 09:00


 12/24/19 17:59  11/30/19 08:33


 


 


 Calcitonin [place]


  (Miacalcin)  1 sprays  DAILY


 NASAL


   11/28/19 12:00


 12/28/19 11:59  11/30/19 08:34


 


 


 Clobetasol


 Propionate


  (Temovate)  1 applic  EVERY 12  HOURS


 TOPIC


   11/26/19 21:00


 12/24/19 20:59  11/30/19 08:34


 


 


 Dextrose


  (Dextrose 50%)  25 ml  Q30M  PRN


 IV


 Hypoglycemia  11/26/19 18:45


 12/24/19 14:44   


 


 


 Dextrose


  (Dextrose 50%)  50 ml  Q30M  PRN


 IV


 Hypoglycemia  11/26/19 18:45


 12/24/19 14:44   


 


 


 Dextrose/


 Electrolytes  1,000 ml @ 


 100 mls/hr  Q10H


 IV


   11/28/19 03:00


 12/28/19 02:59  11/30/19 04:24


 


 


 Ertapenem 1 gm/


 Sodium Chloride  55 ml @ 


 110 mls/hr  Q24H


 IVPB


   11/29/19 13:00


 12/4/19 12:59   


 


 


 Famotidine


  (Pepcid)  20 mg  BID


 GT


   11/28/19 18:00


 12/28/19 17:59  11/30/19 08:33


 


 


 Fenofibrate


  (Tricor)  145 mg  DAILY


 ORAL


   11/27/19 09:00


 12/25/19 08:59  11/30/19 08:33


 


 


 Heparin Sodium


  (Porcine)


  (Heparin 5000


 units/ml)  5,000 units  EVERY 12  HOURS


 SUBQ


   11/26/19 21:00


 12/24/19 20:59  11/30/19 08:39


 


 


 Insulin Aspart


  (NovoLOG)    EVERY 6  HOURS


 SUBQ


   11/30/19 12:00


 12/24/19 16:29   


 


 


 Levetiracetam


  (Keppra)  1,000 mg  Q8H


 GT


   11/29/19 02:00


 12/29/19 01:59  11/30/19 09:13


 


 


 Lorazepam


  (Ativan 2mg/ml


 1ml)  2 mg  Q2H  PRN


 IV


 For Anxiety  11/26/19 18:43


 12/3/19 18:42   


 


 


 Morphine Sulfate


  (Morphine


 Sulfate)  4 mg  Q4H  PRN


 IVP


 Severe Pain (Pain Scale 7-10)  11/26/19 18:44


 12/3/19 18:43   


 


 


 Ondansetron HCl


  (Zofran)  4 mg  Q6H  PRN


 IVP


 Nausea & Vomiting  11/26/19 18:44


 12/26/19 18:43   


 


 


 Polyethylene


 Glycol


  (Miralax)  17 gm  DAILYPRN  PRN


 GT


 Constipation  11/26/19 18:44


 12/26/19 18:43   


 


 


 Sitagliptin


 Phosphate


  (Januvia)  50 mg  ACBREAKFAST


 GT


   11/27/19 06:30


 12/25/19 06:29  11/30/19 05:39


 


 


 Tamsulosin HCl


  (Flomax)  0.4 mg  DAILY


 ORAL


   11/27/19 09:00


 12/27/19 08:59  11/30/19 08:33


 





Laboratory Tests


11/30/19 05:00: 


White Blood Count 7.6, Red Blood Count 3.44L, Hemoglobin 10.3L, Hematocrit 32.3L

, Mean Corpuscular Volume 94, Mean Corpuscular Hemoglobin 29.8, Mean 

Corpuscular Hemoglobin Concent 31.8L, Red Cell Distribution Width 16.7H, 

Platelet Count 275, Mean Platelet Volume 7.7, Neutrophils (%) (Auto) 67.9, 

Lymphocytes (%) (Auto) 9.7L, Monocytes (%) (Auto) 8.8, Eosinophils (%) (Auto) 

11.8H, Basophils (%) (Auto) 1.7, Erythrocyte Sedimentation Rate 102H, Sodium 

Level 144, Potassium Level 3.5, Chloride Level 111H, Carbon Dioxide Level 19L, 

Anion Gap 15, Blood Urea Nitrogen 8, Creatinine 1.1, Estimat Glomerular 

Filtration Rate > 60, Glucose Level 227H, Calcium Level 9.2, Phosphorus Level 

2.5, Magnesium Level 1.7L, Total Bilirubin 0.3, Aspartate Amino Transf (AST/SGOT

) 40H, Alanine Aminotransferase (ALT/SGPT) 27, Alkaline Phosphatase 75, C-

Reactive Protein, Quantitative 18.6H, Total Protein 7.0, Albumin 2.3L, Globulin 

4.7, Albumin/Globulin Ratio 0.5L


Height (Feet):  5


Height (Inches):  7.00


Weight (Pounds):  195


Objective


 exam stable


mayorga indwelling, blood-tinged/christine urine, some debris


minimal bleeding at penile meatus











Vinnie Pollard MD Nov 30, 2019 09:29

## 2019-12-01 VITALS — SYSTOLIC BLOOD PRESSURE: 100 MMHG | DIASTOLIC BLOOD PRESSURE: 73 MMHG

## 2019-12-01 VITALS — DIASTOLIC BLOOD PRESSURE: 77 MMHG | SYSTOLIC BLOOD PRESSURE: 115 MMHG

## 2019-12-01 VITALS — SYSTOLIC BLOOD PRESSURE: 114 MMHG | DIASTOLIC BLOOD PRESSURE: 72 MMHG

## 2019-12-01 VITALS — SYSTOLIC BLOOD PRESSURE: 105 MMHG | DIASTOLIC BLOOD PRESSURE: 68 MMHG

## 2019-12-01 VITALS — SYSTOLIC BLOOD PRESSURE: 109 MMHG | DIASTOLIC BLOOD PRESSURE: 79 MMHG

## 2019-12-01 VITALS — SYSTOLIC BLOOD PRESSURE: 100 MMHG | DIASTOLIC BLOOD PRESSURE: 68 MMHG

## 2019-12-01 LAB
ADD MANUAL DIFF: NO
ALBUMIN SERPL-MCNC: 2.3 G/DL (ref 3.4–5)
ALP SERPL-CCNC: 78 U/L (ref 46–116)
ALT SERPL-CCNC: 26 U/L (ref 12–78)
ANION GAP SERPL CALC-SCNC: 12 MMOL/L (ref 5–15)
AST SERPL-CCNC: 37 U/L (ref 15–37)
BASOPHILS NFR BLD AUTO: 1.7 % (ref 0–2)
BILIRUB DIRECT SERPL-MCNC: 0.1 MG/DL (ref 0–0.3)
BILIRUB SERPL-MCNC: 0.3 MG/DL (ref 0.2–1)
BUN SERPL-MCNC: 6 MG/DL (ref 7–18)
CALCIUM SERPL-MCNC: 9.7 MG/DL (ref 8.5–10.1)
CHLORIDE SERPL-SCNC: 108 MMOL/L (ref 98–107)
CO2 SERPL-SCNC: 20 MMOL/L (ref 21–32)
CREAT SERPL-MCNC: 0.9 MG/DL (ref 0.55–1.3)
EOSINOPHIL NFR BLD AUTO: 13.6 % (ref 0–3)
ERYTHROCYTE [DISTWIDTH] IN BLOOD BY AUTOMATED COUNT: 16 % (ref 11.6–14.8)
HCT VFR BLD CALC: 33 % (ref 42–52)
HGB BLD-MCNC: 10.5 G/DL (ref 14.2–18)
LYMPHOCYTES NFR BLD AUTO: 16.6 % (ref 20–45)
MCV RBC AUTO: 92 FL (ref 80–99)
MONOCYTES NFR BLD AUTO: 7.2 % (ref 1–10)
NEUTROPHILS NFR BLD AUTO: 60.9 % (ref 45–75)
PHOSPHATE SERPL-MCNC: 2.2 MG/DL (ref 2.5–4.9)
PLATELET # BLD: 315 K/UL (ref 150–450)
POTASSIUM SERPL-SCNC: 3.5 MMOL/L (ref 3.5–5.1)
RBC # BLD AUTO: 3.58 M/UL (ref 4.7–6.1)
SODIUM SERPL-SCNC: 140 MMOL/L (ref 136–145)
WBC # BLD AUTO: 8.7 K/UL (ref 4.8–10.8)

## 2019-12-01 RX ADMIN — TAMSULOSIN HYDROCHLORIDE SCH MG: 0.4 CAPSULE ORAL at 09:44

## 2019-12-01 RX ADMIN — DEXTROSE AND POTASSIUM CHLORIDE SCH MLS/HR: 5; .15 SOLUTION INTRAVENOUS at 02:09

## 2019-12-01 RX ADMIN — INSULIN ASPART SCH UNITS: 100 INJECTION, SOLUTION INTRAVENOUS; SUBCUTANEOUS at 18:19

## 2019-12-01 RX ADMIN — HEPARIN SODIUM SCH UNITS: 5000 INJECTION INTRAVENOUS; SUBCUTANEOUS at 09:50

## 2019-12-01 RX ADMIN — LEVETIRACETAM SCH MG: 100 SOLUTION ORAL at 09:44

## 2019-12-01 RX ADMIN — LEVETIRACETAM SCH MG: 100 SOLUTION ORAL at 02:09

## 2019-12-01 RX ADMIN — HEPARIN SODIUM SCH UNITS: 5000 INJECTION INTRAVENOUS; SUBCUTANEOUS at 21:18

## 2019-12-01 RX ADMIN — ERTAPENEM SODIUM SCH MLS/HR: 1 INJECTION, POWDER, LYOPHILIZED, FOR SOLUTION INTRAMUSCULAR; INTRAVENOUS at 13:34

## 2019-12-01 RX ADMIN — SITAGLIPTIN SCH MG: 50 TABLET, FILM COATED ORAL at 06:10

## 2019-12-01 RX ADMIN — INSULIN ASPART SCH UNITS: 100 INJECTION, SOLUTION INTRAVENOUS; SUBCUTANEOUS at 13:17

## 2019-12-01 RX ADMIN — INSULIN ASPART SCH UNITS: 100 INJECTION, SOLUTION INTRAVENOUS; SUBCUTANEOUS at 06:12

## 2019-12-01 RX ADMIN — CLOBETASOL PROPIONATE SCH APPLIC: 0.5 OINTMENT TOPICAL at 21:15

## 2019-12-01 RX ADMIN — CLOBETASOL PROPIONATE SCH APPLIC: 0.5 OINTMENT TOPICAL at 09:44

## 2019-12-01 RX ADMIN — INSULIN ASPART SCH UNITS: 100 INJECTION, SOLUTION INTRAVENOUS; SUBCUTANEOUS at 23:59

## 2019-12-01 RX ADMIN — LEVETIRACETAM SCH MG: 100 SOLUTION ORAL at 18:13

## 2019-12-01 RX ADMIN — DEXTROSE AND POTASSIUM CHLORIDE SCH MLS/HR: 5; .15 SOLUTION INTRAVENOUS at 18:31

## 2019-12-01 NOTE — PULMONOLOGY PROGRESS NOTE
Assessment/Plan


Assessment/Plan


ASSESSMENT


Acute on chronic respiratory failure


VDRF/tracheostomy status


Severe sepsis likely due to UTI and PNA 


UTI with   history of recurrent UTI


Probably pneumonia


ATN/BRAYDON on chronic kidney disease -resolved 


s/p cystoscopy 11/27


Sacral decubitus ulcer , present on admission


ICH, s/pt craniotomy and  shunt placement 


HTN 


DM


COPD


Electrolyte imbalance


Seizure disorder


Colostomy status


Dysphagia ,feeding by G-tube


Vegetative state


Chronic encephalopathy


Anemia of chronic disease , status post 2 units PRBC


Neurogenic bladder








PLAN OF CARE


JOSIAH


Vent support , trach care 


fup  with CXR 


baseline ABG noted, AC decreased to 14 and fup with ABG in am  


Venous duplex BLE no acute DVT





abx as per ID recs 


BCX negative UCX + Candida 


SCX+  Proteus ESBL , Providencia


aspiration precaution, G-tube feeding, monitor tolerance


DVT ,GI prophylaxis





colostomy care


BRAYDON - resolved ; hyperNa -  resolved 


IV fluids 


monitor renal parameters,  electrolytes correct electrolytes as needed,  avoid 

nephrotoxic's 


P replaced as per nephro recs 


abdominal US with hepatomegaly with fatty infiltration ; both kidneys 

unremarkable; no hydronephrosis 


LFT stable





BS management with Januvia and SSI, HgA1c at goal   


seizure precaution,  continue Keppra


pain management monitor HH with goal to keep Hgb> 7, s/p 2 u PRBC 


supportive care


bowel regimen








 case discussed and evaluated by supervising physician





Subjective


Allergies:  


Coded Allergies:  


     AZTREONAM (Verified  Allergy, Unknown, 7/23/18)


Subjective


leuk resolved, afebrile, 


no signs of resp distress on current settings


ABG noted this am


low P





Objective





Last 24 Hour Vital Signs








  Date Time  Temp Pulse Resp B/P (MAP) Pulse Ox O2 Delivery O2 Flow Rate FiO2


 


12/1/19 09:00        30


 


12/1/19 08:38  100 16     30


 


12/1/19 08:00      Mechanical Ventilator  


 


12/1/19 08:00  98      


 


12/1/19 08:00 97.6 96 18 114/72 (86) 100   


 


12/1/19 07:32  98 16     30


 


12/1/19 05:00  101 17  100 Mechanical Ventilator 60.0 30





  99 17     30





        30


 


12/1/19 04:01  99      


 


12/1/19 04:00      Mechanical Ventilator  


 


12/1/19 04:00        30


 


12/1/19 04:00 97.8 104 17 105/68 (80) 100   


 


12/1/19 03:00  101 16  100 Mechanical Ventilator 60.0 30





  105 16     30





        30


 


12/1/19 01:08  101 16  100 Mechanical Ventilator 60.0 30





  105 16     30





        30


 


12/1/19 00:00      Mechanical Ventilator  


 


12/1/19 00:00 97.8 105 16 109/79 (89) 100   


 


12/1/19 00:00        30


 


11/30/19 23:39  102      


 


11/30/19 23:00  100 17  100 Mechanical Ventilator 60.0 30





  100 17     30





        30


 


11/30/19 21:30  100 17     30


 


11/30/19 20:00 97.0 100 16 107/76 (86) 100   


 


11/30/19 20:00      Mechanical Ventilator  


 


11/30/19 20:00        30


 


11/30/19 19:41  99      


 


11/30/19 19:00  106 16     30


 


11/30/19 17:33  99 18     30


 


11/30/19 16:00 97.5 103 17 118/75 (89) 100   


 


11/30/19 16:00        30


 


11/30/19 16:00      Mechanical Ventilator  


 


11/30/19 15:27  101      


 


11/30/19 15:23  103 16     30


 


11/30/19 13:17  107 16     30


 


11/30/19 12:00  102      


 


11/30/19 12:00        30


 


11/30/19 12:00      Mechanical Ventilator  


 


11/30/19 12:00 97.5 102 16 118/71 (87) 100   


 


11/30/19 11:08  100 16     30

















Intake and Output  


 


 11/30/19 12/1/19





 19:00 07:00


 


Intake Total 1478.75 ml 1618.75 ml


 


Output Total 1900 ml 750 ml


 


Balance -421.25 ml 868.75 ml


 


  


 


Free Water 400 ml 400 ml


 


IV Total 658.75 ml 888.75 ml


 


Tube Feeding 360 ml 330 ml


 


Other 60 ml 


 


Output Urine Total 1400 ml 600 ml


 


Stool Total 500 ml 150 ml








Objective


General Appearance:  no acute distress,  bedridden chronically ill looking, 

vent dependent  AA male ; vent -30%-16


HEENT:  status post trach - Portex#7, secretions moderate amount, white color, 

thick consistency


Respiratory/Chest:  crackles/rales - few  scattered


Cardiovascular:  tachycardia - ST on tele 100-106,   RUE PICC intact


Abdomen:  normal bowel sounds, soft, non tender,   G tube , colostomy  with 

brown output


Genitourinary:    Guzmán


Skin:  other - sacral decub POA


Neurologic/Psychiatric:  abnormal gait


Musculoskeletal:  atrophy - BLE


Laboratory Tests


12/1/19 06:45: 


White Blood Count 8.7, Red Blood Count 3.58L, Hemoglobin 10.5L, Hematocrit 33.0L

, Mean Corpuscular Volume 92, Mean Corpuscular Hemoglobin 29.4, Mean 

Corpuscular Hemoglobin Concent 31.8L, Red Cell Distribution Width 16.0H, 

Platelet Count 315, Mean Platelet Volume 8.4, Neutrophils (%) (Auto) 60.9, 

Lymphocytes (%) (Auto) 16.6L, Monocytes (%) (Auto) 7.2, Eosinophils (%) (Auto) 

13.6H, Basophils (%) (Auto) 1.7, Sodium Level 140, Potassium Level 3.5, 

Chloride Level 108H, Carbon Dioxide Level 20L, Anion Gap 12, Blood Urea 

Nitrogen 6L, Creatinine 0.9, Estimat Glomerular Filtration Rate > 60, Glucose 

Level 179H, Uric Acid 6.4, Calcium Level 9.7, Phosphorus Level 2.2L, Magnesium 

Level 1.8, Total Bilirubin 0.3, Direct Bilirubin 0.1, Aspartate Amino Transf (

AST/SGOT) 37, Alanine Aminotransferase (ALT/SGPT) 26, Alkaline Phosphatase 78, 

Total Protein 7.2, Albumin 2.3L


12/1/19 08:34: 


Arterial Blood pH 7.443, Arterial Blood Partial Pressure CO2 26.1L, Arterial 

Blood Partial Pressure O2 154.9H, Arterial Blood HCO3 17.4*L, Arterial Blood 

Oxygen Saturation 98.3, Arterial Blood Base Excess -5.5L, Arsh Test Positive





Current Medications








 Medications


  (Trade)  Dose


 Ordered  Sig/Jan


 Route


 PRN Reason  Start Time


 Stop Time Status Last Admin


Dose Admin


 


 Acetaminophen


  (Tylenol)  650 mg  Q4H  PRN


 GT


 Mild Pain/Temp > 100.6  11/26/19 18:42


 12/26/19 18:41   


 


 


 Albuterol/


 Ipratropium


  (Albuterol/


 Ipratropium)  3 ml  Q4H  PRN


 HHN


 Shortness of Breath  11/30/19 12:20


 12/5/19 12:19   


 


 


 Artificial Tears


  (Akwa-Tears)  2 drop  Q12HR


 BOTH EYES


   11/26/19 21:00


 12/24/19 20:59  12/1/19 09:43


 


 


 Baclofen


  (Lioresal)  10 mg  THREE TIMES A  DAY


 GT


   11/27/19 09:00


 12/24/19 17:59  12/1/19 09:44


 


 


 Calcitonin [place]


  (Miacalcin)  1 sprays  DAILY


 NASAL


   11/28/19 12:00


 12/28/19 11:59  12/1/19 09:43


 


 


 Clobetasol


 Propionate


  (Temovate)  1 applic  EVERY 12  HOURS


 TOPIC


   11/26/19 21:00


 12/24/19 20:59  12/1/19 09:44


 


 


 Dextrose


  (Dextrose 50%)  25 ml  Q30M  PRN


 IV


 Hypoglycemia  11/26/19 18:45


 12/24/19 14:44   


 


 


 Dextrose


  (Dextrose 50%)  50 ml  Q30M  PRN


 IV


 Hypoglycemia  11/26/19 18:45


 12/24/19 14:44   


 


 


 Dextrose/


 Electrolytes  1,000 ml @ 


 75 mls/hr  [id number]


 IV


   11/30/19 13:30


 12/30/19 13:29  12/1/19 02:09


 


 


 Ertapenem 1 gm/


 Sodium Chloride  55 ml @ 


 110 mls/hr  Q24H


 IVPB


   11/29/19 13:00


 12/4/19 12:59  11/30/19 12:41


 


 


 Famotidine


  (Pepcid)  20 mg  BID


 GT


   11/28/19 18:00


 12/28/19 17:59  12/1/19 09:44


 


 


 Fenofibrate


  (Tricor)  145 mg  DAILY


 ORAL


   11/27/19 09:00


 12/25/19 08:59  12/1/19 09:45


 


 


 Heparin Sodium


  (Porcine)


  (Heparin 5000


 units/ml)  5,000 units  EVERY 12  HOURS


 SUBQ


   11/26/19 21:00


 12/24/19 20:59  12/1/19 09:50


 


 


 Insulin Aspart


  (NovoLOG)    EVERY 6  HOURS


 SUBQ


   11/30/19 12:00


 12/24/19 16:29  12/1/19 06:12


 


 


 Levetiracetam


  (Keppra)  1,000 mg  Q8H


 GT


   11/29/19 02:00


 12/29/19 01:59  12/1/19 09:44


 


 


 Lorazepam


  (Ativan 2mg/ml


 1ml)  2 mg  Q2H  PRN


 IV


 For Anxiety  11/26/19 18:43


 12/3/19 18:42   


 


 


 Morphine Sulfate


  (Morphine


 Sulfate)  4 mg  Q4H  PRN


 IVP


 Severe Pain (Pain Scale 7-10)  11/26/19 18:44


 12/3/19 18:43   


 


 


 Ondansetron HCl


  (Zofran)  4 mg  Q6H  PRN


 IVP


 Nausea & Vomiting  11/26/19 18:44


 12/26/19 18:43   


 


 


 Polyethylene


 Glycol


  (Miralax)  17 gm  DAILYPRN  PRN


 GT


 Constipation  11/26/19 18:44


 12/26/19 18:43   


 


 


 Potassium


 Phosphate 20 mm/


 Sodium Chloride  281.6667


 ml @ 


 46.944 m...  ONCE  ONCE


 IV


   12/1/19 12:00


 12/1/19 17:59   


 


 


 Sitagliptin


 Phosphate


  (Januvia)  50 mg  ACBREAKFAST


 GT


   11/27/19 06:30


 12/25/19 06:29  12/1/19 06:10


 


 


 Tamsulosin HCl


  (Flomax)  0.4 mg  DAILY


 ORAL


   11/27/19 09:00


 12/27/19 08:59  12/1/19 09:44


 

















Isabel Aguilar NP Dec 1, 2019 10:01

## 2019-12-01 NOTE — NEPHROLOGY PROGRESS NOTE
Assessment/Plan


Problem List:  


(1) Urinary retention


(2) Acute on chronic respiratory failure


(3) Hypercalcemia


(4) Renal failure (ARF), acute on chronic


Assessment





Hypercalcemia


Urinary retention, BPH , 


Acute on chronic renal failure


Electrolyte imbalance 


Colostomy


DM


PEG


Vegetative state


Acute on chronic respiratory failure


Plan





Free water-


K and Phos and Mag supplements


pulm support


Aredia for high Ca





Subjective


ROS Limited/Unobtainable:  Yes





Objective


Objective





Last 24 Hour Vital Signs








  Date Time  Temp Pulse Resp B/P (MAP) Pulse Ox O2 Delivery O2 Flow Rate FiO2


 


12/1/19 09:00        30


 


12/1/19 08:38  100 16     30


 


12/1/19 08:00      Mechanical Ventilator  


 


12/1/19 08:00  98      


 


12/1/19 07:32  98 16     30


 


12/1/19 05:00  101 17  100 Mechanical Ventilator 60.0 30





  99 17     30





        30


 


12/1/19 04:01  99      


 


12/1/19 04:00      Mechanical Ventilator  


 


12/1/19 04:00        30


 


12/1/19 04:00 97.8 104 17 105/68 (80) 100   


 


12/1/19 03:00  101 16  100 Mechanical Ventilator 60.0 30





  105 16     30





        30


 


12/1/19 01:08  101 16  100 Mechanical Ventilator 60.0 30





  105 16     30





        30


 


12/1/19 00:00      Mechanical Ventilator  


 


12/1/19 00:00 97.8 105 16 109/79 (89) 100   


 


12/1/19 00:00        30


 


11/30/19 23:39  102      


 


11/30/19 23:00  100 17  100 Mechanical Ventilator 60.0 30





  100 17     30





        30


 


11/30/19 21:30  100 17     30


 


11/30/19 20:00 97.0 100 16 107/76 (86) 100   


 


11/30/19 20:00      Mechanical Ventilator  


 


11/30/19 20:00        30


 


11/30/19 19:41  99      


 


11/30/19 19:00  106 16     30


 


11/30/19 17:33  99 18     30


 


11/30/19 16:00 97.5 103 17 118/75 (89) 100   


 


11/30/19 16:00        30


 


11/30/19 16:00      Mechanical Ventilator  


 


11/30/19 15:27  101      


 


11/30/19 15:23  103 16     30


 


11/30/19 13:17  107 16     30


 


11/30/19 12:00  102      


 


11/30/19 12:00        30


 


11/30/19 12:00      Mechanical Ventilator  


 


11/30/19 12:00 97.5 102 16 118/71 (87) 100   


 


11/30/19 11:08  100 16     30

















Intake and Output  


 


 11/30/19 12/1/19





 19:00 07:00


 


Intake Total 1478.75 ml 1618.75 ml


 


Output Total 1900 ml 750 ml


 


Balance -421.25 ml 868.75 ml


 


  


 


Free Water 400 ml 400 ml


 


IV Total 658.75 ml 888.75 ml


 


Tube Feeding 360 ml 330 ml


 


Other 60 ml 


 


Output Urine Total 1400 ml 600 ml


 


Stool Total 500 ml 150 ml








Laboratory Tests


12/1/19 06:45: 


White Blood Count 8.7, Red Blood Count 3.58L, Hemoglobin 10.5L, Hematocrit 33.0L

, Mean Corpuscular Volume 92, Mean Corpuscular Hemoglobin 29.4, Mean 

Corpuscular Hemoglobin Concent 31.8L, Red Cell Distribution Width 16.0H, 

Platelet Count 315, Mean Platelet Volume 8.4, Neutrophils (%) (Auto) 60.9, 

Lymphocytes (%) (Auto) 16.6L, Monocytes (%) (Auto) 7.2, Eosinophils (%) (Auto) 

13.6H, Basophils (%) (Auto) 1.7, Sodium Level 140, Potassium Level 3.5, 

Chloride Level 108H, Carbon Dioxide Level 20L, Anion Gap 12, Blood Urea 

Nitrogen 6L, Creatinine 0.9, Estimat Glomerular Filtration Rate > 60, Glucose 

Level 179H, Uric Acid 6.4, Calcium Level 9.7, Phosphorus Level 2.2L, Magnesium 

Level 1.8, Total Bilirubin 0.3, Direct Bilirubin 0.1, Aspartate Amino Transf (

AST/SGOT) 37, Alanine Aminotransferase (ALT/SGPT) 26, Alkaline Phosphatase 78, 

Total Protein 7.2, Albumin 2.3L


12/1/19 08:34: 


Arterial Blood pH 7.443, Arterial Blood Partial Pressure CO2 26.1L, Arterial 

Blood Partial Pressure O2 154.9H, Arterial Blood HCO3 17.4*L, Arterial Blood 

Oxygen Saturation 98.3, Arterial Blood Base Excess -5.5L, Arsh Test Positive


Height (Feet):  5


Height (Inches):  7.00


Weight (Pounds):  204


General Appearance:  no apparent distress


EENT:  other - trach vent


Cardiovascular:  tachycardia


Respiratory/Chest:  decreased breath sounds


Abdomen:  distended











Simone Rocha MD Dec 1, 2019 09:38

## 2019-12-01 NOTE — UROLOGY PROGRESS NOTE
Assessment/Plan


Assessment/Plan:


1. Urinary retention.


2. BPH.


3. Neurogenic bladder.


4. Incontinence.


5. Pyuria/UTI/colonized.


6. Hematuria.


7. Proteinuria.


8. Renal insufficiency, acute possibly on chronic.


9. Renal cyst.


10. Nephrolithiasis.


11. POD # 4, cysto/optic urethrotomy





monitor clinically


mayorga placed 11/27


hand irrigated and do PRN


abx as ordered


consider antifungals, per ID





Subjective


Allergies:  


Coded Allergies:  


     AZTREONAM (Verified  Allergy, Unknown, 7/23/18)


Subjective


all noted, mayorga draining, non-verbal





Objective





Last 24 Hour Vital Signs








  Date Time  Temp Pulse Resp B/P (MAP) Pulse Ox O2 Delivery O2 Flow Rate FiO2


 


12/1/19 10:39  100 16     30


 


12/1/19 09:00        30


 


12/1/19 08:38  100 16     30


 


12/1/19 08:00      Mechanical Ventilator  


 


12/1/19 08:00  98      


 


12/1/19 08:00 97.6 96 18 114/72 (86) 100   


 


12/1/19 07:32  98 16     30


 


12/1/19 05:00  101 17  100 Mechanical Ventilator 60.0 30





  99 17     30





        30


 


12/1/19 04:01  99      


 


12/1/19 04:00      Mechanical Ventilator  


 


12/1/19 04:00        30


 


12/1/19 04:00 97.8 104 17 105/68 (80) 100   


 


12/1/19 03:00  101 16  100 Mechanical Ventilator 60.0 30





  105 16     30





        30


 


12/1/19 01:08  101 16  100 Mechanical Ventilator 60.0 30





  105 16     30





        30


 


12/1/19 00:00      Mechanical Ventilator  


 


12/1/19 00:00 97.8 105 16 109/79 (89) 100   


 


12/1/19 00:00        30


 


11/30/19 23:39  102      


 


11/30/19 23:00  100 17  100 Mechanical Ventilator 60.0 30





  100 17     30





        30


 


11/30/19 21:30  100 17     30


 


11/30/19 20:00 97.0 100 16 107/76 (86) 100   


 


11/30/19 20:00      Mechanical Ventilator  


 


11/30/19 20:00        30


 


11/30/19 19:41  99      


 


11/30/19 19:00  106 16     30


 


11/30/19 17:33  99 18     30


 


11/30/19 16:00 97.5 103 17 118/75 (89) 100   


 


11/30/19 16:00        30


 


11/30/19 16:00      Mechanical Ventilator  


 


11/30/19 15:27  101      


 


11/30/19 15:23  103 16     30


 


11/30/19 13:17  107 16     30


 


11/30/19 12:00  102      


 


11/30/19 12:00        30


 


11/30/19 12:00      Mechanical Ventilator  


 


11/30/19 12:00 97.5 102 16 118/71 (87) 100   

















Intake and Output  


 


 11/30/19 12/1/19





 19:00 07:00


 


Intake Total 1478.75 ml 1723.75 ml


 


Output Total 1900 ml 750 ml


 


Balance -421.25 ml 973.75 ml


 


  


 


Free Water 400 ml 400 ml


 


IV Total 658.75 ml 963.75 ml


 


Tube Feeding 360 ml 360 ml


 


Other 60 ml 


 


Output Urine Total 1400 ml 600 ml


 


Stool Total 500 ml 150 ml











Microbiology








 Date/Time


Source Procedure


Growth Status


 


 


 11/24/19 09:15


Blood Blood Culture - Final


NO GROWTH AFTER 5 DAYS Complete





 11/25/19 11:00


Nasal Nares - Final Complete


 


 11/25/19 11:00


Nasal Nares - Final Complete


 


 11/26/19 03:00


Urine,Clean Catch Urine Culture - Final


Candida Parapsilosis Complete


 


 11/24/19 09:10


Rectum VRE Culture - Final


Enterococcus Faecium - Vre Complete








Current Medications








 Medications


  (Trade)  Dose


 Ordered  Sig/Jan


 Route


 PRN Reason  Start Time


 Stop Time Status Last Admin


Dose Admin


 


 Acetaminophen


  (Tylenol)  650 mg  Q4H  PRN


 GT


 Mild Pain/Temp > 100.6  11/26/19 18:42


 12/26/19 18:41   


 


 


 Albuterol/


 Ipratropium


  (Albuterol/


 Ipratropium)  3 ml  Q4H  PRN


 HHN


 Shortness of Breath  11/30/19 12:20


 12/5/19 12:19   


 


 


 Artificial Tears


  (Akwa-Tears)  2 drop  Q12HR


 BOTH EYES


   11/26/19 21:00


 12/24/19 20:59  12/1/19 09:43


 


 


 Baclofen


  (Lioresal)  10 mg  THREE TIMES A  DAY


 GT


   11/27/19 09:00


 12/24/19 17:59  12/1/19 09:44


 


 


 Calcitonin [place]


  (Miacalcin)  1 sprays  DAILY


 NASAL


   11/28/19 12:00


 12/28/19 11:59  12/1/19 09:43


 


 


 Clobetasol


 Propionate


  (Temovate)  1 applic  EVERY 12  HOURS


 TOPIC


   11/26/19 21:00


 12/24/19 20:59  12/1/19 09:44


 


 


 Dextrose


  (Dextrose 50%)  25 ml  Q30M  PRN


 IV


 Hypoglycemia  11/26/19 18:45


 12/24/19 14:44   


 


 


 Dextrose


  (Dextrose 50%)  50 ml  Q30M  PRN


 IV


 Hypoglycemia  11/26/19 18:45


 12/24/19 14:44   


 


 


 Dextrose/


 Electrolytes  1,000 ml @ 


 75 mls/hr  [id number]


 IV


   11/30/19 13:30


 12/30/19 13:29  12/1/19 02:09


 


 


 Ertapenem 1 gm/


 Sodium Chloride  55 ml @ 


 110 mls/hr  Q24H


 IVPB


   11/29/19 13:00


 12/4/19 12:59  11/30/19 12:41


 


 


 Famotidine


  (Pepcid)  20 mg  BID


 GT


   11/28/19 18:00


 12/28/19 17:59  12/1/19 09:44


 


 


 Fenofibrate


  (Tricor)  145 mg  DAILY


 ORAL


   11/27/19 09:00


 12/25/19 08:59  12/1/19 09:45


 


 


 Heparin Sodium


  (Porcine)


  (Heparin 5000


 units/ml)  5,000 units  EVERY 12  HOURS


 SUBQ


   11/26/19 21:00


 12/24/19 20:59  12/1/19 09:50


 


 


 Insulin Aspart


  (NovoLOG)    EVERY 6  HOURS


 SUBQ


   11/30/19 12:00


 12/24/19 16:29  12/1/19 06:12


 


 


 Levetiracetam


  (Keppra)  1,000 mg  Q8H


 GT


   11/29/19 02:00


 12/29/19 01:59  12/1/19 09:44


 


 


 Lorazepam


  (Ativan 2mg/ml


 1ml)  2 mg  Q2H  PRN


 IV


 For Anxiety  11/26/19 18:43


 12/3/19 18:42   


 


 


 Morphine Sulfate


  (Morphine


 Sulfate)  4 mg  Q4H  PRN


 IVP


 Severe Pain (Pain Scale 7-10)  11/26/19 18:44


 12/3/19 18:43   


 


 


 Ondansetron HCl


  (Zofran)  4 mg  Q6H  PRN


 IVP


 Nausea & Vomiting  11/26/19 18:44


 12/26/19 18:43   


 


 


 Polyethylene


 Glycol


  (Miralax)  17 gm  DAILYPRN  PRN


 GT


 Constipation  11/26/19 18:44


 12/26/19 18:43   


 


 


 Potassium


 Phosphate 20 mm/


 Sodium Chloride  281.6667


 ml @ 


 46.944 m...  ONCE  ONCE


 IV


   12/1/19 12:00


 12/1/19 17:59   


 


 


 Sitagliptin


 Phosphate


  (Januvia)  50 mg  ACBREAKFAST


 GT


   11/27/19 06:30


 12/25/19 06:29  12/1/19 06:10


 


 


 Tamsulosin HCl


  (Flomax)  0.4 mg  DAILY


 ORAL


   11/27/19 09:00


 12/27/19 08:59  12/1/19 09:44


 





Laboratory Tests


12/1/19 06:45: 


White Blood Count 8.7, Red Blood Count 3.58L, Hemoglobin 10.5L, Hematocrit 33.0L

, Mean Corpuscular Volume 92, Mean Corpuscular Hemoglobin 29.4, Mean 

Corpuscular Hemoglobin Concent 31.8L, Red Cell Distribution Width 16.0H, 

Platelet Count 315, Mean Platelet Volume 8.4, Neutrophils (%) (Auto) 60.9, 

Lymphocytes (%) (Auto) 16.6L, Monocytes (%) (Auto) 7.2, Eosinophils (%) (Auto) 

13.6H, Basophils (%) (Auto) 1.7, Sodium Level 140, Potassium Level 3.5, 

Chloride Level 108H, Carbon Dioxide Level 20L, Anion Gap 12, Blood Urea 

Nitrogen 6L, Creatinine 0.9, Estimat Glomerular Filtration Rate > 60, Glucose 

Level 179H, Uric Acid 6.4, Calcium Level 9.7, Phosphorus Level 2.2L, Magnesium 

Level 1.8, Total Bilirubin 0.3, Direct Bilirubin 0.1, Aspartate Amino Transf (

AST/SGOT) 37, Alanine Aminotransferase (ALT/SGPT) 26, Alkaline Phosphatase 78, 

Total Protein 7.2, Albumin 2.3L


12/1/19 08:34: 


Arterial Blood pH 7.443, Arterial Blood Partial Pressure CO2 26.1L, Arterial 

Blood Partial Pressure O2 154.9H, Arterial Blood HCO3 17.4*L, Arterial Blood 

Oxygen Saturation 98.3, Arterial Blood Base Excess -5.5L, Arsh Test Positive


Height (Feet):  5


Height (Inches):  7.00


Weight (Pounds):  204


Objective


 exam stable


mayorga indwelling, blood-tinged/christine urine, some debris


minimal bleeding at penile meatus











Vinnie Pollard MD Dec 1, 2019 11:13

## 2019-12-02 VITALS — SYSTOLIC BLOOD PRESSURE: 133 MMHG | DIASTOLIC BLOOD PRESSURE: 81 MMHG

## 2019-12-02 VITALS — DIASTOLIC BLOOD PRESSURE: 78 MMHG | SYSTOLIC BLOOD PRESSURE: 131 MMHG

## 2019-12-02 VITALS — SYSTOLIC BLOOD PRESSURE: 131 MMHG | DIASTOLIC BLOOD PRESSURE: 88 MMHG

## 2019-12-02 VITALS — SYSTOLIC BLOOD PRESSURE: 117 MMHG | DIASTOLIC BLOOD PRESSURE: 73 MMHG

## 2019-12-02 VITALS — SYSTOLIC BLOOD PRESSURE: 120 MMHG | DIASTOLIC BLOOD PRESSURE: 91 MMHG

## 2019-12-02 VITALS — DIASTOLIC BLOOD PRESSURE: 81 MMHG | SYSTOLIC BLOOD PRESSURE: 121 MMHG

## 2019-12-02 LAB
ADD MANUAL DIFF: NO
ALBUMIN SERPL-MCNC: 2.5 G/DL (ref 3.4–5)
ALP SERPL-CCNC: 41 U/L (ref 46–116)
ALT SERPL-CCNC: 21 U/L (ref 12–78)
ANION GAP SERPL CALC-SCNC: 16 MMOL/L (ref 5–15)
AST SERPL-CCNC: 33 U/L (ref 15–37)
BASOPHILS NFR BLD AUTO: 3.7 % (ref 0–2)
BILIRUB DIRECT SERPL-MCNC: 0.1 MG/DL (ref 0–0.3)
BILIRUB SERPL-MCNC: 0.4 MG/DL (ref 0.2–1)
BUN SERPL-MCNC: 21 MG/DL (ref 7–18)
CALCIUM SERPL-MCNC: 10.3 MG/DL (ref 8.5–10.1)
CHLORIDE SERPL-SCNC: 119 MMOL/L (ref 98–107)
CO2 SERPL-SCNC: 18 MMOL/L (ref 21–32)
CREAT SERPL-MCNC: 1.1 MG/DL (ref 0.55–1.3)
EOSINOPHIL NFR BLD AUTO: 19 % (ref 0–3)
ERYTHROCYTE [DISTWIDTH] IN BLOOD BY AUTOMATED COUNT: 17.9 % (ref 11.6–14.8)
HCT VFR BLD CALC: 27 % (ref 42–52)
HGB BLD-MCNC: 8.2 G/DL (ref 14.2–18)
LYMPHOCYTES NFR BLD AUTO: 6.6 % (ref 20–45)
MCV RBC AUTO: 96 FL (ref 80–99)
MONOCYTES NFR BLD AUTO: 3 % (ref 1–10)
NEUTROPHILS NFR BLD AUTO: 67.7 % (ref 45–75)
PHOSPHATE SERPL-MCNC: 3 MG/DL (ref 2.5–4.9)
PHOSPHATE SERPL-MCNC: 3 MG/DL (ref 2.5–4.9)
PLATELET # BLD: 221 K/UL (ref 150–450)
POTASSIUM SERPL-SCNC: 3.4 MMOL/L (ref 3.5–5.1)
RBC # BLD AUTO: 2.83 M/UL (ref 4.7–6.1)
SODIUM SERPL-SCNC: 152 MMOL/L (ref 136–145)
WBC # BLD AUTO: 9.7 K/UL (ref 4.8–10.8)

## 2019-12-02 RX ADMIN — TAMSULOSIN HYDROCHLORIDE SCH MG: 0.4 CAPSULE ORAL at 09:21

## 2019-12-02 RX ADMIN — CLOBETASOL PROPIONATE SCH APPLIC: 0.5 OINTMENT TOPICAL at 09:20

## 2019-12-02 RX ADMIN — LEVETIRACETAM SCH MG: 100 SOLUTION ORAL at 09:20

## 2019-12-02 RX ADMIN — SITAGLIPTIN SCH MG: 50 TABLET, FILM COATED ORAL at 05:47

## 2019-12-02 RX ADMIN — CLOBETASOL PROPIONATE SCH APPLIC: 0.5 OINTMENT TOPICAL at 21:08

## 2019-12-02 RX ADMIN — HEPARIN SODIUM SCH UNITS: 5000 INJECTION INTRAVENOUS; SUBCUTANEOUS at 21:09

## 2019-12-02 RX ADMIN — ERTAPENEM SODIUM SCH MLS/HR: 1 INJECTION, POWDER, LYOPHILIZED, FOR SOLUTION INTRAMUSCULAR; INTRAVENOUS at 12:41

## 2019-12-02 RX ADMIN — INSULIN ASPART SCH UNITS: 100 INJECTION, SOLUTION INTRAVENOUS; SUBCUTANEOUS at 05:46

## 2019-12-02 RX ADMIN — LEVETIRACETAM SCH MG: 100 SOLUTION ORAL at 17:29

## 2019-12-02 RX ADMIN — HEPARIN SODIUM SCH UNITS: 5000 INJECTION INTRAVENOUS; SUBCUTANEOUS at 09:22

## 2019-12-02 RX ADMIN — INSULIN ASPART SCH UNITS: 100 INJECTION, SOLUTION INTRAVENOUS; SUBCUTANEOUS at 12:42

## 2019-12-02 RX ADMIN — DEXTROSE AND POTASSIUM CHLORIDE SCH MLS/HR: 5; .15 SOLUTION INTRAVENOUS at 05:43

## 2019-12-02 RX ADMIN — INSULIN ASPART SCH UNITS: 100 INJECTION, SOLUTION INTRAVENOUS; SUBCUTANEOUS at 17:30

## 2019-12-02 RX ADMIN — LEVETIRACETAM SCH MG: 100 SOLUTION ORAL at 01:09

## 2019-12-02 NOTE — NEPHROLOGY PROGRESS NOTE
Assessment/Plan


Problem List:  


(1) Urinary retention


(2) Acute on chronic respiratory failure


(3) Hypercalcemia


(4) Renal failure (ARF), acute on chronic


Assessment





Hypercalcemia


Urinary retention, BPH , 


Acute on chronic renal failure


Electrolyte imbalance 


Colostomy


DM


PEG


Vegetative state


Acute on chronic respiratory failure


Plan





Free water-


K and Phos and Mag supplements


pulm support


Aredia for high Ca on 11/28





Subjective


ROS Limited/Unobtainable:  Yes





Objective


Objective





Last 24 Hour Vital Signs








  Date Time  Temp Pulse Resp B/P (MAP) Pulse Ox O2 Delivery O2 Flow Rate FiO2


 


12/2/19 15:14  100 16     30


 


12/2/19 13:23  108 20     30


 


12/2/19 12:00 98.4 98 17 117/73 (88) 100   


 


12/2/19 12:00      Mechanical Ventilator  


 


12/2/19 12:00        30


 


12/2/19 11:41  101      


 


12/2/19 10:40  100 18     30


 


12/2/19 09:28  99 15     30


 


12/2/19 08:00      Mechanical Ventilator  


 


12/2/19 08:00 97.8 102 17 131/78 (95) 100   


 


12/2/19 08:00        30


 


12/2/19 07:43  98      


 


12/2/19 07:13  100 15     30


 


12/2/19 04:51  97 16     30


 


12/2/19 04:00 98.4 95 24 121/81 (94) 97   


 


12/2/19 04:00        30


 


12/2/19 04:00  97      


 


12/2/19 04:00      Mechanical Ventilator  


 


12/2/19 02:55  95 14     30


 


12/2/19 01:13  100 16     30


 


12/2/19 00:00  98      


 


12/2/19 00:00 98.1 97 20 120/91 (101) 100   


 


12/2/19 00:00      Mechanical Ventilator  


 


12/2/19 00:00        30


 


12/1/19 23:32  98 14     30


 


12/1/19 21:39  102 16     30


 


12/1/19 20:00  101      


 


12/1/19 20:00        30


 


12/1/19 20:00 98.1 100 20 100/68 (79) 100   


 


12/1/19 20:00      Mechanical Ventilator  


 


12/1/19 19:43  104 18     30


 


12/1/19 17:20  101 19     30


 


12/1/19 16:00        30


 


12/1/19 16:00      Mechanical Ventilator  


 


12/1/19 16:00 97.9 99 17 100/73 (82) 100   


 


12/1/19 16:00  103      

















Intake and Output  


 


 12/1/19 12/2/19





 19:00 07:00


 


Intake Total 1685.664 ml 1430 ml


 


Output Total 1750 ml 1600 ml


 


Balance -64.336 ml -170 ml


 


  


 


Free Water 550 ml 200 ml


 


IV Total 745.664 ml 900 ml


 


Tube Feeding 390 ml 330 ml


 


Output Urine Total 1600 ml 1300 ml


 


Stool Total 150 ml 300 ml








Laboratory Tests


12/2/19 05:50: 


White Blood Count 9.7, Red Blood Count 2.83L, Hemoglobin 8.2L, Hematocrit 27.0L

, Mean Corpuscular Volume 96, Mean Corpuscular Hemoglobin 29.0, Mean 

Corpuscular Hemoglobin Concent 30.3L, Red Cell Distribution Width 17.9H, 

Platelet Count 221, Mean Platelet Volume 5.9L, Neutrophils (%) (Auto) 67.7, 

Lymphocytes (%) (Auto) 6.6L, Monocytes (%) (Auto) 3.0, Eosinophils (%) (Auto) 

19.0H, Basophils (%) (Auto) 3.7H, Sodium Level 152#H, Potassium Level 3.4L, 

Chloride Level 119H, Carbon Dioxide Level 18L, Anion Gap 16H, Blood Urea 

Nitrogen 21H, Creatinine 1.1, Estimat Glomerular Filtration Rate > 60, Glucose 

Level 174H, Calcium Level 10.3H, Phosphorus Level 3.0


12/2/19 10:28: 


Phosphorus Level 3.0, Magnesium Level 1.9, Total Bilirubin 0.4, Direct 

Bilirubin 0.1, Aspartate Amino Transf (AST/SGOT) 33, Alanine Aminotransferase (

ALT/SGPT) 21, Alkaline Phosphatase 41L, Total Protein 6.5, Albumin 2.5L


Height (Feet):  5


Height (Inches):  7.00


Weight (Pounds):  202


General Appearance:  no apparent distress


EENT:  other - trach


Cardiovascular:  tachycardia


Respiratory/Chest:  decreased breath sounds


Abdomen:  soft, distended











Simone Rocha MD Dec 2, 2019 15:42

## 2019-12-02 NOTE — UROLOGY PROGRESS NOTE
Assessment/Plan


Assessment/Plan:


1. Urinary retention.


2. BPH.


3. Neurogenic bladder.


4. Incontinence.


5. Pyuria/UTI/colonized.


6. Hematuria.


7. Proteinuria.


8. Renal insufficiency, acute possibly on chronic.


9. Renal cyst.


10. Nephrolithiasis.


11. POD # 5, cysto/optic urethrotomy





monitor clinically


mayorga placed 11/27


hand irrigated and do PRN


abx as ordered


consider antifungals, per ID





Subjective


Allergies:  


Coded Allergies:  


     AZTREONAM (Verified  Allergy, Unknown, 7/23/18)


Subjective


all noted, mayorga draining, non-verbal





Objective





Last 24 Hour Vital Signs








  Date Time  Temp Pulse Resp B/P (MAP) Pulse Ox O2 Delivery O2 Flow Rate FiO2


 


12/2/19 07:13  100 15     30


 


12/2/19 04:51  97 16     30


 


12/2/19 04:00 98.4 95 24 121/81 (94) 97   


 


12/2/19 04:00        30


 


12/2/19 04:00  97      


 


12/2/19 04:00      Mechanical Ventilator  


 


12/2/19 02:55  95 14     30


 


12/2/19 01:13  100 16     30


 


12/2/19 00:00  98      


 


12/2/19 00:00 98.1 97 20 120/91 (101) 100   


 


12/2/19 00:00      Mechanical Ventilator  


 


12/2/19 00:00        30


 


12/1/19 23:32  98 14     30


 


12/1/19 21:39  102 16     30


 


12/1/19 20:00  101      


 


12/1/19 20:00        30


 


12/1/19 20:00 98.1 100 20 100/68 (79) 100   


 


12/1/19 20:00      Mechanical Ventilator  


 


12/1/19 19:43  104 18     30


 


12/1/19 17:20  101 19     30


 


12/1/19 16:00        30


 


12/1/19 16:00      Mechanical Ventilator  


 


12/1/19 16:00 97.9 99 17 100/73 (82) 100   


 


12/1/19 16:00  103      


 


12/1/19 14:56  105 19     30


 


12/1/19 13:01  98 16     30


 


12/1/19 12:00        30


 


12/1/19 12:00  99      


 


12/1/19 12:00      Mechanical Ventilator  


 


12/1/19 12:00 96.8 98 19 115/77 (90) 100   


 


12/1/19 10:39  100 16     30


 


12/1/19 09:00        30

















Intake and Output  


 


 12/1/19 12/2/19





 18:59 06:59


 


Intake Total 2028.664 ml 1422 ml


 


Output Total 2500 ml 1600 ml


 


Balance -471.336 ml -178 ml


 


  


 


Free Water 750 ml 200 ml


 


IV Total 858.664 ml 862 ml


 


Tube Feeding 420 ml 360 ml


 


Output Urine Total 2200 ml 1300 ml


 


Stool Total 300 ml 300 ml











Microbiology








 Date/Time


Source Procedure


Growth Status


 


 


 11/24/19 09:15


Blood Blood Culture - Final


NO GROWTH AFTER 5 DAYS Complete





 11/25/19 11:00


Nasal Nares - Final Complete


 


 11/25/19 11:00


Nasal Nares - Final Complete


 


 11/26/19 03:00


Urine,Clean Catch Urine Culture - Final


Candida Parapsilosis Complete


 


 11/24/19 09:10


Rectum VRE Culture - Final


Enterococcus Faecium - Vre Complete








Current Medications








 Medications


  (Trade)  Dose


 Ordered  Sig/Jan


 Route


 PRN Reason  Start Time


 Stop Time Status Last Admin


Dose Admin


 


 Acetaminophen


  (Tylenol)  650 mg  Q4H  PRN


 GT


 Mild Pain/Temp > 100.6  11/26/19 18:42


 12/26/19 18:41   


 


 


 Albuterol/


 Ipratropium


  (Albuterol/


 Ipratropium)  3 ml  Q4H  PRN


 HHN


 Shortness of Breath  11/30/19 12:20


 12/5/19 12:19   


 


 


 Artificial Tears


  (Akwa-Tears)  2 drop  Q12HR


 BOTH EYES


   11/26/19 21:00


 12/24/19 20:59  12/1/19 21:14


 


 


 Baclofen


  (Lioresal)  10 mg  THREE TIMES A  DAY


 GT


   11/27/19 09:00


 12/24/19 17:59  12/1/19 18:13


 


 


 Calcitonin [place]


  (Miacalcin)  1 sprays  DAILY


 NASAL


   11/28/19 12:00


 12/28/19 11:59  12/1/19 09:43


 


 


 Chlorhexidine


 Gluconate


  (Elaine-Hex 2%)  1 applic  DAILY@2000


 TOPIC


   12/2/19 20:00


 1/1/20 19:59   


 


 


 Clobetasol


 Propionate


  (Temovate)  1 applic  EVERY 12  HOURS


 TOPIC


   11/26/19 21:00


 12/24/19 20:59  12/1/19 21:15


 


 


 Dextrose


  (Dextrose 50%)  25 ml  Q30M  PRN


 IV


 Hypoglycemia  11/26/19 18:45


 12/24/19 14:44   


 


 


 Dextrose


  (Dextrose 50%)  50 ml  Q30M  PRN


 IV


 Hypoglycemia  11/26/19 18:45


 12/24/19 14:44   


 


 


 Dextrose/


 Electrolytes  1,000 ml @ 


 75 mls/hr  [id number]


 IV


   11/30/19 13:30


 12/30/19 13:29  12/2/19 05:43


 


 


 Ertapenem 1 gm/


 Sodium Chloride  55 ml @ 


 110 mls/hr  Q24H


 IVPB


   11/29/19 13:00


 12/4/19 12:59  12/1/19 13:34


 


 


 Famotidine


  (Pepcid)  20 mg  BID


 GT


   11/28/19 18:00


 12/28/19 17:59  12/1/19 18:13


 


 


 Fenofibrate


  (Tricor)  145 mg  DAILY


 ORAL


   11/27/19 09:00


 12/25/19 08:59  12/1/19 09:45


 


 


 Heparin Sodium


  (Porcine)


  (Heparin 5000


 units/ml)  5,000 units  EVERY 12  HOURS


 SUBQ


   11/26/19 21:00


 12/24/19 20:59  12/1/19 21:18


 


 


 Insulin Aspart


  (NovoLOG)    EVERY 6  HOURS


 SUBQ


   11/30/19 12:00


 12/24/19 16:29  12/2/19 05:46


 


 


 Levetiracetam


  (Keppra)  1,000 mg  Q8H


 GT


   11/29/19 02:00


 12/29/19 01:59  12/2/19 01:09


 


 


 Lorazepam


  (Ativan 2mg/ml


 1ml)  2 mg  Q2H  PRN


 IV


 For Anxiety  11/26/19 18:43


 12/3/19 18:42   


 


 


 Morphine Sulfate


  (Morphine


 Sulfate)  4 mg  Q4H  PRN


 IVP


 Severe Pain (Pain Scale 7-10)  11/26/19 18:44


 12/3/19 18:43   


 


 


 Ondansetron HCl


  (Zofran)  4 mg  Q6H  PRN


 IVP


 Nausea & Vomiting  11/26/19 18:44


 12/26/19 18:43   


 


 


 Polyethylene


 Glycol


  (Miralax)  17 gm  DAILYPRN  PRN


 GT


 Constipation  11/26/19 18:44


 12/26/19 18:43   


 


 


 Sitagliptin


 Phosphate


  (Januvia)  50 mg  ACBREAKFAST


 GT


   11/27/19 06:30


 12/25/19 06:29  12/2/19 05:47


 


 


 Tamsulosin HCl


  (Flomax)  0.4 mg  DAILY


 ORAL


   11/27/19 09:00


 12/27/19 08:59  12/1/19 09:44


 





Laboratory Tests


12/2/19 05:50: 


White Blood Count 9.7, Red Blood Count 2.83L, Hemoglobin 8.2L, Hematocrit 27.0L

, Mean Corpuscular Volume 96, Mean Corpuscular Hemoglobin 29.0, Mean 

Corpuscular Hemoglobin Concent 30.3L, Red Cell Distribution Width 17.9H, 

Platelet Count 221, Mean Platelet Volume 5.9L, Neutrophils (%) (Auto) 67.7, 

Lymphocytes (%) (Auto) 6.6L, Monocytes (%) (Auto) 3.0, Eosinophils (%) (Auto) 

19.0H, Basophils (%) (Auto) 3.7H, Sodium Level 152#H, Potassium Level 3.4L, 

Chloride Level 119H, Carbon Dioxide Level 18L, Anion Gap 16H, Blood Urea 

Nitrogen 21H, Creatinine 1.1, Estimat Glomerular Filtration Rate > 60, Glucose 

Level 174H, Calcium Level 10.3H, Phosphorus Level 3.0


Height (Feet):  5


Height (Inches):  7.00


Weight (Pounds):  202


Objective


 exam stable


mayorga indwelling, blood-tinged/christine urine, some debris


minimal bleeding at penile meatus











Vinnie Pollard MD Dec 2, 2019 08:49

## 2019-12-02 NOTE — CONSULTATION
History of Present Illness


General


Date patient seen:  Dec 2, 2019


Time patient seen:  11:49


Chief Complaint:  General Complaint


Reason for Consultation:  Sacral ulcer





Present Illness


HPI


Patient is a 54 yom admitted to [facility] for tachycardia and fevers. He is bedridden 

with trach/g-tube. Unable to give history. He has a h/o intracranial bleed s/p 

shunt placement. unclear how long he has had the sacral ulcer. He had it upon 

presentation to ER. He is from a SNF. He was found to have urosepsis and had a 

mayorga catheter placed by urology.


Allergies:  


Coded Allergies:  


     AZTREONAM (Verified  Allergy, Unknown, 7/23/18)





Medication History


Scheduled


Baclofen* (Baclofen*), 10 MG GT THREE TIMES A DAY, (Reported)


Chlorhexidine Gluconate (Peridex), 15 ML PO Q12HR, (Reported)


Cholecalciferol (Vitamin D3) (Vitamin D3), 5,000 UNIT GT DAILY, (Reported)


Clobetasol Propionate/Emoll (Clobetasol Emollient 0.05% Crm), 15 GM TP DAILY, (

Reported)


Cranberry Extract (Cranberry), 425 MG GT DAILY, (Reported)


Cranberry Extract (Cranberry), 425 MG GT DAILY, (Reported)


Dextran 70/Hypromellose (Artificial Tears Eye Drops*), 1 DROP BOTH EYES BID, (

Reported)


Fenofibrate,Micronized (Fenofibrate), 200 MG GT DAILY, (Reported)


Heparin Sod (Porcine) (Heparin Sodium*), 5,000 UNITS SUBQ EVERY 12 HOURS, (

Reported)


Insulin Regular, Human* (Novolin R*), 0 SUBQ .SLIDING SCALE, (Reported)


Ipratropium/Albuterol Sulfate (DuoNeb 0.5-3(2.5)mg/3ml), 3 ML HHN Q6HR, (

Reported)


Lansoprazole* (Lansoprazole*), 30 MG GT DAILY, (Reported)


Levetiracetam* (Levetiracetam*), 1,000 MG GT TID, (Reported)


Multivitamin With Minerals (Multivitamins With Minerals*), 1 TAB GT DAILY, (

Reported)


Nph, Human Insulin Isophane* (Novolin N*), 15 UNITS SUBQ BID, (Reported)


Sitagliptin (Januvia), 50 MG GT DAILY, (Reported)


Zinc Oxide (Skin Protectant), 1 APPLIC TP DAILY, (Reported)





Scheduled PRN


Acetaminophen 160MG/5ML* (Acetaminophen*), 20.3 ML GT Q4HR PRN for Fever/

Headache/Mild Pain, (Reported)


Ipratropium/Albuterol Sulfate (DuoNeb 0.5-3(2.5)mg/3ml), 3 ML HHN Q2HR PRN for 

Shortness of Breath, (Reported)


Polyethylene Glycol 3350* (Miralax*), 17 GM GT DAILY PRN for Constipation, (

Reported)





Discontinued Medications


Cholecalciferol (Vitamin D3)* (Vitamin D*), 5,000 UNIT GT ONCE A WEEK, (Reported

)


   Discontinued Reason: Prescription changed


Cranberry (Cranberry), 400 MG GT DAILY, (Reported)


   Discontinued Reason: Prescription changed


Fenofibrate,Micronized (Fenofibrate), 200 GT DAILY, (Reported)


   Discontinued Reason: Prescription changed


Insulin NPH Hum/Reg Insulin Hm (Novolin 70-30 Flexpen), 15 UNIT SQ Q12HR, (

Reported)


   Discontinued Reason: Prescription changed





Patient History


Limited by:  medical condition


History Provided By:  Medical Record


Healthcare decision maker


darin Ribeiro


Resuscitation status


Full Code


Advanced Directive on File








Past Medical/Surgical History


Past Medical/Surgical History:  


(1) ATN (acute tubular necrosis)


(2) Urinary retention


(3) Diabetes mellitus


(4) Vegetative state


(5) Gastrostomy tube dependent





Physical Exam


General Appearance:  no apparent distress, obese


Lines, tubes and drains:  PICC, trach, gtube, mayorga cath


Respiratory/Chest:  no respiratory distress


Abdomen:  soft


Skin Exam:  other - Stage 3 sacral pressure ulcer with fibrotic debris at the 

base. Periskin is moist with some maceration. No erythema or warmth. No odor 

from ulcer.  Left ventral wrist with superficial ulcer with granular base. No 

erythema or warmth.





Last 24 Hour Vital Signs








  Date Time  Temp Pulse Resp B/P (MAP) Pulse Ox O2 Delivery O2 Flow Rate FiO2


 


12/2/19 10:40  100 18     30


 


12/2/19 09:28  99 15     30


 


12/2/19 08:00      Mechanical Ventilator  


 


12/2/19 08:00 97.8 102 17 131/78 (95) 100   


 


12/2/19 08:00        30


 


12/2/19 07:43  98      


 


12/2/19 07:13  100 15     30


 


12/2/19 04:51  97 16     30


 


12/2/19 04:00 98.4 95 24 121/81 (94) 97   


 


12/2/19 04:00        30


 


12/2/19 04:00  97      


 


12/2/19 04:00      Mechanical Ventilator  


 


12/2/19 02:55  95 14     30


 


12/2/19 01:13  100 16     30


 


12/2/19 00:00  98      


 


12/2/19 00:00 98.1 97 20 120/91 (101) 100   


 


12/2/19 00:00      Mechanical Ventilator  


 


12/2/19 00:00        30


 


12/1/19 23:32  98 14     30


 


12/1/19 21:39  102 16     30


 


12/1/19 20:00  101      


 


12/1/19 20:00        30


 


12/1/19 20:00 98.1 100 20 100/68 (79) 100   


 


12/1/19 20:00      Mechanical Ventilator  


 


12/1/19 19:43  104 18     30


 


12/1/19 17:20  101 19     30


 


12/1/19 16:00        30


 


12/1/19 16:00      Mechanical Ventilator  


 


12/1/19 16:00 97.9 99 17 100/73 (82) 100   


 


12/1/19 16:00  103      


 


12/1/19 14:56  105 19     30


 


12/1/19 13:01  98 16     30


 


12/1/19 12:00        30


 


12/1/19 12:00  99      


 


12/1/19 12:00      Mechanical Ventilator  


 


12/1/19 12:00 96.8 98 19 115/77 (90) 100   

















Intake and Output  


 


 12/1/19 12/2/19





 18:59 06:59


 


Intake Total 2028.664 ml 1422 ml


 


Output Total 2500 ml 1600 ml


 


Balance -471.336 ml -178 ml


 


  


 


Free Water 750 ml 200 ml


 


IV Total 858.664 ml 862 ml


 


Tube Feeding 420 ml 360 ml


 


Output Urine Total 2200 ml 1300 ml


 


Stool Total 300 ml 300 ml











Laboratory Tests








Test


  12/2/19


05:50 12/2/19


10:28


 


White Blood Count


  9.7 K/UL


(4.8-10.8) 


 


 


Red Blood Count


  2.83 M/UL


(4.70-6.10)  L 


 


 


Hemoglobin


  8.2 G/DL


(14.2-18.0)  L 


 


 


Hematocrit


  27.0 %


(42.0-52.0)  L 


 


 


Mean Corpuscular Volume 96 FL (80-99)   


 


Mean Corpuscular Hemoglobin


  29.0 PG


(27.0-31.0) 


 


 


Mean Corpuscular Hemoglobin


Concent 30.3 G/DL


(32.0-36.0)  L 


 


 


Red Cell Distribution Width


  17.9 %


(11.6-14.8)  H 


 


 


Platelet Count


  221 K/UL


(150-450) 


 


 


Mean Platelet Volume


  5.9 FL


(6.5-10.1)  L 


 


 


Neutrophils (%) (Auto)


  67.7 %


(45.0-75.0) 


 


 


Lymphocytes (%) (Auto)


  6.6 %


(20.0-45.0)  L 


 


 


Monocytes (%) (Auto)


  3.0 %


(1.0-10.0) 


 


 


Eosinophils (%) (Auto)


  19.0 %


(0.0-3.0)  H 


 


 


Basophils (%) (Auto)


  3.7 %


(0.0-2.0)  H 


 


 


Sodium Level


  152 MMOL/L


(136-145)  #H 


 


 


Potassium Level


  3.4 MMOL/L


(3.5-5.1)  L 


 


 


Chloride Level


  119 MMOL/L


()  H 


 


 


Carbon Dioxide Level


  18 MMOL/L


(21-32)  L 


 


 


Anion Gap


  16 mmol/L


(5-15)  H 


 


 


Blood Urea Nitrogen


  21 mg/dL


(7-18)  H 


 


 


Creatinine


  1.1 MG/DL


(0.55-1.30) 


 


 


Estimat Glomerular Filtration


Rate > 60 mL/min


(>60) 


 


 


Glucose Level


  174 MG/DL


()  H 


 


 


Calcium Level


  10.3 MG/DL


(8.5-10.1)  H 


 


 


Phosphorus Level


  3.0 MG/DL


(2.5-4.9) 3.0 MG/DL


(2.5-4.9)


 


Magnesium Level


  


  1.9 MG/DL


(1.8-2.4)


 


Total Bilirubin


  


  0.4 MG/DL


(0.2-1.0)


 


Direct Bilirubin


  


  0.1 MG/DL


(0.0-0.3)


 


Aspartate Amino Transf


(AST/SGOT) 


  33 U/L (15-37)


 


 


Alanine Aminotransferase


(ALT/SGPT) 


  21 U/L (12-78)


 


 


Alkaline Phosphatase


  


  41 U/L


()  L


 


Total Protein


  


  6.5 G/DL


(6.4-8.2)


 


Albumin


  


  2.5 G/DL


(3.4-5.0)  L








Height (Feet):  5


Height (Inches):  7.00


Weight (Pounds):  202


Medications





Current Medications








 Medications


  (Trade)  Dose


 Ordered  Sig/Jan


 Route


 PRN Reason  Start Time


 Stop Time Status Last Admin


Dose Admin


 


 Acetaminophen


  (Tylenol)  650 mg  Q4H  PRN


 GT


 Mild Pain/Temp > 100.6  11/26/19 18:42


 12/26/19 18:41   


 


 


 Albuterol/


 Ipratropium


  (Albuterol/


 Ipratropium)  3 ml  Q4H  PRN


 HHN


 Shortness of Breath  11/30/19 12:20


 12/5/19 12:19   


 


 


 Artificial Tears


  (Akwa-Tears)  2 drop  Q12HR


 BOTH EYES


   11/26/19 21:00


 12/24/19 20:59  12/2/19 09:20


 


 


 Baclofen


  (Lioresal)  10 mg  THREE TIMES A  DAY


 GT


   11/27/19 09:00


 12/24/19 17:59  12/2/19 09:21


 


 


 Calcitonin [place]


  (Miacalcin)  1 sprays  DAILY


 NASAL


   11/28/19 12:00


 12/28/19 11:59  12/2/19 09:20


 


 


 Chlorhexidine


 Gluconate


  (Elaine-Hex 2%)  1 applic  DAILY@2000


 TOPIC


   12/2/19 20:00


 1/1/20 19:59   


 


 


 Clobetasol


 Propionate


  (Temovate)  1 applic  EVERY 12  HOURS


 TOPIC


   11/26/19 21:00


 12/24/19 20:59  12/2/19 09:20


 


 


 Dextrose


  (Dextrose 50%)  25 ml  Q30M  PRN


 IV


 Hypoglycemia  11/26/19 18:45


 12/24/19 14:44   


 


 


 Dextrose


  (Dextrose 50%)  50 ml  Q30M  PRN


 IV


 Hypoglycemia  11/26/19 18:45


 12/24/19 14:44   


 


 


 Dextrose/


 Electrolytes  1,000 ml @ 


 75 mls/hr  [id number]


 IV


   11/30/19 13:30


 12/30/19 13:29  12/2/19 05:43


 


 


 Ertapenem 1 gm/


 Sodium Chloride  55 ml @ 


 110 mls/hr  Q24H


 IVPB


   11/29/19 13:00


 12/4/19 12:59  12/1/19 13:34


 


 


 Famotidine


  (Pepcid)  20 mg  BID


 GT


   11/28/19 18:00


 12/28/19 17:59  12/2/19 09:21


 


 


 Fenofibrate


  (Tricor)  145 mg  DAILY


 ORAL


   11/27/19 09:00


 12/25/19 08:59  12/2/19 09:21


 


 


 Heparin Sodium


  (Porcine)


  (Heparin 5000


 units/ml)  5,000 units  EVERY 12  HOURS


 SUBQ


   11/26/19 21:00


 12/24/19 20:59  12/2/19 09:22


 


 


 Insulin Aspart


  (NovoLOG)    EVERY 6  HOURS


 SUBQ


   11/30/19 12:00


 12/24/19 16:29  12/2/19 05:46


 


 


 Levetiracetam


  (Keppra)  1,000 mg  Q8H


 GT


   11/29/19 02:00


 12/29/19 01:59  12/2/19 09:20


 


 


 Lorazepam


  (Ativan 2mg/ml


 1ml)  2 mg  Q2H  PRN


 IV


 For Anxiety  11/26/19 18:43


 12/3/19 18:42   


 


 


 Morphine Sulfate


  (Morphine


 Sulfate)  4 mg  Q4H  PRN


 IVP


 Severe Pain (Pain Scale 7-10)  11/26/19 18:44


 12/3/19 18:43   


 


 


 Ondansetron HCl


  (Zofran)  4 mg  Q6H  PRN


 IVP


 Nausea & Vomiting  11/26/19 18:44


 12/26/19 18:43   


 


 


 Polyethylene


 Glycol


  (Miralax)  17 gm  DAILYPRN  PRN


 GT


 Constipation  11/26/19 18:44


 12/26/19 18:43   


 


 


 Sitagliptin


 Phosphate


  (Januvia)  50 mg  ACBREAKFAST


 GT


   11/27/19 06:30


 12/25/19 06:29  12/2/19 05:47


 


 


 Tamsulosin HCl


  (Flomax)  0.4 mg  DAILY


 ORAL


   11/27/19 09:00


 12/27/19 08:59  12/2/19 09:21


 











Assessment/Plan


Status:  stable


Assessment/Plan:


Patient with stage 3 sacral pressure ulcer. Given his body habitus it will be 

important to offload this area. Need to turn q 2 hours. Also need to keep the 

area moisture controlled. Given the degree of maceration, recommend alginate 

dressing to the sacral ulcer. Left wrist ulcer may be due to shearing. No need 

for surgical intervention at this time.











Vinnie Blank MD Dec 2, 2019 11:55 General

## 2019-12-02 NOTE — DIAGNOSTIC IMAGING REPORT
Indication: Dyspnea

 

Comparison:  11/29/2019

 

A single view chest radiograph was obtained.

 

Findings:

 

There is pleural thickening at the left lung base again noted with blunting of the

costophrenic angle. Tracheostomy again noted. Heart size is stable.

 

IMPRESSION:

 

No significant change

## 2019-12-02 NOTE — PULMONOLGY CRITICAL CARE NOTE
Critical Care - Asmt/Plan


Problems:  


(1) Acute on chronic respiratory failure


(2) Sepsis


(3) Diabetes mellitus


(4) Vegetative state


(5) Gastrostomy tube dependent


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


(7) ATN (acute tubular necrosis)


Respiratory:  monitor respiratory rate, adjust FIO2, CXR, weaning trial


Cardiac:  continue to monitor HR/BP


Renal:  F/U I&O, keep IV fluid, check electrolytes, other - K in IVF


Infectious Disease:  check cultures


Gastrointestinal:  continue feedings/current rate


Endocrine:  monitor blood sugar, continue sliding scale insulin


Hematologic:  monitor H/H, transfuse if hgb<8.5


Neurologic:  PRN Ativan, PRN Morphine, keep patient comfortable


Affect:  PRN ativan


Time Spent (Minutes):  40


Notes Reviewed:  cardio


Discussed with:  nurses, consultants





Critical Care - Objective





Last 24 Hour Vital Signs








  Date Time  Temp Pulse Resp B/P (MAP) Pulse Ox O2 Delivery O2 Flow Rate FiO2


 


12/2/19 10:40  100 18     30


 


12/2/19 09:28  99 15     30


 


12/2/19 08:00      Mechanical Ventilator  


 


12/2/19 08:00 97.8 102 17 131/78 (95) 100   


 


12/2/19 08:00        30


 


12/2/19 07:43  98      


 


12/2/19 07:13  100 15     30


 


12/2/19 04:51  97 16     30


 


12/2/19 04:00 98.4 95 24 121/81 (94) 97   


 


12/2/19 04:00        30


 


12/2/19 04:00  97      


 


12/2/19 04:00      Mechanical Ventilator  


 


12/2/19 02:55  95 14     30


 


12/2/19 01:13  100 16     30


 


12/2/19 00:00  98      


 


12/2/19 00:00 98.1 97 20 120/91 (101) 100   


 


12/2/19 00:00      Mechanical Ventilator  


 


12/2/19 00:00        30


 


12/1/19 23:32  98 14     30


 


12/1/19 21:39  102 16     30


 


12/1/19 20:00  101      


 


12/1/19 20:00        30


 


12/1/19 20:00 98.1 100 20 100/68 (79) 100   


 


12/1/19 20:00      Mechanical Ventilator  


 


12/1/19 19:43  104 18     30


 


12/1/19 17:20  101 19     30


 


12/1/19 16:00        30


 


12/1/19 16:00      Mechanical Ventilator  


 


12/1/19 16:00 97.9 99 17 100/73 (82) 100   


 


12/1/19 16:00  103      


 


12/1/19 14:56  105 19     30


 


12/1/19 13:01  98 16     30


 


12/1/19 12:00        30


 


12/1/19 12:00  99      


 


12/1/19 12:00      Mechanical Ventilator  


 


12/1/19 12:00 96.8 98 19 115/77 (90) 100   








Status:  obtunded


Condition:  critical


HEENT:  atraumatic


Neck:  full ROM


Lungs:  rales, rhonchi


Heart:  HR/BP stable


Abdomen:  soft, non-tender


Extremities:  no C/C/E, edema


Accucheck:  161





Critical Care - Subjective


ROS Limited/Unobtainable:  Yes


Condition:  critical


EKG Rhythm:  Sinus Rhythm


FI02:  30


Vent Support Breath Rate:  14


Vent Support Mode:  AC


Vent Tidal Volume:  600


Sputum Amount:  Scant


PEEP:  5.0


PIP:  38


Tube Feeding Amount:  30


I&O:











Intake and Output  


 


 12/1/19 12/2/19





 19:00 07:00


 


Intake Total 1685.664 ml 1430 ml


 


Output Total 1750 ml 1600 ml


 


Balance -64.336 ml -170 ml


 


  


 


Free Water 550 ml 200 ml


 


IV Total 745.664 ml 900 ml


 


Tube Feeding 390 ml 330 ml


 


Output Urine Total 1600 ml 1300 ml


 


Stool Total 150 ml 300 ml








CXR:


EMILIE


Labs:





Laboratory Tests








Test


  12/2/19


05:50 12/2/19


10:28


 


White Blood Count


  9.7 K/UL


(4.8-10.8) 


 


 


Red Blood Count


  2.83 M/UL


(4.70-6.10)  L 


 


 


Hemoglobin


  8.2 G/DL


(14.2-18.0)  L 


 


 


Hematocrit


  27.0 %


(42.0-52.0)  L 


 


 


Mean Corpuscular Volume 96 FL (80-99)   


 


Mean Corpuscular Hemoglobin


  29.0 PG


(27.0-31.0) 


 


 


Mean Corpuscular Hemoglobin


Concent 30.3 G/DL


(32.0-36.0)  L 


 


 


Red Cell Distribution Width


  17.9 %


(11.6-14.8)  H 


 


 


Platelet Count


  221 K/UL


(150-450) 


 


 


Mean Platelet Volume


  5.9 FL


(6.5-10.1)  L 


 


 


Neutrophils (%) (Auto)


  67.7 %


(45.0-75.0) 


 


 


Lymphocytes (%) (Auto)


  6.6 %


(20.0-45.0)  L 


 


 


Monocytes (%) (Auto)


  3.0 %


(1.0-10.0) 


 


 


Eosinophils (%) (Auto)


  19.0 %


(0.0-3.0)  H 


 


 


Basophils (%) (Auto)


  3.7 %


(0.0-2.0)  H 


 


 


Sodium Level


  152 MMOL/L


(136-145)  #H 


 


 


Potassium Level


  3.4 MMOL/L


(3.5-5.1)  L 


 


 


Chloride Level


  119 MMOL/L


()  H 


 


 


Carbon Dioxide Level


  18 MMOL/L


(21-32)  L 


 


 


Anion Gap


  16 mmol/L


(5-15)  H 


 


 


Blood Urea Nitrogen


  21 mg/dL


(7-18)  H 


 


 


Creatinine


  1.1 MG/DL


(0.55-1.30) 


 


 


Estimat Glomerular Filtration


Rate > 60 mL/min


(>60) 


 


 


Glucose Level


  174 MG/DL


()  H 


 


 


Calcium Level


  10.3 MG/DL


(8.5-10.1)  H 


 


 


Phosphorus Level


  3.0 MG/DL


(2.5-4.9) 3.0 MG/DL


(2.5-4.9)


 


Magnesium Level


  


  1.9 MG/DL


(1.8-2.4)


 


Total Bilirubin


  


  0.4 MG/DL


(0.2-1.0)


 


Direct Bilirubin


  


  0.1 MG/DL


(0.0-0.3)


 


Aspartate Amino Transf


(AST/SGOT) 


  33 U/L (15-37)


 


 


Alanine Aminotransferase


(ALT/SGPT) 


  21 U/L (12-78)


 


 


Alkaline Phosphatase


  


  41 U/L


()  L


 


Total Protein


  


  6.5 G/DL


(6.4-8.2)


 


Albumin


  


  2.5 G/DL


(3.4-5.0)  Yury Winn MD Dec 2, 2019 12:01

## 2019-12-03 VITALS — DIASTOLIC BLOOD PRESSURE: 85 MMHG | SYSTOLIC BLOOD PRESSURE: 133 MMHG

## 2019-12-03 VITALS — SYSTOLIC BLOOD PRESSURE: 129 MMHG | DIASTOLIC BLOOD PRESSURE: 85 MMHG

## 2019-12-03 VITALS — DIASTOLIC BLOOD PRESSURE: 86 MMHG | SYSTOLIC BLOOD PRESSURE: 134 MMHG

## 2019-12-03 VITALS — DIASTOLIC BLOOD PRESSURE: 79 MMHG | SYSTOLIC BLOOD PRESSURE: 131 MMHG

## 2019-12-03 VITALS — SYSTOLIC BLOOD PRESSURE: 134 MMHG | DIASTOLIC BLOOD PRESSURE: 87 MMHG

## 2019-12-03 VITALS — DIASTOLIC BLOOD PRESSURE: 82 MMHG | SYSTOLIC BLOOD PRESSURE: 131 MMHG

## 2019-12-03 LAB
ALBUMIN SERPL-MCNC: 2.4 G/DL (ref 3.4–5)
ALBUMIN/GLOB SERPL: 0.5 {RATIO} (ref 1–2.7)
ALP SERPL-CCNC: 88 U/L (ref 46–116)
ALT SERPL-CCNC: 32 U/L (ref 12–78)
ANION GAP SERPL CALC-SCNC: 12 MMOL/L (ref 5–15)
AST SERPL-CCNC: 43 U/L (ref 15–37)
BILIRUB SERPL-MCNC: 0.2 MG/DL (ref 0.2–1)
BUN SERPL-MCNC: 4 MG/DL (ref 7–18)
CALCIUM SERPL-MCNC: 10.2 MG/DL (ref 8.5–10.1)
CHLORIDE SERPL-SCNC: 104 MMOL/L (ref 98–107)
CO2 SERPL-SCNC: 23 MMOL/L (ref 21–32)
CREAT SERPL-MCNC: 0.9 MG/DL (ref 0.55–1.3)
GLOBULIN SER-MCNC: 4.9 G/DL
PHOSPHATE SERPL-MCNC: 3.2 MG/DL (ref 2.5–4.9)
POTASSIUM SERPL-SCNC: 4.2 MMOL/L (ref 3.5–5.1)
SODIUM SERPL-SCNC: 139 MMOL/L (ref 136–145)

## 2019-12-03 RX ADMIN — ERTAPENEM SODIUM SCH MLS/HR: 1 INJECTION, POWDER, LYOPHILIZED, FOR SOLUTION INTRAMUSCULAR; INTRAVENOUS at 12:58

## 2019-12-03 RX ADMIN — INSULIN ASPART SCH UNITS: 100 INJECTION, SOLUTION INTRAVENOUS; SUBCUTANEOUS at 06:07

## 2019-12-03 RX ADMIN — LEVETIRACETAM SCH MG: 100 SOLUTION ORAL at 02:29

## 2019-12-03 RX ADMIN — SITAGLIPTIN SCH MG: 50 TABLET, FILM COATED ORAL at 06:05

## 2019-12-03 RX ADMIN — HEPARIN SODIUM SCH UNITS: 5000 INJECTION INTRAVENOUS; SUBCUTANEOUS at 09:28

## 2019-12-03 RX ADMIN — INSULIN ASPART SCH UNITS: 100 INJECTION, SOLUTION INTRAVENOUS; SUBCUTANEOUS at 00:20

## 2019-12-03 RX ADMIN — CLOBETASOL PROPIONATE SCH APPLIC: 0.5 OINTMENT TOPICAL at 09:28

## 2019-12-03 RX ADMIN — LEVETIRACETAM SCH MG: 100 SOLUTION ORAL at 18:10

## 2019-12-03 RX ADMIN — TAMSULOSIN HYDROCHLORIDE SCH MG: 0.4 CAPSULE ORAL at 09:21

## 2019-12-03 RX ADMIN — CLOBETASOL PROPIONATE SCH APPLIC: 0.5 OINTMENT TOPICAL at 20:57

## 2019-12-03 RX ADMIN — INSULIN ASPART SCH UNITS: 100 INJECTION, SOLUTION INTRAVENOUS; SUBCUTANEOUS at 12:31

## 2019-12-03 RX ADMIN — INSULIN ASPART SCH UNITS: 100 INJECTION, SOLUTION INTRAVENOUS; SUBCUTANEOUS at 23:53

## 2019-12-03 RX ADMIN — LEVETIRACETAM SCH MG: 100 SOLUTION ORAL at 09:21

## 2019-12-03 RX ADMIN — HEPARIN SODIUM SCH UNITS: 5000 INJECTION INTRAVENOUS; SUBCUTANEOUS at 20:56

## 2019-12-03 RX ADMIN — INSULIN ASPART SCH UNITS: 100 INJECTION, SOLUTION INTRAVENOUS; SUBCUTANEOUS at 18:13

## 2019-12-03 NOTE — PULMONOLGY CRITICAL CARE NOTE
Critical Care - Asmt/Plan


Problems:  


(1) Acute on chronic respiratory failure


(2) Sepsis


(3) Diabetes mellitus


(4) Vegetative state


(5) Gastrostomy tube dependent


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


(7) ATN (acute tubular necrosis)


Respiratory:  monitor respiratory rate, adjust FIO2, CXR


Cardiac:  continue to monitor HR/BP


Renal:  F/U I&O, keep IV fluid, check electrolytes


Infectious Disease:  check cultures, continue antibiotics


Gastrointestinal:  continue feedings/current rate


Hematologic:  monitor H/H, transfuse if hgb<8.5


Neurologic:  PRN Ativan, keep patient comfortable


Time Spent (Minutes):  40


Notes Reviewed:  internist, cardio, renal


Discussed with:  nurses, consultants, 





Critical Care - Objective





Last 24 Hour Vital Signs








  Date Time  Temp Pulse Resp B/P (MAP) Pulse Ox O2 Delivery O2 Flow Rate FiO2


 


12/3/19 09:02  103 14     30


 


12/3/19 08:00      Mechanical Ventilator  


 


12/3/19 08:00 98.2 103 20 134/87 (103) 98   


 


12/3/19 08:00        30


 


12/3/19 07:58  100      


 


12/3/19 07:02  61 18     30


 


12/3/19 05:29  102 16     30


 


12/3/19 04:00        30


 


12/3/19 04:00 98.2 134 21 129/85 (100) 100   


 


12/3/19 04:00      Mechanical Ventilator  


 


12/3/19 04:00  103      


 


12/3/19 03:26  102 15     30


 


12/3/19 01:00  102 16     30


 


12/3/19 00:00      Mechanical Ventilator  


 


12/3/19 00:00        30


 


12/3/19 00:00  107      


 


12/3/19 00:00 98.2 105 16 133/85 (101) 100   


 


12/2/19 23:27  107 17     30


 


12/2/19 20:46  102 18     30


 


12/2/19 20:00        30


 


12/2/19 20:00      Mechanical Ventilator  


 


12/2/19 20:00  104      


 


12/2/19 20:00 99.0 105 16 131/88 (102) 99   


 


12/2/19 19:14  106 19     30


 


12/2/19 17:18  103 18     30


 


12/2/19 16:00 98.2 103 17 133/81 (98) 100   


 


12/2/19 16:00      Mechanical Ventilator  


 


12/2/19 16:00        30


 


12/2/19 15:39  140      


 


12/2/19 15:14  100 16     30


 


12/2/19 13:23  108 20     30


 


12/2/19 12:00 98.4 98 17 117/73 (88) 100   


 


12/2/19 12:00      Mechanical Ventilator  


 


12/2/19 12:00        30


 


12/2/19 11:41  101      








Status:  obtunded


Condition:  critical


HEENT:  atraumatic


Lungs:  rales, rhonchi


Heart:  HR/BP stable


Abdomen:  soft, non-tender


Accucheck:  159





Critical Care - Subjective


ROS Limited/Unobtainable:  Yes


Condition:  critical


EKG Rhythm:  Sinus Rhythm


FI02:  30


Vent Support Breath Rate:  14


Vent Support Mode:  AC


Vent Tidal Volume:  600


Sputum Amount:  Small


PEEP:  5.0


PIP:  27


Tube Feeding Amount:  30


I&O:











Intake and Output  


 


 12/2/19 12/3/19





 18:59 06:59


 


Intake Total 1560 ml 1910 ml


 


Output Total 1600 ml 2150 ml


 


Balance -40 ml -240 ml


 


  


 


Free Water 400 ml 400 ml


 


IV Total 630 ml 1150 ml


 


Tube Feeding 330 ml 360 ml


 


Other 200 ml 


 


Output Urine Total 1400 ml 2000 ml


 


Stool Total 200 ml 150 ml








CXR:


clear


Labs:





Laboratory Tests








Test


  12/3/19


09:45


 


Sodium Level


  139 MMOL/L


(136-145)


 


Potassium Level


  4.2 MMOL/L


(3.5-5.1)


 


Chloride Level


  104 MMOL/L


()


 


Carbon Dioxide Level


  23 MMOL/L


(21-32)


 


Anion Gap


  12 mmol/L


(5-15)


 


Blood Urea Nitrogen


  4 mg/dL (7-18)


L


 


Creatinine


  0.9 MG/DL


(0.55-1.30)


 


Estimat Glomerular Filtration


Rate > 60 mL/min


(>60)


 


Glucose Level


  167 MG/DL


()  H


 


Calcium Level


  10.2 MG/DL


(8.5-10.1)  H


 


Phosphorus Level


  3.2 MG/DL


(2.5-4.9)


 


Magnesium Level


  1.7 MG/DL


(1.8-2.4)  L


 


Total Bilirubin


  0.2 MG/DL


(0.2-1.0)


 


Aspartate Amino Transf


(AST/SGOT) 43 U/L (15-37)


H


 


Alanine Aminotransferase


(ALT/SGPT) 32 U/L (12-78)


 


 


Alkaline Phosphatase


  88 U/L


()


 


Total Protein


  7.3 G/DL


(6.4-8.2)


 


Albumin


  2.4 G/DL


(3.4-5.0)  L


 


Globulin 4.9 g/dL  


 


Albumin/Globulin Ratio


  0.5 (1.0-2.7)


L

















Yuyr Pena MD Dec 3, 2019 10:56

## 2019-12-03 NOTE — NEPHROLOGY PROGRESS NOTE
Assessment/Plan


Problem List:  


(1) Urinary retention


(2) Acute on chronic respiratory failure


(3) Hypercalcemia


(4) Renal failure (ARF), acute on chronic


Assessment





Hypercalcemia


Urinary retention, BPH , 


Acute on chronic renal failure


Electrolyte imbalance 


Colostomy


DM


PEG


Vegetative state


Acute on chronic respiratory failure


Plan





Free water-


K and Phos and Mag supplements


pulm support


Aredia for high Ca on 11/28 redose today 12/3





Subjective


ROS Limited/Unobtainable:  Yes


Constitutional:  Reports: malaise





Objective


Objective





Last 24 Hour Vital Signs








  Date Time  Temp Pulse Resp B/P (MAP) Pulse Ox O2 Delivery O2 Flow Rate FiO2


 


12/3/19 12:44  88 15     30


 


12/3/19 11:05  80 15     30


 


12/3/19 09:02  103 14     30


 


12/3/19 08:00      Mechanical Ventilator  


 


12/3/19 08:00 98.2 103 20 134/87 (103) 98   


 


12/3/19 08:00        30


 


12/3/19 07:58  100      


 


12/3/19 07:02  61 18     30


 


12/3/19 05:29  102 16     30


 


12/3/19 04:00        30


 


12/3/19 04:00 98.2 134 21 129/85 (100) 100   


 


12/3/19 04:00      Mechanical Ventilator  


 


12/3/19 04:00  103      


 


12/3/19 03:26  102 15     30


 


12/3/19 01:00  102 16     30


 


12/3/19 00:00      Mechanical Ventilator  


 


12/3/19 00:00        30


 


12/3/19 00:00  107      


 


12/3/19 00:00 98.2 105 16 133/85 (101) 100   


 


12/2/19 23:27  107 17     30


 


12/2/19 20:46  102 18     30


 


12/2/19 20:00        30


 


12/2/19 20:00      Mechanical Ventilator  


 


12/2/19 20:00  104      


 


12/2/19 20:00 99.0 105 16 131/88 (102) 99   


 


12/2/19 19:14  106 19     30


 


12/2/19 17:18  103 18     30


 


12/2/19 16:00 98.2 103 17 133/81 (98) 100   


 


12/2/19 16:00      Mechanical Ventilator  


 


12/2/19 16:00        30


 


12/2/19 15:39  140      


 


12/2/19 15:14  100 16     30

















Intake and Output  


 


 12/2/19 12/3/19





 18:59 06:59


 


Intake Total 1560 ml 1910 ml


 


Output Total 1600 ml 2150 ml


 


Balance -40 ml -240 ml


 


  


 


Free Water 400 ml 400 ml


 


IV Total 630 ml 1150 ml


 


Tube Feeding 330 ml 360 ml


 


Other 200 ml 


 


Output Urine Total 1400 ml 2000 ml


 


Stool Total 200 ml 150 ml








Laboratory Tests


12/3/19 09:45: 


Sodium Level 139, Potassium Level 4.2, Chloride Level 104, Carbon Dioxide Level 

23, Anion Gap 12, Blood Urea Nitrogen 4L, Creatinine 0.9, Estimat Glomerular 

Filtration Rate > 60, Glucose Level 167H, Calcium Level 10.2H, Phosphorus Level 

3.2, Magnesium Level 1.7L, Total Bilirubin 0.2, Aspartate Amino Transf (AST/SGOT

) 43H, Alanine Aminotransferase (ALT/SGPT) 32, Alkaline Phosphatase 88, Total 

Protein 7.3, Albumin 2.4L, Globulin 4.9, Albumin/Globulin Ratio 0.5L


Height (Feet):  5


Height (Inches):  7.00


Weight (Pounds):  184


General Appearance:  no apparent distress


Neck:  other - trach


Respiratory/Chest:  decreased breath sounds


Abdomen:  distended











Simone Rocha MD Dec 3, 2019 14:25

## 2019-12-03 NOTE — UROLOGY PROGRESS NOTE
Assessment/Plan


Status:  stable


Assessment/Plan:


1. Urinary retention.


2. BPH.


3. Neurogenic bladder.


4. Incontinence.


5. Pyuria/UTI/colonized.


6. Hematuria.


7. Proteinuria.


8. Renal insufficiency, acute possibly on chronic.


9. Renal cyst.


10. Nephrolithiasis.


11. POD # 6, cysto/optic urethrotomy





monitor clinically


mayorga placed 11/27


hand irrigated and do PRN


abx as ordered


consider antifungals, per ID





Subjective


Allergies:  


Coded Allergies:  


     AZTREONAM (Verified  Allergy, Unknown, 7/23/18)


Subjective


all noted, mayorga draining, non-verbal





Objective





Last 24 Hour Vital Signs








  Date Time  Temp Pulse Resp B/P (MAP) Pulse Ox O2 Delivery O2 Flow Rate FiO2


 


12/3/19 09:02  103 14     30


 


12/3/19 08:00      Mechanical Ventilator  


 


12/3/19 08:00 98.2 103 20 134/87 (103) 98   


 


12/3/19 08:00        30


 


12/3/19 07:58  100      


 


12/3/19 07:02  61 18     30


 


12/3/19 05:29  102 16     30


 


12/3/19 04:00        30


 


12/3/19 04:00 98.2 134 21 129/85 (100) 100   


 


12/3/19 04:00      Mechanical Ventilator  


 


12/3/19 04:00  103      


 


12/3/19 03:26  102 15     30


 


12/3/19 01:00  102 16     30


 


12/3/19 00:00      Mechanical Ventilator  


 


12/3/19 00:00        30


 


12/3/19 00:00  107      


 


12/3/19 00:00 98.2 105 16 133/85 (101) 100   


 


12/2/19 23:27  107 17     30


 


12/2/19 20:46  102 18     30


 


12/2/19 20:00        30


 


12/2/19 20:00      Mechanical Ventilator  


 


12/2/19 20:00  104      


 


12/2/19 20:00 99.0 105 16 131/88 (102) 99   


 


12/2/19 19:14  106 19     30


 


12/2/19 17:18  103 18     30


 


12/2/19 16:00 98.2 103 17 133/81 (98) 100   


 


12/2/19 16:00      Mechanical Ventilator  


 


12/2/19 16:00        30


 


12/2/19 15:39  140      


 


12/2/19 15:14  100 16     30


 


12/2/19 13:23  108 20     30


 


12/2/19 12:00 98.4 98 17 117/73 (88) 100   


 


12/2/19 12:00      Mechanical Ventilator  


 


12/2/19 12:00        30


 


12/2/19 11:41  101      

















Intake and Output  


 


 12/2/19 12/3/19





 18:59 06:59


 


Intake Total 1560 ml 1910 ml


 


Output Total 1600 ml 2150 ml


 


Balance -40 ml -240 ml


 


  


 


Free Water 400 ml 400 ml


 


IV Total 630 ml 1150 ml


 


Tube Feeding 330 ml 360 ml


 


Other 200 ml 


 


Output Urine Total 1400 ml 2000 ml


 


Stool Total 200 ml 150 ml











Microbiology








 Date/Time


Source Procedure


Growth Status


 


 


 11/24/19 09:15


Blood Blood Culture - Final


NO GROWTH AFTER 5 DAYS Complete





 11/25/19 11:00


Nasal Nares - Final Complete


 


 11/25/19 11:00


Nasal Nares - Final Complete


 


 11/26/19 03:00


Urine,Clean Catch Urine Culture - Final


Candida Parapsilosis Complete


 


 11/24/19 09:10


Rectum VRE Culture - Final


Enterococcus Faecium - Vre Complete








Current Medications








 Medications


  (Trade)  Dose


 Ordered  Sig/Jan


 Route


 PRN Reason  Start Time


 Stop Time Status Last Admin


Dose Admin


 


 Acetaminophen


  (Tylenol)  650 mg  Q4H  PRN


 GT


 Mild Pain/Temp > 100.6  11/26/19 18:42


 12/26/19 18:41   


 


 


 Albuterol/


 Ipratropium


  (Albuterol/


 Ipratropium)  3 ml  Q4H  PRN


 HHN


 Shortness of Breath  11/30/19 12:20


 12/5/19 12:19   


 


 


 Artificial Tears


  (Akwa-Tears)  2 drop  Q12HR


 BOTH EYES


   11/26/19 21:00


 12/24/19 20:59  12/3/19 09:28


 


 


 Baclofen


  (Lioresal)  10 mg  THREE TIMES A  DAY


 GT


   11/27/19 09:00


 12/24/19 17:59  12/3/19 09:21


 


 


 Calcitonin [place]


  (Miacalcin)  1 sprays  DAILY


 NASAL


   11/28/19 12:00


 12/28/19 11:59  12/3/19 09:29


 


 


 Chlorhexidine


 Gluconate


  (Elaine-Hex 2%)  1 applic  DAILY@2000


 TOPIC


   12/2/19 20:00


 1/1/20 19:59  12/2/19 21:07


 


 


 Clobetasol


 Propionate


  (Temovate)  1 applic  EVERY 12  HOURS


 TOPIC


   11/26/19 21:00


 12/24/19 20:59  12/3/19 09:28


 


 


 Dextrose  1,000 ml @ 


 100 mls/hr  Q10H


 IV


   12/2/19 15:45


 1/1/20 15:44  12/3/19 02:30


 


 


 Dextrose


  (Dextrose 50%)  25 ml  Q30M  PRN


 IV


 Hypoglycemia  11/26/19 18:45


 12/24/19 14:44   


 


 


 Dextrose


  (Dextrose 50%)  50 ml  Q30M  PRN


 IV


 Hypoglycemia  11/26/19 18:45


 12/24/19 14:44   


 


 


 Ertapenem 1 gm/


 Sodium Chloride  55 ml @ 


 110 mls/hr  Q24H


 IVPB


   11/29/19 13:00


 12/4/19 12:59  12/2/19 12:41


 


 


 Famotidine


  (Pepcid)  20 mg  BID


 GT


   11/28/19 18:00


 12/28/19 17:59  12/3/19 09:22


 


 


 Fenofibrate


  (Tricor)  145 mg  DAILY


 ORAL


   11/27/19 09:00


 12/25/19 08:59  12/3/19 09:22


 


 


 Heparin Sodium


  (Porcine)


  (Heparin 5000


 units/ml)  5,000 units  EVERY 12  HOURS


 SUBQ


   11/26/19 21:00


 12/24/19 20:59  12/3/19 09:28


 


 


 Insulin Aspart


  (NovoLOG)    EVERY 6  HOURS


 SUBQ


   11/30/19 12:00


 12/24/19 16:29  12/3/19 06:07


 


 


 Levetiracetam


  (Keppra)  1,000 mg  Q8H


 GT


   11/29/19 02:00


 12/29/19 01:59  12/3/19 09:21


 


 


 Lorazepam


  (Ativan 2mg/ml


 1ml)  2 mg  Q2H  PRN


 IV


 For Anxiety  11/26/19 18:43


 12/3/19 18:42   


 


 


 Morphine Sulfate


  (Morphine


 Sulfate)  4 mg  Q4H  PRN


 IVP


 Severe Pain (Pain Scale 7-10)  11/26/19 18:44


 12/3/19 18:43   


 


 


 Ondansetron HCl


  (Zofran)  4 mg  Q6H  PRN


 IVP


 Nausea & Vomiting  11/26/19 18:44


 12/26/19 18:43   


 


 


 Polyethylene


 Glycol


  (Miralax)  17 gm  DAILYPRN  PRN


 GT


 Constipation  11/26/19 18:44


 12/26/19 18:43   


 


 


 Potassium Chloride


  (K-Dur)  40 meq  TWICE A  DAY


 GT


   12/2/19 18:00


 1/1/20 17:59  12/3/19 09:21


 


 


 Sitagliptin


 Phosphate


  (Januvia)  50 mg  ACBREAKFAST


 GT


   11/27/19 06:30


 12/25/19 06:29  12/3/19 06:05


 


 


 Tamsulosin HCl


  (Flomax)  0.4 mg  DAILY


 ORAL


   11/27/19 09:00


 12/27/19 08:59  12/3/19 09:21


 





Laboratory Tests


12/3/19 09:45: 


Sodium Level 139, Potassium Level 4.2, Chloride Level 104, Carbon Dioxide Level 

23, Anion Gap 12, Blood Urea Nitrogen 4L, Creatinine 0.9, Estimat Glomerular 

Filtration Rate > 60, Glucose Level 167H, Calcium Level 10.2H, Phosphorus Level 

3.2, Magnesium Level 1.7L, Total Bilirubin 0.2, Aspartate Amino Transf (AST/SGOT

) 43H, Alanine Aminotransferase (ALT/SGPT) 32, Alkaline Phosphatase 88, Total 

Protein 7.3, Albumin 2.4L, Globulin 4.9, Albumin/Globulin Ratio 0.5L


Height (Feet):  5


Height (Inches):  7.00


Weight (Pounds):  184


Objective


 exam stable


mayorga indwelling, blood-tinged/christine urine, some debris


minimal bleeding at penile meatus











Vinnie Pollard MD Dec 3, 2019 11:21

## 2019-12-04 VITALS — DIASTOLIC BLOOD PRESSURE: 79 MMHG | SYSTOLIC BLOOD PRESSURE: 129 MMHG

## 2019-12-04 VITALS — SYSTOLIC BLOOD PRESSURE: 115 MMHG | DIASTOLIC BLOOD PRESSURE: 70 MMHG

## 2019-12-04 VITALS — SYSTOLIC BLOOD PRESSURE: 125 MMHG | DIASTOLIC BLOOD PRESSURE: 77 MMHG

## 2019-12-04 VITALS — DIASTOLIC BLOOD PRESSURE: 85 MMHG | SYSTOLIC BLOOD PRESSURE: 129 MMHG

## 2019-12-04 VITALS — SYSTOLIC BLOOD PRESSURE: 112 MMHG | DIASTOLIC BLOOD PRESSURE: 78 MMHG

## 2019-12-04 VITALS — DIASTOLIC BLOOD PRESSURE: 82 MMHG | SYSTOLIC BLOOD PRESSURE: 131 MMHG

## 2019-12-04 VITALS — SYSTOLIC BLOOD PRESSURE: 143 MMHG | DIASTOLIC BLOOD PRESSURE: 98 MMHG

## 2019-12-04 LAB
ADD MANUAL DIFF: NO
ALBUMIN SERPL-MCNC: 2.5 G/DL (ref 3.4–5)
ALBUMIN/GLOB SERPL: 0.5 {RATIO} (ref 1–2.7)
ALP SERPL-CCNC: 98 U/L (ref 46–116)
ALT SERPL-CCNC: 28 U/L (ref 12–78)
ANION GAP SERPL CALC-SCNC: 9 MMOL/L (ref 5–15)
AST SERPL-CCNC: 41 U/L (ref 15–37)
BASOPHILS NFR BLD AUTO: 1.4 % (ref 0–2)
BILIRUB SERPL-MCNC: 0.3 MG/DL (ref 0.2–1)
BUN SERPL-MCNC: 6 MG/DL (ref 7–18)
CALCIUM SERPL-MCNC: 10.3 MG/DL (ref 8.5–10.1)
CHLORIDE SERPL-SCNC: 104 MMOL/L (ref 98–107)
CO2 SERPL-SCNC: 25 MMOL/L (ref 21–32)
CREAT SERPL-MCNC: 1 MG/DL (ref 0.55–1.3)
EOSINOPHIL NFR BLD AUTO: 6.2 % (ref 0–3)
ERYTHROCYTE [DISTWIDTH] IN BLOOD BY AUTOMATED COUNT: 15.3 % (ref 11.6–14.8)
GLOBULIN SER-MCNC: 5 G/DL
HCT VFR BLD CALC: 31.2 % (ref 42–52)
HGB BLD-MCNC: 10.1 G/DL (ref 14.2–18)
LYMPHOCYTES NFR BLD AUTO: 14.1 % (ref 20–45)
MCV RBC AUTO: 91 FL (ref 80–99)
MONOCYTES NFR BLD AUTO: 3.6 % (ref 1–10)
NEUTROPHILS NFR BLD AUTO: 74.8 % (ref 45–75)
PHOSPHATE SERPL-MCNC: 3.1 MG/DL (ref 2.5–4.9)
PLATELET # BLD: 367 K/UL (ref 150–450)
POTASSIUM SERPL-SCNC: 5 MMOL/L (ref 3.5–5.1)
RBC # BLD AUTO: 3.42 M/UL (ref 4.7–6.1)
SODIUM SERPL-SCNC: 138 MMOL/L (ref 136–145)
WBC # BLD AUTO: 10.9 K/UL (ref 4.8–10.8)

## 2019-12-04 RX ADMIN — HEPARIN SODIUM SCH UNITS: 5000 INJECTION INTRAVENOUS; SUBCUTANEOUS at 20:24

## 2019-12-04 RX ADMIN — LEVETIRACETAM SCH MG: 100 SOLUTION ORAL at 01:33

## 2019-12-04 RX ADMIN — INSULIN ASPART SCH UNITS: 100 INJECTION, SOLUTION INTRAVENOUS; SUBCUTANEOUS at 05:46

## 2019-12-04 RX ADMIN — HEPARIN SODIUM SCH UNITS: 5000 INJECTION INTRAVENOUS; SUBCUTANEOUS at 09:01

## 2019-12-04 RX ADMIN — ACETAMINOPHEN PRN MG: 160 SOLUTION ORAL at 12:08

## 2019-12-04 RX ADMIN — INSULIN ASPART SCH UNITS: 100 INJECTION, SOLUTION INTRAVENOUS; SUBCUTANEOUS at 12:37

## 2019-12-04 RX ADMIN — TAMSULOSIN HYDROCHLORIDE SCH MG: 0.4 CAPSULE ORAL at 08:58

## 2019-12-04 RX ADMIN — LEVETIRACETAM SCH MG: 100 SOLUTION ORAL at 17:19

## 2019-12-04 RX ADMIN — INSULIN ASPART SCH UNITS: 100 INJECTION, SOLUTION INTRAVENOUS; SUBCUTANEOUS at 23:14

## 2019-12-04 RX ADMIN — CLOBETASOL PROPIONATE SCH APPLIC: 0.5 OINTMENT TOPICAL at 09:03

## 2019-12-04 RX ADMIN — CLOBETASOL PROPIONATE SCH APPLIC: 0.5 OINTMENT TOPICAL at 20:20

## 2019-12-04 RX ADMIN — ACETAMINOPHEN PRN MG: 160 SOLUTION ORAL at 18:25

## 2019-12-04 RX ADMIN — SITAGLIPTIN SCH MG: 50 TABLET, FILM COATED ORAL at 05:43

## 2019-12-04 RX ADMIN — INSULIN ASPART SCH UNITS: 100 INJECTION, SOLUTION INTRAVENOUS; SUBCUTANEOUS at 17:20

## 2019-12-04 RX ADMIN — LEVETIRACETAM SCH MG: 100 SOLUTION ORAL at 09:04

## 2019-12-04 NOTE — NEPHROLOGY PROGRESS NOTE
Assessment/Plan


Problem List:  


(1) Urinary retention


(2) Acute on chronic respiratory failure


(3) Hypercalcemia


(4) Renal failure (ARF), acute on chronic


Assessment





Hypercalcemia


Urinary retention, BPH , 


Acute on chronic renal failure


Electrolyte imbalance 


Colostomy


DM


PEG


Vegetative state


Acute on chronic respiratory failure


Plan





Free water-


K and Phos and Mag supplements


pulm support


Aredia for high Ca on 11/28 redose today 12/3





Subjective


ROS Limited/Unobtainable:  Yes





Objective


Objective





Last 24 Hour Vital Signs








  Date Time  Temp Pulse Resp B/P (MAP) Pulse Ox O2 Delivery O2 Flow Rate FiO2


 


12/4/19 11:02  139 19     30


 


12/4/19 09:18  117 16     30


 


12/4/19 08:00 98.2 115 14 125/77 (93) 98   


 


12/4/19 08:00        30


 


12/4/19 08:00      Mechanical Ventilator  


 


12/4/19 07:52  128      


 


12/4/19 07:48  114 19     30


 


12/4/19 04:38  108 19     30


 


12/4/19 04:07        30


 


12/4/19 04:06      Mechanical Ventilator  


 


12/4/19 04:00  109      


 


12/4/19 04:00 98.1 110 16 129/85 (100) 100   


 


12/4/19 03:20  112 17     30


 


12/4/19 01:05  111 15     30


 


12/4/19 00:00        30


 


12/4/19 00:00 98.5 113 16 131/82 (98) 100   


 


12/4/19 00:00      Mechanical Ventilator  


 


12/3/19 23:10  113 15     30


 


12/3/19 21:13  108 15     30


 


12/3/19 20:00      Mechanical Ventilator  


 


12/3/19 20:00 98.2 114 16 131/79 (96) 100   


 


12/3/19 20:00  113      


 


12/3/19 20:00        30


 


12/3/19 18:52  113 16     30


 


12/3/19 16:59  90 15     30


 


12/3/19 16:00        30


 


12/3/19 16:00 98.6 98 19 134/86 (102) 99   


 


12/3/19 16:00      Mechanical Ventilator  


 


12/3/19 15:28  102      


 


12/3/19 14:50  84 15     30


 


12/3/19 12:44  88 15     30


 


12/3/19 12:00      Mechanical Ventilator  


 


12/3/19 12:00 98.3 100 17 131/82 (98) 98   


 


12/3/19 12:00        30


 


12/3/19 11:41  114      

















Intake and Output  


 


 12/3/19 12/4/19





 19:00 07:00


 


Intake Total 2545.827 ml 1460 ml


 


Output Total 2975 ml 1625 ml


 


Balance -429.173 ml -165 ml


 


  


 


Free Water  100 ml


 


IV Total 1475.827 ml 600 ml


 


Tube Feeding 470 ml 760 ml


 


Other 600 ml 


 


Output Urine Total 2850 ml 1400 ml


 


Stool Total 125 ml 225 ml








Laboratory Tests


12/4/19 04:55: 


White Blood Count 10.9H, Red Blood Count 3.42L, Hemoglobin 10.1L, Hematocrit 

31.2L, Mean Corpuscular Volume 91, Mean Corpuscular Hemoglobin 29.5, Mean 

Corpuscular Hemoglobin Concent 32.4, Red Cell Distribution Width 15.3H, 

Platelet Count 367, Mean Platelet Volume 7.7, Neutrophils (%) (Auto) 74.8, 

Lymphocytes (%) (Auto) 14.1L, Monocytes (%) (Auto) 3.6, Eosinophils (%) (Auto) 

6.2H, Basophils (%) (Auto) 1.4, Sodium Level 138, Potassium Level 5.0, Chloride 

Level 104, Carbon Dioxide Level 25, Anion Gap 9, Blood Urea Nitrogen 6L, 

Creatinine 1.0, Estimat Glomerular Filtration Rate > 60, Glucose Level 158H, 

Calcium Level 10.3H, Phosphorus Level 3.1, Magnesium Level 2.1, Total Bilirubin 

0.3, Aspartate Amino Transf (AST/SGOT) 41H, Alanine Aminotransferase (ALT/SGPT) 

28, Alkaline Phosphatase 98, C-Reactive Protein, Quantitative 6.0H, Pro-B-Type 

Natriuretic Peptide 206H, Total Protein 7.5, Albumin 2.5L, Globulin 5.0, Albumin

/Globulin Ratio 0.5L


Height (Feet):  5


Height (Inches):  7.00


Weight (Pounds):  201


General Appearance:  no apparent distress


Cardiovascular:  normal rate


Respiratory/Chest:  decreased breath sounds


Abdomen:  soft


Objective


no change











Simone Rocha MD Dec 4, 2019 11:06

## 2019-12-04 NOTE — UROLOGY PROGRESS NOTE
Assessment/Plan


Status:  stable


Assessment/Plan:


1. Urinary retention.


2. BPH.


3. Neurogenic bladder.


4. Incontinence.


5. Pyuria/UTI/colonized.


6. Hematuria.


7. Proteinuria.


8. Renal insufficiency, acute possibly on chronic.


9. Renal cyst.


10. Nephrolithiasis.


11. POD # 6, cysto/optic urethrotomy





monitor clinically


mayorga placed 11/27


hand irrigated and do PRN


abx as ordered


consider antifungals, per ID





Subjective


Allergies:  


Coded Allergies:  


     AZTREONAM (Verified  Allergy, Unknown, 7/23/18)


Subjective


all noted, mayorga draining, non-verbal





Objective





Last 24 Hour Vital Signs








  Date Time  Temp Pulse Resp B/P (MAP) Pulse Ox O2 Delivery O2 Flow Rate FiO2


 


12/4/19 15:08  131 19     30


 


12/4/19 13:03  113 20     30


 


12/4/19 12:00 100.0 134 15 115/70 (85) 100   


 


12/4/19 12:00        30


 


12/4/19 12:00      Mechanical Ventilator  


 


12/4/19 11:20  138      


 


12/4/19 11:02  139 19     30


 


12/4/19 09:18  117 16     30


 


12/4/19 08:00 98.2 115 14 125/77 (93) 98   


 


12/4/19 08:00        30


 


12/4/19 08:00      Mechanical Ventilator  


 


12/4/19 07:52  128      


 


12/4/19 07:48  114 19     30


 


12/4/19 04:38  108 19     30


 


12/4/19 04:07        30


 


12/4/19 04:06      Mechanical Ventilator  


 


12/4/19 04:00  109      


 


12/4/19 04:00 98.1 110 16 129/85 (100) 100   


 


12/4/19 03:20  112 17     30


 


12/4/19 01:05  111 15     30


 


12/4/19 00:00        30


 


12/4/19 00:00 98.5 113 16 131/82 (98) 100   


 


12/4/19 00:00      Mechanical Ventilator  


 


12/3/19 23:10  113 15     30


 


12/3/19 21:13  108 15     30


 


12/3/19 20:00      Mechanical Ventilator  


 


12/3/19 20:00 98.2 114 16 131/79 (96) 100   


 


12/3/19 20:00  113      


 


12/3/19 20:00        30


 


12/3/19 18:52  113 16     30


 


12/3/19 16:59  90 15     30


 


12/3/19 16:00        30


 


12/3/19 16:00 98.6 98 19 134/86 (102) 99   


 


12/3/19 16:00      Mechanical Ventilator  


 


12/3/19 15:28  102      

















Intake and Output  


 


 12/3/19 12/4/19





 18:59 06:59


 


Intake Total 2248.327 ml 1822.5 ml


 


Output Total  4600 ml


 


Balance 2248.327 ml -2777.5 ml


 


  


 


Free Water  100 ml


 


IV Total 1438.327 ml 737.5 ml


 


Tube Feeding 450 ml 745 ml


 


Other 360 ml 240 ml


 


Output Urine Total  4250 ml


 


Stool Total  350 ml











Microbiology








 Date/Time


Source Procedure


Growth Status


 


 


 11/24/19 09:15


Blood Blood Culture - Final


NO GROWTH AFTER 5 DAYS Complete





 11/25/19 11:00


Nasal Nares - Final Complete


 


 11/25/19 11:00


Nasal Nares - Final Complete


 


 11/26/19 03:00


Urine,Clean Catch Urine Culture - Final


Candida Parapsilosis Complete


 


 11/24/19 09:10


Rectum VRE Culture - Final


Enterococcus Faecium - Vre Complete








Current Medications








 Medications


  (Trade)  Dose


 Ordered  Sig/Jan


 Route


 PRN Reason  Start Time


 Stop Time Status Last Admin


Dose Admin


 


 Acetaminophen


  (Tylenol)  650 mg  Q4H  PRN


 GT


 Mild Pain/Temp > 100.6  11/26/19 18:42


 12/26/19 18:41  12/4/19 12:08


 


 


 Albuterol/


 Ipratropium


  (Albuterol/


 Ipratropium)  3 ml  Q4H  PRN


 HHN


 Shortness of Breath  11/30/19 12:20


 12/5/19 12:19   


 


 


 Artificial Tears


  (Akwa-Tears)  2 drop  Q12HR


 BOTH EYES


   11/26/19 21:00


 12/24/19 20:59  12/4/19 09:04


 


 


 Baclofen


  (Lioresal)  10 mg  THREE TIMES A  DAY


 GT


   11/27/19 09:00


 12/24/19 17:59  12/4/19 13:18


 


 


 Calcitonin [place]


  (Miacalcin)  1 sprays  DAILY


 NASAL


   11/28/19 12:00


 12/28/19 11:59  12/4/19 09:03


 


 


 Clobetasol


 Propionate


  (Temovate)  1 applic  EVERY 12  HOURS


 TOPIC


   11/26/19 21:00


 12/24/19 20:59  12/4/19 09:03


 


 


 Dextrose


  (Dextrose 50%)  25 ml  Q30M  PRN


 IV


 Hypoglycemia  11/26/19 18:45


 12/24/19 14:44   


 


 


 Dextrose


  (Dextrose 50%)  50 ml  Q30M  PRN


 IV


 Hypoglycemia  11/26/19 18:45


 12/24/19 14:44   


 


 


 Famotidine


  (Pepcid)  20 mg  BID


 GT


   11/28/19 18:00


 12/28/19 17:59  12/4/19 08:58


 


 


 Fenofibrate


  (Tricor)  145 mg  DAILY


 ORAL


   11/27/19 09:00


 12/25/19 08:59  12/4/19 09:12


 


 


 Heparin Sodium


  (Porcine)


  (Heparin 5000


 units/ml)  5,000 units  EVERY 12  HOURS


 SUBQ


   11/26/19 21:00


 12/24/19 20:59  12/4/19 09:01


 


 


 Insulin Aspart


  (NovoLOG)    EVERY 6  HOURS


 SUBQ


   11/30/19 12:00


 12/24/19 16:29  12/4/19 12:37


 


 


 Levetiracetam


  (Keppra)  1,000 mg  Q8H


 GT


   11/29/19 02:00


 12/29/19 01:59  12/4/19 09:04


 


 


 Ondansetron HCl


  (Zofran)  4 mg  Q6H  PRN


 IVP


 Nausea & Vomiting  11/26/19 18:44


 12/26/19 18:43   


 


 


 Polyethylene


 Glycol


  (Miralax)  17 gm  DAILYPRN  PRN


 GT


 Constipation  11/26/19 18:44


 12/26/19 18:43   


 


 


 Potassium Chloride


  (K-Dur)  40 meq  TWICE A  DAY


 GT


   12/2/19 18:00


 1/1/20 17:59  12/4/19 08:58


 


 


 Sitagliptin


 Phosphate


  (Januvia)  50 mg  ACBREAKFAST


 GT


   11/27/19 06:30


 12/25/19 06:29  12/4/19 05:43


 


 


 Tamsulosin HCl


  (Flomax)  0.4 mg  DAILY


 ORAL


   11/27/19 09:00


 12/27/19 08:59  12/4/19 08:58


 





Laboratory Tests


12/4/19 04:55: 


White Blood Count 10.9H, Red Blood Count 3.42L, Hemoglobin 10.1L, Hematocrit 

31.2L, Mean Corpuscular Volume 91, Mean Corpuscular Hemoglobin 29.5, Mean 

Corpuscular Hemoglobin Concent 32.4, Red Cell Distribution Width 15.3H, 

Platelet Count 367, Mean Platelet Volume 7.7, Neutrophils (%) (Auto) 74.8, 

Lymphocytes (%) (Auto) 14.1L, Monocytes (%) (Auto) 3.6, Eosinophils (%) (Auto) 

6.2H, Basophils (%) (Auto) 1.4, Sodium Level 138, Potassium Level 5.0, Chloride 

Level 104, Carbon Dioxide Level 25, Anion Gap 9, Blood Urea Nitrogen 6L, 

Creatinine 1.0, Estimat Glomerular Filtration Rate > 60, Glucose Level 158H, 

Calcium Level 10.3H, Phosphorus Level 3.1, Magnesium Level 2.1, Total Bilirubin 

0.3, Aspartate Amino Transf (AST/SGOT) 41H, Alanine Aminotransferase (ALT/SGPT) 

28, Alkaline Phosphatase 98, C-Reactive Protein, Quantitative 6.0H, Pro-B-Type 

Natriuretic Peptide 206H, Total Protein 7.5, Albumin 2.5L, Globulin 5.0, Albumin

/Globulin Ratio 0.5L


Height (Feet):  5


Height (Inches):  7.00


Weight (Pounds):  201


Objective


 exam stable


mayorga indwelling, blood-tinged/christine urine, some debris


minimal bleeding at penile meatus











Vinnie Pollard MD Dec 4, 2019 15:20

## 2019-12-04 NOTE — PULMONOLGY CRITICAL CARE NOTE
Critical Care - Asmt/Plan


Problems:  


(1) Acute on chronic respiratory failure


(2) Sepsis


(3) Diabetes mellitus


(4) Vegetative state


(5) Gastrostomy tube dependent


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


(7) ATN (acute tubular necrosis)


Respiratory:  monitor respiratory rate, adjust FIO2, CXR


Cardiac:  continue to monitor HR/BP, other - ? etiology of tachycardia


Renal:  F/U I&O, keep IV fluid, check electrolytes


Gastrointestinal:  continue feedings/current rate


Endocrine:  continue sliding scale insulin


Hematologic:  monitor H/H, transfuse if hgb<8.5


Neurologic:  PRN Ativan, keep patient comfortable


Affect:  PRN ativan


Prophylaxis:  Protonix, Heparin


Time Spent (Minutes):  40


Notes Reviewed:  internist, cardio, renal


Discussed with:  nurses, consultants, 





Critical Care - Objective





Last 24 Hour Vital Signs








  Date Time  Temp Pulse Resp B/P (MAP) Pulse Ox O2 Delivery O2 Flow Rate FiO2


 


12/4/19 12:00      Mechanical Ventilator  


 


12/4/19 11:02  139 19     30


 


12/4/19 09:18  117 16     30


 


12/4/19 08:00 98.2 115 14 125/77 (93) 98   


 


12/4/19 08:00        30


 


12/4/19 08:00      Mechanical Ventilator  


 


12/4/19 07:52  128      


 


12/4/19 07:48  114 19     30


 


12/4/19 04:38  108 19     30


 


12/4/19 04:07        30


 


12/4/19 04:06      Mechanical Ventilator  


 


12/4/19 04:00  109      


 


12/4/19 04:00 98.1 110 16 129/85 (100) 100   


 


12/4/19 03:20  112 17     30


 


12/4/19 01:05  111 15     30


 


12/4/19 00:00        30


 


12/4/19 00:00 98.5 113 16 131/82 (98) 100   


 


12/4/19 00:00      Mechanical Ventilator  


 


12/3/19 23:10  113 15     30


 


12/3/19 21:13  108 15     30


 


12/3/19 20:00      Mechanical Ventilator  


 


12/3/19 20:00 98.2 114 16 131/79 (96) 100   


 


12/3/19 20:00  113      


 


12/3/19 20:00        30


 


12/3/19 18:52  113 16     30


 


12/3/19 16:59  90 15     30


 


12/3/19 16:00        30


 


12/3/19 16:00 98.6 98 19 134/86 (102) 99   


 


12/3/19 16:00      Mechanical Ventilator  


 


12/3/19 15:28  102      


 


12/3/19 14:50  84 15     30


 


12/3/19 12:44  88 15     30








Status:  obtunded


Condition:  critical


Lungs:  clear, chest wall tender


Abdomen:  soft, non-tender


Extremities:  no C/C/E


Accucheck:  144





Critical Care - Subjective


ROS Limited/Unobtainable:  Yes


Condition:  critical


EKG Rhythm:  Sinus Rhythm


FI02:  30


Vent Support Breath Rate:  14


Vent Support Mode:  AC


Vent Tidal Volume:  600


Sputum Amount:  Scant


PEEP:  5.0


PIP:  32


Tube Feeding Amount:  65


I&O:











Intake and Output  


 


 12/3/19 12/4/19





 18:59 06:59


 


Intake Total 2248.327 ml 1822.5 ml


 


Output Total  4600 ml


 


Balance 2248.327 ml -2777.5 ml


 


  


 


Free Water  100 ml


 


IV Total 1438.327 ml 737.5 ml


 


Tube Feeding 450 ml 745 ml


 


Other 360 ml 240 ml


 


Output Urine Total  4250 ml


 


Stool Total  350 ml








CXR:


clear


Labs:





Laboratory Tests








Test


  12/4/19


04:55


 


White Blood Count


  10.9 K/UL


(4.8-10.8)  H


 


Red Blood Count


  3.42 M/UL


(4.70-6.10)  L


 


Hemoglobin


  10.1 G/DL


(14.2-18.0)  L


 


Hematocrit


  31.2 %


(42.0-52.0)  L


 


Mean Corpuscular Volume 91 FL (80-99)  


 


Mean Corpuscular Hemoglobin


  29.5 PG


(27.0-31.0)


 


Mean Corpuscular Hemoglobin


Concent 32.4 G/DL


(32.0-36.0)


 


Red Cell Distribution Width


  15.3 %


(11.6-14.8)  H


 


Platelet Count


  367 K/UL


(150-450)


 


Mean Platelet Volume


  7.7 FL


(6.5-10.1)


 


Neutrophils (%) (Auto)


  74.8 %


(45.0-75.0)


 


Lymphocytes (%) (Auto)


  14.1 %


(20.0-45.0)  L


 


Monocytes (%) (Auto)


  3.6 %


(1.0-10.0)


 


Eosinophils (%) (Auto)


  6.2 %


(0.0-3.0)  H


 


Basophils (%) (Auto)


  1.4 %


(0.0-2.0)


 


Sodium Level


  138 MMOL/L


(136-145)


 


Potassium Level


  5.0 MMOL/L


(3.5-5.1)


 


Chloride Level


  104 MMOL/L


()


 


Carbon Dioxide Level


  25 MMOL/L


(21-32)


 


Anion Gap


  9 mmol/L


(5-15)


 


Blood Urea Nitrogen


  6 mg/dL (7-18)


L


 


Creatinine


  1.0 MG/DL


(0.55-1.30)


 


Estimat Glomerular Filtration


Rate > 60 mL/min


(>60)


 


Glucose Level


  158 MG/DL


()  H


 


Calcium Level


  10.3 MG/DL


(8.5-10.1)  H


 


Phosphorus Level


  3.1 MG/DL


(2.5-4.9)


 


Magnesium Level


  2.1 MG/DL


(1.8-2.4)


 


Total Bilirubin


  0.3 MG/DL


(0.2-1.0)


 


Aspartate Amino Transf


(AST/SGOT) 41 U/L (15-37)


H


 


Alanine Aminotransferase


(ALT/SGPT) 28 U/L (12-78)


 


 


Alkaline Phosphatase


  98 U/L


()


 


C-Reactive Protein,


Quantitative 6.0 mg/dL


(0.00-0.90)  H


 


Pro-B-Type Natriuretic Peptide


  206 pg/mL


(0-125)  H


 


Total Protein


  7.5 G/DL


(6.4-8.2)


 


Albumin


  2.5 G/DL


(3.4-5.0)  L


 


Globulin 5.0 g/dL  


 


Albumin/Globulin Ratio


  0.5 (1.0-2.7)


L

















Yury Pena MD Dec 4, 2019 12:03

## 2019-12-04 NOTE — DIAGNOSTIC IMAGING REPORT
--------------- APPROVED REPORT --------------





CPT Code: 37346



Present Symptoms

Upper Extremity Edema: Left  





RIGHT  UPPER EXTREMITY: Imaging reveals patency of the internal jugular, subclavian, 

axillary and proximal brachial veins.  Doppler indicates normal spontaneous flow within 

these venous segments.

The mid to distal brachial veins were not imaged, due to bandages. The cephalic and 

basilic veins were not visualized. 

LEFT UPPER EXTREMITY: Venous imaging reveals patency of the internal jugular, subclavian, 

axillary and brachial veins. The cephalic and basilic veins are also patent.  Doppler 

indicates normal spontaneous flow within these venous segments.

## 2019-12-04 NOTE — DIAGNOSTIC IMAGING REPORT
--------------- APPROVED REPORT --------------





CPT Code: 58848



Present Symptoms

Shortness of breath 





BILATERAL: Imaging reveals a patent deep venous system bilaterally. There is no evidence 

of thrombus within the common femoral, superficial femoral, popliteal or tibial segments. 

The greater saphenous veins are within normal limits. Doppler indicates normal 

spontaneous flow within these segments.

## 2019-12-05 VITALS — SYSTOLIC BLOOD PRESSURE: 124 MMHG | DIASTOLIC BLOOD PRESSURE: 72 MMHG

## 2019-12-05 VITALS — DIASTOLIC BLOOD PRESSURE: 80 MMHG | SYSTOLIC BLOOD PRESSURE: 112 MMHG

## 2019-12-05 VITALS — SYSTOLIC BLOOD PRESSURE: 109 MMHG | DIASTOLIC BLOOD PRESSURE: 67 MMHG

## 2019-12-05 VITALS — DIASTOLIC BLOOD PRESSURE: 67 MMHG | SYSTOLIC BLOOD PRESSURE: 109 MMHG

## 2019-12-05 VITALS — DIASTOLIC BLOOD PRESSURE: 70 MMHG | SYSTOLIC BLOOD PRESSURE: 107 MMHG

## 2019-12-05 VITALS — SYSTOLIC BLOOD PRESSURE: 105 MMHG | DIASTOLIC BLOOD PRESSURE: 71 MMHG

## 2019-12-05 VITALS — SYSTOLIC BLOOD PRESSURE: 118 MMHG | DIASTOLIC BLOOD PRESSURE: 85 MMHG

## 2019-12-05 LAB
ADD MANUAL DIFF: YES
ALBUMIN SERPL-MCNC: 2.3 G/DL (ref 3.4–5)
ALBUMIN/GLOB SERPL: 0.5 {RATIO} (ref 1–2.7)
ALP SERPL-CCNC: 126 U/L (ref 46–116)
ALT SERPL-CCNC: 34 U/L (ref 12–78)
ANION GAP SERPL CALC-SCNC: 11 MMOL/L (ref 5–15)
ANION GAP SERPL CALC-SCNC: 6 MMOL/L (ref 5–15)
APPEARANCE UR: CLEAR
APTT PPP: (no result) S
AST SERPL-CCNC: 42 U/L (ref 15–37)
BILIRUB SERPL-MCNC: 0.5 MG/DL (ref 0.2–1)
BUN SERPL-MCNC: 9 MG/DL (ref 7–18)
BUN SERPL-MCNC: 9 MG/DL (ref 7–18)
CALCIUM SERPL-MCNC: 10.3 MG/DL (ref 8.5–10.1)
CALCIUM SERPL-MCNC: 9.8 MG/DL (ref 8.5–10.1)
CHLORIDE SERPL-SCNC: 104 MMOL/L (ref 98–107)
CHLORIDE SERPL-SCNC: 105 MMOL/L (ref 98–107)
CO2 SERPL-SCNC: 24 MMOL/L (ref 21–32)
CO2 SERPL-SCNC: 25 MMOL/L (ref 21–32)
CREAT SERPL-MCNC: 1.1 MG/DL (ref 0.55–1.3)
CREAT SERPL-MCNC: 1.3 MG/DL (ref 0.55–1.3)
ERYTHROCYTE [DISTWIDTH] IN BLOOD BY AUTOMATED COUNT: 15.4 % (ref 11.6–14.8)
GLOBULIN SER-MCNC: 4.8 G/DL
GLUCOSE UR STRIP-MCNC: (no result) MG/DL
HCT VFR BLD CALC: 30.2 % (ref 42–52)
HGB BLD-MCNC: 9.5 G/DL (ref 14.2–18)
KETONES UR QL STRIP: NEGATIVE
LEUKOCYTE ESTERASE UR QL STRIP: (no result)
MCV RBC AUTO: 91 FL (ref 80–99)
NITRITE UR QL STRIP: NEGATIVE
PH UR STRIP: 6 [PH] (ref 4.5–8)
PLATELET # BLD: 361 K/UL (ref 150–450)
POTASSIUM SERPL-SCNC: 4.8 MMOL/L (ref 3.5–5.1)
POTASSIUM SERPL-SCNC: 6 MMOL/L (ref 3.5–5.1)
PROT UR QL STRIP: (no result)
RBC # BLD AUTO: 3.33 M/UL (ref 4.7–6.1)
SODIUM SERPL-SCNC: 135 MMOL/L (ref 136–145)
SODIUM SERPL-SCNC: 140 MMOL/L (ref 136–145)
SP GR UR STRIP: 1.01 (ref 1–1.03)
UROBILINOGEN UR-MCNC: NORMAL MG/DL (ref 0–1)
WBC # BLD AUTO: 33.9 K/UL (ref 4.8–10.8)

## 2019-12-05 RX ADMIN — CLOBETASOL PROPIONATE SCH APPLIC: 0.5 OINTMENT TOPICAL at 20:09

## 2019-12-05 RX ADMIN — ACETAMINOPHEN PRN MG: 160 SOLUTION ORAL at 00:03

## 2019-12-05 RX ADMIN — SITAGLIPTIN SCH MG: 50 TABLET, FILM COATED ORAL at 05:51

## 2019-12-05 RX ADMIN — INSULIN ASPART SCH UNITS: 100 INJECTION, SOLUTION INTRAVENOUS; SUBCUTANEOUS at 23:25

## 2019-12-05 RX ADMIN — INSULIN ASPART SCH UNITS: 100 INJECTION, SOLUTION INTRAVENOUS; SUBCUTANEOUS at 17:39

## 2019-12-05 RX ADMIN — LEVETIRACETAM SCH MG: 100 SOLUTION ORAL at 10:05

## 2019-12-05 RX ADMIN — LEVETIRACETAM SCH MG: 100 SOLUTION ORAL at 18:07

## 2019-12-05 RX ADMIN — HEPARIN SODIUM SCH UNITS: 5000 INJECTION INTRAVENOUS; SUBCUTANEOUS at 10:09

## 2019-12-05 RX ADMIN — INSULIN ASPART SCH UNITS: 100 INJECTION, SOLUTION INTRAVENOUS; SUBCUTANEOUS at 12:50

## 2019-12-05 RX ADMIN — INSULIN ASPART SCH UNITS: 100 INJECTION, SOLUTION INTRAVENOUS; SUBCUTANEOUS at 05:49

## 2019-12-05 RX ADMIN — MEROPENEM SCH MLS/HR: 1 INJECTION INTRAVENOUS at 14:38

## 2019-12-05 RX ADMIN — CLOBETASOL PROPIONATE SCH APPLIC: 0.5 OINTMENT TOPICAL at 10:14

## 2019-12-05 RX ADMIN — Medication SCH MLS/HR: at 15:30

## 2019-12-05 RX ADMIN — HEPARIN SODIUM SCH UNITS: 5000 INJECTION INTRAVENOUS; SUBCUTANEOUS at 20:11

## 2019-12-05 RX ADMIN — ACETAMINOPHEN PRN MG: 160 SOLUTION ORAL at 05:51

## 2019-12-05 RX ADMIN — MEROPENEM SCH MLS/HR: 1 INJECTION INTRAVENOUS at 22:36

## 2019-12-05 RX ADMIN — LEVETIRACETAM SCH MG: 100 SOLUTION ORAL at 01:10

## 2019-12-05 RX ADMIN — ACETAMINOPHEN PRN MG: 160 SOLUTION ORAL at 20:13

## 2019-12-05 RX ADMIN — TAMSULOSIN HYDROCHLORIDE SCH MG: 0.4 CAPSULE ORAL at 10:05

## 2019-12-05 NOTE — PULMONOLGY CRITICAL CARE NOTE
Critical Care - Asmt/Plan


Problems:  


(1) Acute on chronic respiratory failure


(2) Sepsis


(3) Diabetes mellitus


(4) Vegetative state


(5) Gastrostomy tube dependent


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


(7) ATN (acute tubular necrosis)


Respiratory:  monitor respiratory rate, adjust FIO2, CXR


Cardiac:  continue to monitor HR/BP


Renal:  F/U I&O, keep IV fluid


Gastrointestinal:  continue feedings/current rate


Endocrine:  monitor blood sugar


Hematologic:  monitor H/H


Neurologic:  PRN Ativan


Prophylaxis:  Protonix


Notes Reviewed:  internist, ID


Discussed with:  nurses, consultants, 





Critical Care - Objective





Last 24 Hour Vital Signs








  Date Time  Temp Pulse Resp B/P (MAP) Pulse Ox O2 Delivery O2 Flow Rate FiO2


 


12/5/19 10:50  119 19     30


 


12/5/19 08:48  120 15     30


 


12/5/19 08:00      Mechanical Ventilator  


 


12/5/19 08:00 98.0 118 16 105/71 (82) 100   


 


12/5/19 08:00  126      


 


12/5/19 08:00        30


 


12/5/19 06:53  121 16     30


 


12/5/19 04:54  128 16     30


 


12/5/19 04:00      Mechanical Ventilator  


 


12/5/19 04:00 100.0 136 18 112/80 (91) 99   


 


12/5/19 04:00  128      


 


12/5/19 04:00        30


 


12/5/19 03:24  139 18     30


 


12/5/19 00:36  139 19     30


 


12/5/19 00:33 99.7       


 


12/5/19 00:00        30


 


12/5/19 00:00      Mechanical Ventilator  


 


12/5/19 00:00  146      


 


12/5/19 00:00 100.3 156 18 124/72 (89) 99   


 


12/4/19 22:58  152 18     30


 


12/4/19 20:43  154 21     30


 


12/4/19 20:00 100.0 160 17 112/78 (89) 99   


 


12/4/19 20:00  167      


 


12/4/19 20:00        30


 


12/4/19 20:00      Mechanical Ventilator  


 


12/4/19 19:47  157 18     30


 


12/4/19 19:47  157 18  100 Mechanical Ventilator  30


 


12/4/19 18:43 99.0 145 16 129/79 (96) 100   


 


12/4/19 17:14  107 18     30


 


12/4/19 16:00      Mechanical Ventilator  


 


12/4/19 16:00 99.8 131 16 143/98 (113) 100   


 


12/4/19 16:00        30


 


12/4/19 15:19  113      


 


12/4/19 15:08  131 19     30


 


12/4/19 13:03  113 20     30


 


12/4/19 12:00 100.0 134 15 115/70 (85) 100   


 


12/4/19 12:00        30


 


12/4/19 12:00      Mechanical Ventilator  








Status:  other - open eyes,


Condition:  critical


HEENT:  atraumatic


Neck:  trach


Lungs:  rales, rhonchi


Heart:  HR/BP stable


Abdomen:  soft, non-tender


Accucheck:  202





Critical Care - Subjective


ROS Limited/Unobtainable:  No


Condition:  critical


EKG Rhythm:  Sinus Rhythm


FI02:  30


Vent Support Breath Rate:  14


Vent Support Mode:  AC


Vent Tidal Volume:  600


Sputum Amount:  Small


PEEP:  5.0


PIP:  38


Tube Feeding Amount:  65


I&O:











Intake and Output  


 


 12/4/19 12/5/19





 19:00 07:00


 


Intake Total 1280 ml 2866.6 ml


 


Output Total 1400 ml 1000 ml


 


Balance -120 ml 1866.6 ml


 


  


 


Free Water 200 ml 130 ml


 


IV Total  1741.6 ml


 


Tube Feeding 780 ml 845 ml


 


Other 300 ml 150 ml


 


Output Urine Total 1200 ml 600 ml


 


Stool Total 200 ml 400 ml








CXR:


CXR ordered for today b/o fever and new leukocytosis


Labs:





Laboratory Tests








Test


  12/4/19


19:17 12/5/19


05:25


 


Arterial Blood pH


  7.438


(7.350-7.450) 


 


 


Arterial Blood Partial


Pressure CO2 30.0 mmHg


(35.0-45.0)  L 


 


 


Arterial Blood Partial


Pressure O2 111.0 mmHg


(75.0-100.0)  H 


 


 


Arterial Blood HCO3


  19.8 mmol/L


(22.0-26.0)  L 


 


 


Arterial Blood Oxygen


Saturation 97.6 %


() 


 


 


Arterial Blood Base Excess -3.4 (-2-2)  L 


 


Arsh Test Positive   


 


White Blood Count


  


  33.9 K/UL


(4.8-10.8)  #*H


 


Red Blood Count


  


  3.33 M/UL


(4.70-6.10)  L


 


Hemoglobin


  


  9.5 G/DL


(14.2-18.0)  L


 


Hematocrit


  


  30.2 %


(42.0-52.0)  L


 


Mean Corpuscular Volume  91 FL (80-99)  


 


Mean Corpuscular Hemoglobin


  


  28.6 PG


(27.0-31.0)


 


Mean Corpuscular Hemoglobin


Concent 


  31.5 G/DL


(32.0-36.0)  L


 


Red Cell Distribution Width


  


  15.4 %


(11.6-14.8)  H


 


Platelet Count


  


  361 K/UL


(150-450)


 


Mean Platelet Volume


  


  8.0 FL


(6.5-10.1)


 


Neutrophils (%) (Auto)


  


  % (45.0-75.0)


 


 


Lymphocytes (%) (Auto)


  


  % (20.0-45.0)


 


 


Monocytes (%) (Auto)   % (1.0-10.0)  


 


Eosinophils (%) (Auto)   % (0.0-3.0)  


 


Basophils (%) (Auto)   % (0.0-2.0)  


 


Differential Total Cells


Counted 


  100  


 


 


Neutrophils % (Manual)  83 % (45-75)  H


 


Lymphocytes % (Manual)  4 % (20-45)  L


 


Monocytes % (Manual)  2 % (1-10)  


 


Eosinophils % (Manual)  1 % (0-3)  


 


Basophils % (Manual)  0 % (0-2)  


 


Band Neutrophils  10 % (0-8)  H


 


Nucleated Red Blood Cells  1 /100 WBC  


 


Platelet Estimate  Adequate  


 


Platelet Morphology  Normal  


 


Hypochromasia  2+  


 


Anisocytosis  1+  


 


Sodium Level


  


  135 MMOL/L


(136-145)  L


 


Potassium Level


  


  6.0 MMOL/L


(3.5-5.1)  *H


 


Chloride Level


  


  104 MMOL/L


()


 


Carbon Dioxide Level


  


  25 MMOL/L


(21-32)


 


Anion Gap


  


  6 mmol/L


(5-15)


 


Blood Urea Nitrogen


  


  9 mg/dL (7-18)


 


 


Creatinine


  


  1.3 MG/DL


(0.55-1.30)


 


Estimat Glomerular Filtration


Rate 


  > 60 mL/min


(>60)


 


Glucose Level


  


  222 MG/DL


()  H


 


Calcium Level


  


  9.8 MG/DL


(8.5-10.1)


 


Magnesium Level


  


  1.6 MG/DL


(1.8-2.4)  L


 


Total Bilirubin


  


  0.5 MG/DL


(0.2-1.0)


 


Aspartate Amino Transf


(AST/SGOT) 


  42 U/L (15-37)


H


 


Alanine Aminotransferase


(ALT/SGPT) 


  34 U/L (12-78)


 


 


Alkaline Phosphatase


  


  126 U/L


()  H


 


Total Protein


  


  7.1 G/DL


(6.4-8.2)


 


Albumin


  


  2.3 G/DL


(3.4-5.0)  L


 


Globulin  4.8 g/dL  


 


Albumin/Globulin Ratio


  


  0.5 (1.0-2.7)


L

















Yury Pena MD Dec 5, 2019 12:01

## 2019-12-05 NOTE — NEPHROLOGY PROGRESS NOTE
Assessment/Plan


Problem List:  


(1) Urinary retention


(2) Acute on chronic respiratory failure


(3) Hypercalcemia


(4) Renal failure (ARF), acute on chronic


Assessment





Hypercalcemia


Urinary retention, BPH , 


Acute on chronic renal failure


Electrolyte imbalance 


Colostomy


DM


PEG


Vegetative state


Acute on chronic respiratory failure


Plan





recheck K


Free water-


K and Phos and Mag supplements as needed


pulm support


Aredia for high Ca on 11/28 redose today 12/3





Subjective


ROS Limited/Unobtainable:  Yes





Objective


Objective





Last 24 Hour Vital Signs








  Date Time  Temp Pulse Resp B/P (MAP) Pulse Ox O2 Delivery O2 Flow Rate FiO2


 


12/5/19 12:39  127 19     30


 


12/5/19 12:00        30


 


12/5/19 12:00      Mechanical Ventilator  


 


12/5/19 10:50  119 19     30


 


12/5/19 08:48  120 15     30


 


12/5/19 08:00      Mechanical Ventilator  


 


12/5/19 08:00 98.0 118 16 105/71 (82) 100   


 


12/5/19 08:00  126      


 


12/5/19 08:00        30


 


12/5/19 06:53  121 16     30


 


12/5/19 04:54  128 16     30


 


12/5/19 04:00      Mechanical Ventilator  


 


12/5/19 04:00 100.0 136 18 112/80 (91) 99   


 


12/5/19 04:00  128      


 


12/5/19 04:00        30


 


12/5/19 03:24  139 18     30


 


12/5/19 00:36  139 19     30


 


12/5/19 00:33 99.7       


 


12/5/19 00:00        30


 


12/5/19 00:00      Mechanical Ventilator  


 


12/5/19 00:00  146      


 


12/5/19 00:00 100.3 156 18 124/72 (89) 99   


 


12/4/19 22:58  152 18     30


 


12/4/19 20:43  154 21     30


 


12/4/19 20:00 100.0 160 17 112/78 (89) 99   


 


12/4/19 20:00  167      


 


12/4/19 20:00        30


 


12/4/19 20:00      Mechanical Ventilator  


 


12/4/19 19:47  157 18     30


 


12/4/19 19:47  157 18  100 Mechanical Ventilator  30


 


12/4/19 18:43 99.0 145 16 129/79 (96) 100   


 


12/4/19 17:14  107 18     30


 


12/4/19 16:00      Mechanical Ventilator  


 


12/4/19 16:00 99.8 131 16 143/98 (113) 100   


 


12/4/19 16:00        30


 


12/4/19 15:19  113      


 


12/4/19 15:08  131 19     30

















Intake and Output  


 


 12/4/19 12/5/19





 19:00 07:00


 


Intake Total 1280 ml 2866.6 ml


 


Output Total 1400 ml 1000 ml


 


Balance -120 ml 1866.6 ml


 


  


 


Free Water 200 ml 130 ml


 


IV Total  1741.6 ml


 


Tube Feeding 780 ml 845 ml


 


Other 300 ml 150 ml


 


Output Urine Total 1200 ml 600 ml


 


Stool Total 200 ml 400 ml








Laboratory Tests


12/4/19 19:17: 


Arterial Blood pH 7.438, Arterial Blood Partial Pressure CO2 30.0L, Arterial 

Blood Partial Pressure O2 111.0H, Arterial Blood HCO3 19.8L, Arterial Blood 

Oxygen Saturation 97.6, Arterial Blood Base Excess -3.4L, Arsh Test Positive


12/5/19 05:25: 


White Blood Count 33.9#*H, Red Blood Count 3.33L, Hemoglobin 9.5L, Hematocrit 

30.2L, Mean Corpuscular Volume 91, Mean Corpuscular Hemoglobin 28.6, Mean 

Corpuscular Hemoglobin Concent 31.5L, Red Cell Distribution Width 15.4H, 

Platelet Count 361, Mean Platelet Volume 8.0, Neutrophils (%) (Auto) , 

Lymphocytes (%) (Auto) , Monocytes (%) (Auto) , Eosinophils (%) (Auto) , 

Basophils (%) (Auto) , Differential Total Cells Counted 100, Neutrophils % (

Manual) 83H, Lymphocytes % (Manual) 4L, Monocytes % (Manual) 2, Eosinophils % (

Manual) 1, Basophils % (Manual) 0, Band Neutrophils 10H, Nucleated Red Blood 

Cells 1, Platelet Estimate Adequate, Platelet Morphology Normal, Hypochromasia 2

+, Anisocytosis 1+, Sodium Level 135L, Potassium Level 6.0*H, Chloride Level 104

, Carbon Dioxide Level 25, Anion Gap 6, Blood Urea Nitrogen 9, Creatinine 1.3, 

Estimat Glomerular Filtration Rate > 60, Glucose Level 222H, Calcium Level 9.8, 

Magnesium Level 1.6L, Total Bilirubin 0.5, Aspartate Amino Transf (AST/SGOT) 42H

, Alanine Aminotransferase (ALT/SGPT) 34, Alkaline Phosphatase 126H, Total 

Protein 7.1, Albumin 2.3L, Globulin 4.8, Albumin/Globulin Ratio 0.5L


Height (Feet):  5


Height (Inches):  7.00


Weight (Pounds):  203


General Appearance:  no apparent distress


Neck:  limited range of motion


Cardiovascular:  tachycardia


Respiratory/Chest:  decreased breath sounds


Abdomen:  soft, distended


Objective


no change











Simone Rocha MD Dec 5, 2019 13:59

## 2019-12-05 NOTE — PROGRESS NOTE
DATE:  12/04/2019

CARDIOLOGY PROGRESS NOTE AND CRITICAL CARE



SUBJECTIVE:  The patient is now tachycardic again.  Sinus tachycardia up to

150.  EKG revealed sinus tachycardia with nonspecific ST changes.  The

patient is also developing fevers up to 100.3.



OBJECTIVE:

VITAL SIGNS:  Blood pressure 129/79, pulse 145, and respirations

16.

GENERAL:  Noncommunicative.

HEENT:  Trach site with thin secretions.

LUNGS:  Bilateral rhonchi.

HEART:  Regular rhythm.  Rapid rate.  Normal S1, S2.

ABDOMEN:  Colostomy and gastrostomy tube sites noted.

EXTREMITIES:  With dependent edema.



LABORATORY DATA:  November 25, 2019 venous duplex was negative for DVT of

the lower extremities and November 29, 2019 venous duplex of the upper

extremities was negative as well.



Labs today notable for white count 10.9 and hemoglobin 10.1.  Potassium

5, BUN 6, and creatinine 1.  Natriuretic peptide 200.  ABG, pH 7.43, pCO2

30, and pO2 111.



IMPRESSION:

1. Secondary sinus tachycardia.

2. Probable new infection and sepsis.

3. Ventilator-dependent respiratory failure.



PLAN:

1. IV fluids.

2. Discontinue beta agonist inhalation therapy.

3. Follow up laboratory studies.

4. DVT prophylaxis.

5. No role for antiarrhythmics at this time.









  ______________________________________________

  Robert Chan M.D.





DR:  KIZZY

D:  12/05/2019 01:42

T:  12/05/2019 02:55

JOB#:  [id number]

CC:

## 2019-12-05 NOTE — UROLOGY PROGRESS NOTE
Assessment/Plan


Status:  stable


Assessment/Plan:


1. Urinary retention.


2. BPH.


3. Neurogenic bladder.


4. Incontinence.


5. Pyuria/UTI/colonized.


6. Hematuria.


7. Proteinuria.


8. Renal insufficiency, acute possibly on chronic.


9. Renal cyst.


10. Nephrolithiasis.


11. POD # 7, cysto/optic urethrotomy.





monitor clinically


mayorga placed 11/27


hand irrigated and do PRN


abx as ordered


consider antifungals, per ID


monitor WBC spike





Subjective


Allergies:  


Coded Allergies:  


     AZTREONAM (Verified  Allergy, Unknown, 7/23/18)


Subjective


all noted, mayorga draining, non-verbal





Objective





Last 24 Hour Vital Signs








  Date Time  Temp Pulse Resp B/P (MAP) Pulse Ox O2 Delivery O2 Flow Rate FiO2


 


12/5/19 08:48  120 15     30


 


12/5/19 08:00      Mechanical Ventilator  


 


12/5/19 08:00 98.0 118 16 105/71 (82) 100   


 


12/5/19 08:00  126      


 


12/5/19 08:00        30


 


12/5/19 06:53  121 16     30


 


12/5/19 04:54  128 16     30


 


12/5/19 04:00      Mechanical Ventilator  


 


12/5/19 04:00 100.0 136 18 112/80 (91) 99   


 


12/5/19 04:00  128      


 


12/5/19 04:00        30


 


12/5/19 03:24  139 18     30


 


12/5/19 00:36  139 19     30


 


12/5/19 00:33 99.7       


 


12/5/19 00:00        30


 


12/5/19 00:00      Mechanical Ventilator  


 


12/5/19 00:00  146      


 


12/5/19 00:00 100.3 156 18 124/72 (89) 99   


 


12/4/19 22:58  152 18     30


 


12/4/19 20:43  154 21     30


 


12/4/19 20:00 100.0 160 17 112/78 (89) 99   


 


12/4/19 20:00  167      


 


12/4/19 20:00        30


 


12/4/19 20:00      Mechanical Ventilator  


 


12/4/19 19:47  157 18     30


 


12/4/19 19:47  157 18  100 Mechanical Ventilator  30


 


12/4/19 18:43 99.0 145 16 129/79 (96) 100   


 


12/4/19 17:14  107 18     30


 


12/4/19 16:00      Mechanical Ventilator  


 


12/4/19 16:00 99.8 131 16 143/98 (113) 100   


 


12/4/19 16:00        30


 


12/4/19 15:19  113      


 


12/4/19 15:08  131 19     30


 


12/4/19 13:03  113 20     30


 


12/4/19 12:00 100.0 134 15 115/70 (85) 100   


 


12/4/19 12:00        30


 


12/4/19 12:00      Mechanical Ventilator  


 


12/4/19 11:20  138      


 


12/4/19 11:02  139 19     30


 


12/4/19 09:18  117 16     30

















Intake and Output  


 


 12/4/19 12/5/19





 19:00 07:00


 


Intake Total 1280 ml 2741.6 ml


 


Output Total 1400 ml 1000 ml


 


Balance -120 ml 1741.6 ml


 


  


 


Free Water 200 ml 130 ml


 


IV Total  1616.6 ml


 


Tube Feeding 780 ml 845 ml


 


Other 300 ml 150 ml


 


Output Urine Total 1200 ml 600 ml


 


Stool Total 200 ml 400 ml











Microbiology








 Date/Time


Source Procedure


Growth Status


 


 


 11/24/19 09:15


Blood Blood Culture - Final


NO GROWTH AFTER 5 DAYS Complete





 11/25/19 11:00


Nasal Nares - Final Complete


 


 11/25/19 11:00


Nasal Nares - Final Complete


 


 11/26/19 03:00


Urine,Clean Catch Urine Culture - Final


Candida Parapsilosis Complete


 


 11/24/19 09:10


Rectum VRE Culture - Final


Enterococcus Faecium - Vre Complete








Current Medications








 Medications


  (Trade)  Dose


 Ordered  Sig/Jan


 Route


 PRN Reason  Start Time


 Stop Time Status Last Admin


Dose Admin


 


 Acetaminophen


  (Tylenol)  650 mg  Q4H  PRN


 GT


 Mild Pain/Temp > 100.6  11/26/19 18:42


 12/26/19 18:41  12/5/19 05:51


 


 


 Artificial Tears


  (Akwa-Tears)  2 drop  Q12HR


 BOTH EYES


   11/26/19 21:00


 12/24/19 20:59  12/4/19 20:20


 


 


 Baclofen


  (Lioresal)  10 mg  THREE TIMES A  DAY


 GT


   11/27/19 09:00


 12/24/19 17:59  12/4/19 17:19


 


 


 Calcitonin [place]


  (Miacalcin)  1 sprays  DAILY


 NASAL


   11/28/19 12:00


 12/28/19 11:59  12/4/19 09:03


 


 


 Clobetasol


 Propionate


  (Temovate)  1 applic  EVERY 12  HOURS


 TOPIC


   11/26/19 21:00


 12/24/19 20:59  12/4/19 20:20


 


 


 Dextrose


  (Dextrose 50%)  25 ml  Q30M  PRN


 IV


 Hypoglycemia  11/26/19 18:45


 12/24/19 14:44   


 


 


 Dextrose


  (Dextrose 50%)  50 ml  Q30M  PRN


 IV


 Hypoglycemia  11/26/19 18:45


 12/24/19 14:44   


 


 


 Famotidine


  (Pepcid)  20 mg  BID


 GT


   11/28/19 18:00


 12/28/19 17:59  12/4/19 17:19


 


 


 Fenofibrate


  (Tricor)  145 mg  DAILY


 ORAL


   11/27/19 09:00


 12/25/19 08:59  12/4/19 09:12


 


 


 Heparin Sodium


  (Porcine)


  (Heparin 5000


 units/ml)  5,000 units  EVERY 12  HOURS


 SUBQ


   11/26/19 21:00


 12/24/19 20:59  12/4/19 20:24


 


 


 Insulin Aspart


  (NovoLOG)    EVERY 6  HOURS


 SUBQ


   11/30/19 12:00


 12/24/19 16:29  12/5/19 05:49


 


 


 Levetiracetam


  (Keppra)  1,000 mg  Q8H


 GT


   11/29/19 02:00


 12/29/19 01:59  12/5/19 01:10


 


 


 Ondansetron HCl


  (Zofran)  4 mg  Q6H  PRN


 IVP


 Nausea & Vomiting  11/26/19 18:44


 12/26/19 18:43   


 


 


 Polyethylene


 Glycol


  (Miralax)  17 gm  DAILYPRN  PRN


 GT


 Constipation  11/26/19 18:44


 12/26/19 18:43   


 


 


 Sitagliptin


 Phosphate


  (Januvia)  50 mg  ACBREAKFAST


 GT


   11/27/19 06:30


 12/25/19 06:29  12/5/19 05:51


 


 


 Sodium Chloride  1,000 ml @ 


 125 mls/hr  Q8H


 IV


   12/5/19 01:45


 1/4/20 01:44  12/5/19 03:32


 


 


 Tamsulosin HCl


  (Flomax)  0.4 mg  DAILY


 ORAL


   11/27/19 09:00


 12/27/19 08:59  12/4/19 08:58


 





Laboratory Tests


12/4/19 19:17: 


Arterial Blood pH 7.438, Arterial Blood Partial Pressure CO2 30.0L, Arterial 

Blood Partial Pressure O2 111.0H, Arterial Blood HCO3 19.8L, Arterial Blood 

Oxygen Saturation 97.6, Arterial Blood Base Excess -3.4L, Arsh Test Positive


12/5/19 05:25: 


White Blood Count 33.9#*H, Red Blood Count 3.33L, Hemoglobin 9.5L, Hematocrit 

30.2L, Mean Corpuscular Volume 91, Mean Corpuscular Hemoglobin 28.6, Mean 

Corpuscular Hemoglobin Concent 31.5L, Red Cell Distribution Width 15.4H, 

Platelet Count 361, Mean Platelet Volume 8.0, Neutrophils (%) (Auto) , 

Lymphocytes (%) (Auto) , Monocytes (%) (Auto) , Eosinophils (%) (Auto) , 

Basophils (%) (Auto) , Differential Total Cells Counted 100, Neutrophils % (

Manual) 83H, Lymphocytes % (Manual) 4L, Monocytes % (Manual) 2, Eosinophils % (

Manual) 1, Basophils % (Manual) 0, Band Neutrophils 10H, Nucleated Red Blood 

Cells 1, Platelet Estimate Adequate, Platelet Morphology Normal, Hypochromasia 2

+, Anisocytosis 1+, Sodium Level 135L, Potassium Level 6.0*H, Chloride Level 104

, Carbon Dioxide Level 25, Anion Gap 6, Blood Urea Nitrogen 9, Creatinine 1.3, 

Estimat Glomerular Filtration Rate > 60, Glucose Level 222H, Calcium Level 9.8, 

Magnesium Level 1.6L, Total Bilirubin 0.5, Aspartate Amino Transf (AST/SGOT) 42H

, Alanine Aminotransferase (ALT/SGPT) 34, Alkaline Phosphatase 126H, Total 

Protein 7.1, Albumin 2.3L, Globulin 4.8, Albumin/Globulin Ratio 0.5L


Height (Feet):  5


Height (Inches):  7.00


Weight (Pounds):  203


Objective


 exam stable


mayorga indwelling, blood-tinged/christine urine, some debris


minimal bleeding at penile meatus











Vinnie Pollard MD Dec 5, 2019 09:13

## 2019-12-05 NOTE — DIAGNOSTIC IMAGING REPORT
Indication: Dyspnea

 

Technique: One view of the chest

 

Comparison: 12/2/2019

 

Findings: Left pleural effusion is unchanged. Left perihilar atelectasis is

unchanged. Tracheostomy again demonstrated.

 

Impression:  Unchanged, over one day, findings as above.

## 2019-12-05 NOTE — CARDIOLOGY REPORT
--------------- APPROVED REPORT --------------





EKG Measurement

Heart [id number]

[place] [id number]

[id number]

[id number]

[id number]





Sinus tachycardia

Nonspecific T wave abnormality

Abnormal ECG

## 2019-12-06 VITALS — DIASTOLIC BLOOD PRESSURE: 71 MMHG | SYSTOLIC BLOOD PRESSURE: 129 MMHG

## 2019-12-06 VITALS — DIASTOLIC BLOOD PRESSURE: 89 MMHG | SYSTOLIC BLOOD PRESSURE: 124 MMHG

## 2019-12-06 VITALS — DIASTOLIC BLOOD PRESSURE: 60 MMHG | SYSTOLIC BLOOD PRESSURE: 104 MMHG

## 2019-12-06 VITALS — SYSTOLIC BLOOD PRESSURE: 127 MMHG | DIASTOLIC BLOOD PRESSURE: 74 MMHG

## 2019-12-06 VITALS — SYSTOLIC BLOOD PRESSURE: 111 MMHG | DIASTOLIC BLOOD PRESSURE: 59 MMHG

## 2019-12-06 VITALS — SYSTOLIC BLOOD PRESSURE: 108 MMHG | DIASTOLIC BLOOD PRESSURE: 61 MMHG

## 2019-12-06 LAB
ADD MANUAL DIFF: YES
ALBUMIN SERPL-MCNC: 2 G/DL (ref 3.4–5)
ALBUMIN/GLOB SERPL: 0.4 {RATIO} (ref 1–2.7)
ALP SERPL-CCNC: 147 U/L (ref 46–116)
ALT SERPL-CCNC: 25 U/L (ref 12–78)
ANION GAP SERPL CALC-SCNC: 9 MMOL/L (ref 5–15)
APTT BLD: 33 SEC (ref 23–33)
AST SERPL-CCNC: 27 U/L (ref 15–37)
BILIRUB SERPL-MCNC: 0.4 MG/DL (ref 0.2–1)
BUN SERPL-MCNC: 14 MG/DL (ref 7–18)
CALCIUM SERPL-MCNC: 9.9 MG/DL (ref 8.5–10.1)
CHLORIDE SERPL-SCNC: 106 MMOL/L (ref 98–107)
CO2 SERPL-SCNC: 23 MMOL/L (ref 21–32)
CREAT SERPL-MCNC: 1 MG/DL (ref 0.55–1.3)
ERYTHROCYTE [DISTWIDTH] IN BLOOD BY AUTOMATED COUNT: 15.4 % (ref 11.6–14.8)
GLOBULIN SER-MCNC: 4.6 G/DL
HCT VFR BLD CALC: 26.9 % (ref 42–52)
HGB BLD-MCNC: 8.8 G/DL (ref 14.2–18)
INR PPP: 1.1 (ref 0.9–1.1)
MCV RBC AUTO: 91 FL (ref 80–99)
PHOSPHATE SERPL-MCNC: 3.4 MG/DL (ref 2.5–4.9)
PLATELET # BLD: 279 K/UL (ref 150–450)
POTASSIUM SERPL-SCNC: 4 MMOL/L (ref 3.5–5.1)
RBC # BLD AUTO: 2.95 M/UL (ref 4.7–6.1)
SODIUM SERPL-SCNC: 138 MMOL/L (ref 136–145)
WBC # BLD AUTO: 19.2 K/UL (ref 4.8–10.8)

## 2019-12-06 RX ADMIN — LEVETIRACETAM SCH MG: 100 SOLUTION ORAL at 17:31

## 2019-12-06 RX ADMIN — SITAGLIPTIN SCH MG: 50 TABLET, FILM COATED ORAL at 05:33

## 2019-12-06 RX ADMIN — Medication SCH MLS/HR: at 15:10

## 2019-12-06 RX ADMIN — CHLORHEXIDINE GLUCONATE SCH APPLIC: 213 SOLUTION TOPICAL at 20:20

## 2019-12-06 RX ADMIN — MEROPENEM SCH MLS/HR: 1 INJECTION INTRAVENOUS at 15:09

## 2019-12-06 RX ADMIN — ACETAMINOPHEN PRN MG: 160 SOLUTION ORAL at 05:34

## 2019-12-06 RX ADMIN — CLOBETASOL PROPIONATE SCH APPLIC: 0.5 OINTMENT TOPICAL at 20:53

## 2019-12-06 RX ADMIN — LEVETIRACETAM SCH MG: 100 SOLUTION ORAL at 10:17

## 2019-12-06 RX ADMIN — INSULIN ASPART SCH UNITS: 100 INJECTION, SOLUTION INTRAVENOUS; SUBCUTANEOUS at 17:37

## 2019-12-06 RX ADMIN — INSULIN ASPART SCH UNITS: 100 INJECTION, SOLUTION INTRAVENOUS; SUBCUTANEOUS at 05:36

## 2019-12-06 RX ADMIN — TAMSULOSIN HYDROCHLORIDE SCH MG: 0.4 CAPSULE ORAL at 08:17

## 2019-12-06 RX ADMIN — INSULIN ASPART SCH UNITS: 100 INJECTION, SOLUTION INTRAVENOUS; SUBCUTANEOUS at 12:10

## 2019-12-06 RX ADMIN — MEROPENEM SCH MLS/HR: 1 INJECTION INTRAVENOUS at 05:33

## 2019-12-06 RX ADMIN — LEVETIRACETAM SCH MG: 100 SOLUTION ORAL at 02:23

## 2019-12-06 RX ADMIN — MEROPENEM SCH MLS/HR: 1 INJECTION INTRAVENOUS at 21:35

## 2019-12-06 RX ADMIN — HEPARIN SODIUM SCH UNITS: 5000 INJECTION INTRAVENOUS; SUBCUTANEOUS at 20:54

## 2019-12-06 RX ADMIN — CLOBETASOL PROPIONATE SCH APPLIC: 0.5 OINTMENT TOPICAL at 08:21

## 2019-12-06 RX ADMIN — HEPARIN SODIUM SCH UNITS: 5000 INJECTION INTRAVENOUS; SUBCUTANEOUS at 08:21

## 2019-12-06 NOTE — NEPHROLOGY PROGRESS NOTE
Assessment/Plan


Problem List:  


(1) Renal failure (ARF), acute on chronic


(2) Urinary retention


(3) Acute on chronic respiratory failure


(4) Hypercalcemia


Assessment





Hypercalcemia


Urinary retention, BPH , 


Acute on chronic renal failure


Electrolyte imbalance 


Colostomy


DM


PEG


Vegetative state


Acute on chronic respiratory failure


Plan





recheck K


Free water-


K and Phos and Mag supplements as needed


pulm support


Aredia for high Ca on 11/28 redose today 12/3





Subjective


ROS Limited/Unobtainable:  Yes





Objective


Objective





Last 24 Hour Vital Signs








  Date Time  Temp Pulse Resp B/P (MAP) Pulse Ox O2 Delivery O2 Flow Rate FiO2


 


12/6/19 08:00      Mechanical Ventilator  


 


12/6/19 08:00        30


 


12/6/19 07:27  124      


 


12/6/19 07:06  125 16     30


 


12/6/19 06:04 99.7       


 


12/6/19 05:18  131 16     30


 


12/6/19 04:00        30


 


12/6/19 04:00 99.7 133 18 104/60 (75) 97   


 


12/6/19 04:00  132      


 


12/6/19 04:00      Mechanical Ventilator  


 


12/6/19 02:52  131 17     30


 


12/6/19 00:56  133 20     30


 


12/6/19 00:00 99.1 135 18 111/59 (76) 100   


 


12/6/19 00:00      Mechanical Ventilator  


 


12/6/19 00:00  127      


 


12/5/19 23:18  130 14     30


 


12/5/19 21:42  134 16     30


 


12/5/19 21:00 100.0 138  107/70 (82)    


 


12/5/19 20:00        30


 


12/5/19 20:00      Mechanical Ventilator  


 


12/5/19 20:00 102.0 139 18 109/67 (81) 100   


 


12/5/19 19:22  141      


 


12/5/19 19:04  109 17     30


 


12/5/19 16:57  138 19     30


 


12/5/19 16:05        30


 


12/5/19 16:00 99.1 128 18 109/67 (81) 100   


 


12/5/19 16:00      Mechanical Ventilator  


 


12/5/19 16:00  142      


 


12/5/19 14:46  142 22     30


 


12/5/19 12:39  127 19     30


 


12/5/19 12:00        30


 


12/5/19 12:00 99.2 128 18 118/85 (96) 100   


 


12/5/19 12:00      Mechanical Ventilator  


 


12/5/19 12:00  131      


 


12/5/19 10:50  119 19     30


 


12/5/19 08:48  120 15     30

















Intake and Output  


 


 12/5/19 12/6/19





 19:00 07:00


 


Intake Total 2453.75 ml 2450 ml


 


Output Total 1850 ml 1100 ml


 


Balance 603.75 ml 1350 ml


 


  


 


Free Water 300 ml 250 ml


 


IV Total 1373.75 ml 1485 ml


 


Tube Feeding 780 ml 715 ml


 


Output Urine Total 1400 ml 700 ml


 


Stool Total 450 ml 400 ml








Laboratory Tests


12/5/19 14:20: 


Sodium Level 140, Potassium Level 4.8, Chloride Level 105, Carbon Dioxide Level 

24, Anion Gap 11, Blood Urea Nitrogen 9, Creatinine 1.1, Estimat Glomerular 

Filtration Rate > 60, Glucose Level 221H, Calcium Level 10.3H, Magnesium Level 

1.7L


12/5/19 19:00: 


Urine Color Pale yellow, Urine Appearance Clear, Urine pH 6, Urine Specific 

Gravity 1.015, Urine Protein 3+H, Urine Glucose (UA) 2+H, Urine Ketones Negative

, Urine Blood 3+H, Urine Nitrite Negative, Urine Bilirubin Negative, Urine 

Urobilinogen Normal, Urine Leukocyte Esterase 2+H, Urine RBC 10-15H, Urine WBC 

15-20H, Urine Squamous Epithelial Cells Few, Urine Bacteria ModerateH, Urine 

Mucus FewH, Urine Yeast ModerateH


12/6/19 05:30: 


Sodium Level 138, Potassium Level 4.0, Chloride Level 106, Carbon Dioxide Level 

23, Anion Gap 9, Blood Urea Nitrogen 14, Creatinine 1.0, Estimat Glomerular 

Filtration Rate > 60, Glucose Level 299H, Calcium Level 9.9, Magnesium Level 2.1

, White Blood Count 19.2H, Red Blood Count 2.95L, Hemoglobin 8.8L, Hematocrit 

26.9L, Mean Corpuscular Volume 91, Mean Corpuscular Hemoglobin 29.9, Mean 

Corpuscular Hemoglobin Concent 32.8, Red Cell Distribution Width 15.4H, 

Platelet Count 279, Mean Platelet Volume 8.0, Neutrophils (%) (Auto) , 

Lymphocytes (%) (Auto) , Monocytes (%) (Auto) , Eosinophils (%) (Auto) , 

Basophils (%) (Auto) , Neutrophils % (Manual) [Pending], Lymphocytes % (Manual) 

[Pending], Platelet Estimate [Pending], Platelet Morphology [Pending], 

Prothrombin Time 12.0H, Prothromb Time International Ratio 1.1, Activated 

Partial Thromboplast Time 33, Phosphorus Level 3.4, Total Bilirubin 0.4, 

Aspartate Amino Transf (AST/SGOT) 27, Alanine Aminotransferase (ALT/SGPT) 25, 

Alkaline Phosphatase 147H, Total Protein 6.6, Albumin 2.0L, Globulin 4.6, 

Albumin/Globulin Ratio 0.4L


Height (Feet):  5


Height (Inches):  7.00


Weight (Pounds):  203


General Appearance:  no apparent distress


EENT:  other - vented


Cardiovascular:  tachycardia


Respiratory/Chest:  decreased breath sounds


Abdomen:  distended


Objective


no change











Simone Rocha MD Dec 6, 2019 08:25

## 2019-12-06 NOTE — PROGRESS NOTE
DATE:  12/05/2019

CARDIOLOGY PROGRESS NOTE



SUBJECTIVE:  The patient continues to have sinus tachycardia.  Heart rates

above 130 most of the times, slightly improved from yesterday.  The

patient was given fluid challenges.  In addition, today he was noted to

have a white blood count over 30,000.  Further chest x-ray reveals left

pleural effusion and atelectasis.



PHYSICAL EXAMINATION:

HEENT:  Trach site with thin secretions.

LUNGS:  Bilateral breath sounds with rhonchi.

HEART:  Regular rhythm.  Rapid rate.  Normal S1 and S2.

ABDOMEN:  Soft.

EXTREMITIES:  No edema.



LABORATORY DATA:  White count 34 and hemoglobin 9.5.  Potassium 4.8, BUN 9,

creatinine 1.1, and magnesium 1.7.



IMPRESSION:

1. Sepsis.

2. Secondary sinus tachycardia.

3. Hypomagnesemia.

4. Ventilator-dependent respiratory failure.

5. Cerebrovascular disease with chronic encephalopathy.



PLAN:

1. IV fluid hydrations.

2. Nutritional support by feeding tube.

3. Antimicrobials per infectious disease consultant.

4. No role for antiarrhythmics.

5. Ventilator support.

6. Off beta-agonist.









  ______________________________________________

  Robert Chan M.D. DR:  TENZIN

D:  12/06/2019 00:35

T:  12/06/2019 04:14

JOB#:  [id number]

CC:

## 2019-12-06 NOTE — DIAGNOSTIC IMAGING REPORT
Indications: Needs long-term IV access

 

Technique: Procedure performed at bedside. Procedural timeout performed. Ultrasound

confirms patent compressible left brachial vein. Total sterile technique, including

sterile probe cover and sterile gel, sterile gloves, hand hygiene, hat, mask,,

sterile gown, large sterile drape, and preparation with 2% chlorhexidine utilized.

Local anesthesia with 1% lidocaine. Under real-time ultrasound guidance, puncture

brachial vein using 21-gauge needle, passage 0.018 guidewire, exchange for 4 Portuguese

peel-away sheath. 4 Portuguese Bard dual-lumen power PICC cut to 41 cm. It was inserted

through the peel-away sheath. Peel-away sheath and guidewire removed. Catheter fixed

to the skin. Both catheter ports aspirated and flushed. Patient tolerated procedure

well, without immediate complication. Followup chest x-ray obtained, documents

catheter tip position at the high right atrium

 

Impression: Successful bedside placement of left arm PICC under sonographic guidance,

as described above.

## 2019-12-06 NOTE — UROLOGY PROGRESS NOTE
Assessment/Plan


Status:  stable


Assessment/Plan:


1. Urinary retention.


2. BPH.


3. Neurogenic bladder.


4. Incontinence.


5. Pyuria/UTI/colonized.


6. Hematuria.


7. Proteinuria.


8. Renal insufficiency, acute possibly on chronic.


9. Renal cyst.


10. Nephrolithiasis.


11. POD # 9, cysto/optic urethrotomy.





monitor clinically


mayorga placed 11/27


hand irrigated and do PRN


abx as ordered, pr ID


consider antifungals, per ID


monitor WBC spike





Subjective


Allergies:  


Coded Allergies:  


     AZTREONAM (Verified  Allergy, Unknown, 7/23/18)


Subjective


all noted, mayorga draining, non-verbal





Objective





Last 24 Hour Vital Signs








  Date Time  Temp Pulse Resp B/P (MAP) Pulse Ox O2 Delivery O2 Flow Rate FiO2


 


12/6/19 07:06  125 16     30


 


12/6/19 06:04 99.7       


 


12/6/19 05:18  131 16     30


 


12/6/19 04:00        30


 


12/6/19 04:00 99.7 133 18 104/60 (75) 97   


 


12/6/19 04:00  132      


 


12/6/19 04:00      Mechanical Ventilator  


 


12/6/19 02:52  131 17     30


 


12/6/19 00:56  133 20     30


 


12/6/19 00:00 99.1 135 18 111/59 (76) 100   


 


12/6/19 00:00      Mechanical Ventilator  


 


12/6/19 00:00  127      


 


12/5/19 23:18  130 14     30


 


12/5/19 21:42  134 16     30


 


12/5/19 21:00 100.0 138  107/70 (82)    


 


12/5/19 20:00        30


 


12/5/19 20:00      Mechanical Ventilator  


 


12/5/19 20:00 102.0 139 18 109/67 (81) 100   


 


12/5/19 19:22  141      


 


12/5/19 19:04  109 17     30


 


12/5/19 16:57  138 19     30


 


12/5/19 16:05        30


 


12/5/19 16:00 99.1 128 18 109/67 (81) 100   


 


12/5/19 16:00      Mechanical Ventilator  


 


12/5/19 16:00  142      


 


12/5/19 14:46  142 22     30


 


12/5/19 12:39  127 19     30


 


12/5/19 12:00        30


 


12/5/19 12:00 99.2 128 18 118/85 (96) 100   


 


12/5/19 12:00      Mechanical Ventilator  


 


12/5/19 12:00  131      


 


12/5/19 10:50  119 19     30


 


12/5/19 08:48  120 15     30


 


12/5/19 08:00      Mechanical Ventilator  


 


12/5/19 08:00 98.0 118 16 105/71 (82) 100   


 


12/5/19 08:00  126      


 


12/5/19 08:00        30

















Intake and Output  


 


 12/5/19 12/6/19





 19:00 07:00


 


Intake Total 2453.75 ml 2450 ml


 


Output Total 1850 ml 1100 ml


 


Balance 603.75 ml 1350 ml


 


  


 


Free Water 300 ml 250 ml


 


IV Total 1373.75 ml 1485 ml


 


Tube Feeding 780 ml 715 ml


 


Output Urine Total 1400 ml 700 ml


 


Stool Total 450 ml 400 ml











Microbiology








 Date/Time


Source Procedure


Growth Status


 


 


 11/24/19 09:15


Blood Blood Culture - Final


NO GROWTH AFTER 5 DAYS Complete





 11/25/19 11:00


Nasal Nares - Final Complete


 


 11/25/19 11:00


Nasal Nares - Final Complete


 


 11/26/19 03:00


Urine,Clean Catch Urine Culture - Final


Candida Parapsilosis Complete


 


 11/24/19 09:10


Rectum VRE Culture - Final


Enterococcus Faecium - Vre Complete








Current Medications








 Medications


  (Trade)  Dose


 Ordered  Sig/Jan


 Route


 PRN Reason  Start Time


 Stop Time Status Last Admin


Dose Admin


 


 Acetaminophen


  (Tylenol)  650 mg  Q4H  PRN


 GT


 Mild Pain/Temp > 100.6  11/26/19 18:42


 12/26/19 18:41  12/6/19 05:34


 


 


 Artificial Tears


  (Akwa-Tears)  2 drop  Q12HR


 BOTH EYES


   11/26/19 21:00


 12/24/19 20:59  12/5/19 20:09


 


 


 Baclofen


  (Lioresal)  10 mg  THREE TIMES A  DAY


 GT


   11/27/19 09:00


 12/24/19 17:59  12/5/19 18:07


 


 


 Calcitonin Tie Siding


  (Miacalcin)  1 sprays  DAILY


 NASAL


   11/28/19 12:00


 12/28/19 11:59  12/5/19 10:14


 


 


 Clobetasol


 Propionate


  (Temovate)  1 applic  EVERY 12  HOURS


 TOPIC


   11/26/19 21:00


 12/24/19 20:59  12/5/19 20:09


 


 


 Dextrose


  (Dextrose 50%)  25 ml  Q30M  PRN


 IV


 Hypoglycemia  11/26/19 18:45


 12/24/19 14:44   


 


 


 Dextrose


  (Dextrose 50%)  50 ml  Q30M  PRN


 IV


 Hypoglycemia  11/26/19 18:45


 12/24/19 14:44   


 


 


 Famotidine


  (Pepcid)  20 mg  BID


 GT


   11/28/19 18:00


 12/28/19 17:59  12/5/19 18:07


 


 


 Fenofibrate


  (Tricor)  145 mg  DAILY


 ORAL


   11/27/19 09:00


 12/25/19 08:59  12/5/19 10:05


 


 


 Heparin Sodium


  (Porcine)


  (Heparin 5000


 units/ml)  5,000 units  EVERY 12  HOURS


 SUBQ


   11/26/19 21:00


 12/24/19 20:59  12/5/19 20:11


 


 


 Insulin Aspart


  (NovoLOG)    EVERY 6  HOURS


 SUBQ


   11/30/19 12:00


 12/24/19 16:29  12/6/19 05:36


 


 


 Levetiracetam


  (Keppra)  1,000 mg  Q8H


 GT


   11/29/19 02:00


 12/29/19 01:59  12/6/19 02:23


 


 


 Meropenem 1 gm/


 Sodium Chloride  55 ml @ 


 110 mls/hr  Q8HR


 IVPB


   12/5/19 14:00


 12/10/19 13:59  12/6/19 05:33


 


 


 Ondansetron HCl


  (Zofran)  4 mg  Q6H  PRN


 IVP


 Nausea & Vomiting  11/26/19 18:44


 12/26/19 18:43   


 


 


 Polyethylene


 Glycol


  (Miralax)  17 gm  DAILYPRN  PRN


 GT


 Constipation  11/26/19 18:44


 12/26/19 18:43   


 


 


 Sitagliptin


 Phosphate


  (Januvia)  50 mg  ACBREAKFAST


 GT


   11/27/19 06:30


 12/25/19 06:29  12/6/19 05:33


 


 


 Sodium Chloride  1,000 ml @ 


 125 mls/hr  Q8H


 IV


   12/5/19 01:45


 1/4/20 01:44  12/6/19 06:55


 


 


 Tamsulosin HCl


  (Flomax)  0.4 mg  DAILY


 ORAL


   11/27/19 09:00


 12/27/19 08:59  12/5/19 10:05


 


 


 Vancomycin HCl


  (Vanco rx to


 dose)  1 ea  DAILY  PRN


 MISC


 Per rx protocol  12/5/19 12:45


 1/4/20 12:44   


 


 


 Vancomycin/Sodium


 Chloride  275 ml @ 


 137.5 mls/


 hr  Q24H


 IVPB


   12/5/19 15:00


 12/10/19 14:59  12/5/19 15:30


 





Laboratory Tests


12/5/19 14:20: 


Sodium Level 140, Potassium Level 4.8, Chloride Level 105, Carbon Dioxide Level 

24, Anion Gap 11, Blood Urea Nitrogen 9, Creatinine 1.1, Estimat Glomerular 

Filtration Rate > 60, Glucose Level 221H, Calcium Level 10.3H, Magnesium Level 

1.7L


12/5/19 19:00: 


Urine Color Pale yellow, Urine Appearance Clear, Urine pH 6, Urine Specific 

Gravity 1.015, Urine Protein 3+H, Urine Glucose (UA) 2+H, Urine Ketones Negative

, Urine Blood 3+H, Urine Nitrite Negative, Urine Bilirubin Negative, Urine 

Urobilinogen Normal, Urine Leukocyte Esterase 2+H, Urine RBC 10-15H, Urine WBC 

15-20H, Urine Squamous Epithelial Cells Few, Urine Bacteria ModerateH, Urine 

Mucus FewH, Urine Yeast ModerateH


12/6/19 05:30: 


Sodium Level 138, Potassium Level 4.0, Chloride Level 106, Carbon Dioxide Level 

23, Anion Gap 9, Blood Urea Nitrogen 14, Creatinine 1.0, Estimat Glomerular 

Filtration Rate > 60, Glucose Level 299H, Calcium Level 9.9, Magnesium Level 2.1

, White Blood Count 19.2H, Red Blood Count 2.95L, Hemoglobin 8.8L, Hematocrit 

26.9L, Mean Corpuscular Volume 91, Mean Corpuscular Hemoglobin 29.9, Mean 

Corpuscular Hemoglobin Concent 32.8, Red Cell Distribution Width 15.4H, 

Platelet Count 279, Mean Platelet Volume 8.0, Neutrophils (%) (Auto) , 

Lymphocytes (%) (Auto) , Monocytes (%) (Auto) , Eosinophils (%) (Auto) , 

Basophils (%) (Auto) , Neutrophils % (Manual) [Pending], Lymphocytes % (Manual) 

[Pending], Platelet Estimate [Pending], Platelet Morphology [Pending], 

Prothrombin Time [Pending], Prothromb Time International Ratio [Pending], 

Activated Partial Thromboplast Time [Pending], Phosphorus Level 3.4, Total 

Bilirubin 0.4, Aspartate Amino Transf (AST/SGOT) 27, Alanine Aminotransferase (

ALT/SGPT) 25, Alkaline Phosphatase 147H, Total Protein 6.6, Albumin 2.0L, 

Globulin 4.6, Albumin/Globulin Ratio 0.4L


Height (Feet):  5


Height (Inches):  7.00


Weight (Pounds):  203


Objective


 exam stable


mayorga indwelling, yellow/christine urine, some debris


minimal bleeding at penile meatus











Vinnie Pollard MD Dec 6, 2019 07:48

## 2019-12-07 VITALS — DIASTOLIC BLOOD PRESSURE: 93 MMHG | SYSTOLIC BLOOD PRESSURE: 146 MMHG

## 2019-12-07 VITALS — DIASTOLIC BLOOD PRESSURE: 62 MMHG | SYSTOLIC BLOOD PRESSURE: 106 MMHG

## 2019-12-07 VITALS — DIASTOLIC BLOOD PRESSURE: 86 MMHG | SYSTOLIC BLOOD PRESSURE: 140 MMHG

## 2019-12-07 VITALS — DIASTOLIC BLOOD PRESSURE: 98 MMHG | SYSTOLIC BLOOD PRESSURE: 142 MMHG

## 2019-12-07 VITALS — DIASTOLIC BLOOD PRESSURE: 72 MMHG | SYSTOLIC BLOOD PRESSURE: 125 MMHG

## 2019-12-07 VITALS — DIASTOLIC BLOOD PRESSURE: 70 MMHG | SYSTOLIC BLOOD PRESSURE: 122 MMHG

## 2019-12-07 LAB
ADD MANUAL DIFF: NO
ALBUMIN SERPL-MCNC: 1.9 G/DL (ref 3.4–5)
ALBUMIN/GLOB SERPL: 0.4 {RATIO} (ref 1–2.7)
ALP SERPL-CCNC: 134 U/L (ref 46–116)
ALT SERPL-CCNC: 22 U/L (ref 12–78)
ANION GAP SERPL CALC-SCNC: 12 MMOL/L (ref 5–15)
AST SERPL-CCNC: 32 U/L (ref 15–37)
BASOPHILS NFR BLD AUTO: 0.9 % (ref 0–2)
BILIRUB SERPL-MCNC: 0.4 MG/DL (ref 0.2–1)
BUN SERPL-MCNC: 13 MG/DL (ref 7–18)
CALCIUM SERPL-MCNC: 9.9 MG/DL (ref 8.5–10.1)
CHLORIDE SERPL-SCNC: 105 MMOL/L (ref 98–107)
CO2 SERPL-SCNC: 22 MMOL/L (ref 21–32)
CREAT SERPL-MCNC: 1 MG/DL (ref 0.55–1.3)
EOSINOPHIL NFR BLD AUTO: 4.7 % (ref 0–3)
ERYTHROCYTE [DISTWIDTH] IN BLOOD BY AUTOMATED COUNT: 15.3 % (ref 11.6–14.8)
GLOBULIN SER-MCNC: 4.8 G/DL
HCT VFR BLD CALC: 29.4 % (ref 42–52)
HGB BLD-MCNC: 9.2 G/DL (ref 14.2–18)
LYMPHOCYTES NFR BLD AUTO: 9.3 % (ref 20–45)
MCV RBC AUTO: 92 FL (ref 80–99)
MONOCYTES NFR BLD AUTO: 3 % (ref 1–10)
NEUTROPHILS NFR BLD AUTO: 82.2 % (ref 45–75)
PHOSPHATE SERPL-MCNC: 3.1 MG/DL (ref 2.5–4.9)
PLATELET # BLD: 235 K/UL (ref 150–450)
POTASSIUM SERPL-SCNC: 4.3 MMOL/L (ref 3.5–5.1)
RBC # BLD AUTO: 3.19 M/UL (ref 4.7–6.1)
SODIUM SERPL-SCNC: 138 MMOL/L (ref 136–145)
WBC # BLD AUTO: 14.1 K/UL (ref 4.8–10.8)

## 2019-12-07 RX ADMIN — INSULIN ASPART SCH UNITS: 100 INJECTION, SOLUTION INTRAVENOUS; SUBCUTANEOUS at 00:08

## 2019-12-07 RX ADMIN — MEROPENEM SCH MLS/HR: 1 INJECTION INTRAVENOUS at 06:06

## 2019-12-07 RX ADMIN — LEVETIRACETAM SCH MG: 100 SOLUTION ORAL at 17:24

## 2019-12-07 RX ADMIN — INSULIN ASPART SCH UNITS: 100 INJECTION, SOLUTION INTRAVENOUS; SUBCUTANEOUS at 12:39

## 2019-12-07 RX ADMIN — CLOBETASOL PROPIONATE SCH APPLIC: 0.5 OINTMENT TOPICAL at 08:08

## 2019-12-07 RX ADMIN — LEVETIRACETAM SCH MG: 100 SOLUTION ORAL at 09:14

## 2019-12-07 RX ADMIN — CLOBETASOL PROPIONATE SCH APPLIC: 0.5 OINTMENT TOPICAL at 20:30

## 2019-12-07 RX ADMIN — LEVETIRACETAM SCH MG: 100 SOLUTION ORAL at 02:04

## 2019-12-07 RX ADMIN — ACETAMINOPHEN PRN MG: 160 SOLUTION ORAL at 08:09

## 2019-12-07 RX ADMIN — HEPARIN SODIUM SCH UNITS: 5000 INJECTION INTRAVENOUS; SUBCUTANEOUS at 08:11

## 2019-12-07 RX ADMIN — MEROPENEM SCH MLS/HR: 1 INJECTION INTRAVENOUS at 22:25

## 2019-12-07 RX ADMIN — INSULIN ASPART SCH UNITS: 100 INJECTION, SOLUTION INTRAVENOUS; SUBCUTANEOUS at 17:15

## 2019-12-07 RX ADMIN — MEROPENEM SCH MLS/HR: 1 INJECTION INTRAVENOUS at 14:14

## 2019-12-07 RX ADMIN — CHLORHEXIDINE GLUCONATE SCH APPLIC: 213 SOLUTION TOPICAL at 20:30

## 2019-12-07 RX ADMIN — INSULIN ASPART SCH UNITS: 100 INJECTION, SOLUTION INTRAVENOUS; SUBCUTANEOUS at 06:06

## 2019-12-07 RX ADMIN — SITAGLIPTIN SCH MG: 50 TABLET, FILM COATED ORAL at 06:07

## 2019-12-07 RX ADMIN — TAMSULOSIN HYDROCHLORIDE SCH MG: 0.4 CAPSULE ORAL at 08:09

## 2019-12-07 RX ADMIN — HEPARIN SODIUM SCH UNITS: 5000 INJECTION INTRAVENOUS; SUBCUTANEOUS at 20:37

## 2019-12-07 NOTE — NEPHROLOGY PROGRESS NOTE
Assessment/Plan


Problem List:  


(1) Renal failure (ARF), acute on chronic


(2) Urinary retention


(3) Acute on chronic respiratory failure


(4) Hypercalcemia


Assessment





Hypercalcemia


Urinary retention, BPH , 


Acute on chronic renal failure


Electrolyte imbalance 


Colostomy


DM


PEG


Vegetative state


Acute on chronic respiratory failure


Plan





recheck K


Free water-


K and Phos and Mag supplements as needed


pulm support


Aredia for high Ca on 11/28 redose today 12/3





Subjective


ROS Limited/Unobtainable:  Yes





Objective


Objective





Last 24 Hour Vital Signs








  Date Time  Temp Pulse Resp B/P (MAP) Pulse Ox O2 Delivery O2 Flow Rate FiO2


 


12/7/19 08:00 101.2 133 16 125/72 (89) 100   


 


12/7/19 08:00        30


 


12/7/19 08:00      Mechanical Ventilator  


 


12/7/19 06:45  126 15     30


 


12/7/19 05:06  108 18     30


 


12/7/19 04:00        30


 


12/7/19 04:00      Mechanical Ventilator  


 


12/7/19 04:00 98.2 120 14 140/86 (104) 100   


 


12/7/19 04:00  125      


 


12/7/19 03:15  114 15     30


 


12/7/19 01:23  110 16     30


 


12/7/19 00:00        30


 


12/7/19 00:00 98.2 121 14 122/70 (87) 100   


 


12/7/19 00:00      Mechanical Ventilator  


 


12/7/19 00:00  120      


 


12/6/19 22:55  108 15     30


 


12/6/19 21:12  109 14     30


 


12/6/19 20:00  121      


 


12/6/19 20:00        30


 


12/6/19 20:00      Mechanical Ventilator  


 


12/6/19 20:00 98.8 119 14 124/89 (101) 100   


 


12/6/19 19:15  112 16     30


 


12/6/19 17:18  110 15     30


 


12/6/19 16:00 98.0 108 14 129/71 (90) 100   


 


12/6/19 16:00        30


 


12/6/19 16:00      Mechanical Ventilator  


 


12/6/19 16:00  119      


 


12/6/19 15:18  118 18     30


 


12/6/19 12:55  119 16     30


 


12/6/19 12:00 98.8 112 14 108/61 (77) 100   


 


12/6/19 12:00      Mechanical Ventilator  


 


12/6/19 12:00        30


 


12/6/19 11:22  112      


 


12/6/19 10:30  113 14     30

















Intake and Output  


 


 12/6/19 12/7/19





 19:00 07:00


 


Intake Total 2935 ml 2656 ml


 


Output Total 1900 ml 1750 ml


 


Balance 1035 ml 906 ml


 


  


 


Free Water 700 ml 400 ml


 


IV Total 1455 ml 1476 ml


 


Tube Feeding 780 ml 780 ml


 


Output Urine Total 1700 ml 1500 ml


 


Stool Total 200 ml 250 ml








Laboratory Tests


12/7/19 04:35: 


White Blood Count 14.1H, Red Blood Count 3.19L, Hemoglobin 9.2L, Hematocrit 

29.4L, Mean Corpuscular Volume 92, Mean Corpuscular Hemoglobin 28.7, Mean 

Corpuscular Hemoglobin Concent 31.2L, Red Cell Distribution Width 15.3H, 

Platelet Count 235, Mean Platelet Volume 7.5, Neutrophils (%) (Auto) 82.2H, 

Lymphocytes (%) (Auto) 9.3L, Monocytes (%) (Auto) 3.0, Eosinophils (%) (Auto) 

4.7H, Basophils (%) (Auto) 0.9, Sodium Level 138, Potassium Level 4.3, Chloride 

Level 105, Carbon Dioxide Level 22, Anion Gap 12, Blood Urea Nitrogen 13, 

Creatinine 1.0, Estimat Glomerular Filtration Rate > 60, Glucose Level 188#H, 

Calcium Level 9.9, Phosphorus Level 3.1, Magnesium Level 1.8, Total Bilirubin 

0.4, Aspartate Amino Transf (AST/SGOT) 32, Alanine Aminotransferase (ALT/SGPT) 

22, Alkaline Phosphatase 134H, C-Reactive Protein, Quantitative 31.8H, Total 

Protein 6.7, Albumin 1.9L, Globulin 4.8, Albumin/Globulin Ratio 0.4L


12/7/19 06:30: Erythrocyte Sedimentation Rate 128H


Height (Feet):  5


Height (Inches):  7.00


Weight (Pounds):  206


General Appearance:  no apparent distress


EENT:  other - trach


Cardiovascular:  tachycardia


Respiratory/Chest:  decreased breath sounds


Abdomen:  soft


Objective


no change











Simone Rocha MD Dec 7, 2019 09:14

## 2019-12-07 NOTE — UROLOGY PROGRESS NOTE
Assessment/Plan


Status:  stable


Assessment/Plan:


1. Urinary retention.


2. BPH.


3. Neurogenic bladder.


4. Incontinence.


5. Pyuria/UTI/colonized.


6. Hematuria.


7. Proteinuria.


8. Renal insufficiency, acute possibly on chronic.


9. Renal cyst.


10. Nephrolithiasis.


11. POD # 10, cysto/optic urethrotomy.





monitor clinically


mayorga placed 11/27


hand irrigated and do PRN


abx as ordered, pr ID


consider antifungals, per ID


monitor WBC


f/u on last blood cx





Subjective


Allergies:  


Coded Allergies:  


     AZTREONAM (Verified  Allergy, Unknown, 7/23/18)


Subjective


all noted, mayorga draining, non-verbal





Objective





Last 24 Hour Vital Signs








  Date Time  Temp Pulse Resp B/P (MAP) Pulse Ox O2 Delivery O2 Flow Rate FiO2


 


12/7/19 08:00 101.2 133 16 125/72 (89) 100   


 


12/7/19 08:00        30


 


12/7/19 08:00      Mechanical Ventilator  


 


12/7/19 06:45  126 15     30


 


12/7/19 05:06  108 18     30


 


12/7/19 04:00        30


 


12/7/19 04:00      Mechanical Ventilator  


 


12/7/19 04:00 98.2 120 14 140/86 (104) 100   


 


12/7/19 04:00  125      


 


12/7/19 03:15  114 15     30


 


12/7/19 01:23  110 16     30


 


12/7/19 00:00        30


 


12/7/19 00:00 98.2 121 14 122/70 (87) 100   


 


12/7/19 00:00      Mechanical Ventilator  


 


12/7/19 00:00  120      


 


12/6/19 22:55  108 15     30


 


12/6/19 21:12  109 14     30


 


12/6/19 20:00  121      


 


12/6/19 20:00        30


 


12/6/19 20:00      Mechanical Ventilator  


 


12/6/19 20:00 98.8 119 14 124/89 (101) 100   


 


12/6/19 19:15  112 16     30


 


12/6/19 17:18  110 15     30


 


12/6/19 16:00 98.0 108 14 129/71 (90) 100   


 


12/6/19 16:00        30


 


12/6/19 16:00      Mechanical Ventilator  


 


12/6/19 16:00  119      


 


12/6/19 15:18  118 18     30


 


12/6/19 12:55  119 16     30


 


12/6/19 12:00 98.8 112 14 108/61 (77) 100   


 


12/6/19 12:00      Mechanical Ventilator  


 


12/6/19 12:00        30


 


12/6/19 11:22  112      


 


12/6/19 10:30  113 14     30

















Intake and Output  


 


 12/6/19 12/7/19





 19:00 07:00


 


Intake Total 2935 ml 2656 ml


 


Output Total 1900 ml 1750 ml


 


Balance 1035 ml 906 ml


 


  


 


Free Water 700 ml 400 ml


 


IV Total 1455 ml 1476 ml


 


Tube Feeding 780 ml 780 ml


 


Output Urine Total 1700 ml 1500 ml


 


Stool Total 200 ml 250 ml











Microbiology








 Date/Time


Source Procedure


Growth Status


 


 


 12/5/19 23:15


Blood Blood Culture - Preliminary Resulted





 11/25/19 11:00


Nasal Nares - Final Complete


 


 11/25/19 11:00


Nasal Nares - Final Complete


 


 12/5/19 19:00


Stool Clostridium difficile Toxin Assay - Final Complete


 


 12/5/19 19:00


Urine,Clean Catch Urine Culture - Preliminary


NO GROWTH AFTER 24 HOURS Resulted


 


 11/24/19 09:10


Rectum VRE Culture - Final


Enterococcus Faecium - Vre Complete








Current Medications








 Medications


  (Trade)  Dose


 Ordered  Sig/Jan


 Route


 PRN Reason  Start Time


 Stop Time Status Last Admin


Dose Admin


 


 Acetaminophen


  (Tylenol)  650 mg  Q4H  PRN


 GT


 Mild Pain/Temp > 100.6  11/26/19 18:42


 12/26/19 18:41  12/7/19 08:09


 


 


 Artificial Tears


  (Akwa-Tears)  2 drop  Q12HR


 BOTH EYES


   11/26/19 21:00


 12/24/19 20:59  12/7/19 08:08


 


 


 Baclofen


  (Lioresal)  10 mg  THREE TIMES A  DAY


 GT


   11/27/19 09:00


 12/24/19 17:59  12/7/19 08:08


 


 


 Calcitonin [place]


  (Miacalcin)  1 sprays  DAILY


 NASAL


   11/28/19 12:00


 12/28/19 11:59  12/7/19 08:08


 


 


 Chlorhexidine


 Gluconate


  (Elaine-Hex 2%)  1 applic  DAILY@2000


 TOPIC


   12/6/19 20:00


 1/5/20 19:59  12/6/19 20:20


 


 


 Clobetasol


 Propionate


  (Temovate)  1 applic  EVERY 12  HOURS


 TOPIC


   11/26/19 21:00


 12/24/19 20:59  12/7/19 08:08


 


 


 Dextrose


  (Dextrose 50%)  25 ml  Q30M  PRN


 IV


 Hypoglycemia  11/26/19 18:45


 12/24/19 14:44   


 


 


 Dextrose


  (Dextrose 50%)  50 ml  Q30M  PRN


 IV


 Hypoglycemia  11/26/19 18:45


 12/24/19 14:44   


 


 


 Famotidine


  (Pepcid)  20 mg  BID


 GT


   11/28/19 18:00


 12/28/19 17:59  12/7/19 08:08


 


 


 Fenofibrate


  (Tricor)  145 mg  DAILY


 ORAL


   11/27/19 09:00


 12/25/19 08:59  12/7/19 08:08


 


 


 Heparin Sodium


  (Porcine)


  (Heparin 5000


 units/ml)  5,000 units  EVERY 12  HOURS


 SUBQ


   11/26/19 21:00


 12/24/19 20:59  12/7/19 08:11


 


 


 Insulin Aspart


  (NovoLOG)    EVERY 6  HOURS


 SUBQ


   11/30/19 12:00


 12/24/19 16:29  12/7/19 06:06


 


 


 Levetiracetam


  (Keppra)  1,000 mg  Q8H


 GT


   11/29/19 02:00


 12/29/19 01:59  12/7/19 02:04


 


 


 Meropenem 1 gm/


 Sodium Chloride  55 ml @ 


 110 mls/hr  Q8HR


 IVPB


   12/5/19 14:00


 12/10/19 13:59  12/7/19 06:06


 


 


 Ondansetron HCl


  (Zofran)  4 mg  Q6H  PRN


 IVP


 Nausea & Vomiting  11/26/19 18:44


 12/26/19 18:43   


 


 


 Polyethylene


 Glycol


  (Miralax)  17 gm  DAILYPRN  PRN


 GT


 Constipation  11/26/19 18:44


 12/26/19 18:43   


 


 


 Sitagliptin


 Phosphate


  (Januvia)  50 mg  ACBREAKFAST


 GT


   11/27/19 06:30


 12/25/19 06:29  12/7/19 06:07


 


 


 Sodium Chloride  1,000 ml @ 


 125 mls/hr  Q8H


 IV


   12/5/19 01:45


 1/4/20 01:44  12/7/19 08:47


 


 


 Tamsulosin HCl


  (Flomax)  0.4 mg  DAILY


 ORAL


   11/27/19 09:00


 12/27/19 08:59  12/7/19 08:09


 


 


 Vancomycin HCl


  (Vanco rx to


 dose)  1 ea  DAILY  PRN


 MISC


 Per rx protocol  12/5/19 12:45


 1/4/20 12:44   


 


 


 Vancomycin/Sodium


 Chloride  275 ml @ 


 137.5 mls/


 hr  Q24H


 IVPB


   12/5/19 15:00


 12/10/19 14:59  12/6/19 15:10


 





Laboratory Tests


12/7/19 04:35: 


White Blood Count 14.1H, Red Blood Count 3.19L, Hemoglobin 9.2L, Hematocrit 

29.4L, Mean Corpuscular Volume 92, Mean Corpuscular Hemoglobin 28.7, Mean 

Corpuscular Hemoglobin Concent 31.2L, Red Cell Distribution Width 15.3H, 

Platelet Count 235, Mean Platelet Volume 7.5, Neutrophils (%) (Auto) 82.2H, 

Lymphocytes (%) (Auto) 9.3L, Monocytes (%) (Auto) 3.0, Eosinophils (%) (Auto) 

4.7H, Basophils (%) (Auto) 0.9, Sodium Level 138, Potassium Level 4.3, Chloride 

Level 105, Carbon Dioxide Level 22, Anion Gap 12, Blood Urea Nitrogen 13, 

Creatinine 1.0, Estimat Glomerular Filtration Rate > 60, Glucose Level 188#H, 

Calcium Level 9.9, Phosphorus Level 3.1, Magnesium Level 1.8, Total Bilirubin 

0.4, Aspartate Amino Transf (AST/SGOT) 32, Alanine Aminotransferase (ALT/SGPT) 

22, Alkaline Phosphatase 134H, C-Reactive Protein, Quantitative 31.8H, Total 

Protein 6.7, Albumin 1.9L, Globulin 4.8, Albumin/Globulin Ratio 0.4L


12/7/19 06:30: Erythrocyte Sedimentation Rate 128H


Height (Feet):  5


Height (Inches):  7.00


Weight (Pounds):  206


Objective


 exam stable


mayorga indwelling, yellow/christine urine, some debris


minimal bleeding at penile meatus











Vinnie Pollard MD Dec 7, 2019 09:02

## 2019-12-07 NOTE — PROGRESS NOTE
DATE:  12/06/2019

CARDIOLOGY PROGRESS NOTE



SUBJECTIVE:  The patient's white count yesterday was 33,000.  Preceding

that, he developed rapid sinus tachycardia and some fevers.  Today, his

heart rate has slightly improved.  White count has decreased to 19,000.



PHYSICAL EXAMINATION:

VITAL SIGNS:  Blood pressure 124/89, pulse 119, and respirations

14.

NECK:  Thick trach secretions.

LUNGS:  Bilateral breath sounds with rhonchi.

CARDIAC:  Regular rhythm, rapid rate.  Normal S1, S2.

ABDOMEN:  Soft.

EXTREMITIES:  No edema.  PICC line has been placed.



IMPRESSION:

1. Sepsis.

2. Secondary sinus tachycardia.

3. Respiratory failure with tracheostomy.

4. Left pleural effusion and atelectasis.

5. Severe protein-calorie malnutrition.

6. Chronic encephalopathy.



PLAN:

1. IV fluids.

2. Antimicrobials.

3. Antipyretics.

4. Respiratory support.

5. No role for antiarrhythmics.

6. Followup blood cultures.









  ______________________________________________

  Robert Chan M.D.





DR:  OLGA

D:  12/06/2019 21:19

T:  12/06/2019 23:41

JOB#:  [id number]

CC:

## 2019-12-07 NOTE — PULMONOLGY CRITICAL CARE NOTE
Critical Care - Asmt/Plan


Problems:  


(1) Acute on chronic respiratory failure


(2) Sepsis


(3) Diabetes mellitus


(4) Vegetative state


(5) Gastrostomy tube dependent


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


(7) ATN (acute tubular necrosis)


Respiratory:  monitor respiratory rate, adjust FIO2, CXR


Cardiac:  continue to monitor HR/BP


Renal:  F/U I&O, keep IV fluid, check electrolytes


Infectious Disease:  check cultures, continue antibiotics


Gastrointestinal:  continue feedings/current rate


Endocrine:  monitor blood sugar, check HgA1C


Hematologic:  transfuse if hgb<8.5


Neurologic:  PRN Ativan, PRN Morphine, keep patient comfortable


Affect:  PRN ativan


Prophylaxis:  Protonix


Notes Reviewed:  internist, cardio


Discussed with:  nurses, consultants, 





Critical Care - Objective





Last 24 Hour Vital Signs








  Date Time  Temp Pulse Resp B/P (MAP) Pulse Ox O2 Delivery O2 Flow Rate FiO2


 


12/7/19 21:05  110 15     30


 


12/7/19 20:00 97.9 110 14 142/98 (113) 100   


 


12/7/19 19:09  116 16     30


 


12/7/19 17:20  122 15     30


 


12/7/19 16:00        30


 


12/7/19 16:00 99.1 115 15 146/93 (110) 100   


 


12/7/19 16:00      Mechanical Ventilator  


 


12/7/19 16:00  106      


 


12/7/19 15:05  116 14     30


 


12/7/19 13:20  109 14     30


 


12/7/19 12:00  122      


 


12/7/19 12:00 99.4 116 16 106/62 (77) 100   


 


12/7/19 12:00      Mechanical Ventilator  


 


12/7/19 12:00        30


 


12/7/19 10:40  122 14     30


 


12/7/19 09:10  135 16     30


 


12/7/19 08:39 101.8       


 


12/7/19 08:00  135      


 


12/7/19 08:00 101.2 133 16 125/72 (89) 100   


 


12/7/19 08:00        30


 


12/7/19 08:00      Mechanical Ventilator  


 


12/7/19 06:45  126 15     30


 


12/7/19 05:06  108 18     30


 


12/7/19 04:00        30


 


12/7/19 04:00      Mechanical Ventilator  


 


12/7/19 04:00 98.2 120 14 140/86 (104) 100   


 


12/7/19 04:00  125      


 


12/7/19 03:15  114 15     30


 


12/7/19 01:23  110 16     30


 


12/7/19 00:00        30


 


12/7/19 00:00 98.2 121 14 122/70 (87) 100   


 


12/7/19 00:00      Mechanical Ventilator  


 


12/7/19 00:00  120      


 


12/6/19 22:55  108 15     30


 


12/6/19 21:12  109 14     30








Status:  awake


Condition:  critical


HEENT:  atraumatic


Heart:  HR/BP stable, regular


Abdomen:  non-tender, feeding tube


Decubiti:  location


Micro:





Microbiology








 Date/Time


Source Procedure


Growth Status


 


 


 12/5/19 23:15


Blood Blood Culture - Preliminary Resulted


 


 12/5/19 23:10


Blood Blood Culture - Preliminary Resulted


 


 12/5/19 19:00


Stool Clostridium difficile Toxin Assay - Final Complete


 


 12/5/19 19:00


Urine,Clean Catch Urine Culture - Preliminary


NO GROWTH AFTER 24 HOURS Resulted








Accucheck:  142





Critical Care - Subjective


ROS Limited/Unobtainable:  Yes


Condition:  critical


EKG Rhythm:  Sinus Rhythm


FI02:  30


Vent Support Breath Rate:  14


Vent Support Mode:  AC


Vent Tidal Volume:  600


Sputum Amount:  Small


PEEP:  5.0


PIP:  31


Tube Feeding Amount:  65


I&O:











Intake and Output  


 


 12/6/19 12/7/19





 18:59 06:59


 


Intake Total 2720.41 ml 2756 ml


 


Output Total 1300 ml 2350 ml


 


Balance 1420.41 ml 406 ml


 


  


 


Free Water 600 ml 500 ml


 


IV Total 1340.41 ml 1476 ml


 


Tube Feeding 780 ml 780 ml


 


Output Urine Total 1100 ml 2100 ml


 


Stool Total 200 ml 250 ml








Labs:





Laboratory Tests








Test


  12/7/19


04:35 12/7/19


06:30


 


White Blood Count


  14.1 K/UL


(4.8-10.8)  H 


 


 


Red Blood Count


  3.19 M/UL


(4.70-6.10)  L 


 


 


Hemoglobin


  9.2 G/DL


(14.2-18.0)  L 


 


 


Hematocrit


  29.4 %


(42.0-52.0)  L 


 


 


Mean Corpuscular Volume 92 FL (80-99)   


 


Mean Corpuscular Hemoglobin


  28.7 PG


(27.0-31.0) 


 


 


Mean Corpuscular Hemoglobin


Concent 31.2 G/DL


(32.0-36.0)  L 


 


 


Red Cell Distribution Width


  15.3 %


(11.6-14.8)  H 


 


 


Platelet Count


  235 K/UL


(150-450) 


 


 


Mean Platelet Volume


  7.5 FL


(6.5-10.1) 


 


 


Neutrophils (%) (Auto)


  82.2 %


(45.0-75.0)  H 


 


 


Lymphocytes (%) (Auto)


  9.3 %


(20.0-45.0)  L 


 


 


Monocytes (%) (Auto)


  3.0 %


(1.0-10.0) 


 


 


Eosinophils (%) (Auto)


  4.7 %


(0.0-3.0)  H 


 


 


Basophils (%) (Auto)


  0.9 %


(0.0-2.0) 


 


 


Sodium Level


  138 MMOL/L


(136-145) 


 


 


Potassium Level


  4.3 MMOL/L


(3.5-5.1) 


 


 


Chloride Level


  105 MMOL/L


() 


 


 


Carbon Dioxide Level


  22 MMOL/L


(21-32) 


 


 


Anion Gap


  12 mmol/L


(5-15) 


 


 


Blood Urea Nitrogen


  13 mg/dL


(7-18) 


 


 


Creatinine


  1.0 MG/DL


(0.55-1.30) 


 


 


Estimat Glomerular Filtration


Rate > 60 mL/min


(>60) 


 


 


Glucose Level


  188 MG/DL


()  #H 


 


 


Calcium Level


  9.9 MG/DL


(8.5-10.1) 


 


 


Phosphorus Level


  3.1 MG/DL


(2.5-4.9) 


 


 


Magnesium Level


  1.8 MG/DL


(1.8-2.4) 


 


 


Total Bilirubin


  0.4 MG/DL


(0.2-1.0) 


 


 


Aspartate Amino Transf


(AST/SGOT) 32 U/L (15-37)


  


 


 


Alanine Aminotransferase


(ALT/SGPT) 22 U/L (12-78)


  


 


 


Alkaline Phosphatase


  134 U/L


()  H 


 


 


C-Reactive Protein,


Quantitative 31.8 mg/dL


(0.00-0.90)  H 


 


 


Total Protein


  6.7 G/DL


(6.4-8.2) 


 


 


Albumin


  1.9 G/DL


(3.4-5.0)  L 


 


 


Globulin 4.8 g/dL   


 


Albumin/Globulin Ratio


  0.4 (1.0-2.7)


L 


 


 


Erythrocyte Sedimentation Rate


  


  128 MM/HR


(0-20)  H

















Yury Pena MD Dec 7, 2019 21:07

## 2019-12-08 VITALS — SYSTOLIC BLOOD PRESSURE: 141 MMHG | DIASTOLIC BLOOD PRESSURE: 96 MMHG

## 2019-12-08 VITALS — DIASTOLIC BLOOD PRESSURE: 84 MMHG | SYSTOLIC BLOOD PRESSURE: 140 MMHG

## 2019-12-08 VITALS — DIASTOLIC BLOOD PRESSURE: 88 MMHG | SYSTOLIC BLOOD PRESSURE: 134 MMHG

## 2019-12-08 VITALS — DIASTOLIC BLOOD PRESSURE: 81 MMHG | SYSTOLIC BLOOD PRESSURE: 139 MMHG

## 2019-12-08 VITALS — DIASTOLIC BLOOD PRESSURE: 79 MMHG | SYSTOLIC BLOOD PRESSURE: 130 MMHG

## 2019-12-08 VITALS — SYSTOLIC BLOOD PRESSURE: 129 MMHG | DIASTOLIC BLOOD PRESSURE: 75 MMHG

## 2019-12-08 RX ADMIN — INSULIN ASPART SCH UNITS: 100 INJECTION, SOLUTION INTRAVENOUS; SUBCUTANEOUS at 18:00

## 2019-12-08 RX ADMIN — MEROPENEM SCH MLS/HR: 1 INJECTION INTRAVENOUS at 21:55

## 2019-12-08 RX ADMIN — ACETAMINOPHEN PRN MG: 160 SOLUTION ORAL at 20:36

## 2019-12-08 RX ADMIN — CLOBETASOL PROPIONATE SCH APPLIC: 0.5 OINTMENT TOPICAL at 10:20

## 2019-12-08 RX ADMIN — HEPARIN SODIUM SCH UNITS: 5000 INJECTION INTRAVENOUS; SUBCUTANEOUS at 10:21

## 2019-12-08 RX ADMIN — LEVETIRACETAM SCH MG: 100 SOLUTION ORAL at 01:58

## 2019-12-08 RX ADMIN — LEVETIRACETAM SCH MG: 100 SOLUTION ORAL at 10:19

## 2019-12-08 RX ADMIN — CLOBETASOL PROPIONATE SCH APPLIC: 0.5 OINTMENT TOPICAL at 20:24

## 2019-12-08 RX ADMIN — MEROPENEM SCH MLS/HR: 1 INJECTION INTRAVENOUS at 13:53

## 2019-12-08 RX ADMIN — CHLORHEXIDINE GLUCONATE SCH APPLIC: 213 SOLUTION TOPICAL at 20:22

## 2019-12-08 RX ADMIN — SITAGLIPTIN SCH MG: 50 TABLET, FILM COATED ORAL at 06:23

## 2019-12-08 RX ADMIN — INSULIN ASPART SCH UNITS: 100 INJECTION, SOLUTION INTRAVENOUS; SUBCUTANEOUS at 05:49

## 2019-12-08 RX ADMIN — TAMSULOSIN HYDROCHLORIDE SCH MG: 0.4 CAPSULE ORAL at 10:18

## 2019-12-08 RX ADMIN — INSULIN ASPART SCH UNITS: 100 INJECTION, SOLUTION INTRAVENOUS; SUBCUTANEOUS at 23:22

## 2019-12-08 RX ADMIN — INSULIN ASPART SCH UNITS: 100 INJECTION, SOLUTION INTRAVENOUS; SUBCUTANEOUS at 00:00

## 2019-12-08 RX ADMIN — INSULIN ASPART SCH UNITS: 100 INJECTION, SOLUTION INTRAVENOUS; SUBCUTANEOUS at 12:00

## 2019-12-08 RX ADMIN — LEVETIRACETAM SCH MG: 100 SOLUTION ORAL at 18:40

## 2019-12-08 RX ADMIN — MEROPENEM SCH MLS/HR: 1 INJECTION INTRAVENOUS at 05:48

## 2019-12-08 NOTE — PROGRESS NOTE
DATE:  12/07/2019

CARDIOLOGY PROGRESS NOTE



LATE ENTRY



SUBJECTIVE:  The patient remains on ventilator support via trach.  He

continues to have episodes of tachycardia.  He has labile blood pressure

readings.  He is on broad-spectrum antibiotics.



OBJECTIVE:

VITAL SIGNS:  Blood pressure 142/98, pulse 110, respiratory rate 14, T-max

101.8.





LUNGS:  Bilateral breath sounds, rhonchi.

HEART:  ______ rhythm.  Rapid rate.  Normal S1 and S2.

ABDOMEN:  Soft.  G-tube intact.

EXTREMITIES:  Trace edema.



IMPRESSION:

1. Sepsis.

2. Healthcare acquired pneumonia.

3. Recovered shock.

4. Secondary sinus tachycardia.

5. Ventilator-dependent respiratory failure.

6. G-tube and colostomy.



PLAN:

1. Antimicrobials.

2. Volume support.

3. Ventilator support.

4. DVT prophylaxis.

5. Consider further diagnostic study for pulmonary embolic event.









  ______________________________________________

  Robert Chan M.D.





DR:  Alonzo

D:  12/08/2019 22:04

T:  12/08/2019 22:59

JOB#:  [id number]

CC:

## 2019-12-08 NOTE — UROLOGY PROGRESS NOTE
Assessment/Plan


Status:  stable


Assessment/Plan:


1. Urinary retention.


2. BPH.


3. Neurogenic bladder.


4. Incontinence.


5. Pyuria/UTI/colonized.


6. Hematuria.


7. Proteinuria.


8. Renal insufficiency, acute possibly on chronic.


9. Renal cyst.


10. Nephrolithiasis.


11. POD # 11, cysto/optic urethrotomy.





monitor clinically


mayorga placed 11/27


hand irrigated and do PRN


abx as ordered, pr ID


consider antifungals, per ID


monitor WBC


f/u on last blood cx


CT ordered, f/u on result





Subjective


Allergies:  


Coded Allergies:  


     AZTREONAM (Verified  Allergy, Unknown, 7/23/18)


Subjective


all noted, mayorga draining, non-verbal





Objective





Last 24 Hour Vital Signs








  Date Time  Temp Pulse Resp B/P (MAP) Pulse Ox O2 Delivery O2 Flow Rate FiO2


 


12/8/19 07:15  102 15     30


 


12/8/19 04:35  109 15     30


 


12/8/19 04:00 97.8 108 15 140/84 (102) 100   


 


12/8/19 04:00        30


 


12/8/19 04:00      Mechanical Ventilator  


 


12/8/19 04:00  105      


 


12/8/19 02:32  104 14     30


 


12/8/19 01:49  111 14     30


 


12/8/19 00:00        30


 


12/8/19 00:00 97.2 104 14 141/96 (111) 100   


 


12/8/19 00:00  115      


 


12/8/19 00:00      Mechanical Ventilator  


 


12/7/19 23:07  106 14     30


 


12/7/19 21:05  110 15     30


 


12/7/19 20:00  118      


 


12/7/19 20:00      Mechanical Ventilator  


 


12/7/19 20:00 97.9 110 14 142/98 (113) 100   


 


12/7/19 19:09  116 16     30


 


12/7/19 17:20  122 15     30


 


12/7/19 16:00        30


 


12/7/19 16:00 99.1 115 15 146/93 (110) 100   


 


12/7/19 16:00      Mechanical Ventilator  


 


12/7/19 16:00  106      


 


12/7/19 15:05  116 14     30


 


12/7/19 13:20  109 14     30


 


12/7/19 12:00  122      


 


12/7/19 12:00 99.4 116 16 106/62 (77) 100   


 


12/7/19 12:00      Mechanical Ventilator  


 


12/7/19 12:00        30


 


12/7/19 10:40  122 14     30

















Intake and Output  


 


 12/7/19 12/8/19





 19:00 07:00


 


Intake Total 2049 ml 1505 ml


 


Output Total 1500 ml 1900 ml


 


Balance 549 ml -395 ml


 


  


 


Free Water 250 ml 


 


IV Total 1474 ml 1505 ml


 


Tube Feeding 325 ml 


 


Output Urine Total 1200 ml 1500 ml


 


Stool Total 300 ml 400 ml











Microbiology








 Date/Time


Source Procedure


Growth Status


 


 


 12/5/19 23:15


Blood Blood Culture - Preliminary


Gram Negative Clif Resulted





 11/25/19 11:00


Nasal Nares - Final Complete


 


 11/25/19 11:00


Nasal Nares - Final Complete


 


 12/5/19 19:00


Stool Clostridium difficile Toxin Assay - Final Complete


 


 12/5/19 19:00


Urine,Clean Catch Urine Culture - Preliminary


YEAST Resulted


 


 11/24/19 09:10


Rectum VRE Culture - Final


Enterococcus Faecium - Vre Complete








Current Medications








 Medications


  (Trade)  Dose


 Ordered  Sig/Jan


 Route


 PRN Reason  Start Time


 Stop Time Status Last Admin


Dose Admin


 


 Acetaminophen


  (Tylenol)  650 mg  Q4H  PRN


 GT


 Mild Pain/Temp > 100.6  11/26/19 18:42


 12/26/19 18:41  12/7/19 08:09


 


 


 Amikacin Protocol


  (Amikacin


 pharmacy to dose)  1 ea  DAILY  PRN


 MISC


 Per rx protocol  12/7/19 11:30


 1/6/20 11:29   


 


 


 Amikacin Sulfate


 1000 mg/Sodium


 Chloride  114 ml @ 


 114 mls/hr  Q24H


 IV


   12/7/19 12:30


 12/14/19 12:29  12/7/19 12:38


 


 


 Artificial Tears


  (Akwa-Tears)  2 drop  Q12HR


 BOTH EYES


   11/26/19 21:00


 12/24/19 20:59  12/7/19 20:30


 


 


 Baclofen


  (Lioresal)  10 mg  THREE TIMES A  DAY


 GT


   11/27/19 09:00


 12/24/19 17:59  12/7/19 17:24


 


 


 Barium Sulfate


  (Readi-Cat 2)  450 ml  NOW  PRN


 ORAL


 Radiology Procedure  12/7/19 11:15


 12/9/19 11:15   


 


 


 Calcitonin [place]


  (Miacalcin)  1 sprays  DAILY


 NASAL


   11/28/19 12:00


 12/28/19 11:59  12/7/19 08:08


 


 


 Chlorhexidine


 Gluconate


  (Elaine-Hex 2%)  1 applic  DAILY@2000


 TOPIC


   12/6/19 20:00


 1/5/20 19:59  12/7/19 20:30


 


 


 Clobetasol


 Propionate


  (Temovate)  1 applic  EVERY 12  HOURS


 TOPIC


   11/26/19 21:00


 12/24/19 20:59  12/7/19 20:30


 


 


 Dextrose


  (Dextrose 50%)  25 ml  Q30M  PRN


 IV


 Hypoglycemia  11/26/19 18:45


 12/24/19 14:44   


 


 


 Dextrose


  (Dextrose 50%)  50 ml  Q30M  PRN


 IV


 Hypoglycemia  11/26/19 18:45


 12/24/19 14:44   


 


 


 Famotidine


  (Pepcid)  20 mg  BID


 GT


   11/28/19 18:00


 12/28/19 17:59  12/7/19 17:24


 


 


 Fenofibrate


  (Tricor)  145 mg  DAILY


 ORAL


   11/27/19 09:00


 12/25/19 08:59  12/7/19 08:08


 


 


 Heparin Sodium


  (Porcine)


  (Heparin 5000


 units/ml)  5,000 units  EVERY 12  HOURS


 SUBQ


   11/26/19 21:00


 12/24/19 20:59  12/7/19 20:37


 


 


 Insulin Aspart


  (NovoLOG)    EVERY 6  HOURS


 SUBQ


   11/30/19 12:00


 12/24/19 16:29  12/7/19 12:39


 


 


 Iohexol


  (OMNIPAQUE-300


 100ml)  100 ml  ONCE  PRN


 INJ


 Radiology Procedure  12/7/19 11:15


 12/9/19 23:59   


 


 


 Levetiracetam


  (Keppra)  1,000 mg  Q8H


 GT


   11/29/19 02:00


 12/29/19 01:59  12/8/19 01:58


 


 


 Meropenem 1 gm/


 Sodium Chloride  55 ml @ 


 110 mls/hr  Q8HR


 IVPB


   12/5/19 14:00


 12/10/19 13:59  12/8/19 05:48


 


 


 Ondansetron HCl


  (Zofran)  4 mg  Q6H  PRN


 IVP


 Nausea & Vomiting  11/26/19 18:44


 12/26/19 18:43   


 


 


 Polyethylene


 Glycol


  (Miralax)  17 gm  DAILYPRN  PRN


 GT


 Constipation  11/26/19 18:44


 12/26/19 18:43   


 


 


 Sitagliptin


 Phosphate


  (Januvia)  50 mg  ACBREAKFAST


 GT


   11/27/19 06:30


 12/25/19 06:29  12/7/19 06:07


 


 


 Sodium Chloride  1,000 ml @ 


 125 mls/hr  Q8H


 IV


   12/5/19 01:45


 1/4/20 01:44  12/8/19 01:58


 


 


 Tamsulosin HCl


  (Flomax)  0.4 mg  DAILY


 ORAL


   11/27/19 09:00


 12/27/19 08:59  12/7/19 08:09


 





Laboratory Tests


12/8/19 00:20: Miscellaneous Test 2 [Pending]


Height (Feet):  5


Height (Inches):  7.00


Weight (Pounds):  210


Objective


 exam stable


mayorga indwelling, yellow/christine urine, some debris











Vinnie Pollard MD Dec 8, 2019 09:30

## 2019-12-08 NOTE — NEPHROLOGY PROGRESS NOTE
Assessment/Plan


Problem List:  


(1) Renal failure (ARF), acute on chronic


(2) Urinary retention


(3) Acute on chronic respiratory failure


(4) Hypercalcemia


Assessment





Hypercalcemia


Urinary retention, BPH , 


Acute on chronic renal failure


Electrolyte imbalance 


Colostomy


DM


PEG


Vegetative state


Acute on chronic respiratory failure


Plan





recheck K


Free water-


K and Phos and Mag supplements as needed


pulm support


Aredia for high Ca on 11/28 redose today 12/3





Subjective


ROS Limited/Unobtainable:  Yes





Objective


Objective





Last 24 Hour Vital Signs








  Date Time  Temp Pulse Resp B/P (MAP) Pulse Ox O2 Delivery O2 Flow Rate FiO2


 


12/8/19 09:06  106 18     30


 


12/8/19 08:00      Mechanical Ventilator  


 


12/8/19 08:00        30


 


12/8/19 08:00 99.5 111 16 129/75 (93) 100   


 


12/8/19 08:00  106      


 


12/8/19 07:15  102 15     30


 


12/8/19 04:35  109 15     30


 


12/8/19 04:00 97.8 108 15 140/84 (102) 100   


 


12/8/19 04:00        30


 


12/8/19 04:00      Mechanical Ventilator  


 


12/8/19 04:00  105      


 


12/8/19 02:32  104 14     30


 


12/8/19 01:49  111 14     30


 


12/8/19 00:00        30


 


12/8/19 00:00 97.2 104 14 141/96 (111) 100   


 


12/8/19 00:00  115      


 


12/8/19 00:00      Mechanical Ventilator  


 


12/7/19 23:07  106 14     30


 


12/7/19 21:05  110 15     30


 


12/7/19 20:00  118      


 


12/7/19 20:00      Mechanical Ventilator  


 


12/7/19 20:00 97.9 110 14 142/98 (113) 100   


 


12/7/19 19:09  116 16     30


 


12/7/19 17:20  122 15     30


 


12/7/19 16:00        30


 


12/7/19 16:00 99.1 115 15 146/93 (110) 100   


 


12/7/19 16:00      Mechanical Ventilator  


 


12/7/19 16:00  106      


 


12/7/19 15:05  116 14     30


 


12/7/19 13:20  109 14     30


 


12/7/19 12:00  122      


 


12/7/19 12:00 99.4 116 16 106/62 (77) 100   


 


12/7/19 12:00      Mechanical Ventilator  


 


12/7/19 12:00        30

















Intake and Output  


 


 12/7/19 12/8/19





 19:00 07:00


 


Intake Total 2049 ml 1505 ml


 


Output Total 1500 ml 1900 ml


 


Balance 549 ml -395 ml


 


  


 


Free Water 250 ml 


 


IV Total 1474 ml 1505 ml


 


Tube Feeding 325 ml 


 


Output Urine Total 1200 ml 1500 ml


 


Stool Total 300 ml 400 ml








Laboratory Tests


12/8/19 00:20: Miscellaneous Test 2 [Pending]


Height (Feet):  5


Height (Inches):  7.00


Weight (Pounds):  210


General Appearance:  no apparent distress


EENT:  other - trach


Cardiovascular:  tachycardia


Respiratory/Chest:  decreased breath sounds


Abdomen:  distended


Objective


no change











Simone Rocha MD Dec 8, 2019 11:17

## 2019-12-08 NOTE — PULMONOLGY CRITICAL CARE NOTE
Critical Care - Asmt/Plan


Problems:  


(1) Acute on chronic respiratory failure


(2) Sepsis


(3) Diabetes mellitus


(4) Vegetative state


(5) Gastrostomy tube dependent


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


(7) ATN (acute tubular necrosis)


Respiratory:  monitor respiratory rate, adjust FIO2, CXR


Cardiac:  continue to monitor HR/BP


Renal:  F/U I&O, keep IV fluid, check electrolytes


Infectious Disease:  check cultures


Gastrointestinal:  continue feedings/current rate


Endocrine:  monitor blood sugar, check TSH


Hematologic:  monitor H/H, transfuse if hgb<8.5


Neurologic:  PRN Morphine, keep patient comfortable


Affect:  PRN ativan


Prophylaxis:  Protonix, Heparin


Notes Reviewed:  internist, renal


Discussed with:  nurses, consultants, 





Critical Care - Objective





Last 24 Hour Vital Signs








  Date Time  Temp Pulse Resp B/P (MAP) Pulse Ox O2 Delivery O2 Flow Rate FiO2


 


12/8/19 21:14  99 16     30


 


12/8/19 21:06 97.8       


 


12/8/19 20:00  95      


 


12/8/19 20:00      Mechanical Ventilator  


 


12/8/19 20:00        30


 


12/8/19 20:00 97.8 98 16 130/79 (96) 100   


 


12/8/19 19:03  95 15     30


 


12/8/19 17:10  101 14     30


 


12/8/19 16:00 97.7 101 16 139/81 (100) 100   


 


12/8/19 16:00        30


 


12/8/19 16:00      Mechanical Ventilator  


 


12/8/19 16:00  99      


 


12/8/19 15:08  105 14     30


 


12/8/19 13:06  109 14     30


 


12/8/19 12:00 100.1 103 16 134/88 (103) 100   


 


12/8/19 12:00      Mechanical Ventilator  


 


12/8/19 12:00  103      


 


12/8/19 12:00        30


 


12/8/19 11:18  103 18     30


 


12/8/19 09:06  106 18     30


 


12/8/19 08:00      Mechanical Ventilator  


 


12/8/19 08:00        30


 


12/8/19 08:00 99.5 111 16 129/75 (93) 100   


 


12/8/19 08:00  106      


 


12/8/19 07:15  102 15     30


 


12/8/19 04:35  109 15     30


 


12/8/19 04:00 97.8 108 15 140/84 (102) 100   


 


12/8/19 04:00        30


 


12/8/19 04:00      Mechanical Ventilator  


 


12/8/19 04:00  105      


 


12/8/19 02:32  104 14     30


 


12/8/19 01:49  111 14     30


 


12/8/19 00:00        30


 


12/8/19 00:00 97.2 104 14 141/96 (111) 100   


 


12/8/19 00:00  115      


 


12/8/19 00:00      Mechanical Ventilator  


 


12/7/19 23:07  106 14     30








Status:  awake


Condition:  critical


HEENT:  atraumatic


Neck:  full ROM


Lungs:  rales, rhonchi


Abdomen:  soft, non-tender, feeding tube


Extremities:  edema


Micro:





Microbiology








 Date/Time


Source Procedure


Growth Status


 


 


 12/5/19 23:15


Blood Blood Culture - Preliminary


Gram Negative Clif Resulted


 


 12/5/19 23:10


Blood Blood Culture - Preliminary


Gram Negative Clif Resulted








Accucheck:  109





Critical Care - Subjective


ROS Limited/Unobtainable:  Yes


Condition:  critical


EKG Rhythm:  Sinus Rhythm


FI02:  30


Vent Support Breath Rate:  14


Vent Support Mode:  AC


Vent Tidal Volume:  600


Sputum Amount:  Small


PEEP:  5.0


PIP:  34


Tube Feeding Amount:  65


I&O:











Intake and Output  


 


 12/7/19 12/8/19





 19:00 07:00


 


Intake Total 2049 ml 1505 ml


 


Output Total 1500 ml 1900 ml


 


Balance 549 ml -395 ml


 


  


 


Free Water 250 ml 


 


IV Total 1474 ml 1505 ml


 


Tube Feeding 325 ml 


 


Output Urine Total 1200 ml 1500 ml


 


Stool Total 300 ml 400 ml








Labs:





Laboratory Tests








Test


  12/8/19


00:20 12/8/19


16:30


 


Miscellaneous Test 2    


 


Activated Partial


Thromboplast Time 


  32 SEC (23-33)


 

















Yury Pena MD Dec 8, 2019 21:29

## 2019-12-08 NOTE — PROGRESS NOTE
DATE:  12/08/2019

CARDIOLOGY PROGRESS NOTE



SUBJECTIVE:  The patient continues to have sinus tachycardia, episodic and

fever persists.  CT scan revealed pulmonary embolic event.



PHYSICAL EXAMINATION:

LUNGS:  Bilateral breath sounds and rhonchi.

CARDIAC:  Regular rhythm.  Rapid rate.  Normal S1 and S2.

ABDOMEN:  Colostomy and G-tube noted.

EXTREMITIES:  Trace edema.



IMPRESSION:

1. Pulmonary embolus.

2. Sepsis.

3. Healthcare-acquired pneumonia.

4. _____.

5. _____.









  ______________________________________________

  Robert Chan M.D.





DR:  BUCK

D:  12/08/2019 22:06

T:  12/08/2019 22:25

JOB#:  [id number]

CC:

## 2019-12-08 NOTE — DIAGNOSTIC IMAGING REPORT
EXAM:

  CT Chest With Intravenous Contrast

 

CLINICAL HISTORY:

  ABSCESS

 

TECHNIQUE:

  Axial computed tomography images of the chest with intravenous contrast.

  CTDI is 173.7 mGy and DLP is 2822.5 mGy-cm.  One or more of the 

following dose reduction techniques were used: automated exposure control,

 adjustment of the mA and/or kV according to patient size, use of 

iterative reconstruction technique.

  Coronal and sagittal reformatted images were created and reviewed.

 

COMPARISON:

  No relevant prior studies available.

 

FINDINGS:

  Lungs:  Bibasilar atelectasis/consolidations.

  Pleural space:  Unremarkable.  No pneumothorax.  No significant 

effusion.

  Heart:  Cardiomegaly.  No significant pericardial effusion.

  Thyroid:  10 mm low-density lesion in right thyroid.

  Bones/joints:  Unremarkable.  No acute fracture.  No dislocation.

  Soft tissues:  Unremarkable.

  Vasculature:  Left lower lobe segmental and subsegmental pulmonary 

emboli.  No thoracic aortic aneurysm.

  Lymph nodes:  Unremarkable.  No enlarged lymph nodes.

  Tubes, lines and devices:  Tracheostomy tube.  Left PICC line.

 

IMPRESSION:     

1.  Left lower lobe segmental and subsegmental pulmonary emboli.

2.  Bibasilar atelectasis/consolidations.

3.  10 mm low-density lesion in right thyroid.

 

_______________________________________________

 

EXAM:

  CT Abdomen and Pelvis With Intravenous Contrast

 

CLINICAL HISTORY:

  ABSCESS

 

TECHNIQUE:

  Axial computed tomography images of the abdomen and pelvis with 

intravenous contrast.  CTDI is 173.7 mGy and DLP is 2822.5 mGy-cm.  One 

or more of the following dose reduction techniques were used: automated 

exposure control, adjustment of the mA and/or kV according to patient 

size, use of iterative reconstruction technique.

 

COMPARISON:

  CT abdomen and pelvis 1/29/19

 

FINDINGS:

  Lung bases:  Unremarkable.  No mass.  No consolidation.

 

 ABDOMEN:

  Liver:  Heterogeneous large areas of hypoattenuation in the right lobe 

of the liver.  No formed fluid collection to suggest an abscess.

  Gallbladder and bile ducts:  Contracted gallbladder with thickened 

enhancing wall.  No calcified stones.  No ductal dilation.

  Pancreas:  Unremarkable.  No mass.  No ductal dilation.

  Spleen:  Unremarkable.  No splenomegaly.

  Adrenals:  Unremarkable.  No mass.

  Kidneys and ureters:  Bilateral renal stones, staghorn type in the left 

kidney, nonobstructive.  Mild fullness of the right renal pelvis.  Mild 

bilateral perinephric stranding, correlate for infection.  15 mm low-

density lesion left kidney.

  Stomach and bowel: Percutaneous gastrostomy tube. Right lower quadrant 

ostomy.  No obstruction.  No mucosal thickening.

 

 PELVIS:

  Appendix:  No findings to suggest acute appendicitis.

  Bladder:  Guzmán catheter in urinary bladder.  Thickening of the urinary 

bladder with mild stranding may be cystitis. A few small calcifications 

in the left urinary bladder base.

  Reproductive:  Unremarkable as visualized.

 

 ABDOMEN and PELVIS:

  Intraperitoneal space:  Unremarkable.  No free air.  No significant 

fluid collection.

  Bones/joints:  No acute fracture.  No dislocation.

  Soft tissues:  Left lateral abdominal wall musculature 1.5 x 2.2 cm 

fluid collection with enhancing worrisome for an abscess.  Small 

bilateral fat-containing inguinal hernias.  Soft tissue induration right 

lateral abdominal wall.

  Vasculature:  No evidence of pulmonary vein thrombosis.  No abdominal 

aortic aneurysm.

  Lymph nodes:  Unremarkable.  No enlarged lymph nodes.

  

 

IMPRESSION:     

1.  Heterogeneous large areas of hypoattenuation in the right lobe of the 

liver, maybe fatty changes, correlate for hepatitis or other process.  No 

formed fluid collection to suggest an abscess.

2.  Contracted gallbladder with thickened enhancing wall.

3.  Left lateral abdominal wall musculature 1.5 x 2.2 cm fluid collection 

with enhancing worrisome for an abscess.

4.  Bilateral renal stones, staghorn type in the left kidney, 

nonobstructive.  Mild fullness of the right renal pelvis.  Mild bilateral 

perinephric stranding, correlate for infection.

5.  Guzmán catheter in urinary bladder.  Thickening of the urinary bladder 

with mild stranding may be cystitis. A few small calcifications in the 

left urinary bladder base.

 

<MYCVCSECTION>

 

Communications:

 

12/08/19 11:19 Call Nurse Called JL Acuña on 12/08 11:19 (-08:00)

## 2019-12-09 VITALS — DIASTOLIC BLOOD PRESSURE: 76 MMHG | SYSTOLIC BLOOD PRESSURE: 129 MMHG

## 2019-12-09 VITALS — DIASTOLIC BLOOD PRESSURE: 82 MMHG | SYSTOLIC BLOOD PRESSURE: 136 MMHG

## 2019-12-09 VITALS — SYSTOLIC BLOOD PRESSURE: 145 MMHG | DIASTOLIC BLOOD PRESSURE: 78 MMHG

## 2019-12-09 VITALS — SYSTOLIC BLOOD PRESSURE: 132 MMHG | DIASTOLIC BLOOD PRESSURE: 73 MMHG

## 2019-12-09 VITALS — SYSTOLIC BLOOD PRESSURE: 140 MMHG | DIASTOLIC BLOOD PRESSURE: 74 MMHG

## 2019-12-09 VITALS — DIASTOLIC BLOOD PRESSURE: 73 MMHG | SYSTOLIC BLOOD PRESSURE: 134 MMHG

## 2019-12-09 LAB
ADD MANUAL DIFF: YES
ALBUMIN SERPL-MCNC: 1.8 G/DL (ref 3.4–5)
ALBUMIN/GLOB SERPL: 0.4 {RATIO} (ref 1–2.7)
ALP SERPL-CCNC: 100 U/L (ref 46–116)
ALT SERPL-CCNC: 22 U/L (ref 12–78)
ANION GAP SERPL CALC-SCNC: 8 MMOL/L (ref 5–15)
AST SERPL-CCNC: 20 U/L (ref 15–37)
BILIRUB SERPL-MCNC: 0.3 MG/DL (ref 0.2–1)
BUN SERPL-MCNC: 9 MG/DL (ref 7–18)
CALCIUM SERPL-MCNC: 9.5 MG/DL (ref 8.5–10.1)
CHLORIDE SERPL-SCNC: 103 MMOL/L (ref 98–107)
CO2 SERPL-SCNC: 26 MMOL/L (ref 21–32)
CREAT SERPL-MCNC: 1.1 MG/DL (ref 0.55–1.3)
ERYTHROCYTE [DISTWIDTH] IN BLOOD BY AUTOMATED COUNT: 15.3 % (ref 11.6–14.8)
GLOBULIN SER-MCNC: 4.6 G/DL
HCT VFR BLD CALC: 23.8 % (ref 42–52)
HGB BLD-MCNC: 7.6 G/DL (ref 14.2–18)
MCV RBC AUTO: 89 FL (ref 80–99)
PHOSPHATE SERPL-MCNC: 3.1 MG/DL (ref 2.5–4.9)
PLATELET # BLD: 265 K/UL (ref 150–450)
POTASSIUM SERPL-SCNC: 3.4 MMOL/L (ref 3.5–5.1)
RBC # BLD AUTO: 2.67 M/UL (ref 4.7–6.1)
SODIUM SERPL-SCNC: 137 MMOL/L (ref 136–145)
WBC # BLD AUTO: 10.3 K/UL (ref 4.8–10.8)

## 2019-12-09 RX ADMIN — SITAGLIPTIN SCH MG: 50 TABLET, FILM COATED ORAL at 05:30

## 2019-12-09 RX ADMIN — CLOBETASOL PROPIONATE SCH APPLIC: 0.5 OINTMENT TOPICAL at 20:12

## 2019-12-09 RX ADMIN — HEPARIN SODIUM SCH MLS/HR: 5000 INJECTION, SOLUTION INTRAVENOUS at 00:12

## 2019-12-09 RX ADMIN — CLOBETASOL PROPIONATE SCH APPLIC: 0.5 OINTMENT TOPICAL at 09:30

## 2019-12-09 RX ADMIN — LEVETIRACETAM SCH MG: 100 SOLUTION ORAL at 02:13

## 2019-12-09 RX ADMIN — TAMSULOSIN HYDROCHLORIDE SCH MG: 0.4 CAPSULE ORAL at 08:50

## 2019-12-09 RX ADMIN — LEVETIRACETAM SCH MG: 100 SOLUTION ORAL at 09:29

## 2019-12-09 RX ADMIN — INSULIN ASPART SCH UNITS: 100 INJECTION, SOLUTION INTRAVENOUS; SUBCUTANEOUS at 18:32

## 2019-12-09 RX ADMIN — INSULIN ASPART SCH UNITS: 100 INJECTION, SOLUTION INTRAVENOUS; SUBCUTANEOUS at 12:31

## 2019-12-09 RX ADMIN — SULFAMETHOXAZOLE AND TRIMETHOPRIM SCH MLS/HR: 80; 16 INJECTION, SOLUTION, CONCENTRATE INTRAVENOUS at 18:17

## 2019-12-09 RX ADMIN — CHLORHEXIDINE GLUCONATE SCH APPLIC: 213 SOLUTION TOPICAL at 20:11

## 2019-12-09 RX ADMIN — INSULIN ASPART SCH UNITS: 100 INJECTION, SOLUTION INTRAVENOUS; SUBCUTANEOUS at 05:32

## 2019-12-09 RX ADMIN — FLUCONAZOLE SCH MG: 100 TABLET ORAL at 14:28

## 2019-12-09 RX ADMIN — LEVETIRACETAM SCH MG: 100 SOLUTION ORAL at 18:18

## 2019-12-09 RX ADMIN — MEROPENEM SCH MLS/HR: 1 INJECTION INTRAVENOUS at 05:30

## 2019-12-09 RX ADMIN — HEPARIN SODIUM SCH MLS/HR: 5000 INJECTION, SOLUTION INTRAVENOUS at 07:47

## 2019-12-09 RX ADMIN — POLYMYXIN B SULFATE SCH MLS/HR: 500000 INJECTION, POWDER, FOR SOLUTION INTRAMUSCULAR; INTRATHECAL; INTRAVENOUS; OPHTHALMIC at 20:26

## 2019-12-09 NOTE — NEPHROLOGY PROGRESS NOTE
Assessment/Plan


Problem List:  


(1) Renal failure (ARF), acute on chronic


(2) Urinary retention


(3) Acute on chronic respiratory failure


(4) Hypercalcemia


Assessment





Hypercalcemia


Urinary retention, BPH , 


Acute on chronic renal failure


Electrolyte imbalance 


Colostomy


DM


PEG


Vegetative state


Acute on chronic respiratory failure


Plan





consider transfusion: defered tp PMD


Free water-


K and Phos and Mag supplements as needed


pulm support


Aredia for high Ca on 11/28 redose today 12/3





Subjective


ROS Limited/Unobtainable:  Yes





Objective


Objective





Last 24 Hour Vital Signs








  Date Time  Temp Pulse Resp B/P (MAP) Pulse Ox O2 Delivery O2 Flow Rate FiO2


 


12/9/19 08:55  98 14     30


 


12/9/19 08:00 97.2 97 16 134/73 (93) 100   


 


12/9/19 08:00        30


 


12/9/19 07:01  96 14     30


 


12/9/19 04:49  97 15     30


 


12/9/19 04:00  89      


 


12/9/19 04:00        30


 


12/9/19 04:00 96.7 88 15 136/82 (100) 100   


 


12/9/19 04:00      Mechanical Ventilator  


 


12/9/19 02:32  98 14     30


 


12/9/19 00:44  96 14     30


 


12/9/19 00:00        30


 


12/9/19 00:00      Mechanical Ventilator  


 


12/9/19 00:00 97.5 94 15 129/76 (93) 100   


 


12/9/19 00:00  91      


 


12/8/19 22:42  94 14     30


 


12/8/19 21:14  99 16     30


 


12/8/19 21:06 97.8       


 


12/8/19 20:00  95      


 


12/8/19 20:00      Mechanical Ventilator  


 


12/8/19 20:00        30


 


12/8/19 20:00 97.8 98 16 130/79 (96) 100   


 


12/8/19 19:03  95 15     30


 


12/8/19 17:10  101 14     30


 


12/8/19 16:00 97.7 101 16 139/81 (100) 100   


 


12/8/19 16:00        30


 


12/8/19 16:00      Mechanical Ventilator  


 


12/8/19 16:00  99      


 


12/8/19 15:08  105 14     30


 


12/8/19 13:06  109 14     30


 


12/8/19 12:00 100.1 103 16 134/88 (103) 100   


 


12/8/19 12:00      Mechanical Ventilator  


 


12/8/19 12:00  103      


 


12/8/19 12:00        30


 


12/8/19 11:18  103 18     30

















Intake and Output  


 


 12/8/19 12/9/19





 19:00 07:00


 


Intake Total 1813.582 ml 2432.174 ml


 


Output Total  1300 ml


 


Balance 1813.582 ml 1132.174 ml


 


  


 


Free Water  200 ml


 


IV Total 1373.582 ml 1582.174 ml


 


Tube Feeding 440 ml 650 ml


 


Output Urine Total  700 ml


 


Stool Total  600 ml








Laboratory Tests


12/8/19 16:30: Activated Partial Thromboplast Time 32


12/8/19 23:05: Activated Partial Thromboplast Time 55H


12/9/19 06:40: 


Activated Partial Thromboplast Time 66H, White Blood Count 10.3, Red Blood 

Count 2.67L, Hemoglobin 7.6L, Hematocrit 23.8L, Mean Corpuscular Volume 89, 

Mean Corpuscular Hemoglobin 28.5, Mean Corpuscular Hemoglobin Concent 31.9L, 

Red Cell Distribution Width 15.3H, Platelet Count 265, Mean Platelet Volume 7.6

, Neutrophils (%) (Auto) , Lymphocytes (%) (Auto) , Monocytes (%) (Auto) , 

Eosinophils (%) (Auto) , Basophils (%) (Auto) , Neutrophils % (Manual) [Pending]

, Lymphocytes % (Manual) [Pending], Platelet Estimate [Pending], Platelet 

Morphology [Pending], Sodium Level 137, Potassium Level 3.4L, Chloride Level 103

, Carbon Dioxide Level 26, Anion Gap 8, Blood Urea Nitrogen 9, Creatinine 1.1, 

Estimat Glomerular Filtration Rate > 60, Glucose Level 164H, Calcium Level 9.5, 

Phosphorus Level 3.1, Magnesium Level 1.7L, Total Bilirubin 0.3, Aspartate 

Amino Transf (AST/SGOT) 20, Alanine Aminotransferase (ALT/SGPT) 22, Alkaline 

Phosphatase 100, Total Protein 6.4, Albumin 1.8L, Globulin 4.6, Albumin/

Globulin Ratio 0.4L


Height (Feet):  5


Height (Inches):  7.00


Weight (Pounds):  209


General Appearance:  no apparent distress, lethargic


EENT:  other - trach


Cardiovascular:  tachycardia


Respiratory/Chest:  decreased breath sounds


Abdomen:  distended


Objective


no change











Simone Rocha MD Dec 9, 2019 10:14

## 2019-12-09 NOTE — PULMONOLGY CRITICAL CARE NOTE
Critical Care - Asmt/Plan


Problems:  


(1) Acute on chronic respiratory failure


(2) Sepsis


(3) Diabetes mellitus


(4) Vegetative state


(5) Gastrostomy tube dependent


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


(7) ATN (acute tubular necrosis)


(8) Pulmonary embolism


Respiratory:  monitor respiratory rate, adjust FIO2, CXR


Cardiac:  continue to monitor HR/BP


Renal:  F/U I&O


Infectious Disease:  check cultures, continue antibiotics


Gastrointestinal:  continue feedings/current rate, abdominal imaging


Endocrine:  monitor blood sugar


Hematologic:  monitor H/H, transfuse if hgb<8.5 - one or two PRBC


Neurologic:  keep patient comfortable


Affect:  PRN ativan


Prophylaxis:  Protonix


Time Spent (Minutes):  40


Notes Reviewed:  internist, cardio


Discussed with:  nurses, consultants, 





Critical Care - Objective





Last 24 Hour Vital Signs








  Date Time  Temp Pulse Resp B/P (MAP) Pulse Ox O2 Delivery O2 Flow Rate FiO2


 


12/9/19 08:55  98 14     30


 


12/9/19 08:00 97.2 97 16 134/73 (93) 100   


 


12/9/19 08:00        30


 


12/9/19 08:00      Mechanical Ventilator  


 


12/9/19 08:00  97      


 


12/9/19 07:01  96 14     30


 


12/9/19 04:49  97 15     30


 


12/9/19 04:00  89      


 


12/9/19 04:00        30


 


12/9/19 04:00 96.7 88 15 136/82 (100) 100   


 


12/9/19 04:00      Mechanical Ventilator  


 


12/9/19 02:32  98 14     30


 


12/9/19 00:44  96 14     30


 


12/9/19 00:00        30


 


12/9/19 00:00      Mechanical Ventilator  


 


12/9/19 00:00 97.5 94 15 129/76 (93) 100   


 


12/9/19 00:00  91      


 


12/8/19 22:42  94 14     30


 


12/8/19 21:14  99 16     30


 


12/8/19 21:06 97.8       


 


12/8/19 20:00  95      


 


12/8/19 20:00      Mechanical Ventilator  


 


12/8/19 20:00        30


 


12/8/19 20:00 97.8 98 16 130/79 (96) 100   


 


12/8/19 19:03  95 15     30


 


12/8/19 17:10  101 14     30


 


12/8/19 16:00 97.7 101 16 139/81 (100) 100   


 


12/8/19 16:00        30


 


12/8/19 16:00      Mechanical Ventilator  


 


12/8/19 16:00  99      


 


12/8/19 15:08  105 14     30


 


12/8/19 13:06  109 14     30


 


12/8/19 12:00 100.1 103 16 134/88 (103) 100   


 


12/8/19 12:00      Mechanical Ventilator  


 


12/8/19 12:00  103      


 


12/8/19 12:00        30








Status:  other - open eyes, vegetative state


Condition:  critical


HEENT:  atraumatic, normocephalic


Neck:  full ROM


Heart:  HR/BP stable


Abdomen:  soft, non-tender


Extremities:  no C/C/E


Micro:





Microbiology








 Date/Time


Source Procedure


Growth Status


 


 


 12/7/19 17:25


Blood Blood Culture - Preliminary Resulted


 


 12/7/19 17:15


Blood Blood Culture - Preliminary Resulted








Accucheck:  153





Critical Care - Subjective


ROS Limited/Unobtainable:  Yes


Condition:  critical


EKG Rhythm:  Sinus Bradycardia


FI02:  30


Vent Support Breath Rate:  14


Vent Support Mode:  AC


Vent Tidal Volume:  600


Sputum Amount:  Small


PEEP:  5.0


PIP:  32


Tube Feeding Amount:  65


I&O:











Intake and Output  


 


 12/8/19 12/9/19





 19:00 07:00


 


Intake Total 1813.582 ml 2432.174 ml


 


Output Total  1300 ml


 


Balance 1813.582 ml 1132.174 ml


 


  


 


Free Water  200 ml


 


IV Total 1373.582 ml 1582.174 ml


 


Tube Feeding 440 ml 650 ml


 


Output Urine Total  700 ml


 


Stool Total  600 ml








Labs:





Laboratory Tests








Test


  12/8/19


16:30 12/8/19


23:05 12/9/19


06:40


 


Activated Partial


Thromboplast Time 32 SEC (23-33)


  55 SEC (23-33)


H 66 SEC (23-33)


H


 


White Blood Count


  


  


  10.3 K/UL


(4.8-10.8)


 


Red Blood Count


  


  


  2.67 M/UL


(4.70-6.10)  L


 


Hemoglobin


  


  


  7.6 G/DL


(14.2-18.0)  L


 


Hematocrit


  


  


  23.8 %


(42.0-52.0)  L


 


Mean Corpuscular Volume   89 FL (80-99)  


 


Mean Corpuscular Hemoglobin


  


  


  28.5 PG


(27.0-31.0)


 


Mean Corpuscular Hemoglobin


Concent 


  


  31.9 G/DL


(32.0-36.0)  L


 


Red Cell Distribution Width


  


  


  15.3 %


(11.6-14.8)  H


 


Platelet Count


  


  


  265 K/UL


(150-450)


 


Mean Platelet Volume


  


  


  7.6 FL


(6.5-10.1)


 


Neutrophils (%) (Auto)


  


  


  % (45.0-75.0)


 


 


Lymphocytes (%) (Auto)


  


  


  % (20.0-45.0)


 


 


Monocytes (%) (Auto)    % (1.0-10.0)  


 


Eosinophils (%) (Auto)    % (0.0-3.0)  


 


Basophils (%) (Auto)    % (0.0-2.0)  


 


Neutrophils % (Manual)   Pending  


 


Lymphocytes % (Manual)   Pending  


 


Platelet Estimate   Pending  


 


Platelet Morphology   Pending  


 


Sodium Level


  


  


  137 MMOL/L


(136-145)


 


Potassium Level


  


  


  3.4 MMOL/L


(3.5-5.1)  L


 


Chloride Level


  


  


  103 MMOL/L


()


 


Carbon Dioxide Level


  


  


  26 MMOL/L


(21-32)


 


Anion Gap


  


  


  8 mmol/L


(5-15)


 


Blood Urea Nitrogen


  


  


  9 mg/dL (7-18)


 


 


Creatinine


  


  


  1.1 MG/DL


(0.55-1.30)


 


Estimat Glomerular Filtration


Rate 


  


  > 60 mL/min


(>60)


 


Glucose Level


  


  


  164 MG/DL


()  H


 


Calcium Level


  


  


  9.5 MG/DL


(8.5-10.1)


 


Phosphorus Level


  


  


  3.1 MG/DL


(2.5-4.9)


 


Magnesium Level


  


  


  1.7 MG/DL


(1.8-2.4)  L


 


Total Bilirubin


  


  


  0.3 MG/DL


(0.2-1.0)


 


Aspartate Amino Transf


(AST/SGOT) 


  


  20 U/L (15-37)


 


 


Alanine Aminotransferase


(ALT/SGPT) 


  


  22 U/L (12-78)


 


 


Alkaline Phosphatase


  


  


  100 U/L


()


 


Total Protein


  


  


  6.4 G/DL


(6.4-8.2)


 


Albumin


  


  


  1.8 G/DL


(3.4-5.0)  L


 


Globulin   4.6 g/dL  


 


Albumin/Globulin Ratio


  


  


  0.4 (1.0-2.7)


L

















Yury Pena MD Dec 9, 2019 11:44

## 2019-12-10 VITALS — DIASTOLIC BLOOD PRESSURE: 77 MMHG | SYSTOLIC BLOOD PRESSURE: 141 MMHG

## 2019-12-10 VITALS — SYSTOLIC BLOOD PRESSURE: 155 MMHG | DIASTOLIC BLOOD PRESSURE: 97 MMHG

## 2019-12-10 VITALS — DIASTOLIC BLOOD PRESSURE: 82 MMHG | SYSTOLIC BLOOD PRESSURE: 152 MMHG

## 2019-12-10 VITALS — DIASTOLIC BLOOD PRESSURE: 76 MMHG | SYSTOLIC BLOOD PRESSURE: 141 MMHG

## 2019-12-10 VITALS — DIASTOLIC BLOOD PRESSURE: 76 MMHG | SYSTOLIC BLOOD PRESSURE: 144 MMHG

## 2019-12-10 VITALS — SYSTOLIC BLOOD PRESSURE: 137 MMHG | DIASTOLIC BLOOD PRESSURE: 77 MMHG

## 2019-12-10 LAB
ADD MANUAL DIFF: NO
ANION GAP SERPL CALC-SCNC: 10 MMOL/L (ref 5–15)
APTT BLD: 33 SEC (ref 23–33)
BASOPHILS NFR BLD AUTO: 0.8 % (ref 0–2)
BUN SERPL-MCNC: 8 MG/DL (ref 7–18)
CALCIUM SERPL-MCNC: 9.3 MG/DL (ref 8.5–10.1)
CHLORIDE SERPL-SCNC: 98 MMOL/L (ref 98–107)
CO2 SERPL-SCNC: 24 MMOL/L (ref 21–32)
CREAT SERPL-MCNC: 1 MG/DL (ref 0.55–1.3)
EOSINOPHIL NFR BLD AUTO: 8.8 % (ref 0–3)
ERYTHROCYTE [DISTWIDTH] IN BLOOD BY AUTOMATED COUNT: 16.8 % (ref 11.6–14.8)
HCT VFR BLD CALC: 26 % (ref 42–52)
HGB BLD-MCNC: 8.4 G/DL (ref 14.2–18)
INR PPP: 1.1 (ref 0.9–1.1)
LYMPHOCYTES NFR BLD AUTO: 12.1 % (ref 20–45)
MCV RBC AUTO: 86 FL (ref 80–99)
MONOCYTES NFR BLD AUTO: 5.2 % (ref 1–10)
NEUTROPHILS NFR BLD AUTO: 73.1 % (ref 45–75)
PLATELET # BLD: 245 K/UL (ref 150–450)
POTASSIUM SERPL-SCNC: 3.8 MMOL/L (ref 3.5–5.1)
RBC # BLD AUTO: 3.01 M/UL (ref 4.7–6.1)
SODIUM SERPL-SCNC: 132 MMOL/L (ref 136–145)
WBC # BLD AUTO: 11.2 K/UL (ref 4.8–10.8)

## 2019-12-10 PROCEDURE — 0W9F3ZX DRAINAGE OF ABDOMINAL WALL, PERCUTANEOUS APPROACH, DIAGNOSTIC: ICD-10-PCS

## 2019-12-10 RX ADMIN — INSULIN ASPART SCH UNITS: 100 INJECTION, SOLUTION INTRAVENOUS; SUBCUTANEOUS at 00:58

## 2019-12-10 RX ADMIN — SULFAMETHOXAZOLE AND TRIMETHOPRIM SCH MLS/HR: 80; 16 INJECTION, SOLUTION, CONCENTRATE INTRAVENOUS at 16:27

## 2019-12-10 RX ADMIN — SITAGLIPTIN SCH MG: 50 TABLET, FILM COATED ORAL at 05:36

## 2019-12-10 RX ADMIN — LEVETIRACETAM SCH MG: 100 SOLUTION ORAL at 10:13

## 2019-12-10 RX ADMIN — SULFAMETHOXAZOLE AND TRIMETHOPRIM SCH MLS/HR: 80; 16 INJECTION, SOLUTION, CONCENTRATE INTRAVENOUS at 23:10

## 2019-12-10 RX ADMIN — LEVETIRACETAM SCH MG: 100 SOLUTION ORAL at 17:46

## 2019-12-10 RX ADMIN — INSULIN ASPART SCH UNITS: 100 INJECTION, SOLUTION INTRAVENOUS; SUBCUTANEOUS at 17:42

## 2019-12-10 RX ADMIN — CHLORHEXIDINE GLUCONATE SCH APPLIC: 213 SOLUTION TOPICAL at 20:28

## 2019-12-10 RX ADMIN — INSULIN ASPART SCH UNITS: 100 INJECTION, SOLUTION INTRAVENOUS; SUBCUTANEOUS at 23:09

## 2019-12-10 RX ADMIN — FLUCONAZOLE SCH MG: 100 TABLET ORAL at 08:41

## 2019-12-10 RX ADMIN — INSULIN ASPART SCH UNITS: 100 INJECTION, SOLUTION INTRAVENOUS; SUBCUTANEOUS at 05:36

## 2019-12-10 RX ADMIN — CLOBETASOL PROPIONATE SCH APPLIC: 0.5 OINTMENT TOPICAL at 20:29

## 2019-12-10 RX ADMIN — TAMSULOSIN HYDROCHLORIDE SCH MG: 0.4 CAPSULE ORAL at 08:41

## 2019-12-10 RX ADMIN — POLYMYXIN B SULFATE SCH MLS/HR: 500000 INJECTION, POWDER, FOR SOLUTION INTRAMUSCULAR; INTRATHECAL; INTRAVENOUS; OPHTHALMIC at 08:42

## 2019-12-10 RX ADMIN — SULFAMETHOXAZOLE AND TRIMETHOPRIM SCH MLS/HR: 80; 16 INJECTION, SOLUTION, CONCENTRATE INTRAVENOUS at 00:57

## 2019-12-10 RX ADMIN — LEVETIRACETAM SCH MG: 100 SOLUTION ORAL at 02:49

## 2019-12-10 RX ADMIN — SULFAMETHOXAZOLE AND TRIMETHOPRIM SCH MLS/HR: 80; 16 INJECTION, SOLUTION, CONCENTRATE INTRAVENOUS at 08:40

## 2019-12-10 RX ADMIN — INSULIN ASPART SCH UNITS: 100 INJECTION, SOLUTION INTRAVENOUS; SUBCUTANEOUS at 12:11

## 2019-12-10 RX ADMIN — CLOBETASOL PROPIONATE SCH APPLIC: 0.5 OINTMENT TOPICAL at 08:47

## 2019-12-10 NOTE — PROGRESS NOTE
DATE:  12/09/2019

CARDIOLOGY PROGRESS NOTE



SUBJECTIVE:  The patient remains on ventilator support, another dose of

Aredia was administered today for hypercalcemia.  The patient remains with

episodes of sinus tachycardia.



OBJECTIVE:

VITAL SIGNS:  Blood pressure 134/72, pulse 97, respirations 16, afebrile.

 

NECK:  Thin trach secretions.

LUNGS:  Bilateral breath sounds.

CARDIAC:  Regular rhythm.  Rapid rate.  Normal S1, S2.

ABDOMEN:  Soft.  G-tube and colostomy site noted with no dehiscence.

EXTREMITIES:  With trace dependent edema.



LABORATORY DATA:  White count 10, hemoglobin 7.6.  Potassium 3.4, magnesium

1.7.



IMPRESSION:

1. Sepsis.

2. Acute pulmonary embolism.

3. Respiratory failure.

4. Sinus tachycardia.

5. Anemia due to chronic disease and chronic kidney disease.

6. Hypomagnesemia and hypokalemia.



PLAN:

1. Maintain adequate hydration.

2. No role for antiarrhythmics at this time.

3. IV magnesium and oral potassium replacement.









  ______________________________________________

  Robert Chan M.D.





DR:  ELEAZAR

D:  12/09/2019 23:46

T:  12/10/2019 15:02

JOB#:  [id number]

CC:

## 2019-12-10 NOTE — PULMONOLGY CRITICAL CARE NOTE
Critical Care - Asmt/Plan


Problems:  


(1) Acute on chronic respiratory failure


(2) Sepsis


(3) Diabetes mellitus


(4) Vegetative state


(5) Gastrostomy tube dependent


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


(7) ATN (acute tubular necrosis)


(8) Pulmonary embolism


Respiratory:  monitor respiratory rate, adjust FIO2, CXR


Cardiac:  continue to monitor HR/BP


Renal:  F/U I&O, keep IV fluid


Infectious Disease:  check cultures


Gastrointestinal:  continue feedings/current rate, abdominal imaging


Endocrine:  monitor blood sugar


Hematologic:  monitor H/H, transfuse if hgb<8.5


Neurologic:  PRN Ativan, keep patient comfortable


Affect:  PRN ativan


Prophylaxis:  Protonix


Disposition:  keep in ICU


Notes Reviewed:  internist, cardio


Discussed with:  nurses, consultants, 





Critical Care - Objective





Last 24 Hour Vital Signs








  Date Time  Temp Pulse Resp B/P (MAP) Pulse Ox O2 Delivery O2 Flow Rate FiO2


 


12/10/19 08:50  108 15     30


 


12/10/19 08:05  107      


 


12/10/19 08:00        30


 


12/10/19 08:00 99.6 107 15 137/77 (97) 100   


 


12/10/19 07:05  106 14     30


 


12/10/19 05:30  101 14     30


 


12/10/19 04:00        30


 


12/10/19 04:00      Mechanical Ventilator  


 


12/10/19 04:00 99.5 104 14 144/76 (98) 100   


 


12/10/19 03:37  107      


 


12/10/19 03:06  106 17     30


 


12/10/19 01:46  103 16     30


 


12/10/19 00:00      Mechanical Ventilator  


 


12/10/19 00:00 97.7 103 18 141/76 (97) 100   


 


12/10/19 00:00        30


 


12/9/19 23:33  108      


 


12/9/19 23:02  106 16     30


 


12/9/19 21:16  101 16     30


 


12/9/19 20:00      Mechanical Ventilator  


 


12/9/19 20:00        30


 


12/9/19 20:00  103      


 


12/9/19 20:00 99.1 101 18 132/73 (92) 98   


 


12/9/19 19:39  104 17     30


 


12/9/19 19:39  100 18  96 Mechanical Ventilator  30


 


12/9/19 16:20  100 14     30


 


12/9/19 16:00 97.7 100 17 145/78 (100) 100   


 


12/9/19 16:00        30


 


12/9/19 16:00  95      


 


12/9/19 16:00      Mechanical Ventilator  


 


12/9/19 15:17  97 15     30


 


12/9/19 12:45  96 14     30


 


12/9/19 12:00      Mechanical Ventilator  


 


12/9/19 12:00        30


 


12/9/19 12:00 97.5 101 15 140/74 (96) 100   


 


12/9/19 12:00  99      


 


12/9/19 11:02  97 15     30








Status:  somnolent


Condition:  critical


HEENT:  atraumatic


Lungs:  clear


Heart:  HR/BP stable, HR/BP unstable


Abdomen:  soft, non-tender


Extremities:  no C/C/E, edema


Micro:





Microbiology








 Date/Time


Source Procedure


Growth Status


 


 


 12/7/19 17:25


Blood Blood Culture - Preliminary


Gram Negative Bacillus 1 Resulted


 


 12/7/19 17:15


Blood Blood Culture - Preliminary


Gram Negative Bacillus 1 Resulted








Accucheck:  141





Critical Care - Subjective


ROS Limited/Unobtainable:  Yes


Condition:  critical


EKG Rhythm:  Sinus Rhythm


FI02:  30


Vent Support Breath Rate:  14


Vent Support Mode:  AC


Vent Tidal Volume:  600


Sputum Amount:  Small


PEEP:  5.0


PIP:  29


Tube Feeding Amount:  65


I&O:











Intake and Output  


 


 12/9/19 12/10/19





 18:59 06:59


 


Intake Total 1837.224 ml 4067.102 ml


 


Output Total 2135 ml 2950 ml


 


Balance -297.776 ml 1117.102 ml


 


  


 


Free Water 300 ml 300 ml


 


IV Total 457.224 ml 3192.102 ml


 


Tube Feeding 780 ml 325 ml


 


Blood Product  250 ml


 


Other 300 ml 


 


Output Urine Total 2000 ml 2800 ml


 


Stool Total 135 ml 150 ml








Labs:





Laboratory Tests








Test


  12/10/19


04:00


 


White Blood Count


  11.2 K/UL


(4.8-10.8)  H


 


Red Blood Count


  3.01 M/UL


(4.70-6.10)  L


 


Hemoglobin


  8.4 G/DL


(14.2-18.0)  L


 


Hematocrit


  26.0 %


(42.0-52.0)  L


 


Mean Corpuscular Volume 86 FL (80-99)  


 


Mean Corpuscular Hemoglobin


  28.0 PG


(27.0-31.0)


 


Mean Corpuscular Hemoglobin


Concent 32.4 G/DL


(32.0-36.0)


 


Red Cell Distribution Width


  16.8 %


(11.6-14.8)  H


 


Platelet Count


  245 K/UL


(150-450)


 


Mean Platelet Volume


  7.7 FL


(6.5-10.1)


 


Neutrophils (%) (Auto)


  73.1 %


(45.0-75.0)


 


Lymphocytes (%) (Auto)


  12.1 %


(20.0-45.0)  L


 


Monocytes (%) (Auto)


  5.2 %


(1.0-10.0)


 


Eosinophils (%) (Auto)


  8.8 %


(0.0-3.0)  H


 


Basophils (%) (Auto)


  0.8 %


(0.0-2.0)


 


Prothrombin Time


  11.4 SEC


(9.30-11.50)


 


Prothromb Time International


Ratio 1.1 (0.9-1.1)  


 


 


Activated Partial


Thromboplast Time 33 SEC (23-33)


 


 


Sodium Level


  132 MMOL/L


(136-145)  L


 


Potassium Level


  3.8 MMOL/L


(3.5-5.1)


 


Chloride Level


  98 MMOL/L


()


 


Carbon Dioxide Level


  24 MMOL/L


(21-32)


 


Anion Gap


  10 mmol/L


(5-15)


 


Blood Urea Nitrogen


  8 mg/dL (7-18)


 


 


Creatinine


  1.0 MG/DL


(0.55-1.30)


 


Estimat Glomerular Filtration


Rate > 60 mL/min


(>60)


 


Glucose Level


  156 MG/DL


()  H


 


Calcium Level


  9.3 MG/DL


(8.5-10.1)

















Yury Pena MD Dec 10, 2019 10:58

## 2019-12-10 NOTE — PROGRESS NOTE
DATE:  12/10/2019

CARDIOLOGY PROGRESS NOTE



SUBJECTIVE:  The patient is status post fluid extraction from the abdomen.

He remains on ventilator support.



OBJECTIVE:

VITAL SIGNS:  Blood pressure 141/77, pulse 105, respirations 15,

temperature 99.6.  Monitor sinus tachycardia.

HEENT:  Thin trach secretions.

LUNGS:  Bilateral breath sounds.  Few rhonchi.

HEART:  Regular rhythm.  Rapid rate.  Normal S1, S2.

ABDOMEN:  Soft.

EXTREMITIES:  A 1+ dependent edema.



IMPRESSION:

1. Pulmonary emboli.

2. Sepsis.

3. Abdominal abscess.

4. Secondary sinus tachycardia.

5. Chronic encephalopathy.



PLAN:

1. Vent support.

2. Antimicrobials.

3. Maintain adequate hydration.

4. DVT prophylaxis.

5. No role for antiarrhythmic therapy at this time and diuresis.









  ______________________________________________

  Robert Chan M.D.





DR:  ELEAZAR

D:  12/10/2019 21:58

T:  12/11/2019 12:31

JOB#:  [id number]

CC:

## 2019-12-10 NOTE — BRIEF OPERATIVE NOTE
Immediate Post Operative Note


Operative Note


Pre-op Diagnosis:


L abd wall fluid collection


Procedure:


US guided aspiration


Post-op Diagnosis:  same as pre-op


Surgeon:  JESSICA Ramon


Specimen:  yes - 3 ml serous fluid, sent to lab


Complications:  none


Fluids:  none


Implant(s) used?:  No











Brando Ramon MD Dec 10, 2019 17:41

## 2019-12-10 NOTE — PRE-PROCEDURE NOTE/ATTESTATION
Pre-Procedure Note/Attestation


Complete Prior to Procedure


Planned Procedure:  left


Procedure Narrative:


abd wall fluid collection aspiration





Indications for Procedure


Pre-Operative Diagnosis:


L abd wall fluid collection





Attestation


I attest that I discussed the nature of the procedure; its benefits; risks and 

complications; and alternatives (and the risks and benefits of such alternatives

), prior to the procedure, with the patient (or the patient's legal 

representative).





I attest that, if there was a reasonable possibility of needing a blood 

transfusion, the patient (or the patient's legal representative) was given the 

[facility] standardized written summary, pursuant 

to the [place] Blood Safety Act ([place] Health and Safety Code # 1645, as 

amended).





I attest that I re-evaluated the patient just prior to the surgery and that 

there has been no change in the patient's H&P, except as documented below:





Discussed by phone with pt's. wife











Brando Gautam MD Dec 10, 2019 16:17

## 2019-12-10 NOTE — NEPHROLOGY PROGRESS NOTE
Assessment/Plan


Problem List:  


(1) Renal failure (ARF), acute on chronic


(2) Urinary retention


(3) Acute on chronic respiratory failure


(4) Hypercalcemia


Assessment





Hypercalcemia


Urinary retention, BPH , 


Acute on chronic renal failure


Electrolyte imbalance 


Colostomy


DM


PEG


Vegetative state


Acute on chronic respiratory failure


Plan





consider transfusion: defered tp PMD


Free water-


K and Phos and Mag supplements as needed


pulm support


Aredia for high Ca on 11/28 redose today 12/3





Subjective


ROS Limited/Unobtainable:  Yes





Objective


Objective





Last 24 Hour Vital Signs








  Date Time  Temp Pulse Resp B/P (MAP) Pulse Ox O2 Delivery O2 Flow Rate FiO2


 


12/10/19 08:50  108 15     30


 


12/10/19 08:05  107      


 


12/10/19 08:00        30


 


12/10/19 08:00 99.6 107 15 137/77 (97) 100   


 


12/10/19 07:05  106 14     30


 


12/10/19 05:30  101 14     30


 


12/10/19 04:00        30


 


12/10/19 04:00      Mechanical Ventilator  


 


12/10/19 04:00 99.5 104 14 144/76 (98) 100   


 


12/10/19 03:37  107      


 


12/10/19 03:06  106 17     30


 


12/10/19 01:46  103 16     30


 


12/10/19 00:00      Mechanical Ventilator  


 


12/10/19 00:00 97.7 103 18 141/76 (97) 100   


 


12/10/19 00:00        30


 


12/9/19 23:33  108      


 


12/9/19 23:02  106 16     30


 


12/9/19 21:16  101 16     30


 


12/9/19 20:00      Mechanical Ventilator  


 


12/9/19 20:00        30


 


12/9/19 20:00  103      


 


12/9/19 20:00 99.1 101 18 132/73 (92) 98   


 


12/9/19 19:39  104 17     30


 


12/9/19 19:39  100 18  96 Mechanical Ventilator  30


 


12/9/19 16:20  100 14     30


 


12/9/19 16:00 97.7 100 17 145/78 (100) 100   


 


12/9/19 16:00        30


 


12/9/19 16:00  95      


 


12/9/19 16:00      Mechanical Ventilator  


 


12/9/19 15:17  97 15     30


 


12/9/19 12:45  96 14     30


 


12/9/19 12:00      Mechanical Ventilator  


 


12/9/19 12:00        30


 


12/9/19 12:00 97.5 101 15 140/74 (96) 100   


 


12/9/19 12:00  99      


 


12/9/19 11:02  97 15     30

















Intake and Output  


 


 12/9/19 12/10/19





 18:59 06:59


 


Intake Total 1837.224 ml 4067.102 ml


 


Output Total 2135 ml 2950 ml


 


Balance -297.776 ml 1117.102 ml


 


  


 


Free Water 300 ml 300 ml


 


IV Total 457.224 ml 3192.102 ml


 


Tube Feeding 780 ml 325 ml


 


Blood Product  250 ml


 


Other 300 ml 


 


Output Urine Total 2000 ml 2800 ml


 


Stool Total 135 ml 150 ml








Laboratory Tests


12/10/19 04:00: 


White Blood Count 11.2H, Red Blood Count 3.01L, Hemoglobin 8.4L, Hematocrit 

26.0L, Mean Corpuscular Volume 86, Mean Corpuscular Hemoglobin 28.0, Mean 

Corpuscular Hemoglobin Concent 32.4, Red Cell Distribution Width 16.8H, 

Platelet Count 245, Mean Platelet Volume 7.7, Neutrophils (%) (Auto) 73.1, 

Lymphocytes (%) (Auto) 12.1L, Monocytes (%) (Auto) 5.2, Eosinophils (%) (Auto) 

8.8H, Basophils (%) (Auto) 0.8, Prothrombin Time 11.4, Prothromb Time 

International Ratio 1.1, Activated Partial Thromboplast Time 33, Sodium Level 

132L, Potassium Level 3.8, Chloride Level 98, Carbon Dioxide Level 24, Anion 

Gap 10, Blood Urea Nitrogen 8, Creatinine 1.0, Estimat Glomerular Filtration 

Rate > 60, Glucose Level 156H, Calcium Level 9.3


Height (Feet):  5


Height (Inches):  7.00


Weight (Pounds):  209


Cardiovascular:  normal rate


Respiratory/Chest:  lungs clear


Abdomen:  soft


Objective


no change











Simone Rocha MD Dec 10, 2019 10:41

## 2019-12-10 NOTE — UROLOGY PROGRESS NOTE
Assessment/Plan


Status:  stable


Assessment/Plan:


1. Urinary retention.


2. BPH.


3. Neurogenic bladder.


4. Incontinence.


5. Pyuria/UTI/colonized.


6. Hematuria.


7. Proteinuria.


8. Renal insufficiency, acute possibly on chronic.


9. Renal cyst.


10. Nephrolithiasis.


11. Renal fullness.


12. Bladder calcifications.


13. POD # 13, cysto/optic urethrotomy.





monitor clinically


mayorga placed 11/27


hand irrigated and do PRN


abx as ordered, pr ID


diflucan added


monitor WBC


f/u on last blood cx


renal fxn stable





Subjective


Allergies:  


Coded Allergies:  


     AZTREONAM (Verified  Allergy, Unknown, 7/23/18)


Subjective


all noted, mayorga draining, non-verbal





Objective





Last 24 Hour Vital Signs








  Date Time  Temp Pulse Resp B/P (MAP) Pulse Ox O2 Delivery O2 Flow Rate FiO2


 


12/10/19 08:05  107      


 


12/10/19 08:00        30


 


12/10/19 08:00 99.6 107 15 137/77 (97) 100   


 


12/10/19 07:05  106 14     30


 


12/10/19 05:30  101 14     30


 


12/10/19 04:00        30


 


12/10/19 04:00      Mechanical Ventilator  


 


12/10/19 04:00 99.5 104 14 144/76 (98) 100   


 


12/10/19 03:37  107      


 


12/10/19 03:06  106 17     30


 


12/10/19 01:46  103 16     30


 


12/10/19 00:00      Mechanical Ventilator  


 


12/10/19 00:00 97.7 103 18 141/76 (97) 100   


 


12/10/19 00:00        30


 


12/9/19 23:33  108      


 


12/9/19 23:02  106 16     30


 


12/9/19 21:16  101 16     30


 


12/9/19 20:00      Mechanical Ventilator  


 


12/9/19 20:00        30


 


12/9/19 20:00  103      


 


12/9/19 20:00 99.1 101 18 132/73 (92) 98   


 


12/9/19 19:39  104 17     30


 


12/9/19 19:39  100 18  96 Mechanical Ventilator  30


 


12/9/19 16:20  100 14     30


 


12/9/19 16:00 97.7 100 17 145/78 (100) 100   


 


12/9/19 16:00        30


 


12/9/19 16:00  95      


 


12/9/19 16:00      Mechanical Ventilator  


 


12/9/19 15:17  97 15     30


 


12/9/19 12:45  96 14     30


 


12/9/19 12:00      Mechanical Ventilator  


 


12/9/19 12:00        30


 


12/9/19 12:00 97.5 101 15 140/74 (96) 100   


 


12/9/19 12:00  99      


 


12/9/19 11:02  97 15     30

















Intake and Output  


 


 12/9/19 12/10/19





 18:59 06:59


 


Intake Total 1837.224 ml 4067.102 ml


 


Output Total 2135 ml 2950 ml


 


Balance -297.776 ml 1117.102 ml


 


  


 


Free Water 300 ml 300 ml


 


IV Total 457.224 ml 3192.102 ml


 


Tube Feeding 780 ml 325 ml


 


Blood Product  250 ml


 


Other 300 ml 


 


Output Urine Total 2000 ml 2800 ml


 


Stool Total 135 ml 150 ml











Microbiology








 Date/Time


Source Procedure


Growth Status


 


 


 12/7/19 17:25


Blood Blood Culture - Preliminary


Gram Negative Bacillus 1 Resulted





 11/25/19 11:00


Nasal Nares - Final Complete


 


 11/25/19 11:00


Nasal Nares - Final Complete


 


 12/5/19 19:00


Stool Clostridium difficile Toxin Assay - Final Complete


 


 12/5/19 19:00


Urine,Clean Catch Urine Culture - Final


Candida Parapsilosis Complete


 


 11/24/19 09:10


Rectum VRE Culture - Final


Enterococcus Faecium - Vre Complete








Current Medications








 Medications


  (Trade)  Dose


 Ordered  Sig/Jan


 Route


 PRN Reason  Start Time


 Stop Time Status Last Admin


Dose Admin


 


 Acetaminophen


  (Tylenol)  650 mg  Q4H  PRN


 GT


 Mild Pain/Temp > 100.6  11/26/19 18:42


 12/26/19 18:41  12/8/19 20:36


 


 


 Artificial Tears


  (Akwa-Tears)  2 drop  Q12HR


 BOTH EYES


   11/26/19 21:00


 12/24/19 20:59  12/10/19 08:43


 


 


 Baclofen


  (Lioresal)  10 mg  THREE TIMES A  DAY


 GT


   11/27/19 09:00


 12/24/19 17:59  12/10/19 08:41


 


 


 Calcitonin [place]


  (Miacalcin)  1 sprays  DAILY


 NASAL


   11/28/19 12:00


 12/28/19 11:59  12/10/19 08:42


 


 


 Chlorhexidine


 Gluconate


  (Elaine-Hex 2%)  1 applic  DAILY@2000


 TOPIC


   12/6/19 20:00


 1/5/20 19:59  12/9/19 20:11


 


 


 Clobetasol


 Propionate


  (Temovate)  1 applic  EVERY 12  HOURS


 TOPIC


   11/26/19 21:00


 12/24/19 20:59  12/10/19 08:47


 


 


 Dextrose


  (Dextrose 50%)  25 ml  Q30M  PRN


 IV


 Hypoglycemia  11/26/19 18:45


 12/24/19 14:44   


 


 


 Dextrose


  (Dextrose 50%)  50 ml  Q30M  PRN


 IV


 Hypoglycemia  11/26/19 18:45


 12/24/19 14:44   


 


 


 Famotidine


  (Pepcid)  20 mg  BID


 GT


   11/28/19 18:00


 12/28/19 17:59  12/10/19 08:41


 


 


 Fenofibrate


  (Tricor)  145 mg  DAILY


 ORAL


   11/27/19 09:00


 12/25/19 08:59  12/10/19 08:41


 


 


 Fluconazole


  (Diflucan)  400 mg  DAILY


 ORAL


   12/9/19 13:47


 12/16/19 13:46  12/10/19 08:41


 


 


 Insulin Aspart


  (NovoLOG)    EVERY 6  HOURS


 SUBQ


   11/30/19 12:00


 12/24/19 16:29  12/10/19 00:58


 


 


 Levetiracetam


  (Keppra)  1,000 mg  Q8H


 GT


   11/29/19 02:00


 12/29/19 01:59  12/10/19 02:49


 


 


 Ondansetron HCl


  (Zofran)  4 mg  Q6H  PRN


 IVP


 Nausea & Vomiting  11/26/19 18:44


 12/26/19 18:43   


 


 


 Polyethylene


 Glycol


  (Miralax)  17 gm  DAILYPRN  PRN


 GT


 Constipation  11/26/19 18:44


 12/26/19 18:43   


 


 


 Polymyxin B


 Sulfate [id number]


 units/Dextrose  550 ml @ 


 550 mls/hr  EVERY 12  HOURS


 IV


   12/9/19 21:00


 12/16/19 20:59  12/10/19 08:42


 


 


 Sitagliptin


 Phosphate


  (Januvia)  50 mg  ACBREAKFAST


 GT


   11/27/19 06:30


 12/25/19 06:29  12/10/19 05:36


 


 


 Sodium Chloride  1,000 ml @ 


 125 mls/hr  Q8H


 IV


   12/5/19 01:45


 1/4/20 01:44  12/10/19 03:11


 


 


 Tamsulosin HCl


  (Flomax)  0.4 mg  DAILY


 ORAL


   11/27/19 09:00


 12/27/19 08:59  12/10/19 08:41


 


 


 Trimethoprim/


 Sulfamethoxazole


 20 ml/Dextrose  570 ml @ 


 380 mls/hr  [id number]  BACTRIM


 IV


   12/9/19 16:00


 12/16/19 15:59  12/10/19 08:40


 





Laboratory Tests


12/10/19 04:00: 


White Blood Count 11.2H, Red Blood Count 3.01L, Hemoglobin 8.4L, Hematocrit 

26.0L, Mean Corpuscular Volume 86, Mean Corpuscular Hemoglobin 28.0, Mean 

Corpuscular Hemoglobin Concent 32.4, Red Cell Distribution Width 16.8H, 

Platelet Count 245, Mean Platelet Volume 7.7, Neutrophils (%) (Auto) 73.1, 

Lymphocytes (%) (Auto) 12.1L, Monocytes (%) (Auto) 5.2, Eosinophils (%) (Auto) 

8.8H, Basophils (%) (Auto) 0.8, Prothrombin Time 11.4, Prothromb Time 

International Ratio 1.1, Activated Partial Thromboplast Time 33, Sodium Level 

132L, Potassium Level 3.8, Chloride Level 98, Carbon Dioxide Level 24, Anion 

Gap 10, Blood Urea Nitrogen 8, Creatinine 1.0, Estimat Glomerular Filtration 

Rate > 60, Glucose Level 156H, Calcium Level 9.3


Height (Feet):  5


Height (Inches):  7.00


Weight (Pounds):  209


Objective


 exam stable


mayorga indwelling, yellow/christine urine, some debris





CT C/A/P (12/8) noted











Vinnie Pollard MD Dec 10, 2019 09:45

## 2019-12-11 VITALS — DIASTOLIC BLOOD PRESSURE: 78 MMHG | SYSTOLIC BLOOD PRESSURE: 147 MMHG

## 2019-12-11 VITALS — SYSTOLIC BLOOD PRESSURE: 141 MMHG | DIASTOLIC BLOOD PRESSURE: 84 MMHG

## 2019-12-11 VITALS — DIASTOLIC BLOOD PRESSURE: 79 MMHG | SYSTOLIC BLOOD PRESSURE: 139 MMHG

## 2019-12-11 VITALS — SYSTOLIC BLOOD PRESSURE: 120 MMHG | DIASTOLIC BLOOD PRESSURE: 68 MMHG

## 2019-12-11 VITALS — DIASTOLIC BLOOD PRESSURE: 78 MMHG | SYSTOLIC BLOOD PRESSURE: 157 MMHG

## 2019-12-11 VITALS — DIASTOLIC BLOOD PRESSURE: 75 MMHG | SYSTOLIC BLOOD PRESSURE: 155 MMHG

## 2019-12-11 LAB
ADD MANUAL DIFF: NO
ALBUMIN SERPL-MCNC: 2 G/DL (ref 3.4–5)
ALBUMIN/GLOB SERPL: 0.4 {RATIO} (ref 1–2.7)
ALP SERPL-CCNC: 94 U/L (ref 46–116)
ALT SERPL-CCNC: 16 U/L (ref 12–78)
ANION GAP SERPL CALC-SCNC: 8 MMOL/L (ref 5–15)
APTT BLD: 35 SEC (ref 23–33)
AST SERPL-CCNC: 16 U/L (ref 15–37)
BASOPHILS NFR BLD AUTO: 0.6 % (ref 0–2)
BILIRUB SERPL-MCNC: 0.3 MG/DL (ref 0.2–1)
BUN SERPL-MCNC: 6 MG/DL (ref 7–18)
CALCIUM SERPL-MCNC: 9.7 MG/DL (ref 8.5–10.1)
CHLORIDE SERPL-SCNC: 99 MMOL/L (ref 98–107)
CO2 SERPL-SCNC: 26 MMOL/L (ref 21–32)
CREAT SERPL-MCNC: 1.1 MG/DL (ref 0.55–1.3)
EOSINOPHIL NFR BLD AUTO: 3.4 % (ref 0–3)
ERYTHROCYTE [DISTWIDTH] IN BLOOD BY AUTOMATED COUNT: 14.9 % (ref 11.6–14.8)
GLOBULIN SER-MCNC: 4.9 G/DL
HCT VFR BLD CALC: 24.4 % (ref 42–52)
HGB BLD-MCNC: 8.4 G/DL (ref 14.2–18)
INR PPP: 1.1 (ref 0.9–1.1)
LYMPHOCYTES NFR BLD AUTO: 7.7 % (ref 20–45)
MCV RBC AUTO: 83 FL (ref 80–99)
MONOCYTES NFR BLD AUTO: 4.8 % (ref 1–10)
NEUTROPHILS NFR BLD AUTO: 83.6 % (ref 45–75)
PHOSPHATE SERPL-MCNC: 3.7 MG/DL (ref 2.5–4.9)
PLATELET # BLD: 268 K/UL (ref 150–450)
POTASSIUM SERPL-SCNC: 4.2 MMOL/L (ref 3.5–5.1)
RBC # BLD AUTO: 2.94 M/UL (ref 4.7–6.1)
SODIUM SERPL-SCNC: 133 MMOL/L (ref 136–145)
WBC # BLD AUTO: 16.2 K/UL (ref 4.8–10.8)

## 2019-12-11 RX ADMIN — CLOBETASOL PROPIONATE SCH APPLIC: 0.5 OINTMENT TOPICAL at 20:30

## 2019-12-11 RX ADMIN — LEVETIRACETAM SCH MG: 100 SOLUTION ORAL at 09:40

## 2019-12-11 RX ADMIN — INSULIN ASPART SCH UNITS: 100 INJECTION, SOLUTION INTRAVENOUS; SUBCUTANEOUS at 12:12

## 2019-12-11 RX ADMIN — LEVETIRACETAM SCH MG: 100 SOLUTION ORAL at 01:54

## 2019-12-11 RX ADMIN — TAMSULOSIN HYDROCHLORIDE SCH MG: 0.4 CAPSULE ORAL at 08:29

## 2019-12-11 RX ADMIN — SITAGLIPTIN SCH MG: 50 TABLET, FILM COATED ORAL at 05:32

## 2019-12-11 RX ADMIN — HEPARIN SODIUM SCH MLS/HR: 5000 INJECTION, SOLUTION INTRAVENOUS at 20:58

## 2019-12-11 RX ADMIN — INSULIN ASPART SCH UNITS: 100 INJECTION, SOLUTION INTRAVENOUS; SUBCUTANEOUS at 23:17

## 2019-12-11 RX ADMIN — LEVETIRACETAM SCH MG: 100 SOLUTION ORAL at 17:31

## 2019-12-11 RX ADMIN — MINOCYCLINE HYDROCHLORIDE SCH MG: 50 CAPSULE ORAL at 05:32

## 2019-12-11 RX ADMIN — MINOCYCLINE HYDROCHLORIDE SCH MG: 50 CAPSULE ORAL at 17:32

## 2019-12-11 RX ADMIN — SULFAMETHOXAZOLE AND TRIMETHOPRIM SCH MLS/HR: 80; 16 INJECTION, SOLUTION, CONCENTRATE INTRAVENOUS at 08:29

## 2019-12-11 RX ADMIN — SULFAMETHOXAZOLE AND TRIMETHOPRIM SCH MLS/HR: 80; 16 INJECTION, SOLUTION, CONCENTRATE INTRAVENOUS at 16:12

## 2019-12-11 RX ADMIN — SULFAMETHOXAZOLE AND TRIMETHOPRIM SCH MLS/HR: 80; 16 INJECTION, SOLUTION, CONCENTRATE INTRAVENOUS at 23:16

## 2019-12-11 RX ADMIN — INSULIN ASPART SCH UNITS: 100 INJECTION, SOLUTION INTRAVENOUS; SUBCUTANEOUS at 17:35

## 2019-12-11 RX ADMIN — ACETAMINOPHEN PRN MG: 160 SOLUTION ORAL at 20:36

## 2019-12-11 RX ADMIN — FLUCONAZOLE SCH MG: 100 TABLET ORAL at 08:29

## 2019-12-11 RX ADMIN — ACETAMINOPHEN PRN MG: 160 SOLUTION ORAL at 16:12

## 2019-12-11 RX ADMIN — INSULIN ASPART SCH UNITS: 100 INJECTION, SOLUTION INTRAVENOUS; SUBCUTANEOUS at 05:33

## 2019-12-11 RX ADMIN — CHLORHEXIDINE GLUCONATE SCH APPLIC: 213 SOLUTION TOPICAL at 20:30

## 2019-12-11 RX ADMIN — CLOBETASOL PROPIONATE SCH APPLIC: 0.5 OINTMENT TOPICAL at 08:30

## 2019-12-11 NOTE — PULMONOLGY CRITICAL CARE NOTE
Critical Care - Asmt/Plan


Problems:  


(1) Acute on chronic respiratory failure


(2) Pulmonary embolism


(3) Sepsis


(4) Diabetes mellitus


(5) Vegetative state


(6) Gastrostomy tube dependent


(7) Colostomy in place


(8) ATN (acute tubular necrosis)


Respiratory:  monitor respiratory rate, adjust FIO2


Cardiac:  continue to monitor HR/BP


Renal:  F/U I&O, keep IV fluid


Infectious Disease:  check cultures, continue antibiotics, other - -Continue IV 

Bactrim #2 and add PO Miniocycline for MDR/CRE K.pna bacteremia


Gastrointestinal:  continue feedings/current rate


Endocrine:  monitor blood sugar


Hematologic:  monitor H/H


Neurologic:  PRN Ativan, PRN Morphine


Affect:  PRN ativan


Notes Reviewed:  internist, cardio, renal


Discussed with:  nurses, consultants, 





Critical Care - Objective





Last 24 Hour Vital Signs








  Date Time  Temp Pulse Resp B/P (MAP) Pulse Ox O2 Delivery O2 Flow Rate FiO2


 


12/11/19 09:25  112 14     30


 


12/11/19 08:00        30


 


12/11/19 08:00  118      


 


12/11/19 08:00      Mechanical Ventilator  


 


12/11/19 08:00 100.5 118 15 157/78 (104) 100   


 


12/11/19 07:55  115 14     30


 


12/11/19 05:26  117 14     30


 


12/11/19 04:00      Mechanical Ventilator  


 


12/11/19 04:00        30


 


12/11/19 04:00 99.1 117 18 141/84 (103) 100   


 


12/11/19 03:27  115      


 


12/11/19 03:24  116 14     30


 


12/11/19 00:00 99.5 112 16 147/78 (101) 100   


 


12/11/19 00:00      Mechanical Ventilator  


 


12/11/19 00:00        30


 


12/10/19 23:30  120 15     30


 


12/10/19 23:30  124      


 


12/10/19 21:30  119 15     30


 


12/10/19 20:00      Mechanical Ventilator  


 


12/10/19 20:00 100.1 126 16 152/82 (105) 100   


 


12/10/19 20:00  124      


 


12/10/19 20:00        30


 


12/10/19 19:30  100 15     30


 


12/10/19 16:44  105 15     30


 


12/10/19 16:00 98.8 104 17 155/97 (116) 100   


 


12/10/19 16:00        30


 


12/10/19 16:00      Mechanical Ventilator  


 


12/10/19 15:24  101 15     30


 


12/10/19 15:23  107      


 


12/10/19 13:10  107 14     30


 


12/10/19 12:00      Mechanical Ventilator  


 


12/10/19 12:00        30


 


12/10/19 12:00      Mechanical Ventilator  


 


12/10/19 12:00 99.5 105 15 141/77 (98) 100   


 


12/10/19 11:33  110      


 


12/10/19 11:19  111 16     30








Status:  obtunded


Condition:  critical


Neck:  full ROM


Lungs:  rales, rhonchi


Heart:  HR/BP stable


Abdomen:  soft, non-tender


Extremities:  no C/C/E, edema


Micro:





Microbiology








 Date/Time


Source Procedure


Growth Status


 


 


 12/9/19 23:30


Blood Blood Culture - Preliminary


NO GROWTH AFTER 24 HOURS Resulted


 


 12/9/19 23:25


Blood Blood Culture - Preliminary


NO GROWTH AFTER 24 HOURS Resulted








Accucheck:  165





Critical Care - Subjective


ROS Limited/Unobtainable:  Yes


Interval Events:


abdominal wall fluid collection was drained yesterday.


Condition:  critical


EKG Rhythm:  Sinus Tachycardia


FI02:  30


Vent Support Breath Rate:  14


Vent Support Mode:  AC


Vent Tidal Volume:  600


Sputum Amount:  Small


PEEP:  5.0


PIP:  27


Tube Feeding Amount:  65


I&O:











Intake and Output  


 


 12/10/19 12/11/19





 18:59 06:59


 


Intake Total 2842 ml 2882.916 ml


 


Output Total 3400 ml 3600 ml


 


Balance -558 ml -717.084 ml


 


  


 


Free Water 200 ml 100 ml


 


IV Total 2187 ml 2067.916 ml


 


Tube Feeding 455 ml 715 ml


 


Output Urine Total 3300 ml 3600 ml


 


Stool Total 100 ml 


 


# Bowel Movements  100








Labs:





Laboratory Tests








Test


  12/11/19


04:30


 


White Blood Count


  16.2 K/UL


(4.8-10.8)  H


 


Red Blood Count


  2.94 M/UL


(4.70-6.10)  L


 


Hemoglobin


  8.4 G/DL


(14.2-18.0)  L


 


Hematocrit


  24.4 %


(42.0-52.0)  L


 


Mean Corpuscular Volume 83 FL (80-99)  


 


Mean Corpuscular Hemoglobin


  28.5 PG


(27.0-31.0)


 


Mean Corpuscular Hemoglobin


Concent 34.3 G/DL


(32.0-36.0)


 


Red Cell Distribution Width


  14.9 %


(11.6-14.8)  H


 


Platelet Count


  268 K/UL


(150-450)


 


Mean Platelet Volume


  6.9 FL


(6.5-10.1)


 


Neutrophils (%) (Auto)


  83.6 %


(45.0-75.0)  H


 


Lymphocytes (%) (Auto)


  7.7 %


(20.0-45.0)  L


 


Monocytes (%) (Auto)


  4.8 %


(1.0-10.0)


 


Eosinophils (%) (Auto)


  3.4 %


(0.0-3.0)  H


 


Basophils (%) (Auto)


  0.6 %


(0.0-2.0)


 


Prothrombin Time


  11.9 SEC


(9.30-11.50)  H


 


Prothromb Time International


Ratio 1.1 (0.9-1.1)  


 


 


Activated Partial


Thromboplast Time 35 SEC (23-33)


H


 


Sodium Level


  133 MMOL/L


(136-145)  L


 


Potassium Level


  4.2 MMOL/L


(3.5-5.1)


 


Chloride Level


  99 MMOL/L


()


 


Carbon Dioxide Level


  26 MMOL/L


(21-32)


 


Anion Gap


  8 mmol/L


(5-15)


 


Blood Urea Nitrogen


  6 mg/dL (7-18)


L


 


Creatinine


  1.1 MG/DL


(0.55-1.30)


 


Estimat Glomerular Filtration


Rate > 60 mL/min


(>60)


 


Glucose Level


  171 MG/DL


()  H


 


Uric Acid


  3.9 MG/DL


(2.6-7.2)


 


Calcium Level


  9.7 MG/DL


(8.5-10.1)


 


Phosphorus Level


  3.7 MG/DL


(2.5-4.9)


 


Magnesium Level


  1.5 MG/DL


(1.8-2.4)  L


 


Total Bilirubin


  0.3 MG/DL


(0.2-1.0)


 


Aspartate Amino Transf


(AST/SGOT) 16 U/L (15-37)


 


 


Alanine Aminotransferase


(ALT/SGPT) 16 U/L (12-78)


 


 


Alkaline Phosphatase


  94 U/L


()


 


Total Protein


  6.9 G/DL


(6.4-8.2)


 


Albumin


  2.0 G/DL


(3.4-5.0)  L


 


Globulin 4.9 g/dL  


 


Albumin/Globulin Ratio


  0.4 (1.0-2.7)


L

















Yury Pena MD Dec 11, 2019 10:37

## 2019-12-11 NOTE — DIAGNOSTIC IMAGING REPORT
Indication: Abnormal fluid collection in the left lateral abdominal wall musculature

demonstrated on recent CT scan

 

Technique: Prior imaging studies reviewed. Informed consent obtained prior to

commencement of the procedure from patient's wife. Procedural timeout performed.

Ultrasound used to localize optimal puncture site. The skin was sterilely prepped and

draped. Local anesthesia with 1% lidocaine. Under real-time ultrasound guidance, the

target collection in the left flank was accessed using a Yueh needle. Approximately 3

mL of serous fluid aspirated. Specimen sent to the lab for microbial analysis as well

as for cytology. Follow-up imaging demonstrated evacuation of the collection. An area

of residual tissue which demonstrates considerable flow on Doppler imaging

demonstrated. The patient tolerated the procedure well, without immediate

complication.

 

Comparison: none

 

Findings: Initial scans demonstrate the target lesion in the left flank, as

demonstrated on the prior CT scan. There is also a satellite lesion located slightly

medially. The periphery of the target lesion demonstrates considerable flow on color

Doppler imaging. After aspiration, area of hypoechogenicity presents but is

demonstrated be solid by the presence of flow on Doppler

 

Impression: Small amount of serous fluid aspirated from left flank fluid collection.

No jac pus so collection is presumably not an abscess; no drain placed. Note,

however, the presence of considerable residual tissue with hyperemia. This most

likely represents hyperemic scar tissue but this should be followed up to exclude

tumor

 

Procedure, findings, and recommendations discussed by phone with Dr. Love after

the procedure was performed

## 2019-12-11 NOTE — NEPHROLOGY PROGRESS NOTE
Assessment/Plan


Problem List:  


(1) Renal failure (ARF), acute on chronic


(2) Urinary retention


(3) Acute on chronic respiratory failure


(4) Hypercalcemia


Assessment





Hypercalcemia


Urinary retention, BPH , 


Acute on chronic renal failure


Electrolyte imbalance 


Colostomy


DM


PEG


Vegetative state


Acute on chronic respiratory failure


Plan





consider transfusion: defered to PMD


Free water-


K and Phos and Mag supplements as needed


pulm support


Aredia for high Ca on 11/28 redose today 12/3





Subjective


ROS Limited/Unobtainable:  Yes





Objective


Objective





Last 24 Hour Vital Signs








  Date Time  Temp Pulse Resp B/P (MAP) Pulse Ox O2 Delivery O2 Flow Rate FiO2


 


12/11/19 12:42  125 14     30


 


12/11/19 12:00      Mechanical Ventilator  


 


12/11/19 12:00        30


 


12/11/19 12:00 97.4 122 15 155/75 (101) 100   


 


12/11/19 11:25  120 16     30


 


12/11/19 09:25  112 14     30


 


12/11/19 08:00        30


 


12/11/19 08:00  118      


 


12/11/19 08:00      Mechanical Ventilator  


 


12/11/19 08:00 100.5 118 15 157/78 (104) 100   


 


12/11/19 07:55  115 14     30


 


12/11/19 05:26  117 14     30


 


12/11/19 04:00      Mechanical Ventilator  


 


12/11/19 04:00        30


 


12/11/19 04:00 99.1 117 18 141/84 (103) 100   


 


12/11/19 03:27  115      


 


12/11/19 03:24  116 14     30


 


12/11/19 00:00 99.5 112 16 147/78 (101) 100   


 


12/11/19 00:00      Mechanical Ventilator  


 


12/11/19 00:00        30


 


12/10/19 23:30  120 15     30


 


12/10/19 23:30  124      


 


12/10/19 21:30  119 15     30


 


12/10/19 20:00      Mechanical Ventilator  


 


12/10/19 20:00 100.1 126 16 152/82 (105) 100   


 


12/10/19 20:00  124      


 


12/10/19 20:00        30


 


12/10/19 19:30  100 15     30


 


12/10/19 16:44  105 15     30


 


12/10/19 16:00 98.8 104 17 155/97 (116) 100   


 


12/10/19 16:00        30


 


12/10/19 16:00      Mechanical Ventilator  


 


12/10/19 15:24  101 15     30


 


12/10/19 15:23  107      


 


12/10/19 13:10  107 14     30

















Intake and Output  


 


 12/10/19 12/11/19





 18:59 06:59


 


Intake Total 2842 ml 2882.916 ml


 


Output Total 3400 ml 3600 ml


 


Balance -558 ml -717.084 ml


 


  


 


Free Water 200 ml 100 ml


 


IV Total 2187 ml 2067.916 ml


 


Tube Feeding 455 ml 715 ml


 


Output Urine Total 3300 ml 3600 ml


 


Stool Total 100 ml 


 


# Bowel Movements  100








Laboratory Tests


12/11/19 04:30: 


White Blood Count 16.2H, Red Blood Count 2.94L, Hemoglobin 8.4L, Hematocrit 

24.4L, Mean Corpuscular Volume 83, Mean Corpuscular Hemoglobin 28.5, Mean 

Corpuscular Hemoglobin Concent 34.3, Red Cell Distribution Width 14.9H, 

Platelet Count 268, Mean Platelet Volume 6.9, Neutrophils (%) (Auto) 83.6H, 

Lymphocytes (%) (Auto) 7.7L, Monocytes (%) (Auto) 4.8, Eosinophils (%) (Auto) 

3.4H, Basophils (%) (Auto) 0.6, Prothrombin Time 11.9H, Prothromb Time 

International Ratio 1.1, Activated Partial Thromboplast Time 35H, Sodium Level 

133L, Potassium Level 4.2, Chloride Level 99, Carbon Dioxide Level 26, Anion 

Gap 8, Blood Urea Nitrogen 6L, Creatinine 1.1, Estimat Glomerular Filtration 

Rate > 60, Glucose Level 171H, Uric Acid 3.9, Calcium Level 9.7, Phosphorus 

Level 3.7, Magnesium Level 1.5L, Total Bilirubin 0.3, Aspartate Amino Transf (

AST/SGOT) 16, Alanine Aminotransferase (ALT/SGPT) 16, Alkaline Phosphatase 94, 

Total Protein 6.9, Albumin 2.0L, Globulin 4.9, Albumin/Globulin Ratio 0.4L


Height (Feet):  5


Height (Inches):  7.00


Weight (Pounds):  209


General Appearance:  no apparent distress


EENT:  other - trach


Cardiovascular:  tachycardia


Respiratory/Chest:  decreased breath sounds


Abdomen:  soft


Objective


no change











Simone Rocha MD Dec 11, 2019 12:58

## 2019-12-11 NOTE — UROLOGY PROGRESS NOTE
Assessment/Plan


Status:  stable


Assessment/Plan:


1. Urinary retention.


2. BPH.


3. Neurogenic bladder.


4. Incontinence.


5. Pyuria/UTI/colonized.


6. Hematuria.


7. Proteinuria.


8. Renal insufficiency, acute possibly on chronic.


9. Renal cyst.


10. Nephrolithiasis.


11. Renal fullness.


12. Bladder calcifications.


13. POD # 14, cysto/optic urethrotomy.





monitor clinically


mayorga placed 11/27


hand irrigated and do PRN


abx as ordered, pr ID


diflucan added


monitor WBC


f/u on last blood cx


renal fxn stable





Subjective


Allergies:  


Coded Allergies:  


     AZTREONAM (Verified  Allergy, Unknown, 7/23/18)


Subjective


all noted, mayorga draining, non-verbal





Objective





Last 24 Hour Vital Signs








  Date Time  Temp Pulse Resp B/P (MAP) Pulse Ox O2 Delivery O2 Flow Rate FiO2


 


12/11/19 12:42  125 14     30


 


12/11/19 12:00      Mechanical Ventilator  


 


12/11/19 12:00        30


 


12/11/19 12:00  124      


 


12/11/19 12:00 97.4 122 15 155/75 (101) 100   


 


12/11/19 11:25  120 16     30


 


12/11/19 09:25  112 14     30


 


12/11/19 08:00        30


 


12/11/19 08:00  118      


 


12/11/19 08:00      Mechanical Ventilator  


 


12/11/19 08:00 100.5 118 15 157/78 (104) 100   


 


12/11/19 07:55  115 14     30


 


12/11/19 05:26  117 14     30


 


12/11/19 04:00      Mechanical Ventilator  


 


12/11/19 04:00        30


 


12/11/19 04:00 99.1 117 18 141/84 (103) 100   


 


12/11/19 03:27  115      


 


12/11/19 03:24  116 14     30


 


12/11/19 00:00 99.5 112 16 147/78 (101) 100   


 


12/11/19 00:00      Mechanical Ventilator  


 


12/11/19 00:00        30


 


12/10/19 23:30  120 15     30


 


12/10/19 23:30  124      


 


12/10/19 21:30  119 15     30


 


12/10/19 20:00      Mechanical Ventilator  


 


12/10/19 20:00 100.1 126 16 152/82 (105) 100   


 


12/10/19 20:00  124      


 


12/10/19 20:00        30


 


12/10/19 19:30  100 15     30


 


12/10/19 16:44  105 15     30


 


12/10/19 16:00 98.8 104 17 155/97 (116) 100   


 


12/10/19 16:00        30


 


12/10/19 16:00      Mechanical Ventilator  

















Intake and Output  


 


 12/10/19 12/11/19





 19:00 07:00


 


Intake Total 2907 ml 2692.916 ml


 


Output Total 3400 ml 3600 ml


 


Balance -493 ml -907.084 ml


 


  


 


Free Water 200 ml 100 ml


 


IV Total 2187 ml 1942.916 ml


 


Tube Feeding 520 ml 650 ml


 


Output Urine Total 3300 ml 3600 ml


 


Stool Total 100 ml 


 


# Bowel Movements  100











Microbiology








 Date/Time


Source Procedure


Growth Status


 


 


 12/9/19 23:30


Blood Blood Culture - Preliminary


NO GROWTH AFTER 24 HOURS Resulted





 11/25/19 11:00


Nasal Nares - Final Complete


 


 11/25/19 11:00


Nasal Nares - Final Complete


 


 12/5/19 19:00


Stool Clostridium difficile Toxin Assay - Final Complete


 


 12/5/19 19:00


Urine,Clean Catch Urine Culture - Final


Candida Parapsilosis Complete


 


 11/24/19 09:10


Rectum VRE Culture - Final


Enterococcus Faecium - Vre Complete








Current Medications








 Medications


  (Trade)  Dose


 Ordered  Sig/Jan


 Route


 PRN Reason  Start Time


 Stop Time Status Last Admin


Dose Admin


 


 Acetaminophen


  (Tylenol)  650 mg  Q4H  PRN


 GT


 Mild Pain/Temp > 100.6  11/26/19 18:42


 12/26/19 18:41  12/8/19 20:36


 


 


 Artificial Tears


  (Akwa-Tears)  2 drop  Q12HR


 BOTH EYES


   11/26/19 21:00


 12/24/19 20:59  12/11/19 08:33


 


 


 Baclofen


  (Lioresal)  10 mg  THREE TIMES A  DAY


 GT


   11/27/19 09:00


 12/24/19 17:59  12/11/19 12:11


 


 


 Calcitonin Presto


  (Miacalcin)  1 sprays  DAILY


 NASAL


   11/28/19 12:00


 12/28/19 11:59  12/11/19 08:30


 


 


 Chlorhexidine


 Gluconate


  (Elaine-Hex 2%)  1 applic  DAILY@2000


 TOPIC


   12/6/19 20:00


 1/5/20 19:59  12/10/19 20:28


 


 


 Clobetasol


 Propionate


  (Temovate)  1 applic  EVERY 12  HOURS


 TOPIC


   11/26/19 21:00


 12/24/19 20:59  12/11/19 08:30


 


 


 Dextrose


  (Dextrose 50%)  25 ml  Q30M  PRN


 IV


 Hypoglycemia  11/26/19 18:45


 12/24/19 14:44   


 


 


 Dextrose


  (Dextrose 50%)  50 ml  Q30M  PRN


 IV


 Hypoglycemia  11/26/19 18:45


 12/24/19 14:44   


 


 


 Famotidine


  (Pepcid)  20 mg  BID


 GT


   11/28/19 18:00


 12/28/19 17:59  12/11/19 08:29


 


 


 Fenofibrate


  (Tricor)  145 mg  DAILY


 ORAL


   11/27/19 09:00


 12/25/19 08:59  12/11/19 08:29


 


 


 Fluconazole


  (Diflucan)  400 mg  DAILY


 GT


   12/12/19 09:00


 12/16/19 13:46   


 


 


 Heparin Sodium/


 Dextrose  500 ml @ 


 37.96 mls/


 hr  ADJUST PER  PROTOCOL


 IV


   12/11/19 10:40


 1/10/20 10:39  12/11/19 10:54


 


 


 Insulin Aspart


  (NovoLOG)    EVERY 6  HOURS


 SUBQ


   11/30/19 12:00


 12/24/19 16:29  12/11/19 12:12


 


 


 Levetiracetam


  (Keppra)  1,000 mg  Q8H


 GT


   11/29/19 02:00


 12/29/19 01:59  12/11/19 09:40


 


 


 Minocycline HCl


  (Minocin)  100 mg  Q12HR@0600,1800


 ORAL


   12/11/19 06:00


 12/18/19 05:59  12/11/19 05:32


 


 


 Ondansetron HCl


  (Zofran)  4 mg  Q6H  PRN


 IVP


 Nausea & Vomiting  11/26/19 18:44


 12/26/19 18:43   


 


 


 Polyethylene


 Glycol


  (Miralax)  17 gm  DAILYPRN  PRN


 GT


 Constipation  11/26/19 18:44


 12/26/19 18:43   


 


 


 Sitagliptin


 Phosphate


  (Januvia)  50 mg  ACBREAKFAST


 GT


   11/27/19 06:30


 12/25/19 06:29  12/11/19 05:32


 


 


 Tamsulosin HCl


  (Flomax)  0.4 mg  DAILY


 ORAL


   11/27/19 09:00


 12/27/19 08:59  12/11/19 08:29


 


 


 Trimethoprim/


 Sulfamethoxazole


 20 ml/Dextrose  570 ml @ 


 380 mls/hr  [id number]  BACTRIM


 IV


   12/9/19 16:00


 12/16/19 15:59  12/11/19 08:29


 





Laboratory Tests


12/11/19 04:30: 


White Blood Count 16.2H, Red Blood Count 2.94L, Hemoglobin 8.4L, Hematocrit 

24.4L, Mean Corpuscular Volume 83, Mean Corpuscular Hemoglobin 28.5, Mean 

Corpuscular Hemoglobin Concent 34.3, Red Cell Distribution Width 14.9H, 

Platelet Count 268, Mean Platelet Volume 6.9, Neutrophils (%) (Auto) 83.6H, 

Lymphocytes (%) (Auto) 7.7L, Monocytes (%) (Auto) 4.8, Eosinophils (%) (Auto) 

3.4H, Basophils (%) (Auto) 0.6, Prothrombin Time 11.9H, Prothromb Time 

International Ratio 1.1, Activated Partial Thromboplast Time 35H, Sodium Level 

133L, Potassium Level 4.2, Chloride Level 99, Carbon Dioxide Level 26, Anion 

Gap 8, Blood Urea Nitrogen 6L, Creatinine 1.1, Estimat Glomerular Filtration 

Rate > 60, Glucose Level 171H, Uric Acid 3.9, Calcium Level 9.7, Phosphorus 

Level 3.7, Magnesium Level 1.5L, Total Bilirubin 0.3, Aspartate Amino Transf (

AST/SGOT) 16, Alanine Aminotransferase (ALT/SGPT) 16, Alkaline Phosphatase 94, 

Total Protein 6.9, Albumin 2.0L, Globulin 4.9, Albumin/Globulin Ratio 0.4L


Height (Feet):  5


Height (Inches):  7.00


Weight (Pounds):  209


Objective


 exam stable


mayorga indwelling, yellow/christine urine, some debris





CT C/A/P (12/8) noted











Vinnie Pollard MD Dec 11, 2019 15:46

## 2019-12-12 VITALS — SYSTOLIC BLOOD PRESSURE: 114 MMHG | DIASTOLIC BLOOD PRESSURE: 60 MMHG

## 2019-12-12 VITALS — SYSTOLIC BLOOD PRESSURE: 107 MMHG | DIASTOLIC BLOOD PRESSURE: 67 MMHG

## 2019-12-12 VITALS — SYSTOLIC BLOOD PRESSURE: 106 MMHG | DIASTOLIC BLOOD PRESSURE: 64 MMHG

## 2019-12-12 VITALS — DIASTOLIC BLOOD PRESSURE: 59 MMHG | SYSTOLIC BLOOD PRESSURE: 106 MMHG

## 2019-12-12 VITALS — SYSTOLIC BLOOD PRESSURE: 108 MMHG | DIASTOLIC BLOOD PRESSURE: 72 MMHG

## 2019-12-12 VITALS — DIASTOLIC BLOOD PRESSURE: 76 MMHG | SYSTOLIC BLOOD PRESSURE: 121 MMHG

## 2019-12-12 LAB
ADD MANUAL DIFF: YES
ALBUMIN SERPL-MCNC: 2.1 G/DL (ref 3.4–5)
ALBUMIN/GLOB SERPL: 0.4 {RATIO} (ref 1–2.7)
ALP SERPL-CCNC: 118 U/L (ref 46–116)
ALT SERPL-CCNC: 19 U/L (ref 12–78)
ANION GAP SERPL CALC-SCNC: 7 MMOL/L (ref 5–15)
APPEARANCE UR: (no result)
APTT PPP: (no result) S
AST SERPL-CCNC: 24 U/L (ref 15–37)
BILIRUB SERPL-MCNC: 0.5 MG/DL (ref 0.2–1)
BUN SERPL-MCNC: 9 MG/DL (ref 7–18)
CALCIUM SERPL-MCNC: 9.9 MG/DL (ref 8.5–10.1)
CHLORIDE SERPL-SCNC: 95 MMOL/L (ref 98–107)
CO2 SERPL-SCNC: 26 MMOL/L (ref 21–32)
CREAT SERPL-MCNC: 1.5 MG/DL (ref 0.55–1.3)
ERYTHROCYTE [DISTWIDTH] IN BLOOD BY AUTOMATED COUNT: 16.3 % (ref 11.6–14.8)
GLOBULIN SER-MCNC: 5.4 G/DL
GLUCOSE UR STRIP-MCNC: (no result) MG/DL
HCT VFR BLD CALC: 27.1 % (ref 42–52)
HGB BLD-MCNC: 8.9 G/DL (ref 14.2–18)
KETONES UR QL STRIP: NEGATIVE
LEUKOCYTE ESTERASE UR QL STRIP: (no result)
MCV RBC AUTO: 86 FL (ref 80–99)
NITRITE UR QL STRIP: NEGATIVE
PH UR STRIP: 8 [PH] (ref 4.5–8)
PHOSPHATE SERPL-MCNC: 3.2 MG/DL (ref 2.5–4.9)
PLATELET # BLD: 329 K/UL (ref 150–450)
POTASSIUM SERPL-SCNC: 4.8 MMOL/L (ref 3.5–5.1)
PROT UR QL STRIP: (no result)
RBC # BLD AUTO: 3.16 M/UL (ref 4.7–6.1)
SODIUM SERPL-SCNC: 128 MMOL/L (ref 136–145)
SP GR UR STRIP: 1.01 (ref 1–1.03)
UROBILINOGEN UR-MCNC: NORMAL MG/DL (ref 0–1)
WBC # BLD AUTO: 18.7 K/UL (ref 4.8–10.8)

## 2019-12-12 RX ADMIN — SULFAMETHOXAZOLE AND TRIMETHOPRIM SCH MLS/HR: 80; 16 INJECTION, SOLUTION, CONCENTRATE INTRAVENOUS at 23:26

## 2019-12-12 RX ADMIN — MINOCYCLINE HYDROCHLORIDE SCH MG: 50 CAPSULE ORAL at 17:29

## 2019-12-12 RX ADMIN — ACETAMINOPHEN PRN MG: 160 SOLUTION ORAL at 20:00

## 2019-12-12 RX ADMIN — FLUCONAZOLE SCH MG: 100 TABLET ORAL at 09:30

## 2019-12-12 RX ADMIN — CLOBETASOL PROPIONATE SCH APPLIC: 0.5 OINTMENT TOPICAL at 20:00

## 2019-12-12 RX ADMIN — LEVETIRACETAM SCH MG: 100 SOLUTION ORAL at 17:29

## 2019-12-12 RX ADMIN — INSULIN ASPART SCH UNITS: 100 INJECTION, SOLUTION INTRAVENOUS; SUBCUTANEOUS at 23:34

## 2019-12-12 RX ADMIN — CLOBETASOL PROPIONATE SCH APPLIC: 0.5 OINTMENT TOPICAL at 09:29

## 2019-12-12 RX ADMIN — SITAGLIPTIN SCH MG: 50 TABLET, FILM COATED ORAL at 05:35

## 2019-12-12 RX ADMIN — APIXABAN SCH MG: 5 TABLET, FILM COATED ORAL at 18:18

## 2019-12-12 RX ADMIN — HEPARIN SODIUM SCH MLS/HR: 5000 INJECTION, SOLUTION INTRAVENOUS at 08:01

## 2019-12-12 RX ADMIN — SULFAMETHOXAZOLE AND TRIMETHOPRIM SCH MLS/HR: 80; 16 INJECTION, SOLUTION, CONCENTRATE INTRAVENOUS at 07:54

## 2019-12-12 RX ADMIN — CHLORHEXIDINE GLUCONATE SCH APPLIC: 213 SOLUTION TOPICAL at 19:59

## 2019-12-12 RX ADMIN — INSULIN ASPART SCH UNITS: 100 INJECTION, SOLUTION INTRAVENOUS; SUBCUTANEOUS at 17:30

## 2019-12-12 RX ADMIN — LEVETIRACETAM SCH MG: 100 SOLUTION ORAL at 01:09

## 2019-12-12 RX ADMIN — SULFAMETHOXAZOLE AND TRIMETHOPRIM SCH MLS/HR: 80; 16 INJECTION, SOLUTION, CONCENTRATE INTRAVENOUS at 16:02

## 2019-12-12 RX ADMIN — ACETAMINOPHEN PRN MG: 160 SOLUTION ORAL at 06:34

## 2019-12-12 RX ADMIN — MINOCYCLINE HYDROCHLORIDE SCH MG: 50 CAPSULE ORAL at 05:35

## 2019-12-12 RX ADMIN — INSULIN ASPART SCH UNITS: 100 INJECTION, SOLUTION INTRAVENOUS; SUBCUTANEOUS at 11:41

## 2019-12-12 RX ADMIN — LEVETIRACETAM SCH MG: 100 SOLUTION ORAL at 09:31

## 2019-12-12 RX ADMIN — INSULIN ASPART SCH UNITS: 100 INJECTION, SOLUTION INTRAVENOUS; SUBCUTANEOUS at 05:36

## 2019-12-12 RX ADMIN — ACETAMINOPHEN PRN MG: 160 SOLUTION ORAL at 01:09

## 2019-12-12 RX ADMIN — TAMSULOSIN HYDROCHLORIDE SCH MG: 0.4 CAPSULE ORAL at 09:31

## 2019-12-12 NOTE — NEPHROLOGY PROGRESS NOTE
Assessment/Plan


Problem List:  


(1) Renal failure (ARF), acute on chronic


(2) Urinary retention


(3) Acute on chronic respiratory failure


(4) Hypercalcemia


Assessment





Hypercalcemia


Urinary retention, BPH , 


Acute on chronic renal failure


Electrolyte imbalance 


Colostomy


DM


PEG


Vegetative state


Acute on chronic respiratory failure


Plan





3% Saline


Free water-


K and Phos and Mag supplements as needed


pulm support


Aredia for high Ca on 11/28 redose today 12/3





Subjective


ROS Limited/Unobtainable:  Yes





Objective


Objective





Last 24 Hour Vital Signs








  Date Time  Temp Pulse Resp B/P (MAP) Pulse Ox O2 Delivery O2 Flow Rate FiO2


 


12/12/19 13:58  119 14     30


 


12/12/19 12:00  112      


 


12/12/19 12:00 100.0 115 14 106/64 (78) 100   


 


12/12/19 12:00        30


 


12/12/19 12:00      Mechanical Ventilator  


 


12/12/19 11:50  114 15     30


 


12/12/19 09:45  122 15     30


 


12/12/19 08:06 100.4       


 


12/12/19 08:00 100.4 122 16 107/67 (80) 100   


 


12/12/19 08:00      Mechanical Ventilator  


 


12/12/19 08:00        30


 


12/12/19 08:00  127      


 


12/12/19 07:22  127 17     30


 


12/12/19 06:32 101.7       


 


12/12/19 05:04  119 16     30


 


12/12/19 05:00 98.1       


 


12/12/19 04:00        30


 


12/12/19 04:00      Mechanical Ventilator  


 


12/12/19 04:00 101.7 111 15 108/72 (84) 98   


 


12/12/19 03:27  127      


 


12/12/19 02:54  123 18     30


 


12/12/19 01:39 98.9       


 


12/12/19 01:00 100.0       


 


12/12/19 00:31  115 17     30


 


12/12/19 00:00      Mechanical Ventilator  


 


12/12/19 00:00        30


 


12/12/19 00:00 99.2 111 15 121/76 (91) 100   


 


12/11/19 23:26  115      


 


12/11/19 23:08  124 15     30


 


12/11/19 21:11  121 18     30


 


12/11/19 21:06 99.4       


 


12/11/19 20:00        30


 


12/11/19 20:00 100.2 121 17 120/68 (85) 100   


 


12/11/19 20:00      Mechanical Ventilator  


 


12/11/19 19:11  130 16     30


 


12/11/19 19:02  131      


 


12/11/19 16:46  120 16     30


 


12/11/19 16:00  124      


 


12/11/19 16:00 100.6 122 16 139/79 (99) 100   


 


12/11/19 16:00        30


 


12/11/19 16:00      Mechanical Ventilator  


 


12/11/19 15:29  120 16     30

















Intake and Output  


 


 12/11/19 12/12/19





 18:59 06:59


 


Intake Total 2309.80 ml 2171.440 ml


 


Output Total 4600 ml 2150 ml


 


Balance -2290.20 ml 21.440 ml


 


  


 


Free Water 200 ml 100 ml


 


IV Total 1329.80 ml 1291.440 ml


 


Tube Feeding 780 ml 780 ml


 


Output Urine Total 4300 ml 1800 ml


 


Stool Total 300 ml 350 ml








Laboratory Tests


12/11/19 20:19: Activated Partial Thromboplast Time 39H


12/12/19 03:05: 


Activated Partial Thromboplast Time 72H, White Blood Count 18.7H, Red Blood 

Count 3.16L, Hemoglobin 8.9L, Hematocrit 27.1L, Mean Corpuscular Volume 86, 

Mean Corpuscular Hemoglobin 28.2, Mean Corpuscular Hemoglobin Concent 32.9, Red 

Cell Distribution Width 16.3H, Platelet Count 329, Mean Platelet Volume 7.3, 

Neutrophils (%) (Auto) , Lymphocytes (%) (Auto) , Monocytes (%) (Auto) , 

Eosinophils (%) (Auto) , Basophils (%) (Auto) , Differential Total Cells 

Counted 100, Neutrophils % (Manual) 85H, Lymphocytes % (Manual) 7L, Monocytes % 

(Manual) 5, Eosinophils % (Manual) 3, Basophils % (Manual) 0, Band Neutrophils 0

, Platelet Estimate Adequate, Platelet Morphology Normal, Sodium Level 128L, 

Potassium Level 4.8, Chloride Level 95L, Carbon Dioxide Level 26, Anion Gap 7, 

Blood Urea Nitrogen 9, Creatinine 1.5H, Estimat Glomerular Filtration Rate 59.5

, Glucose Level 321#H, Calcium Level 9.9, Phosphorus Level 3.2, Magnesium Level 

2.2, Total Bilirubin 0.5, Aspartate Amino Transf (AST/SGOT) 24, Alanine 

Aminotransferase (ALT/SGPT) 19, Alkaline Phosphatase 118H, Total Protein 7.5, 

Albumin 2.1L, Globulin 5.4, Albumin/Globulin Ratio 0.4L


Height (Feet):  5


Height (Inches):  7.00


Weight (Pounds):  202


General Appearance:  no apparent distress


EENT:  other - trach


Cardiovascular:  tachycardia


Respiratory/Chest:  decreased breath sounds


Abdomen:  distended


Objective


no change











Simone Rocha MD Dec 12, 2019 14:45

## 2019-12-12 NOTE — PROGRESS NOTE
DATE:  12/11/2019

SUBJECTIVE:  Chart review in detail since the patient seen last week and

_____ been discussed with the medical staff.  Major event, the patient was

found to have pulmonary emboli on 12/09/2019 and was placed on heparin

drip.  Heparin drip was increased recently because of low INR.  He is

currently on 24 units/kg.



PHYSICAL EXAMINATION:

VITAL SIGNS:  Blood pressure is 120/68, his pulse is 121, respirations are

17, and temperature is 100.2.

HEENT:  Eyes were normal.  ENT, mucous membranes were moist and intact.

NECK:  Supple with no JVD without lymph nodes.  Tracheostomy site is

clean.

LUNGS:  Clear without rhonchi, rales, or wheezing.  Secretions are small,

thin, and whitish.

CARDIAC:  Normal sounds with regular beats.  There is tachycardia at rest.

Sinus tachycardia on monitor.

ABDOMEN:  Soft and nontender with normal bowel sounds.  Gastrostomy site is

clean.

EXTREMITIES:  Warm without cyanosis, clubbing, or edema.



LABORATORY AND DIAGNOSTIC DATA:  Hemoglobin is 8.4, hematocrit 24.4 with

MCV of 83, WBC of 16.2, and platelets 253.  His WBC was 11.2 yesterday and

10.3 two days ago.  His BUN and creatinine is 6 and 1.1 respectively.  His

sodium is 133, potassium 4.2, chloride 99, CO2 is 26.  His uric acid is

3.9.  His total CK is 16.  His albumin is 2.0 and total protein is 6.9.



_____ was identified on the CT on 12/07/2019.  _____ amount of serous

fluid was aspirated from the left flank and was sent for laboratory

testing.  Repeat laboratory tests will be done in the a.m.









  ______________________________________________

  Etienne Bloom M.D.





DR:  SHAMA

D:  12/12/2019 00:19

T:  12/12/2019 06:40

JOB#:  [id number]

CC:

## 2019-12-12 NOTE — PULMONOLGY CRITICAL CARE NOTE
Critical Care - Asmt/Plan


Problems:  


(1) Recurrent fever


(2) Acute on chronic respiratory failure


(3) Pulmonary embolism


(4) Sepsis


(5) Gastrostomy tube dependent


(6) ATN (acute tubular necrosis)


(7) Colostomy in place


(8) Diabetes mellitus


(9) Vegetative state


Respiratory:  monitor respiratory rate, adjust FIO2, CXR


Cardiac:  continue to monitor HR/BP


Renal:  F/U I&O, keep IV fluid, check electrolytes


Gastrointestinal:  continue feedings/current rate


Endocrine:  monitor blood sugar


Hematologic:  monitor H/H, transfuse if hgb<8.5


Neurologic:  PRN Ativan, PRN Morphine, keep patient comfortable


Affect:  PRN ativan


Notes Reviewed:  internist, cardio, renal, ID


Discussed with:  nurses, consultants, 





Critical Care - Objective





Last 24 Hour Vital Signs








  Date Time  Temp Pulse Resp B/P (MAP) Pulse Ox O2 Delivery O2 Flow Rate FiO2


 


12/12/19 09:45  122 15     30


 


12/12/19 08:06 100.4       


 


12/12/19 08:00 100.4 122 16 107/67 (80) 100   


 


12/12/19 08:00      Mechanical Ventilator  


 


12/12/19 08:00        30


 


12/12/19 07:22  127 17     30


 


12/12/19 06:32 101.7       


 


12/12/19 05:04  119 16     30


 


12/12/19 05:00 98.1       


 


12/12/19 04:00        30


 


12/12/19 04:00      Mechanical Ventilator  


 


12/12/19 04:00 101.7 111 15 108/72 (84) 98   


 


12/12/19 03:27  127      


 


12/12/19 02:54  123 18     30


 


12/12/19 01:39 98.9       


 


12/12/19 01:00 100.0       


 


12/12/19 00:31  115 17     30


 


12/12/19 00:00      Mechanical Ventilator  


 


12/12/19 00:00        30


 


12/12/19 00:00 99.2 111 15 121/76 (91) 100   


 


12/11/19 23:26  115      


 


12/11/19 23:08  124 15     30


 


12/11/19 21:11  121 18     30


 


12/11/19 21:06 99.4       


 


12/11/19 20:00        30


 


12/11/19 20:00 100.2 121 17 120/68 (85) 100   


 


12/11/19 20:00      Mechanical Ventilator  


 


12/11/19 19:11  130 16     30


 


12/11/19 19:02  131      


 


12/11/19 16:46  120 16     30


 


12/11/19 16:00  124      


 


12/11/19 16:00 100.6 122 16 139/79 (99) 100   


 


12/11/19 16:00        30


 


12/11/19 16:00      Mechanical Ventilator  


 


12/11/19 15:29  120 16     30


 


12/11/19 12:42  125 14     30


 


12/11/19 12:00      Mechanical Ventilator  


 


12/11/19 12:00        30


 


12/11/19 12:00  124      


 


12/11/19 12:00 97.4 122 15 155/75 (101) 100   


 


12/11/19 11:25  120 16     30








Status:  obtunded


Condition:  critical


HEENT:  atraumatic, normocephalic


Neck:  full ROM


Heart:  HR/BP unstable


Abdomen:  soft, non-tender


Extremities:  no C/C/E, edema


Decubiti:  stage


Micro:





Microbiology








 Date/Time


Source Procedure


Growth Status


 


 


 12/9/19 23:30


Blood Blood Culture - Preliminary


NO GROWTH AFTER 48 HOURS Resulted


 


 12/9/19 23:25


Blood Blood Culture - Preliminary


NO GROWTH AFTER 48 HOURS Resulted








Accucheck:  278





Critical Care - Subjective


ROS Limited/Unobtainable:  Yes


Condition:  critical


EKG Rhythm:  Sinus Rhythm


FI02:  30


Vent Support Breath Rate:  14


Vent Support Mode:  AC


Vent Tidal Volume:  600


Sputum Amount:  Scant


PEEP:  5.0


PIP:  29


Tube Feeding Amount:  65


I&O:











Intake and Output  


 


 12/11/19 12/12/19





 18:59 06:59


 


Intake Total 2309.80 ml 2171.440 ml


 


Output Total 4600 ml 2150 ml


 


Balance -2290.20 ml 21.440 ml


 


  


 


Free Water 200 ml 100 ml


 


IV Total 1329.80 ml 1291.440 ml


 


Tube Feeding 780 ml 780 ml


 


Output Urine Total 4300 ml 1800 ml


 


Stool Total 300 ml 350 ml








Labs:





Laboratory Tests








Test


  12/11/19


20:19 12/12/19


03:05


 


Activated Partial


Thromboplast Time 39 SEC (23-33)


H 72 SEC (23-33)


H


 


White Blood Count


  


  18.7 K/UL


(4.8-10.8)  H


 


Red Blood Count


  


  3.16 M/UL


(4.70-6.10)  L


 


Hemoglobin


  


  8.9 G/DL


(14.2-18.0)  L


 


Hematocrit


  


  27.1 %


(42.0-52.0)  L


 


Mean Corpuscular Volume  86 FL (80-99)  


 


Mean Corpuscular Hemoglobin


  


  28.2 PG


(27.0-31.0)


 


Mean Corpuscular Hemoglobin


Concent 


  32.9 G/DL


(32.0-36.0)


 


Red Cell Distribution Width


  


  16.3 %


(11.6-14.8)  H


 


Platelet Count


  


  329 K/UL


(150-450)


 


Mean Platelet Volume


  


  7.3 FL


(6.5-10.1)


 


Neutrophils (%) (Auto)


  


  % (45.0-75.0)


 


 


Lymphocytes (%) (Auto)


  


  % (20.0-45.0)


 


 


Monocytes (%) (Auto)   % (1.0-10.0)  


 


Eosinophils (%) (Auto)   % (0.0-3.0)  


 


Basophils (%) (Auto)   % (0.0-2.0)  


 


Differential Total Cells


Counted 


  100  


 


 


Neutrophils % (Manual)  85 % (45-75)  H


 


Lymphocytes % (Manual)  7 % (20-45)  L


 


Monocytes % (Manual)  5 % (1-10)  


 


Eosinophils % (Manual)  3 % (0-3)  


 


Basophils % (Manual)  0 % (0-2)  


 


Band Neutrophils  0 % (0-8)  


 


Platelet Estimate  Adequate  


 


Platelet Morphology  Normal  


 


Sodium Level


  


  128 MMOL/L


(136-145)  L


 


Potassium Level


  


  4.8 MMOL/L


(3.5-5.1)


 


Chloride Level


  


  95 MMOL/L


()  L


 


Carbon Dioxide Level


  


  26 MMOL/L


(21-32)


 


Anion Gap


  


  7 mmol/L


(5-15)


 


Blood Urea Nitrogen


  


  9 mg/dL (7-18)


 


 


Creatinine


  


  1.5 MG/DL


(0.55-1.30)  H


 


Estimat Glomerular Filtration


Rate 


  59.5 mL/min


(>60)


 


Glucose Level


  


  321 MG/DL


()  #H


 


Calcium Level


  


  9.9 MG/DL


(8.5-10.1)


 


Phosphorus Level


  


  3.2 MG/DL


(2.5-4.9)


 


Magnesium Level


  


  2.2 MG/DL


(1.8-2.4)


 


Total Bilirubin


  


  0.5 MG/DL


(0.2-1.0)


 


Aspartate Amino Transf


(AST/SGOT) 


  24 U/L (15-37)


 


 


Alanine Aminotransferase


(ALT/SGPT) 


  19 U/L (12-78)


 


 


Alkaline Phosphatase


  


  118 U/L


()  H


 


Total Protein


  


  7.5 G/DL


(6.4-8.2)


 


Albumin


  


  2.1 G/DL


(3.4-5.0)  L


 


Globulin  5.4 g/dL  


 


Albumin/Globulin Ratio


  


  0.4 (1.0-2.7)


L

















Yury Pena MD Dec 12, 2019 11:26

## 2019-12-12 NOTE — PROGRESS NOTE
DATE:  12/11/2019

CARDIOLOGY PROGRESS NOTE



SUBJECTIVE:  The patient remains in chronic vegetative state on ventilator

support.  Blood pressure parameters high normal at 155/75, heart rate in

the 110 to 120 range, afebrile, T-max 100.5, on full anticoagulation.



OBJECTIVE:

LUNGS:  Bilateral breath sounds.  Thin secretions.

CARDIAC:  Regular rhythm.  Rapid rate.  Normal S1, S2.

ABDOMEN:  Soft.  G-tube intact.

EXTREMITIES:  There is dependent edema.



LABORATORY DATA:  Notable for magnesium 1.5, BUN 6, creatinine 1.1,

potassium 4.2, and albumin 2.



IMPRESSION:

1. Respiratory failure.

2. Sepsis.

3. Pulmonary embolism.

4. Sinus tachycardia.

5. Severe protein-calorie malnutrition.

6. Hypertension.

7. Hypomagnesemia.



PLAN:

1. Intravenous magnesium.

2. Antimicrobials.

3. Nutrition by feeding tube.

4. Ventilator support.

5. _____ add antihypertensive therapy at this time, but consider

beta-blocker if blood pressure parameters keep rising.









  ______________________________________________

  Robert Chan M.D.





DR:  JOSE MARIA

D:  12/12/2019 00:08

T:  12/12/2019 00:17

JOB#:  [id number]

CC:

## 2019-12-12 NOTE — UROLOGY PROGRESS NOTE
Assessment/Plan


Status:  stable


Assessment/Plan:


1. Urinary retention.


2. BPH.


3. Neurogenic bladder.


4. Incontinence.


5. Pyuria/UTI/colonized.


6. Hematuria.


7. Proteinuria.


8. Renal insufficiency, acute possibly on chronic.


9. Renal cyst.


10. Nephrolithiasis.


11. Renal fullness.


12. Bladder calcifications.


13. POD # 15, cysto/optic urethrotomy.





monitor clinically


mayorga placed 11/27


hand irrigated and do PRN


abx as ordered, pr ID


diflucan added


monitor WBC


f/u on last blood cx


renal fxn stable





Subjective


Allergies:  


Coded Allergies:  


     AZTREONAM (Verified  Allergy, Unknown, 7/23/18)


Subjective


all noted, mayorga draining, non-verbal





Objective





Last 24 Hour Vital Signs








  Date Time  Temp Pulse Resp B/P (MAP) Pulse Ox O2 Delivery O2 Flow Rate FiO2


 


12/12/19 08:06 100.4       


 


12/12/19 07:22  127 17     30


 


12/12/19 06:32 101.7       


 


12/12/19 05:04  119 16     30


 


12/12/19 05:00 98.1       


 


12/12/19 04:00        30


 


12/12/19 04:00      Mechanical Ventilator  


 


12/12/19 04:00 101.7 111 15 108/72 (84) 98   


 


12/12/19 03:27  127      


 


12/12/19 02:54  123 18     30


 


12/12/19 01:39 98.9       


 


12/12/19 01:00 100.0       


 


12/12/19 00:31  115 17     30


 


12/12/19 00:00      Mechanical Ventilator  


 


12/12/19 00:00        30


 


12/12/19 00:00 99.2 111 15 121/76 (91) 100   


 


12/11/19 23:26  115      


 


12/11/19 23:08  124 15     30


 


12/11/19 21:11  121 18     30


 


12/11/19 21:06 99.4       


 


12/11/19 20:00        30


 


12/11/19 20:00 100.2 121 17 120/68 (85) 100   


 


12/11/19 20:00      Mechanical Ventilator  


 


12/11/19 19:11  130 16     30


 


12/11/19 19:02  131      


 


12/11/19 16:46  120 16     30


 


12/11/19 16:00  124      


 


12/11/19 16:00 100.6 122 16 139/79 (99) 100   


 


12/11/19 16:00        30


 


12/11/19 16:00      Mechanical Ventilator  


 


12/11/19 15:29  120 16     30


 


12/11/19 12:42  125 14     30


 


12/11/19 12:00      Mechanical Ventilator  


 


12/11/19 12:00        30


 


12/11/19 12:00  124      


 


12/11/19 12:00 97.4 122 15 155/75 (101) 100   


 


12/11/19 11:25  120 16     30


 


12/11/19 09:25  112 14     30

















Intake and Output  


 


 12/11/19 12/12/19





 19:00 07:00


 


Intake Total 2347.76 ml 2133.480 ml


 


Output Total 4600 ml 2150 ml


 


Balance -2252.24 ml -16.520 ml


 


  


 


Free Water 200 ml 100 ml


 


IV Total 1367.76 ml 1253.480 ml


 


Tube Feeding 780 ml 780 ml


 


Output Urine Total 4300 ml 1800 ml


 


Stool Total 300 ml 350 ml











Microbiology








 Date/Time


Source Procedure


Growth Status


 


 


 12/9/19 23:30


Blood Blood Culture - Preliminary


NO GROWTH AFTER 48 HOURS Resulted





 11/25/19 11:00


Nasal Nares - Final Complete


 


 11/25/19 11:00


Nasal Nares - Final Complete


 


 12/5/19 19:00


Stool Clostridium difficile Toxin Assay - Final Complete


 


 12/5/19 19:00


Urine,Clean Catch Urine Culture - Final


Candida Parapsilosis Complete


 


 11/24/19 09:10


Rectum VRE Culture - Final


Enterococcus Faecium - Vre Complete








Current Medications








 Medications


  (Trade)  Dose


 Ordered  Sig/Jan


 Route


 PRN Reason  Start Time


 Stop Time Status Last Admin


Dose Admin


 


 Acetaminophen


  (Tylenol)  650 mg  Q4H  PRN


 GT


 Mild Pain/Temp > 100.6  11/26/19 18:42


 12/26/19 18:41  12/12/19 06:34


 


 


 Artificial Tears


  (Akwa-Tears)  2 drop  Q12HR


 BOTH EYES


   11/26/19 21:00


 12/24/19 20:59  12/11/19 20:31


 


 


 Baclofen


  (Lioresal)  10 mg  THREE TIMES A  DAY


 GT


   11/27/19 09:00


 12/24/19 17:59  12/11/19 17:31


 


 


 Calcitonin [place]


  (Miacalcin)  1 sprays  DAILY


 NASAL


   11/28/19 12:00


 12/28/19 11:59  12/11/19 08:30


 


 


 Chlorhexidine


 Gluconate


  (Elaine-Hex 2%)  1 applic  DAILY@2000


 TOPIC


   12/6/19 20:00


 1/5/20 19:59  12/11/19 20:30


 


 


 Clobetasol


 Propionate


  (Temovate)  1 applic  EVERY 12  HOURS


 TOPIC


   11/26/19 21:00


 12/24/19 20:59  12/11/19 20:30


 


 


 Dextrose


  (Dextrose 50%)  25 ml  Q30M  PRN


 IV


 Hypoglycemia  11/26/19 18:45


 12/24/19 14:44   


 


 


 Dextrose


  (Dextrose 50%)  50 ml  Q30M  PRN


 IV


 Hypoglycemia  11/26/19 18:45


 12/24/19 14:44   


 


 


 Famotidine


  (Pepcid)  20 mg  BID


 GT


   11/28/19 18:00


 12/28/19 17:59  12/11/19 17:33


 


 


 Fenofibrate


  (Tricor)  145 mg  DAILY


 ORAL


   11/27/19 09:00


 12/25/19 08:59  12/11/19 08:29


 


 


 Fluconazole


  (Diflucan)  400 mg  DAILY


 GT


   12/12/19 09:00


 12/16/19 13:46   


 


 


 Heparin Sodium/


 Dextrose  500 ml @ 


 45.552 mls/


 hr  ADJUST PER  PROTOCOL


 IV


   12/11/19 20:50


 1/10/20 10:39  12/12/19 08:01


 


 


 Insulin Aspart


  (NovoLOG)    EVERY 6  HOURS


 SUBQ


   11/30/19 12:00


 12/24/19 16:29  12/12/19 05:36


 


 


 Levetiracetam


  (Keppra)  1,000 mg  Q8H


 GT


   11/29/19 02:00


 12/29/19 01:59  12/12/19 01:09


 


 


 Minocycline HCl


  (Minocin)  100 mg  Q12HR@0600,1800


 ORAL


   12/11/19 06:00


 12/18/19 05:59  12/12/19 05:35


 


 


 Ondansetron HCl


  (Zofran)  4 mg  Q6H  PRN


 IVP


 Nausea & Vomiting  11/26/19 18:44


 12/26/19 18:43   


 


 


 Polyethylene


 Glycol


  (Miralax)  17 gm  DAILYPRN  PRN


 GT


 Constipation  11/26/19 18:44


 12/26/19 18:43   


 


 


 Sitagliptin


 Phosphate


  (Januvia)  50 mg  ACBREAKFAST


 GT


   11/27/19 06:30


 12/25/19 06:29  12/12/19 05:35


 


 


 Sodium Chloride  500 ml @ 


 30 mls/hr  ONCE  ONCE


 IV


   12/12/19 09:00


 12/13/19 01:39   


 


 


 Tamsulosin HCl


  (Flomax)  0.4 mg  DAILY


 ORAL


   11/27/19 09:00


 12/27/19 08:59  12/11/19 08:29


 


 


 Trimethoprim/


 Sulfamethoxazole


 20 ml/Dextrose  570 ml @ 


 380 mls/hr  [id number]-[id number]  BACTRIM


 IV


   12/9/19 16:00


 12/16/19 15:59  12/12/19 07:54


 





Laboratory Tests


12/11/19 20:19: Activated Partial Thromboplast Time 39H


12/12/19 03:05: 


Activated Partial Thromboplast Time 72H, White Blood Count 18.7H, Red Blood 

Count 3.16L, Hemoglobin 8.9L, Hematocrit 27.1L, Mean Corpuscular Volume 86, 

Mean Corpuscular Hemoglobin 28.2, Mean Corpuscular Hemoglobin Concent 32.9, Red 

Cell Distribution Width 16.3H, Platelet Count 329, Mean Platelet Volume 7.3, 

Neutrophils (%) (Auto) , Lymphocytes (%) (Auto) , Monocytes (%) (Auto) , 

Eosinophils (%) (Auto) , Basophils (%) (Auto) , Differential Total Cells 

Counted 100, Neutrophils % (Manual) 85H, Lymphocytes % (Manual) 7L, Monocytes % 

(Manual) 5, Eosinophils % (Manual) 3, Basophils % (Manual) 0, Band Neutrophils 0

, Platelet Estimate Adequate, Platelet Morphology Normal, Sodium Level 128L, 

Potassium Level 4.8, Chloride Level 95L, Carbon Dioxide Level 26, Anion Gap 7, 

Blood Urea Nitrogen 9, Creatinine 1.5H, Estimat Glomerular Filtration Rate 59.5

, Glucose Level 321#H, Calcium Level 9.9, Phosphorus Level 3.2, Magnesium Level 

2.2, Total Bilirubin 0.5, Aspartate Amino Transf (AST/SGOT) 24, Alanine 

Aminotransferase (ALT/SGPT) 19, Alkaline Phosphatase 118H, Total Protein 7.5, 

Albumin 2.1L, Globulin 5.4, Albumin/Globulin Ratio 0.4L


Height (Feet):  5


Height (Inches):  7.00


Weight (Pounds):  202


Objective


 exam stable


mayorga indwelling, yellow/christine urine, some debris





CT C/A/P (12/8) noted











Vinnie Pollard MD Dec 12, 2019 09:07

## 2019-12-13 VITALS — DIASTOLIC BLOOD PRESSURE: 78 MMHG | SYSTOLIC BLOOD PRESSURE: 121 MMHG

## 2019-12-13 VITALS — SYSTOLIC BLOOD PRESSURE: 130 MMHG | DIASTOLIC BLOOD PRESSURE: 60 MMHG

## 2019-12-13 VITALS — SYSTOLIC BLOOD PRESSURE: 118 MMHG | DIASTOLIC BLOOD PRESSURE: 76 MMHG

## 2019-12-13 VITALS — SYSTOLIC BLOOD PRESSURE: 121 MMHG | DIASTOLIC BLOOD PRESSURE: 69 MMHG

## 2019-12-13 VITALS — SYSTOLIC BLOOD PRESSURE: 118 MMHG | DIASTOLIC BLOOD PRESSURE: 69 MMHG

## 2019-12-13 VITALS — DIASTOLIC BLOOD PRESSURE: 68 MMHG | SYSTOLIC BLOOD PRESSURE: 131 MMHG

## 2019-12-13 VITALS — DIASTOLIC BLOOD PRESSURE: 78 MMHG | SYSTOLIC BLOOD PRESSURE: 122 MMHG

## 2019-12-13 LAB
ADD MANUAL DIFF: NO
ALBUMIN SERPL-MCNC: 2.2 G/DL (ref 3.4–5)
ALBUMIN/GLOB SERPL: 0.4 {RATIO} (ref 1–2.7)
ALP SERPL-CCNC: 132 U/L (ref 46–116)
ALT SERPL-CCNC: 23 U/L (ref 12–78)
ANION GAP SERPL CALC-SCNC: 8 MMOL/L (ref 5–15)
AST SERPL-CCNC: 29 U/L (ref 15–37)
BASOPHILS NFR BLD AUTO: 0.3 % (ref 0–2)
BILIRUB SERPL-MCNC: 0.4 MG/DL (ref 0.2–1)
BUN SERPL-MCNC: 13 MG/DL (ref 7–18)
CALCIUM SERPL-MCNC: 9.9 MG/DL (ref 8.5–10.1)
CHLORIDE SERPL-SCNC: 96 MMOL/L (ref 98–107)
CO2 SERPL-SCNC: 25 MMOL/L (ref 21–32)
CREAT SERPL-MCNC: 1.7 MG/DL (ref 0.55–1.3)
EOSINOPHIL NFR BLD AUTO: 4.1 % (ref 0–3)
ERYTHROCYTE [DISTWIDTH] IN BLOOD BY AUTOMATED COUNT: 16.9 % (ref 11.6–14.8)
GLOBULIN SER-MCNC: 5.9 G/DL
HCT VFR BLD CALC: 27.1 % (ref 42–52)
HGB BLD-MCNC: 8.7 G/DL (ref 14.2–18)
LYMPHOCYTES NFR BLD AUTO: 9.3 % (ref 20–45)
MCV RBC AUTO: 87 FL (ref 80–99)
MONOCYTES NFR BLD AUTO: 5.2 % (ref 1–10)
NEUTROPHILS NFR BLD AUTO: 81.1 % (ref 45–75)
PHOSPHATE SERPL-MCNC: 3.2 MG/DL (ref 2.5–4.9)
PLATELET # BLD: 375 K/UL (ref 150–450)
POTASSIUM SERPL-SCNC: 5.7 MMOL/L (ref 3.5–5.1)
RBC # BLD AUTO: 3.12 M/UL (ref 4.7–6.1)
SODIUM SERPL-SCNC: 129 MMOL/L (ref 136–145)
WBC # BLD AUTO: 13.6 K/UL (ref 4.8–10.8)

## 2019-12-13 RX ADMIN — FLUCONAZOLE SCH MG: 100 TABLET ORAL at 09:28

## 2019-12-13 RX ADMIN — SITAGLIPTIN SCH MG: 50 TABLET, FILM COATED ORAL at 05:44

## 2019-12-13 RX ADMIN — CLOBETASOL PROPIONATE SCH APPLIC: 0.5 OINTMENT TOPICAL at 20:35

## 2019-12-13 RX ADMIN — LEVETIRACETAM SCH MG: 100 SOLUTION ORAL at 09:28

## 2019-12-13 RX ADMIN — CLOBETASOL PROPIONATE SCH APPLIC: 0.5 OINTMENT TOPICAL at 09:34

## 2019-12-13 RX ADMIN — CHLORHEXIDINE GLUCONATE SCH APPLIC: 213 SOLUTION TOPICAL at 20:35

## 2019-12-13 RX ADMIN — MINOCYCLINE HYDROCHLORIDE SCH MG: 50 CAPSULE ORAL at 17:55

## 2019-12-13 RX ADMIN — SULFAMETHOXAZOLE AND TRIMETHOPRIM SCH MLS/HR: 80; 16 INJECTION, SOLUTION, CONCENTRATE INTRAVENOUS at 15:38

## 2019-12-13 RX ADMIN — INSULIN ASPART SCH UNITS: 100 INJECTION, SOLUTION INTRAVENOUS; SUBCUTANEOUS at 18:00

## 2019-12-13 RX ADMIN — LEVETIRACETAM SCH MG: 100 SOLUTION ORAL at 01:36

## 2019-12-13 RX ADMIN — LEVETIRACETAM SCH MG: 100 SOLUTION ORAL at 17:55

## 2019-12-13 RX ADMIN — ACETAMINOPHEN PRN MG: 160 SOLUTION ORAL at 04:35

## 2019-12-13 RX ADMIN — SULFAMETHOXAZOLE AND TRIMETHOPRIM SCH MLS/HR: 80; 16 INJECTION, SOLUTION, CONCENTRATE INTRAVENOUS at 07:50

## 2019-12-13 RX ADMIN — ACETAMINOPHEN PRN MG: 160 SOLUTION ORAL at 20:38

## 2019-12-13 RX ADMIN — MINOCYCLINE HYDROCHLORIDE SCH MG: 50 CAPSULE ORAL at 05:44

## 2019-12-13 RX ADMIN — APIXABAN SCH MG: 5 TABLET, FILM COATED ORAL at 09:29

## 2019-12-13 RX ADMIN — INSULIN ASPART SCH UNITS: 100 INJECTION, SOLUTION INTRAVENOUS; SUBCUTANEOUS at 23:09

## 2019-12-13 RX ADMIN — APIXABAN SCH MG: 5 TABLET, FILM COATED ORAL at 17:55

## 2019-12-13 RX ADMIN — INSULIN ASPART SCH UNITS: 100 INJECTION, SOLUTION INTRAVENOUS; SUBCUTANEOUS at 05:46

## 2019-12-13 RX ADMIN — SULFAMETHOXAZOLE AND TRIMETHOPRIM SCH MLS/HR: 80; 16 INJECTION, SOLUTION, CONCENTRATE INTRAVENOUS at 23:08

## 2019-12-13 RX ADMIN — DAPTOMYCIN SCH MLS/HR: 500 INJECTION, POWDER, LYOPHILIZED, FOR SOLUTION INTRAVENOUS at 18:04

## 2019-12-13 RX ADMIN — INSULIN ASPART SCH UNITS: 100 INJECTION, SOLUTION INTRAVENOUS; SUBCUTANEOUS at 12:49

## 2019-12-13 RX ADMIN — TAMSULOSIN HYDROCHLORIDE SCH MG: 0.4 CAPSULE ORAL at 09:29

## 2019-12-13 RX ADMIN — ACETAMINOPHEN PRN MG: 160 SOLUTION ORAL at 00:27

## 2019-12-13 NOTE — PULMONOLGY CRITICAL CARE NOTE
Critical Care - Asmt/Plan


Problems:  


(1) Recurrent fever


(2) Acute on chronic respiratory failure


(3) Pulmonary embolism


(4) Sepsis


(5) Gastrostomy tube dependent


(6) ATN (acute tubular necrosis)


(7) Colostomy in place


(8) Diabetes mellitus


(9) Vegetative state


Respiratory:  adjust tidal volume, monitor respiratory rate, adjust FIO2


Cardiac:  continue to monitor HR/BP


Renal:  F/U I&O, keep IV fluid, check electrolytes


Infectious Disease:  check cultures, continue antibiotics


Gastrointestinal:  continue feedings/current rate


Endocrine:  monitor blood sugar


Hematologic:  monitor H/H, transfuse if hgb<8.5


Neurologic:  PRN Ativan, keep patient comfortable


Affect:  PRN ativan


Prophylaxis:  Protonix


Notes Reviewed:  internist, cardio, ID


Discussed with:  nurses, consultants, 





Critical Care - Objective





Last 24 Hour Vital Signs








  Date Time  Temp Pulse Resp B/P (MAP) Pulse Ox O2 Delivery O2 Flow Rate FiO2


 


12/13/19 10:43  118 17     30


 


12/13/19 08:35  117 14     30


 


12/13/19 08:00      Mechanical Ventilator  


 


12/13/19 08:00 99.4 121 15 121/78 (92) 100   


 


12/13/19 08:00        30


 


12/13/19 06:54  129 15     30


 


12/13/19 05:05 99.0       


 


12/13/19 05:05 99.0       


 


12/13/19 04:55  118 16     30


 


12/13/19 04:00      Mechanical Ventilator  


 


12/13/19 04:00 99.6 121 18 130/60 (83) 100   


 


12/13/19 04:00        30


 


12/13/19 03:36  127      


 


12/13/19 02:39  115 15     30


 


12/13/19 01:04  113 14     30


 


12/13/19 00:57 100.2       


 


12/13/19 00:00 101.0 110 18 118/69 (85) 100   


 


12/13/19 00:00        30


 


12/13/19 00:00      Mechanical Ventilator  


 


12/12/19 23:37  109      


 


12/12/19 22:50  111 15     30


 


12/12/19 21:01  116 14     30


 


12/12/19 20:30 100.3       


 


12/12/19 20:00        30


 


12/12/19 20:00 102.6 119 16 114/60 (78) 100   


 


12/12/19 20:00      Mechanical Ventilator  


 


12/12/19 19:49  119      


 


12/12/19 19:00  119 17     30


 


12/12/19 17:54  116 14     30


 


12/12/19 16:00        30


 


12/12/19 16:00  120      


 


12/12/19 16:00 98.6 115 15 106/59 (75) 100   


 


12/12/19 16:00      Mechanical Ventilator  


 


12/12/19 15:43  124 16     30


 


12/12/19 13:58  119 14     30


 


12/12/19 12:00  112      


 


12/12/19 12:00 100.0 115 14 106/64 (78) 100   


 


12/12/19 12:00        30


 


12/12/19 12:00      Mechanical Ventilator  


 


12/12/19 11:50  114 15     30








Status:  obtunded


Condition:  critical


HEENT:  atraumatic


Neck:  full ROM


Lungs:  rales, rhonchi


Heart:  HR/BP stable


Abdomen:  soft, non-tender


Extremities:  no C/C/E


Micro:





Microbiology








 Date/Time


Source Procedure


Growth Status


 


 


 12/12/19 15:45


Urine,Clean Catch Urine Culture - Preliminary


NO GROWTH Resulted








Accucheck:  342





Critical Care - Subjective


ROS Limited/Unobtainable:  Yes


Condition:  critical


EKG Rhythm:  Sinus Rhythm


FI02:  30


Vent Support Breath Rate:  14


Vent Support Mode:  AC


Vent Tidal Volume:  600


Sputum Amount:  Small


PEEP:  5.0


PIP:  32


Tube Feeding Amount:  65


I&O:











Intake and Output  


 


 12/12/19 12/13/19





 19:00 07:00


 


Intake Total 2709.749 ml 1860.666 ml


 


Output Total 1400 ml 2550 ml


 


Balance 1309.749 ml -689.334 ml


 


  


 


Free Water 300 ml 100 ml


 


IV Total 1629.749 ml 980.666 ml


 


Tube Feeding 780 ml 780 ml


 


Output Urine Total 1100 ml 2400 ml


 


Stool Total 300 ml 150 ml








Labs:





Laboratory Tests








Test


  12/12/19


15:45 12/13/19


03:59


 


Urine Color Pale yellow   


 


Urine Appearance


  Slightly


cloudy 


 


 


Urine pH 8 (4.5-8.0)   


 


Urine Specific Gravity


  1.010


(1.005-1.035) 


 


 


Urine Protein


  3+ (NEGATIVE)


H 


 


 


Urine Glucose (UA)


  3+ (NEGATIVE)


H 


 


 


Urine Ketones


  Negative


(NEGATIVE) 


 


 


Urine Blood


  4+ (NEGATIVE)


H 


 


 


Urine Nitrite


  Negative


(NEGATIVE) 


 


 


Urine Bilirubin


  Negative


(NEGATIVE) 


 


 


Urine Urobilinogen


  Normal MG/DL


(0.0-1.0) 


 


 


Urine Leukocyte Esterase


  3+ (NEGATIVE)


H 


 


 


Urine RBC


  10-15 /HPF (0


- 0)  H 


 


 


Urine WBC


  30-40 /HPF (0


- 0)  H 


 


 


Urine Squamous Epithelial


Cells None /LPF


(NONE/OCC) 


 


 


Urine Bacteria


  Moderate /HPF


(NONE)  H 


 


 


White Blood Count


  


  13.6 K/UL


(4.8-10.8)  H


 


Red Blood Count


  


  3.12 M/UL


(4.70-6.10)  L


 


Hemoglobin


  


  8.7 G/DL


(14.2-18.0)  L


 


Hematocrit


  


  27.1 %


(42.0-52.0)  L


 


Mean Corpuscular Volume  87 FL (80-99)  


 


Mean Corpuscular Hemoglobin


  


  27.8 PG


(27.0-31.0)


 


Mean Corpuscular Hemoglobin


Concent 


  32.0 G/DL


(32.0-36.0)


 


Red Cell Distribution Width


  


  16.9 %


(11.6-14.8)  H


 


Platelet Count


  


  375 K/UL


(150-450)


 


Mean Platelet Volume


  


  7.1 FL


(6.5-10.1)


 


Neutrophils (%) (Auto)


  


  81.1 %


(45.0-75.0)  H


 


Lymphocytes (%) (Auto)


  


  9.3 %


(20.0-45.0)  L


 


Monocytes (%) (Auto)


  


  5.2 %


(1.0-10.0)


 


Eosinophils (%) (Auto)


  


  4.1 %


(0.0-3.0)  H


 


Basophils (%) (Auto)


  


  0.3 %


(0.0-2.0)


 


Activated Partial


Thromboplast Time 


  35 SEC (23-33)


H


 


Sodium Level


  


  129 MMOL/L


(136-145)  L


 


Potassium Level


  


  5.7 MMOL/L


(3.5-5.1)  H


 


Chloride Level


  


  96 MMOL/L


()  L


 


Carbon Dioxide Level


  


  25 MMOL/L


(21-32)


 


Anion Gap


  


  8 mmol/L


(5-15)


 


Blood Urea Nitrogen


  


  13 mg/dL


(7-18)


 


Creatinine


  


  1.7 MG/DL


(0.55-1.30)  H


 


Estimat Glomerular Filtration


Rate 


  51.5 mL/min


(>60)


 


Glucose Level


  


  333 MG/DL


()  H


 


Uric Acid


  


  3.9 MG/DL


(2.6-7.2)


 


Calcium Level


  


  9.9 MG/DL


(8.5-10.1)


 


Phosphorus Level


  


  3.2 MG/DL


(2.5-4.9)


 


Magnesium Level


  


  2.2 MG/DL


(1.8-2.4)


 


Total Bilirubin


  


  0.4 MG/DL


(0.2-1.0)


 


Aspartate Amino Transf


(AST/SGOT) 


  29 U/L (15-37)


 


 


Alanine Aminotransferase


(ALT/SGPT) 


  23 U/L (12-78)


 


 


Alkaline Phosphatase


  


  132 U/L


()  H


 


C-Reactive Protein,


Quantitative 


  35.3 mg/dL


(0.00-0.90)  H


 


Pro-B-Type Natriuretic Peptide


  


  138 pg/mL


(0-125)  H


 


Total Protein


  


  8.1 G/DL


(6.4-8.2)


 


Albumin


  


  2.2 G/DL


(3.4-5.0)  L


 


Globulin  5.9 g/dL  


 


Albumin/Globulin Ratio


  


  0.4 (1.0-2.7)


L

















Yury Pena MD Dec 13, 2019 11:11

## 2019-12-13 NOTE — PROGRESS NOTE
DATE:  12/13/2019

SUBJECTIVE:  _____



PHYSICAL EXAMINATION:

VITAL SIGNS:  Blood pressure is 108/59, pulse is 100, respirations of 18,

and temperature was 98.

HEENT:  Eyes were normal.  ENT, mucous membranes were moist and intact.

NECK:  Supple with no JVD without lymph nodes.  Tracheostomy site is

clean.

LUNGS:  Clear without rhonchi, rales, or wheezing.  Secretions are small,

thin, and gray.

HEART:  Normal sounds with regular beats.  There is no S3, S4, or

pericardial rub.

ABDOMEN:  Soft and nontender with normal bowel sounds.  Gastrostomy site is

clean.

EXTREMITIES:  Warm without cyanosis, clubbing, or edema.



LABORATORY DATA:  His hemoglobin is 8.9, hematocrit 27.9 with MCV of 86,

WBC of 18.7, and platelets of 329,000.  BUN and creatinine are 9 and 1.5

respectively.  Sodium is 128, potassium 4.8, chloride 95, and CO2 is 29.



IMPRESSION:  The patient has hyponatremia _____.  Repeat laboratory tests

will be done in the a.m.  We will continue with the same regimen _____.









  ______________________________________________

  Etienne Bloom M.D.





DR:  NED

D:  12/13/2019 01:46

T:  12/13/2019 12:05

JOB#:  [id number]

CC:

## 2019-12-13 NOTE — UROLOGY PROGRESS NOTE
Assessment/Plan


Status:  stable


Assessment/Plan:


1. Urinary retention.


2. BPH.


3. Neurogenic bladder.


4. Incontinence.


5. Pyuria/UTI/colonized.


6. Hematuria.


7. Proteinuria.


8. Renal insufficiency, acute possibly on chronic.


9. Renal cyst.


10. Nephrolithiasis.


11. Renal fullness.


12. Bladder calcifications.


13. POD # 16, cysto/optic urethrotomy.





monitor clinically


mayorga placed 11/27


hand irrigated and do PRN


abx as ordered, pr ID


diflucan added


monitor WBC


f/u on last blood cx


renal fxn stable





Subjective


Allergies:  


Coded Allergies:  


     AZTREONAM (Verified  Allergy, Unknown, 7/23/18)


Subjective


all noted, mayorga draining, non-verbal





Objective





Last 24 Hour Vital Signs








  Date Time  Temp Pulse Resp B/P (MAP) Pulse Ox O2 Delivery O2 Flow Rate FiO2


 


12/13/19 08:35  117 14     30


 


12/13/19 06:54  129 15     30


 


12/13/19 05:05 99.0       


 


12/13/19 05:05 99.0       


 


12/13/19 04:55  118 16     30


 


12/13/19 04:00      Mechanical Ventilator  


 


12/13/19 04:00 99.6 121 18 130/60 (83) 100   


 


12/13/19 04:00        30


 


12/13/19 03:36  127      


 


12/13/19 02:39  115 15     30


 


12/13/19 01:04  113 14     30


 


12/13/19 00:57 100.2       


 


12/13/19 00:00 101.0 110 18 118/69 (85) 100   


 


12/13/19 00:00        30


 


12/13/19 00:00      Mechanical Ventilator  


 


12/12/19 23:37  109      


 


12/12/19 22:50  111 15     30


 


12/12/19 21:01  116 14     30


 


12/12/19 20:30 100.3       


 


12/12/19 20:00        30


 


12/12/19 20:00 102.6 119 16 114/60 (78) 100   


 


12/12/19 20:00      Mechanical Ventilator  


 


12/12/19 19:49  119      


 


12/12/19 19:00  119 17     30


 


12/12/19 17:54  116 14     30


 


12/12/19 16:00        30


 


12/12/19 16:00  120      


 


12/12/19 16:00 98.6 115 15 106/59 (75) 100   


 


12/12/19 16:00      Mechanical Ventilator  


 


12/12/19 15:43  124 16     30


 


12/12/19 13:58  119 14     30


 


12/12/19 12:00  112      


 


12/12/19 12:00 100.0 115 14 106/64 (78) 100   


 


12/12/19 12:00        30


 


12/12/19 12:00      Mechanical Ventilator  


 


12/12/19 11:50  114 15     30


 


12/12/19 09:45  122 15     30

















Intake and Output  


 


 12/12/19 12/13/19





 19:00 07:00


 


Intake Total 2709.749 ml 1860.666 ml


 


Output Total 1400 ml 2550 ml


 


Balance 1309.749 ml -689.334 ml


 


  


 


Free Water 300 ml 100 ml


 


IV Total 1629.749 ml 980.666 ml


 


Tube Feeding 780 ml 780 ml


 


Output Urine Total 1100 ml 2400 ml


 


Stool Total 300 ml 150 ml











Microbiology








 Date/Time


Source Procedure


Growth Status


 


 


 12/9/19 23:30


Blood Blood Culture - Preliminary


NO GROWTH AFTER 72 HOURS Resulted





 11/25/19 11:00


Nasal Nares - Final Complete


 


 11/25/19 11:00


Nasal Nares - Final Complete


 


 12/5/19 19:00


Stool Clostridium difficile Toxin Assay - Final Complete


 


 12/5/19 19:00


Urine,Clean Catch Urine Culture - Final


Candida Parapsilosis Complete


 


 11/24/19 09:10


Rectum VRE Culture - Final


Enterococcus Faecium - Vre Complete








Current Medications








 Medications


  (Trade)  Dose


 Ordered  Sig/Jan


 Route


 PRN Reason  Start Time


 Stop Time Status Last Admin


Dose Admin


 


 Acetaminophen


  (Tylenol)  650 mg  Q4H  PRN


 GT


 Mild Pain/Temp > 100.6  11/26/19 18:42


 12/26/19 18:41  12/13/19 04:35


 


 


 Apixaban


  (Eliquis)  5 mg  BID


 GT


   12/12/19 18:02


 1/11/20 18:01  12/12/19 18:18


 


 


 Artificial Tears


  (Akwa-Tears)  2 drop  Q12HR


 BOTH EYES


   11/26/19 21:00


 12/24/19 20:59  12/12/19 20:00


 


 


 Baclofen


  (Lioresal)  10 mg  THREE TIMES A  DAY


 GT


   11/27/19 09:00


 12/24/19 17:59  12/12/19 17:28


 


 


 Calcitonin [place]


  (Miacalcin)  1 sprays  DAILY


 NASAL


   11/28/19 12:00


 12/28/19 11:59  12/12/19 09:30


 


 


 Chlorhexidine


 Gluconate


  (Elaine-Hex 2%)  1 applic  DAILY@2000


 TOPIC


   12/6/19 20:00


 1/5/20 19:59  12/12/19 19:59


 


 


 Clobetasol


 Propionate


  (Temovate)  1 applic  EVERY 12  HOURS


 TOPIC


   11/26/19 21:00


 12/24/19 20:59  12/12/19 20:00


 


 


 Dextrose


  (Dextrose 50%)  25 ml  Q30M  PRN


 IV


 Hypoglycemia  11/26/19 18:45


 12/24/19 14:44   


 


 


 Dextrose


  (Dextrose 50%)  50 ml  Q30M  PRN


 IV


 Hypoglycemia  11/26/19 18:45


 12/24/19 14:44   


 


 


 Famotidine


  (Pepcid)  20 mg  BID


 GT


   11/28/19 18:00


 12/28/19 17:59  12/12/19 17:28


 


 


 Fenofibrate


  (Tricor)  145 mg  DAILY


 ORAL


   11/27/19 09:00


 12/25/19 08:59  12/12/19 09:31


 


 


 Fluconazole


  (Diflucan)  400 mg  DAILY


 GT


   12/12/19 09:00


 12/16/19 13:46  12/12/19 09:30


 


 


 Insulin Aspart


  (NovoLOG)    EVERY 6  HOURS


 SUBQ


   11/30/19 12:00


 12/24/19 16:29  12/13/19 05:46


 


 


 Levetiracetam


  (Keppra)  1,000 mg  Q8H


 GT


   11/29/19 02:00


 12/29/19 01:59  12/13/19 01:36


 


 


 Minocycline HCl


  (Minocin)  100 mg  Q12HR@0600,1800


 ORAL


   12/11/19 06:00


 12/18/19 05:59  12/13/19 05:44


 


 


 Ondansetron HCl


  (Zofran)  4 mg  Q6H  PRN


 IVP


 Nausea & Vomiting  11/26/19 18:44


 12/26/19 18:43   


 


 


 Polyethylene


 Glycol


  (Miralax)  17 gm  DAILYPRN  PRN


 GT


 Constipation  11/26/19 18:44


 12/26/19 18:43   


 


 


 Sitagliptin


 Phosphate


  (Januvia)  50 mg  ACBREAKFAST


 GT


   11/27/19 06:30


 12/25/19 06:29  12/13/19 05:44


 


 


 Sodium


 Polystyrene


 Sulfonate


  (Kayexalate)  45 gm  ONCE


 GT


   12/13/19 08:15


 12/13/19 10:15   


 


 


 Sodium Chloride  1,000 ml @ 


 100 mls/hr  Q10H


 IV


   12/13/19 08:15


 1/12/20 08:14   


 


 


 Tamsulosin HCl


  (Flomax)  0.4 mg  DAILY


 ORAL


   11/27/19 09:00


 12/27/19 08:59  12/12/19 09:31


 


 


 Trimethoprim/


 Sulfamethoxazole


 28 ml/Dextrose  578 ml @ 


 385.333


 mls/hr  L2CO-AB  BACTRIM


 IV


   12/12/19 16:00


 12/19/19 15:59  12/13/19 07:50


 





Laboratory Tests


12/12/19 15:45: 


Urine Color Pale yellow, Urine Appearance Slightly cloudy, Urine pH 8, Urine 

Specific Gravity 1.010, Urine Protein 3+H, Urine Glucose (UA) 3+H, Urine 

Ketones Negative, Urine Blood 4+H, Urine Nitrite Negative, Urine Bilirubin 

Negative, Urine Urobilinogen Normal, Urine Leukocyte Esterase 3+H, Urine RBC 10-

15H, Urine WBC 30-40H, Urine Squamous Epithelial Cells None, Urine Bacteria 

ModerateH


12/13/19 03:59: 


White Blood Count 13.6H, Red Blood Count 3.12L, Hemoglobin 8.7L, Hematocrit 

27.1L, Mean Corpuscular Volume 87, Mean Corpuscular Hemoglobin 27.8, Mean 

Corpuscular Hemoglobin Concent 32.0, Red Cell Distribution Width 16.9H, 

Platelet Count 375, Mean Platelet Volume 7.1, Neutrophils (%) (Auto) 81.1H, 

Lymphocytes (%) (Auto) 9.3L, Monocytes (%) (Auto) 5.2, Eosinophils (%) (Auto) 

4.1H, Basophils (%) (Auto) 0.3, Activated Partial Thromboplast Time [Pending], 

Sodium Level 129L, Potassium Level 5.7H, Chloride Level 96L, Carbon Dioxide 

Level 25, Anion Gap 8, Blood Urea Nitrogen 13, Creatinine 1.7H, Estimat 

Glomerular Filtration Rate 51.5, Glucose Level 333H, Uric Acid 3.9, Calcium 

Level 9.9, Phosphorus Level 3.2, Magnesium Level 2.2, Total Bilirubin 0.4, 

Aspartate Amino Transf (AST/SGOT) 29, Alanine Aminotransferase (ALT/SGPT) 23, 

Alkaline Phosphatase 132H, C-Reactive Protein, Quantitative 35.3H, Pro-B-Type 

Natriuretic Peptide 138H, Total Protein 8.1, Albumin 2.2L, Globulin 5.9, Albumin

/Globulin Ratio 0.4L


Height (Feet):  5


Height (Inches):  7.00


Weight (Pounds):  201


Objective


 exam stable


mayorga indwelling, yellow/christine urine, some debris





CT C/A/P (12/8) noted











Vinnie Pollard MD Dec 13, 2019 09:14

## 2019-12-13 NOTE — PROGRESS NOTE
DATE:  12/13/2019

SUBJECTIVE:  The patient is afebrile and hemodynamically stable but with

persistent sinus tachycardia.



PHYSICAL EXAMINATION:

VITAL SIGNS:  Blood pressure 127/78, pulse 117, respirations 15,

temperature 98.1.

HEENT:  Eyes were normal.  ENT, mucous membranes were moist and intact.

NECK:  Supple with no JVD without lymph nodes.  Tracheostomy site is

clean.

LUNGS:  Clear without rhonchi, rales, or wheezing.  Secretions are small,

thin, and gray.

HEART:  Normal sounds with regular beats.  There is no S3, S4, or

pericardial rub.

ABDOMEN:  Soft and nontender with normal bowel sounds.  Gastrostomy site is

clean.

EXTREMITIES:  Warm without cyanosis, clubbing, or edema.



LABORATORY AND DIAGNOSTIC DATA:  Hemoglobin 8.7, hematocrit 27.1 with MCV

of 87, WBC of 13.6, and platelets is 375.  BUN and creatinine is 13 and

1.7 respectively.  His sodium is 129, potassium 5.7, chloride 96, CO2 is

25.  Uric acid is 3.9 and calcium is 9.9.  CRP was 35.  ProBNP was 138.

Albumin is 2.1.  Total protein is 5.9.   Blood culture taken on _____

resistant.  The patient was on Bactrim DS twice a day.  He has been placed

on daptomycin _____ mg IV piggyback every 24 hours.  In addition, the

pulmonary specialist talked today to the patient's wife and the patient

was placed on DNR status.  Repeat laboratory tests will be done in the

morning.









  ______________________________________________

  Etienne Bloom M.D.





DR:  Reji

D:  12/13/2019 18:12

T:  12/13/2019 21:16

JOB#:  [id number]

CC:

## 2019-12-13 NOTE — DIAGNOSTIC IMAGING REPORT
Indication: Dyspnea

 

Technique: One view of the chest

 

Comparison: 12/6/2019 post PICC radiograph

 

Findings: Retrocardiac consolidation persists. Likely small left pleural effusion is

unchanged. Left arm PICC, tracheostomy remains.

 

Impression:  Unchanged, over 7 days, findings as above.

## 2019-12-13 NOTE — PROGRESS NOTE
DATE:  12/12/2019

CARDIOLOGY PROGRESS NOTE



SUBJECTIVE:  The patient on ventilator support and poorly responsive.  At

baseline, worsening electrolyte abnormalities noted.  Monitor, sinus

tachycardia and sinus rhythm.



OBJECTIVE:

HEENT:  Thin secretions.



_____ trach.

CARDIAC:  Regular rhythm and rate.  Normal S1, S2.

LUNGS:  Bilateral breath sounds with few rhonchi.

ABDOMEN:  Soft with G-tube intact.

EXTREMITIES:  1+ dependent edema.



IMPRESSION:

1. Respiratory failure.

2. Pulmonary embolus.

3. Secondary sinus tachycardia.

4. Sepsis with recovered shock.

5. Hyponatremia.

6. Acute on chronic renal failure.

7. Severe protein-calorie malnutrition.



PLAN:

1. Cautious use of 3% saline.

2. Ventilator support.

3. Antimicrobials.

4. Full anticoagulation.

5. Replace electrolytes including magnesium _____ laboratory

parameters.









  ______________________________________________

  Robert Chan M.D.





DR:  JOSE MARIA

D:  12/13/2019 02:24

T:  12/13/2019 03:14

JOB#:  [id number]

CC:

## 2019-12-13 NOTE — NEPHROLOGY PROGRESS NOTE
Assessment/Plan


Problem List:  


(1) Renal failure (ARF), acute on chronic


Assessment:  Cr rising and hyperKalemia





(2) Urinary retention


(3) Acute on chronic respiratory failure


(4) Hypercalcemia


Assessment





Hypercalcemia


Urinary retention, BPH , 


Acute on chronic renal failure


Electrolyte imbalance 


Colostomy


DM


PEG


Vegetative state


Acute on chronic respiratory failure


Plan





3% Saline and NS


kayexelate


need to avoid nephrotoxics as possible


urine studies





K and Phos and Mag supplements as needed


pulm support


Aredia for high Ca on 11/28 redose today 12/3





Subjective


ROS Limited/Unobtainable:  Yes





Objective


Objective





Last 24 Hour Vital Signs








  Date Time  Temp Pulse Resp B/P (MAP) Pulse Ox O2 Delivery O2 Flow Rate FiO2


 


12/13/19 10:43  118 17     30


 


12/13/19 08:35  117 14     30


 


12/13/19 08:00      Mechanical Ventilator  


 


12/13/19 08:00 99.4 121 15 121/78 (92) 100   


 


12/13/19 08:00        30


 


12/13/19 06:54  129 15     30


 


12/13/19 05:05 99.0       


 


12/13/19 05:05 99.0       


 


12/13/19 04:55  118 16     30


 


12/13/19 04:00      Mechanical Ventilator  


 


12/13/19 04:00 99.6 121 18 130/60 (83) 100   


 


12/13/19 04:00        30


 


12/13/19 03:36  127      


 


12/13/19 02:39  115 15     30


 


12/13/19 01:04  113 14     30


 


12/13/19 00:57 100.2       


 


12/13/19 00:00 101.0 110 18 118/69 (85) 100   


 


12/13/19 00:00        30


 


12/13/19 00:00      Mechanical Ventilator  


 


12/12/19 23:37  109      


 


12/12/19 22:50  111 15     30


 


12/12/19 21:01  116 14     30


 


12/12/19 20:30 100.3       


 


12/12/19 20:00        30


 


12/12/19 20:00 102.6 119 16 114/60 (78) 100   


 


12/12/19 20:00      Mechanical Ventilator  


 


12/12/19 19:49  119      


 


12/12/19 19:00  119 17     30


 


12/12/19 17:54  116 14     30


 


12/12/19 16:00        30


 


12/12/19 16:00  120      


 


12/12/19 16:00 98.6 115 15 106/59 (75) 100   


 


12/12/19 16:00      Mechanical Ventilator  


 


12/12/19 15:43  124 16     30


 


12/12/19 13:58  119 14     30

















Intake and Output  


 


 12/12/19 12/13/19





 19:00 07:00


 


Intake Total 2709.749 ml 1860.666 ml


 


Output Total 1400 ml 2550 ml


 


Balance 1309.749 ml -689.334 ml


 


  


 


Free Water 300 ml 100 ml


 


IV Total 1629.749 ml 980.666 ml


 


Tube Feeding 780 ml 780 ml


 


Output Urine Total 1100 ml 2400 ml


 


Stool Total 300 ml 150 ml








Laboratory Tests


12/12/19 15:45: 


Urine Color Pale yellow, Urine Appearance Slightly cloudy, Urine pH 8, Urine 

Specific Gravity 1.010, Urine Protein 3+H, Urine Glucose (UA) 3+H, Urine 

Ketones Negative, Urine Blood 4+H, Urine Nitrite Negative, Urine Bilirubin 

Negative, Urine Urobilinogen Normal, Urine Leukocyte Esterase 3+H, Urine RBC 10-

15H, Urine WBC 30-40H, Urine Squamous Epithelial Cells None, Urine Bacteria 

ModerateH


12/13/19 03:59: 


White Blood Count 13.6H, Red Blood Count 3.12L, Hemoglobin 8.7L, Hematocrit 

27.1L, Mean Corpuscular Volume 87, Mean Corpuscular Hemoglobin 27.8, Mean 

Corpuscular Hemoglobin Concent 32.0, Red Cell Distribution Width 16.9H, 

Platelet Count 375, Mean Platelet Volume 7.1, Neutrophils (%) (Auto) 81.1H, 

Lymphocytes (%) (Auto) 9.3L, Monocytes (%) (Auto) 5.2, Eosinophils (%) (Auto) 

4.1H, Basophils (%) (Auto) 0.3, Activated Partial Thromboplast Time 35H, Sodium 

Level 129L, Potassium Level 5.7H, Chloride Level 96L, Carbon Dioxide Level 25, 

Anion Gap 8, Blood Urea Nitrogen 13, Creatinine 1.7H, Estimat Glomerular 

Filtration Rate 51.5, Glucose Level 333H, Uric Acid 3.9, Calcium Level 9.9, 

Phosphorus Level 3.2, Magnesium Level 2.2, Total Bilirubin 0.4, Aspartate Amino 

Transf (AST/SGOT) 29, Alanine Aminotransferase (ALT/SGPT) 23, Alkaline 

Phosphatase 132H, C-Reactive Protein, Quantitative 35.3H, Pro-B-Type 

Natriuretic Peptide 138H, Total Protein 8.1, Albumin 2.2L, Globulin 5.9, Albumin

/Globulin Ratio 0.4L


Height (Feet):  5


Height (Inches):  7.00


Weight (Pounds):  201


General Appearance:  no apparent distress


EENT:  other


Neck:  other - trach


Cardiovascular:  tachycardia


Respiratory/Chest:  decreased breath sounds


Abdomen:  distended


Objective


no change











Simone Rocha MD Dec 13, 2019 13:02

## 2019-12-13 NOTE — PROGRESS NOTE
DATE:  12/12/2019

SUBJECTIVE:  The patient is awake, alert, afebrile, and hemodynamically

stable.



PHYSICAL EXAMINATION:

VITAL SIGNS:  Blood pressure is 118/69, pulse is 110, respirations of 20,

and temperature is 98.1.

HEENT:  Eyes were normal.  ENT, mucous membranes were moist and intact.

NECK:  Supple with no JVD without lymph nodes.  Tracheostomy site is

clean.

LUNGS:  Clear without rhonchi, rales, or wheezing.  Secretions are small,

thin, and grey.

CARDIAC:  Normal sounds with regular beats.  There is no S3, S4, or

pericardial rub.

ABDOMEN:  Soft and nontender with normal bowel sounds.  Gastrostomy site is

clean.

EXTREMITIES:  Warm without cyanosis, clubbing, or edema.



IMPRESSION:  _____.  Repeat laboratory tests will be done in the

a.m.









  ______________________________________________

  Etienne Bloom M.D.





DR:  SHAMA

D:  12/13/2019 01:41

T:  12/13/2019 03:24

JOB#:  [id number]

CC:

## 2019-12-14 VITALS — SYSTOLIC BLOOD PRESSURE: 124 MMHG | DIASTOLIC BLOOD PRESSURE: 68 MMHG

## 2019-12-14 VITALS — SYSTOLIC BLOOD PRESSURE: 138 MMHG | DIASTOLIC BLOOD PRESSURE: 80 MMHG

## 2019-12-14 VITALS — SYSTOLIC BLOOD PRESSURE: 136 MMHG | DIASTOLIC BLOOD PRESSURE: 77 MMHG

## 2019-12-14 VITALS — SYSTOLIC BLOOD PRESSURE: 126 MMHG | DIASTOLIC BLOOD PRESSURE: 73 MMHG

## 2019-12-14 VITALS — SYSTOLIC BLOOD PRESSURE: 129 MMHG | DIASTOLIC BLOOD PRESSURE: 77 MMHG

## 2019-12-14 LAB — CK SERPL-CCNC: 183 U/L (ref 26–308)

## 2019-12-14 RX ADMIN — SITAGLIPTIN SCH MG: 50 TABLET, FILM COATED ORAL at 05:31

## 2019-12-14 RX ADMIN — LEVETIRACETAM SCH MG: 100 SOLUTION ORAL at 18:17

## 2019-12-14 RX ADMIN — CHLORHEXIDINE GLUCONATE SCH APPLIC: 213 SOLUTION TOPICAL at 20:36

## 2019-12-14 RX ADMIN — LEVETIRACETAM SCH MG: 100 SOLUTION ORAL at 10:11

## 2019-12-14 RX ADMIN — FLUCONAZOLE SCH MG: 100 TABLET ORAL at 08:39

## 2019-12-14 RX ADMIN — MINOCYCLINE HYDROCHLORIDE SCH MG: 50 CAPSULE ORAL at 05:31

## 2019-12-14 RX ADMIN — CLOBETASOL PROPIONATE SCH APPLIC: 0.5 OINTMENT TOPICAL at 08:41

## 2019-12-14 RX ADMIN — CLOBETASOL PROPIONATE SCH APPLIC: 0.5 OINTMENT TOPICAL at 20:37

## 2019-12-14 RX ADMIN — MINOCYCLINE HYDROCHLORIDE SCH MG: 50 CAPSULE ORAL at 18:18

## 2019-12-14 RX ADMIN — SULFAMETHOXAZOLE AND TRIMETHOPRIM SCH MLS/HR: 80; 16 INJECTION, SOLUTION, CONCENTRATE INTRAVENOUS at 15:52

## 2019-12-14 RX ADMIN — INSULIN ASPART SCH UNITS: 100 INJECTION, SOLUTION INTRAVENOUS; SUBCUTANEOUS at 18:19

## 2019-12-14 RX ADMIN — APIXABAN SCH MG: 5 TABLET, FILM COATED ORAL at 08:39

## 2019-12-14 RX ADMIN — INSULIN ASPART SCH UNITS: 100 INJECTION, SOLUTION INTRAVENOUS; SUBCUTANEOUS at 05:32

## 2019-12-14 RX ADMIN — INSULIN ASPART SCH UNITS: 100 INJECTION, SOLUTION INTRAVENOUS; SUBCUTANEOUS at 12:38

## 2019-12-14 RX ADMIN — TAMSULOSIN HYDROCHLORIDE SCH MG: 0.4 CAPSULE ORAL at 08:39

## 2019-12-14 RX ADMIN — DAPTOMYCIN SCH MLS/HR: 500 INJECTION, POWDER, LYOPHILIZED, FOR SOLUTION INTRAVENOUS at 18:17

## 2019-12-14 RX ADMIN — LEVETIRACETAM SCH MG: 100 SOLUTION ORAL at 01:16

## 2019-12-14 RX ADMIN — APIXABAN SCH MG: 5 TABLET, FILM COATED ORAL at 18:18

## 2019-12-14 RX ADMIN — SULFAMETHOXAZOLE AND TRIMETHOPRIM SCH MLS/HR: 80; 16 INJECTION, SOLUTION, CONCENTRATE INTRAVENOUS at 08:35

## 2019-12-14 NOTE — PULMONOLGY CRITICAL CARE NOTE
Critical Care - Asmt/Plan


Problems:  


(1) Recurrent fever


(2) Acute on chronic respiratory failure


(3) Pulmonary embolism


(4) Sepsis


(5) Gastrostomy tube dependent


(6) ATN (acute tubular necrosis)


(7) Colostomy in place


(8) Diabetes mellitus


(9) Vegetative state


Respiratory:  monitor respiratory rate, adjust FIO2, CXR


Cardiac:  continue to monitor HR/BP


Renal:  F/U I&O, keep IV fluid, check electrolytes


Infectious Disease:  check cultures


Gastrointestinal:  continue feedings/current rate


Endocrine:  monitor blood sugar, continue sliding scale insulin


Hematologic:  monitor H/H


Neurologic:  PRN Ativan, keep patient comfortable


Prophylaxis:  Protonix


Notes Reviewed:  internist


Discussed with:  nurses, consultants, 





Critical Care - Objective





Last 24 Hour Vital Signs








  Date Time  Temp Pulse Resp B/P (MAP) Pulse Ox O2 Delivery O2 Flow Rate FiO2


 


12/14/19 05:03  112 14     30


 


12/14/19 04:00        30


 


12/14/19 04:00 97.9 116 18 138/80 (99) 100   


 


12/14/19 04:00      Mechanical Ventilator  


 


12/14/19 03:33 97.9 116 18 138/80 (99) 100   


 


12/14/19 03:33  115      


 


12/14/19 02:38  113 15     30


 


12/14/19 01:16  119 15     30


 


12/14/19 00:00        30


 


12/14/19 00:00      Mechanical Ventilator  


 


12/13/19 23:57 98.9 112 16 121/69 (86) 100   


 


12/13/19 23:35  102      


 


12/13/19 23:22  102 16     30


 


12/13/19 21:24  107 15     30


 


12/13/19 21:08 100.1       


 


12/13/19 21:08 100.1       


 


12/13/19 20:00        30


 


12/13/19 20:00      Mechanical Ventilator  


 


12/13/19 20:00 101.7 115 16 131/68 (89) 100   


 


12/13/19 19:35  111 15     30


 


12/13/19 19:32  111      


 


12/13/19 16:44  107 14     30


 


12/13/19 16:00        30


 


12/13/19 16:00      Mechanical Ventilator  


 


12/13/19 16:00  110      


 


12/13/19 16:00 99.1 109 16 118/76 (90) 100   


 


12/13/19 14:55  113 19     30


 


12/13/19 13:04  113 15     30


 


12/13/19 12:00      Mechanical Ventilator  


 


12/13/19 12:00 98.1 117 15 122/78 (93) 100   


 


12/13/19 12:00        30


 


12/13/19 11:40  116      


 


12/13/19 10:43  118 17     30


 


12/13/19 08:35  117 14     30


 


12/13/19 08:00      Mechanical Ventilator  


 


12/13/19 08:00 99.4 121 15 121/78 (92) 100   


 


12/13/19 08:00        30


 


12/13/19 08:00  118      


 


12/13/19 06:54  129 15     30








Status:  obtunded


Condition:  critical


HEENT:  atraumatic


Neck:  full ROM


Lungs:  rales, rhonchi


Heart:  HR/BP stable


Abdomen:  soft, feeding tube


Extremities:  edema


Decubiti:  location


Micro:





Microbiology








 Date/Time


Source Procedure


Growth Status


 


 


 12/12/19 18:30


Blood Blood Culture - Preliminary


NO GROWTH AFTER 24 HOURS Resulted


 


 12/12/19 18:20


Blood Blood Culture - Preliminary


Gram Negative Clif Resulted


 


 12/12/19 15:45


Urine,Clean Catch Urine Culture - Final


Candida Parapsilosis Complete








Accucheck:  235





Critical Care - Subjective


ROS Limited/Unobtainable:  Yes


Condition:  critical


EKG Rhythm:  Sinus Rhythm


FI02:  30


Vent Support Breath Rate:  14


Vent Support Mode:  AC


Vent Tidal Volume:  600


Sputum Amount:  None


PEEP:  5.0


PIP:  32


Tube Feeding Amount:  65


I&O:











Intake and Output  


 


 12/13/19 12/14/19





 19:00 07:00


 


Intake Total 3721.332 ml 2532.666 ml


 


Output Total 3350 ml 3750 ml


 


Balance 371.332 ml -1217.334 ml


 


  


 


Free Water 300 ml 100 ml


 


IV Total 2641.332 ml 1717.666 ml


 


Tube Feeding 780 ml 715 ml


 


Output Urine Total 2850 ml 3450 ml


 


Stool Total 500 ml 300 ml








Labs:





Laboratory Tests








Test


  12/14/19


03:57


 


Total Creatine Kinase


  183 U/L


()

















Yury Pena MD Dec 14, 2019 06:46

## 2019-12-14 NOTE — NEPHROLOGY PROGRESS NOTE
Assessment/Plan


Problem List:  


(1) Renal failure (ARF), acute on chronic


Assessment:  Cr rising and hyperKalemia





(2) Urinary retention


(3) Acute on chronic respiratory failure


(4) Hypercalcemia


Assessment





Hypercalcemia


Urinary retention, BPH , 


Acute on chronic renal failure


Electrolyte imbalance 


Colostomy


DM


PEG


Vegetative state


Acute on chronic respiratory failure


Plan


no labs today-


 as needed 3% Saline and NS


kayexelate as needed


need to avoid nephrotoxics as possible


urine studies





K and Phos and Mag supplements as needed


pulm support


Aredia for high Ca on 11/28 redose today 12/3





Subjective


ROS Limited/Unobtainable:  Yes





Objective


Objective





Last 24 Hour Vital Signs








  Date Time  Temp Pulse Resp B/P (MAP) Pulse Ox O2 Delivery O2 Flow Rate FiO2


 


12/14/19 10:46  99 16     30


 


12/14/19 08:56  112 14     30


 


12/14/19 08:39  112  136/77    


 


12/14/19 08:00        30


 


12/14/19 08:00 97.0 112 15 136/77 (96) 100   


 


12/14/19 08:00      Mechanical Ventilator  


 


12/14/19 07:58  121      


 


12/14/19 06:54  116 14     30


 


12/14/19 05:03  112 14     30


 


12/14/19 04:00        30


 


12/14/19 04:00 97.9 116 18 138/80 (99) 100   


 


12/14/19 04:00      Mechanical Ventilator  


 


12/14/19 03:33 97.9 116 18 138/80 (99) 100   


 


12/14/19 03:33  115      


 


12/14/19 02:38  113 15     30


 


12/14/19 01:16  119 15     30


 


12/14/19 00:00        30


 


12/14/19 00:00      Mechanical Ventilator  


 


12/13/19 23:57 98.9 112 16 121/69 (86) 100   


 


12/13/19 23:35  102      


 


12/13/19 23:22  102 16     30


 


12/13/19 21:24  107 15     30


 


12/13/19 21:08 100.1       


 


12/13/19 21:08 100.1       


 


12/13/19 20:00        30


 


12/13/19 20:00      Mechanical Ventilator  


 


12/13/19 20:00 101.7 115 16 131/68 (89) 100   


 


12/13/19 19:35  111 15     30


 


12/13/19 19:32  111      


 


12/13/19 16:44  107 14     30


 


12/13/19 16:00        30


 


12/13/19 16:00      Mechanical Ventilator  


 


12/13/19 16:00  110      


 


12/13/19 16:00 99.1 109 16 118/76 (90) 100   


 


12/13/19 14:55  113 19     30


 


12/13/19 13:04  113 15     30

















Intake and Output  


 


 12/13/19 12/14/19





 19:00 07:00


 


Intake Total 3721.332 ml 2697.666 ml


 


Output Total 3350 ml 3750 ml


 


Balance 371.332 ml -1052.334 ml


 


  


 


Free Water 300 ml 100 ml


 


IV Total 2641.332 ml 1817.666 ml


 


Tube Feeding 780 ml 780 ml


 


Output Urine Total 2850 ml 3450 ml


 


Stool Total 500 ml 300 ml








Laboratory Tests


12/14/19 03:57: Total Creatine Kinase 183


Height (Feet):  5


Height (Inches):  7.00


Weight (Pounds):  203


General Appearance:  no apparent distress


EENT:  other - trach


Cardiovascular:  tachycardia


Respiratory/Chest:  decreased breath sounds


Abdomen:  distended


Objective


no change











Simone Rocha MD Dec 14, 2019 12:34

## 2019-12-14 NOTE — UROLOGY PROGRESS NOTE
Assessment/Plan


Status:  stable


Assessment/Plan:


1. Urinary retention.


2. BPH.


3. Neurogenic bladder.


4. Incontinence.


5. Pyuria/UTI/colonized.


6. Hematuria.


7. Proteinuria.


8. Renal insufficiency, acute possibly on chronic.


9. Renal cyst.


10. Nephrolithiasis.


11. Renal fullness.


12. Bladder calcifications.


13. POD # 17, cysto/optic urethrotomy.





monitor clinically


mayorga placed 11/27


hand irrigated and do PRN


abx as ordered, pr ID


diflucan added


monitor WBC


f/u on last blood cx


renal fxn stable





Subjective


Allergies:  


Coded Allergies:  


     AZTREONAM (Verified  Allergy, Unknown, 7/23/18)


Subjective


all noted, mayorga draining, non-verbal





Objective





Last 24 Hour Vital Signs








  Date Time  Temp Pulse Resp B/P (MAP) Pulse Ox O2 Delivery O2 Flow Rate FiO2


 


12/14/19 14:49  98 14     30


 


12/14/19 13:16  97 14     30


 


12/14/19 13:10  97      


 


12/14/19 12:00      Mechanical Ventilator  


 


12/14/19 12:00 97.2 98 15 129/77 (94) 100   


 


12/14/19 12:00        30


 


12/14/19 10:46  99 16     30


 


12/14/19 08:56  112 14     30


 


12/14/19 08:39  112  136/77    


 


12/14/19 08:00        30


 


12/14/19 08:00 97.0 112 15 136/77 (96) 100   


 


12/14/19 08:00      Mechanical Ventilator  


 


12/14/19 07:58  121      


 


12/14/19 06:54  116 14     30


 


12/14/19 05:03  112 14     30


 


12/14/19 04:00        30


 


12/14/19 04:00 97.9 116 18 138/80 (99) 100   


 


12/14/19 04:00      Mechanical Ventilator  


 


12/14/19 03:33 97.9 116 18 138/80 (99) 100   


 


12/14/19 03:33  115      


 


12/14/19 02:38  113 15     30


 


12/14/19 01:16  119 15     30


 


12/14/19 00:00        30


 


12/14/19 00:00      Mechanical Ventilator  


 


12/13/19 23:57 98.9 112 16 121/69 (86) 100   


 


12/13/19 23:35  102      


 


12/13/19 23:22  102 16     30


 


12/13/19 21:24  107 15     30


 


12/13/19 21:08 100.1       


 


12/13/19 21:08 100.1       


 


12/13/19 20:00        30


 


12/13/19 20:00      Mechanical Ventilator  


 


12/13/19 20:00 101.7 115 16 131/68 (89) 100   


 


12/13/19 19:35  111 15     30


 


12/13/19 19:32  111      


 


12/13/19 16:44  107 14     30


 


12/13/19 16:00        30


 


12/13/19 16:00      Mechanical Ventilator  


 


12/13/19 16:00  110      


 


12/13/19 16:00 99.1 109 16 118/76 (90) 100   

















Intake and Output  


 


 12/13/19 12/14/19





 19:00 07:00


 


Intake Total 3721.332 ml 2697.666 ml


 


Output Total 3350 ml 3750 ml


 


Balance 371.332 ml -1052.334 ml


 


  


 


Free Water 300 ml 100 ml


 


IV Total 2641.332 ml 1817.666 ml


 


Tube Feeding 780 ml 780 ml


 


Output Urine Total 2850 ml 3450 ml


 


Stool Total 500 ml 300 ml











Microbiology








 Date/Time


Source Procedure


Growth Status


 


 


 12/12/19 18:30


Blood Blood Culture - Preliminary


NO GROWTH AFTER 24 HOURS Resulted





 11/25/19 11:00


Nasal Nares - Final Complete


 


 11/25/19 11:00


Nasal Nares - Final Complete


 


 12/5/19 19:00


Stool Clostridium difficile Toxin Assay - Final Complete


 


 12/12/19 15:45


Urine,Clean Catch Urine Culture - Final


Candida Parapsilosis Complete


 


 11/24/19 09:10


Rectum VRE Culture - Final


Enterococcus Faecium - Vre Complete








Current Medications








 Medications


  (Trade)  Dose


 Ordered  Sig/Jan


 Route


 PRN Reason  Start Time


 Stop Time Status Last Admin


Dose Admin


 


 Acetaminophen


  (Tylenol)  650 mg  Q4H  PRN


 GT


 Mild Pain/Temp > 100.6  11/26/19 18:42


 12/26/19 18:41  12/13/19 20:38


 


 


 Apixaban


  (Eliquis)  5 mg  BID


 GT


   12/12/19 18:02


 1/11/20 18:01  12/14/19 08:39


 


 


 Artificial Tears


  (Akwa-Tears)  2 drop  Q12HR


 BOTH EYES


   11/26/19 21:00


 12/24/19 20:59  12/14/19 08:40


 


 


 Baclofen


  (Lioresal)  10 mg  THREE TIMES A  DAY


 GT


   11/27/19 09:00


 12/24/19 17:59  12/14/19 12:37


 


 


 Calcitonin [place]


  (Miacalcin)  1 sprays  DAILY


 NASAL


   11/28/19 12:00


 12/28/19 11:59  12/14/19 08:40


 


 


 Chlorhexidine


 Gluconate


  (Elaine-Hex 2%)  1 applic  DAILY@2000


 TOPIC


   12/6/19 20:00


 1/5/20 19:59  12/13/19 20:35


 


 


 Clobetasol


 Propionate


  (Temovate)  1 applic  EVERY 12  HOURS


 TOPIC


   11/26/19 21:00


 12/24/19 20:59  12/14/19 08:41


 


 


 Daptomycin 550 mg/


 Sodium Chloride  55 ml @ 


 100 mls/hr  Q24H


 IV


   12/13/19 18:00


 12/20/19 17:59  12/13/19 18:04


 


 


 Dextrose


  (Dextrose 50%)  25 ml  Q30M  PRN


 IV


 Hypoglycemia  11/26/19 18:45


 12/24/19 14:44   


 


 


 Dextrose


  (Dextrose 50%)  50 ml  Q30M  PRN


 IV


 Hypoglycemia  11/26/19 18:45


 12/24/19 14:44   


 


 


 Famotidine


  (Pepcid)  20 mg  BID


 GT


   11/28/19 18:00


 12/28/19 17:59  12/14/19 08:39


 


 


 Fenofibrate


  (Tricor)  145 mg  DAILY


 ORAL


   11/27/19 09:00


 12/25/19 08:59  12/14/19 08:39


 


 


 Fluconazole


  (Diflucan)  400 mg  DAILY


 GT


   12/12/19 09:00


 12/16/19 13:46  12/14/19 08:39


 


 


 Insulin Aspart


  (NovoLOG)    EVERY 6  HOURS


 SUBQ


   11/30/19 12:00


 12/24/19 16:29  12/14/19 12:38


 


 


 Levetiracetam


  (Keppra)  1,000 mg  Q8H


 GT


   11/29/19 02:00


 12/29/19 01:59  12/14/19 10:11


 


 


 Metoprolol


 Tartrate


  (Lopressor)  25 mg  Q12HR


 GT


   12/14/19 09:00


 1/13/20 08:59  12/14/19 08:39


 


 


 Minocycline HCl


  (Minocin)  100 mg  Q12HR@0600,1800


 ORAL


   12/11/19 06:00


 12/18/19 05:59  12/14/19 05:31


 


 


 Ondansetron HCl


  (Zofran)  4 mg  Q6H  PRN


 IVP


 Nausea & Vomiting  11/26/19 18:44


 12/26/19 18:43   


 


 


 Polyethylene


 Glycol


  (Miralax)  17 gm  DAILYPRN  PRN


 GT


 Constipation  11/26/19 18:44


 12/26/19 18:43   


 


 


 Sitagliptin


 Phosphate


  (Januvia)  50 mg  ACBREAKFAST


 GT


   11/27/19 06:30


 12/25/19 06:29  12/14/19 05:31


 


 


 Sodium Chloride  1,000 ml @ 


 100 mls/hr  Q10H


 IV


   12/13/19 08:15


 1/12/20 08:14  12/14/19 13:59


 


 


 Tamsulosin HCl


  (Flomax)  0.4 mg  DAILY


 ORAL


   11/27/19 09:00


 12/27/19 08:59  12/14/19 08:39


 


 


 Trimethoprim/


 Sulfamethoxazole


 28 ml/Dextrose  578 ml @ 


 385.333


 mls/hr  [id number]  BACTRIM


 IV


   12/12/19 16:00


 12/19/19 15:59  12/14/19 08:35


 





Laboratory Tests


12/14/19 03:57: Total Creatine Kinase 183


Height (Feet):  5


Height (Inches):  7.00


Weight (Pounds):  203


Objective


 exam stable


mayorga indwelling, yellow/christine urine, some debris





CT C/A/P (12/8) noted











Vinnie Pollard MD Dec 14, 2019 15:27

## 2019-12-14 NOTE — PROGRESS NOTE
DATE:  12/13/2019

CARDIOLOGY PROGRESS NOTE



SUBJECTIVE:  The patient remains afebrile, sinus tachy persists.



OBJECTIVE:

VITAL SIGNS:  Blood pressure parameters normal.

LUNGS:  No wheezing or rales.  Minimal secretions, on trach

support.

CARDIAC:  Regular rhythm, rapid rate.  Normal S1 and S2.  No rub.

ABDOMEN:  Soft.  G-tube intact.

EXTREMITIES:  No edema.



IMPRESSION:

1. Sepsis.

2. Pulmonary embolism.

3. Secondary sinus tachycardia.

4. Respiratory failure.

5. Chronic encephalopathy.

6. Elevated pulmonary artery systolic pressures.



PLAN:  Add low-dose beta-blocker, titrate based on clinical parameters.









  ______________________________________________

  Robert Chan M.D.





DR:  RADHA

D:  12/14/2019 00:36

T:  12/14/2019 00:57

JOB#:  [id number]

CC:

## 2019-12-15 VITALS — SYSTOLIC BLOOD PRESSURE: 128 MMHG | DIASTOLIC BLOOD PRESSURE: 69 MMHG

## 2019-12-15 VITALS — SYSTOLIC BLOOD PRESSURE: 139 MMHG | DIASTOLIC BLOOD PRESSURE: 74 MMHG

## 2019-12-15 VITALS — SYSTOLIC BLOOD PRESSURE: 134 MMHG | DIASTOLIC BLOOD PRESSURE: 78 MMHG

## 2019-12-15 VITALS — DIASTOLIC BLOOD PRESSURE: 80 MMHG | SYSTOLIC BLOOD PRESSURE: 132 MMHG

## 2019-12-15 VITALS — DIASTOLIC BLOOD PRESSURE: 81 MMHG | SYSTOLIC BLOOD PRESSURE: 132 MMHG

## 2019-12-15 VITALS — DIASTOLIC BLOOD PRESSURE: 75 MMHG | SYSTOLIC BLOOD PRESSURE: 144 MMHG

## 2019-12-15 LAB
ADD MANUAL DIFF: YES
ALBUMIN SERPL-MCNC: 2.3 G/DL (ref 3.4–5)
ALBUMIN/GLOB SERPL: 0.4 {RATIO} (ref 1–2.7)
ALP SERPL-CCNC: 104 U/L (ref 46–116)
ALT SERPL-CCNC: 22 U/L (ref 12–78)
ANION GAP SERPL CALC-SCNC: 10 MMOL/L (ref 5–15)
AST SERPL-CCNC: 27 U/L (ref 15–37)
BILIRUB SERPL-MCNC: 0.2 MG/DL (ref 0.2–1)
BUN SERPL-MCNC: 16 MG/DL (ref 7–18)
CALCIUM SERPL-MCNC: 10.3 MG/DL (ref 8.5–10.1)
CHLORIDE SERPL-SCNC: 97 MMOL/L (ref 98–107)
CO2 SERPL-SCNC: 23 MMOL/L (ref 21–32)
CREAT SERPL-MCNC: 1.2 MG/DL (ref 0.55–1.3)
ERYTHROCYTE [DISTWIDTH] IN BLOOD BY AUTOMATED COUNT: 17.1 % (ref 11.6–14.8)
GLOBULIN SER-MCNC: 6.1 G/DL
HCT VFR BLD CALC: 24.1 % (ref 42–52)
HGB BLD-MCNC: 7.7 G/DL (ref 14.2–18)
MCV RBC AUTO: 86 FL (ref 80–99)
PHOSPHATE SERPL-MCNC: 3.1 MG/DL (ref 2.5–4.9)
PLATELET # BLD: 440 K/UL (ref 150–450)
POTASSIUM SERPL-SCNC: 5.9 MMOL/L (ref 3.5–5.1)
RBC # BLD AUTO: 2.8 M/UL (ref 4.7–6.1)
SODIUM SERPL-SCNC: 130 MMOL/L (ref 136–145)
WBC # BLD AUTO: 14.2 K/UL (ref 4.8–10.8)

## 2019-12-15 RX ADMIN — APIXABAN SCH MG: 5 TABLET, FILM COATED ORAL at 18:08

## 2019-12-15 RX ADMIN — SITAGLIPTIN SCH MG: 50 TABLET, FILM COATED ORAL at 05:57

## 2019-12-15 RX ADMIN — INSULIN ASPART SCH UNITS: 100 INJECTION, SOLUTION INTRAVENOUS; SUBCUTANEOUS at 06:07

## 2019-12-15 RX ADMIN — SULFAMETHOXAZOLE AND TRIMETHOPRIM SCH MLS/HR: 80; 16 INJECTION, SOLUTION, CONCENTRATE INTRAVENOUS at 00:06

## 2019-12-15 RX ADMIN — FLUCONAZOLE SCH MG: 100 TABLET ORAL at 08:46

## 2019-12-15 RX ADMIN — MINOCYCLINE HYDROCHLORIDE SCH MG: 50 CAPSULE ORAL at 18:08

## 2019-12-15 RX ADMIN — CHLORHEXIDINE GLUCONATE SCH APPLIC: 213 SOLUTION TOPICAL at 20:37

## 2019-12-15 RX ADMIN — LEVETIRACETAM SCH MG: 100 SOLUTION ORAL at 09:53

## 2019-12-15 RX ADMIN — SULFAMETHOXAZOLE AND TRIMETHOPRIM SCH MLS/HR: 80; 16 INJECTION, SOLUTION, CONCENTRATE INTRAVENOUS at 15:58

## 2019-12-15 RX ADMIN — SULFAMETHOXAZOLE AND TRIMETHOPRIM SCH MLS/HR: 80; 16 INJECTION, SOLUTION, CONCENTRATE INTRAVENOUS at 23:20

## 2019-12-15 RX ADMIN — LEVETIRACETAM SCH MG: 100 SOLUTION ORAL at 02:19

## 2019-12-15 RX ADMIN — MINOCYCLINE HYDROCHLORIDE SCH MG: 50 CAPSULE ORAL at 05:57

## 2019-12-15 RX ADMIN — INSULIN ASPART SCH UNITS: 100 INJECTION, SOLUTION INTRAVENOUS; SUBCUTANEOUS at 12:27

## 2019-12-15 RX ADMIN — SULFAMETHOXAZOLE AND TRIMETHOPRIM SCH MLS/HR: 80; 16 INJECTION, SOLUTION, CONCENTRATE INTRAVENOUS at 08:46

## 2019-12-15 RX ADMIN — APIXABAN SCH MG: 5 TABLET, FILM COATED ORAL at 08:46

## 2019-12-15 RX ADMIN — CLOBETASOL PROPIONATE SCH APPLIC: 0.5 OINTMENT TOPICAL at 08:47

## 2019-12-15 RX ADMIN — TAMSULOSIN HYDROCHLORIDE SCH MG: 0.4 CAPSULE ORAL at 08:47

## 2019-12-15 RX ADMIN — DAPTOMYCIN SCH MLS/HR: 500 INJECTION, POWDER, LYOPHILIZED, FOR SOLUTION INTRAVENOUS at 18:10

## 2019-12-15 RX ADMIN — INSULIN ASPART SCH UNITS: 100 INJECTION, SOLUTION INTRAVENOUS; SUBCUTANEOUS at 00:09

## 2019-12-15 RX ADMIN — INSULIN ASPART SCH UNITS: 100 INJECTION, SOLUTION INTRAVENOUS; SUBCUTANEOUS at 18:10

## 2019-12-15 RX ADMIN — CLOBETASOL PROPIONATE SCH APPLIC: 0.5 OINTMENT TOPICAL at 20:38

## 2019-12-15 RX ADMIN — LEVETIRACETAM SCH MG: 100 SOLUTION ORAL at 18:08

## 2019-12-15 RX ADMIN — INSULIN ASPART SCH UNITS: 100 INJECTION, SOLUTION INTRAVENOUS; SUBCUTANEOUS at 23:23

## 2019-12-15 NOTE — PULMONOLGY CRITICAL CARE NOTE
Critical Care - Asmt/Plan


Problems:  


(1) Recurrent fever


(2) Acute on chronic respiratory failure


(3) Pulmonary embolism


(4) Sepsis


(5) Gastrostomy tube dependent


(6) ATN (acute tubular necrosis)


(7) Colostomy in place


(8) Diabetes mellitus


(9) Vegetative state


Respiratory:  monitor respiratory rate, adjust FIO2


Cardiac:  continue to monitor HR/BP


Renal:  F/U I&O, keep IV fluid


Infectious Disease:  check cultures, continue antibiotics


Gastrointestinal:  continue feedings/current rate


Endocrine:  monitor blood sugar, check HgA1C


Hematologic:  monitor H/H


Neurologic:  PRN Morphine


Prophylaxis:  Protonix, Heparin


Notes Reviewed:  internist, cardio, renal


Discussed with:  nurses, consultants, 





Critical Care - Objective





Last 24 Hour Vital Signs








  Date Time  Temp Pulse Resp B/P (MAP) Pulse Ox O2 Delivery O2 Flow Rate FiO2


 


12/15/19 07:26  102 14     30


 


12/15/19 05:03  95 14     30


 


12/15/19 04:00 97.2 93 16 144/75 (98) 100   


 


12/15/19 04:00      Mechanical Ventilator  


 


12/15/19 04:00        30


 


12/15/19 03:42  96      


 


12/15/19 03:30  91 15     30


 


12/15/19 01:29  94 14     30


 


12/15/19 00:00      Mechanical Ventilator  


 


12/15/19 00:00 97.0 93 15 132/80 (97) 99   


 


12/14/19 23:50  93      


 


12/14/19 23:14  90 14     30


 


12/14/19 21:30  92 14     30


 


12/14/19 20:36  103  124/68    


 


12/14/19 20:00        30


 


12/14/19 20:00      Mechanical Ventilator  


 


12/14/19 20:00 97.3 103 15 124/68 (86) 99   


 


12/14/19 19:30  104 14     30


 


12/14/19 19:27  106      


 


12/14/19 19:25  92 14  96 Mechanical Ventilator  30


 


12/14/19 17:09  100 15     30


 


12/14/19 16:00 97.2 100 14 126/73 (90) 100   


 


12/14/19 16:00        30


 


12/14/19 16:00      Mechanical Ventilator  


 


12/14/19 15:53  100      


 


12/14/19 14:49  98 14     30


 


12/14/19 13:16  97 14     30


 


12/14/19 13:10  97      


 


12/14/19 12:00      Mechanical Ventilator  


 


12/14/19 12:00 97.2 98 15 129/77 (94) 100   


 


12/14/19 12:00        30


 


12/14/19 10:46  99 16     30


 


12/14/19 08:56  112 14     30


 


12/14/19 08:39  112  136/77    


 


12/14/19 08:00        30


 


12/14/19 08:00 97.0 112 15 136/77 (96) 100   


 


12/14/19 08:00      Mechanical Ventilator  


 


12/14/19 07:58  121      








Status:  obtunded


Condition:  critical


HEENT:  atraumatic


Lungs:  rales, rhonchi


Heart:  HR/BP stable


Abdomen:  soft, non-tender


Extremities:  no C/C/E, edema


Micro:





Microbiology








 Date/Time


Source Procedure


Growth Status


 


 


 12/13/19 16:15


Blood Blood Culture - Preliminary


NO GROWTH AFTER 24 HOURS Resulted


 


 12/13/19 16:00


Blood Blood Culture - Preliminary


NO GROWTH AFTER 24 HOURS Resulted


 


 12/12/19 18:30


Blood Blood Culture - Preliminary


NO GROWTH AFTER 48 HOURS Resulted


 


 12/12/19 18:20


Blood Blood Culture - Preliminary


Gram Negative Clif Resulted


 


 12/12/19 15:45


Urine,Clean Catch Urine Culture - Final


Candida Parapsilosis Complete








Accucheck:  168





Critical Care - Subjective


ROS Limited/Unobtainable:  Yes


Condition:  critical


EKG Rhythm:  Sinus Tachycardia


FI02:  30


Vent Support Breath Rate:  14


Vent Support Mode:  AC


Vent Tidal Volume:  600


Sputum Amount:  Small


PEEP:  5.0


PIP:  38


Tube Feeding Amount:  65


I&O:











Intake and Output  


 


 12/14/19 12/15/19





 19:00 07:00


 


Intake Total 3445.667 ml 1728.000 ml


 


Output Total 3250 ml 


 


Balance 195.667 ml 1728.000 ml


 


  


 


IV Total 2365.667 ml 978.000 ml


 


Tube Feeding 780 ml 650 ml


 


Other 300 ml 100 ml


 


Output Urine Total 3000 ml 


 


Stool Total 250 ml 








Labs:





Laboratory Tests








Test


  12/15/19


04:30


 


White Blood Count


  14.2 K/UL


(4.8-10.8)  H


 


Red Blood Count


  2.80 M/UL


(4.70-6.10)  L


 


Hemoglobin


  7.7 G/DL


(14.2-18.0)  L


 


Hematocrit


  24.1 %


(42.0-52.0)  L


 


Mean Corpuscular Volume 86 FL (80-99)  


 


Mean Corpuscular Hemoglobin


  27.5 PG


(27.0-31.0)


 


Mean Corpuscular Hemoglobin


Concent 31.9 G/DL


(32.0-36.0)  L


 


Red Cell Distribution Width


  17.1 %


(11.6-14.8)  H


 


Platelet Count


  440 K/UL


(150-450)


 


Mean Platelet Volume


  7.1 FL


(6.5-10.1)


 


Neutrophils (%) (Auto)


  % (45.0-75.0)


 


 


Lymphocytes (%) (Auto)


  % (20.0-45.0)


 


 


Monocytes (%) (Auto)  % (1.0-10.0)  


 


Eosinophils (%) (Auto)  % (0.0-3.0)  


 


Basophils (%) (Auto)  % (0.0-2.0)  


 


Neutrophils % (Manual) Pending  


 


Lymphocytes % (Manual) Pending  


 


Platelet Estimate Pending  


 


Platelet Morphology Pending  


 


Erythrocyte Sedimentation Rate Pending  


 


Sodium Level Pending  


 


Potassium Level Pending  


 


Chloride Level Pending  


 


Carbon Dioxide Level Pending  


 


Blood Urea Nitrogen Pending  


 


Creatinine Pending  


 


Estimat Glomerular Filtration


Rate Pending  


 


 


Glucose Level Pending  


 


Uric Acid


  3.3 MG/DL


(2.6-7.2)


 


Calcium Level Pending  


 


Phosphorus Level Pending  


 


Magnesium Level Pending  


 


Total Bilirubin Pending  


 


Aspartate Amino Transf


(AST/SGOT) Pending  


 


 


Alanine Aminotransferase


(ALT/SGPT) Pending  


 


 


Alkaline Phosphatase Pending  


 


C-Reactive Protein,


Quantitative Pending  


 


 


Pro-B-Type Natriuretic Peptide


  218 pg/mL


(0-125)  H


 


Total Protein Pending  


 


Albumin Pending  


 


Globulin Pending  

















Yury Pena MD Dec 15, 2019 07:29

## 2019-12-15 NOTE — PROGRESS NOTE
DATE:  12/14/2019

SUBJECTIVE:  The patient is afebrile, hemodynamically stable with mostly

tachycardic below 100.



PHYSICAL EXAMINATION:

VITAL SIGNS:  Blood pressure 126/73, pulse is 98, respirations of 14, and

temperature of 97.2.

HEENT:  Eyes were normal.  ENT, mucous membranes were moist and intact.

NECK:  Supple with no JVD without lymph nodes.  Tracheostomy site is

clean.

LUNGS:  Clear without rhonchi, rales, or wheezing.  Secretions are small,

thin, and grey.

HEART:  Normal sounds with regular beats.  There is no S3, S4, or

pericardial rub.

ABDOMEN:  Soft and nontender with normal bowel sounds.  Gastrostomy site is

clean.

EXTREMITIES:  Warm without cyanosis, clubbing, or edema.



LABORATORY DATA:  No new laboratory data available at the time of this

dictation.



IMPRESSION:  The patient is afebrile and hemodynamically stable.  His heart

rate is near 100 with normal O2.  If the patient remains stable for the

next 24 hours, he can be discharged in the a.m. back to the extended care

facility.









  ______________________________________________

  Etienne Bloom M.D.





DR:  NED

D:  12/14/2019 18:27

T:  12/15/2019 01:21

JOB#:  [id number]

CC:

## 2019-12-15 NOTE — NEPHROLOGY PROGRESS NOTE
Assessment/Plan


Problem List:  


(1) Renal failure (ARF), acute on chronic


Assessment:  Cr rising and hyperKalemia





(2) Urinary retention


(3) Acute on chronic respiratory failure


(4) Hypercalcemia


Assessment





Hypercalcemia


Urinary retention, BPH , 


Acute on chronic renal failure


Electrolyte imbalance 


Colostomy


DM


PEG


Vegetative state


Acute on chronic respiratory failure


Plan


Kayexelate


as needed 3% Saline and NS





need to avoid nephrotoxics as possible


urine studies





K and Phos and Mag supplements as needed


pulm support


Aredia for high Ca on 11/28 redose today 12/3





Subjective


ROS Limited/Unobtainable:  Yes





Objective


Objective





Last 24 Hour Vital Signs








  Date Time  Temp Pulse Resp B/P (MAP) Pulse Ox O2 Delivery O2 Flow Rate FiO2


 


12/15/19 08:47  101  134/78    


 


12/15/19 08:00        30


 


12/15/19 08:00      Mechanical Ventilator  


 


12/15/19 08:00 98.4 101 15 134/78 (96) 98   


 


12/15/19 07:26  102 14     30


 


12/15/19 05:03  95 14     30


 


12/15/19 04:00 97.2 93 16 144/75 (98) 100   


 


12/15/19 04:00      Mechanical Ventilator  


 


12/15/19 04:00        30


 


12/15/19 03:42  96      


 


12/15/19 03:30  91 15     30


 


12/15/19 01:29  94 14     30


 


12/15/19 00:00      Mechanical Ventilator  


 


12/15/19 00:00 97.0 93 15 132/80 (97) 99   


 


12/14/19 23:50  93      


 


12/14/19 23:14  90 14     30


 


12/14/19 21:30  92 14     30


 


12/14/19 20:36  103  124/68    


 


12/14/19 20:00        30


 


12/14/19 20:00      Mechanical Ventilator  


 


12/14/19 20:00 97.3 103 15 124/68 (86) 99   


 


12/14/19 19:30  104 14     30


 


12/14/19 19:27  106      


 


12/14/19 19:25  92 14  96 Mechanical Ventilator  30


 


12/14/19 17:09  100 15     30


 


12/14/19 16:00 97.2 100 14 126/73 (90) 100   


 


12/14/19 16:00        30


 


12/14/19 16:00      Mechanical Ventilator  


 


12/14/19 15:53  100      


 


12/14/19 14:49  98 14     30


 


12/14/19 13:16  97 14     30


 


12/14/19 13:10  97      


 


12/14/19 12:00      Mechanical Ventilator  


 


12/14/19 12:00 97.2 98 15 129/77 (94) 100   


 


12/14/19 12:00        30


 


12/14/19 10:46  99 16     30

















Intake and Output  


 


 12/14/19 12/15/19





 19:00 07:00


 


Intake Total 3445.667 ml 2058.000 ml


 


Output Total 3250 ml 1650 ml


 


Balance 195.667 ml 408.000 ml


 


  


 


IV Total 2365.667 ml 1178.000 ml


 


Tube Feeding 780 ml 780 ml


 


Other 300 ml 100 ml


 


Output Urine Total 3000 ml 1600 ml


 


Stool Total 250 ml 50 ml








Laboratory Tests


12/15/19 04:30: 


White Blood Count 14.2H, Red Blood Count 2.80L, Hemoglobin 7.7L, Hematocrit 

24.1L, Mean Corpuscular Volume 86, Mean Corpuscular Hemoglobin 27.5, Mean 

Corpuscular Hemoglobin Concent 31.9L, Red Cell Distribution Width 17.1H, 

Platelet Count 440, Mean Platelet Volume 7.1, Neutrophils (%) (Auto) , 

Lymphocytes (%) (Auto) , Monocytes (%) (Auto) , Eosinophils (%) (Auto) , 

Basophils (%) (Auto) , Neutrophils % (Manual) [Pending], Lymphocytes % (Manual) 

[Pending], Platelet Estimate [Pending], Platelet Morphology [Pending], 

Erythrocyte Sedimentation Rate 67H, Sodium Level 130L, Potassium Level 5.9H, 

Chloride Level 97L, Carbon Dioxide Level 23, Anion Gap 10, Blood Urea Nitrogen 

16, Creatinine 1.2, Estimat Glomerular Filtration Rate > 60, Glucose Level 161H

, Uric Acid 3.3, Calcium Level 10.3H, Phosphorus Level 3.1, Magnesium Level 1.7L

, Total Bilirubin 0.2, Aspartate Amino Transf (AST/SGOT) 27, Alanine 

Aminotransferase (ALT/SGPT) 22, Alkaline Phosphatase 104, C-Reactive Protein, 

Quantitative 16.0H, Pro-B-Type Natriuretic Peptide 218H, Total Protein 8.4H, 

Albumin 2.3L, Globulin 6.1, Albumin/Globulin Ratio 0.4L


Height (Feet):  5


Height (Inches):  7.00


Weight (Pounds):  195


General Appearance:  no apparent distress, lethargic


EENT:  other - trach


Neck:  limited range of motion


Cardiovascular:  tachycardia


Respiratory/Chest:  decreased breath sounds


Abdomen:  distended


Objective


no change











Simone Rocha MD Dec 15, 2019 09:33

## 2019-12-15 NOTE — PROGRESS NOTE
DATE:  12/14/2019

CARDIOLOGY PROGRESS NOTE



SUBJECTIVE:  Heart rates have improved on low dose beta-blocker.  Sinus

rhythm maintained.  Rare atrial ectopics.  Still on ventilator support.



OBJECTIVE:

LUNGS:  With few rhonchi.

CARDIAC:  Regular.  No new murmur.

ABDOMEN:  Soft.  G-tube intact.

EXTREMITIES:  No edema.



IMPRESSION:

1. Pulmonary embolus.

2. Paroxysmal atrial ectopy and sinus tachycardia.

3. Respiratory failure.

4. Chronic encephalopathy.

5. Healthcare-acquired pneumonia.



PLAN:  Maintain current cardiovascular regimen and full anticoagulation.









  ______________________________________________

  Robert Chan M.D.





DR:  KIZZY

D:  12/15/2019 03:01

T:  12/15/2019 03:21

JOB#:  [id number]

CC:

## 2019-12-15 NOTE — UROLOGY PROGRESS NOTE
Assessment/Plan


Status:  stable


Assessment/Plan:


1. Urinary retention.


2. BPH.


3. Neurogenic bladder.


4. Incontinence.


5. Pyuria/UTI/colonized.


6. Hematuria.


7. Proteinuria.


8. Renal insufficiency, acute possibly on chronic.


9. Renal cyst.


10. Nephrolithiasis.


11. Renal fullness.


12. Bladder calcifications.


13. POD # 18, cysto/optic urethrotomy.





monitor clinically


mayorga placed 11/27


hand irrigated and do PRN


abx as ordered, pr ID


diflucan added


monitor WBC


f/u on last blood cx


renal fxn stable





Subjective


Allergies:  


Coded Allergies:  


     AZTREONAM (Verified  Allergy, Unknown, 7/23/18)


Subjective


all noted, mayorga draining, non-verbal





Objective





Last 24 Hour Vital Signs








  Date Time  Temp Pulse Resp B/P (MAP) Pulse Ox O2 Delivery O2 Flow Rate FiO2


 


12/15/19 08:47  101  134/78    


 


12/15/19 08:00        30


 


12/15/19 08:00      Mechanical Ventilator  


 


12/15/19 08:00 98.4 101 15 134/78 (96) 98   


 


12/15/19 07:26  102 14     30


 


12/15/19 05:03  95 14     30


 


12/15/19 04:00 97.2 93 16 144/75 (98) 100   


 


12/15/19 04:00      Mechanical Ventilator  


 


12/15/19 04:00        30


 


12/15/19 03:42  96      


 


12/15/19 03:30  91 15     30


 


12/15/19 01:29  94 14     30


 


12/15/19 00:00      Mechanical Ventilator  


 


12/15/19 00:00 97.0 93 15 132/80 (97) 99   


 


12/14/19 23:50  93      


 


12/14/19 23:14  90 14     30


 


12/14/19 21:30  92 14     30


 


12/14/19 20:36  103  124/68    


 


12/14/19 20:00        30


 


12/14/19 20:00      Mechanical Ventilator  


 


12/14/19 20:00 97.3 103 15 124/68 (86) 99   


 


12/14/19 19:30  104 14     30


 


12/14/19 19:27  106      


 


12/14/19 19:25  92 14  96 Mechanical Ventilator  30


 


12/14/19 17:09  100 15     30


 


12/14/19 16:00 97.2 100 14 126/73 (90) 100   


 


12/14/19 16:00        30


 


12/14/19 16:00      Mechanical Ventilator  


 


12/14/19 15:53  100      


 


12/14/19 14:49  98 14     30


 


12/14/19 13:16  97 14     30


 


12/14/19 13:10  97      


 


12/14/19 12:00      Mechanical Ventilator  


 


12/14/19 12:00 97.2 98 15 129/77 (94) 100   


 


12/14/19 12:00        30


 


12/14/19 10:46  99 16     30

















Intake and Output  


 


 12/14/19 12/15/19





 19:00 07:00


 


Intake Total 3445.667 ml 2058.000 ml


 


Output Total 3250 ml 1650 ml


 


Balance 195.667 ml 408.000 ml


 


  


 


IV Total 2365.667 ml 1178.000 ml


 


Tube Feeding 780 ml 780 ml


 


Other 300 ml 100 ml


 


Output Urine Total 3000 ml 1600 ml


 


Stool Total 250 ml 50 ml











Microbiology








 Date/Time


Source Procedure


Growth Status


 


 


 12/13/19 16:15


Blood Blood Culture - Preliminary Resulted





 11/25/19 11:00


Nasal Nares - Final Complete


 


 11/25/19 11:00


Nasal Nares - Final Complete


 


 12/5/19 19:00


Stool Clostridium difficile Toxin Assay - Final Complete


 


 12/12/19 15:45


Urine,Clean Catch Urine Culture - Final


Candida Parapsilosis Complete


 


 12/10/19 16:00


Abdomen Anaerobic Culture - Final


NO ANAEROBES ISOLATED Complete








Current Medications








 Medications


  (Trade)  Dose


 Ordered  Sig/Jan


 Route


 PRN Reason  Start Time


 Stop Time Status Last Admin


Dose Admin


 


 Acetaminophen


  (Tylenol)  650 mg  Q4H  PRN


 GT


 Mild Pain/Temp > 100.6  11/26/19 18:42


 12/26/19 18:41  12/13/19 20:38


 


 


 Apixaban


  (Eliquis)  5 mg  BID


 GT


   12/12/19 18:02


 1/11/20 18:01  12/15/19 08:46


 


 


 Artificial Tears


  (Akwa-Tears)  2 drop  Q12HR


 BOTH EYES


   11/26/19 21:00


 12/24/19 20:59  12/15/19 08:47


 


 


 Baclofen


  (Lioresal)  10 mg  THREE TIMES A  DAY


 GT


   11/27/19 09:00


 12/24/19 17:59  12/15/19 08:46


 


 


 Calcitonin [place]


  (Miacalcin)  1 sprays  DAILY


 NASAL


   11/28/19 12:00


 12/28/19 11:59  12/15/19 08:47


 


 


 Chlorhexidine


 Gluconate


  (Elaine-Hex 2%)  1 applic  DAILY@2000


 TOPIC


   12/6/19 20:00


 1/5/20 19:59  12/14/19 20:36


 


 


 Clobetasol


 Propionate


  (Temovate)  1 applic  EVERY 12  HOURS


 TOPIC


   11/26/19 21:00


 12/24/19 20:59  12/15/19 08:47


 


 


 Daptomycin 550 mg/


 Sodium Chloride  55 ml @ 


 100 mls/hr  Q24H


 IV


   12/13/19 18:00


 12/20/19 17:59  12/14/19 18:17


 


 


 Dextrose


  (Dextrose 50%)  25 ml  Q30M  PRN


 IV


 Hypoglycemia  11/26/19 18:45


 12/24/19 14:44   


 


 


 Dextrose


  (Dextrose 50%)  50 ml  Q30M  PRN


 IV


 Hypoglycemia  11/26/19 18:45


 12/24/19 14:44   


 


 


 Famotidine


  (Pepcid)  20 mg  BID


 GT


   11/28/19 18:00


 12/28/19 17:59  12/15/19 08:46


 


 


 Fenofibrate


  (Tricor)  145 mg  DAILY


 ORAL


   11/27/19 09:00


 12/25/19 08:59  12/15/19 08:46


 


 


 Fluconazole


  (Diflucan)  400 mg  DAILY


 GT


   12/12/19 09:00


 12/16/19 13:46  12/15/19 08:46


 


 


 Insulin Aspart


  (NovoLOG)    EVERY 6  HOURS


 SUBQ


   11/30/19 12:00


 12/24/19 16:29  12/15/19 06:07


 


 


 Levetiracetam


  (Keppra)  1,000 mg  Q8H


 GT


   11/29/19 02:00


 12/29/19 01:59  12/15/19 09:53


 


 


 Metoprolol


 Tartrate


  (Lopressor)  25 mg  Q12HR


 GT


   12/14/19 09:00


 1/13/20 08:59  12/15/19 08:47


 


 


 Minocycline HCl


  (Minocin)  100 mg  Q12HR@0600,1800


 ORAL


   12/11/19 06:00


 12/18/19 05:59  12/15/19 05:57


 


 


 Ondansetron HCl


  (Zofran)  4 mg  Q6H  PRN


 IVP


 Nausea & Vomiting  11/26/19 18:44


 12/26/19 18:43   


 


 


 Polyethylene


 Glycol


  (Miralax)  17 gm  DAILYPRN  PRN


 GT


 Constipation  11/26/19 18:44


 12/26/19 18:43   


 


 


 Sitagliptin


 Phosphate


  (Januvia)  50 mg  ACBREAKFAST


 GT


   11/27/19 06:30


 12/25/19 06:29  12/15/19 05:57


 


 


 Sodium


 Polystyrene


 Sulfonate


  (Kayexalate)  30 gm  Q8HR  ONCE


 GT


   12/15/19 14:00


 12/15/19 14:01   


 


 


 Sodium Chloride  1,000 ml @ 


 100 mls/hr  Q10H


 IV


   12/13/19 08:15


 1/12/20 08:14  12/15/19 09:54


 


 


 Tamsulosin HCl


  (Flomax)  0.4 mg  DAILY


 ORAL


   11/27/19 09:00


 12/27/19 08:59  12/15/19 08:47


 


 


 Trimethoprim/


 Sulfamethoxazole


 28 ml/Dextrose  578 ml @ 


 385.333


 mls/hr  [id number]  BACTRIM


 IV


   12/12/19 16:00


 12/19/19 15:59  12/15/19 08:46


 





Laboratory Tests


12/15/19 04:30: 


White Blood Count 14.2H, Red Blood Count 2.80L, Hemoglobin 7.7L, Hematocrit 

24.1L, Mean Corpuscular Volume 86, Mean Corpuscular Hemoglobin 27.5, Mean 

Corpuscular Hemoglobin Concent 31.9L, Red Cell Distribution Width 17.1H, 

Platelet Count 440, Mean Platelet Volume 7.1, Neutrophils (%) (Auto) , 

Lymphocytes (%) (Auto) , Monocytes (%) (Auto) , Eosinophils (%) (Auto) , 

Basophils (%) (Auto) , Differential Total Cells Counted 100, Neutrophils % (

Manual) 75, Lymphocytes % (Manual) 11L, Monocytes % (Manual) 6, Eosinophils % (

Manual) 8H, Basophils % (Manual) 0, Band Neutrophils 0, Platelet Estimate 

Adequate, Platelet Morphology Normal, Hypochromasia 1+, Anisocytosis 1+, 

Erythrocyte Sedimentation Rate 67H, Sodium Level 130L, Potassium Level 5.9H, 

Chloride Level 97L, Carbon Dioxide Level 23, Anion Gap 10, Blood Urea Nitrogen 

16, Creatinine 1.2, Estimat Glomerular Filtration Rate > 60, Glucose Level 161H

, Uric Acid 3.3, Calcium Level 10.3H, Phosphorus Level 3.1, Magnesium Level 1.7L

, Total Bilirubin 0.2, Aspartate Amino Transf (AST/SGOT) 27, Alanine 

Aminotransferase (ALT/SGPT) 22, Alkaline Phosphatase 104, C-Reactive Protein, 

Quantitative 16.0H, Pro-B-Type Natriuretic Peptide 218H, Total Protein 8.4H, 

Albumin 2.3L, Globulin 6.1, Albumin/Globulin Ratio 0.4L


Height (Feet):  5


Height (Inches):  7.00


Weight (Pounds):  195


Objective


 exam stable


mayorga indwelling, yellow/christine urine, some debris





CT C/A/P (12/8) noted











Vinnie Pollard MD Dec 15, 2019 10:14

## 2019-12-16 VITALS — SYSTOLIC BLOOD PRESSURE: 133 MMHG | DIASTOLIC BLOOD PRESSURE: 92 MMHG

## 2019-12-16 VITALS — SYSTOLIC BLOOD PRESSURE: 133 MMHG | DIASTOLIC BLOOD PRESSURE: 94 MMHG

## 2019-12-16 VITALS — DIASTOLIC BLOOD PRESSURE: 89 MMHG | SYSTOLIC BLOOD PRESSURE: 139 MMHG

## 2019-12-16 VITALS — DIASTOLIC BLOOD PRESSURE: 80 MMHG | SYSTOLIC BLOOD PRESSURE: 126 MMHG

## 2019-12-16 VITALS — SYSTOLIC BLOOD PRESSURE: 130 MMHG | DIASTOLIC BLOOD PRESSURE: 70 MMHG

## 2019-12-16 VITALS — DIASTOLIC BLOOD PRESSURE: 72 MMHG | SYSTOLIC BLOOD PRESSURE: 145 MMHG

## 2019-12-16 LAB
ADD MANUAL DIFF: NO
ALBUMIN SERPL-MCNC: 2.5 G/DL (ref 3.4–5)
ALBUMIN/GLOB SERPL: 0.4 {RATIO} (ref 1–2.7)
ALP SERPL-CCNC: 95 U/L (ref 46–116)
ALT SERPL-CCNC: 23 U/L (ref 12–78)
ANION GAP SERPL CALC-SCNC: 9 MMOL/L (ref 5–15)
AST SERPL-CCNC: 29 U/L (ref 15–37)
BASOPHILS NFR BLD AUTO: 0.6 % (ref 0–2)
BILIRUB SERPL-MCNC: 0.2 MG/DL (ref 0.2–1)
BUN SERPL-MCNC: 15 MG/DL (ref 7–18)
CALCIUM SERPL-MCNC: 10.4 MG/DL (ref 8.5–10.1)
CHLORIDE SERPL-SCNC: 93 MMOL/L (ref 98–107)
CO2 SERPL-SCNC: 25 MMOL/L (ref 21–32)
CREAT SERPL-MCNC: 1.3 MG/DL (ref 0.55–1.3)
EOSINOPHIL NFR BLD AUTO: 7.7 % (ref 0–3)
ERYTHROCYTE [DISTWIDTH] IN BLOOD BY AUTOMATED COUNT: 17.7 % (ref 11.6–14.8)
GLOBULIN SER-MCNC: 6.5 G/DL
HCT VFR BLD CALC: 25.9 % (ref 42–52)
HGB BLD-MCNC: 8.3 G/DL (ref 14.2–18)
LYMPHOCYTES NFR BLD AUTO: 15.6 % (ref 20–45)
MCV RBC AUTO: 86 FL (ref 80–99)
MONOCYTES NFR BLD AUTO: 4.1 % (ref 1–10)
NEUTROPHILS NFR BLD AUTO: 72 % (ref 45–75)
PHOSPHATE SERPL-MCNC: 3.1 MG/DL (ref 2.5–4.9)
PLATELET # BLD: 535 K/UL (ref 150–450)
POTASSIUM SERPL-SCNC: 5.3 MMOL/L (ref 3.5–5.1)
RBC # BLD AUTO: 3.02 M/UL (ref 4.7–6.1)
SODIUM SERPL-SCNC: 127 MMOL/L (ref 136–145)
WBC # BLD AUTO: 14.1 K/UL (ref 4.8–10.8)

## 2019-12-16 RX ADMIN — SITAGLIPTIN SCH MG: 50 TABLET, FILM COATED ORAL at 06:10

## 2019-12-16 RX ADMIN — MINOCYCLINE HYDROCHLORIDE SCH MG: 50 CAPSULE ORAL at 17:31

## 2019-12-16 RX ADMIN — CHLORHEXIDINE GLUCONATE SCH APPLIC: 213 SOLUTION TOPICAL at 20:39

## 2019-12-16 RX ADMIN — APIXABAN SCH MG: 5 TABLET, FILM COATED ORAL at 10:10

## 2019-12-16 RX ADMIN — MINOCYCLINE HYDROCHLORIDE SCH MG: 50 CAPSULE ORAL at 06:10

## 2019-12-16 RX ADMIN — CLOBETASOL PROPIONATE SCH APPLIC: 0.5 OINTMENT TOPICAL at 10:11

## 2019-12-16 RX ADMIN — FLUCONAZOLE SCH MG: 100 TABLET ORAL at 10:11

## 2019-12-16 RX ADMIN — ACETAMINOPHEN PRN MG: 160 SOLUTION ORAL at 11:27

## 2019-12-16 RX ADMIN — ACETAMINOPHEN PRN MG: 160 SOLUTION ORAL at 17:32

## 2019-12-16 RX ADMIN — LEVETIRACETAM SCH MG: 100 SOLUTION ORAL at 17:30

## 2019-12-16 RX ADMIN — TAMSULOSIN HYDROCHLORIDE SCH MG: 0.4 CAPSULE ORAL at 10:10

## 2019-12-16 RX ADMIN — SULFAMETHOXAZOLE AND TRIMETHOPRIM SCH MLS/HR: 80; 16 INJECTION, SOLUTION, CONCENTRATE INTRAVENOUS at 11:25

## 2019-12-16 RX ADMIN — INSULIN ASPART SCH UNITS: 100 INJECTION, SOLUTION INTRAVENOUS; SUBCUTANEOUS at 18:35

## 2019-12-16 RX ADMIN — APIXABAN SCH MG: 5 TABLET, FILM COATED ORAL at 18:30

## 2019-12-16 RX ADMIN — LEVETIRACETAM SCH MG: 100 SOLUTION ORAL at 02:36

## 2019-12-16 RX ADMIN — CLOBETASOL PROPIONATE SCH APPLIC: 0.5 OINTMENT TOPICAL at 20:40

## 2019-12-16 RX ADMIN — DAPTOMYCIN SCH MLS/HR: 500 INJECTION, POWDER, LYOPHILIZED, FOR SOLUTION INTRAVENOUS at 18:29

## 2019-12-16 RX ADMIN — LEVETIRACETAM SCH MG: 100 SOLUTION ORAL at 10:10

## 2019-12-16 RX ADMIN — INSULIN ASPART SCH UNITS: 100 INJECTION, SOLUTION INTRAVENOUS; SUBCUTANEOUS at 13:30

## 2019-12-16 RX ADMIN — SULFAMETHOXAZOLE AND TRIMETHOPRIM SCH MLS/HR: 80; 16 INJECTION, SOLUTION, CONCENTRATE INTRAVENOUS at 17:30

## 2019-12-16 RX ADMIN — INSULIN ASPART SCH UNITS: 100 INJECTION, SOLUTION INTRAVENOUS; SUBCUTANEOUS at 06:13

## 2019-12-16 NOTE — PULMONOLGY CRITICAL CARE NOTE
Critical Care - Asmt/Plan


Problems:  


(1) Acute on chronic respiratory failure


(2) Pulmonary embolism


(3) Recurrent fever


(4) Sepsis


(5) ATN (acute tubular necrosis)


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


(7) Diabetes mellitus


(8) Vegetative state


(9) Gastrostomy tube dependent


Respiratory:  monitor respiratory rate, adjust FIO2


Cardiac:  continue to monitor HR/BP


Renal:  F/U I&O


Infectious Disease:  check cultures, continue antibiotics


Gastrointestinal:  hold feedings - for a few hours


Endocrine:  monitor blood sugar


Hematologic:  monitor H/H, transfuse if hgb<8.5, other - on Eliquis


Neurologic:  keep patient comfortable


Prophylaxis:  Protonix


Notes Reviewed:  internist, cardio


Discussed with:  nurses, consultants, 





Critical Care - Objective





Last 24 Hour Vital Signs








  Date Time  Temp Pulse Resp B/P (MAP) Pulse Ox O2 Delivery O2 Flow Rate FiO2


 


12/16/19 10:12  122  145/72    


 


12/16/19 09:30  134 19     30


 


12/16/19 07:00  120 18     30


 


12/16/19 05:28  120 18     30


 


12/16/19 04:00      Mechanical Ventilator  


 


12/16/19 04:00 97.8 114 17 126/80 (95) 100   


 


12/16/19 04:00        30


 


12/16/19 03:34  109      


 


12/16/19 03:28  106 16     30


 


12/16/19 01:41  111 17     30


 


12/16/19 00:00      Mechanical Ventilator  


 


12/16/19 00:00 97.2 104 15 139/89 (106) 100   


 


12/16/19 00:00        30


 


12/15/19 23:37  104 15     30


 


12/15/19 23:34  108      


 


12/15/19 21:47  104 14     30


 


12/15/19 20:38  103  132/81    


 


12/15/19 20:00 97.8 103 14 132/81 (98) 100   


 


12/15/19 20:00  100      


 


12/15/19 20:00        30


 


12/15/19 20:00      Mechanical Ventilator  


 


12/15/19 19:50  100 14     30


 


12/15/19 17:16  95 16     30


 


12/15/19 16:00 98.0 96 15 139/74 (95) 96   


 


12/15/19 16:00  97      


 


12/15/19 16:00      Mechanical Ventilator  


 


12/15/19 16:00        30


 


12/15/19 14:40  100 15     30


 


12/15/19 13:00  95 14     30


 


12/15/19 12:00  97      


 


12/15/19 12:00 98.4 93 15 128/69 (88) 97   


 


12/15/19 12:00        30


 


12/15/19 12:00      Mechanical Ventilator  


 


12/15/19 11:05  95 14     30








Status:  obtunded


Condition:  critical


HEENT:  atraumatic


Neck:  full ROM, trach


Heart:  HR/BP stable


Abdomen:  distended


Extremities:  no C/C/E


Decubiti:  location


Micro:





Microbiology








 Date/Time


Source Procedure


Growth Status


 


 


 12/13/19 16:15


Blood Blood Culture - Preliminary


Gram Negative Bacillus 1 Resulted


 


 12/13/19 16:00


Blood Blood Culture - Preliminary


NO GROWTH AFTER 48 HOURS Resulted








Accucheck:  236





Critical Care - Subjective


ROS Limited/Unobtainable:  Yes


Interval Events:


large volume vomiting


Condition:  critical


EKG Rhythm:  Sinus Rhythm


FI02:  30


Vent Support Breath Rate:  14


Vent Support Mode:  AC


Vent Tidal Volume:  600


Sputum Amount:  Small


PEEP:  5.0


PIP:  29


Tube Feeding Amount:  65


I&O:











Intake and Output  


 


 12/15/19 12/16/19





 19:00 07:00


 


Intake Total 3621.329 ml 2539.666 ml


 


Output Total 3050 ml 2710 ml


 


Balance 571.329 ml -170.334 ml


 


  


 


Free Water 300 ml 200 ml


 


IV Total 2541.329 ml 1559.666 ml


 


Tube Feeding 780 ml 780 ml


 


Output Urine Total 2900 ml 2350 ml


 


Stool Total 150 ml 360 ml








CXR:


no changes


Labs:





Laboratory Tests








Test


  12/16/19


04:00


 


White Blood Count


  14.1 K/UL


(4.8-10.8)  H


 


Red Blood Count


  3.02 M/UL


(4.70-6.10)  L


 


Hemoglobin


  8.3 G/DL


(14.2-18.0)  L


 


Hematocrit


  25.9 %


(42.0-52.0)  L


 


Mean Corpuscular Volume 86 FL (80-99)  


 


Mean Corpuscular Hemoglobin


  27.6 PG


(27.0-31.0)


 


Mean Corpuscular Hemoglobin


Concent 32.2 G/DL


(32.0-36.0)


 


Red Cell Distribution Width


  17.7 %


(11.6-14.8)  H


 


Platelet Count


  535 K/UL


(150-450)  H


 


Mean Platelet Volume


  6.3 FL


(6.5-10.1)  L


 


Neutrophils (%) (Auto)


  72.0 %


(45.0-75.0)


 


Lymphocytes (%) (Auto)


  15.6 %


(20.0-45.0)  L


 


Monocytes (%) (Auto)


  4.1 %


(1.0-10.0)


 


Eosinophils (%) (Auto)


  7.7 %


(0.0-3.0)  H


 


Basophils (%) (Auto)


  0.6 %


(0.0-2.0)


 


Sodium Level


  127 MMOL/L


(136-145)  L


 


Potassium Level


  5.3 MMOL/L


(3.5-5.1)  H


 


Chloride Level


  93 MMOL/L


()  L


 


Carbon Dioxide Level


  25 MMOL/L


(21-32)


 


Anion Gap


  9 mmol/L


(5-15)


 


Blood Urea Nitrogen


  15 mg/dL


(7-18)


 


Creatinine


  1.3 MG/DL


(0.55-1.30)


 


Estimat Glomerular Filtration


Rate > 60 mL/min


(>60)


 


Glucose Level


  223 MG/DL


()  H


 


Calcium Level


  10.4 MG/DL


(8.5-10.1)  H


 


Phosphorus Level


  3.1 MG/DL


(2.5-4.9)


 


Magnesium Level


  1.9 MG/DL


(1.8-2.4)


 


Total Bilirubin


  0.2 MG/DL


(0.2-1.0)


 


Aspartate Amino Transf


(AST/SGOT) 29 U/L (15-37)


 


 


Alanine Aminotransferase


(ALT/SGPT) 23 U/L (12-78)


 


 


Alkaline Phosphatase


  95 U/L


()


 


Total Protein


  9.0 G/DL


(6.4-8.2)  H


 


Albumin


  2.5 G/DL


(3.4-5.0)  L


 


Globulin 6.5 g/dL  


 


Albumin/Globulin Ratio


  0.4 (1.0-2.7)


L

















Yury Pena MD Dec 16, 2019 10:35

## 2019-12-16 NOTE — PROGRESS NOTE
DATE:  12/15/2019

SUBJECTIVE:  The patient is not awake, not alert, afebrile and

hemodynamically stable.



PHYSICAL EXAMINATION:

VITAL SIGNS:  Blood pressure 128/69, his pulse is 93, respirations 15, and

temperature 98.4.

HEENT:  Eyes were normal.  ENT, mucous membranes were moist and intact.

NECK:  Supple with no JVD without lymph nodes.  Tracheostomy site is

clean.

LUNGS:  Clear without rhonchi, rales, or wheezing.  Secretions are small,

thin, and grey.

HEART:  Normal sounds with regular beats.  There is no S3, S4, or

pericardial rub.

ABDOMEN:  Soft and nontender with normal bowel sounds.  Gastrostomy site is

clean.

EXTREMITIES:  Warm without cyanosis, clubbing, or edema.



LABORATORY AND DIAGNOSTIC DATA:  Hemoglobin is 7.7, hematocrit 24.1 with

MCV of 86, WBC 14.2, and platelets 440.  BUN and creatinine are 16 and 1.2

respectively.  Sodium is 130, potassium 5.9, chloride 97, CO2 23, his

calcium is 10.3, phosphorus 3.9, and magnesium 1.7.



IMPRESSION:  The patient has hyperkalemia _____ Kayexalate 30 g via G-tube

daily too.  The patient has a drop in H and H.  Repeat laboratory tests

will be done in the morning.  Family would make a decision by tomorrow,

they want the patient to have terminal decannulation.  Repeat laboratory

tests will be done in the morning.









  ______________________________________________

  Etienne Bloom M.D.





DR:  Reji

D:  12/15/2019 18:26

T:  12/16/2019 00:16

JOB#:  [id number]

CC:

## 2019-12-16 NOTE — NEPHROLOGY PROGRESS NOTE
Assessment/Plan


Problem List:  


(1) Renal failure (ARF), acute on chronic


Assessment:  Cr rising and hyperKalemia





(2) Urinary retention


(3) Acute on chronic respiratory failure


(4) Hypercalcemia


Assessment





Hypercalcemia


Urinary retention, BPH , 


Acute on chronic renal failure


Electrolyte imbalance 


Colostomy


DM


PEG


Vegetative state


Acute on chronic respiratory failure


Plan


Kayexelate as needed


as needed 3% Saline and NS





need to avoid nephrotoxics as possible


urine studies





K and Phos and Mag supplements as needed


pulm support


Aredia for high Ca on 11/28 redose  12/3 and 12/16





Subjective


ROS Limited/Unobtainable:  Yes





Objective


Objective





Last 24 Hour Vital Signs








  Date Time  Temp Pulse Resp B/P (MAP) Pulse Ox O2 Delivery O2 Flow Rate FiO2


 


12/16/19 09:30  134 19     30


 


12/16/19 07:00  120 18     30


 


12/16/19 05:28  120 18     30


 


12/16/19 04:00      Mechanical Ventilator  


 


12/16/19 04:00 97.8 114 17 126/80 (95) 100   


 


12/16/19 04:00        30


 


12/16/19 03:34  109      


 


12/16/19 03:28  106 16     30


 


12/16/19 01:41  111 17     30


 


12/16/19 00:00      Mechanical Ventilator  


 


12/16/19 00:00 97.2 104 15 139/89 (106) 100   


 


12/16/19 00:00        30


 


12/15/19 23:37  104 15     30


 


12/15/19 23:34  108      


 


12/15/19 21:47  104 14     30


 


12/15/19 20:38  103  132/81    


 


12/15/19 20:00 97.8 103 14 132/81 (98) 100   


 


12/15/19 20:00  100      


 


12/15/19 20:00        30


 


12/15/19 20:00      Mechanical Ventilator  


 


12/15/19 19:50  100 14     30


 


12/15/19 17:16  95 16     30


 


12/15/19 16:00 98.0 96 15 139/74 (95) 96   


 


12/15/19 16:00  97      


 


12/15/19 16:00      Mechanical Ventilator  


 


12/15/19 16:00        30


 


12/15/19 14:40  100 15     30


 


12/15/19 13:00  95 14     30


 


12/15/19 12:00  97      


 


12/15/19 12:00 98.4 93 15 128/69 (88) 97   


 


12/15/19 12:00        30


 


12/15/19 12:00      Mechanical Ventilator  


 


12/15/19 11:05  95 14     30

















Intake and Output  


 


 12/15/19 12/16/19





 19:00 07:00


 


Intake Total 3621.329 ml 2539.666 ml


 


Output Total 3050 ml 2710 ml


 


Balance 571.329 ml -170.334 ml


 


  


 


Free Water 300 ml 200 ml


 


IV Total 2541.329 ml 1559.666 ml


 


Tube Feeding 780 ml 780 ml


 


Output Urine Total 2900 ml 2350 ml


 


Stool Total 150 ml 360 ml








Laboratory Tests


12/16/19 04:00: 


White Blood Count 14.1H, Red Blood Count 3.02L, Hemoglobin 8.3L, Hematocrit 

25.9L, Mean Corpuscular Volume 86, Mean Corpuscular Hemoglobin 27.6, Mean 

Corpuscular Hemoglobin Concent 32.2, Red Cell Distribution Width 17.7H, 

Platelet Count 535H, Mean Platelet Volume 6.3L, Neutrophils (%) (Auto) 72.0, 

Lymphocytes (%) (Auto) 15.6L, Monocytes (%) (Auto) 4.1, Eosinophils (%) (Auto) 

7.7H, Basophils (%) (Auto) 0.6, Sodium Level 127L, Potassium Level 5.3H, 

Chloride Level 93L, Carbon Dioxide Level 25, Anion Gap 9, Blood Urea Nitrogen 15

, Creatinine 1.3, Estimat Glomerular Filtration Rate > 60, Glucose Level 223H, 

Calcium Level 10.4H, Total Bilirubin 0.2, Aspartate Amino Transf (AST/SGOT) 29, 

Alanine Aminotransferase (ALT/SGPT) 23, Alkaline Phosphatase 95, Total Protein 

9.0H, Albumin 2.5L, Globulin 6.5, Albumin/Globulin Ratio 0.4L


Height (Feet):  5


Height (Inches):  7.00


Weight (Pounds):  193


General Appearance:  no apparent distress


EENT:  other - trach


Cardiovascular:  tachycardia


Respiratory/Chest:  decreased breath sounds


Abdomen:  distended


Objective


no change











Simone Rocha MD Dec 16, 2019 09:52

## 2019-12-16 NOTE — UROLOGY PROGRESS NOTE
Assessment/Plan


Status:  stable


Assessment/Plan:


1. Urinary retention.


2. BPH.


3. Neurogenic bladder.


4. Incontinence.


5. Pyuria/UTI/colonized.


6. Hematuria.


7. Proteinuria.


8. Renal insufficiency, acute possibly on chronic.


9. Renal cyst.


10. Nephrolithiasis.


11. Renal fullness.


12. Bladder calcifications.


13. POD # 19, cysto/optic urethrotomy.





monitor clinically


mayorga placed 11/27


hand irrigated and do PRN


abx as ordered, pr ID


diflucan added


monitor WBC


f/u on last blood cx


renal fxn stable





Subjective


Allergies:  


Coded Allergies:  


     AZTREONAM (Verified  Allergy, Unknown, 7/23/18)


Subjective


all noted, mayorga draining, non-verbal





Objective





Last 24 Hour Vital Signs








  Date Time  Temp Pulse Resp B/P (MAP) Pulse Ox O2 Delivery O2 Flow Rate FiO2


 


12/16/19 12:49  120 17     30


 


12/16/19 11:04  130 18     30


 


12/16/19 10:12  122  145/72    


 


12/16/19 09:30  134 19     30


 


12/16/19 07:00  120 18     30


 


12/16/19 05:28  120 18     30


 


12/16/19 04:00      Mechanical Ventilator  


 


12/16/19 04:00 97.8 114 17 126/80 (95) 100   


 


12/16/19 04:00        30


 


12/16/19 03:34  109      


 


12/16/19 03:28  106 16     30


 


12/16/19 01:41  111 17     30


 


12/16/19 00:00      Mechanical Ventilator  


 


12/16/19 00:00 97.2 104 15 139/89 (106) 100   


 


12/16/19 00:00        30


 


12/15/19 23:37  104 15     30


 


12/15/19 23:34  108      


 


12/15/19 21:47  104 14     30


 


12/15/19 20:38  103  132/81    


 


12/15/19 20:00 97.8 103 14 132/81 (98) 100   


 


12/15/19 20:00  100      


 


12/15/19 20:00        30


 


12/15/19 20:00      Mechanical Ventilator  


 


12/15/19 19:50  100 14     30


 


12/15/19 17:16  95 16     30


 


12/15/19 16:00 98.0 96 15 139/74 (95) 96   


 


12/15/19 16:00  97      


 


12/15/19 16:00      Mechanical Ventilator  


 


12/15/19 16:00        30


 


12/15/19 14:40  100 15     30

















Intake and Output  


 


 12/15/19 12/16/19





 19:00 07:00


 


Intake Total 3621.329 ml 2539.666 ml


 


Output Total 3050 ml 2710 ml


 


Balance 571.329 ml -170.334 ml


 


  


 


Free Water 300 ml 200 ml


 


IV Total 2541.329 ml 1559.666 ml


 


Tube Feeding 780 ml 780 ml


 


Output Urine Total 2900 ml 2350 ml


 


Stool Total 150 ml 360 ml











Microbiology








 Date/Time


Source Procedure


Growth Status


 


 


 12/13/19 16:15


Blood Blood Culture - Preliminary


Gram Negative Bacillus 1 Resulted





 11/25/19 11:00


Nasal Nares - Final Complete


 


 11/25/19 11:00


Nasal Nares - Final Complete


 


 12/5/19 19:00


Stool Clostridium difficile Toxin Assay - Final Complete


 


 12/12/19 15:45


Urine,Clean Catch Urine Culture - Final


Candida Parapsilosis Complete


 


 12/10/19 16:00


Abdomen Anaerobic Culture - Final


NO ANAEROBES ISOLATED Complete








Current Medications








 Medications


  (Trade)  Dose


 Ordered  Sig/Jan


 Route


 PRN Reason  Start Time


 Stop Time Status Last Admin


Dose Admin


 


 Acetaminophen


  (Tylenol)  650 mg  Q4H  PRN


 GT


 Mild Pain/Temp > 100.6  11/26/19 18:42


 12/26/19 18:41  12/16/19 11:27


 


 


 Apixaban


  (Eliquis)  5 mg  BID


 GT


   12/12/19 18:02


 1/11/20 18:01  12/16/19 10:10


 


 


 Artificial Tears


  (Akwa-Tears)  2 drop  Q12HR


 BOTH EYES


   11/26/19 21:00


 12/24/19 20:59  12/16/19 10:12


 


 


 Baclofen


  (Lioresal)  10 mg  THREE TIMES A  DAY


 GT


   11/27/19 09:00


 12/24/19 17:59  12/16/19 13:26


 


 


 Calcitonin [place]


  (Miacalcin)  1 sprays  DAILY


 NASAL


   11/28/19 12:00


 12/28/19 11:59  12/16/19 10:11


 


 


 Chlorhexidine


 Gluconate


  (Elaine-Hex 2%)  1 applic  DAILY@2000


 TOPIC


   12/6/19 20:00


 1/5/20 19:59  12/15/19 20:37


 


 


 Clobetasol


 Propionate


  (Temovate)  1 applic  EVERY 12  HOURS


 TOPIC


   11/26/19 21:00


 12/24/19 20:59  12/16/19 10:11


 


 


 Daptomycin 550 mg/


 Sodium Chloride  55 ml @ 


 100 mls/hr  Q24H


 IV


   12/13/19 18:00


 12/20/19 17:59  12/15/19 18:10


 


 


 Dextrose


  (Dextrose 50%)  25 ml  Q30M  PRN


 IV


 Hypoglycemia  11/26/19 18:45


 12/24/19 14:44   


 


 


 Dextrose


  (Dextrose 50%)  50 ml  Q30M  PRN


 IV


 Hypoglycemia  11/26/19 18:45


 12/24/19 14:44   


 


 


 Famotidine


  (Pepcid)  20 mg  BID


 GT


   11/28/19 18:00


 12/28/19 17:59  12/16/19 10:10


 


 


 Fenofibrate


  (Tricor)  145 mg  DAILY


 ORAL


   11/27/19 09:00


 12/25/19 08:59  12/16/19 10:11


 


 


 Fluconazole


  (Diflucan)  400 mg  DAILY


 GT


   12/16/19 09:00


 12/21/19 13:46  12/16/19 10:11


 


 


 Insulin Aspart


  (NovoLOG)    EVERY 6  HOURS


 SUBQ


   11/30/19 12:00


 12/24/19 16:29  12/16/19 13:30


 


 


 Levetiracetam


  (Keppra)  1,000 mg  Q8H


 GT


   11/29/19 02:00


 12/29/19 01:59  12/16/19 10:10


 


 


 Metoprolol


 Tartrate


  (Lopressor)  25 mg  Q12HR


 GT


   12/14/19 09:00


 1/13/20 08:59  12/16/19 10:12


 


 


 Minocycline HCl


  (Minocin)  100 mg  Q12HR@0600,1800


 ORAL


   12/11/19 06:00


 12/18/19 05:59  12/16/19 06:10


 


 


 Ondansetron HCl


  (Zofran)  4 mg  Q6H  PRN


 IVP


 Nausea & Vomiting  11/26/19 18:44


 12/26/19 18:43  12/16/19 11:28


 


 


 Pamidronate


 Disodium 90 mg/


 Sodium Chloride  550 ml @ 


 137.5 mls/


 hr  ONCE


 IVPB


   12/16/19 11:00


 12/16/19 15:00  12/16/19 11:27


 


 


 Polyethylene


 Glycol


  (Miralax)  17 gm  DAILYPRN  PRN


 GT


 Constipation  11/26/19 18:44


 12/26/19 18:43   


 


 


 Sitagliptin


 Phosphate


  (Januvia)  50 mg  ACBREAKFAST


 GT


   11/27/19 06:30


 12/25/19 06:29  12/16/19 06:10


 


 


 Sodium


 Polystyrene


 Sulfonate


  (Kayexalate w/


 Sorb 15gm/60ml


 Susp)  30 gm  Q8HR


 ORAL


   12/16/19 14:00


 1/15/20 13:59 UNV  


 


 


 Sodium Chloride  250 ml @ 


 30 mls/hr  ONCE  ONCE


 IV


   12/16/19 11:00


 12/16/19 19:19  12/16/19 11:27


 


 


 Tamsulosin HCl


  (Flomax)  0.4 mg  DAILY


 ORAL


   11/27/19 09:00


 12/27/19 08:59  12/16/19 10:10


 


 


 Trimethoprim/


 Sulfamethoxazole


 28 ml/Dextrose  578 ml @ 


 385.333


 mls/hr  [id number]  BACTRIM


 IV


   12/12/19 16:00


 12/19/19 15:59  12/16/19 11:25


 





Laboratory Tests


12/16/19 04:00: 


White Blood Count 14.1H, Red Blood Count 3.02L, Hemoglobin 8.3L, Hematocrit 

25.9L, Mean Corpuscular Volume 86, Mean Corpuscular Hemoglobin 27.6, Mean 

Corpuscular Hemoglobin Concent 32.2, Red Cell Distribution Width 17.7H, 

Platelet Count 535H, Mean Platelet Volume 6.3L, Neutrophils (%) (Auto) 72.0, 

Lymphocytes (%) (Auto) 15.6L, Monocytes (%) (Auto) 4.1, Eosinophils (%) (Auto) 

7.7H, Basophils (%) (Auto) 0.6, Sodium Level 127L, Potassium Level 5.3H, 

Chloride Level 93L, Carbon Dioxide Level 25, Anion Gap 9, Blood Urea Nitrogen 15

, Creatinine 1.3, Estimat Glomerular Filtration Rate > 60, Glucose Level 223H, 

Calcium Level 10.4H, Phosphorus Level 3.1, Magnesium Level 1.9, Total Bilirubin 

0.2, Aspartate Amino Transf (AST/SGOT) 29, Alanine Aminotransferase (ALT/SGPT) 

23, Alkaline Phosphatase 95, Total Protein 9.0H, Albumin 2.5L, Globulin 6.5, 

Albumin/Globulin Ratio 0.4L


Height (Feet):  5


Height (Inches):  7.00


Weight (Pounds):  193


Objective


 exam stable


mayorga indwelling, yellow/christine urine, some debris





CT C/A/P (12/8) noted











Vinnie Pollard MD Dec 16, 2019 13:34

## 2019-12-17 VITALS — SYSTOLIC BLOOD PRESSURE: 139 MMHG | DIASTOLIC BLOOD PRESSURE: 84 MMHG

## 2019-12-17 VITALS — SYSTOLIC BLOOD PRESSURE: 121 MMHG | DIASTOLIC BLOOD PRESSURE: 80 MMHG

## 2019-12-17 VITALS — SYSTOLIC BLOOD PRESSURE: 112 MMHG | DIASTOLIC BLOOD PRESSURE: 61 MMHG

## 2019-12-17 VITALS — DIASTOLIC BLOOD PRESSURE: 65 MMHG | SYSTOLIC BLOOD PRESSURE: 106 MMHG

## 2019-12-17 VITALS — SYSTOLIC BLOOD PRESSURE: 131 MMHG | DIASTOLIC BLOOD PRESSURE: 89 MMHG

## 2019-12-17 VITALS — SYSTOLIC BLOOD PRESSURE: 128 MMHG | DIASTOLIC BLOOD PRESSURE: 89 MMHG

## 2019-12-17 LAB
ADD MANUAL DIFF: YES
ANION GAP SERPL CALC-SCNC: 12 MMOL/L (ref 5–15)
BUN SERPL-MCNC: 15 MG/DL (ref 7–18)
CALCIUM SERPL-MCNC: 9.9 MG/DL (ref 8.5–10.1)
CHLORIDE SERPL-SCNC: 93 MMOL/L (ref 98–107)
CO2 SERPL-SCNC: 24 MMOL/L (ref 21–32)
CREAT SERPL-MCNC: 1.6 MG/DL (ref 0.55–1.3)
ERYTHROCYTE [DISTWIDTH] IN BLOOD BY AUTOMATED COUNT: 17.8 % (ref 11.6–14.8)
HCT VFR BLD CALC: 30.1 % (ref 42–52)
HGB BLD-MCNC: 9.7 G/DL (ref 14.2–18)
MCV RBC AUTO: 85 FL (ref 80–99)
PLATELET # BLD: 779 K/UL (ref 150–450)
POTASSIUM SERPL-SCNC: 4.9 MMOL/L (ref 3.5–5.1)
RBC # BLD AUTO: 3.55 M/UL (ref 4.7–6.1)
SODIUM SERPL-SCNC: 129 MMOL/L (ref 136–145)
WBC # BLD AUTO: 20.4 K/UL (ref 4.8–10.8)

## 2019-12-17 RX ADMIN — MINOCYCLINE HYDROCHLORIDE SCH MG: 50 CAPSULE ORAL at 18:00

## 2019-12-17 RX ADMIN — INSULIN ASPART SCH UNITS: 100 INJECTION, SOLUTION INTRAVENOUS; SUBCUTANEOUS at 01:10

## 2019-12-17 RX ADMIN — MINOCYCLINE HYDROCHLORIDE SCH MG: 50 CAPSULE ORAL at 05:31

## 2019-12-17 RX ADMIN — INSULIN ASPART SCH UNITS: 100 INJECTION, SOLUTION INTRAVENOUS; SUBCUTANEOUS at 06:10

## 2019-12-17 RX ADMIN — SULFAMETHOXAZOLE AND TRIMETHOPRIM SCH MLS/HR: 80; 16 INJECTION, SOLUTION, CONCENTRATE INTRAVENOUS at 08:31

## 2019-12-17 RX ADMIN — LEVETIRACETAM SCH MG: 100 SOLUTION ORAL at 09:42

## 2019-12-17 RX ADMIN — ACETAMINOPHEN PRN MG: 160 SOLUTION ORAL at 03:46

## 2019-12-17 RX ADMIN — APIXABAN SCH MG: 5 TABLET, FILM COATED ORAL at 09:43

## 2019-12-17 RX ADMIN — FLUCONAZOLE SCH MG: 100 TABLET ORAL at 09:42

## 2019-12-17 RX ADMIN — INSULIN ASPART SCH UNITS: 100 INJECTION, SOLUTION INTRAVENOUS; SUBCUTANEOUS at 13:03

## 2019-12-17 RX ADMIN — SULFAMETHOXAZOLE AND TRIMETHOPRIM SCH MLS/HR: 80; 16 INJECTION, SOLUTION, CONCENTRATE INTRAVENOUS at 17:10

## 2019-12-17 RX ADMIN — DAPTOMYCIN SCH MLS/HR: 500 INJECTION, POWDER, LYOPHILIZED, FOR SOLUTION INTRAVENOUS at 18:28

## 2019-12-17 RX ADMIN — CLOBETASOL PROPIONATE SCH APPLIC: 0.5 OINTMENT TOPICAL at 08:32

## 2019-12-17 RX ADMIN — TAMSULOSIN HYDROCHLORIDE SCH MG: 0.4 CAPSULE ORAL at 09:43

## 2019-12-17 RX ADMIN — LEVETIRACETAM SCH MG: 100 SOLUTION ORAL at 01:09

## 2019-12-17 RX ADMIN — LEVETIRACETAM SCH MG: 100 SOLUTION ORAL at 18:00

## 2019-12-17 RX ADMIN — SITAGLIPTIN SCH MG: 50 TABLET, FILM COATED ORAL at 05:32

## 2019-12-17 RX ADMIN — INSULIN ASPART SCH UNITS: 100 INJECTION, SOLUTION INTRAVENOUS; SUBCUTANEOUS at 18:30

## 2019-12-17 RX ADMIN — SULFAMETHOXAZOLE AND TRIMETHOPRIM SCH MLS/HR: 80; 16 INJECTION, SOLUTION, CONCENTRATE INTRAVENOUS at 00:52

## 2019-12-17 RX ADMIN — APIXABAN SCH MG: 5 TABLET, FILM COATED ORAL at 18:00

## 2019-12-17 NOTE — NEPHROLOGY PROGRESS NOTE
Assessment/Plan


Problem List:  


(1) Renal failure (ARF), acute on chronic


Assessment:  Cr rising and hyperKalemia





(2) Urinary retention


(3) Acute on chronic respiratory failure


(4) Hypercalcemia


Assessment





Hypercalcemia


Urinary retention, BPH , 


Acute on chronic renal failure


Electrolyte imbalance 


Colostomy


DM


PEG


Vegetative state


Acute on chronic respiratory failure


Plan





Kayexelate as needed


as needed 3% Saline and NS





need to avoid nephrotoxics as possible


urine studies





K and Phos and Mag supplements as needed


pulm support


Aredia for high Ca on 11/28 redose  12/3 and 12/16





Subjective


ROS Limited/Unobtainable:  Yes





Objective


Objective





Last 24 Hour Vital Signs








  Date Time  Temp Pulse Resp B/P (MAP) Pulse Ox O2 Delivery O2 Flow Rate FiO2


 


12/17/19 12:00 98.1 168 24 112/61 (78) 100   


 


12/17/19 12:00      Mechanical Ventilator  


 


12/17/19 12:00        30


 


12/17/19 11:11  77 25     30


 


12/17/19 09:43  175  139/84    


 


12/17/19 08:45  169 22     30


 


12/17/19 08:00        30


 


12/17/19 08:00 98.6 175 24 139/84 (102) 100   


 


12/17/19 08:00      Mechanical Ventilator  


 


12/17/19 08:00  155      


 


12/17/19 07:27  170 24     30


 


12/17/19 07:00 98.6 160 22 121/80 (94) 100   


 


12/17/19 05:29  160 21     30


 


12/17/19 04:16 100.4       


 


12/17/19 04:00        30


 


12/17/19 04:00  149      


 


12/17/19 04:00      Mechanical Ventilator  


 


12/17/19 04:00 100.1 145 22 128/89 (102) 100   


 


12/17/19 02:50  138 21     30


 


12/17/19 02:00        30


 


12/17/19 01:10  144 22     30


 


12/17/19 00:00 99.3 134 22 131/89 (103) 94   


 


12/17/19 00:00  145      


 


12/17/19 00:00      Mechanical Ventilator  


 


12/16/19 22:45  145 21     30


 


12/16/19 21:14  143 21     30


 


12/16/19 20:41  138  130/70    


 


12/16/19 20:00 99.3 138 20 130/70 (90) 100   


 


12/16/19 20:00        30


 


12/16/19 20:00  136      


 


12/16/19 20:00      Mechanical Ventilator  


 


12/16/19 18:57  133 19     30


 


12/16/19 17:08  114 18     30


 


12/16/19 16:00  127      


 


12/16/19 16:00      Mechanical Ventilator  


 


12/16/19 16:00 99.1 127 20 133/92 (106) 100   


 


12/16/19 16:00        30


 


12/16/19 14:46  112 18     30

















Intake and Output  


 


 12/16/19 12/17/19





 19:00 07:00


 


Intake Total 2650.666 ml 838.000 ml


 


Output Total 1100 ml 


 


Balance 1550.666 ml 838.000 ml


 


  


 


IV Total 2585.666 ml 578.000 ml


 


Tube Feeding 65 ml 260 ml


 


Output Urine Total 1100 ml 








Laboratory Tests


12/17/19 03:00: 


White Blood Count 20.4H, Red Blood Count 3.55L, Hemoglobin 9.7L, Hematocrit 

30.1L, Mean Corpuscular Volume 85, Mean Corpuscular Hemoglobin 27.4, Mean 

Corpuscular Hemoglobin Concent 32.3, Red Cell Distribution Width 17.8H, 

Platelet Count 779H, Mean Platelet Volume 6.5, Neutrophils (%) (Auto) , 

Lymphocytes (%) (Auto) , Monocytes (%) (Auto) , Eosinophils (%) (Auto) , 

Basophils (%) (Auto) , Differential Total Cells Counted 100, Neutrophils % (

Manual) 74, Lymphocytes % (Manual) 11L, Monocytes % (Manual) 9, Eosinophils % (

Manual) 3, Basophils % (Manual) 0, Band Neutrophils 3, Platelet Estimate 

IncreasedH, Platelet Morphology Normal, Hypochromasia 2+, Anisocytosis 1+, 

Spherocytes 1+, Sodium Level 129L, Potassium Level 4.9, Chloride Level 93L, 

Carbon Dioxide Level 24, Anion Gap 12, Blood Urea Nitrogen 15, Creatinine 1.6H, 

Estimat Glomerular Filtration Rate 55.3, Glucose Level 324#H, Calcium Level 9.9


Height (Feet):  5


Height (Inches):  7.00


Weight (Pounds):  193


General Appearance:  no apparent distress


EENT:  other - trach


Cardiovascular:  tachycardia


Respiratory/Chest:  decreased breath sounds


Abdomen:  distended


Objective


no change











Simone Rocha MD Dec 17, 2019 13:25

## 2019-12-17 NOTE — CONSULTATION
History of Present Illness


General


Date patient seen:  Dec 17, 2019


Chief Complaint:  General Complaint


Reason for Consultation:  SBO





Present Illness


HPI


This is a 54-year-old male very unfortunate with multiple medical comorbidities 

who I have cared for in the past that is currently been admitted and under 

medical management for near 17 days was identified to have decreased colostomy 

output and a KUB with concerns for possible SBO versus ileus.  Surgery was 

called to evaluate.  Patient seen, patient evaluated, chart reviewed.  Labs 

noted.  Imaging reviewed.  Unfortunately patient unable to respond and provide 

history.  Patient with extensive medical history and extensive medical care 

since admission.  Took over an hour to review all the records.  Patient with 

history of colostomy right-sided.  Patient with history of ex lap in the past.  

Patient with G-tube.  Patient with trach.  Patient on vent support.


Allergies:  


Coded Allergies:  


     AZTREONAM (Verified  Allergy, Unknown, 7/23/18)





Medication History


Scheduled


Baclofen* (Baclofen*), 10 MG GT THREE TIMES A DAY, (Reported)


Chlorhexidine Gluconate (Peridex), 15 ML PO Q12HR, (Reported)


Cholecalciferol (Vitamin D3) (Vitamin D3), 5,000 UNIT GT DAILY, (Reported)


Clobetasol Propionate/Emoll (Clobetasol Emollient 0.05% Crm), 15 GM TP DAILY, (

Reported)


Cranberry Extract (Cranberry), 425 MG GT DAILY, (Reported)


Cranberry Extract (Cranberry), 425 MG GT DAILY, (Reported)


Dextran 70/Hypromellose (Artificial Tears Eye Drops*), 1 DROP BOTH EYES BID, (

Reported)


Fenofibrate,Micronized (Fenofibrate), 200 MG GT DAILY, (Reported)


Heparin Sod (Porcine) (Heparin Sodium*), 5,000 UNITS SUBQ EVERY 12 HOURS, (

Reported)


Insulin Regular, Human* (Novolin R*), 0 SUBQ .SLIDING SCALE, (Reported)


Ipratropium/Albuterol Sulfate (DuoNeb 0.5-3(2.5)mg/3ml), 3 ML HHN Q6HR, (

Reported)


Lansoprazole* (Lansoprazole*), 30 MG GT DAILY, (Reported)


Levetiracetam* (Levetiracetam*), 1,000 MG GT TID, (Reported)


Multivitamin With Minerals (Multivitamins With Minerals*), 1 TAB GT DAILY, (

Reported)


Nph, Human Insulin Isophane* (Novolin N*), 15 UNITS SUBQ BID, (Reported)


Sitagliptin (Januvia), 50 MG GT DAILY, (Reported)


Zinc Oxide (Skin Protectant), 1 APPLIC TP DAILY, (Reported)





Scheduled PRN


Acetaminophen 160MG/5ML* (Acetaminophen*), 20.3 ML GT Q4HR PRN for Fever/

Headache/Mild Pain, (Reported)


Ipratropium/Albuterol Sulfate (DuoNeb 0.5-3(2.5)mg/3ml), 3 ML HHN Q2HR PRN for 

Shortness of Breath, (Reported)


Polyethylene Glycol 3350* (Miralax*), 17 GM GT DAILY PRN for Constipation, (

Reported)





Patient History


Limited by:  medical condition


History Provided By:  Medical Record, PMD


Healthcare decision maker


darin Ribeiro


Resuscitation status


Full Code


Advanced Directive on File








Past Medical/Surgical History


Past Medical/Surgical History:  


(1) ATN (acute tubular necrosis)


(2) Urinary retention


(3) Hypercalcemia


(4) Renal failure (ARF), acute on chronic


(5) Pulmonary embolism


(6) Recurrent fever


(7) Diabetes mellitus


(8) Vegetative state


(9) Colostomy in place


(10) Gastrostomy tube dependent


(11) Acute on chronic respiratory failure


(12) Sepsis





Review of Systems


ROS Narrative


Cannot obtain given patient's current medical condition and baseline





Physical Exam


General Appearance:  mild distress


Lines, tubes and drains:  trach, gtube, other


HEENT:  normocephalic, mucous membranes moist, status post trach


Neck:  supple, trach


Respiratory/Chest:  no respiratory distress, no accessory muscle use, decreased 

breath sounds, on vent


Cardiovascular/Chest:  tachycardia, other


Abdomen:  soft, distended, feeding tube, other - Right-sided colostomy prior 

midline feeding tube


Extremities:  moderate edema


Skin Exam:  other


Neurologic:  unresponsiveness





Last 24 Hour Vital Signs








  Date Time  Temp Pulse Resp B/P (MAP) Pulse Ox O2 Delivery O2 Flow Rate FiO2


 


12/17/19 15:02  148 26     30


 


12/17/19 13:29  151 22     30


 


12/17/19 12:00 98.1 168 24 112/61 (78) 100   


 


12/17/19 12:00      Mechanical Ventilator  


 


12/17/19 12:00        30


 


12/17/19 11:53  167      


 


12/17/19 11:11  157 25     30


 


12/17/19 09:43  175  139/84    


 


12/17/19 08:45  169 22     30


 


12/17/19 08:00        30


 


12/17/19 08:00 98.6 175 24 139/84 (102) 100   


 


12/17/19 08:00      Mechanical Ventilator  


 


12/17/19 08:00  155      


 


12/17/19 07:27  170 24     30


 


12/17/19 07:00 98.6 160 22 121/80 (94) 100   


 


12/17/19 05:29  160 21     30


 


12/17/19 04:16 100.4       


 


12/17/19 04:00        30


 


12/17/19 04:00  149      


 


12/17/19 04:00      Mechanical Ventilator  


 


12/17/19 04:00 100.1 145 22 128/89 (102) 100   


 


12/17/19 02:50  138 21     30


 


12/17/19 02:00        30


 


12/17/19 01:10  144 22     30


 


12/17/19 00:00 99.3 134 22 131/89 (103) 94   


 


12/17/19 00:00  145      


 


12/17/19 00:00      Mechanical Ventilator  


 


12/16/19 22:45  145 21     30


 


12/16/19 21:14  143 21     30


 


12/16/19 20:41  138  130/70    


 


12/16/19 20:00 99.3 138 20 130/70 (90) 100   


 


12/16/19 20:00        30


 


12/16/19 20:00  136      


 


12/16/19 20:00      Mechanical Ventilator  


 


12/16/19 18:57  133 19     30


 


12/16/19 17:08  114 18     30

















Intake and Output  


 


 12/16/19 12/17/19





 18:59 06:59


 


Intake Total 2677.332 ml 958.000 ml


 


Output Total 1100 ml 


 


Balance 1577.332 ml 958.000 ml


 


  


 


IV Total 2612.332 ml 633.000 ml


 


Tube Feeding 65 ml 325 ml


 


Output Urine Total 1100 ml 











Laboratory Tests








Test


  12/17/19


03:00


 


White Blood Count


  20.4 K/UL


(4.8-10.8)  H


 


Red Blood Count


  3.55 M/UL


(4.70-6.10)  L


 


Hemoglobin


  9.7 G/DL


(14.2-18.0)  L


 


Hematocrit


  30.1 %


(42.0-52.0)  L


 


Mean Corpuscular Volume 85 FL (80-99)  


 


Mean Corpuscular Hemoglobin


  27.4 PG


(27.0-31.0)


 


Mean Corpuscular Hemoglobin


Concent 32.3 G/DL


(32.0-36.0)


 


Red Cell Distribution Width


  17.8 %


(11.6-14.8)  H


 


Platelet Count


  779 K/UL


(150-450)  H


 


Mean Platelet Volume


  6.5 FL


(6.5-10.1)


 


Neutrophils (%) (Auto)


  % (45.0-75.0)


 


 


Lymphocytes (%) (Auto)


  % (20.0-45.0)


 


 


Monocytes (%) (Auto)  % (1.0-10.0)  


 


Eosinophils (%) (Auto)  % (0.0-3.0)  


 


Basophils (%) (Auto)  % (0.0-2.0)  


 


Differential Total Cells


Counted 100  


 


 


Neutrophils % (Manual) 74 % (45-75)  


 


Lymphocytes % (Manual) 11 % (20-45)  L


 


Monocytes % (Manual) 9 % (1-10)  


 


Eosinophils % (Manual) 3 % (0-3)  


 


Basophils % (Manual) 0 % (0-2)  


 


Band Neutrophils 3 % (0-8)  


 


Platelet Estimate Increased  H


 


Platelet Morphology Normal  


 


Hypochromasia 2+  


 


Anisocytosis 1+  


 


Spherocytes 1+  


 


Sodium Level


  129 MMOL/L


(136-145)  L


 


Potassium Level


  4.9 MMOL/L


(3.5-5.1)


 


Chloride Level


  93 MMOL/L


()  L


 


Carbon Dioxide Level


  24 MMOL/L


(21-32)


 


Anion Gap


  12 mmol/L


(5-15)


 


Blood Urea Nitrogen


  15 mg/dL


(7-18)


 


Creatinine


  1.6 MG/DL


(0.55-1.30)  H


 


Estimat Glomerular Filtration


Rate 55.3 mL/min


(>60)


 


Glucose Level


  324 MG/DL


()  #H


 


Calcium Level


  9.9 MG/DL


(8.5-10.1)








Height (Feet):  5


Height (Inches):  7.00


Weight (Pounds):  193


Medications





Current Medications








 Medications


  (Trade)  Dose


 Ordered  Sig/Jan


 Route


 PRN Reason  Start Time


 Stop Time Status Last Admin


Dose Admin


 


 Acetaminophen


  (Tylenol)  650 mg  Q4H  PRN


 GT


 Mild Pain/Temp > 100.6  11/26/19 18:42


 12/26/19 18:41  12/17/19 03:46


 


 


 Apixaban


  (Eliquis)  5 mg  BID


 GT


   12/12/19 18:02


 1/11/20 18:01  12/17/19 09:43


 


 


 Artificial Tears


  (Akwa-Tears)  2 drop  Q12HR


 BOTH EYES


   11/26/19 21:00


 12/24/19 20:59  12/17/19 08:32


 


 


 Baclofen


  (Lioresal)  10 mg  THREE TIMES A  DAY


 GT


   11/27/19 09:00


 12/24/19 17:59  12/17/19 13:58


 


 


 Calcitonin Alfred


  (Miacalcin)  1 sprays  DAILY


 NASAL


   11/28/19 12:00


 12/28/19 11:59  12/17/19 08:32


 


 


 Chlorhexidine


 Gluconate


  (Elaine-Hex 2%)  1 applic  DAILY@2000


 TOPIC


   12/6/19 20:00


 1/5/20 19:59  12/16/19 20:39


 


 


 Clobetasol


 Propionate


  (Temovate)  1 applic  EVERY 12  HOURS


 TOPIC


   11/26/19 21:00


 12/24/19 20:59  12/17/19 08:32


 


 


 Daptomycin 550 mg/


 Sodium Chloride  55 ml @ 


 100 mls/hr  Q24H


 IV


   12/13/19 18:00


 12/20/19 17:59  12/16/19 18:29


 


 


 Dextrose


  (Dextrose 50%)  25 ml  Q30M  PRN


 IV


 Hypoglycemia  11/26/19 18:45


 12/24/19 14:44   


 


 


 Dextrose


  (Dextrose 50%)  50 ml  Q30M  PRN


 IV


 Hypoglycemia  11/26/19 18:45


 12/24/19 14:44   


 


 


 Famotidine


  (Pepcid)  20 mg  BID


 GT


   11/28/19 18:00


 12/28/19 17:59  12/17/19 09:43


 


 


 Fenofibrate


  (Tricor)  145 mg  DAILY


 ORAL


   11/27/19 09:00


 12/25/19 08:59  12/17/19 09:43


 


 


 Fluconazole


  (Diflucan)  400 mg  DAILY


 GT


   12/16/19 09:00


 12/21/19 13:46  12/17/19 09:42


 


 


 Insulin Aspart


  (NovoLOG)    EVERY 6  HOURS


 SUBQ


   11/30/19 12:00


 12/24/19 16:29  12/17/19 13:03


 


 


 Levetiracetam


  (Keppra)  1,000 mg  Q8H


 GT


   11/29/19 02:00


 12/29/19 01:59  12/17/19 09:42


 


 


 Metoprolol


 Tartrate


  (Lopressor)  25 mg  Q12HR


 GT


   12/14/19 09:00


 1/13/20 08:59  12/17/19 09:43


 


 


 Minocycline HCl


  (Minocin)  100 mg  Q12HR@0600,1800


 ORAL


   12/11/19 06:00


 12/18/19 05:59  12/17/19 05:31


 


 


 Ondansetron HCl


  (Zofran)  4 mg  Q6H  PRN


 IVP


 Nausea & Vomiting  11/26/19 18:44


 12/26/19 18:43  12/16/19 11:28


 


 


 Polyethylene


 Glycol


  (Miralax)  17 gm  DAILYPRN  PRN


 GT


 Constipation  11/26/19 18:44


 12/26/19 18:43   


 


 


 Sitagliptin


 Phosphate


  (Januvia)  50 mg  ACBREAKFAST


 GT


   11/27/19 06:30


 12/25/19 06:29  12/17/19 05:32


 


 


 Tamsulosin HCl


  (Flomax)  0.4 mg  DAILY


 ORAL


   11/27/19 09:00


 12/27/19 08:59  12/17/19 09:43


 


 


 Trimethoprim/


 Sulfamethoxazole


 28 ml/Dextrose  578 ml @ 


 385.333


 mls/hr  [id number]  BACTRIM


 IV


   12/12/19 16:00


 12/19/19 15:59  12/17/19 08:31


 











Assessment/Plan


Problem List:  


(1) Small bowel obstruction


Assessment & Plan:  54-year-old male currently admitted to [facility] under medical care and management for greater than 2 weeks.  Patient 

requested saline identified to have more distended abdomen, decreased colostomy 

output, high residuals of feeds, drainage around G-tube site, KUB with small 

bowel obstruction versus ileus.  Patient with very complicated medical and 

surgical history.  Large sacral decubitus ulcer been cared for in the past.  

Patient unable to provide any history review of systems or examination 

participation.  Patient is very ill currently requiring significant support.


Greater 1 hour spent reviewing patient's medical records and current 

hospitalization history.


On examination patient distended firm cannot assess tenderness ostomy viable 

and intact G-tube in place with dark coffee-ground leakage around it.


KUB noted.





Recommend GI evaluation as well for potential GI bleed


G-tube to suction


PPI


A.m. KUB


Will follow with serial abdominal examinations


patient very ill, chronic comorbidities, non responsive, dnr/dni, would not 

recommend any surgical intervention at this time as exploration would not 

benefit patient unless plans for continued full care.  


not sure if he would medically benefit from exploration


unsure how long he has been obstructed for


possible and likely ileus from chronic illness 


CT from 12/8 without similar findings as KUB on 12/17


will monitor closely


thank you 








Findings: There are dilated small bowel loops in the left mid abdomen. These 

are not


evident on prior CT scan. Some gas is seen in the colon. No masses or unusual


calcifications are demonstrated.


 


Impression: Dilated left mid abdominal small bowel loops. This may reflect small


bowel obstruction or ileus.


 





IMPRESSION:     


1.  Heterogeneous large areas of hypoattenuation in the right lobe of the 


liver, maybe fatty changes, correlate for hepatitis or other process.  No 


formed fluid collection to suggest an abscess.


2.  Contracted gallbladder with thickened enhancing wall.


3.  Left lateral abdominal wall musculature 1.5 x 2.2 cm fluid collection 


with enhancing worrisome for an abscess.


4.  Bilateral renal stones, staghorn type in the left kidney, 


nonobstructive.  Mild fullness of the right renal pelvis.  Mild bilateral 


perinephric stranding, correlate for infection.


5.  Guzmán catheter in urinary bladder.  Thickening of the urinary bladder 


with mild stranding may be cystitis. A few small calcifications in the 


left urinary bladder base.


ICD Codes:  K56.609 - Unspecified intestinal obstruction, unspecified as to 

partial versus complete obstruction


SNOMED:  [id number]











TrinoолегBrian Dec 17, 2019 16:45

## 2019-12-17 NOTE — PROGRESS NOTE
DATE:  12/16/2019

CARDIOLOGY PROGRESS NOTE



SUBJECTIVE:  The patient has had episodes of increasing heart rates now.

In the past this has corresponded to new infections.



OBJECTIVE:

VITAL SIGNS:  Blood pressure 130/70, pulse 138, respirations 18.

LUNGS:  Bilateral breath sounds.

CARDIAC:  Regular rhythm, rapid rate.

ABDOMEN:  Soft.  G-tube intact.



LABORATORY DATA:  White count 14, hemoglobin 8.3.  Potassium 5.3, sodium

127.



IMPRESSION:

1. Sinus tachycardia.

2. Pulmonary embolism.

3. Sepsis.

4. Respiratory failure.

5. Hyponatremia.

6. Hyperkalemia.

7. Hyperchloremia.

8. Severe protein-calorie malnutrition.



PLAN:

1. Saline hydration.

2. Infectious Disease followup.

3. Full anticoagulation.

4. Consideration for more traditional beta-blocker therapy for

suppression of _____ in the setting of increased PA systolic pressure on

the right side.









  ______________________________________________

  Robert Chan M.D.





DR:  OLGA

D:  12/17/2019 00:30

T:  12/17/2019 02:05

JOB#:  [id number]

CC:

## 2019-12-17 NOTE — EMERGENCY ROOM REPORT
History of Present Illness


General


Chief Complaint:  General Complaint


Source:  Medical Record, PMD





Present Illness


Allergies:  


Coded Allergies:  


     AZTREONAM (Verified  Allergy, Unknown, 7/23/18)





Nursing Documentation-PMH


Past Medical History Deferred:  Patient Unconscious


Hx Hypertension:  Yes


Hx COPD:  Yes


Hx Diabetes:  Yes - Type 2


Hx Cancer:  No


Hx Gastrointestinal Problems:  Yes - Colostomy


Hx Neurological Problems:  Yes


Hx Cerebrovascular Accident:  Yes


Hx Seizures:  Yes


Hx Dysphasia:  Yes





Physical Exam





Vital Signs








  Date Time  Temp Pulse Resp B/P (MAP) Pulse Ox O2 Delivery O2 Flow Rate FiO2


 


12/13/19 08:00  118      


 


12/13/19 08:00        30


 


12/13/19 08:00 99.4  15 121/78 (92) 100   


 


12/13/19 08:00      Mechanical Ventilator  











Medical Decision Making


Diagnostic Impression:  


 Primary Impression:  


 Sepsis


 Additional Impressions:  


 Fever


 Hyperglycemia


 Tachycardia


 Acute on chronic renal insufficiency


ER Course


Call to medical stepdown unit to pronounce patient.  In short this is a 54-year-

old male with multiple comorbidities admitted to hospital for sepsis.  He was 

found to be in asystole at 1843.  He was on trach to vent with no spontaneous 

respirations.  Pupils were not responsive to light.  Family had made patient 

DNR 2 days prior as per nursing documentation.  Time of death was called at 

1903.  Family next of kin Vanda Ribeiro was notified by nursing staff.  

Primary care physician was informed by nursing supervisor.





Last Vital Signs








  Date Time  Temp Pulse Resp B/P (MAP) Pulse Ox O2 Delivery O2 Flow Rate FiO2


 


12/17/19 17:16  140 26     30


 


12/17/19 16:00 98.2   106/65 (79) 100   


 


12/17/19 16:00      Mechanical Ventilator  








Disposition:  ADMITTED AS INPATIENT


Condition:  Critical


Referrals:  


Etienne Bloom MD (PCP)











Hector Hanley M.D. Dec 17, 2019 22:52

## 2019-12-17 NOTE — PULMONOLGY CRITICAL CARE NOTE
Critical Care - Asmt/Plan


Problems:  


(1) Acute on chronic respiratory failure


(2) Pulmonary embolism


(3) Recurrent fever


(4) Sepsis


(5) ATN (acute tubular necrosis)


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


(7) Diabetes mellitus


(8) Vegetative state


(9) Gastrostomy tube dependent


Respiratory:  monitor respiratory rate, adjust FIO2, CXR


Cardiac:  continue to monitor HR/BP


Renal:  F/U I&O


Infectious Disease:  check cultures, continue antibiotics


Gastrointestinal:  continue feedings/current rate


Endocrine:  monitor blood sugar, other - kub reviewed, No acute changes


Neurologic:  PRN Ativan, PRN Morphine


Affect:  PRN ativan


Prophylaxis:  Protonix


Time Spent (Minutes):  40


Notes Reviewed:  internist, renal


Discussed with:  nurses, consultants, 





Critical Care - Objective





Last 24 Hour Vital Signs








  Date Time  Temp Pulse Resp B/P (MAP) Pulse Ox O2 Delivery O2 Flow Rate FiO2


 


12/17/19 09:43  175  139/84    


 


12/17/19 08:45  169 22     30


 


12/17/19 08:00 98.6 175 24 139/84 (102) 100   


 


12/17/19 07:27  170 24     30


 


12/17/19 07:00 98.6 160 22 121/80 (94) 100   


 


12/17/19 05:29  160 21     30


 


12/17/19 04:16 100.4       


 


12/17/19 04:00        30


 


12/17/19 04:00  149      


 


12/17/19 04:00      Mechanical Ventilator  


 


12/17/19 04:00 100.1 145 22 128/89 (102) 100   


 


12/17/19 02:50  138 21     30


 


12/17/19 02:00        30


 


12/17/19 01:10  144 22     30


 


12/17/19 00:00 99.3 134 22 131/89 (103) 94   


 


12/17/19 00:00  145      


 


12/17/19 00:00      Mechanical Ventilator  


 


12/16/19 22:45  145 21     30


 


12/16/19 21:14  143 21     30


 


12/16/19 20:41  138  130/70    


 


12/16/19 20:00 99.3 138 20 130/70 (90) 100   


 


12/16/19 20:00        30


 


12/16/19 20:00  136      


 


12/16/19 20:00      Mechanical Ventilator  


 


12/16/19 18:57  133 19     30


 


12/16/19 17:08  114 18     30


 


12/16/19 16:00  127      


 


12/16/19 16:00      Mechanical Ventilator  


 


12/16/19 16:00 99.1 127 20 133/92 (106) 100   


 


12/16/19 16:00        30


 


12/16/19 14:46  112 18     30


 


12/16/19 12:49  120 17     30


 


12/16/19 12:00 99.3 140 20 133/94 (107) 100   


 


12/16/19 12:00        30


 


12/16/19 12:00  122      


 


12/16/19 12:00      Mechanical Ventilator  


 


12/16/19 11:04  130 18     30


 


12/16/19 10:12  122  145/72    








Status:  obtunded


Condition:  critical


HEENT:  atraumatic


Lungs:  rales, rhonchi


Heart:  HR/BP stable


Abdomen:  soft, active bowel sounds


Extremities:  no C/C/E


Micro:





Microbiology








 Date/Time


Source Procedure


Growth Status


 


 


 12/15/19 15:10


Blood Blood Culture - Preliminary


NO GROWTH AFTER 24 HOURS Resulted


 


 12/15/19 14:40


Blood Blood Culture - Preliminary


Gram Negative Bacillus 1 Resulted








Accucheck:  300





Critical Care - Subjective


ROS Limited/Unobtainable:  Yes


Condition:  critical


EKG Rhythm:  Sinus Rhythm


FI02:  30


Vent Support Breath Rate:  14


Vent Support Mode:  AC


Vent Tidal Volume:  600


Sputum Amount:  Small


PEEP:  5.0


PIP:  33


Tube Feeding Amount:  65


I&O:











Intake and Output  


 


 12/16/19 12/17/19





 19:00 07:00


 


Intake Total 2650.666 ml 838.000 ml


 


Output Total 1100 ml 


 


Balance 1550.666 ml 838.000 ml


 


  


 


IV Total 2585.666 ml 578.000 ml


 


Tube Feeding 65 ml 260 ml


 


Output Urine Total 1100 ml 








Labs:





Laboratory Tests








Test


  12/17/19


03:00


 


White Blood Count


  20.4 K/UL


(4.8-10.8)  H


 


Red Blood Count


  3.55 M/UL


(4.70-6.10)  L


 


Hemoglobin


  9.7 G/DL


(14.2-18.0)  L


 


Hematocrit


  30.1 %


(42.0-52.0)  L


 


Mean Corpuscular Volume 85 FL (80-99)  


 


Mean Corpuscular Hemoglobin


  27.4 PG


(27.0-31.0)


 


Mean Corpuscular Hemoglobin


Concent 32.3 G/DL


(32.0-36.0)


 


Red Cell Distribution Width


  17.8 %


(11.6-14.8)  H


 


Platelet Count


  779 K/UL


(150-450)  H


 


Mean Platelet Volume


  6.5 FL


(6.5-10.1)


 


Neutrophils (%) (Auto)


  % (45.0-75.0)


 


 


Lymphocytes (%) (Auto)


  % (20.0-45.0)


 


 


Monocytes (%) (Auto)  % (1.0-10.0)  


 


Eosinophils (%) (Auto)  % (0.0-3.0)  


 


Basophils (%) (Auto)  % (0.0-2.0)  


 


Differential Total Cells


Counted 100  


 


 


Neutrophils % (Manual) 74 % (45-75)  


 


Lymphocytes % (Manual) 11 % (20-45)  L


 


Monocytes % (Manual) 9 % (1-10)  


 


Eosinophils % (Manual) 3 % (0-3)  


 


Basophils % (Manual) 0 % (0-2)  


 


Band Neutrophils 3 % (0-8)  


 


Platelet Estimate Increased  H


 


Platelet Morphology Normal  


 


Hypochromasia 2+  


 


Anisocytosis 1+  


 


Spherocytes 1+  


 


Sodium Level


  129 MMOL/L


(136-145)  L


 


Potassium Level


  4.9 MMOL/L


(3.5-5.1)


 


Chloride Level


  93 MMOL/L


()  L


 


Carbon Dioxide Level


  24 MMOL/L


(21-32)


 


Anion Gap


  12 mmol/L


(5-15)


 


Blood Urea Nitrogen


  15 mg/dL


(7-18)


 


Creatinine


  1.6 MG/DL


(0.55-1.30)  H


 


Estimat Glomerular Filtration


Rate 55.3 mL/min


(>60)


 


Glucose Level


  324 MG/DL


()  #H


 


Calcium Level


  9.9 MG/DL


(8.5-10.1)

















Yury Pena MD Dec 17, 2019 10:08

## 2019-12-17 NOTE — DIAGNOSTIC IMAGING REPORT
Indication: Vomiting

 

Technique: Supine view of the abdomen

 

Comparison:  image from CT scan 12/8/2019

 

Findings: There are dilated small bowel loops in the left mid abdomen. These are not

evident on prior CT scan. Some gas is seen in the colon. No masses or unusual

calcifications are demonstrated.

 

Impression: Dilated left mid abdominal small bowel loops. This may reflect small

bowel obstruction or ileus.

 

This agrees with the preliminary interpretation provided overnight by [facility]

teleradiology service.

## 2019-12-17 NOTE — UROLOGY PROGRESS NOTE
Assessment/Plan


Status:  stable


Assessment/Plan:


1. Urinary retention.


2. BPH.


3. Neurogenic bladder.


4. Incontinence.


5. Pyuria/UTI/colonized.


6. Hematuria.


7. Proteinuria.


8. Renal insufficiency, acute possibly on chronic.


9. Renal cyst.


10. Nephrolithiasis.


11. Renal fullness.


12. Bladder calcifications.


13. POD # 20, cysto/optic urethrotomy.





monitor clinically


mayorga placed 11/27


hand irrigated and do PRN


abx as ordered, pr ID


diflucan added


monitor WBC


f/u on last blood cx


renal fxn stable





Subjective


Allergies:  


Coded Allergies:  


     AZTREONAM (Verified  Allergy, Unknown, 7/23/18)


Subjective


all noted, mayorga draining, non-verbal





Objective





Last 24 Hour Vital Signs








  Date Time  Temp Pulse Resp B/P (MAP) Pulse Ox O2 Delivery O2 Flow Rate FiO2


 


12/17/19 11:11  77 25     30


 


12/17/19 09:43  175  139/84    


 


12/17/19 08:45  169 22     30


 


12/17/19 08:00        30


 


12/17/19 08:00 98.6 175 24 139/84 (102) 100   


 


12/17/19 08:00      Mechanical Ventilator  


 


12/17/19 08:00  155      


 


12/17/19 07:27  170 24     30


 


12/17/19 07:00 98.6 160 22 121/80 (94) 100   


 


12/17/19 05:29  160 21     30


 


12/17/19 04:16 100.4       


 


12/17/19 04:00        30


 


12/17/19 04:00  149      


 


12/17/19 04:00      Mechanical Ventilator  


 


12/17/19 04:00 100.1 145 22 128/89 (102) 100   


 


12/17/19 02:50  138 21     30


 


12/17/19 02:00        30


 


12/17/19 01:10  144 22     30


 


12/17/19 00:00 99.3 134 22 131/89 (103) 94   


 


12/17/19 00:00  145      


 


12/17/19 00:00      Mechanical Ventilator  


 


12/16/19 22:45  145 21     30


 


12/16/19 21:14  143 21     30


 


12/16/19 20:41  138  130/70    


 


12/16/19 20:00 99.3 138 20 130/70 (90) 100   


 


12/16/19 20:00        30


 


12/16/19 20:00  136      


 


12/16/19 20:00      Mechanical Ventilator  


 


12/16/19 18:57  133 19     30


 


12/16/19 17:08  114 18     30


 


12/16/19 16:00  127      


 


12/16/19 16:00      Mechanical Ventilator  


 


12/16/19 16:00 99.1 127 20 133/92 (106) 100   


 


12/16/19 16:00        30


 


12/16/19 14:46  112 18     30


 


12/16/19 12:49  120 17     30

















Intake and Output  


 


 12/16/19 12/17/19





 19:00 07:00


 


Intake Total 2650.666 ml 838.000 ml


 


Output Total 1100 ml 


 


Balance 1550.666 ml 838.000 ml


 


  


 


IV Total 2585.666 ml 578.000 ml


 


Tube Feeding 65 ml 260 ml


 


Output Urine Total 1100 ml 











Microbiology








 Date/Time


Source Procedure


Growth Status


 


 


 12/15/19 15:10


Blood Blood Culture - Preliminary


NO GROWTH AFTER 24 HOURS Resulted





 11/25/19 11:00


Nasal Nares - Final Complete


 


 11/25/19 11:00


Nasal Nares - Final Complete


 


 12/5/19 19:00


Stool Clostridium difficile Toxin Assay - Final Complete


 


 12/12/19 15:45


Urine,Clean Catch Urine Culture - Final


Candida Parapsilosis Complete


 


 12/10/19 16:00


Abdomen Anaerobic Culture - Final


NO ANAEROBES ISOLATED Complete








Current Medications








 Medications


  (Trade)  Dose


 Ordered  Sig/Jan


 Route


 PRN Reason  Start Time


 Stop Time Status Last Admin


Dose Admin


 


 Acetaminophen


  (Tylenol)  650 mg  Q4H  PRN


 GT


 Mild Pain/Temp > 100.6  11/26/19 18:42


 12/26/19 18:41  12/17/19 03:46


 


 


 Apixaban


  (Eliquis)  5 mg  BID


 GT


   12/12/19 18:02


 1/11/20 18:01  12/17/19 09:43


 


 


 Artificial Tears


  (Akwa-Tears)  2 drop  Q12HR


 BOTH EYES


   11/26/19 21:00


 12/24/19 20:59  12/17/19 08:32


 


 


 Baclofen


  (Lioresal)  10 mg  THREE TIMES A  DAY


 GT


   11/27/19 09:00


 12/24/19 17:59  12/17/19 09:42


 


 


 Calcitonin Dixie


  (Miacalcin)  1 sprays  DAILY


 NASAL


   11/28/19 12:00


 12/28/19 11:59  12/17/19 08:32


 


 


 Chlorhexidine


 Gluconate


  (Elaine-Hex 2%)  1 applic  DAILY@2000


 TOPIC


   12/6/19 20:00


 1/5/20 19:59  12/16/19 20:39


 


 


 Clobetasol


 Propionate


  (Temovate)  1 applic  EVERY 12  HOURS


 TOPIC


   11/26/19 21:00


 12/24/19 20:59  12/17/19 08:32


 


 


 Daptomycin 550 mg/


 Sodium Chloride  55 ml @ 


 100 mls/hr  Q24H


 IV


   12/13/19 18:00


 12/20/19 17:59  12/16/19 18:29


 


 


 Dextrose


  (Dextrose 50%)  25 ml  Q30M  PRN


 IV


 Hypoglycemia  11/26/19 18:45


 12/24/19 14:44   


 


 


 Dextrose


  (Dextrose 50%)  50 ml  Q30M  PRN


 IV


 Hypoglycemia  11/26/19 18:45


 12/24/19 14:44   


 


 


 Famotidine


  (Pepcid)  20 mg  BID


 GT


   11/28/19 18:00


 12/28/19 17:59  12/17/19 09:43


 


 


 Fenofibrate


  (Tricor)  145 mg  DAILY


 ORAL


   11/27/19 09:00


 12/25/19 08:59  12/17/19 09:43


 


 


 Fluconazole


  (Diflucan)  400 mg  DAILY


 GT


   12/16/19 09:00


 12/21/19 13:46  12/17/19 09:42


 


 


 Insulin Aspart


  (NovoLOG)    EVERY 6  HOURS


 SUBQ


   11/30/19 12:00


 12/24/19 16:29  12/17/19 06:10


 


 


 Levetiracetam


  (Keppra)  1,000 mg  Q8H


 GT


   11/29/19 02:00


 12/29/19 01:59  12/17/19 09:42


 


 


 Metoprolol


 Tartrate


  (Lopressor)  25 mg  Q12HR


 GT


   12/14/19 09:00


 1/13/20 08:59  12/17/19 09:43


 


 


 Minocycline HCl


  (Minocin)  100 mg  Q12HR@0600,1800


 ORAL


   12/11/19 06:00


 12/18/19 05:59  12/17/19 05:31


 


 


 Ondansetron HCl


  (Zofran)  4 mg  Q6H  PRN


 IVP


 Nausea & Vomiting  11/26/19 18:44


 12/26/19 18:43  12/16/19 11:28


 


 


 Polyethylene


 Glycol


  (Miralax)  17 gm  DAILYPRN  PRN


 GT


 Constipation  11/26/19 18:44


 12/26/19 18:43   


 


 


 Sitagliptin


 Phosphate


  (Januvia)  50 mg  ACBREAKFAST


 GT


   11/27/19 06:30


 12/25/19 06:29  12/17/19 05:32


 


 


 Tamsulosin HCl


  (Flomax)  0.4 mg  DAILY


 ORAL


   11/27/19 09:00


 12/27/19 08:59  12/17/19 09:43


 


 


 Trimethoprim/


 Sulfamethoxazole


 28 ml/Dextrose  578 ml @ 


 385.333


 mls/hr  [id number]  BACTRIM


 IV


   12/12/19 16:00


 12/19/19 15:59  12/17/19 08:31


 





Laboratory Tests


12/17/19 03:00: 


White Blood Count 20.4H, Red Blood Count 3.55L, Hemoglobin 9.7L, Hematocrit 

30.1L, Mean Corpuscular Volume 85, Mean Corpuscular Hemoglobin 27.4, Mean 

Corpuscular Hemoglobin Concent 32.3, Red Cell Distribution Width 17.8H, 

Platelet Count 779H, Mean Platelet Volume 6.5, Neutrophils (%) (Auto) , 

Lymphocytes (%) (Auto) , Monocytes (%) (Auto) , Eosinophils (%) (Auto) , 

Basophils (%) (Auto) , Differential Total Cells Counted 100, Neutrophils % (

Manual) 74, Lymphocytes % (Manual) 11L, Monocytes % (Manual) 9, Eosinophils % (

Manual) 3, Basophils % (Manual) 0, Band Neutrophils 3, Platelet Estimate 

IncreasedH, Platelet Morphology Normal, Hypochromasia 2+, Anisocytosis 1+, 

Spherocytes 1+, Sodium Level 129L, Potassium Level 4.9, Chloride Level 93L, 

Carbon Dioxide Level 24, Anion Gap 12, Blood Urea Nitrogen 15, Creatinine 1.6H, 

Estimat Glomerular Filtration Rate 55.3, Glucose Level 324#H, Calcium Level 9.9


Height (Feet):  5


Height (Inches):  7.00


Weight (Pounds):  193


Objective


 exam stable


mayorga indwelling, yellow/christine urine, some debris





CT C/A/P (12/8) noted











Vinnie Pollard MD Dec 17, 2019 12:23

## 2019-12-17 NOTE — PROGRESS NOTE
DATE:  12/16/2019

SUBJECTIVE:  The patient to undergo terminal decannulation today _____

recurrent nausea, vomiting, and abdominal distention.



PHYSICAL EXAMINATION:

VITAL SIGNS:  His blood pressure is 123/92, pulse is 127, respirations of

20, and temperature 99.1.

HEENT:  Eyes were normal.  ENT, mucous membranes were moist and intact.

Tongue is protruded and has repetitive movement.  _____ heart rate.

NECK:  Supple with no JVD without lymph nodes.  Tracheostomy site is

clean.

LUNGS:  Clear without rhonchi, rales, or wheezing.  Secretions are small,

thin, and gray.

HEART:  Normal sounds with regular beat.  There is tachycardia at rest.

ABDOMEN:  Soft, distended with normal bowel sounds.  Gastrostomy site is

clean.

EXTREMITIES:  Warm without cyanosis, clubbing, or edema.



LABORATORY AND DIAGNOSTIC DATA:  Hemoglobin is 8.3, hematocrit 25.9, MCV of

86, WBC of 14.1, and platelets is 525,000.  His WBC was 14.2 yesterday and

13.6 on 12/13/2019.  His BUN and creatinine are 15 and 1.3 respectively.

Sodium is 137, potassium 5.6, chloride 93, CO2 is 25.  His phosphorus is

3.1 and magnesium is 1.9.  SGOT, SGPT, and alkaline phosphatase are

normal.  His albumin is 2.3 and total protein is 9.0.



IMPRESSION:  The patient has new-onset recurrent nausea and vomiting of

large volume and tachycardia.  Feeding was put on hold _____ with Protonix

40 mg IV push q.24 h. and Zofran 4 mg IV push q.4 h. p.r.n. for nausea and

vomiting.  Repeat laboratory tests will be done in the a.m.  In addition,

KUB will be requested.









  ______________________________________________

  Etienne Bloom M.D.





DR:  Clayton

D:  12/17/2019 00:31

T:  12/17/2019 06:24

JOB#:  [id number]

CC:

## 2019-12-18 NOTE — PROGRESS NOTE
DATE:  12/17/2019

SUBJECTIVE:  Last night, the patient had several episodes of nausea and

vomiting.  KUB was ordered and revealed that the patient has a left-sided

_____.



PHYSICAL EXAMINATION:

VITAL SIGNS:  Blood pressure 112/61, his pulse is 148, respirations 26,

temperature 98.1.

HEENT:  Eyes were normal.  ENT, mucous membranes were moist and intact.

NECK:  Supple with no JVD without lymph nodes.  Tracheostomy site is

clean.

HEART:  Normal sounds with regular beats.  There is tachycardia at rest.

ABDOMEN:  Soft, nontender with normal bowel sounds.  Gastrostomy site is

clean.

EXTREMITIES:  Warm without cyanosis, clubbing, or edema.



LABORATORY AND DIAGNOSTIC DATA:  Hemoglobin is 9.7, hematocrit 17.1, MCV of

95, WBC of 20.4, and platelets 779.  His WBC was 14.1 on 12/16/2019 and

12/15/2019.  His BUN and creatinine were 15 and 1.6 respectively.  It was

1.3 yesterday.  Sodium is 159, potassium 4.9, chloride 93, CO2 is 24.

_____ revealed left mid abdomen small bowel loops _____.



IMPRESSION:  _____ abdomen is distended in no significant way.  More

dilated and distended of the left than on the right on palpation of the

abdomen.  Compression is hard.  In addition, the patient has tachycardia,

tachypnea, and leukocytosis.  It appears that the patient is _____ septic

shock.



PLAN:  Rectal _____ will be inserted.  IV normal saline _____ will be

given.  Cardiology consult and General Surgery were called to assist in

the management of this case.









  ______________________________________________

  Etienne Bloom M.D.





DR:  ZABRINA

D:  12/17/2019 16:34

T:  12/18/2019 02:27

JOB#:  [id number]

CC:

## 2019-12-19 NOTE — DISCHARGE SUMMARY
Discharge Summary


Discharge Summary


_


DEATH SUMMARY





DATE OF ADMISSION: 11/24/2019


DATE OF EXPIRATION: 12/17/2019








 





REASON FOR ADMISSION: 


54 years old male, resident of skilled nursing facility, with past medical 

history of hypertension, COPD, diabetes mellitus, seizure disorder, chronic 

respiratory failure, ventilator dependent with tracheostomy, dysphagia, feeding 

by G-tube, colostomy status, in persistent vegetative state, was brought by EMS 

,  since  nursing staff  at the facility reported tachycardia.  


Patient by herself was unable to provide any information .


Heart rate was 153 , temperature 102.9 .


Patient was  hypoxic and required  bagging  initially.


Laboratory work-up revealed significant leukocytosis  with WBC 24.7,  

hemoglobin 12.4 , hematocrit 40.  Platelet count 306.  


Sodium 149, potassium 6.7.


BUN 43, creatinine 1.7.  


Glucose 484.  


AST 90, ALT 35.  


Troponin  0.003.  


Albumin 3.4.  


Lactic acid 1.1.  


EKG revealed sinus tachycardia , no acute ischemic changes.  


Chest x-ray revealed geographic density , overlying the right upper lung , 

possibly representing overlying soft tissue , although cannot exclude 

underlying consolidation/pneumonia.  


Pulmonary vascular congestion.  Small left pleural effusion.  


Urinalysis revealed +4 protein, +4 glucose, pyuria , +3 leukocyte esterase and 

few bacteria.


Septic work-up initiated in emergency department .


Patient subsequently admitted to ICU for further management.  








CONSULTANTS:


cardiologist 


pulmonary/critical care Dr. Pena


ID specialist Dr. Love


nephrologist Dr. Rocha


surgery Dr. Christina 


plastic surgeon Dr. Blank


urologist  [facility] COURSE: 


Patient admitted to ICU.  


Ventilator support and  tracheostomy care provided.  


Bronchodilator treatment   via HHN  provided as needed. 


Patient was followed-up with ABG and chest x-ray.  


Ventilator settings were titrated based on ABG.  


Initial blood culture were negative.  


Influenza swab was negative.  


Sputum culture revealed Proteus ESBL and  Providencia.


Urine culture showed Candida.  


Stool for C. difficile was negative.


Patient continued to have persistent leukocytosis.


Repeated urine culture still revealed Candida. 


Repeated  blood culture on 12/5 and 12/7  revealed Klebsiella pneumonia 

carbapenem  resistant .   


Blood culture on 12/9 were negative.  


Blood culture on 12/12 again showed Klebsiella pneumonia carbapenem  resistant 

. 


Blood culture on 12/13 and 12/15 were positive as well. 


Leukocytosis initially trended down then started to trend up  with peak of 33.9 

, and  then started to trend down only to increase  again to 20.4 on the day of 

expiration.








Venous duplex bilateral lower extremity revealed no evidence of acute DVT.  


Aspiration precaution maintained.  


Patient was able to tolerate G-tube feeding.  


Tube feeding formula with goal rate and protein supplements provided as per 

registered dietitian recommendation.


DVT and GI prophylaxis provided.  


Colostomy care provided.  


Renal parameters and electrolytes were closely monitored,  electrolytes 

corrected as needed and nephrotoxins were avoided.   





Seizure precaution maintained.  Keppra continued.


Blood sugar was closely monitored, remained stable, YpN1y-0.0


Hemoglobin and hematocrit were closely monitored with goal to keep hemoglobin 

above 7.


Patient undergone transfusion of total of 3 units of packed red blood cells.  


Supportive care provided.  


Pain management was addressed.





Patient  with  persistent Klebsiella pneumonia bacteremia of  unclear source.


Abdominal ultrasound revealed hepatomegaly with fatty infiltration.


Suspected nonobstructive stone left kidney.


Left renal cyst





Patient undergone CT chest , abdomen,  and pelvis due to persistent 

leukocytosis .


CT of the chest revealed 


1.  Left lower lobe segmental and subsegmental pulmonary emboli 


2.  Bibasilar atelectasis/consolidations 


3.  10 mm low-density lesion in right thyroid 


 


CT of the abdomen and pelvis demonstrated: 


1.  Heterogeneous large areas of hypoattenuation in the right lobe of the 


liver, maybe fatty changes, correlate for hepatitis or other process.  No 


formed fluid collection to suggest an abscess.


2.  Contracted gallbladder with thickened enhancing wall.


3.  Left lateral abdominal wall musculature 1.5 x 2.2 cm fluid collection 


with enhancing , worrisome for an abscess.


4.  Bilateral renal stones, staghorn type in the left kidney, 


nonobstructive.  Mild fullness of the right renal pelvis.  Mild bilateral 


perinephric stranding, correlate for infection.


5.  Guzmán catheter in urinary bladder.  Thickening of the urinary bladder 


with mild stranding may be cystitis. A few small calcifications in the 


left urinary bladder base.





Patient subsequently undergone ultrasound guided aspiration of fluid collection 

in the left lateral abdominal wall.  


Small amount of serous fluid was aspirated from the left flank,  no jac pus .


Collection was presumed not to be an abscess.  No drain was placed.  


The presence of considerable residual tissue with hyperemia most likely 

represented hyperemic scar tissue,   recommended  follow  up to exclude tumor.  


Culture of abdominal fluid was negative.   





Cardiologist followed.  


Patient  started on full anticoagulation for pulmonary emboli.  


Patient was started on beta-blocker.


Current cardiovascular regimen was maintained as per cardiologist.


EEG  was  consistent with encephalopathy of moderate degree,  most probably 

toxic metabolic component , as evidenced by the triphasic waveforms.





Urologist followed for urinary retention.  


Urologist attempted to pass 12 French catheter,  but met  resistance.  


Findings were consistent with urethral stricture.  


Patient subsequently undergone cystoscopy with urethral dilation, optical 

urethrotomy, placement of Guzmán catheter.  


Renal parameters were improving : BUN from 43 down to 15 and creatinine from 

1.7 initially resolved to normal,  and then started to trend up again.





Plastic surgeon seen and evaluated patient for pressure ulcer stage III present 

on admission.  


No need for surgical intervention at this time.  Recommended turning every 2 leonid

,  offload the area and control the moisture.





Patient condition continued to  deteriorate.  


Patient had persistent bacteremia,  leukocytosis,  intermittent fevers,  

tachycardia .


Goals of care were discussed with the patient 's family,  who made   decision 

to change code status to  DNR / DNI .


CODE STATUS subsequently was changed on 12/13.  


Patient subsequently developed  new onset of nausea and vomiting .


Abdominal x-ray revealed evidence of dilated left mid abdominal small bowel 

loops, reflecting small bowel obstruction or ileus.  


General surgeon consulted.


Patient was made n.p.o.  


G-tube was connected to suction.  


Patient was on   PPI.  


Surgeon recommended  to follow-up with   KUB and serial abdominal exams.  


Surgeon also recommended GI specialist evaluation. 


Patient appeared very ill with chronic comorbidities and nonresponsive,  DNR/

DNI status.  


Surgeon did not recommend any surgical intervention at this time.  





In the evening 12/17/2019  emergency room doctor was called to stepdown to 

pronounce the patient .


Patient was found to be in asystole at 18: 43 .


Patient subsequently  was pronounced at 19: 03  on  12/17/2019. 


Cause of death:  cardiopulmonary arrest.  














FINAL DIAGNOSES: 


Acute on chronic respiratory failure


Ventilator dependent respiratory failure/tracheostomy status 


Shock-recovered 


Severe sepsis, recurrent


Persistent carbapenem resistant Klebsiella pneumonia bacteremia  


UTI with history of recurrent UTI/pyelonephritis, status post treatment


Healthcare associated pneumonia 


Pulmonary emboli


Toxic metabolic encephalopathy  


Acute tubular necrosis/acute kidney injury on chronic kidney disease  


Urethral stricture 


Urinary retention


Status post cystoscopy, urethral dilation, optical urethrotomy, placement of 

Guzmán catheter


Small bowel obstruction


Neurogenic bladder 


ICH, status post craniotomy and  shunt placement


Hypertension


Diabetes mellitus


COPD


Seizure disorder


Colostomy status


Dysphagia,  feeding by G-tube 


Anemia of chronic disease,  status post blood transfusion


Paroxysmal atrial ectopy and sinus tachycardia


Sacral decubitus ulcer stage III,  present on admission





 





I have been assigned to dictate discharge summary for this account. 


I was not involved in the patient's management.











Isabel Aguilar NP Dec 19, 2019 11:37

## 2021-01-09 NOTE — ANETHESIA PREOPERATIVE EVAL
Anesthesia Pre-op PMH/ROS


General


Date of Evaluation:  Nov 27, 2019


Time of Evaluation:  16:25


Anesthesiologist:  sheila


ASA Score:  ASA 3


Mallampati Score


Class I : Soft palate, uvula, fauces, pillars visible


Class II: Soft palate, uvula, fauces visible


Class III: Soft palate, base of uvula visible


Class IV: Only hard plate visible


Mallampati Classification:  Class IV


Surgeon:  Latrice


Diagnosis:  urethral Stricture


Surgical Procedure:  cystoscopy


Anesthesia History:  none


Family History:  no anesthesia problems


Allergies:  


Coded Allergies:  


     AZTREONAM (Verified  Allergy, Unknown, 7/23/18)


Patient NPO?:  Yes


NPO Date:  Nov 27, 2019


NPO Time:  0000





Past Medical History


Cardiovascular:  Reports: HTN


Pulmonary:  Denies: asthma, COPD, PARISA, other


Gastrointestinal/Genitourinary:  Denies: GERD, CRI, ESRD, other


Neurologic/Psychiatric:  Reports: dementia, other - BPH


Endocrine:  Reports: DM; 


   Denies: hypothyroidism, steroids, other


HEENT:  Denies: cataract (L), cataract (R), glaucoma, [place] (L), [place] (R), other


Hematology/Immune:  Reports: anemia; 


   Denies: DVT, bleeding disorder, other


Musculoskeletal/Integumentary:  Denies: OA, RA, DJD, DDD, edema, other


PMH Narrative:


history of intracerebral hemorrhage, history of craniotomy,  shunt, history of


chronic ventilatory-dependent, gastrostomy, BPH history.


PSxH Narrative:


craniotomy; s/p bleed





Anesthesia Pre-op Phys. Exam


Physician Exam





Last Vital Signs








  Date Time  Temp Pulse Resp B/P (MAP) Pulse Ox O2 Delivery O2 Flow Rate FiO2


 


11/27/19 15:09  101 19     30


 


11/27/19 12:00      Mechanical Ventilator  


 


11/27/19 12:00 97.9   115/75 (88) 100   


 


11/24/19 12:22       60.0 








Constitutional:  NAD


Neurologic:  other - consent obtained from next of kin;vegetative state


Cardiovascular:  RRR


Respiratory:  CTA - rochi; trached; artifically ventilated, other


Gastrointestinal:  S/NT/ND





Airway Exam


Mallampati Classification


na


Mallampati Score:  Class III


MO:  limited


ROM:  limited


Dentures:  no upper, no lower





Anesthesia Pre-op A/P


Labs





Hematology








Test


  11/27/19


04:50


 


White Blood Count


  11.5 K/UL


(4.8-10.8)  H


 


Red Blood Count


  2.77 M/UL


(4.70-6.10)  L


 


Hemoglobin


  8.2 G/DL


(14.2-18.0)  L


 


Hematocrit


  25.8 %


(42.0-52.0)  L


 


Mean Corpuscular Volume 93 FL (80-99)  


 


Mean Corpuscular Hemoglobin


  29.5 PG


(27.0-31.0)


 


Mean Corpuscular Hemoglobin


Concent 31.7 G/DL


(32.0-36.0)  L


 


Red Cell Distribution Width


  17.4 %


(11.6-14.8)  H


 


Platelet Count


  253 K/UL


(150-450)


 


Mean Platelet Volume


  9.0 FL


(6.5-10.1)


 


Neutrophils (%) (Auto)


  68.8 %


(45.0-75.0)


 


Lymphocytes (%) (Auto)


  8.6 %


(20.0-45.0)  L


 


Monocytes (%) (Auto)


  4.8 %


(1.0-10.0)


 


Eosinophils (%) (Auto)


  16.7 %


(0.0-3.0)  H


 


Basophils (%) (Auto)


  1.1 %


(0.0-2.0)








Coagulation








Test


  11/27/19


04:50


 


Prothrombin Time


  11.4 SEC


(9.30-11.50)


 


Prothromb Time International


Ratio 1.1 (0.9-1.1)  


 


 


Activated Partial


Thromboplast Time 25 SEC (23-33)


 








Chemistry








Test


  11/27/19


04:50


 


Sodium Level


  153 MMOL/L


(136-145)  H


 


Potassium Level


  3.0 MMOL/L


(3.5-5.1)  L


 


Chloride Level


  118 MMOL/L


()  H


 


Carbon Dioxide Level


  19 MMOL/L


(21-32)  L


 


Anion Gap


  16 mmol/L


(5-15)  H


 


Blood Urea Nitrogen


  21 mg/dL


(7-18)  H


 


Creatinine


  1.0 MG/DL


(0.55-1.30)


 


Estimat Glomerular Filtration


Rate > 60 mL/min


(>60)


 


Glucose Level


  199 MG/DL


()  H


 


Calcium Level


  10.2 MG/DL


(8.5-10.1)  H











Studies


Pre-op Studies:  EKG





Risk Assessment & Plan


Assessment:


see H&P


Plan:


general


Status Change Before Surgery:  No





Pre-Antibiotics


Drug:  Aleshia Grover CRNA Nov 27, 2019 16:35
hard copy, drawn during this pregnancy

## 2022-03-24 NOTE — PULMONOLGY CRITICAL CARE NOTE
Caller: Patient   Phone #: [phone]    Onset: 3/23  Location / description: Pain with urination, frequency, blood tinged when wiping, urine is yellow.   Pain Scale (1-10), 10 highest: 6/10  Associated Symptoms: No fever.   Symptom specific medications: Took tylenol- did not help.  LMP : Patient's last menstrual period was 12/01/2012.  Are you pregnant or breast feeding: No  Recent visits (last 3-4 weeks) for same reason or recent surgery: No.    PLAN:   No appointments available till 4:15 PM tomorrow, patient prefers to be seen in the morning.     Directed to Urgent Care at [place].    Patient/Caller agrees to follow recommendations.    Reason for Disposition  • Pain or burning with passing urine    Protocols used: URINE - BLOOD IN-A-AH       Critical Care - Asmt/Plan


Problems:  


(1) Acute on chronic respiratory failure


(2) Sepsis


(3) Diabetes mellitus


(4) Vegetative state


(5) Gastrostomy tube dependent


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


(7) ATN (acute tubular necrosis)


Respiratory:  monitor respiratory rate, adjust FIO2, CXR


Cardiac:  continue pressors, continue to monitor HR/BP


Renal:  F/U I&O, keep IV fluid


Infectious Disease:  check cultures, other - wbc decreasing


Gastrointestinal:  continue feedings/current rate


Endocrine:  monitor blood sugar


Hematologic:  monitor H/H


Neurologic:  PRN Ativan


Affect:  PRN ativan


Prophylaxis:  Protonix, Heparin


Time Spent (Minutes):  40


Notes Reviewed:  internist, ID


Discussed with:  nurses, consultants, 





Critical Care - Objective





Last 24 Hour Vital Signs








  Date Time  Temp Pulse Resp B/P (MAP) Pulse Ox O2 Delivery O2 Flow Rate FiO2


 


12/6/19 09:00  120 17     30


 


12/6/19 08:00 98.7 131 17 127/74 (91) 100   


 


12/6/19 08:00      Mechanical Ventilator  


 


12/6/19 08:00        30


 


12/6/19 07:27  124      


 


12/6/19 07:06  125 16     30


 


12/6/19 06:04 99.7       


 


12/6/19 05:18  131 16     30


 


12/6/19 04:00        30


 


12/6/19 04:00 99.7 133 18 104/60 (75) 97   


 


12/6/19 04:00  132      


 


12/6/19 04:00      Mechanical Ventilator  


 


12/6/19 02:52  131 17     30


 


12/6/19 00:56  133 20     30


 


12/6/19 00:00 99.1 135 18 111/59 (76) 100   


 


12/6/19 00:00      Mechanical Ventilator  


 


12/6/19 00:00  127      


 


12/5/19 23:18  130 14     30


 


12/5/19 21:42  134 16     30


 


12/5/19 21:00 100.0 138  107/70 (82)    


 


12/5/19 20:00        30


 


12/5/19 20:00      Mechanical Ventilator  


 


12/5/19 20:00 102.0 139 18 109/67 (81) 100   


 


12/5/19 19:22  141      


 


12/5/19 19:04  109 17     30


 


12/5/19 16:57  138 19     30


 


12/5/19 16:05        30


 


12/5/19 16:00 99.1 128 18 109/67 (81) 100   


 


12/5/19 16:00      Mechanical Ventilator  


 


12/5/19 16:00  142      


 


12/5/19 14:46  142 22     30


 


12/5/19 12:39  127 19     30


 


12/5/19 12:00        30


 


12/5/19 12:00 99.2 128 18 118/85 (96) 100   


 


12/5/19 12:00      Mechanical Ventilator  


 


12/5/19 12:00  131      


 


12/5/19 10:50  119 19     30








Status:  awake


Condition:  critical


HEENT:  atraumatic


Neck:  full ROM


Lungs:  rales, rhonchi


Heart:  HR/BP stable


Abdomen:  soft, non-tender


Extremities:  no C/C/E


Accucheck:  245





Critical Care - Subjective


ROS Limited/Unobtainable:  Yes


Interval Events:


open eyes, vegetative state


Condition:  critical


FI02:  30


Vent Support Breath Rate:  14


Vent Support Mode:  AC


Vent Tidal Volume:  600


Sputum Amount:  Small


PEEP:  5.0


PIP:  29


Tube Feeding Amount:  65


I&O:











Intake and Output  


 


 12/5/19 12/6/19





 19:00 07:00


 


Intake Total 2453.75 ml 2450 ml


 


Output Total 1850 ml 1100 ml


 


Balance 603.75 ml 1350 ml


 


  


 


Free Water 300 ml 250 ml


 


IV Total 1373.75 ml 1485 ml


 


Tube Feeding 780 ml 715 ml


 


Output Urine Total 1400 ml 700 ml


 


Stool Total 450 ml 400 ml








CXR:


clear, trach in correct position


Labs:





Laboratory Tests








Test


  12/5/19


14:20 12/5/19


19:00 12/6/19


05:30


 


Sodium Level


  140 MMOL/L


(136-145) 


  138 MMOL/L


(136-145)


 


Potassium Level


  4.8 MMOL/L


(3.5-5.1) 


  4.0 MMOL/L


(3.5-5.1)


 


Chloride Level


  105 MMOL/L


() 


  106 MMOL/L


()


 


Carbon Dioxide Level


  24 MMOL/L


(21-32) 


  23 MMOL/L


(21-32)


 


Anion Gap


  11 mmol/L


(5-15) 


  9 mmol/L


(5-15)


 


Blood Urea Nitrogen


  9 mg/dL (7-18)


  


  14 mg/dL


(7-18)


 


Creatinine


  1.1 MG/DL


(0.55-1.30) 


  1.0 MG/DL


(0.55-1.30)


 


Estimat Glomerular Filtration


Rate > 60 mL/min


(>60) 


  > 60 mL/min


(>60)


 


Glucose Level


  221 MG/DL


()  H 


  299 MG/DL


()  H


 


Calcium Level


  10.3 MG/DL


(8.5-10.1)  H 


  9.9 MG/DL


(8.5-10.1)


 


Magnesium Level


  1.7 MG/DL


(1.8-2.4)  L 


  2.1 MG/DL


(1.8-2.4)


 


Urine Color  Pale yellow   


 


Urine Appearance  Clear   


 


Urine pH  6 (4.5-8.0)   


 


Urine Specific Gravity


  


  1.015


(1.005-1.035) 


 


 


Urine Protein


  


  3+ (NEGATIVE)


H 


 


 


Urine Glucose (UA)


  


  2+ (NEGATIVE)


H 


 


 


Urine Ketones


  


  Negative


(NEGATIVE) 


 


 


Urine Blood


  


  3+ (NEGATIVE)


H 


 


 


Urine Nitrite


  


  Negative


(NEGATIVE) 


 


 


Urine Bilirubin


  


  Negative


(NEGATIVE) 


 


 


Urine Urobilinogen


  


  Normal MG/DL


(0.0-1.0) 


 


 


Urine Leukocyte Esterase


  


  2+ (NEGATIVE)


H 


 


 


Urine RBC


  


  10-15 /HPF (0


- 0)  H 


 


 


Urine WBC


  


  15-20 /HPF (0


- 0)  H 


 


 


Urine Squamous Epithelial


Cells 


  Few /LPF


(NONE/OCC) 


 


 


Urine Bacteria


  


  Moderate /HPF


(NONE)  H 


 


 


Urine Mucus


  


  Few /LPF


(NONE/OCC)  H 


 


 


Urine Yeast


  


  Moderate /HPF


(NONE)  H 


 


 


White Blood Count


  


  


  19.2 K/UL


(4.8-10.8)  H


 


Red Blood Count


  


  


  2.95 M/UL


(4.70-6.10)  L


 


Hemoglobin


  


  


  8.8 G/DL


(14.2-18.0)  L


 


Hematocrit


  


  


  26.9 %


(42.0-52.0)  L


 


Mean Corpuscular Volume   91 FL (80-99)  


 


Mean Corpuscular Hemoglobin


  


  


  29.9 PG


(27.0-31.0)


 


Mean Corpuscular Hemoglobin


Concent 


  


  32.8 G/DL


(32.0-36.0)


 


Red Cell Distribution Width


  


  


  15.4 %


(11.6-14.8)  H


 


Platelet Count


  


  


  279 K/UL


(150-450)


 


Mean Platelet Volume


  


  


  8.0 FL


(6.5-10.1)


 


Neutrophils (%) (Auto)


  


  


  % (45.0-75.0)


 


 


Lymphocytes (%) (Auto)


  


  


  % (20.0-45.0)


 


 


Monocytes (%) (Auto)    % (1.0-10.0)  


 


Eosinophils (%) (Auto)    % (0.0-3.0)  


 


Basophils (%) (Auto)    % (0.0-2.0)  


 


Neutrophils % (Manual)   Pending  


 


Lymphocytes % (Manual)   Pending  


 


Platelet Estimate   Pending  


 


Platelet Morphology   Pending  


 


Prothrombin Time


  


  


  12.0 SEC


(9.30-11.50)  H


 


Prothromb Time International


Ratio 


  


  1.1 (0.9-1.1)  


 


 


Activated Partial


Thromboplast Time 


  


  33 SEC (23-33)


 


 


Phosphorus Level


  


  


  3.4 MG/DL


(2.5-4.9)


 


Total Bilirubin


  


  


  0.4 MG/DL


(0.2-1.0)


 


Aspartate Amino Transf


(AST/SGOT) 


  


  27 U/L (15-37)


 


 


Alanine Aminotransferase


(ALT/SGPT) 


  


  25 U/L (12-78)


 


 


Alkaline Phosphatase


  


  


  147 U/L


()  H


 


Total Protein


  


  


  6.6 G/DL


(6.4-8.2)


 


Albumin


  


  


  2.0 G/DL


(3.4-5.0)  L


 


Globulin   4.6 g/dL  


 


Albumin/Globulin Ratio


  


  


  0.4 (1.0-2.7)


L

















Yury Pena MD Dec 6, 2019 10:35

## 2022-06-10 NOTE — INFECTIOUS DISEASES PROG NOTE
Assessment/Plan


Assessment/Plan








Assessment:


Sepsis, recurrent-


Gram negative bacteremia- r/o intradominal source vs UTI


   -12/5 Cdiff eng


             CXR: Left pleural effusion is unchanged. Left perihilar 

atelectasis is unchanged. Tracheostomy again demonstrated.


             Bcx 4/4 GNR


             u/a wbc 15-20, nit neg, leuk +2; ucx NTD








Fever, recurrent


Leukocytosis, recurrent; improving


UTI, sp rx


Probable PNA,sp Rx


  -Bcx Neg


  -u/a wbc tnct, nit neg, leuk +3; ucx C. parasilopsis


  -sp cx ESBL P. mirabilis, MDR P. stuarti (S Cefepime, Zosyn, Ertapenem)


  -CXR:   Geographic density overlying the right upper lung.  This likely 

represents overlying soft tissue although cannot exclude an underlying 

consolidation/pneumonia. Pulmonary vascular congestion. Small left pleural 

effusion. Subsegmental atelectasis versus infiltrate in the left lung base.


 -Influenza A/B ag neg








BRAYDON, SP





Mild AST elevation, SP


   -Abd uS: Hepatomegaly with fatty infiltration. Suspected nonobstructive 

stone left kidney. Left renal cyst





Sacral decubitus





hx of  recurrent UTIs


  -UCx 1/27/19 P.a.  (R Levo, otherwise S)


  - CT abd 1/29/19 3 mm distal right ureteral calculus, minimal resultant 

hydronephrosis. Atrophic left kidney, containing a large staghorn calculus and 

multiple intrarenal calyceal calculi, previously described


   UCx 2/2/19 - Enterobacter (S Cefepime) and yeast and Pa





 HTN


CKD


 ICH s/p craniotomy and  shunt now w/ persistent vegetative state


 dysphagia s/p GT


Chronic resp failure - vent/trach dependent


ICH


COPD


DM


Seizure disorder


BPH


Dysphagia, G tube


Colostomy  


SNF  resident





Plan:


-D/c empiric IV Vancomycin #3 


-Continue empiric Meropenem #3


  -12/3 SP ertapenem #4


  - 11/29/10 S/P Meropenem 


  - 11/27 SP IV Vancomycin #4


  - 11/24 SP Cefepime x1





-f/u cx


-Monitor CBC/CMP, temperatures


-PEG/Trach/ICU care


-aspiration precautions


-wound care


-repaet Bcx 2


-CT c/abd/p w/





Thank you for this consultation. Will continue to follow along with you.





Discussed with RN





Subjective


Allergies:  


Coded Allergies:  


     AZTREONAM (Verified  Allergy, Unknown, 7/23/18)


Subjective


Tm 101.8


wbc improving


bacteremic gnr





Objective


Vital Signs





Last 24 Hour Vital Signs








  Date Time  Temp Pulse Resp B/P (MAP) Pulse Ox O2 Delivery O2 Flow Rate FiO2


 


12/7/19 10:40  122 14     30


 


12/7/19 09:10  135 16     30


 


12/7/19 08:39 101.8       


 


12/7/19 08:00  135      


 


12/7/19 08:00 101.2 133 16 125/72 (89) 100   


 


12/7/19 08:00        30


 


12/7/19 08:00      Mechanical Ventilator  


 


12/7/19 06:45  126 15     30


 


12/7/19 05:06  108 18     30


 


12/7/19 04:00        30


 


12/7/19 04:00      Mechanical Ventilator  


 


12/7/19 04:00 98.2 120 14 140/86 (104) 100   


 


12/7/19 04:00  125      


 


12/7/19 03:15  114 15     30


 


12/7/19 01:23  110 16     30


 


12/7/19 00:00        30


 


12/7/19 00:00 98.2 121 14 122/70 (87) 100   


 


12/7/19 00:00      Mechanical Ventilator  


 


12/7/19 00:00  120      


 


12/6/19 22:55  108 15     30


 


12/6/19 21:12  109 14     30


 


12/6/19 20:00  121      


 


12/6/19 20:00        30


 


12/6/19 20:00      Mechanical Ventilator  


 


12/6/19 20:00 98.8 119 14 124/89 (101) 100   


 


12/6/19 19:15  112 16     30


 


12/6/19 17:18  110 15     30


 


12/6/19 16:00 98.0 108 14 129/71 (90) 100   


 


12/6/19 16:00        30


 


12/6/19 16:00      Mechanical Ventilator  


 


12/6/19 16:00  119      


 


12/6/19 15:18  118 18     30


 


12/6/19 12:55  119 16     30


 


12/6/19 12:00 98.8 112 14 108/61 (77) 100   


 


12/6/19 12:00      Mechanical Ventilator  


 


12/6/19 12:00        30


 


12/6/19 11:22  112      








Height (Feet):  5


Height (Inches):  7.00


Weight (Pounds):  206


Objective


GENERAL:  Noncommunicative.  Tongue fasciculation.  Moderate edema.


LUNGS:  Diminished breath sounds.


CARDIAC:  Regular rhythm.  Rapid rate.  Normal S1, S2.


ABDOMEN:  Soft with G-tube and colostomy bag.





Microbiology








 Date/Time


Source Procedure


Growth Status


 


 


 12/5/19 23:15


Blood Blood Culture - Preliminary Resulted


 


 12/5/19 23:10


Blood Blood Culture - Preliminary Resulted


 


 12/5/19 19:00


Stool Clostridium difficile Toxin Assay - Final Complete


 


 12/5/19 19:00


Urine,Clean Catch Urine Culture - Preliminary


NO GROWTH AFTER 24 HOURS Resulted











Laboratory Tests








Test


  12/7/19


04:35 12/7/19


06:30


 


White Blood Count


  14.1 K/UL


(4.8-10.8)  H 


 


 


Red Blood Count


  3.19 M/UL


(4.70-6.10)  L 


 


 


Hemoglobin


  9.2 G/DL


(14.2-18.0)  L 


 


 


Hematocrit


  29.4 %


(42.0-52.0)  L 


 


 


Mean Corpuscular Volume 92 FL (80-99)   


 


Mean Corpuscular Hemoglobin


  28.7 PG


(27.0-31.0) 


 


 


Mean Corpuscular Hemoglobin


Concent 31.2 G/DL


(32.0-36.0)  L 


 


 


Red Cell Distribution Width


  15.3 %


(11.6-14.8)  H 


 


 


Platelet Count


  235 K/UL


(150-450) 


 


 


Mean Platelet Volume


  7.5 FL


(6.5-10.1) 


 


 


Neutrophils (%) (Auto)


  82.2 %


(45.0-75.0)  H 


 


 


Lymphocytes (%) (Auto)


  9.3 %


(20.0-45.0)  L 


 


 


Monocytes (%) (Auto)


  3.0 %


(1.0-10.0) 


 


 


Eosinophils (%) (Auto)


  4.7 %


(0.0-3.0)  H 


 


 


Basophils (%) (Auto)


  0.9 %


(0.0-2.0) 


 


 


Sodium Level


  138 MMOL/L


(136-145) 


 


 


Potassium Level


  4.3 MMOL/L


(3.5-5.1) 


 


 


Chloride Level


  105 MMOL/L


() 


 


 


Carbon Dioxide Level


  22 MMOL/L


(21-32) 


 


 


Anion Gap


  12 mmol/L


(5-15) 


 


 


Blood Urea Nitrogen


  13 mg/dL


(7-18) 


 


 


Creatinine


  1.0 MG/DL


(0.55-1.30) 


 


 


Estimat Glomerular Filtration


Rate > 60 mL/min


(>60) 


 


 


Glucose Level


  188 MG/DL


()  #H 


 


 


Calcium Level


  9.9 MG/DL


(8.5-10.1) 


 


 


Phosphorus Level


  3.1 MG/DL


(2.5-4.9) 


 


 


Magnesium Level


  1.8 MG/DL


(1.8-2.4) 


 


 


Total Bilirubin


  0.4 MG/DL


(0.2-1.0) 


 


 


Aspartate Amino Transf


(AST/SGOT) 32 U/L (15-37)


  


 


 


Alanine Aminotransferase


(ALT/SGPT) 22 U/L (12-78)


  


 


 


Alkaline Phosphatase


  134 U/L


()  H 


 


 


C-Reactive Protein,


Quantitative 31.8 mg/dL


(0.00-0.90)  H 


 


 


Total Protein


  6.7 G/DL


(6.4-8.2) 


 


 


Albumin


  1.9 G/DL


(3.4-5.0)  L 


 


 


Globulin 4.8 g/dL   


 


Albumin/Globulin Ratio


  0.4 (1.0-2.7)


L 


 


 


Erythrocyte Sedimentation Rate


  


  128 MM/HR


(0-20)  H











Current Medications








 Medications


  (Trade)  Dose


 Ordered  Sig/Ajn


 Route


 PRN Reason  Start Time


 Stop Time Status Last Admin


Dose Admin


 


 Acetaminophen


  (Tylenol)  650 mg  Q4H  PRN


 GT


 Mild Pain/Temp > 100.6  11/26/19 18:42


 12/26/19 18:41  12/7/19 08:09


 


 


 Artificial Tears


  (Akwa-Tears)  2 drop  Q12HR


 BOTH EYES


   11/26/19 21:00


 12/24/19 20:59  12/7/19 08:08


 


 


 Baclofen


  (Lioresal)  10 mg  THREE TIMES A  DAY


 GT


   11/27/19 09:00


 12/24/19 17:59  12/7/19 08:08


 


 


 Calcitonin [place]


  (Miacalcin)  1 sprays  DAILY


 NASAL


   11/28/19 12:00


 12/28/19 11:59  12/7/19 08:08


 


 


 Chlorhexidine


 Gluconate


  (Elaine-Hex 2%)  1 applic  DAILY@2000


 TOPIC


   12/6/19 20:00


 1/5/20 19:59  12/6/19 20:20


 


 


 Clobetasol


 Propionate


  (Temovate)  1 applic  EVERY 12  HOURS


 TOPIC


   11/26/19 21:00


 12/24/19 20:59  12/7/19 08:08


 


 


 Dextrose


  (Dextrose 50%)  25 ml  Q30M  PRN


 IV


 Hypoglycemia  11/26/19 18:45


 12/24/19 14:44   


 


 


 Dextrose


  (Dextrose 50%)  50 ml  Q30M  PRN


 IV


 Hypoglycemia  11/26/19 18:45


 12/24/19 14:44   


 


 


 Famotidine


  (Pepcid)  20 mg  BID


 GT


   11/28/19 18:00


 12/28/19 17:59  12/7/19 08:08


 


 


 Fenofibrate


  (Tricor)  145 mg  DAILY


 ORAL


   11/27/19 09:00


 12/25/19 08:59  12/7/19 08:08


 


 


 Heparin Sodium


  (Porcine)


  (Heparin 5000


 units/ml)  5,000 units  EVERY 12  HOURS


 SUBQ


   11/26/19 21:00


 12/24/19 20:59  12/7/19 08:11


 


 


 Insulin Aspart


  (NovoLOG)    EVERY 6  HOURS


 SUBQ


   11/30/19 12:00


 12/24/19 16:29  12/7/19 06:06


 


 


 Levetiracetam


  (Keppra)  1,000 mg  Q8H


 GT


   11/29/19 02:00


 12/29/19 01:59  12/7/19 09:14


 


 


 Meropenem 1 gm/


 Sodium Chloride  55 ml @ 


 110 mls/hr  Q8HR


 IVPB


   12/5/19 14:00


 12/10/19 13:59  12/7/19 06:06


 


 


 Ondansetron HCl


  (Zofran)  4 mg  Q6H  PRN


 IVP


 Nausea & Vomiting  11/26/19 18:44


 12/26/19 18:43   


 


 


 Polyethylene


 Glycol


  (Miralax)  17 gm  DAILYPRN  PRN


 GT


 Constipation  11/26/19 18:44


 12/26/19 18:43   


 


 


 Sitagliptin


 Phosphate


  (Januvia)  50 mg  ACBREAKFAST


 GT


   11/27/19 06:30


 12/25/19 06:29  12/7/19 06:07


 


 


 Sodium Chloride  1,000 ml @ 


 125 mls/hr  Q8H


 IV


   12/5/19 01:45


 1/4/20 01:44  12/7/19 08:47


 


 


 Tamsulosin HCl


  (Flomax)  0.4 mg  DAILY


 ORAL


   11/27/19 09:00


 12/27/19 08:59  12/7/19 08:09


 


 


 Vancomycin HCl


  (Vanco rx to


 dose)  1 ea  DAILY  PRN


 MISC


 Per rx protocol  12/5/19 12:45


 1/4/20 12:44   


 


 


 Vancomycin/Sodium


 Chloride  275 ml @ 


 137.5 mls/


 hr  Q24H


 IVPB


   12/5/19 15:00


 12/10/19 14:59  12/6/19 15:10


 

















Joy Love M.D. Dec 7, 2019 11:19
Assessment/Plan


Assessment/Plan








Assessment:


Sepsis, recurrent-


MDR K.pna bacteremia- ?from lateral abdominal wall abscess


Probable cystitis/pyelonephritis


  -12/8 CT c/ab/p:  Left lower lobe segmental and subsegmental pulmonary emboli

. Bibasilar atelectasis/consolidations. 10 mm low-density lesion in right 

thyroid.  Heterogeneous large areas of hypoattenuation in the right lobe of the

  liver, maybe fatty changes, correlate for hepatitis or other process.  

Contracted gallbladder with thickened enhancing wall.  Left lateral abdominal 

wall musculature 1.5 x 2.2 cm fluid collection with enhancing worrisome for an 

abscess. Bilateral renal stones, staghorn type in the left kidney, 

nonobstructive.  Mild fullness of the right renal pelvis.  Mild bilateral 

perinephric stranding, correlate for infection.Mayorga catheter in urinary 

bladder.  Thickening of the urinary bladder with mild stranding may be 

cystitis. A few small calcifications in the left urinary bladder base.


 


  -12/7 Bcx 2/4 GNR


   -12/5 Cdiff neg


             CXR: Left pleural effusion is unchanged. Left perihilar 

atelectasis is unchanged. Tracheostomy again demonstrated.


             Bcx 4/4 MDR K. pna (S only to tigecycline, bactrim; R Meropenem, 

Amikacin)


             u/a wbc 15-20, nit neg, leuk +2; ucx C. parapsilopsis








Fever, recurrent; improving


Leukocytosis, recurrent; SP





Pulmonary Emboli





UTI, sp rx


Probable PNA,sp Rx


  -Bcx Neg


  -u/a wbc tnct, nit neg, leuk +3; ucx C. parasilopsis


  -sp cx ESBL P. mirabilis, MDR P. stuarti (S Cefepime, Zosyn, Ertapenem)


  -CXR:   Geographic density overlying the right upper lung.  This likely 

represents overlying soft tissue although cannot exclude an underlying 

consolidation/pneumonia. Pulmonary vascular congestion. Small left pleural 

effusion. Subsegmental atelectasis versus infiltrate in the left lung base.


 -Influenza A/B ag neg








BRAYDON, SP





Mild AST elevation, SP


   -Abd uS: Hepatomegaly with fatty infiltration. Suspected nonobstructive 

stone left kidney. Left renal cyst





Sacral decubitus





hx of  recurrent UTIs


  -UCx 1/27/19 P.a.  (R Levo, otherwise S)


  - CT abd 1/29/19 3 mm distal right ureteral calculus, minimal resultant 

hydronephrosis. Atrophic left kidney, containing a large staghorn calculus and 

multiple intrarenal calyceal calculi, previously described


   UCx 2/2/19 - Enterobacter (S Cefepime) and yeast and Pa





 HTN


CKD


 ICH s/p craniotomy and  shunt now w/ persistent vegetative state


 dysphagia s/p GT


Chronic resp failure - vent/trach dependent


ICH


COPD


DM


Seizure disorder


BPH


Dysphagia, G tube


Colostomy  


SNF  resident





Plan:


-D/c Meropenem #5 and IV Amikacin #3


-Start IV Polymixin B and IV Bactrim for MDR K.pna bacteremia


-Start PO fluconazole for likely candida cystitis/pyelo (+staghorn calculus, 

chronic mayorga, prolonged antibiotic treatment)


  -12/7 SP IV Vancomycin #3


  -12/3 SP ertapenem #4


  - 11/29/10 S/P Meropenem 


  - 11/27 SP IV Vancomycin #4


  - 11/24 SP Cefepime x1





-f/u cx


-Monitor CBC/CMP, temperatures


-PEG/Trach care


-aspiration precautions


-wound care


-repaet Bcx 2


-CT guided drainage abd wall abscess


  -send fluid for cultures





Thank you for this consultation. Will continue to follow along with you.





Discussed with RN





Subjective


Allergies:  


Coded Allergies:  


     AZTREONAM (Verified  Allergy, Unknown, 7/23/18)


Subjective


afebrile in 24hrs'


leukocytosis resolved


Bcx w/ MDR K.pna; repeat bcx still bacteremic





Objective


Vital Signs





Last 24 Hour Vital Signs








  Date Time  Temp Pulse Resp B/P (MAP) Pulse Ox O2 Delivery O2 Flow Rate FiO2


 


12/9/19 12:45  96 14     30


 


12/9/19 11:02  97 15     30


 


12/9/19 08:55  98 14     30


 


12/9/19 08:00 97.2 97 16 134/73 (93) 100   


 


12/9/19 08:00        30


 


12/9/19 08:00      Mechanical Ventilator  


 


12/9/19 08:00  97      


 


12/9/19 07:01  96 14     30


 


12/9/19 04:49  97 15     30


 


12/9/19 04:00  89      


 


12/9/19 04:00        30


 


12/9/19 04:00 96.7 88 15 136/82 (100) 100   


 


12/9/19 04:00      Mechanical Ventilator  


 


12/9/19 02:32  98 14     30


 


12/9/19 00:44  96 14     30


 


12/9/19 00:00        30


 


12/9/19 00:00      Mechanical Ventilator  


 


12/9/19 00:00 97.5 94 15 129/76 (93) 100   


 


12/9/19 00:00  91      


 


12/8/19 22:42  94 14     30


 


12/8/19 21:14  99 16     30


 


12/8/19 21:06 97.8       


 


12/8/19 20:00  95      


 


12/8/19 20:00      Mechanical Ventilator  


 


12/8/19 20:00        30


 


12/8/19 20:00 97.8 98 16 130/79 (96) 100   


 


12/8/19 19:03  95 15     30


 


12/8/19 17:10  101 14     30


 


12/8/19 16:00 97.7 101 16 139/81 (100) 100   


 


12/8/19 16:00        30


 


12/8/19 16:00      Mechanical Ventilator  


 


12/8/19 16:00  99      


 


12/8/19 15:08  105 14     30








Height (Feet):  5


Height (Inches):  7.00


Weight (Pounds):  209


Objective


GENERAL:  Noncommunicative.  Tongue fasciculation.  Moderate edema.


LUNGS:  Diminished breath sounds.


CARDIAC:  Regular rhythm.  Rapid rate.  Normal S1, S2.


ABDOMEN:  Soft with G-tube and colostomy bag.





Microbiology








 Date/Time


Source Procedure


Growth Status


 


 


 12/7/19 17:25


Blood Blood Culture - Preliminary Resulted


 


 12/7/19 17:15


Blood Blood Culture - Preliminary Resulted











Laboratory Tests








Test


  12/8/19


16:30 12/8/19


23:05 12/9/19


06:40


 


Activated Partial


Thromboplast Time 32 SEC (23-33)


  55 SEC (23-33)


H 66 SEC (23-33)


H


 


White Blood Count


  


  


  10.3 K/UL


(4.8-10.8)


 


Red Blood Count


  


  


  2.67 M/UL


(4.70-6.10)  L


 


Hemoglobin


  


  


  7.6 G/DL


(14.2-18.0)  L


 


Hematocrit


  


  


  23.8 %


(42.0-52.0)  L


 


Mean Corpuscular Volume   89 FL (80-99)  


 


Mean Corpuscular Hemoglobin


  


  


  28.5 PG


(27.0-31.0)


 


Mean Corpuscular Hemoglobin


Concent 


  


  31.9 G/DL


(32.0-36.0)  L


 


Red Cell Distribution Width


  


  


  15.3 %


(11.6-14.8)  H


 


Platelet Count


  


  


  265 K/UL


(150-450)


 


Mean Platelet Volume


  


  


  7.6 FL


(6.5-10.1)


 


Neutrophils (%) (Auto)


  


  


  % (45.0-75.0)


 


 


Lymphocytes (%) (Auto)


  


  


  % (20.0-45.0)


 


 


Monocytes (%) (Auto)    % (1.0-10.0)  


 


Eosinophils (%) (Auto)    % (0.0-3.0)  


 


Basophils (%) (Auto)    % (0.0-2.0)  


 


Differential Total Cells


Counted 


  


  100  


 


 


Neutrophils % (Manual)   69 % (45-75)  


 


Lymphocytes % (Manual)   13 % (20-45)  L


 


Monocytes % (Manual)   8 % (1-10)  


 


Eosinophils % (Manual)   8 % (0-3)  H


 


Basophils % (Manual)   0 % (0-2)  


 


Myelocytes %   2 % (0-0)  H


 


Band Neutrophils   0 % (0-8)  


 


Platelet Estimate   Adequate  


 


Platelet Morphology   Normal  


 


Hypochromasia   1+  


 


Anisocytosis   1+  


 


Sodium Level


  


  


  137 MMOL/L


(136-145)


 


Potassium Level


  


  


  3.4 MMOL/L


(3.5-5.1)  L


 


Chloride Level


  


  


  103 MMOL/L


()


 


Carbon Dioxide Level


  


  


  26 MMOL/L


(21-32)


 


Anion Gap


  


  


  8 mmol/L


(5-15)


 


Blood Urea Nitrogen


  


  


  9 mg/dL (7-18)


 


 


Creatinine


  


  


  1.1 MG/DL


(0.55-1.30)


 


Estimat Glomerular Filtration


Rate 


  


  > 60 mL/min


(>60)


 


Glucose Level


  


  


  164 MG/DL


()  H


 


Calcium Level


  


  


  9.5 MG/DL


(8.5-10.1)


 


Phosphorus Level


  


  


  3.1 MG/DL


(2.5-4.9)


 


Magnesium Level


  


  


  1.7 MG/DL


(1.8-2.4)  L


 


Total Bilirubin


  


  


  0.3 MG/DL


(0.2-1.0)


 


Aspartate Amino Transf


(AST/SGOT) 


  


  20 U/L (15-37)


 


 


Alanine Aminotransferase


(ALT/SGPT) 


  


  22 U/L (12-78)


 


 


Alkaline Phosphatase


  


  


  100 U/L


()


 


Total Protein


  


  


  6.4 G/DL


(6.4-8.2)


 


Albumin


  


  


  1.8 G/DL


(3.4-5.0)  L


 


Globulin   4.6 g/dL  


 


Albumin/Globulin Ratio


  


  


  0.4 (1.0-2.7)


L











Current Medications








 Medications


  (Trade)  Dose


 Ordered  Sig/Jan


 Route


 PRN Reason  Start Time


 Stop Time Status Last Admin


Dose Admin


 


 Acetaminophen


  (Tylenol)  650 mg  Q4H  PRN


 GT


 Mild Pain/Temp > 100.6  11/26/19 18:42


 12/26/19 18:41  12/8/19 20:36


 


 


 Amikacin Protocol


  (Amikacin


 pharmacy to dose)  1 ea  DAILY  PRN


 MISC


 Per rx protocol  12/7/19 11:30


 1/6/20 11:29   


 


 


 Amikacin Sulfate


 1000 mg/Sodium


 Chloride  114 ml @ 


 114 mls/hr  Q36H


 IV


   12/8/19 20:00


 12/15/19 19:59  12/8/19 20:23


 


 


 Artificial Tears


  (Akwa-Tears)  2 drop  Q12HR


 BOTH EYES


   11/26/19 21:00


 12/24/19 20:59  12/9/19 09:29


 


 


 Baclofen


  (Lioresal)  10 mg  THREE TIMES A  DAY


 GT


   11/27/19 09:00


 12/24/19 17:59  12/9/19 08:49


 


 


 Calcitonin [place]


  (Miacalcin)  1 sprays  DAILY


 NASAL


   11/28/19 12:00


 12/28/19 11:59  12/9/19 09:30


 


 


 Chlorhexidine


 Gluconate


  (Elaine-Hex 2%)  1 applic  DAILY@2000


 TOPIC


   12/6/19 20:00


 1/5/20 19:59  12/8/19 20:22


 


 


 Clobetasol


 Propionate


  (Temovate)  1 applic  EVERY 12  HOURS


 TOPIC


   11/26/19 21:00


 12/24/19 20:59  12/9/19 09:30


 


 


 Dextrose


  (Dextrose 50%)  25 ml  Q30M  PRN


 IV


 Hypoglycemia  11/26/19 18:45


 12/24/19 14:44   


 


 


 Dextrose


  (Dextrose 50%)  50 ml  Q30M  PRN


 IV


 Hypoglycemia  11/26/19 18:45


 12/24/19 14:44   


 


 


 Famotidine


  (Pepcid)  20 mg  BID


 GT


   11/28/19 18:00


 12/28/19 17:59  12/9/19 08:49


 


 


 Fenofibrate


  (Tricor)  145 mg  DAILY


 ORAL


   11/27/19 09:00


 12/25/19 08:59  12/9/19 08:49


 


 


 Heparin Sodium/


 Dextrose  500 ml @ 


 38.102 mls/


 hr  ADJUST PER  PROTOCOL


 IV


   12/8/19 23:45


 1/7/20 23:44  12/9/19 07:47


 


 


 Insulin Aspart


  (NovoLOG)    EVERY 6  HOURS


 SUBQ


   11/30/19 12:00


 12/24/19 16:29  12/9/19 12:31


 


 


 Iohexol


  (OMNIPAQUE-300


 100ml)  100 ml  ONCE  PRN


 INJ


 Radiology Procedure  12/7/19 11:15


 12/9/19 23:59   


 


 


 Levetiracetam


  (Keppra)  1,000 mg  Q8H


 GT


   11/29/19 02:00


 12/29/19 01:59  12/9/19 09:29


 


 


 Meropenem 1 gm/


 Sodium Chloride  55 ml @ 


 110 mls/hr  Q8HR


 IVPB


   12/5/19 14:00


 12/10/19 23:59  12/9/19 05:30


 


 


 Ondansetron HCl


  (Zofran)  4 mg  Q6H  PRN


 IVP


 Nausea & Vomiting  11/26/19 18:44


 12/26/19 18:43   


 


 


 Polyethylene


 Glycol


  (Miralax)  17 gm  DAILYPRN  PRN


 GT


 Constipation  11/26/19 18:44


 12/26/19 18:43   


 


 


 Sitagliptin


 Phosphate


  (Januvia)  50 mg  ACBREAKFAST


 GT


   11/27/19 06:30


 12/25/19 06:29  12/9/19 05:30


 


 


 Sodium Chloride  1,000 ml @ 


 125 mls/hr  Q8H


 IV


   12/5/19 01:45


 1/4/20 01:44  12/9/19 07:48


 


 


 Tamsulosin HCl


  (Flomax)  0.4 mg  DAILY


 ORAL


   11/27/19 09:00


 12/27/19 08:59  12/9/19 08:50


 

















Joy Love M.D. Dec 9, 2019 13:44
Assessment/Plan


Assessment/Plan








Assessment:


Sepsis, recurrent-


MDR K.pna bacteremia- ?from lateral abdominal wall abscess


Probable cystitis/pyelonephritis


  -12/8 CT c/ab/p:  Left lower lobe segmental and subsegmental pulmonary emboli

. Bibasilar atelectasis/consolidations. 10 mm low-density lesion in right 

thyroid.  Heterogeneous large areas of hypoattenuation in the right lobe of the

  liver, maybe fatty changes, correlate for hepatitis or other process.  

Contracted gallbladder with thickened enhancing wall.  Left lateral abdominal 

wall musculature 1.5 x 2.2 cm fluid collection with enhancing worrisome for an 

abscess. Bilateral renal stones, staghorn type in the left kidney, 

nonobstructive.  Mild fullness of the right renal pelvis.  Mild bilateral 

perinephric stranding, correlate for infection.Mayorga catheter in urinary 

bladder.  Thickening of the urinary bladder with mild stranding may be 

cystitis. A few small calcifications in the left urinary bladder base.


 


  -12/7 Bcx 2/4 GNR; 12/9 Bx p


   -12/5 Cdiff neg


             CXR: Left pleural effusion is unchanged. Left perihilar 

atelectasis is unchanged. Tracheostomy again demonstrated.


             Bcx 4/4 MDR K. pna (S only to tigecycline, bactrim; R Meropenem, 

Amikacin, Colistin, Polymixin B)


             u/a wbc 15-20, nit neg, leuk +2; ucx C. parapsilopsis








Fever, recurrent; improving


Leukocytosis, recurrent; SP





Pulmonary Emboli





UTI, sp rx


Probable PNA,sp Rx


  -Bcx Neg


  -u/a wbc tnct, nit neg, leuk +3; ucx C. parasilopsis


  -sp cx ESBL P. mirabilis, MDR P. stuarti (S Cefepime, Zosyn, Ertapenem)


  -CXR:   Geographic density overlying the right upper lung.  This likely 

represents overlying soft tissue although cannot exclude an underlying 

consolidation/pneumonia. Pulmonary vascular congestion. Small left pleural 

effusion. Subsegmental atelectasis versus infiltrate in the left lung base.


 -Influenza A/B ag neg








BRAYDON, SP





Mild AST elevation, SP


   -Abd uS: Hepatomegaly with fatty infiltration. Suspected nonobstructive 

stone left kidney. Left renal cyst





Sacral decubitus





hx of  recurrent UTIs


  -UCx 1/27/19 P.a.  (R Levo, otherwise S)


  - CT abd 1/29/19 3 mm distal right ureteral calculus, minimal resultant 

hydronephrosis. Atrophic left kidney, containing a large staghorn calculus and 

multiple intrarenal calyceal calculi, previously described


   UCx 2/2/19 - Enterobacter (S Cefepime) and yeast and Pa





 HTN


CKD


 ICH s/p craniotomy and  shunt now w/ persistent vegetative state


 dysphagia s/p GT


Chronic resp failure - vent/trach dependent


ICH


COPD


DM


Seizure disorder


BPH


Dysphagia, G tube


Colostomy  


SNF  resident





Plan:


-D/c  IV Polymixin B #2 as reistant


-Continue IV Bactrim #2 and add PO Miniocycline for MDR/CRE K.pna bacteremia


-Cont PO fluconazole #2 for likely candida cystitis/pyelo (+staghorn calculus, 

chronic mayorga, prolonged antibiotic treatment)


  -12/9 SP Meropenem #5, IV Amikacin #3


  -12/7 SP IV Vancomycin #3


  -12/3 SP ertapenem #4


  - 11/29/10 S/P Meropenem 


  - 11/27 SP IV Vancomycin #4


  - 11/24 SP Cefepime x1





-f/u cx


-Monitor CBC/CMP, temperatures


-PEG/Trach care


-aspiration precautions


-wound care


-f/u repeat Bcx 2


-CT guided drainage abd wall abscess


  -send fluid for cultures





Thank you for this consultation. Will continue to follow along with you.





Discussed with RN





Subjective


Allergies:  


Coded Allergies:  


     AZTREONAM (Verified  Allergy, Unknown, 7/23/18)


Subjective


afebrile in >36hrs'


mild leukocytosis


latest bcx p





Objective


Vital Signs





Last 24 Hour Vital Signs








  Date Time  Temp Pulse Resp B/P (MAP) Pulse Ox O2 Delivery O2 Flow Rate FiO2


 


12/10/19 15:24  101 15     30


 


12/10/19 13:10  107 14     30


 


12/10/19 12:00      Mechanical Ventilator  


 


12/10/19 12:00        30


 


12/10/19 12:00      Mechanical Ventilator  


 


12/10/19 12:00 99.5 105 15 141/77 (98) 100   


 


12/10/19 11:33  110      


 


12/10/19 11:19  111 16     30


 


12/10/19 08:50  108 15     30


 


12/10/19 08:05  107      


 


12/10/19 08:00        30


 


12/10/19 08:00      Mechanical Ventilator  


 


12/10/19 08:00 99.6 107 15 137/77 (97) 100   


 


12/10/19 07:05  106 14     30


 


12/10/19 05:30  101 14     30


 


12/10/19 04:00        30


 


12/10/19 04:00      Mechanical Ventilator  


 


12/10/19 04:00 99.5 104 14 144/76 (98) 100   


 


12/10/19 03:37  107      


 


12/10/19 03:06  106 17     30


 


12/10/19 01:46  103 16     30


 


12/10/19 00:00      Mechanical Ventilator  


 


12/10/19 00:00 97.7 103 18 141/76 (97) 100   


 


12/10/19 00:00        30


 


12/9/19 23:33  108      


 


12/9/19 23:02  106 16     30


 


12/9/19 21:16  101 16     30


 


12/9/19 20:00      Mechanical Ventilator  


 


12/9/19 20:00        30


 


12/9/19 20:00  103      


 


12/9/19 20:00 99.1 101 18 132/73 (92) 98   


 


12/9/19 19:39  104 17     30


 


12/9/19 19:39  100 18  96 Mechanical Ventilator  30


 


12/9/19 16:20  100 14     30


 


12/9/19 16:00 97.7 100 17 145/78 (100) 100   


 


12/9/19 16:00        30


 


12/9/19 16:00  95      


 


12/9/19 16:00      Mechanical Ventilator  








Height (Feet):  5


Height (Inches):  7.00


Weight (Pounds):  209


Objective


GENERAL:  Noncommunicative.  Tongue fasciculation.  Moderate edema.


LUNGS:  Diminished breath sounds.


CARDIAC:  Regular rhythm.  Rapid rate.  Normal S1, S2.


ABDOMEN:  Soft with G-tube and colostomy bag.





Microbiology








 Date/Time


Source Procedure


Growth Status


 


 


 12/7/19 17:25


Blood Blood Culture - Preliminary


Gram Negative Bacillus 1 Resulted


 


 12/7/19 17:15


Blood Blood Culture - Preliminary


Gram Negative Bacillus 1 Resulted











Laboratory Tests








Test


  12/10/19


04:00


 


White Blood Count


  11.2 K/UL


(4.8-10.8)  H


 


Red Blood Count


  3.01 M/UL


(4.70-6.10)  L


 


Hemoglobin


  8.4 G/DL


(14.2-18.0)  L


 


Hematocrit


  26.0 %


(42.0-52.0)  L


 


Mean Corpuscular Volume 86 FL (80-99)  


 


Mean Corpuscular Hemoglobin


  28.0 PG


(27.0-31.0)


 


Mean Corpuscular Hemoglobin


Concent 32.4 G/DL


(32.0-36.0)


 


Red Cell Distribution Width


  16.8 %


(11.6-14.8)  H


 


Platelet Count


  245 K/UL


(150-450)


 


Mean Platelet Volume


  7.7 FL


(6.5-10.1)


 


Neutrophils (%) (Auto)


  73.1 %


(45.0-75.0)


 


Lymphocytes (%) (Auto)


  12.1 %


(20.0-45.0)  L


 


Monocytes (%) (Auto)


  5.2 %


(1.0-10.0)


 


Eosinophils (%) (Auto)


  8.8 %


(0.0-3.0)  H


 


Basophils (%) (Auto)


  0.8 %


(0.0-2.0)


 


Prothrombin Time


  11.4 SEC


(9.30-11.50)


 


Prothromb Time International


Ratio 1.1 (0.9-1.1)  


 


 


Activated Partial


Thromboplast Time 33 SEC (23-33)


 


 


Sodium Level


  132 MMOL/L


(136-145)  L


 


Potassium Level


  3.8 MMOL/L


(3.5-5.1)


 


Chloride Level


  98 MMOL/L


()


 


Carbon Dioxide Level


  24 MMOL/L


(21-32)


 


Anion Gap


  10 mmol/L


(5-15)


 


Blood Urea Nitrogen


  8 mg/dL (7-18)


 


 


Creatinine


  1.0 MG/DL


(0.55-1.30)


 


Estimat Glomerular Filtration


Rate > 60 mL/min


(>60)


 


Glucose Level


  156 MG/DL


()  H


 


Calcium Level


  9.3 MG/DL


(8.5-10.1)











Current Medications








 Medications


  (Trade)  Dose


 Ordered  Sig/Jan


 Route


 PRN Reason  Start Time


 Stop Time Status Last Admin


Dose Admin


 


 Acetaminophen


  (Tylenol)  650 mg  Q4H  PRN


 GT


 Mild Pain/Temp > 100.6  11/26/19 18:42


 12/26/19 18:41  12/8/19 20:36


 


 


 Artificial Tears


  (Akwa-Tears)  2 drop  Q12HR


 BOTH EYES


   11/26/19 21:00


 12/24/19 20:59  12/10/19 08:43


 


 


 Baclofen


  (Lioresal)  10 mg  THREE TIMES A  DAY


 GT


   11/27/19 09:00


 12/24/19 17:59  12/10/19 12:11


 


 


 Calcitonin [place]


  (Miacalcin)  1 sprays  DAILY


 NASAL


   11/28/19 12:00


 12/28/19 11:59  12/10/19 08:42


 


 


 Chlorhexidine


 Gluconate


  (Elaine-Hex 2%)  1 applic  DAILY@2000


 TOPIC


   12/6/19 20:00


 1/5/20 19:59  12/9/19 20:11


 


 


 Clobetasol


 Propionate


  (Temovate)  1 applic  EVERY 12  HOURS


 TOPIC


   11/26/19 21:00


 12/24/19 20:59  12/10/19 08:47


 


 


 Dextrose


  (Dextrose 50%)  25 ml  Q30M  PRN


 IV


 Hypoglycemia  11/26/19 18:45


 12/24/19 14:44   


 


 


 Dextrose


  (Dextrose 50%)  50 ml  Q30M  PRN


 IV


 Hypoglycemia  11/26/19 18:45


 12/24/19 14:44   


 


 


 Famotidine


  (Pepcid)  20 mg  BID


 GT


   11/28/19 18:00


 12/28/19 17:59  12/10/19 08:41


 


 


 Fenofibrate


  (Tricor)  145 mg  DAILY


 ORAL


   11/27/19 09:00


 12/25/19 08:59  12/10/19 08:41


 


 


 Fluconazole


  (Diflucan)  400 mg  DAILY


 ORAL


   12/9/19 13:47


 12/16/19 13:46  12/10/19 08:41


 


 


 Insulin Aspart


  (NovoLOG)    EVERY 6  HOURS


 SUBQ


   11/30/19 12:00


 12/24/19 16:29  12/10/19 12:11


 


 


 Levetiracetam


  (Keppra)  1,000 mg  Q8H


 GT


   11/29/19 02:00


 12/29/19 01:59  12/10/19 10:13


 


 


 Lidocaine HCl


  (Xylocaine 1%


 30ml)  30 ml  ONCE  PRN


 INJ


 LINE  12/10/19 15:24


 12/10/19 23:59   


 


 


 Ondansetron HCl


  (Zofran)  4 mg  Q6H  PRN


 IVP


 Nausea & Vomiting  11/26/19 18:44


 12/26/19 18:43   


 


 


 Polyethylene


 Glycol


  (Miralax)  17 gm  DAILYPRN  PRN


 GT


 Constipation  11/26/19 18:44


 12/26/19 18:43   


 


 


 Polymyxin B


 Sulfate [id number]


 units/Dextrose  550 ml @ 


 550 mls/hr  EVERY 12  HOURS


 IV


   12/9/19 21:00


 12/16/19 20:59  12/10/19 08:42


 


 


 Sitagliptin


 Phosphate


  (Januvia)  50 mg  ACBREAKFAST


 GT


   11/27/19 06:30


 12/25/19 06:29  12/10/19 05:36


 


 


 Sodium Chloride  1,000 ml @ 


 125 mls/hr  Q8H


 IV


   12/5/19 01:45


 1/4/20 01:44  12/10/19 03:11


 


 


 Tamsulosin HCl


  (Flomax)  0.4 mg  DAILY


 ORAL


   11/27/19 09:00


 12/27/19 08:59  12/10/19 08:41


 


 


 Trimethoprim/


 Sulfamethoxazole


 20 ml/Dextrose  570 ml @ 


 380 mls/hr  [id number]-[id number]  BACTRIM


 IV


   12/9/19 16:00


 12/16/19 15:59  12/10/19 08:40


 

















Joy Love M.D. Dec 10, 2019 15:52
Assessment/Plan


Assessment/Plan








Assessment:


Sepsis, recurrent-


MDR K.pna bacteremia- ?source- fluid collection with serous fluid- r/o abscess, 

cx pending


   -12/10 SP US guided aspiration: Small amount of serous fluid aspirated from 

left flank fluid collection. No jac pus so collection is presumably not an 

abscess; no drain placed. Note,


however, the presence of considerable residual tissue with hyperemia. This most 

likely represents hyperemic scar tissue but this should be followed up to 

exclude tumor


          --cx and cytology p





Probable cystitis/pyelonephritis


  -12/8 CT c/ab/p:  Left lower lobe segmental and subsegmental pulmonary emboli

. Bibasilar atelectasis/consolidations. 10 mm low-density lesion in right 

thyroid.  Heterogeneous large areas of hypoattenuation in the right lobe of the

  liver, maybe fatty changes, correlate for hepatitis or other process.  

Contracted gallbladder with thickened enhancing wall.  Left lateral abdominal 

wall musculature 1.5 x 2.2 cm fluid collection with enhancing worrisome for an 

abscess. Bilateral renal stones, staghorn type in the left kidney, 

nonobstructive.  Mild fullness of the right renal pelvis.  Mild bilateral 

perinephric stranding, correlate for infection.Mayorga catheter in urinary 

bladder.  Thickening of the urinary bladder with mild stranding may be 

cystitis. A few small calcifications in the left urinary bladder base.


 


  -12/7 Bcx 2/4 CRE K.pna (I Genta; S bactrim, tigecycline) ; 12/9 Bx NTD


   -12/5 Cdiff neg


             CXR: Left pleural effusion is unchanged. Left perihilar 

atelectasis is unchanged. Tracheostomy again demonstrated.


             Bcx 4/4 MDR K. pna (S only to tigecycline, bactrim; R Meropenem, 

Amikacin, Colistin, Polymixin B)


             u/a wbc 15-20, nit neg, leuk +2; ucx C. parapsilopsis








Fever, persistent


Leukocytosis, recurrent, increasing





Pulmonary Emboli





UTI, sp rx


Probable PNA,sp Rx


  -Bcx Neg


  -u/a wbc tnct, nit neg, leuk +3; ucx C. parasilopsis


  -sp cx ESBL P. mirabilis, MDR P. stuarti (S Cefepime, Zosyn, Ertapenem)


  -CXR:   Geographic density overlying the right upper lung.  This likely 

represents overlying soft tissue although cannot exclude an underlying 

consolidation/pneumonia. Pulmonary vascular congestion. Small left pleural 

effusion. Subsegmental atelectasis versus infiltrate in the left lung base.


 -Influenza A/B ag neg








BRAYDON, SP





Mild AST elevation, SP


   -Abd uS: Hepatomegaly with fatty infiltration. Suspected nonobstructive 

stone left kidney. Left renal cyst





Sacral decubitus





hx of  recurrent UTIs


  -UCx 1/27/19 P.a.  (R Levo, otherwise S)


  - CT abd 1/29/19 3 mm distal right ureteral calculus, minimal resultant 

hydronephrosis. Atrophic left kidney, containing a large staghorn calculus and 

multiple intrarenal calyceal calculi, previously described


   UCx 2/2/19 - Enterobacter (S Cefepime) and yeast and Pa





 HTN


CKD


 ICH s/p craniotomy and  shunt now w/ persistent vegetative state


 dysphagia s/p GT


Chronic resp failure - vent/trach dependent


ICH


COPD


DM


Seizure disorder


BPH


Dysphagia, G tube


Colostomy  


SNF  resident





Plan:


-Continue IV Bactrim #3 and add PO Miniocycline #2 for MDR/CRE K.pna bacteremia


-Cont PO fluconazole #3 for likely candida cystitis/pyelo (+staghorn calculus, 

chronic mayorga, prolonged antibiotic treatment)


  -12/10 SP IV POlymixin B #2


  -12/9 SP Meropenem #5, IV Amikacin #3


  -12/7 SP IV Vancomycin #3


  -12/3 SP ertapenem #4


  - 11/29/10 S/P Meropenem 


  - 11/27 SP IV Vancomycin #4


  - 11/24 SP Cefepime x1





-f/u cx


-Monitor CBC/CMP, temperatures


-PEG/Trach care


-aspiration precautions


-wound care


-f/u repeat Bcx 2


-f/u fluid collection cultuers and cytology





Thank you for this consultation. Will continue to follow along with you.





Discussed with RN





Subjective


Allergies:  


Coded Allergies:  


     AZTREONAM (Verified  Allergy, Unknown, 7/23/18)


Subjective


afebrile in >36hrs'


mild leukocytosis


latest bcx p





Objective


Vital Signs





Last 24 Hour Vital Signs








  Date Time  Temp Pulse Resp B/P (MAP) Pulse Ox O2 Delivery O2 Flow Rate FiO2


 


12/11/19 09:25  112 14     30


 


12/11/19 08:00        30


 


12/11/19 08:00  118      


 


12/11/19 08:00      Mechanical Ventilator  


 


12/11/19 08:00 100.5 118 15 157/78 (104) 100   


 


12/11/19 07:55  115 14     30


 


12/11/19 05:26  117 14     30


 


12/11/19 04:00      Mechanical Ventilator  


 


12/11/19 04:00        30


 


12/11/19 04:00 99.1 117 18 141/84 (103) 100   


 


12/11/19 03:27  115      


 


12/11/19 03:24  116 14     30


 


12/11/19 00:00 99.5 112 16 147/78 (101) 100   


 


12/11/19 00:00      Mechanical Ventilator  


 


12/11/19 00:00        30


 


12/10/19 23:30  120 15     30


 


12/10/19 23:30  124      


 


12/10/19 21:30  119 15     30


 


12/10/19 20:00      Mechanical Ventilator  


 


12/10/19 20:00 100.1 126 16 152/82 (105) 100   


 


12/10/19 20:00  124      


 


12/10/19 20:00        30


 


12/10/19 19:30  100 15     30


 


12/10/19 16:44  105 15     30


 


12/10/19 16:00 98.8 104 17 155/97 (116) 100   


 


12/10/19 16:00        30


 


12/10/19 16:00      Mechanical Ventilator  


 


12/10/19 15:24  101 15     30


 


12/10/19 15:23  107      


 


12/10/19 13:10  107 14     30


 


12/10/19 12:00      Mechanical Ventilator  


 


12/10/19 12:00        30


 


12/10/19 12:00      Mechanical Ventilator  


 


12/10/19 12:00 99.5 105 15 141/77 (98) 100   


 


12/10/19 11:33  110      








Height (Feet):  5


Height (Inches):  7.00


Weight (Pounds):  209


Objective


GENERAL:  Noncommunicative.  Tongue fasciculation.  Moderate edema.


LUNGS:  Diminished breath sounds.


CARDIAC:  Regular rhythm.  Rapid rate.  Normal S1, S2.


ABDOMEN:  Soft with G-tube and colostomy bag.





Microbiology








 Date/Time


Source Procedure


Growth Status


 


 


 12/9/19 23:30


Blood Blood Culture - Preliminary


NO GROWTH AFTER 24 HOURS Resulted


 


 12/9/19 23:25


Blood Blood Culture - Preliminary


NO GROWTH AFTER 24 HOURS Resulted











Laboratory Tests








Test


  12/11/19


04:30


 


White Blood Count


  16.2 K/UL


(4.8-10.8)  H


 


Red Blood Count


  2.94 M/UL


(4.70-6.10)  L


 


Hemoglobin


  8.4 G/DL


(14.2-18.0)  L


 


Hematocrit


  24.4 %


(42.0-52.0)  L


 


Mean Corpuscular Volume 83 FL (80-99)  


 


Mean Corpuscular Hemoglobin


  28.5 PG


(27.0-31.0)


 


Mean Corpuscular Hemoglobin


Concent 34.3 G/DL


(32.0-36.0)


 


Red Cell Distribution Width


  14.9 %


(11.6-14.8)  H


 


Platelet Count


  268 K/UL


(150-450)


 


Mean Platelet Volume


  6.9 FL


(6.5-10.1)


 


Neutrophils (%) (Auto)


  83.6 %


(45.0-75.0)  H


 


Lymphocytes (%) (Auto)


  7.7 %


(20.0-45.0)  L


 


Monocytes (%) (Auto)


  4.8 %


(1.0-10.0)


 


Eosinophils (%) (Auto)


  3.4 %


(0.0-3.0)  H


 


Basophils (%) (Auto)


  0.6 %


(0.0-2.0)


 


Prothrombin Time


  11.9 SEC


(9.30-11.50)  H


 


Prothromb Time International


Ratio 1.1 (0.9-1.1)  


 


 


Activated Partial


Thromboplast Time 35 SEC (23-33)


H


 


Sodium Level


  133 MMOL/L


(136-145)  L


 


Potassium Level


  4.2 MMOL/L


(3.5-5.1)


 


Chloride Level


  99 MMOL/L


()


 


Carbon Dioxide Level


  26 MMOL/L


(21-32)


 


Anion Gap


  8 mmol/L


(5-15)


 


Blood Urea Nitrogen


  6 mg/dL (7-18)


L


 


Creatinine


  1.1 MG/DL


(0.55-1.30)


 


Estimat Glomerular Filtration


Rate > 60 mL/min


(>60)


 


Glucose Level


  171 MG/DL


()  H


 


Uric Acid


  3.9 MG/DL


(2.6-7.2)


 


Calcium Level


  9.7 MG/DL


(8.5-10.1)


 


Phosphorus Level


  3.7 MG/DL


(2.5-4.9)


 


Magnesium Level


  1.5 MG/DL


(1.8-2.4)  L


 


Total Bilirubin


  0.3 MG/DL


(0.2-1.0)


 


Aspartate Amino Transf


(AST/SGOT) 16 U/L (15-37)


 


 


Alanine Aminotransferase


(ALT/SGPT) 16 U/L (12-78)


 


 


Alkaline Phosphatase


  94 U/L


()


 


Total Protein


  6.9 G/DL


(6.4-8.2)


 


Albumin


  2.0 G/DL


(3.4-5.0)  L


 


Globulin 4.9 g/dL  


 


Albumin/Globulin Ratio


  0.4 (1.0-2.7)


L











Current Medications








 Medications


  (Trade)  Dose


 Ordered  Sig/Jan


 Route


 PRN Reason  Start Time


 Stop Time Status Last Admin


Dose Admin


 


 Acetaminophen


  (Tylenol)  650 mg  Q4H  PRN


 GT


 Mild Pain/Temp > 100.6  11/26/19 18:42


 12/26/19 18:41  12/8/19 20:36


 


 


 Artificial Tears


  (Akwa-Tears)  2 drop  Q12HR


 BOTH EYES


   11/26/19 21:00


 12/24/19 20:59  12/11/19 08:33


 


 


 Baclofen


  (Lioresal)  10 mg  THREE TIMES A  DAY


 GT


   11/27/19 09:00


 12/24/19 17:59  12/11/19 08:30


 


 


 Calcitonin [place]


  (Miacalcin)  1 sprays  DAILY


 NASAL


   11/28/19 12:00


 12/28/19 11:59  12/11/19 08:30


 


 


 Chlorhexidine


 Gluconate


  (Elaine-Hex 2%)  1 applic  DAILY@2000


 TOPIC


   12/6/19 20:00


 1/5/20 19:59  12/10/19 20:28


 


 


 Clobetasol


 Propionate


  (Temovate)  1 applic  EVERY 12  HOURS


 TOPIC


   11/26/19 21:00


 12/24/19 20:59  12/11/19 08:30


 


 


 Dextrose


  (Dextrose 50%)  25 ml  Q30M  PRN


 IV


 Hypoglycemia  11/26/19 18:45


 12/24/19 14:44   


 


 


 Dextrose


  (Dextrose 50%)  50 ml  Q30M  PRN


 IV


 Hypoglycemia  11/26/19 18:45


 12/24/19 14:44   


 


 


 Famotidine


  (Pepcid)  20 mg  BID


 GT


   11/28/19 18:00


 12/28/19 17:59  12/11/19 08:29


 


 


 Fenofibrate


  (Tricor)  145 mg  DAILY


 ORAL


   11/27/19 09:00


 12/25/19 08:59  12/11/19 08:29


 


 


 Fluconazole


  (Diflucan)  400 mg  DAILY


 GT


   12/12/19 09:00


 12/16/19 13:46   


 


 


 Heparin Sodium/


 Dextrose  500 ml @ 


 37.96 mls/


 hr  ADJUST PER  PROTOCOL


 IV


   12/11/19 10:40


 1/10/20 10:39  12/11/19 10:54


 


 


 Insulin Aspart


  (NovoLOG)    EVERY 6  HOURS


 SUBQ


   11/30/19 12:00


 12/24/19 16:29  12/11/19 05:33


 


 


 Levetiracetam


  (Keppra)  1,000 mg  Q8H


 GT


   11/29/19 02:00


 12/29/19 01:59  12/11/19 09:40


 


 


 Magnesium Sulfate  100 ml @ 


 100 mls/hr  Q1H


 IVPB


   12/11/19 08:45


 12/11/19 12:44  12/11/19 10:52


 


 


 Minocycline HCl


  (Minocin)  100 mg  Q12HR@0600,1800


 ORAL


   12/11/19 06:00


 12/18/19 05:59  12/11/19 05:32


 


 


 Ondansetron HCl


  (Zofran)  4 mg  Q6H  PRN


 IVP


 Nausea & Vomiting  11/26/19 18:44


 12/26/19 18:43   


 


 


 Polyethylene


 Glycol


  (Miralax)  17 gm  DAILYPRN  PRN


 GT


 Constipation  11/26/19 18:44


 12/26/19 18:43   


 


 


 Sitagliptin


 Phosphate


  (Januvia)  50 mg  ACBREAKFAST


 GT


   11/27/19 06:30


 12/25/19 06:29  12/11/19 05:32


 


 


 Tamsulosin HCl


  (Flomax)  0.4 mg  DAILY


 ORAL


   11/27/19 09:00


 12/27/19 08:59  12/11/19 08:29


 


 


 Trimethoprim/


 Sulfamethoxazole


 20 ml/Dextrose  570 ml @ 


 380 mls/hr  [id number]  BACTRIM


 IV


   12/9/19 16:00


 12/16/19 15:59  12/11/19 08:29


 

















Joy Love M.D. Dec 11, 2019 11:24
Assessment/Plan


Assessment/Plan








Assessment:


Sepsis, recurrent-


MDR K.pna bacteremia- ?source- fluid collection with serous fluid- r/o abscess, 

cx pending


   -12/10 SP US guided aspiration: Small amount of serous fluid aspirated from 

left flank fluid collection. No jac pus so collection is presumably not an 

abscess; no drain placed. Note,


however, the presence of considerable residual tissue with hyperemia. This most 

likely represents hyperemic scar tissue but this should be followed up to 

exclude tumor


          --cx and cytology p





Probable cystitis/pyelonephritis


  -12/8 CT c/ab/p:  Left lower lobe segmental and subsegmental pulmonary emboli

. Bibasilar atelectasis/consolidations. 10 mm low-density lesion in right 

thyroid.  Heterogeneous large areas of hypoattenuation in the right lobe of the

  liver, maybe fatty changes, correlate for hepatitis or other process.  

Contracted gallbladder with thickened enhancing wall.  Left lateral abdominal 

wall musculature 1.5 x 2.2 cm fluid collection with enhancing worrisome for an 

abscess. Bilateral renal stones, staghorn type in the left kidney, 

nonobstructive.  Mild fullness of the right renal pelvis.  Mild bilateral 

perinephric stranding, correlate for infection.Mayorga catheter in urinary 

bladder.  Thickening of the urinary bladder with mild stranding may be 

cystitis. A few small calcifications in the left urinary bladder base.


 


  -12/7 Bcx 2/4 CRE K.pna (I Genta; S bactrim, tigecycline) ; 12/9 Bx NTD


   -12/5 Cdiff neg


             CXR: Left pleural effusion is unchanged. Left perihilar 

atelectasis is unchanged. Tracheostomy again demonstrated.


             Bcx 4/4 MDR K. pna (S only to tigecycline, bactrim; R Meropenem, 

Amikacin, Colistin, Polymixin B)


             u/a wbc 15-20, nit neg, leuk +2; ucx C. parapsilopsis








Fever, persistent


Leukocytosis, recurrent, increasing





Pulmonary Emboli





UTI, sp rx


Probable PNA,sp Rx


  -Bcx Neg


  -u/a wbc tnct, nit neg, leuk +3; ucx C. parasilopsis


  -sp cx ESBL P. mirabilis, MDR P. stuarti (S Cefepime, Zosyn, Ertapenem)


  -CXR:   Geographic density overlying the right upper lung.  This likely 

represents overlying soft tissue although cannot exclude an underlying 

consolidation/pneumonia. Pulmonary vascular congestion. Small left pleural 

effusion. Subsegmental atelectasis versus infiltrate in the left lung base.


 -Influenza A/B ag neg








BRAYDON, SP





Mild AST elevation, SP


   -Abd uS: Hepatomegaly with fatty infiltration. Suspected nonobstructive 

stone left kidney. Left renal cyst





Sacral decubitus





hx of  recurrent UTIs


  -UCx 1/27/19 P.a.  (R Levo, otherwise S)


  - CT abd 1/29/19 3 mm distal right ureteral calculus, minimal resultant 

hydronephrosis. Atrophic left kidney, containing a large staghorn calculus and 

multiple intrarenal calyceal calculi, previously described


   UCx 2/2/19 - Enterobacter (S Cefepime) and yeast and Pa





 HTN


CKD


 ICH s/p craniotomy and  shunt now w/ persistent vegetative state


 dysphagia s/p GT


Chronic resp failure - vent/trach dependent


ICH


COPD


DM


Seizure disorder


BPH


Dysphagia, G tube


Colostomy  


SNF  resident





Plan:


-Continue IV Bactrim #4 and add PO Miniocycline #3 for MDR/CRE K.pna bacteremia


-Cont PO fluconazole #4 for likely candida cystitis/pyelo (+staghorn calculus, 

chronic mayorga, prolonged antibiotic treatment)


  -12/10 SP IV POlymixin B #2


  -12/9 SP Meropenem #5, IV Amikacin #3


  -12/7 SP IV Vancomycin #3


  -12/3 SP ertapenem #4


  - 11/29/10 S/P Meropenem 


  - 11/27 SP IV Vancomycin #4


  - 11/24 SP Cefepime x1





-f/u cx


-Monitor CBC/CMP, temperatures


-PEG/Trach care


-aspiration precautions


-wound care


-Repeat Bcx 2, CXR, u/a w/ reflex


-f/u fluid collection cultuers and cytology





Thank you for this consultation. Will continue to follow along with you.





Discussed with RN





Subjective


Allergies:  


Coded Allergies:  


     AZTREONAM (Verified  Allergy, Unknown, 7/23/18)


Subjective


Tm 101.7


wbc improving


latest Bcx NTD





Objective


Vital Signs





Last 24 Hour Vital Signs








  Date Time  Temp Pulse Resp B/P (MAP) Pulse Ox O2 Delivery O2 Flow Rate FiO2


 


12/12/19 12:00        30


 


12/12/19 12:00      Mechanical Ventilator  


 


12/12/19 11:50  114 15     30


 


12/12/19 09:45  122 15     30


 


12/12/19 08:06 100.4       


 


12/12/19 08:00 100.4 122 16 107/67 (80) 100   


 


12/12/19 08:00      Mechanical Ventilator  


 


12/12/19 08:00        30


 


12/12/19 08:00  127      


 


12/12/19 07:22  127 17     30


 


12/12/19 06:32 101.7       


 


12/12/19 05:04  119 16     30


 


12/12/19 05:00 98.1       


 


12/12/19 04:00        30


 


12/12/19 04:00      Mechanical Ventilator  


 


12/12/19 04:00 101.7 111 15 108/72 (84) 98   


 


12/12/19 03:27  127      


 


12/12/19 02:54  123 18     30


 


12/12/19 01:39 98.9       


 


12/12/19 01:00 100.0       


 


12/12/19 00:31  115 17     30


 


12/12/19 00:00      Mechanical Ventilator  


 


12/12/19 00:00        30


 


12/12/19 00:00 99.2 111 15 121/76 (91) 100   


 


12/11/19 23:26  115      


 


12/11/19 23:08  124 15     30


 


12/11/19 21:11  121 18     30


 


12/11/19 21:06 99.4       


 


12/11/19 20:00        30


 


12/11/19 20:00 100.2 121 17 120/68 (85) 100   


 


12/11/19 20:00      Mechanical Ventilator  


 


12/11/19 19:11  130 16     30


 


12/11/19 19:02  131      


 


12/11/19 16:46  120 16     30


 


12/11/19 16:00  124      


 


12/11/19 16:00 100.6 122 16 139/79 (99) 100   


 


12/11/19 16:00        30


 


12/11/19 16:00      Mechanical Ventilator  


 


12/11/19 15:29  120 16     30


 


12/11/19 12:42  125 14     30








Height (Feet):  5


Height (Inches):  7.00


Weight (Pounds):  202


Objective


GENERAL:  Noncommunicative.  Tongue fasciculation.  Moderate edema.


LUNGS:  Diminished breath sounds.


CARDIAC:  Regular rhythm.  Rapid rate.  Normal S1, S2.


ABDOMEN:  Soft with G-tube and colostomy bag.





Microbiology








 Date/Time


Source Procedure


Growth Status


 


 


 12/9/19 23:30


Blood Blood Culture - Preliminary


NO GROWTH AFTER 48 HOURS Resulted


 


 12/9/19 23:25


Blood Blood Culture - Preliminary


NO GROWTH AFTER 48 HOURS Resulted











Laboratory Tests








Test


  12/11/19


20:19 12/12/19


03:05


 


Activated Partial


Thromboplast Time 39 SEC (23-33)


H 72 SEC (23-33)


H


 


White Blood Count


  


  18.7 K/UL


(4.8-10.8)  H


 


Red Blood Count


  


  3.16 M/UL


(4.70-6.10)  L


 


Hemoglobin


  


  8.9 G/DL


(14.2-18.0)  L


 


Hematocrit


  


  27.1 %


(42.0-52.0)  L


 


Mean Corpuscular Volume  86 FL (80-99)  


 


Mean Corpuscular Hemoglobin


  


  28.2 PG


(27.0-31.0)


 


Mean Corpuscular Hemoglobin


Concent 


  32.9 G/DL


(32.0-36.0)


 


Red Cell Distribution Width


  


  16.3 %


(11.6-14.8)  H


 


Platelet Count


  


  329 K/UL


(150-450)


 


Mean Platelet Volume


  


  7.3 FL


(6.5-10.1)


 


Neutrophils (%) (Auto)


  


  % (45.0-75.0)


 


 


Lymphocytes (%) (Auto)


  


  % (20.0-45.0)


 


 


Monocytes (%) (Auto)   % (1.0-10.0)  


 


Eosinophils (%) (Auto)   % (0.0-3.0)  


 


Basophils (%) (Auto)   % (0.0-2.0)  


 


Differential Total Cells


Counted 


  100  


 


 


Neutrophils % (Manual)  85 % (45-75)  H


 


Lymphocytes % (Manual)  7 % (20-45)  L


 


Monocytes % (Manual)  5 % (1-10)  


 


Eosinophils % (Manual)  3 % (0-3)  


 


Basophils % (Manual)  0 % (0-2)  


 


Band Neutrophils  0 % (0-8)  


 


Platelet Estimate  Adequate  


 


Platelet Morphology  Normal  


 


Sodium Level


  


  128 MMOL/L


(136-145)  L


 


Potassium Level


  


  4.8 MMOL/L


(3.5-5.1)


 


Chloride Level


  


  95 MMOL/L


()  L


 


Carbon Dioxide Level


  


  26 MMOL/L


(21-32)


 


Anion Gap


  


  7 mmol/L


(5-15)


 


Blood Urea Nitrogen


  


  9 mg/dL (7-18)


 


 


Creatinine


  


  1.5 MG/DL


(0.55-1.30)  H


 


Estimat Glomerular Filtration


Rate 


  59.5 mL/min


(>60)


 


Glucose Level


  


  321 MG/DL


()  #H


 


Calcium Level


  


  9.9 MG/DL


(8.5-10.1)


 


Phosphorus Level


  


  3.2 MG/DL


(2.5-4.9)


 


Magnesium Level


  


  2.2 MG/DL


(1.8-2.4)


 


Total Bilirubin


  


  0.5 MG/DL


(0.2-1.0)


 


Aspartate Amino Transf


(AST/SGOT) 


  24 U/L (15-37)


 


 


Alanine Aminotransferase


(ALT/SGPT) 


  19 U/L (12-78)


 


 


Alkaline Phosphatase


  


  118 U/L


()  H


 


Total Protein


  


  7.5 G/DL


(6.4-8.2)


 


Albumin


  


  2.1 G/DL


(3.4-5.0)  L


 


Globulin  5.4 g/dL  


 


Albumin/Globulin Ratio


  


  0.4 (1.0-2.7)


L











Current Medications








 Medications


  (Trade)  Dose


 Ordered  Sig/Jan


 Route


 PRN Reason  Start Time


 Stop Time Status Last Admin


Dose Admin


 


 Acetaminophen


  (Tylenol)  650 mg  Q4H  PRN


 GT


 Mild Pain/Temp > 100.6  11/26/19 18:42


 12/26/19 18:41  12/12/19 06:34


 


 


 Artificial Tears


  (Akwa-Tears)  2 drop  Q12HR


 BOTH EYES


   11/26/19 21:00


 12/24/19 20:59  12/12/19 09:29


 


 


 Baclofen


  (Lioresal)  10 mg  THREE TIMES A  DAY


 GT


   11/27/19 09:00


 12/24/19 17:59  12/12/19 09:30


 


 


 Calcitonin [place]


  (Miacalcin)  1 sprays  DAILY


 NASAL


   11/28/19 12:00


 12/28/19 11:59  12/12/19 09:30


 


 


 Chlorhexidine


 Gluconate


  (Elaine-Hex 2%)  1 applic  DAILY@2000


 TOPIC


   12/6/19 20:00


 1/5/20 19:59  12/11/19 20:30


 


 


 Clobetasol


 Propionate


  (Temovate)  1 applic  EVERY 12  HOURS


 TOPIC


   11/26/19 21:00


 12/24/19 20:59  12/12/19 09:29


 


 


 Dextrose


  (Dextrose 50%)  25 ml  Q30M  PRN


 IV


 Hypoglycemia  11/26/19 18:45


 12/24/19 14:44   


 


 


 Dextrose


  (Dextrose 50%)  50 ml  Q30M  PRN


 IV


 Hypoglycemia  11/26/19 18:45


 12/24/19 14:44   


 


 


 Famotidine


  (Pepcid)  20 mg  BID


 GT


   11/28/19 18:00


 12/28/19 17:59  12/12/19 09:31


 


 


 Fenofibrate


  (Tricor)  145 mg  DAILY


 ORAL


   11/27/19 09:00


 12/25/19 08:59  12/12/19 09:31


 


 


 Fluconazole


  (Diflucan)  400 mg  DAILY


 GT


   12/12/19 09:00


 12/16/19 13:46  12/12/19 09:30


 


 


 Heparin Sodium/


 Dextrose  500 ml @ 


 45.552 mls/


 hr  ADJUST PER  PROTOCOL


 IV


   12/11/19 20:50


 1/10/20 10:39  12/12/19 08:01


 


 


 Insulin Aspart


  (NovoLOG)    EVERY 6  HOURS


 SUBQ


   11/30/19 12:00


 12/24/19 16:29  12/12/19 11:41


 


 


 Levetiracetam


  (Keppra)  1,000 mg  Q8H


 GT


   11/29/19 02:00


 12/29/19 01:59  12/12/19 09:31


 


 


 Minocycline HCl


  (Minocin)  100 mg  Q12HR@0600,1800


 ORAL


   12/11/19 06:00


 12/18/19 05:59  12/12/19 05:35


 


 


 Ondansetron HCl


  (Zofran)  4 mg  Q6H  PRN


 IVP


 Nausea & Vomiting  11/26/19 18:44


 12/26/19 18:43   


 


 


 Polyethylene


 Glycol


  (Miralax)  17 gm  DAILYPRN  PRN


 GT


 Constipation  11/26/19 18:44


 12/26/19 18:43   


 


 


 Sitagliptin


 Phosphate


  (Januvia)  50 mg  ACBREAKFAST


 GT


   11/27/19 06:30


 12/25/19 06:29  12/12/19 05:35


 


 


 Sodium Chloride  500 ml @ 


 30 mls/hr  ONCE  ONCE


 IV


   12/12/19 09:00


 12/13/19 01:39  12/12/19 09:48


 


 


 Tamsulosin HCl


  (Flomax)  0.4 mg  DAILY


 ORAL


   11/27/19 09:00


 12/27/19 08:59  12/12/19 09:31


 


 


 Trimethoprim/


 Sulfamethoxazole


 28 ml/Dextrose  578 ml @ 


 385.333


 mls/hr  [id number]  BACTRIM


 IV


   12/12/19 16:00


 12/19/19 15:59   


 

















Joy Love M.D. Dec 12, 2019 12:39
Assessment/Plan


Assessment/Plan








Assessment:


Sepsis, recurrent-


MDR K.pna bacteremia- ?source- fluid collection with serous fluid- r/o abscess, 

cx pending


   -12/10 SP US guided aspiration: Small amount of serous fluid aspirated from 

left flank fluid collection. No jac pus so collection is presumably not an 

abscess; no drain placed. Note,


however, the presence of considerable residual tissue with hyperemia. This most 

likely represents hyperemic scar tissue but this should be followed up to 

exclude tumor


          --cx and cytology p





Probable cystitis/pyelonephritis


  -12/8 CT c/ab/p:  Left lower lobe segmental and subsegmental pulmonary emboli

. Bibasilar atelectasis/consolidations. 10 mm low-density lesion in right 

thyroid.  Heterogeneous large areas of hypoattenuation in the right lobe of the

  liver, maybe fatty changes, correlate for hepatitis or other process.  

Contracted gallbladder with thickened enhancing wall.  Left lateral abdominal 

wall musculature 1.5 x 2.2 cm fluid collection with enhancing worrisome for an 

abscess. Bilateral renal stones, staghorn type in the left kidney, 

nonobstructive.  Mild fullness of the right renal pelvis.  Mild bilateral 

perinephric stranding, correlate for infection.Mayorga catheter in urinary 

bladder.  Thickening of the urinary bladder with mild stranding may be 

cystitis. A few small calcifications in the left urinary bladder base.


  -12/9 Bx 1/2 Gram variable rods


  -12/7 Bcx 2/4 CRE K.pna (I Genta; S bactrim, tigecycline) ; 


   -12/5 Cdiff neg


             CXR: Left pleural effusion is unchanged. Left perihilar 

atelectasis is unchanged. Tracheostomy again demonstrated.


             Bcx 4/4 MDR K. pna (S only to tigecycline, bactrim; R Meropenem, 

Amikacin, Colistin, Polymixin B)


             u/a wbc 15-20, nit neg, leuk +2; ucx C. parapsilopsis








Fever, persistent


Leukocytosis, recurrent





Pulmonary Emboli





UTI, sp rx


Probable PNA,sp Rx


  -Bcx Neg


  -u/a wbc tnct, nit neg, leuk +3; ucx C. parasilopsis


  -sp cx ESBL P. mirabilis, MDR P. stuarti (S Cefepime, Zosyn, Ertapenem)


  -CXR:   Geographic density overlying the right upper lung.  This likely 

represents overlying soft tissue although cannot exclude an underlying 

consolidation/pneumonia. Pulmonary vascular congestion. Small left pleural 

effusion. Subsegmental atelectasis versus infiltrate in the left lung base.


 -Influenza A/B ag neg








BRAYDON, SP





Mild AST elevation, SP


   -Abd uS: Hepatomegaly with fatty infiltration. Suspected nonobstructive 

stone left kidney. Left renal cyst





Sacral decubitus





hx of  recurrent UTIs


  -UCx 1/27/19 P.a.  (R Levo, otherwise S)


  - CT abd 1/29/19 3 mm distal right ureteral calculus, minimal resultant 

hydronephrosis. Atrophic left kidney, containing a large staghorn calculus and 

multiple intrarenal calyceal calculi, previously described


   UCx 2/2/19 - Enterobacter (S Cefepime) and yeast and Pa





 HTN


CKD


 ICH s/p craniotomy and  shunt now w/ persistent vegetative state


 dysphagia s/p GT


Chronic resp failure - vent/trach dependent


ICH


COPD


DM


Seizure disorder


BPH


Dysphagia, G tube


Colostomy  


SNF  resident





Plan:


-Continue IV Bactrim #5 and add PO Minocycline #4 for MDR/CRE K.pna bacteremia


-Cont PO fluconazole #5 for likely candida cystitis/pyelo (+staghorn calculus, 

chronic mayorga, prolonged antibiotic treatment)


-Add empiric Daptomycin from gram variable rods pending ID





  -12/10 SP IV POlymixin B #2


  -12/9 SP Meropenem #5, IV Amikacin #3


  -12/7 SP IV Vancomycin #3


  -12/3 SP ertapenem #4


  - 11/29/10 S/P Meropenem 


  - 11/27 SP IV Vancomycin #4


  - 11/24 SP Cefepime x1





-f/u cx


-Monitor CBC/CMP, temperatures


-PEG/Trach care


-aspiration precautions


-wound care


-Repeat Bcx 2


-f/u fluid collection cultuers and cytology


-CPK am





Thank you for this consultation. Will continue to follow along with you.





Discussed with RN





Subjective


Allergies:  


Coded Allergies:  


     AZTREONAM (Verified  Allergy, Unknown, 7/23/18)


Subjective


Tm 101


wbc improving


bacteremic


Cr increased





Objective


Vital Signs





Last 24 Hour Vital Signs








  Date Time  Temp Pulse Resp B/P (MAP) Pulse Ox O2 Delivery O2 Flow Rate FiO2


 


12/13/19 14:55  113 19     30


 


12/13/19 13:04  113 15     30


 


12/13/19 12:00      Mechanical Ventilator  


 


12/13/19 12:00 98.1 117 15 122/78 (93) 100   


 


12/13/19 12:00        30


 


12/13/19 11:40  116      


 


12/13/19 10:43  118 17     30


 


12/13/19 08:35  117 14     30


 


12/13/19 08:00      Mechanical Ventilator  


 


12/13/19 08:00 99.4 121 15 121/78 (92) 100   


 


12/13/19 08:00        30


 


12/13/19 08:00  118      


 


12/13/19 06:54  129 15     30


 


12/13/19 05:05 99.0       


 


12/13/19 05:05 99.0       


 


12/13/19 04:55  118 16     30


 


12/13/19 04:00      Mechanical Ventilator  


 


12/13/19 04:00 99.6 121 18 130/60 (83) 100   


 


12/13/19 04:00        30


 


12/13/19 03:36  127      


 


12/13/19 02:39  115 15     30


 


12/13/19 01:04  113 14     30


 


12/13/19 00:57 100.2       


 


12/13/19 00:00 101.0 110 18 118/69 (85) 100   


 


12/13/19 00:00        30


 


12/13/19 00:00      Mechanical Ventilator  


 


12/12/19 23:37  109      


 


12/12/19 22:50  111 15     30


 


12/12/19 21:01  116 14     30


 


12/12/19 20:30 100.3       


 


12/12/19 20:00        30


 


12/12/19 20:00 102.6 119 16 114/60 (78) 100   


 


12/12/19 20:00      Mechanical Ventilator  


 


12/12/19 19:49  119      


 


12/12/19 19:00  119 17     30


 


12/12/19 17:54  116 14     30


 


12/12/19 16:00        30


 


12/12/19 16:00  120      


 


12/12/19 16:00 98.6 115 15 106/59 (75) 100   


 


12/12/19 16:00      Mechanical Ventilator  


 


12/12/19 15:43  124 16     30








Height (Feet):  5


Height (Inches):  7.00


Weight (Pounds):  201


Objective


GENERAL:  Noncommunicative.  Tongue fasciculation.  Moderate edema.


LUNGS:  Diminished breath sounds.


CARDIAC:  Regular rhythm.  Rapid rate.  Normal S1, S2.


ABDOMEN:  Soft with G-tube and colostomy bag.





Microbiology








 Date/Time


Source Procedure


Growth Status


 


 


 12/12/19 18:20


Blood Blood Culture - Preliminary Resulted


 


 12/12/19 15:45


Urine,Clean Catch Urine Culture - Preliminary


NO GROWTH Resulted











Laboratory Tests








Test


  12/12/19


15:45 12/13/19


03:59


 


Urine Color Pale yellow   


 


Urine Appearance


  Slightly


cloudy 


 


 


Urine pH 8 (4.5-8.0)   


 


Urine Specific Gravity


  1.010


(1.005-1.035) 


 


 


Urine Protein


  3+ (NEGATIVE)


H 


 


 


Urine Glucose (UA)


  3+ (NEGATIVE)


H 


 


 


Urine Ketones


  Negative


(NEGATIVE) 


 


 


Urine Blood


  4+ (NEGATIVE)


H 


 


 


Urine Nitrite


  Negative


(NEGATIVE) 


 


 


Urine Bilirubin


  Negative


(NEGATIVE) 


 


 


Urine Urobilinogen


  Normal MG/DL


(0.0-1.0) 


 


 


Urine Leukocyte Esterase


  3+ (NEGATIVE)


H 


 


 


Urine RBC


  10-15 /HPF (0


- 0)  H 


 


 


Urine WBC


  30-40 /HPF (0


- 0)  H 


 


 


Urine Squamous Epithelial


Cells None /LPF


(NONE/OCC) 


 


 


Urine Bacteria


  Moderate /HPF


(NONE)  H 


 


 


White Blood Count


  


  13.6 K/UL


(4.8-10.8)  H


 


Red Blood Count


  


  3.12 M/UL


(4.70-6.10)  L


 


Hemoglobin


  


  8.7 G/DL


(14.2-18.0)  L


 


Hematocrit


  


  27.1 %


(42.0-52.0)  L


 


Mean Corpuscular Volume  87 FL (80-99)  


 


Mean Corpuscular Hemoglobin


  


  27.8 PG


(27.0-31.0)


 


Mean Corpuscular Hemoglobin


Concent 


  32.0 G/DL


(32.0-36.0)


 


Red Cell Distribution Width


  


  16.9 %


(11.6-14.8)  H


 


Platelet Count


  


  375 K/UL


(150-450)


 


Mean Platelet Volume


  


  7.1 FL


(6.5-10.1)


 


Neutrophils (%) (Auto)


  


  81.1 %


(45.0-75.0)  H


 


Lymphocytes (%) (Auto)


  


  9.3 %


(20.0-45.0)  L


 


Monocytes (%) (Auto)


  


  5.2 %


(1.0-10.0)


 


Eosinophils (%) (Auto)


  


  4.1 %


(0.0-3.0)  H


 


Basophils (%) (Auto)


  


  0.3 %


(0.0-2.0)


 


Activated Partial


Thromboplast Time 


  35 SEC (23-33)


H


 


Sodium Level


  


  129 MMOL/L


(136-145)  L


 


Potassium Level


  


  5.7 MMOL/L


(3.5-5.1)  H


 


Chloride Level


  


  96 MMOL/L


()  L


 


Carbon Dioxide Level


  


  25 MMOL/L


(21-32)


 


Anion Gap


  


  8 mmol/L


(5-15)


 


Blood Urea Nitrogen


  


  13 mg/dL


(7-18)


 


Creatinine


  


  1.7 MG/DL


(0.55-1.30)  H


 


Estimat Glomerular Filtration


Rate 


  51.5 mL/min


(>60)


 


Glucose Level


  


  333 MG/DL


()  H


 


Uric Acid


  


  3.9 MG/DL


(2.6-7.2)


 


Calcium Level


  


  9.9 MG/DL


(8.5-10.1)


 


Phosphorus Level


  


  3.2 MG/DL


(2.5-4.9)


 


Magnesium Level


  


  2.2 MG/DL


(1.8-2.4)


 


Total Bilirubin


  


  0.4 MG/DL


(0.2-1.0)


 


Aspartate Amino Transf


(AST/SGOT) 


  29 U/L (15-37)


 


 


Alanine Aminotransferase


(ALT/SGPT) 


  23 U/L (12-78)


 


 


Alkaline Phosphatase


  


  132 U/L


()  H


 


C-Reactive Protein,


Quantitative 


  35.3 mg/dL


(0.00-0.90)  H


 


Pro-B-Type Natriuretic Peptide


  


  138 pg/mL


(0-125)  H


 


Total Protein


  


  8.1 G/DL


(6.4-8.2)


 


Albumin


  


  2.2 G/DL


(3.4-5.0)  L


 


Globulin  5.9 g/dL  


 


Albumin/Globulin Ratio


  


  0.4 (1.0-2.7)


L











Current Medications








 Medications


  (Trade)  Dose


 Ordered  Sig/Jan


 Route


 PRN Reason  Start Time


 Stop Time Status Last Admin


Dose Admin


 


 Acetaminophen


  (Tylenol)  650 mg  Q4H  PRN


 GT


 Mild Pain/Temp > 100.6  11/26/19 18:42


 12/26/19 18:41  12/13/19 04:35


 


 


 Apixaban


  (Eliquis)  5 mg  BID


 GT


   12/12/19 18:02


 1/11/20 18:01  12/13/19 09:29


 


 


 Artificial Tears


  (Akwa-Tears)  2 drop  Q12HR


 BOTH EYES


   11/26/19 21:00


 12/24/19 20:59  12/13/19 09:34


 


 


 Baclofen


  (Lioresal)  10 mg  THREE TIMES A  DAY


 GT


   11/27/19 09:00


 12/24/19 17:59  12/13/19 12:47


 


 


 Calcitonin [place]


  (Miacalcin)  1 sprays  DAILY


 NASAL


   11/28/19 12:00


 12/28/19 11:59  12/13/19 09:34


 


 


 Chlorhexidine


 Gluconate


  (Elaine-Hex 2%)  1 applic  DAILY@2000


 TOPIC


   12/6/19 20:00


 1/5/20 19:59  12/12/19 19:59


 


 


 Clobetasol


 Propionate


  (Temovate)  1 applic  EVERY 12  HOURS


 TOPIC


   11/26/19 21:00


 12/24/19 20:59  12/13/19 09:34


 


 


 Dextrose


  (Dextrose 50%)  25 ml  Q30M  PRN


 IV


 Hypoglycemia  11/26/19 18:45


 12/24/19 14:44   


 


 


 Dextrose


  (Dextrose 50%)  50 ml  Q30M  PRN


 IV


 Hypoglycemia  11/26/19 18:45


 12/24/19 14:44   


 


 


 Famotidine


  (Pepcid)  20 mg  BID


 GT


   11/28/19 18:00


 12/28/19 17:59  12/13/19 09:28


 


 


 Fenofibrate


  (Tricor)  145 mg  DAILY


 ORAL


   11/27/19 09:00


 12/25/19 08:59  12/13/19 09:28


 


 


 Fluconazole


  (Diflucan)  400 mg  DAILY


 GT


   12/12/19 09:00


 12/16/19 13:46  12/13/19 09:28


 


 


 Insulin Aspart


  (NovoLOG)    EVERY 6  HOURS


 SUBQ


   11/30/19 12:00


 12/24/19 16:29  12/13/19 12:49


 


 


 Levetiracetam


  (Keppra)  1,000 mg  Q8H


 GT


   11/29/19 02:00


 12/29/19 01:59  12/13/19 09:28


 


 


 Minocycline HCl


  (Minocin)  100 mg  Q12HR@0600,1800


 ORAL


   12/11/19 06:00


 12/18/19 05:59  12/13/19 05:44


 


 


 Ondansetron HCl


  (Zofran)  4 mg  Q6H  PRN


 IVP


 Nausea & Vomiting  11/26/19 18:44


 12/26/19 18:43   


 


 


 Polyethylene


 Glycol


  (Miralax)  17 gm  DAILYPRN  PRN


 GT


 Constipation  11/26/19 18:44


 12/26/19 18:43   


 


 


 Sitagliptin


 Phosphate


  (Januvia)  50 mg  ACBREAKFAST


 GT


   11/27/19 06:30


 12/25/19 06:29  12/13/19 05:44


 


 


 Sodium Chloride  1,000 ml @ 


 100 mls/hr  Q10H


 IV


   12/13/19 08:15


 1/12/20 08:14  12/13/19 09:33


 


 


 Tamsulosin HCl


  (Flomax)  0.4 mg  DAILY


 ORAL


   11/27/19 09:00


 12/27/19 08:59  12/13/19 09:29


 


 


 Trimethoprim/


 Sulfamethoxazole


 28 ml/Dextrose  578 ml @ 


 385.333


 mls/hr  [id number]  BACTRIM


 IV


   12/12/19 16:00


 12/19/19 15:59  12/13/19 07:50


 

















Joy Love M.D. Dec 13, 2019 15:45
Assessment/Plan


Assessment/Plan








Assessment:


Sepsis, recurrent-


Persistent CRE K.pna bacteremia- ?source- fluid collection with serous fluid- r/

o abscess, cx pending


   -12/10 SP US guided aspiration: Small amount of serous fluid aspirated from 

left flank fluid collection. No jac pus so collection is presumably not an 

abscess; no drain placed. Note,


however, the presence of considerable residual tissue with hyperemia. This most 

likely represents hyperemic scar tissue but this should be followed up to 

exclude tumor


          --cx neg





Probable cystitis/pyelonephritis


  -12/8 CT c/ab/p:  Left lower lobe segmental and subsegmental pulmonary emboli

. Bibasilar atelectasis/consolidations. 10 mm low-density lesion in right 

thyroid.  Heterogeneous large areas of hypoattenuation in the right lobe of the

  liver, maybe fatty changes, correlate for hepatitis or other process.  

Contracted gallbladder with thickened enhancing wall.  Left lateral abdominal 

wall musculature 1.5 x 2.2 cm fluid collection with enhancing worrisome for an 

abscess. Bilateral renal stones, staghorn type in the left kidney, 

nonobstructive.  Mild fullness of the right renal pelvis.  Mild bilateral 

perinephric stranding, correlate for infection.Mayorga catheter in urinary 

bladder.  Thickening of the urinary bladder with mild stranding may be 

cystitis. A few small calcifications in the left urinary bladder base.


    -12/13 BCx 1/4 GNR


   -12/12 Bcx 1/4 CRE K.pna


  -12/9 Bx neg


  -12/7 Bcx 2/4 CRE K.pna (I Genta; S bactrim, tigecycline) ; 


   -12/5 Cdiff neg


             CXR: Left pleural effusion is unchanged. Left perihilar 

atelectasis is unchanged. Tracheostomy again demonstrated.


             Bcx 4/4 MDR K. pna (S only to tigecycline, bactrim; R Meropenem, 

Amikacin, Colistin, Polymixin B)


             u/a wbc 15-20, nit neg, leuk +2; ucx C. parapsilopsis








Fever, improving


Leukocytosis, recurrent; improving





Pulmonary Emboli





UTI, sp rx


Probable PNA,sp Rx


  -Bcx Neg


  -u/a wbc tnct, nit neg, leuk +3; ucx C. parasilopsis


  -sp cx ESBL P. mirabilis, MDR P. stuarti (S Cefepime, Zosyn, Ertapenem)


  -CXR:   Geographic density overlying the right upper lung.  This likely 

represents overlying soft tissue although cannot exclude an underlying 

consolidation/pneumonia. Pulmonary vascular congestion. Small left pleural 

effusion. Subsegmental atelectasis versus infiltrate in the left lung base.


 -Influenza A/B ag neg








BRAYDON, SP





Mild AST elevation, SP


   -Abd uS: Hepatomegaly with fatty infiltration. Suspected nonobstructive 

stone left kidney. Left renal cyst





Sacral decubitus





hx of  recurrent UTIs


  -UCx 1/27/19 P.a.  (R Levo, otherwise S)


  - CT abd 1/29/19 3 mm distal right ureteral calculus, minimal resultant 

hydronephrosis. Atrophic left kidney, containing a large staghorn calculus and 

multiple intrarenal calyceal calculi, previously described


   UCx 2/2/19 - Enterobacter (S Cefepime) and yeast and Pa





 HTN


CKD


 ICH s/p craniotomy and  shunt now w/ persistent vegetative state


 dysphagia s/p GT


Chronic resp failure - vent/trach dependent


ICH


COPD


DM


Seizure disorder


BPH


Dysphagia, G tube


Colostomy  


SNF  resident





Plan:


-Continue IV Bactrim #7 and add PO Minocycline #6 for MDR/CRE K.pna bacteremia


-Cont PO fluconazole #7 for likely candida cystitis/pyelo (+staghorn calculus, 

chronic mayorga, prolonged antibiotic treatment)


-Add empiric Daptomycin #3 from gram variable rods pending ID





  -12/10 SP IV POlymixin B #2


  -12/9 SP Meropenem #5, IV Amikacin #3


  -12/7 SP IV Vancomycin #3


  -12/3 SP ertapenem #4


  - 11/29/10 S/P Meropenem 


  - 11/27 SP IV Vancomycin #4


  - 11/24 SP Cefepime x1





-f/u cx


-Monitor CBC/CMP, temperatures


-PEG/Trach care


-aspiration precautions


-wound care


-Repeat Bcx 2


-f/u fluid collection  cytology


-poor px: goals of care to be define





Thank you for this consultation. Will continue to follow along with you.





Discussed with RN





Subjective


Allergies:  


Coded Allergies:  


     AZTREONAM (Verified  Allergy, Unknown, 7/23/18)


Subjective


Tm 101


wbc improving


bacteremic


Cr increased





Objective


Vital Signs





Last 24 Hour Vital Signs








  Date Time  Temp Pulse Resp B/P (MAP) Pulse Ox O2 Delivery O2 Flow Rate FiO2


 


12/15/19 08:47  101  134/78    


 


12/15/19 08:00        30


 


12/15/19 08:00  108      


 


12/15/19 08:00      Mechanical Ventilator  


 


12/15/19 08:00 98.4 101 15 134/78 (96) 98   


 


12/15/19 07:26  102 14     30


 


12/15/19 05:03  95 14     30


 


12/15/19 04:00 97.2 93 16 144/75 (98) 100   


 


12/15/19 04:00      Mechanical Ventilator  


 


12/15/19 04:00        30


 


12/15/19 03:42  96      


 


12/15/19 03:30  91 15     30


 


12/15/19 01:29  94 14     30


 


12/15/19 00:00      Mechanical Ventilator  


 


12/15/19 00:00 97.0 93 15 132/80 (97) 99   


 


12/14/19 23:50  93      


 


12/14/19 23:14  90 14     30


 


12/14/19 21:30  92 14     30


 


12/14/19 20:36  103  124/68    


 


12/14/19 20:00        30


 


12/14/19 20:00      Mechanical Ventilator  


 


12/14/19 20:00 97.3 103 15 124/68 (86) 99   


 


12/14/19 19:30  104 14     30


 


12/14/19 19:27  106      


 


12/14/19 19:25  92 14  96 Mechanical Ventilator  30


 


12/14/19 17:09  100 15     30


 


12/14/19 16:00 97.2 100 14 126/73 (90) 100   


 


12/14/19 16:00        30


 


12/14/19 16:00      Mechanical Ventilator  


 


12/14/19 15:53  100      


 


12/14/19 14:49  98 14     30


 


12/14/19 13:16  97 14     30


 


12/14/19 13:10  97      








Height (Feet):  5


Height (Inches):  7.00


Weight (Pounds):  195


Objective


GENERAL:  Noncommunicative.  Tongue fasciculation.  Moderate edema.


LUNGS:  Diminished breath sounds.


CARDIAC:  Regular rhythm.  Rapid rate.  Normal S1, S2.


ABDOMEN:  Soft with G-tube and colostomy bag.





Microbiology








 Date/Time


Source Procedure


Growth Status


 


 


 12/13/19 16:15


Blood Blood Culture - Preliminary Resulted


 


 12/13/19 16:00


Blood Blood Culture - Preliminary


NO GROWTH AFTER 24 HOURS Resulted


 


 12/12/19 18:30


Blood Blood Culture - Preliminary


NO GROWTH AFTER 48 HOURS Resulted


 


 12/12/19 18:20


Blood Blood Culture - Final


K.pneumoniae Carbapenem Resist Complete


 


 12/12/19 15:45


Urine,Clean Catch Urine Culture - Final


Candida Parapsilosis Complete











Laboratory Tests








Test


  12/15/19


04:30


 


White Blood Count


  14.2 K/UL


(4.8-10.8)  H


 


Red Blood Count


  2.80 M/UL


(4.70-6.10)  L


 


Hemoglobin


  7.7 G/DL


(14.2-18.0)  L


 


Hematocrit


  24.1 %


(42.0-52.0)  L


 


Mean Corpuscular Volume 86 FL (80-99)  


 


Mean Corpuscular Hemoglobin


  27.5 PG


(27.0-31.0)


 


Mean Corpuscular Hemoglobin


Concent 31.9 G/DL


(32.0-36.0)  L


 


Red Cell Distribution Width


  17.1 %


(11.6-14.8)  H


 


Platelet Count


  440 K/UL


(150-450)


 


Mean Platelet Volume


  7.1 FL


(6.5-10.1)


 


Neutrophils (%) (Auto)


  % (45.0-75.0)


 


 


Lymphocytes (%) (Auto)


  % (20.0-45.0)


 


 


Monocytes (%) (Auto)  % (1.0-10.0)  


 


Eosinophils (%) (Auto)  % (0.0-3.0)  


 


Basophils (%) (Auto)  % (0.0-2.0)  


 


Differential Total Cells


Counted 100  


 


 


Neutrophils % (Manual) 75 % (45-75)  


 


Lymphocytes % (Manual) 11 % (20-45)  L


 


Monocytes % (Manual) 6 % (1-10)  


 


Eosinophils % (Manual) 8 % (0-3)  H


 


Basophils % (Manual) 0 % (0-2)  


 


Band Neutrophils 0 % (0-8)  


 


Platelet Estimate Adequate  


 


Platelet Morphology Normal  


 


Hypochromasia 1+  


 


Anisocytosis 1+  


 


Erythrocyte Sedimentation Rate


  67 MM/HR


(0-20)  H


 


Sodium Level


  130 MMOL/L


(136-145)  L


 


Potassium Level


  5.9 MMOL/L


(3.5-5.1)  H


 


Chloride Level


  97 MMOL/L


()  L


 


Carbon Dioxide Level


  23 MMOL/L


(21-32)


 


Anion Gap


  10 mmol/L


(5-15)


 


Blood Urea Nitrogen


  16 mg/dL


(7-18)


 


Creatinine


  1.2 MG/DL


(0.55-1.30)


 


Estimat Glomerular Filtration


Rate > 60 mL/min


(>60)


 


Glucose Level


  161 MG/DL


()  H


 


Uric Acid


  3.3 MG/DL


(2.6-7.2)


 


Calcium Level


  10.3 MG/DL


(8.5-10.1)  H


 


Phosphorus Level


  3.1 MG/DL


(2.5-4.9)


 


Magnesium Level


  1.7 MG/DL


(1.8-2.4)  L


 


Total Bilirubin


  0.2 MG/DL


(0.2-1.0)


 


Aspartate Amino Transf


(AST/SGOT) 27 U/L (15-37)


 


 


Alanine Aminotransferase


(ALT/SGPT) 22 U/L (12-78)


 


 


Alkaline Phosphatase


  104 U/L


()


 


C-Reactive Protein,


Quantitative 16.0 mg/dL


(0.00-0.90)  H


 


Pro-B-Type Natriuretic Peptide


  218 pg/mL


(0-125)  H


 


Total Protein


  8.4 G/DL


(6.4-8.2)  H


 


Albumin


  2.3 G/DL


(3.4-5.0)  L


 


Globulin 6.1 g/dL  


 


Albumin/Globulin Ratio


  0.4 (1.0-2.7)


L











Current Medications








 Medications


  (Trade)  Dose


 Ordered  Sig/Jan


 Route


 PRN Reason  Start Time


 Stop Time Status Last Admin


Dose Admin


 


 Acetaminophen


  (Tylenol)  650 mg  Q4H  PRN


 GT


 Mild Pain/Temp > 100.6  11/26/19 18:42


 12/26/19 18:41  12/13/19 20:38


 


 


 Apixaban


  (Eliquis)  5 mg  BID


 GT


   12/12/19 18:02


 1/11/20 18:01  12/15/19 08:46


 


 


 Artificial Tears


  (Akwa-Tears)  2 drop  Q12HR


 BOTH EYES


   11/26/19 21:00


 12/24/19 20:59  12/15/19 08:47


 


 


 Baclofen


  (Lioresal)  10 mg  THREE TIMES A  DAY


 GT


   11/27/19 09:00


 12/24/19 17:59  12/15/19 08:46


 


 


 Calcitonin [place]


  (Miacalcin)  1 sprays  DAILY


 NASAL


   11/28/19 12:00


 12/28/19 11:59  12/15/19 08:47


 


 


 Chlorhexidine


 Gluconate


  (Elaine-Hex 2%)  1 applic  DAILY@2000


 TOPIC


   12/6/19 20:00


 1/5/20 19:59  12/14/19 20:36


 


 


 Clobetasol


 Propionate


  (Temovate)  1 applic  EVERY 12  HOURS


 TOPIC


   11/26/19 21:00


 12/24/19 20:59  12/15/19 08:47


 


 


 Daptomycin 550 mg/


 Sodium Chloride  55 ml @ 


 100 mls/hr  Q24H


 IV


   12/13/19 18:00


 12/20/19 17:59  12/14/19 18:17


 


 


 Dextrose


  (Dextrose 50%)  25 ml  Q30M  PRN


 IV


 Hypoglycemia  11/26/19 18:45


 12/24/19 14:44   


 


 


 Dextrose


  (Dextrose 50%)  50 ml  Q30M  PRN


 IV


 Hypoglycemia  11/26/19 18:45


 12/24/19 14:44   


 


 


 Famotidine


  (Pepcid)  20 mg  BID


 GT


   11/28/19 18:00


 12/28/19 17:59  12/15/19 08:46


 


 


 Fenofibrate


  (Tricor)  145 mg  DAILY


 ORAL


   11/27/19 09:00


 12/25/19 08:59  12/15/19 08:46


 


 


 Fluconazole


  (Diflucan)  400 mg  DAILY


 GT


   12/12/19 09:00


 12/16/19 13:46  12/15/19 08:46


 


 


 Insulin Aspart


  (NovoLOG)    EVERY 6  HOURS


 SUBQ


   11/30/19 12:00


 12/24/19 16:29  12/15/19 06:07


 


 


 Levetiracetam


  (Keppra)  1,000 mg  Q8H


 GT


   11/29/19 02:00


 12/29/19 01:59  12/15/19 09:53


 


 


 Magnesium Sulfate  100 ml @ 


 100 mls/hr  Q1H


 IVPB


   12/15/19 12:00


 12/15/19 13:59   


 


 


 Metoprolol


 Tartrate


  (Lopressor)  25 mg  Q12HR


 GT


   12/14/19 09:00


 1/13/20 08:59  12/15/19 08:47


 


 


 Minocycline HCl


  (Minocin)  100 mg  Q12HR@0600,1800


 ORAL


   12/11/19 06:00


 12/18/19 05:59  12/15/19 05:57


 


 


 Ondansetron HCl


  (Zofran)  4 mg  Q6H  PRN


 IVP


 Nausea & Vomiting  11/26/19 18:44


 12/26/19 18:43   


 


 


 Polyethylene


 Glycol


  (Miralax)  17 gm  DAILYPRN  PRN


 GT


 Constipation  11/26/19 18:44


 12/26/19 18:43   


 


 


 Sitagliptin


 Phosphate


  (Januvia)  50 mg  ACBREAKFAST


 GT


   11/27/19 06:30


 12/25/19 06:29  12/15/19 05:57


 


 


 Sodium


 Polystyrene


 Sulfonate


  (Kayexalate)  30 gm  ONCE


 GT


   12/15/19 22:00


 12/15/19 23:00   


 


 


 Sodium


 Polystyrene


 Sulfonate


  (Kayexalate)  30 gm  Q8HR  ONCE


 GT


   12/15/19 14:00


 12/15/19 14:01   


 


 


 Sodium Chloride  1,000 ml @ 


 100 mls/hr  Q10H


 IV


   12/13/19 08:15


 1/12/20 08:14  12/15/19 09:54


 


 


 Tamsulosin HCl


  (Flomax)  0.4 mg  DAILY


 ORAL


   11/27/19 09:00


 12/27/19 08:59  12/15/19 08:47


 


 


 Trimethoprim/


 Sulfamethoxazole


 28 ml/Dextrose  578 ml @ 


 385.333


 mls/hr  [id number]  BACTRIM


 IV


   12/12/19 16:00


 12/19/19 15:59  12/15/19 08:46


 

















Joy Love M.D. Dec 15, 2019 12:21
Assessment/Plan


Assessment/Plan








Assessment:


Sepsis, recurrent-


Persistent CRE K.pna bacteremia- ?source- fluid collection with serous fluid- r/

o abscess, cx pending


   -12/10 SP US guided aspiration: Small amount of serous fluid aspirated from 

left flank fluid collection. No jac pus so collection is presumably not an 

abscess; no drain placed. Note,


however, the presence of considerable residual tissue with hyperemia. This most 

likely represents hyperemic scar tissue but this should be followed up to 

exclude tumor


          --cx neg





Probable cystitis/pyelonephritis


  -12/8 CT c/ab/p:  Left lower lobe segmental and subsegmental pulmonary emboli

. Bibasilar atelectasis/consolidations. 10 mm low-density lesion in right 

thyroid.  Heterogeneous large areas of hypoattenuation in the right lobe of the

  liver, maybe fatty changes, correlate for hepatitis or other process.  

Contracted gallbladder with thickened enhancing wall.  Left lateral abdominal 

wall musculature 1.5 x 2.2 cm fluid collection with enhancing worrisome for an 

abscess. Bilateral renal stones, staghorn type in the left kidney, 

nonobstructive.  Mild fullness of the right renal pelvis.  Mild bilateral 

perinephric stranding, correlate for infection.Mayorga catheter in urinary 

bladder.  Thickening of the urinary bladder with mild stranding may be 

cystitis. A few small calcifications in the left urinary bladder base.


    -12/13 BCx 1/4 GNR


   -12/12 Bcx 1/4 CRE K.pna


  -12/9 Bx neg


  -12/7 Bcx 2/4 CRE K.pna (I Genta; S bactrim, tigecycline) ; 


   -12/5 Cdiff neg


             CXR: Left pleural effusion is unchanged. Left perihilar 

atelectasis is unchanged. Tracheostomy again demonstrated.


             Bcx 4/4 MDR K. pna (S only to tigecycline, bactrim; R Meropenem, 

Amikacin, Colistin, Polymixin B)


             u/a wbc 15-20, nit neg, leuk +2; ucx C. parapsilopsis








Fever, improving


Leukocytosis, recurrent; improving





Pulmonary Emboli





UTI, sp rx


Probable PNA,sp Rx


  -Bcx Neg


  -u/a wbc tnct, nit neg, leuk +3; ucx C. parasilopsis


  -sp cx ESBL P. mirabilis, MDR P. stuarti (S Cefepime, Zosyn, Ertapenem)


  -CXR:   Geographic density overlying the right upper lung.  This likely 

represents overlying soft tissue although cannot exclude an underlying 

consolidation/pneumonia. Pulmonary vascular congestion. Small left pleural 

effusion. Subsegmental atelectasis versus infiltrate in the left lung base.


 -Influenza A/B ag neg








BRAYDON, SP





Mild AST elevation, SP


   -Abd uS: Hepatomegaly with fatty infiltration. Suspected nonobstructive 

stone left kidney. Left renal cyst





Sacral decubitus





hx of  recurrent UTIs


  -UCx 1/27/19 P.a.  (R Levo, otherwise S)


  - CT abd 1/29/19 3 mm distal right ureteral calculus, minimal resultant 

hydronephrosis. Atrophic left kidney, containing a large staghorn calculus and 

multiple intrarenal calyceal calculi, previously described


   UCx 2/2/19 - Enterobacter (S Cefepime) and yeast and Pa





 HTN


CKD


 ICH s/p craniotomy and  shunt now w/ persistent vegetative state


 dysphagia s/p GT


Chronic resp failure - vent/trach dependent


ICH


COPD


DM


Seizure disorder


BPH


Dysphagia, G tube


Colostomy  


SNF  resident





Plan:


-Continue IV Bactrim #8 and  PO Minocycline #7 for MDR/CRE K.pna bacteremia


-Cont PO fluconazole #8 for likely candida cystitis/pyelo (+staghorn calculus, 

chronic mayorga, prolonged antibiotic treatment)


-Add empiric Daptomycin #4 from gram variable rods pending ID





  -12/10 SP IV POlymixin B #2


  -12/9 SP Meropenem #5, IV Amikacin #3


  -12/7 SP IV Vancomycin #3


  -12/3 SP ertapenem #4


  - 11/29/10 S/P Meropenem 


  - 11/27 SP IV Vancomycin #4


  - 11/24 SP Cefepime x1





- 2 D Echo





-f/u cx


-Monitor CBC/CMP, temperatures


-PEG/Trach care


-aspiration precautions


-wound care





-f/u fluid collection  cytology


-poor px: goals of care to be define





Thank you for this consultation. Will continue to follow along with you.





Discussed with RN





Subjective


Allergies:  


Coded Allergies:  


     AZTREONAM (Verified  Allergy, Unknown, 7/23/18)


Subjective


Afebrile


Leukocytosis


On vent 30% O2





Objective


Vital Signs





Last 24 Hour Vital Signs








  Date Time  Temp Pulse Resp B/P (MAP) Pulse Ox O2 Delivery O2 Flow Rate FiO2


 


12/16/19 10:12  122  145/72    


 


12/16/19 09:30  134 19     30


 


12/16/19 07:00  120 18     30


 


12/16/19 05:28  120 18     30


 


12/16/19 04:00      Mechanical Ventilator  


 


12/16/19 04:00 97.8 114 17 126/80 (95) 100   


 


12/16/19 04:00        30


 


12/16/19 03:34  109      


 


12/16/19 03:28  106 16     30


 


12/16/19 01:41  111 17     30


 


12/16/19 00:00      Mechanical Ventilator  


 


12/16/19 00:00 97.2 104 15 139/89 (106) 100   


 


12/16/19 00:00        30


 


12/15/19 23:37  104 15     30


 


12/15/19 23:34  108      


 


12/15/19 21:47  104 14     30


 


12/15/19 20:38  103  132/81    


 


12/15/19 20:00 97.8 103 14 132/81 (98) 100   


 


12/15/19 20:00  100      


 


12/15/19 20:00        30


 


12/15/19 20:00      Mechanical Ventilator  


 


12/15/19 19:50  100 14     30


 


12/15/19 17:16  95 16     30


 


12/15/19 16:00 98.0 96 15 139/74 (95) 96   


 


12/15/19 16:00  97      


 


12/15/19 16:00      Mechanical Ventilator  


 


12/15/19 16:00        30


 


12/15/19 14:40  100 15     30


 


12/15/19 13:00  95 14     30


 


12/15/19 12:00  97      


 


12/15/19 12:00 98.4 93 15 128/69 (88) 97   


 


12/15/19 12:00        30


 


12/15/19 12:00      Mechanical Ventilator  


 


12/15/19 11:05  95 14     30








Height (Feet):  5


Height (Inches):  7.00


Weight (Pounds):  193


Objective


GENERAL:  NAD, Noncommunicative.  Tongue fasciculation.  


LUNGS:  Diminished breath sounds B/L


CARDIAC:  Regular rhythm.  Rapid rate.  Normal S1, S2.


ABDOMEN:  Soft with G-tube and colostomy bag.





Microbiology








 Date/Time


Source Procedure


Growth Status


 


 


 12/13/19 16:15


Blood Blood Culture - Preliminary


Gram Negative Bacillus 1 Resulted


 


 12/13/19 16:00


Blood Blood Culture - Preliminary


NO GROWTH AFTER 48 HOURS Resulted











Laboratory Tests








Test


  12/16/19


04:00


 


White Blood Count


  14.1 K/UL


(4.8-10.8)  H


 


Red Blood Count


  3.02 M/UL


(4.70-6.10)  L


 


Hemoglobin


  8.3 G/DL


(14.2-18.0)  L


 


Hematocrit


  25.9 %


(42.0-52.0)  L


 


Mean Corpuscular Volume 86 FL (80-99)  


 


Mean Corpuscular Hemoglobin


  27.6 PG


(27.0-31.0)


 


Mean Corpuscular Hemoglobin


Concent 32.2 G/DL


(32.0-36.0)


 


Red Cell Distribution Width


  17.7 %


(11.6-14.8)  H


 


Platelet Count


  535 K/UL


(150-450)  H


 


Mean Platelet Volume


  6.3 FL


(6.5-10.1)  L


 


Neutrophils (%) (Auto)


  72.0 %


(45.0-75.0)


 


Lymphocytes (%) (Auto)


  15.6 %


(20.0-45.0)  L


 


Monocytes (%) (Auto)


  4.1 %


(1.0-10.0)


 


Eosinophils (%) (Auto)


  7.7 %


(0.0-3.0)  H


 


Basophils (%) (Auto)


  0.6 %


(0.0-2.0)


 


Sodium Level


  127 MMOL/L


(136-145)  L


 


Potassium Level


  5.3 MMOL/L


(3.5-5.1)  H


 


Chloride Level


  93 MMOL/L


()  L


 


Carbon Dioxide Level


  25 MMOL/L


(21-32)


 


Anion Gap


  9 mmol/L


(5-15)


 


Blood Urea Nitrogen


  15 mg/dL


(7-18)


 


Creatinine


  1.3 MG/DL


(0.55-1.30)


 


Estimat Glomerular Filtration


Rate > 60 mL/min


(>60)


 


Glucose Level


  223 MG/DL


()  H


 


Calcium Level


  10.4 MG/DL


(8.5-10.1)  H


 


Phosphorus Level


  3.1 MG/DL


(2.5-4.9)


 


Magnesium Level


  1.9 MG/DL


(1.8-2.4)


 


Total Bilirubin


  0.2 MG/DL


(0.2-1.0)


 


Aspartate Amino Transf


(AST/SGOT) 29 U/L (15-37)


 


 


Alanine Aminotransferase


(ALT/SGPT) 23 U/L (12-78)


 


 


Alkaline Phosphatase


  95 U/L


()


 


Total Protein


  9.0 G/DL


(6.4-8.2)  H


 


Albumin


  2.5 G/DL


(3.4-5.0)  L


 


Globulin 6.5 g/dL  


 


Albumin/Globulin Ratio


  0.4 (1.0-2.7)


L











Current Medications








 Medications


  (Trade)  Dose


 Ordered  Sig/Jan


 Route


 PRN Reason  Start Time


 Stop Time Status Last Admin


Dose Admin


 


 Acetaminophen


  (Tylenol)  650 mg  Q4H  PRN


 GT


 Mild Pain/Temp > 100.6  11/26/19 18:42


 12/26/19 18:41  12/13/19 20:38


 


 


 Apixaban


  (Eliquis)  5 mg  BID


 GT


   12/12/19 18:02


 1/11/20 18:01  12/16/19 10:10


 


 


 Artificial Tears


  (Akwa-Tears)  2 drop  Q12HR


 BOTH EYES


   11/26/19 21:00


 12/24/19 20:59  12/16/19 10:12


 


 


 Baclofen


  (Lioresal)  10 mg  THREE TIMES A  DAY


 GT


   11/27/19 09:00


 12/24/19 17:59  12/16/19 10:10


 


 


 Calcitonin [place]


  (Miacalcin)  1 sprays  DAILY


 NASAL


   11/28/19 12:00


 12/28/19 11:59  12/16/19 10:11


 


 


 Chlorhexidine


 Gluconate


  (Elaine-Hex 2%)  1 applic  DAILY@2000


 TOPIC


   12/6/19 20:00


 1/5/20 19:59  12/15/19 20:37


 


 


 Clobetasol


 Propionate


  (Temovate)  1 applic  EVERY 12  HOURS


 TOPIC


   11/26/19 21:00


 12/24/19 20:59  12/16/19 10:11


 


 


 Daptomycin 550 mg/


 Sodium Chloride  55 ml @ 


 100 mls/hr  Q24H


 IV


   12/13/19 18:00


 12/20/19 17:59  12/15/19 18:10


 


 


 Dextrose


  (Dextrose 50%)  25 ml  Q30M  PRN


 IV


 Hypoglycemia  11/26/19 18:45


 12/24/19 14:44   


 


 


 Dextrose


  (Dextrose 50%)  50 ml  Q30M  PRN


 IV


 Hypoglycemia  11/26/19 18:45


 12/24/19 14:44   


 


 


 Famotidine


  (Pepcid)  20 mg  BID


 GT


   11/28/19 18:00


 12/28/19 17:59  12/16/19 10:10


 


 


 Fenofibrate


  (Tricor)  145 mg  DAILY


 ORAL


   11/27/19 09:00


 12/25/19 08:59  12/16/19 10:11


 


 


 Fluconazole


  (Diflucan)  400 mg  DAILY


 GT


   12/16/19 09:00


 12/21/19 13:46  12/16/19 10:11


 


 


 Insulin Aspart


  (NovoLOG)    EVERY 6  HOURS


 SUBQ


   11/30/19 12:00


 12/24/19 16:29  12/16/19 06:13


 


 


 Levetiracetam


  (Keppra)  1,000 mg  Q8H


 GT


   11/29/19 02:00


 12/29/19 01:59  12/16/19 10:10


 


 


 Metoprolol


 Tartrate


  (Lopressor)  25 mg  Q12HR


 GT


   12/14/19 09:00


 1/13/20 08:59  12/16/19 10:12


 


 


 Minocycline HCl


  (Minocin)  100 mg  Q12HR@0600,1800


 ORAL


   12/11/19 06:00


 12/18/19 05:59  12/16/19 06:10


 


 


 Ondansetron HCl


  (Zofran)  4 mg  Q6H  PRN


 IVP


 Nausea & Vomiting  11/26/19 18:44


 12/26/19 18:43   


 


 


 Pamidronate


 Disodium 90 mg/


 Sodium Chloride  550 ml @ 


 137.5 mls/


 hr  ONCE


 IVPB


   12/16/19 11:00


 12/16/19 15:00   


 


 


 Polyethylene


 Glycol


  (Miralax)  17 gm  DAILYPRN  PRN


 GT


 Constipation  11/26/19 18:44


 12/26/19 18:43   


 


 


 Sitagliptin


 Phosphate


  (Januvia)  50 mg  ACBREAKFAST


 GT


   11/27/19 06:30


 12/25/19 06:29  12/16/19 06:10


 


 


 Sodium


 Polystyrene


 Sulfonate


  (Kayexalate)  45 gm  ONCE


 ORAL


   12/16/19 10:00


 12/16/19 12:00   


 


 


 Sodium Chloride  250 ml @ 


 30 mls/hr  ONCE  ONCE


 IV


   12/16/19 11:00


 12/16/19 19:19   


 


 


 Tamsulosin HCl


  (Flomax)  0.4 mg  DAILY


 ORAL


   11/27/19 09:00


 12/27/19 08:59  12/16/19 10:10


 


 


 Trimethoprim/


 Sulfamethoxazole


 28 ml/Dextrose  578 ml @ 


 385.333


 mls/hr  [id number]  BACTRIM


 IV


   12/12/19 16:00


 12/19/19 15:59  12/15/19 23:20


 

















Robert Williamson MD Dec 16, 2019 10:30
Assessment/Plan


Assessment/Plan








Assessment:


Sepsis, recurrent-


Persistent CRE K.pna bacteremia- ?source- fluid collection with serous fluid- r/

o abscess, cx pending


   -12/10 SP US guided aspiration: Small amount of serous fluid aspirated from 

left flank fluid collection. No jac pus so collection is presumably not an 

abscess; no drain placed. Note,


however, the presence of considerable residual tissue with hyperemia. This most 

likely represents hyperemic scar tissue but this should be followed up to 

exclude tumor


          --cx neg





Probable cystitis/pyelonephritis


  -12/8 CT c/ab/p:  Left lower lobe segmental and subsegmental pulmonary emboli

. Bibasilar atelectasis/consolidations. 10 mm low-density lesion in right 

thyroid.  Heterogeneous large areas of hypoattenuation in the right lobe of the

  liver, maybe fatty changes, correlate for hepatitis or other process.  

Contracted gallbladder with thickened enhancing wall.  Left lateral abdominal 

wall musculature 1.5 x 2.2 cm fluid collection with enhancing worrisome for an 

abscess. Bilateral renal stones, staghorn type in the left kidney, 

nonobstructive.  Mild fullness of the right renal pelvis.  Mild bilateral 

perinephric stranding, correlate for infection.Mayorga catheter in urinary 

bladder.  Thickening of the urinary bladder with mild stranding may be 

cystitis. A few small calcifications in the left urinary bladder base.


    -12/13 BCx 1/4 GNR


   -12/12 Bcx 1/4 CRE K.pna


  -12/9 Bx neg


  -12/7 Bcx 2/4 CRE K.pna (I Genta; S bactrim, tigecycline) ; 


   -12/5 Cdiff neg


             CXR: Left pleural effusion is unchanged. Left perihilar 

atelectasis is unchanged. Tracheostomy again demonstrated.


             Bcx 4/4 MDR K. pna (S only to tigecycline, bactrim; R Meropenem, 

Amikacin, Colistin, Polymixin B)


             u/a wbc 15-20, nit neg, leuk +2; ucx C. parapsilopsis








Fever, improving


Leukocytosis, recurrent; improving





Pulmonary Emboli





UTI, sp rx


Probable PNA,sp Rx


  -Bcx Neg


  -u/a wbc tnct, nit neg, leuk +3; ucx C. parasilopsis


  -sp cx ESBL P. mirabilis, MDR P. stuarti (S Cefepime, Zosyn, Ertapenem)


  -CXR:   Geographic density overlying the right upper lung.  This likely 

represents overlying soft tissue although cannot exclude an underlying 

consolidation/pneumonia. Pulmonary vascular congestion. Small left pleural 

effusion. Subsegmental atelectasis versus infiltrate in the left lung base.


 -Influenza A/B ag neg








BRAYDON, SP





Mild AST elevation, SP


   -Abd uS: Hepatomegaly with fatty infiltration. Suspected nonobstructive 

stone left kidney. Left renal cyst





Sacral decubitus





hx of  recurrent UTIs


  -UCx 1/27/19 P.a.  (R Levo, otherwise S)


  - CT abd 1/29/19 3 mm distal right ureteral calculus, minimal resultant 

hydronephrosis. Atrophic left kidney, containing a large staghorn calculus and 

multiple intrarenal calyceal calculi, previously described


   UCx 2/2/19 - Enterobacter (S Cefepime) and yeast and Pa





 HTN


CKD


 ICH s/p craniotomy and  shunt now w/ persistent vegetative state


 dysphagia s/p GT


Chronic resp failure - vent/trach dependent


ICH


COPD


DM


Seizure disorder


BPH


Dysphagia, G tube


Colostomy  


SNF  resident





Plan:


-Continue IV Bactrim #9 and  PO Minocycline #8 for MDR/CRE K.pna bacteremia


-Cont PO fluconazole #9 for likely candida cystitis/pyelo (+staghorn calculus, 

chronic mayorga, prolonged antibiotic treatment)





  - 12/17 SP Daptomycin #5 from gram variable rods pending ID


  -12/10 SP IV POlymixin B #2


  -12/9 SP Meropenem #5, IV Amikacin #3


  -12/7 SP IV Vancomycin #3


  -12/3 SP ertapenem #4


  - 11/29/10 S/P Meropenem 


  - 11/27 SP IV Vancomycin #4


  - 11/24 SP Cefepime x1





- 2 D Echo





-f/u cx


-Monitor CBC/CMP, temperatures


-PEG/Trach care


-aspiration precautions


-wound care





-f/u fluid collection  cytology


-poor px: goals of care to be define





Thank you for this consultation. Will continue to follow along with you.





Discussed with RN





Subjective


Allergies:  


Coded Allergies:  


     AZTREONAM (Verified  Allergy, Unknown, 7/23/18)


Subjective


Afebrile


Leukocytosis increased


On vent 30% O2





Objective


Vital Signs





Last 24 Hour Vital Signs








  Date Time  Temp Pulse Resp B/P (MAP) Pulse Ox O2 Delivery O2 Flow Rate FiO2


 


12/17/19 09:43  175  139/84    


 


12/17/19 08:45  169 22     30


 


12/17/19 08:00 98.6 175 24 139/84 (102) 100   


 


12/17/19 07:27  170 24     30


 


12/17/19 07:00 98.6 160 22 121/80 (94) 100   


 


12/17/19 05:29  160 21     30


 


12/17/19 04:16 100.4       


 


12/17/19 04:00        30


 


12/17/19 04:00  149      


 


12/17/19 04:00      Mechanical Ventilator  


 


12/17/19 04:00 100.1 145 22 128/89 (102) 100   


 


12/17/19 02:50  138 21     30


 


12/17/19 02:00        30


 


12/17/19 01:10  144 22     30


 


12/17/19 00:00 99.3 134 22 131/89 (103) 94   


 


12/17/19 00:00  145      


 


12/17/19 00:00      Mechanical Ventilator  


 


12/16/19 22:45  145 21     30


 


12/16/19 21:14  143 21     30


 


12/16/19 20:41  138  130/70    


 


12/16/19 20:00 99.3 138 20 130/70 (90) 100   


 


12/16/19 20:00        30


 


12/16/19 20:00  136      


 


12/16/19 20:00      Mechanical Ventilator  


 


12/16/19 18:57  133 19     30


 


12/16/19 17:08  114 18     30


 


12/16/19 16:00  127      


 


12/16/19 16:00      Mechanical Ventilator  


 


12/16/19 16:00 99.1 127 20 133/92 (106) 100   


 


12/16/19 16:00        30


 


12/16/19 14:46  112 18     30


 


12/16/19 12:49  120 17     30


 


12/16/19 12:00 99.3 140 20 133/94 (107) 100   


 


12/16/19 12:00        30


 


12/16/19 12:00  122      


 


12/16/19 12:00      Mechanical Ventilator  


 


12/16/19 11:04  130 18     30


 


12/16/19 10:12  122  145/72    








Height (Feet):  5


Height (Inches):  7.00


Weight (Pounds):  193


Objective


GENERAL:  NAD, Noncommunicative.  Tongue fasciculation.  


LUNGS:  Coarse B/L


CARDIAC:  Regular rhythm.  Rapid rate.  Normal S1, S2.


ABDOMEN:  Soft with G-tube and colostomy bag.





Microbiology








 Date/Time


Source Procedure


Growth Status


 


 


 12/15/19 15:10


Blood Blood Culture - Preliminary


NO GROWTH AFTER 24 HOURS Resulted


 


 12/15/19 14:40


Blood Blood Culture - Preliminary


Gram Negative Bacillus 1 Resulted











Laboratory Tests








Test


  12/17/19


03:00


 


White Blood Count


  20.4 K/UL


(4.8-10.8)  H


 


Red Blood Count


  3.55 M/UL


(4.70-6.10)  L


 


Hemoglobin


  9.7 G/DL


(14.2-18.0)  L


 


Hematocrit


  30.1 %


(42.0-52.0)  L


 


Mean Corpuscular Volume 85 FL (80-99)  


 


Mean Corpuscular Hemoglobin


  27.4 PG


(27.0-31.0)


 


Mean Corpuscular Hemoglobin


Concent 32.3 G/DL


(32.0-36.0)


 


Red Cell Distribution Width


  17.8 %


(11.6-14.8)  H


 


Platelet Count


  779 K/UL


(150-450)  H


 


Mean Platelet Volume


  6.5 FL


(6.5-10.1)


 


Neutrophils (%) (Auto)


  % (45.0-75.0)


 


 


Lymphocytes (%) (Auto)


  % (20.0-45.0)


 


 


Monocytes (%) (Auto)  % (1.0-10.0)  


 


Eosinophils (%) (Auto)  % (0.0-3.0)  


 


Basophils (%) (Auto)  % (0.0-2.0)  


 


Differential Total Cells


Counted 100  


 


 


Neutrophils % (Manual) 74 % (45-75)  


 


Lymphocytes % (Manual) 11 % (20-45)  L


 


Monocytes % (Manual) 9 % (1-10)  


 


Eosinophils % (Manual) 3 % (0-3)  


 


Basophils % (Manual) 0 % (0-2)  


 


Band Neutrophils 3 % (0-8)  


 


Platelet Estimate Increased  H


 


Platelet Morphology Normal  


 


Hypochromasia 2+  


 


Anisocytosis 1+  


 


Spherocytes 1+  


 


Sodium Level


  129 MMOL/L


(136-145)  L


 


Potassium Level


  4.9 MMOL/L


(3.5-5.1)


 


Chloride Level


  93 MMOL/L


()  L


 


Carbon Dioxide Level


  24 MMOL/L


(21-32)


 


Anion Gap


  12 mmol/L


(5-15)


 


Blood Urea Nitrogen


  15 mg/dL


(7-18)


 


Creatinine


  1.6 MG/DL


(0.55-1.30)  H


 


Estimat Glomerular Filtration


Rate 55.3 mL/min


(>60)


 


Glucose Level


  324 MG/DL


()  #H


 


Calcium Level


  9.9 MG/DL


(8.5-10.1)











Current Medications








 Medications


  (Trade)  Dose


 Ordered  Sig/Jan


 Route


 PRN Reason  Start Time


 Stop Time Status Last Admin


Dose Admin


 


 Acetaminophen


  (Tylenol)  650 mg  Q4H  PRN


 GT


 Mild Pain/Temp > 100.6  11/26/19 18:42


 12/26/19 18:41  12/17/19 03:46


 


 


 Apixaban


  (Eliquis)  5 mg  BID


 GT


   12/12/19 18:02


 1/11/20 18:01  12/17/19 09:43


 


 


 Artificial Tears


  (Akwa-Tears)  2 drop  Q12HR


 BOTH EYES


   11/26/19 21:00


 12/24/19 20:59  12/17/19 08:32


 


 


 Baclofen


  (Lioresal)  10 mg  THREE TIMES A  DAY


 GT


   11/27/19 09:00


 12/24/19 17:59  12/17/19 09:42


 


 


 Calcitonin Piru


  (Miacalcin)  1 sprays  DAILY


 NASAL


   11/28/19 12:00


 12/28/19 11:59  12/17/19 08:32


 


 


 Chlorhexidine


 Gluconate


  (Elaine-Hex 2%)  1 applic  DAILY@2000


 TOPIC


   12/6/19 20:00


 1/5/20 19:59  12/16/19 20:39


 


 


 Clobetasol


 Propionate


  (Temovate)  1 applic  EVERY 12  HOURS


 TOPIC


   11/26/19 21:00


 12/24/19 20:59  12/17/19 08:32


 


 


 Daptomycin 550 mg/


 Sodium Chloride  55 ml @ 


 100 mls/hr  Q24H


 IV


   12/13/19 18:00


 12/20/19 17:59  12/16/19 18:29


 


 


 Dextrose


  (Dextrose 50%)  25 ml  Q30M  PRN


 IV


 Hypoglycemia  11/26/19 18:45


 12/24/19 14:44   


 


 


 Dextrose


  (Dextrose 50%)  50 ml  Q30M  PRN


 IV


 Hypoglycemia  11/26/19 18:45


 12/24/19 14:44   


 


 


 Famotidine


  (Pepcid)  20 mg  BID


 GT


   11/28/19 18:00


 12/28/19 17:59  12/17/19 09:43


 


 


 Fenofibrate


  (Tricor)  145 mg  DAILY


 ORAL


   11/27/19 09:00


 12/25/19 08:59  12/17/19 09:43


 


 


 Fluconazole


  (Diflucan)  400 mg  DAILY


 GT


   12/16/19 09:00


 12/21/19 13:46  12/17/19 09:42


 


 


 Insulin Aspart


  (NovoLOG)    EVERY 6  HOURS


 SUBQ


   11/30/19 12:00


 12/24/19 16:29  12/17/19 06:10


 


 


 Levetiracetam


  (Keppra)  1,000 mg  Q8H


 GT


   11/29/19 02:00


 12/29/19 01:59  12/17/19 09:42


 


 


 Metoprolol


 Tartrate


  (Lopressor)  25 mg  Q12HR


 GT


   12/14/19 09:00


 1/13/20 08:59  12/17/19 09:43


 


 


 Minocycline HCl


  (Minocin)  100 mg  Q12HR@0600,1800


 ORAL


   12/11/19 06:00


 12/18/19 05:59  12/17/19 05:31


 


 


 Ondansetron HCl


  (Zofran)  4 mg  Q6H  PRN


 IVP


 Nausea & Vomiting  11/26/19 18:44


 12/26/19 18:43  12/16/19 11:28


 


 


 Polyethylene


 Glycol


  (Miralax)  17 gm  DAILYPRN  PRN


 GT


 Constipation  11/26/19 18:44


 12/26/19 18:43   


 


 


 Sitagliptin


 Phosphate


  (Januvia)  50 mg  ACBREAKFAST


 GT


   11/27/19 06:30


 12/25/19 06:29  12/17/19 05:32


 


 


 Tamsulosin HCl


  (Flomax)  0.4 mg  DAILY


 ORAL


   11/27/19 09:00


 12/27/19 08:59  12/17/19 09:43


 


 


 Trimethoprim/


 Sulfamethoxazole


 28 ml/Dextrose  578 ml @ 


 385.333


 mls/hr  [id number]  BACTRIM


 IV


   12/12/19 16:00


 12/19/19 15:59  12/17/19 08:31


 

















Robert Williamson MD Dec 17, 2019 10:10
Assessment/Plan


Assessment/Plan








Assessment:


Sepsis, recurrent- r/o bacteremia, recurrent UTI, PNA


   -12/5 Cdiff eng


             CXR: Left pleural effusion is unchanged. Left perihilar 

atelectasis is unchanged. Tracheostomy again demonstrated.


             Bcx p


             u/a wbc 15-20, nit neg, leuk +2; ucx p








Fever, recurrent


Leukocytosis, recurrent; improving


UTI, sp rx


Probable PNA,sp Rx


  -Bcx Neg


  -u/a wbc tnct, nit neg, leuk +3; ucx C. parasilopsis


  -sp cx ESBL P. mirabilis, MDR P. stuarti (S Cefepime, Zosyn, Ertapenem)


  -CXR:   Geographic density overlying the right upper lung.  This likely 

represents overlying soft tissue although cannot exclude an underlying 

consolidation/pneumonia. Pulmonary vascular congestion. Small left pleural 

effusion. Subsegmental atelectasis versus infiltrate in the left lung base.


 -Influenza A/B ag neg








BRAYDON, SP





Mild AST elevation, SP


   -Abd uS: Hepatomegaly with fatty infiltration. Suspected nonobstructive 

stone left kidney. Left renal cyst





Sacral decubitus





hx of  recurrent UTIs


  -UCx 1/27/19 P.a.  (R Levo, otherwise S)


  - CT abd 1/29/19 3 mm distal right ureteral calculus, minimal resultant 

hydronephrosis. Atrophic left kidney, containing a large staghorn calculus and 

multiple intrarenal calyceal calculi, previously described


   UCx 2/2/19 - Enterobacter (S Cefepime) and yeast and Pa





 HTN


CKD


 ICH s/p craniotomy and  shunt now w/ persistent vegetative state


 dysphagia s/p GT


Chronic resp failure - vent/trach dependent


ICH


COPD


DM


Seizure disorder


BPH


Dysphagia, G tube


Colostomy  


SNF  resident





Plan:


-Continue empiric IV Vancomycin and Meropenem #2


  -12/3 SP ertapenem #4


  - 11/29/10 S/P Meropenem 


  - 11/27 SP IV Vancomycin #4


  - 11/24 SP Cefepime x1





-f/u cx


-Monitor CBC/CMP, temperatures


-PEG/Trach/ICU care


-aspiration precautions


-wound care


-f/u u cx Bcx x2





Thank you for this consultation. Will continue to follow along with you.





Discussed with RN





Subjective


Allergies:  


Coded Allergies:  


     AZTREONAM (Verified  Allergy, Unknown, 7/23/18)


Subjective


Tm 102


wbc improving


bcx  p





Objective


Vital Signs





Last 24 Hour Vital Signs








  Date Time  Temp Pulse Resp B/P (MAP) Pulse Ox O2 Delivery O2 Flow Rate FiO2


 


12/6/19 12:00      Mechanical Ventilator  


 


12/6/19 12:00        30


 


12/6/19 10:30  113 14     30


 


12/6/19 09:00  120 17     30


 


12/6/19 08:00 98.7 131 17 127/74 (91) 100   


 


12/6/19 08:00      Mechanical Ventilator  


 


12/6/19 08:00        30


 


12/6/19 07:27  124      


 


12/6/19 07:06  125 16     30


 


12/6/19 06:04 99.7       


 


12/6/19 05:18  131 16     30


 


12/6/19 04:00        30


 


12/6/19 04:00 99.7 133 18 104/60 (75) 97   


 


12/6/19 04:00  132      


 


12/6/19 04:00      Mechanical Ventilator  


 


12/6/19 02:52  131 17     30


 


12/6/19 00:56  133 20     30


 


12/6/19 00:00 99.1 135 18 111/59 (76) 100   


 


12/6/19 00:00      Mechanical Ventilator  


 


12/6/19 00:00  127      


 


12/5/19 23:18  130 14     30


 


12/5/19 21:42  134 16     30


 


12/5/19 21:00 100.0 138  107/70 (82)    


 


12/5/19 20:00        30


 


12/5/19 20:00      Mechanical Ventilator  


 


12/5/19 20:00 102.0 139 18 109/67 (81) 100   


 


12/5/19 19:22  141      


 


12/5/19 19:04  109 17     30


 


12/5/19 16:57  138 19     30


 


12/5/19 16:05        30


 


12/5/19 16:00 99.1 128 18 109/67 (81) 100   


 


12/5/19 16:00      Mechanical Ventilator  


 


12/5/19 16:00  142      


 


12/5/19 14:46  142 22     30








Height (Feet):  5


Height (Inches):  7.00


Weight (Pounds):  203


Objective


GENERAL:  Noncommunicative.  Tongue fasciculation.  Moderate edema.


LUNGS:  Diminished breath sounds.


CARDIAC:  Regular rhythm.  Rapid rate.  Normal S1, S2.


ABDOMEN:  Soft with G-tube and colostomy bag.





Microbiology








 Date/Time


Source Procedure


Growth Status


 


 


 12/5/19 19:00


Stool Clostridium difficile Toxin Assay - Final Complete











Laboratory Tests








Test


  12/5/19


14:20 12/5/19


19:00 12/6/19


05:30


 


Sodium Level


  140 MMOL/L


(136-145) 


  138 MMOL/L


(136-145)


 


Potassium Level


  4.8 MMOL/L


(3.5-5.1) 


  4.0 MMOL/L


(3.5-5.1)


 


Chloride Level


  105 MMOL/L


() 


  106 MMOL/L


()


 


Carbon Dioxide Level


  24 MMOL/L


(21-32) 


  23 MMOL/L


(21-32)


 


Anion Gap


  11 mmol/L


(5-15) 


  9 mmol/L


(5-15)


 


Blood Urea Nitrogen


  9 mg/dL (7-18)


  


  14 mg/dL


(7-18)


 


Creatinine


  1.1 MG/DL


(0.55-1.30) 


  1.0 MG/DL


(0.55-1.30)


 


Estimat Glomerular Filtration


Rate > 60 mL/min


(>60) 


  > 60 mL/min


(>60)


 


Glucose Level


  221 MG/DL


()  H 


  299 MG/DL


()  H


 


Calcium Level


  10.3 MG/DL


(8.5-10.1)  H 


  9.9 MG/DL


(8.5-10.1)


 


Magnesium Level


  1.7 MG/DL


(1.8-2.4)  L 


  2.1 MG/DL


(1.8-2.4)


 


Urine Color  Pale yellow   


 


Urine Appearance  Clear   


 


Urine pH  6 (4.5-8.0)   


 


Urine Specific Gravity


  


  1.015


(1.005-1.035) 


 


 


Urine Protein


  


  3+ (NEGATIVE)


H 


 


 


Urine Glucose (UA)


  


  2+ (NEGATIVE)


H 


 


 


Urine Ketones


  


  Negative


(NEGATIVE) 


 


 


Urine Blood


  


  3+ (NEGATIVE)


H 


 


 


Urine Nitrite


  


  Negative


(NEGATIVE) 


 


 


Urine Bilirubin


  


  Negative


(NEGATIVE) 


 


 


Urine Urobilinogen


  


  Normal MG/DL


(0.0-1.0) 


 


 


Urine Leukocyte Esterase


  


  2+ (NEGATIVE)


H 


 


 


Urine RBC


  


  10-15 /HPF (0


- 0)  H 


 


 


Urine WBC


  


  15-20 /HPF (0


- 0)  H 


 


 


Urine Squamous Epithelial


Cells 


  Few /LPF


(NONE/OCC) 


 


 


Urine Bacteria


  


  Moderate /HPF


(NONE)  H 


 


 


Urine Mucus


  


  Few /LPF


(NONE/OCC)  H 


 


 


Urine Yeast


  


  Moderate /HPF


(NONE)  H 


 


 


White Blood Count


  


  


  19.2 K/UL


(4.8-10.8)  H


 


Red Blood Count


  


  


  2.95 M/UL


(4.70-6.10)  L


 


Hemoglobin


  


  


  8.8 G/DL


(14.2-18.0)  L


 


Hematocrit


  


  


  26.9 %


(42.0-52.0)  L


 


Mean Corpuscular Volume   91 FL (80-99)  


 


Mean Corpuscular Hemoglobin


  


  


  29.9 PG


(27.0-31.0)


 


Mean Corpuscular Hemoglobin


Concent 


  


  32.8 G/DL


(32.0-36.0)


 


Red Cell Distribution Width


  


  


  15.4 %


(11.6-14.8)  H


 


Platelet Count


  


  


  279 K/UL


(150-450)


 


Mean Platelet Volume


  


  


  8.0 FL


(6.5-10.1)


 


Neutrophils (%) (Auto)


  


  


  % (45.0-75.0)


 


 


Lymphocytes (%) (Auto)


  


  


  % (20.0-45.0)


 


 


Monocytes (%) (Auto)    % (1.0-10.0)  


 


Eosinophils (%) (Auto)    % (0.0-3.0)  


 


Basophils (%) (Auto)    % (0.0-2.0)  


 


Differential Total Cells


Counted 


  


  100  


 


 


Neutrophils % (Manual)   91 % (45-75)  H


 


Lymphocytes % (Manual)   5 % (20-45)  L


 


Monocytes % (Manual)   4 % (1-10)  


 


Eosinophils % (Manual)   0 % (0-3)  


 


Basophils % (Manual)   0 % (0-2)  


 


Band Neutrophils   0 % (0-8)  


 


Platelet Estimate   Adequate  


 


Platelet Morphology   Normal  


 


Hypochromasia   2+  


 


Anisocytosis   1+  


 


Prothrombin Time


  


  


  12.0 SEC


(9.30-11.50)  H


 


Prothromb Time International


Ratio 


  


  1.1 (0.9-1.1)  


 


 


Activated Partial


Thromboplast Time 


  


  33 SEC (23-33)


 


 


Phosphorus Level


  


  


  3.4 MG/DL


(2.5-4.9)


 


Total Bilirubin


  


  


  0.4 MG/DL


(0.2-1.0)


 


Aspartate Amino Transf


(AST/SGOT) 


  


  27 U/L (15-37)


 


 


Alanine Aminotransferase


(ALT/SGPT) 


  


  25 U/L (12-78)


 


 


Alkaline Phosphatase


  


  


  147 U/L


()  H


 


Total Protein


  


  


  6.6 G/DL


(6.4-8.2)


 


Albumin


  


  


  2.0 G/DL


(3.4-5.0)  L


 


Globulin   4.6 g/dL  


 


Albumin/Globulin Ratio


  


  


  0.4 (1.0-2.7)


L











Current Medications








 Medications


  (Trade)  Dose


 Ordered  Sig/Jan


 Route


 PRN Reason  Start Time


 Stop Time Status Last Admin


Dose Admin


 


 Acetaminophen


  (Tylenol)  650 mg  Q4H  PRN


 GT


 Mild Pain/Temp > 100.6  11/26/19 18:42


 12/26/19 18:41  12/6/19 05:34


 


 


 Artificial Tears


  (Akwa-Tears)  2 drop  Q12HR


 BOTH EYES


   11/26/19 21:00


 12/24/19 20:59  12/6/19 08:21


 


 


 Baclofen


  (Lioresal)  10 mg  THREE TIMES A  DAY


 GT


   11/27/19 09:00


 12/24/19 17:59  12/6/19 08:17


 


 


 Calcitonin [place]


  (Miacalcin)  1 sprays  DAILY


 NASAL


   11/28/19 12:00


 12/28/19 11:59  12/6/19 08:20


 


 


 Chlorhexidine


 Gluconate


  (Elaine-Hex 2%)  1 applic  DAILY@2000


 TOPIC


   12/6/19 20:00


 1/5/20 19:59   


 


 


 Clobetasol


 Propionate


  (Temovate)  1 applic  EVERY 12  HOURS


 TOPIC


   11/26/19 21:00


 12/24/19 20:59  12/6/19 08:21


 


 


 Dextrose


  (Dextrose 50%)  25 ml  Q30M  PRN


 IV


 Hypoglycemia  11/26/19 18:45


 12/24/19 14:44   


 


 


 Dextrose


  (Dextrose 50%)  50 ml  Q30M  PRN


 IV


 Hypoglycemia  11/26/19 18:45


 12/24/19 14:44   


 


 


 Famotidine


  (Pepcid)  20 mg  BID


 GT


   11/28/19 18:00


 12/28/19 17:59  12/6/19 08:17


 


 


 Fenofibrate


  (Tricor)  145 mg  DAILY


 ORAL


   11/27/19 09:00


 12/25/19 08:59  12/6/19 08:17


 


 


 Heparin Sodium


  (Porcine)


  (Heparin 5000


 units/ml)  5,000 units  EVERY 12  HOURS


 SUBQ


   11/26/19 21:00


 12/24/19 20:59  12/6/19 08:21


 


 


 Heparin Sodium/


 Sodium Chloride


  (Heparin 1000


 units/500ml


 Premix)  1,000 unit  ONCE


 IV


   12/6/19 10:00


 12/6/19 18:00   


 


 


 Insulin Aspart


  (NovoLOG)    EVERY 6  HOURS


 SUBQ


   11/30/19 12:00


 12/24/19 16:29  12/6/19 12:10


 


 


 Levetiracetam


  (Keppra)  1,000 mg  Q8H


 GT


   11/29/19 02:00


 12/29/19 01:59  12/6/19 10:17


 


 


 Lidocaine HCl


  (Xylocaine 1%


 30ml)  30 ml  ONCE


 INJ


   12/6/19 10:00


 12/6/19 18:00   


 


 


 Meropenem 1 gm/


 Sodium Chloride  55 ml @ 


 110 mls/hr  Q8HR


 IVPB


   12/5/19 14:00


 12/10/19 13:59  12/6/19 05:33


 


 


 Ondansetron HCl


  (Zofran)  4 mg  Q6H  PRN


 IVP


 Nausea & Vomiting  11/26/19 18:44


 12/26/19 18:43   


 


 


 Polyethylene


 Glycol


  (Miralax)  17 gm  DAILYPRN  PRN


 GT


 Constipation  11/26/19 18:44


 12/26/19 18:43   


 


 


 Sitagliptin


 Phosphate


  (Januvia)  50 mg  ACBREAKFAST


 GT


   11/27/19 06:30


 12/25/19 06:29  12/6/19 05:33


 


 


 Sodium Chloride  1,000 ml @ 


 125 mls/hr  Q8H


 IV


   12/5/19 01:45


 1/4/20 01:44  12/6/19 06:55


 


 


 Tamsulosin HCl


  (Flomax)  0.4 mg  DAILY


 ORAL


   11/27/19 09:00


 12/27/19 08:59  12/6/19 08:17


 


 


 Vancomycin HCl


  (Vanco rx to


 dose)  1 ea  DAILY  PRN


 MISC


 Per rx protocol  12/5/19 12:45


 1/4/20 12:44   


 


 


 Vancomycin/Sodium


 Chloride  275 ml @ 


 137.5 mls/


 hr  Q24H


 IVPB


   12/5/19 15:00


 12/10/19 14:59  12/5/19 15:30


 

















Joy Love M.D. Dec 6, 2019 12:52
Assessment/Plan


Assessment/Plan








Assessment:


Sepsis, recurrent- r/o bacteremia, recurrent UTI, PNA, Cdiff





UTI, sp rx


Probable PNA,sp Rx


  -Bcx Neg


  -u/a wbc tnct, nit neg, leuk +3; ucx C. parasilopsis


  -sp cx ESBL P. mirabilis, MDR P. stuarti (S Cefepime, Zosyn, Ertapenem)


  -CXR:   Geographic density overlying the right upper lung.  This likely 

represents overlying soft tissue although cannot exclude an underlying 

consolidation/pneumonia. Pulmonary vascular congestion. Small left pleural 

effusion. Subsegmental atelectasis versus infiltrate in the left lung base.


 -Influenza A/B ag neg





Fever, SP


Leukocytosis, SP





BRAYDON, SP





Mild AST elevation, SP


   -Abd uS: Hepatomegaly with fatty infiltration. Suspected nonobstructive 

stone left kidney. Left renal cyst





Sacral decubitus





hx of  recurrent UTIs


  -UCx 1/27/19 P.a.  (R Levo, otherwise S)


  - CT abd 1/29/19 3 mm distal right ureteral calculus, minimal resultant 

hydronephrosis. Atrophic left kidney, containing a large staghorn calculus and 

multiple intrarenal calyceal calculi, previously described


   UCx 2/2/19 - Enterobacter (S Cefepime) and yeast and Pa





 HTN


CKD


 ICH s/p craniotomy and  shunt now w/ persistent vegetative state


 dysphagia s/p GT


Chronic resp failure - vent/trach dependent


ICH


COPD


DM


Seizure disorder


BPH


Dysphagia, G tube


Colostomy  


SNF  resident





Plan:


-Start IV Vancomycin and Meropenem


  -12/3 SP ertapenem #4


  - 11/29/10 S/P Meropenem 


  - 11/27 SP IV Vancomycin #4


  - 11/24 SP Cefepime x1





-f/u cx


-Monitor CBC/CMP, temperatures


-PEG/Trach/ICU care


-aspiration precautions


-wound care


-CXR


-u/a w/ reflex, Bcx x2, Cdiff





Thank you for this consultation. Will continue to follow along with you.





Discussed with RN





Subjective


Allergies:  


Coded Allergies:  


     AZTREONAM (Verified  Allergy, Unknown, 7/23/18)


Subjective


Tm 100.3


wbc up to 33





Objective


Vital Signs





Last 24 Hour Vital Signs








  Date Time  Temp Pulse Resp B/P (MAP) Pulse Ox O2 Delivery O2 Flow Rate FiO2


 


12/5/19 12:00        30


 


12/5/19 12:00      Mechanical Ventilator  


 


12/5/19 10:50  119 19     30


 


12/5/19 08:48  120 15     30


 


12/5/19 08:00      Mechanical Ventilator  


 


12/5/19 08:00 98.0 118 16 105/71 (82) 100   


 


12/5/19 08:00  126      


 


12/5/19 08:00        30


 


12/5/19 06:53  121 16     30


 


12/5/19 04:54  128 16     30


 


12/5/19 04:00      Mechanical Ventilator  


 


12/5/19 04:00 100.0 136 18 112/80 (91) 99   


 


12/5/19 04:00  128      


 


12/5/19 04:00        30


 


12/5/19 03:24  139 18     30


 


12/5/19 00:36  139 19     30


 


12/5/19 00:33 99.7       


 


12/5/19 00:00        30


 


12/5/19 00:00      Mechanical Ventilator  


 


12/5/19 00:00  146      


 


12/5/19 00:00 100.3 156 18 124/72 (89) 99   


 


12/4/19 22:58  152 18     30


 


12/4/19 20:43  154 21     30


 


12/4/19 20:00 100.0 160 17 112/78 (89) 99   


 


12/4/19 20:00  167      


 


12/4/19 20:00        30


 


12/4/19 20:00      Mechanical Ventilator  


 


12/4/19 19:47  157 18     30


 


12/4/19 19:47  157 18  100 Mechanical Ventilator  30


 


12/4/19 18:43 99.0 145 16 129/79 (96) 100   


 


12/4/19 17:14  107 18     30


 


12/4/19 16:00      Mechanical Ventilator  


 


12/4/19 16:00 99.8 131 16 143/98 (113) 100   


 


12/4/19 16:00        30


 


12/4/19 15:19  113      


 


12/4/19 15:08  131 19     30


 


12/4/19 13:03  113 20     30








Height (Feet):  5


Height (Inches):  7.00


Weight (Pounds):  203


Objective


GENERAL:  Noncommunicative.  Tongue fasciculation.  Moderate edema.


LUNGS:  Diminished breath sounds.


CARDIAC:  Regular rhythm.  Rapid rate.  Normal S1, S2.


ABDOMEN:  Soft with G-tube and colostomy bag.





Laboratory Tests








Test


  12/4/19


19:17 12/5/19


05:25


 


Arterial Blood pH


  7.438


(7.350-7.450) 


 


 


Arterial Blood Partial


Pressure CO2 30.0 mmHg


(35.0-45.0)  L 


 


 


Arterial Blood Partial


Pressure O2 111.0 mmHg


(75.0-100.0)  H 


 


 


Arterial Blood HCO3


  19.8 mmol/L


(22.0-26.0)  L 


 


 


Arterial Blood Oxygen


Saturation 97.6 %


() 


 


 


Arterial Blood Base Excess -3.4 (-2-2)  L 


 


Arsh Test Positive   


 


White Blood Count


  


  33.9 K/UL


(4.8-10.8)  #*H


 


Red Blood Count


  


  3.33 M/UL


(4.70-6.10)  L


 


Hemoglobin


  


  9.5 G/DL


(14.2-18.0)  L


 


Hematocrit


  


  30.2 %


(42.0-52.0)  L


 


Mean Corpuscular Volume  91 FL (80-99)  


 


Mean Corpuscular Hemoglobin


  


  28.6 PG


(27.0-31.0)


 


Mean Corpuscular Hemoglobin


Concent 


  31.5 G/DL


(32.0-36.0)  L


 


Red Cell Distribution Width


  


  15.4 %


(11.6-14.8)  H


 


Platelet Count


  


  361 K/UL


(150-450)


 


Mean Platelet Volume


  


  8.0 FL


(6.5-10.1)


 


Neutrophils (%) (Auto)


  


  % (45.0-75.0)


 


 


Lymphocytes (%) (Auto)


  


  % (20.0-45.0)


 


 


Monocytes (%) (Auto)   % (1.0-10.0)  


 


Eosinophils (%) (Auto)   % (0.0-3.0)  


 


Basophils (%) (Auto)   % (0.0-2.0)  


 


Differential Total Cells


Counted 


  100  


 


 


Neutrophils % (Manual)  83 % (45-75)  H


 


Lymphocytes % (Manual)  4 % (20-45)  L


 


Monocytes % (Manual)  2 % (1-10)  


 


Eosinophils % (Manual)  1 % (0-3)  


 


Basophils % (Manual)  0 % (0-2)  


 


Band Neutrophils  10 % (0-8)  H


 


Nucleated Red Blood Cells  1 /100 WBC  


 


Platelet Estimate  Adequate  


 


Platelet Morphology  Normal  


 


Hypochromasia  2+  


 


Anisocytosis  1+  


 


Sodium Level


  


  135 MMOL/L


(136-145)  L


 


Potassium Level


  


  6.0 MMOL/L


(3.5-5.1)  *H


 


Chloride Level


  


  104 MMOL/L


()


 


Carbon Dioxide Level


  


  25 MMOL/L


(21-32)


 


Anion Gap


  


  6 mmol/L


(5-15)


 


Blood Urea Nitrogen


  


  9 mg/dL (7-18)


 


 


Creatinine


  


  1.3 MG/DL


(0.55-1.30)


 


Estimat Glomerular Filtration


Rate 


  > 60 mL/min


(>60)


 


Glucose Level


  


  222 MG/DL


()  H


 


Calcium Level


  


  9.8 MG/DL


(8.5-10.1)


 


Magnesium Level


  


  1.6 MG/DL


(1.8-2.4)  L


 


Total Bilirubin


  


  0.5 MG/DL


(0.2-1.0)


 


Aspartate Amino Transf


(AST/SGOT) 


  42 U/L (15-37)


H


 


Alanine Aminotransferase


(ALT/SGPT) 


  34 U/L (12-78)


 


 


Alkaline Phosphatase


  


  126 U/L


()  H


 


Total Protein


  


  7.1 G/DL


(6.4-8.2)


 


Albumin


  


  2.3 G/DL


(3.4-5.0)  L


 


Globulin  4.8 g/dL  


 


Albumin/Globulin Ratio


  


  0.5 (1.0-2.7)


L











Current Medications








 Medications


  (Trade)  Dose


 Ordered  Sig/Jan


 Route


 PRN Reason  Start Time


 Stop Time Status Last Admin


Dose Admin


 


 Acetaminophen


  (Tylenol)  650 mg  Q4H  PRN


 GT


 Mild Pain/Temp > 100.6  11/26/19 18:42


 12/26/19 18:41  12/5/19 05:51


 


 


 Artificial Tears


  (Akwa-Tears)  2 drop  Q12HR


 BOTH EYES


   11/26/19 21:00


 12/24/19 20:59  12/5/19 10:14


 


 


 Baclofen


  (Lioresal)  10 mg  THREE TIMES A  DAY


 GT


   11/27/19 09:00


 12/24/19 17:59  12/5/19 10:05


 


 


 Calcitonin [place]


  (Miacalcin)  1 sprays  DAILY


 NASAL


   11/28/19 12:00


 12/28/19 11:59  12/5/19 10:14


 


 


 Clobetasol


 Propionate


  (Temovate)  1 applic  EVERY 12  HOURS


 TOPIC


   11/26/19 21:00


 12/24/19 20:59  12/5/19 10:14


 


 


 Dextrose


  (Dextrose 50%)  25 ml  Q30M  PRN


 IV


 Hypoglycemia  11/26/19 18:45


 12/24/19 14:44   


 


 


 Dextrose


  (Dextrose 50%)  50 ml  Q30M  PRN


 IV


 Hypoglycemia  11/26/19 18:45


 12/24/19 14:44   


 


 


 Famotidine


  (Pepcid)  20 mg  BID


 GT


   11/28/19 18:00


 12/28/19 17:59  12/5/19 10:05


 


 


 Fenofibrate


  (Tricor)  145 mg  DAILY


 ORAL


   11/27/19 09:00


 12/25/19 08:59  12/5/19 10:05


 


 


 Heparin Sodium


  (Porcine)


  (Heparin 5000


 units/ml)  5,000 units  EVERY 12  HOURS


 SUBQ


   11/26/19 21:00


 12/24/19 20:59  12/5/19 10:09


 


 


 Insulin Aspart


  (NovoLOG)    EVERY 6  HOURS


 SUBQ


   11/30/19 12:00


 12/24/19 16:29  12/5/19 05:49


 


 


 Levetiracetam


  (Keppra)  1,000 mg  Q8H


 GT


   11/29/19 02:00


 12/29/19 01:59  12/5/19 10:05


 


 


 Ondansetron HCl


  (Zofran)  4 mg  Q6H  PRN


 IVP


 Nausea & Vomiting  11/26/19 18:44


 12/26/19 18:43   


 


 


 Polyethylene


 Glycol


  (Miralax)  17 gm  DAILYPRN  PRN


 GT


 Constipation  11/26/19 18:44


 12/26/19 18:43   


 


 


 Sitagliptin


 Phosphate


  (Januvia)  50 mg  ACBREAKFAST


 GT


   11/27/19 06:30


 12/25/19 06:29  12/5/19 05:51


 


 


 Sodium Chloride  1,000 ml @ 


 125 mls/hr  Q8H


 IV


   12/5/19 01:45


 1/4/20 01:44  12/5/19 10:05


 


 


 Tamsulosin HCl


  (Flomax)  0.4 mg  DAILY


 ORAL


   11/27/19 09:00


 12/27/19 08:59  12/5/19 10:05


 

















Joy Love M.D. Dec 5, 2019 12:39
Assessment/Plan


Assessment/Plan








Assessment:


Severe Sepsis, SP- likely 2ry to UTI- 


Probable PNA


  -Bcx Neg


  -u/a wbc tnct, nit neg, leuk +3; ucx C. parasilopsis


  -sp cx ESBL P. mirabilis, MDR P. stuarti (S Cefepime, Zosyn, Ertapenem)


  -CXR:   Geographic density overlying the right upper lung.  This likely 

represents overlying soft tissue although cannot exclude an underlying 

consolidation/pneumonia. Pulmonary vascular congestion. Small left pleural 

effusion. Subsegmental atelectasis versus infiltrate in the left lung base.


 -Influenza A/B ag neg





Fever, SP


Leukocytosis, SP





BRAYDON, SP





Mild AST elevation, SP


   -Abd uS: Hepatomegaly with fatty infiltration. Suspected nonobstructive 

stone left kidney. Left renal cyst





Sacral decubitus





hx of  recurrent UTIs


  -UCx 1/27/19 P.a.  (R Levo, otherwise S)


  - CT abd 1/29/19 3 mm distal right ureteral calculus, minimal resultant 

hydronephrosis. Atrophic left kidney, containing a large staghorn calculus and 

multiple intrarenal calyceal calculi, previously described


   UCx 2/2/19 - Enterobacter (S Cefepime) and yeast and Pa





 HTN


CKD


 ICH s/p craniotomy and  shunt now w/ persistent vegetative state


 dysphagia s/p GT


Chronic resp failure - vent/trach dependent


ICH


COPD


DM


Seizure disorder


BPH


Dysphagia, G tube


Colostomy  


SNF  resident





Plan:


-Continue Ertapenem #4 (Abx #10/10)


  - 11/29/10 S/P Meropenem 


  - 11/27 SP IV Vancomycin #4


  - 11/24 SP Cefepime x1





-f/u cx


-Monitor CBC/CMP, temperatures


-PEG/Trach/ICU care


-aspiration precautions


-wound care





Thank you for this consultation. Will continue to follow along with you.





Discussed with RN





Subjective


Allergies:  


Coded Allergies:  


     AZTREONAM (Verified  Allergy, Unknown, 7/23/18)


Subjective


afebrile 


no leukocytosis


BCx Neg





Objective


Vital Signs





Last 24 Hour Vital Signs








  Date Time  Temp Pulse Resp B/P (MAP) Pulse Ox O2 Delivery O2 Flow Rate FiO2


 


12/3/19 09:02  103 14     30


 


12/3/19 08:00      Mechanical Ventilator  


 


12/3/19 08:00 98.2 103 20 134/87 (103) 98   


 


12/3/19 08:00        30


 


12/3/19 07:58  100      


 


12/3/19 07:02  61 18     30


 


12/3/19 05:29  102 16     30


 


12/3/19 04:00        30


 


12/3/19 04:00 98.2 134 21 129/85 (100) 100   


 


12/3/19 04:00      Mechanical Ventilator  


 


12/3/19 04:00  103      


 


12/3/19 03:26  102 15     30


 


12/3/19 01:00  102 16     30


 


12/3/19 00:00      Mechanical Ventilator  


 


12/3/19 00:00        30


 


12/3/19 00:00  107      


 


12/3/19 00:00 98.2 105 16 133/85 (101) 100   


 


12/2/19 23:27  107 17     30


 


12/2/19 20:46  102 18     30


 


12/2/19 20:00        30


 


12/2/19 20:00      Mechanical Ventilator  


 


12/2/19 20:00  104      


 


12/2/19 20:00 99.0 105 16 131/88 (102) 99   


 


12/2/19 19:14  106 19     30


 


12/2/19 17:18  103 18     30


 


12/2/19 16:00 98.2 103 17 133/81 (98) 100   


 


12/2/19 16:00      Mechanical Ventilator  


 


12/2/19 16:00        30


 


12/2/19 15:39  140      


 


12/2/19 15:14  100 16     30


 


12/2/19 13:23  108 20     30


 


12/2/19 12:00 98.4 98 17 117/73 (88) 100   


 


12/2/19 12:00      Mechanical Ventilator  


 


12/2/19 12:00        30


 


12/2/19 11:41  101      








Height (Feet):  5


Height (Inches):  7.00


Weight (Pounds):  184


Objective


GENERAL:  Noncommunicative.  Tongue fasciculation.  Moderate edema.


LUNGS:  Diminished breath sounds.


CARDIAC:  Regular rhythm.  Rapid rate.  Normal S1, S2.


ABDOMEN:  Soft with G-tube and colostomy bag.





Laboratory Tests








Test


  12/3/19


09:45


 


Sodium Level


  139 MMOL/L


(136-145)


 


Potassium Level


  4.2 MMOL/L


(3.5-5.1)


 


Chloride Level


  104 MMOL/L


()


 


Carbon Dioxide Level


  23 MMOL/L


(21-32)


 


Anion Gap


  12 mmol/L


(5-15)


 


Blood Urea Nitrogen


  4 mg/dL (7-18)


L


 


Creatinine


  0.9 MG/DL


(0.55-1.30)


 


Estimat Glomerular Filtration


Rate > 60 mL/min


(>60)


 


Glucose Level


  167 MG/DL


()  H


 


Calcium Level


  10.2 MG/DL


(8.5-10.1)  H


 


Phosphorus Level


  3.2 MG/DL


(2.5-4.9)


 


Magnesium Level


  1.7 MG/DL


(1.8-2.4)  L


 


Total Bilirubin


  0.2 MG/DL


(0.2-1.0)


 


Aspartate Amino Transf


(AST/SGOT) 43 U/L (15-37)


H


 


Alanine Aminotransferase


(ALT/SGPT) 32 U/L (12-78)


 


 


Alkaline Phosphatase


  88 U/L


()


 


Total Protein


  7.3 G/DL


(6.4-8.2)


 


Albumin


  2.4 G/DL


(3.4-5.0)  L


 


Globulin 4.9 g/dL  


 


Albumin/Globulin Ratio


  0.5 (1.0-2.7)


L











Current Medications








 Medications


  (Trade)  Dose


 Ordered  Sig/Jan


 Route


 PRN Reason  Start Time


 Stop Time Status Last Admin


Dose Admin


 


 Acetaminophen


  (Tylenol)  650 mg  Q4H  PRN


 GT


 Mild Pain/Temp > 100.6  11/26/19 18:42


 12/26/19 18:41   


 


 


 Albuterol/


 Ipratropium


  (Albuterol/


 Ipratropium)  3 ml  Q4H  PRN


 HHN


 Shortness of Breath  11/30/19 12:20


 12/5/19 12:19   


 


 


 Artificial Tears


  (Akwa-Tears)  2 drop  Q12HR


 BOTH EYES


   11/26/19 21:00


 12/24/19 20:59  12/3/19 09:28


 


 


 Baclofen


  (Lioresal)  10 mg  THREE TIMES A  DAY


 GT


   11/27/19 09:00


 12/24/19 17:59  12/3/19 09:21


 


 


 Calcitonin [place]


  (Miacalcin)  1 sprays  DAILY


 NASAL


   11/28/19 12:00


 12/28/19 11:59  12/3/19 09:29


 


 


 Chlorhexidine


 Gluconate


  (Elaine-Hex 2%)  1 applic  DAILY@2000


 TOPIC


   12/2/19 20:00


 1/1/20 19:59  12/2/19 21:07


 


 


 Clobetasol


 Propionate


  (Temovate)  1 applic  EVERY 12  HOURS


 TOPIC


   11/26/19 21:00


 12/24/19 20:59  12/3/19 09:28


 


 


 Dextrose  1,000 ml @ 


 100 mls/hr  Q10H


 IV


   12/2/19 15:45


 1/1/20 15:44  12/3/19 02:30


 


 


 Dextrose


  (Dextrose 50%)  25 ml  Q30M  PRN


 IV


 Hypoglycemia  11/26/19 18:45


 12/24/19 14:44   


 


 


 Dextrose


  (Dextrose 50%)  50 ml  Q30M  PRN


 IV


 Hypoglycemia  11/26/19 18:45


 12/24/19 14:44   


 


 


 Ertapenem 1 gm/


 Sodium Chloride  55 ml @ 


 110 mls/hr  Q24H


 IVPB


   11/29/19 13:00


 12/4/19 12:59  12/2/19 12:41


 


 


 Famotidine


  (Pepcid)  20 mg  BID


 GT


   11/28/19 18:00


 12/28/19 17:59  12/3/19 09:22


 


 


 Fenofibrate


  (Tricor)  145 mg  DAILY


 ORAL


   11/27/19 09:00


 12/25/19 08:59  12/3/19 09:22


 


 


 Heparin Sodium


  (Porcine)


  (Heparin 5000


 units/ml)  5,000 units  EVERY 12  HOURS


 SUBQ


   11/26/19 21:00


 12/24/19 20:59  12/3/19 09:28


 


 


 Insulin Aspart


  (NovoLOG)    EVERY 6  HOURS


 SUBQ


   11/30/19 12:00


 12/24/19 16:29  12/3/19 06:07


 


 


 Levetiracetam


  (Keppra)  1,000 mg  Q8H


 GT


   11/29/19 02:00


 12/29/19 01:59  12/3/19 09:21


 


 


 Lorazepam


  (Ativan 2mg/ml


 1ml)  2 mg  Q2H  PRN


 IV


 For Anxiety  11/26/19 18:43


 12/3/19 18:42   


 


 


 Morphine Sulfate


  (Morphine


 Sulfate)  4 mg  Q4H  PRN


 IVP


 Severe Pain (Pain Scale 7-10)  11/26/19 18:44


 12/3/19 18:43   


 


 


 Ondansetron HCl


  (Zofran)  4 mg  Q6H  PRN


 IVP


 Nausea & Vomiting  11/26/19 18:44


 12/26/19 18:43   


 


 


 Polyethylene


 Glycol


  (Miralax)  17 gm  DAILYPRN  PRN


 GT


 Constipation  11/26/19 18:44


 12/26/19 18:43   


 


 


 Potassium Chloride


  (K-Dur)  40 meq  TWICE A  DAY


 GT


   12/2/19 18:00


 1/1/20 17:59  12/3/19 09:21


 


 


 Sitagliptin


 Phosphate


  (Januvia)  50 mg  ACBREAKFAST


 GT


   11/27/19 06:30


 12/25/19 06:29  12/3/19 06:05


 


 


 Tamsulosin HCl


  (Flomax)  0.4 mg  DAILY


 ORAL


   11/27/19 09:00


 12/27/19 08:59  12/3/19 09:21


 

















Joy Love M.D. Dec 3, 2019 10:53
Assessment/Plan


Assessment/Plan








Assessment:


Severe Sepsis, SP- likely 2ry to UTI- 


Probable PNA


  -Bcx Neg


  -u/a wbc tnct, nit neg, leuk +3; ucx C. parasilopsis


  -sp cx ESBL P. mirabilis, MDR P. stuarti (S Cefepime, Zosyn, Ertapenem)


  -CXR:   Geographic density overlying the right upper lung.  This likely 

represents overlying soft tissue although cannot exclude an underlying 

consolidation/pneumonia. Pulmonary vascular congestion. Small left pleural 

effusion. Subsegmental atelectasis versus infiltrate in the left lung base.


 -Influenza A/B ag neg





Fever, SP


Leukocytosis, SP





BRAYDON, SP





Mild AST elevation, SP


   -Abd uS: Hepatomegaly with fatty infiltration. Suspected nonobstructive 

stone left kidney. Left renal cyst





Sacral decubitus





hx of  recurrent UTIs


  -UCx 1/27/19 P.a.  (R Levo, otherwise S)


  - CT abd 1/29/19 3 mm distal right ureteral calculus, minimal resultant 

hydronephrosis. Atrophic left kidney, containing a large staghorn calculus and 

multiple intrarenal calyceal calculi, previously described


   UCx 2/2/19 - Enterobacter (S Cefepime) and yeast and Pa





 HTN


CKD


 ICH s/p craniotomy and  shunt now w/ persistent vegetative state


 dysphagia s/p GT


Chronic resp failure - vent/trach dependent


ICH


COPD


DM


Seizure disorder


BPH


Dysphagia, G tube


Colostomy  


SNF  resident





Plan:


-Continue to monitor off abx


  -12/3 SP ertapenem #4


  - 11/29/10 S/P Meropenem 


  - 11/27 SP IV Vancomycin #4


  - 11/24 SP Cefepime x1





-f/u cx


-Monitor CBC/CMP, temperatures


-PEG/Trach/ICU care


-aspiration precautions


-wound care





Thank you for this consultation. Will continue to follow along with you.





Discussed with RN





Subjective


Allergies:  


Coded Allergies:  


     AZTREONAM (Verified  Allergy, Unknown, 7/23/18)


Subjective


Tm 100





Objective


Vital Signs





Last 24 Hour Vital Signs








  Date Time  Temp Pulse Resp B/P (MAP) Pulse Ox O2 Delivery O2 Flow Rate FiO2


 


12/4/19 12:00 100.0 134 15 115/70 (85) 100   


 


12/4/19 12:00        30


 


12/4/19 12:00      Mechanical Ventilator  


 


12/4/19 11:02  139 19     30


 


12/4/19 09:18  117 16     30


 


12/4/19 08:00 98.2 115 14 125/77 (93) 98   


 


12/4/19 08:00        30


 


12/4/19 08:00      Mechanical Ventilator  


 


12/4/19 07:52  128      


 


12/4/19 07:48  114 19     30


 


12/4/19 04:38  108 19     30


 


12/4/19 04:07        30


 


12/4/19 04:06      Mechanical Ventilator  


 


12/4/19 04:00  109      


 


12/4/19 04:00 98.1 110 16 129/85 (100) 100   


 


12/4/19 03:20  112 17     30


 


12/4/19 01:05  111 15     30


 


12/4/19 00:00        30


 


12/4/19 00:00 98.5 113 16 131/82 (98) 100   


 


12/4/19 00:00      Mechanical Ventilator  


 


12/3/19 23:10  113 15     30


 


12/3/19 21:13  108 15     30


 


12/3/19 20:00      Mechanical Ventilator  


 


12/3/19 20:00 98.2 114 16 131/79 (96) 100   


 


12/3/19 20:00  113      


 


12/3/19 20:00        30


 


12/3/19 18:52  113 16     30


 


12/3/19 16:59  90 15     30


 


12/3/19 16:00        30


 


12/3/19 16:00 98.6 98 19 134/86 (102) 99   


 


12/3/19 16:00      Mechanical Ventilator  


 


12/3/19 15:28  102      


 


12/3/19 14:50  84 15     30








Height (Feet):  5


Height (Inches):  7.00


Weight (Pounds):  201


Objective


GENERAL:  Noncommunicative.  Tongue fasciculation.  Moderate edema.


LUNGS:  Diminished breath sounds.


CARDIAC:  Regular rhythm.  Rapid rate.  Normal S1, S2.


ABDOMEN:  Soft with G-tube and colostomy bag.





Laboratory Tests








Test


  12/4/19


04:55


 


White Blood Count


  10.9 K/UL


(4.8-10.8)  H


 


Red Blood Count


  3.42 M/UL


(4.70-6.10)  L


 


Hemoglobin


  10.1 G/DL


(14.2-18.0)  L


 


Hematocrit


  31.2 %


(42.0-52.0)  L


 


Mean Corpuscular Volume 91 FL (80-99)  


 


Mean Corpuscular Hemoglobin


  29.5 PG


(27.0-31.0)


 


Mean Corpuscular Hemoglobin


Concent 32.4 G/DL


(32.0-36.0)


 


Red Cell Distribution Width


  15.3 %


(11.6-14.8)  H


 


Platelet Count


  367 K/UL


(150-450)


 


Mean Platelet Volume


  7.7 FL


(6.5-10.1)


 


Neutrophils (%) (Auto)


  74.8 %


(45.0-75.0)


 


Lymphocytes (%) (Auto)


  14.1 %


(20.0-45.0)  L


 


Monocytes (%) (Auto)


  3.6 %


(1.0-10.0)


 


Eosinophils (%) (Auto)


  6.2 %


(0.0-3.0)  H


 


Basophils (%) (Auto)


  1.4 %


(0.0-2.0)


 


Sodium Level


  138 MMOL/L


(136-145)


 


Potassium Level


  5.0 MMOL/L


(3.5-5.1)


 


Chloride Level


  104 MMOL/L


()


 


Carbon Dioxide Level


  25 MMOL/L


(21-32)


 


Anion Gap


  9 mmol/L


(5-15)


 


Blood Urea Nitrogen


  6 mg/dL (7-18)


L


 


Creatinine


  1.0 MG/DL


(0.55-1.30)


 


Estimat Glomerular Filtration


Rate > 60 mL/min


(>60)


 


Glucose Level


  158 MG/DL


()  H


 


Calcium Level


  10.3 MG/DL


(8.5-10.1)  H


 


Phosphorus Level


  3.1 MG/DL


(2.5-4.9)


 


Magnesium Level


  2.1 MG/DL


(1.8-2.4)


 


Total Bilirubin


  0.3 MG/DL


(0.2-1.0)


 


Aspartate Amino Transf


(AST/SGOT) 41 U/L (15-37)


H


 


Alanine Aminotransferase


(ALT/SGPT) 28 U/L (12-78)


 


 


Alkaline Phosphatase


  98 U/L


()


 


C-Reactive Protein,


Quantitative 6.0 mg/dL


(0.00-0.90)  H


 


Pro-B-Type Natriuretic Peptide


  206 pg/mL


(0-125)  H


 


Total Protein


  7.5 G/DL


(6.4-8.2)


 


Albumin


  2.5 G/DL


(3.4-5.0)  L


 


Globulin 5.0 g/dL  


 


Albumin/Globulin Ratio


  0.5 (1.0-2.7)


L











Current Medications








 Medications


  (Trade)  Dose


 Ordered  Sig/Jan


 Route


 PRN Reason  Start Time


 Stop Time Status Last Admin


Dose Admin


 


 Acetaminophen


  (Tylenol)  650 mg  Q4H  PRN


 GT


 Mild Pain/Temp > 100.6  11/26/19 18:42


 12/26/19 18:41  12/4/19 12:08


 


 


 Albuterol/


 Ipratropium


  (Albuterol/


 Ipratropium)  3 ml  Q4H  PRN


 HHN


 Shortness of Breath  11/30/19 12:20


 12/5/19 12:19   


 


 


 Artificial Tears


  (Akwa-Tears)  2 drop  Q12HR


 BOTH EYES


   11/26/19 21:00


 12/24/19 20:59  12/4/19 09:04


 


 


 Baclofen


  (Lioresal)  10 mg  THREE TIMES A  DAY


 GT


   11/27/19 09:00


 12/24/19 17:59  12/4/19 08:58


 


 


 Calcitonin Colp


  (Miacalcin)  1 sprays  DAILY


 NASAL


   11/28/19 12:00


 12/28/19 11:59  12/4/19 09:03


 


 


 Clobetasol


 Propionate


  (Temovate)  1 applic  EVERY 12  HOURS


 TOPIC


   11/26/19 21:00


 12/24/19 20:59  12/4/19 09:03


 


 


 Dextrose


  (Dextrose 50%)  25 ml  Q30M  PRN


 IV


 Hypoglycemia  11/26/19 18:45


 12/24/19 14:44   


 


 


 Dextrose


  (Dextrose 50%)  50 ml  Q30M  PRN


 IV


 Hypoglycemia  11/26/19 18:45


 12/24/19 14:44   


 


 


 Ertapenem 1 gm/


 Sodium Chloride  55 ml @ 


 110 mls/hr  Q24H


 IVPB


   11/29/19 13:00


 12/4/19 12:59  12/3/19 12:58


 


 


 Famotidine


  (Pepcid)  20 mg  BID


 GT


   11/28/19 18:00


 12/28/19 17:59  12/4/19 08:58


 


 


 Fenofibrate


  (Tricor)  145 mg  DAILY


 ORAL


   11/27/19 09:00


 12/25/19 08:59  12/4/19 09:12


 


 


 Heparin Sodium


  (Porcine)


  (Heparin 5000


 units/ml)  5,000 units  EVERY 12  HOURS


 SUBQ


   11/26/19 21:00


 12/24/19 20:59  12/4/19 09:01


 


 


 Insulin Aspart


  (NovoLOG)    EVERY 6  HOURS


 SUBQ


   11/30/19 12:00


 12/24/19 16:29  12/4/19 12:37


 


 


 Levetiracetam


  (Keppra)  1,000 mg  Q8H


 GT


   11/29/19 02:00


 12/29/19 01:59  12/4/19 09:04


 


 


 Ondansetron HCl


  (Zofran)  4 mg  Q6H  PRN


 IVP


 Nausea & Vomiting  11/26/19 18:44


 12/26/19 18:43   


 


 


 Polyethylene


 Glycol


  (Miralax)  17 gm  DAILYPRN  PRN


 GT


 Constipation  11/26/19 18:44


 12/26/19 18:43   


 


 


 Potassium Chloride


  (K-Dur)  40 meq  TWICE A  DAY


 GT


   12/2/19 18:00


 1/1/20 17:59  12/4/19 08:58


 


 


 Sitagliptin


 Phosphate


  (Januvia)  50 mg  ACBREAKFAST


 GT


   11/27/19 06:30


 12/25/19 06:29  12/4/19 05:43


 


 


 Tamsulosin HCl


  (Flomax)  0.4 mg  DAILY


 ORAL


   11/27/19 09:00


 12/27/19 08:59  12/4/19 08:58


 

















Joy Love M.D. Dec 4, 2019 12:51
Assessment/Plan


Assessment/Plan








Assessment:


Severe Sepsis- likely 2ry to UTI- 


Probable PNA


  -Bcx NTD


  -u/a wbc tnct, nit neg, leuk +3; ucx C. parasilopsis


  -sp cx ESBL P. mirabilis, MDR P. stuarti (S Cefepime, Zosyn, Ertapenem)


  -CXR:   Geographic density overlying the right upper lung.  This likely 

represents overlying soft tissue although cannot exclude an underlying 

consolidation/pneumonia. Pulmonary vascular congestion. Small left pleural 

effusion. Subsegmental atelectasis versus infiltrate in the left lung base.


 -Influenza A/B ag neg





Fever, SP


Leukocytosis, SP





BRAYDON, SP





Mild AST elevation, SP


   -Abd uS: Hepatomegaly with fatty infiltration. Suspected nonobstructive 

stone left kidney. Left renal cyst





Sacral decubitus





hx of  recurrent UTIs


  -UCx 1/27/19 P.a.  (R Levo, otherwise S)


  - CT abd 1/29/19 3 mm distal right ureteral calculus, minimal resultant 

hydronephrosis. Atrophic left kidney, containing a large staghorn calculus and 

multiple intrarenal calyceal calculi, previously described


   UCx 2/2/19 - Enterobacter (S Cefepime) and yeast and Pa





 HTN


CKD


 ICH s/p craniotomy and  shunt now w/ persistent vegetative state


 dysphagia s/p GT


Chronic resp failure - vent/trach dependent


ICH


COPD


DM


Seizure disorder


BPH


Dysphagia, G tube


Colostomy  


SNF  resident





Plan:


-Continue Ertapenem #2 (Abx #8/10)


  - 11/29/10 S/P Meropenem 


  - 11/27 SP IV Vancomycin #4


  - 11/24 SP Cefepime x1





-f/u cx


-Monitor CBC/CMP, temperatures


-PEG/Trach/ICU care


-aspiration precautions


-wound care





Thank you for this consultation. Will continue to follow along with you.





Discussed with RN





Subjective


Allergies:  


Coded Allergies:  


     AZTREONAM (Verified  Allergy, Unknown, 7/23/18)


Subjective


Afebrile


Leukocytosis resolved


On vent 30% O2





Objective


Vital Signs





Last 24 Hour Vital Signs








  Date Time  Temp Pulse Resp B/P (MAP) Pulse Ox O2 Delivery O2 Flow Rate FiO2


 


12/1/19 09:00        30


 


12/1/19 08:38  100 16     30


 


12/1/19 08:00      Mechanical Ventilator  


 


12/1/19 08:00  98      


 


12/1/19 08:00 97.6 96 18 114/72 (86) 100   


 


12/1/19 07:32  98 16     30


 


12/1/19 05:00  101 17  100 Mechanical Ventilator 60.0 30





  99 17     30





        30


 


12/1/19 04:01  99      


 


12/1/19 04:00      Mechanical Ventilator  


 


12/1/19 04:00        30


 


12/1/19 04:00 97.8 104 17 105/68 (80) 100   


 


12/1/19 03:00  101 16  100 Mechanical Ventilator 60.0 30





  105 16     30





        30


 


12/1/19 01:08  101 16  100 Mechanical Ventilator 60.0 30





  105 16     30





        30


 


12/1/19 00:00      Mechanical Ventilator  


 


12/1/19 00:00 97.8 105 16 109/79 (89) 100   


 


12/1/19 00:00        30


 


11/30/19 23:39  102      


 


11/30/19 23:00  100 17  100 Mechanical Ventilator 60.0 30





  100 17     30





        30


 


11/30/19 21:30  100 17     30


 


11/30/19 20:00 97.0 100 16 107/76 (86) 100   


 


11/30/19 20:00      Mechanical Ventilator  


 


11/30/19 20:00        30


 


11/30/19 19:41  99      


 


11/30/19 19:00  106 16     30


 


11/30/19 17:33  99 18     30


 


11/30/19 16:00 97.5 103 17 118/75 (89) 100   


 


11/30/19 16:00        30


 


11/30/19 16:00      Mechanical Ventilator  


 


11/30/19 15:27  101      


 


11/30/19 15:23  103 16     30


 


11/30/19 13:17  107 16     30


 


11/30/19 12:00  102      


 


11/30/19 12:00        30


 


11/30/19 12:00      Mechanical Ventilator  


 


11/30/19 12:00 97.5 102 16 118/71 (87) 100   


 


11/30/19 11:08  100 16     30








Height (Feet):  5


Height (Inches):  7.00


Weight (Pounds):  204


Objective


GENERAL:  Noncommunicative.  Tongue fasciculation.  


LUNGS:  Diminished breath sounds B/L


CARDIAC:  Regular rhythm.  Rapid rate.  Normal S1, S2.


ABDOMEN:  Soft with G-tube and colostomy bag.





Laboratory Tests








Test


  12/1/19


06:45 12/1/19


08:34


 


White Blood Count


  8.7 K/UL


(4.8-10.8) 


 


 


Red Blood Count


  3.58 M/UL


(4.70-6.10)  L 


 


 


Hemoglobin


  10.5 G/DL


(14.2-18.0)  L 


 


 


Hematocrit


  33.0 %


(42.0-52.0)  L 


 


 


Mean Corpuscular Volume 92 FL (80-99)   


 


Mean Corpuscular Hemoglobin


  29.4 PG


(27.0-31.0) 


 


 


Mean Corpuscular Hemoglobin


Concent 31.8 G/DL


(32.0-36.0)  L 


 


 


Red Cell Distribution Width


  16.0 %


(11.6-14.8)  H 


 


 


Platelet Count


  315 K/UL


(150-450) 


 


 


Mean Platelet Volume


  8.4 FL


(6.5-10.1) 


 


 


Neutrophils (%) (Auto)


  60.9 %


(45.0-75.0) 


 


 


Lymphocytes (%) (Auto)


  16.6 %


(20.0-45.0)  L 


 


 


Monocytes (%) (Auto)


  7.2 %


(1.0-10.0) 


 


 


Eosinophils (%) (Auto)


  13.6 %


(0.0-3.0)  H 


 


 


Basophils (%) (Auto)


  1.7 %


(0.0-2.0) 


 


 


Sodium Level


  140 MMOL/L


(136-145) 


 


 


Potassium Level


  3.5 MMOL/L


(3.5-5.1) 


 


 


Chloride Level


  108 MMOL/L


()  H 


 


 


Carbon Dioxide Level


  20 MMOL/L


(21-32)  L 


 


 


Anion Gap


  12 mmol/L


(5-15) 


 


 


Blood Urea Nitrogen


  6 mg/dL (7-18)


L 


 


 


Creatinine


  0.9 MG/DL


(0.55-1.30) 


 


 


Estimat Glomerular Filtration


Rate > 60 mL/min


(>60) 


 


 


Glucose Level


  179 MG/DL


()  H 


 


 


Uric Acid


  6.4 MG/DL


(2.6-7.2) 


 


 


Calcium Level


  9.7 MG/DL


(8.5-10.1) 


 


 


Phosphorus Level


  2.2 MG/DL


(2.5-4.9)  L 


 


 


Magnesium Level


  1.8 MG/DL


(1.8-2.4) 


 


 


Total Bilirubin


  0.3 MG/DL


(0.2-1.0) 


 


 


Direct Bilirubin


  0.1 MG/DL


(0.0-0.3) 


 


 


Aspartate Amino Transf


(AST/SGOT) 37 U/L (15-37)


  


 


 


Alanine Aminotransferase


(ALT/SGPT) 26 U/L (12-78)


  


 


 


Alkaline Phosphatase


  78 U/L


() 


 


 


Total Protein


  7.2 G/DL


(6.4-8.2) 


 


 


Albumin


  2.3 G/DL


(3.4-5.0)  L 


 


 


Arterial Blood pH


  


  7.443


(7.350-7.450)


 


Arterial Blood Partial


Pressure CO2 


  26.1 mmHg


(35.0-45.0)  L


 


Arterial Blood Partial


Pressure O2 


  154.9 mmHg


(75.0-100.0)  H


 


Arterial Blood HCO3


  


  17.4 mmol/L


(22.0-26.0)  *L


 


Arterial Blood Oxygen


Saturation 


  98.3 %


()


 


Arterial Blood Base Excess  -5.5 (-2-2)  L


 


Arsh Test  Positive  











Current Medications








 Medications


  (Trade)  Dose


 Ordered  Sig/Jan


 Route


 PRN Reason  Start Time


 Stop Time Status Last Admin


Dose Admin


 


 Acetaminophen


  (Tylenol)  650 mg  Q4H  PRN


 GT


 Mild Pain/Temp > 100.6  11/26/19 18:42


 12/26/19 18:41   


 


 


 Albuterol/


 Ipratropium


  (Albuterol/


 Ipratropium)  3 ml  Q4H  PRN


 HHN


 Shortness of Breath  11/30/19 12:20


 12/5/19 12:19   


 


 


 Artificial Tears


  (Akwa-Tears)  2 drop  Q12HR


 BOTH EYES


   11/26/19 21:00


 12/24/19 20:59  12/1/19 09:43


 


 


 Baclofen


  (Lioresal)  10 mg  THREE TIMES A  DAY


 GT


   11/27/19 09:00


 12/24/19 17:59  12/1/19 09:44


 


 


 Calcitonin [place]


  (Miacalcin)  1 sprays  DAILY


 NASAL


   11/28/19 12:00


 12/28/19 11:59  12/1/19 09:43


 


 


 Clobetasol


 Propionate


  (Temovate)  1 applic  EVERY 12  HOURS


 TOPIC


   11/26/19 21:00


 12/24/19 20:59  12/1/19 09:44


 


 


 Dextrose


  (Dextrose 50%)  25 ml  Q30M  PRN


 IV


 Hypoglycemia  11/26/19 18:45


 12/24/19 14:44   


 


 


 Dextrose


  (Dextrose 50%)  50 ml  Q30M  PRN


 IV


 Hypoglycemia  11/26/19 18:45


 12/24/19 14:44   


 


 


 Dextrose/


 Electrolytes  1,000 ml @ 


 75 mls/hr  [id number]


 IV


   11/30/19 13:30


 12/30/19 13:29  12/1/19 02:09


 


 


 Ertapenem 1 gm/


 Sodium Chloride  55 ml @ 


 110 mls/hr  Q24H


 IVPB


   11/29/19 13:00


 12/4/19 12:59  11/30/19 12:41


 


 


 Famotidine


  (Pepcid)  20 mg  BID


 GT


   11/28/19 18:00


 12/28/19 17:59  12/1/19 09:44


 


 


 Fenofibrate


  (Tricor)  145 mg  DAILY


 ORAL


   11/27/19 09:00


 12/25/19 08:59  12/1/19 09:45


 


 


 Heparin Sodium


  (Porcine)


  (Heparin 5000


 units/ml)  5,000 units  EVERY 12  HOURS


 SUBQ


   11/26/19 21:00


 12/24/19 20:59  12/1/19 09:50


 


 


 Insulin Aspart


  (NovoLOG)    EVERY 6  HOURS


 SUBQ


   11/30/19 12:00


 12/24/19 16:29  12/1/19 06:12


 


 


 Levetiracetam


  (Keppra)  1,000 mg  Q8H


 GT


   11/29/19 02:00


 12/29/19 01:59  12/1/19 09:44


 


 


 Lorazepam


  (Ativan 2mg/ml


 1ml)  2 mg  Q2H  PRN


 IV


 For Anxiety  11/26/19 18:43


 12/3/19 18:42   


 


 


 Morphine Sulfate


  (Morphine


 Sulfate)  4 mg  Q4H  PRN


 IVP


 Severe Pain (Pain Scale 7-10)  11/26/19 18:44


 12/3/19 18:43   


 


 


 Ondansetron HCl


  (Zofran)  4 mg  Q6H  PRN


 IVP


 Nausea & Vomiting  11/26/19 18:44


 12/26/19 18:43   


 


 


 Polyethylene


 Glycol


  (Miralax)  17 gm  DAILYPRN  PRN


 GT


 Constipation  11/26/19 18:44


 12/26/19 18:43   


 


 


 Potassium


 Phosphate 20 mm/


 Sodium Chloride  281.6667


 ml @ 


 46.944 m...  ONCE  ONCE


 IV


   12/1/19 12:00


 12/1/19 17:59   


 


 


 Sitagliptin


 Phosphate


  (Januvia)  50 mg  ACBREAKFAST


 GT


   11/27/19 06:30


 12/25/19 06:29  12/1/19 06:10


 


 


 Tamsulosin HCl


  (Flomax)  0.4 mg  DAILY


 ORAL


   11/27/19 09:00


 12/27/19 08:59  12/1/19 09:44


 

















Robert Williamson MD Dec 1, 2019 10:15
Assessment/Plan


Assessment/Plan








Assessment:


Severe Sepsis- likely 2ry to UTI- 


Probable PNA


  -Bcx NTD


  -u/a wbc tnct, nit neg, leuk +3; ucx C. parasilopsis


  -sp cx ESBL P. mirabilis, MDR P. stuarti (S Cefepime, Zosyn, Ertapenem)


  -CXR:   Geographic density overlying the right upper lung.  This likely 

represents overlying soft tissue although cannot exclude an underlying 

consolidation/pneumonia. Pulmonary vascular congestion. Small left pleural 

effusion. Subsegmental atelectasis versus infiltrate in the left lung base.


 -Influenza A/B ag neg





Fever, SP


Leukocytosis, SP





BRAYDON, SP





Mild AST elevation, SP


   -Abd uS: Hepatomegaly with fatty infiltration. Suspected nonobstructive 

stone left kidney. Left renal cyst





Sacral decubitus





hx of  recurrent UTIs


  -UCx 1/27/19 P.a.  (R Levo, otherwise S)


  - CT abd 1/29/19 3 mm distal right ureteral calculus, minimal resultant 

hydronephrosis. Atrophic left kidney, containing a large staghorn calculus and 

multiple intrarenal calyceal calculi, previously described


   UCx 2/2/19 - Enterobacter (S Cefepime) and yeast and Pa





 HTN


CKD


 ICH s/p craniotomy and  shunt now w/ persistent vegetative state


 dysphagia s/p GT


Chronic resp failure - vent/trach dependent


ICH


COPD


DM


Seizure disorder


BPH


Dysphagia, G tube


Colostomy  


SNF  resident





Plan:


-Continue Ertapenem #3 (Abx #9/10)


  - 11/29/10 S/P Meropenem 


  - 11/27 SP IV Vancomycin #4


  - 11/24 SP Cefepime x1





-f/u cx


-Monitor CBC/CMP, temperatures


-PEG/Trach/ICU care


-aspiration precautions


-wound care





Thank you for this consultation. Will continue to follow along with you.





Discussed with RN





Subjective


Allergies:  


Coded Allergies:  


     AZTREONAM (Verified  Allergy, Unknown, 7/23/18)


Subjective


afebrile 


no leukocytosis


BCx NTD





Objective


Vital Signs





Last 24 Hour Vital Signs








  Date Time  Temp Pulse Resp B/P (MAP) Pulse Ox O2 Delivery O2 Flow Rate FiO2


 


12/2/19 10:40  100 18     30


 


12/2/19 09:28  99 15     30


 


12/2/19 08:00      Mechanical Ventilator  


 


12/2/19 08:00 97.8 102 17 131/78 (95) 100   


 


12/2/19 08:00        30


 


12/2/19 07:43  98      


 


12/2/19 07:13  100 15     30


 


12/2/19 04:51  97 16     30


 


12/2/19 04:00 98.4 95 24 121/81 (94) 97   


 


12/2/19 04:00        30


 


12/2/19 04:00  97      


 


12/2/19 04:00      Mechanical Ventilator  


 


12/2/19 02:55  95 14     30


 


12/2/19 01:13  100 16     30


 


12/2/19 00:00  98      


 


12/2/19 00:00 98.1 97 20 120/91 (101) 100   


 


12/2/19 00:00      Mechanical Ventilator  


 


12/2/19 00:00        30


 


12/1/19 23:32  98 14     30


 


12/1/19 21:39  102 16     30


 


12/1/19 20:00  101      


 


12/1/19 20:00        30


 


12/1/19 20:00 98.1 100 20 100/68 (79) 100   


 


12/1/19 20:00      Mechanical Ventilator  


 


12/1/19 19:43  104 18     30


 


12/1/19 17:20  101 19     30


 


12/1/19 16:00        30


 


12/1/19 16:00      Mechanical Ventilator  


 


12/1/19 16:00 97.9 99 17 100/73 (82) 100   


 


12/1/19 16:00  103      


 


12/1/19 14:56  105 19     30


 


12/1/19 13:01  98 16     30








Height (Feet):  5


Height (Inches):  7.00


Weight (Pounds):  202


Objective


GENERAL:  Noncommunicative.  Tongue fasciculation.  Moderate edema.


LUNGS:  Diminished breath sounds.


CARDIAC:  Regular rhythm.  Rapid rate.  Normal S1, S2.


ABDOMEN:  Soft with G-tube and colostomy bag.





Laboratory Tests








Test


  12/2/19


05:50 12/2/19


10:28


 


White Blood Count


  9.7 K/UL


(4.8-10.8) 


 


 


Red Blood Count


  2.83 M/UL


(4.70-6.10)  L 


 


 


Hemoglobin


  8.2 G/DL


(14.2-18.0)  L 


 


 


Hematocrit


  27.0 %


(42.0-52.0)  L 


 


 


Mean Corpuscular Volume 96 FL (80-99)   


 


Mean Corpuscular Hemoglobin


  29.0 PG


(27.0-31.0) 


 


 


Mean Corpuscular Hemoglobin


Concent 30.3 G/DL


(32.0-36.0)  L 


 


 


Red Cell Distribution Width


  17.9 %


(11.6-14.8)  H 


 


 


Platelet Count


  221 K/UL


(150-450) 


 


 


Mean Platelet Volume


  5.9 FL


(6.5-10.1)  L 


 


 


Neutrophils (%) (Auto)


  67.7 %


(45.0-75.0) 


 


 


Lymphocytes (%) (Auto)


  6.6 %


(20.0-45.0)  L 


 


 


Monocytes (%) (Auto)


  3.0 %


(1.0-10.0) 


 


 


Eosinophils (%) (Auto)


  19.0 %


(0.0-3.0)  H 


 


 


Basophils (%) (Auto)


  3.7 %


(0.0-2.0)  H 


 


 


Sodium Level


  152 MMOL/L


(136-145)  #H 


 


 


Potassium Level


  3.4 MMOL/L


(3.5-5.1)  L 


 


 


Chloride Level


  119 MMOL/L


()  H 


 


 


Carbon Dioxide Level


  18 MMOL/L


(21-32)  L 


 


 


Anion Gap


  16 mmol/L


(5-15)  H 


 


 


Blood Urea Nitrogen


  21 mg/dL


(7-18)  H 


 


 


Creatinine


  1.1 MG/DL


(0.55-1.30) 


 


 


Estimat Glomerular Filtration


Rate > 60 mL/min


(>60) 


 


 


Glucose Level


  174 MG/DL


()  H 


 


 


Calcium Level


  10.3 MG/DL


(8.5-10.1)  H 


 


 


Phosphorus Level


  3.0 MG/DL


(2.5-4.9) 3.0 MG/DL


(2.5-4.9)


 


Magnesium Level


  


  1.9 MG/DL


(1.8-2.4)


 


Total Bilirubin


  


  0.4 MG/DL


(0.2-1.0)


 


Direct Bilirubin


  


  0.1 MG/DL


(0.0-0.3)


 


Aspartate Amino Transf


(AST/SGOT) 


  33 U/L (15-37)


 


 


Alanine Aminotransferase


(ALT/SGPT) 


  21 U/L (12-78)


 


 


Alkaline Phosphatase


  


  41 U/L


()  L


 


Total Protein


  


  6.5 G/DL


(6.4-8.2)


 


Albumin


  


  2.5 G/DL


(3.4-5.0)  L











Current Medications








 Medications


  (Trade)  Dose


 Ordered  Sig/Jan


 Route


 PRN Reason  Start Time


 Stop Time Status Last Admin


Dose Admin


 


 Acetaminophen


  (Tylenol)  650 mg  Q4H  PRN


 GT


 Mild Pain/Temp > 100.6  11/26/19 18:42


 12/26/19 18:41   


 


 


 Albuterol/


 Ipratropium


  (Albuterol/


 Ipratropium)  3 ml  Q4H  PRN


 HHN


 Shortness of Breath  11/30/19 12:20


 12/5/19 12:19   


 


 


 Artificial Tears


  (Akwa-Tears)  2 drop  Q12HR


 BOTH EYES


   11/26/19 21:00


 12/24/19 20:59  12/2/19 09:20


 


 


 Baclofen


  (Lioresal)  10 mg  THREE TIMES A  DAY


 GT


   11/27/19 09:00


 12/24/19 17:59  12/2/19 09:21


 


 


 Calcitonin [place]


  (Miacalcin)  1 sprays  DAILY


 NASAL


   11/28/19 12:00


 12/28/19 11:59  12/2/19 09:20


 


 


 Chlorhexidine


 Gluconate


  (Elaine-Hex 2%)  1 applic  DAILY@2000


 TOPIC


   12/2/19 20:00


 1/1/20 19:59   


 


 


 Clobetasol


 Propionate


  (Temovate)  1 applic  EVERY 12  HOURS


 TOPIC


   11/26/19 21:00


 12/24/19 20:59  12/2/19 09:20


 


 


 Dextrose


  (Dextrose 50%)  25 ml  Q30M  PRN


 IV


 Hypoglycemia  11/26/19 18:45


 12/24/19 14:44   


 


 


 Dextrose


  (Dextrose 50%)  50 ml  Q30M  PRN


 IV


 Hypoglycemia  11/26/19 18:45


 12/24/19 14:44   


 


 


 Dextrose/


 Electrolytes  1,000 ml @ 


 75 mls/hr  [id number]


 IV


   11/30/19 13:30


 12/30/19 13:29  12/2/19 05:43


 


 


 Ertapenem 1 gm/


 Sodium Chloride  55 ml @ 


 110 mls/hr  Q24H


 IVPB


   11/29/19 13:00


 12/4/19 12:59  12/1/19 13:34


 


 


 Famotidine


  (Pepcid)  20 mg  BID


 GT


   11/28/19 18:00


 12/28/19 17:59  12/2/19 09:21


 


 


 Fenofibrate


  (Tricor)  145 mg  DAILY


 ORAL


   11/27/19 09:00


 12/25/19 08:59  12/2/19 09:21


 


 


 Heparin Sodium


  (Porcine)


  (Heparin 5000


 units/ml)  5,000 units  EVERY 12  HOURS


 SUBQ


   11/26/19 21:00


 12/24/19 20:59  12/2/19 09:22


 


 


 Insulin Aspart


  (NovoLOG)    EVERY 6  HOURS


 SUBQ


   11/30/19 12:00


 12/24/19 16:29  12/2/19 05:46


 


 


 Levetiracetam


  (Keppra)  1,000 mg  Q8H


 GT


   11/29/19 02:00


 12/29/19 01:59  12/2/19 09:20


 


 


 Lorazepam


  (Ativan 2mg/ml


 1ml)  2 mg  Q2H  PRN


 IV


 For Anxiety  11/26/19 18:43


 12/3/19 18:42   


 


 


 Morphine Sulfate


  (Morphine


 Sulfate)  4 mg  Q4H  PRN


 IVP


 Severe Pain (Pain Scale 7-10)  11/26/19 18:44


 12/3/19 18:43   


 


 


 Ondansetron HCl


  (Zofran)  4 mg  Q6H  PRN


 IVP


 Nausea & Vomiting  11/26/19 18:44


 12/26/19 18:43   


 


 


 Polyethylene


 Glycol


  (Miralax)  17 gm  DAILYPRN  PRN


 GT


 Constipation  11/26/19 18:44


 12/26/19 18:43   


 


 


 Sitagliptin


 Phosphate


  (Januvia)  50 mg  ACBREAKFAST


 GT


   11/27/19 06:30


 12/25/19 06:29  12/2/19 05:47


 


 


 Tamsulosin HCl


  (Flomax)  0.4 mg  DAILY


 ORAL


   11/27/19 09:00


 12/27/19 08:59  12/2/19 09:21


 

















Joy Love M.D. Dec 2, 2019 12:05
Assessment/Plan


Assessment/Plan








Assessment:


Severe Sepsis- likely 2ry to UTI- 


Probable PNA


  -Bcx NTD


  -u/a wbc tnct, nit neg, leuk +3; ucx C. parasilopsis


  -sp cx ESBL P. mirabilis, MDR P. stuarti (S Cefepime, Zosyn, Ertapenem)


  -CXR:   Geographic density overlying the right upper lung.  This likely 

represents overlying soft tissue although cannot exclude an underlying 

consolidation/pneumonia. Pulmonary vascular congestion. Small left pleural 

effusion. Subsegmental atelectasis versus infiltrate in the left lung base.


 -Influenza A/B ag neg





Fever, SP


Leukocytosis, SP





BRAYDON, SP





Mild AST elevation, SP


   -Abd uS: Hepatomegaly with fatty infiltration. Suspected nonobstructive 

stone left kidney. Left renal cyst





Sacral decubitus





hx of  recurrent UTIs


  -UCx 1/27/19 P.a.  (R Levo, otherwise S)


  - CT abd 1/29/19 3 mm distal right ureteral calculus, minimal resultant 

hydronephrosis. Atrophic left kidney, containing a large staghorn calculus and 

multiple intrarenal calyceal calculi, previously described


   UCx 2/2/19 - Enterobacter (S Cefepime) and yeast and Pa





 HTN


CKD


 ICH s/p craniotomy and  shunt now w/ persistent vegetative state


 dysphagia s/p GT


Chronic resp failure - vent/trach dependent


ICH


COPD


DM


Seizure disorder


BPH


Dysphagia, G tube


Colostomy  


SNF  resident





Plan:


-Switch empiric Meropenem #6/10 to Ertapenem


  -11/27 SP IV Vancomycin #4


  -11/24 SP Cefepime x1





-f/u cx


-Monitor CBC/CMP, temperatures


-PEG/Trach/ICU care


-aspiration precautions


-wound care





Thank you for this consultation. Will continue to follow along with you.





Discussed with RN





Subjective


Allergies:  


Coded Allergies:  


     AZTREONAM (Verified  Allergy, Unknown, 7/23/18)


Subjective


afebrile 


no leukocytosis


BCx NTD





Objective


Vital Signs





Last 24 Hour Vital Signs








  Date Time  Temp Pulse Resp B/P (MAP) Pulse Ox O2 Delivery O2 Flow Rate FiO2


 


11/29/19 09:20  99 17     30


 


11/29/19 07:01  98 18     30


 


11/29/19 05:29  99 16     30


 


11/29/19 04:00 97.0 99 17 130/86 (101) 100   


 


11/29/19 04:00      Mechanical Ventilator  


 


11/29/19 04:00        30


 


11/29/19 03:32  98      


 


11/29/19 03:25  99 17     30


 


11/29/19 01:30  98 18     30


 


11/29/19 00:00 97.3 107 17 141/84 (103) 100   


 


11/29/19 00:00        30


 


11/29/19 00:00      Mechanical Ventilator  


 


11/28/19 23:51  108      


 


11/28/19 23:00  98 19     30


 


11/28/19 21:00  100 21     30


 


11/28/19 20:00      Mechanical Ventilator  


 


11/28/19 20:00        30


 


11/28/19 20:00 97.7 102 16 127/78 (94) 100   


 


11/28/19 19:25  105      


 


11/28/19 19:00  98 19     30


 


11/28/19 17:16  100 19     30


 


11/28/19 16:00        30


 


11/28/19 16:00      Mechanical Ventilator  


 


11/28/19 16:00 98.6 105 18 115/87 (96) 100   


 


11/28/19 15:22  105      


 


11/28/19 13:00  100 19     30


 


11/28/19 12:00 98.9 104 16 127/84 (98) 100   


 


11/28/19 12:00        30


 


11/28/19 12:00      Mechanical Ventilator  


 


11/28/19 11:40  103      


 


11/28/19 11:05  102 17     30








Height (Feet):  5


Height (Inches):  7.00


Weight (Pounds):  196


Objective


GENERAL:  Noncommunicative.  Tongue fasciculation.  Moderate edema.


LUNGS:  Diminished breath sounds.


CARDIAC:  Regular rhythm.  Rapid rate.  Normal S1, S2.


ABDOMEN:  Soft with G-tube and colostomy bag.





Laboratory Tests








Test


  11/29/19


08:10


 


White Blood Count


  7.5 K/UL


(4.8-10.8)


 


Red Blood Count


  3.42 M/UL


(4.70-6.10)  L


 


Hemoglobin


  9.9 G/DL


(14.2-18.0)  L


 


Hematocrit


  31.7 %


(42.0-52.0)  L


 


Mean Corpuscular Volume 93 FL (80-99)  


 


Mean Corpuscular Hemoglobin


  28.9 PG


(27.0-31.0)


 


Mean Corpuscular Hemoglobin


Concent 31.3 G/DL


(32.0-36.0)  L


 


Red Cell Distribution Width


  16.5 %


(11.6-14.8)  H


 


Platelet Count


  273 K/UL


(150-450)


 


Mean Platelet Volume


  8.0 FL


(6.5-10.1)


 


Neutrophils (%) (Auto)


  67.6 %


(45.0-75.0)


 


Lymphocytes (%) (Auto)


  9.4 %


(20.0-45.0)  L


 


Monocytes (%) (Auto)


  7.6 %


(1.0-10.0)


 


Eosinophils (%) (Auto)


  13.1 %


(0.0-3.0)  H


 


Basophils (%) (Auto)


  2.3 %


(0.0-2.0)  H


 


Sodium Level


  148 MMOL/L


(136-145)  H


 


Potassium Level


  3.7 MMOL/L


(3.5-5.1)


 


Chloride Level


  116 MMOL/L


()  H


 


Carbon Dioxide Level


  20 MMOL/L


(21-32)  L


 


Anion Gap


  12 mmol/L


(5-15)


 


Blood Urea Nitrogen


  11 mg/dL


(7-18)


 


Creatinine


  1.0 MG/DL


(0.55-1.30)


 


Estimat Glomerular Filtration


Rate > 60 mL/min


(>60)


 


Glucose Level


  192 MG/DL


()  H


 


Uric Acid


  8.2 MG/DL


(2.6-7.2)  H


 


Calcium Level


  9.5 MG/DL


(8.5-10.1)


 


Phosphorus Level


  2.8 MG/DL


(2.5-4.9)


 


Magnesium Level


  1.8 MG/DL


(1.8-2.4)


 


Total Bilirubin


  0.4 MG/DL


(0.2-1.0)


 


Aspartate Amino Transf


(AST/SGOT) 41 U/L (15-37)


H


 


Alanine Aminotransferase


(ALT/SGPT) 25 U/L (12-78)


 


 


Alkaline Phosphatase


  66 U/L


()


 


Total Protein


  6.9 G/DL


(6.4-8.2)


 


Albumin


  2.3 G/DL


(3.4-5.0)  L


 


Globulin 4.6 g/dL  


 


Albumin/Globulin Ratio


  0.5 (1.0-2.7)


L


 


Vancomycin Level Trough


  20.1 ug/mL


(5.0-12.0)  H











Current Medications








 Medications


  (Trade)  Dose


 Ordered  Sig/Jan


 Route


 PRN Reason  Start Time


 Stop Time Status Last Admin


Dose Admin


 


 Acetaminophen


  (Tylenol)  650 mg  Q4H  PRN


 GT


 Mild Pain/Temp > 100.6  11/26/19 18:42


 12/26/19 18:41   


 


 


 Albuterol/


 Ipratropium


  (Albuterol/


 Ipratropium)  3 ml  Q2H  PRN


 HHN


 Shortness of Breath  11/26/19 18:45


 11/29/19 14:44   


 


 


 Artificial Tears


  (Akwa-Tears)  2 drop  Q12HR


 BOTH EYES


   11/26/19 21:00


 12/24/19 20:59  11/29/19 09:36


 


 


 Baclofen


  (Lioresal)  10 mg  THREE TIMES A  DAY


 GT


   11/27/19 09:00


 12/24/19 17:59  11/29/19 09:37


 


 


 Calcitonin [place]


  (Miacalcin)  1 sprays  DAILY


 NASAL


   11/28/19 12:00


 12/28/19 11:59  11/29/19 09:43


 


 


 Clobetasol


 Propionate


  (Temovate)  1 applic  EVERY 12  HOURS


 TOPIC


   11/26/19 21:00


 12/24/19 20:59  11/29/19 09:38


 


 


 Dextrose


  (Dextrose 50%)  25 ml  Q30M  PRN


 IV


 Hypoglycemia  11/26/19 18:45


 12/24/19 14:44   


 


 


 Dextrose


  (Dextrose 50%)  50 ml  Q30M  PRN


 IV


 Hypoglycemia  11/26/19 18:45


 12/24/19 14:44   


 


 


 Dextrose/


 Electrolytes  1,000 ml @ 


 100 mls/hr  Q10H


 IV


   11/28/19 03:00


 12/28/19 02:59  11/28/19 14:48


 


 


 Famotidine


  (Pepcid)  20 mg  BID


 GT


   11/28/19 18:00


 12/28/19 17:59  11/29/19 09:37


 


 


 Fenofibrate


  (Tricor)  145 mg  DAILY


 ORAL


   11/27/19 09:00


 12/25/19 08:59  11/29/19 09:37


 


 


 Heparin Sodium


  (Porcine)


  (Heparin 5000


 units/ml)  5,000 units  EVERY 12  HOURS


 SUBQ


   11/26/19 21:00


 12/24/19 20:59  11/29/19 09:43


 


 


 Insulin Aspart


  (NovoLOG)    BEFORE MEALS AND  HS


 SUBQ


   11/26/19 21:00


 12/24/19 16:29  11/29/19 05:40


 


 


 Levetiracetam


  (Keppra)  1,000 mg  Q8H


 GT


   11/29/19 02:00


 12/29/19 01:59  11/29/19 09:37


 


 


 Lorazepam


  (Ativan 2mg/ml


 1ml)  2 mg  Q2H  PRN


 IV


 For Anxiety  11/26/19 18:43


 12/3/19 18:42   


 


 


 Meropenem 1 gm/


 Sodium Chloride  55 ml @ 


 110 mls/hr  Q12HR


 IVPB


   11/26/19 21:00


 12/1/19 20:59  11/28/19 20:12


 


 


 Morphine Sulfate


  (Morphine


 Sulfate)  4 mg  Q4H  PRN


 IVP


 Severe Pain (Pain Scale 7-10)  11/26/19 18:44


 12/3/19 18:43   


 


 


 Ondansetron HCl


  (Zofran)  4 mg  Q6H  PRN


 IVP


 Nausea & Vomiting  11/26/19 18:44


 12/26/19 18:43   


 


 


 Polyethylene


 Glycol


  (Miralax)  17 gm  DAILYPRN  PRN


 GT


 Constipation  11/26/19 18:44


 12/26/19 18:43   


 


 


 Sitagliptin


 Phosphate


  (Januvia)  50 mg  ACBREAKFAST


 GT


   11/27/19 06:30


 12/25/19 06:29  11/29/19 05:38


 


 


 Tamsulosin HCl


  (Flomax)  0.4 mg  DAILY


 ORAL


   11/27/19 09:00


 12/27/19 08:59  11/29/19 09:37


 

















Joy Love M.D. Nov 29, 2019 10:59
Assessment/Plan


Assessment/Plan








Assessment:


Severe Sepsis- likely 2ry to UTI- r/o PNA


  -Bcx NTD


  -u/a wbc tnct, nit neg, leuk +3; ucx p


  -sp cx GNR


  -CXR:   Geographic density overlying the right upper lung.  This likely 

represents overlying soft tissue although cannot exclude an underlying 

consolidation/pneumonia. Pulmonary vascular congestion. Small left pleural 

effusion. Subsegmental atelectasis versus infiltrate in the left lung base.


 -Influenza A/B ag neg





Fever, improving


Leukocytosis, improving





BRAYDON, improving





Mild AST elevation, SP


   -Abd uS: Hepatomegaly with fatty infiltration. Suspected nonobstructive 

stone left kidney. Left renal cyst





Sacral decubitus





hx of  recurrent UTIs


  -UCx 1/27/19 P.a.  (R Levo, otherwise S)


  - CT abd 1/29/19 3 mm distal right ureteral calculus, minimal resultant 

hydronephrosis. Atrophic left kidney, containing a large staghorn calculus and 

multiple intrarenal calyceal calculi, previously described


   UCx 2/2/19 - Enterobacter (S Cefepime) and yeast and Pa





 HTN


CKD


 ICH s/p craniotomy and  shunt now w/ persistent vegetative state


 dysphagia s/p GT


Chronic resp failure - vent/trach dependent


ICH


COPD


DM


Seizure disorder


BPH


Dysphagia, G tube


Colostomy  


SNF  resident





Plan:


-Continue empiric IV Vancomycin and Meropenem #3 pending cultures


  -11/24 SP Cefepime x1





-f/u cx


-Monitor CBC/CMP, temperatures


-PEG/Trach/ICU care


-aspiration precautions


-wound care





Thank you for this consultation. Will continue to follow along with you.





Discussed with RN





Subjective


Allergies:  


Coded Allergies:  


     AZTREONAM (Verified  Allergy, Unknown, 7/23/18)


Subjective


afebrile in >36hrs


wbc and Cr improving


BCx NTD





Objective


Vital Signs





Last 24 Hour Vital Signs








  Date Time  Temp Pulse Resp B/P (MAP) Pulse Ox O2 Delivery O2 Flow Rate FiO2


 


11/26/19 10:00  100 17 116/76 (89) 100   


 


11/26/19 09:00  104 17 129/72 (91) 100   


 


11/26/19 09:00  102 16     30


 


11/26/19 08:00  104      


 


11/26/19 08:00        30


 


11/26/19 08:00  104 17 121/81 (94) 100   


 


11/26/19 08:00      Mechanical Ventilator  


 


11/26/19 07:15  106 19     30


 


11/26/19 07:00 98.3 102 17 128/74 (92) 100   


 


11/26/19 06:00  100 17 119/76 (90) 100   


 


11/26/19 05:04  100 16     30


 


11/26/19 05:00  101 19 105/73 (84) 100   


 


11/26/19 04:00      Mechanical Ventilator  


 


11/26/19 04:00        30


 


11/26/19 04:00 98.3 104 17 117/71 (86) 100   


 


11/26/19 03:18  104 17     30


 


11/26/19 03:06  105      


 


11/26/19 03:00  106 17 125/78 (94) 100   


 


11/26/19 02:00  109 17 119/70 (86) 100   


 


11/26/19 01:29  110 18     30


 


11/26/19 01:00  105 17 117/73 (88) 100   


 


11/26/19 00:00        30


 


11/26/19 00:00 98.7 103 16 141/80 (100) 100   


 


11/26/19 00:00      Mechanical Ventilator  


 


11/25/19 23:42  103      


 


11/25/19 23:00  105 17 116/78 (91) 100   


 


11/25/19 22:56  105 17     30


 


11/25/19 22:00  107 17 114/78 (90) 100   


 


11/25/19 21:26  106 16     30


 


11/25/19 21:00  105 17 123/80 (94) 100   


 


11/25/19 20:00      Mechanical Ventilator  


 


11/25/19 20:00        30


 


11/25/19 20:00 98.8 103 17 125/76 (92) 100   


 


11/25/19 19:38  106 17     30


 


11/25/19 19:26  107      


 


11/25/19 19:00  107 18 117/76 (90) 100   


 


11/25/19 18:00  108 18 126/77 (93) 100   


 


11/25/19 17:00  107 17 117/79 (92) 100   


 


11/25/19 16:57  111 18     30


 


11/25/19 16:00 98.5 109 17 133/81 (98) 100   


 


11/25/19 16:00      Mechanical Ventilator  


 


11/25/19 16:00  111      


 


11/25/19 16:00        30


 


11/25/19 16:00  108 18 133/81 (98) 100   


 


11/25/19 15:24  108 19     30


 


11/25/19 15:00  110 16 113/77 (89) 100   


 


11/25/19 14:00  107 16 129/78 (95) 100   


 


11/25/19 13:22  109 16     30


 


11/25/19 13:00  111 18 111/75 (87) 100   


 


11/25/19 12:00        30


 


11/25/19 12:00  108      


 


11/25/19 12:00      Mechanical Ventilator  


 


11/25/19 12:00 98.6 107 17 133/82 (99) 100   


 


11/25/19 12:00  117      


 


11/25/19 11:02  108 18     30


 


11/25/19 11:00  108 18 127/78 (94) 100   








Height (Feet):  5


Height (Inches):  7.00


Weight (Pounds):  183


Objective


GENERAL:  Noncommunicative.  Tongue fasciculation.  Moderate edema.


LUNGS:  Diminished breath sounds.


CARDIAC:  Regular rhythm.  Rapid rate.  Normal S1, S2.


ABDOMEN:  Soft with G-tube and colostomy bag.





Microbiology








 Date/Time


Source Procedure


Growth Status


 


 


 11/24/19 09:15


Blood Blood Culture - Preliminary


NO GROWTH AFTER 24 HOURS Resulted


 


 11/24/19 09:15


Blood Blood Culture - Preliminary


NO GROWTH AFTER 24 HOURS Resulted





 11/25/19 11:00


Nasal Nares - Final Complete


 


 11/25/19 11:00


Nasal Nares - Final Complete





 11/24/19 16:45


Sputum Gram Stain - Final Resulted


 


 11/24/19 16:45 Sputum Culture - Preliminary


Gram Negative Bacillus 1 Resulted





 11/24/19 09:40


Nasal Nares - Final Complete


 


 11/24/19 09:40


Nasal Nares - Final Complete


 


 11/24/19 09:10


Nasal Nares MRSA Culture - Final


Staphylococcus Aureus - Mrsa Complete


 


 11/24/19 16:45


Urine,Clean Catch Urine Culture - Preliminary Resulted


 


 11/24/19 09:10


Rectum VRE Culture - Final


Enterococcus Faecium - Vre Complete


 


 11/24/19 09:10


Rectum - Final


NO CARBAPENEM-RESISTANT ENTEROBACTERI... Complete











Laboratory Tests








Test


  11/25/19


11:00 11/26/19


03:00 11/26/19


03:15


 


Urine Legionella Antigen Pending    


 


Urine Color  Yellow   


 


Urine Appearance  Cloudy   


 


Urine pH  5 (4.5-8.0)   


 


Urine Specific Gravity


  


  1.015


(1.005-1.035) 


 


 


Urine Protein


  


  4+ (NEGATIVE)


H 


 


 


Urine Glucose (UA)


  


  1+ (NEGATIVE)


H 


 


 


Urine Ketones


  


  Negative


(NEGATIVE) 


 


 


Urine Blood


  


  3+ (NEGATIVE)


H 


 


 


Urine Nitrite


  


  Negative


(NEGATIVE) 


 


 


Urine Bilirubin


  


  Negative


(NEGATIVE) 


 


 


Urine Urobilinogen


  


  Normal MG/DL


(0.0-1.0) 


 


 


Urine Leukocyte Esterase


  


  3+ (NEGATIVE)


H 


 


 


Urine RBC


  


  20-30 /HPF (0


- 0)  H 


 


 


Urine WBC


  


  Tntc /HPF (0 -


0)  H 


 


 


Urine Squamous Epithelial


Cells 


  Few /LPF


(NONE/OCC) 


 


 


Urine Bacteria


  


  Moderate /HPF


(NONE)  H 


 


 


White Blood Count


  


  


  13.4 K/UL


(4.8-10.8)  H


 


Red Blood Count


  


  


  2.41 M/UL


(4.70-6.10)  L


 


Hemoglobin


  


  


  7.0 G/DL


(14.2-18.0)  L


 


Hematocrit


  


  


  23.0 %


(42.0-52.0)  L


 


Mean Corpuscular Volume   95 FL (80-99)  


 


Mean Corpuscular Hemoglobin


  


  


  29.2 PG


(27.0-31.0)


 


Mean Corpuscular Hemoglobin


Concent 


  


  30.6 G/DL


(32.0-36.0)  L


 


Red Cell Distribution Width


  


  


  17.3 %


(11.6-14.8)  H


 


Platelet Count


  


  


  243 K/UL


(150-450)


 


Mean Platelet Volume


  


  


  8.9 FL


(6.5-10.1)


 


Neutrophils (%) (Auto)


  


  


  % (45.0-75.0)


 


 


Lymphocytes (%) (Auto)


  


  


  % (20.0-45.0)


 


 


Monocytes (%) (Auto)    % (1.0-10.0)  


 


Eosinophils (%) (Auto)    % (0.0-3.0)  


 


Basophils (%) (Auto)    % (0.0-2.0)  


 


Differential Total Cells


Counted 


  


  100  


 


 


Neutrophils % (Manual)   74 % (45-75)  


 


Lymphocytes % (Manual)   9 % (20-45)  L


 


Monocytes % (Manual)   3 % (1-10)  


 


Eosinophils % (Manual)   14 % (0-3)  H


 


Basophils % (Manual)   0 % (0-2)  


 


Band Neutrophils   0 % (0-8)  


 


Platelet Estimate   Adequate  


 


Platelet Morphology   Normal  


 


Anisocytosis   1+  


 


Sodium Level


  


  


  153 MMOL/L


(136-145)  H


 


Potassium Level


  


  


  3.2 MMOL/L


(3.5-5.1)  L


 


Chloride Level


  


  


  117 MMOL/L


()  H


 


Carbon Dioxide Level


  


  


  21 MMOL/L


(21-32)


 


Anion Gap


  


  


  15 mmol/L


(5-15)


 


Blood Urea Nitrogen


  


  


  30 mg/dL


(7-18)  H


 


Creatinine


  


  


  1.4 MG/DL


(0.55-1.30)  H


 


Estimat Glomerular Filtration


Rate 


  


  > 60 mL/min


(>60)


 


Glucose Level


  


  


  219 MG/DL


()  H


 


Calcium Level


  


  


  10.7 MG/DL


(8.5-10.1)  H


 


Calcium (Send out)   Pending  


 


Phosphorus Level


  


  


  2.7 MG/DL


(2.5-4.9)


 


Magnesium Level


  


  


  1.9 MG/DL


(1.8-2.4)


 


Total Creatine Kinase


  


  


  156 U/L


()


 


Triglycerides Level


  


  


  1256 MG/DL


()  H


 


Cholesterol Level


  


  


  241 MG/DL (<


200)  H


 


LDL Cholesterol


  


  


  59 mg/dL


(<100)


 


HDL Cholesterol


  


  


  18 MG/DL


(40-60)  L


 


Cholesterol/HDL Ratio


  


  


  13.4 (3.3-4.4)


H


 


Parathyroid Hormone (Intact)   Pending  











Current Medications








 Medications


  (Trade)  Dose


 Ordered  Sig/Jan


 Route


 PRN Reason  Start Time


 Stop Time Status Last Admin


Dose Admin


 


 Acetaminophen


  (Tylenol)  650 mg  Q4H  PRN


 GT


 Mild Pain/Temp > 100.6  11/24/19 14:45


 12/24/19 14:44   


 


 


 Albuterol/


 Ipratropium


  (Albuterol/


 Ipratropium)  3 ml  Q2H  PRN


 HHN


 Shortness of Breath  11/24/19 14:45


 11/29/19 14:44   


 


 


 Artificial Tears


  (Akwa-Tears)  2 drop  Q12HR


 BOTH EYES


   11/24/19 21:00


 12/24/19 20:59  11/26/19 08:51


 


 


 Baclofen


  (Lioresal)  10 mg  THREE TIMES A  DAY


 GT


   11/24/19 18:00


 12/24/19 17:59  11/26/19 08:44


 


 


 Calcium Carbonate


  (Tums)  1,000 mg  BID


 GT


   11/24/19 18:00


 12/24/19 17:59  11/26/19 08:44


 


 


 Chlorhexidine


 Gluconate


  (Elaine-Hex 2%)  1 applic  DAILY@2000


 TOPIC


   11/25/19 20:00


 12/25/19 19:59   


 


 


 Clobetasol


 Propionate


  (Temovate)  1 applic  EVERY 12  HOURS


 TOPIC


   11/24/19 21:00


 12/24/19 20:59  11/26/19 08:51


 


 


 Dextrose


  (Dextrose 50%)  25 ml  Q30M  PRN


 IV


 Hypoglycemia  11/24/19 14:45


 12/24/19 14:44   


 


 


 Dextrose


  (Dextrose 50%)  50 ml  Q30M  PRN


 IV


 Hypoglycemia  11/24/19 14:45


 12/24/19 14:44   


 


 


 Fenofibrate


  (Tricor)  145 mg  DAILY


 ORAL


   11/25/19 09:00


 12/25/19 08:59  11/26/19 08:44


 


 


 Heparin Sodium


  (Porcine)


  (Heparin 5000


 units/ml)  5,000 units  EVERY 12  HOURS


 SUBQ


   11/24/19 21:00


 12/24/19 20:59  11/25/19 21:21


 


 


 Insulin Aspart


  (NovoLOG)    BEFORE MEALS AND  HS


 SUBQ


   11/24/19 16:30


 12/24/19 16:29  11/26/19 06:25


 


 


 Levetiracetam  100 ml @ 


 400 mls/hr  Q8HR


 IVPB


   11/24/19 22:00


 12/24/19 21:59  11/26/19 05:14


 


 


 Lorazepam


  (Ativan 2mg/ml


 1ml)  2 mg  Q2H  PRN


 IV


 For Anxiety  11/24/19 16:00


 12/1/19 15:59   


 


 


 Meropenem 1 gm/


 Sodium Chloride  55 ml @ 


 110 mls/hr  Q12HR


 IVPB


   11/24/19 21:00


 11/29/19 20:59  11/26/19 08:52


 


 


 Morphine Sulfate


  (Morphine


 Sulfate)  4 mg  Q4H  PRN


 IVP


 Severe Pain (Pain Scale 7-10)  11/24/19 16:00


 12/1/19 15:59   


 


 


 Ondansetron HCl


  (Zofran)  4 mg  Q6H  PRN


 IVP


 Nausea & Vomiting  11/24/19 16:00


 12/24/19 15:59   


 


 


 Pantoprazole


  (Protonix)  40 mg  DAILY


 IV


   11/25/19 09:00


 12/25/19 08:59  11/26/19 08:44


 


 


 Polyethylene


 Glycol


  (Miralax)  17 gm  DAILYPRN  PRN


 GT


 Constipation  11/24/19 16:00


 12/24/19 14:44   


 


 


 Sitagliptin


 Phosphate


  (Januvia)  50 mg  ACBREAKFAST


 GT


   11/25/19 06:30


 12/25/19 06:29  11/26/19 06:24


 


 


 Vancomycin HCl


  (Vanco rx to


 dose)  1 ea  DAILY  PRN


 MISC


 .  11/24/19 16:00


 12/24/19 15:59   


 


 


 Vancomycin/Sodium


 Chloride  275 ml @ 


 137.5 mls/


 hr  Q24H


 IVPB


   11/25/19 09:00


 11/30/19 08:59  11/26/19 08:52


 

















Joy Love M.D. Nov 26, 2019 10:43
Assessment/Plan


Assessment/Plan








Assessment:


Severe Sepsis- likely 2ry to UTI- r/o PNA


  -Bcx NTD


  -u/a wbc tnct, nit neg, leuk +3; ucx yeast


  -sp cx GNR #1, #2


  -CXR:   Geographic density overlying the right upper lung.  This likely 

represents overlying soft tissue although cannot exclude an underlying 

consolidation/pneumonia. Pulmonary vascular congestion. Small left pleural 

effusion. Subsegmental atelectasis versus infiltrate in the left lung base.


 -Influenza A/B ag neg





Fever, SP


Leukocytosis, improving





BRAYDON, improving





Mild AST elevation, SP


   -Abd uS: Hepatomegaly with fatty infiltration. Suspected nonobstructive 

stone left kidney. Left renal cyst





Sacral decubitus





hx of  recurrent UTIs


  -UCx 1/27/19 P.a.  (R Levo, otherwise S)


  - CT abd 1/29/19 3 mm distal right ureteral calculus, minimal resultant 

hydronephrosis. Atrophic left kidney, containing a large staghorn calculus and 

multiple intrarenal calyceal calculi, previously described


   UCx 2/2/19 - Enterobacter (S Cefepime) and yeast and Pa





 HTN


CKD


 ICH s/p craniotomy and  shunt now w/ persistent vegetative state


 dysphagia s/p GT


Chronic resp failure - vent/trach dependent


ICH


COPD


DM


Seizure disorder


BPH


Dysphagia, G tube


Colostomy  


SNF  resident





Plan:


-D/c  empiric IV Vancomycin #4


-Continue empiric Meropenem #4 pending cultures


  -11/24 SP Cefepime x1





-f/u cx


-Monitor CBC/CMP, temperatures


-PEG/Trach/ICU care


-aspiration precautions


-wound care





Thank you for this consultation. Will continue to follow along with you.





Discussed with RN





Subjective


Allergies:  


Coded Allergies:  


     AZTREONAM (Verified  Allergy, Unknown, 7/23/18)


Subjective


afebrile in >72hrs


wbc and Cr improving


BCx NTD





Objective


Vital Signs





Last 24 Hour Vital Signs








  Date Time  Temp Pulse Resp B/P (MAP) Pulse Ox O2 Delivery O2 Flow Rate FiO2


 


11/27/19 15:09  101 19     30


 


11/27/19 13:15  101 17     30


 


11/27/19 12:00      Mechanical Ventilator  


 


11/27/19 12:00  113      


 


11/27/19 12:00        30


 


11/27/19 12:00 97.9 102 18 115/75 (88) 100   


 


11/27/19 11:28  103 19     30


 


11/27/19 09:00  101 18     30


 


11/27/19 08:00 97.5 100 17 127/81 (96) 100   


 


11/27/19 08:00      Mechanical Ventilator  


 


11/27/19 08:00  101      


 


11/27/19 08:00        30


 


11/27/19 06:52  101 20     30


 


11/27/19 05:24  95 17     30


 


11/27/19 04:00 97.9 93 18 122/81 (95) 100   


 


11/27/19 04:00  91      


 


11/27/19 04:00      Mechanical Ventilator  


 


11/27/19 04:00        30


 


11/27/19 03:40  95 18     30


 


11/27/19 01:03  88 19     30


 


11/27/19 00:00 97.9 93 18 125/80 (95) 100   


 


11/27/19 00:00  92      


 


11/27/19 00:00      Mechanical Ventilator  


 


11/27/19 00:00        30


 


11/26/19 23:16  106 19     30


 


11/26/19 21:09  99 19     30


 


11/26/19 20:00      Mechanical Ventilator  


 


11/26/19 20:00        30


 


11/26/19 20:00 98.8 95 17 133/79 (97) 100   


 


11/26/19 19:35  94      


 


11/26/19 19:22  104 16     30


 


11/26/19 18:00 98.1 94 17 110/73 (85) 100   


 


11/26/19 17:00  96 16 120/72 (88) 100   








Height (Feet):  5


Height (Inches):  7.00


Weight (Pounds):  183


Objective


GENERAL:  Noncommunicative.  Tongue fasciculation.  Moderate edema.


LUNGS:  Diminished breath sounds.


CARDIAC:  Regular rhythm.  Rapid rate.  Normal S1, S2.


ABDOMEN:  Soft with G-tube and colostomy bag.





Microbiology








 Date/Time


Source Procedure


Growth Status


 


 


 11/25/19 11:00


Nasal Nares - Final Complete


 


 11/25/19 11:00


Nasal Nares - Final Complete


 


 11/26/19 03:00


Urine,Clean Catch Urine Culture - Preliminary Resulted











Laboratory Tests








Test


  11/27/19


04:50 11/27/19


08:25


 


White Blood Count


  11.5 K/UL


(4.8-10.8)  H 


 


 


Red Blood Count


  2.77 M/UL


(4.70-6.10)  L 


 


 


Hemoglobin


  8.2 G/DL


(14.2-18.0)  L 


 


 


Hematocrit


  25.8 %


(42.0-52.0)  L 


 


 


Mean Corpuscular Volume 93 FL (80-99)   


 


Mean Corpuscular Hemoglobin


  29.5 PG


(27.0-31.0) 


 


 


Mean Corpuscular Hemoglobin


Concent 31.7 G/DL


(32.0-36.0)  L 


 


 


Red Cell Distribution Width


  17.4 %


(11.6-14.8)  H 


 


 


Platelet Count


  253 K/UL


(150-450) 


 


 


Mean Platelet Volume


  9.0 FL


(6.5-10.1) 


 


 


Neutrophils (%) (Auto)


  68.8 %


(45.0-75.0) 


 


 


Lymphocytes (%) (Auto)


  8.6 %


(20.0-45.0)  L 


 


 


Monocytes (%) (Auto)


  4.8 %


(1.0-10.0) 


 


 


Eosinophils (%) (Auto)


  16.7 %


(0.0-3.0)  H 


 


 


Basophils (%) (Auto)


  1.1 %


(0.0-2.0) 


 


 


Prothrombin Time


  11.4 SEC


(9.30-11.50) 


 


 


Prothromb Time International


Ratio 1.1 (0.9-1.1)  


  


 


 


Activated Partial


Thromboplast Time 25 SEC (23-33)


  


 


 


Sodium Level


  153 MMOL/L


(136-145)  H 


 


 


Potassium Level


  3.0 MMOL/L


(3.5-5.1)  L 


 


 


Chloride Level


  118 MMOL/L


()  H 


 


 


Carbon Dioxide Level


  19 MMOL/L


(21-32)  L 


 


 


Anion Gap


  16 mmol/L


(5-15)  H 


 


 


Blood Urea Nitrogen


  21 mg/dL


(7-18)  H 


 


 


Creatinine


  1.0 MG/DL


(0.55-1.30) 


 


 


Estimat Glomerular Filtration


Rate > 60 mL/min


(>60) 


 


 


Glucose Level


  199 MG/DL


()  H 


 


 


Calcium Level


  10.2 MG/DL


(8.5-10.1)  H 


 


 


Vancomycin Level Trough


  


  17.5 ug/mL


(5.0-12.0)  H











Current Medications








 Medications


  (Trade)  Dose


 Ordered  Sig/Jan


 Route


 PRN Reason  Start Time


 Stop Time Status Last Admin


Dose Admin


 


 Acetaminophen


  (Tylenol)  650 mg  Q4H  PRN


 GT


 Mild Pain/Temp > 100.6  11/26/19 18:42


 12/26/19 18:41   


 


 


 Albuterol/


 Ipratropium


  (Albuterol/


 Ipratropium)  3 ml  Q2H  PRN


 HHN


 Shortness of Breath  11/26/19 18:45


 11/29/19 14:44   


 


 


 Artificial Tears


  (Akwa-Tears)  2 drop  Q12HR


 BOTH EYES


   11/26/19 21:00


 12/24/19 20:59  11/27/19 10:08


 


 


 Baclofen


  (Lioresal)  10 mg  THREE TIMES A  DAY


 GT


   11/27/19 09:00


 12/24/19 17:59  11/27/19 10:10


 


 


 Clobetasol


 Propionate


  (Temovate)  1 applic  EVERY 12  HOURS


 TOPIC


   11/26/19 21:00


 12/24/19 20:59  11/27/19 10:09


 


 


 Dextrose


  (Dextrose 50%)  25 ml  Q30M  PRN


 IV


 Hypoglycemia  11/26/19 18:45


 12/24/19 14:44   


 


 


 Dextrose


  (Dextrose 50%)  50 ml  Q30M  PRN


 IV


 Hypoglycemia  11/26/19 18:45


 12/24/19 14:44   


 


 


 Fenofibrate


  (Tricor)  145 mg  DAILY


 ORAL


   11/27/19 09:00


 12/25/19 08:59  11/27/19 10:10


 


 


 Heparin Sodium


  (Porcine)


  (Heparin 5000


 units/ml)  5,000 units  EVERY 12  HOURS


 SUBQ


   11/26/19 21:00


 12/24/19 20:59   


 


 


 Insulin Aspart


  (NovoLOG)    BEFORE MEALS AND  HS


 SUBQ


   11/26/19 21:00


 12/24/19 16:29   


 


 


 Levetiracetam  100 ml @ 


 400 mls/hr  Q8HR@0100,0900,1700


 IVPB


   11/27/19 17:00


 12/27/19 16:59   


 


 


 Lorazepam


  (Ativan 2mg/ml


 1ml)  2 mg  Q2H  PRN


 IV


 For Anxiety  11/26/19 18:43


 12/3/19 18:42   


 


 


 Meropenem 1 gm/


 Sodium Chloride  55 ml @ 


 110 mls/hr  Q12HR


 IVPB


   11/26/19 21:00


 11/29/19 20:59  11/27/19 10:31


 


 


 Morphine Sulfate


  (Morphine


 Sulfate)  4 mg  Q4H  PRN


 IVP


 Severe Pain (Pain Scale 7-10)  11/26/19 18:44


 12/3/19 18:43   


 


 


 Ondansetron HCl


  (Zofran)  4 mg  Q6H  PRN


 IVP


 Nausea & Vomiting  11/26/19 18:44


 12/26/19 18:43   


 


 


 Pantoprazole


  (Protonix)  40 mg  DAILY


 IV


   11/27/19 09:00


 12/25/19 08:59  11/27/19 10:09


 


 


 Polyethylene


 Glycol


  (Miralax)  17 gm  DAILYPRN  PRN


 GT


 Constipation  11/26/19 18:44


 12/26/19 18:43   


 


 


 Potassium Chloride


  (K-Dur)  40 meq  TWICE A  DAY


 ORAL


   11/27/19 09:00


 11/27/19 23:00  11/27/19 10:11


 


 


 Sitagliptin


 Phosphate


  (Januvia)  50 mg  ACBREAKFAST


 GT


   11/27/19 06:30


 12/25/19 06:29  11/27/19 05:54


 


 


 Tamsulosin HCl


  (Flomax)  0.4 mg  DAILY


 ORAL


   11/27/19 09:00


 12/27/19 08:59  11/27/19 10:10


 


 


 Vancomycin HCl


  (Vanco rx to


 dose)  1 ea  DAILY  PRN


 MISC


 .  11/27/19 09:00


 12/24/19 15:59   


 


 


 Vancomycin/Sodium


 Chloride  275 ml @ 


 137.5 mls/


 hr  Q24H


 IVPB


   11/27/19 09:00


 11/30/19 08:59  11/27/19 10:32


 

















Joy Love M.D. Nov 27, 2019 16:48
n/a
n/a
